# Patient Record
Sex: FEMALE | Race: WHITE | NOT HISPANIC OR LATINO | Employment: OTHER | URBAN - METROPOLITAN AREA
[De-identification: names, ages, dates, MRNs, and addresses within clinical notes are randomized per-mention and may not be internally consistent; named-entity substitution may affect disease eponyms.]

---

## 2017-01-27 ENCOUNTER — NURSE TRIAGE (OUTPATIENT)
Dept: TELEHEALTH | Age: 77
End: 2017-01-27

## 2017-03-11 ENCOUNTER — NURSE TRIAGE (OUTPATIENT)
Dept: TELEHEALTH | Age: 77
End: 2017-03-11

## 2017-03-17 ENCOUNTER — HOSPITAL ENCOUNTER (EMERGENCY)
Age: 77
Discharge: HOME | End: 2017-03-17
Attending: EMERGENCY MEDICINE

## 2017-03-17 ENCOUNTER — APPOINTMENT (OUTPATIENT)
Dept: GENERAL RADIOLOGY | Age: 77
End: 2017-03-17
Attending: EMERGENCY MEDICINE

## 2017-03-17 VITALS
TEMPERATURE: 98.9 F | HEIGHT: 62 IN | BODY MASS INDEX: 28.64 KG/M2 | HEART RATE: 73 BPM | SYSTOLIC BLOOD PRESSURE: 164 MMHG | OXYGEN SATURATION: 100 % | DIASTOLIC BLOOD PRESSURE: 70 MMHG | WEIGHT: 155.65 LBS | RESPIRATION RATE: 18 BRPM

## 2017-03-17 DIAGNOSIS — S62.101A RIGHT WRIST FRACTURE, CLOSED, INITIAL ENCOUNTER: Primary | ICD-10-CM

## 2017-03-17 LAB — GLUCOSE BLDC GLUCOMTR-MCNC: 184 MG/DL (ref 65–99)

## 2017-03-17 PROCEDURE — 29125 APPL SHORT ARM SPLINT STATIC: CPT | Performed by: EMERGENCY MEDICINE

## 2017-03-17 PROCEDURE — 73110 X-RAY EXAM OF WRIST: CPT

## 2017-03-17 PROCEDURE — 82962 GLUCOSE BLOOD TEST: CPT

## 2017-03-17 PROCEDURE — 99284 EMERGENCY DEPT VISIT MOD MDM: CPT | Performed by: NURSE PRACTITIONER

## 2017-03-17 PROCEDURE — 73090 X-RAY EXAM OF FOREARM: CPT | Performed by: RADIOLOGY

## 2017-03-17 PROCEDURE — 73110 X-RAY EXAM OF WRIST: CPT | Performed by: RADIOLOGY

## 2017-03-17 PROCEDURE — 73060 X-RAY EXAM OF HUMERUS: CPT

## 2017-03-17 PROCEDURE — 73090 X-RAY EXAM OF FOREARM: CPT

## 2017-03-17 PROCEDURE — A4565 SLINGS: HCPCS | Performed by: EMERGENCY MEDICINE

## 2017-03-17 PROCEDURE — 73060 X-RAY EXAM OF HUMERUS: CPT | Performed by: RADIOLOGY

## 2017-03-17 RX ORDER — HYDROCODONE BITARTRATE AND ACETAMINOPHEN 5; 325 MG/1; MG/1
1 TABLET ORAL ONCE
Status: COMPLETED | OUTPATIENT
Start: 2017-03-17 | End: 2017-03-17

## 2017-03-17 RX ORDER — HYDROCODONE BITARTRATE AND ACETAMINOPHEN 5; 325 MG/1; MG/1
1-2 TABLET ORAL EVERY 6 HOURS PRN
Qty: 12 TABLET | Refills: 0 | Status: SHIPPED | OUTPATIENT
Start: 2017-03-17

## 2017-03-17 RX ADMIN — HYDROCODONE BITARTRATE AND ACETAMINOPHEN 0.5 TABLET: 5; 325 TABLET ORAL at 10:15

## 2017-03-17 ASSESSMENT — ENCOUNTER SYMPTOMS
DIARRHEA: 0
ABDOMINAL PAIN: 0
HEADACHES: 0
COUGH: 0
NAUSEA: 0
CONFUSION: 0
WEAKNESS: 0
NUMBNESS: 0
CHILLS: 0
VOMITING: 0
FEVER: 0
SHORTNESS OF BREATH: 0
SORE THROAT: 0

## 2017-03-17 ASSESSMENT — PAIN SCALES - GENERAL
PAINLEVEL_OUTOF10: 2
PAINLEVEL_OUTOF10: 3
PAINLEVEL_OUTOF10: 8

## 2018-01-03 ENCOUNTER — NURSE TRIAGE (OUTPATIENT)
Dept: TELEHEALTH | Age: 78
End: 2018-01-03

## 2018-05-18 ENCOUNTER — HOSPITAL ENCOUNTER (INPATIENT)
Age: 78
LOS: 3 days | Discharge: NURSING, REHAB OR LONG-TERM CARE | DRG: 699 | End: 2018-05-22
Attending: INTERNAL MEDICINE | Admitting: INTERNAL MEDICINE
Payer: MEDICARE

## 2018-05-18 ENCOUNTER — APPOINTMENT (OUTPATIENT)
Dept: GENERAL RADIOLOGY | Age: 78
DRG: 699 | End: 2018-05-18
Attending: INTERNAL MEDICINE
Payer: MEDICARE

## 2018-05-18 ENCOUNTER — APPOINTMENT (OUTPATIENT)
Dept: CT IMAGING | Age: 78
DRG: 699 | End: 2018-05-18
Attending: INTERNAL MEDICINE
Payer: MEDICARE

## 2018-05-18 ENCOUNTER — APPOINTMENT (OUTPATIENT)
Dept: INTERVENTIONAL RADIOLOGY/VASCULAR | Age: 78
DRG: 699 | End: 2018-05-18
Attending: RADIOLOGY
Payer: MEDICARE

## 2018-05-18 DIAGNOSIS — N12 PYELONEPHRITIS: ICD-10-CM

## 2018-05-18 DIAGNOSIS — R10.9 RIGHT FLANK PAIN: ICD-10-CM

## 2018-05-18 DIAGNOSIS — T83.098A MALFUNCTION OF NEPHROSTOMY TUBE (HCC): Primary | ICD-10-CM

## 2018-05-18 PROBLEM — Z90.5 S/P NEPHRECTOMY: Status: ACTIVE | Noted: 2018-05-18

## 2018-05-18 PROBLEM — T83.022A NEPHROSTOMY TUBE DISPLACED (HCC): Status: ACTIVE | Noted: 2018-05-18

## 2018-05-18 LAB
ANION GAP SERPL CALC-SCNC: 9 MMOL/L (ref 3–18)
APPEARANCE UR: ABNORMAL
APTT PPP: 36.5 SEC (ref 23–36.4)
BACTERIA URNS QL MICRO: ABNORMAL /HPF
BASOPHILS # BLD: 0 K/UL (ref 0–0.06)
BASOPHILS NFR BLD: 0 % (ref 0–2)
BILIRUB UR QL: ABNORMAL
BUN SERPL-MCNC: 23 MG/DL (ref 7–18)
BUN/CREAT SERPL: 15 (ref 12–20)
CALCIUM SERPL-MCNC: 9 MG/DL (ref 8.5–10.1)
CHLORIDE SERPL-SCNC: 105 MMOL/L (ref 100–108)
CK MB CFR SERPL CALC: NORMAL % (ref 0–4)
CK MB SERPL-MCNC: <1 NG/ML (ref 5–25)
CK SERPL-CCNC: 67 U/L (ref 26–192)
CO2 SERPL-SCNC: 25 MMOL/L (ref 21–32)
COLOR UR: ABNORMAL
CREAT SERPL-MCNC: 1.58 MG/DL (ref 0.6–1.3)
DIFFERENTIAL METHOD BLD: ABNORMAL
EOSINOPHIL # BLD: 0.2 K/UL (ref 0–0.4)
EOSINOPHIL NFR BLD: 2 % (ref 0–5)
EPITH CASTS URNS QL MICRO: ABNORMAL /LPF (ref 0–5)
ERYTHROCYTE [DISTWIDTH] IN BLOOD BY AUTOMATED COUNT: 13.3 % (ref 11.6–14.5)
EST. AVERAGE GLUCOSE BLD GHB EST-MCNC: 151 MG/DL
GLUCOSE SERPL-MCNC: 157 MG/DL (ref 74–99)
GLUCOSE UR STRIP.AUTO-MCNC: 100 MG/DL
HBA1C MFR BLD: 6.9 % (ref 4.5–5.6)
HCT VFR BLD AUTO: 39.9 % (ref 35–45)
HGB BLD-MCNC: 13 G/DL (ref 12–16)
HGB UR QL STRIP: ABNORMAL
INR PPP: 1 (ref 0.8–1.2)
KETONES UR QL STRIP.AUTO: NEGATIVE MG/DL
LEUKOCYTE ESTERASE UR QL STRIP.AUTO: ABNORMAL
LYMPHOCYTES # BLD: 1 K/UL (ref 0.9–3.6)
LYMPHOCYTES NFR BLD: 10 % (ref 21–52)
MAGNESIUM SERPL-MCNC: 1.7 MG/DL (ref 1.6–2.6)
MCH RBC QN AUTO: 29.3 PG (ref 24–34)
MCHC RBC AUTO-ENTMCNC: 32.6 G/DL (ref 31–37)
MCV RBC AUTO: 90.1 FL (ref 74–97)
MONOCYTES # BLD: 0.8 K/UL (ref 0.05–1.2)
MONOCYTES NFR BLD: 8 % (ref 3–10)
NEUTS SEG # BLD: 8 K/UL (ref 1.8–8)
NEUTS SEG NFR BLD: 80 % (ref 40–73)
NITRITE UR QL STRIP.AUTO: POSITIVE
PH UR STRIP: 5.5 [PH] (ref 5–8)
PLATELET # BLD AUTO: 229 K/UL (ref 135–420)
PMV BLD AUTO: 9.3 FL (ref 9.2–11.8)
POTASSIUM SERPL-SCNC: 4.3 MMOL/L (ref 3.5–5.5)
PROT UR STRIP-MCNC: 100 MG/DL
PROTHROMBIN TIME: 12.9 SEC (ref 11.5–15.2)
RBC # BLD AUTO: 4.43 M/UL (ref 4.2–5.3)
RBC #/AREA URNS HPF: ABNORMAL /HPF (ref 0–5)
SODIUM SERPL-SCNC: 139 MMOL/L (ref 136–145)
SP GR UR REFRACTOMETRY: 1.02 (ref 1–1.03)
TROPONIN I SERPL-MCNC: <0.02 NG/ML (ref 0–0.06)
UROBILINOGEN UR QL STRIP.AUTO: 0.2 EU/DL (ref 0.2–1)
WBC # BLD AUTO: 10 K/UL (ref 4.6–13.2)
WBC URNS QL MICRO: ABNORMAL /HPF (ref 0–5)

## 2018-05-18 PROCEDURE — 85025 COMPLETE CBC W/AUTO DIFF WBC: CPT | Performed by: INTERNAL MEDICINE

## 2018-05-18 PROCEDURE — 87040 BLOOD CULTURE FOR BACTERIA: CPT | Performed by: INTERNAL MEDICINE

## 2018-05-18 PROCEDURE — 74176 CT ABD & PELVIS W/O CONTRAST: CPT

## 2018-05-18 PROCEDURE — 74018 RADEX ABDOMEN 1 VIEW: CPT

## 2018-05-18 PROCEDURE — 83036 HEMOGLOBIN GLYCOSYLATED A1C: CPT | Performed by: INTERNAL MEDICINE

## 2018-05-18 PROCEDURE — 81001 URINALYSIS AUTO W/SCOPE: CPT | Performed by: INTERNAL MEDICINE

## 2018-05-18 PROCEDURE — 74011636320 HC RX REV CODE- 636/320: Performed by: RADIOLOGY

## 2018-05-18 PROCEDURE — 87186 SC STD MICRODIL/AGAR DIL: CPT | Performed by: INTERNAL MEDICINE

## 2018-05-18 PROCEDURE — 87070 CULTURE OTHR SPECIMN AEROBIC: CPT | Performed by: INTERNAL MEDICINE

## 2018-05-18 PROCEDURE — 83735 ASSAY OF MAGNESIUM: CPT | Performed by: INTERNAL MEDICINE

## 2018-05-18 PROCEDURE — C1769 GUIDE WIRE: HCPCS

## 2018-05-18 PROCEDURE — 87086 URINE CULTURE/COLONY COUNT: CPT | Performed by: INTERNAL MEDICINE

## 2018-05-18 PROCEDURE — 93005 ELECTROCARDIOGRAM TRACING: CPT

## 2018-05-18 PROCEDURE — 87077 CULTURE AEROBIC IDENTIFY: CPT | Performed by: INTERNAL MEDICINE

## 2018-05-18 PROCEDURE — 99152 MOD SED SAME PHYS/QHP 5/>YRS: CPT

## 2018-05-18 PROCEDURE — 74011250636 HC RX REV CODE- 250/636: Performed by: INTERNAL MEDICINE

## 2018-05-18 PROCEDURE — 85730 THROMBOPLASTIN TIME PARTIAL: CPT | Performed by: INTERNAL MEDICINE

## 2018-05-18 PROCEDURE — 80048 BASIC METABOLIC PNL TOTAL CA: CPT | Performed by: INTERNAL MEDICINE

## 2018-05-18 PROCEDURE — 96374 THER/PROPH/DIAG INJ IV PUSH: CPT

## 2018-05-18 PROCEDURE — 0T25X0Z CHANGE DRAINAGE DEVICE IN KIDNEY, EXTERNAL APPROACH: ICD-10-PCS | Performed by: RADIOLOGY

## 2018-05-18 PROCEDURE — 85610 PROTHROMBIN TIME: CPT | Performed by: INTERNAL MEDICINE

## 2018-05-18 PROCEDURE — 99285 EMERGENCY DEPT VISIT HI MDM: CPT

## 2018-05-18 PROCEDURE — 82550 ASSAY OF CK (CPK): CPT | Performed by: INTERNAL MEDICINE

## 2018-05-18 PROCEDURE — 71045 X-RAY EXAM CHEST 1 VIEW: CPT

## 2018-05-18 PROCEDURE — 74011000250 HC RX REV CODE- 250: Performed by: RADIOLOGY

## 2018-05-18 PROCEDURE — 74011250636 HC RX REV CODE- 250/636: Performed by: RADIOLOGY

## 2018-05-18 RX ORDER — MORPHINE SULFATE 10 MG/ML
2 INJECTION, SOLUTION INTRAMUSCULAR; INTRAVENOUS
Status: DISCONTINUED | OUTPATIENT
Start: 2018-05-18 | End: 2018-05-22 | Stop reason: HOSPADM

## 2018-05-18 RX ORDER — LEVOFLOXACIN 5 MG/ML
750 INJECTION, SOLUTION INTRAVENOUS ONCE
Status: COMPLETED | OUTPATIENT
Start: 2018-05-18 | End: 2018-05-18

## 2018-05-18 RX ORDER — LEVOFLOXACIN 5 MG/ML
750 INJECTION, SOLUTION INTRAVENOUS EVERY 24 HOURS
Status: DISCONTINUED | OUTPATIENT
Start: 2018-05-18 | End: 2018-05-19

## 2018-05-18 RX ORDER — FLUMAZENIL 0.1 MG/ML
0.2 INJECTION INTRAVENOUS
Status: DISCONTINUED | OUTPATIENT
Start: 2018-05-18 | End: 2018-05-18 | Stop reason: ALTCHOICE

## 2018-05-18 RX ORDER — SODIUM CHLORIDE 9 MG/ML
100 INJECTION, SOLUTION INTRAVENOUS CONTINUOUS
Status: DISCONTINUED | OUTPATIENT
Start: 2018-05-18 | End: 2018-05-21

## 2018-05-18 RX ORDER — FENTANYL CITRATE 50 UG/ML
25-200 INJECTION, SOLUTION INTRAMUSCULAR; INTRAVENOUS
Status: DISCONTINUED | OUTPATIENT
Start: 2018-05-18 | End: 2018-05-18 | Stop reason: ALTCHOICE

## 2018-05-18 RX ORDER — DOCUSATE SODIUM 100 MG/1
100 CAPSULE, LIQUID FILLED ORAL 2 TIMES DAILY
Status: DISCONTINUED | OUTPATIENT
Start: 2018-05-18 | End: 2018-05-22 | Stop reason: HOSPADM

## 2018-05-18 RX ORDER — HYDROCODONE BITARTRATE AND ACETAMINOPHEN 5; 325 MG/1; MG/1
TABLET ORAL
COMMUNITY
End: 2018-05-22

## 2018-05-18 RX ORDER — ACETAMINOPHEN 325 MG/1
650 TABLET ORAL
Status: DISCONTINUED | OUTPATIENT
Start: 2018-05-18 | End: 2018-05-22 | Stop reason: HOSPADM

## 2018-05-18 RX ORDER — DIPHENHYDRAMINE HYDROCHLORIDE 50 MG/ML
12.5 INJECTION, SOLUTION INTRAMUSCULAR; INTRAVENOUS
Status: DISCONTINUED | OUTPATIENT
Start: 2018-05-18 | End: 2018-05-22 | Stop reason: HOSPADM

## 2018-05-18 RX ORDER — MIDAZOLAM HYDROCHLORIDE 1 MG/ML
.5-4 INJECTION, SOLUTION INTRAMUSCULAR; INTRAVENOUS
Status: DISCONTINUED | OUTPATIENT
Start: 2018-05-18 | End: 2018-05-18 | Stop reason: ALTCHOICE

## 2018-05-18 RX ORDER — INSULIN LISPRO 100 [IU]/ML
INJECTION, SOLUTION INTRAVENOUS; SUBCUTANEOUS
Status: DISCONTINUED | OUTPATIENT
Start: 2018-05-18 | End: 2018-05-19

## 2018-05-18 RX ORDER — NALOXONE HYDROCHLORIDE 0.4 MG/ML
0.1 INJECTION, SOLUTION INTRAMUSCULAR; INTRAVENOUS; SUBCUTANEOUS AS NEEDED
Status: DISCONTINUED | OUTPATIENT
Start: 2018-05-18 | End: 2018-05-18 | Stop reason: ALTCHOICE

## 2018-05-18 RX ORDER — OXYCODONE AND ACETAMINOPHEN 5; 325 MG/1; MG/1
1 TABLET ORAL
Status: DISCONTINUED | OUTPATIENT
Start: 2018-05-18 | End: 2018-05-22 | Stop reason: HOSPADM

## 2018-05-18 RX ORDER — DEXTROSE 50 % IN WATER (D50W) INTRAVENOUS SYRINGE
25-50 AS NEEDED
Status: DISCONTINUED | OUTPATIENT
Start: 2018-05-18 | End: 2018-05-22 | Stop reason: HOSPADM

## 2018-05-18 RX ORDER — LIDOCAINE HYDROCHLORIDE 10 MG/ML
1-20 INJECTION INFILTRATION; PERINEURAL
Status: DISCONTINUED | OUTPATIENT
Start: 2018-05-18 | End: 2018-05-18 | Stop reason: ALTCHOICE

## 2018-05-18 RX ORDER — MAGNESIUM SULFATE 100 %
4 CRYSTALS MISCELLANEOUS AS NEEDED
Status: DISCONTINUED | OUTPATIENT
Start: 2018-05-18 | End: 2018-05-22 | Stop reason: HOSPADM

## 2018-05-18 RX ORDER — NALOXONE HYDROCHLORIDE 0.4 MG/ML
0.4 INJECTION, SOLUTION INTRAMUSCULAR; INTRAVENOUS; SUBCUTANEOUS AS NEEDED
Status: DISCONTINUED | OUTPATIENT
Start: 2018-05-18 | End: 2018-05-22 | Stop reason: HOSPADM

## 2018-05-18 RX ORDER — FENTANYL CITRATE 50 UG/ML
50 INJECTION, SOLUTION INTRAMUSCULAR; INTRAVENOUS
Status: COMPLETED | OUTPATIENT
Start: 2018-05-18 | End: 2018-05-18

## 2018-05-18 RX ORDER — HEPARIN SODIUM 5000 [USP'U]/ML
5000 INJECTION, SOLUTION INTRAVENOUS; SUBCUTANEOUS EVERY 8 HOURS
Status: DISCONTINUED | OUTPATIENT
Start: 2018-05-18 | End: 2018-05-22 | Stop reason: HOSPADM

## 2018-05-18 RX ORDER — ONDANSETRON 2 MG/ML
4 INJECTION INTRAMUSCULAR; INTRAVENOUS
Status: DISCONTINUED | OUTPATIENT
Start: 2018-05-18 | End: 2018-05-22 | Stop reason: HOSPADM

## 2018-05-18 RX ADMIN — MIDAZOLAM HYDROCHLORIDE 0.5 MG: 1 INJECTION, SOLUTION INTRAMUSCULAR; INTRAVENOUS at 22:42

## 2018-05-18 RX ADMIN — FENTANYL CITRATE 50 MCG: 50 INJECTION, SOLUTION INTRAMUSCULAR; INTRAVENOUS at 19:30

## 2018-05-18 RX ADMIN — HEPARIN SODIUM 5000 UNITS: 5000 INJECTION, SOLUTION INTRAVENOUS; SUBCUTANEOUS at 23:21

## 2018-05-18 RX ADMIN — LEVOFLOXACIN 750 MG: 5 INJECTION, SOLUTION INTRAVENOUS at 21:20

## 2018-05-18 RX ADMIN — LIDOCAINE HYDROCHLORIDE 1 ML: 10 INJECTION, SOLUTION INFILTRATION; PERINEURAL at 22:51

## 2018-05-18 RX ADMIN — SODIUM CHLORIDE 100 ML/HR: 900 INJECTION, SOLUTION INTRAVENOUS at 21:21

## 2018-05-18 RX ADMIN — FENTANYL CITRATE 50 MCG: 50 INJECTION, SOLUTION INTRAMUSCULAR; INTRAVENOUS at 22:30

## 2018-05-18 RX ADMIN — IOPAMIDOL 4 ML: 612 INJECTION, SOLUTION INTRAVENOUS at 22:51

## 2018-05-18 RX ADMIN — FENTANYL CITRATE 25 MCG: 50 INJECTION, SOLUTION INTRAMUSCULAR; INTRAVENOUS at 22:42

## 2018-05-18 RX ADMIN — MIDAZOLAM HYDROCHLORIDE 1 MG: 1 INJECTION, SOLUTION INTRAMUSCULAR; INTRAVENOUS at 22:30

## 2018-05-18 RX ADMIN — SODIUM CHLORIDE 1000 MG: 900 INJECTION, SOLUTION INTRAVENOUS at 23:22

## 2018-05-18 NOTE — H&P
History & Physical    Patient: Maximino Ribera MRN: [id number]  CSN: [id number]    YOB: 1940  Age: 65 y.o. Sex: female      DOA: 5/18/2018    Chief Complaint:   Chief Complaint   Patient presents with    Other     Kidney catheter drainage problem          HPI:     Maximino Ribera is a 65 y.o.  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 No pertinent family history. Social History     Social History    Marital status:      Spouse name: N/A    Number of children: N/A    Years of education: N/A     Social History Main Topics    Smoking status: Never Smoker    Smokeless tobacco: Never Used    Alcohol use No    Drug use: None    Sexual activity: Not Asked     Other Topics Concern    None     Social History Narrative    None       Prior to Admission medications    Medication Sig Start Date End Date Taking? Authorizing Provider   levothyroxine sodium (SYNTHROID PO) Take  by mouth. Yes Dc Hopper MD   METOPROLOL TARTRATE PO Take  by mouth. Yes Dc Hopper MD   GABAPENTIN, BULK, by Does Not Apply route.    Yes Dc Hopper MD   atorvastatin calcium (ATORVASTATIN PO) Take  by mouth.   Yes Dc Hopper MD   SODIUM BICARBONATE, BULK, by Does Not Apply route. Yes Dc Hopper MD   HYDROcodone-acetaminophen (NORCO) 5-325 mg per tablet Take  by mouth. Yes Dc Hopper MD   tramadol HCl (TRAMADOL PO) Take  by mouth. Yes Dc Hopper MD   OTHER Indications: reports takes anxiety medication and muscle relaxant   Yes Dc Hopper MD       Allergies   Allergen Reactions    Pcn [Penicillins] Rash         Review of Systems  GENERAL: Patient alert, awake and oriented times 3, able to communicate full sentences and not in distress. HEENT: No change in vision, no earache, tinnitus, sore throat or sinus congestion. NECK: No pain or stiffness. PULMONARY: No shortness of breath, cough or wheeze. Cardiovascular: no pnd or orthopnea, no CP  GASTROINTESTINAL: + abdominal pain, +nausea, vomiting or diarrhea, melena or bright red blood per rectum. GENITOURINARY:   +urinary frequency, urgency, hesitancy or dysuria. Through tube and change in urine order   MUSCULOSKELETAL: No joint or muscle pain, no back pain, no recent trauma. DERMATOLOGIC: No rash, no itching, no lesions. ENDOCRINE: No polyuria, polydipsia, no heat or cold intolerance. No recent change in weight. HEMATOLOGICAL: No anemia or easy bruising or bleeding. NEUROLOGIC: No headache, seizures, numbness, tingling +weakness. Physical Exam:     Physical Exam:  Visit Vitals    /68    Pulse 88    Temp 98.1 °F (36.7 °C)    Resp 16    Ht 5' 2\" (1.575 m)    Wt 70.3 kg (155 lb)    SpO2 99%    BMI 28.35 kg/m2      O2 Device: Room air    Temp (24hrs), Av.1 °F (36.7 °C), Min:98.1 °F (36.7 °C), Max:98.1 °F (36.7 °C)             General:  Alert, cooperative, no distress, appears stated age. Head: Normocephalic, without obvious abnormality, atraumatic. Eyes:  Conjunctivae/corneas clear. PERRL, EOMs intact. Nose: Nares normal. No drainage or sinus tenderness.    Neck: Supple, symmetrical, trachea midline, no adenopathy, thyroid: no enlargement, no carotid bruit and no JVD. Lungs:   Clear to auscultation bilaterally. Heart:  Regular rate and rhythm, S1, S2 normal.     Abdomen: Soft, non-tender. Bowel sounds normal. Nephrostomy tube with purulent drainage around opening urine bag with thick purulent urine  Colostomy site in abdomen intact    Extremities: Extremities normal, atraumatic, no cyanosis or edema. Pulses: 2+ and symmetric all extremities. Skin:  +rashes around nephrostomy tube or lesions   Neurologic: AAOx3, No focal motor or sensory deficit. Labs Reviewed:  Recent Results (from the past 24 hour(s))   EKG, 12 LEAD, INITIAL    Collection Time: 05/18/18  3:48 PM   Result Value Ref Range    Ventricular Rate 84 BPM    Atrial Rate 84 BPM    P-R Interval 160 ms    QRS Duration 76 ms    Q-T Interval 376 ms    QTC Calculation (Bezet) 444 ms    Calculated P Axis 58 degrees    Calculated R Axis 68 degrees    Calculated T Axis 59 degrees    Diagnosis       Normal sinus rhythm  Normal ECG  No previous ECGs available     CBC WITH AUTOMATED DIFF    Collection Time: 05/18/18  4:36 PM   Result Value Ref Range    WBC 10.0 4.6 - 13.2 K/uL    RBC 4.43 4.20 - 5.30 M/uL    HGB 13.0 12.0 - 16.0 g/dL    HCT 39.9 35.0 - 45.0 %    MCV 90.1 74.0 - 97.0 FL    MCH 29.3 24.0 - 34.0 PG    MCHC 32.6 31.0 - 37.0 g/dL    RDW 13.3 11.6 - 14.5 %    PLATELET 723 992 - 895 K/uL    MPV 9.3 9.2 - 11.8 FL    NEUTROPHILS 80 (H) 40 - 73 %    LYMPHOCYTES 10 (L) 21 - 52 %    MONOCYTES 8 3 - 10 %    EOSINOPHILS 2 0 - 5 %    BASOPHILS 0 0 - 2 %    ABS. NEUTROPHILS 8.0 1.8 - 8.0 K/UL    ABS. LYMPHOCYTES 1.0 0.9 - 3.6 K/UL    ABS. MONOCYTES 0.8 0.05 - 1.2 K/UL    ABS. EOSINOPHILS 0.2 0.0 - 0.4 K/UL    ABS.  BASOPHILS 0.0 0.0 - 0.06 K/UL    DF AUTOMATED     METABOLIC PANEL, BASIC    Collection Time: 05/18/18  4:36 PM   Result Value Ref Range    Sodium 139 136 - 145 mmol/L    Potassium 4.3 3.5 - 5.5 mmol/L    Chloride 105 100 - 108 mmol/L    CO2 25 21 - 32 mmol/L    Anion gap 9 3.0 - 18 mmol/L    Glucose 157 (H) 74 - 99 mg/dL    BUN 23 (H) 7.0 - 18 MG/DL    Creatinine 1.58 (H) 0.6 - 1.3 MG/DL    BUN/Creatinine ratio 15 12 - 20      GFR est AA 38 (L) >60 ml/min/1.73m2    GFR est non-AA 32 (L) >60 ml/min/1.73m2    Calcium 9.0 8.5 - 10.1 MG/DL   MAGNESIUM    Collection Time: 05/18/18  4:36 PM   Result Value Ref Range    Magnesium 1.7 1.6 - 2.6 mg/dL   CARDIAC PANEL,(CK, CKMB & TROPONIN)    Collection Time: 05/18/18  4:36 PM   Result Value Ref Range    CK 67 26 - 192 U/L    CK - MB <1.0 <3.6 ng/ml    CK-MB Index  0.0 - 4.0 %     CALCULATION NOT PERFORMED WHEN RESULT IS BELOW LINEAR LIMIT    Troponin-I, Qt. <0.02 0.00 - 0.06 NG/ML   URINALYSIS W/ RFLX MICROSCOPIC    Collection Time: 05/18/18  5:00 PM   Result Value Ref Range    Color CESAR      Appearance CLOUDY      Specific gravity 1.020 1.003 - 1.030      pH (UA) 5.5 5.0 - 8.0      Protein 100 (A) NEG mg/dL    Glucose 100 (A) NEG mg/dL    Ketone NEGATIVE  NEG mg/dL    Bilirubin SMALL (A) NEG      Blood LARGE (A) NEG      Urobilinogen 0.2 0.2 - 1.0 EU/dL    Nitrites POSITIVE (A) NEG      Leukocyte Esterase LARGE (A) NEG     URINE MICROSCOPIC ONLY    Collection Time: 05/18/18  5:00 PM   Result Value Ref Range    WBC TOO NUMEROUS TO COUNT 0 - 5 /hpf    RBC TOO NUMEROUS TO COUNT 0 - 5 /hpf    Epithelial cells FEW 0 - 5 /lpf    Bacteria 3+ (A) NEG /hpf     All lab results for the last 24 hours reviewed.   Recent Results (from the past 24 hour(s))   EKG, 12 LEAD, INITIAL    Collection Time: 05/18/18  3:48 PM   Result Value Ref Range    Ventricular Rate 84 BPM    Atrial Rate 84 BPM    P-R Interval 160 ms    QRS Duration 76 ms    Q-T Interval 376 ms    QTC Calculation (Bezet) 444 ms    Calculated P Axis 58 degrees    Calculated R Axis 68 degrees    Calculated T Axis 59 degrees    Diagnosis       Normal sinus rhythm  Normal ECG  No previous ECGs available     CBC WITH AUTOMATED DIFF    Collection Time: 05/18/18  4:36 PM Result Value Ref Range    WBC 10.0 4.6 - 13.2 K/uL    RBC 4.43 4.20 - 5.30 M/uL    HGB 13.0 12.0 - 16.0 g/dL    HCT 39.9 35.0 - 45.0 %    MCV 90.1 74.0 - 97.0 FL    MCH 29.3 24.0 - 34.0 PG    MCHC 32.6 31.0 - 37.0 g/dL    RDW 13.3 11.6 - 14.5 %    PLATELET 644 603 - 930 K/uL    MPV 9.3 9.2 - 11.8 FL    NEUTROPHILS 80 (H) 40 - 73 %    LYMPHOCYTES 10 (L) 21 - 52 %    MONOCYTES 8 3 - 10 %    EOSINOPHILS 2 0 - 5 %    BASOPHILS 0 0 - 2 %    ABS. NEUTROPHILS 8.0 1.8 - 8.0 K/UL    ABS. LYMPHOCYTES 1.0 0.9 - 3.6 K/UL    ABS. MONOCYTES 0.8 0.05 - 1.2 K/UL    ABS. EOSINOPHILS 0.2 0.0 - 0.4 K/UL    ABS.  BASOPHILS 0.0 0.0 - 0.06 K/UL    DF AUTOMATED     METABOLIC PANEL, BASIC    Collection Time: 05/18/18  4:36 PM   Result Value Ref Range    Sodium 139 136 - 145 mmol/L    Potassium 4.3 3.5 - 5.5 mmol/L    Chloride 105 100 - 108 mmol/L    CO2 25 21 - 32 mmol/L    Anion gap 9 3.0 - 18 mmol/L    Glucose 157 (H) 74 - 99 mg/dL    BUN 23 (H) 7.0 - 18 MG/DL    Creatinine 1.58 (H) 0.6 - 1.3 MG/DL    BUN/Creatinine ratio 15 12 - 20      GFR est AA 38 (L) >60 ml/min/1.73m2    GFR est non-AA 32 (L) >60 ml/min/1.73m2    Calcium 9.0 8.5 - 10.1 MG/DL   MAGNESIUM    Collection Time: 05/18/18  4:36 PM   Result Value Ref Range    Magnesium 1.7 1.6 - 2.6 mg/dL   CARDIAC PANEL,(CK, CKMB & TROPONIN)    Collection Time: 05/18/18  4:36 PM   Result Value Ref Range    CK 67 26 - 192 U/L    CK - MB <1.0 <3.6 ng/ml    CK-MB Index  0.0 - 4.0 %     CALCULATION NOT PERFORMED WHEN RESULT IS BELOW LINEAR LIMIT    Troponin-I, Qt. <0.02 0.00 - 0.06 NG/ML   URINALYSIS W/ RFLX MICROSCOPIC    Collection Time: 05/18/18  5:00 PM   Result Value Ref Range    Color CESAR      Appearance CLOUDY      Specific gravity 1.020 1.003 - 1.030      pH (UA) 5.5 5.0 - 8.0      Protein 100 (A) NEG mg/dL    Glucose 100 (A) NEG mg/dL    Ketone NEGATIVE  NEG mg/dL    Bilirubin SMALL (A) NEG      Blood LARGE (A) NEG      Urobilinogen 0.2 0.2 - 1.0 EU/dL    Nitrites POSITIVE (A) NEG Leukocyte Esterase LARGE (A) NEG     URINE MICROSCOPIC ONLY    Collection Time: 05/18/18  5:00 PM   Result Value Ref Range    WBC TOO NUMEROUS TO COUNT 0 - 5 /hpf    RBC TOO NUMEROUS TO COUNT 0 - 5 /hpf    Epithelial cells FEW 0 - 5 /lpf    Bacteria 3+ (A) NEG /hpf    and EKG  CT Results  (Last 48 hours)               05/18/18 1844  CT ABD PELV WO CONT Final result    Impression:  IMPRESSION:       1. There is a nephrostomy tube in the lower pole the right kidney. It is   unclear whether this nephrostomy tube is functioning since there is significant   hydronephrosis with perinephric stranding. Perinephric stranding raising the   possibility of nephritis. Postsurgical changes in the pelvis and a left lower quadrant colostomy. Prior left nephrectomy and probable left adrenalectomy           Narrative:  EXAM: CT of the abdomen and pelvis       INDICATION: Right flank pain       COMPARISON: None. TECHNIQUE: Axial CT imaging of the abdomen and pelvis was performed without   intravenous contrast. Multiplanar reformats were generated. DOSE REDUCTION:  One or more dose reduction techniques were used on this CT:   automated exposure control, adjustment of the mAs and/or kVp according to   patient's size, and iterative reconstruction techniques. The specific techniques   utilized on this CT exam have been documented in the patient's electronic   medical record.       _______________       FINDINGS:       LOWER CHEST: Unremarkable. LIVER, BILIARY: Liver is normal. No biliary dilation. Cholecystectomy       PANCREAS: Normal.       SPLEEN: Normal.       ADRENALS: Right adrenal is unremarkable. The left adrenal not visualized and may   have been removed. KIDNEYS/URETERS: Left nephrectomy. Right kidney demonstrates moderate to   significant hydronephrosis with a right renal pelvis measuring 4.6 x 5 cm.  There   is a right-sided nephrostomy tube with the loop portion in the lower pole of the right kidney. It is unclear whether the side holes are in the renal collecting   system or in the renal parenchyma. Correlate as to whether this nephrostomy tube   demonstrates output. Given the significant hydronephrosis as it is unclear   whether this nephrostomy tube is functioning. In addition there is stranding   around the right kidney suggesting nephritis. VASCULATURE: Significant calcific atherosclerosis present       LYMPH NODES: No enlarged lymph nodes. GASTRO INTESTINAL TRACT: There is a left lower quadrant colostomy. There has   been resection of the descending and sigmoid colon. There is no bowel   obstruction. PELVIC ORGANS/BLADDER: Cystectomy. Hysterectomy. 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 Correlate with prior cross-sectional imaging   for change. BONES: No acute or aggressive osseous abnormalities identified. Significant   osteopenia       OTHER: Small right lower quadrant ventral hernia containing one wall of cecum   without obstruction. _______________               Procedures/imaging: see electronic medical records for all procedures/Xrays and details which were not copied into this note but were reviewed prior to creation of Plan      Assessment/Plan     Active Problems:    * No active hospital problems. *  1. Vipin nephritis   2. Malfunctioning Nephrostomy Tube  3. DM   4. Legal Blindness  5. Non ambulatory  6. Hx of Cervical cancer with secondary neprhostomy and colostomy tube     Plan  Gentle fluids  IV levaquin/Vanc  IR consult for tube exchange  Wound Care consult   Sliding scale insulin     DVT/GI Prophylaxis: Hep SQ    Discussed with patient at bedside about hospital admission and my plan care, who understood and agree with my plan care.     Starla Oliveira MD  5/18/2018 7:56 PM

## 2018-05-18 NOTE — ED TRIAGE NOTES
Patient presents today with pain and irritation around her kidney tube drainage sight. Patients son is with patient and denies seeing any drainage around bandage sight but has reported decreased urine drainage    Sepsis Screening completed    (  )Patient meets SIRS criteria. ( x )Patient does not meet SIRS criteria.       SIRS Criteria is achieved when two or more of the following are present   Temperature < 96.8°F (36°C) or > 100.9°F (38.3°C)   Heart Rate > 90 beats per minute   Respiratory Rate > 20 breaths per minute   WBC count > 12,000 or <4,000 or > 10% bands

## 2018-05-18 NOTE — ED PROVIDER NOTES
EMERGENCY DEPARTMENT HISTORY AND PHYSICAL EXAM    Date: 5/18/2018  Patient Name: Mp Zamora    History of Presenting Illness     Chief Complaint   Patient presents with    Other     Kidney catheter drainage problem         History Provided By: Patient    Chief Complaint: Back pain  Duration: 1 Weeks  Timing:  Gradual  Location: Around right kidney drainage tube  Quality: Aching and Pressure  Severity: 5 out of 10  Associated Symptoms: Cough, decreased appetite, and fever of 99.1 F    Additional History (Context):   3:20 PM  Mp Zamora is a 65 y.o. female with PMHX of vaginal and cervical cancer who presents to the emergency department C/O pressure/pain to the right back around surgical site of right kidney drainage tube for one week. Associated sxs include cough, decreased appetite, and fever of 99.1 F. Pt had surgery for kidney drainage tube in [place] and has not had it cleaned/changed since 4/5/18, usually apparatus is replaced every 6 weeks. PSHx bladder, vagina and colon removal. Pt last emptied her gupta 3 hours ago. Last PO was at 5:30 AM. Pt has colostomy bag. Pt denies any other sxs or complaints. PCP: None    Current Facility-Administered Medications   Medication Dose Route Frequency Provider Last Rate Last Dose    levoFLOXacin (LEVAQUIN) 750 mg in D5W IVPB  750 mg IntraVENous ONCE Nydia Garcia MD        vancomycin (VANCOCIN) 1,000 mg in 0.9% sodium chloride (MBP/ADV) 250 mL adv  1,000 mg IntraVENous ONCE Nydia Garcia MD        Vancomycin- Rx to Dose & Monitor  1 Each Other Rx Dosing/Monitoring Nydia Garcia MD         Current Outpatient Prescriptions   Medication Sig Dispense Refill    levothyroxine sodium (SYNTHROID PO) Take  by mouth.  METOPROLOL TARTRATE PO Take  by mouth.  GABAPENTIN, BULK, by Does Not Apply route.  atorvastatin calcium (ATORVASTATIN PO) Take  by mouth.  SODIUM BICARBONATE, BULK, by Does Not Apply route.       HYDROcodone-acetaminophen (NORCO) 5-325 mg per tablet Take  by mouth.  tramadol HCl (TRAMADOL PO) Take  by mouth.  OTHER Indications: reports takes anxiety medication and muscle relaxant         Past History     Past Medical History:  Past Medical History:   Diagnosis Date    Cancer ([facility] [address]     vaginal and colon cancer    Diabetes ([facility] [address]     Legally blind        Past Surgical History:  Past Surgical History:   Procedure Laterality Date    HX COLOSTOMY      HX OTHER SURGICAL      kidney drainage tube    HX UROLOGICAL      kidney removal       Family History:  History reviewed. No pertinent family history. Social History:  Social History   Substance Use Topics    Smoking status: Never Smoker    Smokeless tobacco: Never Used    Alcohol use No       Allergies: Allergies   Allergen Reactions    Pcn [Penicillins] Rash         Review of Systems   Review of Systems   Constitutional: Positive for fever. Negative for chills. Respiratory: Positive for cough. Musculoskeletal: Positive for back pain and myalgias. Skin: Positive for wound. All other systems reviewed and are negative. Physical Exam     Vitals:    05/18/18 1355 05/18/18 1742 05/18/18 1933   BP: 152/79 152/58 172/68   Pulse: 92 84 88   Resp: 18 13 16   Temp: 98.1 °F (36.7 °C)     SpO2: 99% 99% 99%   Weight: 70.3 kg (155 lb)     Height: 5' 2\" (1.575 m)       Physical Exam   Constitutional: She is oriented to person, place, and time. She appears well-developed and well-nourished. She appears distressed. HENT:   Head: Normocephalic and atraumatic. Right Ear: External ear normal.   Left Ear: External ear normal.   Nose: Nose normal.   Mouth/Throat: Oropharynx is clear and moist. Mucous membranes are dry. Eyes: Conjunctivae and EOM are normal. Pupils are equal, round, and reactive to light. Right eye exhibits no discharge. Left eye exhibits no discharge. No scleral icterus. Neck: Normal range of motion. Neck supple. No JVD present.  No tracheal deviation present. Cardiovascular: Normal rate, regular rhythm, normal heart sounds and intact distal pulses. Pulmonary/Chest: Effort normal. She has decreased breath sounds in the right lower field. Abdominal: Soft. Bowel sounds are normal. She exhibits no distension. There is tenderness in the right lower quadrant. There is no rebound and no guarding. No hernia. Colostomy llq area, w/o leakage, bleed or signs of infection. Multiple healed surgical scar. No HSM. Obese. Musculoskeletal: Normal range of motion. Right nephrostomy tube with dry dressing, no obvious bleed or drainage. Right flank tenderness, no guarding or rebound. Neurological: She is alert and oriented to person, place, and time. She has normal reflexes. No focal motor weakness. Skin: Skin is warm and dry. No rash noted. Psychiatric: She has a normal mood and affect. Her behavior is normal.   Nursing note and vitals reviewed. Diagnostic Study Results     Labs -     Recent Results (from the past 12 hour(s))   EKG, 12 LEAD, INITIAL    Collection Time: 05/18/18  3:48 PM   Result Value Ref Range    Ventricular Rate 84 BPM    Atrial Rate 84 BPM    P-R Interval 160 ms    QRS Duration 76 ms    Q-T Interval 376 ms    QTC Calculation (Bezet) 444 ms    Calculated P Axis 58 degrees    Calculated R Axis 68 degrees    Calculated T Axis 59 degrees    Diagnosis       Normal sinus rhythm  Normal ECG  No previous ECGs available     CBC WITH AUTOMATED DIFF    Collection Time: 05/18/18  4:36 PM   Result Value Ref Range    WBC 10.0 4.6 - 13.2 K/uL    RBC 4.43 4.20 - 5.30 M/uL    HGB 13.0 12.0 - 16.0 g/dL    HCT 39.9 35.0 - 45.0 %    MCV 90.1 74.0 - 97.0 FL    MCH 29.3 24.0 - 34.0 PG    MCHC 32.6 31.0 - 37.0 g/dL    RDW 13.3 11.6 - 14.5 %    PLATELET 526 739 - 461 K/uL    MPV 9.3 9.2 - 11.8 FL    NEUTROPHILS 80 (H) 40 - 73 %    LYMPHOCYTES 10 (L) 21 - 52 %    MONOCYTES 8 3 - 10 %    EOSINOPHILS 2 0 - 5 %    BASOPHILS 0 0 - 2 %    ABS.  NEUTROPHILS 8.0 1.8 - 8.0 K/UL    ABS. LYMPHOCYTES 1.0 0.9 - 3.6 K/UL    ABS. MONOCYTES 0.8 0.05 - 1.2 K/UL    ABS. EOSINOPHILS 0.2 0.0 - 0.4 K/UL    ABS.  BASOPHILS 0.0 0.0 - 0.06 K/UL    DF AUTOMATED     METABOLIC PANEL, BASIC    Collection Time: 05/18/18  4:36 PM   Result Value Ref Range    Sodium 139 136 - 145 mmol/L    Potassium 4.3 3.5 - 5.5 mmol/L    Chloride 105 100 - 108 mmol/L    CO2 25 21 - 32 mmol/L    Anion gap 9 3.0 - 18 mmol/L    Glucose 157 (H) 74 - 99 mg/dL    BUN 23 (H) 7.0 - 18 MG/DL    Creatinine 1.58 (H) 0.6 - 1.3 MG/DL    BUN/Creatinine ratio 15 12 - 20      GFR est AA 38 (L) >60 ml/min/1.73m2    GFR est non-AA 32 (L) >60 ml/min/1.73m2    Calcium 9.0 8.5 - 10.1 MG/DL   MAGNESIUM    Collection Time: 05/18/18  4:36 PM   Result Value Ref Range    Magnesium 1.7 1.6 - 2.6 mg/dL   CARDIAC PANEL,(CK, CKMB & TROPONIN)    Collection Time: 05/18/18  4:36 PM   Result Value Ref Range    CK 67 26 - 192 U/L    CK - MB <1.0 <3.6 ng/ml    CK-MB Index  0.0 - 4.0 %     CALCULATION NOT PERFORMED WHEN RESULT IS BELOW LINEAR LIMIT    Troponin-I, Qt. <0.02 0.00 - 0.06 NG/ML   URINALYSIS W/ RFLX MICROSCOPIC    Collection Time: 05/18/18  5:00 PM   Result Value Ref Range    Color CESAR      Appearance CLOUDY      Specific gravity 1.020 1.003 - 1.030      pH (UA) 5.5 5.0 - 8.0      Protein 100 (A) NEG mg/dL    Glucose 100 (A) NEG mg/dL    Ketone NEGATIVE  NEG mg/dL    Bilirubin SMALL (A) NEG      Blood LARGE (A) NEG      Urobilinogen 0.2 0.2 - 1.0 EU/dL    Nitrites POSITIVE (A) NEG      Leukocyte Esterase LARGE (A) NEG     URINE MICROSCOPIC ONLY    Collection Time: 05/18/18  5:00 PM   Result Value Ref Range    WBC TOO NUMEROUS TO COUNT 0 - 5 /hpf    RBC TOO NUMEROUS TO COUNT 0 - 5 /hpf    Epithelial cells FEW 0 - 5 /lpf    Bacteria 3+ (A) NEG /hpf       Radiologic Studies -     RADIOLOGY FINDINGS  Chest X-ray shows NAP  Pending review by Radiologist  Recorded by Melvi Beebe ED Scribe, as dictated by Nemo Thomas MD    RADIOLOGY FINDINGS  Abd X-ray shows nephrostomy tube in right flank area, multiple surgical clips  Pending review by Radiologist  Recorded by David Jimenez ED Scribe, as dictated by Sharonda Rivera MD       XR CHEST PORT    (Results Pending)   XR ABD PORT  1 V    (Results Pending)     CT Results  (Last 48 hours)               05/18/18 1844  CT ABD PELV WO CONT Final result    Impression:  IMPRESSION:       1. There is a nephrostomy tube in the lower pole the right kidney. It is   unclear whether this nephrostomy tube is functioning since there is significant   hydronephrosis with perinephric stranding. Perinephric stranding raising the   possibility of nephritis. Postsurgical changes in the pelvis and a left lower quadrant colostomy. Prior left nephrectomy and probable left adrenalectomy           Narrative:  EXAM: CT of the abdomen and pelvis       INDICATION: Right flank pain       COMPARISON: None. TECHNIQUE: Axial CT imaging of the abdomen and pelvis was performed without   intravenous contrast. Multiplanar reformats were generated. DOSE REDUCTION:  One or more dose reduction techniques were used on this CT:   automated exposure control, adjustment of the mAs and/or kVp according to   patient's size, and iterative reconstruction techniques. The specific techniques   utilized on this CT exam have been documented in the patient's electronic   medical record.       _______________       FINDINGS:       LOWER CHEST: Unremarkable. LIVER, BILIARY: Liver is normal. No biliary dilation. Cholecystectomy       PANCREAS: Normal.       SPLEEN: Normal.       ADRENALS: Right adrenal is unremarkable. The left adrenal not visualized and may   have been removed. KIDNEYS/URETERS: Left nephrectomy. Right kidney demonstrates moderate to   significant hydronephrosis with a right renal pelvis measuring 4.6 x 5 cm.  There   is a right-sided nephrostomy tube with the loop portion in the lower pole of the right kidney. It is unclear whether the side holes are in the renal collecting   system or in the renal parenchyma. Correlate as to whether this nephrostomy tube   demonstrates output. Given the significant hydronephrosis as it is unclear   whether this nephrostomy tube is functioning. In addition there is stranding   around the right kidney suggesting nephritis. VASCULATURE: Significant calcific atherosclerosis present       LYMPH NODES: No enlarged lymph nodes. GASTRO INTESTINAL TRACT: There is a left lower quadrant colostomy. There has   been resection of the descending and sigmoid colon. There is no bowel   obstruction. PELVIC ORGANS/BLADDER: Cystectomy. Hysterectomy. 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 Correlate with prior cross-sectional imaging   for change. BONES: No acute or aggressive osseous abnormalities identified. Significant   osteopenia       OTHER: Small right lower quadrant ventral hernia containing one wall of cecum   without obstruction.        _______________                 Medications given in the ED-  Medications   Vancomycin- Rx to Dose & Monitor (not administered)   glucose chewable tablet 16 g (not administered)   glucagon (GLUCAGEN) injection 1 mg (not administered)   dextrose (D50W) injection syrg 12.5-25 g (not administered)   ondansetron (ZOFRAN) injection 4 mg (not administered)   0.9% sodium chloride infusion (100 mL/hr IntraVENous New Bag 5/20/18 5807)   acetaminophen (TYLENOL) tablet 650 mg (not administered)   oxyCODONE-acetaminophen (PERCOCET) 5-325 mg per tablet 1 Tab (not administered)   morphine injection 2 mg (not administered)   naloxone (NARCAN) injection 0.4 mg (not administered)   diphenhydrAMINE (BENADRYL) injection 12.5 mg (not administered)   docusate sodium (COLACE) capsule 100 mg (100 mg Oral Given 5/20/18 7346)   heparin (porcine) injection 5,000 Units (5,000 Units SubCUTAneous Given 5/20/18 7045) atorvastatin (LIPITOR) tablet 10 mg (10 mg Oral Given 5/20/18 0842)   HYDROcodone-acetaminophen (NORCO) 5-325 mg per tablet 2 Tab (not administered)   LORazepam (ATIVAN) tablet 1 mg (not administered)   gabapentin (NEURONTIN) capsule 300 mg (300 mg Oral Given 5/20/18 0842)   vancomycin (VANCOCIN) 1,000 mg in 0.9% sodium chloride (MBP/ADV) 250 mL adv (1,000 mg IntraVENous New Bag 5/20/18 0626)   levoFLOXacin (LEVAQUIN) 750 mg in D5W IVPB (not administered)   insulin lispro (HUMALOG) injection (3 Units SubCUTAneous Given 5/20/18 0844)   fentaNYL citrate (PF) injection 50 mcg (50 mcg IntraVENous Given 5/18/18 1930)   levoFLOXacin (LEVAQUIN) 750 mg in D5W IVPB (750 mg IntraVENous New Bag 5/18/18 2120)   vancomycin (VANCOCIN) 1,000 mg in 0.9% sodium chloride (MBP/ADV) 250 mL adv (1,000 mg IntraVENous New Bag 5/18/18 2322)   iopamidol (ISOVUE 300) 61 % contrast injection 100 mL (4 mL IntraVENous Given 5/18/18 2251)         Medical Decision Making   I am the first provider for this patient. I reviewed the vital signs, available nursing notes, past medical history, past surgical history, family history and social history. Vital Signs-Reviewed the patient's vital signs. EKG interpretation: (Preliminary)  Rhythm: NSR. Rate (approx.): 84 bpm. No STEMI   EKG read by Reji Raygoza MD at 3:48 PM     Provider Notes (Medical Decision Making): Ddx- malfunctioning nephrostomy tube, UTI, obstruction, mass and abscess. Rule out obstruction, kidney stone and infiltrate. Procedures:  Procedures    ED Course:   3:20 PM Initial assessment performed. The patients presenting problems have been discussed, and they are in agreement with the care plan formulated and outlined with them. I have encouraged them to ask questions as they arise throughout their visit.     4:14 PM Discussed patient's history, exam, and available diagnostics results with Haris Martinez MD (urologist), who agrees with CT abd pelvic, basic blood work and flushing with sterile normal saline 10-20cc to irrigate nephrostomy tube. He would like call back after 5pm.     550 PM pt re-evaluated says still pain, but better when lies on flatter on stretcher. Given update on results and plan.    7:08 PM Spoke to radiologist who states the CT shows that the nephrostomy tube might not be in place and there is significant hydronephrosis and pyelonephritis. 7:26 PM Discussed patient's history, exam, and available diagnostics results with Dr. Vinita Flynn MD, urologist, who states they do not do nephrostomies, IR does. 7:47 PM Discussed patient's history, exam, and available diagnostics results with Norma Gray MD, interventional radiology, who will come in a couple hours to evaluate for intervention. 550 PM pt re-evaluated says rt flank pain is better, no other compltints. . Given update on results and furhther plan. 7:59 PM Discussed patient's history, exam, and available diagnostics results with Dr. Tod Stanley MD, internal medicine, who agrees to admit to Medical-Surgical.       Diagnosis and Disposition       Core Measures:  For Hospitalized Patients:    1. Hospitalization Decision Time:  The decision to hospitalize the patient was made by Kelsie Ramirez MD at 7:47 PM on 5/18/2018    2. Aspirin: Aspirin was not given because the patient did not present with a stroke at the time of their Emergency Department evaluation    3. Plan: Admit to Medical-Surgical    8:04 PM Patient is being admitted to the hospital by Dr. Tod Stanley MD (Hospitalist). The results of their tests and reasons for their admission have been discussed with them and/or available family. They convey agreement and understanding for the need to be admitted and for their admission diagnosis. Clinical Impression:    1. Malfunction of nephrostomy tube ([address]    2. Pyelonephritis    3. Right flank pain          _______________________________    Attestations:   This note is prepared by Rema Oakes and Clair Bueno, acting as Scribe for Erasmo Olivares MD .    Erasmo Olivares MD:  The scribe's documentation has been prepared under my direction and personally reviewed by me in its entirety.   I confirm that the note above accurately reflects all work, treatment, procedures, and medical decision making performed by me.  _______________________________

## 2018-05-18 NOTE — Clinical Note
Status[de-identified] Inpatient [101] Type of Bed: Medical [8] Inpatient Hospitalization Certified Necessary for the Following Reasons: 9. Other (further clarification in H&P documentation) Admitting Diagnosis: Malfunction of nephrostomy tube [facility]) [[id number]] Admitting Diagnosis: Pyelonephritis [[id number]] Admitting Diagnosis: S/p nephrectomy [[id number]] Admitting Diagnosis: Right flank pain [[id number]] Admitting Physician: Remedios Carrero [[id number]] Attending Physician: Remedios Carrero [[id number]] Estimated Length of Stay: 2 Midnights Discharge Plan[de-identified] Home with Office Follow-up

## 2018-05-18 NOTE — IP AVS SNAPSHOT
[address] [place] [address] 
[phone] Patient: Shirley Styles MRN: [id number] :[id number] About your hospitalization You were admitted on:  May 19, 2018 You last received care in the:  [address] [place] [address] You were discharged on:  May 22, 2018 Why you were hospitalized Your primary diagnosis was:  Pyelonephritis Your diagnoses also included:  S/P Nephrectomy, Right Flank Pain, Malfunction Of Nephrostomy Tube (Hcc), Nephrostomy Tube Displaced (Hcc) Follow-up Information Follow up With Details Comments Contact Info [place] VER[place] is responsible for making arrangements for the PCP visit while in-house. Chosen to continue managing your healthcare needs [address] [place] [address] 
[phone] Ladonna Navarrete MD Schedule an appointment as soon as possible for a visit in 4 weeks for established patient and nephrostomy tube change scheduling 111 [facility] 107 [facility] 150 
[phone] None   None (395) Patient stated that they have no PCP Discharge Orders None A check quin indicates which time of day the medication should be taken. My Medications START taking these medications Instructions Each Dose to Equal  
 Morning Noon Evening Bedtime  
 insulin glargine 100 unit/mL injection Commonly known as:  LANTUS Your last dose was: Your next dose is:    
   
   
 8 units SQ QHS  
     
   
   
   
  
 insulin lispro 100 unit/mL injection Commonly known as:  HUMALOG Your last dose was: Your next dose is:    
   
   
 Sliding scale per facility  
     
   
   
   
  
 levoFLOXacin 250 mg tablet Commonly known as:  Gerda Boeck Your last dose was: Your next dose is: Take 1 Tab by mouth daily.   
 250 mg  
    
   
   
   
  
 metroNIDAZOLE 500 mg tablet Commonly known as:  FLAGYL Your last dose was: Your next dose is: Take 1 Tab by mouth two (2) times a day for 5 days. 500 mg CHANGE how you take these medications Instructions Each Dose to Equal  
 Morning Noon Evening Bedtime  
 traMADol 50 mg tablet Commonly known as:  ULTRAM  
What changed:   
- medication strength 
- how much to take - when to take this 
- reasons to take this Your last dose was: Your next dose is: Take 1 Tab by mouth every six (6) hours as needed. Max Daily Amount: 200 mg.  
 50 mg CONTINUE taking these medications Instructions Each Dose to Equal  
 Morning Noon Evening Bedtime ATORVASTATIN PO Your last dose was: Your next dose is: Take  by mouth. GABAPENTIN (BULK) Your last dose was: Your next dose is:    
   
   
 by Does Not Apply route. METOPROLOL TARTRATE PO Your last dose was: Your next dose is: Take  by mouth. OTHER Your last dose was: Your next dose is:    
   
   
 Indications: reports takes anxiety medication and muscle relaxant SODIUM BICARBONATE (BULK) Your last dose was: Your next dose is:    
   
   
 by Does Not Apply route. SYNTHROID PO Your last dose was: Your next dose is: Take  by mouth. STOP taking these medications HYDROcodone-acetaminophen 5-325 mg per tablet Commonly known as:  Sweta Oliva Where to Get Your Medications Information on where to get these meds will be given to you by the nurse or doctor. ! Ask your nurse or doctor about these medications  
  insulin glargine 100 unit/mL injection insulin lispro 100 unit/mL injection  
 levoFLOXacin 250 mg tablet  
 metroNIDAZOLE 500 mg tablet  
 traMADol 50 mg tablet Opioid Education Prescription Opioids: What You Need to Know: 
 
Prescription opioids can be used to help relieve moderate-to-severe pain and are often prescribed following a surgery or injury, or for certain health conditions. These medications can be an important part of treatment but also come with serious risks. Opioids are strong pain medicines. Examples include hydrocodone, oxycodone, fentanyl, and morphine. Heroin is an example of an illegal opioid. It is important to work with your health care provider to make sure you are getting the safest, most effective care. WHAT ARE THE RISKS AND SIDE EFFECTS OF OPIOID USE? Prescription opioids carry serious risks of addiction and overdose, especially with prolonged use. An opioid overdose, often marked by slow breathing, can cause sudden death. The use of prescription opioids can have a number of side effects as well, even when taken as directed. · Tolerance-meaning you might need to take more of a medication for the same pain relief · Physical dependence-meaning you have symptoms of withdrawal when the medication is stopped. Withdrawal symptoms can include nausea, sweating, chills, diarrhea, stomach cramps, and muscle aches. Withdrawal can last up to several weeks, depending on which drug you took and how long you took it. · Increased sensitivity to pain · Constipation · Nausea, vomiting, and dry mouth · Sleepiness and dizziness · Confusion · Depression · Low levels of testosterone that can result in lower sex drive, energy, and strength · Itching and sweating RISKS ARE GREATER WITH:      
· History of drug misuse, substance use disorder, or overdose · Mental health conditions (such as depression or anxiety) · Sleep apnea · Older age (72 years or older) · Pregnancy Avoid alcohol while taking prescription opioids. Also, unless specifically advised by your health care provider, medications to avoid include: · Benzodiazepines (such as Xanax or Valium) · Muscle relaxants (such as Soma or Flexeril) · Hypnotics (such as Ambien or Lunesta) · Other prescription opioids KNOW YOUR OPTIONS Talk to your health care provider about ways to manage your pain that don't involve prescription opioids. Some of these options may actually work better and have fewer risks and side effects. Options may include: 
· Pain relievers such as acetaminophen, ibuprofen, and naproxen · Some medications that are also used for depression or seizures · Physical therapy and exercise · Counseling to help patients learn how to cope better with triggers of pain and stress. · Application of heat or cold compress · Massage therapy · Relaxation techniques Be Informed Make sure you know the name of your medication, how much and how often to take it, and its potential risks & side effects. IF YOU ARE PRESCRIBED OPIOIDS FOR PAIN: 
· Never take opioids in greater amounts or more often than prescribed. Remember the goal is not to be pain-free but to manage your pain at a tolerable level. · Follow up with your primary care provider to: · Work together to create a plan on how to manage your pain. · Talk about ways to help manage your pain that don't involve prescription opioids. · Talk about any and all concerns and side effects. · Help prevent misuse and abuse. · Never sell or share prescription opioids · Help prevent misuse and abuse. · Store prescription opioids in a secure place and out of reach of others (this may include visitors, children, friends, and family).  
· Safely dispose of unused/unwanted prescription opioids: Find your community drug take-back program or your pharmacy mail-back program, or flush them down the toilet, following guidance from the Food and Drug Administration (www.fda.gov/Drugs/ResourcesForYou). · Visit www.cdc.gov/drugoverdose to learn about the risks of opioid abuse and overdose. · If you believe you may be struggling with addiction, tell your health care provider and ask for guidance or call Carrie Barba at 6-175-185-XYFN. Discharge Instructions Open Nephrectomy: What to Expect at [facility] Your Recovery A nephrectomy is surgery to take out part or all of the kidney. One or both kidneys may have been taken out. Sometimes other tissue near the kidney is taken out at the same time. The doctor took out your kidney through a long cut, called an incision, in the front or side of your belly. The incision will leave a scar that will fade with time. Your belly will feel sore after the surgery. This usually lasts about 1 to 2 weeks. Your doctor will give you pain medicine for this. You may also have other symptoms such as nausea, diarrhea, constipation, gas, or a headache. At first, you may have low energy and get tired quickly. It may take 3 to 6 months for your energy to fully return. Your body can work fine with one healthy kidney. If both kidneys are removed or your remaining kidney is not healthy, your doctor will talk to you about the kind of treatment you will need after surgery. This care sheet gives you a general idea about how long it will take for you to recover. But each person recovers at a different pace. Follow the steps below to get better as quickly as possible. How can you care for yourself at home? Activity ? · Rest when you feel tired. Getting enough sleep will help you recover. ? · Try to walk each day. Start by walking a little more than you did the day before. Bit by bit, increase the amount you walk. Walking boosts blood flow and helps prevent pneumonia and constipation. ? · Avoid exercises that use your belly muscles and strenuous activities, such as bicycle riding, jogging, weight lifting, or aerobic exercise, until your doctor says it is okay. ? · For at least 4 weeks, avoid lifting anything that would make you strain. This may include a child, heavy grocery bags and milk containers, a heavy briefcase or backpack, cat litter or dog food bags, or a vacuum . ? · Hold a pillow over the cut the doctor made (incision) when you cough or take deep breaths. This will support your belly and decrease your pain. ? · Do breathing exercises at home as instructed by your doctor. This will help prevent pneumonia. ? · Ask your doctor when you can drive again. ? · You will probably need to take 4 to 6 weeks off from work. It depends on the type of work you do and how you feel. ? · You may be able to take showers (unless you have a drainage tube near your incision). If you have a drainage tube, follow your doctor's instructions to empty and care for it. Do not take a bath for the first 2 weeks, or until your doctor tells you it is okay. ? · Ask your doctor when it is okay for you to have sex. Diet ? · You can eat your normal diet. If you were on a special diet for your kidneys before surgery, follow that diet until your doctor tells you to stop. ? · If your stomach is upset, try bland, low-fat foods like plain rice, broiled chicken, toast, and yogurt. ? · Drink plenty of fluids (unless your doctor tells you not to). ? · You may notice that your bowel movements are not regular right after your surgery. This is common. Try to avoid constipation and straining with bowel movements. You may want to take a fiber supplement every day. If you have not had a bowel movement after a couple of days, ask your doctor about taking a mild laxative. Medicines ? · Your doctor will tell you if and when you can restart your medicines. He or she will also give you instructions about taking any new medicines. ? · If you take blood thinners, such as warfarin (Coumadin), clopidogrel (Plavix), or aspirin, be sure to talk to your doctor. He or she will tell you if and when to start taking those medicines again. Make sure that you understand exactly what your doctor wants you to do. ? · Take pain medicines exactly as directed. ¨ If the doctor gave you a prescription medicine for pain, take it as prescribed. ¨ If you are not taking a prescription pain medicine, take an over-the-counter medicine that your doctor recommends. Read and follow all instructions on the label. ¨ Do not take aspirin, ibuprofen (Advil, Motrin), naproxen (Aleve), or other nonsteroidal anti-inflammatory drugs (NSAIDs) unless your doctor says it is okay. ? · If you think your pain medicine is making you sick to your stomach: 
¨ Take your medicine after meals (unless your doctor has told you not to). ¨ Ask your doctor for a different pain medicine. ? · If your doctor prescribed antibiotics, take them as directed. Do not stop taking them just because you feel better. You need to take the full course of antibiotics. Incision care ? · If you have strips of tape on the incision, leave the tape on for a week or until it falls off.  
? · Wash the area daily with warm, soapy water and pat it dry. Don't use hydrogen peroxide or alcohol, which can slow healing. You may cover the area with a gauze bandage if it weeps or rubs against clothing. Change the bandage every day. ? · Keep the area clean and dry. Follow-up care is a key part of your treatment and safety. Be sure to make and go to all appointments, and call your doctor if you are having problems. It's also a good idea to know your test results and keep a list of the medicines you take. When should you call for help? Call 911 anytime you think you may need emergency care. For example, call if: ? · You passed out (lost consciousness). ? · You have chest pain, are short of breath, or cough up blood. ?Call your doctor now or seek immediate medical care if: 
? · You have pain that does not get better after you take your pain medicine. ? · You have symptoms of a urinary tract infection. These may include: 
¨ Pain or burning when you urinate. ¨ A frequent need to urinate without being able to pass much urine. ¨ Pain in the flank, which is just below the rib cage and above the waist on either side of the back. ¨ Blood in the urine. ¨ A fever. ? · You have signs of infection, such as: 
¨ Increased pain, swelling, warmth, or redness. ¨ Red streaks leading from the incisions. ¨ Pus draining from the incisions. ¨ A fever. ? · You have loose stitches, or your incisions come open. ? · You are bleeding from the incisions. ? · You cannot urinate. ? · You are sick to your stomach or cannot drink fluids. ? · You have signs of a blood clot in your leg (called a deep vein thrombosis), such as: 
¨ Pain in the calf, back of the knee, thigh, or groin. ¨ Redness and swelling in your leg. ? Watch closely for changes in your health, and be sure to contact your doctor if you are having any problems. Where can you learn more? Go to http://richelle-kash.info/. Enter V566 in the search box to learn more about \"Open Nephrectomy: What to Expect at Home. \" Current as of: May 12, 2017 Content Version: 11.4 © 4564-0291 [employer]. Care instructions adapted under license by [employer] (which disclaims liability or warranty for this information). If you have questions about a medical condition or this instruction, always ask your healthcare professional. [place] [address] any warranty or liability for your use of this information. DISCHARGE SUMMARY from Nurse PATIENT INSTRUCTIONS: 
 
 
F-face looks uneven A-arms unable to move or move unevenly S-speech slurred or non-existent T-time-call 911 as soon as signs and symptoms begin-DO NOT go Back to bed or wait to see if you get better-TIME IS BRAIN. Warning Signs of HEART ATTACK Call 911 if you have these symptoms: 
? Chest discomfort. Most heart attacks involve discomfort in the center of the chest that lasts more than a few minutes, or that goes away and comes back. It can feel like uncomfortable pressure, squeezing, fullness, or pain. ? Discomfort in other areas of the upper body. Symptoms can include pain or discomfort in one or both arms, the back, neck, jaw, or stomach. ? Shortness of breath with or without chest discomfort. ? Other signs may include breaking out in a cold sweat, nausea, or lightheadedness. Don't wait more than five minutes to call [address]! Fast action can save your life. Calling 911 is almost always the fastest way to get lifesaving treatment. Emergency Medical Services staff can begin treatment when they arrive  up to an hour sooner than if someone gets to the hospital by car. The discharge information has been reviewed with the caregiver. The caregiver verbalized understanding. Discharge medications reviewed with the caregiver and appropriate educational materials and side effects teaching were provided. ___________________________________________________________________________________________________________________________________ Introducing [facility]! [employer] introduces [employer] patient portal. Now you can access parts of your medical record, email your doctor's office, and request medication refills online.    
 
1. In your internet browser, go to [email or web address] 2. Click on the First Time User? Click Here link in the Sign In box. You will see the New Member Sign Up page. 3. Enter your PredicSis Access Code exactly as it appears below. You will not need to use this code after youve completed the sign-up process. If you do not sign up before the expiration date, you must request a new code. · PredicSis Access Code: [email or web address] Expires: 8/16/2018  1:55 PM 
 
4. Enter the last four digits of your Social Security Number (xxxx) and Date of Birth (mm/dd/yyyy) as indicated and click Submit. You will be taken to the next sign-up page. 5. Create a Imagineer Systemst ID. This will be your PredicSis login ID and cannot be changed, so think of one that is secure and easy to remember. 6. Create a PredicSis password. You can change your password at any time. 7. Enter your Password Reset Question and Answer. This can be used at a later time if you forget your password. 8. Enter your e-mail address. You will receive e-mail notification when new information is available in [address] 9. Click Sign Up. You can now view and download portions of your medical record. 10. Click the Download Summary menu link to download a portable copy of your medical information. If you have questions, please visit the Frequently Asked Questions section of the PredicSis website. Remember, PredicSis is NOT to be used for urgent needs. For medical emergencies, dial 911. Now available from your iPhone and Android! Introducing Rodolfo Diaz As a [facility] Parvez patient, I wanted to make you aware of our electronic visit tool called Rodolfo Diaz. [facility] 24/7 allows you to connect within minutes with a medical provider 24 hours a day, seven days a week via a mobile device or tablet or logging into a secure website from your computer. You can access Rodolfo Diaz from anywhere in the [place]. A virtual visit might be right for you when you have a simple condition and feel like you just dont want to get out of bed, or cant get away from work for an appointment, when your regular [employer] provider is not available (evenings, weekends or holidays), or when youre out of town and need minor care. Electronic visits cost only $49 and if the [employer] 24/7 provider determines a prescription is needed to treat your condition, one can be electronically transmitted to a nearby pharmacy*. Please take a moment to enroll today if you have not already done so. The enrollment process is free and takes just a few minutes. To enroll, please download the [employer] 24/7 justina to your tablet or phone, or visit [email or web address] to enroll on your computer. And, as an [address] [place] [address] patient with a [employer] account, the results of your visits will be scanned into your electronic medical record and your primary care provider will be able to view the scanned results. We urge you to continue to see your regular [employer] provider for your ongoing medical care. And while your primary care provider may not be the one available when you seek a iCabbiwolfgangfin virtual visit, the peace of mind you get from getting a real diagnosis real time can be priceless. For more information on iCabbiwolfgangfin, view our Frequently Asked Questions (FAQs) at [email or web address]. Sincerely, 
 
Pradip Wang MD 
Chief Medical Officer Saira Fritz *:  certain medications cannot be prescribed via [facility] Unresulted Labs-Please follow up with your PCP about these lab tests Order Current Status CULTURE, BLOOD Preliminary result Providers Seen During Your Hospitalization Provider Specialty Primary office phone Mt Esquivel MD Emergency Medicine [phone] Renato Negrete MD Internal Medicine [phone] Your Primary Care Physician (PCP) Primary Care Physician Office Phone Office Fax NONE ** None ** ** None ** You are allergic to the following Allergen Reactions Pcn (Penicillins) Rash Recent Documentation Height Weight BMI OB Status Smoking Status 1.575 m 73.8 kg 29.76 kg/m2 Postmenopausal Never Smoker Emergency Contacts Name Discharge Info Relation Home Work Mobile Brittney Formany DISCHARGE CAREGIVER [3] Son [22] [phone] Patient Belongings The following personal items are in your possession at time of discharge: 
  Dental Appliances: None  Visual Aid: None      Home Medications: None   Jewelry: None  Clothing: Shirt, Pants, Other (comment), Undergarments (shoes)    Other Valuables: None Please provide this summary of care documentation to your next provider. Signatures-by signing, you are acknowledging that this After Visit Summary has been reviewed with you and you have received a copy. Patient Signature:  ____________________________________________________________ Date:  ____________________________________________________________  
  
[facility] Provider Signature:  ____________________________________________________________ Date:  ____________________________________________________________

## 2018-05-18 NOTE — IP AVS SNAPSHOT
Summary of Care Report The Summary of Care report has been created to help improve care coordination. Users with access to qcue or [address] (Web-based application) may access additional patient information including the Discharge Summary. If you are not currently a [address] user and need more information, please call the number listed below in the [place] [address] section and ask to be connected with Medical Records. Facility Information Name Address Phone [address] [address] [phone] Patient Information Patient Name Sex  Edis Portillo ([id number]) Female 1940 Discharge Information Admitting Provider Service Area Unit Carlie Alcala MD / [address] Surg/Onco / [phone] Discharge Provider Discharge Date/Time Discharge Disposition Destination (none) 2018 (Pending) SNF (none) Patient Language Language ENGLISH [13] Hospital Problems as of 2018  Reviewed: 2018  9:08 PM by Carlie Alcala MD  
  
  
  
 Class Noted - Resolved Last Modified POA Active Problems * (Principal)Pyelonephritis  2018 - Present 2018 by Carlie Alcala MD Unknown Entered by Sanjana Carranza MD  
  S/p nephrectomy  2018 - Present 2018 by Sanjana Craranza MD Unknown Entered by Sanjana Carranza MD  
  Right flank pain  2018 - Present 2018 by Sanjana Carranza MD Unknown Entered by Sanjana Carranza MD  
  Malfunction of nephrostomy tube [facility])  2018 - Present 2018 by Carlie Alcala MD Unknown Entered by Sanjana Carranza MD  
  Nephrostomy tube displaced [facility])  2018 - Present 2018 by Carlie Alcala MD Unknown   Entered by Carlie Alcala MD  
  
 Non-Hospital Problems as of 5/22/2018  Reviewed: 5/18/2018  9:08 PM by Joana Lofton MD  
 None You are allergic to the following Allergen Reactions Pcn (Penicillins) Rash Current Discharge Medication List  
  
START taking these medications Dose & Instructions Dispensing Information Comments  
 insulin glargine 100 unit/mL injection Commonly known as:  LANTUS  
 8 units SQ QHS Quantity:  1 Vial  
Refills:  0  
   
 insulin lispro 100 unit/mL injection Commonly known as:  HUMALOG Sliding scale per facility Quantity:  1 Vial  
Refills:  0  
   
 levoFLOXacin 250 mg tablet Commonly known as:  Lerma Jb Dose:  250 mg Take 1 Tab by mouth daily. Quantity:  5 Tab Refills:  0  
   
 metroNIDAZOLE 500 mg tablet Commonly known as:  FLAGYL Dose:  500 mg Take 1 Tab by mouth two (2) times a day for 5 days. Quantity:  10 Tab Refills:  0 CONTINUE these medications which have CHANGED Dose & Instructions Dispensing Information Comments  
 traMADol 50 mg tablet Commonly known as:  ULTRAM  
What changed:   
- medication strength 
- how much to take - when to take this 
- reasons to take this Dose:  50 mg Take 1 Tab by mouth every six (6) hours as needed. Max Daily Amount: 200 mg. Quantity:  12 Tab Refills:  0 CONTINUE these medications which have NOT CHANGED Dose & Instructions Dispensing Information Comments ATORVASTATIN PO Take  by mouth. Refills:  0  
   
 GABAPENTIN (BULK)  
 by Does Not Apply route. Refills:  0 METOPROLOL TARTRATE PO Take  by mouth. Refills:  0  
   
 OTHER Indications: reports takes anxiety medication and muscle relaxant Refills:  0  
   
 SODIUM BICARBONATE (BULK)  
 by Does Not Apply route. Refills:  0  
   
 SYNTHROID PO Take  by mouth. Refills:  0 STOP taking these medications Comments HYDROcodone-acetaminophen 5-325 mg per tablet Commonly known as:  Fritz Castro Follow-up Information Follow up With Details Comments Contact Info [facility])  SNF is responsible for making arrangements for the PCP visit while in-house. Chosen to continue managing your healthcare needs [address] [facility] 
[phone] Carey Kathleen MD Schedule an appointment as soon as possible for a visit in 4 weeks for established patient and nephrostomy tube change scheduling [address] [facility] [address] [facility] 
[phone] None   None (395) Patient stated that they have no PCP Discharge Instructions Open Nephrectomy: What to Expect at [facility] Your Recovery A nephrectomy is surgery to take out part or all of the kidney. One or both kidneys may have been taken out. Sometimes other tissue near the kidney is taken out at the same time. The doctor took out your kidney through a long cut, called an incision, in the front or side of your belly. The incision will leave a scar that will fade with time. Your belly will feel sore after the surgery. This usually lasts about 1 to 2 weeks. Your doctor will give you pain medicine for this. You may also have other symptoms such as nausea, diarrhea, constipation, gas, or a headache. At first, you may have low energy and get tired quickly. It may take 3 to 6 months for your energy to fully return. Your body can work fine with one healthy kidney. If both kidneys are removed or your remaining kidney is not healthy, your doctor will talk to you about the kind of treatment you will need after surgery. This care sheet gives you a general idea about how long it will take for you to recover. But each person recovers at a different pace. Follow the steps below to get better as quickly as possible. How can you care for yourself at home? Activity ? · Rest when you feel tired. Getting enough sleep will help you recover. ? · Try to walk each day. Start by walking a little more than you did the day before. Bit by bit, increase the amount you walk. Walking boosts blood flow and helps prevent pneumonia and constipation. ? · Avoid exercises that use your belly muscles and strenuous activities, such as bicycle riding, jogging, weight lifting, or aerobic exercise, until your doctor says it is okay. ? · For at least 4 weeks, avoid lifting anything that would make you strain. This may include a child, heavy grocery bags and milk containers, a heavy briefcase or backpack, cat litter or dog food bags, or a vacuum . ? · Hold a pillow over the cut the doctor made (incision) when you cough or take deep breaths. This will support your belly and decrease your pain. ? · Do breathing exercises at home as instructed by your doctor. This will help prevent pneumonia. ? · Ask your doctor when you can drive again. ? · You will probably need to take 4 to 6 weeks off from work. It depends on the type of work you do and how you feel. ? · You may be able to take showers (unless you have a drainage tube near your incision). If you have a drainage tube, follow your doctor's instructions to empty and care for it. Do not take a bath for the first 2 weeks, or until your doctor tells you it is okay. ? · Ask your doctor when it is okay for you to have sex. Diet ? · You can eat your normal diet. If you were on a special diet for your kidneys before surgery, follow that diet until your doctor tells you to stop. ? · If your stomach is upset, try bland, low-fat foods like plain rice, broiled chicken, toast, and yogurt. ? · Drink plenty of fluids (unless your doctor tells you not to). ? · You may notice that your bowel movements are not regular right after your surgery. This is common. Try to avoid constipation and straining with bowel movements. You may want to take a fiber supplement every day.  If you have not had a bowel movement after a couple of days, ask your doctor about taking a mild laxative. Medicines ? · Your doctor will tell you if and when you can restart your medicines. He or she will also give you instructions about taking any new medicines. ? · If you take blood thinners, such as warfarin (Coumadin), clopidogrel (Plavix), or aspirin, be sure to talk to your doctor. He or she will tell you if and when to start taking those medicines again. Make sure that you understand exactly what your doctor wants you to do. ? · Take pain medicines exactly as directed. ¨ If the doctor gave you a prescription medicine for pain, take it as prescribed. ¨ If you are not taking a prescription pain medicine, take an over-the-counter medicine that your doctor recommends. Read and follow all instructions on the label. ¨ Do not take aspirin, ibuprofen (Advil, Motrin), naproxen (Aleve), or other nonsteroidal anti-inflammatory drugs (NSAIDs) unless your doctor says it is okay. ? · If you think your pain medicine is making you sick to your stomach: 
¨ Take your medicine after meals (unless your doctor has told you not to). ¨ Ask your doctor for a different pain medicine. ? · If your doctor prescribed antibiotics, take them as directed. Do not stop taking them just because you feel better. You need to take the full course of antibiotics. Incision care ? · If you have strips of tape on the incision, leave the tape on for a week or until it falls off.  
? · Wash the area daily with warm, soapy water and pat it dry. Don't use hydrogen peroxide or alcohol, which can slow healing. You may cover the area with a gauze bandage if it weeps or rubs against clothing. Change the bandage every day. ? · Keep the area clean and dry. Follow-up care is a key part of your treatment and safety.  Be sure to make and go to all appointments, and call your doctor if you are having problems. It's also a good idea to know your test results and keep a list of the medicines you take. When should you call for help? Call 911 anytime you think you may need emergency care. For example, call if: 
? · You passed out (lost consciousness). ? · You have chest pain, are short of breath, or cough up blood. ?Call your doctor now or seek immediate medical care if: 
? · You have pain that does not get better after you take your pain medicine. ? · You have symptoms of a urinary tract infection. These may include: 
¨ Pain or burning when you urinate. ¨ A frequent need to urinate without being able to pass much urine. ¨ Pain in the flank, which is just below the rib cage and above the waist on either side of the back. ¨ Blood in the urine. ¨ A fever. ? · You have signs of infection, such as: 
¨ Increased pain, swelling, warmth, or redness. ¨ Red streaks leading from the incisions. ¨ Pus draining from the incisions. ¨ A fever. ? · You have loose stitches, or your incisions come open. ? · You are bleeding from the incisions. ? · You cannot urinate. ? · You are sick to your stomach or cannot drink fluids. ? · You have signs of a blood clot in your leg (called a deep vein thrombosis), such as: 
¨ Pain in the calf, back of the knee, thigh, or groin. ¨ Redness and swelling in your leg. ? Watch closely for changes in your health, and be sure to contact your doctor if you are having any problems. Where can you learn more? Go to http://richelle-kash.info/. Enter V756 in the search box to learn more about \"Open Nephrectomy: What to Expect at Home. \" Current as of: May 12, 2017 Content Version: 11.4 © 0138-8945 [employer]wise, [employer]. Care instructions adapted under license by [employer] (which disclaims liability or warranty for this information).  If you have questions about a medical condition or this instruction, always ask your healthcare professional. [place] [address] any warranty or liability for your use of this information. DISCHARGE SUMMARY from Nurse PATIENT INSTRUCTIONS: 
 
 
F-face looks uneven A-arms unable to move or move unevenly S-speech slurred or non-existent T-time-call 911 as soon as signs and symptoms begin-DO NOT go Back to bed or wait to see if you get better-TIME IS BRAIN. Warning Signs of HEART ATTACK Call 911 if you have these symptoms: 
? Chest discomfort. Most heart attacks involve discomfort in the center of the chest that lasts more than a few minutes, or that goes away and comes back. It can feel like uncomfortable pressure, squeezing, fullness, or pain. ? Discomfort in other areas of the upper body. Symptoms can include pain or discomfort in one or both arms, the back, neck, jaw, or stomach. ? Shortness of breath with or without chest discomfort. ? Other signs may include breaking out in a cold sweat, nausea, or lightheadedness. Don't wait more than five minutes to call [address]! Fast action can save your life. Calling 911 is almost always the fastest way to get lifesaving treatment. Emergency Medical Services staff can begin treatment when they arrive  up to an hour sooner than if someone gets to the hospital by car. The discharge information has been reviewed with the caregiver. The caregiver verbalized understanding. Discharge medications reviewed with the caregiver and appropriate educational materials and side effects teaching were provided. ___________________________________________________________________________________________________________________________________ Chart Review Routing History No Routing History on File

## 2018-05-19 LAB
ANION GAP SERPL CALC-SCNC: 10 MMOL/L (ref 3–18)
ATRIAL RATE: 84 BPM
BUN SERPL-MCNC: 19 MG/DL (ref 7–18)
BUN/CREAT SERPL: 13 (ref 12–20)
CALCIUM SERPL-MCNC: 8.3 MG/DL (ref 8.5–10.1)
CALCULATED P AXIS, ECG09: 58 DEGREES
CALCULATED R AXIS, ECG10: 68 DEGREES
CALCULATED T AXIS, ECG11: 59 DEGREES
CHLORIDE SERPL-SCNC: 107 MMOL/L (ref 100–108)
CO2 SERPL-SCNC: 23 MMOL/L (ref 21–32)
CREAT SERPL-MCNC: 1.46 MG/DL (ref 0.6–1.3)
DIAGNOSIS, 93000: NORMAL
GLUCOSE BLD STRIP.AUTO-MCNC: 182 MG/DL (ref 70–110)
GLUCOSE BLD STRIP.AUTO-MCNC: 232 MG/DL (ref 70–110)
GLUCOSE BLD STRIP.AUTO-MCNC: 279 MG/DL (ref 70–110)
GLUCOSE BLD STRIP.AUTO-MCNC: 292 MG/DL (ref 70–110)
GLUCOSE SERPL-MCNC: 171 MG/DL (ref 74–99)
P-R INTERVAL, ECG05: 160 MS
POTASSIUM SERPL-SCNC: 3.9 MMOL/L (ref 3.5–5.5)
Q-T INTERVAL, ECG07: 376 MS
QRS DURATION, ECG06: 76 MS
QTC CALCULATION (BEZET), ECG08: 444 MS
SODIUM SERPL-SCNC: 140 MMOL/L (ref 136–145)
VENTRICULAR RATE, ECG03: 84 BPM

## 2018-05-19 PROCEDURE — 82962 GLUCOSE BLOOD TEST: CPT

## 2018-05-19 PROCEDURE — 74011636637 HC RX REV CODE- 636/637: Performed by: INTERNAL MEDICINE

## 2018-05-19 PROCEDURE — 97162 PT EVAL MOD COMPLEX 30 MIN: CPT

## 2018-05-19 PROCEDURE — 74011250636 HC RX REV CODE- 250/636: Performed by: INTERNAL MEDICINE

## 2018-05-19 PROCEDURE — 36415 COLL VENOUS BLD VENIPUNCTURE: CPT | Performed by: INTERNAL MEDICINE

## 2018-05-19 PROCEDURE — 80048 BASIC METABOLIC PNL TOTAL CA: CPT | Performed by: INTERNAL MEDICINE

## 2018-05-19 PROCEDURE — 97116 GAIT TRAINING THERAPY: CPT

## 2018-05-19 PROCEDURE — 65270000029 HC RM PRIVATE

## 2018-05-19 PROCEDURE — 74011250637 HC RX REV CODE- 250/637: Performed by: INTERNAL MEDICINE

## 2018-05-19 RX ORDER — INSULIN LISPRO 100 [IU]/ML
INJECTION, SOLUTION INTRAVENOUS; SUBCUTANEOUS
Status: DISCONTINUED | OUTPATIENT
Start: 2018-05-19 | End: 2018-05-22 | Stop reason: HOSPADM

## 2018-05-19 RX ORDER — LORAZEPAM 1 MG/1
1 TABLET ORAL
Status: DISCONTINUED | OUTPATIENT
Start: 2018-05-19 | End: 2018-05-19

## 2018-05-19 RX ORDER — HYDROCODONE BITARTRATE AND ACETAMINOPHEN 5; 325 MG/1; MG/1
2 TABLET ORAL
Status: DISCONTINUED | OUTPATIENT
Start: 2018-05-19 | End: 2018-05-22 | Stop reason: HOSPADM

## 2018-05-19 RX ORDER — GABAPENTIN 300 MG/1
300 CAPSULE ORAL DAILY
Status: DISCONTINUED | OUTPATIENT
Start: 2018-05-19 | End: 2018-05-22 | Stop reason: HOSPADM

## 2018-05-19 RX ORDER — LORAZEPAM 1 MG/1
1 TABLET ORAL
Status: DISCONTINUED | OUTPATIENT
Start: 2018-05-19 | End: 2018-05-22 | Stop reason: HOSPADM

## 2018-05-19 RX ORDER — LEVOFLOXACIN 5 MG/ML
750 INJECTION, SOLUTION INTRAVENOUS
Status: DISCONTINUED | OUTPATIENT
Start: 2018-05-20 | End: 2018-05-22 | Stop reason: HOSPADM

## 2018-05-19 RX ORDER — ATORVASTATIN CALCIUM 10 MG/1
10 TABLET, FILM COATED ORAL DAILY
Status: DISCONTINUED | OUTPATIENT
Start: 2018-05-19 | End: 2018-05-22 | Stop reason: HOSPADM

## 2018-05-19 RX ADMIN — DOCUSATE SODIUM 100 MG: 100 CAPSULE, LIQUID FILLED ORAL at 22:22

## 2018-05-19 RX ADMIN — DOCUSATE SODIUM 100 MG: 100 CAPSULE, LIQUID FILLED ORAL at 08:14

## 2018-05-19 RX ADMIN — GABAPENTIN 300 MG: 300 CAPSULE ORAL at 08:51

## 2018-05-19 RX ADMIN — INSULIN LISPRO 9 UNITS: 100 INJECTION, SOLUTION INTRAVENOUS; SUBCUTANEOUS at 22:22

## 2018-05-19 RX ADMIN — HEPARIN SODIUM 5000 UNITS: 5000 INJECTION, SOLUTION INTRAVENOUS; SUBCUTANEOUS at 08:14

## 2018-05-19 RX ADMIN — HEPARIN SODIUM 5000 UNITS: 5000 INJECTION, SOLUTION INTRAVENOUS; SUBCUTANEOUS at 16:56

## 2018-05-19 RX ADMIN — INSULIN LISPRO 2 UNITS: 100 INJECTION, SOLUTION INTRAVENOUS; SUBCUTANEOUS at 08:13

## 2018-05-19 RX ADMIN — INSULIN LISPRO 4 UNITS: 100 INJECTION, SOLUTION INTRAVENOUS; SUBCUTANEOUS at 16:55

## 2018-05-19 RX ADMIN — INSULIN LISPRO 6 UNITS: 100 INJECTION, SOLUTION INTRAVENOUS; SUBCUTANEOUS at 11:42

## 2018-05-19 RX ADMIN — SODIUM CHLORIDE 100 ML/HR: 900 INJECTION, SOLUTION INTRAVENOUS at 16:55

## 2018-05-19 RX ADMIN — ATORVASTATIN CALCIUM 10 MG: 10 TABLET, FILM COATED ORAL at 08:14

## 2018-05-19 RX ADMIN — HEPARIN SODIUM 5000 UNITS: 5000 INJECTION, SOLUTION INTRAVENOUS; SUBCUTANEOUS at 23:08

## 2018-05-19 NOTE — PROGRESS NOTES
Pt arrived on unit; Pt Alert and Oriented; Consent signed; See chart   for sedation report and/or vital signs. Pt transferred to treatment table for procedure.

## 2018-05-19 NOTE — ROUTINE PROCESS
TRANSFER - OUT REPORT:    Verbal report given to CHAITANYA Salvador  on Martita Cummings  being transferred to [facility]  (medical unit) for routine progression of care       Report consisted of patients Situation, Background, Assessment and   Recommendations(SBAR). Information from the following report(s) SBAR, ED Summary, [facility] and Recent Results was reviewed with the receiving nurse. Lines:   Peripheral IV 05/18/18 Left Wrist (Active)   Site Assessment Clean, dry, & intact 5/18/2018  4:35 PM   Phlebitis Assessment 0 5/18/2018  4:35 PM   Infiltration Assessment 0 5/18/2018  4:35 PM   Dressing Status Clean, dry, & intact 5/18/2018  4:35 PM   Dressing Type Transparent 5/18/2018  4:35 PM   Hub Color/Line Status Blue;Flushed 5/18/2018  4:35 PM   Action Taken Blood drawn 5/18/2018  4:35 PM   Alcohol Cap Used Yes 5/18/2018  4:35 PM       Peripheral IV 05/18/18 Right Hand (Active)   Site Assessment Clean, dry, & intact 5/18/2018  9:03 PM   Phlebitis Assessment 0 5/18/2018  9:03 PM   Infiltration Assessment 0 5/18/2018  9:03 PM   Dressing Status Clean, dry, & intact 5/18/2018  9:03 PM   Hub Color/Line Status Patent 5/18/2018  9:03 PM        Opportunity for questions and clarification was provided.       Patient transported with:   [employer]

## 2018-05-19 NOTE — PROGRESS NOTES
Hospitalist Progress Note    Patient: Jaz Trotter MRN: [id number]  CSN: [id number]    YOB: 1940  Age: 65 y.o. Sex: female    DOA: 5/18/2018 LOS:  LOS: 0 days          Chief Complaint:  Nephrostomy malfunction with pyelonephritis           Assessment/Plan     Disposition :  Patient Active Problem List   Diagnosis Code    Pyelonephritis N12    S/p nephrectomy Z90.5    Right flank pain R10.9    Malfunction of nephrostomy tube ([address] T83.098A    Nephrostomy tube displaced ([address] T83.022A     Continue hydration with gentle fluids  Continue IV levaquin/Vanc  S/p tube exchange yesterday by IR  Wound Care consulted   Good glycemic control. Continue sliding scale insulin      DVT/GI Prophylaxis: Hep SQ    Subjective:    No events over night. Patient doing well this AM.  No new complaints. Review of systems:    Constitutional: denies fevers, chills, myalgias  Respiratory: denies SOB, cough  Cardiovascular: denies chest pain, palpitations  Gastrointestinal: denies nausea, vomiting, diarrhea      Vital signs/Intake and Output:  Visit Vitals    /73 (BP 1 Location: Right arm, BP Patient Position: At rest)    Pulse 85    Temp 98.3 °F (36.8 °C)    Resp 18    Ht 5' 2\" (1.575 m)    Wt 70.3 kg (155 lb)    SpO2 100%    BMI 28.35 kg/m2     Current Shift:  05/19 0701 - 05/19 1900  In: 100 [P.O.:100]  Out: 800   Last three shifts:  05/17 1901 - 05/19 0700  In: -   Out: 200 [Urine:200]    Exam:    General: Legally blind.   Well developed, alert, NAD, OX3  Head/Neck: NCAT, supple, No masses, No lymphadenopathy  CVS:Regular rate and rhythm, no M/R/G, S1/S2 heard, no thrill  Lungs:Clear to auscultation bilaterally, no wheezes, rhonchi, or rales  Abdomen: Soft, Nontender, No distention, Normal Bowel sounds, No hepatomegaly  Extremities: No C/C/E, pulses palpable 2+  Skin:normal texture and turgor, no rashes, no lesions  Neuro:grossly normal, follows commands  Psych:appropriate Labs: Results:       Chemistry Recent Labs      05/18/18   1636   GLU  157*   NA  139   K  4.3   CL  105   CO2  25   BUN  23*   CREA  1.58*   CA  9.0   AGAP  9   BUCR  15      CBC w/Diff Recent Labs      05/18/18   1636   WBC  10.0   RBC  4.43   HGB  13.0   HCT  39.9   PLT  229   GRANS  80*   LYMPH  10*   EOS  2      Cardiac Enzymes Recent Labs      05/18/18   1636   CPK  67   CKND1  CALCULATION NOT PERFORMED WHEN RESULT IS BELOW LINEAR LIMIT      Coagulation Recent Labs      05/18/18   1636   PTP  12.9   INR  1.0   APTT  36.5*       Lipid Panel No results found for: CHOL, CHOLPOCT, CHOLX, CHLST, CHOLV, 197036, HDL, LDL, LDLC, DLDLP, 291812, VLDLC, VLDL, TGLX, TRIGL, TRIGP, TGLPOCT, CHHD, CHHDX   BNP No results for input(s): BNPP in the last 72 hours. Liver Enzymes No results for input(s): TP, ALB, TBIL, AP, SGOT, GPT in the last 72 hours.     No lab exists for component: DBIL   Thyroid Studies No results found for: T4, T3U, TSH, TSHEXT     Procedures/imaging: see electronic medical records for all procedures/Xrays and details which were not copied into this note but were reviewed prior to creation of Adrienne Starks MD

## 2018-05-19 NOTE — PROGRESS NOTES
Pharmacy Dosing Services: Levaquin     The following medication: Levaquin 750 mg IV q24h was automatically dose-adjusted to Levaquin 750 mg IV q48h per [facility] P&T Committee Protocol, with respect to renal function. Pt Weight:   Wt Readings from Last 1 Encounters:   05/18/18 70.3 kg (155 lb)     Serum Creatinine Lab Results   Component Value Date/Time    Creatinine 1.46 (H) 05/19/2018 01:40 PM       Creatinine Clearance Estimated Creatinine Clearance: 29.2 mL/min (based on Cr of 1.46). BUN Lab Results   Component Value Date/Time    BUN 19 (H) 05/19/2018 01:40 PM         Pharmacy to continue to monitor patient daily. Will make dosage adjustments based upon changing renal function. Signed Jessica Palomo.  Contact information: [phone]

## 2018-05-19 NOTE — PROGRESS NOTES
Pharmacy Dosing Services: Vancomycin    Consult for Vancomycin Dosing by Pharmacy by Dr. Mary Andrew provided for this 65y.o. year old female , for indication of Pyelonephritis. Day of Therapy 01    Ht Readings from Last 1 Encounters:   05/18/18 157.5 cm (62\")        Wt Readings from Last 1 Encounters:   05/18/18 70.3 kg (155 lb)        Previous Regimen NA   Last Level NA   Other Current Antibiotics Levaquin 750 mg IV x1   Significant Cultures Pending   Serum Creatinine Lab Results   Component Value Date/Time    Creatinine 1.58 (H) 05/18/2018 04:36 PM      Creatinine Clearance Estimated Creatinine Clearance: 27 mL/min (based on Cr of 1.58). BUN Lab Results   Component Value Date/Time    BUN 23 (H) 05/18/2018 04:36 PM      WBC Lab Results   Component Value Date/Time    WBC 10.0 05/18/2018 04:36 PM      H/H Lab Results   Component Value Date/Time    HGB 13.0 05/18/2018 04:36 PM      Platelets Lab Results   Component Value Date/Time    PLATELET 784 56/49/9158 04:36 PM      Temp 98.1 °F (36.7 °C)     Start Vancomycin therapy, with loading dose of 1 gm IV x1 @ 20:00 [id number]  Subsequent dosing to be determined by AM labs, due to compromised renal function. Dose calculated to approximate a therapeutic trough of 15-20 mcg/mL. Pharmacy to follow daily and will make changes to dose and/or frequency based on clinical status. Erin Walter, Pharm. D.   Clinical Pharmacist  [phone]

## 2018-05-19 NOTE — PROGRESS NOTES
TRANSFER - OUT REPORT:    Verbal report given to CHAITANYA Jay(name) on Maximino Ribera  being transferred to ED room 9(unit) for routine progression of care       Report consisted of patients Situation, Background, Assessment and   Recommendations(SBAR). Information from the following report(s) SBAR, Kardex and MAR was reviewed with the receiving nurse. Lines:   Peripheral IV 05/18/18 Left Wrist (Active)   Site Assessment Clean, dry, & intact 5/18/2018  4:35 PM   Phlebitis Assessment 0 5/18/2018  4:35 PM   Infiltration Assessment 0 5/18/2018  4:35 PM   Dressing Status Clean, dry, & intact 5/18/2018  4:35 PM   Dressing Type Transparent 5/18/2018  4:35 PM   Hub Color/Line Status Blue;Flushed 5/18/2018  4:35 PM   Action Taken Blood drawn 5/18/2018  4:35 PM   Alcohol Cap Used Yes 5/18/2018  4:35 PM       Peripheral IV 05/18/18 Right Hand (Active)   Site Assessment Clean, dry, & intact 5/18/2018  9:03 PM   Phlebitis Assessment 0 5/18/2018  9:03 PM   Infiltration Assessment 0 5/18/2018  9:03 PM   Dressing Status Clean, dry, & intact 5/18/2018  9:03 PM   Hub Color/Line Status Patent 5/18/2018  9:03 PM        Opportunity for questions and clarification was provided.           Patient transported with:   Registered Nurse

## 2018-05-19 NOTE — PROGRESS NOTES
New admit, patient has a sore to right great toe, in healing stages. Reddened tissue to right outer leg. Left lower abd colostomy bag c/d/i. Right nephrostomy tube protruding from back draining to bag. Patient states vaginal opening has been closed. Small bits of extra tissue on buttocks. States she has had increase in confusion, dizziness, and balance over the last few months with random nausea and vomiting. Says she had radiation to her eyes and would like to \"get her head looked at\" to make sure she doesn't have cancer in the brain. Dr. Brody Yoo was notified and will pass off to day shift, may need CT. Patient Vitals for the past 12 hrs:   Temp Pulse Resp BP SpO2   05/19/18 0716 98.3 °F (36.8 °C) 85 18 153/73 100 %   05/19/18 0442 97.4 °F (36.3 °C) 93 18 (!) 153/93 100 %   05/19/18 0031 97.6 °F (36.4 °C) 90 20 159/79 100 %   05/18/18 2248 - 99 16 158/90 (!) 9 %   05/18/18 2245 - 96 15 160/51 97 %   05/18/18 2240 - (!) 101 15 166/58 98 %   05/18/18 2235 - 97 16 172/52 95 %   05/18/18 2230 - 95 16 170/56 100 %   05/18/18 2225 - 98 16 (!) 188/92 100 %   05/18/18 2100 - 99 16 - -   05/18/18 1933 - 88 16 172/68 99 %     Bedside shift change report given to Ian Medrano Rn (oncoming nurse) by Denise Mcmullen Rn (offgoing nurse). Report included the following information SBAR, Kardex, MAR and Recent Results.

## 2018-05-19 NOTE — PROGRESS NOTES
Problem: Falls - Risk of  Goal: *Absence of Falls  Document Romeo Fall Risk and appropriate interventions in the flowsheet.    Outcome: Progressing Towards Goal  Fall Risk Interventions:  Mobility Interventions: Communicate number of staff needed for ambulation/transfer         Medication Interventions: Patient to call before getting OOB    Elimination Interventions: Call light in reach

## 2018-05-19 NOTE — PROGRESS NOTES
Pharmacy Dosing Services: Vancomycin     Consult for Vancomycin Dosing by Pharmacy by Dr. Jerman Zhong provided for this 65y.o. year old female , for indication of Pyelonephritis. Day of Therapy 2    Ht Readings from Last 1 Encounters:   05/18/18 157.5 cm (62\")        Wt Readings from Last 1 Encounters:   05/18/18 70.3 kg (155 lb)      Other Current Antibiotics Levaquin    Serum Creatinine Lab Results   Component Value Date/Time    Creatinine 1.46 (H) 05/19/2018 01:40 PM      Creatinine Clearance Estimated Creatinine Clearance: 29.2 mL/min (based on Cr of 1.46). BUN Lab Results   Component Value Date/Time    BUN 19 (H) 05/19/2018 01:40 PM      WBC Lab Results   Component Value Date/Time    WBC 10.0 05/18/2018 04:36 PM      H/H Lab Results   Component Value Date/Time    HGB 13.0 05/18/2018 04:36 PM      Platelets Lab Results   Component Value Date/Time    PLATELET 170 62/65/4753 04:36 PM      Temp 98.6 °F (37 °C)     Pt received Vancomycin 1 gm IVPB x 1 last night 5/18/18 @ 2300 with further dosing pending today's labs. Scr = 1.46 today   CrCl ~ 29.2 ml/min   Will continue Vancomycin dosed at 1 gm IV q36h  Dose calculated to approximate a therapeutic trough of 15-20 mcg/mL. Next dose due tomorrow 5/20/18 @ 0500      Pharmacy to follow daily and will make changes to dose and/or frequency based on clinical status.     Pharmacist Benigno Mcburney, [facility]

## 2018-05-19 NOTE — PROGRESS NOTES
Problem: Mobility Impaired (Adult and Pediatric)  Goal: *Acute Goals and Plan of Care (Insert Text)  In 1-7 days pt will be able to perform:  ST.  Bed mobility:  Rolling L to R to L modified independent for positioning. 2.  Supine to sit to supine SBA with HR for meals. 3.  Sit to stand to sit CGA with RW in prep for ambulation. LT.  Gait:  Ambulate >50ft CGA with RW, for home/community mobility. 2.  Stair Negotiation:  Ascend/descend >3 steps CGA with HR for home entry. 3.  Activity tolerance: Tolerate up in chair 1-2 hours for ADLs. 4.  Patient/Family Education:  Patient/family to be independent with HEP for follow-up care and safe discharge. physical Therapy EVALUATION    Patient: Jenny Montalvo (25 y.o. female)  Date: 2018  Primary Diagnosis: Malfunction of nephrostomy tube [facility])  Pyelonephritis  S/p nephrectomy  Right flank pain  Pyelonephritis  Nephrostomy tube displaced [facility])        Precautions:   Fall (blind)    ASSESSMENT :  Based on the objective data described below, the patient presents with decreased functional mobility and independence in regard to bed mobility, transfers, gt quality and tolerance, generalized dec strength, pain, stair negotiation and safety. Pt denies pain at this time. Pt reports she was in transition in between [place] and here when she became ill due to nephro tube blockage bringing her to the hospital.  Pt staying w/ son and over the course of several days became ill causing immobility, sleeping in recliner and c/o increasing weakness. Pt reports she used [facility] prior to this episode as RW too wide to navigate son's home. Pt is blind and is worried regarding recent fall, inc balance impairments and possibility of B eye tumor return. Pt has colostomy which she reports HH comes to A w/.  Pt required mod A for supine <>sit. Sit <> stand min A.  Balance impairments in standing and during gt noted, pt is high fall risk.   Pt able to participate in gt training w/ RW, WBAT, GB and min A w/ antalgic pattern and poor technique. Pt fatigues quickly. Pt returned to supine in bed w/ all needs within reach. Nurse Terry galo. Recommend SNF rehab upon hospital d/c. Patient will benefit from skilled intervention to address the above impairments. Patients rehabilitation potential is considered to be Fair  Factors which may influence rehabilitation potential include:   []         None noted  []         Mental ability/status  [x]         Medical condition  []         Home/family situation and support systems  [x]         Safety awareness  []         Pain tolerance/management  []         Other:      PLAN :  Recommendations and Planned Interventions:  [x]           Bed Mobility Training             []    Neuromuscular Re-Education  [x]           Transfer Training                   []    Orthotic/Prosthetic Training  [x]           Gait Training                          []    Modalities  [x]           Therapeutic Exercises          []    Edema Management/Control  [x]           Therapeutic Activities            [x]    Patient and Family Training/Education  []           Other (comment):    Frequency/Duration: Patient will be followed by physical therapy 1-2 times per day/4-7 days per week to address goals. Discharge Recommendations: [facility]  Further Equipment Recommendations for Discharge: N/A     SUBJECTIVE:   Patient stated I have just become so weak.     OBJECTIVE DATA SUMMARY:     Past Medical History:   Diagnosis Date    Cancer ([facility] [address]     vaginal and colon cancer    Diabetes ([facility] [address]     Legally blind      Past Surgical History:   Procedure Laterality Date    HX COLOSTOMY      HX OTHER SURGICAL      kidney drainage tube    HX UROLOGICAL      kidney removal     Barriers to Learning/Limitations: yes;  sensory deficits-vision/hearing/speech  Compensate with: visual, verbal, tactile, kinesthetic cues/model  Prior Level of Function/Home Situation:   Home Situation  Home Environment: Private residence  # Steps to Enter: 4  Rails to Enter: Yes  Hand Rails : Bilateral  One/Two Story Residence: One story  Living Alone: No  Support Systems: Family member(s), Home care staff  Patient Expects to be Discharged to[de-identified] Skilled nursing facility  Current DME Used/Available at Home: [employer], [address], [place]  Critical Behavior:  Neurologic State: Alert; Appropriate for age  Orientation Level: Oriented to person;Oriented to place;Oriented to situation  Cognition: Follows commands; Appropriate safety awareness; Appropriate for age attention/concentration; Appropriate decision making  Safety/Judgement: Decreased awareness of environment  Psychosocial  Patient Behaviors: Calm; Cooperative  Purposeful Interaction: Yes  Pt Identified Daily Priority: Clinical issues (comment)  Caritas Process: Establish trust  Caring Interventions: Reassure  Reassure: Informing  Skin Condition/Temp: Dry;Warm  Skin Integrity: Other (comment) (nephro tube in back)  Skin Integumentary  Skin Color: Appropriate for ethnicity  Skin Condition/Temp: Dry;Warm  Skin Integrity: Other (comment) (nephro tube in back)  Turgor: Non-tenting  Hair Growth: Sparce  Varicosities: Absent  Strength:    Strength: Generally decreased, functional  Tone & Sensation:   Tone: Normal  Sensation: Intact  Range Of Motion:  AROM: Generally decreased, functional  PROM: Generally decreased, functional  Functional Mobility:  Bed Mobility:  Supine to Sit: Moderate assistance; Additional time (vc)  Sit to Supine: Moderate assistance; Additional time (vc)  Scooting: Moderate assistance (vc)  Transfers:  Sit to Stand: Minimum assistance (vc)  Stand to Sit: Minimum assistance; Additional time (vc)  Balance:   Sitting: Intact  Standing: Impaired; With support  Standing - Static: Fair;Constant support  Standing - Dynamic : Poor  Ambulation/Gait Training:  Distance (ft): 20 Feet (ft)  Assistive Device: Walker, rolling;Gait belt  Ambulation - Level of Assistance: Minimal assistance; Moderate assistance; Additional time (vc)  Gait Abnormalities: Decreased step clearance; Step to gait; Path deviations  Base of Support: Widened  Stance: Weight shift;Time  Speed/Jennifer: Slow;Shuffled  Step Length: Left shortened;Right shortened  Swing Pattern: Left asymmetrical;Right asymmetrical  Interventions: Safety awareness training; Tactile cues; Verbal cues  Therapeutic Exercises:   Pain:  Pain Scale 1: Numeric (0 - 10)  Pain Intensity 1: 0  Activity Tolerance:   Fair-  Please refer to the flowsheet for vital signs taken during this treatment. After treatment:   []         Patient left in no apparent distress sitting up in chair  [x]         Patient left in no apparent distress in bed  [x]         Call bell left within reach  [x]         Nursing notified  []         Caregiver present  []         Bed alarm activated    COMMUNICATION/EDUCATION:   [x]         Fall prevention education was provided and the patient/caregiver indicated understanding. [x]         Patient/family have participated as able in goal setting and plan of care. [x]         Patient/family agree to work toward stated goals and plan of care. []         Patient understands intent and goals of therapy, but is neutral about his/her participation. []         Patient is unable to participate in goal setting and plan of care.     Thank you for this referral.  Namita Lagunas, PT   Time Calculation: 28 mins      Eval Complexity: History: HIGH Complexity :3+ comorbidities / personal factors will impact the outcome/ POC Exam:MEDIUM Complexity : 3 Standardized tests and measures addressing body structure, function, activity limitation and / or participation in recreation  Presentation: MEDIUM Complexity : Evolving with changing characteristics  Clinical Decision Making:Medium Complexity amb <30' Overall Complexity:MEDIUM

## 2018-05-19 NOTE — PROGRESS NOTES
New right nephrostomy tube catheter secured with suture, tegaderm and guaze by Dr. Geralynn Litten. Reddened sore noted at insertion site from where a plastic securing device from an outside facility was utilized to secure the old nephrostomy tube that irritated pt's skin. Sore was covered with antibiotic ointment, covered with guaze and tegaderm.

## 2018-05-19 NOTE — ED NOTES
Patient transported to IR for procedure in stable condition, receiving nurse updated on patients condition at this time.

## 2018-05-19 NOTE — PROCEDURES
Interventional Radiology Brief Procedure Note    Interventional Radiologist: Katelin Yung MD    Pre-operative Diagnosis: dysfunctional R PCN. S/p left nephrectomy . UTI    Post-operative Diagnosis: Same as pre-operative diagnosis    Procedure(s) Performed:  Nephrostomy Tube replacement    Anesthesia:    Moderate Sedation/local  Time:     9191-8141    Findings: Informed consent. fluoroscopic guidance . Old PCN dressing malodorous. Old PCN partially pulled back. Replaced. New PCN in good position,  attached to gravity drainage bag. Full report to follow.     Complications: No immediate  Estimated Blood Loss:  Minimal  Tubes and Drains: Percutaneous Nephrostomy Tube  Specimens: Urine from kidney  Condition: unchanged  Disposition: to room    Katelin Yung MD  [facility]  5/18/2018

## 2018-05-20 LAB
ANION GAP SERPL CALC-SCNC: 7 MMOL/L (ref 3–18)
BUN SERPL-MCNC: 18 MG/DL (ref 7–18)
BUN/CREAT SERPL: 11 (ref 12–20)
CALCIUM SERPL-MCNC: 8.1 MG/DL (ref 8.5–10.1)
CHLORIDE SERPL-SCNC: 109 MMOL/L (ref 100–108)
CO2 SERPL-SCNC: 24 MMOL/L (ref 21–32)
CREAT SERPL-MCNC: 1.61 MG/DL (ref 0.6–1.3)
GLUCOSE BLD STRIP.AUTO-MCNC: 172 MG/DL (ref 70–110)
GLUCOSE BLD STRIP.AUTO-MCNC: 182 MG/DL (ref 70–110)
GLUCOSE BLD STRIP.AUTO-MCNC: 219 MG/DL (ref 70–110)
GLUCOSE BLD STRIP.AUTO-MCNC: 228 MG/DL (ref 70–110)
GLUCOSE SERPL-MCNC: 198 MG/DL (ref 74–99)
POTASSIUM SERPL-SCNC: 4.6 MMOL/L (ref 3.5–5.5)
SODIUM SERPL-SCNC: 140 MMOL/L (ref 136–145)

## 2018-05-20 PROCEDURE — 74011250637 HC RX REV CODE- 250/637: Performed by: INTERNAL MEDICINE

## 2018-05-20 PROCEDURE — 74011250636 HC RX REV CODE- 250/636: Performed by: INTERNAL MEDICINE

## 2018-05-20 PROCEDURE — 36415 COLL VENOUS BLD VENIPUNCTURE: CPT | Performed by: INTERNAL MEDICINE

## 2018-05-20 PROCEDURE — 97530 THERAPEUTIC ACTIVITIES: CPT

## 2018-05-20 PROCEDURE — 65270000029 HC RM PRIVATE

## 2018-05-20 PROCEDURE — 80048 BASIC METABOLIC PNL TOTAL CA: CPT | Performed by: INTERNAL MEDICINE

## 2018-05-20 PROCEDURE — 74011636637 HC RX REV CODE- 636/637: Performed by: INTERNAL MEDICINE

## 2018-05-20 PROCEDURE — 97116 GAIT TRAINING THERAPY: CPT

## 2018-05-20 PROCEDURE — 82962 GLUCOSE BLOOD TEST: CPT

## 2018-05-20 RX ADMIN — DOCUSATE SODIUM 100 MG: 100 CAPSULE, LIQUID FILLED ORAL at 08:42

## 2018-05-20 RX ADMIN — INSULIN LISPRO 3 UNITS: 100 INJECTION, SOLUTION INTRAVENOUS; SUBCUTANEOUS at 17:10

## 2018-05-20 RX ADMIN — HEPARIN SODIUM 5000 UNITS: 5000 INJECTION, SOLUTION INTRAVENOUS; SUBCUTANEOUS at 23:01

## 2018-05-20 RX ADMIN — HEPARIN SODIUM 5000 UNITS: 5000 INJECTION, SOLUTION INTRAVENOUS; SUBCUTANEOUS at 16:00

## 2018-05-20 RX ADMIN — SODIUM CHLORIDE 100 ML/HR: 900 INJECTION, SOLUTION INTRAVENOUS at 02:17

## 2018-05-20 RX ADMIN — SODIUM CHLORIDE 1000 MG: 900 INJECTION, SOLUTION INTRAVENOUS at 06:26

## 2018-05-20 RX ADMIN — INSULIN LISPRO 6 UNITS: 100 INJECTION, SOLUTION INTRAVENOUS; SUBCUTANEOUS at 12:51

## 2018-05-20 RX ADMIN — DOCUSATE SODIUM 100 MG: 100 CAPSULE, LIQUID FILLED ORAL at 20:33

## 2018-05-20 RX ADMIN — INSULIN LISPRO 3 UNITS: 100 INJECTION, SOLUTION INTRAVENOUS; SUBCUTANEOUS at 08:44

## 2018-05-20 RX ADMIN — HEPARIN SODIUM 5000 UNITS: 5000 INJECTION, SOLUTION INTRAVENOUS; SUBCUTANEOUS at 08:43

## 2018-05-20 RX ADMIN — GABAPENTIN 300 MG: 300 CAPSULE ORAL at 08:42

## 2018-05-20 RX ADMIN — ATORVASTATIN CALCIUM 10 MG: 10 TABLET, FILM COATED ORAL at 08:42

## 2018-05-20 RX ADMIN — LORAZEPAM 1 MG: 1 TABLET ORAL at 23:10

## 2018-05-20 RX ADMIN — SODIUM CHLORIDE 100 ML/HR: 900 INJECTION, SOLUTION INTRAVENOUS at 17:11

## 2018-05-20 RX ADMIN — INSULIN LISPRO 6 UNITS: 100 INJECTION, SOLUTION INTRAVENOUS; SUBCUTANEOUS at 23:01

## 2018-05-20 RX ADMIN — LEVOFLOXACIN 750 MG: 5 INJECTION, SOLUTION INTRAVENOUS at 20:33

## 2018-05-20 NOTE — PROGRESS NOTES
Shift summary:     Patient worked with PT, recommends skilled nursing facility. Eating well. No complaints of pain/nausea. Son visited throughout the shift.      Patient Vitals for the past 12 hrs:   Temp Pulse Resp BP SpO2   05/19/18 1952 98.9 °F (37.2 °C) 81 18 139/60 98 %   05/19/18 1129 98.6 °F (37 °C) 83 18 134/49 100 %   05/19/18 1033 - - - - 100 %

## 2018-05-20 NOTE — PROGRESS NOTES
Hospitalist Progress Note    Patient: Loreta Ventura MRN: [id number]  CSN: [id number]    YOB: 1940  Age: 65 y.o. Sex: female    DOA: 5/18/2018 LOS:  LOS: 1 day          Chief Complaint:  Nephrostomy malfunction with pyelonephritis           Assessment/Plan     Disposition :  Patient Active Problem List   Diagnosis Code    Pyelonephritis N12    S/p nephrectomy Z90.5    Right flank pain R10.9    Malfunction of nephrostomy tube ([address] T83.098A    Nephrostomy tube displaced ([address] T83.022A     Continue hydration with gentle fluids  Continue IV levaquin/Vanc  S/p tube exchange by IR  Wound Care consulted   Good glycemic control. Continue sliding scale insulin      DVT/GI Prophylaxis: Hep SQ    Subjective:    No events over night. Patient doing well this AM.  No new complaints. Review of systems:    Constitutional: denies fevers, chills, myalgias  Respiratory: denies SOB, cough  Cardiovascular: denies chest pain, palpitations  Gastrointestinal: denies nausea, vomiting, diarrhea      Vital signs/Intake and Output:  Visit Vitals    /58 (BP 1 Location: Right arm, BP Patient Position: At rest)    Pulse 75    Temp 99 °F (37.2 °C)    Resp 16    Ht 5' 2\" (1.575 m)    Wt 70.3 kg (154 lb 15.7 oz)    SpO2 96%    BMI 28.35 kg/m2     Current Shift:     Last three shifts:  05/18 1901 - 05/20 0700  In: 3551 [P.O.:600; I.V.:1167]  Out: 2450 [Urine:1650]    Exam:    General: Legally blind.   Well developed, alert, NAD, OX3  Head/Neck: NCAT, supple, No masses, No lymphadenopathy  CVS:Regular rate and rhythm, no M/R/G, S1/S2 heard, no thrill  Lungs:Clear to auscultation bilaterally, no wheezes, rhonchi, or rales  Abdomen: Soft, Nontender, No distention, Normal Bowel sounds, No hepatomegaly  Extremities: No C/C/E, pulses palpable 2+  Skin:normal texture and turgor, no rashes, no lesions  Neuro:grossly normal, follows commands  Psych:appropriate                Labs: Results:       Chemistry Recent Labs      05/20/18   0530  05/19/18   1340  05/18/18   1636   GLU  198*  171*  157*   NA  140  140  139   K  4.6  3.9  4.3   CL  109*  107  105   CO2  24  23  25   BUN  18  19*  23*   CREA  1.61*  1.46*  1.58*   CA  8.1*  8.3*  9.0   AGAP  7  10  9   BUCR  11*  13  15      CBC w/Diff Recent Labs      05/18/18   1636   WBC  10.0   RBC  4.43   HGB  13.0   HCT  39.9   PLT  229   GRANS  80*   LYMPH  10*   EOS  2      Cardiac Enzymes Recent Labs      05/18/18   1636   CPK  67   CKND1  CALCULATION NOT PERFORMED WHEN RESULT IS BELOW LINEAR LIMIT      Coagulation Recent Labs      05/18/18   1636   PTP  12.9   INR  1.0   APTT  36.5*       Lipid Panel No results found for: CHOL, CHOLPOCT, CHOLX, CHLST, CHOLV, 383369, HDL, LDL, LDLC, DLDLP, 563722, VLDLC, VLDL, TGLX, TRIGL, TRIGP, TGLPOCT, CHHD, CHHDX   BNP No results for input(s): BNPP in the last 72 hours. Liver Enzymes No results for input(s): TP, ALB, TBIL, AP, SGOT, GPT in the last 72 hours.     No lab exists for component: DBIL   Thyroid Studies No results found for: T4, T3U, TSH, TSHEXT, TSHEXT     Procedures/imaging: see electronic medical records for all procedures/Xrays and details which were not copied into this note but were reviewed prior to creation of Ron De La Rosa MD

## 2018-05-20 NOTE — PROGRESS NOTES
Patient alert and oriented x4. Blind bilateral eyes but communicates well. Denies pain at this time. 1300hrs Patient resting in bed, denies pain. 1923hrs Bedside, Verbal and Written shift change report given to Latrice Montana RN (oncoming nurse) by Jenny Zamora RN (offgoing nurse). Report included the following information SBAR, Kardex, Intake/Output, MAR, Accordion, Recent Results and Med Rec Status. All questions answered.

## 2018-05-20 NOTE — ROUTINE PROCESS
Bedside and Verbal shift change report given to MARY Bautista (oncoming nurse) by Martine Gustafson RN   (offgoing nurse). Report included the following information SBAR, Kardex and MAR.

## 2018-05-20 NOTE — PROGRESS NOTES
Problem: Mobility Impaired (Adult and Pediatric)  Goal: *Acute Goals and Plan of Care (Insert Text)  In 1-7 days pt will be able to perform:  ST.  Bed mobility:  Rolling L to R to L modified independent for positioning. 2.  Supine to sit to supine SBA with HR for meals. 3.  Sit to stand to sit CGA with RW in prep for ambulation. LT.  Gait:  Ambulate >50ft CGA with RW, for home/community mobility. 2.  Stair Negotiation:  Ascend/descend >3 steps CGA with HR for home entry. 3.  Activity tolerance: Tolerate up in chair 1-2 hours for ADLs. 4.  Patient/Family Education:  Patient/family to be independent with HEP for follow-up care and safe discharge. Outcome: Progressing Towards Goal  physical Therapy TREATMENT    Patient: Emanuel Sandoval (24 y.o. female)  Date: 2018  Diagnosis: Malfunction of nephrostomy tube [facility])  Pyelonephritis  S/p nephrectomy  Right flank pain  Pyelonephritis  Nephrostomy tube displaced [facility]) Pyelonephritis       Precautions: Fall  Chart, physical therapy assessment, plan of care and goals were reviewed. ASSESSMENT:  Pt denies pain at this time. Pt c/o not sleeping well and being awakened last night. Pt wanting to participate w/ PT. Pt cont to require min/mod A for supine<>sit<>stand. Pt requires additional time and frequent vc for guidance due to blindness. Pt fatigues quickly during gt training. Pt able to participate w/ gt training w/ RW, GB and min A. Pt has tendency to advance RW too far ahead requiring A for safety. Pt returned to supine in bed w/ all needs within reach. Recommend SNF rehab upon hospital d/c. Nurse Cezar Agudelo aware. Progression toward goals:  []      Improving appropriately and progressing toward goals  [x]      Improving slowly and progressing toward goals  []      Not making progress toward goals and plan of care will be adjusted     PLAN:  Patient continues to benefit from skilled intervention to address the above impairments.   Continue treatment per established plan of care. Discharge Recommendations:  [facility]  Further Equipment Recommendations for Discharge:  N/A     SUBJECTIVE:   Patient stated I have not slept all night because the nurse kept coming in.    OBJECTIVE DATA SUMMARY:   Critical Behavior:  Neurologic State: Alert, Appropriate for age  Orientation Level: Oriented to person, Oriented to place, Oriented to situation  Cognition: Follows commands, Appropriate safety awareness  Safety/Judgement: Decreased awareness of environment  Functional Mobility Training:  Bed Mobility:  Supine to Sit: Minimum assistance; Additional time; Moderate assistance (vc)  Sit to Supine: Moderate assistance (vc)  Scooting: Minimum assistance; Additional time (vc)  Transfers:  Sit to Stand: Minimum assistance; Additional time (vc)  Stand to Sit: Minimum assistance; Additional time (vc)  Balance:  Sitting: Intact  Standing: Impaired; With support  Standing - Static: Fair;Constant support  Standing - Dynamic : Poor   Range Of Motion:  Ambulation/Gait Training:  Distance (ft): 40 Feet (ft)  Assistive Device: Walker, rolling;Gait belt  Ambulation - Level of Assistance: Minimal assistance; Moderate assistance; Additional time (vc)  Gait Abnormalities: Decreased step clearance; Path deviations; Step to gait  Base of Support: Widened  Stance: Weight shift;Time  Speed/Jennifer: Slow  Step Length: Left shortened;Right shortened  Swing Pattern: Left asymmetrical;Right asymmetrical  Interventions: Safety awareness training; Tactile cues; Verbal cues  Neuro Re-Education:  Therapeutic Exercises: Ankle pumps, ankle circles, LAQ x10 reps each. Pain:  Pain Scale 1: Numeric (0 - 10)  Pain Intensity 1: 0  Activity Tolerance:   Fair   Please refer to the flowsheet for vital signs taken during this treatment.   After treatment:   [] Patient left in no apparent distress sitting up in chair  [x] Patient left in no apparent distress in bed  [x] Call bell left within reach  [x] Nursing notified  [] Caregiver present  [] Bed alarm activated      Ab Mcdowell PT   Time Calculation: 24 mins

## 2018-05-20 NOTE — ROUTINE PROCESS
Bedside and Verbal shift change report given to Eliseo Jain RN (oncoming nurse) by Olga Ford   (offgoing nurse). Report included the following information SBAR, Kardex and MAR.

## 2018-05-20 NOTE — PROGRESS NOTES
Shift summary: Pt A/Ox4. Bind in both eyes, call light in reach at all times. Bed alarm on. Nephrostomy tube drained clear yellow urine. Flushed with 20 ml NS per order, pt tolerated well. IV infiltrated x 2. Up with assist x 1 with walker to bathroom. Pt emptied colostomy bag over toilet, filled with flatus and brown stool. New site started on multiple attempts.      Patient Vitals for the past 12 hrs:   Temp Pulse Resp BP SpO2   05/20/18 0340 98.6 °F (37 °C) 74 18 149/68 100 %   05/19/18 2253 98.2 °F (36.8 °C) 89 18 155/73 99 %   05/19/18 1952 98.9 °F (37.2 °C) 81 18 139/60 98 %

## 2018-05-21 LAB
ANION GAP SERPL CALC-SCNC: 10 MMOL/L (ref 3–18)
BUN SERPL-MCNC: 19 MG/DL (ref 7–18)
BUN/CREAT SERPL: 13 (ref 12–20)
CALCIUM SERPL-MCNC: 8.2 MG/DL (ref 8.5–10.1)
CHLORIDE SERPL-SCNC: 110 MMOL/L (ref 100–108)
CO2 SERPL-SCNC: 22 MMOL/L (ref 21–32)
CREAT SERPL-MCNC: 1.49 MG/DL (ref 0.6–1.3)
GLUCOSE BLD STRIP.AUTO-MCNC: 167 MG/DL (ref 70–110)
GLUCOSE BLD STRIP.AUTO-MCNC: 180 MG/DL (ref 70–110)
GLUCOSE BLD STRIP.AUTO-MCNC: 199 MG/DL (ref 70–110)
GLUCOSE BLD STRIP.AUTO-MCNC: 229 MG/DL (ref 70–110)
GLUCOSE SERPL-MCNC: 135 MG/DL (ref 74–99)
POTASSIUM SERPL-SCNC: 4.4 MMOL/L (ref 3.5–5.5)
SODIUM SERPL-SCNC: 142 MMOL/L (ref 136–145)

## 2018-05-21 PROCEDURE — 97110 THERAPEUTIC EXERCISES: CPT

## 2018-05-21 PROCEDURE — 80048 BASIC METABOLIC PNL TOTAL CA: CPT | Performed by: INTERNAL MEDICINE

## 2018-05-21 PROCEDURE — 74011250636 HC RX REV CODE- 250/636: Performed by: INTERNAL MEDICINE

## 2018-05-21 PROCEDURE — 74011250636 HC RX REV CODE- 250/636: Performed by: HOSPITALIST

## 2018-05-21 PROCEDURE — 74011250637 HC RX REV CODE- 250/637: Performed by: INTERNAL MEDICINE

## 2018-05-21 PROCEDURE — 74011636637 HC RX REV CODE- 636/637: Performed by: INTERNAL MEDICINE

## 2018-05-21 PROCEDURE — 82962 GLUCOSE BLOOD TEST: CPT

## 2018-05-21 PROCEDURE — 87493 C DIFF AMPLIFIED PROBE: CPT | Performed by: HOSPITALIST

## 2018-05-21 PROCEDURE — 74011636637 HC RX REV CODE- 636/637: Performed by: HOSPITALIST

## 2018-05-21 PROCEDURE — 97116 GAIT TRAINING THERAPY: CPT

## 2018-05-21 PROCEDURE — 97530 THERAPEUTIC ACTIVITIES: CPT

## 2018-05-21 PROCEDURE — 36415 COLL VENOUS BLD VENIPUNCTURE: CPT | Performed by: INTERNAL MEDICINE

## 2018-05-21 PROCEDURE — 65270000029 HC RM PRIVATE

## 2018-05-21 RX ORDER — INSULIN GLARGINE 100 [IU]/ML
8 INJECTION, SOLUTION SUBCUTANEOUS EVERY 24 HOURS
Status: DISCONTINUED | OUTPATIENT
Start: 2018-05-21 | End: 2018-05-22 | Stop reason: HOSPADM

## 2018-05-21 RX ADMIN — SODIUM CHLORIDE 100 ML/HR: 900 INJECTION, SOLUTION INTRAVENOUS at 05:32

## 2018-05-21 RX ADMIN — INSULIN GLARGINE 8 UNITS: 100 INJECTION, SOLUTION SUBCUTANEOUS at 17:14

## 2018-05-21 RX ADMIN — DOCUSATE SODIUM 100 MG: 100 CAPSULE, LIQUID FILLED ORAL at 08:20

## 2018-05-21 RX ADMIN — HEPARIN SODIUM 5000 UNITS: 5000 INJECTION, SOLUTION INTRAVENOUS; SUBCUTANEOUS at 17:14

## 2018-05-21 RX ADMIN — HEPARIN SODIUM 5000 UNITS: 5000 INJECTION, SOLUTION INTRAVENOUS; SUBCUTANEOUS at 08:19

## 2018-05-21 RX ADMIN — GABAPENTIN 300 MG: 300 CAPSULE ORAL at 08:20

## 2018-05-21 RX ADMIN — INSULIN LISPRO 3 UNITS: 100 INJECTION, SOLUTION INTRAVENOUS; SUBCUTANEOUS at 12:57

## 2018-05-21 RX ADMIN — ATORVASTATIN CALCIUM 10 MG: 10 TABLET, FILM COATED ORAL at 08:20

## 2018-05-21 RX ADMIN — INSULIN LISPRO 6 UNITS: 100 INJECTION, SOLUTION INTRAVENOUS; SUBCUTANEOUS at 17:14

## 2018-05-21 RX ADMIN — LORAZEPAM 1 MG: 1 TABLET ORAL at 21:06

## 2018-05-21 RX ADMIN — INSULIN LISPRO 3 UNITS: 100 INJECTION, SOLUTION INTRAVENOUS; SUBCUTANEOUS at 08:19

## 2018-05-21 RX ADMIN — SODIUM CHLORIDE 100 ML/HR: 900 INJECTION, SOLUTION INTRAVENOUS at 15:24

## 2018-05-21 RX ADMIN — SODIUM CHLORIDE 1000 MG: 900 INJECTION, SOLUTION INTRAVENOUS at 17:11

## 2018-05-21 NOTE — ROUTINE PROCESS
Bedside and Verbal shift change report given to Jatin Phelps RN (oncoming nurse) by Serina Nava RN (offgoing nurse). Report included the following information SBAR, Kardex, Intake/Output, MAR and Recent Results.

## 2018-05-21 NOTE — PROGRESS NOTES
Problem: Mobility Impaired (Adult and Pediatric)  Goal: *Acute Goals and Plan of Care (Insert Text)  In 1-7 days pt will be able to perform:  ST.  Bed mobility:  Rolling L to R to L modified independent for positioning. 2.  Supine to sit to supine SBA with HR for meals. 3.  Sit to stand to sit CGA with RW in prep for ambulation. LT.  Gait:  Ambulate >50ft CGA with RW, for home/community mobility. 2.  Stair Negotiation:  Ascend/descend >3 steps CGA with HR for home entry. 3.  Activity tolerance: Tolerate up in chair 1-2 hours for ADLs. 4.  Patient/Family Education:  Patient/family to be independent with HEP for follow-up care and safe discharge. Outcome: Progressing Towards Goal  physical Therapy TREATMENT    Patient: Jenny Montalvo (20 y.o. female)  Date: 2018  Diagnosis: Malfunction of nephrostomy tube [facility])  Pyelonephritis  S/p nephrectomy  Right flank pain  Pyelonephritis  Nephrostomy tube displaced [facility]) Pyelonephritis  Precautions: Fall   Chart, physical therapy assessment, plan of care and goals were reviewed. ASSESSMENT:  Pt continues to be motivated to participate with PT. She continues to require assistance with mobility and moderate verbal, tactile and manual cuing due to visual impairments. During gait training with RW/GB use pt ambulated 5 feet from bed-chair with min/mod A with a wide RADHA and decreased step clearance. Left pt sitting up in a chair for lunch with son in the room. Educated pt and son to call nursing staff to get back to bed if son has to leave due to pt's increased falls risk; pt and son verbalized understanding. Recommend SNF at time of discharge.    Progression toward goals:  []      Improving appropriately and progressing toward goals  [x]      Improving slowly and progressing toward goals  []      Not making progress toward goals and plan of care will be adjusted     PLAN:  Patient continues to benefit from skilled intervention to address the above impairments. Continue treatment per established plan of care. Discharge Recommendations:  Skilled Nursing Facility  Further Equipment Recommendations for Discharge:  rolling walker     SUBJECTIVE:   Patient stated Dee Canas was hoping you would come back.     OBJECTIVE DATA SUMMARY:   Critical Behavior:  Neurologic State: Alert, Appropriate for age  Orientation Level: Appropriate for age, Oriented X4  Cognition: Follows commands  Safety/Judgement: Awareness of environment, Fall prevention  Functional Mobility Training:  Bed Mobility:   Supine to Sit: Minimum assistance; Additional time  Sit to Supine: Moderate assistance; Additional time  Scooting: Minimum assistance; Additional time   Transfers:  Sit to Stand: Minimum assistance; Additional time  Stand to Sit: Minimum assistance; Additional time  Balance:  Sitting: Intact  Standing: Impaired; With support  Standing - Static: Fair  Standing - Dynamic : Poor  Ambulation/Gait Training:  Distance (ft): 5 Feet (ft)  Assistive Device: Walker, rolling;Gait belt  Ambulation - Level of Assistance: Minimal assistance   Gait Abnormalities: Decreased step clearance   Base of Support: Widened  Stance: Weight shift;Time  Speed/Jennifer: Slow  Step Length: Right shortened;Left shortened  Swing Pattern: Right asymmetrical;Left asymmetrical   Interventions: Visual/Demos; Verbal cues; Tactile cues; Safety awareness training   Therapeutic Exercises:   HEP included ankle pumps, heel slides, LAQ, finger AROM in all directions x 10 reps  Pain:  Pain Scale 1: Numeric (0 - 10)  Pain Intensity 1: 0   Activity Tolerance:   Fair  Please refer to the flowsheet for vital signs taken during this treatment.   After treatment:   [x] Patient left in no apparent distress sitting up in chair  [] Patient left in no apparent distress in bed  [x] Call bell left within reach  [x] Nursing notified  [x] Caregiver present  [] Bed alarm activated        Kleber Fatima PT, DPT     Time Calculation: 25 mins

## 2018-05-21 NOTE — PROGRESS NOTES
Shift summary: No acute events. Pt A&Ox4. Blind in both eyes, bed alarm on. Call light in reach at all times. Denied pain. Nephrostomy tube drained clear yellow urine. Colostomy with soft brown stool. Gave ativan for sleep.      Patient Vitals for the past 12 hrs:   Temp Pulse Resp BP SpO2   05/21/18 0408 98.6 °F (37 °C) 73 17 152/60 100 %   05/20/18 2247 98.8 °F (37.1 °C) 84 18 159/66 99 %   05/20/18 2047 98.4 °F (36.9 °C) 76 18 143/49 98 %

## 2018-05-21 NOTE — PROGRESS NOTES
Received bedside report from night shift RN, patient resting quietly in bed. Lung sounds clear bilaterally, bowel sounds active. Colostomy and nephrostomy site clean, dry, intact. No complaints of pain. Patient stated concern about post hospitalization care. During IDR's, Care Mgmt spoke about [facility]. Wound care and Urology consult possibly today. 1500 bedside handoff to new RN.

## 2018-05-21 NOTE — PROGRESS NOTES
Problem: Mobility Impaired (Adult and Pediatric)  Goal: *Acute Goals and Plan of Care (Insert Text)  In 1-7 days pt will be able to perform:  ST.  Bed mobility:  Rolling L to R to L modified independent for positioning. 2.  Supine to sit to supine SBA with HR for meals. 3.  Sit to stand to sit CGA with RW in prep for ambulation. LT.  Gait:  Ambulate >50ft CGA with RW, for home/community mobility. 2.  Stair Negotiation:  Ascend/descend >3 steps CGA with HR for home entry. 3.  Activity tolerance: Tolerate up in chair 1-2 hours for ADLs. 4.  Patient/Family Education:  Patient/family to be independent with HEP for follow-up care and safe discharge. Pt refused PT due to:    []  Nausea/vomiting  [x]  Eating  []  Pain  []  Pt lethargic  []  Off Unit  Will f/u later as schedule allows. Thank you.     Lucy Fatima PT, DPT

## 2018-05-21 NOTE — PROGRESS NOTES
Problem: Mobility Impaired (Adult and Pediatric)  Goal: *Acute Goals and Plan of Care (Insert Text)  In 1-7 days pt will be able to perform:  ST.  Bed mobility:  Rolling L to R to L modified independent for positioning. 2.  Supine to sit to supine SBA with HR for meals. 3.  Sit to stand to sit CGA with RW in prep for ambulation. LT.  Gait:  Ambulate >50ft CGA with RW, for home/community mobility. 2.  Stair Negotiation:  Ascend/descend >3 steps CGA with HR for home entry. 3.  Activity tolerance: Tolerate up in chair 1-2 hours for ADLs. 4.  Patient/Family Education:  Patient/family to be independent with HEP for follow-up care and safe discharge. Outcome: Progressing Towards Goal  physical Therapy TREATMENT    Patient: Lesley Mchugh (15 y.o. female)  Date: 2018  Diagnosis: Malfunction of nephrostomy tube [facility])  Pyelonephritis  S/p nephrectomy  Right flank pain  Pyelonephritis  Nephrostomy tube displaced [facility]) Pyelonephritis  Precautions: Fall   Chart, physical therapy assessment, plan of care and goals were reviewed. ASSESSMENT:  Pt motivated to participate with PT at this time. Pt did require extensive verbal, tactile and manual cuing for mobility due to being visually impaired. During gait training pt ambulated a total of 46 feet with RW/GB use with Gwen with RW negotiation required by therapist.  Left pt in bed with all needs met, call bell within reach and pt's son in the room. Recommend SNF at time of discharge. Progression toward goals:  []      Improving appropriately and progressing toward goals  [x]      Improving slowly and progressing toward goals  []      Not making progress toward goals and plan of care will be adjusted     PLAN:  Patient continues to benefit from skilled intervention to address the above impairments. Continue treatment per established plan of care.   Discharge Recommendations:  [facility]  Further Equipment Recommendations for Discharge: rolling walker     SUBJECTIVE:   Patient stated I have been getting really tired.     OBJECTIVE DATA SUMMARY:   Critical Behavior:  Neurologic State: Alert, Appropriate for age  Orientation Level: Appropriate for age, Oriented X4  Cognition: Follows commands  Safety/Judgement: Decreased awareness of environment  Functional Mobility Training:  Bed Mobility:   Supine to Sit: Minimum assistance; Additional time  Sit to Supine: Moderate assistance; Additional time  Scooting: Minimum assistance; Additional time   Transfers:  Sit to Stand: Minimum assistance; Additional time  Stand to Sit: Minimum assistance; Additional time  Balance:  Sitting: Intact  Standing: Impaired; With support  Standing - Static: Fair;Constant support  Standing - Dynamic : Poor  Ambulation/Gait Training:  Distance (ft): 46 Feet (ft)  Assistive Device: Walker, rolling;Gait belt  Ambulation - Level of Assistance: Minimal assistance   Gait Abnormalities: Decreased step clearance   Base of Support: Widened  Stance: Weight shift;Time  Speed/Jennifer: Slow  Step Length: Right shortened;Left shortened  Swing Pattern: Right asymmetrical;Left asymmetrical   Interventions: Verbal cues; Tactile cues; Safety awareness training;Manual cues   Therapeutic Exercises:   HEP included ankle pumps, heel slides, finger flexion/extension x 10 reps  Pain:  Pain Scale 1: Numeric (0 - 10)  Pain Intensity 1: 0   Activity Tolerance:   Fair  Please refer to the flowsheet for vital signs taken during this treatment.   After treatment:   [] Patient left in no apparent distress sitting up in chair  [x] Patient left in no apparent distress in bed  [x] Call bell left within reach  [x] Nursing notified  [x] Caregiver present  [x] Bed alarm activated      Jian Fatima PT, DPT       Time Calculation: 26 mins

## 2018-05-21 NOTE — PROGRESS NOTES
Hospitalist Progress Note    Patient: Lesley Mchugh MRN: [id number]  CSN: [id number]    YOB: 1940  Age: 65 y.o. Sex: female    DOA: 5/18/2018 LOS:  LOS: 2 days          Chief Complaint:  pyelo        Assessment/Plan     Pyelonephritis  Hx cervical cancer with chronic neph tube and colostomy  Diabetes with neuropathy and blindness  Malfunction of neph tube which is a chronic tube    Enterococcus small amount from wound-vanco second dose ordered  Tube exchanged  Flush daily  Continue levaquin for other all cx results/sensitivities    [facility] planned at [facility]  Urology consult  Labs reviewed    Add low dose lantus for diabetes-BG above goal, plus SSI    Discussed with son at length    Disposition :  Patient Active Problem List   Diagnosis Code    Pyelonephritis N12    S/p nephrectomy Z90.5    Right flank pain R10.9    Malfunction of nephrostomy tube ([address] T83.098A    Nephrostomy tube displaced ([address] T83.022A       Subjective:  I feel ok  Denies [pain, nausea, abd pain      Review of systems:    Constitutional: denies fevers, chills, myalgias  Respiratory: denies SOB, cough  Cardiovascular: denies chest pain, palpitations  Gastrointestinal: denies nausea, vomiting, diarrhea      Vital signs/Intake and Output:  Visit Vitals    /57    Pulse 83    Temp 98.8 °F (37.1 °C)    Resp 17    Ht 5' 2\" (1.575 m)    Wt 70.3 kg (154 lb 15.7 oz)    SpO2 99%    BMI 28.35 kg/m2     Current Shift:  05/21 0701 - 05/21 1900  In: 2174.7 [P.O.:540; I.V.:1634.7]  Out: 360 [Urine:360]  Last three shifts:  05/19 1901 - 05/21 0700  In: 2037 [P.O.:720;  I.V.:1317]  Out: 3175 [Urine:2825]    Exam:    General: elderly Wf, alert, NAD, OX3  CVS:Regular rate and rhythm, no M/R/G, S1/S2 heard, no thrill  Lungs:Clear to auscultation bilaterally, no wheezes, rhonchi, or rales  Abdomen: Soft, Nontender, No distention, colostomy, right neph tube, Normal Bowel sounds, No hepatomegaly  Extremities: No C/C/E, pulses palpable 2+  Skin:normal texture and turgor, no rashes, no lesions  Neuro:grossly normal , follows commands  Psych:appropriate                Labs: Results:       Chemistry Recent Labs      05/21/18   0444  05/20/18   0530  05/19/18   1340   GLU  135*  198*  171*   NA  142  140  140   K  4.4  4.6  3.9   CL  110*  109*  107   CO2  22  24  23   BUN  19*  18  19*   CREA  1.49*  1.61*  1.46*   CA  8.2*  8.1*  8.3*   AGAP  10  7  10   BUCR  13  11*  13      CBC w/Diff Recent Labs      05/18/18   1636   WBC  10.0   RBC  4.43   HGB  13.0   HCT  39.9   PLT  229   GRANS  80*   LYMPH  10*   EOS  2      Cardiac Enzymes Recent Labs      05/18/18   1636   CPK  67   CKND1  CALCULATION NOT PERFORMED WHEN RESULT IS BELOW LINEAR LIMIT      Coagulation Recent Labs      05/18/18   1636   PTP  12.9   INR  1.0   APTT  36.5*       Lipid Panel No results found for: CHOL, CHOLPOCT, CHOLX, CHLST, CHOLV, 170214, HDL, LDL, LDLC, DLDLP, 101260, VLDLC, VLDL, TGLX, TRIGL, TRIGP, TGLPOCT, CHHD, CHHDX   BNP No results for input(s): BNPP in the last 72 hours. Liver Enzymes No results for input(s): TP, ALB, TBIL, AP, SGOT, GPT in the last 72 hours.     No lab exists for component: DBIL   Thyroid Studies No results found for: T4, T3U, TSH, TSHEXT     Procedures/imaging: see electronic medical records for all procedures/Xrays and details which were not copied into this note but were reviewed prior to creation of Debby Swartz MD

## 2018-05-21 NOTE — WOUND CARE
Pt seen by wound care at request of staff, to assist with provisions of supplies. Pt ostomy measures 1 1/8, wafer used from PAR and matching bag. Upon assessment bag noted to be over inflated and filled with liquid stool. As this site is a colostomy, should be solid stool. Bedside nurse made aware of liquid stool, which has apparently had more than one occurrence today per pt. Also discussed with Dr. Zeus Cochran, who states she will place order for stool sample. Wafer and bag applied using stoma paste, recommend frequent monitoring and emptying of bag to prevent leaks, bag bursting or peristomal breakdown. Wound care remains available as needed during this hospitalization.

## 2018-05-21 NOTE — PROGRESS NOTES
Problem: Pressure Injury - Risk of  Goal: *Prevention of pressure injury  Document Rickie Scale and appropriate interventions in the flowsheet.    Outcome: Progressing Towards Goal  Pressure Injury Interventions:  Sensory Interventions: Assess changes in LOC, Check visual cues for pain, Keep linens dry and wrinkle-free, Monitor skin under medical devices, Pressure redistribution bed/mattress (bed type)     Activity Interventions: Pressure redistribution bed/mattress(bed type), PT/OT evaluation    Mobility Interventions: Pressure redistribution bed/mattress (bed type), HOB 30 degrees or less    Nutrition Interventions: Document food/fluid/supplement intake

## 2018-05-21 NOTE — ROUTINE PROCESS
Bedside and Verbal shift change report given to MORENO Virk (oncoming nurse) by Agueda Weaver RN   (offgoing nurse). Report included the following information SBAR, Kardex and MAR.

## 2018-05-21 NOTE — PROGRESS NOTES
Problem: Falls - Risk of  Goal: *Absence of Falls  Document Romeo Fall Risk and appropriate interventions in the flowsheet.    Outcome: Progressing Towards Goal  Fall Risk Interventions:  Mobility Interventions: Assess mobility with egress test, Utilize walker, cane, or other assitive device     Medication Interventions: Patient to call before getting OOB, Teach patient to arise slowly    Elimination Interventions: Call light in reach, Patient to call for help with toileting needs

## 2018-05-21 NOTE — PROGRESS NOTES
Reason for Admission:   Pyelonephritis                    RRAT Score:         Low; 8            Plan for utilizing home health:      Plan SNF to LTC                    Likelihood of Readmission:  Low                          [facility]          Pt admitted due to pyelonephritis and kidney catheter drainage issue. Pt with a history of  cervical cancer with mets now with chronic nephrostomytube and colostomy presents with 3 day hx of severe nephrostomy tube pain and foul smelling urine. Pt is also blind and [place]. Pt recently moved to the area in April of this year to live with her son. Met with pt and her son, Neil Handley ([phone]), at bedside to discuss plan of care needs. Pt has asked that I speak with her son regarding plan of care. CM spoke with [place] [place] and he has indicated he would like SNF. Pt is in agreement. Offered [facility] and they would like [address] [place] [address] and [employer]. CMS has been notified to assist.  Pt has been accepted to [address] [place] [address] for admission as early as tomorrow. Pt and her son have indicated they would like to have pt transition to LTC. CM has contacted [facility] to assist with a Medicaid application. A UAI will also be completed to assist with SNF to LTC. Anticipate pt will transfer to [facility] [address] ([facility] [address] at [address] [place] [address]. [phone] for report.), 5/22/18. Pt's son has indicated he will transport pt to this facility when medically stable. CM continuing to follow. Care Management Interventions  PCP Verified by CM: Yes  Mode of Transport at Discharge:  Other (see comment) (Family)  Transition of Care Consult (CM Consult): SNF  Partner SNF: Yes  Health Maintenance Reviewed: Yes  Physical Therapy Consult: Yes  Occupational Therapy Consult: Yes  Current Support Network: Family Lives [place], Relative's Home  Confirm Follow Up Transport: Family  Plan discussed with Pt/Family/Caregiver: Yes  Freedom of Choice Offered: Yes  Discharge Location  Discharge Placement: Skilled nursing facility ([facility])

## 2018-05-21 NOTE — PROGRESS NOTES
Problem: Mobility Impaired (Adult and Pediatric)  Goal: *Acute Goals and Plan of Care (Insert Text)  In 1-7 days pt will be able to perform:  ST.  Bed mobility:  Rolling L to R to L modified independent for positioning. 2.  Supine to sit to supine SBA with HR for meals. 3.  Sit to stand to sit CGA with RW in prep for ambulation. LT.  Gait:  Ambulate >50ft CGA with RW, for home/community mobility. 2.  Stair Negotiation:  Ascend/descend >3 steps CGA with HR for home entry. 3.  Activity tolerance: Tolerate up in chair 1-2 hours for ADLs. 4.  Patient/Family Education:  Patient/family to be independent with HEP for follow-up care and safe discharge. Pt refused PT due to:    []  Nausea/vomiting  []  Eating  []  Pain  []  Pt lethargic  [x]  Nursing staff bathing patient. Will f/u later as schedule allows. Thank you.     Jona Fatima PT, DPT

## 2018-05-22 VITALS
HEART RATE: 72 BPM | BODY MASS INDEX: 29.94 KG/M2 | DIASTOLIC BLOOD PRESSURE: 65 MMHG | RESPIRATION RATE: 17 BRPM | HEIGHT: 62 IN | SYSTOLIC BLOOD PRESSURE: 152 MMHG | OXYGEN SATURATION: 97 % | TEMPERATURE: 97.9 F | WEIGHT: 162.7 LBS

## 2018-05-22 LAB
ANION GAP SERPL CALC-SCNC: 10 MMOL/L (ref 3–18)
BACTERIA SPEC CULT: ABNORMAL
BACTERIA SPEC CULT: NORMAL
BUN SERPL-MCNC: 19 MG/DL (ref 7–18)
BUN/CREAT SERPL: 13 (ref 12–20)
CALCIUM SERPL-MCNC: 8.4 MG/DL (ref 8.5–10.1)
CHLORIDE SERPL-SCNC: 110 MMOL/L (ref 100–108)
CO2 SERPL-SCNC: 22 MMOL/L (ref 21–32)
CREAT SERPL-MCNC: 1.45 MG/DL (ref 0.6–1.3)
ERYTHROCYTE [DISTWIDTH] IN BLOOD BY AUTOMATED COUNT: 13.3 % (ref 11.6–14.5)
GLUCOSE BLD STRIP.AUTO-MCNC: 125 MG/DL (ref 70–110)
GLUCOSE BLD STRIP.AUTO-MCNC: 218 MG/DL (ref 70–110)
GLUCOSE SERPL-MCNC: 150 MG/DL (ref 74–99)
GRAM STN SPEC: ABNORMAL
GRAM STN SPEC: ABNORMAL
HCT VFR BLD AUTO: 37.7 % (ref 35–45)
HGB BLD-MCNC: 12.4 G/DL (ref 12–16)
MCH RBC QN AUTO: 29.5 PG (ref 24–34)
MCHC RBC AUTO-ENTMCNC: 32.9 G/DL (ref 31–37)
MCV RBC AUTO: 89.5 FL (ref 74–97)
PLATELET # BLD AUTO: 213 K/UL (ref 135–420)
PMV BLD AUTO: 9.5 FL (ref 9.2–11.8)
POTASSIUM SERPL-SCNC: 4.2 MMOL/L (ref 3.5–5.5)
RBC # BLD AUTO: 4.21 M/UL (ref 4.2–5.3)
SERVICE CMNT-IMP: ABNORMAL
SERVICE CMNT-IMP: NORMAL
SODIUM SERPL-SCNC: 142 MMOL/L (ref 136–145)
WBC # BLD AUTO: 5.3 K/UL (ref 4.6–13.2)

## 2018-05-22 PROCEDURE — 74011636637 HC RX REV CODE- 636/637: Performed by: INTERNAL MEDICINE

## 2018-05-22 PROCEDURE — 85027 COMPLETE CBC AUTOMATED: CPT | Performed by: HOSPITALIST

## 2018-05-22 PROCEDURE — 74011250637 HC RX REV CODE- 250/637: Performed by: HOSPITALIST

## 2018-05-22 PROCEDURE — 80048 BASIC METABOLIC PNL TOTAL CA: CPT | Performed by: INTERNAL MEDICINE

## 2018-05-22 PROCEDURE — 74011250637 HC RX REV CODE- 250/637: Performed by: INTERNAL MEDICINE

## 2018-05-22 PROCEDURE — 36415 COLL VENOUS BLD VENIPUNCTURE: CPT | Performed by: INTERNAL MEDICINE

## 2018-05-22 PROCEDURE — 82962 GLUCOSE BLOOD TEST: CPT

## 2018-05-22 PROCEDURE — 77030010541

## 2018-05-22 PROCEDURE — 74011250636 HC RX REV CODE- 250/636: Performed by: INTERNAL MEDICINE

## 2018-05-22 RX ORDER — LEVOFLOXACIN 250 MG/1
250 TABLET ORAL DAILY
Qty: 5 TAB | Refills: 0 | Status: ON HOLD | OUTPATIENT
Start: 2018-05-22 | End: 2018-06-15

## 2018-05-22 RX ORDER — INSULIN LISPRO 100 [IU]/ML
INJECTION, SOLUTION INTRAVENOUS; SUBCUTANEOUS
Qty: 1 VIAL | Refills: 0 | Status: ON HOLD
Start: 2018-05-22 | End: 2018-06-15

## 2018-05-22 RX ORDER — INSULIN GLARGINE 100 [IU]/ML
INJECTION, SOLUTION SUBCUTANEOUS
Qty: 1 VIAL | Refills: 0 | Status: SHIPPED | OUTPATIENT
Start: 2018-05-22 | End: 2019-11-12

## 2018-05-22 RX ORDER — METRONIDAZOLE 500 MG/1
500 TABLET ORAL 2 TIMES DAILY
Qty: 10 TAB | Refills: 0 | Status: SHIPPED | OUTPATIENT
Start: 2018-05-22 | End: 2018-05-27

## 2018-05-22 RX ORDER — METRONIDAZOLE 250 MG/1
500 TABLET ORAL EVERY 8 HOURS
Status: DISCONTINUED | OUTPATIENT
Start: 2018-05-22 | End: 2018-05-22 | Stop reason: HOSPADM

## 2018-05-22 RX ORDER — TRAMADOL HYDROCHLORIDE 50 MG/1
50 TABLET ORAL
Qty: 12 TAB | Refills: 0 | Status: ON HOLD | OUTPATIENT
Start: 2018-05-22 | End: 2018-06-15

## 2018-05-22 RX ADMIN — INSULIN LISPRO 3 UNITS: 100 INJECTION, SOLUTION INTRAVENOUS; SUBCUTANEOUS at 12:19

## 2018-05-22 RX ADMIN — HEPARIN SODIUM 5000 UNITS: 5000 INJECTION, SOLUTION INTRAVENOUS; SUBCUTANEOUS at 09:10

## 2018-05-22 RX ADMIN — HEPARIN SODIUM 5000 UNITS: 5000 INJECTION, SOLUTION INTRAVENOUS; SUBCUTANEOUS at 00:23

## 2018-05-22 RX ADMIN — ATORVASTATIN CALCIUM 10 MG: 10 TABLET, FILM COATED ORAL at 09:10

## 2018-05-22 RX ADMIN — METRONIDAZOLE 500 MG: 250 TABLET ORAL at 12:19

## 2018-05-22 RX ADMIN — GABAPENTIN 300 MG: 300 CAPSULE ORAL at 09:10

## 2018-05-22 NOTE — PROGRESS NOTES
Shift summary:     Stool sample for Cdiff sent per MD order. New IV placed. Ativan given x1 at bedtime per request. Nephro tube flush, drainage noted around tube site, urine output adequate and is clear/yellow. No complaints of pain/nausea.      Patient Vitals for the past 12 hrs:   Temp Pulse Resp BP SpO2   05/21/18 2019 98.5 °F (36.9 °C) 79 16 148/54 98 %   05/21/18 1805 - - - - 99 %   05/21/18 1611 98.8 °F (37.1 °C) 78 16 138/59 99 %

## 2018-05-22 NOTE — ROUTINE PROCESS
Verbal shift change report given to Sherri Fernandez RN (oncoming nurse) by Yennifer Curry   (offgoing nurse). Report included the following information SBAR, Kardex and MAR.

## 2018-05-22 NOTE — ROUTINE PROCESS
Bedside and Verbal shift change report given to Carisa Varghese RN (oncoming nurse) by Ananya Merchant RN (offgoing nurse). Report included the following information SBAR and Kardex.

## 2018-05-22 NOTE — PROGRESS NOTES
0006-Resting quietly in bed with recording playing, arouses with moderate effort. Stable. Nephrostomy tube connected to leg bag in place; patent, draining. Dressing to right flank area intact, no drainage noted. Left quad ostomy intact, drainage noted to bag. Patient is legally blind and hard of hearing. Stable. Denies pain. Protective of IV site, right hand. Call light and personal items within reach. Assessment complete. 0428-No change from initial assessment. Patient voiced concern about having blood drawn this morning, stating that they usually draw from hands, but they are sore from being stuck. Will make  aware when on floor this morning. 0630-Resting in bed. Stable. No complaints. Reassured patient that assistance will be given to order breakfast this morning. Shift Summary:  Small amount of leaking noted from nephrostomy site during shift. Stable.

## 2018-05-22 NOTE — DISCHARGE SUMMARY
[address]  MR#: [id number]  : 1940  ACCOUNT #: [de-identified]   ADMIT DATE: 2018  DISCHARGE DATE:     CODE STATUS:  DO NOT RESUSCITATE. DISCHARGE DIAGNOSES:  1. Pyelonephritis. 2.  Status post previous nephrectomy with history of cervical cancer. 3.  Chronic nephrostomy tube in place. 4.  Malfunction of the nephrostomy tube, status post replacement. CONSULTANTS:  Dr. Darcie Wallace, urology and interventional radiology. HOSPITAL SUMMARY:  The patient is 65years old. [place] recently [place] live [place] her older son who is in this area, and she is going to be at a nursing facility at least in the interim. She has a history of cervical cancer. She has a colostomy in place. She has a chronic nephrostomy tube. It was last changed a month ago. It was being changed every few months, but it started to be requiring more regular changes. She has had some issues with infection recently in regards to that. It was last changed in [place] on 2018. Here, she came into the hospital because she had pain around the tube. There was foul smelling urine, and when she came in, there was notable drainage. It required exchange, and she has been treated for pyelonephritis here. She was not septic on admission. Her electrolytes are stable. She has some degree of chronic kidney disease with the last BUN and creatinine of 19 and 1.45. Her sodium and potassium are normal.  Bicarbonate is normal at 22. White count was normal on admission at 10. Repeat is 5.3. H and H and platelet count are normal.  Her INR was 1.0 on admission. She had a urine culture from 2018, that grew greater than 100,000 colonies of E. coli, Citrobacter, and Enterococcus.   The Citrobacter is sensitive to Levaquin, the Enterococcus is sensitive to Levaquin, and the E. coli is sensitive to Levaquin, which she has been on here in the [facility].  A wound culture was done from the nephrostomy area. It grew Pseudomonas, few E. coli, Enterococcus, and another second morphotype of Pseudomonas. She has received vancomycin IV for that, and the E. coli part is sensitive as well as the Pseudomonas are both sensitive to the Levaquin as well. A second urine culture was done. It also showed the Enterococcus and E. coli. Again, this is sensitive to the Levaquin that she has been receiving, and she had a blood culture from 05/18/2018, that is no growth for 4 days and a C. diff was sent yesterday that was negative. She does have a history of C. diff about a year and a half ago. She does have a colostomy in place that was changed out yesterday by Wound Care. She had some loose brown stools in that, but she is not febrile, not toxic, awake and alert. She is a debilitated, elderly, white female who seems to be at her baseline otherwise, and that is per her son report as well. She is legally blind. She is mildly hard of hearing. She does listen to books on tape for entertainment. She has a machine to do that that she has been using here in the hospital.  Today she is stable. Temperature is 98.1, pulse 76, blood pressure 155/53, respiratory rate 16, SpO2 99% on room air. She denies abdominal cramping or pain. She has had no nausea or vomiting. No shortness of breath. No issues overnight. Ostomy shows loose brown stool. Abdomen is soft and nontender with good bowel sounds. Lungs are clear. Cardiac exam, regular rate and rhythm. Lower extremities are without pitting edema. Nephrostomy tube is exchanged and in place. Urology has seen her and recommended they will see her in the office for establishment of new patient care as well as arrange for a repeat nephrostomy change out in 6 weeks. At this point, she is tolerating her oral medications. She has had Levaquin since admission.   We are changing that over to oral.  We started empiric Flagyl because of the question about C. diff, and I think she is going to be on Levaquin for the next 5 days. We will continue Flagyl for that many days as well empirically. So with that said, she is stable for discharge from the hospital.  There are some clarifications on her home meds that need to be made by the nursing facility, and those are on her Lipitor, which we have her on 10 mg daily here, but I would verify with her son. Gabapentin we have her on 300 mg daily, but verify with her son her home dose. She is diabetic and her A1c is 6.9%. Her most recent blood sugar here is 125-229. We started Lantus insulin here which I would recommend continuing as well as sliding scale at the facility. We will continue Levaquin 250 mg daily for 5 more days. Metoprolol dose needs to be verified by her son. We are going to continue Flagyl 500 mg twice daily for 5 days. She takes sodium bicarbonate, which I do not have a home dose on nor Synthroid, so all of those medications need to be verified with her son as to what her prior dosings were. Those are in her prior to admission medication list.  He is currently not here to verify the dosing, but he is coming to pick her up for transfer, so we can try and do it here before she leaves if not at the facility. I wrote a prescription for tramadol 50 mg q.6 hours as needed for severe pain that she can take for the next few days as needed, but she does not appear to have any uncontrolled pain. Her nephrostomy tube needs to be flushed every day, and she needs to come back in 6 weeks for it to be exchanged. She needs to follow up with Dr. Janina Hampton at [facility]. Urology in about 2-3 weeks to establish care. [facility] is going to accept her today for skilled nursing and rehabilitation. So make sure her flush happens every day for the nephrostomy. She has a DO NOT RESUSCITATE in place.   She should be on a diabetic diet, Accu-Cheks before meals and at bedtime, physical therapy and occupational therapy. Follow up with urology in 2-3 weeks and make sure good catheter care is done for her. She is stable for discharge from the hospital.      DISCHARGE TIME:  45 minutes. I discussed the plan of care with her son yesterday. DISPOSITION:  Patient will be discharged to Nursing Home.       MD JERROD Brown/  D: 05/22/2018 11:42     T: 05/22/2018 12:36  JOB #: [id number]

## 2018-05-22 NOTE — PROGRESS NOTES
Progress Note    Patient: Niurka Hilario MRN: [id number]  SSN: [id number]    YOB: 1940  Age: 65 y.o. Sex: female      Admit Date: 5/18/2018    LOS: 3 days     Subjective:     Pt is lying in bed this am.  She is listening to a book recording. She offers no complaints. Bed is low,  Side rails up x 2 and call light in reach. Objective:     Vitals:    05/21/18 2019 05/22/18 0411 05/22/18 0807 05/22/18 0836   BP: 148/54 177/82 155/53    Pulse: 79 76 76    Resp: 16 18 16    Temp: 98.5 °F (36.9 °C) 98.3 °F (36.8 °C) 98.1 °F (36.7 °C)    SpO2: 98% 100% 99% 99%   Weight:  73.8 kg (162 lb 11.2 oz)     Height:            Intake and Output:  Current Shift: 05/22 0701 - 05/22 1900  In: 240 [P.O.:240]  Out: -   Last three shifts: 05/20 1901 - 05/22 0700  In: 2804.7 [P.O.:1020; I.V.:1784.7]  Out: 2930 [Urine:2580]    Physical Exam:   GENERAL: alert, cooperative, no distress, appears stated age  EYE: positive findings: pt is legally blind. LUNG: clear to auscultation bilaterally  HEART: regular rate and rhythm, S1, S2 normal, no murmur, click, rub or gallop  ABDOMEN: soft, non-tender. Bowel sounds normal. No masses,  no organomegaly  EXTREMITIES:  extremities normal, atraumatic, no cyanosis or edema  SKIN: Normal.    Lab/Data Review: All lab results for the last 24 hours reviewed. Assessment:     Principal Problem:    Pyelonephritis (5/18/2018)    Active Problems:    S/p nephrectomy (5/18/2018)      Right flank pain (5/18/2018)      Malfunction of nephrostomy tube ([address] (5/18/2018)      Nephrostomy tube displaced ([address] (5/18/2018)        Plan:     Pt is being discharged today  Keep pt comfortable  Assist with needs  Pt is legally blind.     Signed By: Brook Coffey     May 22, 2018

## 2018-05-22 NOTE — PROGRESS NOTES
TRANSFER - OUT REPORT:    Verbal report given to Dulce Robb LPN on Niurka Hilario  being transferred to [facility] for routine progression of care       Report consisted of patients Situation, Background, Assessment and   Recommendations(SBAR). Information from the following report(s) SBAR, Kardex, Intake/Output, MAR, Recent Results and Cardiac Rhythm NSR was reviewed with the receiving nurse. Lines:   Peripheral IV 05/21/18 Right Hand (Active)   Site Assessment Clean, dry, & intact 5/22/2018  4:28 AM   Phlebitis Assessment 0 5/22/2018  4:28 AM   Infiltration Assessment 0 5/22/2018  4:28 AM   Dressing Status Clean, dry, & intact 5/22/2018  4:28 AM   Dressing Type Transparent;Tape 5/22/2018  4:28 AM   Hub Color/Line Status Yellow;Capped 5/22/2018  4:28 AM   Action Taken Open ports on tubing capped 5/21/2018  8:59 PM   Alcohol Cap Used Yes 5/22/2018  4:28 AM        Opportunity for questions, call back number  and clarification was provided. Patient  Will be transported with:   Tech and Son to the next facility  Discharge paper work given to son.  Pt off unit at this time

## 2018-05-22 NOTE — PROGRESS NOTES
Notified that pt would be discharged. Her son will be transporting her to [address] [place] [address] at 1300. Son is here now at bedside.

## 2018-05-22 NOTE — DISCHARGE INSTRUCTIONS
Open Nephrectomy: What to Expect at [address] [place] [address]  A nephrectomy is surgery to take out part or all of the kidney. One or both kidneys may have been taken out. Sometimes other tissue near the kidney is taken out at the same time. The doctor took out your kidney through a long cut, called an incision, in the front or side of your belly. The incision will leave a scar that will fade with time. Your belly will feel sore after the surgery. This usually lasts about 1 to 2 weeks. Your doctor will give you pain medicine for this. You may also have other symptoms such as nausea, diarrhea, constipation, gas, or a headache. At first, you may have low energy and get tired quickly. It may take 3 to 6 months for your energy to fully return. Your body can work fine with one healthy kidney. If both kidneys are removed or your remaining kidney is not healthy, your doctor will talk to you about the kind of treatment you will need after surgery. This care sheet gives you a general idea about how long it will take for you to recover. But each person recovers at a different pace. Follow the steps below to get better as quickly as possible. How can you care for yourself at home? Activity  ? · Rest when you feel tired. Getting enough sleep will help you recover. ? · Try to walk each day. Start by walking a little more than you did the day before. Bit by bit, increase the amount you walk. Walking boosts blood flow and helps prevent pneumonia and constipation. ? · Avoid exercises that use your belly muscles and strenuous activities, such as bicycle riding, jogging, weight lifting, or aerobic exercise, until your doctor says it is okay. ? · For at least 4 weeks, avoid lifting anything that would make you strain. This may include a child, heavy grocery bags and milk containers, a heavy briefcase or backpack, cat litter or dog food bags, or a vacuum .    ? · Hold a pillow over the cut the doctor made (incision) when you cough or take deep breaths. This will support your belly and decrease your pain. ? · Do breathing exercises at home as instructed by your doctor. This will help prevent pneumonia. ? · Ask your doctor when you can drive again. ? · You will probably need to take 4 to 6 weeks off from work. It depends on the type of work you do and how you feel. ? · You may be able to take showers (unless you have a drainage tube near your incision). If you have a drainage tube, follow your doctor's instructions to empty and care for it. Do not take a bath for the first 2 weeks, or until your doctor tells you it is okay. ? · Ask your doctor when it is okay for you to have sex. Diet  ? · You can eat your normal diet. If you were on a special diet for your kidneys before surgery, follow that diet until your doctor tells you to stop. ? · If your stomach is upset, try bland, low-fat foods like plain rice, broiled chicken, toast, and yogurt. ? · Drink plenty of fluids (unless your doctor tells you not to). ? · You may notice that your bowel movements are not regular right after your surgery. This is common. Try to avoid constipation and straining with bowel movements. You may want to take a fiber supplement every day. If you have not had a bowel movement after a couple of days, ask your doctor about taking a mild laxative. Medicines  ? · Your doctor will tell you if and when you can restart your medicines. He or she will also give you instructions about taking any new medicines. ? · If you take blood thinners, such as warfarin (Coumadin), clopidogrel (Plavix), or aspirin, be sure to talk to your doctor. He or she will tell you if and when to start taking those medicines again. Make sure that you understand exactly what your doctor wants you to do. ? · Take pain medicines exactly as directed. ¨ If the doctor gave you a prescription medicine for pain, take it as prescribed.   ¨ If you are not taking a prescription pain medicine, take an over-the-counter medicine that your doctor recommends. Read and follow all instructions on the label. ¨ Do not take aspirin, ibuprofen (Advil, Motrin), naproxen (Aleve), or other nonsteroidal anti-inflammatory drugs (NSAIDs) unless your doctor says it is okay. ? · If you think your pain medicine is making you sick to your stomach:  ¨ Take your medicine after meals (unless your doctor has told you not to). ¨ Ask your doctor for a different pain medicine. ? · If your doctor prescribed antibiotics, take them as directed. Do not stop taking them just because you feel better. You need to take the full course of antibiotics. Incision care  ? · If you have strips of tape on the incision, leave the tape on for a week or until it falls off.   ? · Wash the area daily with warm, soapy water and pat it dry. Don't use hydrogen peroxide or alcohol, which can slow healing. You may cover the area with a gauze bandage if it weeps or rubs against clothing. Change the bandage every day. ? · Keep the area clean and dry. Follow-up care is a key part of your treatment and safety. Be sure to make and go to all appointments, and call your doctor if you are having problems. It's also a good idea to know your test results and keep a list of the medicines you take. When should you call for help? Call 911 anytime you think you may need emergency care. For example, call if:  ? · You passed out (lost consciousness). ? · You have chest pain, are short of breath, or cough up blood. ?Call your doctor now or seek immediate medical care if:  ? · You have pain that does not get better after you take your pain medicine. ? · You have symptoms of a urinary tract infection. These may include:  ¨ Pain or burning when you urinate. ¨ A frequent need to urinate without being able to pass much urine.   ¨ Pain in the flank, which is just below the rib cage and above the waist on either side of the back.  ¨ Blood in the urine. ¨ A fever. ? · You have signs of infection, such as:  ¨ Increased pain, swelling, warmth, or redness. ¨ Red streaks leading from the incisions. ¨ Pus draining from the incisions. ¨ A fever. ? · You have loose stitches, or your incisions come open. ? · You are bleeding from the incisions. ? · You cannot urinate. ? · You are sick to your stomach or cannot drink fluids. ? · You have signs of a blood clot in your leg (called a deep vein thrombosis), such as:  ¨ Pain in the calf, back of the knee, thigh, or groin. ¨ Redness and swelling in your leg. ? Watch closely for changes in your health, and be sure to contact your doctor if you are having any problems. Where can you learn more? Go to http://richelle-kash.info/. Enter Y061 in the search box to learn more about \"Open Nephrectomy: What to Expect at Home. \"  Current as of: May 12, 2017  Content Version: 11.4  © 7696-1916 [employer]. Care instructions adapted under license by [employer] (which disclaims liability or warranty for this information). If you have questions about a medical condition or this instruction, always ask your healthcare professional. [address] any warranty or liability for your use of this information. DISCHARGE SUMMARY from Nurse    PATIENT INSTRUCTIONS:    After general anesthesia or intravenous sedation, for 24 hours or while taking prescription Narcotics:  · Limit your activities  · Do not drive and operate hazardous machinery  · Do not make important personal or business decisions  · Do  not drink alcoholic beverages  · If you have not urinated within 8 hours after discharge, please contact your surgeon on call.     Report the following to your surgeon:  · Excessive pain, swelling, redness or odor of or around the surgical area  · Temperature over 100.5  · Nausea and vomiting lasting longer than 4 hours or if unable to take medications  · Any signs of decreased circulation or nerve impairment to extremity: change in color, persistent  numbness, tingling, coldness or increase pain  · Any questions    What to do at Home:  Recommended activity: Activity as tolerated,     If you experience any of the following symptoms , please follow up with PCP. *  Please give a list of your current medications to your Primary Care Provider. *  Please update this list whenever your medications are discontinued, doses are      changed, or new medications (including over-the-counter products) are added. *  Please carry medication information at all times in case of emergency situations. These are general instructions for a healthy lifestyle:    No smoking/ No tobacco products/ Avoid exposure to second hand smoke  Surgeon General's Warning:  Quitting smoking now greatly reduces serious risk to your health. Obesity, smoking, and sedentary lifestyle greatly increases your risk for illness    A healthy diet, regular physical exercise & weight monitoring are important for maintaining a healthy lifestyle    You may be retaining fluid if you have a history of heart failure or if you experience any of the following symptoms:  Weight gain of 3 pounds or more overnight or 5 pounds in a week, increased swelling in our hands or feet or shortness of breath while lying flat in bed. Please call your doctor as soon as you notice any of these symptoms; do not wait until your next office visit. Recognize signs and symptoms of STROKE:    F-face looks uneven    A-arms unable to move or move unevenly    S-speech slurred or non-existent    T-time-call 911 as soon as signs and symptoms begin-DO NOT go       Back to bed or wait to see if you get better-TIME IS BRAIN. Warning Signs of HEART ATTACK     Call 911 if you have these symptoms:   Chest discomfort.  Most heart attacks involve discomfort in the center of the chest that lasts more than a few minutes, or that goes away and comes back. It can feel like uncomfortable pressure, squeezing, fullness, or pain.  Discomfort in other areas of the upper body. Symptoms can include pain or discomfort in one or both arms, the back, neck, jaw, or stomach.  Shortness of breath with or without chest discomfort.  Other signs may include breaking out in a cold sweat, nausea, or lightheadedness. Don't wait more than five minutes to call 911 - MINUTES MATTER! Fast action can save your life. Calling 911 is almost always the fastest way to get lifesaving treatment. Emergency Medical Services staff can begin treatment when they arrive -- up to an hour sooner than if someone gets to the hospital by car. The discharge information has been reviewed with the caregiver. The caregiver verbalized understanding. Discharge medications reviewed with the caregiver and appropriate educational materials and side effects teaching were provided.   ___________________________________________________________________________________________________________________________________

## 2018-05-22 NOTE — ROUTINE PROCESS
Bedside and Verbal shift change report given to Marquis Carney RN (oncoming nurse) by Jameel Wayne RN (offgoing nurse). Report included the following information SBAR and Kardex.

## 2018-05-24 LAB
BACTERIA SPEC CULT: NORMAL
SERVICE CMNT-IMP: NORMAL

## 2018-06-08 ENCOUNTER — HOSPITAL ENCOUNTER (OUTPATIENT)
Dept: LAB | Age: 78
Discharge: HOME | End: 2018-06-08

## 2018-06-08 PROCEDURE — 99001 SPECIMEN HANDLING PT-LAB: CPT | Performed by: INTERNAL MEDICINE

## 2018-06-15 ENCOUNTER — HOSPITAL ENCOUNTER (OUTPATIENT)
Dept: INTERVENTIONAL RADIOLOGY/VASCULAR | Age: 78
Discharge: NURSING, REHAB OR LONG-TERM CARE | End: 2018-06-15
Attending: UROLOGY | Admitting: RADIOLOGY
Payer: MEDICARE

## 2018-06-15 VITALS — TEMPERATURE: 97.6 F | OXYGEN SATURATION: 10 % | HEART RATE: 62 BPM | HEIGHT: 62 IN | RESPIRATION RATE: 16 BRPM

## 2018-06-15 DIAGNOSIS — N13.39 OTHER HYDRONEPHROSIS: ICD-10-CM

## 2018-06-15 PROCEDURE — 50435 EXCHANGE NEPHROSTOMY CATH: CPT

## 2018-06-15 PROCEDURE — 74011250636 HC RX REV CODE- 250/636: Performed by: RADIOLOGY

## 2018-06-15 PROCEDURE — 74011636320 HC RX REV CODE- 636/320: Performed by: RADIOLOGY

## 2018-06-15 PROCEDURE — 74011000250 HC RX REV CODE- 250

## 2018-06-15 PROCEDURE — 74011250636 HC RX REV CODE- 250/636

## 2018-06-15 RX ORDER — FLUMAZENIL 0.1 MG/ML
INJECTION INTRAVENOUS
Status: DISCONTINUED
Start: 2018-06-15 | End: 2018-06-15 | Stop reason: HOSPADM

## 2018-06-15 RX ORDER — LEVOTHYROXINE SODIUM 50 UG/1
TABLET ORAL
COMMUNITY

## 2018-06-15 RX ORDER — SODIUM CHLORIDE 9 MG/ML
500 INJECTION, SOLUTION INTRAVENOUS
Status: COMPLETED | OUTPATIENT
Start: 2018-06-15 | End: 2018-06-15

## 2018-06-15 RX ORDER — GABAPENTIN 300 MG/1
300 CAPSULE ORAL 2 TIMES DAILY
Status: ON HOLD | COMMUNITY
End: 2019-04-15 | Stop reason: SDUPTHER

## 2018-06-15 RX ORDER — HYDROCODONE BITARTRATE AND ACETAMINOPHEN 5; 325 MG/1; MG/1
1 TABLET ORAL
Status: ON HOLD | COMMUNITY
End: 2018-11-29 | Stop reason: SDUPTHER

## 2018-06-15 RX ORDER — ACETAMINOPHEN 650 MG/1
650 SUPPOSITORY RECTAL EVERY 6 HOURS
COMMUNITY
End: 2018-11-29

## 2018-06-15 RX ORDER — INSULIN ASPART 100 [IU]/ML
INJECTION, SOLUTION INTRAVENOUS; SUBCUTANEOUS
Status: ON HOLD | COMMUNITY
End: 2021-03-20

## 2018-06-15 RX ORDER — METOPROLOL SUCCINATE 50 MG/1
50 TABLET, EXTENDED RELEASE ORAL DAILY
COMMUNITY

## 2018-06-15 RX ORDER — MILK OF MAGNESIA 2400 MG/10ML
10 CONCENTRATE ORAL
Status: ON HOLD | COMMUNITY
End: 2021-12-07

## 2018-06-15 RX ORDER — FLUMAZENIL 0.1 MG/ML
0.2 INJECTION INTRAVENOUS
Status: DISCONTINUED | OUTPATIENT
Start: 2018-06-15 | End: 2018-06-15 | Stop reason: HOSPADM

## 2018-06-15 RX ORDER — NALOXONE HYDROCHLORIDE 0.4 MG/ML
0.1 INJECTION, SOLUTION INTRAMUSCULAR; INTRAVENOUS; SUBCUTANEOUS AS NEEDED
Status: DISCONTINUED | OUTPATIENT
Start: 2018-06-15 | End: 2018-06-15 | Stop reason: HOSPADM

## 2018-06-15 RX ORDER — LIDOCAINE HYDROCHLORIDE 10 MG/ML
1-20 INJECTION INFILTRATION; PERINEURAL
Status: COMPLETED | OUTPATIENT
Start: 2018-06-15 | End: 2018-06-15

## 2018-06-15 RX ORDER — BRIMONIDINE TARTRATE, TIMOLOL MALEATE 2; 5 MG/ML; MG/ML
1 SOLUTION/ DROPS OPHTHALMIC EVERY 12 HOURS
COMMUNITY
End: 2021-12-10

## 2018-06-15 RX ORDER — FACIAL-BODY WIPES
EACH TOPICAL
Status: ON HOLD | COMMUNITY
End: 2021-03-20

## 2018-06-15 RX ORDER — SODIUM BICARBONATE 650 MG/1
650 TABLET ORAL 2 TIMES DAILY
COMMUNITY
End: 2020-11-11

## 2018-06-15 RX ORDER — MIDAZOLAM HYDROCHLORIDE 1 MG/ML
INJECTION, SOLUTION INTRAMUSCULAR; INTRAVENOUS
Status: DISCONTINUED
Start: 2018-06-15 | End: 2018-06-15 | Stop reason: HOSPADM

## 2018-06-15 RX ORDER — SODIUM CHLORIDE 9 MG/ML
25 INJECTION, SOLUTION INTRAVENOUS CONTINUOUS
Status: DISCONTINUED | OUTPATIENT
Start: 2018-06-15 | End: 2018-06-15 | Stop reason: HOSPADM

## 2018-06-15 RX ORDER — MIDAZOLAM HYDROCHLORIDE 1 MG/ML
.5-4 INJECTION, SOLUTION INTRAMUSCULAR; INTRAVENOUS
Status: DISCONTINUED | OUTPATIENT
Start: 2018-06-15 | End: 2018-06-15 | Stop reason: HOSPADM

## 2018-06-15 RX ORDER — NALOXONE HYDROCHLORIDE 0.4 MG/ML
INJECTION, SOLUTION INTRAMUSCULAR; INTRAVENOUS; SUBCUTANEOUS
Status: DISCONTINUED
Start: 2018-06-15 | End: 2018-06-15 | Stop reason: HOSPADM

## 2018-06-15 RX ORDER — LORAZEPAM 0.5 MG/1
0.5 TABLET ORAL
Status: ON HOLD | COMMUNITY
End: 2018-11-29 | Stop reason: SDUPTHER

## 2018-06-15 RX ORDER — DEXTROMETHORPHAN HYDROBROMIDE, GUAIFENESIN 5; 100 MG/5ML; MG/5ML
650 LIQUID ORAL
Status: ON HOLD | COMMUNITY
End: 2021-03-20

## 2018-06-15 RX ORDER — MAG HYDROX/ALUMINUM HYD/SIMETH 200-200-20
30 SUSPENSION, ORAL (FINAL DOSE FORM) ORAL
Status: ON HOLD | COMMUNITY
End: 2021-03-20

## 2018-06-15 RX ORDER — FENTANYL CITRATE 50 UG/ML
25-200 INJECTION, SOLUTION INTRAMUSCULAR; INTRAVENOUS
Status: DISCONTINUED | OUTPATIENT
Start: 2018-06-15 | End: 2018-06-15 | Stop reason: HOSPADM

## 2018-06-15 RX ORDER — LIDOCAINE HYDROCHLORIDE 10 MG/ML
INJECTION INFILTRATION; PERINEURAL
Status: COMPLETED
Start: 2018-06-15 | End: 2018-06-15

## 2018-06-15 RX ORDER — FENTANYL CITRATE 50 UG/ML
INJECTION, SOLUTION INTRAMUSCULAR; INTRAVENOUS
Status: DISCONTINUED
Start: 2018-06-15 | End: 2018-06-15 | Stop reason: HOSPADM

## 2018-06-15 RX ADMIN — LIDOCAINE HYDROCHLORIDE 9 ML: 10 INJECTION INFILTRATION; PERINEURAL at 13:42

## 2018-06-15 RX ADMIN — LIDOCAINE HYDROCHLORIDE 9 ML: 10 INJECTION, SOLUTION INFILTRATION; PERINEURAL at 13:42

## 2018-06-15 RX ADMIN — SODIUM CHLORIDE 500 ML: 900 INJECTION, SOLUTION INTRAVENOUS at 12:50

## 2018-06-15 RX ADMIN — IOPAMIDOL 9 ML: 612 INJECTION, SOLUTION INTRAVENOUS at 13:42

## 2018-06-15 NOTE — PROGRESS NOTES
TRANSFER - OUT REPORT:    Verbal report given to [id number]pe Lerner RN(name) on Hedy Lozoya  being transferred to Care Unit(unit) for routine progression of care       Report consisted of patients Situation, Background, Assessment and   Recommendations(SBAR). Information from the following report(s) SBAR, Kardex and MAR was reviewed with the receiving nurse. Lines:   Peripheral IV 06/15/18 Right Hand (Active)   Site Assessment Clean, dry, & intact 6/15/2018 12:50 PM   Phlebitis Assessment 0 6/15/2018 12:50 PM   Infiltration Assessment 0 6/15/2018 12:50 PM   Dressing Status Clean, dry, & intact 6/15/2018 12:50 PM   Dressing Type Transparent 6/15/2018 12:50 PM   Hub Color/Line Status Yellow 6/15/2018 12:50 PM        Opportunity for questions and clarification was provided.       Patient transported with:   Registered Nurse

## 2018-06-15 NOTE — PROGRESS NOTES
Received notice of pt's leaking right nephrostomy tube. Requested earlier appt to address leak:  Precall completed with Andre Brewer, nurse managers at [facility]:  aware of NPO status. Pt ate at 0900. aware of need to hold anticoagulants per protocol. Denies. aware of potential for sedation administration and need for  at discharge. aware of arrival time pre procedure. Arrive to [facility] radiology waiting room ASAP today. states no questions at this time.

## 2018-06-15 NOTE — PROGRESS NOTES
Pt assisted dressed and helped to wheelchair,  Discharge inst given and reviewed with pt and son,  Instruction sheet on how to flush cath given to pt and son.   Both verbalize understanding

## 2018-06-15 NOTE — DISCHARGE INSTRUCTIONS
DISCHARGE SUMMARY from Nurse    PATIENT INSTRUCTIONS:    After general anesthesia or intravenous sedation, for 24 hours or while taking prescription Narcotics:  · Limit your activities  · Do not drive and operate hazardous machinery  · Do not make important personal or business decisions  · Do  not drink alcoholic beverages  · If you have not urinated within 8 hours after discharge, please contact your surgeon on call. Report the following to your surgeon:  · Excessive pain, swelling, redness or odor of or around the surgical area  · Temperature over 100.5  · Nausea and vomiting lasting longer than 4 hours or if unable to take medications  · Any signs of decreased circulation or nerve impairment to extremity: change in color, persistent  numbness, tingling, coldness or increase pain  · Any questions    What to do at Home:  Recommended activity: Activity as tolerated and no driving for today,   *  Please give a list of your current medications to your Primary Care Provider. *  Please update this list whenever your medications are discontinued, doses are      changed, or new medications (including over-the-counter products) are added. *  Please carry medication information at all times in case of emergency situations. These are general instructions for a healthy lifestyle:    No smoking/ No tobacco products/ Avoid exposure to second hand smoke  Surgeon General's Warning:  Quitting smoking now greatly reduces serious risk to your health. Obesity, smoking, and sedentary lifestyle greatly increases your risk for illness    A healthy diet, regular physical exercise & weight monitoring are important for maintaining a healthy lifestyle    You may be retaining fluid if you have a history of heart failure or if you experience any of the following symptoms:  Weight gain of 3 pounds or more overnight or 5 pounds in a week, increased swelling in our hands or feet or shortness of breath while lying flat in bed. Please call your doctor as soon as you notice any of these symptoms; do not wait until your next office visit. Recognize signs and symptoms of STROKE:    F-face looks uneven    A-arms unable to move or move unevenly    S-speech slurred or non-existent    T-time-call 911 as soon as signs and symptoms begin-DO NOT go       Back to bed or wait to see if you get better-TIME IS BRAIN. Warning Signs of HEART ATTACK     Call 911 if you have these symptoms:   Chest discomfort. Most heart attacks involve discomfort in the center of the chest that lasts more than a few minutes, or that goes away and comes back. It can feel like uncomfortable pressure, squeezing, fullness, or pain.  Discomfort in other areas of the upper body. Symptoms can include pain or discomfort in one or both arms, the back, neck, jaw, or stomach.  Shortness of breath with or without chest discomfort.  Other signs may include breaking out in a cold sweat, nausea, or lightheadedness. Don't wait more than five minutes to call 911 - MINUTES MATTER! Fast action can save your life. Calling 911 is almost always the fastest way to get lifesaving treatment. Emergency Medical Services staff can begin treatment when they arrive -- up to an hour sooner than if someone gets to the hospital by car. The discharge information has been reviewed with the patient. The patient and caregiver verbalized understanding. Discharge medications reviewed with the patient and caregiver and appropriate educational materials and side effects teaching were provided. ___________________________________________________________________________________________________________________________________     Nephrostomy Tube Care: Care Instructions  Your Care Instructions    A nephrostomy tube is a thin catheter placed into your kidney to drain urine. You may have one tube in a kidney or two tubes, one in each kidney. The urine collects in a bag attached to the tube.  In most cases, the bag is attached to your leg. Sometimes the catheter tube has a valve that lets you drain the urine into the toilet or other container. You may need a nephrostomy tube if you have a blockage or a hole in your urinary tract. The blockage may be caused by a kidney stone, infection, scar tissue, or a tumor. If you have only one tube, you still need to urinate. Your other kidney will still produce urine that will drain into your bladder. Having a nephrostomy tube in for a long time increases the risk of getting an infection. Nephrostomy tube care focuses on preventing infection. Follow-up care is a key part of your treatment and safety. Be sure to make and go to all appointments, and call your doctor if you are having problems. It's also a good idea to know your test results and keep a list of the medicines you take. How can you care for yourself at home? · Wash your hands before you handle the nephrostomy tube. · Clean the area around the tube with soap and water every day. · Keep the drainage bag lower than your kidney to keep urine from backing up. · If you have been told you can reuse your bag, you can clean the bag after removing it from the tube. Use another container to collect your urine while you clean the bag. To clean the bag, fill it with 2 parts vinegar to 3 parts water, and let it stand for 20 minutes. Then empty it out, and let it air dry. · Empty the drainage bag before it is completely full or every 2 to 3 hours. · Do not swim or take baths while you have a nephrostomy tube. You can shower after wrapping the end of the nephrostomy tube with plastic wrap. · Change the dressing around the nephrostomy tube about every 3 days or when it gets wet or dirty. A nurse will teach you how to change the dressing. When should you call for help? Call your doctor now or seek immediate medical care if:  ? · You have new or worse symptoms of a kidney infection.  These may include:  ¨ Pain or burning when you urinate. ¨ A frequent need to urinate without being able to pass much urine. ¨ Pain in the flank, which is just below the rib cage and above the waist on either side of the back. ¨ Blood in the urine. ¨ A fever. ? · You are vomiting or nauseated. ? · Your tube leaks. ? · Urine does not collect in the drainage bag.   ? · You have pain or bleeding around the tube. ? Watch closely for changes in your health, and be sure to contact your doctor if:  ? · You do not get better as expected. Where can you learn more? Go to http://richelle-kash.info/. Enter Y680 in the search box to learn more about \"Nephrostomy Tube Care: Care Instructions. \"  Current as of: May 12, 2017  Content Version: 11.4  © 4423-0241 [employer]. Care instructions adapted under license by [employer] (which disclaims liability or warranty for this information). If you have questions about a medical condition or this instruction, always ask your healthcare professional. [place] [address] any warranty or liability for your use of this information. Nephrostomy Tube Discharge Instructions    General Information:   A nephrostomy is a tube inserted through your skin and into the kidney. The purpose of the tube is to drain urine outside of the body. This is done in the event fo a block or a damaged ureter. Most of these tubes are usually changed every 2-3 months. However, some patients may need to have their tubes changed more often. Home Care Instructions: You can resume your regular diet. Do not drink alcohol, drive, or make any important legal decisions in the next 24 hours. Do not lift anything heavier than a gallon of milk or do anything strenuous for the next 24 hours. You should not shower for 24 hours. You should not bathe or swim while you have this drain in place. Your doctor may arrange for home health to visit you to help take care of the tube.  You should clean the tube and change the dressing at least every 48 hours and more as needed. Keep the dressing clean and dry. Keep up with how much drainage you get from the tube and report this to your doctor. Call If:   You should call your Physician and/or the Radiology Nurse if you have any bleeding other than a small spot on your bandage. Call if you have any signs of infection, fever, or increased pain at the site of the tube. Call if you should have leakage around the tube, a change in the appearance of the urine draining from the tube, increased pain in the lower back, or no drainage from the tube for 12 hours. Call if the tube has pulled back, or has been pulled out. Follow-Up Instructions:  Please see your ordering doctor as he/she has requested.     To Reach Us:        Patient Signature:  Date: 6/15/2018  Discharging Nurse: Lj Wright RN

## 2018-06-15 NOTE — IP AVS SNAPSHOT
[address] [address] 
[phone] Patient: Zulema Joe MRN: [id number] :1940 About your hospitalization You were admitted on:  Keesha 15, 2018 You last received care in the:  [address] You were discharged on:  Keesha 15, 2018 Why you were hospitalized Your primary diagnosis was:  Not on File Follow-up Information Follow up With Details Comments Contact Info Nicko Myers MD  As needed [address] [facility] 107 [address] [facility] 
[phone] Discharge Orders None A check quin indicates which time of day the medication should be taken. My Medications ASK your doctor about these medications Instructions Each Dose to Equal  
 Morning Noon Evening Bedtime * acetaminophen 650 mg suppository Commonly known as:  TYLENOL Your last dose was: Your next dose is: Insert 650 mg into rectum every six (6) hours. 650 mg  
    
   
   
   
  
 * acetaminophen 650 mg Tber Commonly known as:  TYLENOL Your last dose was: Your next dose is: Take 650 mg by mouth every six (6) hours as needed for Pain or Fever. 650 mg  
    
   
   
   
  
 alum-mag hydroxide-simeth 200-200-20 mg/5 mL Susp Commonly known as:  MYLANTA Your last dose was: Your next dose is: Take 30 mL by mouth four (4) times daily as needed. 30 mL  
    
   
   
   
  
 ATIVAN 0.5 mg tablet Generic drug:  LORazepam  
   
Your last dose was: Your next dose is: Take 0.5 mg by mouth daily as needed for Anxiety. 0.5 mg  
    
   
   
   
  
 ATORVASTATIN PO Your last dose was: Your next dose is: Take 20 mg by mouth daily. 20 mg  
    
   
   
   
  
 brimonidine-timolol 0.2-0.5 % Drop ophthalmic solution Commonly known as:  COMBIGAN Your last dose was: Your next dose is:    
   
   
 Administer 1 Drop to both eyes every twelve (12) hours. 1 Drop DULCOLAX (BISACODYL) 10 mg suppository Generic drug:  bisacodyl Your last dose was: Your next dose is: Insert  into rectum daily as needed. Indications: constipation  
     
   
   
   
  
 gabapentin 300 mg capsule Commonly known as:  NEURONTIN Your last dose was: Your next dose is: Take 300 mg by mouth daily. 300 mg  
    
   
   
   
  
 insulin glargine 100 unit/mL injection Commonly known as:  LANTUS Your last dose was: Your next dose is:    
   
   
 8 units SQ QHS  
     
   
   
   
  
 metoprolol succinate 50 mg XL tablet Commonly known as:  TOPROL-XL Your last dose was: Your next dose is: Take 50 mg by mouth daily. 50 mg MILK OF MAGNESIA CONCENTRATED 2,400 mg/10 mL Susp suspension Generic drug:  magnesium hydroxide Your last dose was: Your next dose is: Take 10 mL by mouth. 10 mL NORCO 5-325 mg per tablet Generic drug:  HYDROcodone-acetaminophen Your last dose was: Your next dose is: Take 1 Tab by mouth every twelve (12) hours as needed for Pain (for pain related to [address]). 1 Tab NovoLOG Flexpen U-100 Insulin 100 unit/mL Inpn Generic drug:  insulin aspart U-100 Your last dose was: Your next dose is:    
   
   
 by SubCUTAneous route. Sliding scale  
     
   
   
   
  
 sodium bicarbonate 650 mg tablet Your last dose was: Your next dose is: Take 650 mg by mouth two (2) times a day. 650 mg  
    
   
   
   
  
 synthroid 50 mcg tablet Generic drug:  levothyroxine Your last dose was: Your next dose is: Take  by mouth Daily (before breakfast). * Notice: This list has 2 medication(s) that are the same as other medications prescribed for you. Read the directions carefully, and ask your doctor or other care provider to review them with you. Opioid Education Prescription Opioids: What You Need to Know: 
 
 
 
F-face looks uneven A-arms unable to move or move unevenly S-speech slurred or non-existent T-time-call 911 as soon as signs and symptoms begin-DO NOT go Back to bed or wait to see if you get better-TIME IS BRAIN. Warning Signs of HEART ATTACK Call 911 if you have these symptoms: 
? Chest discomfort. Most heart attacks involve discomfort in the center of the chest that lasts more than a few minutes, or that goes away and comes back. It can feel like uncomfortable pressure, squeezing, fullness, or pain. ? Discomfort in other areas of the upper body. Symptoms can include pain or discomfort in one or both arms, the back, neck, jaw, or stomach. ? Shortness of breath with or without chest discomfort. ? Other signs may include breaking out in a cold sweat, nausea, or lightheadedness. Don't wait more than five minutes to call [address]! Fast action can save your life. Calling 911 is almost always the fastest way to get lifesaving treatment. Emergency Medical Services staff can begin treatment when they arrive  up to an hour sooner than if someone gets to the hospital by car. The discharge information has been reviewed with the patient. The patient and caregiver verbalized understanding.  
Discharge medications reviewed with the patient and caregiver and appropriate educational materials and side effects teaching were provided. ___________________________________________________________________________________________________________________________________ Nephrostomy Tube Care: Care Instructions Your Care Instructions A nephrostomy tube is a thin catheter placed into your kidney to drain urine. You may have one tube in a kidney or two tubes, one in each kidney. The urine collects in a bag attached to the tube. In most cases, the bag is attached to your leg. Sometimes the catheter tube has a valve that lets you drain the urine into the toilet or other container. You may need a nephrostomy tube if you have a blockage or a hole in your urinary tract. The blockage may be caused by a kidney stone, infection, scar tissue, or a tumor. If you have only one tube, you still need to urinate. Your other kidney will still produce urine that will drain into your bladder. Having a nephrostomy tube in for a long time increases the risk of getting an infection. Nephrostomy tube care focuses on preventing infection. Follow-up care is a key part of your treatment and safety. Be sure to make and go to all appointments, and call your doctor if you are having problems. It's also a good idea to know your test results and keep a list of the medicines you take. How can you care for yourself at home? · Wash your hands before you handle the nephrostomy tube. · Clean the area around the tube with soap and water every day. · Keep the drainage bag lower than your kidney to keep urine from backing up. · If you have been told you can reuse your bag, you can clean the bag after removing it from the tube. Use another container to collect your urine while you clean the bag. To clean the bag, fill it with 2 parts vinegar to 3 parts water, and let it stand for 20 minutes. Then empty it out, and let it air dry.  
· Empty the drainage bag before it is completely full or every 2 to 3 hours. 
· Do not swim or take baths while you have a nephrostomy tube. You can shower after wrapping the end of the nephrostomy tube with plastic wrap. · Change the dressing around the nephrostomy tube about every 3 days or when it gets wet or dirty. A nurse will teach you how to change the dressing. When should you call for help? Call your doctor now or seek immediate medical care if: 
? · You have new or worse symptoms of a kidney infection. These may include: 
¨ Pain or burning when you urinate. ¨ A frequent need to urinate without being able to pass much urine. ¨ Pain in the flank, which is just below the rib cage and above the waist on either side of the back. ¨ Blood in the urine. ¨ A fever. ? · You are vomiting or nauseated. ? · Your tube leaks. ? · Urine does not collect in the drainage bag.  
? · You have pain or bleeding around the tube. ? Watch closely for changes in your health, and be sure to contact your doctor if: 
? · You do not get better as expected. Where can you learn more? Go to http://richelle-kash.info/. Enter J314 in the search box to learn more about \"Nephrostomy Tube Care: Care Instructions. \" Current as of: May 12, 2017 Content Version: 11.4 © 9553-9681 [employer]. Care instructions adapted under license by [employer] (which disclaims liability or warranty for this information). If you have questions about a medical condition or this instruction, always ask your healthcare professional. [place] [address] any warranty or liability for your use of this information. Nephrostomy Tube Discharge Instructions General Information: A nephrostomy is a tube inserted through your skin and into the kidney. The purpose of the tube is to drain urine outside of the body. This is done in the event fo a block or a damaged ureter.  Most of these tubes are usually changed every 2-3 months. However, some patients may need to have their tubes changed more often. Home Care Instructions: You can resume your regular diet. Do not drink alcohol, drive, or make any important legal decisions in the next 24 hours. Do not lift anything heavier than a gallon of milk or do anything strenuous for the next 24 hours. You should not shower for 24 hours. You should not bathe or swim while you have this drain in place. Your doctor may arrange for home health to visit you to help take care of the tube. You should clean the tube and change the dressing at least every 48 hours and more as needed. Keep the dressing clean and dry. Keep up with how much drainage you get from the tube and report this to your doctor. Call If: 
 You should call your Physician and/or the Radiology Nurse if you have any bleeding other than a small spot on your bandage. Call if you have any signs of infection, fever, or increased pain at the site of the tube. Call if you should have leakage around the tube, a change in the appearance of the urine draining from the tube, increased pain in the lower back, or no drainage from the tube for 12 hours. Call if the tube has pulled back, or has been pulled out. Follow-Up Instructions:  Please see your ordering doctor as he/she has requested. To Reach Us:   
 
 
Patient Signature: 
Date: 6/15/2018 Discharging Nurse: Lovely Pringle RN Introducing [facility]! Gavi Meyer introduces "[employer] patient portal. Now you can access parts of your medical record, email your doctor's office, and request medication refills online. 1. In your internet browser, go to [email or web address] 2. Click on the First Time User? Click Here link in the Sign In box. You will see the New Member Sign Up page. 3. Enter your "[employer] Access Code exactly as it appears below.  You will not need to use this code after youve completed the sign-up process. If you do not sign up before the expiration date, you must request a new code. · [employer] Access Code: [email or web address] Expires: 8/16/2018  1:55 PM 
 
4. Enter the last four digits of your Social Security Number (xxxx) and Date of Birth (mm/dd/yyyy) as indicated and click Submit. You will be taken to the next sign-up page. 5. Create a [employer]Som ID. This will be your [employer] login ID and cannot be changed, so think of one that is secure and easy to remember. 6. Create a [employer] password. You can change your password at any time. 7. Enter your Password Reset Question and Answer. This can be used at a later time if you forget your password. 8. Enter your e-mail address. You will receive e-mail notification when new information is available in [address]. 9. Click Sign Up. You can now view and download portions of your medical record. 10. Click the Download Summary menu link to download a portable copy of your medical information. If you have questions, please visit the Frequently Asked Questions section of the [employer] website. Remember, [employer] is NOT to be used for urgent needs. For medical emergencies, dial 911. Now available from your iPhone and Android! Introducing Rodolfo Diaz As a [employer] patient, I wanted to make you aware of our electronic visit tool called Rodolfo Diaz. [employer] 24/7 allows you to connect within minutes with a medical provider 24 hours a day, seven days a week via a mobile device or tablet or logging into a secure website from your computer. You can access Rodolfo Diaz from anywhere in the [place].  
 
A virtual visit might be right for you when you have a simple condition and feel like you just dont want to get out of bed, or cant get away from work for an appointment, when your regular [employer] provider is not available (evenings, weekends or holidays), or when youre out of town and need minor care. Electronic visits cost only $49 and if the [employer] 24/7 provider determines a prescription is needed to treat your condition, one can be electronically transmitted to a nearby pharmacy*. Please take a moment to enroll today if you have not already done so. The enrollment process is free and takes just a few minutes. To enroll, please download the [employer] 24/7 justina to your tablet or phone, or visit [email or web address] to enroll on your computer. And, as an [address] [place] [address] patient with a [employer] account, the results of your visits will be scanned into your electronic medical record and your primary care provider will be able to view the scanned results. We urge you to continue to see your regular [employer] provider for your ongoing medical care. And while your primary care provider may not be the one available when you seek a [employer] virtual visit, the peace of mind you get from getting a real diagnosis real time can be priceless. For more information on [employer], view our Frequently Asked Questions (FAQs) at [email or web address]. Sincerely, 
 
Ny Tay MD 
Chief Medical Officer [address] [place] [address] *:  certain medications cannot be prescribed via [employer] Unresulted tests-please follow up with your PCP on these results Procedure/Test Authorizing Provider Kevin Marshall MD  
  
Providers Seen During Your Hospitalization Provider Specialty Primary office phone Ino Hyatt MD Urology [phone] Your Primary Care Physician (PCP) Primary Care Physician Office Phone Office Fax NONE ** None ** ** None ** You are allergic to the following Allergen Reactions Pcn (Penicillins) Rash Recent Documentation Height Breastfeeding? OB Status Smoking Status 1.575 m No Postmenopausal Never Smoker Emergency Contacts Name Discharge Info Relation Home Work Mobile Hemal Forman DISCHARGE CAREGIVER [3] Son [22] [phone] Patient Belongings The following personal items are in your possession at time of discharge: 
     Visual Aid: None Please provide this summary of care documentation to your next provider. Signatures-by signing, you are acknowledging that this After Visit Summary has been reviewed with you and you have received a copy. Patient Signature:  ____________________________________________________________ Date:  ____________________________________________________________  
  
[facility] Provider Signature:  ____________________________________________________________ Date:  ____________________________________________________________

## 2018-06-15 NOTE — PROCEDURES
Vascular & Interventional Radiology Brief Procedure Note    Interventional Radiologist: Camille Johnson    Pre-operative Diagnosis: R ureteric obstruction    Post-operative Diagnosis: Same as pre-operative diagnosis    Procedure(s) Performed:  R PCN exchange     Anesthesia:  Local     Findings: Old catheter in ideal position and patent. New catheter placed. Complications: None    Estimated Blood Loss:  minimal    Tubes and Drains: None    Specimens: None    Condition: Good    Disposition: Back to nursing facility    [address] [place] [address].  Camille Johnson, [address]    6/15/2018

## 2018-06-15 NOTE — PROGRESS NOTES
Called report to Hemalatha Gomez, nurse manager at [facility]. Informed nurse that pt's right flank nephrostomy tube has been exchanged. Original tube presented clamped with stopcock turned off to patient causing urine to not drain into the bag. As soon as stop cock was opened, clear yellow urine drained freely into bag. Tube was exchanged by Dr. Jacquelyn Ugarte, interventional radiologist.  Instructions for flushing neph tube w/10cc NS daily and changing sterile dsg to neph tube q 7 days and prn were given to both pt's son and Jennie Cottrell at [facility]. All parties verbalized understanding/compliance.

## 2018-06-19 ENCOUNTER — HOSPITAL ENCOUNTER (OUTPATIENT)
Dept: INTERVENTIONAL RADIOLOGY/VASCULAR | Age: 78
Discharge: HOME | End: 2018-06-19
Attending: UROLOGY

## 2018-07-20 NOTE — PROGRESS NOTES
PT pre call done with son Brianna Dickinson. PT aware of need to hold anticoagulants per protocol. PT aware of potential for  need for  at discharge. PT aware of arrival time pre procedure, 0730. Pt states no questions at this time.

## 2018-07-26 ENCOUNTER — HOSPITAL ENCOUNTER (OUTPATIENT)
Dept: INTERVENTIONAL RADIOLOGY/VASCULAR | Age: 78
Discharge: HOME | End: 2018-07-26
Attending: RADIOLOGY | Admitting: RADIOLOGY
Payer: MEDICARE

## 2018-07-26 VITALS
HEIGHT: 62 IN | TEMPERATURE: 97.7 F | BODY MASS INDEX: 28.52 KG/M2 | SYSTOLIC BLOOD PRESSURE: 176 MMHG | DIASTOLIC BLOOD PRESSURE: 65 MMHG | HEART RATE: 83 BPM | OXYGEN SATURATION: 100 % | WEIGHT: 155 LBS | RESPIRATION RATE: 20 BRPM

## 2018-07-26 DIAGNOSIS — N13.39 OTHER HYDRONEPHROSIS: ICD-10-CM

## 2018-07-26 PROCEDURE — 74011250636 HC RX REV CODE- 250/636: Performed by: RADIOLOGY

## 2018-07-26 PROCEDURE — C1729 CATH, DRAINAGE: HCPCS

## 2018-07-26 PROCEDURE — 74011636320 HC RX REV CODE- 636/320: Performed by: RADIOLOGY

## 2018-07-26 RX ORDER — LIDOCAINE HYDROCHLORIDE 10 MG/ML
1-10 INJECTION, SOLUTION EPIDURAL; INFILTRATION; INTRACAUDAL; PERINEURAL
Status: DISCONTINUED | OUTPATIENT
Start: 2018-07-26 | End: 2018-07-26 | Stop reason: CLARIF

## 2018-07-26 RX ORDER — SODIUM CHLORIDE 9 MG/ML
500 INJECTION, SOLUTION INTRAVENOUS
Status: COMPLETED | OUTPATIENT
Start: 2018-07-26 | End: 2018-07-26

## 2018-07-26 RX ORDER — SODIUM CHLORIDE 9 MG/ML
500 INJECTION, SOLUTION INTRAVENOUS
Status: DISCONTINUED | OUTPATIENT
Start: 2018-07-26 | End: 2018-07-26 | Stop reason: CLARIF

## 2018-07-26 RX ORDER — LIDOCAINE HYDROCHLORIDE 10 MG/ML
1-10 INJECTION, SOLUTION EPIDURAL; INFILTRATION; INTRACAUDAL; PERINEURAL
Status: DISCONTINUED | OUTPATIENT
Start: 2018-07-26 | End: 2018-07-26

## 2018-07-26 RX ADMIN — IOPAMIDOL 3 ML: 612 INJECTION, SOLUTION INTRAVENOUS at 08:54

## 2018-07-26 RX ADMIN — SODIUM CHLORIDE 500 ML: 900 INJECTION, SOLUTION INTRAVENOUS at 08:54

## 2018-07-26 NOTE — IP AVS SNAPSHOT
[address] [address] 
[phone] Patient: Tobi Hannah MRN: [id number] :1940 About your hospitalization You were admitted on:  2018 You last received care in the:  [address] You were discharged on:  2018 Why you were hospitalized Your primary diagnosis was:  Not on File Follow-up Information Follow up With Details Comments Contact Info None   None ([phone]) Patient stated that they have no PCP Discharge Orders None A check quin indicates which time of day the medication should be taken. My Medications ASK your doctor about these medications Instructions Each Dose to Equal  
 Morning Noon Evening Bedtime * acetaminophen 650 mg suppository Commonly known as:  TYLENOL Your last dose was: Your next dose is: Insert 650 mg into rectum every six (6) hours. 650 mg  
    
   
   
   
  
 * acetaminophen 650 mg Tber Commonly known as:  TYLENOL Your last dose was: Your next dose is: Take 650 mg by mouth every six (6) hours as needed for Pain or Fever. 650 mg  
    
   
   
   
  
 alum-mag hydroxide-simeth 200-200-20 mg/5 mL Susp Commonly known as:  MYLANTA Your last dose was: Your next dose is: Take 30 mL by mouth four (4) times daily as needed. 30 mL  
    
   
   
   
  
 ATIVAN 0.5 mg tablet Generic drug:  LORazepam  
   
Your last dose was: Your next dose is: Take 0.5 mg by mouth daily as needed for Anxiety. 0.5 mg  
    
   
   
   
  
 ATORVASTATIN PO Your last dose was: Your next dose is: Take 20 mg by mouth daily. 20 mg  
    
   
   
   
  
 brimonidine-timolol 0.2-0.5 % Drop ophthalmic solution Commonly known as:  COMBIGAN Your last dose was: Your next dose is: Administer 1 Drop to both eyes every twelve (12) hours. 1 Drop DULCOLAX (BISACODYL) 10 mg suppository Generic drug:  bisacodyl Your last dose was: Your next dose is: Insert  into rectum daily as needed. Indications: constipation  
     
   
   
   
  
 gabapentin 300 mg capsule Commonly known as:  NEURONTIN Your last dose was: Your next dose is: Take 300 mg by mouth daily. 300 mg  
    
   
   
   
  
 insulin glargine 100 unit/mL injection Commonly known as:  LANTUS Your last dose was: Your next dose is:    
   
   
 8 units SQ QHS  
     
   
   
   
  
 metoprolol succinate 50 mg XL tablet Commonly known as:  TOPROL-XL Your last dose was: Your next dose is: Take 50 mg by mouth daily. 50 mg MILK OF MAGNESIA CONCENTRATED 2,400 mg/10 mL Susp suspension Generic drug:  magnesium hydroxide Your last dose was: Your next dose is: Take 10 mL by mouth. 10 mL NORCO 5-325 mg per tablet Generic drug:  HYDROcodone-acetaminophen Your last dose was: Your next dose is: Take 1 Tab by mouth every twelve (12) hours as needed for Pain (for pain related to [address]). 1 Tab NovoLOG Flexpen U-100 Insulin 100 unit/mL Inpn Generic drug:  insulin aspart U-100 Your last dose was: Your next dose is:    
   
   
 by SubCUTAneous route. Sliding scale  
     
   
   
   
  
 sodium bicarbonate 650 mg tablet Your last dose was: Your next dose is: Take 650 mg by mouth two (2) times a day. 650 mg  
    
   
   
   
  
 synthroid 50 mcg tablet Generic drug:  levothyroxine Your last dose was: Your next dose is: Take  by mouth Daily (before breakfast). * Notice: This list has 2 medication(s) that are the same as other medications prescribed for you. Read the directions carefully, and ask your doctor or other care provider to review them with you. Opioid Education Prescription Opioids: What You Need to Know: 
 
Prescription opioids can be used to help relieve moderate-to-severe pain and are often prescribed following a surgery or injury, or for certain health conditions. These medications can be an important part of treatment but also come with serious risks. Opioids are strong pain medicines. Examples include hydrocodone, oxycodone, fentanyl, and morphine. Heroin is an example of an illegal opioid. It is important to work with your health care provider to make sure you are getting the safest, most effective care. WHAT ARE THE RISKS AND SIDE EFFECTS OF OPIOID USE? Prescription opioids carry serious risks of addiction and overdose, especially with prolonged use. An opioid overdose, often marked by slow breathing, can cause sudden death. The use of prescription opioids can have a number of side effects as well, even when taken as directed. · Tolerance-meaning you might need to take more of a medication for the same pain relief · Physical dependence-meaning you have symptoms of withdrawal when the medication is stopped. Withdrawal symptoms can include nausea, sweating, chills, diarrhea, stomach cramps, and muscle aches. Withdrawal can last up to several weeks, depending on which drug you took and how long you took it. · Increased sensitivity to pain · Constipation · Nausea, vomiting, and dry mouth · Sleepiness and dizziness · Confusion · Depression · Low levels of testosterone that can result in lower sex drive, energy, and strength · Itching and sweating RISKS ARE GREATER WITH:      
· History of drug misuse, substance use disorder, or overdose · Mental health conditions (such as depression or anxiety) · Sleep apnea · Older age (72 years or older) · Pregnancy Avoid alcohol while taking prescription opioids. Also, unless specifically advised by your health care provider, medications to avoid include: · Benzodiazepines (such as Xanax or Valium) · Muscle relaxants (such as Soma or Flexeril) · Hypnotics (such as Ambien or Lunesta) · Other prescription opioids KNOW YOUR OPTIONS Talk to your health care provider about ways to manage your pain that don't involve prescription opioids. Some of these options may actually work better and have fewer risks and side effects. Options may include: 
· Pain relievers such as acetaminophen, ibuprofen, and naproxen · Some medications that are also used for depression or seizures · Physical therapy and exercise · Counseling to help patients learn how to cope better with triggers of pain and stress. · Application of heat or cold compress · Massage therapy · Relaxation techniques Be Informed Make sure you know the name of your medication, how much and how often to take it, and its potential risks & side effects. IF YOU ARE PRESCRIBED OPIOIDS FOR PAIN: 
· Never take opioids in greater amounts or more often than prescribed. Remember the goal is not to be pain-free but to manage your pain at a tolerable level. · Follow up with your primary care provider to: · Work together to create a plan on how to manage your pain. · Talk about ways to help manage your pain that don't involve prescription opioids. · Talk about any and all concerns and side effects. · Help prevent misuse and abuse. · Never sell or share prescription opioids · Help prevent misuse and abuse. · Store prescription opioids in a secure place and out of reach of others (this may include visitors, children, friends, and family).  
· Safely dispose of unused/unwanted prescription opioids: Find your community drug take-back program or your pharmacy mail-back program, or flush them down the toilet, following guidance from the Food and Drug Administration (www.fda.gov/Drugs/ResourcesForYou). · Visit www.cdc.gov/drugoverdose to learn about the risks of opioid abuse and overdose. · If you believe you may be struggling with addiction, tell your health care provider and ask for guidance or call Carrie Barba at 7-937-164-DKTM. Discharge Instructions [facility] Nephrostomy Tube Discharge Instructions General Information: A nephrostomy is a tube inserted through your skin and into the kidney. The purpose of the tube is to drain urine outside of the body. This is done in the event fo a block or a damaged ureter. Most of these tubes are usually changed every 2-3 months. However, some patients may need to have their tubes changed more often. Home Care Instructions: You can resume your regular diet. Do not drink alcohol, drive, or make any important legal decisions in the next 24 hours. Do not lift anything heavier than a gallon of milk or do anything strenuous for the next 24 hours. You should not shower for 24 hours. You should not bathe or swim while you have this drain in place. Your doctor may arrange for home health to visit you to help take care of the tube. You should clean the tube and change the dressing at least every 48 hours and more as needed. Keep the dressing clean and dry. Keep up with how much drainage you get from the tube and report this to your doctor. Call If: 
 You should call your Physician and/or the Radiology Nurse if you have any bleeding other than a small spot on your bandage. Call if you have any signs of infection, fever, or increased pain at the site of the tube.   Call if you should have leakage around the tube, a change in the appearance of the urine draining from the tube, increased pain in the lower back, or no drainage from the tube for 12 hours. Call if the tube has pulled back, or has been pulled out. Follow-Up Instructions:  Please see your ordering doctor as he/she has requested. To Reach Us:   
 
 
Patient Signature: 
Date: 7/26/2018 Discharging Nurse: Tony Landis RN 
 
DISCHARGE SUMMARY from Nurse PATIENT INSTRUCTIONS: 
 
 
F-face looks uneven A-arms unable to move or move unevenly S-speech slurred or non-existent T-time-call 911 as soon as signs and symptoms begin-DO NOT go Back to bed or wait to see if you get better-TIME IS BRAIN. Warning Signs of HEART ATTACK Call 911 if you have these symptoms: 
? Chest discomfort. Most heart attacks involve discomfort in the center of the chest that lasts more than a few minutes, or that goes away and comes back. It can feel like uncomfortable pressure, squeezing, fullness, or pain. ? Discomfort in other areas of the upper body. Symptoms can include pain or discomfort in one or both arms, the back, neck, jaw, or stomach. ? Shortness of breath with or without chest discomfort. ? Other signs may include breaking out in a cold sweat, nausea, or lightheadedness. Don't wait more than five minutes to call [address]! Fast action can save your life. Calling 911 is almost always the fastest way to get lifesaving treatment. Emergency Medical Services staff can begin treatment when they arrive  up to an hour sooner than if someone gets to the hospital by car. The discharge information has been reviewed with the patient and caregiver. The patient and caregiver verbalized understanding. Discharge medications reviewed with the patient and caregiver and appropriate educational materials and side effects teaching were provided. Patient armband removed and shredded ___________________________________________________________________________________________________________________________________ Introducing [facility]! Leonard Nugent introduces [employer] patient portal. Now you can access parts of your medical record, email your doctor's office, and request medication refills online. 1. In your internet browser, go to [email or web address] 2. Click on the First Time User? Click Here link in the Sign In box. You will see the New Member Sign Up page. 3. Enter your [employer] Access Code exactly as it appears below. You will not need to use this code after youve completed the sign-up process. If you do not sign up before the expiration date, you must request a new code. · [employer] Access Code: [email or web address] Expires: 8/16/2018  1:55 PM 
 
4. Enter the last four digits of your Social Security Number (xxxx) and Date of Birth (mm/dd/yyyy) as indicated and click Submit. You will be taken to the next sign-up page. 5. Create a Server D[employer] ID. This will be your [employer] login ID and cannot be changed, so think of one that is secure and easy to remember. 6. Create a [employer] password. You can change your password at any time. 7. Enter your Password Reset Question and Answer. This can be used at a later time if you forget your password. 8. Enter your e-mail address. You will receive e-mail notification when new information is available in [facility] 9. Click Sign Up. You can now view and download portions of your medical record. 10. Click the Download Summary menu link to download a portable copy of your medical information. If you have questions, please visit the Frequently Asked Questions section of the [employer] website. Remember, [employer] is NOT to be used for urgent needs. For medical emergencies, dial 911. Now available from your iPhone and Android! Introducing Rodolfo Diaz As a [facility] patient, I wanted to make you aware of our electronic visit tool called [employer]. [employer] 24/7 allows you to connect within minutes with a medical provider 24 hours a day, seven days a week via a mobile device or tablet or logging into a secure website from your computer. You can access Rodolfo [employer] from anywhere in the [place]. A virtual visit might be right for you when you have a simple condition and feel like you just dont want to get out of bed, or cant get away from work for an appointment, when your regular [facility]res provider is not available (evenings, weekends or holidays), or when youre out of town and need minor care. Electronic visits cost only $49 and if the [employer] 24/7 provider determines a prescription is needed to treat your condition, one can be electronically transmitted to a nearby pharmacy*. Please take a moment to enroll today if you have not already done so. The enrollment process is free and takes just a few minutes. To enroll, please download the [employer] 24/7 justina to your tablet or phone, or visit [email or web address] to enroll on your computer. And, as an [address] [place] [address] patient with a [employer] account, the results of your visits will be scanned into your electronic medical record and your primary care provider will be able to view the scanned results. We urge you to continue to see your regular [facility]ie Linares provider for your ongoing medical care. And while your primary care provider may not be the one available when you seek a Rodolfo Diaz virtual visit, the peace of mind you get from getting a real diagnosis real time can be priceless. For more information on Rodolfo Diaz, view our Frequently Asked Questions (FAQs) at [email or web address]. Sincerely, 
 
Humera Jackson MD 
Chief Medical Officer Saira Fritz *:  certain medications cannot be prescribed via Rodolfo Diaz Unresulted Labs-Please follow up with your PCP about these lab tests Order Current Status IR EXCHANGE NEPHRO PERC RT SI In process Providers Seen During Your Hospitalization Provider Specialty Primary office phone Sunshine Corley MD Diagnostic Radiology [phone] Your Primary Care Physician (PCP) Primary Care Physician Office Phone Office Fax NONE ** None ** ** None ** You are allergic to the following Allergen Reactions Morphine Nausea and Vomiting Pcn (Penicillins) Rash Recent Documentation Height Weight Breastfeeding? BMI OB Status Smoking Status 1.575 m 70.3 kg No 28.35 kg/m2 Postmenopausal Never Smoker Emergency Contacts Name Discharge Info Relation Home Work Mobile Hemal Forman DISCHARGE CAREGIVER [3] Son [22] [phone] Patient Belongings The following personal items are in your possession at time of discharge: 
     Visual Aid: Glasses Please provide this summary of care documentation to your next provider. Signatures-by signing, you are acknowledging that this After Visit Summary has been reviewed with you and you have received a copy. Patient Signature:  ____________________________________________________________ Date:  ____________________________________________________________  
  
Justin Ibanez Provider Signature:  ____________________________________________________________ Date:  ____________________________________________________________

## 2018-07-26 NOTE — IP AVS SNAPSHOT
Summary of Care Report The Summary of Care report has been created to help improve care coordination. Users with access to [facility] or [address] (Web-based application) may access additional patient information including the Discharge Summary. If you are not currently a [address] user and need more information, please call the number listed below in the [place] [address] section and ask to be connected with Medical Records. Facility Information Name Address Phone [address] [place] [id number] [phone] Patient Information Patient Name Sex  Allison Lee ([id number]) Female 1940 Discharge Information Admitting Provider Service Area Unit Nicky Briggs MD / 2 [facility] / [phone] Discharge Provider Discharge Date/Time Discharge Disposition Destination (none) 2018 09:00 (Pending) AHR (none) Patient Language Language ENGLISH [13] Hospital Problems as of 2018  Reviewed: 2018  9:08 PM by Cassie Partida MD  
 None Non-Hospital Problems as of 2018  Reviewed: 2018  9:08 PM by Cassie Partida MD  
  
  
  
 Class Noted - Resolved Last Modified Active Problems   Pyelonephritis  2018 - Present 2018 by Cassie Partida MD  
  Entered by Vianey Mishra MD  
  S/p nephrectomy  2018 - Present 2018 by Vianey Mishra MD  
  Entered by Vianey Mishra MD  
  Right flank pain  2018 - Present 2018 by Vianey Mishra MD  
  Entered by Viaeny Mishra MD  
  Malfunction of nephrostomy tube [facility])  2018 - Present 2018 by Cassie Partida MD  
  Entered by Vianey Mishra MD  
  Nephrostomy tube displaced [facility])  2018 - Present 2018 by Cassie Partida MD  
 Entered by Ivery Canavan, MD  
  
You are allergic to the following Allergen Reactions Morphine Nausea and Vomiting Pcn (Penicillins) Rash Current Discharge Medication List  
  
ASK your doctor about these medications Dose & Instructions Dispensing Information Comments * acetaminophen 650 mg suppository Commonly known as:  TYLENOL Dose:  650 mg Insert 650 mg into rectum every six (6) hours. Refills:  0  
   
 * acetaminophen 650 mg Tber Commonly known as:  TYLENOL Dose:  650 mg Take 650 mg by mouth every six (6) hours as needed for Pain or Fever. Refills:  0  
   
 alum-mag hydroxide-simeth 200-200-20 mg/5 mL Susp Commonly known as:  MYLANTA Dose:  30 mL Take 30 mL by mouth four (4) times daily as needed. Refills:  0  
   
 ATIVAN 0.5 mg tablet Generic drug:  LORazepam  
 Dose:  0.5 mg Take 0.5 mg by mouth daily as needed for Anxiety. Refills:  0  
   
 ATORVASTATIN PO Dose:  20 mg Take 20 mg by mouth daily. Refills:  0  
   
 brimonidine-timolol 0.2-0.5 % Drop ophthalmic solution Commonly known as:  COMBIGAN Dose:  1 Drop Administer 1 Drop to both eyes every twelve (12) hours. Refills:  0 DULCOLAX (BISACODYL) 10 mg suppository Generic drug:  bisacodyl Insert  into rectum daily as needed. Indications: constipation Refills:  0  
   
 gabapentin 300 mg capsule Commonly known as:  NEURONTIN Dose:  300 mg Take 300 mg by mouth daily. Refills:  0  
   
 insulin glargine 100 unit/mL injection Commonly known as:  LANTUS  
 8 units SQ QHS Quantity:  1 Vial  
Refills:  0  
   
 metoprolol succinate 50 mg XL tablet Commonly known as:  TOPROL-XL Dose:  50 mg Take 50 mg by mouth daily. Refills:  0 MILK OF MAGNESIA CONCENTRATED 2,400 mg/10 mL Susp suspension Generic drug:  magnesium hydroxide Dose:  10 mL Take 10 mL by mouth. Refills:  0 NORCO 5-325 mg per tablet Generic drug:  HYDROcodone-acetaminophen Dose:  1 Tab Take 1 Tab by mouth every twelve (12) hours as needed for Pain (for pain related to [address]). Refills:  0 NovoLOG Flexpen U-100 Insulin 100 unit/mL Inpn Generic drug:  insulin aspart U-100  
 by SubCUTAneous route. Sliding scale Refills:  0  
   
 sodium bicarbonate 650 mg tablet Dose:  650 mg Take 650 mg by mouth two (2) times a day. Refills:  0  
   
 synthroid 50 mcg tablet Generic drug:  levothyroxine Take  by mouth Daily (before breakfast). Refills:  0  
   
 * Notice: This list has 2 medication(s) that are the same as other medications prescribed for you. Read the directions carefully, and ask your doctor or other care provider to review them with you. Surgery Information ID Date/Time Status Primary Surgeon All Procedures Location [id number] 7/26/2018 Unposted   [facility] RAD ANGIO IR OR-DO NOT SCHEDULE Follow-up Information Follow up With Details Comments Contact Info None   None ([phone]) Patient stated that they have no PCP Discharge Instructions [facility] Nephrostomy Tube Discharge Instructions General Information: A nephrostomy is a tube inserted through your skin and into the kidney. The purpose of the tube is to drain urine outside of the body. This is done in the event fo a block or a damaged ureter. Most of these tubes are usually changed every 2-3 months. However, some patients may need to have their tubes changed more often. Home Care Instructions: You can resume your regular diet. Do not drink alcohol, drive, or make any important legal decisions in the next 24 hours. Do not lift anything heavier than a gallon of milk or do anything strenuous for the next 24 hours. You should not shower for 24 hours. You should not bathe or swim while you have this drain in place.   Your doctor may arrange for home health to visit you to help take care of the tube. You should clean the tube and change the dressing at least every 48 hours and more as needed. Keep the dressing clean and dry. Keep up with how much drainage you get from the tube and report this to your doctor. Call If: 
 You should call your Physician and/or the Radiology Nurse if you have any bleeding other than a small spot on your bandage. Call if you have any signs of infection, fever, or increased pain at the site of the tube. Call if you should have leakage around the tube, a change in the appearance of the urine draining from the tube, increased pain in the lower back, or no drainage from the tube for 12 hours. Call if the tube has pulled back, or has been pulled out. Follow-Up Instructions:  Please see your ordering doctor as he/she has requested. To Reach Us:   
 
 
Patient Signature: 
Date: 7/26/2018 Discharging Nurse: Leonie Major RN 
 
DISCHARGE SUMMARY from Nurse PATIENT INSTRUCTIONS: 
 
 
F-face looks uneven A-arms unable to move or move unevenly S-speech slurred or non-existent T-time-call 911 as soon as signs and symptoms begin-DO NOT go Back to bed or wait to see if you get better-TIME IS BRAIN. Warning Signs of HEART ATTACK Call 911 if you have these symptoms: 
? Chest discomfort. Most heart attacks involve discomfort in the center of the chest that lasts more than a few minutes, or that goes away and comes back. It can feel like uncomfortable pressure, squeezing, fullness, or pain. ? Discomfort in other areas of the upper body. Symptoms can include pain or discomfort in one or both arms, the back, neck, jaw, or stomach. ? Shortness of breath with or without chest discomfort. ? Other signs may include breaking out in a cold sweat, nausea, or lightheadedness. Don't wait more than five minutes to call [address]! Fast action can save your life. Calling 911 is almost always the fastest way to get lifesaving treatment. Emergency Medical Services staff can begin treatment when they arrive  up to an hour sooner than if someone gets to the hospital by car. The discharge information has been reviewed with the patient and caregiver. The patient and caregiver verbalized understanding. Discharge medications reviewed with the patient and caregiver and appropriate educational materials and side effects teaching were provided. Patient armband removed and shredded 
 
___________________________________________________________________________________________________________________________________ Chart Review Routing History No Routing History on File

## 2018-07-26 NOTE — DISCHARGE INSTRUCTIONS
[address] Nephrostomy Tube Discharge Instructions    General Information:   A nephrostomy is a tube inserted through your skin and into the kidney. The purpose of the tube is to drain urine outside of the body. This is done in the event fo a block or a damaged ureter. Most of these tubes are usually changed every 2-3 months. However, some patients may need to have their tubes changed more often. Home Care Instructions: You can resume your regular diet. Do not drink alcohol, drive, or make any important legal decisions in the next 24 hours. Do not lift anything heavier than a gallon of milk or do anything strenuous for the next 24 hours. You should not shower for 24 hours. You should not bathe or swim while you have this drain in place. Your doctor may arrange for home health to visit you to help take care of the tube. You should clean the tube and change the dressing at least every 48 hours and more as needed. Keep the dressing clean and dry. Keep up with how much drainage you get from the tube and report this to your doctor. Call If:   You should call your Physician and/or the Radiology Nurse if you have any bleeding other than a small spot on your bandage. Call if you have any signs of infection, fever, or increased pain at the site of the tube. Call if you should have leakage around the tube, a change in the appearance of the urine draining from the tube, increased pain in the lower back, or no drainage from the tube for 12 hours. Call if the tube has pulled back, or has been pulled out. Follow-Up Instructions:  Please see your ordering doctor as he/she has requested.     To Reach Us:        Patient Signature:  Date: 7/26/2018  Discharging Nurse: Rosetta Viramontes RN    DISCHARGE SUMMARY from Nurse    PATIENT INSTRUCTIONS:    After general anesthesia or intravenous sedation, for 24 hours or while taking prescription Narcotics:  · Limit your activities  · Do not drive and operate hazardous machinery  · Do not make important personal or business decisions  · Do  not drink alcoholic beverages  · If you have not urinated within 8 hours after discharge, please contact your surgeon on call. Report the following to your surgeon:  · Excessive pain, swelling, redness or odor of or around the surgical area  · Temperature over 100.5  · Nausea and vomiting lasting longer than 4 hours or if unable to take medications  · Any signs of decreased circulation or nerve impairment to extremity: change in color, persistent  numbness, tingling, coldness or increase pain  · Any questions    What to do at Home:  Recommended activity: Activity as tolerated,       *  Please give a list of your current medications to your Primary Care Provider. *  Please update this list whenever your medications are discontinued, doses are      changed, or new medications (including over-the-counter products) are added. *  Please carry medication information at all times in case of emergency situations. These are general instructions for a healthy lifestyle:    No smoking/ No tobacco products/ Avoid exposure to second hand smoke  Surgeon General's Warning:  Quitting smoking now greatly reduces serious risk to your health. Obesity, smoking, and sedentary lifestyle greatly increases your risk for illness    A healthy diet, regular physical exercise & weight monitoring are important for maintaining a healthy lifestyle    You may be retaining fluid if you have a history of heart failure or if you experience any of the following symptoms:  Weight gain of 3 pounds or more overnight or 5 pounds in a week, increased swelling in our hands or feet or shortness of breath while lying flat in bed. Please call your doctor as soon as you notice any of these symptoms; do not wait until your next office visit.     Recognize signs and symptoms of STROKE:    F-face looks uneven    A-arms unable to move or move unevenly    S-speech slurred or non-existent    T-time-call 911 as soon as signs and symptoms begin-DO NOT go       Back to bed or wait to see if you get better-TIME IS BRAIN. Warning Signs of HEART ATTACK     Call 911 if you have these symptoms:   Chest discomfort. Most heart attacks involve discomfort in the center of the chest that lasts more than a few minutes, or that goes away and comes back. It can feel like uncomfortable pressure, squeezing, fullness, or pain.  Discomfort in other areas of the upper body. Symptoms can include pain or discomfort in one or both arms, the back, neck, jaw, or stomach.  Shortness of breath with or without chest discomfort.  Other signs may include breaking out in a cold sweat, nausea, or lightheadedness. Don't wait more than five minutes to call 911 - MINUTES MATTER! Fast action can save your life. Calling 911 is almost always the fastest way to get lifesaving treatment. Emergency Medical Services staff can begin treatment when they arrive -- up to an hour sooner than if someone gets to the hospital by car. The discharge information has been reviewed with the patient and caregiver. The patient and caregiver verbalized understanding. Discharge medications reviewed with the patient and caregiver and appropriate educational materials and side effects teaching were provided.     Patient armband removed and shredded    ___________________________________________________________________________________________________________________________________

## 2018-07-26 NOTE — PROCEDURES
Vascular & Interventional Radiology Brief Procedure Note    Interventional Radiologist: Mariah Zavala MD    Pre-operative Diagnosis: Obstructive Uropathy    Post-operative Diagnosis: Same as pre-op dx    Procedure(s) Performed:  Routine RT PCN Change    Anesthesia:  none    Findings:  Uneventful OTW exchange of the existing tube for an identical sized new tube.     Complications: None    Estimated Blood Loss:  minimal    Tubes and Drains: 8 Fr locking pigtail RT PCN tube    Specimens: None    Condition: Good    Disposition:  D/c home    Plan: return PRN      Mariah Zavala MD  [facility]  Vascular & Interventional Radiology  7/26/2018

## 2018-07-26 NOTE — PROGRESS NOTES
Pt discharged home in the care of son in stable condition with no complications nor any c/o pain. Nephrotube exchanged, site WNL.

## 2018-08-27 NOTE — PROGRESS NOTES
PT aware of NPO status. PT aware of potential for sedation administration and need for  at discharge. PT aware of arrival time pre procedure. Pt states no questions at this time. Spoke with son and will arrive at 36. Patient prefers not to be sedated if possible.

## 2018-09-04 ENCOUNTER — HOSPITAL ENCOUNTER (OUTPATIENT)
Dept: INTERVENTIONAL RADIOLOGY/VASCULAR | Age: 78
Discharge: HOME | End: 2018-09-04
Attending: RADIOLOGY | Admitting: RADIOLOGY
Payer: MEDICARE

## 2018-09-04 VITALS
OXYGEN SATURATION: 99 % | SYSTOLIC BLOOD PRESSURE: 153 MMHG | WEIGHT: 154 LBS | BODY MASS INDEX: 28.34 KG/M2 | RESPIRATION RATE: 16 BRPM | HEIGHT: 62 IN | HEART RATE: 72 BPM | TEMPERATURE: 97.5 F | DIASTOLIC BLOOD PRESSURE: 49 MMHG

## 2018-09-04 DIAGNOSIS — N13.39 OTHER HYDRONEPHROSIS: ICD-10-CM

## 2018-09-04 PROCEDURE — 74011636320 HC RX REV CODE- 636/320: Performed by: RADIOLOGY

## 2018-09-04 PROCEDURE — 74011250636 HC RX REV CODE- 250/636: Performed by: RADIOLOGY

## 2018-09-04 PROCEDURE — 77030005208 IR EXCHANGE NEPHRO PERC RT SI

## 2018-09-04 RX ORDER — LIDOCAINE HYDROCHLORIDE 10 MG/ML
1-10 INJECTION INFILTRATION; PERINEURAL
Status: DISCONTINUED | OUTPATIENT
Start: 2018-09-04 | End: 2018-09-04

## 2018-09-04 RX ORDER — SODIUM CHLORIDE 9 MG/ML
500 INJECTION, SOLUTION INTRAVENOUS
Status: COMPLETED | OUTPATIENT
Start: 2018-09-04 | End: 2018-09-04

## 2018-09-04 RX ADMIN — IOPAMIDOL 10 ML: 612 INJECTION, SOLUTION INTRAVENOUS at 13:44

## 2018-09-04 RX ADMIN — SODIUM CHLORIDE 500 ML: 900 INJECTION, SOLUTION INTRAVENOUS at 13:44

## 2018-09-04 NOTE — PROGRESS NOTES
Back from procedure. Nephrostomy tube draining clear yellow urine to leg bag. Pt denies sob or pain,.

## 2018-09-04 NOTE — DISCHARGE INSTRUCTIONS
[address] Nephrostomy Tube Discharge Instructions    General Information:   A nephrostomy is a tube inserted through your skin and into the kidney. The purpose of the tube is to drain urine outside of the body. This is done in the event fo a block or a damaged ureter. Most of these tubes are usually changed every 2-3 months. However, some patients may need to have their tubes changed more often. Home Care Instructions: You can resume your regular diet. Do not drink alcohol, drive, or make any important legal decisions in the next 24 hours. Do not lift anything heavier than a gallon of milk or do anything strenuous for the next 24 hours. You should not shower for 24 hours. You should not bathe or swim while you have this drain in place. Your doctor may arrange for home health to visit you to help take care of the tube. You should clean the tube and change the dressing at least every 48 hours and more as needed. Keep the dressing clean and dry. Keep up with how much drainage you get from the tube and report this to your doctor. Call If:   You should call your Physician and/or the Radiology Nurse if you have any bleeding other than a small spot on your bandage. Call if you have any signs of infection, fever, or increased pain at the site of the tube. Call if you should have leakage around the tube, a change in the appearance of the urine draining from the tube, increased pain in the lower back, or no drainage from the tube for 12 hours. Call if the tube has pulled back, or has been pulled out. Follow-Up Instructions:  Please see your ordering doctor as he/she has requested.     To Reach Us:        Patient Signature:  Date: 9/4/2018  Discharging Nurse: Philip Richard RN    DISCHARGE SUMMARY from Nurse    PATIENT INSTRUCTIONS:    After general anesthesia or intravenous sedation, for 24 hours or while taking prescription Narcotics:  · Limit your activities  · Do not drive and operate hazardous machinery  · Do not make important personal or business decisions  · Do  not drink alcoholic beverages  · If you have not urinated within 8 hours after discharge, please contact your surgeon on call. Report the following to your surgeon:  · Excessive pain, swelling, redness or odor of or around the surgical area  · Temperature over 100.5  · Nausea and vomiting lasting longer than 4 hours or if unable to take medications  · Any signs of decreased circulation or nerve impairment to extremity: change in color, persistent  numbness, tingling, coldness or increase pain  · Any questions    What to do at Home:  Recommended activity: Activity as tolerated,       *  Please give a list of your current medications to your Primary Care Provider. *  Please update this list whenever your medications are discontinued, doses are      changed, or new medications (including over-the-counter products) are added. *  Please carry medication information at all times in case of emergency situations. These are general instructions for a healthy lifestyle:    No smoking/ No tobacco products/ Avoid exposure to second hand smoke  Surgeon General's Warning:  Quitting smoking now greatly reduces serious risk to your health. Obesity, smoking, and sedentary lifestyle greatly increases your risk for illness    A healthy diet, regular physical exercise & weight monitoring are important for maintaining a healthy lifestyle    You may be retaining fluid if you have a history of heart failure or if you experience any of the following symptoms:  Weight gain of 3 pounds or more overnight or 5 pounds in a week, increased swelling in our hands or feet or shortness of breath while lying flat in bed. Please call your doctor as soon as you notice any of these symptoms; do not wait until your next office visit.     Recognize signs and symptoms of STROKE:    F-face looks uneven    A-arms unable to move or move unevenly    S-speech slurred or non-existent    T-time-call 911 as soon as signs and symptoms begin-DO NOT go       Back to bed or wait to see if you get better-TIME IS BRAIN. Warning Signs of HEART ATTACK     Call 911 if you have these symptoms:   Chest discomfort. Most heart attacks involve discomfort in the center of the chest that lasts more than a few minutes, or that goes away and comes back. It can feel like uncomfortable pressure, squeezing, fullness, or pain.  Discomfort in other areas of the upper body. Symptoms can include pain or discomfort in one or both arms, the back, neck, jaw, or stomach.  Shortness of breath with or without chest discomfort.  Other signs may include breaking out in a cold sweat, nausea, or lightheadedness. Don't wait more than five minutes to call 911 - MINUTES MATTER! Fast action can save your life. Calling 911 is almost always the fastest way to get lifesaving treatment. Emergency Medical Services staff can begin treatment when they arrive -- up to an hour sooner than if someone gets to the hospital by car. The discharge information has been reviewed with the patient and caregiver. The patient and caregiver verbalized understanding. Discharge medications reviewed with the patient and caregiver and appropriate educational materials and side effects teaching were provided.       ,Patient armband removed and shredded    ___________________________________________________________________________________________________________________________________

## 2018-09-04 NOTE — IP AVS SNAPSHOT
[address] [address] 
[phone] Patient: Christie Mendoza MRN: [id number] :1940 About your hospitalization You were admitted on:  2018 You last received care in the:  [address] You were discharged on:  2018 Why you were hospitalized Your primary diagnosis was:  Not on File Follow-up Information Follow up With Details Comments Contact Info Michael Evans MD  As needed 111 [facility] 107 1700 [facility] 
[phone] Discharge Orders None A check quin indicates which time of day the medication should be taken. My Medications ASK your doctor about these medications Instructions Each Dose to Equal  
 Morning Noon Evening Bedtime * acetaminophen 650 mg suppository Commonly known as:  TYLENOL Your last dose was: Your next dose is: Insert 650 mg into rectum every six (6) hours. 650 mg  
    
   
   
   
  
 * acetaminophen 650 mg Tber Commonly known as:  TYLENOL Your last dose was: Your next dose is: Take 650 mg by mouth every six (6) hours as needed for Pain or Fever. 650 mg  
    
   
   
   
  
 alum-mag hydroxide-simeth 200-200-20 mg/5 mL Susp Commonly known as:  MYLANTA Your last dose was: Your next dose is: Take 30 mL by mouth four (4) times daily as needed. 30 mL  
    
   
   
   
  
 ATIVAN 0.5 mg tablet Generic drug:  LORazepam  
   
Your last dose was: Your next dose is: Take 0.5 mg by mouth daily as needed for Anxiety. 0.5 mg  
    
   
   
   
  
 ATORVASTATIN PO Your last dose was: Your next dose is: Take 20 mg by mouth daily. 20 mg  
    
   
   
   
  
 brimonidine-timolol 0.2-0.5 % Drop ophthalmic solution Commonly known as:  COMBIGAN Your last dose was: Your next dose is:    
   
   
 Administer 1 Drop to both eyes every twelve (12) hours. 1 Drop DULCOLAX (BISACODYL) 10 mg suppository Generic drug:  bisacodyl Your last dose was: Your next dose is: Insert  into rectum daily as needed. Indications: constipation  
     
   
   
   
  
 gabapentin 300 mg capsule Commonly known as:  NEURONTIN Your last dose was: Your next dose is: Take 300 mg by mouth daily. 300 mg  
    
   
   
   
  
 insulin glargine 100 unit/mL injection Commonly known as:  LANTUS Your last dose was: Your next dose is:    
   
   
 8 units SQ QHS  
     
   
   
   
  
 metoprolol succinate 50 mg XL tablet Commonly known as:  TOPROL-XL Your last dose was: Your next dose is: Take 50 mg by mouth daily. 50 mg MILK OF MAGNESIA CONCENTRATED 2,400 mg/10 mL Susp suspension Generic drug:  magnesium hydroxide Your last dose was: Your next dose is: Take 10 mL by mouth. 10 mL NORCO 5-325 mg per tablet Generic drug:  HYDROcodone-acetaminophen Your last dose was: Your next dose is: Take 1 Tab by mouth every twelve (12) hours as needed for Pain (for pain related to [address]). 1 Tab NovoLOG Flexpen U-100 Insulin 100 unit/mL Inpn Generic drug:  insulin aspart U-100 Your last dose was: Your next dose is:    
   
   
 by SubCUTAneous route. Sliding scale  
     
   
   
   
  
 sodium bicarbonate 650 mg tablet Your last dose was: Your next dose is: Take 650 mg by mouth two (2) times a day. 650 mg  
    
   
   
   
  
 synthroid 50 mcg tablet Generic drug:  levothyroxine Your last dose was: Your next dose is: Take  by mouth Daily (before breakfast). * Notice: This list has 2 medication(s) that are the same as other medications prescribed for you. Read the directions carefully, and ask your doctor or other care provider to review them with you. Opioid Education Prescription Opioids: What You Need to Know: 
 
Prescription opioids can be used to help relieve moderate-to-severe pain and are often prescribed following a surgery or injury, or for certain health conditions. These medications can be an important part of treatment but also come with serious risks. Opioids are strong pain medicines. Examples include hydrocodone, oxycodone, fentanyl, and morphine. Heroin is an example of an illegal opioid. It is important to work with your health care provider to make sure you are getting the safest, most effective care. WHAT ARE THE RISKS AND SIDE EFFECTS OF OPIOID USE? Prescription opioids carry serious risks of addiction and overdose, especially with prolonged use. An opioid overdose, often marked by slow breathing, can cause sudden death. The use of prescription opioids can have a number of side effects as well, even when taken as directed. · Tolerance-meaning you might need to take more of a medication for the same pain relief · Physical dependence-meaning you have symptoms of withdrawal when the medication is stopped. Withdrawal symptoms can include nausea, sweating, chills, diarrhea, stomach cramps, and muscle aches. Withdrawal can last up to several weeks, depending on which drug you took and how long you took it. · Increased sensitivity to pain · Constipation · Nausea, vomiting, and dry mouth · Sleepiness and dizziness · Confusion · Depression · Low levels of testosterone that can result in lower sex drive, energy, and strength · Itching and sweating RISKS ARE GREATER WITH:      
· History of drug misuse, substance use disorder, or overdose · Mental health conditions (such as depression or anxiety) · Sleep apnea · Older age (72 years or older) · Pregnancy Avoid alcohol while taking prescription opioids. Also, unless specifically advised by your health care provider, medications to avoid include: · Benzodiazepines (such as Xanax or Valium) · Muscle relaxants (such as Soma or Flexeril) · Hypnotics (such as Ambien or Lunesta) · Other prescription opioids KNOW YOUR OPTIONS Talk to your health care provider about ways to manage your pain that don't involve prescription opioids. Some of these options may actually work better and have fewer risks and side effects. Options may include: 
· Pain relievers such as acetaminophen, ibuprofen, and naproxen · Some medications that are also used for depression or seizures · Physical therapy and exercise · Counseling to help patients learn how to cope better with triggers of pain and stress. · Application of heat or cold compress · Massage therapy · Relaxation techniques Be Informed Make sure you know the name of your medication, how much and how often to take it, and its potential risks & side effects. IF YOU ARE PRESCRIBED OPIOIDS FOR PAIN: 
· Never take opioids in greater amounts or more often than prescribed. Remember the goal is not to be pain-free but to manage your pain at a tolerable level. · Follow up with your primary care provider to: · Work together to create a plan on how to manage your pain. · Talk about ways to help manage your pain that don't involve prescription opioids. · Talk about any and all concerns and side effects. · Help prevent misuse and abuse. · Never sell or share prescription opioids · Help prevent misuse and abuse. · Store prescription opioids in a secure place and out of reach of others (this may include visitors, children, friends, and family).  
· Safely dispose of unused/unwanted prescription opioids: Find your community drug take-back program or your pharmacy mail-back program, or flush them down the toilet, following guidance from the Food and Drug Administration (www.fda.gov/Drugs/ResourcesForYou). · Visit www.cdc.gov/drugoverdose to learn about the risks of opioid abuse and overdose. · If you believe you may be struggling with addiction, tell your health care provider and ask for guidance or call Carrie Barba at 5-590-556-LHNQ. Discharge Instructions [address] 34 Nephrostomy Tube Discharge Instructions General Information: A nephrostomy is a tube inserted through your skin and into the kidney. The purpose of the tube is to drain urine outside of the body. This is done in the event fo a block or a damaged ureter. Most of these tubes are usually changed every 2-3 months. However, some patients may need to have their tubes changed more often. Home Care Instructions: You can resume your regular diet. Do not drink alcohol, drive, or make any important legal decisions in the next 24 hours. Do not lift anything heavier than a gallon of milk or do anything strenuous for the next 24 hours. You should not shower for 24 hours. You should not bathe or swim while you have this drain in place. Your doctor may arrange for home health to visit you to help take care of the tube. You should clean the tube and change the dressing at least every 48 hours and more as needed. Keep the dressing clean and dry. Keep up with how much drainage you get from the tube and report this to your doctor. Call If: 
 You should call your Physician and/or the Radiology Nurse if you have any bleeding other than a small spot on your bandage. Call if you have any signs of infection, fever, or increased pain at the site of the tube.   Call if you should have leakage around the tube, a change in the appearance of the urine draining from the tube, increased pain in the lower back, or no drainage from the tube for 12 hours. Call if the tube has pulled back, or has been pulled out. Follow-Up Instructions:  Please see your ordering doctor as he/she has requested. To Reach Us:   
 
 
Patient Signature: 
Date: 9/4/2018 Discharging Nurse: Rodrigo Sadler RN 
 
DISCHARGE SUMMARY from Nurse PATIENT INSTRUCTIONS: 
 
 
F-face looks uneven A-arms unable to move or move unevenly S-speech slurred or non-existent T-time-call 911 as soon as signs and symptoms begin-DO NOT go Back to bed or wait to see if you get better-TIME IS BRAIN. Warning Signs of HEART ATTACK Call 911 if you have these symptoms: 
? Chest discomfort. Most heart attacks involve discomfort in the center of the chest that lasts more than a few minutes, or that goes away and comes back. It can feel like uncomfortable pressure, squeezing, fullness, or pain. ? Discomfort in other areas of the upper body. Symptoms can include pain or discomfort in one or both arms, the back, neck, jaw, or stomach. ? Shortness of breath with or without chest discomfort. ? Other signs may include breaking out in a cold sweat, nausea, or lightheadedness. Don't wait more than five minutes to call [address]! Fast action can save your life. Calling 911 is almost always the fastest way to get lifesaving treatment. Emergency Medical Services staff can begin treatment when they arrive  up to an hour sooner than if someone gets to the hospital by car. The discharge information has been reviewed with the patient and caregiver. The patient and caregiver verbalized understanding. Discharge medications reviewed with the patient and caregiver and appropriate educational materials and side effects teaching were provided. ,Patient armband removed and shredded ___________________________________________________________________________________________________________________________________ Introducing [facility]! [facility] introduces Wealthfronthart patient portal. Now you can access parts of your medical record, email your doctor's office, and request medication refills online. 1. In your internet browser, go to [email or web address] 2. Click on the First Time User? Click Here link in the Sign In box. You will see the New Member Sign Up page. 3. Enter your Semmle Access Code exactly as it appears below. You will not need to use this code after youve completed the sign-up process. If you do not sign up before the expiration date, you must request a new code. · Semmle Access Code: [email or web address] Expires: 11/25/2018  2:38 PM 
 
4. Enter the last four digits of your Social Security Number (xxxx) and Date of Birth (mm/dd/yyyy) as indicated and click Submit. You will be taken to the next sign-up page. 5. Create a Brightcovet ID. This will be your Semmle login ID and cannot be changed, so think of one that is secure and easy to remember. 6. Create a Semmle password. You can change your password at any time. 7. Enter your Password Reset Question and Answer. This can be used at a later time if you forget your password. 8. Enter your e-mail address. You will receive e-mail notification when new information is available in [address] 9. Click Sign Up. You can now view and download portions of your medical record. 10. Click the Download Summary menu link to download a portable copy of your medical information. If you have questions, please visit the Frequently Asked Questions section of the Semmle website. Remember, Semmle is NOT to be used for urgent needs. For medical emergencies, dial 911. Now available from your iPhone and Android! Introducing Rodolfo Diaz As a [facility] patient, I wanted to make you aware of our electronic visit tool called Rodolfo MartinezMymCart. [employer]/7 allows you to connect within minutes with a medical provider 24 hours a day, seven days a week via a mobile device or tablet or logging into a secure website from your computer. You can access Rodolfo Martinezfin from anywhere in the [place]. A virtual visit might be right for you when you have a simple condition and feel like you just dont want to get out of bed, or cant get away from work for an appointment, when your regular [facility] provider is not available (evenings, weekends or holidays), or when youre out of town and need minor care. Electronic visits cost only $49 and if the GilliamStorenvy/7 provider determines a prescription is needed to treat your condition, one can be electronically transmitted to a nearby pharmacy*. Please take a moment to enroll today if you have not already done so. The enrollment process is free and takes just a few minutes. To enroll, please download the [facility] 24/Sensor Tower justina to your tablet or phone, or visit [email or web address] to enroll on your computer. And, as an [address] [place] [address] patient with a SemaConnect account, the results of your visits will be scanned into your electronic medical record and your primary care provider will be able to view the scanned results. We urge you to continue to see your regular [facility] provider for your ongoing medical care. And while your primary care provider may not be the one available when you seek a Rodolfo Diaz virtual visit, the peace of mind you get from getting a real diagnosis real time can be priceless. For more information on Rodolfo Casaswolfgangfin, view our Frequently Asked Questions (FAQs) at [email or web address]. Sincerely, 
 
Carl Odell MD 
Chief Medical Officer Sherrell8 Berta Fritz *:  certain medications cannot be prescribed via Rodolfo Diaz Providers Seen During Your Hospitalization Provider Specialty Primary office phone Apurva Butterfield MD Radiology [phone] Your Primary Care Physician (PCP) Primary Care Physician Office Phone Office Fax NONE ** None ** ** None ** You are allergic to the following Allergen Reactions Morphine Nausea and Vomiting Pcn (Penicillins) Rash Recent Documentation Height Weight Breastfeeding? BMI OB Status Smoking Status 1.575 m 69.9 kg No 28.17 kg/m2 Postmenopausal Never Smoker Emergency Contacts Name Discharge Info Relation Home Work Mobile Hemal Forman DISCHARGE CAREGIVER [3] Son [22] [phone] Patient Belongings The following personal items are in your possession at time of discharge: 
     Visual Aid: None Please provide this summary of care documentation to your next provider. Signatures-by signing, you are acknowledging that this After Visit Summary has been reviewed with you and you have received a copy. Patient Signature:  ____________________________________________________________ Date:  ____________________________________________________________  
  
Jhonathan Sport Provider Signature:  ____________________________________________________________ Date:  ____________________________________________________________

## 2018-09-04 NOTE — PROGRESS NOTES
Telephone report given to Steven Langley on [facility] unit at [facility]

## 2018-09-04 NOTE — PROGRESS NOTES
Pt assisted up to side of bed, dressed and into wheelchair. Discharge inst reviewed with pt and son, son verbalizes understanding.  Arm bands removed and shredded,  Pt discharge via wheelchair in care of son,  Pt denies any pain or distress at time of discharge, urostomy tube draining clear yellow urine

## 2018-09-26 ENCOUNTER — HOSPITAL ENCOUNTER (OUTPATIENT)
Dept: LAB | Age: 78
Discharge: HOME | End: 2018-09-26
Payer: MEDICARE

## 2018-09-26 LAB
ALBUMIN SERPL-MCNC: 2.8 G/DL (ref 3.4–5)
ALBUMIN/GLOB SERPL: 0.8 {RATIO} (ref 0.8–1.7)
ALP SERPL-CCNC: 79 U/L (ref 45–117)
ALT SERPL-CCNC: 28 U/L (ref 13–56)
ANION GAP SERPL CALC-SCNC: 9 MMOL/L (ref 3–18)
AST SERPL-CCNC: 13 U/L (ref 15–37)
BILIRUB SERPL-MCNC: 0.3 MG/DL (ref 0.2–1)
BUN SERPL-MCNC: 36 MG/DL (ref 7–18)
BUN/CREAT SERPL: 26 (ref 12–20)
CALCIUM SERPL-MCNC: 8.3 MG/DL (ref 8.5–10.1)
CHLORIDE SERPL-SCNC: 106 MMOL/L (ref 100–108)
CHOLEST SERPL-MCNC: 123 MG/DL
CO2 SERPL-SCNC: 25 MMOL/L (ref 21–32)
CREAT SERPL-MCNC: 1.38 MG/DL (ref 0.6–1.3)
ERYTHROCYTE [DISTWIDTH] IN BLOOD BY AUTOMATED COUNT: 13.9 % (ref 11.6–14.5)
EST. AVERAGE GLUCOSE BLD GHB EST-MCNC: 183 MG/DL
GLOBULIN SER CALC-MCNC: 3.3 G/DL (ref 2–4)
GLUCOSE SERPL-MCNC: 176 MG/DL (ref 74–99)
HBA1C MFR BLD: 8 % (ref 4.5–5.6)
HCT VFR BLD AUTO: 36 % (ref 35–45)
HDLC SERPL-MCNC: 33 MG/DL (ref 40–60)
HDLC SERPL: 3.7 {RATIO} (ref 0–5)
HGB BLD-MCNC: 11.7 G/DL (ref 12–16)
LDLC SERPL CALC-MCNC: 66 MG/DL (ref 0–100)
LIPID PROFILE,FLP: ABNORMAL
MCH RBC QN AUTO: 28.5 PG (ref 24–34)
MCHC RBC AUTO-ENTMCNC: 32.5 G/DL (ref 31–37)
MCV RBC AUTO: 87.8 FL (ref 74–97)
PLATELET # BLD AUTO: 229 K/UL (ref 135–420)
PMV BLD AUTO: 9.8 FL (ref 9.2–11.8)
POTASSIUM SERPL-SCNC: 5 MMOL/L (ref 3.5–5.5)
PROT SERPL-MCNC: 6.1 G/DL (ref 6.4–8.2)
RBC # BLD AUTO: 4.1 M/UL (ref 4.2–5.3)
SODIUM SERPL-SCNC: 140 MMOL/L (ref 136–145)
TRIGL SERPL-MCNC: 120 MG/DL (ref ?–150)
VLDLC SERPL CALC-MCNC: 24 MG/DL
WBC # BLD AUTO: 7.4 K/UL (ref 4.6–13.2)

## 2018-09-26 PROCEDURE — 80053 COMPREHEN METABOLIC PANEL: CPT | Performed by: INTERNAL MEDICINE

## 2018-09-26 PROCEDURE — 85027 COMPLETE CBC AUTOMATED: CPT | Performed by: INTERNAL MEDICINE

## 2018-09-26 PROCEDURE — 80061 LIPID PANEL: CPT | Performed by: INTERNAL MEDICINE

## 2018-09-26 PROCEDURE — 83036 HEMOGLOBIN GLYCOSYLATED A1C: CPT | Performed by: INTERNAL MEDICINE

## 2018-09-28 ENCOUNTER — HOSPITAL ENCOUNTER (OUTPATIENT)
Dept: GENERAL RADIOLOGY | Age: 78
Discharge: HOME | End: 2018-09-28
Payer: MEDICARE

## 2018-09-28 DIAGNOSIS — M79.671 RIGHT FOOT PAIN: ICD-10-CM

## 2018-09-28 PROCEDURE — 73630 X-RAY EXAM OF FOOT: CPT

## 2018-10-17 RX ORDER — DOCUSATE SODIUM 100 MG/1
100 CAPSULE, LIQUID FILLED ORAL 2 TIMES DAILY
COMMUNITY
End: 2020-11-11

## 2018-10-17 RX ORDER — TIMOLOL MALEATE 5 MG/ML
1 SOLUTION/ DROPS OPHTHALMIC 2 TIMES DAILY
Status: ON HOLD | COMMUNITY
End: 2021-03-20 | Stop reason: SDUPTHER

## 2018-10-17 RX ORDER — ONDANSETRON 8 MG/1
8 TABLET, ORALLY DISINTEGRATING ORAL
COMMUNITY
End: 2019-04-15

## 2018-10-17 RX ORDER — TRAZODONE HYDROCHLORIDE 50 MG/1
50 TABLET ORAL
Status: ON HOLD | COMMUNITY
End: 2021-03-20

## 2018-10-17 RX ORDER — NYSTATIN 100000 [USP'U]/G
100000 POWDER TOPICAL 4 TIMES DAILY
Status: ON HOLD | COMMUNITY
End: 2021-03-20

## 2018-10-17 RX ORDER — TERBINAFINE HYDROCHLORIDE 250 MG/1
250 TABLET ORAL DAILY
COMMUNITY
End: 2018-11-29

## 2018-10-17 RX ORDER — HYDROCORTISONE 25 MG/G
CREAM TOPICAL 2 TIMES DAILY
Status: ON HOLD | COMMUNITY
End: 2021-03-20

## 2018-10-17 RX ORDER — TIZANIDINE HYDROCHLORIDE 2 MG/1
2 CAPSULE, GELATIN COATED ORAL 2 TIMES DAILY
Status: ON HOLD | COMMUNITY
End: 2021-03-20

## 2018-10-17 NOTE — PROGRESS NOTES
Pre call made to [facility] and son Teddy king. PT aware of need to hold anticoagulants per protocol. PT /family/SFN aware of arrival time pre procedure, 46. SNF and son states no questions at this time.

## 2018-10-27 ENCOUNTER — HOSPITAL ENCOUNTER (OUTPATIENT)
Dept: LAB | Age: 78
Discharge: HOME | End: 2018-10-27

## 2018-10-27 LAB
ALBUMIN SERPL-MCNC: 2.8 G/DL (ref 3.4–5)
ALBUMIN/GLOB SERPL: 0.8 {RATIO} (ref 0.8–1.7)
ALP SERPL-CCNC: 77 U/L (ref 45–117)
ALT SERPL-CCNC: 17 U/L (ref 13–56)
ANION GAP SERPL CALC-SCNC: 12 MMOL/L (ref 3–18)
AST SERPL-CCNC: 14 U/L (ref 15–37)
BILIRUB SERPL-MCNC: 0.3 MG/DL (ref 0.2–1)
BUN SERPL-MCNC: 34 MG/DL (ref 7–18)
BUN/CREAT SERPL: 26
CALCIUM SERPL-MCNC: 8.5 MG/DL (ref 8.5–10.1)
CHLORIDE SERPL-SCNC: 104 MMOL/L (ref 100–108)
CO2 SERPL-SCNC: 25 MMOL/L (ref 21–32)
CREAT SERPL-MCNC: 1.31 MG/DL (ref 0.6–1.3)
EST. AVERAGE GLUCOSE BLD GHB EST-MCNC: 180 MG/DL
GLOBULIN SER CALC-MCNC: 3.5 G/DL (ref 2–4)
GLUCOSE SERPL-MCNC: 118 MG/DL (ref 74–99)
HBA1C MFR BLD: 7.9 % (ref 4.2–5.6)
POTASSIUM SERPL-SCNC: 5.1 MMOL/L (ref 3.5–5.5)
PROT SERPL-MCNC: 6.3 G/DL (ref 6.4–8.2)
SODIUM SERPL-SCNC: 141 MMOL/L (ref 136–145)

## 2018-10-27 PROCEDURE — 80053 COMPREHEN METABOLIC PANEL: CPT | Performed by: INTERNAL MEDICINE

## 2018-10-27 PROCEDURE — 36415 COLL VENOUS BLD VENIPUNCTURE: CPT | Performed by: INTERNAL MEDICINE

## 2018-10-27 PROCEDURE — 83036 HEMOGLOBIN GLYCOSYLATED A1C: CPT | Performed by: INTERNAL MEDICINE

## 2018-10-30 ENCOUNTER — HOSPITAL ENCOUNTER (OUTPATIENT)
Dept: INTERVENTIONAL RADIOLOGY/VASCULAR | Age: 78
Discharge: HOME | End: 2018-10-30
Attending: RADIOLOGY | Admitting: RADIOLOGY
Payer: MEDICARE

## 2018-10-30 VITALS
TEMPERATURE: 98 F | HEIGHT: 62 IN | BODY MASS INDEX: 28.34 KG/M2 | OXYGEN SATURATION: 99 % | DIASTOLIC BLOOD PRESSURE: 53 MMHG | SYSTOLIC BLOOD PRESSURE: 130 MMHG | HEART RATE: 61 BPM | RESPIRATION RATE: 20 BRPM | WEIGHT: 154 LBS

## 2018-10-30 DIAGNOSIS — N13.39 OTHER HYDRONEPHROSIS: ICD-10-CM

## 2018-10-30 PROCEDURE — 74011636320 HC RX REV CODE- 636/320: Performed by: RADIOLOGY

## 2018-10-30 PROCEDURE — 74011250636 HC RX REV CODE- 250/636: Performed by: RADIOLOGY

## 2018-10-30 PROCEDURE — C1769 GUIDE WIRE: HCPCS

## 2018-10-30 RX ORDER — SODIUM CHLORIDE 9 MG/ML
500 INJECTION, SOLUTION INTRAVENOUS
Status: COMPLETED | OUTPATIENT
Start: 2018-10-30 | End: 2018-10-30

## 2018-10-30 RX ORDER — LIDOCAINE HYDROCHLORIDE 10 MG/ML
INJECTION, SOLUTION EPIDURAL; INFILTRATION; INTRACAUDAL; PERINEURAL
Status: DISCONTINUED
Start: 2018-10-30 | End: 2018-10-30 | Stop reason: HOSPADM

## 2018-10-30 RX ORDER — LIDOCAINE HYDROCHLORIDE 10 MG/ML
1-5 INJECTION, SOLUTION EPIDURAL; INFILTRATION; INTRACAUDAL; PERINEURAL
Status: DISCONTINUED | OUTPATIENT
Start: 2018-10-30 | End: 2018-10-30 | Stop reason: HOSPADM

## 2018-10-30 RX ADMIN — IOPAMIDOL 5 ML: 612 INJECTION, SOLUTION INTRAVENOUS at 09:55

## 2018-10-30 RX ADMIN — SODIUM CHLORIDE 500 ML: 900 INJECTION, SOLUTION INTRAVENOUS at 10:14

## 2018-10-30 NOTE — DISCHARGE INSTRUCTIONS
[address] 34 Nephrostomy Tube Discharge Instructions    General Information:   A nephrostomy is a tube inserted through your skin and into the kidney. The purpose of the tube is to drain urine outside of the body. This is done in the event fo a block or a damaged ureter. Most of these tubes are usually changed every 2-3 months. However, some patients may need to have their tubes changed more often. Home Care Instructions: You can resume your regular diet. Do not drink alcohol, drive, or make any important legal decisions in the next 24 hours. Do not lift anything heavier than a gallon of milk or do anything strenuous for the next 24 hours. You should not shower for 24 hours. You should not bathe or swim while you have this drain in place. Your doctor may arrange for home health to visit you to help take care of the tube. You should clean the tube and change the dressing at least every 48 hours and more as needed. Keep the dressing clean and dry. Keep up with how much drainage you get from the tube and report this to your doctor. Call If:   You should call your Physician and/or the Radiology Nurse if you have any bleeding other than a small spot on your bandage. Call if you have any signs of infection, fever, or increased pain at the site of the tube. Call if you should have leakage around the tube, a change in the appearance of the urine draining from the tube, increased pain in the lower back, or no drainage from the tube for 12 hours. Call if the tube has pulled back, or has been pulled out. Follow-Up Instructions:  Please see your ordering doctor as he/she has requested.     To Reach Us:        Patient Signature:  Date: 10/30/2018  Discharging Nurse: Judith Baez RN    DISCHARGE SUMMARY from Nurse    PATIENT INSTRUCTIONS:    After general anesthesia or intravenous sedation, for 24 hours or while taking prescription Narcotics:  · Limit your activities  · Do not drive and operate hazardous machinery  · Do not make important personal or business decisions  · Do  not drink alcoholic beverages  · If you have not urinated within 8 hours after discharge, please contact your surgeon on call. Report the following to your surgeon:  · Excessive pain, swelling, redness or odor of or around the surgical area  · Temperature over 100.5  · Nausea and vomiting lasting longer than 4 hours or if unable to take medications  · Any signs of decreased circulation or nerve impairment to extremity: change in color, persistent  numbness, tingling, coldness or increase pain  · Any questions    What to do at Home:  Recommended activity: Activity as tolerated,         *  Please give a list of your current medications to your Primary Care Provider. *  Please update this list whenever your medications are discontinued, doses are      changed, or new medications (including over-the-counter products) are added. *  Please carry medication information at all times in case of emergency situations. These are general instructions for a healthy lifestyle:    No smoking/ No tobacco products/ Avoid exposure to second hand smoke  Surgeon General's Warning:  Quitting smoking now greatly reduces serious risk to your health. Obesity, smoking, and sedentary lifestyle greatly increases your risk for illness    A healthy diet, regular physical exercise & weight monitoring are important for maintaining a healthy lifestyle    You may be retaining fluid if you have a history of heart failure or if you experience any of the following symptoms:  Weight gain of 3 pounds or more overnight or 5 pounds in a week, increased swelling in our hands or feet or shortness of breath while lying flat in bed. Please call your doctor as soon as you notice any of these symptoms; do not wait until your next office visit.     Recognize signs and symptoms of STROKE:    F-face looks uneven    A-arms unable to move or move unevenly    S-speech slurred or non-existent    T-time-call 911 as soon as signs and symptoms begin-DO NOT go       Back to bed or wait to see if you get better-TIME IS BRAIN. Warning Signs of HEART ATTACK     Call 911 if you have these symptoms:   Chest discomfort. Most heart attacks involve discomfort in the center of the chest that lasts more than a few minutes, or that goes away and comes back. It can feel like uncomfortable pressure, squeezing, fullness, or pain.  Discomfort in other areas of the upper body. Symptoms can include pain or discomfort in one or both arms, the back, neck, jaw, or stomach.  Shortness of breath with or without chest discomfort.  Other signs may include breaking out in a cold sweat, nausea, or lightheadedness. Don't wait more than five minutes to call 911 - MINUTES MATTER! Fast action can save your life. Calling 911 is almost always the fastest way to get lifesaving treatment. Emergency Medical Services staff can begin treatment when they arrive -- up to an hour sooner than if someone gets to the hospital by car. The discharge information has been reviewed with the patient and caregiver. The patient and caregiver verbalized understanding. Discharge medications reviewed with the patient and caregiver and appropriate educational materials and side effects teaching were provided.     Patient armband removed and shredded    ___________________________________________________________________________________________________________________________________

## 2018-10-30 NOTE — PROGRESS NOTES
Pt is all prepped and ready for procedure      1035 Pt tolerated procedure well. Verbalized discharge instructions well. Left with son in stable condition with no complaints voiced.

## 2018-10-30 NOTE — PROCEDURES
Vascular & Interventional Radiology Brief Procedure Note    Interventional Radiologist: Celia Gutierres MD    Pre-operative Diagnosis:  PCN exchange    Post-operative Diagnosis: Same as pre-op dx    Procedure(s) Performed:  Uncomplicated R PCN exchange. Anesthesia:  Local and Moderate Sedation    Findings:   Uncomplicated R PCN exchange.      Complications: None    Estimated Blood Loss:  minimal    Tubes and Drains: None    Specimens: None    Condition: Good       Celia Gutierres MD  [facility]  Vascular & Interventional Radiology  10/30/2018

## 2018-10-30 NOTE — H&P
The patient is an appropriate candidate to undergo R PCN change. Patient assessed immediately prior to induction. Anesthesia plan as follows: Local/No Anesthesia. History and Physical update:  H&P was reviewed and the patient was examined. No changes have occurred in the patient's condition since the H&P was completed.     Jeremias Cantu MD  Vascular & Interventional Radiology  [facility]  10/30/2018

## 2018-11-26 ENCOUNTER — HOSPITAL ENCOUNTER (INPATIENT)
Age: 78
LOS: 3 days | Discharge: NURSING, REHAB OR LONG-TERM CARE | DRG: 699 | End: 2018-11-29
Attending: EMERGENCY MEDICINE | Admitting: HOSPITALIST
Payer: MEDICARE

## 2018-11-26 ENCOUNTER — APPOINTMENT (OUTPATIENT)
Dept: INTERVENTIONAL RADIOLOGY/VASCULAR | Age: 78
DRG: 699 | End: 2018-11-26
Attending: NURSE PRACTITIONER
Payer: MEDICARE

## 2018-11-26 ENCOUNTER — APPOINTMENT (OUTPATIENT)
Dept: GENERAL RADIOLOGY | Age: 78
DRG: 699 | End: 2018-11-26
Attending: NURSE PRACTITIONER
Payer: MEDICARE

## 2018-11-26 DIAGNOSIS — R31.9 URINARY TRACT INFECTION WITH HEMATURIA, SITE UNSPECIFIED: Primary | ICD-10-CM

## 2018-11-26 DIAGNOSIS — N39.0 URINARY TRACT INFECTION WITH HEMATURIA, SITE UNSPECIFIED: Primary | ICD-10-CM

## 2018-11-26 DIAGNOSIS — R79.89 ELEVATED LACTIC ACID LEVEL: ICD-10-CM

## 2018-11-26 DIAGNOSIS — F41.9 ANXIETY: ICD-10-CM

## 2018-11-26 DIAGNOSIS — T83.022A NEPHROSTOMY TUBE DISPLACED (HCC): ICD-10-CM

## 2018-11-26 DIAGNOSIS — L89.322 PRESSURE INJURY OF LEFT BUTTOCK, STAGE 2 (HCC): ICD-10-CM

## 2018-11-26 DIAGNOSIS — E86.0 DEHYDRATION: ICD-10-CM

## 2018-11-26 DIAGNOSIS — D72.829 LEUKOCYTOSIS, UNSPECIFIED TYPE: ICD-10-CM

## 2018-11-26 DIAGNOSIS — E87.1 HYPONATREMIA: ICD-10-CM

## 2018-11-26 PROBLEM — A41.9 SEPSIS (HCC): Status: ACTIVE | Noted: 2018-11-26

## 2018-11-26 PROBLEM — E11.9 DIABETES (HCC): Status: ACTIVE | Noted: 2018-11-26

## 2018-11-26 PROBLEM — L98.9 SKIN LESION OF FOOT: Status: ACTIVE | Noted: 2018-11-26

## 2018-11-26 PROBLEM — I10 HYPERTENSION: Status: ACTIVE | Noted: 2018-11-26

## 2018-11-26 PROBLEM — Z90.49 S/P COLECTOMY: Status: ACTIVE | Noted: 2018-11-26

## 2018-11-26 PROBLEM — H54.8 LEGALLY BLIND: Status: ACTIVE | Noted: 2018-11-26

## 2018-11-26 PROBLEM — E07.9 THYROID DISEASE: Status: ACTIVE | Noted: 2018-11-26

## 2018-11-26 LAB
ALBUMIN SERPL-MCNC: 3.2 G/DL (ref 3.4–5)
ALBUMIN/GLOB SERPL: 0.8 {RATIO} (ref 0.8–1.7)
ALP SERPL-CCNC: 94 U/L (ref 45–117)
ALT SERPL-CCNC: 34 U/L (ref 13–56)
ANION GAP SERPL CALC-SCNC: 11 MMOL/L (ref 3–18)
APPEARANCE UR: ABNORMAL
APTT PPP: 49.7 SEC (ref 23–36.4)
AST SERPL-CCNC: 23 U/L (ref 15–37)
BACTERIA URNS QL MICRO: ABNORMAL /HPF
BASOPHILS # BLD: 0 K/UL (ref 0–0.1)
BASOPHILS NFR BLD: 0 % (ref 0–2)
BILIRUB SERPL-MCNC: 0.6 MG/DL (ref 0.2–1)
BILIRUB UR QL: NEGATIVE
BUN SERPL-MCNC: 33 MG/DL (ref 7–18)
BUN/CREAT SERPL: 20
CALCIUM SERPL-MCNC: 8.3 MG/DL (ref 8.5–10.1)
CHLORIDE SERPL-SCNC: 99 MMOL/L (ref 100–108)
CO2 SERPL-SCNC: 24 MMOL/L (ref 21–32)
COLOR UR: ABNORMAL
CREAT SERPL-MCNC: 1.63 MG/DL (ref 0.6–1.3)
DIFFERENTIAL METHOD BLD: ABNORMAL
EOSINOPHIL # BLD: 0.1 K/UL (ref 0–0.4)
EOSINOPHIL NFR BLD: 1 % (ref 0–5)
EPITH CASTS URNS QL MICRO: ABNORMAL /LPF (ref 0–5)
ERYTHROCYTE [DISTWIDTH] IN BLOOD BY AUTOMATED COUNT: 13.7 % (ref 11.6–14.5)
EST. AVERAGE GLUCOSE BLD GHB EST-MCNC: 186 MG/DL
GLOBULIN SER CALC-MCNC: 3.9 G/DL (ref 2–4)
GLUCOSE BLD STRIP.AUTO-MCNC: 200 MG/DL (ref 70–110)
GLUCOSE SERPL-MCNC: 195 MG/DL (ref 74–99)
GLUCOSE UR STRIP.AUTO-MCNC: 100 MG/DL
HBA1C MFR BLD: 8.1 % (ref 4.2–5.6)
HCT VFR BLD AUTO: 39.9 % (ref 35–45)
HGB BLD-MCNC: 13.3 G/DL (ref 12–16)
HGB UR QL STRIP: ABNORMAL
INR PPP: 1.3 (ref 0.8–1.2)
KETONES UR QL STRIP.AUTO: NEGATIVE MG/DL
LACTATE BLD-SCNC: 2.62 MMOL/L (ref 0.4–2)
LACTATE SERPL-SCNC: 1.2 MMOL/L (ref 0.4–2)
LEUKOCYTE ESTERASE UR QL STRIP.AUTO: ABNORMAL
LYMPHOCYTES # BLD: 1.2 K/UL (ref 0.9–3.6)
LYMPHOCYTES NFR BLD: 7 % (ref 21–52)
MCH RBC QN AUTO: 29 PG (ref 24–34)
MCHC RBC AUTO-ENTMCNC: 33.3 G/DL (ref 31–37)
MCV RBC AUTO: 86.9 FL (ref 74–97)
MONOCYTES # BLD: 1 K/UL (ref 0.05–1.2)
MONOCYTES NFR BLD: 6 % (ref 3–10)
NEUTS SEG # BLD: 15.8 K/UL (ref 1.8–8)
NEUTS SEG NFR BLD: 86 % (ref 40–73)
NITRITE UR QL STRIP.AUTO: NEGATIVE
OTHER,OTHU: ABNORMAL
PH UR STRIP: 8 [PH] (ref 5–8)
PLATELET # BLD AUTO: 249 K/UL (ref 135–420)
PMV BLD AUTO: 9 FL (ref 9.2–11.8)
POTASSIUM SERPL-SCNC: 5.2 MMOL/L (ref 3.5–5.5)
PROT SERPL-MCNC: 7.1 G/DL (ref 6.4–8.2)
PROT UR STRIP-MCNC: >1000 MG/DL
PROTHROMBIN TIME: 16.1 SEC (ref 11.5–15.2)
RBC # BLD AUTO: 4.59 M/UL (ref 4.2–5.3)
RBC #/AREA URNS HPF: ABNORMAL /HPF (ref 0–5)
SODIUM SERPL-SCNC: 134 MMOL/L (ref 136–145)
SP GR UR REFRACTOMETRY: 1.02 (ref 1–1.03)
UROBILINOGEN UR QL STRIP.AUTO: 0.2 EU/DL (ref 0.2–1)
WBC # BLD AUTO: 18.2 K/UL (ref 4.6–13.2)
WBC URNS QL MICRO: ABNORMAL /HPF (ref 0–5)

## 2018-11-26 PROCEDURE — 99285 EMERGENCY DEPT VISIT HI MDM: CPT

## 2018-11-26 PROCEDURE — 74011000250 HC RX REV CODE- 250: Performed by: HOSPITALIST

## 2018-11-26 PROCEDURE — 85610 PROTHROMBIN TIME: CPT

## 2018-11-26 PROCEDURE — 74011250637 HC RX REV CODE- 250/637: Performed by: HOSPITALIST

## 2018-11-26 PROCEDURE — C1887 CATHETER, GUIDING: HCPCS

## 2018-11-26 PROCEDURE — 96365 THER/PROPH/DIAG IV INF INIT: CPT

## 2018-11-26 PROCEDURE — 74011250636 HC RX REV CODE- 250/636: Performed by: HOSPITALIST

## 2018-11-26 PROCEDURE — 74011250636 HC RX REV CODE- 250/636: Performed by: NURSE PRACTITIONER

## 2018-11-26 PROCEDURE — 36415 COLL VENOUS BLD VENIPUNCTURE: CPT

## 2018-11-26 PROCEDURE — 87186 SC STD MICRODIL/AGAR DIL: CPT

## 2018-11-26 PROCEDURE — 96366 THER/PROPH/DIAG IV INF ADDON: CPT

## 2018-11-26 PROCEDURE — 87040 BLOOD CULTURE FOR BACTERIA: CPT

## 2018-11-26 PROCEDURE — 74011636637 HC RX REV CODE- 636/637: Performed by: HOSPITALIST

## 2018-11-26 PROCEDURE — 81001 URINALYSIS AUTO W/SCOPE: CPT

## 2018-11-26 PROCEDURE — 77030011256 HC DRSG MEPILEX <16IN NO BORD MOLN -A

## 2018-11-26 PROCEDURE — 85730 THROMBOPLASTIN TIME PARTIAL: CPT

## 2018-11-26 PROCEDURE — 87086 URINE CULTURE/COLONY COUNT: CPT

## 2018-11-26 PROCEDURE — 87077 CULTURE AEROBIC IDENTIFY: CPT

## 2018-11-26 PROCEDURE — 74011250636 HC RX REV CODE- 250/636: Performed by: EMERGENCY MEDICINE

## 2018-11-26 PROCEDURE — 83036 HEMOGLOBIN GLYCOSYLATED A1C: CPT

## 2018-11-26 PROCEDURE — 74011636320 HC RX REV CODE- 636/320: Performed by: EMERGENCY MEDICINE

## 2018-11-26 PROCEDURE — 83605 ASSAY OF LACTIC ACID: CPT

## 2018-11-26 PROCEDURE — 71045 X-RAY EXAM CHEST 1 VIEW: CPT

## 2018-11-26 PROCEDURE — 77030037878 HC DRSG MEPILEX >48IN BORD MOLN -B

## 2018-11-26 PROCEDURE — 65270000029 HC RM PRIVATE

## 2018-11-26 PROCEDURE — 85025 COMPLETE CBC W/AUTO DIFF WBC: CPT

## 2018-11-26 PROCEDURE — 96375 TX/PRO/DX INJ NEW DRUG ADDON: CPT

## 2018-11-26 PROCEDURE — 82962 GLUCOSE BLOOD TEST: CPT

## 2018-11-26 PROCEDURE — 0T25X0Z CHANGE DRAINAGE DEVICE IN KIDNEY, EXTERNAL APPROACH: ICD-10-PCS | Performed by: RADIOLOGY

## 2018-11-26 PROCEDURE — 80053 COMPREHEN METABOLIC PANEL: CPT

## 2018-11-26 PROCEDURE — 74011000258 HC RX REV CODE- 258: Performed by: NURSE PRACTITIONER

## 2018-11-26 RX ORDER — ONDANSETRON 2 MG/ML
4 INJECTION INTRAMUSCULAR; INTRAVENOUS
Status: DISCONTINUED | OUTPATIENT
Start: 2018-11-26 | End: 2018-11-29 | Stop reason: HOSPADM

## 2018-11-26 RX ORDER — DOCUSATE SODIUM 100 MG/1
100 CAPSULE, LIQUID FILLED ORAL 2 TIMES DAILY
Status: DISCONTINUED | OUTPATIENT
Start: 2018-11-26 | End: 2018-11-29 | Stop reason: HOSPADM

## 2018-11-26 RX ORDER — HEPARIN SODIUM 5000 [USP'U]/ML
5000 INJECTION, SOLUTION INTRAVENOUS; SUBCUTANEOUS EVERY 8 HOURS
Status: DISCONTINUED | OUTPATIENT
Start: 2018-11-26 | End: 2018-11-29 | Stop reason: HOSPADM

## 2018-11-26 RX ORDER — NALOXONE HYDROCHLORIDE 0.4 MG/ML
0.4 INJECTION, SOLUTION INTRAMUSCULAR; INTRAVENOUS; SUBCUTANEOUS AS NEEDED
Status: DISCONTINUED | OUTPATIENT
Start: 2018-11-26 | End: 2018-11-29 | Stop reason: HOSPADM

## 2018-11-26 RX ORDER — DEXTROSE 50 % IN WATER (D50W) INTRAVENOUS SYRINGE
25-50 AS NEEDED
Status: DISCONTINUED | OUTPATIENT
Start: 2018-11-26 | End: 2018-11-29 | Stop reason: HOSPADM

## 2018-11-26 RX ORDER — SODIUM CHLORIDE 9 MG/ML
100 INJECTION, SOLUTION INTRAVENOUS CONTINUOUS
Status: DISCONTINUED | OUTPATIENT
Start: 2018-11-26 | End: 2018-11-27

## 2018-11-26 RX ORDER — SODIUM CHLORIDE 9 MG/ML
500 INJECTION, SOLUTION INTRAVENOUS
Status: COMPLETED | OUTPATIENT
Start: 2018-11-26 | End: 2018-11-26

## 2018-11-26 RX ORDER — DIPHENHYDRAMINE HYDROCHLORIDE 50 MG/ML
12.5 INJECTION, SOLUTION INTRAMUSCULAR; INTRAVENOUS
Status: DISCONTINUED | OUTPATIENT
Start: 2018-11-26 | End: 2018-11-29 | Stop reason: HOSPADM

## 2018-11-26 RX ORDER — INSULIN LISPRO 100 [IU]/ML
INJECTION, SOLUTION INTRAVENOUS; SUBCUTANEOUS
Status: DISCONTINUED | OUTPATIENT
Start: 2018-11-26 | End: 2018-11-29 | Stop reason: HOSPADM

## 2018-11-26 RX ORDER — DOCUSATE SODIUM 100 MG/1
100 CAPSULE, LIQUID FILLED ORAL 2 TIMES DAILY
Status: DISCONTINUED | OUTPATIENT
Start: 2018-11-26 | End: 2018-11-26 | Stop reason: SDUPTHER

## 2018-11-26 RX ORDER — LEVOTHYROXINE SODIUM 50 UG/1
50 TABLET ORAL
Status: DISCONTINUED | OUTPATIENT
Start: 2018-11-27 | End: 2018-11-29 | Stop reason: HOSPADM

## 2018-11-26 RX ORDER — TIMOLOL MALEATE 5 MG/ML
1 SOLUTION/ DROPS OPHTHALMIC 2 TIMES DAILY
Status: DISCONTINUED | OUTPATIENT
Start: 2018-11-26 | End: 2018-11-29 | Stop reason: HOSPADM

## 2018-11-26 RX ORDER — LEVOFLOXACIN 5 MG/ML
750 INJECTION, SOLUTION INTRAVENOUS
Status: DISCONTINUED | OUTPATIENT
Start: 2018-11-26 | End: 2018-11-26

## 2018-11-26 RX ORDER — LEVOFLOXACIN 5 MG/ML
750 INJECTION, SOLUTION INTRAVENOUS ONCE
Status: COMPLETED | OUTPATIENT
Start: 2018-11-26 | End: 2018-11-26

## 2018-11-26 RX ORDER — SODIUM CHLORIDE 9 MG/ML
500 INJECTION, SOLUTION INTRAVENOUS CONTINUOUS
Status: DISCONTINUED | OUTPATIENT
Start: 2018-11-26 | End: 2018-11-27

## 2018-11-26 RX ORDER — METOPROLOL SUCCINATE 50 MG/1
50 TABLET, EXTENDED RELEASE ORAL DAILY
Status: DISCONTINUED | OUTPATIENT
Start: 2018-11-27 | End: 2018-11-29 | Stop reason: HOSPADM

## 2018-11-26 RX ORDER — ACETAMINOPHEN 325 MG/1
650 TABLET ORAL
Status: DISCONTINUED | OUTPATIENT
Start: 2018-11-26 | End: 2018-11-29 | Stop reason: HOSPADM

## 2018-11-26 RX ORDER — LIDOCAINE HYDROCHLORIDE 10 MG/ML
1-20 INJECTION INFILTRATION; PERINEURAL
Status: DISCONTINUED | OUTPATIENT
Start: 2018-11-26 | End: 2018-11-26 | Stop reason: SDDI

## 2018-11-26 RX ORDER — LORAZEPAM 2 MG/ML
1 INJECTION INTRAMUSCULAR ONCE
Status: COMPLETED | OUTPATIENT
Start: 2018-11-26 | End: 2018-11-26

## 2018-11-26 RX ORDER — LEVOFLOXACIN 5 MG/ML
750 INJECTION, SOLUTION INTRAVENOUS
Status: DISCONTINUED | OUTPATIENT
Start: 2018-11-28 | End: 2018-11-29 | Stop reason: HOSPADM

## 2018-11-26 RX ORDER — MAGNESIUM SULFATE 100 %
4 CRYSTALS MISCELLANEOUS AS NEEDED
Status: DISCONTINUED | OUTPATIENT
Start: 2018-11-26 | End: 2018-11-29 | Stop reason: HOSPADM

## 2018-11-26 RX ORDER — BRIMONIDINE TARTRATE, TIMOLOL MALEATE 2; 5 MG/ML; MG/ML
1 SOLUTION/ DROPS OPHTHALMIC EVERY 12 HOURS
Status: DISCONTINUED | OUTPATIENT
Start: 2018-11-26 | End: 2018-11-26 | Stop reason: RX

## 2018-11-26 RX ORDER — BRIMONIDINE TARTRATE 2 MG/ML
1 SOLUTION/ DROPS OPHTHALMIC 2 TIMES DAILY
Status: DISCONTINUED | OUTPATIENT
Start: 2018-11-26 | End: 2018-11-29 | Stop reason: HOSPADM

## 2018-11-26 RX ORDER — SODIUM CHLORIDE 0.9 % (FLUSH) 0.9 %
5-10 SYRINGE (ML) INJECTION AS NEEDED
Status: DISCONTINUED | OUTPATIENT
Start: 2018-11-26 | End: 2018-11-26

## 2018-11-26 RX ORDER — POLYETHYLENE GLYCOL 3350 17 G/17G
17 POWDER, FOR SOLUTION ORAL DAILY
Status: DISCONTINUED | OUTPATIENT
Start: 2018-11-27 | End: 2018-11-29 | Stop reason: HOSPADM

## 2018-11-26 RX ORDER — ATORVASTATIN CALCIUM 20 MG/1
20 TABLET, FILM COATED ORAL DAILY
Status: DISCONTINUED | OUTPATIENT
Start: 2018-11-27 | End: 2018-11-29 | Stop reason: HOSPADM

## 2018-11-26 RX ORDER — GABAPENTIN 300 MG/1
300 CAPSULE ORAL DAILY
Status: DISCONTINUED | OUTPATIENT
Start: 2018-11-27 | End: 2018-11-29 | Stop reason: HOSPADM

## 2018-11-26 RX ADMIN — SODIUM CHLORIDE 500 ML: 900 INJECTION, SOLUTION INTRAVENOUS at 15:04

## 2018-11-26 RX ADMIN — SODIUM CHLORIDE 1000 ML: 900 INJECTION, SOLUTION INTRAVENOUS at 18:13

## 2018-11-26 RX ADMIN — INSULIN LISPRO 4 UNITS: 100 INJECTION, SOLUTION INTRAVENOUS; SUBCUTANEOUS at 22:33

## 2018-11-26 RX ADMIN — TIMOLOL MALEATE 1 DROP: 5 SOLUTION/ DROPS OPHTHALMIC at 22:34

## 2018-11-26 RX ADMIN — LEVOFLOXACIN 750 MG: 5 INJECTION, SOLUTION INTRAVENOUS at 12:34

## 2018-11-26 RX ADMIN — HEPARIN SODIUM 5000 UNITS: 5000 INJECTION INTRAVENOUS; SUBCUTANEOUS at 18:37

## 2018-11-26 RX ADMIN — LORAZEPAM 1 MG: 2 INJECTION INTRAMUSCULAR; INTRAVENOUS at 10:36

## 2018-11-26 RX ADMIN — BRIMONIDINE TARTRATE 1 DROP: 2 SOLUTION OPHTHALMIC at 22:34

## 2018-11-26 RX ADMIN — SODIUM CHLORIDE 124 ML: 900 INJECTION, SOLUTION INTRAVENOUS at 19:28

## 2018-11-26 RX ADMIN — DOCUSATE SODIUM 100 MG: 100 CAPSULE, LIQUID FILLED ORAL at 22:33

## 2018-11-26 RX ADMIN — SODIUM CHLORIDE 500 ML: 900 INJECTION, SOLUTION INTRAVENOUS at 13:40

## 2018-11-26 RX ADMIN — SODIUM CHLORIDE 1000 ML: 900 INJECTION, SOLUTION INTRAVENOUS at 16:37

## 2018-11-26 RX ADMIN — IOPAMIDOL 5 ML: 612 INJECTION, SOLUTION INTRAVENOUS at 13:40

## 2018-11-26 NOTE — PROGRESS NOTES
Pharmacy Dosing Services: Levaquin The following medication: Levaquin 750 mg IV q24h was automatically dose-adjusted to Levaquin 750 mg IV q48h per THE [facility] P&T Committee Protocol, with respect to renal function. Pt Weight:  
Wt Readings from Last 1 Encounters:  
11/26/18 70.8 kg (156 lb) Serum Creatinine Lab Results Component Value Date/Time Creatinine 1.63 (H) 11/26/2018 10:44 AM  
   
Creatinine Clearance Estimated Creatinine Clearance: 26.2 mL/min (A) (based on SCr of 1.63 mg/dL (H)). BUN Lab Results Component Value Date/Time BUN 33 (H) 11/26/2018 10:44 AM  
   
Pharmacy to continue to monitor patient daily. Will make dosage adjustments based upon changing renal function. Signed Emmanuel Monahan. Contact information: [phone]

## 2018-11-26 NOTE — ED NOTES
Patient transported to interventional radiology with IV antibiotics and RN transport , patient updates given to Sharon Alex RN at bedside, all questions answered at this time

## 2018-11-26 NOTE — ED NOTES
Spoke with Parveen Soria NP regarding labs, verbal order to not repeat labs or EKG at this time, levaquin already given at 1234

## 2018-11-26 NOTE — ROUTINE PROCESS
[facility] Medic Code Sepsis -  
-  
- Time of Code [id number] 
- Team: 
Physician Darshan Mancuso Bedside Nurse Julia Alvarez - SIRS # 1 2.62  SIRS # 2 NA 
- Possible source of infection: UTI 
- Organ dysfunction related to sepsis: Hypotension - Blood Cultures collected times 2 OR collected within last 24 hours:  Yes 
- Lactic Acid Collection time OR within last 6 hours: Yes 
- Antibiotic Start time: 
- Lactic Acid Result:2.62 
- Target Fluid Bolus Amount:2124 - Time of Fluid Bolus initiated:1616 
- Reason for no target fluid bolus: NA 
- New PIV / or line: PIV x 1 
 
- Additional Notes: Pt transferred to Medical 327 Transfer to higher level of care? Medical

## 2018-11-26 NOTE — ED NOTES
Telephone report given to [facility], updates given on sepsis protocol, all questions answered at this time

## 2018-11-26 NOTE — ED PROVIDER NOTES
EMERGENCY DEPARTMENT HISTORY AND PHYSICAL EXAM 
 
Date: 11/26/2018 Patient Name: Becka Wallis History of Presenting Illness Chief Complaint Patient presents with  Back Pain History Provided By: Patient Chief Complaint: lower back pain Duration: ~7.5 Hours Timing:  Constant Location: lower Severity: 10 out of 10 Associated Symptoms: urinary retention and nausea Additional History (Context):  
 
8:55 AM 
 
Becka Wallis is a 65 y.o. female with pertinent PMHx of DM, HTN, and chronic pain presenting via EMS to the ED c/o constant 10/10 lower back pain x ~0130 this morning. Pt states that an aide was helping her adjust this morning, and twisted her in a way that was uncomfortable. Onset of more intense pain after this. Pt states that her nephrostomy tubing seemed to be displaced with this movement. This was placed in [place]. Pt notes associated symptoms of urinary retention and nausea. Pt specifically denies any fever/chills or vomiting. PCP: Dr. Janey Wilkinson Urology: Jamie Leavitt MD 
Pt does not smoke tobacco, drink ETOH or do illicit drugs. There are no other complaints, changes, or physical findings at this time. Current Facility-Administered Medications Medication Dose Route Frequency Provider Last Rate Last Dose  
 0.9% sodium chloride infusion 500 mL  500 mL IntraVENous CONTINUOUS Lula Coleman NP   Stopped at 11/26/18 1637  levoFLOXacin (LEVAQUIN) 750 mg in D5W IVPB  750 mg IntraVENous Q48H Yoana Morillo NP      
 sodium chloride 0.9 % bolus infusion 1,000 mL  1,000 mL IntraVENous ONCE Lacho Morillo NP Followed by  
 sodium chloride 0.9 % bolus infusion 124 mL  124 mL IntraVENous ONCE Lacho Morillo NP Current Outpatient Medications Medication Sig Dispense Refill  ondansetron (ZOFRAN ODT) 8 mg disintegrating tablet Take 8 mg by mouth every eight (8) hours as needed for Nausea.  tiZANidine (ZANAFLEX) 2 mg capsule Take 2 mg by mouth two (2) times a day.  traZODone (DESYREL) 50 mg tablet Take 50 mg by mouth nightly.  nystatin (MYCOSTATIN) powder Apply 100,000 Units to affected area four (4) times daily.  camphor-menthol (SARNA) 0.5-0.5 % lotion Apply  to affected area daily.  terbinafine HCl (LAMISIL) 250 mg tablet Take 250 mg by mouth daily.  docusate sodium (COLACE) 100 mg capsule Take 100 mg by mouth two (2) times a day.  hydrocortisone (HYTONE) 2.5 % topical cream Apply  to affected area two (2) times a day. use thin layer  timolol (TIMOPTIC) 0.5 % ophthalmic solution 1 Drop two (2) times a day.  acetaminophen (TYLENOL) 650 mg suppository Insert 650 mg into rectum every six (6) hours.  LORazepam (ATIVAN) 0.5 mg tablet Take 0.5 mg by mouth daily as needed for Anxiety.  brimonidine-timolol (COMBIGAN) 0.2-0.5 % drop ophthalmic solution Administer 1 Drop to both eyes every twelve (12) hours.  bisacodyl (DULCOLAX, BISACODYL,) 10 mg suppository Insert  into rectum daily as needed. Indications: constipation  gabapentin (NEURONTIN) 300 mg capsule Take 300 mg by mouth daily.  metoprolol succinate (TOPROL-XL) 50 mg XL tablet Take 50 mg by mouth daily.  magnesium hydroxide (MILK OF MAGNESIA CONCENTRATED) 2,400 mg/10 mL susp suspension Take 10 mL by mouth.  alum-mag hydroxide-simeth (MYLANTA) 200-200-20 mg/5 mL susp Take 30 mL by mouth four (4) times daily as needed.  HYDROcodone-acetaminophen (NORCO) 5-325 mg per tablet Take 1 Tab by mouth every twelve (12) hours as needed for Pain (for pain related to [address]).  insulin aspart U-100 (NOVOLOG FLEXPEN U-100 INSULIN) 100 unit/mL inpn by SubCUTAneous route. Sliding scale  sodium bicarbonate 650 mg tablet Take 650 mg by mouth two (2) times a day.  levothyroxine (SYNTHROID) 50 mcg tablet Take  by mouth Daily (before breakfast).  acetaminophen (TYLENOL) 650 mg TbER Take 650 mg by mouth every six (6) hours as needed for Pain or Fever.  insulin glargine (LANTUS) 100 unit/mL injection 8 units SQ QHS 1 Vial 0  
 atorvastatin calcium (ATORVASTATIN PO) Take 20 mg by mouth daily. Past History Past Medical History: 
Past Medical History:  
Diagnosis Date  Cancer ([facility] [address]   
 vaginal and colon cancer  Chronic pain   
 arthritis  [facility]  Hypertension  Legally blind  Thyroid disease Past Surgical History: 
Past Surgical History:  
Procedure Laterality Date  HX COLOSTOMY  HX OTHER SURGICAL    
 kidney drainage tube  HX UROLOGICAL    
 kidney removal  
 
 
Family History: 
History reviewed. No pertinent family history. Social History: 
Social History Tobacco Use  Smoking status: Never Smoker  Smokeless tobacco: Never Used Substance Use Topics  Alcohol use: No  
 Drug use: Not on file Allergies: Allergies Allergen Reactions  Morphine Nausea and Vomiting  Pcn [Penicillins] Rash Review of Systems Review of Systems Constitutional: Negative for chills and fever. Gastrointestinal: Positive for nausea. Negative for vomiting. Genitourinary: Positive for difficulty urinating. Musculoskeletal: Positive for back pain (lower). All other systems reviewed and are negative. Physical Exam  
 
Vitals:  
 11/26/18 1542 11/26/18 1545 11/26/18 1600 11/26/18 1615 BP: 115/89 (!) 103/36 105/60 109/56 Pulse: 95 Resp: 18 Temp: 97.4 °F (36.3 °C) SpO2: 100% 100% 100% 100% Weight:      
Height:      
 
Physical Exam  
Constitutional: She is oriented to person, place, and time. She appears well-developed and well-nourished. Elderly, moaning in pain HENT:  
Head: Normocephalic and atraumatic. Eyes: Conjunctivae are normal.  
Neck: Normal range of motion. Cardiovascular: Normal rate and regular rhythm. Pulmonary/Chest: Effort normal and breath sounds normal.  
Abdominal: Soft. Bowel sounds are normal. She exhibits distension. There is tenderness. There is no rebound and no guarding. Diffuse TTP without point TTP, mildly distended Genitourinary:  
Genitourinary Comments: Right sided nephrostomy tube without any urine or drainage Urostomy present abdomen, no output or drainage, no erythema Musculoskeletal:  
Nephrostomy tube present right lower back, no erythema or bleeding Neurological: She is alert and oriented to person, place, and time. Skin: Skin is warm and dry. No erythema. Nursing note and vitals reviewed. Diagnostic Study Results Labs - Recent Results (from the past 12 hour(s)) CBC WITH AUTOMATED DIFF Collection Time: 11/26/18 10:44 AM  
Result Value Ref Range WBC 18.2 (H) 4.6 - 13.2 K/uL  
 RBC 4.59 4.20 - 5.30 M/uL  
 HGB 13.3 12.0 - 16.0 g/dL HCT 39.9 35.0 - 45.0 % MCV 86.9 74.0 - 97.0 FL  
 MCH 29.0 24.0 - 34.0 PG  
 MCHC 33.3 31.0 - 37.0 g/dL  
 RDW 13.7 11.6 - 14.5 % PLATELET 560 541 - 046 K/uL MPV 9.0 (L) 9.2 - 11.8 FL  
 NEUTROPHILS 86 (H) 40 - 73 % LYMPHOCYTES 7 (L) 21 - 52 % MONOCYTES 6 3 - 10 % EOSINOPHILS 1 0 - 5 % BASOPHILS 0 0 - 2 %  
 ABS. NEUTROPHILS 15.8 (H) 1.8 - 8.0 K/UL  
 ABS. LYMPHOCYTES 1.2 0.9 - 3.6 K/UL  
 ABS. MONOCYTES 1.0 0.05 - 1.2 K/UL  
 ABS. EOSINOPHILS 0.1 0.0 - 0.4 K/UL  
 ABS. BASOPHILS 0.0 0.0 - 0.1 K/UL  
 DF AUTOMATED METABOLIC PANEL, COMPREHENSIVE Collection Time: 11/26/18 10:44 AM  
Result Value Ref Range Sodium 134 (L) 136 - 145 mmol/L Potassium 5.2 3.5 - 5.5 mmol/L Chloride 99 (L) 100 - 108 mmol/L  
 CO2 24 21 - 32 mmol/L Anion gap 11 3.0 - 18 mmol/L Glucose 195 (H) 74 - 99 mg/dL BUN 33 (H) 7.0 - 18 MG/DL Creatinine 1.63 (H) 0.6 - 1.3 MG/DL  
 BUN/Creatinine ratio 20 GFR est AA 37 (L) >60 ml/min/1.73m2 GFR est non-AA 31 (L) >60 ml/min/1.73m2 Calcium 8.3 (L) 8.5 - 10.1 MG/DL Bilirubin, total 0.6 0.2 - 1.0 MG/DL  
 ALT (SGPT) 34 13 - 56 U/L  
 AST (SGOT) 23 15 - 37 U/L Alk. phosphatase 94 45 - 117 U/L Protein, total 7.1 6.4 - 8.2 g/dL Albumin 3.2 (L) 3.4 - 5.0 g/dL Globulin 3.9 2.0 - 4.0 g/dL A-G Ratio 0.8 0.8 - 1.7 PTT Collection Time: 11/26/18 10:44 AM  
Result Value Ref Range aPTT 49.7 (H) 23.0 - 36.4 SEC PROTHROMBIN TIME + INR Collection Time: 11/26/18 10:44 AM  
Result Value Ref Range Prothrombin time 16.1 (H) 11.5 - 15.2 sec INR 1.3 (H) 0.8 - 1.2 URINALYSIS W/ RFLX MICROSCOPIC Collection Time: 11/26/18  2:25 PM  
Result Value Ref Range Color RED Appearance TURBID Specific gravity 1.023 1.005 - 1.030    
 pH (UA) 8.0 5.0 - 8.0 Protein >1,000 (A) NEG mg/dL Glucose 100 (A) NEG mg/dL Ketone NEGATIVE  NEG mg/dL Bilirubin NEGATIVE  NEG Blood LARGE (A) NEG Urobilinogen 0.2 0.2 - 1.0 EU/dL Nitrites NEGATIVE  NEG Leukocyte Esterase LARGE (A) NEG URINE MICROSCOPIC ONLY Collection Time: 11/26/18  2:25 PM  
Result Value Ref Range WBC TOO NUMEROUS TO COUNT 0 - 5 /hpf  
 RBC TOO NUMEROUS TO COUNT 0 - 5 /hpf Epithelial cells 1+ 0 - 5 /lpf Bacteria 3+ (A) NEG /hpf Other: URINE COLOR MAY EFFECT MACROSCOPIC RESULTS. POC LACTIC ACID Collection Time: 11/26/18  4:10 PM  
Result Value Ref Range Lactic Acid (POC) 2.62 (HH) 0.40 - 2.00 mmol/L Radiologic Studies -  
IR NEPHROSTOMY PERC RT PLC CATH  SI Final Result XR CHEST PORT    (Results Pending) Medical Decision Making I am the first provider for this patient. I reviewed the vital signs, available nursing notes, past medical history, past surgical history, family history and social history. Vital Signs-Reviewed the patient's vital signs. Pulse Oximetry Analysis - 100% on RA Records Reviewed: Nursing Notes and Old Medical Records Provider Notes (Medical Decision Making): Patient presents with no nephrostomy output and pain after being reposition at the nursing home. The nephrostomy tube was replaced but showed cloudy output, and she had a WBC of 18,000. She was already treated with IV abx prior to procedure. She had a single low BP, so lactic acid was checked, which was elevated. Code Sepsis was called, target fluids given. Pt will be admitted to the hospital for further evaluation and IV abx. She was stable during her stay in the ED. PROCEDURES: 
Procedures MEDICATIONS GIVEN IN THE ED: 
Medications  
0.9% sodium chloride infusion 500 mL (0 mL IntraVENous IV Completed 11/26/18 1637) levoFLOXacin (LEVAQUIN) 750 mg in D5W IVPB (not administered)  
sodium chloride 0.9 % bolus infusion 1,000 mL (1,000 mL IntraVENous New Bag 11/26/18 1637) Followed by  
sodium chloride 0.9 % bolus infusion 1,000 mL (not administered) Followed by  
sodium chloride 0.9 % bolus infusion 124 mL (not administered) LORazepam (ATIVAN) injection 1 mg (1 mg IntraVENous Given 11/26/18 1036) levoFLOXacin (LEVAQUIN) 750 mg in D5W IVPB (0 mg IntraVENous IV Completed 11/26/18 1404) 0.9% sodium chloride infusion 500 mL (0 mL Irrigation IV Completed 11/26/18 1431) iopamidol (ISOVUE 300) 61 % contrast injection 1-50 mL (5 mL Other Given 11/26/18 1340) ED COURSE:  
8:55 AM 
Initial assessment performed. CONSULT NOTE: 
9:21 AM 
David ZHAO spoke with Zoe Jimenez MD, Specialty: Urology Discussed pt's hx, disposition, and available diagnostic and imaging results. Reviewed care plans. Consultant agrees with plans as outlined. Zoe Jimenez MD recommends consulting IR to place new Nephrostomy tubes, and does not recommend further imaging at this time. Written by [place] [facility], ED Scribe, as dictated by David ZHAO.   
 
11:46 AM Discussed patient's history, exam, and available diagnostics results with Soco Simms MD, interventional radiology, who states that he will replace the nephrostomy bag and wants platelets taken and Levaquin given. 1:34 PM:  Discussed patient's history, exam, and available diagnostics results with Soco iSmms MD, interventional radiology,who states they were able to replace the tube and it was completely out, the fluid was somewhat cloudy, he recommends watching for 2 hours and possibly covering with ab. 
 
1:51 PM: Maame PRESTON-BC spoke with Tyrell Guerrero MD, Specialty: Urology Discussed pt's hx, disposition, and available diagnostic and imaging results. He recommends watching the patient and if she spikes a fever or appears toxic needs to be admitted but if she is doing well recommends sending urine for culture and covering with Levaquin 3:22 PM Discussed patient's history, exam, and available diagnostics results with Manuel Cuadra MD, hospitalist, who agrees to admit the patient to Medical. 
 
4:13 Pt had initial reading of HTN, POC lactic was checked and was elevated. Code SEPSIS called. IV ab were started previously. 4:21 PM Patient's son, Seth Weaver, updated on patient staying in the hospital. He is agreeable and will be up to see his mother. Diagnosis and Disposition Critical Care Time: none Core Measures: 
For Hospitalized Patients: 
 
1. Hospitalization Decision Time: The decision to hospitalize the patient was made by Maame ZHAO at 3:22 PM on 11/26/2018 2. Aspirin: Aspirin was not given because the patient did not present with a stroke at the time of their Emergency Department evaluation 3:23 PM  Patient is being admitted to the hospital by Manuel Cuadra MD. The results of their tests and reasons for their admission have been discussed with them and/or available family. They convey agreement and understanding for the need to be admitted and for their admission diagnosis.    
 
CONDITIONS ON ADMISSION: 
 Sepsis is not present at the time of admission. Deep Vein Thrombosis is not present at the time of admission. Thrombosis is not present at the time of admission. Urinary Tract Infection is present at the time of admission. Pneumonia is not present at the time of admission. MRSA is not present at the time of admission. Wound infection is not present at the time of admission. Pressure Ulcer is not present at the time of admission. CLINICAL IMPRESSION: 
 
1. Urinary tract infection with hematuria, site unspecified 2. Leukocytosis, unspecified type 3. Nephrostomy tube displaced ([address] 4. Hyponatremia 5. Dehydration 6. Elevated lactic acid level PLAN: 
1. Admit to Medical 
_______________________________ Attestations: This note is prepared by Hao Madrid and Idania Boston, acting as Scribe for [facility]. [facility]:  The scribe's documentation has been prepared under my direction and personally reviewed by me in its entirety. I confirm that the note above accurately reflects all work, treatment, procedures, and medical decision making performed by me. 
_______________________________

## 2018-11-26 NOTE — PROGRESS NOTES
Report to Millicent Andres RN in ER-pt resting comfortable and son was left a message. Basia Mela Adrian was also notified.

## 2018-11-26 NOTE — ED TRIAGE NOTES
Patient arrived via EMS with back pain since 0400, patient states that nephrostomy tube was pulled out and has had no urine output in 0400, patient report nausea, patient moaning in pain, speaking in complete sentences, a/o x3

## 2018-11-26 NOTE — ROUTINE PROCESS
TRANSFER - OUT REPORT: 
 
Verbal report given to [facility] on Naun Jones  being transferred to Medical Unit for routine progression of care Report consisted of patients Situation, Background, Assessment and  
Recommendations(SBAR). Information from the following report(s) SBAR, ED Summary, Intake/Output, MAR and Recent Results was reviewed with the receiving nurse. Lines:  
Peripheral IV 11/26/18 Right Hand (Active) Site Assessment Clean, dry, & intact 11/26/2018 12:20 PM  
Phlebitis Assessment 0 11/26/2018 12:20 PM  
Infiltration Assessment 0 11/26/2018 12:20 PM  
Dressing Type Transparent 11/26/2018 12:20 PM  
Hub Color/Line Status Yellow 11/26/2018 12:20 PM  
   
Peripheral IV 11/26/18 Right Hand (Active) Site Assessment Clean, dry, & intact 11/26/2018 12:30 PM  
Phlebitis Assessment 0 11/26/2018 12:30 PM  
Infiltration Assessment 0 11/26/2018 12:30 PM  
Dressing Status Clean, dry, & intact 11/26/2018 12:30 PM  
Dressing Type Transparent 11/26/2018 12:30 PM  
Hub Color/Line Status Blue 11/26/2018 12:30 PM  
  
 
Opportunity for questions and clarification was provided. Patient transported with: 
 [employer]

## 2018-11-26 NOTE — H&P
History & Physical 
Patient: Kamaljit Diehl MRN: [id number]  CSN: [id number] YOB: 1940  Age: 65 y.o. Sex: female DOA: 11/26/2018 Primary Care Provider:  None Assessment/Plan Hospital Problems  Date Reviewed: 5/18/2018 Codes Class Noted POA  
 UTI (urinary tract infection) ICD-10-CM: N39.0 ICD-9-CM: 599.0  11/26/2018 Unknown Elevated WBC count ICD-10-CM: A72.513 ICD-9-CM: 288.60  11/26/2018 Unknown Diabetes ([facility] ICD-10-CM: E11.9 ICD-9-CM: 250.00  11/26/2018 Unknown Legally blind ICD-10-CM: H54.8 ICD-9-CM: 369.4  11/26/2018 Unknown Hypertension ICD-10-CM: I10 
ICD-9-CM: 401.9  11/26/2018 Unknown Thyroid disease ICD-10-CM: E07.9 ICD-9-CM: 246.9  11/26/2018 Unknown Elevated serum creatinine ICD-10-CM: R79.89 ICD-9-CM: 790.99  11/26/2018 Unknown S/P colectomy ICD-10-CM: Z90.49 ICD-9-CM: V45.89  11/26/2018 Unknown Sepsis ([facility] ICD-10-CM: A41.9 ICD-9-CM: 038.9, 995.91  11/26/2018 Unknown Skin lesion of foot ICD-10-CM: L98.9 ICD-9-CM: 709.9  11/26/2018 Unknown Nephrostomy tube displaced ([facility] ICD-10-CM: J67.017N ICD-9-CM: 997.5  5/18/2018 Unknown Admit to medical 
 
 
Sepsis Due to gu source Septic protocol, pan cx , iv fluid ,lactic acid monitoring Iv abx started in Er and will continue, received iv n bolus  
 
uti Will continue f/u with the cx Nephrostomy tube displaced Changed per IR Urology was consulted DM type II ,  
- ssi, diabetic diet , hypoglycemia protocol   
 
 HTN, accelerated Will hold now due to low side normal range. Elevated cr 
 due to sepsis vs dehydration Skin lesion of rt foot Will continue wound care AC:  I have had a discussion with patient regarding code status.  Particularly, described potential options in event of cardiac or respiratory arrest. I have explained what being full code entails, including cardiorespiratory resuscitation attempts with chest compressions, potential cariodioversion/ \" shocks\" as well as intubation. I have also explained Do not resuscitate which would mean we allow a natural death with out aggressive interventions. Dnr/I  AC 32 min DVT : heparin,  ppi proph CC: tube issue and back pain HPI:  
 
Selene Garcia is a 65 y.o. female who hx Dm, HTN , chronic pain. Colectomy bag, nephro tube was sent to Er due to nephro tube dysfunction. She was turned position and her nephro tube seen was displaced. She was told to drink a lot water , but no urine coming out of the tube. She started to have back pain. IR was called and tube was repositioned. She was found wbc 18 . ua -uti and urology recommend to admit to the hospital.  
Denies any slurred speech/headache/cp/n/v/blurred vission/d/c/palpitation/gait change/bleeding. Visit Vitals /56 Pulse 95 Temp 97.4 °F (36.3 °C) Resp 18 Ht 5' 2\" (1.575 m) Wt 70.8 kg (156 lb) SpO2 100% BMI 28.53 kg/m² O2 Device: Room air Past Medical History:  
Diagnosis Date  Cancer ([facility]   
 vaginal and colon cancer  Chronic pain   
 arthritis  Diabetes ([facility]  Hypertension  Legally blind  Thyroid disease Past Surgical History:  
Procedure Laterality Date  HX COLOSTOMY  HX OTHER SURGICAL    
 kidney drainage tube  HX UROLOGICAL    
 kidney removal  
 
 
History reviewed. No pertinent family history. Social History Socioeconomic History  Marital status:  Spouse name: Not on file  Number of children: Not on file  Years of education: Not on file  Highest education level: Not on file Tobacco Use  Smoking status: Never Smoker  Smokeless tobacco: Never Used Substance and Sexual Activity  Alcohol use: No  
Other Topics Concern Prior to Admission medications Medication Sig Start Date End Date Taking? Authorizing Provider ondansetron (ZOFRAN ODT) 8 mg disintegrating tablet Take 8 mg by mouth every eight (8) hours as needed for Nausea. Provider, Historical  
tiZANidine (ZANAFLEX) 2 mg capsule Take 2 mg by mouth two (2) times a day. Provider, Historical  
traZODone (DESYREL) 50 mg tablet Take 50 mg by mouth nightly. Provider, Historical  
nystatin (MYCOSTATIN) powder Apply 100,000 Units to affected area four (4) times daily. Provider, Historical  
camphor-menthol (SARNA) 0.5-0.5 % lotion Apply  to affected area daily. Provider, Historical  
terbinafine HCl (LAMISIL) 250 mg tablet Take 250 mg by mouth daily. Provider, Historical  
docusate sodium (COLACE) 100 mg capsule Take 100 mg by mouth two (2) times a day. Provider, Historical  
hydrocortisone (HYTONE) 2.5 % topical cream Apply  to affected area two (2) times a day. use thin layer    Provider, Historical  
timolol (TIMOPTIC) 0.5 % ophthalmic solution 1 Drop two (2) times a day. Provider, Historical  
acetaminophen (TYLENOL) 650 mg suppository Insert 650 mg into rectum every six (6) hours. Provider, Historical  
LORazepam (ATIVAN) 0.5 mg tablet Take 0.5 mg by mouth daily as needed for Anxiety. Provider, Historical  
brimonidine-timolol (COMBIGAN) 0.2-0.5 % drop ophthalmic solution Administer 1 Drop to both eyes every twelve (12) hours. Provider, Historical  
bisacodyl (DULCOLAX, BISACODYL,) 10 mg suppository Insert  into rectum daily as needed. Indications: constipation    Provider, Historical  
gabapentin (NEURONTIN) 300 mg capsule Take 300 mg by mouth daily. Provider, Historical  
metoprolol succinate (TOPROL-XL) 50 mg XL tablet Take 50 mg by mouth daily. Provider, Historical  
magnesium hydroxide (MILK OF MAGNESIA CONCENTRATED) 2,400 mg/10 mL susp suspension Take 10 mL by mouth. Provider, Historical  
alum-mag hydroxide-simeth (MYLANTA) 200-200-20 mg/5 mL susp Take 30 mL by mouth four (4) times daily as needed.     Provider, Historical  
 HYDROcodone-acetaminophen (NORCO) 5-325 mg per tablet Take 1 Tab by mouth every twelve (12) hours as needed for Pain (for pain related to [address]). Provider, Historical  
insulin aspart U-100 (NOVOLOG FLEXPEN U-100 INSULIN) 100 unit/mL inpn by SubCUTAneous route. Sliding scale    Provider, Historical  
sodium bicarbonate 650 mg tablet Take 650 mg by mouth two (2) times a day. Provider, Historical  
levothyroxine (SYNTHROID) 50 mcg tablet Take  by mouth Daily (before breakfast). Provider, Historical  
acetaminophen (TYLENOL) 650 mg TbER Take 650 mg by mouth every six (6) hours as needed for Pain or Fever. Provider, Historical  
insulin glargine (LANTUS) 100 unit/mL injection 8 units SQ QHS 18   Shy Zheng MD  
atorvastatin calcium (ATORVASTATIN PO) Take 20 mg by mouth daily. Other, MD Dc  
 
 
Allergies Allergen Reactions  Morphine Nausea and Vomiting  Pcn [Penicillins] Rash Review of Systems Gen: No fever, chills, malaise, weight loss/gain. Heent: No headache, rhinorrhea, epistaxis, ear pain, hearing loss, sinus pain, neck pain/stiffness, sore throat. Heart: No chest pain, palpitations, LEWIS, pnd, or orthopnea. Resp: No cough, hemoptysis, wheezing and shortness of breath. GI:  nausea, vomiting, no diarrhea, +constipation, no melena or hematochezia. : No urine out -put from tube Derm: skin lesion on foot Musc/skeletal: pain all over Vasc: No edema, cyanosis or claudication. Endo: No heat/cold intolerance, no polyuria,polydipsia or polyphagia. Neuro: No unilateral weakness, numbness, tingling. No seizures. Heme: No easy bruising or bleeding. Physical Exam:  
 
Physical Exam: 
Visit Vitals /56 Pulse 95 Temp 97.4 °F (36.3 °C) Resp 18 Ht 5' 2\" (1.575 m) Wt 70.8 kg (156 lb) SpO2 100% BMI 28.53 kg/m² O2 Device: Room air Temp (24hrs), Av.6 °F (36.4 °C), Min:97.4 °F (36.3 °C), Max:97.7 °F (36.5 °C) 11/26 0701 - 11/26 1900 In: 1150 [I.V.:1150] Out: -    No intake/output data recorded. General:  Awake, cooperative, no distress. Head:  Normocephalic, without obvious abnormality, atraumatic. Eyes:  Conjunctivae/corneas clear, sclera anicteric, PERRL, EOMs intact. Nose: Nares normal. No drainage or sinus tenderness. Throat: Lips, mucosa, and tongue normal. .  
Neck: Supple, symmetrical, trachea midline, no adenopathy. Lungs:   Clear to auscultation bilaterally. Heart:  Regular rate and rhythm, S1, S2 normal, + murmur, no click, rub or gallop. Abdomen: Soft, non-tender. Bowel sounds normal. No masses,  No organomegaly. Colectomy bag was -empty Extremities: Extremities normal, atraumatic, no  Edema. Rt  Foot skin lesion Pulses: 2+ and symmetric all extremities. Skin: Skin color-pink, texture, turgor normal.except above No rashes or lesions. Capillary refill normal   
Neurologic: CNII-XII intact. No focal motor or sensory deficit. Labs Reviewed: 
 
BMP:  
Lab Results Component Value Date/Time  (L) 11/26/2018 10:44 AM  
 K 5.2 11/26/2018 10:44 AM  
 CL 99 (L) 11/26/2018 10:44 AM  
 CO2 24 11/26/2018 10:44 AM  
 AGAP 11 11/26/2018 10:44 AM  
  (H) 11/26/2018 10:44 AM  
 BUN 33 (H) 11/26/2018 10:44 AM  
 CREA 1.63 (H) 11/26/2018 10:44 AM  
 GFRAA 37 (L) 11/26/2018 10:44 AM  
 GFRNA 31 (L) 11/26/2018 10:44 AM  
 
CMP:  
Lab Results Component Value Date/Time   (L) 11/26/2018 10:44 AM  
 K 5.2 11/26/2018 10:44 AM  
 CL 99 (L) 11/26/2018 10:44 AM  
 CO2 24 11/26/2018 10:44 AM  
 AGAP 11 11/26/2018 10:44 AM  
  (H) 11/26/2018 10:44 AM  
 BUN 33 (H) 11/26/2018 10:44 AM  
 CREA 1.63 (H) 11/26/2018 10:44 AM  
 GFRAA 37 (L) 11/26/2018 10:44 AM  
 GFRNA 31 (L) 11/26/2018 10:44 AM  
 CA 8.3 (L) 11/26/2018 10:44 AM  
 ALB 3.2 (L) 11/26/2018 10:44 AM  
 TP 7.1 11/26/2018 10:44 AM  
 GLOB 3.9 11/26/2018 10:44 AM  
 AGRAT 0.8 11/26/2018 10:44 AM  
 SGOT 23 11/26/2018 10:44 AM  
 ALT 34 11/26/2018 10:44 AM  
 
CBC:  
Lab Results Component Value Date/Time WBC 18.2 (H) 11/26/2018 10:44 AM  
 HGB 13.3 11/26/2018 10:44 AM  
 HCT 39.9 11/26/2018 10:44 AM  
  11/26/2018 10:44 AM  
 
All Cardiac Markers in the last 24 hours: No results found for: CPK, CK, CKMMB, CKMB, RCK3, CKMBT, CKNDX, CKND1, JANE, TROPT, TROIQ, CLARENCE, TROPT, TNIPOC, BNP, BNPP Recent Glucose Results:  
Lab Results Component Value Date/Time  (H) 11/26/2018 10:44 AM  
 
ABG: No results found for: PH, PHI, PCO2, PCO2I, PO2, PO2I, HCO3, HCO3I, FIO2, FIO2I 
COAGS:  
Lab Results Component Value Date/Time APTT 49.7 (H) 11/26/2018 10:44 AM  
 PTP 16.1 (H) 11/26/2018 10:44 AM  
 INR 1.3 (H) 11/26/2018 10:44 AM  
 
Liver Panel:  
Lab Results Component Value Date/Time ALB 3.2 (L) 11/26/2018 10:44 AM  
 TP 7.1 11/26/2018 10:44 AM  
 GLOB 3.9 11/26/2018 10:44 AM  
 AGRAT 0.8 11/26/2018 10:44 AM  
 SGOT 23 11/26/2018 10:44 AM  
 ALT 34 11/26/2018 10:44 AM  
 AP 94 11/26/2018 10:44 AM  
 
Pancreatic Markers: No results found for: AMYLPOCT, AML, LIPPOCT, LPSE Procedures/imaging: see electronic medical records for all procedures/Xrays and details which were not copied into this note but were reviewed prior to creation of Plan Judd Steven MD, Internal Medicine CC: None

## 2018-11-26 NOTE — PROGRESS NOTES
Spoke with son Bon Kim and explained procedure and received verbal consent with Autumn Marion.   
 
Report rreceived from [address]

## 2018-11-26 NOTE — ED NOTES
Patient laying in bed with PIV removed and holding IV tubing in other hand, patient appears to be asleep, cath tip intact, will attempt to place new PIV

## 2018-11-26 NOTE — PROGRESS NOTES
Went to ER and found patient unable to give consent due to 1mg of ativan given. Called her son Seth Weaver and left message as he is POA. Attempted to call SF[facility] and no answer. Awaiting call back for consent. Pt pulled out IV.

## 2018-11-26 NOTE — PROCEDURES
Vascular & Interventional Radiology Brief Procedure Note    Interventional Radiologist: Eugenio Jasmine MD    Pre-operative Diagnosis:  Right nephrostomy pulled back this am    Post-operative Diagnosis: Same as pre-op dx    Procedure(s) Performed:  Replace right percutaneous nephrostomy    Anesthesia:  None    Findings:  Prior right nephrostomy completely out under the bandage. Tract easily negotiated with a 5 Ivorian catheter into the collecting system and new 8 Ivorian nephrostomy placed over a guidewire. Urine is cloudy and malodorous. Complications: None    Estimated Blood Loss:  None    Tubes and Drains: 8 Ivorian locking loop pigtail right nephrostomy.     Specimens: None    Condition: Unchanged    Disposition:  Emergency Department patient    Eugenio Jasmine MD, MD  [address] [address]  Vascular & Interventional Radiology  11/26/2018

## 2018-11-26 NOTE — ED NOTES
Patient is a difficult stick, 2 RNs attempted to draw blood and place PIV with ultrasound assistance,  Medic called to attempt lab draw and PIV access

## 2018-11-27 ENCOUNTER — HOSPITAL ENCOUNTER (OUTPATIENT)
Dept: WOUND CARE | Age: 78
End: 2018-11-27

## 2018-11-27 LAB
ALBUMIN SERPL-MCNC: 2.2 G/DL (ref 3.4–5)
ALBUMIN/GLOB SERPL: 0.8 {RATIO} (ref 0.8–1.7)
ALP SERPL-CCNC: 75 U/L (ref 45–117)
ALT SERPL-CCNC: 28 U/L (ref 13–56)
ANION GAP SERPL CALC-SCNC: 13 MMOL/L (ref 3–18)
AST SERPL-CCNC: 18 U/L (ref 15–37)
BASOPHILS # BLD: 0 K/UL (ref 0–0.1)
BASOPHILS NFR BLD: 0 % (ref 0–2)
BILIRUB SERPL-MCNC: 0.5 MG/DL (ref 0.2–1)
BUN SERPL-MCNC: 29 MG/DL (ref 7–18)
BUN/CREAT SERPL: 19
CALCIUM SERPL-MCNC: 7.1 MG/DL (ref 8.5–10.1)
CHLORIDE SERPL-SCNC: 105 MMOL/L (ref 100–108)
CO2 SERPL-SCNC: 21 MMOL/L (ref 21–32)
CREAT SERPL-MCNC: 1.55 MG/DL (ref 0.6–1.3)
DIFFERENTIAL METHOD BLD: ABNORMAL
EOSINOPHIL # BLD: 0 K/UL (ref 0–0.4)
EOSINOPHIL NFR BLD: 0 % (ref 0–5)
ERYTHROCYTE [DISTWIDTH] IN BLOOD BY AUTOMATED COUNT: 14 % (ref 11.6–14.5)
GLOBULIN SER CALC-MCNC: 2.9 G/DL (ref 2–4)
GLUCOSE BLD STRIP.AUTO-MCNC: 128 MG/DL (ref 70–110)
GLUCOSE BLD STRIP.AUTO-MCNC: 147 MG/DL (ref 70–110)
GLUCOSE BLD STRIP.AUTO-MCNC: 168 MG/DL (ref 70–110)
GLUCOSE BLD STRIP.AUTO-MCNC: 214 MG/DL (ref 70–110)
GLUCOSE SERPL-MCNC: 145 MG/DL (ref 74–99)
HCT VFR BLD AUTO: 31.1 % (ref 35–45)
HGB BLD-MCNC: 10.2 G/DL (ref 12–16)
LYMPHOCYTES # BLD: 0.8 K/UL (ref 0.9–3.6)
LYMPHOCYTES NFR BLD: 7 % (ref 21–52)
MAGNESIUM SERPL-MCNC: 1.4 MG/DL (ref 1.6–2.6)
MCH RBC QN AUTO: 28.6 PG (ref 24–34)
MCHC RBC AUTO-ENTMCNC: 32.8 G/DL (ref 31–37)
MCV RBC AUTO: 87.1 FL (ref 74–97)
MONOCYTES # BLD: 0.9 K/UL (ref 0.05–1.2)
MONOCYTES NFR BLD: 9 % (ref 3–10)
NEUTS SEG # BLD: 8.6 K/UL (ref 1.8–8)
NEUTS SEG NFR BLD: 84 % (ref 40–73)
PLATELET # BLD AUTO: 194 K/UL (ref 135–420)
PMV BLD AUTO: 9.5 FL (ref 9.2–11.8)
POTASSIUM SERPL-SCNC: 4.6 MMOL/L (ref 3.5–5.5)
PROT SERPL-MCNC: 5.1 G/DL (ref 6.4–8.2)
RBC # BLD AUTO: 3.57 M/UL (ref 4.2–5.3)
SODIUM SERPL-SCNC: 139 MMOL/L (ref 136–145)
WBC # BLD AUTO: 10.4 K/UL (ref 4.6–13.2)

## 2018-11-27 PROCEDURE — 74011636637 HC RX REV CODE- 636/637: Performed by: HOSPITALIST

## 2018-11-27 PROCEDURE — 97535 SELF CARE MNGMENT TRAINING: CPT

## 2018-11-27 PROCEDURE — 74011000250 HC RX REV CODE- 250: Performed by: HOSPITALIST

## 2018-11-27 PROCEDURE — 85025 COMPLETE CBC W/AUTO DIFF WBC: CPT

## 2018-11-27 PROCEDURE — 80053 COMPREHEN METABOLIC PANEL: CPT

## 2018-11-27 PROCEDURE — 83735 ASSAY OF MAGNESIUM: CPT

## 2018-11-27 PROCEDURE — 74011250637 HC RX REV CODE- 250/637: Performed by: HOSPITALIST

## 2018-11-27 PROCEDURE — 97167 OT EVAL HIGH COMPLEX 60 MIN: CPT

## 2018-11-27 PROCEDURE — 77030010541

## 2018-11-27 PROCEDURE — 65270000029 HC RM PRIVATE

## 2018-11-27 PROCEDURE — 36415 COLL VENOUS BLD VENIPUNCTURE: CPT

## 2018-11-27 PROCEDURE — 74011250636 HC RX REV CODE- 250/636: Performed by: HOSPITALIST

## 2018-11-27 PROCEDURE — 82962 GLUCOSE BLOOD TEST: CPT

## 2018-11-27 PROCEDURE — 77030010522

## 2018-11-27 PROCEDURE — 97530 THERAPEUTIC ACTIVITIES: CPT

## 2018-11-27 PROCEDURE — 77030011641 HC PASTE OST ADH BMS -A

## 2018-11-27 PROCEDURE — 97163 PT EVAL HIGH COMPLEX 45 MIN: CPT

## 2018-11-27 RX ORDER — INSULIN GLARGINE 100 [IU]/ML
5 INJECTION, SOLUTION SUBCUTANEOUS
Status: DISCONTINUED | OUTPATIENT
Start: 2018-11-27 | End: 2018-11-29 | Stop reason: HOSPADM

## 2018-11-27 RX ADMIN — TIMOLOL MALEATE 1 DROP: 5 SOLUTION/ DROPS OPHTHALMIC at 09:56

## 2018-11-27 RX ADMIN — SODIUM CHLORIDE 100 ML/HR: 900 INJECTION, SOLUTION INTRAVENOUS at 15:25

## 2018-11-27 RX ADMIN — DOCUSATE SODIUM 100 MG: 100 CAPSULE, LIQUID FILLED ORAL at 09:55

## 2018-11-27 RX ADMIN — INSULIN LISPRO 4 UNITS: 100 INJECTION, SOLUTION INTRAVENOUS; SUBCUTANEOUS at 12:53

## 2018-11-27 RX ADMIN — SODIUM CHLORIDE 100 ML/HR: 900 INJECTION, SOLUTION INTRAVENOUS at 04:14

## 2018-11-27 RX ADMIN — METOPROLOL SUCCINATE 50 MG: 50 TABLET, EXTENDED RELEASE ORAL at 09:55

## 2018-11-27 RX ADMIN — BRIMONIDINE TARTRATE 1 DROP: 2 SOLUTION OPHTHALMIC at 09:56

## 2018-11-27 RX ADMIN — HEPARIN SODIUM 5000 UNITS: 5000 INJECTION INTRAVENOUS; SUBCUTANEOUS at 19:05

## 2018-11-27 RX ADMIN — HEPARIN SODIUM 5000 UNITS: 5000 INJECTION INTRAVENOUS; SUBCUTANEOUS at 04:14

## 2018-11-27 RX ADMIN — ATORVASTATIN CALCIUM 20 MG: 20 TABLET, FILM COATED ORAL at 09:54

## 2018-11-27 RX ADMIN — HEPARIN SODIUM 5000 UNITS: 5000 INJECTION INTRAVENOUS; SUBCUTANEOUS at 09:55

## 2018-11-27 RX ADMIN — POLYETHYLENE GLYCOL 3350 17 G: 17 POWDER, FOR SOLUTION ORAL at 09:54

## 2018-11-27 RX ADMIN — GABAPENTIN 300 MG: 300 CAPSULE ORAL at 09:55

## 2018-11-27 RX ADMIN — LEVOTHYROXINE SODIUM 50 MCG: 50 TABLET ORAL at 09:55

## 2018-11-27 NOTE — PROGRESS NOTES
Hospitalist Progress Note-critical care note Patient: Mattie Martin MRN: [id number]  CSN: [id number] YOB: 1940  Age: 65 y.o. Sex: female DOA: 11/26/2018 LOS:  LOS: 1 day Chief complaint: uti, dm, HTn sepsis Assessment/Plan Hospital Problems  Date Reviewed: 5/18/2018 Codes Class Noted POA  
 UTI (urinary tract infection) ICD-10-CM: N39.0 ICD-9-CM: 599.0  11/26/2018 Unknown Elevated WBC count ICD-10-CM: S94.527 ICD-9-CM: 288.60  11/26/2018 Unknown Diabetes ([facility] ICD-10-CM: E11.9 ICD-9-CM: 250.00  11/26/2018 Unknown Legally blind ICD-10-CM: H54.8 ICD-9-CM: 369.4  11/26/2018 Unknown Hypertension ICD-10-CM: I10 
ICD-9-CM: 401.9  11/26/2018 Unknown Thyroid disease ICD-10-CM: E07.9 ICD-9-CM: 246.9  11/26/2018 Unknown Elevated serum creatinine ICD-10-CM: R79.89 ICD-9-CM: 790.99  11/26/2018 Unknown S/P colectomy ICD-10-CM: Z90.49 ICD-9-CM: V45.89  11/26/2018 Unknown Sepsis ([facility] ICD-10-CM: A41.9 ICD-9-CM: 038.9, 995.91  11/26/2018 Unknown Skin lesion of foot ICD-10-CM: L98.9 ICD-9-CM: 709.9  11/26/2018 Unknown Nephrostomy tube displaced ([facility] ICD-10-CM: W96.884J ICD-9-CM: 997.5  5/18/2018 Unknown Sepsis Resolving Due to gu source  
bcx and urine cx were no growth so far 
  
uti -due to Nephrostomy tube dysfunction Will continue f/u with the cx On levaquin ,  
  
Nephrostomy tube displaced Changed per IR Urology was consulted  
  
  
DM type II ,  
- ssi, diabetic diet , hypoglycemia protocol, will add lantus  For better control  
  
 HTN, accelerated 
bp better now, will d/c iv fluid  
  
Elevated cr Improving, close to base line  
  
Skin lesion of rt foot  
 wound care Subjective,  Blood drawal bother me Nurse: no acute issue Disposition :1-2 days Review of systems: 
 
General: No fevers or chills. Cardiovascular: No chest pain or pressure. No palpitations. Pulmonary: No shortness of breath. Gastrointestinal: No nausea, vomiting. Vital signs/Intake and Output: 
Visit Vitals /41 (BP 1 Location: Left arm, BP Patient Position: At rest) Pulse 77 Temp 98.4 °F (36.9 °C) Resp 18 Ht 5' 2\" (1.575 m) Wt 75.4 kg (166 lb 3.2 oz) SpO2 100% BMI 30.40 kg/m² Current Shift:  11/27 0701 - 11/27 1900 In: 2442 [I.V.:2442] Out: 800 [Urine:800] Last three shifts:  11/25 1901 - 11/27 0700 In: 1150 [I.V.:1150] Out: 850 [Urine:850] Physical Exam: 
General: WD, WN. Alert, cooperative, no acute distress   
HEENT: NC, Atraumatic. PERRLA, anicteric sclerae. Lungs: CTA Bilaterally. No Wheezing/Rhonchi/Rales. Heart:  Regular  rhythm,  +murmur, No Rubs, No Gallops Abdomen: Soft, Non distended, Non tender.  +Bowel sounds, colostomy bag noted Extremities: No c/c/e. Rt foot skin lesion, no sign of infection Psych:   Not anxious or agitated. Neurologic:  No acute neurological deficit. Labs: Results:  
   
Chemistry Recent Labs  
  11/27/18 
0340 11/26/18 
1044 * 195*  134* K 4.6 5.2  99* CO2 21 24 BUN 29* 33* CREA 1.55* 1.63* CA 7.1* 8.3* AGAP 13 11 BUCR 19 20 AP 75 94  
TP 5.1* 7.1 ALB 2.2* 3.2*  
GLOB 2.9 3.9 AGRAT 0.8 0.8 CBC w/Diff Recent Labs  
  11/27/18 
0340 11/26/18 
1044 WBC 10.4 18.2*  
RBC 3.57* 4.59 HGB 10.2* 13.3 HCT 31.1* 39.9  249 GRANS 84* 86* LYMPH 7* 7* EOS 0 1 Cardiac Enzymes No results for input(s): CPK, CKND1, JANE in the last 72 hours. No lab exists for component: Sunita Browning Coagulation Recent Labs  
  11/26/18 
1044 PTP 16.1* INR 1.3* APTT 49.7* Lipid Panel Lab Results Component Value Date/Time  Cholesterol, total 123 09/26/2018 06:50 AM  
 HDL Cholesterol 33 (L) 09/26/2018 06:50 AM  
 LDL, calculated 66 09/26/2018 06:50 AM  
 VLDL, calculated 24 09/26/2018 06:50 AM  
 Triglyceride 120 09/26/2018 06:50 AM  
 CHOL/HDL Ratio 3.7 09/26/2018 06:50 AM  
  
BNP No results for input(s): BNPP in the last 72 hours. Liver Enzymes Recent Labs  
  11/27/18 
0340 TP 5.1* ALB 2.2* AP 75 SGOT 18 Thyroid Studies No results found for: T4, T3U, TSH, TSHEXT Procedures/imaging: see electronic medical records for all procedures/Xrays and details which were not copied into this note but were reviewed prior to creation of Plan Rodney Brown MD

## 2018-11-27 NOTE — PROGRESS NOTES
0730-received report from Merlin Peng RN included SBAR MAR and Kardex. Shift summary-no change in pt status, consult to wound care. Visited by family. No c/o. Bedside and Verbal shift change report given to Yessenia Mckeon RN (oncoming nurse) by Madelyn Ho RN (offgoing nurse). Report included the following information SBAR, Kardex and MAR.

## 2018-11-27 NOTE — PROGRESS NOTES
Problem: Mobility Impaired (Adult and Pediatric) Goal: *Acute Goals and Plan of Care (Insert Text) Physical Therapy Goals Initiated 11/27/2018 and to be accomplished within 7 day(s) 1. Patient will move from supine <> sit with Min A  in prep for out of bed activity and change of position. 2.  Patient will perform sit<> stand with Max A  with LRAD in prep for transfers/ambulation. 3.  Patient will SPT from bed <> chair with Max A  with LRAD for time up in chair for completion of ADL activity. Outcome: Progressing Towards Goal 
physical Therapy EVALUATION Patient: Mattie Martin (39 y.o. female) Date: 11/27/2018 Primary Diagnosis: UTI (urinary tract infection) Nephrostomy tube displaced ([address] Elevated WBC count Precautions:Fall ASSESSMENT : 
Based on the objective data described below, the patient presents with decrease independence w/ bed mobility and transfers. Pt seen in supine prior to session w/ IV connected and nephrostomy bag. Pt reports no pain at this time. Pt is confused, talkative during session and easily distracted so needs a lot of cuing to stay on task. Per [facility] at [facility] pt was able to perform SPT from bed <>w/c assist x 1 PTA. Pt has L heel ulcer. Pt has difficulty w/ AROM of the L LE. Pt able to sit at the EOB however demonstrates impaired sitting balance and requires support. At this time no safe for transfers sit<>stand as pt has impaired sitting balance and L heel ulcer Pt transferred back to bed where pt was left in supine after session, call bell and tray in reach, all rails up, bed alarm donned, nurse notified after session. Patient will benefit from skilled intervention to address the above impairments. Patients rehabilitation potential is considered to be Good Factors which may influence rehabilitation potential include:  
[]         None noted 
[]         Mental ability/status [x]         Medical condition [x]         Home/family situation and support systems 
[x]         Safety awareness 
[]         Pain tolerance/management 
[]         Other: PLAN : 
Recommendations and Planned Interventions: 
[x]           Bed Mobility Training             [x]    Neuromuscular Re-Education 
[x]           Transfer Training                   []    Orthotic/Prosthetic Training 
[]           Gait Training                          []    Modalities [x]           Therapeutic Exercises          []    Edema Management/Control 
[x]           Therapeutic Activities            [x]    Patient and Family Training/Education 
[]           Other (comment): Frequency/Duration: Patient will be followed by physical therapy 3 day trial to address goals. [facility] Further Equipment Recommendations for Discharge: N/A  
 
SUBJECTIVE:  
Patient stated I really appreciate you guys.  OBJECTIVE DATA SUMMARY:  
 
Past Medical History:  
Diagnosis Date  Cancer ([facility]   
 vaginal and colon cancer  Chronic pain   
 arthritis  [facility]  Hypertension  Legally blind  Thyroid disease Past Surgical History:  
Procedure Laterality Date  HX COLOSTOMY  HX OTHER SURGICAL    
 kidney drainage tube  HX UROLOGICAL    
 kidney removal  
 
Barriers to Learning/Limitations: yes;  cognitive and physical 
Compensate with: Verbal Cues and Tactile Cues Prior Level of Function/Home Situation:  
Home Situation Home Environment: Skilled nursing facility Care Facility Name: [facility] One/Two Story Residence: One story Living Alone: No 
Support Systems: Family member(s) Patient Expects to be Discharged to[de-identified] Private residence Current DME Used/Available at Home: NoneStrength:   
Strength: Generally decreased, functional 
Tone & Sensation:  
Tone: Normal 
Sensation: Intact Range Of Motion: 
AROM: Generally decreased, functional 
 PROM: Generally decreased, functional 
Functional Mobility: 
Bed Mobility: 
Supine to Sit: Maximum assistance; Total assistance Sit to Supine: Maximum assistance; Total assistance Scooting: Total assistance;Assist x2 Balance:  
Sitting: Impaired; With support; Without support Sitting - Static: Fair (occasional); Poor (constant support) Sitting - Dynamic: Poor (constant support) Standing: (NT)Pain: 
Pain Scale 1: Numeric (0 - 10) Pain Intensity 1: 0 Activity Tolerance:  
Fair Please refer to the flowsheet for vital signs taken during this treatment. After treatment:  
[]         Patient left in no apparent distress sitting up in chair 
[x]         Patient left in no apparent distress in bed 
[x]         Call bell left within reach [x]         Nursing notified 
[]         Caregiver present [x]         Bed alarm activated COMMUNICATION/EDUCATION:  
[x]         Fall prevention education was provided and the patient/caregiver indicated understanding. [x]         Patient/family have participated as able in goal setting and plan of care. [x]         Patient/family agree to work toward stated goals and plan of care. []         Patient understands intent and goals of therapy, but is neutral about his/her participation. []         Patient is unable to participate in goal setting and plan of care. Thank you for this referral. 
Ana Lilia Hernandez, PT Time Calculation: 27 mins Mobility:  Current  CM= 80-99%   Goal  CL= 60-79%. The severity rating is based on the Other Based on functional assessment

## 2018-11-27 NOTE — PROGRESS NOTES
Reason for Admission:   Nephrostomy tube issue with back pain RRAT Score:     Mod; 13 Do you (patient/family) have any concerns for transition/discharge? Plan for utilizing home health:      
 
Likelihood of readmission? Transition of Care Plan:      [address] [place] [address] Becka Wallis is a 65 y.o. female who hx Dm, HTN , chronic pain. Colectomy bag, nephro tube. Pt is a resident at [address] [place] [address] and was sent to Er due to nephro tube dysfunction. Pt with a history of diabetes, is legally blind, hypertension, and [place]. CM contacted pt's son, Laurita Dos Santos ([phone]), to discuss transition of care. Pt's son has indicated plan is to have pt return to [address] [place] [address] when medically stable. CM to continue to follow and assist with transition of care. Care Management Interventions Mode of Transport at Discharge: [facility] Transition of Care Consult (CM Consult): [facility], Discharge Planning(From [address] [place] [address]) Health Maintenance Reviewed: Yes Physical Therapy Consult: Yes Occupational Therapy Consult: Yes Current Support Network: [address] [place] [address]

## 2018-11-27 NOTE — PROGRESS NOTES
Shift summary: Pt A&Ox4. Hard of hearing and legally blind. Bed alarm on. Blanchable redness to sacrum, proshield and mepilex applied. Blanchable redness to L heel, mepilex applied. Top of R foot with mepilex dressing. Colostomy leaked, bag and wafer changed. R nephrostomy tube drained yellow cloudy urine. Denied pain.

## 2018-11-27 NOTE — PROGRESS NOTES
Problem: Self Care Deficits Care Plan (Adult) Goal: *Acute Goals and Plan of Care (Insert Text) Occupational Therapy EVALUATION/discharge Patient: Opal Yeager (15 y.o. female) Date: 11/27/2018 Primary Diagnosis: UTI (urinary tract infection) Nephrostomy tube displaced ([facility] Elevated WBC count Precautions:   Fall ASSESSMENT AND RECOMMENDATIONS: 
Based on the objective data described below, the patient presents with ability to perform ADL tasks at baseline level. Pt was talkative and required re-direction to attending to the tasks at hand. Pt currently requires assistance with self-care tasks secondary to limited mobility and being legally blind. Pt was able to complete basic grooming tasks while supine with setup and verbal cueing to assist. Pt does require assistance with self-feeding; container management and cutting food as well as with feeding. Pt does not need skilled OT services at this time. It is recommended for pt to return back to [facility] after discharge as she was residing there prior. Education: POC, OT. Discharge Recommendations: Catrachito Bui Further Equipment Recommendations for Discharge: None at this time SUBJECTIVE:  
Patient stated I'm waiting on wound care to come.  OBJECTIVE DATA SUMMARY:  
 
Past Medical History:  
Diagnosis Date  Cancer ([facility]   
 vaginal and colon cancer  Chronic pain   
 arthritis  Diabetes ([facility] [facility]  Hypertension  Legally blind  Thyroid disease Past Surgical History:  
Procedure Laterality Date  HX COLOSTOMY  HX OTHER SURGICAL    
 kidney drainage tube  HX UROLOGICAL    
 kidney removal  
 
Barriers to Learning/Limitations: yes;  sensory deficits-vision/hearing/speech and physical 
Compensate with: visual, verbal, tactile, kinesthetic cues/model Prior Level of Function/Home Situation: Pt required assistance with self-care tasks at [facility]. Home Situation Home Environment: Skilled nursing facility Care Facility Name: [facility] One/Two Story Residence: One story Living Alone: No 
Support Systems: Family member(s) Patient Expects to be Discharged to[de-identified] Private residence Current DME Used/Available at Home: None 
[x]     Right hand dominant   []     Left hand dominant Cognitive/Behavioral Status: 
Neurologic State: Alert Orientation Level: Appropriate for age Cognition: Appropriate decision making; Follows commands; Appropriate for age attention/concentration Safety/Judgement: Awareness of environment Vision/Perceptual:   
   Acuity: Impaired near vision; Impaired far vision(legally blind) Coordination: 
Coordination: Generally decreased, functional 
Fine Motor Skills-Upper: Left Intact; Right Intact Balance: 
Sitting: Impaired; With support; Without support Sitting - Static: Fair (occasional); Poor (constant support) Sitting - Dynamic: Poor (constant support) Standing: (NT) Strength: 
Strength: Generally decreased, functional 
   
Tone & Sensation: 
Tone: Normal 
Sensation: Intact Range of Motion: 
AROM: Generally decreased, functional 
PROM: Generally decreased, functional 
   
Functional Mobility and Transfers for ADLs: 
Bed Mobility: 
 Supine to Sit: Maximum assistance; Total assistance Sit to Supine: Maximum assistance; Total assistance Scooting: Total assistance;Assist x2 ADL Assessment: 
Feeding: Setup(secondary to being legally blind) Oral Facial Hygiene/Grooming: Setup(secondary to being legally blind) Lower Body Dressing: Total assistance Toileting: Total assistance ADL Intervention: 
Feeding Container Management: Total assistance (dependent) Cutting Food: Total assistance (dependent) Drink to Mouth: Supervision/set-up Grooming Washing Face: Supervision/set-up Brushing Teeth: Supervision/set-up Cues: Verbal cues provided(to assist secondary to being legally blind) Toileting Toileting Assistance: Total assistance(dependent) Bladder Hygiene: Total assistance (dependent) Bowel Hygiene: Total assistance (dependent) Clothing Management: Total assistance (dependent) Cognitive Retraining Safety/Judgement: Awareness of environment Activity Tolerance:  
Pt tolerated treatment well no signs of fatigue or increase pain Please refer to the flowsheet for vital signs taken during this treatment. After treatment:  
[]  Patient left in no apparent distress sitting up in chair 
[x]  Patient left in no apparent distress in bed 
[x]  Call bell left within reach [x]  Nursing notified 
[]  Caregiver present [x]  Bed alarm activated COMMUNICATION/EDUCATION:  
Communication/Collaboration: 
[x]      Home safety education was provided and the patient/caregiver indicated understanding. [x]      Patient/family have participated as able and agree with findings and recommendations. []      Patient is unable to participate in plan of care at this time. Thank you for this referral. 
Ginny Resendiz, OTR/L Time Calculation: 25 mins G-Codes (GP) Self Care  Current  CM= 80-99%  Goal  CM= 80-99%  D/C  CM= 80-99% The severity rating is based on the professional judgement & direct observation of Level of Assistance required for Functional Mobility and ADLs. Eval Complexity: History: HIGH Complexity : Extensive review of history including physical, cognitive and psychosocial history ; Examination: HIGH Complexity : 5 or more performance deficits relating to physical, cognitive , or psychosocial skils that result in activity limitations and / or participation restrictions; Decision Making:HIGH Complexity : Patient presents with comorbidities that affect occupational performance. Signifigant modification of tasks or assistance (eg, physical or verbal) with assessment (s) is necessary to enable patient to complete evaluation

## 2018-11-27 NOTE — DIABETES MGMT
NUTRITIONAL ASSESSMENT GLYCEMIC CONTROL/ PLAN OF CARE Harinder Chang           65 y.o.           11/26/2018 1. Urinary tract infection with hematuria, site unspecified 2. Leukocytosis, unspecified type 3. Nephrostomy tube displaced ([address] 4. Hyponatremia 5. Dehydration 6. Elevated lactic acid level PMHx: Legally Blind, Diabetes, Cancer ( vaginal and colon) pt w/ colostomy bag, HTN, Thyroid Disease Brief Update: Pt has UTI and sepsis. Skin lesion to R. Foot. INTERVENTIONS/PLAN:  
-Recommend continuing Diabetic Consistent Carb Diet w/ mechanical soft consistency. ASSESSMENT:  
Nutritional Status: 
Obese class I per BMI 30.4 kg/m2 (30.0-34.9 kg/m2) Diabetes Management:  
Recent Glucose Results:  
Lab Results Component Value Date/Time  (H) 11/27/2018 03:40 AM  
 GLUCPOC 128 (H) 11/27/2018 04:26 PM  
 GLUCPOC 214 (H) 11/27/2018 11:07 AM  
 GLUCPOC 147 (H) 11/27/2018 07:51 AM  
 
 
Within target range (non-ICU: <140; ICU<180): [] Yes   [x]  No 
 
Current Insulin regimen:   
Lantus 5 units Humalog, corrective, normal insulin sensitivity Home medication/insulin regimen:  
Lantus 8 units Novolog (sliding scale) HbA1c: (11/26/18) 8.1% equivalent to average blood glucose level 186 mg/dL. Adequate glycemic control PTA:  [] Yes  [x] No 
  
SUBJECTIVE/OBJECTIVE: Information obtained from: Pt had difficulty confirming all PTA diabetes medications. She comes form [address] and is legally blind. She confirms that she receives Lantus daily, but is unsure about Novolog. P/t NH, pt was on a 70/30 regimen. Diet: DIET DIABETIC WITH OPTIONS Medications: [x]                Reviewed Most Recent POC Glucose:  
Recent Labs  
  11/27/18 
0340 11/26/18 
1044 * 195* Labs:  
Lab Results Component Value Date/Time Hemoglobin A1c 8.1 (H) 11/26/2018 10:44 AM  
 
Lab Results Component Value Date/Time Sodium 139 11/27/2018 03:40 AM  
 Potassium 4.6 11/27/2018 03:40 AM  
 Chloride 105 11/27/2018 03:40 AM  
 CO2 21 11/27/2018 03:40 AM  
 Anion gap 13 11/27/2018 03:40 AM  
 Glucose 145 (H) 11/27/2018 03:40 AM  
 BUN 29 (H) 11/27/2018 03:40 AM  
 Creatinine 1.55 (H) 11/27/2018 03:40 AM  
 Calcium 7.1 (L) 11/27/2018 03:40 AM  
 Magnesium 1.4 (L) 11/27/2018 03:40 AM  
 Albumin 2.2 (L) 11/27/2018 03:40 AM  
 
 
Anthropometrics:BMI (calculated): 30.4 Wt Readings from Last 1 Encounters:  
11/26/18 75.4 kg (166 lb 3.2 oz) Ht Readings from Last 1 Encounters:  
11/26/18 5' 2\" (1.575 m) Estimated Nutrition Needs: 1544 kcal/day (MSJ x 1.3 ), 45 g PRO/day ( 0.6 g PRO/kg), 1885 mL fluid/day (25 mL fluid/ kg) Based on:   [x]          Actual BW : 75.4 kg Nutrition Diagnoses: Altered nutrition-related lab value related pt's inability to self-monitor d/t legal blindness as evidenced by A1C 8.1%. Nutrition Interventions: 
-Continue Diabetic Consistent Carbohydrate Diet w/ mechanical soft texture. Goal:  
Blood glucose will be within target range of  mg/dL by 12/30/18. Nutrition Monitoring and Evaluation []     Monitor po intake on meal rounds 
[x]     Continue inpatient monitoring and intervention 
[]     Other: 
 
 
Nutrition Risk:  []   High     []  Moderate    [x]  Minimal/Uncompromised Michela Boudreaux RD 
pgr [phone]

## 2018-11-27 NOTE — CDMP QUERY
Please clarify if this patient is being treated/managed for: 
 
=>Uti due to Nephrostomy tube     
=>Other Explanation of clinical findings =>Unable to Determine (no explanation of clinical findings) The medical record reflects the following: 
 
Risk: Dislodge nephrostomy tube Clinical Indicators: Nephrostomy dislodge , urinary retention, UA- leukocyte esterase large, wbc tntc, bacteria 3+. urine cloudy and slightly malodorous from nephrostomy tube. Treatment: levaquin, replacement of nephrostomy tube Please clarify and document your clinical opinion in the progress notes and discharge summary including the definitive and/or presumptive diagnosis, (suspected or probable), related to the above clinical findings. Please include clinical findings supporting your diagnosis. If you DECLINE this query or would like to communicate with [facility], please utilize the \"[employer] message box\" at the TOP of the Progress Note on the right. Thank you, Surjit Solitario RN [facility] [phone]

## 2018-11-28 PROBLEM — L89.302 PRESSURE INJURY OF BUTTOCK, STAGE 2 (HCC): Status: ACTIVE | Noted: 2018-11-28

## 2018-11-28 PROBLEM — L89.601 PRESSURE INJURY OF HEEL, STAGE 1: Status: ACTIVE | Noted: 2018-11-28

## 2018-11-28 PROBLEM — E83.42 HYPOMAGNESEMIA: Status: ACTIVE | Noted: 2018-11-28

## 2018-11-28 LAB
ANION GAP SERPL CALC-SCNC: 9 MMOL/L (ref 3–18)
BASOPHILS # BLD: 0 K/UL (ref 0–0.1)
BASOPHILS NFR BLD: 0 % (ref 0–2)
BUN SERPL-MCNC: 23 MG/DL (ref 7–18)
BUN/CREAT SERPL: 18
CALCIUM SERPL-MCNC: 7.3 MG/DL (ref 8.5–10.1)
CHLORIDE SERPL-SCNC: 111 MMOL/L (ref 100–108)
CO2 SERPL-SCNC: 20 MMOL/L (ref 21–32)
CREAT SERPL-MCNC: 1.31 MG/DL (ref 0.6–1.3)
DIFFERENTIAL METHOD BLD: ABNORMAL
EOSINOPHIL # BLD: 0.1 K/UL (ref 0–0.4)
EOSINOPHIL NFR BLD: 1 % (ref 0–5)
ERYTHROCYTE [DISTWIDTH] IN BLOOD BY AUTOMATED COUNT: 14 % (ref 11.6–14.5)
GLUCOSE BLD STRIP.AUTO-MCNC: 123 MG/DL (ref 70–110)
GLUCOSE BLD STRIP.AUTO-MCNC: 125 MG/DL (ref 70–110)
GLUCOSE BLD STRIP.AUTO-MCNC: 158 MG/DL (ref 70–110)
GLUCOSE BLD STRIP.AUTO-MCNC: 166 MG/DL (ref 70–110)
GLUCOSE BLD STRIP.AUTO-MCNC: 196 MG/DL (ref 70–110)
GLUCOSE SERPL-MCNC: 112 MG/DL (ref 74–99)
HCT VFR BLD AUTO: 32 % (ref 35–45)
HGB BLD-MCNC: 10.2 G/DL (ref 12–16)
LYMPHOCYTES # BLD: 0.9 K/UL (ref 0.9–3.6)
LYMPHOCYTES NFR BLD: 12 % (ref 21–52)
MAGNESIUM SERPL-MCNC: 1.5 MG/DL (ref 1.6–2.6)
MCH RBC QN AUTO: 28.1 PG (ref 24–34)
MCHC RBC AUTO-ENTMCNC: 31.9 G/DL (ref 31–37)
MCV RBC AUTO: 88.2 FL (ref 74–97)
MONOCYTES # BLD: 0.8 K/UL (ref 0.05–1.2)
MONOCYTES NFR BLD: 11 % (ref 3–10)
NEUTS SEG # BLD: 5.6 K/UL (ref 1.8–8)
NEUTS SEG NFR BLD: 76 % (ref 40–73)
PLATELET # BLD AUTO: 184 K/UL (ref 135–420)
PMV BLD AUTO: 9.1 FL (ref 9.2–11.8)
POTASSIUM SERPL-SCNC: 4.2 MMOL/L (ref 3.5–5.5)
RBC # BLD AUTO: 3.63 M/UL (ref 4.2–5.3)
SODIUM SERPL-SCNC: 140 MMOL/L (ref 136–145)
WBC # BLD AUTO: 7.4 K/UL (ref 4.6–13.2)

## 2018-11-28 PROCEDURE — 74011250636 HC RX REV CODE- 250/636: Performed by: EMERGENCY MEDICINE

## 2018-11-28 PROCEDURE — 74011000250 HC RX REV CODE- 250: Performed by: HOSPITALIST

## 2018-11-28 PROCEDURE — 83735 ASSAY OF MAGNESIUM: CPT

## 2018-11-28 PROCEDURE — 87040 BLOOD CULTURE FOR BACTERIA: CPT

## 2018-11-28 PROCEDURE — 36415 COLL VENOUS BLD VENIPUNCTURE: CPT

## 2018-11-28 PROCEDURE — 74011250636 HC RX REV CODE- 250/636: Performed by: HOSPITALIST

## 2018-11-28 PROCEDURE — 74011250636 HC RX REV CODE- 250/636: Performed by: INTERNAL MEDICINE

## 2018-11-28 PROCEDURE — 77030011256 HC DRSG MEPILEX <16IN NO BORD MOLN -A

## 2018-11-28 PROCEDURE — 74011250637 HC RX REV CODE- 250/637: Performed by: HOSPITALIST

## 2018-11-28 PROCEDURE — 80048 BASIC METABOLIC PNL TOTAL CA: CPT

## 2018-11-28 PROCEDURE — 82962 GLUCOSE BLOOD TEST: CPT

## 2018-11-28 PROCEDURE — 65270000029 HC RM PRIVATE

## 2018-11-28 PROCEDURE — 74011636637 HC RX REV CODE- 636/637: Performed by: HOSPITALIST

## 2018-11-28 PROCEDURE — 97530 THERAPEUTIC ACTIVITIES: CPT

## 2018-11-28 PROCEDURE — 85025 COMPLETE CBC W/AUTO DIFF WBC: CPT

## 2018-11-28 RX ORDER — MAGNESIUM SULFATE HEPTAHYDRATE 40 MG/ML
2 INJECTION, SOLUTION INTRAVENOUS
Status: COMPLETED | OUTPATIENT
Start: 2018-11-28 | End: 2018-11-28

## 2018-11-28 RX ORDER — VANCOMYCIN/0.9 % SOD CHLORIDE 1.5G/250ML
1500 PLASTIC BAG, INJECTION (ML) INTRAVENOUS ONCE
Status: COMPLETED | OUTPATIENT
Start: 2018-11-28 | End: 2018-11-28

## 2018-11-28 RX ADMIN — INSULIN GLARGINE 5 UNITS: 100 INJECTION, SOLUTION SUBCUTANEOUS at 00:17

## 2018-11-28 RX ADMIN — BRIMONIDINE TARTRATE 1 DROP: 2 SOLUTION OPHTHALMIC at 21:40

## 2018-11-28 RX ADMIN — HEPARIN SODIUM 5000 UNITS: 5000 INJECTION INTRAVENOUS; SUBCUTANEOUS at 10:24

## 2018-11-28 RX ADMIN — ACETAMINOPHEN 650 MG: 325 TABLET ORAL at 00:17

## 2018-11-28 RX ADMIN — INSULIN GLARGINE 5 UNITS: 100 INJECTION, SOLUTION SUBCUTANEOUS at 21:39

## 2018-11-28 RX ADMIN — TIMOLOL MALEATE 1 DROP: 5 SOLUTION/ DROPS OPHTHALMIC at 00:16

## 2018-11-28 RX ADMIN — TIMOLOL MALEATE 1 DROP: 5 SOLUTION/ DROPS OPHTHALMIC at 10:27

## 2018-11-28 RX ADMIN — INSULIN LISPRO 2 UNITS: 100 INJECTION, SOLUTION INTRAVENOUS; SUBCUTANEOUS at 13:23

## 2018-11-28 RX ADMIN — DOCUSATE SODIUM 100 MG: 100 CAPSULE, LIQUID FILLED ORAL at 11:00

## 2018-11-28 RX ADMIN — INSULIN LISPRO 2 UNITS: 100 INJECTION, SOLUTION INTRAVENOUS; SUBCUTANEOUS at 21:39

## 2018-11-28 RX ADMIN — MAGNESIUM SULFATE HEPTAHYDRATE 2 G: 40 INJECTION, SOLUTION INTRAVENOUS at 15:18

## 2018-11-28 RX ADMIN — ATORVASTATIN CALCIUM 20 MG: 20 TABLET, FILM COATED ORAL at 10:24

## 2018-11-28 RX ADMIN — VANCOMYCIN HYDROCHLORIDE 1500 MG: 10 INJECTION, POWDER, LYOPHILIZED, FOR SOLUTION INTRAVENOUS at 10:32

## 2018-11-28 RX ADMIN — LEVOTHYROXINE SODIUM 50 MCG: 50 TABLET ORAL at 07:05

## 2018-11-28 RX ADMIN — GABAPENTIN 300 MG: 300 CAPSULE ORAL at 10:24

## 2018-11-28 RX ADMIN — BRIMONIDINE TARTRATE 1 DROP: 2 SOLUTION OPHTHALMIC at 10:27

## 2018-11-28 RX ADMIN — HEPARIN SODIUM 5000 UNITS: 5000 INJECTION INTRAVENOUS; SUBCUTANEOUS at 17:48

## 2018-11-28 RX ADMIN — DOCUSATE SODIUM 100 MG: 100 CAPSULE, LIQUID FILLED ORAL at 00:17

## 2018-11-28 RX ADMIN — DOCUSATE SODIUM 100 MG: 100 CAPSULE, LIQUID FILLED ORAL at 21:39

## 2018-11-28 RX ADMIN — MAGNESIUM SULFATE HEPTAHYDRATE 2 G: 40 INJECTION, SOLUTION INTRAVENOUS at 14:25

## 2018-11-28 RX ADMIN — ACETAMINOPHEN 650 MG: 325 TABLET ORAL at 21:46

## 2018-11-28 RX ADMIN — TIMOLOL MALEATE 1 DROP: 5 SOLUTION/ DROPS OPHTHALMIC at 21:40

## 2018-11-28 RX ADMIN — INSULIN LISPRO 2 UNITS: 100 INJECTION, SOLUTION INTRAVENOUS; SUBCUTANEOUS at 00:17

## 2018-11-28 RX ADMIN — HEPARIN SODIUM 5000 UNITS: 5000 INJECTION INTRAVENOUS; SUBCUTANEOUS at 03:41

## 2018-11-28 RX ADMIN — BRIMONIDINE TARTRATE 1 DROP: 2 SOLUTION OPHTHALMIC at 00:16

## 2018-11-28 RX ADMIN — POLYETHYLENE GLYCOL 3350 17 G: 17 POWDER, FOR SOLUTION ORAL at 10:23

## 2018-11-28 RX ADMIN — METOPROLOL SUCCINATE 50 MG: 50 TABLET, EXTENDED RELEASE ORAL at 10:24

## 2018-11-28 RX ADMIN — LEVOFLOXACIN 750 MG: 5 INJECTION, SOLUTION INTRAVENOUS at 13:24

## 2018-11-28 NOTE — PROGRESS NOTES
Hospitalist Progress Note-critical care note Patient: Danica Ta MRN: [id number]  CSN: [id number] YOB: 1940  Age: 65 y.o. Sex: female DOA: 11/26/2018 LOS:  LOS: 2 days Chief complaint: uti, dm, HTn sepsis Assessment/Plan Hospital Problems  Date Reviewed: 5/18/2018 Codes Class Noted POA Pressure injury of buttock, stage 2 ICD-10-CM: V65.966 ICD-9-CM: 707.05, 707.22  11/28/2018 Unknown Pressure injury of heel, stage 1 ICD-10-CM: L89.601 ICD-9-CM: 707.07, 707.21  11/28/2018 Unknown Hypomagnesemia ICD-10-CM: X11.87 
ICD-9-CM: 275.2  11/28/2018 Unknown UTI (urinary tract infection) ICD-10-CM: N39.0 ICD-9-CM: 599.0  11/26/2018 Unknown Elevated WBC count ICD-10-CM: L16.607 ICD-9-CM: 288.60  11/26/2018 Unknown Diabetes ([facility] ICD-10-CM: E11.9 ICD-9-CM: 250.00  11/26/2018 Unknown Legally blind ICD-10-CM: H54.8 ICD-9-CM: 369.4  11/26/2018 Unknown Hypertension ICD-10-CM: I10 
ICD-9-CM: 401.9  11/26/2018 Unknown Thyroid disease ICD-10-CM: E07.9 ICD-9-CM: 246.9  11/26/2018 Unknown Elevated serum creatinine ICD-10-CM: R79.89 ICD-9-CM: 790.99  11/26/2018 Unknown S/P colectomy ICD-10-CM: Z90.49 ICD-9-CM: V45.89  11/26/2018 Unknown Sepsis ([facility] ICD-10-CM: A41.9 ICD-9-CM: 038.9, 995.91  11/26/2018 Unknown Skin lesion of foot ICD-10-CM: L98.9 ICD-9-CM: 709.9  11/26/2018 Unknown Nephrostomy tube displaced ([facility] ICD-10-CM: H01.927G ICD-9-CM: 997.5  5/18/2018 Unknown Sepsis Resolving Due to gu source  
bcx and ua positive  
  
uti -due to Nephrostomy tube dysfunction Gram negative rods, will f/u with the final results and sensitivity On levaquin ,  
  
Hypomagnesemia Mg replacement Positive blood cx One of two bottles ,will repeat -like contaminate -she remained afebrile She is on vanc now -covered Nephrostomy tube displaced Changed per IR 
 
   
  
DM type II ,  
- ssi, diabetic diet , hypoglycemia protocol, will add lantus  For better control  
  
 HTN, accelerated Controlled and continue current medication  
  
Elevated cr Improving, close to base line  
  
Skin lesion of rt foot and decubitus ulcer -buttock 2-3 stage and heel -stage 1, poa Stable , continue wound care Subjective. When I can go home Nurse: no acute issue Disposition :1-2 days Review of systems: 
 
General: No fevers or chills. Cardiovascular: No chest pain or pressure. No palpitations. Pulmonary: No shortness of breath. Gastrointestinal: No nausea, vomiting. Vital signs/Intake and Output: 
Visit Vitals /55 (BP 1 Location: Left arm, BP Patient Position: At rest) Pulse 77 Temp 98 °F (36.7 °C) Resp 20 Ht 5' 2\" (1.575 m) Wt 77 kg (169 lb 12.1 oz) SpO2 98% BMI 31.05 kg/m² Current Shift:  11/28 0701 - 11/28 1900 In: -  
Out: [place] [address] Last three shifts:  11/26 1901 - 11/28 0700 In: 2442 [I.V.:2442] Out: 2300 [Urine:2150] Physical Exam: 
General: WD, WN. Alert, cooperative, no acute distress   
HEENT: NC, Atraumatic. PERRLA, anicteric sclerae. Lungs: CTA Bilaterally. No Wheezing/Rhonchi/Rales. Heart:  Regular  rhythm,  +murmur, No Rubs, No Gallops Abdomen: Soft, Non distended, Non tender.  +Bowel sounds, colostomy bag noted , decubitus ulcer at the bottom, nephro tube noted with urine -clear Extremities: No c/c/e. Rt foot skin lesion, no sign of infection , erythema on left heal  
Psych:   Not anxious or agitated. Neurologic:  No acute neurological deficit. Labs: Results:  
   
Chemistry Recent Labs  
  11/28/18 
0517 11/27/18 
0340 11/26/18 
1044 * 145* 195*  139 134* K 4.2 4.6 5.2 * 105 99* CO2 20* 21 24 BUN 23* 29* 33* CREA 1.31* 1.55* 1.63* CA 7.3* 7.1* 8.3* AGAP 9 13 11 BUCR 18 19 20 AP  --  75 94 TP  --  5.1* 7.1 ALB  --  2.2* 3.2*  
GLOB  --  2.9 3.9 AGRAT  --  0.8 0.8 CBC w/Diff Recent Labs  
  11/28/18 
0517 11/27/18 
0340 11/26/18 
1044 WBC 7.4 10.4 18.2*  
RBC 3.63* 3.57* 4.59 HGB 10.2* 10.2* 13.3 HCT 32.0* 31.1* 39.9  194 249 GRANS 76* 84* 86* LYMPH 12* 7* 7* EOS 1 0 1 Cardiac Enzymes No results for input(s): CPK, CKND1, JANE in the last 72 hours. No lab exists for component: Mihirtis Pluck Coagulation Recent Labs  
  11/26/18 
1044 PTP 16.1* INR 1.3* APTT 49.7* Lipid Panel Lab Results Component Value Date/Time Cholesterol, total 123 09/26/2018 06:50 AM  
 HDL Cholesterol 33 (L) 09/26/2018 06:50 AM  
 LDL, calculated 66 09/26/2018 06:50 AM  
 VLDL, calculated 24 09/26/2018 06:50 AM  
 Triglyceride 120 09/26/2018 06:50 AM  
 CHOL/HDL Ratio 3.7 09/26/2018 06:50 AM  
  
BNP No results for input(s): BNPP in the last 72 hours. Liver Enzymes Recent Labs  
  11/27/18 
0340 TP 5.1* ALB 2.2* AP 75 SGOT 18 Thyroid Studies No results found for: T4, T3U, TSH, TSHEXT, TSHEXT Procedures/imaging: see electronic medical records for all procedures/Xrays and details which were not copied into this note but were reviewed prior to creation of Plan Dheeraj Hdez MD

## 2018-11-28 NOTE — PROGRESS NOTES
Problem: Falls - Risk of 
Goal: *Absence of Falls Document Magalie Spangler Fall Risk and appropriate interventions in the flowsheet. Outcome: Progressing Towards Goal 
Fall Risk Interventions: 
Mobility Interventions: Patient to call before getting OOB Medication Interventions: Evaluate medications/consider consulting pharmacy Elimination Interventions: Call light in reach, Patient to call for help with toileting needs

## 2018-11-28 NOTE — PROGRESS NOTES
0540 - Cultures called in by lab. Positive for gram positive cocci in clusters. Lv Ojeda Notified Dr. Mariela Frankel. Orders for Vancomycin, with Pharmacy to dose. Shift summary: no acute changes. Pt rested quietly throughout the night.

## 2018-11-28 NOTE — ROUTINE PROCESS
Bedside and Verbal shift change report given to KATIE Ramirez (oncoming nurse) by Deepak Dolan RN (offgoing nurse). Report included the following information SBAR, Kardex, Intake/Output, MAR and Recent Results.

## 2018-11-28 NOTE — WOUND CARE
Wound Care Progress Note New consult placed for \"blanchable redness to sacrum, lt foot\" Assessment:  
Communication: pt alert and oriented and very talkative Continent - Pt has a nephrostomy tube to right back Requires assistance to turn and reposition. Diet: as tolerated Patient reports pain to left ankle and left great toe Left heel reddened but blanches (pt states this is due to a bone spur), sacrum and buttocks with blanchable redness, however small stage II noted to coccyx. No redness observed to right heel 1. POA - Coccyx area Stage II  0.4 x 0.4 x 0.1 cm. Proshield applied followed by mepilex border 2. POA - Left medial ankle that measure 0.3 x 0.3 x 0.1 cm . Proshield applied followed by mepilex border. 3. POA - Left dorsal foot wound that measures 0.6 x 0.8 x 0.1 cm. Wound cleansed with barrier wipes and dermal wound cleanser. Dr. Rae Mac in room and saw wound to foot. Bacitracin and mepilex border applied to area. 4. POA - Great toe on left foot with scabbed area. Pt states toe is very painful. Toe remains open to air Patient repositioned on back at this time. Pt ready to eat  Breakfast 
 
Heels offloaded on pillow. Wound Recommendations:  Continue to apply proshield to coocyx area, Lt heel and lt medial ankle followed by protective borders. Continue to relieve pressure of bottom by alternating pillows underneath hips. Pt. States she is unable to lay on her side. Skin Care & Pressure Relief Recommendations: 
Minimize layers of linen/pads under patient to optimize support surface. Turn/reposition approximately every 2 hours and offload heels pillows or Prevalon boots. Manage incontinence / promote continence; Proshield to buttocks and sacrum daily and as needed with incontinence care Discussed above plan with patient &  S.  Judy Marroquin 
 
 Transition of Care: Plan to follow weekly and per product recommendations while admitted to hospital.

## 2018-11-28 NOTE — PROGRESS NOTES
Pharmacy Dosing Services: Vancomycin Consult for Vancomycin Dosing by Pharmacy by Dr. Ewelina Reyes Consult provided for this 65y.o. year old female , for indication of Bloodstream Infection. Day of Therapy 1 Ht Readings from Last 1 Encounters:  
11/26/18 157.5 cm (62\") Wt Readings from Last 1 Encounters:  
11/27/18 77 kg (169 lb 12.1 oz) Other Current Antibiotics Levofloxacin 750 mg IV every 28 hours Significant Cultures Pending Serum Creatinine Lab Results Component Value Date/Time Creatinine 1.31 (H) 11/28/2018 05:17 AM  
  
Creatinine Clearance Estimated Creatinine Clearance: 34 mL/min (A) (based on SCr of 1.31 mg/dL (H)). BUN Lab Results Component Value Date/Time BUN 23 (H) 11/28/2018 05:17 AM  
  
WBC Lab Results Component Value Date/Time WBC 7.4 11/28/2018 05:17 AM  
  
H/H Lab Results Component Value Date/Time HGB 10.2 (L) 11/28/2018 05:17 AM  
  
Platelets Lab Results Component Value Date/Time PLATELET 194 55/00/5095 05:17 AM  
  
Temp 97.9 °F (36.6 °C) Start Vancomycin therapy, with loading dose of 1500 mg at 0700 11/28/18. Follow with maintenance dose of 750 mg at 0800 11/29/18, every 24 hours. Dose calculated to approximate a therapeutic trough of 15-20 mcg/mL. Pharmacy to follow daily and will make changes to dose and/or frequency based on clinical status.  
 
Pharmacist [id number] [facility] [address] Mary Schneider

## 2018-11-29 VITALS
HEART RATE: 71 BPM | SYSTOLIC BLOOD PRESSURE: 153 MMHG | HEIGHT: 62 IN | TEMPERATURE: 98.4 F | DIASTOLIC BLOOD PRESSURE: 52 MMHG | WEIGHT: 162.7 LBS | OXYGEN SATURATION: 99 % | BODY MASS INDEX: 29.94 KG/M2 | RESPIRATION RATE: 18 BRPM

## 2018-11-29 LAB
ANION GAP SERPL CALC-SCNC: 9 MMOL/L (ref 3–18)
BACTERIA SPEC CULT: ABNORMAL
BASOPHILS # BLD: 0 K/UL (ref 0–0.1)
BASOPHILS NFR BLD: 0 % (ref 0–2)
BUN SERPL-MCNC: 22 MG/DL (ref 7–18)
BUN/CREAT SERPL: 14
CALCIUM SERPL-MCNC: 7.8 MG/DL (ref 8.5–10.1)
CHLORIDE SERPL-SCNC: 109 MMOL/L (ref 100–108)
CO2 SERPL-SCNC: 23 MMOL/L (ref 21–32)
CREAT SERPL-MCNC: 1.58 MG/DL (ref 0.6–1.3)
DIFFERENTIAL METHOD BLD: ABNORMAL
EOSINOPHIL # BLD: 0.2 K/UL (ref 0–0.4)
EOSINOPHIL NFR BLD: 2 % (ref 0–5)
ERYTHROCYTE [DISTWIDTH] IN BLOOD BY AUTOMATED COUNT: 13.8 % (ref 11.6–14.5)
GLUCOSE BLD STRIP.AUTO-MCNC: 122 MG/DL (ref 70–110)
GLUCOSE BLD STRIP.AUTO-MCNC: 163 MG/DL (ref 70–110)
GLUCOSE SERPL-MCNC: 116 MG/DL (ref 74–99)
HCT VFR BLD AUTO: 32.3 % (ref 35–45)
HGB BLD-MCNC: 10.6 G/DL (ref 12–16)
LYMPHOCYTES # BLD: 0.6 K/UL (ref 0.9–3.6)
LYMPHOCYTES NFR BLD: 8 % (ref 21–52)
MAGNESIUM SERPL-MCNC: 2.3 MG/DL (ref 1.6–2.6)
MCH RBC QN AUTO: 28.5 PG (ref 24–34)
MCHC RBC AUTO-ENTMCNC: 32.8 G/DL (ref 31–37)
MCV RBC AUTO: 86.8 FL (ref 74–97)
MONOCYTES # BLD: 0.7 K/UL (ref 0.05–1.2)
MONOCYTES NFR BLD: 8 % (ref 3–10)
NEUTS SEG # BLD: 6.3 K/UL (ref 1.8–8)
NEUTS SEG NFR BLD: 82 % (ref 40–73)
PLATELET # BLD AUTO: 208 K/UL (ref 135–420)
PMV BLD AUTO: 9.2 FL (ref 9.2–11.8)
POTASSIUM SERPL-SCNC: 4.4 MMOL/L (ref 3.5–5.5)
RBC # BLD AUTO: 3.72 M/UL (ref 4.2–5.3)
SERVICE CMNT-IMP: ABNORMAL
SODIUM SERPL-SCNC: 141 MMOL/L (ref 136–145)
WBC # BLD AUTO: 7.7 K/UL (ref 4.6–13.2)

## 2018-11-29 PROCEDURE — 74011250636 HC RX REV CODE- 250/636: Performed by: INTERNAL MEDICINE

## 2018-11-29 PROCEDURE — 74011250637 HC RX REV CODE- 250/637: Performed by: HOSPITALIST

## 2018-11-29 PROCEDURE — 80048 BASIC METABOLIC PNL TOTAL CA: CPT

## 2018-11-29 PROCEDURE — 82962 GLUCOSE BLOOD TEST: CPT

## 2018-11-29 PROCEDURE — 74011250636 HC RX REV CODE- 250/636: Performed by: HOSPITALIST

## 2018-11-29 PROCEDURE — 83735 ASSAY OF MAGNESIUM: CPT

## 2018-11-29 PROCEDURE — 74011636637 HC RX REV CODE- 636/637: Performed by: HOSPITALIST

## 2018-11-29 PROCEDURE — 36415 COLL VENOUS BLD VENIPUNCTURE: CPT

## 2018-11-29 PROCEDURE — 85025 COMPLETE CBC W/AUTO DIFF WBC: CPT

## 2018-11-29 RX ORDER — HYDROCODONE BITARTRATE AND ACETAMINOPHEN 5; 325 MG/1; MG/1
1 TABLET ORAL
Qty: 4 TAB | Refills: 0 | Status: SHIPPED | OUTPATIENT
Start: 2018-11-29 | End: 2019-04-15

## 2018-11-29 RX ORDER — CHOLECALCIFEROL (VITAMIN D3) 125 MCG
5 CAPSULE ORAL
Status: DISCONTINUED | OUTPATIENT
Start: 2018-11-29 | End: 2018-11-29 | Stop reason: HOSPADM

## 2018-11-29 RX ORDER — LORAZEPAM 0.5 MG/1
0.5 TABLET ORAL
Qty: 2 TAB | Refills: 0 | Status: ON HOLD | OUTPATIENT
Start: 2018-11-29 | End: 2019-04-15 | Stop reason: SDUPTHER

## 2018-11-29 RX ORDER — NALOXONE HYDROCHLORIDE 4 MG/.1ML
SPRAY NASAL
Qty: 1 EACH | Refills: 0 | Status: SHIPPED | OUTPATIENT
Start: 2018-11-29 | End: 2020-06-26

## 2018-11-29 RX ORDER — LEVOFLOXACIN 500 MG/1
500 TABLET, FILM COATED ORAL
Qty: 4 TAB | Refills: 0 | Status: SHIPPED | OUTPATIENT
Start: 2018-11-29 | End: 2019-03-19

## 2018-11-29 RX ADMIN — GABAPENTIN 300 MG: 300 CAPSULE ORAL at 08:34

## 2018-11-29 RX ADMIN — METOPROLOL SUCCINATE 50 MG: 50 TABLET, EXTENDED RELEASE ORAL at 08:34

## 2018-11-29 RX ADMIN — HEPARIN SODIUM 5000 UNITS: 5000 INJECTION INTRAVENOUS; SUBCUTANEOUS at 09:48

## 2018-11-29 RX ADMIN — INSULIN LISPRO 2 UNITS: 100 INJECTION, SOLUTION INTRAVENOUS; SUBCUTANEOUS at 12:22

## 2018-11-29 RX ADMIN — VANCOMYCIN HYDROCHLORIDE 750 MG: 10 INJECTION, POWDER, LYOPHILIZED, FOR SOLUTION INTRAVENOUS at 08:37

## 2018-11-29 RX ADMIN — TIMOLOL MALEATE 1 DROP: 5 SOLUTION/ DROPS OPHTHALMIC at 08:35

## 2018-11-29 RX ADMIN — HEPARIN SODIUM 5000 UNITS: 5000 INJECTION INTRAVENOUS; SUBCUTANEOUS at 03:12

## 2018-11-29 RX ADMIN — ATORVASTATIN CALCIUM 20 MG: 20 TABLET, FILM COATED ORAL at 08:34

## 2018-11-29 RX ADMIN — LEVOTHYROXINE SODIUM 50 MCG: 50 TABLET ORAL at 07:19

## 2018-11-29 RX ADMIN — BRIMONIDINE TARTRATE 1 DROP: 2 SOLUTION OPHTHALMIC at 08:35

## 2018-11-29 NOTE — PROGRESS NOTES
Problem: Falls - Risk of 
Goal: *Absence of Falls Document Bran Christianson Fall Risk and appropriate interventions in the flowsheet. Outcome: Progressing Towards Goal 
Fall Risk Interventions: 
Mobility Interventions: Patient to call before getting OOB Medication Interventions: Evaluate medications/consider consulting pharmacy Elimination Interventions: Call light in reach, Patient to call for help with toileting needs Problem: Pressure Injury - Risk of 
Goal: *Prevention of pressure injury Document Rickie Scale and appropriate interventions in the flowsheet. Outcome: Progressing Towards Goal 
Pressure Injury Interventions: 
Sensory Interventions: Assess changes in LOC, Keep linens dry and wrinkle-free, Monitor skin under medical devices Moisture Interventions: Absorbent underpads Activity Interventions: Pressure redistribution bed/mattress(bed type) Mobility Interventions: HOB 30 degrees or less, Pressure redistribution bed/mattress (bed type) Nutrition Interventions: Document food/fluid/supplement intake Friction and Shear Interventions: Foam dressings/transparent film/skin sealants

## 2018-11-29 NOTE — PROGRESS NOTES
Problem: Pressure Injury - Risk of 
Goal: *Prevention of pressure injury Document Rickie Scale and appropriate interventions in the flowsheet. Outcome: Progressing Towards Goal 
Pressure Injury Interventions: 
Sensory Interventions: Assess changes in LOC, Keep linens dry and wrinkle-free, Monitor skin under medical devices Moisture Interventions: Absorbent underpads Activity Interventions: Pressure redistribution bed/mattress(bed type), PT/OT evaluation Mobility Interventions: HOB 30 degrees or less, Pressure redistribution bed/mattress (bed type), PT/OT evaluation Nutrition Interventions: Document food/fluid/supplement intake, Offer support with meals,snacks and hydration Friction and Shear Interventions: Apply protective barrier, creams and emollients, Foam dressings/transparent film/skin sealants, HOB 30 degrees or less, Lift sheet, Minimize layers

## 2018-11-29 NOTE — PROGRESS NOTES
Problem: Mobility Impaired (Adult and Pediatric) Goal: *Acute Goals and Plan of Care (Insert Text) Physical Therapy Goals Initiated 11/27/2018 and to be accomplished within 7 day(s) 1. Patient will move from supine <> sit with Min A  in prep for out of bed activity and change of position. 2.  Patient will perform sit<> stand with Max A  with LRAD in prep for transfers/ambulation. 3.  Patient will SPT from bed <> chair with Max A  with LRAD for time up in chair for completion of ADL activity. Outcome: Progressing Towards Goal 
physical Therapy TREATMENT Patient: Kamaljit Diehl (15 y.o. female) Date: 11/29/2018 Diagnosis: UTI (urinary tract infection) Nephrostomy tube displaced ([address] Elevated WBC count <principal problem not specified> Precautions: Fall Chart, physical therapy assessment, plan of care and goals were reviewed. ASSESSMENT: 
Pt continues to require significant assistance with mobility. Cont POC. Progression toward goals: 
[]      Improving appropriately and progressing toward goals [x]      Improving slowly and progressing toward goals 
[]      Not making progress toward goals and plan of care will be adjusted PLAN: 
Patient continues to benefit from skilled intervention to address the above impairments. Continue treatment per established plan of care. Discharge Recommendations:  Rehab Further Equipment Recommendations for Discharge:  hospital bed SUBJECTIVE:  
Patient stated  They picked me up and put me in my chair OBJECTIVE DATA SUMMARY:  
Critical Behavior: 
Neurologic State: Alert, Appropriate for age Orientation Level: Appropriate for age Cognition: Appropriate decision making Safety/Judgement: Awareness of environment Functional Mobility Training: 
Bed Mobility: 
Supine <> sit EOB Mod A x2 with HOB elevated. Sit<>Supine Max A Transfers: 
Not tested Balance: 
Sitting: impaired. F. Pt leans to R and VCs to shift weight to midline. Standing: not tested. Pain: 
Pain Scale 1: Visual 
Pain Intensity 1: 2 Pain Location 1: Rib cage Pain Orientation 1: Right Pain Description 1: Tightness(when breathing deeply) Pain Intervention(s) 1: Medication (see MAR) Activity Tolerance:  
Fair After treatment:  
[] Patient left in no apparent distress sitting up in chair 
[x] Patient left in no apparent distress in bed 
[x] Call bell left within reach 
[] Nursing notified 
[] Caregiver present 
[] Bed alarm activated Tung Newman PTA Time Calculation: 25 mins

## 2018-11-29 NOTE — PROGRESS NOTES
[facility] Noted pt is medically stable for discharge today to transition back to [facility]. CM contacted pt's son, Landon Beltran ([phone]), and left a  with transport time. CM contacted pt's daughter in law, Mrs. Leann Luis ([phone]), and notified her of plan transport time to [facility] today. Please arrange medical transport to [facility] at [address]. [phone] for report. Please include all hard scripts for controlled substances, med rec and dc summary in packet. Please medicate for pain prior to dc if possible and needed to help offset delay when patient first arrives to facility. No other transition of care needs have been identified. Care Management Interventions Mode of Transport at Discharge: [facility] Transition of Care Consult (CM Consult): [facility], Discharge Planning(From [facility]) Health Maintenance Reviewed: Yes Physical Therapy Consult: Yes Occupational Therapy Consult: Yes Current Support Network: [address] [place] [address]

## 2018-11-29 NOTE — PROGRESS NOTES
Bedside shift change report given to JESSIKA Vizcaino  (oncoming nurse) by Mamadou (offgoing nurse). Report included the following information SBAR, Kardex, Intake/Output and MAR.

## 2018-11-29 NOTE — DISCHARGE SUMMARY
Discharge Summary    Patient: Danica Ta MRN: [id number]  CSN: [id number]    YOB: 1940  Age: 65 y.o. Sex: female    DOA: 11/26/2018 LOS:  LOS: 3 days   Discharge Date:      Primary Care Provider:  None    Admission Diagnoses: UTI (urinary tract infection)  Nephrostomy tube displaced ([facility]  Elevated WBC count    Discharge Diagnoses:    Hospital Problems  Date Reviewed: 5/18/2018          Codes Class Noted POA    Pressure injury of buttock, stage 2 ICD-10-CM: L89.302  ICD-9-CM: 707.05, 707.22  11/28/2018 Unknown        Pressure injury of heel, stage 1 ICD-10-CM: L89.601  ICD-9-CM: 707.07, 707.21  11/28/2018 Unknown        Hypomagnesemia ICD-10-CM: E83.42  ICD-9-CM: 275.2  11/28/2018 Unknown        UTI (urinary tract infection) ICD-10-CM: N39.0  ICD-9-CM: 599.0  11/26/2018 Unknown        Elevated WBC count ICD-10-CM: D72.829  ICD-9-CM: 288.60  11/26/2018 Unknown        Diabetes ([facility] ICD-10-CM: E11.9  ICD-9-CM: 250.00  11/26/2018 Unknown        Legally blind ICD-10-CM: H54.8  ICD-9-CM: 369.4  11/26/2018 Unknown        Hypertension ICD-10-CM: I10  ICD-9-CM: 401.9  11/26/2018 Unknown        Thyroid disease ICD-10-CM: E07.9  ICD-9-CM: 246.9  11/26/2018 Unknown        Elevated serum creatinine ICD-10-CM: R79.89  ICD-9-CM: 790.99  11/26/2018 Unknown        S/P colectomy ICD-10-CM: Z90.49  ICD-9-CM: V45.89  11/26/2018 Unknown        Sepsis ([facility] ICD-10-CM: A41.9  ICD-9-CM: 038.9, 995.91  11/26/2018 Unknown        Skin lesion of foot ICD-10-CM: L98.9  ICD-9-CM: 709.9  11/26/2018 Unknown        Nephrostomy tube displaced ([facility] ICD-10-CM: T41.400T  ICD-9-CM: 997.5  5/18/2018 Unknown              Discharge Condition: Stable    Discharge Medications:     Current Discharge Medication List      START taking these medications    Details   levoFLOXacin (LEVAQUIN) 500 mg tablet Take 1 Tab by mouth every fourty-eight (48) hours.   Qty: 4 Tab, Refills: 0      naloxone (NARCAN) 4 mg/actuation nasal spray Use 1 spray intranasally, then discard. Repeat with new spray every 2 min as needed for opioid overdose symptoms, alternating nostrils. Qty: 1 Each, Refills: 0         CONTINUE these medications which have CHANGED    Details   HYDROcodone-acetaminophen (NORCO) 5-325 mg per tablet Take 1 Tab by mouth every twelve (12) hours as needed for Pain (for pain related to [address]). Max Daily Amount: 2 Tabs. Qty: 4 Tab, Refills: 0    Associated Diagnoses: Pressure injury of left buttock, stage 2      LORazepam (ATIVAN) 0.5 mg tablet Take 1 Tab by mouth daily as needed for Anxiety. Qty: 2 Tab, Refills: 0    Associated Diagnoses: Anxiety         CONTINUE these medications which have NOT CHANGED    Details   ondansetron (ZOFRAN ODT) 8 mg disintegrating tablet Take 8 mg by mouth every eight (8) hours as needed for Nausea. tiZANidine (ZANAFLEX) 2 mg capsule Take 2 mg by mouth two (2) times a day. traZODone (DESYREL) 50 mg tablet Take 50 mg by mouth nightly. nystatin (MYCOSTATIN) powder Apply 100,000 Units to affected area four (4) times daily. camphor-menthol (SARNA) 0.5-0.5 % lotion Apply  to affected area daily. docusate sodium (COLACE) 100 mg capsule Take 100 mg by mouth two (2) times a day. hydrocortisone (HYTONE) 2.5 % topical cream Apply  to affected area two (2) times a day. use thin layer      timolol (TIMOPTIC) 0.5 % ophthalmic solution 1 Drop two (2) times a day. brimonidine-timolol (COMBIGAN) 0.2-0.5 % drop ophthalmic solution Administer 1 Drop to both eyes every twelve (12) hours. bisacodyl (DULCOLAX, BISACODYL,) 10 mg suppository Insert  into rectum daily as needed. Indications: constipation      gabapentin (NEURONTIN) 300 mg capsule Take 300 mg by mouth daily. metoprolol succinate (TOPROL-XL) 50 mg XL tablet Take 50 mg by mouth daily. magnesium hydroxide (MILK OF MAGNESIA CONCENTRATED) 2,400 mg/10 mL susp suspension Take 10 mL by mouth.       alum-mag hydroxide-simeth (MYLANTA) 200-200-20 mg/5 mL susp Take 30 mL by mouth four (4) times daily as needed. insulin aspart U-100 (NOVOLOG FLEXPEN U-100 INSULIN) 100 unit/mL inpn by SubCUTAneous route. Sliding scale      sodium bicarbonate 650 mg tablet Take 650 mg by mouth two (2) times a day. levothyroxine (SYNTHROID) 50 mcg tablet Take  by mouth Daily (before breakfast). acetaminophen (TYLENOL) 650 mg TbER Take 650 mg by mouth every six (6) hours as needed for Pain or Fever. insulin glargine (LANTUS) 100 unit/mL injection 8 units SQ QHS  Qty: 1 Vial, Refills: 0      atorvastatin calcium (ATORVASTATIN PO) Take 20 mg by mouth daily. STOP taking these medications       terbinafine HCl (LAMISIL) 250 mg tablet Comments:   Reason for Stopping:         acetaminophen (TYLENOL) 650 mg suppository Comments:   Reason for Stopping:               Procedures : nephro tube replacement    Consults: None      PHYSICAL EXAM   Visit Vitals  /76   Pulse 74   Temp 97.8 °F (36.6 °C)   Resp 17   Ht 5' 2\" (1.575 m)   Wt 73.8 kg (162 lb 11.2 oz)   SpO2 99%   BMI 29.76 kg/m²     General: Awake, cooperative, no acute distress    HEENT: NC, Atraumatic. PERRLA, EOMI. Anicteric sclerae. Lungs:  CTA Bilaterally. No Wheezing/Rhonchi/Rales. Heart:  Regular  rhythm,  + murmur, No Rubs, No Gallops  Abdomen: Soft, Non distended, Non tender. +Bowel sounds,   Extremities: No c/c. Rt foot covered with dressing   Psych:   Not anxious or agitated. Neurologic:  No acute neurological deficits. Admission HPI :   Naun Jones is a 65 y.o. female who hx Dm, HTN , chronic pain. Colectomy bag, nephro tube was sent to Er due to nephro tube dysfunction. She was turned position and her nephro tube seen was displaced. She was told to drink a lot water , but no urine coming out of the tube. She started to have back pain. IR was called and tube was repositioned.  She was found wbc 18 . ua -uti and urology recommend to admit to the hospital.   Denies any slurred speech/headache/cp/n/v/blurred         Hospital Course :     Since she was admitted, iv abx were started uti -due to Nephrostomy tube dysfunction, urine cx indicate   27076 COLONIES/mL CITROBACTER BRAAKII Abnormal     Culture result: (Abnormal)      Final   67777 COLONIES/mL KLEBSIELLA OXYTOCA Abnormal     Culture result: (Abnormal)      Final   <10,000 COLONIES/mL PROTEUS SPECIES Abnormal     And sensitive to levaquin. Will continue renal dose to complete the treat ment. She did have 1/4 bottle bcx positive, she remained afebrile, likely due to contamination. She also received mg replacement for  hypomagnesemia     Nephrostomy tube displaced and was replaced per IR. We also controlled DM with insulin. Wound  Care was on board for decubitus ulcer and her wound on rt foot got improving. Recommend to continue wound care in the future facility.                   Activity: Activity as tolerated    Diet: Diabetic Diet    Follow-up: [facility]     Disposition: [facility]     Minutes spent on discharge: 45 min       Labs: Results:       Chemistry Recent Labs     11/29/18 0440 11/28/18 0517 11/27/18 0340 11/26/18  1044   * 112* 145* 195*    140 139 134*   K 4.4 4.2 4.6 5.2   * 111* 105 99*   CO2 23 20* 21 24   BUN 22* 23* 29* 33*   CREA 1.58* 1.31* 1.55* 1.63*   CA 7.8* 7.3* 7.1* 8.3*   AGAP 9 9 13 11   BUCR 14 18 19 20   AP  --   --  75 94   TP  --   --  5.1* 7.1   ALB  --   --  2.2* 3.2*   GLOB  --   --  2.9 3.9   AGRAT  --   --  0.8 0.8      CBC w/Diff Recent Labs     11/29/18 0440 11/28/18 0517 11/27/18  0340   WBC 7.7 7.4 10.4   RBC 3.72* 3.63* 3.57*   HGB 10.6* 10.2* 10.2*   HCT 32.3* 32.0* 31.1*    184 194   GRANS 82* 76* 84*   LYMPH 8* 12* 7*   EOS 2 1 0      Cardiac Enzymes No results for input(s): CPK, CKND1, JANE in the last 72 hours.     No lab exists for component: CKRMB, TROIP   Coagulation Recent Labs     11/26/18  1044   PTP 16.1*   INR 1.3* APTT 49.7*       Lipid Panel Lab Results   Component Value Date/Time    Cholesterol, total 123 09/26/2018 06:50 AM    HDL Cholesterol 33 (L) 09/26/2018 06:50 AM    LDL, calculated 66 09/26/2018 06:50 AM    VLDL, calculated 24 09/26/2018 06:50 AM    Triglyceride 120 09/26/2018 06:50 AM    CHOL/HDL Ratio 3.7 09/26/2018 06:50 AM      BNP No results for input(s): BNPP in the last 72 hours. Liver Enzymes Recent Labs     11/27/18  0340   TP 5.1*   ALB 2.2*   AP 75   SGOT 18      Thyroid Studies No results found for: T4, T3U, TSH, TSHEXT         Significant Diagnostic Studies: Xr Chest Port    Result Date: 11/26/2018  EXAM: One-view chest CLINICAL HISTORY: History of sepsis COMPARISON: None FINDINGS: Frontal view of the chest demonstrate clear lungs. Cardiac silhouette is normal in size and contour. No acute bony or soft tissue abnormality. IMPRESSION: No acute pulmonary process identified. Ir Nephrostomy Perc Rt Plc Cath  Si    Result Date: 11/26/2018  Procedure: Right percutaneous nephrostomy tube replacement with fluoroscopic guidance Indication: Right percutaneous nephrostomy tube pulled back this a.m. Medications: Premedicated with Levaquin 750 mg IV No sedation or local anesthetic required, mature tract. Findings: Prior to the procedure, written informed consent was obtained from the patient's son, Wanda Moy. A final timeout was performed. The patient was placed prone on the special procedures table. Upon removing the patient's dressing, the right nephrostomy tube had pulled completely out of the skin, with its pigtail formed underneath the bandage. The right flank was prepped and draped in usual sterile fashion. The procedure was performed following standard maximal sterile barrier technique which includes cap and mask, sterile gown, sterile gloves, sterile full body drape, hand hygiene with alcohol foam, and 2% chlorhexidine gluconate/70% isopropyl alcohol for cutaneous antisepsis.  A [address] [place] [address] Berenstein catheter was placed into the orifice of the tract and dilute iodinated contrast was gently injected during fluoroscopic visualization, opacifying the tract to the renal collecting system. The catheter with associated down the tract into the right renal pelvis and additional contrast was injected gently, confirming the location of the catheter within the renal pelvis. In addition, urine was freely coming back through the tube. High-grade right hydronephrosis was noted. Of note, the urine is cloudy and a slightly malodorous. A 0.035 guidewire was advanced down the [place], and exchanges over the guidewire resulted in placement of an 8 Lithuanian locking loop pigtail catheter, with the loop formed in the renal pelvis. The guidewire was removed, the catheter was secured to the skin with a Percu-Stay system and attached to external gravity drainage. The procedure was well tolerated without complication. A postprocedure pause was performed. The procedure was performed with fluoroscopic guidance with 3 images archived to PACS. Radiation dose information: --Radiation dose (reference air kerma), in mGy: 33     Impression: Successful replacement of 8 Lithuanian right percutaneous nephrostomy catheter. Urine is cloudy and slightly malodorous suggesting colonization. The procedure and the character of the patient's urine were discussed with Yana Andersen NP.             [facility]     CC: None

## 2018-11-29 NOTE — PROGRESS NOTES
Pt was resting in bed NAD, VSS, Pt blood sugar remained WNL. Pt received 2 Mg runs this am. Pt nephrostomy tube and colostomy tube in place draining adequately. Will continue to monitor.

## 2018-11-29 NOTE — PROGRESS NOTES
Bedside shift change report given to KATIE Bird (oncoming nurse) by JESSIKA Hernandez  (offgoing nurse). Report included the following information SBAR, Kardex, Intake/Output and MAR.

## 2018-11-29 NOTE — PROGRESS NOTES
Bedside shift change report given to KATIE Naidu Che (oncoming nurse) by JESSIKA Fall  (offgoing nurse). Report included the following information SBAR, Kardex, MAR and Med Rec Status.

## 2018-11-29 NOTE — PROGRESS NOTES
TRANSFER - OUT REPORT: 
 
Verbal report given to STEVEN Espinoza (name) on Christina Ready  being transferred to [address] [place] [address] (unit) for routine progression of care Report consisted of patients Situation, Background, Assessment and  
Recommendations(SBAR). Information from the following report(s) SBAR, Kardex, Intake/Output, MAR and Recent Results was reviewed with the receiving nurse. Opportunity for questions and clarification was provided. Patient transported with: 
 [facility] All drains emptied, and dressings intact.

## 2018-11-29 NOTE — ROUTINE PROCESS
Bedside and Verbal shift change report given to KATIE Hughes (oncoming nurse) by Amrit Pino RN (offgoing nurse). Report included the following information SBAR, Kardex, Intake/Output, MAR and Recent Results.

## 2018-11-29 NOTE — PROGRESS NOTES
Shift summary: No acute events. Nephrostomy and colostomy draining without complications. Reports minimal pain to RLE; controlled with PRN Tylenol. Able to rest quietly throughout the night.

## 2018-11-29 NOTE — PROGRESS NOTES
Requested pt DDNR be faxed to the floor from [facility]. Will continue to monitor.

## 2018-11-30 LAB
BACTERIA SPEC CULT: ABNORMAL
GRAM STN SPEC: ABNORMAL
GRAM STN SPEC: ABNORMAL
SERVICE CMNT-IMP: ABNORMAL

## 2018-12-02 LAB
BACTERIA SPEC CULT: NORMAL
SERVICE CMNT-IMP: NORMAL

## 2018-12-04 LAB
BACTERIA SPEC CULT: NORMAL
BACTERIA SPEC CULT: NORMAL
SERVICE CMNT-IMP: NORMAL
SERVICE CMNT-IMP: NORMAL

## 2018-12-13 ENCOUNTER — HOSPITAL ENCOUNTER (OUTPATIENT)
Dept: LAB | Age: 78
Discharge: HOME | End: 2018-12-13
Payer: MEDICARE

## 2018-12-13 LAB
ANION GAP SERPL CALC-SCNC: 6 MMOL/L (ref 3–18)
BUN SERPL-MCNC: 18 MG/DL (ref 7–18)
BUN/CREAT SERPL: 17
CALCIUM SERPL-MCNC: 8.3 MG/DL (ref 8.5–10.1)
CHLORIDE SERPL-SCNC: 103 MMOL/L (ref 100–108)
CO2 SERPL-SCNC: 29 MMOL/L (ref 21–32)
CREAT SERPL-MCNC: 1.05 MG/DL (ref 0.6–1.3)
ERYTHROCYTE [DISTWIDTH] IN BLOOD BY AUTOMATED COUNT: 13.8 % (ref 11.6–14.5)
GLUCOSE SERPL-MCNC: 147 MG/DL (ref 74–99)
HCT VFR BLD AUTO: 36.5 % (ref 35–45)
HGB BLD-MCNC: 12.1 G/DL (ref 12–16)
MCH RBC QN AUTO: 28.9 PG (ref 24–34)
MCHC RBC AUTO-ENTMCNC: 33.2 G/DL (ref 31–37)
MCV RBC AUTO: 87.3 FL (ref 74–97)
PLATELET # BLD AUTO: 290 K/UL (ref 135–420)
PMV BLD AUTO: 9.7 FL (ref 9.2–11.8)
POTASSIUM SERPL-SCNC: 4.6 MMOL/L (ref 3.5–5.5)
RBC # BLD AUTO: 4.18 M/UL (ref 4.2–5.3)
SODIUM SERPL-SCNC: 138 MMOL/L (ref 136–145)
WBC # BLD AUTO: 5.4 K/UL (ref 4.6–13.2)

## 2018-12-13 PROCEDURE — 80048 BASIC METABOLIC PNL TOTAL CA: CPT

## 2018-12-13 PROCEDURE — 85027 COMPLETE CBC AUTOMATED: CPT

## 2019-01-16 NOTE — PROGRESS NOTES
Precall completed w/pt's son, Emily Mendez:  aware of potential need for  at discharge. aware of arrival time pre procedure. [facility] on 1/28/19 at 0800 for scheduled procedure to occur at 0830.    states no questions at this time.

## 2019-01-28 ENCOUNTER — HOSPITAL ENCOUNTER (OUTPATIENT)
Dept: INTERVENTIONAL RADIOLOGY/VASCULAR | Age: 79
Discharge: NURSING, REHAB OR LONG-TERM CARE | End: 2019-01-28
Attending: RADIOLOGY | Admitting: RADIOLOGY
Payer: MEDICARE

## 2019-01-28 VITALS
TEMPERATURE: 98.5 F | DIASTOLIC BLOOD PRESSURE: 62 MMHG | RESPIRATION RATE: 18 BRPM | HEIGHT: 62 IN | BODY MASS INDEX: 29.44 KG/M2 | SYSTOLIC BLOOD PRESSURE: 159 MMHG | OXYGEN SATURATION: 100 % | HEART RATE: 75 BPM | WEIGHT: 160 LBS

## 2019-01-28 DIAGNOSIS — N13.39 OTHER HYDRONEPHROSIS: ICD-10-CM

## 2019-01-28 PROCEDURE — 74011636320 HC RX REV CODE- 636/320: Performed by: RADIOLOGY

## 2019-01-28 PROCEDURE — 74011250636 HC RX REV CODE- 250/636: Performed by: RADIOLOGY

## 2019-01-28 PROCEDURE — 50435 EXCHANGE NEPHROSTOMY CATH: CPT

## 2019-01-28 RX ORDER — SODIUM CHLORIDE 9 MG/ML
500 INJECTION, SOLUTION INTRAVENOUS
Status: COMPLETED | OUTPATIENT
Start: 2019-01-28 | End: 2019-01-28

## 2019-01-28 RX ORDER — LIDOCAINE HYDROCHLORIDE 10 MG/ML
INJECTION, SOLUTION EPIDURAL; INFILTRATION; INTRACAUDAL; PERINEURAL
Status: DISCONTINUED
Start: 2019-01-28 | End: 2019-01-28 | Stop reason: WASHOUT

## 2019-01-28 RX ORDER — LIDOCAINE HYDROCHLORIDE 10 MG/ML
1-5 INJECTION, SOLUTION EPIDURAL; INFILTRATION; INTRACAUDAL; PERINEURAL
Status: DISCONTINUED | OUTPATIENT
Start: 2019-01-28 | End: 2019-01-28 | Stop reason: CLARIF

## 2019-01-28 RX ADMIN — SODIUM CHLORIDE 500 ML: 900 INJECTION, SOLUTION INTRAVENOUS at 09:35

## 2019-01-28 RX ADMIN — IOPAMIDOL 7 ML: 612 INJECTION, SOLUTION INTRAVENOUS at 09:35

## 2019-01-28 NOTE — PROGRESS NOTES
Pt tolerated rt Neph tube exchange well. Dressing dry and intact  Pt discharged back to rehab with son @ side  via ambulance service in stable condition with no complaints.

## 2019-01-28 NOTE — PROCEDURES
Vascular & Interventional Radiology Brief Procedure Note    Interventional Radiologist: Courtney Alexander MD    Pre-operative Diagnosis:  PCN exchange    Post-operative Diagnosis: Same as pre-op dx    Procedure(s) Performed:  same    Anesthesia:  Local and Moderate Sedation    Findings:  Uncomplicated 8F R PCN exchange over wire.      Complications: None    Estimated Blood Loss:  minimal    Tubes and Drains: None    Specimens: None    Condition: Good       Courtney Alexander MD  [facility]  Vascular & Interventional Radiology  1/28/2019

## 2019-01-28 NOTE — H&P
The patient is an appropriate candidate to undergo PCN exchange. Patient assessed immediately prior to induction. Anesthesia plan as follows: Local/No Anesthesia. History and Physical update:  H&P was reviewed and the patient was examined. No changes have occurred in the patient's condition since the H&P was completed.     Ivonne Dan MD  Vascular & Interventional Radiology  [facility]  1/28/2019

## 2019-01-28 NOTE — DISCHARGE INSTRUCTIONS
Patient Education        Nephrostomy Tube Care: Care Instructions  Your Care Instructions    A nephrostomy tube is a thin catheter placed into your kidney to drain urine. You may have one tube in a kidney or two tubes, one in each kidney. The urine collects in a bag attached to the tube. In most cases, the bag is attached to your leg. Sometimes the catheter tube has a valve that lets you drain the urine into the toilet or other container. You may need a nephrostomy tube if you have a blockage or a hole in your urinary tract. The blockage may be caused by a kidney stone, infection, scar tissue, or a tumor. If you have only one tube, you still need to urinate. Your other kidney will still produce urine that will drain into your bladder. Having a nephrostomy tube in for a long time increases the risk of getting an infection. Nephrostomy tube care focuses on preventing infection. Follow-up care is a key part of your treatment and safety. Be sure to make and go to all appointments, and call your doctor if you are having problems. It's also a good idea to know your test results and keep a list of the medicines you take. How can you care for yourself at home? · Wash your hands before you handle the nephrostomy tube. · Clean the area around the tube with soap and water every day. · Keep the drainage bag lower than your kidney to keep urine from backing up. · If you have been told you can reuse your bag, you can clean the bag after removing it from the tube. Use another container to collect your urine while you clean the bag. To clean the bag, fill it with 2 parts vinegar to 3 parts water, and let it stand for 20 minutes. Then empty it out, and let it air dry. · Empty the drainage bag before it is completely full or every 2 to 3 hours. · Do not swim or take baths while you have a nephrostomy tube. You can shower after wrapping the end of the nephrostomy tube with plastic wrap.   · Change the dressing around the nephrostomy tube about every 3 days or when it gets wet or dirty. A nurse will teach you how to change the dressing. When should you call for help? Call your doctor now or seek immediate medical care if:    · You have new or worse symptoms of a kidney infection. These may include:  ? Pain or burning when you urinate. ? A frequent need to urinate without being able to pass much urine. ? Pain in the flank, which is just below the rib cage and above the waist on either side of the back. ? Blood in the urine. ? A fever.     · You are vomiting or nauseated.     · Your tube leaks.     · Urine does not collect in the drainage bag.     · You have pain or bleeding around the tube.    Watch closely for changes in your health, and be sure to contact your doctor if:    · You do not get better as expected. Where can you learn more? Go to http://richelle-kash.info/. Enter D463 in the search box to learn more about \"Nephrostomy Tube Care: Care Instructions. \"  Current as of: March 14, 2018  Content Version: 11.9  © 6637-0733 [employer]. Care instructions adapted under license by [employer] (which disclaims liability or warranty for this information). If you have questions about a medical condition or this instruction, always ask your healthcare professional. [address] any warranty or liability for your use of this information. DISCHARGE SUMMARY from Nurse    PATIENT INSTRUCTIONS:    After general anesthesia or intravenous sedation, for 24 hours or while taking prescription Narcotics:  · Limit your activities  · Do not drive and operate hazardous machinery  · Do not make important personal or business decisions  · Do  not drink alcoholic beverages  · If you have not urinated within 8 hours after discharge, please contact your surgeon on call.     Report the following to your surgeon:  · Excessive pain, swelling, redness or odor of or around the surgical area  · Temperature over 100.5  · Nausea and vomiting lasting longer than 4 hours or if unable to take medications  · Any signs of decreased circulation or nerve impairment to extremity: change in color, persistent  numbness, tingling, coldness or increase pain  · Any questions    What to do at Home:  Recommended activity: Activity as tolerated,       *  Please give a list of your current medications to your Primary Care Provider. *  Please update this list whenever your medications are discontinued, doses are      changed, or new medications (including over-the-counter products) are added. *  Please carry medication information at all times in case of emergency situations. These are general instructions for a healthy lifestyle:    No smoking/ No tobacco products/ Avoid exposure to second hand smoke  Surgeon General's Warning:  Quitting smoking now greatly reduces serious risk to your health. Obesity, smoking, and sedentary lifestyle greatly increases your risk for illness    A healthy diet, regular physical exercise & weight monitoring are important for maintaining a healthy lifestyle    You may be retaining fluid if you have a history of heart failure or if you experience any of the following symptoms:  Weight gain of 3 pounds or more overnight or 5 pounds in a week, increased swelling in our hands or feet or shortness of breath while lying flat in bed. Please call your doctor as soon as you notice any of these symptoms; do not wait until your next office visit. Recognize signs and symptoms of STROKE:    F-face looks uneven    A-arms unable to move or move unevenly    S-speech slurred or non-existent    T-time-call 911 as soon as signs and symptoms begin-DO NOT go       Back to bed or wait to see if you get better-TIME IS BRAIN. Warning Signs of HEART ATTACK     Call 911 if you have these symptoms:   Chest discomfort.  Most heart attacks involve discomfort in the center of the chest that lasts more than a few minutes, or that goes away and comes back. It can feel like uncomfortable pressure, squeezing, fullness, or pain.  Discomfort in other areas of the upper body. Symptoms can include pain or discomfort in one or both arms, the back, neck, jaw, or stomach.  Shortness of breath with or without chest discomfort.  Other signs may include breaking out in a cold sweat, nausea, or lightheadedness. Don't wait more than five minutes to call 911 - MINUTES MATTER! Fast action can save your life. Calling 911 is almost always the fastest way to get lifesaving treatment. Emergency Medical Services staff can begin treatment when they arrive -- up to an hour sooner than if someone gets to the hospital by car. The discharge information has been reviewed with the patient and spouse. The patient and spouse verbalized understanding. Discharge medications reviewed with the patient and spouse and appropriate educational materials and side effects teaching were provided.     Patient armband removed and shredded    ___________________________________________________________________________________________________________________________________

## 2019-02-06 ENCOUNTER — HOSPITAL ENCOUNTER (OUTPATIENT)
Dept: LAB | Age: 79
Discharge: HOME | End: 2019-02-06
Payer: MEDICARE

## 2019-02-06 LAB
25(OH)D3 SERPL-MCNC: 27.4 NG/ML (ref 30–100)
ANION GAP SERPL CALC-SCNC: 7 MMOL/L (ref 3–18)
BASOPHILS # BLD: 0 K/UL (ref 0–0.1)
BASOPHILS NFR BLD: 0 % (ref 0–2)
BUN SERPL-MCNC: 32 MG/DL (ref 7–18)
BUN/CREAT SERPL: 30 (ref 12–20)
CALCIUM SERPL-MCNC: 8.7 MG/DL (ref 8.5–10.1)
CHLORIDE SERPL-SCNC: 106 MMOL/L (ref 100–108)
CHOLEST SERPL-MCNC: 116 MG/DL
CO2 SERPL-SCNC: 27 MMOL/L (ref 21–32)
CREAT SERPL-MCNC: 1.08 MG/DL (ref 0.6–1.3)
DIFFERENTIAL METHOD BLD: ABNORMAL
EOSINOPHIL # BLD: 0.3 K/UL (ref 0–0.4)
EOSINOPHIL NFR BLD: 4 % (ref 0–5)
ERYTHROCYTE [DISTWIDTH] IN BLOOD BY AUTOMATED COUNT: 14.2 % (ref 11.6–14.5)
EST. AVERAGE GLUCOSE BLD GHB EST-MCNC: 183 MG/DL
GLUCOSE SERPL-MCNC: 143 MG/DL (ref 74–99)
HBA1C MFR BLD: 8 % (ref 4.2–5.6)
HCT VFR BLD AUTO: 36 % (ref 35–45)
HDLC SERPL-MCNC: 31 MG/DL (ref 40–60)
HDLC SERPL: 3.7 {RATIO} (ref 0–5)
HGB BLD-MCNC: 11.2 G/DL (ref 12–16)
LDLC SERPL CALC-MCNC: 60.6 MG/DL (ref 0–100)
LIPID PROFILE,FLP: ABNORMAL
LYMPHOCYTES # BLD: 1.4 K/UL (ref 0.9–3.6)
LYMPHOCYTES NFR BLD: 19 % (ref 21–52)
MCH RBC QN AUTO: 28 PG (ref 24–34)
MCHC RBC AUTO-ENTMCNC: 31.1 G/DL (ref 31–37)
MCV RBC AUTO: 90 FL (ref 74–97)
MONOCYTES # BLD: 0.6 K/UL (ref 0.05–1.2)
MONOCYTES NFR BLD: 8 % (ref 3–10)
NEUTS SEG # BLD: 5.2 K/UL (ref 1.8–8)
NEUTS SEG NFR BLD: 69 % (ref 40–73)
PLATELET # BLD AUTO: 287 K/UL (ref 135–420)
PMV BLD AUTO: 9.9 FL (ref 9.2–11.8)
POTASSIUM SERPL-SCNC: 5.1 MMOL/L (ref 3.5–5.5)
RBC # BLD AUTO: 4 M/UL (ref 4.2–5.3)
SODIUM SERPL-SCNC: 140 MMOL/L (ref 136–145)
TRIGL SERPL-MCNC: 122 MG/DL (ref ?–150)
TSH SERPL DL<=0.05 MIU/L-ACNC: 1.3 UIU/ML (ref 0.36–3.74)
VLDLC SERPL CALC-MCNC: 24.4 MG/DL
WBC # BLD AUTO: 7.6 K/UL (ref 4.6–13.2)

## 2019-02-06 PROCEDURE — 80061 LIPID PANEL: CPT

## 2019-02-06 PROCEDURE — 82306 VITAMIN D 25 HYDROXY: CPT

## 2019-02-06 PROCEDURE — 80048 BASIC METABOLIC PNL TOTAL CA: CPT

## 2019-02-06 PROCEDURE — 83036 HEMOGLOBIN GLYCOSYLATED A1C: CPT

## 2019-02-06 PROCEDURE — 85025 COMPLETE CBC W/AUTO DIFF WBC: CPT

## 2019-02-06 PROCEDURE — 84443 ASSAY THYROID STIM HORMONE: CPT

## 2019-02-09 ENCOUNTER — HOSPITAL ENCOUNTER (EMERGENCY)
Age: 79
Discharge: NURSING, REHAB OR LONG-TERM CARE | End: 2019-02-09
Attending: EMERGENCY MEDICINE
Payer: MEDICARE

## 2019-02-09 VITALS
TEMPERATURE: 97.6 F | HEART RATE: 77 BPM | WEIGHT: 158 LBS | OXYGEN SATURATION: 100 % | HEIGHT: 62 IN | SYSTOLIC BLOOD PRESSURE: 154 MMHG | RESPIRATION RATE: 16 BRPM | BODY MASS INDEX: 29.08 KG/M2 | DIASTOLIC BLOOD PRESSURE: 79 MMHG

## 2019-02-09 DIAGNOSIS — R31.0 GROSS HEMATURIA: ICD-10-CM

## 2019-02-09 DIAGNOSIS — Z93.6 NEPHROSTOMY STATUS (HCC): Primary | ICD-10-CM

## 2019-02-09 DIAGNOSIS — Z79.01 ON CONTINUOUS ORAL ANTICOAGULATION: ICD-10-CM

## 2019-02-09 LAB
ABO + RH BLD: NORMAL
ALBUMIN SERPL-MCNC: 3 G/DL (ref 3.4–5)
ALBUMIN/GLOB SERPL: 0.7 {RATIO} (ref 0.8–1.7)
ALP SERPL-CCNC: 99 U/L (ref 45–117)
ALT SERPL-CCNC: 23 U/L (ref 13–56)
ANION GAP SERPL CALC-SCNC: 6 MMOL/L (ref 3–18)
APPEARANCE UR: ABNORMAL
APTT PPP: 35.9 SEC (ref 23–36.4)
AST SERPL-CCNC: 28 U/L (ref 15–37)
BACTERIA URNS QL MICRO: ABNORMAL /HPF
BASOPHILS # BLD: 0 K/UL (ref 0–0.1)
BASOPHILS NFR BLD: 0 % (ref 0–2)
BILIRUB SERPL-MCNC: 0.5 MG/DL (ref 0.2–1)
BILIRUB UR QL: ABNORMAL
BLOOD GROUP ANTIBODIES SERPL: NORMAL
BUN SERPL-MCNC: 28 MG/DL (ref 7–18)
BUN/CREAT SERPL: 26 (ref 12–20)
CALCIUM SERPL-MCNC: 8.6 MG/DL (ref 8.5–10.1)
CHLORIDE SERPL-SCNC: 104 MMOL/L (ref 100–108)
CO2 SERPL-SCNC: 28 MMOL/L (ref 21–32)
COLOR UR: ABNORMAL
CREAT SERPL-MCNC: 1.08 MG/DL (ref 0.6–1.3)
DIFFERENTIAL METHOD BLD: ABNORMAL
EOSINOPHIL # BLD: 0.3 K/UL (ref 0–0.4)
EOSINOPHIL NFR BLD: 3 % (ref 0–5)
EPITH CASTS URNS QL MICRO: ABNORMAL /LPF (ref 0–5)
ERYTHROCYTE [DISTWIDTH] IN BLOOD BY AUTOMATED COUNT: 13.5 % (ref 11.6–14.5)
GLOBULIN SER CALC-MCNC: 4.4 G/DL (ref 2–4)
GLUCOSE SERPL-MCNC: 199 MG/DL (ref 74–99)
GLUCOSE UR STRIP.AUTO-MCNC: NEGATIVE MG/DL
HCT VFR BLD AUTO: 38.6 % (ref 35–45)
HGB BLD-MCNC: 12.5 G/DL (ref 12–16)
HGB UR QL STRIP: ABNORMAL
INR PPP: 1.1 (ref 0.8–1.2)
KETONES UR QL STRIP.AUTO: ABNORMAL MG/DL
LEUKOCYTE ESTERASE UR QL STRIP.AUTO: ABNORMAL
LYMPHOCYTES # BLD: 1.5 K/UL (ref 0.9–3.6)
LYMPHOCYTES NFR BLD: 18 % (ref 21–52)
MCH RBC QN AUTO: 28.7 PG (ref 24–34)
MCHC RBC AUTO-ENTMCNC: 32.4 G/DL (ref 31–37)
MCV RBC AUTO: 88.7 FL (ref 74–97)
MONOCYTES # BLD: 0.5 K/UL (ref 0.05–1.2)
MONOCYTES NFR BLD: 7 % (ref 3–10)
NEUTS SEG # BLD: 5.9 K/UL (ref 1.8–8)
NEUTS SEG NFR BLD: 72 % (ref 40–73)
NITRITE UR QL STRIP.AUTO: POSITIVE
PH UR STRIP: >9 [PH] (ref 5–8)
PLATELET # BLD AUTO: 301 K/UL (ref 135–420)
PMV BLD AUTO: 9.3 FL (ref 9.2–11.8)
POTASSIUM SERPL-SCNC: 4.4 MMOL/L (ref 3.5–5.5)
POTASSIUM SERPL-SCNC: 6.4 MMOL/L (ref 3.5–5.5)
PROT SERPL-MCNC: 7.4 G/DL (ref 6.4–8.2)
PROT UR STRIP-MCNC: >300 MG/DL
PROTHROMBIN TIME: 14.3 SEC (ref 11.5–15.2)
RBC # BLD AUTO: 4.35 M/UL (ref 4.2–5.3)
RBC #/AREA URNS HPF: ABNORMAL /HPF (ref 0–5)
SODIUM SERPL-SCNC: 138 MMOL/L (ref 136–145)
SP GR UR REFRACTOMETRY: 1.01 (ref 1–1.03)
SPECIMEN EXP DATE BLD: NORMAL
UROBILINOGEN UR QL STRIP.AUTO: 1 EU/DL (ref 0.2–1)
WBC # BLD AUTO: 8.2 K/UL (ref 4.6–13.2)
WBC URNS QL MICRO: ABNORMAL /HPF (ref 0–5)

## 2019-02-09 PROCEDURE — 99285 EMERGENCY DEPT VISIT HI MDM: CPT

## 2019-02-09 PROCEDURE — 87077 CULTURE AEROBIC IDENTIFY: CPT

## 2019-02-09 PROCEDURE — 87086 URINE CULTURE/COLONY COUNT: CPT

## 2019-02-09 PROCEDURE — 81001 URINALYSIS AUTO W/SCOPE: CPT

## 2019-02-09 PROCEDURE — 74011000250 HC RX REV CODE- 250: Performed by: EMERGENCY MEDICINE

## 2019-02-09 PROCEDURE — 84132 ASSAY OF SERUM POTASSIUM: CPT

## 2019-02-09 PROCEDURE — 87186 SC STD MICRODIL/AGAR DIL: CPT

## 2019-02-09 PROCEDURE — 85610 PROTHROMBIN TIME: CPT

## 2019-02-09 PROCEDURE — 85730 THROMBOPLASTIN TIME PARTIAL: CPT

## 2019-02-09 PROCEDURE — 86900 BLOOD TYPING SEROLOGIC ABO: CPT

## 2019-02-09 PROCEDURE — 96374 THER/PROPH/DIAG INJ IV PUSH: CPT

## 2019-02-09 PROCEDURE — 85025 COMPLETE CBC W/AUTO DIFF WBC: CPT

## 2019-02-09 PROCEDURE — 74011250636 HC RX REV CODE- 250/636: Performed by: EMERGENCY MEDICINE

## 2019-02-09 RX ADMIN — CEFTRIAXONE 1 G: 1 INJECTION, POWDER, FOR SOLUTION INTRAMUSCULAR; INTRAVENOUS at 12:30

## 2019-02-09 NOTE — ED NOTES
Report called to Desmond Bashir LPN at [facility] allowed to answer questions and updated on vss and treatment

## 2019-02-09 NOTE — ED NOTES
Patient is blind and states she cannot see to sign for d/c summary states she will have her son come by later to sign

## 2019-02-09 NOTE — DISCHARGE INSTRUCTIONS
Blood in the Urine: Care Instructions  Your Care Instructions    Blood in the urine, or hematuria, may make the urine look red, brown, or pink. There may be blood every time you urinate or just from time to time. You cannot always see blood in the urine, but it will show up in a urine test.  Blood in the urine may be serious. It should always be checked by a doctor. Your doctor may recommend more tests, including an X-ray, a CT scan, or a cystoscopy (which lets a doctor look inside the urethra and bladder). Blood in the urine can be a sign of another problem. Common causes are bladder infections and kidney stones. An injury to your groin or your genital area can also cause bleeding in the urinary tract. Very hard exercise--such as running a marathon--can cause blood in the urine. Blood in the urine can also be a sign of kidney disease or cancer in the bladder or kidney. Many cases of blood in the urine are caused by a harmless condition that runs in families. This is called benign familial hematuria. It does not need any treatment. Sometimes your urine may look red or brown even though it does not contain blood. For example, not getting enough fluids (dehydration), taking certain medicines, or having a liver problem can change the color of your urine. Eating foods such as beets, rhubarb, or blackberries or foods with red food coloring can make your urine look red or pink. Follow-up care is a key part of your treatment and safety. Be sure to make and go to all appointments, and call your doctor if you are having problems. It's also a good idea to know your test results and keep a list of the medicines you take. When should you call for help? Call your doctor now or seek immediate medical care if:    · You have symptoms of a urinary infection. For example:  ? You have pus in your urine. ? You have pain in your back just below your rib cage. This is called flank pain. ?  You have a fever, chills, or body aches.  ? It hurts to urinate. ? You have groin or belly pain.     · You have more blood in your urine.    Watch closely for changes in your health, and be sure to contact your doctor if:    · You have new urination problems.     · You do not get better as expected. Where can you learn more? Go to http://richelle-kash.info/. Enter J517 in the search box to learn more about \"Blood in the Urine: Care Instructions. \"  Current as of: March 20, 2018  Content Version: 11.9  © 0959-6376 [employer]. Care instructions adapted under license by [employer] (which disclaims liability or warranty for this information). If you have questions about a medical condition or this instruction, always ask your healthcare professional. [address] any warranty or liability for your use of this information. Nephrostomy Tube Care: Care Instructions  Your Care Instructions    A nephrostomy tube is a thin catheter placed into your kidney to drain urine. You may have one tube in a kidney or two tubes, one in each kidney. The urine collects in a bag attached to the tube. In most cases, the bag is attached to your leg. Sometimes the catheter tube has a valve that lets you drain the urine into the toilet or other container. You may need a nephrostomy tube if you have a blockage or a hole in your urinary tract. The blockage may be caused by a kidney stone, infection, scar tissue, or a tumor. If you have only one tube, you still need to urinate. Your other kidney will still produce urine that will drain into your bladder. Having a nephrostomy tube in for a long time increases the risk of getting an infection. Nephrostomy tube care focuses on preventing infection. Follow-up care is a key part of your treatment and safety. Be sure to make and go to all appointments, and call your doctor if you are having problems.  It's also a good idea to know your test results and keep a list of the medicines you take. How can you care for yourself at home? · Wash your hands before you handle the nephrostomy tube. · Clean the area around the tube with soap and water every day. · Keep the drainage bag lower than your kidney to keep urine from backing up. · If you have been told you can reuse your bag, you can clean the bag after removing it from the tube. Use another container to collect your urine while you clean the bag. To clean the bag, fill it with 2 parts vinegar to 3 parts water, and let it stand for 20 minutes. Then empty it out, and let it air dry. · Empty the drainage bag before it is completely full or every 2 to 3 hours. · Do not swim or take baths while you have a nephrostomy tube. You can shower after wrapping the end of the nephrostomy tube with plastic wrap. · Change the dressing around the nephrostomy tube about every 3 days or when it gets wet or dirty. A nurse will teach you how to change the dressing. When should you call for help? Call your doctor now or seek immediate medical care if:    · You have new or worse symptoms of a kidney infection. These may include:  ? Pain or burning when you urinate. ? A frequent need to urinate without being able to pass much urine. ? Pain in the flank, which is just below the rib cage and above the waist on either side of the back. ? Blood in the urine. ? A fever.     · You are vomiting or nauseated.     · Your tube leaks.     · Urine does not collect in the drainage bag.     · You have pain or bleeding around the tube.    Watch closely for changes in your health, and be sure to contact your doctor if:    · You do not get better as expected. Where can you learn more? Go to http://richelle-kash.info/. Enter Q001 in the search box to learn more about \"Nephrostomy Tube Care: Care Instructions. \"  Current as of: March 14, 2018  Content Version: 11.9  © 2540-5166 [employer].  Care instructions adapted under license by [employer] (which disclaims liability or warranty for this information). If you have questions about a medical condition or this instruction, always ask your healthcare professional. [address] any warranty or liability for your use of this information.

## 2019-02-09 NOTE — ED TRIAGE NOTES
Patient arrived via EMS from [facility] was reported that patient had DVT and was started on eloquis for DVT. Patient has nephrostomy tube and colostomy. Patient has red colored like Emigdio-aid in nephrostomy bag.  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

## 2019-02-09 NOTE — ED NOTES
Moo Scott has been attempting to get transport for this patient back to [facility] however, however, [facility] reports patient has medicaid [facility]  called and logistic care  reports patient has not had medicaid since 2018.  Moo Scott is now attempting to call [facility] back to set up transportation

## 2019-02-09 NOTE — ED NOTES
Patient d/c back to [facility] via [facility] report was called earlier as documented. Patient has cell phone in her hand blanket pants and shirt were sent. Report gven to ems patient viss at time of transfer. Nephrostomy tube patent draining cherry colored urine colostomy intact.

## 2019-02-09 NOTE — ED NOTES
Report received from FRIDA Pritchard pt laying in bed, voices no complaints or concerns, call light in reach, pt awaiting transport at this time.

## 2019-02-09 NOTE — ED NOTES
Hourly rounds completed no new complains. Vss. Continues to have cheery colored urine In urostomy bag.  Consulted with Dr. Hiren Roberts she informed RN that she has consulted with urology and they informed her that it is expected

## 2019-02-09 NOTE — ED NOTES
Lab called to obtain repeat K level after this RN unable to find vein to obtain blood from. Pt yelling that she needs a PICC line, pt refuses for this RN to attempt blood draw from IV site.

## 2019-02-09 NOTE — ED PROVIDER NOTES
EMERGENCY DEPARTMENT HISTORY AND PHYSICAL EXAM    Date: 2/9/2019  Patient Name: Osmin Lombardi    History of Presenting Illness     Chief Complaint   Patient presents with    Other         History Provided By: Patient    Chief Complaint: Blood in Nephrostomy  Duration: <1 Days  Timing:  Constant  Location: Left  Severity: Moderate  Associated Symptoms: intermittent nausea    Additional History (Context):   8:27 AM  Osmin Lombardi is a 77 y.o. female with PMHX DM, HTN, and vaginal and colon CA and PSHX nephrectomy, colostomy, and nephrostomy placement (placed 2/8/2011) presents to the emergency department C/O blood in her nephrostomy bag, onset this morning. Associated sxs include intermittent nausea. Pt was recently dx'ed with RLE DVT and is currently on Eliquis. Urologist is Dr. Xuan Isbell. Pt does not have a PCP. Pt denies fever, CP, SOB, and any other sxs or complaints. PCP: None    Current Facility-Administered Medications   Medication Dose Route Frequency Provider Last Rate Last Dose    cefTRIAXone (ROCEPHIN) 1 g in sterile water (preservative free) 10 mL IV syringe  1 g IntraVENous NOW Mari Levy, DO         Current Outpatient Medications   Medication Sig Dispense Refill    apixaban (ELIQUIS PO) Take 10 mg by mouth two (2) times a day.  traZODone (DESYREL) 50 mg tablet Take 50 mg by mouth nightly.  docusate sodium (COLACE) 100 mg capsule Take 100 mg by mouth two (2) times a day.  brimonidine-timolol (COMBIGAN) 0.2-0.5 % drop ophthalmic solution Administer 1 Drop to both eyes every twelve (12) hours.  bisacodyl (DULCOLAX, BISACODYL,) 10 mg suppository Insert  into rectum daily as needed. Indications: constipation      metoprolol succinate (TOPROL-XL) 50 mg XL tablet Take 50 mg by mouth daily.  levothyroxine (SYNTHROID) 50 mcg tablet Take  by mouth Daily (before breakfast).       insulin glargine (LANTUS) 100 unit/mL injection 8 units SQ QHS 1 Vial 0    atorvastatin calcium (ATORVASTATIN PO) Take 20 mg by mouth daily.  pollen extracts (PROSTAT PO) Take  by mouth.  HYDROcodone-acetaminophen (NORCO) 5-325 mg per tablet Take 1 Tab by mouth every twelve (12) hours as needed for Pain (for pain related to [address]). Max Daily Amount: 2 Tabs. 4 Tab 0    LORazepam (ATIVAN) 0.5 mg tablet Take 1 Tab by mouth daily as needed for Anxiety. 2 Tab 0    levoFLOXacin (LEVAQUIN) 500 mg tablet Take 1 Tab by mouth every fourty-eight (48) hours. 4 Tab 0    naloxone (NARCAN) 4 mg/actuation nasal spray Use 1 spray intranasally, then discard. Repeat with new spray every 2 min as needed for opioid overdose symptoms, alternating nostrils. 1 Each 0    ondansetron (ZOFRAN ODT) 8 mg disintegrating tablet Take 8 mg by mouth every eight (8) hours as needed for Nausea.  tiZANidine (ZANAFLEX) 2 mg capsule Take 2 mg by mouth two (2) times a day.  nystatin (MYCOSTATIN) powder Apply 100,000 Units to affected area four (4) times daily.  camphor-menthol (SARNA) 0.5-0.5 % lotion Apply  to affected area daily.  hydrocortisone (HYTONE) 2.5 % topical cream Apply  to affected area two (2) times a day. use thin layer      timolol (TIMOPTIC) 0.5 % ophthalmic solution 1 Drop two (2) times a day.  gabapentin (NEURONTIN) 300 mg capsule Take 300 mg by mouth two (2) times a day.  magnesium hydroxide (MILK OF MAGNESIA CONCENTRATED) 2,400 mg/10 mL susp suspension Take 10 mL by mouth.  alum-mag hydroxide-simeth (MYLANTA) 200-200-20 mg/5 mL susp Take 30 mL by mouth four (4) times daily as needed.  insulin aspart U-100 (NOVOLOG FLEXPEN U-100 INSULIN) 100 unit/mL inpn by SubCUTAneous route. Sliding scale      sodium bicarbonate 650 mg tablet Take 650 mg by mouth two (2) times a day.  acetaminophen (TYLENOL) 650 mg TbER Take 650 mg by mouth every six (6) hours as needed for Pain or Fever.          Past History     Past Medical History:  Past Medical History: Diagnosis Date    Cancer [facility])     vaginal and colon cancer    Chronic pain     arthritis    Diabetes ([facility] [address]     Hypertension     Legally blind     Thyroid disease        Past Surgical History:  Past Surgical History:   Procedure Laterality Date    HX COLOSTOMY      HX OTHER SURGICAL      kidney drainage tube    HX UROLOGICAL      kidney removal    IR EXCHANGE NEPHRO PERC RT SI  1/28/2019       Family History:  History reviewed. No pertinent family history. Social History:  Social History     Tobacco Use    Smoking status: Never Smoker    Smokeless tobacco: Never Used   Substance Use Topics    Alcohol use: No    Drug use: Not on file       Allergies: Allergies   Allergen Reactions    Morphine Nausea and Vomiting    Pcn [Penicillins] Rash         Review of Systems   Review of Systems   Constitutional: Negative for fever. Respiratory: Negative for shortness of breath. Cardiovascular: Negative for chest pain. Gastrointestinal: Positive for nausea. Genitourinary: Positive for hematuria (blood in nephrostomy bag). All other systems reviewed and are negative. Physical Exam     Vitals:    02/09/19 0930 02/09/19 0945 02/09/19 1000 02/09/19 1130   BP: (!) 163/24 (!) 156/39 (!) 147/32 158/60   Pulse: 68 69 72 79   Resp: 19 14 15 17   Temp:       SpO2: 100% 100%     Weight:       Height:         Physical Exam   Nursing note and vitals reviewed. Constitutional: Chronically ill appearing, but in NAD. Head: Normocephalic, Atraumatic  Eyes: Blind in both eyes. Neck: Supple, non-tender  Cardiovascular: Regular rate and rhythm, no murmurs, rubs, or gallops  Chest: Normal work of breathing and chest excursion bilaterally  Lungs: Clear to ausculation bilaterally, no wheezes, no rhonchi  Abdomen: Soft, non tender, non distended, normoactive bowel sounds. Colostomy bag in the left quadrant. Back: No evidence of trauma or deformity. Nephrostomy tube draining bloody urine.    Extremities: No evidence of trauma or deformity, no LE edema  Skin: Warm and dry, normal cap refill  Neuro: Alert and appropriate, CN intact, normal speech, strength and sensation full and symmetric bilaterally, normal gait, normal coordination  Psychiatric: Normal mood and affect       Diagnostic Study Results     Labs -     Recent Results (from the past 12 hour(s))   CBC WITH AUTOMATED DIFF    Collection Time: 02/09/19  9:11 AM   Result Value Ref Range    WBC 8.2 4.6 - 13.2 K/uL    RBC 4.35 4.20 - 5.30 M/uL    HGB 12.5 12.0 - 16.0 g/dL    HCT 38.6 35.0 - 45.0 %    MCV 88.7 74.0 - 97.0 FL    MCH 28.7 24.0 - 34.0 PG    MCHC 32.4 31.0 - 37.0 g/dL    RDW 13.5 11.6 - 14.5 %    PLATELET 215 005 - 876 K/uL    MPV 9.3 9.2 - 11.8 FL    NEUTROPHILS 72 40 - 73 %    LYMPHOCYTES 18 (L) 21 - 52 %    MONOCYTES 7 3 - 10 %    EOSINOPHILS 3 0 - 5 %    BASOPHILS 0 0 - 2 %    ABS. NEUTROPHILS 5.9 1.8 - 8.0 K/UL    ABS. LYMPHOCYTES 1.5 0.9 - 3.6 K/UL    ABS. MONOCYTES 0.5 0.05 - 1.2 K/UL    ABS. EOSINOPHILS 0.3 0.0 - 0.4 K/UL    ABS. BASOPHILS 0.0 0.0 - 0.1 K/UL    DF AUTOMATED     METABOLIC PANEL, COMPREHENSIVE    Collection Time: 02/09/19  9:11 AM   Result Value Ref Range    Sodium 138 136 - 145 mmol/L    Potassium 6.4 (HH) 3.5 - 5.5 mmol/L    Chloride 104 100 - 108 mmol/L    CO2 28 21 - 32 mmol/L    Anion gap 6 3.0 - 18 mmol/L    Glucose 199 (H) 74 - 99 mg/dL    BUN 28 (H) 7.0 - 18 MG/DL    Creatinine 1.08 0.6 - 1.3 MG/DL    BUN/Creatinine ratio 26 (H) 12 - 20      GFR est AA 59 (L) >60 ml/min/1.73m2    GFR est non-AA 49 (L) >60 ml/min/1.73m2    Calcium 8.6 8.5 - 10.1 MG/DL    Bilirubin, total 0.5 0.2 - 1.0 MG/DL    ALT (SGPT) 23 13 - 56 U/L    AST (SGOT) 28 15 - 37 U/L    Alk.  phosphatase 99 45 - 117 U/L    Protein, total 7.4 6.4 - 8.2 g/dL    Albumin 3.0 (L) 3.4 - 5.0 g/dL    Globulin 4.4 (H) 2.0 - 4.0 g/dL    A-G Ratio 0.7 (L) 0.8 - 1.7     PROTHROMBIN TIME + INR    Collection Time: 02/09/19  9:11 AM   Result Value Ref Range    Prothrombin time 14.3 11.5 - 15.2 sec    INR 1.1 0.8 - 1.2     TYPE & SCREEN    Collection Time: 02/09/19  9:11 AM   Result Value Ref Range    Crossmatch Expiration 02/12/2019     ABO/Rh(D) A NEGATIVE     Antibody screen NEG    PTT    Collection Time: 02/09/19  9:11 AM   Result Value Ref Range    aPTT 35.9 23.0 - 36.4 SEC   URINALYSIS W/ RFLX MICROSCOPIC    Collection Time: 02/09/19  9:11 AM   Result Value Ref Range    Color RED      Appearance CLOUDY      Specific gravity 1.010 1.003 - 1.030      pH (UA) >9.0 (H) 5.0 - 8.0    Protein >300 (A) NEG mg/dL    Glucose NEGATIVE  NEG mg/dL    Ketone TRACE (A) NEG mg/dL    Bilirubin SMALL (A) NEG      Blood LARGE (A) NEG      Urobilinogen 1.0 0.2 - 1.0 EU/dL    Nitrites POSITIVE (A) NEG      Leukocyte Esterase LARGE (A) NEG     URINE MICROSCOPIC ONLY    Collection Time: 02/09/19  9:11 AM   Result Value Ref Range    WBC 4 to 10 0 - 5 /hpf    RBC TOO NUMEROUS TO COUNT 0 - 5 /hpf    Epithelial cells FEW 0 - 5 /lpf    Bacteria 3+ (A) NEG /hpf   POTASSIUM    Collection Time: 02/09/19 11:00 AM   Result Value Ref Range    Potassium 4.4 3.5 - 5.5 mmol/L       Radiologic Studies -   No orders to display     CT Results  (Last 48 hours)    None        CXR Results  (Last 48 hours)    None            Medical Decision Making   I am the first provider for this patient. I reviewed the vital signs, available nursing notes, past medical history, past surgical history, family history and social history. Vital Signs-Reviewed the patient's vital signs. Pulse Oximetry Analysis - 100% on RA     Cardiac Monitor:  Rate: 74 bpm  Rhythm: NSR    Records Reviewed: Nursing Notes    Provider Notes:   77 y.o. female hx of recently dx'ed DVT on Eliquis and chronic colostomy and nephrostomy presenting for hematuria in her nephrostomy bag.  On exam, she is afebrile with appropriate vital signs and notable hematuria in her nephrostomy bag but a benign abd exam. Will check HGB and clotters and will require discussion with her urologist Dr. Osuna Frederick. Procedures:  Procedures    ED Course:   8:27 AM Initial assessment performed. The patients presenting problems have been discussed, and they are in agreement with the care plan formulated and outlined with them. I have encouraged them to ask questions as they arise throughout their visit. 11:58 AM K hemolyzed, rpt 4.4 Hgb stable at 12.5. Labwork + for nitrates and leuks, few bacteria, many RBCs. Discussed patient's history, exam, and available diagnostics results with Radha Anderson MD, urology, who states this is likely chronic contaminant from nephrostomy. Recommend giving Rocephin and obtaining urine culture. If thin hematuria, this is likely normal due to the Eliquis. Diagnosis and Disposition       DISCHARGE NOTE:  12:03 PM  Chip Craig's  results have been reviewed with her. She has been counseled regarding her diagnosis, treatment, and plan. She verbally conveys understanding and agreement of the signs, symptoms, diagnosis, treatment and prognosis and additionally agrees to follow up as discussed. She also agrees with the care-plan and conveys that all of her questions have been answered. I have also provided discharge instructions for her that include: educational information regarding their diagnosis and treatment, and list of reasons why they would want to return to the ED prior to their follow-up appointment, should her condition change. She has been provided with education for proper emergency department utilization. CLINICAL IMPRESSION:    1. Nephrostomy status ([address]    2. Gross hematuria    3. On continuous oral anticoagulation        PLAN:  1. D/C Home  2. Current Discharge Medication List        3.    Follow-up Information     Follow up With Specialties Details Why Lucila Mcdonough MD Urology Schedule an appointment as soon as possible for a visit in 2 days Urology follow up.  [address] [address] [place]      [facility] EMERGENCY DEPT Emergency Medicine  As needed, if symptoms worsen [address] [facility] [address]  [phone]        ___________________________   Attestations:     SCRIBE ATTESTATION:  This note is prepared by Dalia Hudson, acting as Scribe for Willi Mead DO.    PROVIDER ATTESTATION:  Willi Mead DO: The scribe's documentation has been prepared under my direction and personally reviewed by me in its entirety.  I confirm that the note above accurately reflects all work, treatment, procedures, and medical decision making performed by me.   _______________________________

## 2019-02-11 LAB
BACTERIA SPEC CULT: ABNORMAL
SERVICE CMNT-IMP: ABNORMAL

## 2019-02-11 NOTE — CALL BACK NOTE
Spoke to patients nurse Craig at [address] [place] [address] who would like results faxed to her so she can discuss urine culture with the provider.  Results faxed as requested    Jose De Jesus ZHAO 12:10 PM 2/11/2019

## 2019-02-11 NOTE — CALL BACK NOTE
Attempted to call patient with positive urine culture. Spoke to patients daughter in law who will notify patient to call the Ed for results.  Review of records shows patient was not treated    Culture result: (Abnormal)      Final   >100,000 COLONIES/mL PROTEUS MIRABILIS Abnormal     Susceptibility     Proteus mirabilis     Antibiotic Interpretation Value Method Comment   Ampicillin ($) Resistant <=2 ug/mL SHANELL    Ampicillin/sulbactam ($) Susceptible <=2 ug/mL SHANELL    Cefazolin ($) Susceptible 8 ug/mL SHANELL    Cefepime ($$) Susceptible <=1 ug/mL SHANELL    Ceftazidime ($) Susceptible <=1 ug/mL SHANELL    Ceftriaxone ($) Susceptible <=1 ug/mL SHANELL    Ciprofloxacin ($) Resistant >=4 ug/mL SHANELL    Gentamicin ($) Susceptible <=1 ug/mL SHANELL    Imipenem Susceptible 2 ug/mL SHANELL    Levofloxacin ($) Resistant >=8 ug/mL SHANELL    Nitrofurantoin Resistant 128 ug/mL SHANELL    Piperacillin/Tazobac ($) Susceptible <=4 ug/mL SHANELL    Tobramycin ($) Susceptible <=1 ug/mL SHANELL    Trimeth-Sulfamethoxa Resistant >=320 ug/mL SHANELL    Susceptibility     Proteus mirabilis (1)     Antibiotic Interpretation Method Status    Ampicillin ($) Resistant SHANELL Final    Ampicillin/sulbactam ($) Susceptible SHANELL Final    Cefazolin ($) Susceptible SHANELL Final    Cefepime ($$) Susceptible SHANELL Final    Ceftazidime ($) Susceptible SHANELL Final    Ceftriaxone ($) Susceptible SHANELL Final    Ciprofloxacin ($) Resistant SHANELL Final    Gentamicin ($) Susceptible SHANELL Final    Imipenem Susceptible SHANELL Final    Levofloxacin ($) Resistant SHANELL Final    Nitrofurantoin Resistant SHANELL Final    Piperacillin/Tazobac ($) Susceptible SHANELL Final    Tobramycin ($) Susceptible SHANELL Final    Trimeth-Sulfamethoxa Resistant SHANELL       Emily Smith FNP-BC 8:20 AM 2/11/19

## 2019-02-14 ENCOUNTER — HOSPITAL ENCOUNTER (OUTPATIENT)
Dept: CT IMAGING | Age: 79
Discharge: HOME | End: 2019-02-14
Attending: INTERNAL MEDICINE
Payer: MEDICARE

## 2019-02-14 DIAGNOSIS — Q27.9 CONGENITAL VASCULAR DISEASE: ICD-10-CM

## 2019-02-14 PROCEDURE — 73706 CT ANGIO LWR EXTR W/O&W/DYE: CPT

## 2019-02-14 PROCEDURE — 74011636320 HC RX REV CODE- 636/320: Performed by: INTERNAL MEDICINE

## 2019-03-14 NOTE — PROGRESS NOTES
Son aware of appt time of 1030. Attempting to call [facility] but no answer at this time. Will continue to attempt to reach.

## 2019-03-18 ENCOUNTER — HOSPITAL ENCOUNTER (OUTPATIENT)
Dept: LAB | Age: 79
Discharge: HOME | End: 2019-03-18
Payer: MEDICARE

## 2019-03-18 LAB
FLUAV AG NPH QL IA: NEGATIVE
FLUBV AG NOSE QL IA: NEGATIVE

## 2019-03-18 PROCEDURE — 87804 INFLUENZA ASSAY W/OPTIC: CPT

## 2019-03-19 RX ORDER — DIPHENHYDRAMINE HCL 25 MG
50 CAPSULE ORAL
COMMUNITY
End: 2020-06-26

## 2019-03-21 ENCOUNTER — HOSPITAL ENCOUNTER (OUTPATIENT)
Dept: INTERVENTIONAL RADIOLOGY/VASCULAR | Age: 79
Discharge: HOME | End: 2019-03-21
Attending: UROLOGY
Payer: MEDICARE

## 2019-03-21 DIAGNOSIS — N13.39 OTHER HYDRONEPHROSIS: ICD-10-CM

## 2019-03-21 PROCEDURE — 74011636320 HC RX REV CODE- 636/320: Performed by: UROLOGY

## 2019-03-21 PROCEDURE — 74011250636 HC RX REV CODE- 250/636: Performed by: UROLOGY

## 2019-03-21 PROCEDURE — 50435 EXCHANGE NEPHROSTOMY CATH: CPT

## 2019-03-21 RX ORDER — SODIUM CHLORIDE 9 MG/ML
500 INJECTION, SOLUTION INTRAVENOUS
Status: COMPLETED | OUTPATIENT
Start: 2019-03-21 | End: 2019-03-21

## 2019-03-21 RX ADMIN — SODIUM CHLORIDE 500 ML: 900 INJECTION, SOLUTION INTRAVENOUS at 15:37

## 2019-03-21 RX ADMIN — IOPAMIDOL 3 ML: 612 INJECTION, SOLUTION INTRAVENOUS at 15:37

## 2019-03-21 NOTE — PROCEDURES
Vascular & Interventional Radiology Brief Procedure Note    Interventional Radiologist: Félix Kemp MD    Pre-operative Diagnosis:  R PCN exchange    Post-operative Diagnosis: Same as pre-op dx    Procedure(s) Performed:  same    Anesthesia:  Local and Moderate Sedation    Findings:  Uncomplicated R PCN exchange     Complications: None    Estimated Blood Loss:  minimal    Tubes and Drains: None    Specimens: None    Condition: Good     Félix Kemp MD  [address] [place] [address],[facility]  Vascular & Interventional Radiology  3/21/2019

## 2019-03-21 NOTE — H&P
The patient is an appropriate candidate to undergo PCN exchange. Patient assessed immediately prior to induction. Anesthesia plan as follows: Local/No Anesthesia. History and Physical update:  H&P was reviewed and the patient was examined. No changes have occurred in the patient's condition since the H&P was completed.     Jsesica Small MD  Vascular & Interventional Radiology  [address] [place] [address] [facility]  3/21/2019

## 2019-03-21 NOTE — PROGRESS NOTES
TRANSFER - OUT REPORT:    Verbal report given to Laila Mcfarlane (nurse at [facility]) on Candelario Host  being transferred to [facility] for routine progression of care. Report consisted of patients Situation, Background, Assessment and   Recommendations(SBAR). Information from the following report(s) SBAR/KARDEX/MARwas reviewed with the receiving nurse. Lines:       Opportunity for questions and clarification was provided. Patient transported with:  Pt discharged out of angio in baseline condition in wheelchair to care of pt's son, Georgie Barroso,  and the [facility] CNA.

## 2019-03-21 NOTE — PROGRESS NOTES
Pt arrived on unit; Pt Alert and Oriented; Consent signed; Pt transferred from wheelchair to stretcher to treatment table w/4person assist for procedure.

## 2019-04-03 ENCOUNTER — HOSPITAL ENCOUNTER (OUTPATIENT)
Dept: LAB | Age: 79
Discharge: HOME | End: 2019-04-03
Payer: MEDICARE

## 2019-04-03 LAB
APPEARANCE UR: ABNORMAL
BACTERIA URNS QL MICRO: ABNORMAL /HPF
BILIRUB UR QL: NEGATIVE
COLOR UR: YELLOW
GLUCOSE UR STRIP.AUTO-MCNC: 500 MG/DL
HGB UR QL STRIP: ABNORMAL
KETONES UR QL STRIP.AUTO: NEGATIVE MG/DL
LEUKOCYTE ESTERASE UR QL STRIP.AUTO: ABNORMAL
NITRITE UR QL STRIP.AUTO: NEGATIVE
PH UR STRIP: >8.5 [PH] (ref 5–8)
PROT UR STRIP-MCNC: ABNORMAL MG/DL
RBC #/AREA URNS HPF: ABNORMAL /HPF (ref 0–5)
SP GR UR REFRACTOMETRY: 1.01 (ref 1–1.03)
TRI-PHOS CRY URNS QL MICRO: ABNORMAL
UROBILINOGEN UR QL STRIP.AUTO: 0.2 EU/DL (ref 0.2–1)
WBC URNS QL MICRO: ABNORMAL /HPF (ref 0–5)

## 2019-04-03 PROCEDURE — 87086 URINE CULTURE/COLONY COUNT: CPT

## 2019-04-03 PROCEDURE — 87077 CULTURE AEROBIC IDENTIFY: CPT

## 2019-04-03 PROCEDURE — 81001 URINALYSIS AUTO W/SCOPE: CPT

## 2019-04-03 PROCEDURE — 87186 SC STD MICRODIL/AGAR DIL: CPT

## 2019-04-05 LAB
BACTERIA SPEC CULT: ABNORMAL
SERVICE CMNT-IMP: ABNORMAL

## 2019-04-11 ENCOUNTER — APPOINTMENT (OUTPATIENT)
Dept: CT IMAGING | Age: 79
DRG: 699 | End: 2019-04-11
Attending: PHYSICIAN ASSISTANT
Payer: MEDICARE

## 2019-04-11 ENCOUNTER — HOSPITAL ENCOUNTER (INPATIENT)
Age: 79
LOS: 4 days | Discharge: NURSING, REHAB OR LONG-TERM CARE | DRG: 699 | End: 2019-04-15
Attending: EMERGENCY MEDICINE | Admitting: INTERNAL MEDICINE
Payer: MEDICARE

## 2019-04-11 ENCOUNTER — APPOINTMENT (OUTPATIENT)
Dept: GENERAL RADIOLOGY | Age: 79
DRG: 699 | End: 2019-04-11
Attending: PHYSICIAN ASSISTANT
Payer: MEDICARE

## 2019-04-11 DIAGNOSIS — R10.9 RIGHT FLANK PAIN: ICD-10-CM

## 2019-04-11 DIAGNOSIS — N12 PYELONEPHRITIS: Primary | ICD-10-CM

## 2019-04-11 DIAGNOSIS — N99.528 NEPHROSTOMY COMPLICATION (HCC): ICD-10-CM

## 2019-04-11 DIAGNOSIS — E87.5 ACUTE HYPERKALEMIA: ICD-10-CM

## 2019-04-11 DIAGNOSIS — F41.9 ANXIETY: ICD-10-CM

## 2019-04-11 LAB
ALBUMIN SERPL-MCNC: 3.1 G/DL (ref 3.4–5)
ALBUMIN/GLOB SERPL: 0.8 {RATIO} (ref 0.8–1.7)
ALP SERPL-CCNC: 102 U/L (ref 45–117)
ALT SERPL-CCNC: 30 U/L (ref 13–56)
ANION GAP SERPL CALC-SCNC: 8 MMOL/L (ref 3–18)
APPEARANCE UR: ABNORMAL
APPEARANCE UR: ABNORMAL
AST SERPL-CCNC: 30 U/L (ref 15–37)
BACTERIA URNS QL MICRO: ABNORMAL /HPF
BACTERIA URNS QL MICRO: ABNORMAL /HPF
BASOPHILS # BLD: 0 K/UL (ref 0–0.1)
BASOPHILS NFR BLD: 0 % (ref 0–2)
BILIRUB SERPL-MCNC: 0.5 MG/DL (ref 0.2–1)
BILIRUB UR QL: NEGATIVE
BILIRUB UR QL: NEGATIVE
BUN SERPL-MCNC: 40 MG/DL (ref 7–18)
BUN/CREAT SERPL: 31 (ref 12–20)
CALCIUM SERPL-MCNC: 8.9 MG/DL (ref 8.5–10.1)
CHLORIDE SERPL-SCNC: 103 MMOL/L (ref 100–108)
CK MB CFR SERPL CALC: NORMAL % (ref 0–4)
CK MB SERPL-MCNC: <1 NG/ML (ref 5–25)
CK SERPL-CCNC: 56 U/L (ref 26–192)
CO2 SERPL-SCNC: 22 MMOL/L (ref 21–32)
COLOR UR: ABNORMAL
COLOR UR: YELLOW
CREAT SERPL-MCNC: 1.28 MG/DL (ref 0.6–1.3)
DIFFERENTIAL METHOD BLD: ABNORMAL
EOSINOPHIL # BLD: 0.4 K/UL (ref 0–0.4)
EOSINOPHIL NFR BLD: 3 % (ref 0–5)
EPITH CASTS URNS QL MICRO: ABNORMAL /LPF (ref 0–5)
EPITH CASTS URNS QL MICRO: ABNORMAL /LPF (ref 0–5)
ERYTHROCYTE [DISTWIDTH] IN BLOOD BY AUTOMATED COUNT: 13.9 % (ref 11.6–14.5)
GLOBULIN SER CALC-MCNC: 3.8 G/DL (ref 2–4)
GLUCOSE SERPL-MCNC: 158 MG/DL (ref 74–99)
GLUCOSE UR STRIP.AUTO-MCNC: 250 MG/DL
GLUCOSE UR STRIP.AUTO-MCNC: NEGATIVE MG/DL
HCT VFR BLD AUTO: 42.9 % (ref 35–45)
HGB BLD-MCNC: 14.2 G/DL (ref 12–16)
HGB UR QL STRIP: ABNORMAL
HGB UR QL STRIP: NEGATIVE
KETONES UR QL STRIP.AUTO: NEGATIVE MG/DL
KETONES UR QL STRIP.AUTO: NEGATIVE MG/DL
LACTATE BLD-SCNC: 0.82 MMOL/L (ref 0.4–2)
LEUKOCYTE ESTERASE UR QL STRIP.AUTO: ABNORMAL
LEUKOCYTE ESTERASE UR QL STRIP.AUTO: ABNORMAL
LYMPHOCYTES # BLD: 1.3 K/UL (ref 0.9–3.6)
LYMPHOCYTES NFR BLD: 8 % (ref 21–52)
MCH RBC QN AUTO: 29.4 PG (ref 24–34)
MCHC RBC AUTO-ENTMCNC: 33.1 G/DL (ref 31–37)
MCV RBC AUTO: 88.8 FL (ref 74–97)
MONOCYTES # BLD: 1 K/UL (ref 0.05–1.2)
MONOCYTES NFR BLD: 6 % (ref 3–10)
NEUTS SEG # BLD: 13.4 K/UL (ref 1.8–8)
NEUTS SEG NFR BLD: 83 % (ref 40–73)
NITRITE UR QL STRIP.AUTO: NEGATIVE
NITRITE UR QL STRIP.AUTO: POSITIVE
PH UR STRIP: >8.5 [PH] (ref 5–8)
PH UR STRIP: >8.5 [PH] (ref 5–8)
PLATELET # BLD AUTO: 311 K/UL (ref 135–420)
PMV BLD AUTO: 9.6 FL (ref 9.2–11.8)
POTASSIUM SERPL-SCNC: 6 MMOL/L (ref 3.5–5.5)
PROT SERPL-MCNC: 6.9 G/DL (ref 6.4–8.2)
PROT UR STRIP-MCNC: 100 MG/DL
PROT UR STRIP-MCNC: 300 MG/DL
RBC # BLD AUTO: 4.83 M/UL (ref 4.2–5.3)
RBC #/AREA URNS HPF: ABNORMAL /HPF (ref 0–5)
RBC #/AREA URNS HPF: NEGATIVE /HPF (ref 0–5)
SODIUM SERPL-SCNC: 133 MMOL/L (ref 136–145)
SP GR UR REFRACTOMETRY: 1.01 (ref 1–1.03)
SP GR UR REFRACTOMETRY: 1.01 (ref 1–1.03)
TRI-PHOS CRY URNS QL MICRO: ABNORMAL
TRI-PHOS CRY URNS QL MICRO: ABNORMAL
TROPONIN I SERPL-MCNC: <0.02 NG/ML (ref 0–0.04)
UROBILINOGEN UR QL STRIP.AUTO: 0.2 EU/DL (ref 0.2–1)
UROBILINOGEN UR QL STRIP.AUTO: 1 EU/DL (ref 0.2–1)
WBC # BLD AUTO: 16.1 K/UL (ref 4.6–13.2)
WBC URNS QL MICRO: ABNORMAL /HPF (ref 0–5)
WBC URNS QL MICRO: NEGATIVE /HPF (ref 0–5)

## 2019-04-11 PROCEDURE — 96374 THER/PROPH/DIAG INJ IV PUSH: CPT

## 2019-04-11 PROCEDURE — 96375 TX/PRO/DX INJ NEW DRUG ADDON: CPT

## 2019-04-11 PROCEDURE — 99285 EMERGENCY DEPT VISIT HI MDM: CPT

## 2019-04-11 PROCEDURE — 74011636637 HC RX REV CODE- 636/637: Performed by: PHYSICIAN ASSISTANT

## 2019-04-11 PROCEDURE — 96361 HYDRATE IV INFUSION ADD-ON: CPT

## 2019-04-11 PROCEDURE — 85025 COMPLETE CBC W/AUTO DIFF WBC: CPT

## 2019-04-11 PROCEDURE — 74011250636 HC RX REV CODE- 250/636: Performed by: PHYSICIAN ASSISTANT

## 2019-04-11 PROCEDURE — 83605 ASSAY OF LACTIC ACID: CPT

## 2019-04-11 PROCEDURE — 74011000250 HC RX REV CODE- 250: Performed by: PHYSICIAN ASSISTANT

## 2019-04-11 PROCEDURE — 82550 ASSAY OF CK (CPK): CPT

## 2019-04-11 PROCEDURE — 87086 URINE CULTURE/COLONY COUNT: CPT

## 2019-04-11 PROCEDURE — 81001 URINALYSIS AUTO W/SCOPE: CPT

## 2019-04-11 PROCEDURE — 74176 CT ABD & PELVIS W/O CONTRAST: CPT

## 2019-04-11 PROCEDURE — 74011250637 HC RX REV CODE- 250/637: Performed by: PHYSICIAN ASSISTANT

## 2019-04-11 PROCEDURE — 74018 RADEX ABDOMEN 1 VIEW: CPT

## 2019-04-11 PROCEDURE — 93005 ELECTROCARDIOGRAM TRACING: CPT

## 2019-04-11 PROCEDURE — 65660000000 HC RM CCU STEPDOWN

## 2019-04-11 PROCEDURE — 87186 SC STD MICRODIL/AGAR DIL: CPT

## 2019-04-11 PROCEDURE — 87077 CULTURE AEROBIC IDENTIFY: CPT

## 2019-04-11 PROCEDURE — 80053 COMPREHEN METABOLIC PANEL: CPT

## 2019-04-11 RX ORDER — ONDANSETRON 2 MG/ML
4 INJECTION INTRAMUSCULAR; INTRAVENOUS
Status: COMPLETED | OUTPATIENT
Start: 2019-04-11 | End: 2019-04-11

## 2019-04-11 RX ORDER — HYDROCODONE BITARTRATE AND ACETAMINOPHEN 5; 325 MG/1; MG/1
1 TABLET ORAL
Status: COMPLETED | OUTPATIENT
Start: 2019-04-11 | End: 2019-04-11

## 2019-04-11 RX ORDER — DEXTROSE 50 % IN WATER (D50W) INTRAVENOUS SYRINGE
25
Status: COMPLETED | OUTPATIENT
Start: 2019-04-11 | End: 2019-04-11

## 2019-04-11 RX ORDER — ONDANSETRON 4 MG/1
4 TABLET, FILM COATED ORAL
COMMUNITY
End: 2020-06-26

## 2019-04-11 RX ADMIN — SODIUM CHLORIDE 500 ML: 900 INJECTION, SOLUTION INTRAVENOUS at 20:41

## 2019-04-11 RX ADMIN — HUMAN INSULIN 5 UNITS: 100 INJECTION, SOLUTION SUBCUTANEOUS at 21:33

## 2019-04-11 RX ADMIN — CEFTRIAXONE 1 G: 1 INJECTION, POWDER, FOR SOLUTION INTRAMUSCULAR; INTRAVENOUS at 20:59

## 2019-04-11 RX ADMIN — SODIUM CHLORIDE 500 ML: 900 INJECTION, SOLUTION INTRAVENOUS at 16:07

## 2019-04-11 RX ADMIN — DEXTROSE MONOHYDRATE 25 G: 25 INJECTION, SOLUTION INTRAVENOUS at 21:25

## 2019-04-11 RX ADMIN — HYDROCODONE BITARTRATE AND ACETAMINOPHEN 1 TABLET: 5; 325 TABLET ORAL at 18:31

## 2019-04-11 RX ADMIN — ONDANSETRON 4 MG: 2 INJECTION INTRAMUSCULAR; INTRAVENOUS at 16:10

## 2019-04-11 NOTE — ED PROVIDER NOTES
EMERGENCY DEPARTMENT HISTORY AND PHYSICAL EXAM    Date: 4/11/2019  Patient Name: Nayeli Piña    History of Presenting Illness     Chief Complaint   Patient presents with    Flank Pain         History Provided By: Patient     Nayeli Piña is a 77 y.o. legally blind female with PMHX DM, HTN, and vaginal and colon CA and PSHX nephrectomy, colostomy, and nephrostomy placement (placed 2/8/2011) presents to the emergency department C/O decreased urine output to her nephrostomy bag, onset this morning. Associated sxs include intermittent nausea and lower abd pain. Urologist is Dr. Cheng Villalba per EMR. Pt denies fever, vomiting, CP, SOB, and any other sxs or complaints. PCP: Tomás Callaway MD    Current Facility-Administered Medications   Medication Dose Route Frequency Provider Last Rate Last Dose    dextrose (D50W) injection syrg 25 g  25 g IntraVENous NOW Prashant Baum PA        insulin regular (NOVOLIN R, HUMULIN R) injection 5 Units  5 Units IntraVENous ONCE Prashant Baum PA         Current Outpatient Medications   Medication Sig Dispense Refill    ondansetron hcl (ZOFRAN) 4 mg tablet Take 4 mg by mouth every eight (8) hours as needed for Nausea.  insulin regular (NOVOLIN R, HUMULIN R) 100 unit/mL injection by SubCUTAneous route.  diphenhydrAMINE (BENADRYL) 25 mg capsule Take 50 mg by mouth every six (6) hours as needed for Itching.  apixaban (ELIQUIS PO) Take 10 mg by mouth two (2) times a day.  pollen extracts (PROSTAT PO) Take  by mouth.  HYDROcodone-acetaminophen (NORCO) 5-325 mg per tablet Take 1 Tab by mouth every twelve (12) hours as needed for Pain (for pain related to [address]). Max Daily Amount: 2 Tabs. 4 Tab 0    LORazepam (ATIVAN) 0.5 mg tablet Take 1 Tab by mouth daily as needed for Anxiety. 2 Tab 0    naloxone (NARCAN) 4 mg/actuation nasal spray Use 1 spray intranasally, then discard.  Repeat with new spray every 2 min as needed for opioid overdose symptoms, alternating nostrils. 1 Each 0    ondansetron (ZOFRAN ODT) 8 mg disintegrating tablet Take 8 mg by mouth every eight (8) hours as needed for Nausea.  tiZANidine (ZANAFLEX) 2 mg capsule Take 2 mg by mouth two (2) times a day.  traZODone (DESYREL) 50 mg tablet Take 50 mg by mouth nightly.  nystatin (MYCOSTATIN) powder Apply 100,000 Units to affected area four (4) times daily.  camphor-menthol (SARNA) 0.5-0.5 % lotion Apply  to affected area daily.  docusate sodium (COLACE) 100 mg capsule Take 100 mg by mouth two (2) times a day.  hydrocortisone (HYTONE) 2.5 % topical cream Apply  to affected area two (2) times a day. use thin layer      timolol (TIMOPTIC) 0.5 % ophthalmic solution 1 Drop two (2) times a day.  brimonidine-timolol (COMBIGAN) 0.2-0.5 % drop ophthalmic solution Administer 1 Drop to both eyes every twelve (12) hours.  bisacodyl (DULCOLAX, BISACODYL,) 10 mg suppository Insert  into rectum daily as needed. Indications: constipation      gabapentin (NEURONTIN) 300 mg capsule Take 300 mg by mouth two (2) times a day.  metoprolol succinate (TOPROL-XL) 50 mg XL tablet Take 50 mg by mouth daily.  magnesium hydroxide (MILK OF MAGNESIA CONCENTRATED) 2,400 mg/10 mL susp suspension Take 10 mL by mouth.  alum-mag hydroxide-simeth (MYLANTA) 200-200-20 mg/5 mL susp Take 30 mL by mouth four (4) times daily as needed.  insulin aspart U-100 (NOVOLOG FLEXPEN U-100 INSULIN) 100 unit/mL inpn by SubCUTAneous route. Sliding scale      sodium bicarbonate 650 mg tablet Take 650 mg by mouth two (2) times a day.  levothyroxine (SYNTHROID) 50 mcg tablet Take  by mouth Daily (before breakfast).  acetaminophen (TYLENOL) 650 mg TbER Take 650 mg by mouth every six (6) hours as needed for Pain or Fever.  insulin glargine (LANTUS) 100 unit/mL injection 8 units SQ QHS 1 Vial 0    atorvastatin calcium (ATORVASTATIN PO) Take 20 mg by mouth daily. Past History     Past Medical History:  Past Medical History:   Diagnosis Date    Cancer ([facility] [address]     vaginal and colon cancer    Chronic pain     arthritis    Diabetes ([facility] [address]     Hypertension     Legally blind     Thyroid disease        Past Surgical History:  Past Surgical History:   Procedure Laterality Date    HX COLOSTOMY      HX OTHER SURGICAL      kidney drainage tube    HX UROLOGICAL      kidney removal    IR EXCHANGE NEPHRO PERC RT SI  1/28/2019    IR EXCHANGE NEPHRO PERC RT SI  3/21/2019       Family History:  History reviewed. No pertinent family history. Social History:  Social History     Tobacco Use    Smoking status: Never Smoker    Smokeless tobacco: Never Used   Substance Use Topics    Alcohol use: No    Drug use: Not on file       Allergies: Allergies   Allergen Reactions    Morphine Nausea and Vomiting    Pcn [Penicillins] Rash         Review of Systems   Review of Systems   Constitutional: Positive for appetite change. Negative for fever. Respiratory: Negative for shortness of breath. Cardiovascular: Negative for chest pain. Gastrointestinal: Positive for abdominal pain and nausea. Negative for vomiting. Genitourinary: Positive for decreased urine volume. All other systems reviewed and are negative. Physical Exam     Vitals:    04/11/19 1456 04/11/19 1525 04/11/19 1730 04/11/19 1900   BP: 154/74  (!) 182/98 166/81   Pulse: 67   70   Resp: 16   18   Temp: 98.1 °F (36.7 °C)      SpO2: 100%  100% 100%   Weight:  73.8 kg (162 lb 11.2 oz)     Height:  5' 2\" (1.575 m)       Physical Exam   Constitutional: She is oriented to person, place, and time. Obese female reclined on stretcher in NAD   HENT:   Head: Normocephalic and atraumatic. Mouth/Throat: Mucous membranes are dry. Eyes: Pupils are equal, round, and reactive to light. Conjunctivae and EOM are normal.   Neck: Normal range of motion. Neck supple. Cardiovascular: Regular rhythm.    Pulmonary/Chest: Effort normal and breath sounds normal.   Abdominal: Soft. Bowel sounds are normal.   Nephrostomy bag with very little light yellow urine noted; colonostomy bag in place is empty; both dressings to sites are clean & dry   Musculoskeletal: Normal range of motion. Neurological: She is alert and oriented to person, place, and time. Skin: Skin is warm and dry. Psychiatric: She has a normal mood and affect. Her behavior is normal.   Nursing note and vitals reviewed. Diagnostic Study Results     Labs -     Recent Results (from the past 12 hour(s))   URINALYSIS W/ RFLX MICROSCOPIC    Collection Time: 04/11/19  3:30 PM   Result Value Ref Range    Color YELLOW      Appearance TURBID      Specific gravity 1.015 1.005 - 1.030      pH (UA) >8.5 (H) 5.0 - 8.0    Protein 300 (A) NEG mg/dL    Glucose 250 (A) NEG mg/dL    Ketone NEGATIVE  NEG mg/dL    Bilirubin NEGATIVE  NEG      Blood NEGATIVE  NEG      Urobilinogen 1.0 0.2 - 1.0 EU/dL    Nitrites NEGATIVE  NEG      Leukocyte Esterase LARGE (A) NEG     URINE MICROSCOPIC ONLY    Collection Time: 04/11/19  3:30 PM   Result Value Ref Range    WBC NEGATIVE  0 - 5 /hpf    RBC NEGATIVE  0 - 5 /hpf    Epithelial cells FEW 0 - 5 /lpf    Bacteria 4+ (A) NEG /hpf    Triple Phosphate crystals 4+ (A) NEG   CBC WITH AUTOMATED DIFF    Collection Time: 04/11/19  4:00 PM   Result Value Ref Range    WBC 16.1 (H) 4.6 - 13.2 K/uL    RBC 4.83 4.20 - 5.30 M/uL    HGB 14.2 12.0 - 16.0 g/dL    HCT 42.9 35.0 - 45.0 %    MCV 88.8 74.0 - 97.0 FL    MCH 29.4 24.0 - 34.0 PG    MCHC 33.1 31.0 - 37.0 g/dL    RDW 13.9 11.6 - 14.5 %    PLATELET 767 614 - 608 K/uL    MPV 9.6 9.2 - 11.8 FL    NEUTROPHILS 83 (H) 40 - 73 %    LYMPHOCYTES 8 (L) 21 - 52 %    MONOCYTES 6 3 - 10 %    EOSINOPHILS 3 0 - 5 %    BASOPHILS 0 0 - 2 %    ABS. NEUTROPHILS 13.4 (H) 1.8 - 8.0 K/UL    ABS. LYMPHOCYTES 1.3 0.9 - 3.6 K/UL    ABS. MONOCYTES 1.0 0.05 - 1.2 K/UL    ABS. EOSINOPHILS 0.4 0.0 - 0.4 K/UL    ABS.  BASOPHILS 0.0 0.0 - 0.1 K/UL    DF AUTOMATED     METABOLIC PANEL, COMPREHENSIVE    Collection Time: 04/11/19  6:07 PM   Result Value Ref Range    Sodium 133 (L) 136 - 145 mmol/L    Potassium 6.0 (H) 3.5 - 5.5 mmol/L    Chloride 103 100 - 108 mmol/L    CO2 22 21 - 32 mmol/L    Anion gap 8 3.0 - 18 mmol/L    Glucose 158 (H) 74 - 99 mg/dL    BUN 40 (H) 7.0 - 18 MG/DL    Creatinine 1.28 0.6 - 1.3 MG/DL    BUN/Creatinine ratio 31 (H) 12 - 20      GFR est AA 49 (L) >60 ml/min/1.73m2    GFR est non-AA 40 (L) >60 ml/min/1.73m2    Calcium 8.9 8.5 - 10.1 MG/DL    Bilirubin, total 0.5 0.2 - 1.0 MG/DL    ALT (SGPT) 30 13 - 56 U/L    AST (SGOT) 30 15 - 37 U/L    Alk.  phosphatase 102 45 - 117 U/L    Protein, total 6.9 6.4 - 8.2 g/dL    Albumin 3.1 (L) 3.4 - 5.0 g/dL    Globulin 3.8 2.0 - 4.0 g/dL    A-G Ratio 0.8 0.8 - 1.7     CARDIAC PANEL,(CK, CKMB & TROPONIN)    Collection Time: 04/11/19  6:07 PM   Result Value Ref Range    CK 56 26 - 192 U/L    CK - MB <1.0 <3.6 ng/ml    CK-MB Index  0.0 - 4.0 %     CALCULATION NOT PERFORMED WHEN RESULT IS BELOW LINEAR LIMIT    Troponin-I, QT <0.02 0.0 - 0.045 NG/ML   EKG, 12 LEAD, INITIAL    Collection Time: 04/11/19  7:02 PM   Result Value Ref Range    Ventricular Rate 69 BPM    Atrial Rate 69 BPM    P-R Interval 160 ms    QRS Duration 82 ms    Q-T Interval 384 ms    QTC Calculation (Bezet) 411 ms    Calculated P Axis 49 degrees    Calculated R Axis 55 degrees    Calculated T Axis 60 degrees    Diagnosis       Normal sinus rhythm  Normal ECG  When compared with ECG of 18-MAY-2018 15:48,  No significant change was found     URINALYSIS W/ RFLX MICROSCOPIC    Collection Time: 04/11/19  8:30 PM   Result Value Ref Range    Color ORANGE      Appearance CLOUDY      Specific gravity 1.013 1.005 - 1.030      pH (UA) >8.5 (H) 5.0 - 8.0    Protein 100 (A) NEG mg/dL    Glucose NEGATIVE  NEG mg/dL    Ketone NEGATIVE  NEG mg/dL    Bilirubin NEGATIVE  NEG      Blood LARGE (A) NEG      Urobilinogen 0.2 0.2 - 1.0 EU/dL Nitrites POSITIVE (A) NEG      Leukocyte Esterase LARGE (A) NEG     URINE MICROSCOPIC ONLY    Collection Time: 04/11/19  8:30 PM   Result Value Ref Range    WBC 2 to 4 0 - 5 /hpf    RBC 60 to 80 0 - 5 /hpf    Epithelial cells OCCASIONAL 0 - 5 /lpf    Bacteria 2+ (A) NEG /hpf    Triple Phosphate crystals FEW (A) NEG     POC LACTIC ACID    Collection Time: 04/11/19  8:45 PM   Result Value Ref Range    Lactic Acid (POC) 0.82 0.40 - 2.00 mmol/L       Radiologic Studies -   CT ABD PELV WO CONT   Final Result   IMPRESSION:      Right kidney demonstrates a nephrostomy tube in place within the lower portion   right renal pelvis, however given the moderate to large hydronephrosis it is   unclear whether this tube is functioning. There is perinephric stranding around   the right kidney correlate with any symptoms of pyelonephritis. Left nephrectomy and likely adrenalectomy. Prior colectomy with left lower quadrant colostomy in place. Stable presacral stranding which may reflect radiation fibrosis      XR ABD (KUB)   Final Result   IMPRESSION:      Right percutaneous nephrostomy catheter appears unchanged in position compared   to most recent exchange on 3/21/2019. CT Results  (Last 48 hours)               04/11/19 1720  CT ABD PELV WO CONT Final result    Impression:  IMPRESSION:       Right kidney demonstrates a nephrostomy tube in place within the lower portion   right renal pelvis, however given the moderate to large hydronephrosis it is   unclear whether this tube is functioning. There is perinephric stranding around   the right kidney correlate with any symptoms of pyelonephritis. Left nephrectomy and likely adrenalectomy. Prior colectomy with left lower quadrant colostomy in place.          Stable presacral stranding which may reflect radiation fibrosis       Narrative:  EXAM: CT of the abdomen and pelvis       INDICATION: Right flank pain, right nephrostomy tube dislodged, colostomy,   history of cervical and colon cancer and UTI       COMPARISON: May 18, 2018       TECHNIQUE: Axial CT imaging of the abdomen and pelvis was performed without   intravenous contrast. Multiplanar reformats were generated. One or more dose   reduction techniques were used on this CT: automated exposure control,   adjustment of the mAs and/or kVp according to patient size, and iterative   reconstruction techniques. The specific techniques used on this CT exam have   been documented in the patient's electronic medical record. Digital Imaging and   Communications in Medicine (DICOM) format image data are available to   nonaffiliated external healthcare facilities or entities on a secure, media   free, reciprocally searchable basis with patient authorization for at least a   12-month period after this study. _______________       FINDINGS:       LOWER CHEST: Unremarkable. LIVER, BILIARY: Liver is normal. No biliary dilation. Cholecystectomy       PANCREAS: There is some atrophy of the pancreas. SPLEEN: There is capsular calcification of the spleen. ADRENALS: Right adrenal is normal. Left adrenal not identified and may have been   removed. KIDNEYS: Left nephrectomy. Right kidney is enlarged with moderate right   hydronephrosis. There is a prominent right extrarenal pelvis. There is   perinephric stranding around the right kidney. No renal calculi seen. There is a   right nephrostomy tube in place looped in the right renal pelvis inferiorly. Right ureter is mildly dilated proximally and difficult to evaluate due to the   beam hardening artifacts from the metallic densities in the right hemipelvis. VASCULATURE: Severe calcific atherosclerosis present. LYMPH NODES: No enlarged lymph nodes. GASTROINTESTINAL TRACT: No bowel dilation or wall thickening.  There has been   resection of the sigmoid and portion the descending colon with a colostomy in   the left lower quadrant. Moderate amount of stool present in the colon. No bowel   obstruction seen. Postsurgical changes are seen in the pelvis anterior to the   sacrum. Evaluation stomach is limited due to under distention. PELVIC ORGANS: Cystectomy and hysterectomy. Presacral stranding seen likely   radiation fibrosis. This appears stable. BONES: No acute or aggressive osseous abnormalities identified. OTHER: Right lower quadrant ventral hernia is again seen in this time it only   contains fat and no bowel wall. .       _______________               CXR Results  (Last 48 hours)    None          Medications given in the ED-  Medications   dextrose (D50W) injection syrg 25 g (has no administration in time range)   insulin regular (NOVOLIN R, HUMULIN R) injection 5 Units (has no administration in time range)   sodium chloride 0.9 % bolus infusion 500 mL (0 mL IntraVENous IV Completed 4/11/19 1710)   ondansetron (ZOFRAN) injection 4 mg (4 mg IntraVENous Given 4/11/19 1610)   HYDROcodone-acetaminophen (NORCO) 5-325 mg per tablet 1 Tab (1 Tab Oral Given 4/11/19 1831)   cefTRIAXone (ROCEPHIN) 1 g in sterile water (preservative free) 10 mL IV syringe (1 g IntraVENous Given 4/11/19 2059)   sodium chloride 0.9 % bolus infusion 500 mL (500 mL IntraVENous New Bag 4/11/19 2041)         Medical Decision Making   I am the first provider for this patient. I reviewed the vital signs, available nursing notes, past medical history, past surgical history, family history and social history. Vital Signs-Reviewed the patient's vital signs. Records Reviewed: Nursing Notes    Procedures:  Procedures    ED Course:   2:57 PM   Initial assessment performed. The patients presenting problems have been discussed, and they are in agreement with the care plan formulated and outlined with them. I have encouraged them to ask questions as they arise throughout their visit.     8:00 PM   Consult with  Citlali who suggest suggest Rocephin IV and another attempt to flush nephrostomy tube.     8:22 PM  Successful nephrostomy tube flushing urine now draining into bag will obtain secondary urinalysis and new urine culture based on this urinary output    9:16 PM  Consult with Dr. Chantel Stewart who agrees to admit the patient to telemetry    Core Measures:  For Hospitalized Patients:    1. Hospitalization Decision Time:  The decision to hospitalize the patient was made by VIRY Cardenas  at 9:19 PM  on 4/11/2019    2. Aspirin: Aspirin was not given because the patient did not present with a stroke at the time of their Emergency Department evaluation    9:19 PM  Patient is being admitted to the hospital by Dr Chantel Stewart. The results of their tests and reasons for their admission have been discussed with them and/or available family. They convey agreement and understanding for the need to be admitted and for their admission diagnosis. CONDITIONS ON ADMISSION:  Sepsis is not present at the time of admission. Deep Vein Thrombosis is not present at the time of admission. Thrombosis is not present at the time of admission. Urinary Tract Infection is present at the time of admission. CLINICAL IMPRESSION:    1. Pyelonephritis    2. Acute hyperkalemia    3. Nephrostomy complication [facility])              Please note that this dictation was completed with [employer], the computer voice recognition software. Quite often unanticipated grammatical, syntax, homophones, and other interpretive errors are inadvertently transcribed by the computer software. Please disregard these errors. Please excuse any errors that have escaped final proofreading.

## 2019-04-11 NOTE — ED TRIAGE NOTES
Pt states that earlier today she felt pressure right side/flank area and worsened.   Given pain med and zofran at 1400 without relief;  Pt states she had similar pain in past with her nephrostomy tube when it was clogged and not draining;

## 2019-04-12 PROBLEM — L97.409 HEEL ULCER (HCC): Status: ACTIVE | Noted: 2019-04-12

## 2019-04-12 PROBLEM — E87.5 HYPERKALEMIA: Status: ACTIVE | Noted: 2019-04-12

## 2019-04-12 LAB
ANION GAP SERPL CALC-SCNC: 10 MMOL/L (ref 3–18)
ANION GAP SERPL CALC-SCNC: 7 MMOL/L (ref 3–18)
ANION GAP SERPL CALC-SCNC: 7 MMOL/L (ref 3–18)
ANION GAP SERPL CALC-SCNC: 9 MMOL/L (ref 3–18)
BUN SERPL-MCNC: 34 MG/DL (ref 7–18)
BUN SERPL-MCNC: 37 MG/DL (ref 7–18)
BUN SERPL-MCNC: 37 MG/DL (ref 7–18)
BUN SERPL-MCNC: 42 MG/DL (ref 7–18)
BUN/CREAT SERPL: 26 (ref 12–20)
BUN/CREAT SERPL: 26 (ref 12–20)
BUN/CREAT SERPL: 28 (ref 12–20)
BUN/CREAT SERPL: 29 (ref 12–20)
CALCIUM SERPL-MCNC: 8 MG/DL (ref 8.5–10.1)
CALCIUM SERPL-MCNC: 8.4 MG/DL (ref 8.5–10.1)
CALCIUM SERPL-MCNC: 8.4 MG/DL (ref 8.5–10.1)
CALCIUM SERPL-MCNC: 8.7 MG/DL (ref 8.5–10.1)
CHLORIDE SERPL-SCNC: 104 MMOL/L (ref 100–108)
CHLORIDE SERPL-SCNC: 109 MMOL/L (ref 100–108)
CHLORIDE SERPL-SCNC: 111 MMOL/L (ref 100–108)
CHLORIDE SERPL-SCNC: 114 MMOL/L (ref 100–108)
CK MB CFR SERPL CALC: NORMAL % (ref 0–4)
CK MB SERPL-MCNC: <1 NG/ML (ref 5–25)
CK SERPL-CCNC: 28 U/L (ref 26–192)
CK SERPL-CCNC: 33 U/L (ref 26–192)
CK SERPL-CCNC: 41 U/L (ref 26–192)
CO2 SERPL-SCNC: 18 MMOL/L (ref 21–32)
CO2 SERPL-SCNC: 20 MMOL/L (ref 21–32)
CO2 SERPL-SCNC: 21 MMOL/L (ref 21–32)
CO2 SERPL-SCNC: 22 MMOL/L (ref 21–32)
CREAT SERPL-MCNC: 1.22 MG/DL (ref 0.6–1.3)
CREAT SERPL-MCNC: 1.4 MG/DL (ref 0.6–1.3)
CREAT SERPL-MCNC: 1.43 MG/DL (ref 0.6–1.3)
CREAT SERPL-MCNC: 1.45 MG/DL (ref 0.6–1.3)
EST. AVERAGE GLUCOSE BLD GHB EST-MCNC: 194 MG/DL
GLUCOSE BLD STRIP.AUTO-MCNC: 177 MG/DL (ref 70–110)
GLUCOSE BLD STRIP.AUTO-MCNC: 220 MG/DL (ref 70–110)
GLUCOSE BLD STRIP.AUTO-MCNC: 227 MG/DL (ref 70–110)
GLUCOSE BLD STRIP.AUTO-MCNC: 301 MG/DL (ref 70–110)
GLUCOSE SERPL-MCNC: 210 MG/DL (ref 74–99)
GLUCOSE SERPL-MCNC: 222 MG/DL (ref 74–99)
GLUCOSE SERPL-MCNC: 226 MG/DL (ref 74–99)
GLUCOSE SERPL-MCNC: 262 MG/DL (ref 74–99)
HBA1C MFR BLD: 8.4 % (ref 4.2–5.6)
POTASSIUM SERPL-SCNC: 4.5 MMOL/L (ref 3.5–5.5)
POTASSIUM SERPL-SCNC: 4.5 MMOL/L (ref 3.5–5.5)
POTASSIUM SERPL-SCNC: 5.1 MMOL/L (ref 3.5–5.5)
POTASSIUM SERPL-SCNC: 6.7 MMOL/L (ref 3.5–5.5)
SODIUM SERPL-SCNC: 133 MMOL/L (ref 136–145)
SODIUM SERPL-SCNC: 137 MMOL/L (ref 136–145)
SODIUM SERPL-SCNC: 140 MMOL/L (ref 136–145)
SODIUM SERPL-SCNC: 142 MMOL/L (ref 136–145)
TROPONIN I SERPL-MCNC: <0.02 NG/ML (ref 0–0.04)

## 2019-04-12 PROCEDURE — 80048 BASIC METABOLIC PNL TOTAL CA: CPT

## 2019-04-12 PROCEDURE — 83036 HEMOGLOBIN GLYCOSYLATED A1C: CPT

## 2019-04-12 PROCEDURE — 77030010541

## 2019-04-12 PROCEDURE — 74011250637 HC RX REV CODE- 250/637: Performed by: INTERNAL MEDICINE

## 2019-04-12 PROCEDURE — 74011636637 HC RX REV CODE- 636/637: Performed by: INTERNAL MEDICINE

## 2019-04-12 PROCEDURE — 65660000000 HC RM CCU STEPDOWN

## 2019-04-12 PROCEDURE — 74011000250 HC RX REV CODE- 250: Performed by: INTERNAL MEDICINE

## 2019-04-12 PROCEDURE — 82962 GLUCOSE BLOOD TEST: CPT

## 2019-04-12 PROCEDURE — 74011000258 HC RX REV CODE- 258: Performed by: INTERNAL MEDICINE

## 2019-04-12 PROCEDURE — 82550 ASSAY OF CK (CPK): CPT

## 2019-04-12 PROCEDURE — 36415 COLL VENOUS BLD VENIPUNCTURE: CPT

## 2019-04-12 PROCEDURE — 74011250636 HC RX REV CODE- 250/636: Performed by: INTERNAL MEDICINE

## 2019-04-12 RX ORDER — ATORVASTATIN CALCIUM 20 MG/1
20 TABLET, FILM COATED ORAL DAILY
Status: DISCONTINUED | OUTPATIENT
Start: 2019-04-12 | End: 2019-04-15 | Stop reason: HOSPADM

## 2019-04-12 RX ORDER — BRIMONIDINE TARTRATE 2 MG/ML
1 SOLUTION/ DROPS OPHTHALMIC 2 TIMES DAILY
Status: DISCONTINUED | OUTPATIENT
Start: 2019-04-12 | End: 2019-04-15 | Stop reason: HOSPADM

## 2019-04-12 RX ORDER — TIMOLOL MALEATE 5 MG/ML
1 SOLUTION/ DROPS OPHTHALMIC 2 TIMES DAILY
Status: DISCONTINUED | OUTPATIENT
Start: 2019-04-12 | End: 2019-04-15 | Stop reason: HOSPADM

## 2019-04-12 RX ORDER — TRAZODONE HYDROCHLORIDE 50 MG/1
50 TABLET ORAL
Status: DISCONTINUED | OUTPATIENT
Start: 2019-04-12 | End: 2019-04-15 | Stop reason: HOSPADM

## 2019-04-12 RX ORDER — SODIUM CHLORIDE 0.9 % (FLUSH) 0.9 %
5-40 SYRINGE (ML) INJECTION AS NEEDED
Status: DISCONTINUED | OUTPATIENT
Start: 2019-04-12 | End: 2019-04-15 | Stop reason: HOSPADM

## 2019-04-12 RX ORDER — DIPHENHYDRAMINE HYDROCHLORIDE 50 MG/ML
12.5 INJECTION, SOLUTION INTRAMUSCULAR; INTRAVENOUS
Status: DISCONTINUED | OUTPATIENT
Start: 2019-04-12 | End: 2019-04-15 | Stop reason: HOSPADM

## 2019-04-12 RX ORDER — TIMOLOL MALEATE 5 MG/ML
1 SOLUTION/ DROPS OPHTHALMIC 2 TIMES DAILY
Status: DISCONTINUED | OUTPATIENT
Start: 2019-04-12 | End: 2019-04-12 | Stop reason: SDUPTHER

## 2019-04-12 RX ORDER — NYSTATIN 100000 [USP'U]/G
POWDER TOPICAL 2 TIMES DAILY
Status: DISCONTINUED | OUTPATIENT
Start: 2019-04-12 | End: 2019-04-15 | Stop reason: HOSPADM

## 2019-04-12 RX ORDER — INSULIN GLARGINE 100 [IU]/ML
8 INJECTION, SOLUTION SUBCUTANEOUS
Status: DISCONTINUED | OUTPATIENT
Start: 2019-04-12 | End: 2019-04-14

## 2019-04-12 RX ORDER — TIZANIDINE 2 MG/1
2 TABLET ORAL
Status: DISCONTINUED | OUTPATIENT
Start: 2019-04-12 | End: 2019-04-15 | Stop reason: HOSPADM

## 2019-04-12 RX ORDER — INSULIN GLARGINE 100 [IU]/ML
8 INJECTION, SOLUTION SUBCUTANEOUS
Status: DISCONTINUED | OUTPATIENT
Start: 2019-04-12 | End: 2019-04-12

## 2019-04-12 RX ORDER — DOCUSATE SODIUM 100 MG/1
100 CAPSULE, LIQUID FILLED ORAL 2 TIMES DAILY
Status: DISCONTINUED | OUTPATIENT
Start: 2019-04-12 | End: 2019-04-15 | Stop reason: HOSPADM

## 2019-04-12 RX ORDER — OXYCODONE AND ACETAMINOPHEN 5; 325 MG/1; MG/1
1 TABLET ORAL
Status: DISCONTINUED | OUTPATIENT
Start: 2019-04-12 | End: 2019-04-15 | Stop reason: HOSPADM

## 2019-04-12 RX ORDER — FACIAL-BODY WIPES
10 EACH TOPICAL DAILY PRN
Status: DISCONTINUED | OUTPATIENT
Start: 2019-04-12 | End: 2019-04-15 | Stop reason: HOSPADM

## 2019-04-12 RX ORDER — ONDANSETRON 2 MG/ML
4 INJECTION INTRAMUSCULAR; INTRAVENOUS
Status: DISCONTINUED | OUTPATIENT
Start: 2019-04-12 | End: 2019-04-15 | Stop reason: HOSPADM

## 2019-04-12 RX ORDER — SODIUM BICARBONATE 650 MG/1
650 TABLET ORAL 2 TIMES DAILY
Status: DISCONTINUED | OUTPATIENT
Start: 2019-04-12 | End: 2019-04-15 | Stop reason: HOSPADM

## 2019-04-12 RX ORDER — LORAZEPAM 0.5 MG/1
0.5 TABLET ORAL
Status: DISCONTINUED | OUTPATIENT
Start: 2019-04-12 | End: 2019-04-15 | Stop reason: HOSPADM

## 2019-04-12 RX ORDER — MAG HYDROX/ALUMINUM HYD/SIMETH 200-200-20
30 SUSPENSION, ORAL (FINAL DOSE FORM) ORAL
Status: DISCONTINUED | OUTPATIENT
Start: 2019-04-12 | End: 2019-04-15 | Stop reason: HOSPADM

## 2019-04-12 RX ORDER — DEXTROSE 50 % IN WATER (D50W) INTRAVENOUS SYRINGE
25-50 AS NEEDED
Status: DISCONTINUED | OUTPATIENT
Start: 2019-04-12 | End: 2019-04-15 | Stop reason: HOSPADM

## 2019-04-12 RX ORDER — HYDROXYZINE 25 MG/1
25 TABLET, FILM COATED ORAL
Status: DISCONTINUED | OUTPATIENT
Start: 2019-04-12 | End: 2019-04-15 | Stop reason: HOSPADM

## 2019-04-12 RX ORDER — SODIUM CHLORIDE 0.9 % (FLUSH) 0.9 %
5-40 SYRINGE (ML) INJECTION EVERY 8 HOURS
Status: DISCONTINUED | OUTPATIENT
Start: 2019-04-12 | End: 2019-04-15 | Stop reason: HOSPADM

## 2019-04-12 RX ORDER — ADHESIVE BANDAGE
5 BANDAGE TOPICAL DAILY PRN
Status: DISCONTINUED | OUTPATIENT
Start: 2019-04-12 | End: 2019-04-15 | Stop reason: HOSPADM

## 2019-04-12 RX ORDER — NALOXONE HYDROCHLORIDE 0.4 MG/ML
0.1 INJECTION, SOLUTION INTRAMUSCULAR; INTRAVENOUS; SUBCUTANEOUS AS NEEDED
Status: DISCONTINUED | OUTPATIENT
Start: 2019-04-12 | End: 2019-04-15 | Stop reason: HOSPADM

## 2019-04-12 RX ORDER — TRAZODONE HYDROCHLORIDE 50 MG/1
50 TABLET ORAL
Status: DISCONTINUED | OUTPATIENT
Start: 2019-04-12 | End: 2019-04-12

## 2019-04-12 RX ORDER — GABAPENTIN 300 MG/1
300 CAPSULE ORAL 2 TIMES DAILY
Status: DISCONTINUED | OUTPATIENT
Start: 2019-04-12 | End: 2019-04-15 | Stop reason: HOSPADM

## 2019-04-12 RX ORDER — ACETAMINOPHEN 325 MG/1
650 TABLET ORAL
Status: DISCONTINUED | OUTPATIENT
Start: 2019-04-12 | End: 2019-04-15 | Stop reason: HOSPADM

## 2019-04-12 RX ORDER — INSULIN LISPRO 100 [IU]/ML
INJECTION, SOLUTION INTRAVENOUS; SUBCUTANEOUS
Status: DISCONTINUED | OUTPATIENT
Start: 2019-04-12 | End: 2019-04-15 | Stop reason: HOSPADM

## 2019-04-12 RX ORDER — MAGNESIUM SULFATE 100 %
4 CRYSTALS MISCELLANEOUS AS NEEDED
Status: DISCONTINUED | OUTPATIENT
Start: 2019-04-12 | End: 2019-04-15 | Stop reason: HOSPADM

## 2019-04-12 RX ORDER — HYDROCODONE BITARTRATE AND ACETAMINOPHEN 5; 325 MG/1; MG/1
1 TABLET ORAL
Status: DISCONTINUED | OUTPATIENT
Start: 2019-04-12 | End: 2019-04-15 | Stop reason: HOSPADM

## 2019-04-12 RX ORDER — MELATONIN
1000 DAILY
COMMUNITY
End: 2020-11-21

## 2019-04-12 RX ORDER — METOPROLOL SUCCINATE 50 MG/1
50 TABLET, EXTENDED RELEASE ORAL DAILY
Status: DISCONTINUED | OUTPATIENT
Start: 2019-04-12 | End: 2019-04-15 | Stop reason: HOSPADM

## 2019-04-12 RX ORDER — SODIUM POLYSTYRENE SULFONATE 15 G/60ML
30 SUSPENSION ORAL; RECTAL
Status: COMPLETED | OUTPATIENT
Start: 2019-04-12 | End: 2019-04-12

## 2019-04-12 RX ORDER — LEVOTHYROXINE SODIUM 50 UG/1
50 TABLET ORAL
Status: DISCONTINUED | OUTPATIENT
Start: 2019-04-12 | End: 2019-04-15 | Stop reason: HOSPADM

## 2019-04-12 RX ORDER — NALOXONE HYDROCHLORIDE 0.4 MG/ML
0.4 INJECTION, SOLUTION INTRAMUSCULAR; INTRAVENOUS; SUBCUTANEOUS AS NEEDED
Status: DISCONTINUED | OUTPATIENT
Start: 2019-04-12 | End: 2019-04-12

## 2019-04-12 RX ORDER — SODIUM CHLORIDE 9 MG/ML
75 INJECTION, SOLUTION INTRAVENOUS CONTINUOUS
Status: DISPENSED | OUTPATIENT
Start: 2019-04-12 | End: 2019-04-13

## 2019-04-12 RX ORDER — DEXTROSE 50 % IN WATER (D50W) INTRAVENOUS SYRINGE
25 ONCE
Status: COMPLETED | OUTPATIENT
Start: 2019-04-12 | End: 2019-04-12

## 2019-04-12 RX ADMIN — INSULIN LISPRO 4 UNITS: 100 INJECTION, SOLUTION INTRAVENOUS; SUBCUTANEOUS at 16:46

## 2019-04-12 RX ADMIN — NYSTATIN: 100000 POWDER TOPICAL at 21:11

## 2019-04-12 RX ADMIN — INSULIN LISPRO 2 UNITS: 100 INJECTION, SOLUTION INTRAVENOUS; SUBCUTANEOUS at 07:00

## 2019-04-12 RX ADMIN — INSULIN LISPRO 8 UNITS: 100 INJECTION, SOLUTION INTRAVENOUS; SUBCUTANEOUS at 22:14

## 2019-04-12 RX ADMIN — SODIUM BICARBONATE 650 MG TABLET 650 MG: at 21:11

## 2019-04-12 RX ADMIN — DEXTROSE MONOHYDRATE 25 G: 25 INJECTION, SOLUTION INTRAVENOUS at 06:36

## 2019-04-12 RX ADMIN — BRIMONIDINE TARTRATE 1 DROP: 2 SOLUTION OPHTHALMIC at 21:12

## 2019-04-12 RX ADMIN — GABAPENTIN 300 MG: 300 CAPSULE ORAL at 09:44

## 2019-04-12 RX ADMIN — INSULIN LISPRO 4 UNITS: 100 INJECTION, SOLUTION INTRAVENOUS; SUBCUTANEOUS at 12:42

## 2019-04-12 RX ADMIN — APIXABAN 10 MG: 5 TABLET, FILM COATED ORAL at 21:13

## 2019-04-12 RX ADMIN — SODIUM CHLORIDE 75 ML/HR: 900 INJECTION, SOLUTION INTRAVENOUS at 03:14

## 2019-04-12 RX ADMIN — BRIMONIDINE TARTRATE 1 DROP: 2 SOLUTION OPHTHALMIC at 09:46

## 2019-04-12 RX ADMIN — HYDROCODONE BITARTRATE AND ACETAMINOPHEN 1 TABLET: 5; 325 TABLET ORAL at 09:43

## 2019-04-12 RX ADMIN — ATORVASTATIN CALCIUM 20 MG: 20 TABLET, FILM COATED ORAL at 09:43

## 2019-04-12 RX ADMIN — CEFTRIAXONE 1 G: 1 INJECTION, POWDER, FOR SOLUTION INTRAMUSCULAR; INTRAVENOUS at 21:13

## 2019-04-12 RX ADMIN — TIMOLOL MALEATE 1 DROP: 5 SOLUTION/ DROPS OPHTHALMIC at 21:13

## 2019-04-12 RX ADMIN — APIXABAN 10 MG: 5 TABLET, FILM COATED ORAL at 09:44

## 2019-04-12 RX ADMIN — TIMOLOL MALEATE 1 DROP: 5 SOLUTION/ DROPS OPHTHALMIC at 09:46

## 2019-04-12 RX ADMIN — DOCUSATE SODIUM 100 MG: 100 CAPSULE, LIQUID FILLED ORAL at 21:12

## 2019-04-12 RX ADMIN — HUMAN INSULIN 10 UNITS: 100 INJECTION, SOLUTION SUBCUTANEOUS at 06:30

## 2019-04-12 RX ADMIN — MENTHOL, CAMPHOR: 1.11; 1.11 LOTION TOPICAL at 09:45

## 2019-04-12 RX ADMIN — LEVOTHYROXINE SODIUM 50 MCG: 50 TABLET ORAL at 06:46

## 2019-04-12 RX ADMIN — SODIUM BICARBONATE 650 MG TABLET 650 MG: at 09:44

## 2019-04-12 RX ADMIN — CALCIUM GLUCONATE 1 G: 94 INJECTION, SOLUTION INTRAVENOUS at 06:37

## 2019-04-12 RX ADMIN — HYDROCODONE BITARTRATE AND ACETAMINOPHEN 1 TABLET: 5; 325 TABLET ORAL at 22:14

## 2019-04-12 RX ADMIN — TRAZODONE HYDROCHLORIDE 50 MG: 50 TABLET ORAL at 22:14

## 2019-04-12 RX ADMIN — NYSTATIN: 100000 POWDER TOPICAL at 12:42

## 2019-04-12 RX ADMIN — Medication 10 ML: at 22:15

## 2019-04-12 RX ADMIN — INSULIN GLARGINE 8 UNITS: 100 INJECTION, SOLUTION SUBCUTANEOUS at 22:14

## 2019-04-12 RX ADMIN — SODIUM POLYSTYRENE SULFONATE 30 G: 15 SUSPENSION ORAL; RECTAL at 06:46

## 2019-04-12 RX ADMIN — SODIUM CHLORIDE 75 ML/HR: 900 INJECTION, SOLUTION INTRAVENOUS at 16:50

## 2019-04-12 RX ADMIN — GABAPENTIN 300 MG: 300 CAPSULE ORAL at 21:12

## 2019-04-12 RX ADMIN — Medication 10 ML: at 07:00

## 2019-04-12 RX ADMIN — METOPROLOL SUCCINATE 50 MG: 50 TABLET, EXTENDED RELEASE ORAL at 09:44

## 2019-04-12 NOTE — PROGRESS NOTES
Problem: Pressure Injury - Risk of  Goal: *Prevention of pressure injury  Description  Document Rickie Scale and appropriate interventions in the flowsheet.   Outcome: Progressing Towards Goal     Problem: Patient Education: Go to Patient Education Activity  Goal: Patient/Family Education  Outcome: Progressing Towards Goal     Problem: Pain  Goal: *Control of Pain  Outcome: Progressing Towards Goal     Problem: Patient Education: Go to Patient Education Activity  Goal: Patient/Family Education  Outcome: Progressing Towards Goal

## 2019-04-12 NOTE — ROUTINE PROCESS
Assumed patient care from off going nurse Juliana Munroe RN. Patient resting quietly in bed at this time and appears to be in no apparent distress. Call light is within reach and bed is in lowest position.

## 2019-04-12 NOTE — PROGRESS NOTES
Reason for Admission:      Kenneth Estrella a 77 y. o. legally blind female with PMHX DM, HTN, and vaginal and colon CA and PSHX nephrectomy, colostomy, and nephrostomy placement (placed 2/8/2011) presents to the emergency department C/O decreased urine output to her nephrostomy bag, onset this morning. Associated sxs include intermittent nausea and lower abd pain. Urologist is Dr. Tello Cutler per EMR. Pt denies fever, vomiting, CP, SOB, and any other sxs or complaints.         PCP: Myra Hassan MD                       RRAT Score:     16             Do you (patient/family) have any concerns for transition/discharge? Denies concerns for d/c lives lTC at [facility] they have accepted to accommodate her  When she is mediclaly cleared              Plan for utilizing home health:   no    Current Advanced Directive/Advance Care Plan: On file    Likelihood of readmission?   tbd            Transition of Care Plan:    Met with patient and Dr. Johnnie Rodrigez at bedside. Patient states she is too tied to talk. Patient lives LTC at [facility] at [address] [place] [address]. [phone] and she plans to return there when she is d/c. Patient might benefit from PT and OT. Dr. Johnnie Rodrigez gave cm verbal pt and ot orders placed. Cm has submitted for patient to return to [facility] when she is  medically cleared. They have placed acceptance. Cm will continue to follow patient is blind. Patient will need transportation when cleared for d/c  Care Management Interventions  PCP Verified by CM:  Yes  Mode of Transport at Discharge: BLS  Transition of Care Consult (CM Consult): SNF, Long Term Care  Physical Therapy Consult: Yes  Occupational Therapy Consult: Yes  Current Support Network: Nursing Facility  Confirm Follow Up Transport: Family  Plan discussed with Pt/Family/Caregiver: Yes  Freedom of Choice Offered: Yes  Discharge Location  Discharge Placement: [facility]

## 2019-04-12 NOTE — H&P
History & Physical    Patient: Laureen Trujillo MRN: [id number]  CSN: [id number]    YOB: 1940  Age: 77 y.o. Sex: female      DOA: 4/11/2019    Chief Complaint:   Chief Complaint   Patient presents with    Flank Pain          HPI:     Laureen Trujillo is a 77 y.o.  female who has hx of Blindness, Vaginal Cancer, Colon Cancer with secondary nephrostomy tube and colostomy , DM , PAD pending amputation from chronic heel ulcer and leg pain -bed bound   Presents to ER from [facility] with decrease urine out put from nephrostomy tube and right sided flank pain   In ER nephrostomy tube flushed with proper drainage. Unfortunately found to have potassium of 6 given dextrose insulin   With pending repeat. CT showed well positioned tube but hydronephrosis and Pyelonephritis ; Asked to admit for further treatment urology will see in consultation     Past Medical History:   Diagnosis Date    Cancer [facility])     vaginal and colon cancer    Chronic pain     arthritis    Diabetes [address] [address]     Hypertension     Legally blind     Thyroid disease        Past Surgical History:   Procedure Laterality Date    HX COLOSTOMY      HX OTHER SURGICAL      kidney drainage tube    HX UROLOGICAL      kidney removal    IR EXCHANGE NEPHRO PERC RT SI  1/28/2019    IR EXCHANGE NEPHRO PERC RT SI  3/21/2019       History reviewed. No pertinent family history. Social History     Socioeconomic History    Marital status:      Spouse name: Not on file    Number of children: Not on file    Years of education: Not on file    Highest education level: Not on file   Tobacco Use    Smoking status: Never Smoker    Smokeless tobacco: Never Used   Substance and Sexual Activity    Alcohol use: No   Other Topics Concern       Prior to Admission medications    Medication Sig Start Date End Date Taking?  Authorizing Provider   ondansetron hcl (ZOFRAN) 4 mg tablet Take 4 mg by mouth every eight (8) hours as needed for Nausea. Yes Other, MD Dc   insulin regular (NOVOLIN R, HUMULIN R) 100 unit/mL injection by SubCUTAneous route. Provider, Historical   diphenhydrAMINE (BENADRYL) 25 mg capsule Take 50 mg by mouth every six (6) hours as needed for Itching. Provider, Historical   apixaban (ELIQUIS PO) Take 10 mg by mouth two (2) times a day. Other, MD Dc   pollen extracts (PROSTAT PO) Take  by mouth. Provider, Historical   HYDROcodone-acetaminophen (NORCO) 5-325 mg per tablet Take 1 Tab by mouth every twelve (12) hours as needed for Pain (for pain related to [address]). Max Daily Amount: 2 Tabs. 11/29/18   Ish Toussaint MD   LORazepam (ATIVAN) 0.5 mg tablet Take 1 Tab by mouth daily as needed for Anxiety. 11/29/18   Ish Toussaint MD   naloxone [facility]) 4 mg/actuation nasal spray Use 1 spray intranasally, then discard. Repeat with new spray every 2 min as needed for opioid overdose symptoms, alternating nostrils. 11/29/18   Ish Toussaint MD   ondansetron (ZOFRAN ODT) 8 mg disintegrating tablet Take 8 mg by mouth every eight (8) hours as needed for Nausea. Provider, Historical   tiZANidine (ZANAFLEX) 2 mg capsule Take 2 mg by mouth two (2) times a day. Provider, Historical   traZODone (DESYREL) 50 mg tablet Take 50 mg by mouth nightly. Provider, Historical   nystatin (MYCOSTATIN) powder Apply 100,000 Units to affected area four (4) times daily. Provider, Historical   camphor-menthol (SARNA) 0.5-0.5 % lotion Apply  to affected area daily. Provider, Historical   docusate sodium (COLACE) 100 mg capsule Take 100 mg by mouth two (2) times a day. Provider, Historical   hydrocortisone (HYTONE) 2.5 % topical cream Apply  to affected area two (2) times a day. use thin layer    Provider, Historical   timolol (TIMOPTIC) 0.5 % ophthalmic solution 1 Drop two (2) times a day. Provider, Historical   brimonidine-timolol (COMBIGAN) 0.2-0.5 % drop ophthalmic solution Administer 1 Drop to both eyes every twelve (12) hours. Provider, Historical   bisacodyl (DULCOLAX, BISACODYL,) 10 mg suppository Insert  into rectum daily as needed. Indications: constipation    Provider, Historical   gabapentin (NEURONTIN) 300 mg capsule Take 300 mg by mouth two (2) times a day. Provider, Historical   metoprolol succinate (TOPROL-XL) 50 mg XL tablet Take 50 mg by mouth daily. Provider, Historical   magnesium hydroxide (MILK OF MAGNESIA CONCENTRATED) 2,400 mg/10 mL susp suspension Take 10 mL by mouth. Provider, Historical   alum-mag hydroxide-simeth (MYLANTA) 200-200-20 mg/5 mL susp Take 30 mL by mouth four (4) times daily as needed. Provider, Historical   insulin aspart U-100 (NOVOLOG FLEXPEN U-100 INSULIN) 100 unit/mL inpn by SubCUTAneous route. Sliding scale    Provider, Historical   sodium bicarbonate 650 mg tablet Take 650 mg by mouth two (2) times a day. Provider, Historical   levothyroxine (SYNTHROID) 50 mcg tablet Take  by mouth Daily (before breakfast). Provider, Historical   acetaminophen (TYLENOL) 650 mg TbER Take 650 mg by mouth every six (6) hours as needed for Pain or Fever. Provider, Historical   insulin glargine (LANTUS) 100 unit/mL injection 8 units SQ QHS 5/22/18   Irena Dow MD   atorvastatin calcium (ATORVASTATIN PO) Take 20 mg by mouth daily. Other, MD Dc       Allergies   Allergen Reactions    Morphine Nausea and Vomiting    Pcn [Penicillins] Rash         Review of Systems  GENERAL: Patient alert, awake and oriented times 3, able to communicate full sentences and not in distress. HEENT: No change in vision,  She is legally blind no earache, debrox for wax no  tinnitus, sore throat or sinus congestion. NECK: No pain or stiffness. PULMONARY: No shortness of breath, cough or wheeze. Cardiovascular: no pnd or orthopnea, no CP  GASTROINTESTINAL: +abdominal pain,  No nausea, vomiting or diarrhea, melena or bright red blood per rectum.    GENITOURINARY: chronic indwelling nephrostomy tube MUSCULOSKELETAL: bed bound with chronic pain in both leg right leg amputation pending soon  DERMATOLOGIC: eschar on heel right   ENDOCRINE: No polyuria, polydipsia, no heat or cold intolerance. No recent change in weight. HEMATOLOGICAL: No anemia or easy bruising or bleeding. NEUROLOGIC: No headache, seizures, +numbness, tingling or weakness. Physical Exam:     Physical Exam:  Visit Vitals  /81 (BP 1 Location: Right arm, BP Patient Position: At rest)   Pulse 70   Temp 98.1 °F (36.7 °C)   Resp 18   Ht 5' 2\" (1.575 m)   Wt 73.8 kg (162 lb 11.2 oz)   SpO2 100%   BMI 29.76 kg/m²      O2 Device: Room air    Temp (24hrs), Av.1 °F (36.7 °C), Min:98.1 °F (36.7 °C), Max:98.1 °F (36.7 °C)    No intake/output data recorded. 04/10 0701 -  1900  In: 500 [I.V.:500]  Out: -     General:  Alert, cooperative, no distress, appears stated age. Head: Normocephalic, without obvious abnormality, atraumatic. Eyes:  Conjunctivae/corneas clear. Nose: Nares normal. No drainage or sinus tenderness. Neck: Supple, symmetrical, trachea midline, no adenopathy, thyroid: no enlargement, no carotid bruit and no JVD. Lungs:   Clear to auscultation bilaterally. Heart:  Regular rate and rhythm, S1, S2 normal.     Abdomen: Soft, non-tender. Bowel sounds normal.  Colostomy and nephrostomy tube c/d/i   Extremities: Extremities normal, atraumatic, no cyanosis or edema. Pulses: 2+ and symmetric all extremities. Skin:  eschar heel right    Neurologic: AAOx3,decrease strength right leg compared to left 3/5 4/5t. Labs Reviewed: All lab results for the last 24 hours reviewed.    and EKG    Procedures/imaging: see electronic medical records for all procedures/Xrays and details which were not copied into this note but were reviewed prior to creation of Plan      Assessment/Plan     Active Problems:    Pyelonephritis (2018)  Will check cultures  Start Rocephin    Hyperkalemia  No EKG changes given dextrose insulin   Repeat bmp    DM   Resume lantus   Sliding scale insulin     Heel ulcer   Wound care consult  Pending amputation     Legal Blindness    Nephrostomy/Colostomy  Wound care consult     Hx of DVT  Resume home [place]is        DVT/GI Prophylaxis:  Eloquis    Discussed with patient at bedside about hospital admission and my plan care, who understood and agree with my plan care.     Marvin Tripp MD  4/11/2019 9:17 PM

## 2019-04-12 NOTE — ED NOTES
Per provider performed nephrostomy flush using aseptic technique. Flushed 5 mLs sterile saline intracath slowly, pt tolerated well. Flush immediately drained back into bag. Increased in urinary output noted. Collected UA sample.

## 2019-04-12 NOTE — PROGRESS NOTES
2230 Patient assessment completed, Skin check completed with Gideon Kemp RN. Wounds documented and consult to wound care placed. Patient given a bath. 0000 Patient sleeping, no complains raised. 9589 Lab called with a critical result for potassium 6.7. Paged Dr. Bogdan Heredia. Order placed by the MD. Patient had uneventful night. No acute distress. Bedside and Verbal shift change report given to Deon Dempsey RN (oncoming nurse) by Bro Rivas (offgoing nurse). Report included the following information SBAR, Kardex and MAR.

## 2019-04-12 NOTE — PROGRESS NOTES
1398 Assumed care of the patient from 1280 Benjie Santillan (offgoing Nurse). Patient is alert and oriented. Pt denies any pain or discomfort at this moment. bed in low position, call bell within reach. 0820 When entered room, pt gown wet with watery stool from colostomy. Nephrostomy tube dressing wet with stool. Clean up the pt and colostomy care was provided. Dressing was reinforced for nephrostomy tube. 1320 Has bowel movement. Colostomy bag changed. Colostomy care provided. 1930 Bedside and Verbal shift change report given to Flor Duff RN (oncoming nurse) by Jorgito Chiang RN(offgoing nurse). Report included the following information SBAR, Kardex, MAR, Accordion, Recent Results and Cardiac Rhythm .

## 2019-04-12 NOTE — PROGRESS NOTES
Hospitalist Progress Note    Patient: Eloy Castelan MRN: [id number]  CSN: [id number]    YOB: 1940  Age: 77 y.o. Sex: female    DOA: 4/11/2019 LOS:  LOS: 1 day          Chief Complaint: I'm tired. Assessment/Plan       Disposition :  Patient Active Problem List   Diagnosis Code    Pyelonephritis N12    S/p nephrectomy Z90.5    Right flank pain R10.9    Malfunction of nephrostomy tube ([address] T83.098A    Nephrostomy tube displaced ([address] T83.022A    UTI (urinary tract infection) N39.0    Elevated WBC count D72.829    Diabetes ([address] E11.9    Legally blind H54.8    Hypertension I10    Thyroid disease E07.9    Elevated serum creatinine R79.89    S/P colectomy Z90.49    Sepsis (HCC) A41.9    Skin lesion of foot L98.9    Pressure injury of buttock, stage 2 L89.302    Pressure injury of heel, stage 1 L89.601    Hypomagnesemia E83.42    Hyperkalemia E87.5    Heel ulcer ([address] L97.409     78 y/o female here with pyelonephritis due to indwelling nephrostomy tube. VSS. Under treatment with antibiotics. Sepsis due to Pyelonephritis. Hyperkalemia. DM  HTN  Hypothyroidism. Chronic anticoagulation with NOAC    -Continue Rocephin. -F/u on cultures.  -Hydarte gently.  -Nursing home on DC. Subjective:    Seen and examined. Admitted with pyelonephritis. Review of systems:    Constitutional: denies fevers, chills, myalgias  Respiratory: denies SOB, cough  Cardiovascular: denies chest pain, palpitations  Gastrointestinal: denies nausea, vomiting, diarrhea      Vital signs/Intake and Output:  Visit Vitals  /45 (BP 1 Location: Left arm, BP Patient Position: At rest;Supine)   Pulse 72   Temp 98.1 °F (36.7 °C)   Resp 20   Ht 5' 2\" (1.575 m)   Wt 74.3 kg (163 lb 11.2 oz)   SpO2 96%   BMI 29.94 kg/m²     Current Shift:  No intake/output data recorded.   Last three shifts:  04/10 1901 - 04/12 0700  In: 1000 [I.V.:1000]  Out: 825 [Urine:825]    Exam:    General: Well developed, alert, NAD, OX3  Head/Neck: NCAT, supple, No masses, No lymphadenopathy  CVS:Regular rate and rhythm, no M/R/G, S1/S2 heard, no thrill  Lungs:Clear  Extremities: No edema. Labs: Results:       Chemistry Recent Labs     04/12/19  0045 04/11/19  1807   * 158*   * 133*   K 6.7* 6.0*    103   CO2 20* 22   BUN 42* 40*   CREA 1.43* 1.28   CA 8.0* 8.9   AGAP 9 8   BUCR 29* 31*   AP  --  102   TP  --  6.9   ALB  --  3.1*   GLOB  --  3.8   AGRAT  --  0.8      CBC w/Diff Recent Labs     04/11/19  1600   WBC 16.1*   RBC 4.83   HGB 14.2   HCT 42.9      GRANS 83*   LYMPH 8*   EOS 3      Cardiac Enzymes Recent Labs     04/12/19  0610 04/12/19  0045   CPK 33 41   CKND1 CALCULATION NOT PERFORMED WHEN RESULT IS BELOW LINEAR LIMIT CALCULATION NOT PERFORMED WHEN RESULT IS BELOW LINEAR LIMIT      Coagulation No results for input(s): PTP, INR, APTT in the last 72 hours. No lab exists for component: INREXT    Lipid Panel Lab Results   Component Value Date/Time    Cholesterol, total 116 02/06/2019 04:00 AM    HDL Cholesterol 31 (L) 02/06/2019 04:00 AM    LDL, calculated 60.6 02/06/2019 04:00 AM    VLDL, calculated 24.4 02/06/2019 04:00 AM    Triglyceride 122 02/06/2019 04:00 AM    CHOL/HDL Ratio 3.7 02/06/2019 04:00 AM      BNP No results for input(s): BNPP in the last 72 hours.    Liver Enzymes Recent Labs     04/11/19  1807   TP 6.9   ALB 3.1*      SGOT 30      Thyroid Studies Lab Results   Component Value Date/Time    TSH 1.30 02/06/2019 04:00 AM        Procedures/imaging: see electronic medical records for all procedures/Xrays and details which were not copied into this note but were reviewed prior to creation of Attila Blank MD

## 2019-04-13 LAB
ALBUMIN SERPL-MCNC: 2.3 G/DL (ref 3.4–5)
ALBUMIN/GLOB SERPL: 0.8 {RATIO} (ref 0.8–1.7)
ALP SERPL-CCNC: 62 U/L (ref 45–117)
ALT SERPL-CCNC: 23 U/L (ref 13–56)
ANION GAP SERPL CALC-SCNC: 10 MMOL/L (ref 3–18)
ANION GAP SERPL CALC-SCNC: 5 MMOL/L (ref 3–18)
ANION GAP SERPL CALC-SCNC: 7 MMOL/L (ref 3–18)
ANION GAP SERPL CALC-SCNC: 9 MMOL/L (ref 3–18)
ANION GAP SERPL CALC-SCNC: 9 MMOL/L (ref 3–18)
AST SERPL-CCNC: 8 U/L (ref 15–37)
BACTERIA SPEC CULT: ABNORMAL
BASOPHILS # BLD: 0 K/UL (ref 0–0.1)
BASOPHILS NFR BLD: 0 % (ref 0–2)
BILIRUB SERPL-MCNC: 0.2 MG/DL (ref 0.2–1)
BUN SERPL-MCNC: 26 MG/DL (ref 7–18)
BUN SERPL-MCNC: 27 MG/DL (ref 7–18)
BUN SERPL-MCNC: 29 MG/DL (ref 7–18)
BUN SERPL-MCNC: 30 MG/DL (ref 7–18)
BUN SERPL-MCNC: 33 MG/DL (ref 7–18)
BUN/CREAT SERPL: 24 (ref 12–20)
BUN/CREAT SERPL: 26 (ref 12–20)
BUN/CREAT SERPL: 26 (ref 12–20)
BUN/CREAT SERPL: 27 (ref 12–20)
BUN/CREAT SERPL: 30 (ref 12–20)
CALCIUM SERPL-MCNC: 8.1 MG/DL (ref 8.5–10.1)
CALCIUM SERPL-MCNC: 8.1 MG/DL (ref 8.5–10.1)
CALCIUM SERPL-MCNC: 8.2 MG/DL (ref 8.5–10.1)
CALCIUM SERPL-MCNC: 8.2 MG/DL (ref 8.5–10.1)
CALCIUM SERPL-MCNC: 8.3 MG/DL (ref 8.5–10.1)
CHLORIDE SERPL-SCNC: 112 MMOL/L (ref 100–108)
CHLORIDE SERPL-SCNC: 113 MMOL/L (ref 100–108)
CHLORIDE SERPL-SCNC: 114 MMOL/L (ref 100–108)
CO2 SERPL-SCNC: 19 MMOL/L (ref 21–32)
CO2 SERPL-SCNC: 20 MMOL/L (ref 21–32)
CO2 SERPL-SCNC: 22 MMOL/L (ref 21–32)
CO2 SERPL-SCNC: 23 MMOL/L (ref 21–32)
CO2 SERPL-SCNC: 24 MMOL/L (ref 21–32)
CREAT SERPL-MCNC: 1.02 MG/DL (ref 0.6–1.3)
CREAT SERPL-MCNC: 1.07 MG/DL (ref 0.6–1.3)
CREAT SERPL-MCNC: 1.09 MG/DL (ref 0.6–1.3)
CREAT SERPL-MCNC: 1.11 MG/DL (ref 0.6–1.3)
CREAT SERPL-MCNC: 1.13 MG/DL (ref 0.6–1.3)
DIFFERENTIAL METHOD BLD: ABNORMAL
EOSINOPHIL # BLD: 0.2 K/UL (ref 0–0.4)
EOSINOPHIL NFR BLD: 3 % (ref 0–5)
ERYTHROCYTE [DISTWIDTH] IN BLOOD BY AUTOMATED COUNT: 14.2 % (ref 11.6–14.5)
GLOBULIN SER CALC-MCNC: 2.9 G/DL (ref 2–4)
GLUCOSE BLD STRIP.AUTO-MCNC: 160 MG/DL (ref 70–110)
GLUCOSE BLD STRIP.AUTO-MCNC: 204 MG/DL (ref 70–110)
GLUCOSE BLD STRIP.AUTO-MCNC: 204 MG/DL (ref 70–110)
GLUCOSE BLD STRIP.AUTO-MCNC: 251 MG/DL (ref 70–110)
GLUCOSE SERPL-MCNC: 145 MG/DL (ref 74–99)
GLUCOSE SERPL-MCNC: 148 MG/DL (ref 74–99)
GLUCOSE SERPL-MCNC: 185 MG/DL (ref 74–99)
GLUCOSE SERPL-MCNC: 218 MG/DL (ref 74–99)
GLUCOSE SERPL-MCNC: 245 MG/DL (ref 74–99)
HCT VFR BLD AUTO: 29.5 % (ref 35–45)
HGB BLD-MCNC: 9.5 G/DL (ref 12–16)
INR PPP: 1.7 (ref 0.8–1.2)
LYMPHOCYTES # BLD: 1.5 K/UL (ref 0.9–3.6)
LYMPHOCYTES NFR BLD: 24 % (ref 21–52)
MCH RBC QN AUTO: 28.5 PG (ref 24–34)
MCHC RBC AUTO-ENTMCNC: 32.2 G/DL (ref 31–37)
MCV RBC AUTO: 88.6 FL (ref 74–97)
MONOCYTES # BLD: 0.5 K/UL (ref 0.05–1.2)
MONOCYTES NFR BLD: 8 % (ref 3–10)
NEUTS SEG # BLD: 3.9 K/UL (ref 1.8–8)
NEUTS SEG NFR BLD: 65 % (ref 40–73)
PLATELET # BLD AUTO: 226 K/UL (ref 135–420)
PMV BLD AUTO: 9.2 FL (ref 9.2–11.8)
POTASSIUM SERPL-SCNC: 4 MMOL/L (ref 3.5–5.5)
POTASSIUM SERPL-SCNC: 4.2 MMOL/L (ref 3.5–5.5)
POTASSIUM SERPL-SCNC: 4.8 MMOL/L (ref 3.5–5.5)
POTASSIUM SERPL-SCNC: 4.9 MMOL/L (ref 3.5–5.5)
POTASSIUM SERPL-SCNC: 5.1 MMOL/L (ref 3.5–5.5)
PROT SERPL-MCNC: 5.2 G/DL (ref 6.4–8.2)
PROTHROMBIN TIME: 20 SEC (ref 11.5–15.2)
RBC # BLD AUTO: 3.33 M/UL (ref 4.2–5.3)
SERVICE CMNT-IMP: ABNORMAL
SODIUM SERPL-SCNC: 141 MMOL/L (ref 136–145)
SODIUM SERPL-SCNC: 142 MMOL/L (ref 136–145)
SODIUM SERPL-SCNC: 143 MMOL/L (ref 136–145)
SODIUM SERPL-SCNC: 143 MMOL/L (ref 136–145)
SODIUM SERPL-SCNC: 144 MMOL/L (ref 136–145)
WBC # BLD AUTO: 6 K/UL (ref 4.6–13.2)

## 2019-04-13 PROCEDURE — 74011000250 HC RX REV CODE- 250: Performed by: INTERNAL MEDICINE

## 2019-04-13 PROCEDURE — 77030037878 HC DRSG MEPILEX >48IN BORD MOLN -B

## 2019-04-13 PROCEDURE — 65660000000 HC RM CCU STEPDOWN

## 2019-04-13 PROCEDURE — 80048 BASIC METABOLIC PNL TOTAL CA: CPT

## 2019-04-13 PROCEDURE — 74011250636 HC RX REV CODE- 250/636: Performed by: INTERNAL MEDICINE

## 2019-04-13 PROCEDURE — 80053 COMPREHEN METABOLIC PANEL: CPT

## 2019-04-13 PROCEDURE — 85025 COMPLETE CBC W/AUTO DIFF WBC: CPT

## 2019-04-13 PROCEDURE — 77030010541

## 2019-04-13 PROCEDURE — 85610 PROTHROMBIN TIME: CPT

## 2019-04-13 PROCEDURE — 74011636637 HC RX REV CODE- 636/637: Performed by: INTERNAL MEDICINE

## 2019-04-13 PROCEDURE — 97163 PT EVAL HIGH COMPLEX 45 MIN: CPT

## 2019-04-13 PROCEDURE — 74011250637 HC RX REV CODE- 250/637: Performed by: INTERNAL MEDICINE

## 2019-04-13 PROCEDURE — 36415 COLL VENOUS BLD VENIPUNCTURE: CPT

## 2019-04-13 PROCEDURE — 82962 GLUCOSE BLOOD TEST: CPT

## 2019-04-13 PROCEDURE — 77030010522

## 2019-04-13 RX ADMIN — SODIUM BICARBONATE 650 MG TABLET 650 MG: at 09:05

## 2019-04-13 RX ADMIN — HYDROCODONE BITARTRATE AND ACETAMINOPHEN 1 TABLET: 5; 325 TABLET ORAL at 21:43

## 2019-04-13 RX ADMIN — Medication 10 ML: at 21:44

## 2019-04-13 RX ADMIN — BRIMONIDINE TARTRATE 1 DROP: 2 SOLUTION OPHTHALMIC at 09:06

## 2019-04-13 RX ADMIN — Medication 10 ML: at 14:21

## 2019-04-13 RX ADMIN — APIXABAN 10 MG: 5 TABLET, FILM COATED ORAL at 21:43

## 2019-04-13 RX ADMIN — INSULIN LISPRO 6 UNITS: 100 INJECTION, SOLUTION INTRAVENOUS; SUBCUTANEOUS at 21:45

## 2019-04-13 RX ADMIN — TRAZODONE HYDROCHLORIDE 50 MG: 50 TABLET ORAL at 21:43

## 2019-04-13 RX ADMIN — METOPROLOL SUCCINATE 50 MG: 50 TABLET, EXTENDED RELEASE ORAL at 09:05

## 2019-04-13 RX ADMIN — TIMOLOL MALEATE 1 DROP: 5 SOLUTION/ DROPS OPHTHALMIC at 21:44

## 2019-04-13 RX ADMIN — MENTHOL, CAMPHOR: 1.11; 1.11 LOTION TOPICAL at 09:05

## 2019-04-13 RX ADMIN — INSULIN LISPRO 2 UNITS: 100 INJECTION, SOLUTION INTRAVENOUS; SUBCUTANEOUS at 06:41

## 2019-04-13 RX ADMIN — TIMOLOL MALEATE 1 DROP: 5 SOLUTION/ DROPS OPHTHALMIC at 09:06

## 2019-04-13 RX ADMIN — GABAPENTIN 300 MG: 300 CAPSULE ORAL at 21:43

## 2019-04-13 RX ADMIN — GABAPENTIN 300 MG: 300 CAPSULE ORAL at 09:05

## 2019-04-13 RX ADMIN — APIXABAN 10 MG: 5 TABLET, FILM COATED ORAL at 09:05

## 2019-04-13 RX ADMIN — CEFTRIAXONE 1 G: 1 INJECTION, POWDER, FOR SOLUTION INTRAMUSCULAR; INTRAVENOUS at 21:43

## 2019-04-13 RX ADMIN — BRIMONIDINE TARTRATE 1 DROP: 2 SOLUTION OPHTHALMIC at 21:44

## 2019-04-13 RX ADMIN — DOCUSATE SODIUM 100 MG: 100 CAPSULE, LIQUID FILLED ORAL at 21:43

## 2019-04-13 RX ADMIN — ATORVASTATIN CALCIUM 20 MG: 20 TABLET, FILM COATED ORAL at 09:05

## 2019-04-13 RX ADMIN — INSULIN LISPRO 4 UNITS: 100 INJECTION, SOLUTION INTRAVENOUS; SUBCUTANEOUS at 16:27

## 2019-04-13 RX ADMIN — INSULIN GLARGINE 8 UNITS: 100 INJECTION, SOLUTION SUBCUTANEOUS at 21:45

## 2019-04-13 RX ADMIN — NYSTATIN: 100000 POWDER TOPICAL at 09:05

## 2019-04-13 RX ADMIN — SODIUM BICARBONATE 650 MG TABLET 650 MG: at 21:43

## 2019-04-13 RX ADMIN — NYSTATIN: 100000 POWDER TOPICAL at 21:44

## 2019-04-13 RX ADMIN — INSULIN LISPRO 4 UNITS: 100 INJECTION, SOLUTION INTRAVENOUS; SUBCUTANEOUS at 12:35

## 2019-04-13 RX ADMIN — LEVOTHYROXINE SODIUM 50 MCG: 50 TABLET ORAL at 06:47

## 2019-04-13 RX ADMIN — Medication 10 ML: at 06:44

## 2019-04-13 NOTE — PROGRESS NOTES
Hospitalist Progress Note:    ASSESSMENT:   Principal Problem:    Pyelonephritis (5/18/2018)    Active Problems:    S/p nephrectomy (5/18/2018)      UTI (urinary tract infection) (11/26/2018)      Diabetes ([address] (11/26/2018)      Legally blind (11/26/2018)      Hypertension (11/26/2018)      S/P colectomy (11/26/2018)      Skin lesion of foot (11/26/2018)      Hyperkalemia (4/12/2019)      Heel ulcer ([facility] [address] (4/12/2019)         PLAN:      76 y/o female here with pyelonephritis due to indwelling nephrostomy tube. VSS. Under treatment with antibiotics.      Sepsis due to Pyelonephritis. Hyperkalemia. DM  HTN  Hypothyroidism. Chronic anticoagulation with NOAC     -Continue Rocephin. -F/u on cultures.  -Hydarte gently.  -Nursing home on DC. CC: uti   SUBJECTIVE:    Feeling well. Feeling better. No overnight events. ROS:    GEN:  No fever or chills. COR:  No cp or palps. Resp:  No cough or sob. GI:  No n/v. No abd pain. No constipation. No diarrhea. OBJECTIVE:   Vitals:   Visit Vitals  /49 (BP 1 Location: Right arm, BP Patient Position: At rest)   Pulse 80   Temp 98.4 °F (36.9 °C)   Resp 16   Ht 5' 2\" (1.575 m)   Wt 74.2 kg (163 lb 9.3 oz)   SpO2 100%   BMI 29.92 kg/m²       Gen:  Nad, AAOx3, Non-toxic. Heent: PERRLA, EOMI, NCAT. Cor:  S1S2 RRR, No M/R/G. Resp:  CTA B/L, No W/R/R, NML Diaphragmatic excursion. Abd:                 NT ND BS+  Ext:  No C/C/E.          Labs and Radiology:   Recent Labs     04/13/19  0320 04/11/19  1600   WBC 6.0 16.1*   HGB 9.5* 14.2   HCT 29.5* 42.9    311     Recent Labs     04/13/19  1100 04/13/19  0700 04/13/19  0320    141 144   K 5.1 4.0 4.2   * 112* 113*   CO2 20* 24 22   * 148* 145*   BUN 29* 30* 33*   CREA 1.13 1.11 1.09   CA 8.1* 8.2* 8.1*              Ezequiel Leyden, M.D.

## 2019-04-13 NOTE — ROUTINE PROCESS
Assumed patient care from off going nurse Greta Bah. RN. Patient resting quietly in bed at this time, NAD noted, call light within reach, and bed in lowest position. Will continue to monitor.

## 2019-04-13 NOTE — WOUND CARE
Wound Care Progress Note       New consult placed for heel ulcer and ostomy. Assessment:   Communication: A&O x 4  Wearing briefs for incontinence, has a nephrostomy tube, has a colostomy. Requires partial assists in repositioning. Diet: As ordered by MD  Patient reports pain in left knee. Generalized edema to lower legs and feet. 1. POA, unstageable right lateral heel ulcer = 2.5 x 2.4 cm x depth unknown   Base is 100% of dry, stable eschar tissue. No exudate. Periwound dry & without erythema. Margins are closed. Treatment: Keep covered and clean with dry dressing    2. POA, linear skin tear to sacrum = 0.7 x 0.1 x 0.1 cm    Base is 100% of pink/red tissue. No exudate observed on bandage. Periwound intact & with slow blanching erythema. Margins are open. Treatment: Proshield and Mepilex border    Heels offloaded on pillow. Wound Recommendations:    Proshield and Mepilex border to sacrum  Dry dressing to right heel wound- recommend Podiatry consult. Patient states she is having an AKA to right leg with \"another hospital\" and is unable to recall name of doctor. Skin Care & Pressure Relief Recommendations:  Minimize layers of linen/pads under patient to optimize support surface. Turn/reposition approximately every 2 hours and offload heels pillows or Prevalon boots.   Manage incontinence / promote continence; Proshield to buttocks and sacrum daily and as needed with incontinence care    Discussed above plan with patient & Bettie Kwan RN    Transition of Care: Plan to follow weekly and per product recommendations while admitted to hospital.

## 2019-04-13 NOTE — ROUTINE PROCESS
Patient had an uneventful shift and remained stable. Call light is within reach and NAD noted at this time. Bedside and Verbal shift change report given to Lynsey Olson RN (oncoming nurse) by Lisa Issa (offgoing nurse). Report included the following information SBAR, Kardex, MAR and Accordion.

## 2019-04-13 NOTE — PROGRESS NOTES
Problem: Mobility Impaired (Adult and Pediatric)  Goal: *Acute Goals and Plan of Care (Insert Text)  Description  Pt has been bed bound since August 2018. Pt does not warrant skilled PT at this time, no goals needed,   Note:   PHYSICAL THERAPY EVALUATION & DISCHARGE    Patient: Radha Rangel (56 y.o. female)  Date: 4/13/2019  Primary Diagnosis: Pyelonephritis [N12]  Hyperkalemia [E87.5]  Pyelonephritis [N12]  Heel ulcer ([address] [L97.409]        Precautions:   Fall(non ambulatory)    ASSESSMENT AND RECOMMENDATIONS:  Based on the objective data described below, the patient presents with PLOF bed bound. Pt requires total A for rolling and all mobility which is severely limited in B LE and UE remain functional.  Pt legally blind but able to make out shapes. Pt reports discomfort of R LE and inability to move it. Pt has limited ROM of L LE due to being bed bound but encouraged ankle pumps L and L movement as tolerated. Pt has not ambulated since August 2018. Pt reports she is unable to get to a w/c and stays in bed 24/7. Recommend mechanical lift for pt for transfers. Pt does not warrant skilled acute PT at this time. Pt to return to SNF upon hospital d/c. Nurse aware. Skilled acute physical therapy is not indicated at this time. Discharge Recommendations: Catrachito Bui  Further Equipment Recommendations for Discharge: N/A      SUBJECTIVE:   Patient stated ? No I don't get out of bed.?    OBJECTIVE DATA SUMMARY:     Past Medical History:   Diagnosis Date    Cancer ([address]     vaginal and colon cancer    Chronic pain     arthritis    Diabetes ([address]     DVT (deep venous thrombosis) [address] [address]     Hypertension     Legally blind     Thyroid disease      Past Surgical History:   Procedure Laterality Date    HX COLOSTOMY      HX OTHER SURGICAL      kidney drainage tube    HX UROLOGICAL      kidney removal    IR EXCHANGE NEPHRO PERC RT SI  1/28/2019    IR EXCHANGE NEPHRO PERC RT SI  3/21/2019     Barriers to Learning/Limitations: None  Compensate with: visual, verbal, tactile, kinesthetic cues/model  Prior Level of Function/Home Situation:   Home Situation  Home Environment: [address] [place] [address] Name: Mary Alice Comer  One/Two Story Residence: One story  Living Alone: No  Support Systems: Skilled nursing facility, Family member(s)  Patient Expects to be Discharged to[de-identified] Skilled nursing facility  Current DME Used/Available at Home: Hospital bed  Critical Behavior:  Neurologic State: Alert; Appropriate for age  Orientation Level: Oriented X4  Cognition: Appropriate decision making; Appropriate for age attention/concentration; Appropriate safety awareness; Follows commands  Psychosocial  Patient Behaviors: Calm; Cooperative  Purposeful Interaction: Yes  Pt Identified Daily Priority: Clinical issues (comment); Communication issues (comment)  Car[facility] Process: Nurture loving kindness;Establish trust;Teaching/learning; Attend basic human needs;Create healing environment  Caring Interventions: Reassure  Reassure: Therapeutic listening; Informing;Caring rounds  Skin Condition/Temp: Warm;Dry  Skin Integrity: Wound (add Wound LDA)  Skin Integumentary  Skin Color: Appropriate for ethnicity; Red  Skin Condition/Temp: Warm;Dry  Skin Integrity: Wound (add Wound LDA)  Turgor: Epidermis thin w/ loss of subcut tissue  Strength:    Strength: Grossly decreased, non-functional  Tone & Sensation:   Tone: Normal  Sensation: Impaired  Range Of Motion:  AROM: Grossly decreased, non-functional  PROM: Grossly decreased, non-functional  Functional Mobility:  Bed Mobility:  Rolling: Total assistance  Supine to Sit: Total assistance  Transfers:  Balance:   Ambulation/Gait Training:  Therapeutic Exercises:   Pain:  Pain Scale 1: Numeric (0 - 10)  Pain Intensity 1: 0  Activity Tolerance:   Poor   Please refer to the flowsheet for vital signs taken during this treatment.   After treatment:   ?         Patient left in no apparent distress sitting up in chair  ? Patient left in no apparent distress in bed  ? Call bell left within reach  ? Nursing notified  ? Caregiver present  ? Bed alarm activated    COMMUNICATION/EDUCATION:   ?         Fall prevention education was provided and the patient/caregiver indicated understanding. ? Patient/family have participated as able in goal setting and plan of care. ?         Patient/family agree to work toward stated goals and plan of care. ?         Patient understands intent and goals of therapy, but is neutral about his/her participation. ? Patient is unable to participate in goal setting and plan of care.     Thank you for this referral.  Telly Street, PT   Time Calculation: 16 mins       Eval Complexity: History: HIGH Complexity :3+ comorbidities / personal factors will impact the outcome/ POC Exam:HIGH Complexity : 4+ Standardized tests and measures addressing body structure, function, activity limitation and / or participation in recreation  Presentation: HIGH Complexity : Unstable and unpredictable characteristics  Clinical Decision Making:High Complexity    Overall Complexity:HIGH

## 2019-04-14 LAB
ANION GAP SERPL CALC-SCNC: 7 MMOL/L (ref 3–18)
BACTERIA SPEC CULT: ABNORMAL
BUN SERPL-MCNC: 26 MG/DL (ref 7–18)
BUN/CREAT SERPL: 28 (ref 12–20)
CALCIUM SERPL-MCNC: 8 MG/DL (ref 8.5–10.1)
CHLORIDE SERPL-SCNC: 112 MMOL/L (ref 100–108)
CO2 SERPL-SCNC: 23 MMOL/L (ref 21–32)
CREAT SERPL-MCNC: 0.93 MG/DL (ref 0.6–1.3)
GLUCOSE BLD STRIP.AUTO-MCNC: 148 MG/DL (ref 70–110)
GLUCOSE BLD STRIP.AUTO-MCNC: 182 MG/DL (ref 70–110)
GLUCOSE BLD STRIP.AUTO-MCNC: 255 MG/DL (ref 70–110)
GLUCOSE BLD STRIP.AUTO-MCNC: 325 MG/DL (ref 70–110)
GLUCOSE SERPL-MCNC: 178 MG/DL (ref 74–99)
POTASSIUM SERPL-SCNC: 4.4 MMOL/L (ref 3.5–5.5)
SERVICE CMNT-IMP: ABNORMAL
SODIUM SERPL-SCNC: 142 MMOL/L (ref 136–145)

## 2019-04-14 PROCEDURE — 74011000250 HC RX REV CODE- 250: Performed by: INTERNAL MEDICINE

## 2019-04-14 PROCEDURE — 74011636637 HC RX REV CODE- 636/637: Performed by: INTERNAL MEDICINE

## 2019-04-14 PROCEDURE — 74011250637 HC RX REV CODE- 250/637: Performed by: INTERNAL MEDICINE

## 2019-04-14 PROCEDURE — 65660000000 HC RM CCU STEPDOWN

## 2019-04-14 PROCEDURE — 80048 BASIC METABOLIC PNL TOTAL CA: CPT

## 2019-04-14 PROCEDURE — 74011250636 HC RX REV CODE- 250/636: Performed by: INTERNAL MEDICINE

## 2019-04-14 PROCEDURE — 82962 GLUCOSE BLOOD TEST: CPT

## 2019-04-14 PROCEDURE — 36415 COLL VENOUS BLD VENIPUNCTURE: CPT

## 2019-04-14 RX ORDER — DOCOSANOL 100 MG/G
CREAM TOPICAL
Status: DISCONTINUED | OUTPATIENT
Start: 2019-04-14 | End: 2019-04-15 | Stop reason: HOSPADM

## 2019-04-14 RX ORDER — INSULIN GLARGINE 100 [IU]/ML
12 INJECTION, SOLUTION SUBCUTANEOUS
Status: DISCONTINUED | OUTPATIENT
Start: 2019-04-14 | End: 2019-04-15 | Stop reason: HOSPADM

## 2019-04-14 RX ORDER — INSULIN LISPRO 100 [IU]/ML
4 INJECTION, SOLUTION INTRAVENOUS; SUBCUTANEOUS
Status: DISCONTINUED | OUTPATIENT
Start: 2019-04-14 | End: 2019-04-15

## 2019-04-14 RX ADMIN — DOCOSANOL 10 % TOPICAL CREAM: at 17:25

## 2019-04-14 RX ADMIN — SODIUM BICARBONATE 650 MG TABLET 650 MG: at 21:49

## 2019-04-14 RX ADMIN — GABAPENTIN 300 MG: 300 CAPSULE ORAL at 09:07

## 2019-04-14 RX ADMIN — DOCOSANOL 10 % TOPICAL CREAM: at 21:50

## 2019-04-14 RX ADMIN — Medication 10 ML: at 06:43

## 2019-04-14 RX ADMIN — DOCUSATE SODIUM 100 MG: 100 CAPSULE, LIQUID FILLED ORAL at 09:07

## 2019-04-14 RX ADMIN — HYDROCODONE BITARTRATE AND ACETAMINOPHEN 1 TABLET: 5; 325 TABLET ORAL at 19:46

## 2019-04-14 RX ADMIN — INSULIN LISPRO 8 UNITS: 100 INJECTION, SOLUTION INTRAVENOUS; SUBCUTANEOUS at 12:18

## 2019-04-14 RX ADMIN — DOCOSANOL 10 % TOPICAL CREAM: at 12:18

## 2019-04-14 RX ADMIN — INSULIN LISPRO 6 UNITS: 100 INJECTION, SOLUTION INTRAVENOUS; SUBCUTANEOUS at 17:25

## 2019-04-14 RX ADMIN — TRAZODONE HYDROCHLORIDE 50 MG: 50 TABLET ORAL at 21:49

## 2019-04-14 RX ADMIN — INSULIN LISPRO 2 UNITS: 100 INJECTION, SOLUTION INTRAVENOUS; SUBCUTANEOUS at 21:48

## 2019-04-14 RX ADMIN — APIXABAN 10 MG: 5 TABLET, FILM COATED ORAL at 09:07

## 2019-04-14 RX ADMIN — ATORVASTATIN CALCIUM 20 MG: 20 TABLET, FILM COATED ORAL at 09:07

## 2019-04-14 RX ADMIN — INSULIN LISPRO 4 UNITS: 100 INJECTION, SOLUTION INTRAVENOUS; SUBCUTANEOUS at 17:24

## 2019-04-14 RX ADMIN — DOCOSANOL 10 % TOPICAL CREAM: at 13:42

## 2019-04-14 RX ADMIN — APIXABAN 10 MG: 5 TABLET, FILM COATED ORAL at 21:49

## 2019-04-14 RX ADMIN — CEFTRIAXONE 1 G: 1 INJECTION, POWDER, FOR SOLUTION INTRAMUSCULAR; INTRAVENOUS at 21:48

## 2019-04-14 RX ADMIN — DOCUSATE SODIUM 100 MG: 100 CAPSULE, LIQUID FILLED ORAL at 21:49

## 2019-04-14 RX ADMIN — BRIMONIDINE TARTRATE 1 DROP: 2 SOLUTION OPHTHALMIC at 21:50

## 2019-04-14 RX ADMIN — NYSTATIN: 100000 POWDER TOPICAL at 21:50

## 2019-04-14 RX ADMIN — LEVOTHYROXINE SODIUM 50 MCG: 50 TABLET ORAL at 06:44

## 2019-04-14 RX ADMIN — INSULIN GLARGINE 12 UNITS: 100 INJECTION, SOLUTION SUBCUTANEOUS at 21:48

## 2019-04-14 RX ADMIN — METOPROLOL SUCCINATE 50 MG: 50 TABLET, EXTENDED RELEASE ORAL at 09:08

## 2019-04-14 RX ADMIN — Medication 10 ML: at 13:42

## 2019-04-14 RX ADMIN — GABAPENTIN 300 MG: 300 CAPSULE ORAL at 21:49

## 2019-04-14 RX ADMIN — INSULIN LISPRO 4 UNITS: 100 INJECTION, SOLUTION INTRAVENOUS; SUBCUTANEOUS at 12:18

## 2019-04-14 RX ADMIN — Medication 10 ML: at 21:51

## 2019-04-14 RX ADMIN — SODIUM BICARBONATE 650 MG TABLET 650 MG: at 09:07

## 2019-04-14 RX ADMIN — TIMOLOL MALEATE 1 DROP: 5 SOLUTION/ DROPS OPHTHALMIC at 21:50

## 2019-04-14 RX ADMIN — NYSTATIN: 100000 POWDER TOPICAL at 09:08

## 2019-04-14 RX ADMIN — MENTHOL, CAMPHOR: 1.11; 1.11 LOTION TOPICAL at 09:08

## 2019-04-14 NOTE — PROGRESS NOTES
Hospitalist Progress Note    Patient: Sabina Cochran MRN: [id number]  CSN: [id number]    YOB: 1940  Age: 77 y.o. Sex: female    DOA: 4/11/2019 LOS:  LOS: 3 days          Chief Complaint: pyelonephritis          Assessment/Plan       Disposition :  Patient Active Problem List   Diagnosis Code    Pyelonephritis N12    S/p nephrectomy Z90.5    Right flank pain R10.9    Malfunction of nephrostomy tube ([address] T83.098A    Nephrostomy tube displaced ([address] T83.022A    UTI (urinary tract infection) N39.0    Elevated WBC count D72.829    Diabetes ([address] E11.9    Legally blind H54.8    Hypertension I10    Thyroid disease E07.9    Elevated serum creatinine R79.89    S/P colectomy Z90.49    Sepsis ([address] A41.9    Skin lesion of foot L98.9    Pressure injury of buttock, stage 2 L89.302    Pressure injury of heel, stage 1 L89.601    Hypomagnesemia E83.42    Hyperkalemia E87.5    Heel ulcer ([address] L97.409       78 y/o female here with pyelonephritis due to indwelling nephrostomy tube.  VSS.  Under treatment with antibiotics.      Sepsis due to Pyelonephritis. Hyperkalemia. DM.  HTN. Hypothyroidism. Chronic anticoagulation with NOAC     -Continue Rocephin.  -Ucx with Proteus. Looks like same \"bug\" as noted on 4/3/19 culture. Could probably go home of oral Keflex when clear for dc.   -Rx Abreva. -Nursing home on DC. Tomorrow? Subjective:    C/o \"fever blisters. \"  Requests Theone Matte. Review of systems:    Constitutional: denies fevers, chills, myalgias  Respiratory: denies SOB, cough  Cardiovascular: denies chest pain, palpitations  Gastrointestinal: denies nausea, vomiting, diarrhea      Vital signs/Intake and Output:  Visit Vitals  /57 (BP 1 Location: Right arm, BP Patient Position: At rest)   Pulse 77   Temp 98.3 °F (36.8 °C)   Resp 16   Ht 5' 2\" (1.575 m)   Wt 76.1 kg (167 lb 12.3 oz)   SpO2 100%   BMI 30.69 kg/m²     Current Shift:  No intake/output data recorded.   Last three shifts:  04/12 1901 - 04/14 0700  In: 828.8 [P.O.:220; I.V.:608.8]  Out: 3550 [Urine:2350]    Exam:    General: NAD  Head/Neck: MMM  CVS:s1s2   Lungs:CTABL  Abdomen: Soft, bs+  Extremities: No edema. Labs: Results:       Chemistry Recent Labs     04/14/19  0300 04/13/19  2255 04/13/19  1845  04/13/19  0320  04/11/19  1807   * 245* 185*   < > 145*   < > 158*    143 143   < > 144   < > 133*   K 4.4 4.8 4.9   < > 4.2   < > 6.0*   * 114* 113*   < > 113*   < > 103   CO2 23 19* 23   < > 22   < > 22   BUN 26* 27* 26*   < > 33*   < > 40*   CREA 0.93 1.02 1.07   < > 1.09   < > 1.28   CA 8.0* 8.2* 8.3*   < > 8.1*   < > 8.9   AGAP 7 10 7   < > 9   < > 8   BUCR 28* 26* 24*   < > 30*   < > 31*   AP  --   --   --   --  62  --  102   TP  --   --   --   --  5.2*  --  6.9   ALB  --   --   --   --  2.3*  --  3.1*   GLOB  --   --   --   --  2.9  --  3.8   AGRAT  --   --   --   --  0.8  --  0.8    < > = values in this interval not displayed. CBC w/Diff Recent Labs     04/13/19  0320 04/11/19  1600   WBC 6.0 16.1*   RBC 3.33* 4.83   HGB 9.5* 14.2   HCT 29.5* 42.9    311   GRANS 65 83*   LYMPH 24 8*   EOS 3 3      Cardiac Enzymes Recent Labs     04/12/19  1228 04/12/19  0610   CPK 28 33   CKND1 CALCULATION NOT PERFORMED WHEN RESULT IS BELOW LINEAR LIMIT CALCULATION NOT PERFORMED WHEN RESULT IS BELOW LINEAR LIMIT      Coagulation Recent Labs     04/13/19  0320   PTP 20.0*   INR 1.7*       Lipid Panel Lab Results   Component Value Date/Time    Cholesterol, total 116 02/06/2019 04:00 AM    HDL Cholesterol 31 (L) 02/06/2019 04:00 AM    LDL, calculated 60.6 02/06/2019 04:00 AM    VLDL, calculated 24.4 02/06/2019 04:00 AM    Triglyceride 122 02/06/2019 04:00 AM    CHOL/HDL Ratio 3.7 02/06/2019 04:00 AM      BNP No results for input(s): BNPP in the last 72 hours.    Liver Enzymes Recent Labs     04/13/19  0320   TP 5.2*   ALB 2.3*   AP 62   SGOT 8*      Thyroid Studies Lab Results   Component Value Date/Time    TSH 1.30 02/06/2019 04:00 AM        Procedures/imaging: see electronic medical records for all procedures/Xrays and details which were not copied into this note but were reviewed prior to creation of Joana Cheng MD

## 2019-04-14 NOTE — ROUTINE PROCESS
Patient had an uneventful shift and remained stable. NAD noted, call light within reach, and bed in lowest position. Bedside and Verbal shift change report given to Zenon Mead RN (oncoming nurse) by Saniya Falcon (offgoing nurse). Report included the following information SBAR, Kardex, MAR and Accordion.  \

## 2019-04-15 ENCOUNTER — APPOINTMENT (OUTPATIENT)
Dept: GENERAL RADIOLOGY | Age: 79
DRG: 699 | End: 2019-04-15
Attending: PHYSICIAN ASSISTANT
Payer: MEDICARE

## 2019-04-15 VITALS
BODY MASS INDEX: 30.87 KG/M2 | SYSTOLIC BLOOD PRESSURE: 146 MMHG | OXYGEN SATURATION: 100 % | WEIGHT: 167.77 LBS | HEART RATE: 77 BPM | RESPIRATION RATE: 16 BRPM | DIASTOLIC BLOOD PRESSURE: 63 MMHG | TEMPERATURE: 97.6 F | HEIGHT: 62 IN

## 2019-04-15 PROBLEM — S31.801A TEAR OF SKIN OF BUTTOCK: Status: ACTIVE | Noted: 2018-11-28

## 2019-04-15 PROBLEM — L89.610 PRESSURE INJURY OF RIGHT HEEL, UNSTAGEABLE (HCC): Status: ACTIVE | Noted: 2018-11-28

## 2019-04-15 LAB
GLUCOSE BLD STRIP.AUTO-MCNC: 110 MG/DL (ref 70–110)
GLUCOSE BLD STRIP.AUTO-MCNC: 239 MG/DL (ref 70–110)

## 2019-04-15 PROCEDURE — 74011636637 HC RX REV CODE- 636/637: Performed by: INTERNAL MEDICINE

## 2019-04-15 PROCEDURE — 82962 GLUCOSE BLOOD TEST: CPT

## 2019-04-15 PROCEDURE — 74011250637 HC RX REV CODE- 250/637: Performed by: INTERNAL MEDICINE

## 2019-04-15 PROCEDURE — 71045 X-RAY EXAM CHEST 1 VIEW: CPT

## 2019-04-15 PROCEDURE — 77030010541

## 2019-04-15 PROCEDURE — 77030010522

## 2019-04-15 RX ORDER — INSULIN LISPRO 100 [IU]/ML
7 INJECTION, SOLUTION INTRAVENOUS; SUBCUTANEOUS
Status: DISCONTINUED | OUTPATIENT
Start: 2019-04-15 | End: 2019-04-15 | Stop reason: HOSPADM

## 2019-04-15 RX ORDER — GABAPENTIN 300 MG/1
300 CAPSULE ORAL 2 TIMES DAILY
Qty: 2 CAP | Refills: 0 | Status: ON HOLD | OUTPATIENT
Start: 2019-04-15 | End: 2020-11-11 | Stop reason: SDUPTHER

## 2019-04-15 RX ORDER — LORAZEPAM 0.5 MG/1
0.5 TABLET ORAL
Qty: 2 TAB | Refills: 0 | Status: SHIPPED | OUTPATIENT
Start: 2019-04-15 | End: 2020-06-26

## 2019-04-15 RX ORDER — CEPHALEXIN 500 MG/1
500 CAPSULE ORAL 4 TIMES DAILY
Qty: 24 CAP | Refills: 0 | Status: SHIPPED
Start: 2019-04-15 | End: 2019-04-21

## 2019-04-15 RX ADMIN — BRIMONIDINE TARTRATE 1 DROP: 2 SOLUTION OPHTHALMIC at 09:09

## 2019-04-15 RX ADMIN — DOCUSATE SODIUM 100 MG: 100 CAPSULE, LIQUID FILLED ORAL at 09:06

## 2019-04-15 RX ADMIN — DOCOSANOL 10 % TOPICAL CREAM: at 11:00

## 2019-04-15 RX ADMIN — METOPROLOL SUCCINATE 50 MG: 50 TABLET, EXTENDED RELEASE ORAL at 09:00

## 2019-04-15 RX ADMIN — NYSTATIN: 100000 POWDER TOPICAL at 09:09

## 2019-04-15 RX ADMIN — ATORVASTATIN CALCIUM 20 MG: 20 TABLET, FILM COATED ORAL at 09:06

## 2019-04-15 RX ADMIN — SODIUM BICARBONATE 650 MG TABLET 650 MG: at 09:06

## 2019-04-15 RX ADMIN — MENTHOL, CAMPHOR: 1.11; 1.11 LOTION TOPICAL at 09:10

## 2019-04-15 RX ADMIN — APIXABAN 10 MG: 5 TABLET, FILM COATED ORAL at 09:06

## 2019-04-15 RX ADMIN — INSULIN LISPRO 4 UNITS: 100 INJECTION, SOLUTION INTRAVENOUS; SUBCUTANEOUS at 11:52

## 2019-04-15 RX ADMIN — LEVOTHYROXINE SODIUM 50 MCG: 50 TABLET ORAL at 06:36

## 2019-04-15 RX ADMIN — DOCOSANOL 10 % TOPICAL CREAM: at 06:23

## 2019-04-15 RX ADMIN — Medication 10 ML: at 06:22

## 2019-04-15 RX ADMIN — TIMOLOL MALEATE 1 DROP: 5 SOLUTION/ DROPS OPHTHALMIC at 09:09

## 2019-04-15 RX ADMIN — GABAPENTIN 300 MG: 300 CAPSULE ORAL at 09:06

## 2019-04-15 NOTE — ROUTINE PROCESS
Bedside and Verbal shift change report given to John Rowley RN  (oncoming nurse) by Isak Singleton RN  (offgoing nurse). Report given with SBAR, Kardex, Intake/Output and Recent Results.

## 2019-04-15 NOTE — DIABETES MGMT
GLYCEMIC CONTROL PROGRESS NOTE:    - known h/o T2DM, basal/mealtime SNF regimen  - BG out of target range non-ICU: 110-180 mg/dl  - TDD 36 (12 Lantus + 8 units mealtime + 16 units - Humalog Normal Insulin Sensitivity Corrective Coverage)  - PPG out of target range recommend increase mealtime insulin   *Humalog 7 units qac    Recent Glucose Results:   Lab Results   Component Value Date/Time    GLUCPOC 110 04/15/2019 06:21 AM    GLUCPOC 182 (H) 04/14/2019 08:48 PM    GLUCPOC 255 (H) 04/14/2019 04:16 PM     Jose Christopher MS, RN, CDE  Glycemic Control Team  [phone]  Pager [phone] (M-TH 8:00-4:30P)  *After Hours pager [phone]

## 2019-04-15 NOTE — CDMP QUERY
Conflict in documentation:  
 
 
Patient admitted with wounds to heel and buttock. Noted documentation of pressure ulcer heel stage 1 on patient active problem list. and  Pressure ulcer  buttock on patient active problem list.  
 
Wound Care Nurse  Documents :1)  POA, unstageable right lateral heel ulcer = 2.5 x 2.4 cm x depth unknown Base is 100% of dry, stable eschar tissue 2) POA, linear skin tear to sacrum = 0.7 x 0.1 x 0.1 cm Base is 100% of pink/red tissue Do you agree? If so ,please document in progress note and discharge summary. Also if in agreement with wound care , please remove from patients active problem list. 
 
Thank-You Margarita Landry RN [address] [place] [address] [phone]

## 2019-04-15 NOTE — CDMP QUERY
This patient has been diagnosed with Sepsis. Please document in the progress notes the Clinical Indicators and any associated Acute Organ Dysfunction that supports this diagnosis or state that the diagnosis has been ruled out. Current documentation: WBC 16 with left shift. VS WNL, . Documented with pyelonephritis. No blood cxs. Sepsis has been documented in Patient active problem list.  
 
Sepsis  (BSV Approved definition) Documented Source of suspected or confirmed infection as manifested by 2 or more of the following, not easily explained by another co-existing condition:  Temperature:  >38C (100.9F)   -OR-  <36C  (96.8F)  Heart Rate:  > 90 bpm 
 Respiratory Rate:  > 20 / min  -OR-  PaCO2 < 32 
 WBC:  > 12K  -OR- < 4K  -OR- Bands > 10 Explicitly link all related/associated Acute Organ Dysfunction to Sepsis:     
 Encephalopathy  Sepsis-induced Hypotension (or)  Septic Shock         [SBP < 90 (or) MAP <65 (or) SBP drop > 40 points from baseline]  Hypoxemia (or)  Acute Respiratory Failure         [need for invasive or non-invasive ventilation OR- pO2 < 60 mmHg]  Acute Kidney Injury (or) Acute Renal Failure OR- Oliguria         [serum Creatinine > 2.0 OR- Urine Output < 0.5ml/kg/hr for 2 hours]  Ileus (absent bowel sounds)  Coagulopathy  DIC  Thrombocytopenia         [Platelet Count < 053,296 OR- INR > 1.5 OR- aPTT >60 sec]  Hyperbilirubinemia     [Bilirubin > 2 mg/dL]  Hyperlactatemia   [Lactic Acid > 2.0]  Critical-Illness Myopathy or Polyneuropathy Severe Sepsis = Sepsis with associated Acute Organ Dysfunction Septic Shock = Refractory hypotension refractory to aggressive volume replacement and often requiring vasopressor therapy like dopamine  -OR-  Lactic Acidosis  [Lactic Acid > 4.0]. Thank-You Rudy Koenig  [facility] [phone]

## 2019-04-15 NOTE — PROGRESS NOTES
0500- Colostomy bag and wafer changed. Nephrostomy appears to be leaking, linens changed and dressing around nephrostomy changed.

## 2019-04-15 NOTE — PROGRESS NOTES
CDMP - Sepsis ruled out. CDMP - per wound care: POA unstageable right lateral heel ulcer and POA linear skin tear to sacrum.

## 2019-04-15 NOTE — PROGRESS NOTES
Transition of care: d/c to LTC today  Met with patient at bedside. She lives at [facility]. Informed cm she plan to return there when ready for d/c. She is aware of plan for d/c today. Patient is legally blind. Patient will need transportation to return to rehab. RN please set up transportation for patient to return to [address] [facility] has set up transportation for 1500 per Darius, [address] request  RN please call report to RONA Mendoza at [address] [place] [address]. [phone] for report. Prior to patient leaving [address] [place] [address] Dr  Please include all hard scripts for controlled substances, med rec and dc summary in packet. Please medicate for pain prior to dc if possible and needed to help offset delay when patient first arrives to facility. [phone] telephone call to patients son to let him know of plan of care and transportation has been set up for 1500 he concurs with d/c plan. Telephone call to [facility] at [facility] to let her know transportation has been set up for 1500  Care Management Interventions  PCP Verified by CM:  Yes  Mode of Transport at Discharge: BLS  Transition of Care Consult (CM Consult): SNF, Long Term Care  Physical Therapy Consult: Yes  Occupational Therapy Consult: Yes  Current Support Network: Nursing Facility  Confirm Follow Up Transport: Family  Plan discussed with Pt/Family/Caregiver: Yes  Freedom of Choice Offered: Yes  Discharge Location  Discharge Placement: [address] [facility]

## 2019-04-15 NOTE — PROGRESS NOTES
Occupational Therapy Evaluation Attempt/Screening:  Services are not indicated at this time. Occupational therapy orders received. Pt is a SNF/LTC resident at [facility], bedbound and dependent for ADLs d/t decreased vision. Would recommend OT at SNF to screen patient during meal to assess for A/E to assist in self-feeding independence if not already done so. Unfortunately, no skilled OT needs at this time d/t no functional decline. Recommend Mechanical lift for OOB to w/c to increase skin integrity & quality of life. Thank you for this consult. Will sign off.   Shantell Naik, OTR/L

## 2019-04-15 NOTE — ROUTINE PROCESS
Bedside and Verbal shift change report given to [address]  (oncoming nurse) by Elton Galeazzi RN (offgoing nurse). Report included the following information SBAR and Kardex.

## 2019-04-15 NOTE — DISCHARGE INSTRUCTIONS
Patient Education        Kidney Infection: Care Instructions  Your Care Instructions    A kidney infection (pyelonephritis) is a type of urinary tract infection, or UTI. Most UTIs are bladder infections. Kidney infections tend to make people much sicker than bladder infections do. A kidney infection is also more serious because it can cause lasting damage if it is not treated quickly. Follow-up care is a key part of your treatment and safety. Be sure to make and go to all appointments, and call your doctor if you are having problems. It's also a good idea to know your test results and keep a list of the medicines you take. How can you care for yourself at home? · Take your antibiotics as directed. Do not stop taking them just because you feel better. You need to take the full course of antibiotics. · Drink plenty of water, enough so that your urine is light yellow or clear like water. This may help wash out bacteria that are causing the infection. If you have kidney, heart, or liver disease and have to limit fluids, talk with your doctor before you increase the amount of fluids you drink. · Urinate often. Try to empty your bladder each time. · To relieve pain, take a hot shower or lay a heating pad (set on low) over your lower belly. Never go to sleep with a heating pad in place. Put a thin cloth between the heating pad and your skin. To help prevent kidney infections  · Drink plenty of water each day. This helps you urinate often, which clears bacteria from your system. If you have kidney, heart, or liver disease and have to limit fluids, talk with your doctor before you increase the amount of fluids you drink. · Urinate when you have the urge. Do not hold your urine for a long time. Urinate before you go to sleep. · If you have symptoms of a bladder infection, such as burning when you urinate or having to urinate often, call your doctor so you can treat the problem before it gets worse.  If you do not treat a bladder infection quickly, it can spread to the kidney. · Men should keep the tip of the penis clean. If you are a woman, keep these ideas in mind:  · Urinate right after you have sex. · Change sanitary pads often. Avoid douches, feminine hygiene sprays, and other feminine hygiene products that have deodorants. · After going to the bathroom, wipe from front to back. When should you call for help? Call your doctor now or seek immediate medical care if:    · You have symptoms that a kidney infection is getting worse. These may include:  ? Pain or burning when you urinate. ? A frequent need to urinate without being able to pass much urine. ? Pain in the flank, which is just below the rib cage and above the waist on either side of the back. ? Blood in the urine. ? A fever.     · You are vomiting or nauseated.    Watch closely for changes in your health, and be sure to contact your doctor if:    · You do not get better as expected. Where can you learn more? Go to http://richelle-kash.info/. Enter S598 in the search box to learn more about \"Kidney Infection: Care Instructions. \"  Current as of: March 14, 2018  Content Version: 11.9  © 6620-7974 [employer]. Care instructions adapted under license by [employer] (which disclaims liability or warranty for this information). If you have questions about a medical condition or this instruction, always ask your healthcare professional. [place] [address] any warranty or liability for your use of this information.

## 2019-04-15 NOTE — ROUTINE PROCESS
Bedside shift change report given to Shilpa Blakely (oncoming nurse) by Davida Xavier RN (offgoing nurse). Report included the following information SBAR, Kardex, Intake/Output and MAR.

## 2019-04-15 NOTE — PROGRESS NOTES
0700: Assumed care of pt from [facility]. 0900: Pt resting no sign of distress. Call light within reach.

## 2019-04-15 NOTE — DIABETES MGMT
Diabetes Patient/Family Education Record  Factors That  May Influence Patients Ability  to Learn or  Comply with Recommendations   []   Language barrier    []   Cultural needs   [x]   Motivation    []   Cognitive limitation    []   Physical   []   Education    []   Physiological factors   [x]   Hearing/vision/speaking impairment: Blind   []   Jew beliefs    []   Financial factors   [x]  Other: NH Resident    []  No factors identified at this time.      Person Instructed:   [x]   Patient   []   Family   []  Other     Preference for Learning:   [x]   Verbal   []   Written   []  Demonstration     Level of Comprehension & Competence:    []  Good                                      [] Fair                                     [x]  Poor                             [x]  Needs Reinforcement   [x]  Teachback completed    Education Component:   [x]  Medication management, including how to administer insulin (if appropriate) and potential medication interactions    [x]  Nutritional management -obtain usual meal pattern   []  Exercise   []  Signs, symptoms, and treatment of hyperglycemia and hypoglycemia   [] Prevention, recognition and treatment of hyperglycemia and hypoglycemia   [x]  Importance of blood glucose monitoring and how to obtain a blood glucose meter    []  Instruction on use of the blood glucose meter   [x]  Discuss the importance of HbA1C monitoring    []  Sick day guidelines   []  Proper use and disposal of lancets, needles, syringes or insulin pens (if appropriate)   [x]  Potential long-term complications (retinopathy, kidney disease, neuropathy, foot care) infection,poor healing   [] Information about whom to contact in case of emergency or for more information    [x]  Goal:  Patient/family will demonstrate understanding of Diabetes Self Management Skills by:4/30/19  Plan for post-discharge education or self-management support:    [] Outpatient class schedule provided [x] Patient Declined: not appropriate. Pt bed bound and lives at [facility]    [] Scheduled for outpatient classes (date) _______  Verify:  Does patient understand how diabetes medications work?_  Does patient know what their most recent A1c is? Yes, 8.4%. Does patient monitor glucose at home? NH staff monitors. Does patient have difficulty obtaining diabetes medications or testing supplies?  No     Chema Yadav  [facility] [phone]

## 2019-04-15 NOTE — DIABETES MGMT
NUTRITIONAL ASSESSMENT GLYCEMIC CONTROL/ PLAN OF CARE     Shonna Arias           77 y.o.           4/11/2019                 1. Pyelonephritis    2. Acute hyperkalemia    3. 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, PAD, chronic heal ulcer, bed bound     INTERVENTIONS/PLAN:   -Recommend continuing Diabetic Consistent Carbohydrate Diet  -Provide Diabetes Self-Mgt Education     ASSESSMENT:   Nutritional Status:  Obese class I per BMI 30.7 kg/m2 (30.0-34.9 kg/m2)      Diabetes Management:     Recent Glucose Results:   Lab Results   Component Value Date/Time    GLUCPOC 110 04/15/2019 06:21 AM    GLUCPOC 182 (H) 04/14/2019 08:48 PM    GLUCPOC 255 (H) 04/14/2019 04:16 PM       Within target range (non-ICU: <140; ICU<180): [] Yes   [x]  No    Current Insulin regimen:   Lantus  12 units  Humalog 7 units w/ meals  Humalog corrective normal insulin sensitivity scale    Home medication/insulin regimen:   Lantus 8 units   Novolog ss    HbA1c:(4/12/19) 8.4% equivalent to average blood glucose level 192mg/dL. Adequate glycemic control PTA:  [] Yes  [x] No       SUBJECTIVE/OBJECTIVE:   Information obtained from: Pt unable to confirm PTA diabetes regimen. She states before living at [facility] she was on a 70/30 regimen. Believe pt is now on Lantus and Novolog at [facility]. Staff at [facility] monitor pt's BG levels. Pt states that she eat the NH food. No special diabetic diet provided per pt. Pt's son also brings in food and candy for pt. Pt states she will have occasional chocolate binges. Pt reports good appetitie. Will sometimes eat breakfast and feel full for the rest of the day. She will snack on cheese, carrot and celery, and dill dip. Pt not open to dietary changes. Explained importance of good BG levels and protein intake for wound healing. Will continue to monitor and follow-up per policy.       Diet: DIET DIABETIC CONSISTENT CARB      Patient Vitals for the past 100 hrs:   % Diet Eaten   04/15/19 0649 0 %   04/14/19 0621 0 %   04/13/19 0604 0 %   04/12/19 1436 100 %     Medications: [x]                Reviewed     Most Recent POC Glucose:   Recent Labs     04/14/19  0300 04/13/19  2255 04/13/19  1845 04/13/19  1100   * 245* 185* 218*         Labs:   Lab Results   Component Value Date/Time    Hemoglobin A1c 8.4 (H) 04/12/2019 12:45 AM     Lab Results   Component Value Date/Time    Sodium 142 04/14/2019 03:00 AM    Potassium 4.4 04/14/2019 03:00 AM    Chloride 112 (H) 04/14/2019 03:00 AM    CO2 23 04/14/2019 03:00 AM    Anion gap 7 04/14/2019 03:00 AM    Glucose 178 (H) 04/14/2019 03:00 AM    BUN 26 (H) 04/14/2019 03:00 AM    Creatinine 0.93 04/14/2019 03:00 AM    Calcium 8.0 (L) 04/14/2019 03:00 AM    Magnesium 2.3 11/29/2018 04:40 AM    Albumin 2.3 (L) 04/13/2019 03:20 AM       Anthropometrics:BMI (calculated): 30.7 kg/m2  Wt Readings from Last 1 Encounters:   04/14/19 76.1 kg (167 lb 12.3 oz)      Ht Readings from Last 1 Encounters:   04/11/19 5' 2\" (1.575 m)       Estimated Nutrition Needs: 1976 kcal/day (35 kcal/kg), 74 g PRO/day ( 1.3 g PRO/kg), 1976 mL fluid/day (1 mL fluid/ kcal)  Based on:     [x]            Adjusted BW : 56.47 kg    Nutrition Diagnoses: Altered nutrition-related lab value related to excess carbohydrate as evidenced by Hgb A1C 7.4%. Nutrition Interventions:  -Recommend continuing Diabetic Consistent Carbohydrate Diet    Goal:   Blood glucose will be within target range of  mg/dL by 4/18/19.      Nutrition Monitoring and Evaluation      []     Monitor po intake on meal rounds  [x]     Continue inpatient monitoring and intervention  []     Other:      Nutrition Risk:  []   High     []  Moderate    [x]  Minimal/Uncompromised    Mary Lav  pgr [phone]

## 2019-04-15 NOTE — DISCHARGE SUMMARY
Discharge Summary    Patient: Jayro Cisneros MRN: [id number]  CSN: [id number]    YOB: 1940  Age: 77 y.o.   Sex: female    DOA: 4/11/2019 LOS:  LOS: 4 days   Discharge Date:      Primary Care Provider:  Nahomi Falcon MD    Admission Diagnoses: Pyelonephritis [N12]  Hyperkalemia [E87.5]  Pyelonephritis [N12]  Heel ulcer ([facility] [L97.409]    Discharge Diagnoses:    Problem List as of 4/15/2019 Date Reviewed: 4/12/2019          Codes Class Noted - Resolved    Hyperkalemia ICD-10-CM: E87.5  ICD-9-CM: 276.7  4/12/2019 - Present        Heel ulcer ([facility] ICD-10-CM: L97.409  ICD-9-CM: 707.14  4/12/2019 - Present        Pressure injury of buttock, stage 2 ICD-10-CM: L89.302  ICD-9-CM: 707.05, 707.22  11/28/2018 - Present        Pressure injury of heel, stage 1 ICD-10-CM: L89.601  ICD-9-CM: 707.07, 707.21  11/28/2018 - Present        Hypomagnesemia ICD-10-CM: E83.42  ICD-9-CM: 275.2  11/28/2018 - Present        UTI (urinary tract infection) ICD-10-CM: N39.0  ICD-9-CM: 599.0  11/26/2018 - Present        Elevated WBC count ICD-10-CM: D72.829  ICD-9-CM: 288.60  11/26/2018 - Present        Diabetes ([facility] ICD-10-CM: E11.9  ICD-9-CM: 250.00  11/26/2018 - Present        Legally blind ICD-10-CM: H54.8  ICD-9-CM: 369.4  11/26/2018 - Present        Hypertension ICD-10-CM: I10  ICD-9-CM: 401.9  11/26/2018 - Present        Thyroid disease ICD-10-CM: E07.9  ICD-9-CM: 246.9  11/26/2018 - Present        Elevated serum creatinine ICD-10-CM: R79.89  ICD-9-CM: 790.99  11/26/2018 - Present        S/P colectomy ICD-10-CM: Z90.49  ICD-9-CM: V45.89  11/26/2018 - Present        Sepsis ([address] ICD-10-CM: A41.9  ICD-9-CM: 038.9, 995.91  11/26/2018 - Present        Skin lesion of foot ICD-10-CM: L98.9  ICD-9-CM: 709.9  11/26/2018 - Present        * (Principal) Pyelonephritis ICD-10-CM: N12  ICD-9-CM: 590.80  5/18/2018 - Present        S/p nephrectomy ICD-10-CM: Z90.5  ICD-9-CM: V45.73  5/18/2018 - Present        Right flank pain ICD-10-CM: R10.9  ICD-9-CM: 789.09  5/18/2018 - Present        Malfunction of nephrostomy tube [facility]) ICD-10-CM: B49.550H  ICD-9-CM: 997.5  5/18/2018 - Present        Nephrostomy tube displaced [facility]) ICD-10-CM: A98.909E  ICD-9-CM: 997.5  5/18/2018 - Present              Discharge Medications:     Current Discharge Medication List      START taking these medications    Details   cephALEXin (KEFLEX) 500 mg capsule Take 1 Cap by mouth four (4) times daily for 6 days. Qty: 24 Cap, Refills: 0         CONTINUE these medications which have CHANGED    Details   LORazepam (ATIVAN) 0.5 mg tablet Take 1 Tab by mouth daily as needed for Anxiety. Qty: 2 Tab, Refills: 0    Associated Diagnoses: Anxiety      gabapentin (NEURONTIN) 300 mg capsule Take 1 Cap by mouth two (2) times a day. Qty: 2 Cap, Refills: 0    Associated Diagnoses: Right flank pain         CONTINUE these medications which have NOT CHANGED    Details   cholecalciferol (VITAMIN D3) 1,000 unit tablet Take 1,000 Units by mouth daily. ondansetron hcl (ZOFRAN) 4 mg tablet Take 4 mg by mouth every eight (8) hours as needed for Nausea. apixaban (ELIQUIS PO) Take 10 mg by mouth two (2) times a day. nystatin (MYCOSTATIN) powder Apply 100,000 Units to affected area four (4) times daily. hydrocortisone (HYTONE) 2.5 % topical cream Apply  to affected area two (2) times a day. use thin layer      brimonidine-timolol (COMBIGAN) 0.2-0.5 % drop ophthalmic solution Administer 1 Drop to both eyes every twelve (12) hours. insulin aspart U-100 (NOVOLOG FLEXPEN U-100 INSULIN) 100 unit/mL inpn by SubCUTAneous route. Sliding scale      sodium bicarbonate 650 mg tablet Take 650 mg by mouth two (2) times a day. insulin regular (NOVOLIN R, HUMULIN R) 100 unit/mL injection by SubCUTAneous route. diphenhydrAMINE (BENADRYL) 25 mg capsule Take 50 mg by mouth every six (6) hours as needed for Itching. pollen extracts (PROSTAT PO) Take  by mouth. naloxone (NARCAN) 4 mg/actuation nasal spray Use 1 spray intranasally, then discard. Repeat with new spray every 2 min as needed for opioid overdose symptoms, alternating nostrils. Qty: 1 Each, Refills: 0      tiZANidine (ZANAFLEX) 2 mg capsule Take 2 mg by mouth two (2) times a day. traZODone (DESYREL) 50 mg tablet Take 50 mg by mouth nightly. camphor-menthol (SARNA) 0.5-0.5 % lotion Apply  to affected area daily. docusate sodium (COLACE) 100 mg capsule Take 100 mg by mouth two (2) times a day. timolol (TIMOPTIC) 0.5 % ophthalmic solution 1 Drop two (2) times a day. bisacodyl (DULCOLAX, BISACODYL,) 10 mg suppository Insert  into rectum daily as needed. Indications: constipation      metoprolol succinate (TOPROL-XL) 50 mg XL tablet Take 50 mg by mouth daily. magnesium hydroxide (MILK OF MAGNESIA CONCENTRATED) 2,400 mg/10 mL susp suspension Take 10 mL by mouth. alum-mag hydroxide-simeth (MYLANTA) 200-200-20 mg/5 mL susp Take 30 mL by mouth four (4) times daily as needed. levothyroxine (SYNTHROID) 50 mcg tablet Take  by mouth Daily (before breakfast). acetaminophen (TYLENOL) 650 mg TbER Take 650 mg by mouth every six (6) hours as needed for Pain or Fever. insulin glargine (LANTUS) 100 unit/mL injection 8 units SQ QHS  Qty: 1 Vial, Refills: 0      atorvastatin calcium (ATORVASTATIN PO) Take 20 mg by mouth daily. STOP taking these medications       HYDROcodone-acetaminophen (NORCO) 5-325 mg per tablet Comments:   Reason for Stopping:         ondansetron (ZOFRAN ODT) 8 mg disintegrating tablet Comments:   Reason for Stopping:               Discharge Condition: Stable    Procedures : None    Consults: None      PHYSICAL EXAM   Visit Vitals  /63   Pulse 77   Temp 97.6 °F (36.4 °C)   Resp 16   Ht 5' 2\" (1.575 m)   Wt 76.1 kg (167 lb 12.3 oz)   SpO2 100%   BMI 30.69 kg/m²     General: Elderly white female, chronically ill appearing.  Awake, cooperative, no acute distress    HEENT: NC, Atraumatic. PERRLA, EOMI. Anicteric sclerae. Lungs:  CTA Bilaterally. No Wheezing/Rhonchi/Rales. Heart:  Regular  rhythm,  No murmur, No Rubs, No Gallops  Abdomen: Soft, Non distended, Non tender. +Bowel sounds. Nephrostomy tube in place. Ostomy bag in place. Extremities: Right lower extremity with unstageable ulcer to lateral right heel. RLE painful to palpation. Psych:   Not anxious or agitated. Neurologic:  No acute neurological deficits. Admission HPI : Margret Mcmullen is a 77 y.o.  female who has hx of Blindness, Vaginal Cancer, Colon Cancer with secondary nephrostomy tube and colostomy , DM , PAD pending amputation from chronic heel ulcer and leg pain -bed bound   Presents to ER from [facility] with decrease urine out put from nephrostomy tube and right sided flank pain   In ER nephrostomy tube flushed with proper drainage. Unfortunately found to have potassium of 6 given dextrose insulin   With pending repeat. CT showed well positioned tube but hydronephrosis and Pyelonephritis ; Asked to admit for further treatment urology will see in consultation     Hospital Course : This patient was admitted to medical services on April 11, 2019 for pyelonephritis and malfunctioning nephrostomy tube. Other admission diagnoses include insulin-dependent type II diabetes, hyperkalemia, right heel ulcer, legal blindness, history of DVT. The patient was admitted to the telemetry unit for further care. She was started on broad spectrum antibiotics and her urine was sent for culture. The patient's nephrostomy tube was flushed in the emergency department after recommendations from urology. The flush resulted in adequate flow of urine into nephrostomy bag. Urine was sent for culture and has returned with Proteus mirabilis growth.  She will be discharged with a course of Keflex 500 mg four times daily for anther 6 days, to complete a total of 10 days of antibiotics. On the day of discharge, she is nontoxic, her vital signs are stable, and she is afebrile. The patient was advised to follow up with her primary urologist for future management. The patient's insulin-dependent diabetes was monitored with accuchecks four times daily before meals and at bedtime. She was resumed on lantus and mealtime Humalog. She was advised to resume her usual regimen at the time of discharge and follow up with her primary care provider for any future adjustments and management. Her hyperkalemia was managed utilizing an insulin injection as well as dextrose. There were no significant EKG changes, and her potassium remained stable throughout the duration of her hospital stay thereafter. She was seen by wound care for her chronic right heel ulcer. Per the patient's son, she is scheduled for RLE amputation supposedly this Friday. Advised patient to follow up with surgeon for further management. As for her history of DVT, she was continued on Eliquis without any acute complications. The patient will be discharged to her long term care facility today. Activity: Activity as tolerated    Diet: Diabetic Diet    Follow-up: Primary care provider    Disposition: Long term care    Minutes spent on discharge: 36       Labs: Results:       Chemistry Recent Labs     04/14/19  0300 04/13/19  2255 04/13/19  1845  04/13/19  0320   * 245* 185*   < > 145*    143 143   < > 144   K 4.4 4.8 4.9   < > 4.2   * 114* 113*   < > 113*   CO2 23 19* 23   < > 22   BUN 26* 27* 26*   < > 33*   CREA 0.93 1.02 1.07   < > 1.09   CA 8.0* 8.2* 8.3*   < > 8.1*   AGAP 7 10 7   < > 9   BUCR 28* 26* 24*   < > 30*   AP  --   --   --   --  62   TP  --   --   --   --  5.2*   ALB  --   --   --   --  2.3*   GLOB  --   --   --   --  2.9   AGRAT  --   --   --   --  0.8    < > = values in this interval not displayed.       CBC w/Diff Recent Labs     04/13/19  0320   WBC 6.0   RBC 3.33* HGB 9.5*   HCT 29.5*      GRANS 65   LYMPH 24   EOS 3      Cardiac Enzymes No results for input(s): CPK, CKND1, JANE in the last 72 hours. No lab exists for component: CKRMB, TROIP   Coagulation Recent Labs     04/13/19 0320   PTP 20.0*   INR 1.7*       Lipid Panel Lab Results   Component Value Date/Time    Cholesterol, total 116 02/06/2019 04:00 AM    HDL Cholesterol 31 (L) 02/06/2019 04:00 AM    LDL, calculated 60.6 02/06/2019 04:00 AM    VLDL, calculated 24.4 02/06/2019 04:00 AM    Triglyceride 122 02/06/2019 04:00 AM    CHOL/HDL Ratio 3.7 02/06/2019 04:00 AM      BNP No results for input(s): BNPP in the last 72 hours. Liver Enzymes Recent Labs     04/13/19 0320   TP 5.2*   ALB 2.3*   AP 62   SGOT 8*      Thyroid Studies Lab Results   Component Value Date/Time    TSH 1.30 02/06/2019 04:00 AM            Significant Diagnostic Studies: Xr Abd (kub)    Result Date: 4/11/2019  EXAM: XR ABD (KUB) CLINICAL INDICATION/HISTORY: Decreased urine output from right nephrostomy bag since this morning, evaluate for dislodged catheter COMPARISON: Nephrostomy tube exchange 3/21/2019 TECHNIQUE: Supine AP view of the abdomen was obtained. _______________ FINDINGS: Right-sided percutaneous nephrostomy catheter appears essentially unchanged in location and shape compared to prior post placement radiographs of 3/21/2019. No air distended bowel loops nor other evidence of bowel obstruction or bowel dilatation. No free intraperitoneal air. No acute osseous abnormality. Coils are again seen overlying superior right paracentral pelvis. Scattered left and right retroperitoneal surgical clips. _______________     IMPRESSION: Right percutaneous nephrostomy catheter appears unchanged in position compared to most recent exchange on 3/21/2019.      Ct Abd Pelv Wo Cont    Result Date: 4/11/2019  EXAM: CT of the abdomen and pelvis INDICATION: Right flank pain, right nephrostomy tube dislodged, colostomy, history of cervical and colon cancer and UTI COMPARISON: May 18, 2018 TECHNIQUE: Axial CT imaging of the abdomen and pelvis was performed without intravenous contrast. Multiplanar reformats were generated. One or more dose reduction techniques were used on this CT: automated exposure control, adjustment of the mAs and/or kVp according to patient size, and iterative reconstruction techniques. The specific techniques used on this CT exam have been documented in the patient's electronic medical record. Digital Imaging and Communications in Medicine (DICOM) format image data are available to nonaffiliated external healthcare facilities or entities on a secure, media free, reciprocally searchable basis with patient authorization for at least a 12-month period after this study. _______________ FINDINGS: LOWER CHEST: Unremarkable. LIVER, BILIARY: Liver is normal. No biliary dilation. Cholecystectomy PANCREAS: There is some atrophy of the pancreas. SPLEEN: There is capsular calcification of the spleen. ADRENALS: Right adrenal is normal. Left adrenal not identified and may have been removed. KIDNEYS: Left nephrectomy. Right kidney is enlarged with moderate right hydronephrosis. There is a prominent right extrarenal pelvis. There is perinephric stranding around the right kidney. No renal calculi seen. There is a right nephrostomy tube in place looped in the right renal pelvis inferiorly. Right ureter is mildly dilated proximally and difficult to evaluate due to the beam hardening artifacts from the metallic densities in the right hemipelvis. VASCULATURE: Severe calcific atherosclerosis present. LYMPH NODES: No enlarged lymph nodes. GASTROINTESTINAL TRACT: No bowel dilation or wall thickening. There has been resection of the sigmoid and portion the descending colon with a colostomy in the left lower quadrant. Moderate amount of stool present in the colon. No bowel obstruction seen. Postsurgical changes are seen in the pelvis anterior to the sacrum. Evaluation stomach is limited due to under distention. PELVIC ORGANS: Cystectomy and hysterectomy. Presacral stranding seen likely radiation fibrosis. This appears stable. BONES: No acute or aggressive osseous abnormalities identified. OTHER: Right lower quadrant ventral hernia is again seen in this time it only contains fat and no bowel wall. . _______________     IMPRESSION: Right kidney demonstrates a nephrostomy tube in place within the lower portion right renal pelvis, however given the moderate to large hydronephrosis it is unclear whether this tube is functioning. There is perinephric stranding around the right kidney correlate with any symptoms of pyelonephritis. Left nephrectomy and likely adrenalectomy. Prior colectomy with left lower quadrant colostomy in place. Stable presacral stranding which may reflect radiation fibrosis    Xr Chest Port    Result Date: 4/15/2019  EXAM: XR CHEST PORT CLINICAL INDICATION/HISTORY: Return to long term care -Additional: Discharge planning COMPARISON: Several prior exams, most recently 11/26/2018 TECHNIQUE: Frontal view of the chest _______________ FINDINGS: HEART AND MEDIASTINUM: Normal cardiac size and mediastinal contours. LUNGS AND PLEURAL SPACES: No focal pneumonic consolidation, pneumothorax, or pleural effusion. BONY THORAX AND SOFT TISSUES: No acute osseous abnormality _______________     IMPRESSION: No acute radiographic cardiopulmonary abnormality. Ir Exchange Nephro Perc Rt Si    Result Date: 3/29/2019  Right percutaneous nephrostomy tube exchange:   Indication:  Routine exchange of right nephrostomy tube. GUIDANCE: Fluoroscopic guidance was used to position (and confirm the position  of) the catheter. Image(s) saved in PACS: Fluoroscopic   Technique:  After the procedure, as well as its risks, benefits and alternatives  were explained to the patient wife, and all questions answered, written informed consent was obtained and witnessed.   The procedure was performed under conscious sedation with constant monitoring. Maximal sterile barrier technique was used for the procedure including sterile cap, sterile mask, sterile gown, sterile gloves and a large sterile sheet. Proper hand hygiene along with proper skin antiseptic was followed, utilizing 2% chlorhexidine solution for skin preparation. Existing right pigtail nephrostomy tube was identified.  film demonstrates this to be grossly in normal position. Small amount of contrast injected to confirm positioning in the collecting system. Catheter then cut and removed over wire. Over this a new 8 Indian pigtail drain was placed and pigtail formed in the renal pelvis and secured. Catheter secured and bandaged. Contrast was injected to document position. Catheter was adhered to the skin using a Percustay device. The patient tolerated procedure well and there were no immediate complications. Findings: Uncomplicated replacement of right percutaneous nephrostomy tube. Fluoroscopic time: 0.6 minutes  Fluoroscopic dose (reference air kerma): 17 mGy        IMPRESSION:   Successful replacement of right percutaneous nephrostomy tube over wire. No results found for this or any previous visit.         CC: Minerva Fan MD

## 2019-04-17 ENCOUNTER — HOSPITAL ENCOUNTER (OUTPATIENT)
Dept: LAB | Age: 79
Discharge: HOME | End: 2019-04-17

## 2019-04-17 LAB
EST. AVERAGE GLUCOSE BLD GHB EST-MCNC: 192 MG/DL
HBA1C MFR BLD: 8.3 % (ref 4.2–5.6)

## 2019-04-17 PROCEDURE — 36415 COLL VENOUS BLD VENIPUNCTURE: CPT

## 2019-04-17 PROCEDURE — 83036 HEMOGLOBIN GLYCOSYLATED A1C: CPT

## 2019-04-22 NOTE — PROGRESS NOTES
Order received for right nephrostomy tube exchange. Spoke to pt's son, Hammad Johnson, and Brenda @ [facility]. Beny Amaya the patient is to arrive at [facility] radiology waiting room on 5/10/19 at 0730 for scheduled procedure to occur at 0800. No IV, sedation, labs, or lidocaine (per pt request). No prep necessary for this procedure. Brenda @ [facility] stated she will arrange transport from [facility] to [facility] w/pt in stretcher.

## 2019-04-23 LAB
ATRIAL RATE: 69 BPM
CALCULATED P AXIS, ECG09: 49 DEGREES
CALCULATED R AXIS, ECG10: 55 DEGREES
CALCULATED T AXIS, ECG11: 60 DEGREES
DIAGNOSIS, 93000: NORMAL
P-R INTERVAL, ECG05: 160 MS
Q-T INTERVAL, ECG07: 384 MS
QRS DURATION, ECG06: 82 MS
QTC CALCULATION (BEZET), ECG08: 411 MS
VENTRICULAR RATE, ECG03: 69 BPM

## 2019-05-03 ENCOUNTER — HOSPITAL ENCOUNTER (OUTPATIENT)
Dept: LAB | Age: 79
Discharge: HOME | End: 2019-05-03
Payer: MEDICARE

## 2019-05-03 LAB — HEMOCCULT STL QL: NEGATIVE

## 2019-05-03 PROCEDURE — 82272 OCCULT BLD FECES 1-3 TESTS: CPT

## 2019-05-05 LAB
FAX TO INFO,FAXT: NORMAL
FAX TO NUMBER,FAXN: NORMAL

## 2019-05-10 ENCOUNTER — HOSPITAL ENCOUNTER (OUTPATIENT)
Dept: INTERVENTIONAL RADIOLOGY/VASCULAR | Age: 79
Discharge: HOME | End: 2019-05-10
Attending: UROLOGY

## 2019-05-10 NOTE — PROGRESS NOTES
PT and SFN aware and POA son Hemal-signed consent. PT aware of need to hold anticoagulants per protocol. PT aware of arrival time pre procedure,  0900. Pt and POA states no questions at this time.

## 2019-05-13 ENCOUNTER — HOSPITAL ENCOUNTER (OUTPATIENT)
Dept: INTERVENTIONAL RADIOLOGY/VASCULAR | Age: 79
Discharge: HOME | End: 2019-05-13
Attending: UROLOGY
Payer: MEDICARE

## 2019-05-13 DIAGNOSIS — N13.30 HYDRONEPHROSIS: ICD-10-CM

## 2019-05-13 PROCEDURE — 74011250636 HC RX REV CODE- 250/636: Performed by: UROLOGY

## 2019-05-13 PROCEDURE — 50435 EXCHANGE NEPHROSTOMY CATH: CPT

## 2019-05-13 PROCEDURE — 74011636320 HC RX REV CODE- 636/320: Performed by: UROLOGY

## 2019-05-13 RX ORDER — SODIUM CHLORIDE 9 MG/ML
500 INJECTION, SOLUTION INTRAVENOUS
Status: COMPLETED | OUTPATIENT
Start: 2019-05-13 | End: 2019-05-13

## 2019-05-13 RX ADMIN — SODIUM CHLORIDE 500 ML: 900 INJECTION, SOLUTION INTRAVENOUS at 09:45

## 2019-05-13 RX ADMIN — IOPAMIDOL 5 ML: 612 INJECTION, SOLUTION INTRAVENOUS at 09:45

## 2019-05-13 NOTE — PROCEDURES
Vascular & Interventional Radiology Brief Procedure Note    Interventional Radiologist: Marlin Sheikh MD    Pre-operative Diagnosis: chronic right PCN.   Ureteral obstruction    Post-operative Diagnosis: Same as pre-op dx    Procedure(s) Performed:  R PCN exchange    Assistant:  none    Anesthesia:  none    Findings:  New cath in good position    Complications: None    Estimated Blood Loss:  minimal    Tubes and Drains: 8 Fr PCN    Specimens: None    Condition: Good    Plan: home      Marlin Sheikh MD, MD  [facility]  Vascular & Interventional Radiology  5/13/2019

## 2019-05-22 NOTE — PROGRESS NOTES
EC-ECHOCARDIOGRAM COMPLETE W/O CONT   Order: [id number]   Status:  Final result   Visible to patient:  No (Inaccessible in MyChart) Dx:  Mixed hyperlipidemia; HTN (hypertensi...   Notes recorded by Neil King M.D. on 5/21/2019 at 8:40 AM PDT  Dear Fifi,    Can you please let Doug Arora know that result is ok and I will see patient as scheduled?    Thanks Mejia Calix.     Pt called. No answer. LVM with above details and f/u appt time/date.    Pt discharged via wheelchair to car in care of son,  Pt denies any pain or distress at time of discharge.  Arm bands removed and shredded

## 2019-05-31 ENCOUNTER — APPOINTMENT (OUTPATIENT)
Dept: GENERAL RADIOLOGY | Age: 79
DRG: 683 | End: 2019-05-31
Attending: EMERGENCY MEDICINE
Payer: MEDICARE

## 2019-05-31 ENCOUNTER — HOSPITAL ENCOUNTER (INPATIENT)
Age: 79
LOS: 3 days | Discharge: NURSING, REHAB OR LONG-TERM CARE | DRG: 683 | End: 2019-06-03
Attending: EMERGENCY MEDICINE | Admitting: INTERNAL MEDICINE
Payer: MEDICARE

## 2019-05-31 ENCOUNTER — HOSPITAL ENCOUNTER (OUTPATIENT)
Dept: LAB | Age: 79
Discharge: HOME | End: 2019-05-31

## 2019-05-31 DIAGNOSIS — N17.9 AKI (ACUTE KIDNEY INJURY) (HCC): Primary | ICD-10-CM

## 2019-05-31 DIAGNOSIS — N30.01 ACUTE CYSTITIS WITH HEMATURIA: ICD-10-CM

## 2019-05-31 LAB
ALBUMIN SERPL-MCNC: 2.3 G/DL (ref 3.4–5)
ALBUMIN/GLOB SERPL: 0.6 {RATIO} (ref 0.8–1.7)
ALP SERPL-CCNC: 103 U/L (ref 45–117)
ALT SERPL-CCNC: 31 U/L (ref 13–56)
ANION GAP SERPL CALC-SCNC: 9 MMOL/L (ref 3–18)
APPEARANCE UR: ABNORMAL
AST SERPL-CCNC: 25 U/L (ref 15–37)
ATRIAL RATE: 81 BPM
BACTERIA URNS QL MICRO: ABNORMAL /HPF
BASOPHILS # BLD: 0 K/UL (ref 0–0.1)
BASOPHILS NFR BLD: 0 % (ref 0–2)
BILIRUB SERPL-MCNC: 0.5 MG/DL (ref 0.2–1)
BILIRUB UR QL: NEGATIVE
BUN SERPL-MCNC: 41 MG/DL (ref 7–18)
BUN/CREAT SERPL: 18 (ref 12–20)
CALCIUM SERPL-MCNC: 8.3 MG/DL (ref 8.5–10.1)
CALCULATED P AXIS, ECG09: 53 DEGREES
CALCULATED R AXIS, ECG10: 60 DEGREES
CALCULATED T AXIS, ECG11: 59 DEGREES
CHLORIDE SERPL-SCNC: 98 MMOL/L (ref 100–108)
CO2 SERPL-SCNC: 24 MMOL/L (ref 21–32)
COLOR UR: YELLOW
CREAT SERPL-MCNC: 2.32 MG/DL (ref 0.6–1.3)
DIAGNOSIS, 93000: NORMAL
DIFFERENTIAL METHOD BLD: ABNORMAL
EOSINOPHIL # BLD: 0.2 K/UL (ref 0–0.4)
EOSINOPHIL NFR BLD: 2 % (ref 0–5)
EPITH CASTS URNS QL MICRO: ABNORMAL /LPF (ref 0–5)
ERYTHROCYTE [DISTWIDTH] IN BLOOD BY AUTOMATED COUNT: 13.9 % (ref 11.6–14.5)
GLOBULIN SER CALC-MCNC: 3.7 G/DL (ref 2–4)
GLUCOSE BLD STRIP.AUTO-MCNC: 123 MG/DL (ref 70–110)
GLUCOSE BLD STRIP.AUTO-MCNC: 218 MG/DL (ref 70–110)
GLUCOSE SERPL-MCNC: 149 MG/DL (ref 74–99)
GLUCOSE UR STRIP.AUTO-MCNC: NEGATIVE MG/DL
HCT VFR BLD AUTO: 27.4 % (ref 35–45)
HGB BLD-MCNC: 8.8 G/DL (ref 12–16)
HGB UR QL STRIP: ABNORMAL
KETONES UR QL STRIP.AUTO: NEGATIVE MG/DL
LEUKOCYTE ESTERASE UR QL STRIP.AUTO: ABNORMAL
LYMPHOCYTES # BLD: 1.1 K/UL (ref 0.9–3.6)
LYMPHOCYTES NFR BLD: 10 % (ref 21–52)
MCH RBC QN AUTO: 28.9 PG (ref 24–34)
MCHC RBC AUTO-ENTMCNC: 32.1 G/DL (ref 31–37)
MCV RBC AUTO: 89.8 FL (ref 74–97)
MONOCYTES # BLD: 1.1 K/UL (ref 0.05–1.2)
MONOCYTES NFR BLD: 10 % (ref 3–10)
NEUTS SEG # BLD: 8.6 K/UL (ref 1.8–8)
NEUTS SEG NFR BLD: 78 % (ref 40–73)
NITRITE UR QL STRIP.AUTO: NEGATIVE
P-R INTERVAL, ECG05: 168 MS
PH UR STRIP: 7 [PH] (ref 5–8)
PLATELET # BLD AUTO: 342 K/UL (ref 135–420)
PMV BLD AUTO: 8.8 FL (ref 9.2–11.8)
POTASSIUM SERPL-SCNC: 5.2 MMOL/L (ref 3.5–5.5)
PROT SERPL-MCNC: 6 G/DL (ref 6.4–8.2)
PROT UR STRIP-MCNC: 100 MG/DL
Q-T INTERVAL, ECG07: 370 MS
QRS DURATION, ECG06: 74 MS
QTC CALCULATION (BEZET), ECG08: 429 MS
RBC # BLD AUTO: 3.05 M/UL (ref 4.2–5.3)
RBC #/AREA URNS HPF: ABNORMAL /HPF (ref 0–5)
SODIUM SERPL-SCNC: 131 MMOL/L (ref 136–145)
SP GR UR REFRACTOMETRY: 1.01 (ref 1–1.03)
UROBILINOGEN UR QL STRIP.AUTO: 0.2 EU/DL (ref 0.2–1)
VENTRICULAR RATE, ECG03: 81 BPM
WBC # BLD AUTO: 11 K/UL (ref 4.6–13.2)
WBC URNS QL MICRO: ABNORMAL /HPF (ref 0–5)

## 2019-05-31 PROCEDURE — 96374 THER/PROPH/DIAG INJ IV PUSH: CPT

## 2019-05-31 PROCEDURE — 73552 X-RAY EXAM OF FEMUR 2/>: CPT

## 2019-05-31 PROCEDURE — 87077 CULTURE AEROBIC IDENTIFY: CPT

## 2019-05-31 PROCEDURE — 74011250636 HC RX REV CODE- 250/636: Performed by: EMERGENCY MEDICINE

## 2019-05-31 PROCEDURE — 74011250637 HC RX REV CODE- 250/637: Performed by: FAMILY MEDICINE

## 2019-05-31 PROCEDURE — 93005 ELECTROCARDIOGRAM TRACING: CPT

## 2019-05-31 PROCEDURE — 82962 GLUCOSE BLOOD TEST: CPT

## 2019-05-31 PROCEDURE — 96361 HYDRATE IV INFUSION ADD-ON: CPT

## 2019-05-31 PROCEDURE — 71045 X-RAY EXAM CHEST 1 VIEW: CPT

## 2019-05-31 PROCEDURE — 87086 URINE CULTURE/COLONY COUNT: CPT

## 2019-05-31 PROCEDURE — 74011636637 HC RX REV CODE- 636/637: Performed by: INTERNAL MEDICINE

## 2019-05-31 PROCEDURE — 87186 SC STD MICRODIL/AGAR DIL: CPT

## 2019-05-31 PROCEDURE — 81001 URINALYSIS AUTO W/SCOPE: CPT

## 2019-05-31 PROCEDURE — 74011250637 HC RX REV CODE- 250/637: Performed by: INTERNAL MEDICINE

## 2019-05-31 PROCEDURE — 74011000250 HC RX REV CODE- 250: Performed by: FAMILY MEDICINE

## 2019-05-31 PROCEDURE — 74011250636 HC RX REV CODE- 250/636: Performed by: INTERNAL MEDICINE

## 2019-05-31 PROCEDURE — 80053 COMPREHEN METABOLIC PANEL: CPT

## 2019-05-31 PROCEDURE — 74011000250 HC RX REV CODE- 250: Performed by: EMERGENCY MEDICINE

## 2019-05-31 PROCEDURE — 65270000029 HC RM PRIVATE

## 2019-05-31 PROCEDURE — 74011250636 HC RX REV CODE- 250/636: Performed by: FAMILY MEDICINE

## 2019-05-31 PROCEDURE — 85025 COMPLETE CBC W/AUTO DIFF WBC: CPT

## 2019-05-31 PROCEDURE — 99285 EMERGENCY DEPT VISIT HI MDM: CPT

## 2019-05-31 RX ORDER — NYSTATIN 100000 [USP'U]/G
100000 POWDER TOPICAL 4 TIMES DAILY
Status: DISCONTINUED | OUTPATIENT
Start: 2019-05-31 | End: 2019-06-03 | Stop reason: HOSPADM

## 2019-05-31 RX ORDER — SODIUM BICARBONATE 650 MG/1
650 TABLET ORAL 2 TIMES DAILY
Status: DISCONTINUED | OUTPATIENT
Start: 2019-05-31 | End: 2019-06-03 | Stop reason: HOSPADM

## 2019-05-31 RX ORDER — TIMOLOL MALEATE 5 MG/ML
1 SOLUTION/ DROPS OPHTHALMIC 2 TIMES DAILY
Status: DISCONTINUED | OUTPATIENT
Start: 2019-05-31 | End: 2019-06-03 | Stop reason: HOSPADM

## 2019-05-31 RX ORDER — SODIUM CHLORIDE 9 MG/ML
125 INJECTION, SOLUTION INTRAVENOUS CONTINUOUS
Status: DISCONTINUED | OUTPATIENT
Start: 2019-05-31 | End: 2019-06-03

## 2019-05-31 RX ORDER — DOCUSATE SODIUM 100 MG/1
100 CAPSULE, LIQUID FILLED ORAL 2 TIMES DAILY
Status: DISCONTINUED | OUTPATIENT
Start: 2019-05-31 | End: 2019-06-03 | Stop reason: HOSPADM

## 2019-05-31 RX ORDER — DIPHENHYDRAMINE HCL 25 MG
25 CAPSULE ORAL
Status: ON HOLD | COMMUNITY
End: 2021-03-20

## 2019-05-31 RX ORDER — MAGNESIUM SULFATE 100 %
4 CRYSTALS MISCELLANEOUS AS NEEDED
Status: DISCONTINUED | OUTPATIENT
Start: 2019-05-31 | End: 2019-06-03 | Stop reason: HOSPADM

## 2019-05-31 RX ORDER — ONDANSETRON 4 MG/1
4 TABLET, FILM COATED ORAL
Status: DISCONTINUED | OUTPATIENT
Start: 2019-05-31 | End: 2019-06-03 | Stop reason: HOSPADM

## 2019-05-31 RX ORDER — LORAZEPAM 0.5 MG/1
0.5 TABLET ORAL
Status: DISCONTINUED | OUTPATIENT
Start: 2019-05-31 | End: 2019-06-03 | Stop reason: HOSPADM

## 2019-05-31 RX ORDER — GABAPENTIN 300 MG/1
300 CAPSULE ORAL 2 TIMES DAILY
Status: DISCONTINUED | OUTPATIENT
Start: 2019-05-31 | End: 2019-06-03 | Stop reason: HOSPADM

## 2019-05-31 RX ORDER — TRAZODONE HYDROCHLORIDE 50 MG/1
50 TABLET ORAL
Status: DISCONTINUED | OUTPATIENT
Start: 2019-05-31 | End: 2019-06-03 | Stop reason: HOSPADM

## 2019-05-31 RX ORDER — METOPROLOL SUCCINATE 50 MG/1
50 TABLET, EXTENDED RELEASE ORAL DAILY
Status: DISCONTINUED | OUTPATIENT
Start: 2019-06-01 | End: 2019-06-03 | Stop reason: HOSPADM

## 2019-05-31 RX ORDER — TIZANIDINE 2 MG/1
2 TABLET ORAL 2 TIMES DAILY
Status: DISCONTINUED | OUTPATIENT
Start: 2019-05-31 | End: 2019-06-03 | Stop reason: HOSPADM

## 2019-05-31 RX ORDER — ACETAMINOPHEN 325 MG/1
650 TABLET ORAL
Status: DISCONTINUED | OUTPATIENT
Start: 2019-05-31 | End: 2019-06-03 | Stop reason: HOSPADM

## 2019-05-31 RX ORDER — LEVOFLOXACIN 5 MG/ML
500 INJECTION, SOLUTION INTRAVENOUS
Status: DISCONTINUED | OUTPATIENT
Start: 2019-06-02 | End: 2019-06-01

## 2019-05-31 RX ORDER — LEVOFLOXACIN 5 MG/ML
750 INJECTION, SOLUTION INTRAVENOUS ONCE
Status: COMPLETED | OUTPATIENT
Start: 2019-05-31 | End: 2019-06-01

## 2019-05-31 RX ORDER — DIPHENHYDRAMINE HCL 25 MG
50 CAPSULE ORAL
Status: DISCONTINUED | OUTPATIENT
Start: 2019-05-31 | End: 2019-06-03 | Stop reason: HOSPADM

## 2019-05-31 RX ORDER — LEVOTHYROXINE SODIUM 50 UG/1
50 TABLET ORAL
Status: DISCONTINUED | OUTPATIENT
Start: 2019-06-01 | End: 2019-06-03 | Stop reason: HOSPADM

## 2019-05-31 RX ORDER — BRIMONIDINE TARTRATE 2 MG/ML
1 SOLUTION/ DROPS OPHTHALMIC 2 TIMES DAILY
Status: DISCONTINUED | OUTPATIENT
Start: 2019-05-31 | End: 2019-06-03 | Stop reason: HOSPADM

## 2019-05-31 RX ORDER — ADHESIVE BANDAGE
30 BANDAGE TOPICAL DAILY PRN
COMMUNITY
End: 2020-06-26

## 2019-05-31 RX ORDER — DOCUSATE SODIUM 100 MG/1
100 CAPSULE, LIQUID FILLED ORAL 2 TIMES DAILY
COMMUNITY
End: 2019-06-03

## 2019-05-31 RX ORDER — INSULIN LISPRO 100 [IU]/ML
INJECTION, SOLUTION INTRAVENOUS; SUBCUTANEOUS
Status: DISCONTINUED | OUTPATIENT
Start: 2019-05-31 | End: 2019-06-03 | Stop reason: HOSPADM

## 2019-05-31 RX ORDER — ATORVASTATIN CALCIUM 20 MG/1
20 TABLET, FILM COATED ORAL DAILY
Status: DISCONTINUED | OUTPATIENT
Start: 2019-06-01 | End: 2019-06-03 | Stop reason: HOSPADM

## 2019-05-31 RX ORDER — DEXTROSE 50 % IN WATER (D50W) INTRAVENOUS SYRINGE
25-50 AS NEEDED
Status: DISCONTINUED | OUTPATIENT
Start: 2019-05-31 | End: 2019-06-03 | Stop reason: HOSPADM

## 2019-05-31 RX ADMIN — CEFTRIAXONE 1 G: 1 INJECTION, POWDER, FOR SOLUTION INTRAMUSCULAR; INTRAVENOUS at 23:10

## 2019-05-31 RX ADMIN — SODIUM BICARBONATE 650 MG TABLET 650 MG: at 21:59

## 2019-05-31 RX ADMIN — CEFTRIAXONE 1 G: 1 INJECTION, POWDER, FOR SOLUTION INTRAMUSCULAR; INTRAVENOUS at 14:33

## 2019-05-31 RX ADMIN — GABAPENTIN 300 MG: 300 CAPSULE ORAL at 21:59

## 2019-05-31 RX ADMIN — INSULIN LISPRO 4 UNITS: 100 INJECTION, SOLUTION INTRAVENOUS; SUBCUTANEOUS at 23:08

## 2019-05-31 RX ADMIN — TRAZODONE HYDROCHLORIDE 50 MG: 50 TABLET ORAL at 21:59

## 2019-05-31 RX ADMIN — SODIUM CHLORIDE 125 ML/HR: 900 INJECTION, SOLUTION INTRAVENOUS at 18:06

## 2019-05-31 RX ADMIN — NYSTATIN 100000 UNITS: 100000 POWDER TOPICAL at 18:07

## 2019-05-31 RX ADMIN — SODIUM CHLORIDE 500 ML: 900 INJECTION, SOLUTION INTRAVENOUS at 14:33

## 2019-05-31 RX ADMIN — LEVOFLOXACIN 750 MG: 5 INJECTION, SOLUTION INTRAVENOUS at 22:55

## 2019-05-31 RX ADMIN — NYSTATIN 100000 UNITS: 100000 POWDER TOPICAL at 22:02

## 2019-05-31 RX ADMIN — ACETAMINOPHEN 650 MG: 325 TABLET ORAL at 21:59

## 2019-05-31 RX ADMIN — SODIUM CHLORIDE 1000 ML: 900 INJECTION, SOLUTION INTRAVENOUS at 23:19

## 2019-05-31 RX ADMIN — APIXABAN 5 MG: 5 TABLET, FILM COATED ORAL at 21:59

## 2019-05-31 RX ADMIN — DOCUSATE SODIUM 100 MG: 100 CAPSULE, LIQUID FILLED ORAL at 21:59

## 2019-05-31 NOTE — H&P
History & Physical    Patient: Marielle Amor MRN: [id number]  CSN: [id number]    YOB: 1940  Age: 77 y.o. Sex: female      DOA: 5/31/2019  Primary Care Provider:  Ricky Reilly MD      Assessment/Plan     Patient Active Problem List   Diagnosis Code    Pyelonephritis N14    S/p nephrectomy Z90.5    Right flank pain R10.9    Malfunction of nephrostomy tube [facility]) T83.098A    Nephrostomy tube displaced ([facility] T83.022A    UTI (urinary tract infection) N39.0    Elevated WBC count D72.829    Diabetes ([facility] E11.9    Legally blind H54.8    Hypertension I10    Thyroid disease E07.9    Elevated serum creatinine R79.89    S/P colectomy Z90.49    Skin lesion of foot L98.9    Tear of skin of buttock S31.801A    Pressure injury of right heel, unstageable ([facility]) L89.610    Hypomagnesemia E83.42    Hyperkalemia E87.5    Heel ulcer ([facility] L97.409    RAOUL (acute kidney injury) ([facility] N17.9       RAOUL  UTI  DM  Hypothyroidism  HTN  Blind    -Admit.  -IVF. Seemingly pre-renal.  Reassess creatinine in am  -I/O. -No nephrotoxins.  -Rocephin + await urine cx. -DVT px.  -PT as she will being going back to snf on dc.   -Dispo 1-2 days. I think we can fix her overnight. CC: Fatigue       HPI:     Marielle Amor is a 77 y.o. female well known to service. Sent from SNF to ER. Not quite herself. Fatigue. Malaise. Dx raoul and uti. Asked to bring in to correct these issues.      Past Medical History:   Diagnosis Date    Cancer [facility])     vaginal and colon cancer    Cancer ([facility]     Chronic pain     arthritis    Diabetes ([facility]     DVT (deep venous thrombosis) ([facility])     Hypertension     Legally blind     Thyroid disease        Past Surgical History:   Procedure Laterality Date    HX ABOVE KNEE AMPUTATION Right     HX COLOSTOMY      HX ORTHOPAEDIC      HX OTHER SURGICAL      kidney drainage tube    HX UROLOGICAL      kidney removal    IR EXCHANGE NEPHRO PERC RT SI  1/28/2019    IR EXCHANGE NEPHRO PERC RT SI  3/21/2019    IR EXCHANGE NEPHRO PERC RT SI  5/13/2019       History reviewed. No pertinent family history. Social History     Socioeconomic History    Marital status:      Spouse name: Not on file    Number of children: Not on file    Years of education: Not on file    Highest education level: Not on file   Tobacco Use    Smoking status: Never Smoker    Smokeless tobacco: Never Used   Substance and Sexual Activity    Alcohol use: No   Other Topics Concern       Prior to Admission medications    Medication Sig Start Date End Date Taking? Authorizing Provider   LORazepam (ATIVAN) 0.5 mg tablet Take 1 Tab by mouth daily as needed for Anxiety. 4/15/19   Ana Juarez PA-C   gabapentin (NEURONTIN) 300 mg capsule Take 1 Cap by mouth two (2) times a day. 4/15/19   Ana Juarez PA-C   cholecalciferol (VITAMIN D3) 1,000 unit tablet Take 1,000 Units by mouth daily. Provider, Historical   ondansetron hcl (ZOFRAN) 4 mg tablet Take 4 mg by mouth every eight (8) hours as needed for Nausea. Other, MD Dc   insulin regular (NOVOLIN R, HUMULIN R) 100 unit/mL injection by SubCUTAneous route. Provider, Historical   diphenhydrAMINE (BENADRYL) 25 mg capsule Take 50 mg by mouth every six (6) hours as needed for Itching. Provider, Historical   apixaban (ELIQUIS PO) Take 10 mg by mouth two (2) times a day. Other, MD Dc   pollen extracts (PROSTAT PO) Take  by mouth. Provider, Historical   naloxone (NARCAN) 4 mg/actuation nasal spray Use 1 spray intranasally, then discard. Repeat with new spray every 2 min as needed for opioid overdose symptoms, alternating nostrils. 11/29/18   Pardeep Valdovinos MD   tiZANidine (ZANAFLEX) 2 mg capsule Take 2 mg by mouth two (2) times a day. Provider, Historical   traZODone (DESYREL) 50 mg tablet Take 50 mg by mouth nightly.     Provider, Historical   nystatin (MYCOSTATIN) powder Apply 100,000 Units to affected area four (4) times daily.    Provider, Historical   camphor-menthol (SARNA) 0.5-0.5 % lotion Apply  to affected area daily. Provider, Historical   docusate sodium (COLACE) 100 mg capsule Take 100 mg by mouth two (2) times a day. Provider, Historical   hydrocortisone (HYTONE) 2.5 % topical cream Apply  to affected area two (2) times a day. use thin layer    Provider, Historical   timolol (TIMOPTIC) 0.5 % ophthalmic solution 1 Drop two (2) times a day. Provider, Historical   brimonidine-timolol (COMBIGAN) 0.2-0.5 % drop ophthalmic solution Administer 1 Drop to both eyes every twelve (12) hours. Provider, Historical   bisacodyl (DULCOLAX, BISACODYL,) 10 mg suppository Insert  into rectum daily as needed. Indications: constipation    Provider, Historical   metoprolol succinate (TOPROL-XL) 50 mg XL tablet Take 50 mg by mouth daily. Provider, Historical   magnesium hydroxide (MILK OF MAGNESIA CONCENTRATED) 2,400 mg/10 mL susp suspension Take 10 mL by mouth. Provider, Historical   alum-mag hydroxide-simeth (MYLANTA) 200-200-20 mg/5 mL susp Take 30 mL by mouth four (4) times daily as needed. Provider, Historical   insulin aspart U-100 (NOVOLOG FLEXPEN U-100 INSULIN) 100 unit/mL inpn by SubCUTAneous route. Sliding scale    Provider, Historical   sodium bicarbonate 650 mg tablet Take 650 mg by mouth two (2) times a day. Provider, Historical   levothyroxine (SYNTHROID) 50 mcg tablet Take  by mouth Daily (before breakfast). Provider, Historical   acetaminophen (TYLENOL) 650 mg TbER Take 650 mg by mouth every six (6) hours as needed for Pain or Fever. Provider, Historical   insulin glargine (LANTUS) 100 unit/mL injection 8 units SQ QHS 5/22/18   Alban Erwin MD   atorvastatin calcium (ATORVASTATIN PO) Take 20 mg by mouth daily. Other, MD Dc       Allergies   Allergen Reactions    Morphine Nausea and Vomiting    Pcn [Penicillins] Rash       Review of Systems  Gen: No fever, chills, malaise, weight loss/gain. Heent: No headache, rhinorrhea, epistaxis, ear pain, hearing loss, sinus pain, neck pain/stiffness, sore throat. Heart: No chest pain, palpitations, LEWIS, pnd, or orthopnea. Resp: No cough, hemoptysis, wheezing and shortness of breath. GI: No nausea, vomiting, diarrhea, constipation, melena or hematochezia. : No urinary obstruction, dysuria or hematuria. Derm: No rash, new skin lesion or pruritis. Musc/skeletal: no bone or joint complains. Vasc: No edema, cyanosis or claudication. Endo: No heat/cold intolerance, no polyuria,polydipsia or polyphagia. Neuro: No unilateral weakness, numbness, tingling. No seizures. Heme: No easy bruising or bleeding. Physical Exam:     Physical Exam:  Visit Vitals  /41 (BP 1 Location: Left arm, BP Patient Position: At rest)   Pulse 85   Temp 98.4 °F (36.9 °C)   Resp 21   Ht 5' 2\" (1.575 m)   Wt 68 kg (150 lb)   SpO2 100%   BMI 27.44 kg/m²      O2 Device: Room air    Temp (24hrs), Av °F (37.2 °C), Min:98.4 °F (36.9 °C), Max:99.5 °F (37.5 °C)     0701 -  1900  In: 500 [I.V.:500]  Out: -    No intake/output data recorded. General:  Awake, cooperative, no distress. Head:  Normocephalic, without obvious abnormality, atraumatic. Eyes:  Conjunctivae/corneas clear, sclera anicteric, PERRL, EOMs intact. Nose: Nares normal. No drainage or sinus tenderness. Throat: Lips, mucosa, and tongue normal.    Neck: Supple, symmetrical, trachea midline, no adenopathy. Lungs:   Clear to auscultation bilaterally. Heart:  Regular rate and rhythm, S1, S2 normal, no murmur, click, rub or gallop. Abdomen: Soft, non-tender. Bowel sounds normal. No masses,  No organomegaly. Extremities: Extremities normal, atraumatic, no cyanosis or edema. Capillary refill normal.   Pulses: 2+ and symmetric all extremities. Skin: Skin color pink/pale/mottled/flushed, turgor normal. No rashes or lesions   Neurologic: CNII-XII intact.  No focal motor or sensory deficit. Labs Reviewed: All lab results for the last 24 hours reviewed. Recent Results (from the past 24 hour(s))   URINALYSIS W/ RFLX MICROSCOPIC    Collection Time: 05/31/19  1:05 PM   Result Value Ref Range    Color YELLOW      Appearance TURBID      Specific gravity 1.010 1.005 - 1.030      pH (UA) 7.0 5.0 - 8.0      Protein 100 (A) NEG mg/dL    Glucose NEGATIVE  NEG mg/dL    Ketone NEGATIVE  NEG mg/dL    Bilirubin NEGATIVE  NEG      Blood LARGE (A) NEG      Urobilinogen 0.2 0.2 - 1.0 EU/dL    Nitrites NEGATIVE  NEG      Leukocyte Esterase LARGE (A) NEG     URINE MICROSCOPIC ONLY    Collection Time: 05/31/19  1:05 PM   Result Value Ref Range    WBC TOO NUMEROUS TO COUNT 0 - 5 /hpf    RBC 31 to 40 0 - 5 /hpf    Epithelial cells FEW 0 - 5 /lpf    Bacteria 2+ (A) NEG /hpf   EKG, 12 LEAD, INITIAL    Collection Time: 05/31/19  1:09 PM   Result Value Ref Range    Ventricular Rate 81 BPM    Atrial Rate 81 BPM    P-R Interval 168 ms    QRS Duration 74 ms    Q-T Interval 370 ms    QTC Calculation (Bezet) 429 ms    Calculated P Axis 53 degrees    Calculated R Axis 60 degrees    Calculated T Axis 59 degrees    Diagnosis       Normal sinus rhythm  Normal ECG  When compared with ECG of 11-APR-2019 19:02,  No significant change was found     CBC WITH AUTOMATED DIFF    Collection Time: 05/31/19  1:30 PM   Result Value Ref Range    WBC 11.0 4.6 - 13.2 K/uL    RBC 3.05 (L) 4.20 - 5.30 M/uL    HGB 8.8 (L) 12.0 - 16.0 g/dL    HCT 27.4 (L) 35.0 - 45.0 %    MCV 89.8 74.0 - 97.0 FL    MCH 28.9 24.0 - 34.0 PG    MCHC 32.1 31.0 - 37.0 g/dL    RDW 13.9 11.6 - 14.5 %    PLATELET 182 151 - 646 K/uL    MPV 8.8 (L) 9.2 - 11.8 FL    NEUTROPHILS 78 (H) 40 - 73 %    LYMPHOCYTES 10 (L) 21 - 52 %    MONOCYTES 10 3 - 10 %    EOSINOPHILS 2 0 - 5 %    BASOPHILS 0 0 - 2 %    ABS. NEUTROPHILS 8.6 (H) 1.8 - 8.0 K/UL    ABS. LYMPHOCYTES 1.1 0.9 - 3.6 K/UL    ABS. MONOCYTES 1.1 0.05 - 1.2 K/UL    ABS.  EOSINOPHILS 0.2 0.0 - 0.4 K/UL ABS. BASOPHILS 0.0 0.0 - 0.1 K/UL    DF AUTOMATED     METABOLIC PANEL, COMPREHENSIVE    Collection Time: 05/31/19  1:30 PM   Result Value Ref Range    Sodium 131 (L) 136 - 145 mmol/L    Potassium 5.2 3.5 - 5.5 mmol/L    Chloride 98 (L) 100 - 108 mmol/L    CO2 24 21 - 32 mmol/L    Anion gap 9 3.0 - 18 mmol/L    Glucose 149 (H) 74 - 99 mg/dL    BUN 41 (H) 7.0 - 18 MG/DL    Creatinine 2.32 (H) 0.6 - 1.3 MG/DL    BUN/Creatinine ratio 18 12 - 20      GFR est AA 25 (L) >60 ml/min/1.73m2    GFR est non-AA 20 (L) >60 ml/min/1.73m2    Calcium 8.3 (L) 8.5 - 10.1 MG/DL    Bilirubin, total 0.5 0.2 - 1.0 MG/DL    ALT (SGPT) 31 13 - 56 U/L    AST (SGOT) 25 15 - 37 U/L    Alk. phosphatase 103 45 - 117 U/L    Protein, total 6.0 (L) 6.4 - 8.2 g/dL    Albumin 2.3 (L) 3.4 - 5.0 g/dL    Globulin 3.7 2.0 - 4.0 g/dL    A-G Ratio 0.6 (L) 0.8 - 1.7         Procedures/imaging: see electronic medical records for all procedures/Xrays and details which were not copied into this note but were reviewed prior to creation of Plan    70 minutes of critical care time spent in the direct evaluation and treatment of this high risk patient. The reason for providing this level of medical care for this critically ill patient was due a critical illness that impaired one or more vital organ systems such that there was a high probability of imminent or life threatening deterioration in the patients condition. This care involved high complexity decision making to assess, manipulate, and support vital system functions, to treat this degreee vital organ system failure and to prevent further life threatening deterioration of the patients condition.       CC: Pastor Eveline MD

## 2019-05-31 NOTE — ROUTINE PROCESS
Bedside and Verbal shift change report given to ARTHUR Ramirez RN (oncoming nurse) by Antionette Domingo RN  (offgoing nurse). Report included the following information SBAR, Kardex, Intake/Output and MAR.

## 2019-05-31 NOTE — ED PROVIDER NOTES
EMERGENCY DEPARTMENT HISTORY AND PHYSICAL EXAM    Date: 5/31/2019  Patient Name: Gamaliel Hurst    History of Presenting Illness     Chief Complaint   Patient presents with    Fatigue         History Provided By: Patient and EMS    12:51 PM  Gamaliel Hurst is a 77 y.o. 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 Per patient she has no complaints but per the nursing rehab facility she had a fever this morning of 99.5 axillary and seemed fatigued and so they called EMS to bring patient to the emergency department. The patient denies any chest pain, shortness of breath, fever, cough, vomiting, bowel or urinary complaints, drainage from her wound site. PCP: Maricel Kothari MD    Current Outpatient Medications   Medication Sig Dispense Refill    LORazepam (ATIVAN) 0.5 mg tablet Take 1 Tab by mouth daily as needed for Anxiety. 2 Tab 0    gabapentin (NEURONTIN) 300 mg capsule Take 1 Cap by mouth two (2) times a day. 2 Cap 0    cholecalciferol (VITAMIN D3) 1,000 unit tablet Take 1,000 Units by mouth daily.  ondansetron hcl (ZOFRAN) 4 mg tablet Take 4 mg by mouth every eight (8) hours as needed for Nausea.  insulin regular (NOVOLIN R, HUMULIN R) 100 unit/mL injection by SubCUTAneous route.  diphenhydrAMINE (BENADRYL) 25 mg capsule Take 50 mg by mouth every six (6) hours as needed for Itching.  apixaban (ELIQUIS PO) Take 10 mg by mouth two (2) times a day.  pollen extracts (PROSTAT PO) Take  by mouth.  naloxone (NARCAN) 4 mg/actuation nasal spray Use 1 spray intranasally, then discard. Repeat with new spray every 2 min as needed for opioid overdose symptoms, alternating nostrils. 1 Each 0    tiZANidine (ZANAFLEX) 2 mg capsule Take 2 mg by mouth two (2) times a day.  traZODone (DESYREL) 50 mg tablet Take 50 mg by mouth nightly.       nystatin (MYCOSTATIN) powder Apply 100,000 Units to affected area four (4) times daily.  camphor-menthol (SARNA) 0.5-0.5 % lotion Apply  to affected area daily.  docusate sodium (COLACE) 100 mg capsule Take 100 mg by mouth two (2) times a day.  hydrocortisone (HYTONE) 2.5 % topical cream Apply  to affected area two (2) times a day. use thin layer      timolol (TIMOPTIC) 0.5 % ophthalmic solution 1 Drop two (2) times a day.  brimonidine-timolol (COMBIGAN) 0.2-0.5 % drop ophthalmic solution Administer 1 Drop to both eyes every twelve (12) hours.  bisacodyl (DULCOLAX, BISACODYL,) 10 mg suppository Insert  into rectum daily as needed. Indications: constipation      metoprolol succinate (TOPROL-XL) 50 mg XL tablet Take 50 mg by mouth daily.  magnesium hydroxide (MILK OF MAGNESIA CONCENTRATED) 2,400 mg/10 mL susp suspension Take 10 mL by mouth.  alum-mag hydroxide-simeth (MYLANTA) 200-200-20 mg/5 mL susp Take 30 mL by mouth four (4) times daily as needed.  insulin aspart U-100 (NOVOLOG FLEXPEN U-100 INSULIN) 100 unit/mL inpn by SubCUTAneous route. Sliding scale      sodium bicarbonate 650 mg tablet Take 650 mg by mouth two (2) times a day.  levothyroxine (SYNTHROID) 50 mcg tablet Take  by mouth Daily (before breakfast).  acetaminophen (TYLENOL) 650 mg TbER Take 650 mg by mouth every six (6) hours as needed for Pain or Fever.  insulin glargine (LANTUS) 100 unit/mL injection 8 units SQ QHS 1 Vial 0    atorvastatin calcium (ATORVASTATIN PO) Take 20 mg by mouth daily.          Past History     Past Medical History:  Past Medical History:   Diagnosis Date    Cancer ([facility]     vaginal and colon cancer    Cancer ([facility]     Chronic pain     arthritis    Diabetes ([facility]     DVT (deep venous thrombosis) (HCC)     Hypertension     Legally blind     Thyroid disease        Past Surgical History:  Past Surgical History:   Procedure Laterality Date    HX ABOVE KNEE AMPUTATION Right     HX COLOSTOMY      HX ORTHOPAEDIC      HX OTHER SURGICAL      kidney drainage tube    HX UROLOGICAL      kidney removal    IR EXCHANGE NEPHRO PERC RT SI  1/28/2019    IR EXCHANGE NEPHRO PERC RT SI  3/21/2019    IR EXCHANGE NEPHRO PERC RT SI  5/13/2019       Family History:  History reviewed. No pertinent family history. Social History:  Social History     Tobacco Use    Smoking status: Never Smoker    Smokeless tobacco: Never Used   Substance Use Topics    Alcohol use: No    Drug use: Not on file       Allergies: Allergies   Allergen Reactions    Morphine Nausea and Vomiting    Pcn [Penicillins] Rash         Review of Systems   Review of Systems   Constitutional: Negative for fever. Respiratory: Negative for cough. Cardiovascular: Negative for chest pain. Gastrointestinal: Negative for abdominal pain. All other systems reviewed and are negative.         Physical Exam     Vitals:    05/31/19 1300 05/31/19 1330 05/31/19 1400 05/31/19 1436   BP: 137/40 (!) 120/22 127/44 (!) 131/110   Pulse: 80 82 83 83   Resp: 20 19 22 22   Temp:       SpO2: 96% 100% 100% 100%   Weight:       Height:         Physical Exam    Nursing notes and vital signs reviewed    Constitutional: Non toxic appearing, no acute distress  Head: Normocephalic, Atraumatic  Eyes: Blind  Neck: Supple  Cardiovascular: Regular rate and rhythm, no murmurs, rubs, or gallops  Chest: Normal work of breathing and chest excursion bilaterally  Lungs: Clear to ausculation bilaterally  Abdomen: Soft, non tender, non distended, normoactive bowel sounds  Back: No evidence of trauma or deformity  Extremities: Left AKA site with clean dry dressing in place and no surrounding erythema  Skin: Warm and dry, normal cap refill  Neuro: Alert and appropriate, normal speech  Psychiatric: Normal mood and affect      Diagnostic Study Results     Labs -     Recent Results (from the past 12 hour(s))   URINALYSIS W/ RFLX MICROSCOPIC    Collection Time: 05/31/19  1:05 PM   Result Value Ref Range    Color YELLOW      Appearance TURBID      Specific gravity 1.010 1.005 - 1.030      pH (UA) 7.0 5.0 - 8.0      Protein 100 (A) NEG mg/dL    Glucose NEGATIVE  NEG mg/dL    Ketone NEGATIVE  NEG mg/dL    Bilirubin NEGATIVE  NEG      Blood LARGE (A) NEG      Urobilinogen 0.2 0.2 - 1.0 EU/dL    Nitrites NEGATIVE  NEG      Leukocyte Esterase LARGE (A) NEG     URINE MICROSCOPIC ONLY    Collection Time: 05/31/19  1:05 PM   Result Value Ref Range    WBC TOO NUMEROUS TO COUNT 0 - 5 /hpf    RBC 31 to 40 0 - 5 /hpf    Epithelial cells FEW 0 - 5 /lpf    Bacteria 2+ (A) NEG /hpf   EKG, 12 LEAD, INITIAL    Collection Time: 05/31/19  1:09 PM   Result Value Ref Range    Ventricular Rate 81 BPM    Atrial Rate 81 BPM    P-R Interval 168 ms    QRS Duration 74 ms    Q-T Interval 370 ms    QTC Calculation (Bezet) 429 ms    Calculated P Axis 53 degrees    Calculated R Axis 60 degrees    Calculated T Axis 59 degrees    Diagnosis       Normal sinus rhythm  Normal ECG  When compared with ECG of 11-APR-2019 19:02,  No significant change was found     CBC WITH AUTOMATED DIFF    Collection Time: 05/31/19  1:30 PM   Result Value Ref Range    WBC 11.0 4.6 - 13.2 K/uL    RBC 3.05 (L) 4.20 - 5.30 M/uL    HGB 8.8 (L) 12.0 - 16.0 g/dL    HCT 27.4 (L) 35.0 - 45.0 %    MCV 89.8 74.0 - 97.0 FL    MCH 28.9 24.0 - 34.0 PG    MCHC 32.1 31.0 - 37.0 g/dL    RDW 13.9 11.6 - 14.5 %    PLATELET 413 194 - 858 K/uL    MPV 8.8 (L) 9.2 - 11.8 FL    NEUTROPHILS 78 (H) 40 - 73 %    LYMPHOCYTES 10 (L) 21 - 52 %    MONOCYTES 10 3 - 10 %    EOSINOPHILS 2 0 - 5 %    BASOPHILS 0 0 - 2 %    ABS. NEUTROPHILS 8.6 (H) 1.8 - 8.0 K/UL    ABS. LYMPHOCYTES 1.1 0.9 - 3.6 K/UL    ABS. MONOCYTES 1.1 0.05 - 1.2 K/UL    ABS. EOSINOPHILS 0.2 0.0 - 0.4 K/UL    ABS.  BASOPHILS 0.0 0.0 - 0.1 K/UL    DF AUTOMATED     METABOLIC PANEL, COMPREHENSIVE    Collection Time: 05/31/19  1:30 PM   Result Value Ref Range    Sodium 131 (L) 136 - 145 mmol/L    Potassium 5.2 3.5 - 5.5 mmol/L    Chloride 98 (L) 100 - 108 mmol/L    CO2 24 21 - 32 mmol/L    Anion gap 9 3.0 - 18 mmol/L    Glucose 149 (H) 74 - 99 mg/dL    BUN 41 (H) 7.0 - 18 MG/DL    Creatinine 2.32 (H) 0.6 - 1.3 MG/DL    BUN/Creatinine ratio 18 12 - 20      GFR est AA 25 (L) >60 ml/min/1.73m2    GFR est non-AA 20 (L) >60 ml/min/1.73m2    Calcium 8.3 (L) 8.5 - 10.1 MG/DL    Bilirubin, total 0.5 0.2 - 1.0 MG/DL    ALT (SGPT) 31 13 - 56 U/L    AST (SGOT) 25 15 - 37 U/L    Alk. phosphatase 103 45 - 117 U/L    Protein, total 6.0 (L) 6.4 - 8.2 g/dL    Albumin 2.3 (L) 3.4 - 5.0 g/dL    Globulin 3.7 2.0 - 4.0 g/dL    A-G Ratio 0.6 (L) 0.8 - 1.7         Radiologic Studies -   XR CHEST PORT   Final Result   Impression:      No significant abnormality. No new abnormality is seen developing since last   study. XR FEMUR RT 2 VS   Final Result   IMPRESSION:      Amputation at the mid shaft right femur without focal abnormality. CT Results  (Last 48 hours)    None        CXR Results  (Last 48 hours)               05/31/19 1435  XR CHEST PORT Final result    Impression:  Impression:       No significant abnormality. No new abnormality is seen developing since last   study. Narrative:  A portable AP radiograph of the chest was obtained at 1426 hours:   INDICATION:  Malaise. COMPARISON:  4/15/2019. FINDINGS:        Heart and mediastinum: Unremarkable. Lungs and pleura: Clear without consolidation or pleural effusion. Aorta: Unremarkable. Bones: Within normal limits for age. Other: None. Medications given in the ED-  Medications   cefTRIAXone (ROCEPHIN) 1 g in sterile water (preservative free) 10 mL IV syringe (1 g IntraVENous Given 5/31/19 1433)   sodium chloride 0.9 % bolus infusion 500 mL (500 mL IntraVENous New Bag 5/31/19 1433)         Medical Decision Making   I am the first provider for this patient.     I reviewed the vital signs, available nursing notes, past medical history, past surgical history, family history and social history. Vital Signs-Reviewed the patient's vital signs. Pulse Oximetry Analysis -100 % on room air    Cardiac Monitor:  Rate: 84 bpm  Rhythm: Normal sinus    EKG interpretation: (Preliminary)  EKG read by Dr. Cathy Patricia at 1:17 PM  Normal sinus rhythm at a rate of 81 bpm, MD interval of 160 ms, QRS duration of 74 ms    Records Reviewed: Nursing Notes, Old Medical Records and Previous electrocardiograms    Provider Notes (Medical Decision Making): Basim Moser is a 77 y.o. female sent from rehab facility for fatigue and reported fever. No complaints here but found to have UTI and ANTONI. Antibiotics and gentle hydration initiated. Discussed with hospitalist for further inpatient management. Procedures:  Procedures    ED Course:   2:13 PM  UA concerning for UTI. Prior cultures reviewed and reveal susceptibility to ceftriaxone. Though allergic to penicillins has tolerated ceftriaxone in the past.  In addition patient's creatinine is significantly elevated which is new reflecting ANTONI. CONSULT NOTE:   2:57 PM  Dr. Cathy Patricia spoke with Dr. Elissa Negro  Specialty: Hospitalist  Discussed pt's hx, disposition, and available diagnostic and imaging results over the telephone. Reviewed care plans. Accepts for admission to medical.         Diagnosis and Disposition     Critical Care Time: None    Core Measures:  For Hospitalized Patients:    1. Hospitalization Decision Time:  The decision to hospitalize the patient was made by Dr. Cathy Patricia at 2:30 PM on 5/31/2019    2. Aspirin: Aspirin was not given because the patient did not present with a stroke at the time of their Emergency Department evaluation    2:58 PM  Patient is being admitted to the hospital by Dr. Elissa Negro. The results of their tests and reasons for their admission have been discussed with them and/or available family.  They convey agreement and understanding for the need to be admitted and for their admission diagnosis. CONDITIONS ON ADMISSION:  Sepsis is not present at the time of admission. Deep Vein Thrombosis is present at the time of admission. Urinary Tract Infection is present at the time of admission. Pneumonia is not present at the time of admission. MRSA is not present at the time of admission. Wound infection is not present at the time of admission. Pressure Ulcer is not present at the time of admission. CLINICAL IMPRESSION:    1. ANTONI (acute kidney injury) ([facility] [address]    2. Acute cystitis with hematuria      _______________________________      Please note that this dictation was completed with [employer], the computer voice recognition software. Quite often unanticipated grammatical, syntax, homophones, and other interpretive errors are inadvertently transcribed by the computer software. Please disregard these errors. Please excuse any errors that have escaped final proofreading.

## 2019-05-31 NOTE — ED NOTES
TRANSFER - OUT REPORT:    Verbal report given to [facility]) on Evangelista Pickard  being transferred to [facility](unit) for routine progression of care       Report consisted of patients Situation, Background, Assessment and   Recommendations(SBAR). Information from the following report(s) SBAR, Kardex, ED Summary, [facility] and Recent Results was reviewed with the receiving nurse. Lines:   Peripheral IV 05/31/19 Right Hand (Active)   Site Assessment Clean, dry, & intact 5/31/2019  1:30 PM   Phlebitis Assessment 0 5/31/2019  1:30 PM   Infiltration Assessment 0 5/31/2019  1:30 PM   Dressing Status Clean, dry, & intact 5/31/2019  1:30 PM   Dressing Type Tape;Transparent 5/31/2019  1:30 PM   Hub Color/Line Status Blue 5/31/2019  1:30 PM   Action Taken Blood drawn 5/31/2019  1:30 PM   Alcohol Cap Used Yes 5/31/2019  1:30 PM        Opportunity for questions and clarification was provided.       Patient transported with:   EBIQUOUS

## 2019-05-31 NOTE — ED TRIAGE NOTES
Pt arrived per EMS from [address] [place] [address] with no complaints. Nursing from [address] [place] [address] called EMS for patient having a fever and malaise this morning. EMS also reports pt had RIGHT AKA about a week ago and has a nephrostomy and colostomy bag.

## 2019-05-31 NOTE — PROGRESS NOTES
1700 . Pt requesting for dinner of shrimp stir cervantes, 2% milk, and apple juice. Request placed with dietary. 2500 [facility] list received from Marlene Bennett via fax. Eliquis 5mg is twice a day; updated PTA med list and informed pharmacy. 1800 DDNR found in paperwork. Dr. Lamont Soni was informed. SHIFT SUMMARY: No acute events. Pt is blind and hard of hearing, but pt is oriented x4, cooperative, and is able to feed herself. Good appetite. Wound care consult placed for ostomy, and healing wound on right AKA stump.

## 2019-06-01 LAB
ANION GAP SERPL CALC-SCNC: 8 MMOL/L (ref 3–18)
BUN SERPL-MCNC: 32 MG/DL (ref 7–18)
BUN/CREAT SERPL: 22 (ref 12–20)
CALCIUM SERPL-MCNC: 7.3 MG/DL (ref 8.5–10.1)
CHLORIDE SERPL-SCNC: 110 MMOL/L (ref 100–108)
CO2 SERPL-SCNC: 22 MMOL/L (ref 21–32)
CREAT SERPL-MCNC: 1.43 MG/DL (ref 0.6–1.3)
GLUCOSE BLD STRIP.AUTO-MCNC: 114 MG/DL (ref 70–110)
GLUCOSE BLD STRIP.AUTO-MCNC: 122 MG/DL (ref 70–110)
GLUCOSE BLD STRIP.AUTO-MCNC: 186 MG/DL (ref 70–110)
GLUCOSE BLD STRIP.AUTO-MCNC: 227 MG/DL (ref 70–110)
GLUCOSE SERPL-MCNC: 101 MG/DL (ref 74–99)
LACTATE SERPL-SCNC: 0.8 MMOL/L (ref 0.4–2)
POTASSIUM SERPL-SCNC: 4.7 MMOL/L (ref 3.5–5.5)
SODIUM SERPL-SCNC: 140 MMOL/L (ref 136–145)

## 2019-06-01 PROCEDURE — 77030011256 HC DRSG MEPILEX <16IN NO BORD MOLN -A

## 2019-06-01 PROCEDURE — 74011250636 HC RX REV CODE- 250/636: Performed by: INTERNAL MEDICINE

## 2019-06-01 PROCEDURE — 74011636637 HC RX REV CODE- 636/637: Performed by: INTERNAL MEDICINE

## 2019-06-01 PROCEDURE — 82962 GLUCOSE BLOOD TEST: CPT

## 2019-06-01 PROCEDURE — 65270000029 HC RM PRIVATE

## 2019-06-01 PROCEDURE — 36415 COLL VENOUS BLD VENIPUNCTURE: CPT

## 2019-06-01 PROCEDURE — 80048 BASIC METABOLIC PNL TOTAL CA: CPT

## 2019-06-01 PROCEDURE — 74011000250 HC RX REV CODE- 250: Performed by: INTERNAL MEDICINE

## 2019-06-01 PROCEDURE — 74011000250 HC RX REV CODE- 250: Performed by: FAMILY MEDICINE

## 2019-06-01 PROCEDURE — 83605 ASSAY OF LACTIC ACID: CPT

## 2019-06-01 PROCEDURE — 74011250636 HC RX REV CODE- 250/636: Performed by: FAMILY MEDICINE

## 2019-06-01 PROCEDURE — 87040 BLOOD CULTURE FOR BACTERIA: CPT

## 2019-06-01 PROCEDURE — 74011250637 HC RX REV CODE- 250/637: Performed by: INTERNAL MEDICINE

## 2019-06-01 RX ORDER — LEVOFLOXACIN 5 MG/ML
750 INJECTION, SOLUTION INTRAVENOUS
Status: DISCONTINUED | OUTPATIENT
Start: 2019-06-02 | End: 2019-06-02

## 2019-06-01 RX ADMIN — METOPROLOL SUCCINATE 50 MG: 50 TABLET, EXTENDED RELEASE ORAL at 08:53

## 2019-06-01 RX ADMIN — NYSTATIN 100000 UNITS: 100000 POWDER TOPICAL at 08:55

## 2019-06-01 RX ADMIN — TIZANIDINE 2 MG: 2 TABLET ORAL at 21:00

## 2019-06-01 RX ADMIN — NYSTATIN 100000 UNITS: 100000 POWDER TOPICAL at 21:51

## 2019-06-01 RX ADMIN — NYSTATIN 100000 UNITS: 100000 POWDER TOPICAL at 12:51

## 2019-06-01 RX ADMIN — LEVOTHYROXINE SODIUM 50 MCG: 50 TABLET ORAL at 07:35

## 2019-06-01 RX ADMIN — DOCUSATE SODIUM 100 MG: 100 CAPSULE, LIQUID FILLED ORAL at 08:53

## 2019-06-01 RX ADMIN — SODIUM BICARBONATE 650 MG TABLET 650 MG: at 08:53

## 2019-06-01 RX ADMIN — BRIMONIDINE TARTRATE 1 DROP: 2 SOLUTION OPHTHALMIC at 08:53

## 2019-06-01 RX ADMIN — ATORVASTATIN CALCIUM 20 MG: 20 TABLET, FILM COATED ORAL at 08:53

## 2019-06-01 RX ADMIN — TIZANIDINE 2 MG: 2 TABLET ORAL at 09:00

## 2019-06-01 RX ADMIN — TIMOLOL MALEATE 1 DROP: 5 SOLUTION/ DROPS OPHTHALMIC at 08:53

## 2019-06-01 RX ADMIN — GABAPENTIN 300 MG: 300 CAPSULE ORAL at 08:53

## 2019-06-01 RX ADMIN — INSULIN LISPRO 4 UNITS: 100 INJECTION, SOLUTION INTRAVENOUS; SUBCUTANEOUS at 11:57

## 2019-06-01 RX ADMIN — CEFTRIAXONE 1 G: 1 INJECTION, POWDER, FOR SOLUTION INTRAMUSCULAR; INTRAVENOUS at 23:54

## 2019-06-01 RX ADMIN — APIXABAN 5 MG: 5 TABLET, FILM COATED ORAL at 08:53

## 2019-06-01 RX ADMIN — INSULIN LISPRO 2 UNITS: 100 INJECTION, SOLUTION INTRAVENOUS; SUBCUTANEOUS at 21:49

## 2019-06-01 RX ADMIN — GABAPENTIN 300 MG: 300 CAPSULE ORAL at 21:50

## 2019-06-01 RX ADMIN — SODIUM CHLORIDE 125 ML/HR: 900 INJECTION, SOLUTION INTRAVENOUS at 23:58

## 2019-06-01 RX ADMIN — SODIUM CHLORIDE 125 ML/HR: 900 INJECTION, SOLUTION INTRAVENOUS at 14:01

## 2019-06-01 RX ADMIN — TRAZODONE HYDROCHLORIDE 50 MG: 50 TABLET ORAL at 21:50

## 2019-06-01 RX ADMIN — VANCOMYCIN HYDROCHLORIDE 1000 MG: 1 INJECTION, POWDER, LYOPHILIZED, FOR SOLUTION INTRAVENOUS at 00:46

## 2019-06-01 RX ADMIN — APIXABAN 5 MG: 5 TABLET, FILM COATED ORAL at 21:51

## 2019-06-01 RX ADMIN — SODIUM BICARBONATE 650 MG TABLET 650 MG: at 21:50

## 2019-06-01 NOTE — PROGRESS NOTES
Pharmacy Dosing Services: levofloxacin 500 mg IV q 48 hrs    Pharmacist Renal Dosing  Note for  Dosage Adjustment in Patients with Renal Impairment (creatinine clearance <50 mL/min)  Physician Bharath Ray    Indication:   Previous Regimen Levofloxacin 500 mg IV q 24 hr   Serum Creatinine Lab Results   Component Value Date/Time    Creatinine 2.32 (H) 05/31/2019 01:30 PM        Creatinine Clearance Estimated Creatinine Clearance: 17.8 mL/min (A) (based on SCr of 2.32 mg/dL (H)).    BUN Lab Results   Component Value Date/Time    BUN 41 (H) 05/31/2019 01:30 PM           Dose administration notes:   Doses given appropriately as scheduled  Plan:  Continue to monitor

## 2019-06-01 NOTE — PROGRESS NOTES
3:20 PM  Bedside and Verbal shift change report given to Jeanette Vega RN (oncoming nurse) by Zi Messina RN (offgoing nurse). Report included the following information SBAR, Kardex and MAR.

## 2019-06-01 NOTE — PROGRESS NOTES
Orders received. Chart reviewed. PT assessment not performed at this time. When encouraging pt to participate pt reported \"Go Away I don't need PT I don't move, I am bed bound! \". Pt reports that she lives in a nursing home and she does not get out of the bed. Pt has a R LE amputation and reports L LE stiffness. [address] [place] [address] they report that she is in the LTC facililty usually refuses to get oob but when she does agree a jazz lift is used to transfer from bed to chair. Per note in April 2019, pt was evaluated and d/c as skilled PT services were not rendered as pt was bed bound and no changed of status since last evaluation. At this time no skilled PT services are rendered as pt is total care, spoke with care management about pt being total care and will d/c order at this time. Please re-order PT if needed or pt is will to participate in session.

## 2019-06-01 NOTE — PROGRESS NOTES
Antimicrobial Stewardship Team Note    Broad Spectrum Antimicrobial Recommendation    Patient: Adrian Kendrick  MRN#: [id number]  Attending: No name on file. Admission Date: 053119    Current Antimicrobial Medications  Current Antimicrobial Therapy (168h ago, onward)      Ordered     Start Stop    05/31/19 2142  cefTRIAXone (ROCEPHIN) 1 g in sterile water (preservative free) 10 mL IV syringe  1 g,   IntraVENous,   EVERY 24 HOURS      06/01/19 0000 --    05/31/19 2141  vancomycin (VANCOCIN) 1,000 mg in 0.9% sodium chloride 250 mL IVPB  1,000 mg,   IntraVENous,   EVERY 24 HOURS      05/31/19 2200 06/07/19 2159 05/31/19 2141  levoFLOXacin (LEVAQUIN) 500 mg in D5W IVPB  500 mg,   IntraVENous,   EVERY 24 HOURS      05/31/19 2200 --              Current Indication/Therapy  Vancomycin 1 gm IV  q 24 hr    Assessment/Recommendation    Estimated Pharmacokinetic Parameters (based on population kinetics)  Vd: 48 L (0.7 L/kg)   John: 0.017 hr-1 (T1/2 = 40.8 hrs)     Dosing Recommendations   Vancomycin dose: 1000 mg IV Q48hrs (infused over 1 hr)   Estimated peak: 37.3 mcg/mL   Estimated trough: 16.8 mcg/mL   Estimated AUC:SHANELL: 618 mcg*hr/mL (assumed SHANELL 1 mcg/mL)     A/P:   1. Recommend vancomycin 1000 mg IV Q48hrs (15 mg/kg)   2. Consider a vancomycin trough level prior to the 4th dose. 3. Please monitor renal function (urine output, BUN/SCr). Dose adjustments may be necessary with a significant change in renal function.        Submitted by: Germaine Sky, [address] [facility]

## 2019-06-01 NOTE — PROGRESS NOTES
Problem: Pressure Injury - Risk of  Goal: *Prevention of pressure injury  Description  Document Rickie Scale and appropriate interventions in the flowsheet.   Outcome: Progressing Towards Goal

## 2019-06-01 NOTE — PROGRESS NOTES
Hospitalist Progress Note    Patient: Maylin Ashford MRN: [id number]  CSN: [id number]    YOB: 1940  Age: 77 y.o. Sex: female    DOA: 5/31/2019 LOS:  LOS: 1 day                Assessment/Plan     Patient Active Problem List   Diagnosis Code    Pyelonephritis N14    S/p nephrectomy Z90.5    Right flank pain R10.9    Malfunction of nephrostomy tube ([address] T83.098A    Nephrostomy tube displaced ([address] T83.022A    UTI (urinary tract infection) N39.0    Elevated WBC count D72.829    Diabetes ([address] E11.9    Legally blind H54.8    Hypertension I10    Thyroid disease E07.9    Elevated serum creatinine R79.89    S/P colectomy Z90.49    Skin lesion of foot L98.9    Tear of skin of buttock S31.801A    Pressure injury of right heel, unstageable ([facility]) L89.610    Hypomagnesemia E83.42    Hyperkalemia E87.5    Heel ulcer ([address] L97.409    ANTONI (acute kidney injury) ([address] N17.9            78 yo female from SNF admitted for UTI, ANTONI. History of DM, HTN    Feels better, denies any complains, no acute events overnight. ANTONI - improving on IVF, follow renal function. UTI - continue on ceftriaxone and levaquin, narrow down antibiotics once cultures resulted. DM - on SSI    HTN - controlled on toprol xl    Hypothyroidism - on synthroid    History of DVT - on eliquis. Disposition : 1-2 days    Review of systems  General: No fevers or chills. Cardiovascular: No chest pain or pressure. No palpitations. Pulmonary: No shortness of breath. Gastrointestinal: No nausea, vomiting. Physical Exam:  General: Awake, cooperative, no acute distress    HEENT: NC, Atraumatic. PERRLA, anicteric sclerae. Lungs: CTA Bilaterally. No Wheezing/Rhonchi/Rales. Heart:  S1 S2,  No murmur, No Rubs, No Gallops  Abdomen: Soft, Non distended, Non tender.  +Bowel sounds,   Extremities: No c/c/e  Psych:   Not anxious or agitated. Neurologic:  No acute neurological deficit.            Vital signs/Intake and Output:  Visit Vitals  /77 (BP 1 Location: Right arm, BP Patient Position: At rest)   Pulse 95   Temp 98.7 °F (37.1 °C)   Resp 16   Ht 5' 2\" (1.575 m)   Wt 68 kg (150 lb)   SpO2 100%   BMI 27.44 kg/m²     Current Shift:  06/01 0701 - 06/01 1900  In: 360 [P.O.:360]  Out: 700 [Urine:500]  Last three shifts:  05/30 1901 - 06/01 0700  In: 5808 [P.O.:300; I.V.:2752]  Out: 3450 [Urine:2750]            Labs: Results:       Chemistry Recent Labs     06/01/19  0424 05/31/19  1330   * 149*    131*   K 4.7 5.2   * 98*   CO2 22 24   BUN 32* 41*   CREA 1.43* 2.32*   CA 7.3* 8.3*   AGAP 8 9   BUCR 22* 18   AP  --  103   TP  --  6.0*   ALB  --  2.3*   GLOB  --  3.7   AGRAT  --  0.6*      CBC w/Diff Recent Labs     05/31/19  1330   WBC 11.0   RBC 3.05*   HGB 8.8*   HCT 27.4*      GRANS 78*   LYMPH 10*   EOS 2      Cardiac Enzymes No results for input(s): CPK, CKND1, JANE in the last 72 hours. No lab exists for component: CKRMB, TROIP   Coagulation No results for input(s): PTP, INR, APTT in the last 72 hours. No lab exists for component: INREXT    Lipid Panel Lab Results   Component Value Date/Time    Cholesterol, total 116 02/06/2019 04:00 AM    HDL Cholesterol 31 (L) 02/06/2019 04:00 AM    LDL, calculated 60.6 02/06/2019 04:00 AM    VLDL, calculated 24.4 02/06/2019 04:00 AM    Triglyceride 122 02/06/2019 04:00 AM    CHOL/HDL Ratio 3.7 02/06/2019 04:00 AM      BNP No results for input(s): BNPP in the last 72 hours.    Liver Enzymes Recent Labs     05/31/19  1330   TP 6.0*   ALB 2.3*      SGOT 25      Thyroid Studies Lab Results   Component Value Date/Time    TSH 1.30 02/06/2019 04:00 AM        Procedures/imaging: see electronic medical records for all procedures/Xrays and details which were not copied into this note but were reviewed prior to creation of Plan

## 2019-06-01 NOTE — PROGRESS NOTES
RN paged for fever with increasing HR and BP. Ordered blood cx (not done in ER), vanc, and levaquin. Concerned for sepsis. Also ordered 1 L bolus.     Mary Stiles MD  Nocturnist

## 2019-06-01 NOTE — ROUTINE PROCESS
1940: Resident MEWS score of 3, assessed resident no s/s of distress or discomfort, denies pain. Fluids given and accepted. Awaiting call from MD for abnormal vitals. Hospitalist in to see. New orders received. Tylenol given for elevated temp, effective. Rocephin given no adverse reactions noted. Bolus NS given. Resident started on Levaquin and vancomycin no adverse reactions noted. Stat labs drawn awaiting results. Medications tolerated. Fluids encouraged. PT able to make all needs known. Resident blind and hard of hearing. Tolerated all medications. Colostomy and nephrostomy in place. Call bell and safety precautions in place. Bedside and Verbal shift change report given to Tia Mclean  (oncoming nurse) by Sadia Taveras RN   (offgoing nurse). Report included the following information SBAR, Kardex, Procedure Summary, Intake/Output, MAR and Recent Results.

## 2019-06-01 NOTE — PROGRESS NOTES
0  Pt is awake, alert, responds to questions appropriately. Pt is blind and hard of hearing. Pt has island dressing to her right leg AKA stump. Left leg with minimal movement and has swelling on foot and leg. .  As per pt, she has been bedridden. Pt has nephrostomy tube to right flank draining clear yellow urine. Pt has Colostomy bad to left abdomen draining liquid stools. 9:05 AM   Pt ate breakfast.  Denies pain. Lungs are clear. Abdomen soft with hypoactive BS. Nystatin powder applied to reddened areas on groin and abdominal folds. Dr Wayne Mahoney in to see pt.   9:59 AM   Colostomy drain was emptied of 100 ml of yellowish liquid stools. 1026  Colostomy was emptied by CNA of 100 ml of liquid stools. 1150   Pt was turned to left side. Noted about 2inch long thin area that appears like an old blister that had popped located on back of right thigh which is dry and pinkish. 1222  Family in to see pt. Pt stated  that she is not hungry  and will not have lunch but drank apple juice. 1500  Shift Summary   Pt refused PT this morning. Pt sleeping on and off but wakes up easily to tactile stimuli. Pt to continue IV antibiotics.

## 2019-06-01 NOTE — PROGRESS NOTES
Pharmacy Dosing Services: vancomycin    Consult for Vancomycin Dosing by Pharmacy by Dr. Jeanie Levi provided for this 77y.o. year old female , for indication of UTI and Sepsis. Ht Readings from Last 1 Encounters:   05/31/19 157.5 cm (62\")        Wt Readings from Last 1 Encounters:   05/31/19 68 kg (150 lb)        Previous Regimen Vancomycin 1 g Q24h   Other Current Antibiotics Ceftriaxone, levaquin   Serum Creatinine Lab Results   Component Value Date/Time    Creatinine 1.43 (H) 06/01/2019 04:24 AM        Creatinine Clearance Estimated Creatinine Clearance: 28.9 mL/min (A) (based on SCr of 1.43 mg/dL (H)). BUN Lab Results   Component Value Date/Time    BUN 32 (H) 06/01/2019 04:24 AM        WBC Lab Results   Component Value Date/Time    WBC 11.0 05/31/2019 01:30 PM        H/H Lab Results   Component Value Date/Time    HGB 8.8 (L) 05/31/2019 01:30 PM        Platelets Lab Results   Component Value Date/Time    PLATELET 037 08/43/5949 01:30 PM        Temp 98.5 °F (36.9 °C)     Change to maintenance dose of 1000 mg at 1300 on 6/2/19, every 36 hours. Dose calculated to approximate a therapeutic trough of 15-20 mcg/mL. Pharmacy to follow daily and will make changes to dose and/or frequency based on clinical status.       Pharmacist Anya Trevino, PACOD

## 2019-06-01 NOTE — ROUTINE PROCESS
Bedside and Verbal shift change report given to ARTHUR Vilchis RN (oncoming nurse) by TAM Cabrera (offgoing nurse). Report included the following information SBAR, Kardex, Intake/Output and MAR.

## 2019-06-01 NOTE — PROGRESS NOTES
Pharmacy Dosing Services: [facility]    Pharmacist Renal Dosing Progress Note for [facility]   Physician Dani    The following medication: 500 mg IV q 48 hrs was automatically dose-adjusted per [facility] P&T Committee Protocol, with respect to renal function. Consult provided for this   77 y.o. , female , for the indication of UTI and Sepsis of Unknown etiology. Pt Weight:   Wt Readings from Last 1 Encounters:   05/31/19 68 kg (150 lb)         Previous Regimen Levaquin 500 mg IV q 48 hrs   Serum Creatinine Lab Results   Component Value Date/Time    Creatinine 1.43 (H) 06/01/2019 04:24 AM         Creatinine Clearance Estimated Creatinine Clearance: 28.9 mL/min (A) (based on SCr of 1.43 mg/dL (H)). BUN Lab Results   Component Value Date/Time    BUN 32 (H) 06/01/2019 04:24 AM           New CrCl = 28.9    Dosage changed to:  Levaquin 750 mg q48 hr        Pharmacy to continue to monitor patient daily. Will make dosage adjustments based upon changing renal function.   Signed Titi Raman. Contact information: [phone]

## 2019-06-01 NOTE — PROGRESS NOTES
Shift Summary:    Pt report received from Maximino Rose RN. Pt hard of hearing and legally blind. Pt denies pain. Some redness observed in skin folds, Nystatin powder sprinkled in reddened areas. 18:40 Pt colostomy bag began to leak from sides. Bag replaced and taped down for extra security and yazan pad placed underneath to protect patient gown and skin.      Visit Vitals  BP (!) 140/36 (BP 1 Location: Right arm, BP Patient Position: At rest)   Pulse 84   Temp 99.1 °F (37.3 °C)   Resp 16   Ht 5' 2\" (1.575 m)   Wt 68 kg (150 lb)   SpO2 100%   BMI 27.44 kg/m²

## 2019-06-01 NOTE — PROGRESS NOTES
NUTRITION SCREENING    Recommendations: MST screen error for EN/PN PTA    RD ASSESSMENT/PLAN:     Diet:  DM 2000kcal, 2 GM NA Height: 5' 2\" (157.5 cm)     Food Allergies: NKFA  Weight: 68 kg (150 lb)    PO Intake:  Patient Vitals for the past 100 hrs:   % Diet Eaten   05/31/19 1910 100 %      BMI: 27.4 kg/m^2 is  overweight (25.0%-29.9% BMI)      PMH: vaginal ca, colon ca, chronic pain, dm, dvt, HTN, legally blind thyroid disease    Current Hospital Problems: ANTONI, UTI, DM, HTN, pt is blind lives at snf, pressure injury to right heel POA     MST screen error for EN/PN PTA pt eats food; full nutrition assessment to follow   Will rescreen per policy.      REASON FOR ASSESSMENT:         [x] Enteral/Parenteral Nutrition PTA       Lis Velez RD  Pager: [phone]

## 2019-06-02 LAB
ANION GAP SERPL CALC-SCNC: 8 MMOL/L (ref 3–18)
BACTERIA SPEC CULT: ABNORMAL
BUN SERPL-MCNC: 20 MG/DL (ref 7–18)
BUN/CREAT SERPL: 19 (ref 12–20)
CALCIUM SERPL-MCNC: 7.6 MG/DL (ref 8.5–10.1)
CHLORIDE SERPL-SCNC: 113 MMOL/L (ref 100–108)
CO2 SERPL-SCNC: 21 MMOL/L (ref 21–32)
CREAT SERPL-MCNC: 1.03 MG/DL (ref 0.6–1.3)
GLUCOSE BLD STRIP.AUTO-MCNC: 114 MG/DL (ref 70–110)
GLUCOSE BLD STRIP.AUTO-MCNC: 135 MG/DL (ref 70–110)
GLUCOSE BLD STRIP.AUTO-MCNC: 174 MG/DL (ref 70–110)
GLUCOSE BLD STRIP.AUTO-MCNC: 306 MG/DL (ref 70–110)
GLUCOSE SERPL-MCNC: 103 MG/DL (ref 74–99)
POTASSIUM SERPL-SCNC: 4.5 MMOL/L (ref 3.5–5.5)
SERVICE CMNT-IMP: ABNORMAL
SODIUM SERPL-SCNC: 142 MMOL/L (ref 136–145)

## 2019-06-02 PROCEDURE — 82962 GLUCOSE BLOOD TEST: CPT

## 2019-06-02 PROCEDURE — 74011000250 HC RX REV CODE- 250: Performed by: FAMILY MEDICINE

## 2019-06-02 PROCEDURE — 77030010535 HC BELT OST ADJ HOLL -A

## 2019-06-02 PROCEDURE — 77030037875 HC DRSG MEPILEX <16IN BORD MOLN -A

## 2019-06-02 PROCEDURE — 74011250637 HC RX REV CODE- 250/637: Performed by: INTERNAL MEDICINE

## 2019-06-02 PROCEDURE — 74011636637 HC RX REV CODE- 636/637: Performed by: INTERNAL MEDICINE

## 2019-06-02 PROCEDURE — 74011250636 HC RX REV CODE- 250/636: Performed by: INTERNAL MEDICINE

## 2019-06-02 PROCEDURE — 74011250636 HC RX REV CODE- 250/636: Performed by: FAMILY MEDICINE

## 2019-06-02 PROCEDURE — 80048 BASIC METABOLIC PNL TOTAL CA: CPT

## 2019-06-02 PROCEDURE — 65270000029 HC RM PRIVATE

## 2019-06-02 PROCEDURE — 77030010522

## 2019-06-02 PROCEDURE — 36415 COLL VENOUS BLD VENIPUNCTURE: CPT

## 2019-06-02 PROCEDURE — 77030011641 HC PASTE OST ADH BMS -A

## 2019-06-02 RX ADMIN — CEFTRIAXONE 1 G: 1 INJECTION, POWDER, FOR SOLUTION INTRAMUSCULAR; INTRAVENOUS at 23:25

## 2019-06-02 RX ADMIN — TRAZODONE HYDROCHLORIDE 50 MG: 50 TABLET ORAL at 21:20

## 2019-06-02 RX ADMIN — VANCOMYCIN HYDROCHLORIDE 1000 MG: 1 INJECTION, POWDER, LYOPHILIZED, FOR SOLUTION INTRAVENOUS at 12:58

## 2019-06-02 RX ADMIN — LEVOTHYROXINE SODIUM 50 MCG: 50 TABLET ORAL at 06:51

## 2019-06-02 RX ADMIN — NYSTATIN 100000 UNITS: 100000 POWDER TOPICAL at 21:22

## 2019-06-02 RX ADMIN — SODIUM CHLORIDE 125 ML/HR: 900 INJECTION, SOLUTION INTRAVENOUS at 17:06

## 2019-06-02 RX ADMIN — SODIUM BICARBONATE 650 MG TABLET 650 MG: at 09:01

## 2019-06-02 RX ADMIN — NYSTATIN 100000 UNITS: 100000 POWDER TOPICAL at 09:04

## 2019-06-02 RX ADMIN — GABAPENTIN 300 MG: 300 CAPSULE ORAL at 09:01

## 2019-06-02 RX ADMIN — INSULIN LISPRO 8 UNITS: 100 INJECTION, SOLUTION INTRAVENOUS; SUBCUTANEOUS at 21:19

## 2019-06-02 RX ADMIN — TIZANIDINE 2 MG: 2 TABLET ORAL at 21:20

## 2019-06-02 RX ADMIN — TIZANIDINE 2 MG: 2 TABLET ORAL at 11:53

## 2019-06-02 RX ADMIN — SODIUM CHLORIDE 125 ML/HR: 900 INJECTION, SOLUTION INTRAVENOUS at 06:47

## 2019-06-02 RX ADMIN — NYSTATIN 100000 UNITS: 100000 POWDER TOPICAL at 12:58

## 2019-06-02 RX ADMIN — INSULIN LISPRO 2 UNITS: 100 INJECTION, SOLUTION INTRAVENOUS; SUBCUTANEOUS at 11:53

## 2019-06-02 RX ADMIN — APIXABAN 5 MG: 5 TABLET, FILM COATED ORAL at 21:20

## 2019-06-02 RX ADMIN — ATORVASTATIN CALCIUM 20 MG: 20 TABLET, FILM COATED ORAL at 09:01

## 2019-06-02 RX ADMIN — NYSTATIN 100000 UNITS: 100000 POWDER TOPICAL at 17:06

## 2019-06-02 RX ADMIN — GABAPENTIN 300 MG: 300 CAPSULE ORAL at 21:20

## 2019-06-02 RX ADMIN — METOPROLOL SUCCINATE 50 MG: 50 TABLET, EXTENDED RELEASE ORAL at 09:01

## 2019-06-02 RX ADMIN — SODIUM BICARBONATE 650 MG TABLET 650 MG: at 21:20

## 2019-06-02 RX ADMIN — APIXABAN 5 MG: 5 TABLET, FILM COATED ORAL at 09:01

## 2019-06-02 NOTE — PROGRESS NOTES
0730 Assumed care of pt at this time. Pt in bed with no signs of distress. Pt left with call light within reach and encouraged to call for assistance. 1000 Head to toe assessment completed at this time  Patient is A&OX4. Pt denies N/V chest pain and SOB or distress. Skin in warm and dry with dressing on right AKA and is CDI. Lungs are clear bilaterally. Bowel sounds are active. Abdomen is soft and non-tender. Pt has Lt colostomy bag and was leaking so changed and is clean, dry and intact. Nephrostomy on rt side of abdomen and is patent and draining. 22 g needle in the Rt hand. Pain level is 0. Patient was oriented to call bell and bed function. Will continue to monitor    1545 Reassessment done, no changes to previous one, pt is resting quietly on bed, call light within reach and bed at lowest position.

## 2019-06-02 NOTE — PROGRESS NOTES
Problem: Pressure Injury - Risk of  Goal: *Prevention of pressure injury  Description  Document Rickie Scale and appropriate interventions in the flowsheet.   Outcome: Progressing Towards Goal  Note:   Pressure Injury Interventions:       Moisture Interventions: Absorbent underpads, Internal/External fecal devices, Internal/External urinary devices    Activity Interventions: Pressure redistribution bed/mattress(bed type), Increase time out of bed    Mobility Interventions: HOB 30 degrees or less    Nutrition Interventions: Document food/fluid/supplement intake    Friction and Shear Interventions: HOB 30 degrees or less, Apply protective barrier, creams and emollients

## 2019-06-02 NOTE — PROGRESS NOTES
Pharmacy Dosing Services: Vancomycin    Consult for Vancomycin Dosing by Pharmacy by Dr. Smiley Kelley. Consult provided for this 77y.o. year old female , for indication of UTI / sepsis. Day of Therapy 2 of 5  Scr = 1.03  (adjusted Scr = 1.14 for AKA)  CrCl ~ 36 ml/min       Due to improvement in Scr, maintenance dose adjusted to:  Vancomycin 1000 mg IV q24h, scheduled for 6/3/19 at 13:00. (3 doses remaining)  Goal therapeutic trough: 15 - 20        Pharmacy to follow daily and will make changes to dose and/or frequency based on clinical status.   Pharmacist Christopher Zamarripa, [address]

## 2019-06-02 NOTE — ROUTINE PROCESS
Bedside shift change report given to Jayne Fowler (oncoming nurse) by Casimiro RICE RN (offgoing nurse). Report included the following information SBAR, Kardex and MAR.

## 2019-06-02 NOTE — PROGRESS NOTES
Hospitalist Progress Note    Patient: Laureen Trujillo MRN: [id number]  CSN: [id number]    YOB: 1940  Age: 77 y.o. Sex: female    DOA: 5/31/2019 LOS:  LOS: 2 days                Assessment/Plan     Patient Active Problem List   Diagnosis Code    Pyelonephritis N14    S/p nephrectomy Z90.5    Right flank pain R10.9    Malfunction of nephrostomy tube ([address] T83.098A    Nephrostomy tube displaced ([address] T83.022A    UTI (urinary tract infection) N39.0    Elevated WBC count D72.829    Diabetes ([address] E11.9    Legally blind H54.8    Hypertension I10    Thyroid disease E07.9    Elevated serum creatinine R79.89    S/P colectomy Z90.49    Skin lesion of foot L98.9    Tear of skin of buttock S31.801A    Pressure injury of right heel, unstageable ([facility]) L89.610    Hypomagnesemia E83.42    Hyperkalemia E87.5    Heel ulcer ([address] L97.409    ANTONI (acute kidney injury) ([address] N17.9            76 yo female from SNF admitted for UTI, ANTONI. History of DM, HTN    Feels better, denies any complains, no acute events overnight. \"I had a good tasty breakfast\"    ANTONI - resolved with IVF. UTI - urine cultures growing proteus mirabilis, continue on ceftriaxone stop levaquin, vancomycin. DM - on SSI    HTN - controlled on toprol xl    Hypothyroidism - on synthroid    History of DVT - on eliquis. PT/OT    Disposition : likely tomorrow    Review of systems  General: No fevers or chills. Cardiovascular: No chest pain or pressure. No palpitations. Pulmonary: No shortness of breath. Gastrointestinal: No nausea, vomiting. Physical Exam:  General: Awake, cooperative, no acute distress    HEENT: NC, Atraumatic. PERRLA, anicteric sclerae. Lungs: CTA Bilaterally. No Wheezing/Rhonchi/Rales. Heart:  S1 S2,  No murmur, No Rubs, No Gallops  Abdomen: Soft, Non distended, Non tender.  +Bowel sounds,   Extremities: No c/c/e  Psych:   Not anxious or agitated. Neurologic:  No acute neurological deficit. Vital signs/Intake and Output:  Visit Vitals  /64 (BP 1 Location: Right arm, BP Patient Position: At rest)   Pulse 74   Temp 98.8 °F (37.1 °C)   Resp 16   Ht 5' 2\" (1.575 m)   Wt 68 kg (150 lb)   SpO2 100%   BMI 27.44 kg/m²     Current Shift:  06/02 0701 - 06/02 1900  In: 4406 [P.O.:344; I.V.:1500]  Out: 2040 [Urine:1650]  Last three shifts:  05/31 1901 - 06/02 0700  In: 5987 [P.O.:1140; I.V.:2252]  Out: 5125 [Urine:4150]            Labs: Results:       Chemistry Recent Labs     06/02/19  0525 06/01/19  0424 05/31/19  1330   * 101* 149*    140 131*   K 4.5 4.7 5.2   * 110* 98*   CO2 21 22 24   BUN 20* 32* 41*   CREA 1.03 1.43* 2.32*   CA 7.6* 7.3* 8.3*   AGAP 8 8 9   BUCR 19 22* 18   AP  --   --  103   TP  --   --  6.0*   ALB  --   --  2.3*   GLOB  --   --  3.7   AGRAT  --   --  0.6*      CBC w/Diff Recent Labs     05/31/19  1330   WBC 11.0   RBC 3.05*   HGB 8.8*   HCT 27.4*      GRANS 78*   LYMPH 10*   EOS 2      Cardiac Enzymes No results for input(s): CPK, CKND1, JANE in the last 72 hours. No lab exists for component: CKRMB, TROIP   Coagulation No results for input(s): PTP, INR, APTT in the last 72 hours. No lab exists for component: INREXT, INREXT    Lipid Panel Lab Results   Component Value Date/Time    Cholesterol, total 116 02/06/2019 04:00 AM    HDL Cholesterol 31 (L) 02/06/2019 04:00 AM    LDL, calculated 60.6 02/06/2019 04:00 AM    VLDL, calculated 24.4 02/06/2019 04:00 AM    Triglyceride 122 02/06/2019 04:00 AM    CHOL/HDL Ratio 3.7 02/06/2019 04:00 AM      BNP No results for input(s): BNPP in the last 72 hours.    Liver Enzymes Recent Labs     05/31/19  1330   TP 6.0*   ALB 2.3*      SGOT 25      Thyroid Studies Lab Results   Component Value Date/Time    TSH 1.30 02/06/2019 04:00 AM        Procedures/imaging: see electronic medical records for all procedures/Xrays and details which were not copied into this note but were reviewed prior to creation of Plan

## 2019-06-02 NOTE — WOUND CARE
Wound Care Progress Note     New consult placed for right AKA wound and colostomy. Assessment:   Communication: A&O x 4, communicative  Patient has nephrostomy on right side and colostomy on left side of abdomen. States she has had colostomy for approximately 8 years. Nurse stated it was leaking so she changed it this morning. Patient has had loose stools. Wafer intact with no leaks. Encouraged use of ostomy belt to prevent leaking. Requires partial assists in repositioning. Diet: As ordered by MD  Patient reports phantom pain in right toes. 1. POA, right AKA surgical wound with staples present and intact= 0.1 x 20 x 0.1 cm    Base is 100% of pink tissue. Scant amount of tan exudate from right side of incision with no odor. Periwound intact & with mild erythema. Margins are well approximated. Treatment: Mepilex border    Had conversation with son via patient's phone. Stated Dr. Diamond Wasserman performed surgery at [facility] approximately two weeks ago and sent orders to [facility] for daily dry dressing changes and  but patient could not tolerate compression due to pain at that time. States he will call office tomorrow to report patient is in the hospital.       Wound Recommendations:    Continue any orders and follow up from Dr. [place] Recommendations:  Minimize layers of linen/pads under patient to optimize support surface. Turn/reposition approximately every 2 hours and offload heels pillows or Prevalon boots.   Manage incontinence / promote continence; Proshield to buttocks and sacrum daily and as needed with incontinence care    Discussed above plan with patient & Ton Erazo RN, Annabella Pham MD    Transition of Care: Plan to follow weekly and per product recommendations while admitted to hospital.

## 2019-06-02 NOTE — ROUTINE PROCESS
Received resident in bed awake and alert, able to make all needs known. No s/s of distress or discomfort. C/O phantom pain to R AKA, declined pain medications. Tolerated meds with ease. Colostomy and Nephrostomy intact. No adverse reactions to ABT. Fluids offered and accepted. Call bell and safety precautions in place. 0300 Received a call from [address] [place] [address] asking residents admitting diagnosis. Bedside and Verbal shift change report given to Rekha Diamond RN  (oncoming nurse) by Suman Macedo RN   (offgoing nurse). Report included the following information SBAR, ED Summary, Intake/Output, MAR and Recent Results.

## 2019-06-03 VITALS
RESPIRATION RATE: 18 BRPM | BODY MASS INDEX: 27.6 KG/M2 | DIASTOLIC BLOOD PRESSURE: 36 MMHG | OXYGEN SATURATION: 100 % | HEIGHT: 62 IN | HEART RATE: 76 BPM | WEIGHT: 150 LBS | SYSTOLIC BLOOD PRESSURE: 146 MMHG | TEMPERATURE: 98.2 F

## 2019-06-03 PROBLEM — Z93.6 NEPHROSTOMY STATUS (HCC): Status: ACTIVE | Noted: 2019-06-03

## 2019-06-03 PROBLEM — Z89.611 S/P AKA (ABOVE KNEE AMPUTATION), RIGHT (HCC): Status: ACTIVE | Noted: 2019-06-03

## 2019-06-03 LAB
ANION GAP SERPL CALC-SCNC: 8 MMOL/L (ref 3–18)
BUN SERPL-MCNC: 18 MG/DL (ref 7–18)
BUN/CREAT SERPL: 19 (ref 12–20)
CALCIUM SERPL-MCNC: 7.7 MG/DL (ref 8.5–10.1)
CHLORIDE SERPL-SCNC: 114 MMOL/L (ref 100–108)
CO2 SERPL-SCNC: 23 MMOL/L (ref 21–32)
CREAT SERPL-MCNC: 0.94 MG/DL (ref 0.6–1.3)
GLUCOSE BLD STRIP.AUTO-MCNC: 130 MG/DL (ref 70–110)
GLUCOSE BLD STRIP.AUTO-MCNC: 240 MG/DL (ref 70–110)
GLUCOSE SERPL-MCNC: 114 MG/DL (ref 74–99)
POTASSIUM SERPL-SCNC: 4.2 MMOL/L (ref 3.5–5.5)
SODIUM SERPL-SCNC: 145 MMOL/L (ref 136–145)

## 2019-06-03 PROCEDURE — 74011250637 HC RX REV CODE- 250/637: Performed by: INTERNAL MEDICINE

## 2019-06-03 PROCEDURE — 80048 BASIC METABOLIC PNL TOTAL CA: CPT

## 2019-06-03 PROCEDURE — 74011250636 HC RX REV CODE- 250/636: Performed by: INTERNAL MEDICINE

## 2019-06-03 PROCEDURE — 82962 GLUCOSE BLOOD TEST: CPT

## 2019-06-03 PROCEDURE — 36415 COLL VENOUS BLD VENIPUNCTURE: CPT

## 2019-06-03 PROCEDURE — 77030011256 HC DRSG MEPILEX <16IN NO BORD MOLN -A

## 2019-06-03 RX ORDER — CEPHALEXIN 500 MG/1
500 CAPSULE ORAL 4 TIMES DAILY
Qty: 20 CAP | Refills: 0 | Status: SHIPPED
Start: 2019-06-03 | End: 2019-06-08

## 2019-06-03 RX ADMIN — SODIUM CHLORIDE 125 ML/HR: 900 INJECTION, SOLUTION INTRAVENOUS at 08:55

## 2019-06-03 RX ADMIN — METOPROLOL SUCCINATE 50 MG: 50 TABLET, EXTENDED RELEASE ORAL at 08:41

## 2019-06-03 RX ADMIN — LEVOTHYROXINE SODIUM 50 MCG: 50 TABLET ORAL at 06:30

## 2019-06-03 RX ADMIN — ATORVASTATIN CALCIUM 20 MG: 20 TABLET, FILM COATED ORAL at 08:41

## 2019-06-03 RX ADMIN — SODIUM BICARBONATE 650 MG TABLET 650 MG: at 08:41

## 2019-06-03 RX ADMIN — APIXABAN 5 MG: 5 TABLET, FILM COATED ORAL at 08:41

## 2019-06-03 RX ADMIN — TIZANIDINE 2 MG: 2 TABLET ORAL at 08:42

## 2019-06-03 RX ADMIN — SODIUM CHLORIDE 125 ML/HR: 900 INJECTION, SOLUTION INTRAVENOUS at 00:52

## 2019-06-03 RX ADMIN — NYSTATIN 100000 UNITS: 100000 POWDER TOPICAL at 13:00

## 2019-06-03 RX ADMIN — GABAPENTIN 300 MG: 300 CAPSULE ORAL at 08:41

## 2019-06-03 RX ADMIN — NYSTATIN 100000 UNITS: 100000 POWDER TOPICAL at 08:44

## 2019-06-03 NOTE — PROGRESS NOTES
Transition of Care [facility]) Plan:  Qi Garcia at [address], [place]. [phone] for report. Please include all hard scripts for controlled substances, med rec and dc summary in packet. Please medicate for pain prior to dc if possible and needed to help offset delay when patient first arrives to facility. Pt will need medical transport, is bed bound from LTC. Spoke with son Manolo Ambrocio [phone] to inform of possible dc today, will let him know when plan finalized. 1100- left VM for son re: transport time 12:30pm    [facility] Transportation:       How is patient being transported at discharge? By ambulance     When? Is transport scheduled? Follow-up appointment and transportation:     PCP? See AVS     Specialist?     Time/Date? Who is transporting to the follow-up appointment? Is transport for follow up appointment scheduled? Communication plan (with patient/family): Who is being called? Patient or Next of Kin? Responsible party? Manolo Ambrocio      What number(s) is to be used? What service provider is calling for [facility] services? When are they calling? Readmission Risk? (Green/Low; Yellow/Moderate; Red/High): green      Care Management Interventions  PCP Verified by CM:  Yes  Transition of Care Consult (CM Consult): SNF  Partner SNF: Yes  Discharge Durable Medical Equipment: No  Physical Therapy Consult: Yes  Occupational Therapy Consult: Yes  Current Support Network: [address] [place] [address] Av  Confirm Follow Up Transport: Family  Plan discussed with Pt/Family/Caregiver: Yes  Freedom of Choice Offered: Yes  Discharge Location  Discharge Placement: Skilled nursing facility

## 2019-06-03 NOTE — PROGRESS NOTES
0710 received bedside report from night shift RN. Patient resting quietly in bed. No complaint of pain/nausea. Patient alert and oriented x 4. Legally blind, hard of hearing. Right AKA dry/intact. Colostomy emptied, loose stool. Dressing dry/intact. Nephrostomy emptied, yellow/clear urine without odor. Dressing dry/intact. Tas [address] given to Meghan Roberts RN at [facility]. Patient taken via medical transport to [facility], son following.

## 2019-06-03 NOTE — DISCHARGE SUMMARY
[address]    Name:  Lam Cade  MR#:   [id number]  :  1940  ACCOUNT #:  [de-identified]  ADMIT DATE:  2019  DISCHARGE DATE:  2019    DISCHARGE DIAGNOSES:  1. Acute kidney injury. 2.  Urinary tract infection. 3.  Diabetes mellitus. 4.  Hypertension. 5.  Nephrostomy tube in place. 6.  Right above-knee amputation. 7.  History of deep vein thrombosis. 8.  Hypothyroidism. 9.  Chronic blindness and debility. 10.  History of colectomy. HOSPITAL SUMMARY:  This is a 71-year-old  female who lives at the nursing facility. She was last here in the middle of May for a urinary infection and hyperkalemia. At that time, she was noted to have a history of vaginal cancer, colon cancer, and a secondary nephrostomy tube with colostomy. She had, at that time, a pending amputation which she has just had at the end of May for chronic heel ulcer and leg pain. She had been treated for UTI/pyelonephritis during that hospitalization also and so she came back to the hospital on  with fatigue, malaise, diagnosis of UTI, and acute kidney injury. She was started on antibiotics and IV fluids. She has had a fairly uneventful hospital stay with resolution of her acute kidney injury from a creatinine of 2.32, now down to 0.94 with a normal BUN of 18. Her white count normalized from 16.1-11. Urinalysis had heavy too numerous to count WBCs, 2+ bacteria, RBCs, large leukocyte esterase, and the culture is back showing Proteus and that is susceptible to the cephalosporin that she has been on, so we plan to transition her over to oral cephalosporins for her discharge. She is feeling better. She wants to go home, back to [address] [place] [address] today. She does have pain in which she describes as phantom pains in her right AKA site, it is stapled.   There is some mild redness at the right lower border, but this appears minimal and I think can be treated with the oral antibiotics as well. Her glucose currently is 240. White count is as noted already. Metabolic panel has stabilized and she had a chest x-ray also when she came in. There was no significant abnormality and a right femur x-ray also done that showed the amputation without focal abnormality and her blood culture from 06/01 is no growth for 2 days. Today, she is a debilitated, elderly female who is awake and alert. She is blind, but she is oriented x3, in no acute distress. Temperature is 98.5, blood pressure 154/58. Her respiratory rate is 18. Her SaO2 is 100%. On exam, her oropharynx is dry. Neck is supple. Lungs are clear bilaterally. Cardiac exam, regular rate and rhythm. Her nephrostomy tube is in place. She has a bag associated with clear, yellow urine. Colostomy in place. Lower extremities, right amputation above knee, staple line intact, small redness at the right lower border with minimal serous drainage on a bandage that was in place and left lower extremity chronic edema and no changes otherwise. PLAN:  Discharge back to the facility today to continue on oral antibiotics. She does have a do not resuscitate in place. Of note, her meds should be listed as follows; Mylanta four times daily as needed, Dulcolax 10 mg daily as needed, Combigan 1 drop both eyes every 12 hours, Keflex 500 mg four times daily for 5 days, vitamin D 1000 units daily, sliding scale FlexPen insulin, Lantus 8 units subcu at night, milk of mag as needed daily. She has Timoptic eyedrops 1 drop twice daily, Eliquis 5 mg twice daily, Lipitor 20 mg daily, Ativan 0.5 mg as needed for anxiety, Benadryl 25 mg q.6 h. as needed, Zofran 4 mg q.8 h. as needed, sodium bicarb 650 mg twice daily, Desyrel 50 mg at night, Colace 100 mg twice daily, Neurontin 300 mg twice daily, Synthroid 50 mcg daily, Toprol XL 50 mg daily, nystatin powder to affected areas four times daily, and Zanaflex 2 mg twice daily.   The new medication is listed and her followup is at the facility. She has a do not resuscitate in place. Her diet currently is diabetic with options 2-g sodium. She needs to follow up with the surgeon that did her amputation as soon as she gets out of the hospital to make sure that they can see that staple line and decide when it is going to get removed since that was 05/22, so I would say later this week would be an excellent plan for her. 45 minutes on discharge time today. She is stable to leave the hospital, otherwise can go back to [address] [place] [address].       Fuentes Bearden MD      [id number]  D:  06/03/2019 11:35  T:  06/03/2019 11:40  JOB #:  [id number]

## 2019-06-03 NOTE — DIABETES MGMT
Diabetes Patient/Family Education Record  Factors That  May Influence Patients Ability  to Learn or  Comply with Recommendations   []   Language barrier    []   Cultural needs   [x]   Motivation    []   Cognitive limitation    []   Physical   []   Education    []   Physiological factors   [x]   Hearing/vision impairment   []   Religion beliefs    []   Financial factors   []  Other:   []  No factors identified at this time.      Person Instructed:   [x]   Patient   [x]   Family: pt's son   []  Other     Preference for Learning:   [x]   Verbal   []   Written   []  Demonstration     Level of Comprehension & Competence:    []  Good                                      [] Fair                                     []  Poor                             []  Needs Reinforcement   []  Teachback completed    Education Component:   [x]  Medication management, including how to administer insulin (if appropriate) and potential medication interactions    [x]  Nutritional management -obtain usual meal pattern   []  Exercise   []  Signs, symptoms, and treatment of hyperglycemia and hypoglycemia   [] Prevention, recognition and treatment of hyperglycemia and hypoglycemia   []  Importance of blood glucose monitoring and how to obtain a blood glucose meter    []  Instruction on use of the blood glucose meter   [x]  Discuss the importance of HbA1C monitoring    []  Sick day guidelines   []  Proper use and disposal of lancets, needles, syringes or insulin pens (if appropriate)   []  Potential long-term complications (retinopathy, kidney disease, neuropathy, foot care)   [] Information about whom to contact in case of emergency or for more information    [x]  Goal:  Patient/family will demonstrate understanding of Diabetes Self Management Skills by: 6/30  Plan for post-discharge education or self-management support:    [] Outpatient class schedule provided            [x] No appropriate    [] Scheduled for outpatient classes (date) _______  Verify:  Does patient understand how diabetes medications work? Yes  Does patient know what their most recent A1c is? Yes  Does patient monitor glucose at home? Staff at [facility] does. Does patient have difficulty obtaining diabetes medications or testing supplies?  No         Kaleigh Ambriz, [address]

## 2019-06-03 NOTE — PROGRESS NOTES
Occupational Therapy Evaluation/Screening:    Occupational Therapy orders received. The patients chart was reviewed and the patient is NOT appropriate for a skilled therapy evaluation. Pt is a LTC resident, bedbound at baseline. Pt w/ recent R AKA (2 weeks ago) w/o change in status. Staff use Kushal Pitts for OOB when pt is agreeable. Pt is legally blind and requires assist w/ ADLs and assist/compensation for self-feeding at baseline. Thank you.   Shantell Naik, OTR/L

## 2019-06-03 NOTE — ROUTINE PROCESS
Bedside and Verbal shift change report given to CHAITANYA Chino (oncoming nurse) by Susan Thorpe RN   (offgoing nurse). Report included the following information SBAR, Kardex, ED Summary, Intake/Output, MAR and Recent Results.

## 2019-06-03 NOTE — PROGRESS NOTES
Received PT eval order and chart reviewed. Pt is bed bound, LTC patient and PT eval attempted 06/01/2019 w/ pt adamantly refusing. PT eval is not appropriate at this time as pt is bed bound and facility uses mechanical [place] lift for transfers.

## 2019-06-03 NOTE — DIABETES MGMT
NUTRITIONAL ASSESSMENT GLYCEMIC CONTROL/ PLAN OF CARE     Jayro Cisneros           77 y.o.           5/31/2019                 1. ANTONI (acute kidney injury) ([address]    2. Acute cystitis with hematuria      PMHx: Type 2 Diabetes, Blind     INTERVENTIONS/PLAN:   -Recommend continuing Diabetic Consistent Carb Diet  -Provided Diabetes Self-Mgt Education     ASSESSMENT:   Nutritional Status:  Overweight per BMI 27.4 kg/m2 (25.0-29.9 kg/m2)     Diabetes Management:     Recent Glucose Results:   Lab Results   Component Value Date/Time     (H) 06/03/2019 04:43 AM    GLUCPOC 240 (H) 06/03/2019 11:28 AM    GLUCPOC 130 (H) 06/03/2019 08:12 AM    GLUCPOC 306 (H) 06/02/2019 09:14 PM         Within target range (non-ICU: <140; ICU<180): [] Yes   [x]  No    Current Insulin regimen:   Humalog Corrective normal insulin sensitivity     Home medication/insulin regimen:   Pt unable to confirm     HbA1c:(4/17/19) 8.3% equivalent to average blood glucose level 192 mg/dL. .    Adequate glycemic control PTA:  [] Yes  [x] No       SUBJECTIVE/OBJECTIVE:   Information obtained from: Pt legally blind and resides at [address] [place] [address]. Pt unable to confirm PTA diabetes regimen. Pt reports she likes the breakfast here. Her son was present at time of visit and disclosed that he provided a lot of take out meals to pt while at [facility]. Pt not interested in changing eating patterns or nutrition education d/t age. Will continue to monitor and follow-up per policy.      Diet: DIET DIABETIC WITH OPTIONS    Patient Vitals for the past 100 hrs:   % Diet Eaten   06/03/19 1007 100 %   06/02/19 1544 0 %   06/02/19 0906 90 %   06/01/19 0957 70 %   06/01/19 0937 30 %   05/31/19 1910 100 %         Medications: [x]                Reviewed     Most Recent POC Glucose:   Recent Labs     06/03/19  0443 06/02/19  0525 06/01/19  0424   * 103* 101*         Labs:   Lab Results   Component Value Date/Time    Hemoglobin A1c 8.3 (H) 04/17/2019 11:40 AM     Lab Results   Component Value Date/Time    Sodium 145 06/03/2019 04:43 AM    Potassium 4.2 06/03/2019 04:43 AM    Chloride 114 (H) 06/03/2019 04:43 AM    CO2 23 06/03/2019 04:43 AM    Anion gap 8 06/03/2019 04:43 AM    Glucose 114 (H) 06/03/2019 04:43 AM    BUN 18 06/03/2019 04:43 AM    Creatinine 0.94 06/03/2019 04:43 AM    Calcium 7.7 (L) 06/03/2019 04:43 AM    Magnesium 2.3 11/29/2018 04:40 AM    Albumin 2.3 (L) 05/31/2019 01:30 PM       Anthropometrics: BMI (calculated): 27.4 kg/m2  Wt Readings from Last 1 Encounters:   06/01/19 68 kg (150 lb)      Ht Readings from Last 1 Encounters:   05/31/19 5' 2\" (1.575 m)       Estimated Nutrition Needs:.1527 kcal/day (28 kcal/kg), 65 g PRO/day ( 1.2 g PRO/kg), 1527 mL fluid/day (1mL fluid/kcal)  Based on:     [x]            Adjusted BW: 68 kg    Nutrition Diagnoses: Altered nutrition-related lab value related to hx of type 2 diabetes and less stringent diabetes self-mgt targets as evidenced by Hgb A1C 8.3%. Nutrition Interventions:  -Recommend diabetes consistent carb diet    Goal:   Blood glucose will be within target range of  mg/dL by 6/6/19.      Nutrition Monitoring and Evaluation      []     Monitor po intake on meal rounds  [x]     Continue inpatient monitoring and intervention  []     Other:      Nutrition Risk:  []   High     []  Moderate    [x]  Minimal/Uncompromised    Lisa Spencer  [phone]

## 2019-06-07 LAB
BACTERIA SPEC CULT: NORMAL
SERVICE CMNT-IMP: NORMAL

## 2019-06-27 ENCOUNTER — HOSPITAL ENCOUNTER (OUTPATIENT)
Dept: INTERVENTIONAL RADIOLOGY/VASCULAR | Age: 79
Discharge: HOME | End: 2019-06-27
Attending: UROLOGY
Payer: MEDICARE

## 2019-06-27 DIAGNOSIS — N13.30 HYDRONEPHROSIS: ICD-10-CM

## 2019-06-27 PROCEDURE — 50435 EXCHANGE NEPHROSTOMY CATH: CPT

## 2019-06-27 PROCEDURE — 74011250636 HC RX REV CODE- 250/636: Performed by: UROLOGY

## 2019-06-27 PROCEDURE — 74011636320 HC RX REV CODE- 636/320: Performed by: UROLOGY

## 2019-06-27 RX ORDER — SODIUM CHLORIDE 9 MG/ML
500 INJECTION, SOLUTION INTRAVENOUS
Status: COMPLETED | OUTPATIENT
Start: 2019-06-27 | End: 2019-06-27

## 2019-06-27 RX ADMIN — SODIUM CHLORIDE 500 ML: 900 INJECTION, SOLUTION INTRAVENOUS at 09:52

## 2019-06-27 RX ADMIN — IOPAMIDOL 8 ML: 612 INJECTION, SOLUTION INTRAVENOUS at 09:52

## 2019-07-31 ENCOUNTER — HOSPITAL ENCOUNTER (OUTPATIENT)
Dept: LAB | Age: 79
Discharge: HOME | End: 2019-07-31
Payer: MEDICARE

## 2019-07-31 LAB
APPEARANCE UR: ABNORMAL
BACTERIA URNS QL MICRO: ABNORMAL /HPF
BILIRUB UR QL: NEGATIVE
COLOR UR: YELLOW
EPITH CASTS URNS QL MICRO: ABNORMAL /LPF (ref 0–5)
GLUCOSE UR STRIP.AUTO-MCNC: NEGATIVE MG/DL
HGB UR QL STRIP: ABNORMAL
KETONES UR QL STRIP.AUTO: NEGATIVE MG/DL
LEUKOCYTE ESTERASE UR QL STRIP.AUTO: ABNORMAL
NITRITE UR QL STRIP.AUTO: POSITIVE
PH UR STRIP: >8.5 [PH] (ref 5–8)
PROT UR STRIP-MCNC: 30 MG/DL
RBC #/AREA URNS HPF: ABNORMAL /HPF (ref 0–5)
SP GR UR REFRACTOMETRY: 1.01 (ref 1–1.03)
TRI-PHOS CRY URNS QL MICRO: ABNORMAL
UROBILINOGEN UR QL STRIP.AUTO: 0.2 EU/DL (ref 0.2–1)
WBC URNS QL MICRO: ABNORMAL /HPF (ref 0–5)

## 2019-07-31 PROCEDURE — 87086 URINE CULTURE/COLONY COUNT: CPT

## 2019-07-31 PROCEDURE — 87186 SC STD MICRODIL/AGAR DIL: CPT

## 2019-07-31 PROCEDURE — 87077 CULTURE AEROBIC IDENTIFY: CPT

## 2019-07-31 PROCEDURE — 81001 URINALYSIS AUTO W/SCOPE: CPT

## 2019-08-02 LAB
BACTERIA SPEC CULT: ABNORMAL
SERVICE CMNT-IMP: ABNORMAL

## 2019-08-16 ENCOUNTER — HOSPITAL ENCOUNTER (OUTPATIENT)
Dept: INTERVENTIONAL RADIOLOGY/VASCULAR | Age: 79
Discharge: HOME | End: 2019-08-16
Attending: UROLOGY | Admitting: UROLOGY
Payer: MEDICARE

## 2019-08-16 DIAGNOSIS — N13.30 HYDRONEPHROSIS: ICD-10-CM

## 2019-08-16 PROCEDURE — 74011250636 HC RX REV CODE- 250/636: Performed by: UROLOGY

## 2019-08-16 PROCEDURE — 74011636320 HC RX REV CODE- 636/320: Performed by: UROLOGY

## 2019-08-16 PROCEDURE — 50435 EXCHANGE NEPHROSTOMY CATH: CPT

## 2019-08-16 RX ORDER — SODIUM CHLORIDE 9 MG/ML
500 INJECTION, SOLUTION INTRAVENOUS
Status: COMPLETED | OUTPATIENT
Start: 2019-08-16 | End: 2019-08-16

## 2019-08-16 RX ADMIN — SODIUM CHLORIDE 500 ML: 900 INJECTION, SOLUTION INTRAVENOUS at 09:53

## 2019-08-16 RX ADMIN — IOPAMIDOL 10 ML: 612 INJECTION, SOLUTION INTRAVENOUS at 09:52

## 2019-08-16 NOTE — PROGRESS NOTES
Interventional Radiology Brief Procedure Note    Interventional Radiologist: Gregorio Valerio MD    Pre-operative Diagnosis: CHronic right PCN - routine exchange    Post-operative Diagnosis: Same as pre-operative diagnosis    Procedure(s) Performed:  Nephrostomy Tube exchange    Anesthesia:  none    Findings: Informed consent. fluoroscopic guidance. New PCN in good position. PCN attached to gravity drainage bag. Full report to follow.     Complications: No immediate    Estimated Blood Loss: none    Tubes and Drains: 8Fr PCN    Specimens: None    Condition: Good         Gregorio Valerio MD  [facility]  8/16/2019

## 2019-08-28 ENCOUNTER — HOSPITAL ENCOUNTER (OUTPATIENT)
Dept: LAB | Age: 79
Discharge: HOME | End: 2019-08-28
Payer: MEDICARE

## 2019-08-28 LAB
ALBUMIN SERPL-MCNC: 2.9 G/DL (ref 3.4–5)
ALBUMIN/GLOB SERPL: 0.8 {RATIO} (ref 0.8–1.7)
ALP SERPL-CCNC: 78 U/L (ref 45–117)
ALT SERPL-CCNC: 23 U/L (ref 13–56)
ANION GAP SERPL CALC-SCNC: 9 MMOL/L (ref 3–18)
AST SERPL-CCNC: 17 U/L (ref 10–38)
BASOPHILS # BLD: 0 K/UL (ref 0–0.1)
BASOPHILS NFR BLD: 0 % (ref 0–2)
BILIRUB SERPL-MCNC: 0.4 MG/DL (ref 0.2–1)
BUN SERPL-MCNC: 31 MG/DL (ref 7–18)
BUN/CREAT SERPL: 26 (ref 12–20)
CALCIUM SERPL-MCNC: 8.3 MG/DL (ref 8.5–10.1)
CHLORIDE SERPL-SCNC: 104 MMOL/L (ref 100–111)
CHOLEST SERPL-MCNC: 115 MG/DL
CO2 SERPL-SCNC: 24 MMOL/L (ref 21–32)
CREAT SERPL-MCNC: 1.17 MG/DL (ref 0.6–1.3)
DIFFERENTIAL METHOD BLD: ABNORMAL
EOSINOPHIL # BLD: 0.3 K/UL (ref 0–0.4)
EOSINOPHIL NFR BLD: 4 % (ref 0–5)
ERYTHROCYTE [DISTWIDTH] IN BLOOD BY AUTOMATED COUNT: 14.3 % (ref 11.6–14.5)
EST. AVERAGE GLUCOSE BLD GHB EST-MCNC: 194 MG/DL
GLOBULIN SER CALC-MCNC: 3.7 G/DL (ref 2–4)
GLUCOSE SERPL-MCNC: 215 MG/DL (ref 74–99)
HBA1C MFR BLD: 8.4 % (ref 4.2–5.6)
HCT VFR BLD AUTO: 37.9 % (ref 35–45)
HDLC SERPL-MCNC: 39 MG/DL (ref 40–60)
HDLC SERPL: 2.9 {RATIO} (ref 0–5)
HGB BLD-MCNC: 12.1 G/DL (ref 12–16)
LDLC SERPL CALC-MCNC: 46.2 MG/DL (ref 0–100)
LIPID PROFILE,FLP: ABNORMAL
LYMPHOCYTES # BLD: 1.7 K/UL (ref 0.9–3.6)
LYMPHOCYTES NFR BLD: 19 % (ref 21–52)
MCH RBC QN AUTO: 27.8 PG (ref 24–34)
MCHC RBC AUTO-ENTMCNC: 31.9 G/DL (ref 31–37)
MCV RBC AUTO: 87.1 FL (ref 74–97)
MONOCYTES # BLD: 0.7 K/UL (ref 0.05–1.2)
MONOCYTES NFR BLD: 8 % (ref 3–10)
NEUTS SEG # BLD: 5.9 K/UL (ref 1.8–8)
NEUTS SEG NFR BLD: 69 % (ref 40–73)
PLATELET # BLD AUTO: 249 K/UL (ref 135–420)
PMV BLD AUTO: 10.5 FL (ref 9.2–11.8)
POTASSIUM SERPL-SCNC: 4.9 MMOL/L (ref 3.5–5.5)
PROT SERPL-MCNC: 6.6 G/DL (ref 6.4–8.2)
RBC # BLD AUTO: 4.35 M/UL (ref 4.2–5.3)
SODIUM SERPL-SCNC: 137 MMOL/L (ref 136–145)
TRIGL SERPL-MCNC: 149 MG/DL (ref ?–150)
VLDLC SERPL CALC-MCNC: 29.8 MG/DL
WBC # BLD AUTO: 8.6 K/UL (ref 4.6–13.2)

## 2019-08-28 PROCEDURE — 83036 HEMOGLOBIN GLYCOSYLATED A1C: CPT

## 2019-08-28 PROCEDURE — 80053 COMPREHEN METABOLIC PANEL: CPT

## 2019-08-28 PROCEDURE — 85025 COMPLETE CBC W/AUTO DIFF WBC: CPT

## 2019-08-28 PROCEDURE — 80061 LIPID PANEL: CPT

## 2019-09-25 NOTE — PROGRESS NOTES
Call to [facility]) to discuss appt time and setting up transport for the patient for neph tube exchange. Will have patient arrive between [id number] to radiology waiting room.

## 2019-10-03 ENCOUNTER — HOSPITAL ENCOUNTER (OUTPATIENT)
Dept: INTERVENTIONAL RADIOLOGY/VASCULAR | Age: 79
Discharge: HOME | End: 2019-10-03
Attending: UROLOGY
Payer: MEDICARE

## 2019-10-03 DIAGNOSIS — N13.39 OTHER HYDRONEPHROSIS: ICD-10-CM

## 2019-10-03 PROCEDURE — 74011636320 HC RX REV CODE- 636/320: Performed by: UROLOGY

## 2019-10-03 PROCEDURE — 50435 EXCHANGE NEPHROSTOMY CATH: CPT

## 2019-10-03 PROCEDURE — 74011250636 HC RX REV CODE- 250/636: Performed by: UROLOGY

## 2019-10-03 RX ORDER — SODIUM CHLORIDE 9 MG/ML
500 INJECTION, SOLUTION INTRAVENOUS
Status: COMPLETED | OUTPATIENT
Start: 2019-10-03 | End: 2019-10-03

## 2019-10-03 RX ADMIN — IOPAMIDOL 10 ML: 612 INJECTION, SOLUTION INTRAVENOUS at 12:36

## 2019-10-03 RX ADMIN — SODIUM CHLORIDE 500 ML: 900 INJECTION, SOLUTION INTRAVENOUS at 12:36

## 2019-10-10 ENCOUNTER — HOSPITAL ENCOUNTER (OUTPATIENT)
Dept: LAB | Age: 79
Discharge: HOME | End: 2019-10-10
Payer: MEDICARE

## 2019-10-10 LAB
APPEARANCE UR: ABNORMAL
BACTERIA URNS QL MICRO: ABNORMAL /HPF
BILIRUB UR QL: NEGATIVE
COLOR UR: YELLOW
EPITH CASTS URNS QL MICRO: ABNORMAL /LPF (ref 0–5)
GLUCOSE UR STRIP.AUTO-MCNC: 500 MG/DL
HGB UR QL STRIP: ABNORMAL
KETONES UR QL STRIP.AUTO: NEGATIVE MG/DL
LEUKOCYTE ESTERASE UR QL STRIP.AUTO: ABNORMAL
NITRITE UR QL STRIP.AUTO: POSITIVE
PH UR STRIP: 8.5 [PH] (ref 5–8)
PROT UR STRIP-MCNC: 30 MG/DL
RBC #/AREA URNS HPF: ABNORMAL /HPF (ref 0–5)
SP GR UR REFRACTOMETRY: 1.01 (ref 1–1.03)
UROBILINOGEN UR QL STRIP.AUTO: 0.2 EU/DL (ref 0.2–1)
WBC URNS QL MICRO: ABNORMAL /HPF (ref 0–5)

## 2019-10-10 PROCEDURE — 81001 URINALYSIS AUTO W/SCOPE: CPT

## 2019-10-10 PROCEDURE — 36415 COLL VENOUS BLD VENIPUNCTURE: CPT

## 2019-10-10 PROCEDURE — 87077 CULTURE AEROBIC IDENTIFY: CPT

## 2019-10-10 PROCEDURE — 87086 URINE CULTURE/COLONY COUNT: CPT

## 2019-10-10 PROCEDURE — 87186 SC STD MICRODIL/AGAR DIL: CPT

## 2019-10-14 LAB
BACTERIA SPEC CULT: ABNORMAL
SERVICE CMNT-IMP: ABNORMAL

## 2019-11-12 RX ORDER — HYDROCODONE BITARTRATE AND ACETAMINOPHEN 5; 325 MG/1; MG/1
1 TABLET ORAL
COMMUNITY
End: 2020-06-26

## 2019-11-12 NOTE — PROGRESS NOTES
Notified [address] of appointment-need transport. PT aware of need to hold anticoagulants per protocol. [facility]  aware of arrival time pre procedure 0930. NATHALIENIK Recio-son  states no questions at this time.

## 2019-11-18 ENCOUNTER — HOSPITAL ENCOUNTER (OUTPATIENT)
Dept: INTERVENTIONAL RADIOLOGY/VASCULAR | Age: 79
Discharge: HOME | End: 2019-11-18
Attending: UROLOGY
Payer: MEDICARE

## 2019-11-18 DIAGNOSIS — N13.39 OTHER HYDRONEPHROSIS: ICD-10-CM

## 2019-11-18 PROCEDURE — 87086 URINE CULTURE/COLONY COUNT: CPT

## 2019-11-18 PROCEDURE — 74011636320 HC RX REV CODE- 636/320: Performed by: RADIOLOGY

## 2019-11-18 PROCEDURE — 50435 EXCHANGE NEPHROSTOMY CATH: CPT

## 2019-11-18 PROCEDURE — 74011250637 HC RX REV CODE- 250/637: Performed by: RADIOLOGY

## 2019-11-18 PROCEDURE — 87077 CULTURE AEROBIC IDENTIFY: CPT

## 2019-11-18 PROCEDURE — 74011250636 HC RX REV CODE- 250/636: Performed by: RADIOLOGY

## 2019-11-18 PROCEDURE — 87186 SC STD MICRODIL/AGAR DIL: CPT

## 2019-11-18 RX ORDER — CIPROFLOXACIN 500 MG/1
500 TABLET ORAL EVERY 12 HOURS
Status: DISCONTINUED | OUTPATIENT
Start: 2019-11-18 | End: 2019-11-22 | Stop reason: HOSPADM

## 2019-11-18 RX ORDER — SODIUM CHLORIDE 9 MG/ML
500 INJECTION, SOLUTION INTRAVENOUS
Status: COMPLETED | OUTPATIENT
Start: 2019-11-18 | End: 2019-11-18

## 2019-11-18 RX ADMIN — IOPAMIDOL 10 ML: 612 INJECTION, SOLUTION INTRAVENOUS at 09:56

## 2019-11-18 RX ADMIN — SODIUM CHLORIDE 500 ML: 900 INJECTION, SOLUTION INTRAVENOUS at 09:58

## 2019-11-18 RX ADMIN — CIPROFLOXACIN HYDROCHLORIDE 500 MG: 500 TABLET, FILM COATED ORAL at 10:02

## 2019-11-18 NOTE — PROGRESS NOTES
Patient reports feeling dizzy this am and has had some blood in urine. BP taken prior to procedure  And noted at 209/84. Discussed with Dr Navjot Dobbins and prophalactic dose of Cipro given po. Cultures obtained and sent. Results to go to Dr Zoraida Lew.

## 2019-11-18 NOTE — PROGRESS NOTES
Exchange of [id number] APDL. Urine culture drawn from nephrostomy tubing and sent to lab due to patient condition and urine coloration.

## 2019-11-20 LAB
BACTERIA SPEC CULT: ABNORMAL
SERVICE CMNT-IMP: ABNORMAL

## 2020-01-06 ENCOUNTER — HOSPITAL ENCOUNTER (OUTPATIENT)
Dept: INTERVENTIONAL RADIOLOGY/VASCULAR | Age: 80
Discharge: HOME | End: 2020-01-06
Attending: UROLOGY
Payer: MEDICARE

## 2020-01-06 DIAGNOSIS — N13.30 HYDRONEPHROSIS: ICD-10-CM

## 2020-01-06 PROCEDURE — 74011250636 HC RX REV CODE- 250/636: Performed by: UROLOGY

## 2020-01-06 PROCEDURE — 50435 EXCHANGE NEPHROSTOMY CATH: CPT

## 2020-01-06 PROCEDURE — 74011636320 HC RX REV CODE- 636/320: Performed by: UROLOGY

## 2020-01-06 RX ORDER — SODIUM CHLORIDE 9 MG/ML
500 INJECTION, SOLUTION INTRAVENOUS
Status: COMPLETED | OUTPATIENT
Start: 2020-01-06 | End: 2020-01-06

## 2020-01-06 RX ADMIN — IOPAMIDOL 10 ML: 612 INJECTION, SOLUTION INTRAVENOUS at 09:37

## 2020-01-06 RX ADMIN — SODIUM CHLORIDE 500 ML: 900 INJECTION, SOLUTION INTRAVENOUS at 09:36

## 2020-01-16 ENCOUNTER — APPOINTMENT (OUTPATIENT)
Dept: CT IMAGING | Age: 80
End: 2020-01-16
Attending: EMERGENCY MEDICINE
Payer: MEDICARE

## 2020-01-16 ENCOUNTER — HOSPITAL ENCOUNTER (EMERGENCY)
Age: 80
Discharge: OTHER INSTITUTION | End: 2020-01-16
Attending: EMERGENCY MEDICINE
Payer: MEDICARE

## 2020-01-16 VITALS
SYSTOLIC BLOOD PRESSURE: 171 MMHG | WEIGHT: 190 LBS | BODY MASS INDEX: 35.87 KG/M2 | RESPIRATION RATE: 18 BRPM | OXYGEN SATURATION: 100 % | HEART RATE: 71 BPM | HEIGHT: 61 IN | TEMPERATURE: 98.1 F | DIASTOLIC BLOOD PRESSURE: 59 MMHG

## 2020-01-16 DIAGNOSIS — T83.092A OBSTRUCTED NEPHROSTOMY TUBE (HCC): Primary | ICD-10-CM

## 2020-01-16 DIAGNOSIS — R10.9 RIGHT FLANK PAIN: ICD-10-CM

## 2020-01-16 LAB
ANION GAP BLD CALC-SCNC: 15 MMOL/L (ref 10–20)
APPEARANCE UR: ABNORMAL
BACTERIA URNS QL MICRO: ABNORMAL /HPF
BASOPHILS # BLD: 0 K/UL (ref 0–0.1)
BASOPHILS NFR BLD: 0 % (ref 0–2)
BILIRUB UR QL: NEGATIVE
BUN BLD-MCNC: 17 MG/DL (ref 7–18)
CA-I BLD-MCNC: 1.13 MMOL/L (ref 1.12–1.32)
CHLORIDE BLD-SCNC: 94 MMOL/L (ref 100–108)
CO2 BLD-SCNC: 29 MMOL/L (ref 19–24)
COLOR UR: YELLOW
CREAT UR-MCNC: 1.1 MG/DL (ref 0.6–1.3)
DIFFERENTIAL METHOD BLD: ABNORMAL
EOSINOPHIL # BLD: 0.2 K/UL (ref 0–0.4)
EOSINOPHIL NFR BLD: 2 % (ref 0–5)
EPITH CASTS URNS QL MICRO: ABNORMAL /LPF (ref 0–5)
ERYTHROCYTE [DISTWIDTH] IN BLOOD BY AUTOMATED COUNT: 14 % (ref 11.6–14.5)
GLUCOSE BLD STRIP.AUTO-MCNC: 153 MG/DL (ref 74–106)
GLUCOSE UR STRIP.AUTO-MCNC: NEGATIVE MG/DL
HCT VFR BLD AUTO: 38.6 % (ref 35–45)
HCT VFR BLD CALC: 38 % (ref 36–49)
HGB BLD-MCNC: 12.6 G/DL (ref 12–16)
HGB BLD-MCNC: 12.9 G/DL (ref 12–16)
HGB UR QL STRIP: ABNORMAL
KETONES UR QL STRIP.AUTO: NEGATIVE MG/DL
LEUKOCYTE ESTERASE UR QL STRIP.AUTO: ABNORMAL
LYMPHOCYTES # BLD: 1 K/UL (ref 0.9–3.6)
LYMPHOCYTES NFR BLD: 9 % (ref 21–52)
MCH RBC QN AUTO: 28.1 PG (ref 24–34)
MCHC RBC AUTO-ENTMCNC: 32.6 G/DL (ref 31–37)
MCV RBC AUTO: 86.2 FL (ref 74–97)
MONOCYTES # BLD: 0.8 K/UL (ref 0.05–1.2)
MONOCYTES NFR BLD: 8 % (ref 3–10)
NEUTS SEG # BLD: 9 K/UL (ref 1.8–8)
NEUTS SEG NFR BLD: 81 % (ref 40–73)
NITRITE UR QL STRIP.AUTO: NEGATIVE
PH UR STRIP: 6 [PH] (ref 5–8)
PLATELET # BLD AUTO: 257 K/UL (ref 135–420)
PMV BLD AUTO: 9.4 FL (ref 9.2–11.8)
POTASSIUM BLD-SCNC: 4.7 MMOL/L (ref 3.5–5.5)
PROT UR STRIP-MCNC: 30 MG/DL
RBC # BLD AUTO: 4.48 M/UL (ref 4.2–5.3)
RBC #/AREA URNS HPF: ABNORMAL /HPF (ref 0–5)
SODIUM BLD-SCNC: 132 MMOL/L (ref 136–145)
SP GR UR REFRACTOMETRY: 1.01 (ref 1–1.03)
UROBILINOGEN UR QL STRIP.AUTO: 0.2 EU/DL (ref 0.2–1)
WBC # BLD AUTO: 11 K/UL (ref 4.6–13.2)
WBC CASTS URNS QL MICRO: ABNORMAL /LPF
WBC URNS QL MICRO: ABNORMAL /HPF (ref 0–5)

## 2020-01-16 PROCEDURE — 85025 COMPLETE CBC W/AUTO DIFF WBC: CPT

## 2020-01-16 PROCEDURE — 81001 URINALYSIS AUTO W/SCOPE: CPT

## 2020-01-16 PROCEDURE — 80047 BASIC METABLC PNL IONIZED CA: CPT

## 2020-01-16 PROCEDURE — 99285 EMERGENCY DEPT VISIT HI MDM: CPT

## 2020-01-16 PROCEDURE — 74176 CT ABD & PELVIS W/O CONTRAST: CPT

## 2020-01-16 PROCEDURE — 74011250637 HC RX REV CODE- 250/637: Performed by: EMERGENCY MEDICINE

## 2020-01-16 RX ORDER — ACETAMINOPHEN 500 MG
1000 TABLET ORAL ONCE
Status: COMPLETED | OUTPATIENT
Start: 2020-01-16 | End: 2020-01-16

## 2020-01-16 RX ADMIN — ACETAMINOPHEN 1000 MG: 500 TABLET ORAL at 10:27

## 2020-01-16 NOTE — ED TRIAGE NOTES
Pt arrives from [address] [place] [address] w/ c/o RIGHT flank pain starting this morning stating \"feeling like the nephrostomy tube is clogged\"

## 2020-01-16 NOTE — ED NOTES
Report called to [facility] and spoke to [facility]. Pt to return via medical transport within the hour. Results and plan of care reviewed.

## 2020-01-16 NOTE — ED NOTES
Bedside report given to Michaela Lopez from [place]. Pt transported back to [address] [place] [address].

## 2020-01-16 NOTE — ED PROVIDER NOTES
EMERGENCY DEPARTMENT HISTORY AND PHYSICAL EXAM    Date: 1/16/2020  Patient Name: Lidia Matos    History of Presenting Illness     Chief Complaint   Patient presents with    Flank Pain         History Provided By: Patient    Lidia Matos is a [de-identified] y.o. female who presents to the emergency department C/O right-sided flank pain and possible clogged nephrostomy tube. 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 She states she had to have her bladder and colon removed as well as her left kidney. She states she does have her right kidney and only is able to produce urine through her nephrostomy tube. .  She states that about 3 AM she developed pain in her right flank. And she states that the nursing staff was unable to determine if the tube was clogged. She denies any fevers or chills. She states she does have a longstanding history of frequent urinary tract infections and is \"colonized\". She states she is bedbound. She states she had her right nephrostomy tube exchanged on January 6, 2020 by Dr. Tyra Broussard    PCP: Anthony Torres MD    Current Outpatient Medications   Medication Sig Dispense Refill    insulin detemir U-100 (LEVEMIR U-100 INSULIN) 100 unit/mL injection 15 Units by SubCUTAneous route nightly.  HYDROcodone-acetaminophen (NORCO) 5-325 mg per tablet Take 1 Tab by mouth every six (6) hours as needed for Pain.  magnesium hydroxide (ROSE MILK OF MAGNESIA) 400 mg/5 mL suspension Take 30 mL by mouth daily as needed for Constipation.  diphenhydrAMINE (BENADRYL) 25 mg capsule Take 25 mg by mouth every six (6) hours as needed.  LORazepam (ATIVAN) 0.5 mg tablet Take 1 Tab by mouth daily as needed for Anxiety. 2 Tab 0    gabapentin (NEURONTIN) 300 mg capsule Take 1 Cap by mouth two (2) times a day. 2 Cap 0    cholecalciferol (VITAMIN D3) 1,000 unit tablet Take 1,000 Units by mouth daily.       ondansetron hcl (ZOFRAN) 4 mg tablet Take 4 mg by mouth every eight (8) hours as needed for Nausea.  insulin regular (NOVOLIN R, HUMULIN R) 100 unit/mL injection by SubCUTAneous route.  diphenhydrAMINE (BENADRYL) 25 mg capsule Take 50 mg by mouth every six (6) hours as needed for Itching.  apixaban (ELIQUIS PO) Take 5 mg by mouth two (2) times a day.  pollen extracts (PROSTAT PO) Take  by mouth.  naloxone (NARCAN) 4 mg/actuation nasal spray Use 1 spray intranasally, then discard. Repeat with new spray every 2 min as needed for opioid overdose symptoms, alternating nostrils. 1 Each 0    tiZANidine (ZANAFLEX) 2 mg capsule Take 2 mg by mouth two (2) times a day.  traZODone (DESYREL) 50 mg tablet Take 50 mg by mouth nightly.  nystatin (MYCOSTATIN) powder Apply 100,000 Units to affected area four (4) times daily.  camphor-menthol (SARNA) 0.5-0.5 % lotion Apply  to affected area daily.  docusate sodium (COLACE) 100 mg capsule Take 100 mg by mouth two (2) times a day.  hydrocortisone (HYTONE) 2.5 % topical cream Apply  to affected area two (2) times a day. use thin layer      timolol (TIMOPTIC) 0.5 % ophthalmic solution 1 Drop two (2) times a day.  brimonidine-timolol (COMBIGAN) 0.2-0.5 % drop ophthalmic solution Administer 1 Drop to both eyes every twelve (12) hours.  bisacodyl (DULCOLAX, BISACODYL,) 10 mg suppository Insert  into rectum daily as needed. Indications: constipation      metoprolol succinate (TOPROL-XL) 50 mg XL tablet Take 50 mg by mouth daily.  magnesium hydroxide (MILK OF MAGNESIA CONCENTRATED) 2,400 mg/10 mL susp suspension Take 10 mL by mouth.  alum-mag hydroxide-simeth (MYLANTA) 200-200-20 mg/5 mL susp Take 30 mL by mouth four (4) times daily as needed.  insulin aspart U-100 (NOVOLOG FLEXPEN U-100 INSULIN) 100 unit/mL inpn by SubCUTAneous route. Sliding scale      sodium bicarbonate 650 mg tablet Take 650 mg by mouth two (2) times a day.       levothyroxine (SYNTHROID) 50 mcg tablet Take  by mouth Daily (before breakfast).  acetaminophen (TYLENOL) 650 mg TbER Take 650 mg by mouth every six (6) hours as needed for Pain or Fever.  atorvastatin calcium (ATORVASTATIN PO) Take 20 mg by mouth daily. Past History     Past Medical History:  Past Medical History:   Diagnosis Date    [facility] ([facility]     vaginal and colon cancer    Cancer ([facility]     Chronic pain     arthritis    [facility]     DVT (deep venous thrombosis) (HCC)     Hypertension     Legally blind     Thyroid disease        Past Surgical History:  Past Surgical History:   Procedure Laterality Date    HX ABOVE KNEE AMPUTATION Right     HX COLOSTOMY      HX ORTHOPAEDIC      HX OTHER SURGICAL      kidney drainage tube    HX UROLOGICAL      kidney removal    IR EXCHANGE NEPHRO PERC RT SI  1/28/2019    IR EXCHANGE NEPHRO PERC RT SI  3/21/2019    IR EXCHANGE NEPHRO PERC RT SI  5/13/2019    IR EXCHANGE NEPHRO PERC RT SI  6/27/2019    IR EXCHANGE NEPHRO PERC RT SI  8/16/2019    IR EXCHANGE NEPHRO PERC RT SI  10/3/2019    IR EXCHANGE NEPHRO PERC RT SI  11/18/2019    IR EXCHANGE NEPHRO PERC RT SI  1/6/2020       Family History:  History reviewed. No pertinent family history. Social History:  Social History     Tobacco Use    Smoking status: Never Smoker    Smokeless tobacco: Never Used   Substance Use Topics    Alcohol use: No    Drug use: Not on file       Allergies: Allergies   Allergen Reactions    Morphine Nausea and Vomiting    Pcn [Penicillins] Rash         Review of Systems   Review of Systems   Genitourinary: Positive for flank pain. All other systems reviewed and are negative.         Physical Exam     Vitals:    01/16/20 0843 01/16/20 0930 01/16/20 1000 01/16/20 1130   BP: 162/63 159/43 164/47 171/59   Pulse: 70 67 72 71   Resp: 20 13 16 18   Temp: 98.1 °F (36.7 °C)      SpO2: 100%   100%   Weight: 86.2 kg (190 lb)      Height: 5' 1\" (1.549 m)        Physical Exam    Nursing notes and vital signs reviewed    Constitutional: Non toxic appearing, no acute distress chronically ill-appearing  HEENT: Normocephalic, Atraumatic, bilateral blindness EOMI, No nasal congestion, external ears normal, mucous membranes mois  Cardiovascular: Regular rate and rhythm, no murmurs  Chest: Normal work of breathing and chest excursion bilaterally  Lungs: Clear to ausculation bilaterally  Abdomen: Soft, non tender, non distended, normoactive bowel sounds  Back: No evidence of trauma or deformity, right nephrostomy tube is in place there is no surrounding erythema, there is sediment noted in the tubing as well as some yellow urine in the bag  Extremities: No evidence of trauma or deformity, chronic changes in the lower extremities  Skin: Warm and dry, normal cap refill  Neuro: Alert and oriented x 3, CN intact, normal speech, strength and sensation full and symmetric bilaterally, normal coordination  Psychiatric: Normal mood and affect      Diagnostic Study Results     Labs -     Recent Results (from the past 72 hour(s))   URINALYSIS W/ RFLX MICROSCOPIC    Collection Time: 01/16/20  8:55 AM   Result Value Ref Range    Color YELLOW      Appearance CLOUDY      Specific gravity 1.006 1.005 - 1.030      pH (UA) 6.0 5.0 - 8.0      Protein 30 (A) NEG mg/dL    Glucose NEGATIVE  NEG mg/dL    Ketone NEGATIVE  NEG mg/dL    Bilirubin NEGATIVE  NEG      Blood LARGE (A) NEG      Urobilinogen 0.2 0.2 - 1.0 EU/dL    Nitrites NEGATIVE  NEG      Leukocyte Esterase LARGE (A) NEG     URINE MICROSCOPIC ONLY    Collection Time: 01/16/20  8:55 AM   Result Value Ref Range    WBC 11 to 20 0 - 5 /hpf    RBC 11 to 20 0 - 5 /hpf    Epithelial cells FEW 0 - 5 /lpf    Bacteria 1+ (A) NEG /hpf    WBC cast 0 to 3 NEG /lpf   CBC WITH AUTOMATED DIFF    Collection Time: 01/16/20  9:20 AM   Result Value Ref Range    WBC 11.0 4.6 - 13.2 K/uL    RBC 4.48 4.20 - 5.30 M/uL    HGB 12.6 12.0 - 16.0 g/dL    HCT 38.6 35.0 - 45.0 %    MCV 86.2 74.0 - 97.0 FL    MCH 28.1 24.0 - 34.0 PG    MCHC 32.6 31.0 - 37.0 g/dL    RDW 14.0 11.6 - 14.5 %    PLATELET 133 388 - 219 K/uL    MPV 9.4 9.2 - 11.8 FL    NEUTROPHILS 81 (H) 40 - 73 %    LYMPHOCYTES 9 (L) 21 - 52 %    MONOCYTES 8 3 - 10 %    EOSINOPHILS 2 0 - 5 %    BASOPHILS 0 0 - 2 %    ABS. NEUTROPHILS 9.0 (H) 1.8 - 8.0 K/UL    ABS. LYMPHOCYTES 1.0 0.9 - 3.6 K/UL    ABS. MONOCYTES 0.8 0.05 - 1.2 K/UL    ABS. EOSINOPHILS 0.2 0.0 - 0.4 K/UL    ABS. BASOPHILS 0.0 0.0 - 0.1 K/UL    DF AUTOMATED     POC CHEM8    Collection Time: 01/16/20  9:28 AM   Result Value Ref Range    CO2, POC 29 (H) 19 - 24 MMOL/L    Glucose,  (H) 74 - 106 MG/DL    BUN, POC 17 7 - 18 MG/DL    Creatinine, POC 1.1 0.6 - 1.3 MG/DL    GFRAA, POC 58 (L) >60 ml/min/1.73m2    GFRNA, POC 48 (L) >60 ml/min/1.73m2    Sodium,  (L) 136 - 145 MMOL/L    Potassium, POC 4.7 3.5 - 5.5 MMOL/L    Calcium, ionized (POC) 1.13 1.12 - 1.32 mmol/L    Chloride, POC 94 (L) 100 - 108 MMOL/L    Anion gap, POC 15 10 - 20      Hematocrit, POC 38 36 - 49 %    Hemoglobin, POC 12.9 12 - 16 G/DL       Radiologic Studies -   CT ABD PELV WO CONT   Final Result   IMPRESSION:      1. Interval retraction of the right-sided percutaneous nephrostomy catheter,   with formed loop now present within the lower pole calyx. Unchanged dilatation   of the upper and mid pole calyces with chronic unchanged pelviectasis. 2. Additional chronic unchanged findings as above. CT Results  (Last 48 hours)               01/16/20 1105  CT ABD PELV WO CONT Final result    Impression:  IMPRESSION:       1. Interval retraction of the right-sided percutaneous nephrostomy catheter,   with formed loop now present within the lower pole calyx. Unchanged dilatation   of the upper and mid pole calyces with chronic unchanged pelviectasis. 2. Additional chronic unchanged findings as above.        Narrative:  EXAM: CT of the abdomen and pelvis       INDICATION: Right-sided flank pain, recent prior nephrostomy tube exchange on   6/20/2020. COMPARISON: CT performed 4/11/2019       TECHNIQUE: Axial CT imaging of the abdomen and pelvis was performed without oral   or intravenous contrast. Multiplanar reformats were generated. One or more dose reduction techniques were used on this CT: automated exposure   control, adjustment of the mAs and/or kVp according to patient size, and   iterative reconstruction techniques. The specific techniques used on this CT   exam have been documented in the patient's electronic medical record. Digital   Imaging and Communications in Medicine (DICOM) format image data are available   to nonaffiliated external healthcare facilities or entities on a secure, media   free, reciprocally searchable basis with patient authorization for at least a   12-month period after this study. _______________       FINDINGS:       LOWER CHEST: Trace left pleural effusion and left basilar atelectasis. Lung   bases is clear. Cardiac size. LIVER, BILIARY: Unenhanced appearance of the liver demonstrates no focal   abnormality. No biliary dilation. Cholecystectomy. PANCREAS: Normal unenhanced appearance. SPLEEN: Normal.       ADRENALS: Normal.       KIDNEYS/URETERS/BLADDER: Left kidney is surgically absent. Right-sided   percutaneous nephrostomy catheter is retracted in the interval from placement   imaging on 6/20/2020, with formed loop present within the lower pole calyx. Unchanged ureteropelvic dilatation and mid/upper pole hydronephrosis. Coil   obliteration of the right ureter noted, as previously. Potential cystectomy,   with no discrete urinary bladder demonstrated. PELVIC ORGANS: Uterus is surgically absent. VASCULATURE: Advanced and diffuse aortobiiliac atherosclerotic vascular   calcification and visceral arterial atherosclerosis as previously. LYMPH NODES: No enlarged lymph nodes. GASTROINTESTINAL TRACT: No bowel dilation or wall thickening. Left lower   quadrant ostomy with small parastomal hernia, unchanged. No morphology of bowel   obstruction. No free intraperitoneal gas. BONES: No acute or aggressive osseous abnormalities identified. OTHER: None.       _______________               CXR Results  (Last 48 hours)    None          Medications given in the ED-  Medications   acetaminophen (TYLENOL) tablet 1,000 mg (1,000 mg Oral Given 1/16/20 1027)         Medical Decision Making   I am the first provider for this patient. I reviewed the vital signs, available nursing notes, past medical history, past surgical history, family history and social history. Vital Signs-Reviewed the patient's vital signs. Pulse Oximetry Analysis - 100% on room air     Cardiac Monitor:  Rate: 70 bpm  Rhythm: sinus    Records Reviewed: Nursing Notes    Procedures:  Procedures    ED Course: The right nephrostomy tube was flushed with 5 cc of sterile saline, urine was able to be drawn from the tube more easily after flushing, there is noticeable sediment. Will obtain UA, CBC, kidney function as well as CT scan of the abdomen pelvis without contrast to assess for possible hydronephrosis    Laboratory findings unremarkable, UA appears improved from prior, CT scan showed no significant changes from prior. Patient is stable at this time. The nephrostomy tube is flowing adequately with no clogging at this time. I recommend the patient follow up with her urologist and nephrologist for long-term management of medical problems otherwise come back to the emergency department if symptoms worsen. She understands and agrees to plan    Diagnosis and Disposition       DISCHARGE NOTE:    Akosua Riff  results have been reviewed with her. She has been counseled regarding her diagnosis, treatment, and plan. She verbally conveys understanding and agreement of the signs, symptoms, diagnosis, treatment and prognosis and additionally agrees to follow up as discussed. She also agrees with the care-plan and conveys that all of her questions have been answered. I have also provided discharge instructions for her that include: educational information regarding their diagnosis and treatment, and list of reasons why they would want to return to the ED prior to their follow-up appointment, should her condition change. She has been provided with education for proper emergency department utilization. CLINICAL IMPRESSION:    1. Obstructed nephrostomy tube ([address]    2. Right flank pain        PLAN:  1. D/C Home  2. Discharge Medication List as of 1/16/2020 12:10 PM        3. Follow-up Information     Follow up With Specialties Details Why Cristian Dudley MD Urology Schedule an appointment as soon as possible for a visit   [address] [place] [address]  [phone]      [facility] EMERGENCY DEPT Emergency Medicine Go to  If symptoms worsen [address] [address]  [phone]        _______________________________      Please note that this dictation was completed with [employer], the computer voice recognition software. Quite often unanticipated grammatical, syntax, homophones, and other interpretive errors are inadvertently transcribed by the computer software. Please disregard these errors. Please excuse any errors that have escaped final proofreading.

## 2020-01-16 NOTE — ED NOTES
Pt resting on stretcher. Side rails up x 2. Call bell instructions given to pt and call bell palced in pt hand. Pt is visually impaired. Pt reports improvement in pain post medication. States that she continues to have a soreness to R LQ and that the area feels hard. Upon palpation, area does feel very firm. Unknown what normal is for pt. Nephrostomy tube in place to R flank. Approx 175mL straw colored, clear urine noted in bag. Pt reports improvement in pressure sensation since MD flushed nephrostomy tube. Awaiting CT results. Central monitor in plcae with NSR noted.

## 2020-01-16 NOTE — ED NOTES
Patient arrived with 50 cc urine output from RIGHT sided nephrostomy bag. 5 cc sterile NS flushed via Nguyễn Dewitt MD and had 5 cc urine pulled from tubing, bag emptied to monitor output during ED visit. Will continue to monitor.

## 2020-01-20 ENCOUNTER — HOSPITAL ENCOUNTER (OUTPATIENT)
Dept: LAB | Age: 80
Discharge: HOME | End: 2020-01-20
Payer: MEDICARE

## 2020-01-20 LAB
APPEARANCE UR: ABNORMAL
BACTERIA URNS QL MICRO: ABNORMAL /HPF
BILIRUB UR QL: NEGATIVE
COLOR UR: YELLOW
GLUCOSE UR STRIP.AUTO-MCNC: NEGATIVE MG/DL
HGB UR QL STRIP: ABNORMAL
KETONES UR QL STRIP.AUTO: NEGATIVE MG/DL
LEUKOCYTE ESTERASE UR QL STRIP.AUTO: ABNORMAL
MUCOUS THREADS URNS QL MICRO: ABNORMAL /LPF
NITRITE UR QL STRIP.AUTO: POSITIVE
PH UR STRIP: 8 [PH] (ref 5–8)
PROT UR STRIP-MCNC: 30 MG/DL
RBC #/AREA URNS HPF: ABNORMAL /HPF (ref 0–5)
SP GR UR REFRACTOMETRY: 1.01 (ref 1–1.03)
UROBILINOGEN UR QL STRIP.AUTO: 0.2 EU/DL (ref 0.2–1)
WBC URNS QL MICRO: ABNORMAL /HPF (ref 0–5)

## 2020-01-20 PROCEDURE — 87186 SC STD MICRODIL/AGAR DIL: CPT

## 2020-01-20 PROCEDURE — 81001 URINALYSIS AUTO W/SCOPE: CPT

## 2020-01-20 PROCEDURE — 87086 URINE CULTURE/COLONY COUNT: CPT

## 2020-01-20 PROCEDURE — 87077 CULTURE AEROBIC IDENTIFY: CPT

## 2020-01-23 LAB
BACTERIA SPEC CULT: ABNORMAL
BACTERIA SPEC CULT: ABNORMAL
SERVICE CMNT-IMP: ABNORMAL

## 2020-01-24 ENCOUNTER — HOSPITAL ENCOUNTER (OUTPATIENT)
Dept: LAB | Age: 80
Discharge: HOME | End: 2020-01-24
Payer: MEDICARE

## 2020-01-24 LAB
T4 FREE SERPL-MCNC: 1.5 NG/DL (ref 0.7–1.5)
TSH SERPL DL<=0.05 MIU/L-ACNC: 3.4 UIU/ML (ref 0.36–3.74)

## 2020-01-24 PROCEDURE — 36415 COLL VENOUS BLD VENIPUNCTURE: CPT

## 2020-01-24 PROCEDURE — 84443 ASSAY THYROID STIM HORMONE: CPT

## 2020-01-24 PROCEDURE — 84439 ASSAY OF FREE THYROXINE: CPT

## 2020-02-14 ENCOUNTER — HOSPITAL ENCOUNTER (OUTPATIENT)
Dept: LAB | Age: 80
Discharge: HOME | End: 2020-02-14
Payer: MEDICARE

## 2020-02-14 PROCEDURE — 85025 COMPLETE CBC W/AUTO DIFF WBC: CPT

## 2020-02-15 ENCOUNTER — HOSPITAL ENCOUNTER (OUTPATIENT)
Dept: LAB | Age: 80
Discharge: HOME | End: 2020-02-15
Payer: MEDICARE

## 2020-02-15 LAB
BASOPHILS # BLD: 0 K/UL (ref 0–0.1)
BASOPHILS NFR BLD: 0 % (ref 0–2)
DIFFERENTIAL METHOD BLD: ABNORMAL
EOSINOPHIL # BLD: 0.3 K/UL (ref 0–0.4)
EOSINOPHIL NFR BLD: 4 % (ref 0–5)
ERYTHROCYTE [DISTWIDTH] IN BLOOD BY AUTOMATED COUNT: 14.7 % (ref 11.6–14.5)
HCT VFR BLD AUTO: 35.4 % (ref 35–45)
HGB BLD-MCNC: 11.4 G/DL (ref 12–16)
LYMPHOCYTES # BLD: 1.2 K/UL (ref 0.9–3.6)
LYMPHOCYTES NFR BLD: 18 % (ref 21–52)
MCH RBC QN AUTO: 28 PG (ref 24–34)
MCHC RBC AUTO-ENTMCNC: 32.2 G/DL (ref 31–37)
MCV RBC AUTO: 87 FL (ref 74–97)
MONOCYTES # BLD: 0.7 K/UL (ref 0.05–1.2)
MONOCYTES NFR BLD: 10 % (ref 3–10)
NEUTS SEG # BLD: 4.6 K/UL (ref 1.8–8)
NEUTS SEG NFR BLD: 68 % (ref 40–73)
PLATELET # BLD AUTO: 284 K/UL (ref 135–420)
PMV BLD AUTO: 9.9 FL (ref 9.2–11.8)
RBC # BLD AUTO: 4.07 M/UL (ref 4.2–5.3)
WBC # BLD AUTO: 6.8 K/UL (ref 4.6–13.2)

## 2020-02-15 PROCEDURE — 85025 COMPLETE CBC W/AUTO DIFF WBC: CPT

## 2020-02-18 ENCOUNTER — HOSPITAL ENCOUNTER (OUTPATIENT)
Dept: VASCULAR SURGERY | Age: 80
Discharge: HOME | End: 2020-02-18
Attending: NURSE PRACTITIONER
Payer: MEDICARE

## 2020-02-18 DIAGNOSIS — I83.813 VARICOSE VEINS OF BOTH LOWER EXTREMITIES WITH PAIN: ICD-10-CM

## 2020-02-18 PROCEDURE — 93971 EXTREMITY STUDY: CPT

## 2020-02-24 ENCOUNTER — HOSPITAL ENCOUNTER (OUTPATIENT)
Dept: INTERVENTIONAL RADIOLOGY/VASCULAR | Age: 80
Discharge: HOME | End: 2020-02-24
Attending: UROLOGY | Admitting: UROLOGY
Payer: MEDICARE

## 2020-02-24 DIAGNOSIS — N13.30 HYDRONEPHROSIS: ICD-10-CM

## 2020-02-24 PROCEDURE — 74011636320 HC RX REV CODE- 636/320: Performed by: UROLOGY

## 2020-02-24 PROCEDURE — 50435 EXCHANGE NEPHROSTOMY CATH: CPT

## 2020-02-24 PROCEDURE — 74011250636 HC RX REV CODE- 250/636: Performed by: UROLOGY

## 2020-02-24 RX ORDER — SODIUM CHLORIDE 9 MG/ML
500 INJECTION, SOLUTION INTRAVENOUS
Status: COMPLETED | OUTPATIENT
Start: 2020-02-24 | End: 2020-02-24

## 2020-02-24 RX ADMIN — SODIUM CHLORIDE 500 ML: 900 INJECTION, SOLUTION INTRAVENOUS at 09:23

## 2020-02-24 RX ADMIN — IOPAMIDOL 7 ML: 612 INJECTION, SOLUTION INTRAVENOUS at 09:24

## 2020-03-24 ENCOUNTER — HOSPITAL ENCOUNTER (OUTPATIENT)
Dept: LAB | Age: 80
Discharge: HOME | End: 2020-03-24
Payer: MEDICARE

## 2020-03-24 LAB
APPEARANCE UR: CLEAR
BACTERIA URNS QL MICRO: ABNORMAL /HPF
BILIRUB UR QL: NEGATIVE
COLOR UR: YELLOW
EPITH CASTS URNS QL MICRO: ABNORMAL /LPF (ref 0–5)
GLUCOSE UR STRIP.AUTO-MCNC: 250 MG/DL
HGB UR QL STRIP: ABNORMAL
KETONES UR QL STRIP.AUTO: NEGATIVE MG/DL
LEUKOCYTE ESTERASE UR QL STRIP.AUTO: ABNORMAL
NITRITE UR QL STRIP.AUTO: POSITIVE
PH UR STRIP: 8.5 [PH] (ref 5–8)
PROT UR STRIP-MCNC: 30 MG/DL
RBC #/AREA URNS HPF: ABNORMAL /HPF (ref 0–5)
SP GR UR REFRACTOMETRY: 1.01 (ref 1–1.03)
UROBILINOGEN UR QL STRIP.AUTO: 0.2 EU/DL (ref 0.2–1)
WBC URNS QL MICRO: ABNORMAL /HPF (ref 0–5)

## 2020-03-24 PROCEDURE — 87086 URINE CULTURE/COLONY COUNT: CPT

## 2020-03-24 PROCEDURE — 87077 CULTURE AEROBIC IDENTIFY: CPT

## 2020-03-24 PROCEDURE — 81001 URINALYSIS AUTO W/SCOPE: CPT

## 2020-03-24 PROCEDURE — 87186 SC STD MICRODIL/AGAR DIL: CPT

## 2020-03-26 NOTE — PROGRESS NOTES
Spoke w/pt's son Mary Alcantara who requested 4/13/20 for neph tube exchange. Spoke with Krish Sanon @ [facility] who is aware to plan for pt arrival to [facility] radiology waiting room on 4/13/20 at 0830.

## 2020-03-27 LAB
BACTERIA SPEC CULT: ABNORMAL
CC UR VC: ABNORMAL
SERVICE CMNT-IMP: ABNORMAL

## 2020-04-13 ENCOUNTER — HOSPITAL ENCOUNTER (OUTPATIENT)
Dept: INTERVENTIONAL RADIOLOGY/VASCULAR | Age: 80
Discharge: HOME | End: 2020-04-13
Attending: UROLOGY | Admitting: UROLOGY
Payer: MEDICARE

## 2020-04-13 DIAGNOSIS — N13.30 HYDRONEPHROSIS: ICD-10-CM

## 2020-04-13 PROCEDURE — 74011636320 HC RX REV CODE- 636/320: Performed by: UROLOGY

## 2020-04-13 PROCEDURE — 50435 EXCHANGE NEPHROSTOMY CATH: CPT

## 2020-04-13 PROCEDURE — 74011250636 HC RX REV CODE- 250/636: Performed by: UROLOGY

## 2020-04-13 RX ORDER — SODIUM CHLORIDE 9 MG/ML
500 INJECTION, SOLUTION INTRAVENOUS
Status: COMPLETED | OUTPATIENT
Start: 2020-04-13 | End: 2020-04-13

## 2020-04-13 RX ADMIN — IOPAMIDOL 10 ML: 612 INJECTION, SOLUTION INTRAVENOUS at 09:23

## 2020-04-13 RX ADMIN — SODIUM CHLORIDE 500 ML: 900 INJECTION, SOLUTION INTRAVENOUS at 09:23

## 2020-04-13 NOTE — PROGRESS NOTES
Call to [facility] and spole with Faustino Burgess LPN for Maribell Mora and informed her of colostomy leaking. We placed a depends over for transport as we don't have correct supplies. Transport aware. Spoke with Damari, he is aware of issue and also transport concerns.

## 2020-06-01 ENCOUNTER — HOSPITAL ENCOUNTER (OUTPATIENT)
Dept: INTERVENTIONAL RADIOLOGY/VASCULAR | Age: 80
Discharge: HOME | End: 2020-06-01
Attending: UROLOGY
Payer: MEDICARE

## 2020-06-01 DIAGNOSIS — N13.30 HYDRONEPHROSIS: ICD-10-CM

## 2020-06-01 PROCEDURE — 50435 EXCHANGE NEPHROSTOMY CATH: CPT

## 2020-06-01 PROCEDURE — 74011636320 HC RX REV CODE- 636/320: Performed by: RADIOLOGY

## 2020-06-01 PROCEDURE — 74011250636 HC RX REV CODE- 250/636: Performed by: RADIOLOGY

## 2020-06-01 RX ORDER — SODIUM CHLORIDE 9 MG/ML
500 INJECTION, SOLUTION INTRAVENOUS
Status: COMPLETED | OUTPATIENT
Start: 2020-06-01 | End: 2020-06-01

## 2020-06-01 RX ADMIN — IOPAMIDOL 10 ML: 612 INJECTION, SOLUTION INTRAVENOUS at 09:07

## 2020-06-01 RX ADMIN — SODIUM CHLORIDE 500 ML: 900 INJECTION, SOLUTION INTRAVENOUS at 09:07

## 2020-06-19 ENCOUNTER — HOSPITAL ENCOUNTER (INPATIENT)
Age: 80
LOS: 7 days | Discharge: NURSING, REHAB OR LONG-TERM CARE | DRG: 698 | End: 2020-06-26
Attending: EMERGENCY MEDICINE | Admitting: EMERGENCY MEDICINE
Payer: MEDICARE

## 2020-06-19 ENCOUNTER — APPOINTMENT (OUTPATIENT)
Dept: ULTRASOUND IMAGING | Age: 80
DRG: 698 | End: 2020-06-19
Attending: RADIOLOGY
Payer: MEDICARE

## 2020-06-19 ENCOUNTER — APPOINTMENT (OUTPATIENT)
Dept: INTERVENTIONAL RADIOLOGY/VASCULAR | Age: 80
DRG: 698 | End: 2020-06-19
Attending: EMERGENCY MEDICINE
Payer: MEDICARE

## 2020-06-19 DIAGNOSIS — T83.022A NEPHROSTOMY TUBE DISPLACED (HCC): Primary | ICD-10-CM

## 2020-06-19 DIAGNOSIS — T83.022A DISPLACEMENT OF NEPHROSTOMY TUBE (HCC): ICD-10-CM

## 2020-06-19 DIAGNOSIS — I33.0 BACTERIAL ENDOCARDITIS: ICD-10-CM

## 2020-06-19 PROBLEM — N99.528 NEPHROSTOMY COMPLICATION (HCC): Status: ACTIVE | Noted: 2020-06-19

## 2020-06-19 LAB
ANION GAP SERPL CALC-SCNC: 7 MMOL/L (ref 3–18)
APTT PPP: 48.5 SEC (ref 23–36.4)
BASOPHILS # BLD: 0 K/UL (ref 0–0.1)
BASOPHILS NFR BLD: 0 % (ref 0–2)
BUN SERPL-MCNC: 32 MG/DL (ref 7–18)
BUN/CREAT SERPL: 16 (ref 12–20)
CALCIUM SERPL-MCNC: 8.4 MG/DL (ref 8.5–10.1)
CHLORIDE SERPL-SCNC: 101 MMOL/L (ref 100–111)
CO2 SERPL-SCNC: 27 MMOL/L (ref 21–32)
CREAT SERPL-MCNC: 1.98 MG/DL (ref 0.6–1.3)
DIFFERENTIAL METHOD BLD: ABNORMAL
EOSINOPHIL # BLD: 0 K/UL (ref 0–0.4)
EOSINOPHIL NFR BLD: 0 % (ref 0–5)
ERYTHROCYTE [DISTWIDTH] IN BLOOD BY AUTOMATED COUNT: 13.8 % (ref 11.6–14.5)
EST. AVERAGE GLUCOSE BLD GHB EST-MCNC: 189 MG/DL
GLUCOSE BLD STRIP.AUTO-MCNC: 218 MG/DL (ref 70–110)
GLUCOSE BLD STRIP.AUTO-MCNC: 222 MG/DL (ref 70–110)
GLUCOSE BLD STRIP.AUTO-MCNC: 258 MG/DL (ref 70–110)
GLUCOSE SERPL-MCNC: 205 MG/DL (ref 74–99)
HBA1C MFR BLD: 8.2 % (ref 4.2–5.6)
HCT VFR BLD AUTO: 36.9 % (ref 35–45)
HGB BLD-MCNC: 11.9 G/DL (ref 12–16)
INR PPP: 1.8 (ref 0.8–1.2)
LYMPHOCYTES # BLD: 0.8 K/UL (ref 0.9–3.6)
LYMPHOCYTES NFR BLD: 7 % (ref 21–52)
MCH RBC QN AUTO: 28.9 PG (ref 24–34)
MCHC RBC AUTO-ENTMCNC: 32.2 G/DL (ref 31–37)
MCV RBC AUTO: 89.6 FL (ref 74–97)
MONOCYTES # BLD: 1.1 K/UL (ref 0.05–1.2)
MONOCYTES NFR BLD: 9 % (ref 3–10)
NEUTS SEG # BLD: 10.7 K/UL (ref 1.8–8)
NEUTS SEG NFR BLD: 84 % (ref 40–73)
PLATELET # BLD AUTO: 322 K/UL (ref 135–420)
PMV BLD AUTO: 9 FL (ref 9.2–11.8)
POTASSIUM SERPL-SCNC: 5.3 MMOL/L (ref 3.5–5.5)
PROTHROMBIN TIME: 21 SEC (ref 11.5–15.2)
RBC # BLD AUTO: 4.12 M/UL (ref 4.2–5.3)
SODIUM SERPL-SCNC: 135 MMOL/L (ref 136–145)
WBC # BLD AUTO: 12.7 K/UL (ref 4.6–13.2)

## 2020-06-19 PROCEDURE — 74011250637 HC RX REV CODE- 250/637: Performed by: FAMILY MEDICINE

## 2020-06-19 PROCEDURE — C1769 GUIDE WIRE: HCPCS

## 2020-06-19 PROCEDURE — 80048 BASIC METABOLIC PNL TOTAL CA: CPT

## 2020-06-19 PROCEDURE — 85730 THROMBOPLASTIN TIME PARTIAL: CPT

## 2020-06-19 PROCEDURE — 74011636320 HC RX REV CODE- 636/320: Performed by: RADIOLOGY

## 2020-06-19 PROCEDURE — 83036 HEMOGLOBIN GLYCOSYLATED A1C: CPT

## 2020-06-19 PROCEDURE — 76775 US EXAM ABDO BACK WALL LIM: CPT

## 2020-06-19 PROCEDURE — 74011636637 HC RX REV CODE- 636/637: Performed by: FAMILY MEDICINE

## 2020-06-19 PROCEDURE — 85610 PROTHROMBIN TIME: CPT

## 2020-06-19 PROCEDURE — 74011000250 HC RX REV CODE- 250: Performed by: RADIOLOGY

## 2020-06-19 PROCEDURE — 85025 COMPLETE CBC W/AUTO DIFF WBC: CPT

## 2020-06-19 PROCEDURE — 74011250636 HC RX REV CODE- 250/636: Performed by: RADIOLOGY

## 2020-06-19 PROCEDURE — 0T25X0Z CHANGE DRAINAGE DEVICE IN KIDNEY, EXTERNAL APPROACH: ICD-10-PCS | Performed by: RADIOLOGY

## 2020-06-19 PROCEDURE — 82962 GLUCOSE BLOOD TEST: CPT

## 2020-06-19 PROCEDURE — 65270000029 HC RM PRIVATE

## 2020-06-19 PROCEDURE — 99285 EMERGENCY DEPT VISIT HI MDM: CPT

## 2020-06-19 RX ORDER — SODIUM CHLORIDE 9 MG/ML
500 INJECTION, SOLUTION INTRAVENOUS
Status: COMPLETED | OUTPATIENT
Start: 2020-06-19 | End: 2020-06-19

## 2020-06-19 RX ORDER — FLUMAZENIL 0.1 MG/ML
0.2 INJECTION INTRAVENOUS
Status: DISCONTINUED | OUTPATIENT
Start: 2020-06-19 | End: 2020-06-19

## 2020-06-19 RX ORDER — SODIUM CHLORIDE 9 MG/ML
25 INJECTION, SOLUTION INTRAVENOUS CONTINUOUS
Status: DISCONTINUED | OUTPATIENT
Start: 2020-06-19 | End: 2020-06-19

## 2020-06-19 RX ORDER — DIPHENHYDRAMINE HCL 25 MG
25 CAPSULE ORAL
Status: CANCELLED | OUTPATIENT
Start: 2020-06-19

## 2020-06-19 RX ORDER — NALOXONE HYDROCHLORIDE 0.4 MG/ML
0.4 INJECTION, SOLUTION INTRAMUSCULAR; INTRAVENOUS; SUBCUTANEOUS AS NEEDED
Status: DISCONTINUED | OUTPATIENT
Start: 2020-06-19 | End: 2020-06-26 | Stop reason: HOSPADM

## 2020-06-19 RX ORDER — INSULIN LISPRO 100 [IU]/ML
INJECTION, SOLUTION INTRAVENOUS; SUBCUTANEOUS
Status: DISCONTINUED | OUTPATIENT
Start: 2020-06-19 | End: 2020-06-26 | Stop reason: HOSPADM

## 2020-06-19 RX ORDER — DEXTROSE MONOHYDRATE 100 MG/ML
125-250 INJECTION, SOLUTION INTRAVENOUS AS NEEDED
Status: DISCONTINUED | OUTPATIENT
Start: 2020-06-19 | End: 2020-06-26 | Stop reason: HOSPADM

## 2020-06-19 RX ORDER — NALOXONE HYDROCHLORIDE 0.4 MG/ML
0.4 INJECTION, SOLUTION INTRAMUSCULAR; INTRAVENOUS; SUBCUTANEOUS AS NEEDED
Status: DISCONTINUED | OUTPATIENT
Start: 2020-06-19 | End: 2020-06-19

## 2020-06-19 RX ORDER — CLINDAMYCIN PHOSPHATE 600 MG/50ML
600 INJECTION, SOLUTION INTRAVENOUS ONCE
Status: DISCONTINUED | OUTPATIENT
Start: 2020-06-19 | End: 2020-06-19

## 2020-06-19 RX ORDER — MIDAZOLAM HYDROCHLORIDE 1 MG/ML
.5-4 INJECTION, SOLUTION INTRAMUSCULAR; INTRAVENOUS
Status: DISCONTINUED | OUTPATIENT
Start: 2020-06-19 | End: 2020-06-19

## 2020-06-19 RX ORDER — OXYCODONE HYDROCHLORIDE 5 MG/1
5 TABLET ORAL
Status: DISCONTINUED | OUTPATIENT
Start: 2020-06-19 | End: 2020-06-26 | Stop reason: HOSPADM

## 2020-06-19 RX ORDER — MAGNESIUM SULFATE 100 %
16 CRYSTALS MISCELLANEOUS AS NEEDED
Status: DISCONTINUED | OUTPATIENT
Start: 2020-06-19 | End: 2020-06-26 | Stop reason: HOSPADM

## 2020-06-19 RX ORDER — LIDOCAINE HYDROCHLORIDE 10 MG/ML
1-20 INJECTION INFILTRATION; PERINEURAL
Status: DISCONTINUED | OUTPATIENT
Start: 2020-06-19 | End: 2020-06-21

## 2020-06-19 RX ORDER — ONDANSETRON 2 MG/ML
4 INJECTION INTRAMUSCULAR; INTRAVENOUS
Status: DISCONTINUED | OUTPATIENT
Start: 2020-06-19 | End: 2020-06-26 | Stop reason: HOSPADM

## 2020-06-19 RX ORDER — ACETAMINOPHEN 325 MG/1
650 TABLET ORAL
Status: DISCONTINUED | OUTPATIENT
Start: 2020-06-19 | End: 2020-06-26 | Stop reason: HOSPADM

## 2020-06-19 RX ORDER — FENTANYL CITRATE 50 UG/ML
25-200 INJECTION, SOLUTION INTRAMUSCULAR; INTRAVENOUS
Status: DISCONTINUED | OUTPATIENT
Start: 2020-06-19 | End: 2020-06-19

## 2020-06-19 RX ADMIN — INSULIN LISPRO 6 UNITS: 100 INJECTION, SOLUTION INTRAVENOUS; SUBCUTANEOUS at 22:46

## 2020-06-19 RX ADMIN — OXYCODONE 5 MG: 5 TABLET ORAL at 18:57

## 2020-06-19 RX ADMIN — IOPAMIDOL 5 ML: 612 INJECTION, SOLUTION INTRAVENOUS at 08:46

## 2020-06-19 RX ADMIN — SODIUM CHLORIDE 500 ML: 900 INJECTION, SOLUTION INTRAVENOUS at 08:26

## 2020-06-19 RX ADMIN — INSULIN LISPRO 4 UNITS: 100 INJECTION, SOLUTION INTRAVENOUS; SUBCUTANEOUS at 17:03

## 2020-06-19 RX ADMIN — LIDOCAINE HYDROCHLORIDE 4 ML: 10 INJECTION, SOLUTION INFILTRATION; PERINEURAL at 08:45

## 2020-06-19 RX ADMIN — OXYCODONE 5 MG: 5 TABLET ORAL at 22:46

## 2020-06-19 NOTE — ED NOTES
Verbal shift exchange report given to  Miquel RN report included the following information ED summary, SBAR, MAR.

## 2020-06-19 NOTE — H&P
History & Physical    Patient: Fe Santa MRN: [id number]  CSN: [id number]    YOB: 1940  Age: [de-identified] y.o. 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 Admitted for nephrostomy tube complication.      Nephrostomy tube complication -  IR is planning to place NEW nephrostomy tube  IR planned to replace tube but track had closed   IR could not PLACE NEW TUBE  today because pt is on Eliquis , last dose yesterday and needs to wait 48 hours, planning to do it tomorrow     Abdominal pain due to nephrostomy tube complication -  Pain mgmt until tube replaced   Hydronephrosis present    ANTONI -  Does not have good vascular access  ED physician placed a RIGHT IJ   Gentle fluid hydration   Expect improvement overnight   Follow BMP     Diabetes Mellitus -  SSI, ADA, FSBG qac and qhs     Hypertension -  Monitor BP     Legally blind -  Supportive care     Hyponatremia -  Very mild, will expect improvement overnight     DVT Prophylaxis -SCDs    GI Prophylaxis -Protonix       Patient Active Problem List   Diagnosis Code    Pyelonephritis N12    S/p nephrectomy Z90.5    Right flank pain R10.9    Malfunction of nephrostomy tube ([address] T83.098A    Nephrostomy tube displaced ([address] T83.022A    UTI (urinary tract infection) N39.0    Elevated WBC count D72.829    Diabetes ([address] E11.9    Legally blind H54.8    Hypertension I10    Thyroid disease E07.9    Elevated serum creatinine R79.89    S/P colectomy Z90.49    Skin lesion of foot L98.9    Tear of skin of buttock S31.801A    Pressure injury of right heel, unstageable (HCC) L89.610    Hypomagnesemia E83.42    Hyperkalemia E87.5    Heel ulcer ([address] L97.409    ANTONI (acute kidney injury) ([address] N17.9    S/P AKA (above knee amputation), right [facility]) Z89.611    Nephrostomy status ([facility] Z93.6    Obstructed nephrostomy tube ([facility] T83.092A    Nephrostomy complication ([facility] E37.354     Estimated length of stay : 1-2 days     CC: abdominal pain        HPI:     Gera Walsh is a [de-identified] y.o. female with pertinent h/o of CA, DM, HTN, thyroid disease, blindness, chronic pain, colostomy, decubitus ulcers, and DVT presenting via EMS to the ED due to her right nephrostomy tube being accidentally removed last night at her nursing facility ([facility]). Pt notes associated pain to her right flank and nausea, but denies vomiting. She does also note unrelated periumbilical abdominal pain, to the area of her colostomy, noting that she had a \"heavy\" bagel yesterday. Pt states that she has been followed for her nephrostomy tube and colostomy over the last 9 years. Pt states that nursing at her facility will not empty her colostomy until she is full. Pt notes a FHx of thyroid disease (mother). She is unsure of paternal family history. Pt does note a personal extensive history of difficult vascular access.  She also notes multiple issues with her tubing in the past.    Past Medical History:   Diagnosis Date    Cancer ([facility]     vaginal and colon cancer    Cancer ([facility]     Chronic pain     arthritis    Diabetes ([facility]     DVT (deep venous thrombosis) (HCC)     Hypertension     Legally blind     Thyroid disease        Past Surgical History:   Procedure Laterality Date    HX ABOVE KNEE AMPUTATION Right     HX COLOSTOMY      HX ORTHOPAEDIC      HX OTHER SURGICAL      kidney drainage tube    HX UROLOGICAL      kidney removal    IR EXCHANGE NEPHRO PERC RT SI  1/28/2019    IR EXCHANGE NEPHRO PERC RT SI  3/21/2019    IR EXCHANGE NEPHRO PERC RT SI  5/13/2019    IR EXCHANGE NEPHRO PERC RT SI  6/27/2019    IR EXCHANGE NEPHRO PERC RT SI  8/16/2019    IR EXCHANGE NEPHRO PERC RT SI  10/3/2019    IR EXCHANGE NEPHRO PERC RT SI  11/18/2019    IR EXCHANGE NEPHRO PERC RT SI  1/6/2020    IR EXCHANGE NEPHRO PERC RT SI  2/24/2020    IR EXCHANGE NEPHRO PERC RT SI  4/13/2020    IR EXCHANGE NEPHRO PERC RT SI  6/1/2020       History reviewed. No pertinent family history. Social History     Socioeconomic History    Marital status:      Spouse name: Not on file    Number of children: Not on file    Years of education: Not on file    Highest education level: Not on file   Tobacco Use    Smoking status: Never Smoker    Smokeless tobacco: Never Used   Substance and Sexual Activity    Alcohol use: No    Drug use: Never   Other Topics Concern       Prior to Admission medications    Medication Sig Start Date End Date Taking? Authorizing Provider   insulin detemir U-100 (LEVEMIR U-100 INSULIN) 100 unit/mL injection 15 Units by SubCUTAneous route nightly. Yes Provider, Historical   gabapentin (NEURONTIN) 300 mg capsule Take 1 Cap by mouth two (2) times a day. 4/15/19  Yes Rashi Song PA-C   insulin regular (NOVOLIN R, HUMULIN R) 100 unit/mL injection by SubCUTAneous route. Yes Provider, Historical   metoprolol succinate (TOPROL-XL) 50 mg XL tablet Take 50 mg by mouth daily. Yes Provider, Historical   insulin aspart U-100 (NOVOLOG FLEXPEN U-100 INSULIN) 100 unit/mL inpn by SubCUTAneous route. Sliding scale   Yes Provider, Historical   levothyroxine (SYNTHROID) 50 mcg tablet Take  by mouth Daily (before breakfast). Yes Provider, Historical   atorvastatin calcium (ATORVASTATIN PO) Take 20 mg by mouth daily. Yes Other, MD Dc   HYDROcodone-acetaminophen (NORCO) 5-325 mg per tablet Take 1 Tab by mouth every six (6) hours as needed for Pain. Provider, Historical   magnesium hydroxide (ROSE MILK OF MAGNESIA) 400 mg/5 mL suspension Take 30 mL by mouth daily as needed for Constipation. Provider, Historical   diphenhydrAMINE (BENADRYL) 25 mg capsule Take 25 mg by mouth every six (6) hours as needed.     Provider, Historical   LORazepam (ATIVAN) 0.5 mg tablet Take 1 Tab by mouth daily as needed for Anxiety. 4/15/19   Margarita Johnson PA-C   cholecalciferol (VITAMIN D3) 1,000 unit tablet Take 1,000 Units by mouth daily. Provider, Historical   ondansetron hcl (ZOFRAN) 4 mg tablet Take 4 mg by mouth every eight (8) hours as needed for Nausea. Other, MD Dc   diphenhydrAMINE (BENADRYL) 25 mg capsule Take 50 mg by mouth every six (6) hours as needed for Itching. Provider, Historical   apixaban (ELIQUIS PO) Take 5 mg by mouth two (2) times a day. Other, MD Dc   pollen extracts (PROSTAT PO) Take  by mouth. Provider, Historical   naloxone (NARCAN) 4 mg/actuation nasal spray Use 1 spray intranasally, then discard. Repeat with new spray every 2 min as needed for opioid overdose symptoms, alternating nostrils. 11/29/18   Lindsey Lake MD   tiZANidine (ZANAFLEX) 2 mg capsule Take 2 mg by mouth two (2) times a day. Provider, Historical   traZODone (DESYREL) 50 mg tablet Take 50 mg by mouth nightly. Provider, Historical   nystatin (MYCOSTATIN) powder Apply 100,000 Units to affected area four (4) times daily. Provider, Historical   camphor-menthol (SARNA) 0.5-0.5 % lotion Apply  to affected area daily. Provider, Historical   docusate sodium (COLACE) 100 mg capsule Take 100 mg by mouth two (2) times a day. Provider, Historical   hydrocortisone (HYTONE) 2.5 % topical cream Apply  to affected area two (2) times a day. use thin layer    Provider, Historical   timolol (TIMOPTIC) 0.5 % ophthalmic solution 1 Drop two (2) times a day. Provider, Historical   brimonidine-timolol (COMBIGAN) 0.2-0.5 % drop ophthalmic solution Administer 1 Drop to both eyes every twelve (12) hours. Provider, Historical   bisacodyl (DULCOLAX, BISACODYL,) 10 mg suppository Insert  into rectum daily as needed. Indications: constipation    Provider, Historical   magnesium hydroxide (MILK OF MAGNESIA CONCENTRATED) 2,400 mg/10 mL susp suspension Take 10 mL by mouth.     Provider, Historical   alum-mag hydroxide-simeth (MYLANTA) 200-200-20 mg/5 mL susp Take 30 mL by mouth four (4) times daily as needed. Provider, Historical   sodium bicarbonate 650 mg tablet Take 650 mg by mouth two (2) times a day. Provider, Historical   acetaminophen (TYLENOL) 650 mg TbER Take 650 mg by mouth every six (6) hours as needed for Pain or Fever. Provider, Historical       Allergies   Allergen Reactions    Morphine Nausea and Vomiting    Pcn [Penicillins] Rash       Review of Systems  Gen: No fever, chills, malaise, weight loss/gain. Heent: No headache, rhinorrhea, epistaxis, ear pain, hearing loss, sinus pain, neck pain/stiffness, sore throat. Heart: No chest pain, palpitations, LEWIS, pnd, or orthopnea. Resp: No cough, hemoptysis, wheezing and shortness of breath. GI: No nausea, vomiting, diarrhea, constipation, melena or hematochezia. : No urinary obstruction, dysuria or hematuria. Derm: No rash, new skin lesion or pruritis. Musc/skeletal: no bone or joint complains. Vasc: No edema, cyanosis or claudication. Endo: No heat/cold intolerance, no polyuria,polydipsia or polyphagia. Neuro: No unilateral weakness, numbness, tingling. No seizures. Heme: No easy bruising or bleeding. Physical Exam:     Physical Exam:  Visit Vitals  /90   Pulse 60   Temp 97.7 °F (36.5 °C)   Resp 17   Ht 5' 2\" (1.575 m)   Wt 78 kg (172 lb)   SpO2 100%   BMI 31.46 kg/m²      O2 Device: Room air    Temp (24hrs), Av.7 °F (36.5 °C), Min:97.6 °F (36.4 °C), Max:97.7 °F (36.5 °C)    No intake/output data recorded. No intake/output data recorded. General:  Awake, cooperative, no distress. Head:  Normocephalic, without obvious abnormality, atraumatic. Eyes:  Conjunctivae/corneas clear, sclera anicteric, PERRL, EOMs intact. Nose: Nares normal. No drainage or sinus tenderness. Throat: Lips, mucosa, and tongue normal.    Neck: Supple, symmetrical, trachea midline, no adenopathy. Lungs:   Clear to auscultation bilaterally. Heart:  Regular rate and rhythm, S1, S2 normal, no murmur, click, rub or gallop. Abdomen: Soft, non-tender. Bowel sounds normal. No masses,  No organomegaly. Extremities: Extremities normal, atraumatic, no cyanosis or edema. Capillary refill normal.   Pulses: 2+ and symmetric all extremities. Skin: Skin color pink/pale/mottled/flushed, turgor normal. No rashes or lesions   Neurologic: CNII-XII intact. No focal motor or sensory deficit.        Labs Reviewed:  Recent Results (from the past 24 hour(s))   GLUCOSE, POC    Collection Time: 06/19/20  6:22 AM   Result Value Ref Range    Glucose (POC) 222 (H) 70 - 110 mg/dL       Procedures/imaging: see electronic medical records for all procedures/Xrays and details which were not copied into this note but were reviewed prior to creation of Plan        CC: Bsi, Not On File

## 2020-06-19 NOTE — ED NOTES
Pt arrives back from IR w/ no access, per Ishaan Torrez RN pt w/ no access for PICC line placement or any IV access after total US to find access, suggestion for vascular surgery for access. MD made aware. Pt resting comfortably in stretcher, states \"I feel much better than I did before. \" Pt in NAD, lights dimmed, VSS on monitor.

## 2020-06-19 NOTE — ED NOTES
Spoke w/ Debra Josue, RN Supervisor about patient being admitted without vascular access, but will have plan for today for patient to have procedure for vascular access prior to transport to floor. Dr [employer] consulting with IR at this time.

## 2020-06-19 NOTE — PROGRESS NOTES
Reason for Admission:     nephrostomy tube was accidently removed                        RUR Score:                  PCP: First and Last name:     Name of Practice:    Are you a current patient: Yes/No:    Approximate date of last visit:    Can you participate in a virtual visit if needed:     Do you (patient/family) have any concerns for transition/discharge? Plan for utilizing home health:   N/a    Current Advanced Directive/Advance Care Plan: On file            Transition of Care Plan:  Return to [facility]        Chart reviewed. Per physician documentation Lynn Kathleen is a [de-identified] y.o. female with pertinent PMHx of CA, DM, HTN, thyroid disease, blindness, chronic pain, colostomy, decubitus ulcers, and DVT presenting via EMS to the ED due to her right nephrostomy tube being accidentally removed last night at her nursing facility ([facility]). Pt notes associated pain to her right flank and nausea, but denies vomiting. She does also note unrelated periumbilical abdominal pain, to the area of her colostomy, noting that she had a \"heavy\" bagel yesterday. Pt states that she has been followed for her nephrostomy tube and colostomy over the last 9 years. Pt states that nursing at her facility will not empty her colostomy until she is full. Pt notes a FHx of thyroid disease (mother). She is unsure of paternal family history. Pt does note a personal extensive history of difficult vascular access. She also notes multiple issues with her tubing in the past.\"    Pt is a LTC resident at [facility]. Pt would have to have a COVID test to assist with facilitating his return. CM has been notified [facility] would be able to accept pt back over the weekend, pending COVID test.  Pt would also have to arrive before noon on Saturday or Sunday. Referral has been sent for review.   CM to continue to follow and assist.      Care Management Interventions  Mode of Transport at Discharge: BLS  Transition of Care Consult (CM Consult): Discharge Planning(From [address] [place] [address] ([facility]))  Current Support Network: Nursing Facility([facility])  The Plan for Transition of Care is Related to the Following Treatment Goals : Return to [address] [place] [address]  Discharge Location  [facility]

## 2020-06-19 NOTE — PROGRESS NOTES
Spoke with Chen Person and gave Miquel RN report on patient. Unable to find IV access. Received medication list and information from N-last Eliquis given on 6/18/2020 @7512. Dr. Janene Oneal has been informed.

## 2020-06-19 NOTE — ED PROVIDER NOTES
EMERGENCY DEPARTMENT HISTORY AND PHYSICAL EXAM    Date: 6/19/2020  Patient Name: Jose Manuel    History of Presenting Illness     Chief Complaint   Patient presents with    Other     nephrostomy tube was accidently removed         History Provided By: Patient    Additional History (Context):     5:31 AM    Jose Manuel is a [de-identified] y.o. female with pertinent PMHx of CA, DM, HTN, thyroid disease, blindness, chronic pain, colostomy, decubitus ulcers, and DVT presenting via EMS to the ED due to her right nephrostomy tube being accidentally removed last night at her nursing facility ([address] [place] [address]). Pt notes associated pain to her right flank and nausea, but denies vomiting. She does also note unrelated periumbilical abdominal pain, to the area of her colostomy, noting that she had a \"heavy\" bagel yesterday. Pt states that she has been followed for her nephrostomy tube and colostomy over the last 9 years. Pt states that nursing at her facility will not empty her colostomy until she is full. Pt notes a FHx of thyroid disease (mother). She is unsure of paternal family history. Pt does note a personal extensive history of difficult vascular access. She also notes multiple issues with her tubing in the past.    PCP: Leticia, Not On File  Pt does not smoke tobacco, drink EtOH excessively, or do illicit drugs. There are no other complaints, changes, or physical findings at this time.      Current Facility-Administered Medications   Medication Dose Route Frequency Provider Last Rate Last Dose    lidocaine (XYLOCAINE) 10 mg/mL (1 %) injection 1-20 mL  1-20 mL SubCUTAneous RAD CONTINUOUS Gavi Christina MD   Stopped at 06/19/20 0944       Past History     Past Medical History:  Past Medical History:   Diagnosis Date    Cancer ([address]     vaginal and colon cancer    Cancer [facility]     Chronic pain     arthritis    Diabetes ([facility]     DVT (deep venous thrombosis) (HCC)     Hypertension     Legally blind     Thyroid disease Past Surgical History:  Past Surgical History:   Procedure Laterality Date    HX ABOVE KNEE AMPUTATION Right     HX COLOSTOMY      HX ORTHOPAEDIC      HX OTHER SURGICAL      kidney drainage tube    HX UROLOGICAL      kidney removal    IR EXCHANGE NEPHRO PERC RT SI  1/28/2019    IR EXCHANGE NEPHRO PERC RT SI  3/21/2019    IR EXCHANGE NEPHRO PERC RT SI  5/13/2019    IR EXCHANGE NEPHRO PERC RT SI  6/27/2019    IR EXCHANGE NEPHRO PERC RT SI  8/16/2019    IR EXCHANGE NEPHRO PERC RT SI  10/3/2019    IR EXCHANGE NEPHRO PERC RT SI  11/18/2019    IR EXCHANGE NEPHRO PERC RT SI  1/6/2020    IR EXCHANGE NEPHRO PERC RT SI  2/24/2020    IR EXCHANGE NEPHRO PERC RT SI  4/13/2020    IR EXCHANGE NEPHRO PERC RT SI  6/1/2020       Family History:  History reviewed. No pertinent family history. Social History:  Social History     Tobacco Use    Smoking status: Never Smoker    Smokeless tobacco: Never Used   Substance Use Topics    Alcohol use: No    Drug use: Never       Allergies: Allergies   Allergen Reactions    Morphine Nausea and Vomiting    Pcn [Penicillins] Rash         Review of Systems   Review of Systems   Constitutional: Negative for chills and fever. HENT: Negative. Respiratory: Negative. Cardiovascular: Negative. Gastrointestinal: Positive for nausea. Negative for vomiting. Genitourinary: Positive for flank pain (right). All other systems reviewed and are negative. Physical Exam     Vitals:    06/19/20 1200 06/19/20 1230 06/19/20 1302 06/19/20 1439   BP: 136/83 155/68 136/62 149/72   Pulse:   88 79   Resp:   17 15   Temp:   97.6 °F (36.4 °C) 97.5 °F (36.4 °C)   SpO2: 100% 100% 94% 96%   Weight:       Height:         Physical Exam  Vitals signs and nursing note reviewed. Constitutional:       Appearance: She is well-developed. She is not diaphoretic. Comments: Morbidly obese, comfortable appearing, nontoxic   HENT:      Head: Normocephalic and atraumatic.       Right Ear: External ear normal.      Left Ear: External ear normal.      Mouth/Throat:      Pharynx: No oropharyngeal exudate. Eyes:      General: No scleral icterus. Comments: Pupils are unresponsive at 3mm  Mild pallor   Neck:      Musculoskeletal: Normal range of motion and neck supple. Thyroid: No thyromegaly. Vascular: No JVD. Trachea: No tracheal deviation. Cardiovascular:      Rate and Rhythm: Normal rate and regular rhythm. Comments: Distant heart sounds, otherwise unremarkable  Pulmonary:      Effort: Pulmonary effort is normal.      Breath sounds: No stridor. Comments: Faint crackles to bilateral bases, otherwise normal.  Abdominal:      General: Bowel sounds are normal. There is no distension. Palpations: Abdomen is soft. Tenderness: There is no abdominal tenderness. There is no guarding or rebound. Genitourinary:     Comments: We rolled the pt. She has stage 2 sacral decubitus ulcers starting at the central supra-gluteal cleft with small inflamed irritated skin tag roughly 2x1cm triangle. This is tender to touch, but without underlying fluctuance. Prominent malodor to her urine  Musculoskeletal:      Comments: Right lower extremity with AKA  Left foot in extensor tone   Lymphadenopathy:      Cervical: No cervical adenopathy. Skin:     General: Skin is warm and dry. Findings: No erythema or rash. Comments: Left lower extremity with small calcaneal pressure sore mostly unstageable. She has lesions roughly 2.5 x 3.5 cm in size. Irregular shape with apparent deflated vesicle or blister with dried material. No underlying fluid collection. Surgical wounds to abdomen and buttocks are mostly well healed. There is some erythema or minimal tissue breakdown buildup on her skin intertriginous folds of the waistline as well as under both breasts. Neurological:      Mental Status: She is alert and oriented to person, place, and time.       GCS: GCS eye subscore is 4. GCS verbal subscore is 5. GCS motor subscore is 6. Cranial Nerves: No cranial nerve deficit. Coordination: Coordination normal.      Deep Tendon Reflexes: Reflexes are normal and symmetric. Reflexes normal.      Comments: Pupils are unresponsive at 3mm with normal EOM   Psychiatric:         Thought Content: Thought content normal.         Judgment: Judgment normal.      Comments: Anxious affect         Diagnostic Study Results     Labs -     Recent Results (from the past 12 hour(s))   GLUCOSE, POC    Collection Time: 06/19/20  6:22 AM   Result Value Ref Range    Glucose (POC) 222 (H) 70 - 538 mg/dL   METABOLIC PANEL, BASIC    Collection Time: 06/19/20  2:15 PM   Result Value Ref Range    Sodium 135 (L) 136 - 145 mmol/L    Potassium 5.3 3.5 - 5.5 mmol/L    Chloride 101 100 - 111 mmol/L    CO2 27 21 - 32 mmol/L    Anion gap 7 3.0 - 18 mmol/L    Glucose 205 (H) 74 - 99 mg/dL    BUN 32 (H) 7.0 - 18 MG/DL    Creatinine 1.98 (H) 0.6 - 1.3 MG/DL    BUN/Creatinine ratio 16 12 - 20      GFR est AA 29 (L) >60 ml/min/1.73m2    GFR est non-AA 24 (L) >60 ml/min/1.73m2    Calcium 8.4 (L) 8.5 - 10.1 MG/DL   CBC WITH AUTOMATED DIFF    Collection Time: 06/19/20  2:15 PM   Result Value Ref Range    WBC 12.7 4.6 - 13.2 K/uL    RBC 4.12 (L) 4.20 - 5.30 M/uL    HGB 11.9 (L) 12.0 - 16.0 g/dL    HCT 36.9 35.0 - 45.0 %    MCV 89.6 74.0 - 97.0 FL    MCH 28.9 24.0 - 34.0 PG    MCHC 32.2 31.0 - 37.0 g/dL    RDW 13.8 11.6 - 14.5 %    PLATELET 230 984 - 820 K/uL    MPV 9.0 (L) 9.2 - 11.8 FL    NEUTROPHILS 84 (H) 40 - 73 %    LYMPHOCYTES 7 (L) 21 - 52 %    MONOCYTES 9 3 - 10 %    EOSINOPHILS 0 0 - 5 %    BASOPHILS 0 0 - 2 %    ABS. NEUTROPHILS 10.7 (H) 1.8 - 8.0 K/UL    ABS. LYMPHOCYTES 0.8 (L) 0.9 - 3.6 K/UL    ABS. MONOCYTES 1.1 0.05 - 1.2 K/UL    ABS. EOSINOPHILS 0.0 0.0 - 0.4 K/UL    ABS.  BASOPHILS 0.0 0.0 - 0.1 K/UL    DF AUTOMATED     PROTHROMBIN TIME + INR    Collection Time: 06/19/20  2:15 PM   Result Value Ref Range Prothrombin time 21.0 (H) 11.5 - 15.2 sec    INR 1.8 (H) 0.8 - 1.2     PTT    Collection Time: 06/19/20  2:15 PM   Result Value Ref Range    aPTT 48.5 (H) 23.0 - 36.4 SEC       Radiologic Studies -   US KIDNEY RT   Final Result   IMPRESSION:       1. Moderate right renal pelvic caliectasis                IR EXCHANGE NEPHRO PERC RT SI    (Results Pending)         Medical Decision Making   I am the first provider for this patient. I reviewed the vital signs, available nursing notes, past medical history, past surgical history, family history and social history. Vital Signs-Reviewed the patient's vital signs. Pulse Oximetry Analysis - 97% on RA      Records Reviewed: Nursing Notes, Old Medical Records, Previous Radiology Studies and Previous Laboratory Studies    Provider Notes (Medical Decision Making): Long term care is difficult for this pt. Apparently she has had several tubes dislodged. Will need a new tube placed by IR or urology. She has a few decubitus ulcers to sacrum and heel, and would likely benefit from egg crate padding to heel, bed, or variable inflation mattress. She may need new blood work assisted by [employer] [facility] team. Will assess urine for new infection    PROCEDURES:  Procedures    MEDICATIONS GIVEN IN THE ED:  Medications   lidocaine (XYLOCAINE) 10 mg/mL (1 %) injection 1-20 mL (0 mL SubCUTAneous IV Completed 6/19/20 3159)   iopamidoL (ISOVUE 300) 61 % contrast injection 1-50 mL (5 mL IntraVENous Given 6/19/20 9621)   0.9% sodium chloride infusion 500 mL (0 mL Irrigation IV Completed 6/19/20 1317)        ED COURSE:   5:31 AM   Initial assessment performed. 6:15 AM - Paged urology. CONSULT NOTE:  6:16 AM  [employer]. Grant Michaud MD spoke with Peng Winter MD,  Specialty: Urologist  Discussed pt's hx, disposition, and available diagnostic and imaging results. Reviewed care plans. Consultant agrees with our plans to have IR place new tube.  She should not need long term IVs, but may need a quick blood test for coags. We can also culture the urine once obtained. Written by EDSON Vazquez, as dictated by Cheryl Wells MD.     6:40 AM - Spoke with radiology tech who will have interventional radiology nurse respond to us. Rachel Berg MD  Spoke with IR and they were unable to place PICC line due to poor access. Additionally they report nephrostomy line has closed off and has likely been out for greater than 1 day. Because patient is on Eliquis she needs to wait at least 48 hours before a new tract can be made. Additionally she is not have easy access at this point      12:01 PM  Spoke with Dr. Matthew Cox who reports he is able to place tunnel cath in patient, ideal after 24 hours w/o eqliuis    12:02 PM  I spoke with Dr. Rufina Jacinto who accepts patient     2:00 PM  Spoke with Dr. Matthew Cox -does not feel that patient requires permanent access. He reports she does not have frequent emergency room visits and her past visits have all been for replacement of nephrostomy tube. We will place a peripheral IV and patient as she needs labs and IV access for admission. Dr. Matthew Cox plans on new nephrostomy line in the next day or 2 depending on labs, renal function, patient progress        VENOUS ACCESS.     Side and/or site verified, Patient identification confirmed, Sterile procedures observed, Verbal consent obtained, IV therapy indicated for fluid replacement, IV access indicated for medication administration, IV access by physician indicated due to poor venous access, IV access by a physician indicated due to RN unable to obtain access, Venipuncture requiring MD skill, Ultrasound was used to evaluate potential access sites, verify vessel location and patency, and to verify vascular needle entry in real-time, 18Ga long (2.5\") [place] intravenous catheter placed RIJ under real-time HF U/S guidance; dark, non-pulsatile venous blood obtained        Diagnosis and Disposition     Critical Care Time:     Core Measures:  For Hospitalized Patients:    1. Hospitalization Decision Time:  The decision to hospitalize the patient was made by Dr Bjorn Pereyra on 6/19/2020    2. Aspirin: Aspirin was not given because the patient did not present with a stroke at the time of their Emergency Department evaluation    2:53 PM  Patient is being admitted to the hospital by Dr Do Peacock. The results of their tests and reasons for their admission have been discussed with them and/or available family. They convey agreement and understanding for the need to be admitted and for their admission diagnosis. CONDITIONS ON ADMISSION:  Sepsis is not present at the time of admission. Deep Vein Thrombosis is not present at the time of admission. Thrombosis is not present at the time of admission. Urinary Tract Infection is not present at the time of admission. Pneumonia is not present at the time of admission. MRSA is not present at the time of admission. Wound infection is not present at the time of admission. Pressure Ulcer is not present at the time of admission. CLINICAL IMPRESSION:    1.  Nephrostomy tube displaced ([address]

## 2020-06-19 NOTE — ROUTINE PROCESS
TRANSFER - IN REPORT:    Verbal report received from DEBBY SolisRN(name) on Oral Ke  being received from ED(unit) for routine progression of care      Report consisted of patients Situation, Background, Assessment and   Recommendations(SBAR). Information from the following report(s) SBAR, Kardex, Intake/Output and MAR was reviewed with the receiving nurse. Opportunity for questions and clarification was provided. Assessment completed upon patients arrival to unit and care assumed.

## 2020-06-19 NOTE — ED TRIAGE NOTES
Transported via medics from [facility] due to her nephrostomy tube was removed some time through the night.

## 2020-06-19 NOTE — PROGRESS NOTES
Spoke with son, [address] [facility] [place], ED Miquel RN , informed  of findings, new tube including fresh stick, tract is closed. Patient requires labs and IV , moderate sedation. Patient is informed and moved back to bed. Patient had informed me earlier that she had pain and tube was pulled on 2 days ago and also yesterday during turning.

## 2020-06-19 NOTE — PROGRESS NOTES
Received report from Miquel TAVARES, received verbal/phone consent from [address] [place] [address]. Pt brought to IR for procedure, alert and oriented. Rt nephrostomy tube completely out.

## 2020-06-19 NOTE — PROGRESS NOTES
1437-Telephone/Verbal shift change report given to JESUS Ledezma RN (oncoming nurse) by Charlee Stevenson (offgoing nurse). Report included the following information SBAR, Kardex, Intake/Output and MAR.     1453-Patient arrived on the floor. Griffin Schwartz from radiology called to discuss plan of care. Patient to have Nephro tube placed Sunday at 0900. Patient last took Eliquis last night (6/18/2020) must have not taken it for 48 hours,To have INR rechecked Saturday AM. NPO at midnight Saturday. 1722-Patient requesting something for anxiety, Dr. Martha Ramirez. . On unit to assess patient. Spoke with provider about anxiety meds. Bedside and Verbal shift change report given to Abiola Oscar RN (oncoming nurse) by Celia Montoya (offgoing nurse). Report included the following information SBAR, Kardex, Intake/Output and MAR.

## 2020-06-19 NOTE — ROUTINE PROCESS
TRANSFER - OUT REPORT:    Verbal report given to [facility] RN(name) on Luis Escobar  being transferred to (unit) for routine progression of care       Report consisted of patients Situation, Background, Assessment and   Recommendations(SBAR). Information from the following report(s) SBAR, ED Summary, [facility] and Recent Results was reviewed with the receiving nurse. Lines:   Peripheral IV 06/19/20 Right Other(comment) (Active)   Site Assessment Clean, dry, & intact 6/19/2020  2:20 PM   Phlebitis Assessment 0 6/19/2020  2:20 PM   Infiltration Assessment 0 6/19/2020  2:20 PM   Dressing Status Clean, dry, & intact 6/19/2020  2:20 PM   Dressing Type Transparent;Tape 6/19/2020  2:20 PM   Hub Color/Line Status Pink;Patent; Flushed;Positional 6/19/2020  2:20 PM   Action Taken Blood drawn 6/19/2020  2:20 PM   Alcohol Cap Used Yes 6/19/2020  2:20 PM        Opportunity for questions and clarification was provided.       Patient transported with:   [employer]

## 2020-06-19 NOTE — PROCEDURES
Interventional Radiology Brief Procedure Note    Interventional Radiologist: Amalia Massey MD    Pre-operative Diagnosis: Dislodged nephrostomy tube    Post-operative Diagnosis: Same as pre-operative diagnosis    Procedure(s) Performed:  Attempted tube replacement    Anesthesia:  Local     Findings:   -nephrostomy tube completely dislodged.  -no visible track on contrast evaluation  -attempts to catheterize with wire and catheters unsuccessful.  -bedside ultrasound showed hydronephrosis. Complications: None    Estimated Blood Loss:  minimal    Tubes and Drains: None    Specimens: None     Condition: Good    Disposition:   ER for admission. Plan to place new nephrostomy tube. Case discussed with ER.      Amalia Massey MD  [facility]  6/19/2020

## 2020-06-19 NOTE — ED NOTES
Cyn TAVARES called for pt update. Pt needs change of procedure d/t extensive change in status of nephrostomy tube/track. MD made aware.

## 2020-06-19 NOTE — PROGRESS NOTES
Spoke with [place] RN, [facility] and informed her that patient will have neph tube placed on Sunday at 0900 by Dr. Marla Vilchis. Patients INR needs to be 1.5 or lower for procedure. NPO after midnight on Saturday.

## 2020-06-20 LAB
ABO + RH BLD: NORMAL
ANION GAP SERPL CALC-SCNC: 9 MMOL/L (ref 3–18)
BUN SERPL-MCNC: 37 MG/DL (ref 7–18)
BUN/CREAT SERPL: 12 (ref 12–20)
CALCIUM SERPL-MCNC: 8.3 MG/DL (ref 8.5–10.1)
CHLORIDE SERPL-SCNC: 101 MMOL/L (ref 100–111)
CO2 SERPL-SCNC: 23 MMOL/L (ref 21–32)
CREAT SERPL-MCNC: 3.08 MG/DL (ref 0.6–1.3)
ERYTHROCYTE [DISTWIDTH] IN BLOOD BY AUTOMATED COUNT: 14.2 % (ref 11.6–14.5)
GLUCOSE BLD STRIP.AUTO-MCNC: 248 MG/DL (ref 70–110)
GLUCOSE BLD STRIP.AUTO-MCNC: 248 MG/DL (ref 70–110)
GLUCOSE BLD STRIP.AUTO-MCNC: 252 MG/DL (ref 70–110)
GLUCOSE BLD STRIP.AUTO-MCNC: 286 MG/DL (ref 70–110)
GLUCOSE SERPL-MCNC: 210 MG/DL (ref 74–99)
HCT VFR BLD AUTO: 34.2 % (ref 35–45)
HGB BLD-MCNC: 10.9 G/DL (ref 12–16)
INR PPP: 1.9 (ref 0.8–1.2)
INR PPP: 2 (ref 0.8–1.2)
INR PPP: 2.2 (ref 0.8–1.2)
MCH RBC QN AUTO: 28.8 PG (ref 24–34)
MCHC RBC AUTO-ENTMCNC: 31.9 G/DL (ref 31–37)
MCV RBC AUTO: 90.2 FL (ref 74–97)
PLATELET # BLD AUTO: 407 K/UL (ref 135–420)
PMV BLD AUTO: 9.6 FL (ref 9.2–11.8)
POTASSIUM SERPL-SCNC: 5.7 MMOL/L (ref 3.5–5.5)
PROTHROMBIN TIME: 21.4 SEC (ref 11.5–15.2)
PROTHROMBIN TIME: 22.7 SEC (ref 11.5–15.2)
PROTHROMBIN TIME: 24.6 SEC (ref 11.5–15.2)
RBC # BLD AUTO: 3.79 M/UL (ref 4.2–5.3)
SODIUM SERPL-SCNC: 133 MMOL/L (ref 136–145)
WBC # BLD AUTO: 14.7 K/UL (ref 4.6–13.2)

## 2020-06-20 PROCEDURE — 74011636637 HC RX REV CODE- 636/637: Performed by: FAMILY MEDICINE

## 2020-06-20 PROCEDURE — 74011250637 HC RX REV CODE- 250/637: Performed by: FAMILY MEDICINE

## 2020-06-20 PROCEDURE — 87186 SC STD MICRODIL/AGAR DIL: CPT

## 2020-06-20 PROCEDURE — 85027 COMPLETE CBC AUTOMATED: CPT

## 2020-06-20 PROCEDURE — 82962 GLUCOSE BLOOD TEST: CPT

## 2020-06-20 PROCEDURE — 87635 SARS-COV-2 COVID-19 AMP PRB: CPT

## 2020-06-20 PROCEDURE — 74011250636 HC RX REV CODE- 250/636: Performed by: FAMILY MEDICINE

## 2020-06-20 PROCEDURE — 85610 PROTHROMBIN TIME: CPT

## 2020-06-20 PROCEDURE — 80048 BASIC METABOLIC PNL TOTAL CA: CPT

## 2020-06-20 PROCEDURE — 51798 US URINE CAPACITY MEASURE: CPT

## 2020-06-20 PROCEDURE — 65270000029 HC RM PRIVATE

## 2020-06-20 PROCEDURE — 74011000258 HC RX REV CODE- 258: Performed by: FAMILY MEDICINE

## 2020-06-20 PROCEDURE — 87040 BLOOD CULTURE FOR BACTERIA: CPT

## 2020-06-20 PROCEDURE — 86900 BLOOD TYPING SEROLOGIC ABO: CPT

## 2020-06-20 PROCEDURE — 87077 CULTURE AEROBIC IDENTIFY: CPT

## 2020-06-20 PROCEDURE — 36415 COLL VENOUS BLD VENIPUNCTURE: CPT

## 2020-06-20 RX ORDER — SODIUM POLYSTYRENE SULFONATE 15 G/60ML
15 SUSPENSION ORAL; RECTAL
Status: COMPLETED | OUTPATIENT
Start: 2020-06-20 | End: 2020-06-20

## 2020-06-20 RX ADMIN — PHYTONADIONE 10 MG: 10 INJECTION, EMULSION INTRAMUSCULAR; INTRAVENOUS; SUBCUTANEOUS at 13:41

## 2020-06-20 RX ADMIN — SODIUM POLYSTYRENE SULFONATE 15 G: 15 SUSPENSION ORAL; RECTAL at 17:26

## 2020-06-20 RX ADMIN — INSULIN LISPRO 6 UNITS: 100 INJECTION, SOLUTION INTRAVENOUS; SUBCUTANEOUS at 21:54

## 2020-06-20 RX ADMIN — INSULIN LISPRO 4 UNITS: 100 INJECTION, SOLUTION INTRAVENOUS; SUBCUTANEOUS at 12:47

## 2020-06-20 RX ADMIN — ACETAMINOPHEN 650 MG: 325 TABLET ORAL at 21:15

## 2020-06-20 RX ADMIN — INSULIN LISPRO 4 UNITS: 100 INJECTION, SOLUTION INTRAVENOUS; SUBCUTANEOUS at 09:11

## 2020-06-20 RX ADMIN — INSULIN LISPRO 6 UNITS: 100 INJECTION, SOLUTION INTRAVENOUS; SUBCUTANEOUS at 17:25

## 2020-06-20 RX ADMIN — OXYCODONE 5 MG: 5 TABLET ORAL at 10:04

## 2020-06-20 NOTE — PROGRESS NOTES
Assumed pt care from [facility], RN.    1004- Roxicodone PRN for 5/10 pain. Reassessed pain and verbalized relief. Patient was given Vitamin K to help lower INR in preparation for procedure in AM.    Kayexalate given for Potassium  Of 5.7. No other significant episodes during this shift. Visit Vitals  /77   Pulse 96   Temp 100.1 °F (37.8 °C)   Resp 17   Ht 5' 2\" (1.575 m)   Wt 78 kg (172 lb)   SpO2 99%   Breastfeeding No   BMI 31.46 kg/m²     Bedside and Verbal shift change report given to Daniela Goodwin RN (oncoming nurse) by Marie Avalos RN (offgoing nurse). Report included the following information SBAR, Kardex, ED Summary, Procedure Summary, Intake/Output, MAR and Recent Results.

## 2020-06-20 NOTE — PROGRESS NOTES
Assumed pt care from [address] 2246 Roxicodone prn was given for pain score of 5/10, reassessed pain and verbalized relief. No other significant event the entire shift. No nausea and vomiting. Refused VS at midnight d/t pt just wanted to sleep, pt was educated. Visit Vitals  /55 (BP 1 Location: Right arm, BP Patient Position: At rest)   Pulse 96   Temp 98.7 °F (37.1 °C)   Resp 17   Ht 5' 2\" (1.575 m)   Wt 78 kg (172 lb)   SpO2 97%   Breastfeeding No   BMI 31.46 kg/m²     0815 Serum Potassium was 5.7, Dr Nazario Fisher was verbally notified. Bedside shift change report given to Jasmine Damian RN (oncoming nurse) by Chris Young RN (offgoing nurse). Report included the following information SBAR, Kardex, ED Summary, Procedure Summary, MAR, Accordion, Med Rec Status, Alarm Parameters  and Quality Measures.

## 2020-06-20 NOTE — PROGRESS NOTES
Hospitalist Progress Note    Patient: Ofelia Chaidez MRN: [id number]  CSN: [id number]    YOB: 1940  Age: [de-identified] y.o. Sex: female    DOA: 6/19/2020 LOS:  LOS: 1 day          Chief Complaint:      Assessment/Plan   79-year-old female with a history of diabetes, hypertension, legally blind, right AKA, colectomy, obesity, colonic and vaginal cancer resulting in reconstructive surgery.  Admitted for nephrostomy tube complication.      Nephrostomy tube complication -  IR is planning to place NEW nephrostomy tube  IR planned to replace tube but track had closed   IR could not PLACE NEW TUBE  today because pt is on Eliquis, last dose yesterday and needs to wait 48 hours, planning to do it tomorrow      Abdominal pain due to nephrostomy tube complication -  Pain mgmt until tube replaced   Hydronephrosis present     ANTONI -  Worsening renal status  Sodium level 133  Potassium level 5.7  Creatinine level 3.08, yesterday it was 1.98  Gentle fluid hydration   Expect improvement overnight   Follow BMP      Diabetes Mellitus -  SSI, ADA, FSBG qac and qhs      Hypertension -  Monitor BP      Legally blind -  Supportive care      Hyponatremia -  Very mild, will expect improvement overnight      DVT Prophylaxis -SCDs     GI Prophylaxis -Protonix      Disposition :  Patient Active Problem List   Diagnosis Code    Pyelonephritis N12    S/p nephrectomy Z90.5    Right flank pain R10.9    Malfunction of nephrostomy tube ([address] T83.098A    Nephrostomy tube displaced ([address] T83.022A    UTI (urinary tract infection) N39.0    Elevated WBC count D72.829    Diabetes ([address] E11.9    Legally blind H54.8    Hypertension I10    Thyroid disease E07.9    Elevated serum creatinine R79.89    S/P colectomy Z90.49    Skin lesion of foot L98.9    Tear of skin of buttock S31.801A    Pressure injury of right heel, unstageable ([facility]) L89.610    Hypomagnesemia E83.42    Hyperkalemia E87.5    Heel ulcer ([address] L97.409    ANTONI (acute kidney injury) ([facility] N17.9    S/P AKA (above knee amputation), right ([facility] Z89.611    Nephrostomy status ([facility] Z93.6    Obstructed nephrostomy tube ([facility] T83.092A    Nephrostomy complication ([facility] R18.426       Subjective:    Pt has no complaints. Understands that she is getting procedure tomorrow. Review of systems:    Constitutional: denies fevers, chills, myalgias  Respiratory: denies SOB, cough  Cardiovascular: denies chest pain, palpitations  Gastrointestinal: denies nausea, vomiting, diarrhea      Vital signs/Intake and Output:  Visit Vitals  /45   Pulse 99   Temp 99.5 °F (37.5 °C)   Resp 17   Ht 5' 2\" (1.575 m)   Wt 78 kg (172 lb)   SpO2 98%   Breastfeeding No   BMI 31.46 kg/m²     Current Shift:  No intake/output data recorded. Last three shifts:  06/18 1901 - 06/20 0700  In: 120 [P.O.:120]  Out: -     Exam:    General: Well developed, alert, NAD, OX3  Head/Neck: NCAT, supple, No masses, No lymphadenopathy  CVS:Regular rate and rhythm, no M/R/G, S1/S2 heard, no thrill  Lungs:Clear to auscultation bilaterally, no wheezes, rhonchi, or rales  Abdomen: Soft, Nontender, No distention, Normal Bowel sounds, No hepatomegaly  Extremities: No C/C/E, pulses palpable 2+  Skin:normal texture and turgor, no rashes, no lesions  Neuro:grossly normal , follows commands  Psych:appropriate                Labs: Results:       Chemistry Recent Labs     06/20/20  0548 06/19/20  1415   * 205*   * 135*   K 5.7* 5.3    101   CO2 23 27   BUN 37* 32*   CREA 3.08* 1.98*   CA 8.3* 8.4*   AGAP 9 7   BUCR 12 16      CBC w/Diff Recent Labs     06/20/20  0548 06/19/20  1415   WBC 14.7* 12.7   RBC 3.79* 4.12*   HGB 10.9* 11.9*   HCT 34.2* 36.9    322   GRANS  --  84*   LYMPH  --  7*   EOS  --  0      Cardiac Enzymes No results for input(s): CPK, CKND1, JANE in the last 72 hours.     No lab exists for component: CKRMB, TROIP   Coagulation Recent Labs     06/20/20  0548 06/19/20  1415   PTP 22.7* 21.0* INR 2.0* 1.8*   APTT  --  48.5*       Lipid Panel Lab Results   Component Value Date/Time    Cholesterol, total 115 08/28/2019 08:22 AM    HDL Cholesterol 39 (L) 08/28/2019 08:22 AM    LDL, calculated 46.2 08/28/2019 08:22 AM    VLDL, calculated 29.8 08/28/2019 08:22 AM    Triglyceride 149 08/28/2019 08:22 AM    CHOL/HDL Ratio 2.9 08/28/2019 08:22 AM      BNP No results for input(s): BNPP in the last 72 hours. Liver Enzymes No results for input(s): TP, ALB, TBIL, AP in the last 72 hours.     No lab exists for component: SGOT, GPT, DBIL   Thyroid Studies Lab Results   Component Value Date/Time    TSH 3.40 01/24/2020 08:35 AM        Procedures/imaging: see electronic medical records for all procedures/Xrays and details which were not copied into this note but were reviewed prior to creation of Skye Jackson MD

## 2020-06-20 NOTE — PROGRESS NOTES
Chart reviewed as CM on call. Pt admitted to medical by hospitalist. Benjamin Espinosa with Manololizzeth Clemente @ [facility] and she confirms pt is LTC resident of [facility]. Referral sent. Per Devora Bird pt will need covid screen before returning, MD Adam Alvarez made aware and will order. CM will cont to follow for transition of care needs and will be available via hospital  on weekends.

## 2020-06-21 ENCOUNTER — APPOINTMENT (OUTPATIENT)
Dept: INTERVENTIONAL RADIOLOGY/VASCULAR | Age: 80
DRG: 698 | End: 2020-06-21
Attending: FAMILY MEDICINE
Payer: MEDICARE

## 2020-06-21 ENCOUNTER — APPOINTMENT (OUTPATIENT)
Dept: ULTRASOUND IMAGING | Age: 80
DRG: 698 | End: 2020-06-21
Attending: RADIOLOGY
Payer: MEDICARE

## 2020-06-21 PROBLEM — N17.9 ACUTE RENAL FAILURE (ARF) (HCC): Status: ACTIVE | Noted: 2020-06-21

## 2020-06-21 PROBLEM — R79.1 ELEVATED INR: Status: ACTIVE | Noted: 2020-06-21

## 2020-06-21 LAB
ANION GAP SERPL CALC-SCNC: 11 MMOL/L (ref 3–18)
ANION GAP SERPL CALC-SCNC: 8 MMOL/L (ref 3–18)
ARTERIAL PATENCY WRIST A: YES
ATRIAL RATE: 152 BPM
BASE DEFICIT BLD-SCNC: 1 MMOL/L
BDY SITE: NORMAL
BUN SERPL-MCNC: 46 MG/DL (ref 7–18)
BUN SERPL-MCNC: 47 MG/DL (ref 7–18)
BUN/CREAT SERPL: 10 (ref 12–20)
BUN/CREAT SERPL: 10 (ref 12–20)
CALCIUM SERPL-MCNC: 8.5 MG/DL (ref 8.5–10.1)
CALCIUM SERPL-MCNC: 8.6 MG/DL (ref 8.5–10.1)
CALCULATED P AXIS, ECG09: 58 DEGREES
CALCULATED R AXIS, ECG10: 51 DEGREES
CALCULATED T AXIS, ECG11: 88 DEGREES
CHLORIDE SERPL-SCNC: 99 MMOL/L (ref 100–111)
CHLORIDE SERPL-SCNC: 99 MMOL/L (ref 100–111)
CK MB CFR SERPL CALC: ABNORMAL % (ref 0–4)
CK MB SERPL-MCNC: <1 NG/ML (ref 5–25)
CK SERPL-CCNC: 209 U/L (ref 26–192)
CO2 SERPL-SCNC: 23 MMOL/L (ref 21–32)
CO2 SERPL-SCNC: 25 MMOL/L (ref 21–32)
CREAT SERPL-MCNC: 4.48 MG/DL (ref 0.6–1.3)
CREAT SERPL-MCNC: 4.72 MG/DL (ref 0.6–1.3)
DIAGNOSIS, 93000: NORMAL
ERYTHROCYTE [DISTWIDTH] IN BLOOD BY AUTOMATED COUNT: 14.6 % (ref 11.6–14.5)
ERYTHROCYTE [DISTWIDTH] IN BLOOD BY AUTOMATED COUNT: 14.7 % (ref 11.6–14.5)
GAS FLOW.O2 O2 DELIVERY SYS: NORMAL L/MIN
GAS FLOW.O2 SETTING OXYMISER: 5 L/M
GLUCOSE BLD STRIP.AUTO-MCNC: 168 MG/DL (ref 70–110)
GLUCOSE BLD STRIP.AUTO-MCNC: 185 MG/DL (ref 70–110)
GLUCOSE BLD STRIP.AUTO-MCNC: 218 MG/DL (ref 70–110)
GLUCOSE BLD STRIP.AUTO-MCNC: 250 MG/DL (ref 70–110)
GLUCOSE SERPL-MCNC: 218 MG/DL (ref 74–99)
GLUCOSE SERPL-MCNC: 240 MG/DL (ref 74–99)
HCO3 BLD-SCNC: 24.3 MMOL/L (ref 22–26)
HCT VFR BLD AUTO: 35 % (ref 35–45)
HCT VFR BLD AUTO: 35.8 % (ref 35–45)
HGB BLD-MCNC: 11.1 G/DL (ref 12–16)
HGB BLD-MCNC: 11.2 G/DL (ref 12–16)
INR PPP: 1.9 (ref 0.8–1.2)
MAGNESIUM SERPL-MCNC: 1.5 MG/DL (ref 1.6–2.6)
MCH RBC QN AUTO: 28.3 PG (ref 24–34)
MCH RBC QN AUTO: 28.9 PG (ref 24–34)
MCHC RBC AUTO-ENTMCNC: 31 G/DL (ref 31–37)
MCHC RBC AUTO-ENTMCNC: 32 G/DL (ref 31–37)
MCV RBC AUTO: 90.2 FL (ref 74–97)
MCV RBC AUTO: 91.3 FL (ref 74–97)
O2/TOTAL GAS SETTING VFR VENT: 40 %
P-R INTERVAL, ECG05: 128 MS
PCO2 BLD: 43.2 MMHG (ref 35–45)
PH BLD: 7.36 [PH] (ref 7.35–7.45)
PLATELET # BLD AUTO: 300 K/UL (ref 135–420)
PLATELET # BLD AUTO: 344 K/UL (ref 135–420)
PMV BLD AUTO: 9.4 FL (ref 9.2–11.8)
PMV BLD AUTO: 9.5 FL (ref 9.2–11.8)
PO2 BLD: 99 MMHG (ref 80–100)
POTASSIUM SERPL-SCNC: 5 MMOL/L (ref 3.5–5.5)
POTASSIUM SERPL-SCNC: 5.6 MMOL/L (ref 3.5–5.5)
PROTHROMBIN TIME: 21.5 SEC (ref 11.5–15.2)
Q-T INTERVAL, ECG07: 246 MS
QRS DURATION, ECG06: 66 MS
QTC CALCULATION (BEZET), ECG08: 391 MS
RBC # BLD AUTO: 3.88 M/UL (ref 4.2–5.3)
RBC # BLD AUTO: 3.92 M/UL (ref 4.2–5.3)
SAO2 % BLD: 97 % (ref 92–97)
SERVICE CMNT-IMP: NORMAL
SODIUM SERPL-SCNC: 132 MMOL/L (ref 136–145)
SODIUM SERPL-SCNC: 133 MMOL/L (ref 136–145)
SPECIMEN TYPE: NORMAL
TOTAL RESP. RATE, ITRR: 24
TROPONIN I SERPL-MCNC: <0.02 NG/ML (ref 0–0.04)
VENTRICULAR RATE, ECG03: 152 BPM
WBC # BLD AUTO: 13.3 K/UL (ref 4.6–13.2)
WBC # BLD AUTO: 17.9 K/UL (ref 4.6–13.2)

## 2020-06-21 PROCEDURE — 87040 BLOOD CULTURE FOR BACTERIA: CPT

## 2020-06-21 PROCEDURE — 36430 TRANSFUSION BLD/BLD COMPNT: CPT

## 2020-06-21 PROCEDURE — 85027 COMPLETE CBC AUTOMATED: CPT

## 2020-06-21 PROCEDURE — 77010033678 HC OXYGEN DAILY

## 2020-06-21 PROCEDURE — 77030013687 IR NEPHROSTOMY PERC RT PLC CATH  SI

## 2020-06-21 PROCEDURE — 74011000250 HC RX REV CODE- 250

## 2020-06-21 PROCEDURE — 82550 ASSAY OF CK (CPK): CPT

## 2020-06-21 PROCEDURE — 74011250636 HC RX REV CODE- 250/636: Performed by: FAMILY MEDICINE

## 2020-06-21 PROCEDURE — 80048 BASIC METABOLIC PNL TOTAL CA: CPT

## 2020-06-21 PROCEDURE — 74011250636 HC RX REV CODE- 250/636: Performed by: HOSPITALIST

## 2020-06-21 PROCEDURE — 36600 WITHDRAWAL OF ARTERIAL BLOOD: CPT

## 2020-06-21 PROCEDURE — 74011000250 HC RX REV CODE- 250: Performed by: FAMILY MEDICINE

## 2020-06-21 PROCEDURE — 87086 URINE CULTURE/COLONY COUNT: CPT

## 2020-06-21 PROCEDURE — 0T9030Z DRAINAGE OF RIGHT KIDNEY WITH DRAINAGE DEVICE, PERCUTANEOUS APPROACH: ICD-10-PCS | Performed by: RADIOLOGY

## 2020-06-21 PROCEDURE — P9047 ALBUMIN (HUMAN), 25%, 50ML: HCPCS | Performed by: FAMILY MEDICINE

## 2020-06-21 PROCEDURE — 36415 COLL VENOUS BLD VENIPUNCTURE: CPT

## 2020-06-21 PROCEDURE — 83735 ASSAY OF MAGNESIUM: CPT

## 2020-06-21 PROCEDURE — 74011636320 HC RX REV CODE- 636/320: Performed by: RADIOLOGY

## 2020-06-21 PROCEDURE — 93005 ELECTROCARDIOGRAM TRACING: CPT

## 2020-06-21 PROCEDURE — P9059 PLASMA, FRZ BETWEEN 8-24HOUR: HCPCS

## 2020-06-21 PROCEDURE — 74011000258 HC RX REV CODE- 258: Performed by: FAMILY MEDICINE

## 2020-06-21 PROCEDURE — 74011250636 HC RX REV CODE- 250/636: Performed by: RADIOLOGY

## 2020-06-21 PROCEDURE — 30233K1 TRANSFUSION OF NONAUTOLOGOUS FROZEN PLASMA INTO PERIPHERAL VEIN, PERCUTANEOUS APPROACH: ICD-10-PCS | Performed by: FAMILY MEDICINE

## 2020-06-21 PROCEDURE — 74011250636 HC RX REV CODE- 250/636

## 2020-06-21 PROCEDURE — 82803 BLOOD GASES ANY COMBINATION: CPT

## 2020-06-21 PROCEDURE — 74011636637 HC RX REV CODE- 636/637: Performed by: FAMILY MEDICINE

## 2020-06-21 PROCEDURE — 87077 CULTURE AEROBIC IDENTIFY: CPT

## 2020-06-21 PROCEDURE — 85610 PROTHROMBIN TIME: CPT

## 2020-06-21 PROCEDURE — 82962 GLUCOSE BLOOD TEST: CPT

## 2020-06-21 PROCEDURE — 65270000029 HC RM PRIVATE

## 2020-06-21 PROCEDURE — 87186 SC STD MICRODIL/AGAR DIL: CPT

## 2020-06-21 RX ORDER — MAGNESIUM SULFATE HEPTAHYDRATE 40 MG/ML
2 INJECTION, SOLUTION INTRAVENOUS ONCE
Status: COMPLETED | OUTPATIENT
Start: 2020-06-21 | End: 2020-06-21

## 2020-06-21 RX ORDER — SODIUM CHLORIDE 9 MG/ML
500 INJECTION, SOLUTION INTRAVENOUS
Status: DISPENSED | OUTPATIENT
Start: 2020-06-21 | End: 2020-06-22

## 2020-06-21 RX ORDER — LIDOCAINE HYDROCHLORIDE 20 MG/ML
1 INJECTION, SOLUTION EPIDURAL; INFILTRATION; INTRACAUDAL; PERINEURAL AS NEEDED
Status: DISCONTINUED | OUTPATIENT
Start: 2020-06-21 | End: 2020-06-21

## 2020-06-21 RX ORDER — SODIUM BICARBONATE 42 MG/ML
50 INJECTION, SOLUTION INTRAVENOUS ONCE
Status: DISCONTINUED | OUTPATIENT
Start: 2020-06-21 | End: 2020-06-21

## 2020-06-21 RX ORDER — MIDAZOLAM HYDROCHLORIDE 1 MG/ML
.5-4 INJECTION, SOLUTION INTRAMUSCULAR; INTRAVENOUS
Status: DISCONTINUED | OUTPATIENT
Start: 2020-06-21 | End: 2020-06-21

## 2020-06-21 RX ORDER — FENTANYL CITRATE 50 UG/ML
25-200 INJECTION, SOLUTION INTRAMUSCULAR; INTRAVENOUS
Status: DISCONTINUED | OUTPATIENT
Start: 2020-06-21 | End: 2020-06-21

## 2020-06-21 RX ORDER — LIDOCAINE HYDROCHLORIDE 20 MG/ML
2 INJECTION, SOLUTION EPIDURAL; INFILTRATION; INTRACAUDAL; PERINEURAL ONCE
Status: COMPLETED | OUTPATIENT
Start: 2020-06-21 | End: 2020-06-21

## 2020-06-21 RX ORDER — MIDAZOLAM HYDROCHLORIDE 1 MG/ML
INJECTION, SOLUTION INTRAMUSCULAR; INTRAVENOUS
Status: COMPLETED
Start: 2020-06-21 | End: 2020-06-21

## 2020-06-21 RX ORDER — LIDOCAINE HYDROCHLORIDE 10 MG/ML
INJECTION INFILTRATION; PERINEURAL
Status: COMPLETED
Start: 2020-06-21 | End: 2020-06-21

## 2020-06-21 RX ORDER — FENTANYL CITRATE 50 UG/ML
INJECTION, SOLUTION INTRAMUSCULAR; INTRAVENOUS
Status: COMPLETED
Start: 2020-06-21 | End: 2020-06-21

## 2020-06-21 RX ORDER — CLINDAMYCIN PHOSPHATE 900 MG/50ML
900 INJECTION, SOLUTION INTRAVENOUS ONCE
Status: CANCELLED | OUTPATIENT
Start: 2020-06-21 | End: 2020-06-22

## 2020-06-21 RX ORDER — CLINDAMYCIN PHOSPHATE 600 MG/50ML
600 INJECTION, SOLUTION INTRAVENOUS ONCE
Status: COMPLETED | OUTPATIENT
Start: 2020-06-21 | End: 2020-06-21

## 2020-06-21 RX ORDER — LORAZEPAM 2 MG/ML
INJECTION INTRAMUSCULAR
Status: COMPLETED
Start: 2020-06-21 | End: 2020-06-21

## 2020-06-21 RX ORDER — NALOXONE HYDROCHLORIDE 0.4 MG/ML
INJECTION, SOLUTION INTRAMUSCULAR; INTRAVENOUS; SUBCUTANEOUS
Status: DISCONTINUED
Start: 2020-06-21 | End: 2020-06-21 | Stop reason: WASHOUT

## 2020-06-21 RX ORDER — FLUMAZENIL 0.1 MG/ML
INJECTION INTRAVENOUS
Status: DISCONTINUED
Start: 2020-06-21 | End: 2020-06-21 | Stop reason: WASHOUT

## 2020-06-21 RX ORDER — SODIUM CHLORIDE 9 MG/ML
250 INJECTION, SOLUTION INTRAVENOUS AS NEEDED
Status: DISCONTINUED | OUTPATIENT
Start: 2020-06-21 | End: 2020-06-25

## 2020-06-21 RX ORDER — SODIUM CHLORIDE 9 MG/ML
125 INJECTION, SOLUTION INTRAVENOUS CONTINUOUS
Status: DISCONTINUED | OUTPATIENT
Start: 2020-06-21 | End: 2020-06-24

## 2020-06-21 RX ORDER — CALCIUM GLUCONATE 20 MG/ML
1 INJECTION, SOLUTION INTRAVENOUS ONCE
Status: DISPENSED | OUTPATIENT
Start: 2020-06-21 | End: 2020-06-21

## 2020-06-21 RX ORDER — LIDOCAINE HYDROCHLORIDE 10 MG/ML
1-20 INJECTION INFILTRATION; PERINEURAL
Status: COMPLETED | OUTPATIENT
Start: 2020-06-21 | End: 2020-06-21

## 2020-06-21 RX ORDER — NALOXONE HYDROCHLORIDE 0.4 MG/ML
0.4 INJECTION, SOLUTION INTRAMUSCULAR; INTRAVENOUS; SUBCUTANEOUS AS NEEDED
Status: DISCONTINUED | OUTPATIENT
Start: 2020-06-21 | End: 2020-06-21

## 2020-06-21 RX ORDER — FLUMAZENIL 0.1 MG/ML
0.2 INJECTION INTRAVENOUS
Status: DISCONTINUED | OUTPATIENT
Start: 2020-06-21 | End: 2020-06-21

## 2020-06-21 RX ORDER — ALBUMIN HUMAN 250 G/1000ML
12.5 SOLUTION INTRAVENOUS EVERY 6 HOURS
Status: DISCONTINUED | OUTPATIENT
Start: 2020-06-21 | End: 2020-06-22

## 2020-06-21 RX ADMIN — LIDOCAINE HYDROCHLORIDE 20 ML: 10 INJECTION, SOLUTION INFILTRATION; PERINEURAL at 14:22

## 2020-06-21 RX ADMIN — INSULIN LISPRO 2 UNITS: 100 INJECTION, SOLUTION INTRAVENOUS; SUBCUTANEOUS at 22:16

## 2020-06-21 RX ADMIN — LIDOCAINE HYDROCHLORIDE 20 ML: 10 INJECTION INFILTRATION; PERINEURAL at 14:22

## 2020-06-21 RX ADMIN — INSULIN LISPRO 4 UNITS: 100 INJECTION, SOLUTION INTRAVENOUS; SUBCUTANEOUS at 09:10

## 2020-06-21 RX ADMIN — ALBUMIN (HUMAN) 12.5 G: 0.25 INJECTION, SOLUTION INTRAVENOUS at 20:55

## 2020-06-21 RX ADMIN — LIDOCAINE HYDROCHLORIDE 2 ML: 20 INJECTION, SOLUTION EPIDURAL; INFILTRATION; INTRACAUDAL; PERINEURAL at 12:28

## 2020-06-21 RX ADMIN — DESMOPRESSIN ACETATE 23.4 MCG: 4 INJECTION, SOLUTION INTRAVENOUS; SUBCUTANEOUS at 14:47

## 2020-06-21 RX ADMIN — MIDAZOLAM HYDROCHLORIDE 0.5 MG: 1 INJECTION, SOLUTION INTRAMUSCULAR; INTRAVENOUS at 14:26

## 2020-06-21 RX ADMIN — MAGNESIUM SULFATE HEPTAHYDRATE 2 G: 40 INJECTION, SOLUTION INTRAVENOUS at 21:17

## 2020-06-21 RX ADMIN — CLINDAMYCIN PHOSPHATE 600 MG: 600 INJECTION, SOLUTION INTRAVENOUS at 14:15

## 2020-06-21 RX ADMIN — INSULIN LISPRO 6 UNITS: 100 INJECTION, SOLUTION INTRAVENOUS; SUBCUTANEOUS at 12:18

## 2020-06-21 RX ADMIN — FENTANYL CITRATE 25 MCG: 50 INJECTION INTRAMUSCULAR; INTRAVENOUS at 14:26

## 2020-06-21 RX ADMIN — IOPAMIDOL 10 ML: 612 INJECTION, SOLUTION INTRAVENOUS at 14:25

## 2020-06-21 RX ADMIN — FENTANYL CITRATE 25 MCG: 50 INJECTION, SOLUTION INTRAMUSCULAR; INTRAVENOUS at 14:26

## 2020-06-21 RX ADMIN — ONDANSETRON 4 MG: 2 INJECTION INTRAMUSCULAR; INTRAVENOUS at 09:45

## 2020-06-21 RX ADMIN — LORAZEPAM 1 MG: 2 INJECTION INTRAMUSCULAR at 10:00

## 2020-06-21 RX ADMIN — SODIUM CHLORIDE 25 ML/HR: 900 INJECTION, SOLUTION INTRAVENOUS at 20:56

## 2020-06-21 RX ADMIN — INSULIN LISPRO 2 UNITS: 100 INJECTION, SOLUTION INTRAVENOUS; SUBCUTANEOUS at 19:08

## 2020-06-21 NOTE — PROGRESS NOTES
RRT called due to sob   Start abg and ekg done at the bedside, will increase O2. Pt has  raoul on ckd, hyperkalemia from today morning lab,   Calcium gluconate and bicarb were given  IO done due to no good iv access   Stat lab ordered   Ativan was given   She was alert, bp was good     Dr. Liana Juarez was at the bedside too. She will f/u with the lab      Hemal Forman son was updated per phone. Confirmed dnr/I-according to her wish. Dr. Liana Juarez also talked with him -confirmed the code status.

## 2020-06-21 NOTE — PROGRESS NOTES
Attempts to draw, lab refused to attempt. MD aware and paged anesthesia. Waiting on call back. Patient and I spoke with son Mayra Barraza to keep informed. Dr. Saunders [place] kept updated by Geronimo Ramos.

## 2020-06-21 NOTE — PROGRESS NOTES
TRANSFER - OUT REPORT:    Verbal report given to Tesha Knox 23  being transferred to [address] [place] [address](unit) for ordered procedure       Report consisted of patients Situation, Background, Assessment and   Recommendations(SBAR). Information from the following report(s) SBAR, Kardex and MAR was reviewed with the receiving nurse. Lines:   Peripheral IV 06/19/20 Right Other(comment) (Active)   Site Assessment Clean, dry, & intact 6/20/2020 10:22 PM   Phlebitis Assessment 0 6/20/2020 10:22 PM   Infiltration Assessment 0 6/20/2020 10:22 PM   Dressing Status Clean, dry, & intact 6/20/2020 10:22 PM   Dressing Type Transparent 6/20/2020 10:22 PM   Hub Color/Line Status Flushed;Patent; End cap changed 6/20/2020 10:22 PM   Action Taken Open ports on tubing capped 6/20/2020 10:22 PM   Alcohol Cap Used Yes 6/20/2020 10:22 PM       Intraosseous Line 06/21/20 Left;Tibia (Active)        Opportunity for questions and clarification was provided. Wasted 1.5 versed and 75 mcq fentanyl with Geronimo Ramos.      Patient transported with:   Registered Nurse  Tech

## 2020-06-21 NOTE — PROGRESS NOTES
Consent from son George Day. Pt alert, we are awaiting MD arrival.   Pt arrived on unit; Pt Alert and Oriented; Consent signed; See MAC_lab for sedation report and/or vital signs. Pt transferred to treatment table for procedure. MD called POA and explained procedure.

## 2020-06-21 NOTE — PROGRESS NOTES
VASCULAR & INTERVENTIONAL RADIOLOGY PROGRESS NOTE    History and Physical reviewed; I have examined the patient and there are no pertinent changes. Pt is an appropriate candidate to undergo image guided right percutaneous nephrostomy (PCN) catheter placement under moderate sedation. Procedure delayed due to Eliquis washout. It was noted on admission labs that INR is elevated. Would like INR < 1.5 as procedure as high risk of bleeding. Continued to be elevated throughout weekend. Patient received 3 U FFP this am.  Had difficulty obtaining blood sample to recheck INR. INR still elevated at 1.9. D/W hospitalist.  Patient's WBC and renal function worsening. Unsure why INR is elevated as patient not on Coumadin and no liver disease. Due to worsening clinical picture will proceed with PCN placement. Discussed increased bleeding risk with hospitalist, patient and son/POA Aaronwilfred Lopez ([phone]). Preprocedure IV antibiotics, Clindamycin 600 mg IV and ddAVP will be given. Apparently difficult to give IV meds due to only IO access.     Jean Fox MD  Vascular & Interventional Radiology  [facility]  6/21/2020

## 2020-06-21 NOTE — ROUTINE PROCESS
Responded to rapid response at 0953. MD and RN present. Patient's vitals were taken and an EKG was performed. Peripheral vascular access failed during the rapid response, and an IO was placed to obtain peripheral access. Patient initially hypertensive and tachycardic. After rapid response the patient became hypotensive and a 250 mL bolus of normal saline was ordered.

## 2020-06-21 NOTE — PROGRESS NOTES
Discussed overall plan with Dr. Tavo Renteria. He plans to come and place her nephrostomy tube. He is aware that her INR is elevated at 1.9. He is concerned that her platelets may be underactive and he wants to have DDAVP hung and running while he is doing the nephrostomy tube placement. DDAVP, 0.3mcg/kg IV, 23.4 mcg, 100ml/hr, 50ml bag, 30 minutes at start of procedure. I have also paged the anesthesiologist on call with regard to femoral line placement. Patient has very limited vascular access and we currently have an I/O line in. Called her son, Klarissa Ko and updated him with regard to the current plan. He expressed understanding and agreement.

## 2020-06-21 NOTE — PROGRESS NOTES
RESPONDED TO RRT FOR PATIENT WITH DIFFICULTY BREATHING. NECK APPEARED TO BE SWOLLEN. PROBABLE INFILTRATION OF RIGHT IJ. UPPER AIRWAY COMPROMISED. NASOPHARYNGEAL AIRWAY INSERTED IN RIGHT NARE. ABG'S OBTAINED WITH PATIENT ON 5L NC.  RESULTS WERE WNL AND WERE GIVEN TO JERSON YOON AND KARINA. CONTINUING TO MONITOR.

## 2020-06-21 NOTE — PROGRESS NOTES
Hospitalist Progress Note    Patient: Ayesha Ko MRN: [id number]  CSN: [id number]    YOB: 1940  Age: [de-identified] y.o. 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 Admitted for nephrostomy tube complication. Had hydronephrosis on admission. Has developed acute and worsening renal failure because nephrostomy tube insertion delayed because of bleeding risk from being on Eliquis. This morning her INR (1.9) was still higher than the requirement after vitamin K and FFP. A stat blood draw was ordered this AM, but patient began yelling at staff and telling them not to stick her and to get away from her.    Attempting to draw her labwork to see if the INR was 1.5 or less     Nephrostomy tube complication -  IR is planning to place NEW nephrostomy tube  IR planned to replace tube but track had closed   IR could not PLACE NEW TUBE  today because pt is on Eliquis, last dose yesterday and needs to wait 48 hours, planning to do it tomorrow      Abdominal pain due to nephrostomy tube complication -  Pain mgmt until tube replaced   Hydronephrosis present     ANTONI -  Worsening renal status  Sodium level 133  Potassium level 5.7  Creatinine level 3.08, yesterday it was 1.98  Gentle fluid hydration   Expect improvement overnight   Follow BMP      Diabetes Mellitus -  SSI, ADA, FSBG qac and qhs      Hypertension -  Monitor BP      Legally blind -  Supportive care      Hyponatremia -  Very mild, will expect improvement overnight      DVT Prophylaxis -SCDs     GI Prophylaxis -Protonix      Disposition :  Patient Active Problem List   Diagnosis Code    Pyelonephritis N12    S/p nephrectomy Z90.5    Right flank pain R10.9    Malfunction of nephrostomy tube ([facility] [address] [address] T83.098A    Nephrostomy tube displaced ([address] T83.022A    UTI (urinary tract infection) N39.0    Elevated WBC count D72.829    Diabetes (HCC) E11.9    Legally blind H54.8    Hypertension I10    Thyroid disease E07.9    Elevated serum creatinine R79.89    S/P colectomy Z90.49    Skin lesion of foot L98.9    Tear of skin of buttock S31.801A    Pressure injury of right heel, unstageable ([facility]) L89.610    Hypomagnesemia E83.42    Hyperkalemia E87.5    Heel ulcer ([address] L97.409    ANTONI (acute kidney injury) ([address] N17.9    S/P AKA (above knee amputation), right ([facility]) Z89.611    Nephrostomy status ([address] Z93.6    Obstructed nephrostomy tube ([address] T83.092A    Nephrostomy complication ([facility]) W34.485       Subjective:    Pt very irritated and agitated. Says that we all are using her for an experiment. Some degradation in her mental status      Review of systems:    Constitutional: denies fevers, chills, myalgias  Respiratory: denies SOB, cough  Cardiovascular: denies chest pain, palpitations  Gastrointestinal: denies nausea, vomiting, diarrhea      Vital signs/Intake and Output:  Visit Vitals  /57   Pulse (!) 103   Temp 99.2 °F (37.3 °C)   Resp 19   Ht 5' 2\" (1.575 m)   Wt 78 kg (172 lb)   SpO2 100%   Breastfeeding No   BMI 31.46 kg/m²     Current Shift:  No intake/output data recorded.   Last three shifts:  06/19 1901 - 06/21 0700  In: 964 [P.O.:120]  Out: -     Exam:    General: Well developed, edematous, agitated   Head/Neck: NCAT, supple, No masses, No lymphadenopathy  CVS:Regular rate and rhythm, no M/R/G, S1/S2 heard, no thrill  Lungs:Clear to auscultation bilaterally, no wheezes, rhonchi, or rales  Abdomen: Soft, Nontender, No distention, Normal Bowel sounds, No hepatomegaly  Extremities: No C/C/E, pulses palpable 2+  Skin:normal texture and turgor, no rashes, no lesions  Neuro:grossly normal , follows commands  Psych:appropriate                Labs: Results:       Chemistry Recent Labs     06/21/20  1612 06/20/20  0548 06/19/20  1415   * 210* 205*   * 133* 135*   K 5.6* 5.7* 5.3   CL 99* 101 101   CO2 25 23 27   BUN 46* 37* 32*   CREA 4.48* 3.08* 1.98*   CA 8.6 8.3* 8.4*   AGAP 8 9 7   BUCR 10* 12 16      CBC w/Diff Recent Labs     06/21/20  0623 06/20/20  0548 06/19/20  1415   WBC 17.9* 14.7* 12.7   RBC 3.92* 3.79* 4.12*   HGB 11.1* 10.9* 11.9*   HCT 35.8 34.2* 36.9    407 322   GRANS  --   --  84*   LYMPH  --   --  7*   EOS  --   --  0      Cardiac Enzymes No results for input(s): CPK, CKND1, JANE in the last 72 hours. No lab exists for component: CKRMB, TROIP   Coagulation Recent Labs     06/20/20  2235 06/20/20  1745  06/19/20  1415   PTP 21.4* 24.6*   < > 21.0*   INR 1.9* 2.2*   < > 1.8*   APTT  --   --   --  48.5*    < > = values in this interval not displayed. Lipid Panel Lab Results   Component Value Date/Time    Cholesterol, total 115 08/28/2019 08:22 AM    HDL Cholesterol 39 (L) 08/28/2019 08:22 AM    LDL, calculated 46.2 08/28/2019 08:22 AM    VLDL, calculated 29.8 08/28/2019 08:22 AM    Triglyceride 149 08/28/2019 08:22 AM    CHOL/HDL Ratio 2.9 08/28/2019 08:22 AM      BNP No results for input(s): BNPP in the last 72 hours. Liver Enzymes No results for input(s): TP, ALB, TBIL, AP in the last 72 hours.     No lab exists for component: SGOT, GPT, DBIL   Thyroid Studies Lab Results   Component Value Date/Time    TSH 3.40 01/24/2020 08:35 AM        Procedures/imaging: see electronic medical records for all procedures/Xrays and details which were not copied into this note but were reviewed prior to creation of Lenice Friendly, MD

## 2020-06-21 NOTE — DIABETES MGMT
Glycemic Control: A1c= 8.2%  Recommend adding Lantus 8 units q 24 hrs for 12 glucose readings 210 to 286.  Medardo TUTTLE CDE

## 2020-06-21 NOTE — PROGRESS NOTES
Assumed care of patient from Malathi Arellano RN.    2300- Multiple attempts to draw labs were unsuccessful. Anesthesia was paged for assistance. 0772- RRT called on patient due to SOB. Dr. Merlinda Dalton, medic, and Samanta responded. EKG and IO access gained due to poor IV access. Calcium gluconate and bicarb given. 1mg of Ativan given IM. 1230- patient hypotensive, 250 ml bolus given via IO.    1330- Patient transferred to IR for nephrostomy tube placement. 32 61 16- Patient back to room from IR with RN at the bedside. Patient tolerated procedure well.     1600- Patient is sleeping at this time will continue to monitor. 1830- Patient is resting, responds to voice/ touch and responds appropriately. Verbalizes understanding of dinner at the bedside and shift change. Bedside and Verbal shift change report given to Koki Saucedo RN (oncoming nurse) by Judd Pickett RN (offgoing nurse). Report included the following information SBAR, Kardex, Procedure Summary, Intake/Output, MAR and Recent Results.

## 2020-06-21 NOTE — PROGRESS NOTES
INITIAL NUTRITION ASSESSMENT     RECOMMENDATIONS/PLAN:   Diet: Adv diet per MD  Avoid prolonged NPO diet order as it does not meet estimated needs  Monitor labs/lytes, diet adv, skin integrity, wt, fluid status, BM    REASON FOR ASSESSMENT:   []  MD Consult:    [] General Nutrition Management and Supplements   [] Management of Tube Feeding   [] Calorie Count    [] Diet Education  []  RN Referral:    [] MST score >/=2  Malnutrition Screening Tool (MST):  Recently Lost Weight Without Trying: No     Eating Poorly Due to Decreased Appetite: No  MST Score: 0    [] Enteral/Parenteral Nutrition PTA   [] Pregnant (Not in Labor):  [] Gestational DM     [] Multigestation   [x] Pressure Ulcer/Wound Care needs  [] Positive Nutrition Screen:  [] BMI <19  [] NPO/Clear Liquid Diet > 5 days (>3 days in ICU)  [] New Order for TPN  [] LOS  [] ICU admission  NUTRITION ASSESSMENT:   Client History: [de-identified] yrs old Female admitted with nephrostomy tube complication, abdominal pain, ANTONI, DM, HTN, hyponatremia, legally blind, has colostomy bag      PMHx: vaginal and colon ca, arthritis, DM, DVT, HTN, legally blind, thyroid disease    Cultural/Advent Food Preferences: None Identified    FOOD/NUTRITION HISTORY  Diet History: ROSIE at this time    Food Allergies:  [x] NKFA       Pertinent PTA Medications: levemir, novolin, novolog, synthroid, MOM, vit d3, zofran, prostat,  Colace, dulcolax, magnesium hydroxide, mylanta, sodium bicarbonate,     NUTRITION INTAKE   Diet Order:  NPO      Average PO Intake:       No data found.    Pertinent Medications:  [x] Reviewed; zofran   Electrolyte Replacement Protocol: []K  []Mg  []PO4    Insulin:  [] SSI  [x] Pre-meal   []  Basal   [] Drip  [] None  Pt expected to meet estimated nutrient needs through next review:          []  Yes     [x] No; NPO does not meet estimated needs  ANTHROPOMETRICS  Height: 5' 2\" (157.5 cm)       Weight: 78 kg (172 lb)    BMI: 31.5 kg/m^2  -  obese (30%-39.9% BMI)        Weight change: no wt loss per chart review                                   Comparison to Reference Standards:  IBW: 99 lbs      %IBW: 178%      AdjBW: 57 kg    NUTRITION-FOCUSED PHYSICAL ASSESSMENT  Skin: decubitus ulcer POA     GI: +BM 6/202/20    BIOCHEMICAL DATA & MEDICAL TESTS  Pertinent Labs:  [x] Reviewed; Na-132, Glucose-218, BUN-46 Creatinine-4.48 GFR est AA-11    NUTRITION PRESCRIPTION  Calories: 1925 kcal/day based on Kell x 1.5  Protein:68-86 g/day based on 1.2-1.5 g/kg of AdjBW  CHO: 241 g/day based on 50% of total energy  Fluid: 1925 ml/day based on 1 kcal/ml      NUTRITION DIAGNOSES:   1. Inadequate oral intake related to diet order as evidence by NPO diet     NUTRITION INTERVENTIONS:   INTERVENTIONS:        GOALS:  1. Diet: Adv diet per MD 1. Adv diet per MD by next review 4 days     LEARNING NEEDS (Diet, Supplementation, Food/Nutrient-Drug Interaction):   [x] None Identified  [] Inpatient education provided/documented    [] Identified and patient:  [] Declined     [] Was not appropriate/indicated  NUTRITION MONITORING /EVALUATION:   Monitor wt  Monitor renal labs, electrolytes, fluid status    [] Participated in Interdisciplinary Rounds  [x] Interdisciplinary Care Plan Reviewed/Documented  DISCHARGE NUTRITION RECOMMENDATIONS ADDRESSED:      [x] To be determined closer to discharge    NUTRITION RISK:     [x]  At risk                     []  Not currently at risk     Will follow-up per policy.   Nalini Durbin 1

## 2020-06-21 NOTE — PROCEDURES
Vascular & Interventional Radiology Brief Procedure Note    Interventional Radiologist: Latrell Traylor    Pre-operative Diagnosis:  Right obstructive uropathy. Urosepsis. Post-operative Diagnosis: Same as pre-op dx    Procedure(s) Performed:  Image guided right percutaneous nephrostomy (PCN) placement. Anesthesia:  Local and Moderate Sedation    Findings:    1. Successful placement of right PCN via upper pole calyx. 2. Total 100 mL of cloudy fluid aspirated. Sample sent to lab. Complications: None    Estimated Blood Loss:  < 5 mL    Tubes and Drains: 10 Fr APDL. Specimens: Urine. Condition: Guarded. Disposition: To floor. Plan:   1. Finish infusion of Clindamycin and ddAVP. 2. Monitor closely for bleeding due to elevated INR. 3. Patient needs to be on IV Abx.       Eliana Swann MD  [facility]  Vascular & Interventional Radiology  6/21/2020

## 2020-06-21 NOTE — PROGRESS NOTES
Pt in stable condition, no s/s of acute distress noted/voiced. Pt drowsy and slow to follow command. Plan of care discussed, verbalized understanding. Comfort and care given. All safety and comfort measures in place. Legally blind, [place], right AKA, skin tear to buttocks and unstageable to right heel. Wound prevention/protection in place. CLRT as tolerated. Colostomy care performed, brown soft outpt.  K 5.7 and kayexalate given at 1726. Pending repeat lab. MD called. Aware of pending Covid sccreening for SNF placement. Voiced no isolation order required. MD aware of INR of 2.2 at 1745 after Vit K infusion at 1341. Order for repeat testing and FFP. 2235:  Current INR 1.9     0000:  Pt NPO for nephr tube placement. 12:  Order for FFP ordered directly from MD.  Blood Bank reviewed order and released order. No order seen on kardex. Will trouble shoot. Order reentered. 3151:  Pt tolerated 3units well. Pending followup labs. 0730:  IVR wants stat INR before procedure. Phlebotomy called to assist.  Pending lab work.    Yandel Bar:  Bedside shift change report given to Trinh Ochoa (oncoming nurse) by Nisa Moe RN (offgoing nurse). Report included the following information SBAR, Kardex, Procedure Summary, Intake/Output and Recent Results.

## 2020-06-22 ENCOUNTER — HOSPITAL ENCOUNTER (OUTPATIENT)
Dept: ULTRASOUND IMAGING | Age: 80
Discharge: HOME | DRG: 698 | End: 2020-06-22
Attending: HOSPITALIST
Payer: MEDICARE

## 2020-06-22 LAB
ANION GAP SERPL CALC-SCNC: 11 MMOL/L (ref 3–18)
BLD PROD TYP BPU: NORMAL
BPU ID: NORMAL
BUN SERPL-MCNC: 52 MG/DL (ref 7–18)
BUN/CREAT SERPL: 12 (ref 12–20)
CALCIUM SERPL-MCNC: 8.1 MG/DL (ref 8.5–10.1)
CHLORIDE SERPL-SCNC: 102 MMOL/L (ref 100–111)
CO2 SERPL-SCNC: 26 MMOL/L (ref 21–32)
CREAT SERPL-MCNC: 4.34 MG/DL (ref 0.6–1.3)
ERYTHROCYTE [DISTWIDTH] IN BLOOD BY AUTOMATED COUNT: 14.7 % (ref 11.6–14.5)
GLUCOSE BLD STRIP.AUTO-MCNC: 157 MG/DL (ref 70–110)
GLUCOSE BLD STRIP.AUTO-MCNC: 205 MG/DL (ref 70–110)
GLUCOSE BLD STRIP.AUTO-MCNC: 254 MG/DL (ref 70–110)
GLUCOSE SERPL-MCNC: 129 MG/DL (ref 74–99)
HCT VFR BLD AUTO: 25.5 % (ref 35–45)
HGB BLD-MCNC: 8 G/DL (ref 12–16)
INR PPP: 2 (ref 0.8–1.2)
MCH RBC QN AUTO: 28.3 PG (ref 24–34)
MCHC RBC AUTO-ENTMCNC: 31.4 G/DL (ref 31–37)
MCV RBC AUTO: 90.1 FL (ref 74–97)
PLATELET # BLD AUTO: 261 K/UL (ref 135–420)
PMV BLD AUTO: 9.4 FL (ref 9.2–11.8)
POTASSIUM SERPL-SCNC: 4.2 MMOL/L (ref 3.5–5.5)
PROTHROMBIN TIME: 22.5 SEC (ref 11.5–15.2)
RBC # BLD AUTO: 2.83 M/UL (ref 4.2–5.3)
SODIUM SERPL-SCNC: 139 MMOL/L (ref 136–145)
STATUS OF UNIT,%ST: NORMAL
UNIT DIVISION, %UDIV: 0
VANCOMYCIN SERPL-MCNC: 20.6 UG/ML (ref 5–40)
WBC # BLD AUTO: 9.4 K/UL (ref 4.6–13.2)

## 2020-06-22 PROCEDURE — 76770 US EXAM ABDO BACK WALL COMP: CPT

## 2020-06-22 PROCEDURE — 85610 PROTHROMBIN TIME: CPT

## 2020-06-22 PROCEDURE — 74011250637 HC RX REV CODE- 250/637: Performed by: HOSPITALIST

## 2020-06-22 PROCEDURE — 74011636637 HC RX REV CODE- 636/637: Performed by: FAMILY MEDICINE

## 2020-06-22 PROCEDURE — 74011250636 HC RX REV CODE- 250/636: Performed by: FAMILY MEDICINE

## 2020-06-22 PROCEDURE — 74011250637 HC RX REV CODE- 250/637: Performed by: FAMILY MEDICINE

## 2020-06-22 PROCEDURE — 99152 MOD SED SAME PHYS/QHP 5/>YRS: CPT

## 2020-06-22 PROCEDURE — 74011250636 HC RX REV CODE- 250/636: Performed by: HOSPITALIST

## 2020-06-22 PROCEDURE — 36415 COLL VENOUS BLD VENIPUNCTURE: CPT

## 2020-06-22 PROCEDURE — P9047 ALBUMIN (HUMAN), 25%, 50ML: HCPCS | Performed by: FAMILY MEDICINE

## 2020-06-22 PROCEDURE — 82962 GLUCOSE BLOOD TEST: CPT

## 2020-06-22 PROCEDURE — 74011250636 HC RX REV CODE- 250/636: Performed by: INTERNAL MEDICINE

## 2020-06-22 PROCEDURE — 74011000250 HC RX REV CODE- 250: Performed by: FAMILY MEDICINE

## 2020-06-22 PROCEDURE — 85027 COMPLETE CBC AUTOMATED: CPT

## 2020-06-22 PROCEDURE — 80202 ASSAY OF VANCOMYCIN: CPT

## 2020-06-22 PROCEDURE — 87040 BLOOD CULTURE FOR BACTERIA: CPT

## 2020-06-22 PROCEDURE — 74011000250 HC RX REV CODE- 250: Performed by: INTERNAL MEDICINE

## 2020-06-22 PROCEDURE — 80048 BASIC METABOLIC PNL TOTAL CA: CPT

## 2020-06-22 PROCEDURE — 65270000029 HC RM PRIVATE

## 2020-06-22 RX ORDER — LEVOFLOXACIN 5 MG/ML
250 INJECTION, SOLUTION INTRAVENOUS
Status: COMPLETED | OUTPATIENT
Start: 2020-06-22 | End: 2020-06-24

## 2020-06-22 RX ORDER — ATORVASTATIN CALCIUM 20 MG/1
20 TABLET, FILM COATED ORAL DAILY
Status: DISCONTINUED | OUTPATIENT
Start: 2020-06-23 | End: 2020-06-26 | Stop reason: HOSPADM

## 2020-06-22 RX ORDER — TIMOLOL MALEATE 5 MG/ML
1 SOLUTION/ DROPS OPHTHALMIC 2 TIMES DAILY
Status: DISCONTINUED | OUTPATIENT
Start: 2020-06-22 | End: 2020-06-26 | Stop reason: HOSPADM

## 2020-06-22 RX ORDER — DOCUSATE SODIUM 100 MG/1
100 CAPSULE, LIQUID FILLED ORAL 2 TIMES DAILY
Status: DISCONTINUED | OUTPATIENT
Start: 2020-06-22 | End: 2020-06-26 | Stop reason: HOSPADM

## 2020-06-22 RX ORDER — TIMOLOL MALEATE 5 MG/ML
1 SOLUTION/ DROPS OPHTHALMIC 2 TIMES DAILY
Status: DISCONTINUED | OUTPATIENT
Start: 2020-06-22 | End: 2020-06-22

## 2020-06-22 RX ORDER — MELATONIN
1000 DAILY
Status: DISCONTINUED | OUTPATIENT
Start: 2020-06-23 | End: 2020-06-26 | Stop reason: HOSPADM

## 2020-06-22 RX ORDER — INSULIN GLARGINE 100 [IU]/ML
15 INJECTION, SOLUTION SUBCUTANEOUS
Status: DISCONTINUED | OUTPATIENT
Start: 2020-06-22 | End: 2020-06-26 | Stop reason: HOSPADM

## 2020-06-22 RX ORDER — BRIMONIDINE TARTRATE 2 MG/ML
1 SOLUTION/ DROPS OPHTHALMIC EVERY 12 HOURS
Status: DISCONTINUED | OUTPATIENT
Start: 2020-06-22 | End: 2020-06-26 | Stop reason: HOSPADM

## 2020-06-22 RX ORDER — TRAZODONE HYDROCHLORIDE 50 MG/1
50 TABLET ORAL
Status: DISCONTINUED | OUTPATIENT
Start: 2020-06-22 | End: 2020-06-26 | Stop reason: HOSPADM

## 2020-06-22 RX ORDER — METOPROLOL SUCCINATE 50 MG/1
50 TABLET, EXTENDED RELEASE ORAL DAILY
Status: DISCONTINUED | OUTPATIENT
Start: 2020-06-23 | End: 2020-06-26 | Stop reason: HOSPADM

## 2020-06-22 RX ORDER — FACIAL-BODY WIPES
10 EACH TOPICAL DAILY PRN
Status: DISCONTINUED | OUTPATIENT
Start: 2020-06-22 | End: 2020-06-26 | Stop reason: HOSPADM

## 2020-06-22 RX ORDER — MAG HYDROX/ALUMINUM HYD/SIMETH 200-200-20
30 SUSPENSION, ORAL (FINAL DOSE FORM) ORAL
Status: DISCONTINUED | OUTPATIENT
Start: 2020-06-22 | End: 2020-06-26 | Stop reason: HOSPADM

## 2020-06-22 RX ORDER — LEVOTHYROXINE SODIUM 50 UG/1
50 TABLET ORAL
Status: DISCONTINUED | OUTPATIENT
Start: 2020-06-23 | End: 2020-06-26 | Stop reason: HOSPADM

## 2020-06-22 RX ORDER — GABAPENTIN 300 MG/1
300 CAPSULE ORAL 2 TIMES DAILY
Status: DISCONTINUED | OUTPATIENT
Start: 2020-06-22 | End: 2020-06-26 | Stop reason: HOSPADM

## 2020-06-22 RX ORDER — SODIUM BICARBONATE 650 MG/1
650 TABLET ORAL 2 TIMES DAILY
Status: DISCONTINUED | OUTPATIENT
Start: 2020-06-22 | End: 2020-06-26 | Stop reason: HOSPADM

## 2020-06-22 RX ADMIN — ALBUMIN (HUMAN) 12.5 G: 0.25 INJECTION, SOLUTION INTRAVENOUS at 02:41

## 2020-06-22 RX ADMIN — BRIMONIDINE TARTRATE 1 DROP: 2 SOLUTION OPHTHALMIC at 21:35

## 2020-06-22 RX ADMIN — DOCUSATE SODIUM 100 MG: 100 CAPSULE, LIQUID FILLED ORAL at 15:26

## 2020-06-22 RX ADMIN — ALBUMIN (HUMAN) 12.5 G: 0.25 INJECTION, SOLUTION INTRAVENOUS at 08:39

## 2020-06-22 RX ADMIN — INSULIN LISPRO 6 UNITS: 100 INJECTION, SOLUTION INTRAVENOUS; SUBCUTANEOUS at 17:40

## 2020-06-22 RX ADMIN — TIMOLOL MALEATE 1 DROP: 5 SOLUTION/ DROPS OPHTHALMIC at 21:35

## 2020-06-22 RX ADMIN — SODIUM BICARBONATE 650 MG TABLET 650 MG: at 15:26

## 2020-06-22 RX ADMIN — INSULIN LISPRO 2 UNITS: 100 INJECTION, SOLUTION INTRAVENOUS; SUBCUTANEOUS at 08:39

## 2020-06-22 RX ADMIN — VANCOMYCIN HYDROCHLORIDE 1500 MG: 1.5 INJECTION, POWDER, LYOPHILIZED, FOR SOLUTION INTRAVENOUS at 00:36

## 2020-06-22 RX ADMIN — DOCUSATE SODIUM 100 MG: 100 CAPSULE, LIQUID FILLED ORAL at 20:47

## 2020-06-22 RX ADMIN — TRAZODONE HYDROCHLORIDE 50 MG: 50 TABLET ORAL at 21:21

## 2020-06-22 RX ADMIN — LEVOFLOXACIN 250 MG: 5 INJECTION, SOLUTION INTRAVENOUS at 15:26

## 2020-06-22 RX ADMIN — CEFTRIAXONE 1 G: 1 INJECTION, POWDER, FOR SOLUTION INTRAMUSCULAR; INTRAVENOUS at 22:05

## 2020-06-22 RX ADMIN — SODIUM BICARBONATE 650 MG TABLET 650 MG: at 20:47

## 2020-06-22 RX ADMIN — INSULIN LISPRO 4 UNITS: 100 INJECTION, SOLUTION INTRAVENOUS; SUBCUTANEOUS at 21:21

## 2020-06-22 RX ADMIN — GABAPENTIN 300 MG: 300 CAPSULE ORAL at 20:46

## 2020-06-22 RX ADMIN — ACETAMINOPHEN 650 MG: 325 TABLET ORAL at 17:41

## 2020-06-22 RX ADMIN — ACETAMINOPHEN 650 MG: 325 TABLET ORAL at 00:52

## 2020-06-22 RX ADMIN — OXYCODONE 5 MG: 5 TABLET ORAL at 20:46

## 2020-06-22 RX ADMIN — INSULIN GLARGINE 15 UNITS: 100 INJECTION, SOLUTION SUBCUTANEOUS at 21:22

## 2020-06-22 RX ADMIN — GABAPENTIN 300 MG: 300 CAPSULE ORAL at 15:26

## 2020-06-22 RX ADMIN — OXYCODONE 5 MG: 5 TABLET ORAL at 15:29

## 2020-06-22 RX ADMIN — CEFTRIAXONE 1 G: 1 INJECTION, POWDER, FOR SOLUTION INTRAMUSCULAR; INTRAVENOUS at 00:36

## 2020-06-22 NOTE — PROGRESS NOTES
Above noted, chart reviewed. Case discussed with Dr. Kim Cason. At this time would recommend picc line placement if possible. If not possible pt may come to temporary central venous catheter vs tunneled groshong catheter for long term iv acces today vs tomorrow.

## 2020-06-22 NOTE — PROGRESS NOTES
Pharmacy Dosing Services: Vancomycin    Consult for Vancomycin Dosing by Pharmacy by Dr. Robert Gregory provided for this [de-identified]y.o. year old female , for indication of UTI x 10 days. Day of Therapy 1    Ht Readings from Last 1 Encounters:   06/19/20 157.5 cm (62\")        Wt Readings from Last 1 Encounters:   06/19/20 78 kg (172 lb)        Other Current Antibiotics Ceftriaxone 1g IV   Significant Cultures pending   Serum Creatinine Lab Results   Component Value Date/Time    Creatinine 4.72 (H) 06/21/2020 10:35 AM    Creatinine, POC 1.1 01/16/2020 09:28 AM      Creatinine Clearance Estimated Creatinine Clearance: 9.2 mL/min (A) (based on SCr of 4.72 mg/dL (H)). BUN Lab Results   Component Value Date/Time    BUN 47 (H) 06/21/2020 10:35 AM    BUN, POC 17 01/16/2020 09:28 AM      WBC Lab Results   Component Value Date/Time    WBC 13.3 (H) 06/21/2020 11:25 PM      H/H Lab Results   Component Value Date/Time    HGB 11.2 (L) 06/21/2020 11:25 PM      Platelets Lab Results   Component Value Date/Time    PLATELET 504 19/68/3710 11:25 PM      Temp 98.2 °F (36.8 °C)     Start Vancomycin therapy, with loading dose of 1500mg IV for one dose  Due to patient's decreased renal function, will order a 24 hour random after first dose    Pharmacy to follow daily and will make changes to dose and/or frequency based levels and clinical status.     Pharmacist Wil Kaur, [facility]

## 2020-06-22 NOTE — PROGRESS NOTES
D/C Plan: [facility] (pt must arrive before 2:00pm on day of discharge)    Noted pt is not medically stable at this time [facility]. Pt has a LTSS/UAI at [facility]. CM to continue to follow and assist with transition back to this facility when medically stable.     Care Management Interventions  Mode of Transport at Discharge: BLS  Transition of Care Consult (CM Consult): Discharge Planning(From [facility] (LT))  Current Support Network: Nursing Facility([facility])  The Plan for Transition of Care is Related to the Following Treatment Goals : Return to [facility]  Discharge Location  Discharge Placement: Skilled nursing facility([facility])

## 2020-06-22 NOTE — PROGRESS NOTES
In contact with vascular and pt nurse. Pending consult to nephrology to determine if PICC placement is OK.

## 2020-06-22 NOTE — PROGRESS NOTES
Problem: Falls - Risk of  Goal: *Absence of Falls  Description: Document Lisa Graham Fall Risk and appropriate interventions in the flowsheet. Outcome: Progressing Towards Goal  Note: Fall Risk Interventions:  Mobility Interventions: Patient to call before getting OOB    Mentation Interventions: Adequate sleep, hydration, pain control    Medication Interventions: Patient to call before getting OOB    Elimination Interventions: Toileting schedule/hourly rounds              Problem: Patient Education: Go to Patient Education Activity  Goal: Patient/Family Education  Outcome: Progressing Towards Goal     Problem: Pressure Injury - Risk of  Goal: *Prevention of pressure injury  Description: Document Rickie Scale and appropriate interventions in the flowsheet. Outcome: Progressing Towards Goal  Note: Pressure Injury Interventions:  Sensory Interventions: Assess changes in LOC    Moisture Interventions: Absorbent underpads    Activity Interventions: Pressure redistribution bed/mattress(bed type)    Mobility Interventions: Pressure redistribution bed/mattress (bed type)    Nutrition Interventions: Document food/fluid/supplement intake    Friction and Shear Interventions: Apply protective barrier, creams and emollients                Problem: Patient Education: Go to Patient Education Activity  Goal: Patient/Family Education  Outcome: Progressing Towards Goal     Problem: Diabetes Self-Management  Goal: *Disease process and treatment process  Description: Define diabetes and identify own type of diabetes; list 3 options for treating diabetes. Outcome: Progressing Towards Goal  Goal: *Incorporating nutritional management into lifestyle  Description: Describe effect of type, amount and timing of food on blood glucose; list 3 methods for planning meals. Outcome: Progressing Towards Goal  Goal: *Incorporating physical activity into lifestyle  Description: State effect of exercise on blood glucose levels.   Outcome: Progressing Towards Goal  Goal: *Developing strategies to promote health/change behavior  Description: Define the ABC's of diabetes; identify appropriate screenings, schedule and personal plan for screenings. Outcome: Progressing Towards Goal  Goal: *Using medications safely  Description: State effect of diabetes medications on diabetes; name diabetes medication taking, action and side effects. Outcome: Progressing Towards Goal  Goal: *Monitoring blood glucose, interpreting and using results  Description: Identify recommended blood glucose targets  and personal targets. Outcome: Progressing Towards Goal  Goal: *Prevention, detection, treatment of acute complications  Description: List symptoms of hyper- and hypoglycemia; describe how to treat low blood sugar and actions for lowering  high blood glucose level. Outcome: Progressing Towards Goal  Goal: *Prevention, detection and treatment of chronic complications  Description: Define the natural course of diabetes and describe the relationship of blood glucose levels to long term complications of diabetes.   Outcome: Progressing Towards Goal  Goal: *Developing strategies to address psychosocial issues  Description: Describe feelings about living with diabetes; identify support needed and support network  Outcome: Progressing Towards Goal  Goal: *Insulin pump training  Outcome: Progressing Towards Goal  Goal: *Sick day guidelines  Outcome: Progressing Towards Goal  Goal: *Patient Specific Goal (EDIT GOAL, INSERT TEXT)  Outcome: Progressing Towards Goal     Problem: Patient Education: Go to Patient Education Activity  Goal: Patient/Family Education  Outcome: Progressing Towards Goal

## 2020-06-22 NOTE — PROGRESS NOTES
Pt in stable condition, no s/s of acute distress noted/voiced. Pt drowsy and slow to follow command. Plan of care discussed, verbalized understanding. Comfort and care given. All safety and comfort measures in place. Legally blind, [place], right AKA, skin tear to buttocks and unstageable to right heel. Wound prevention/protection in place. CLRT as tolerated. Colostomy care performed, brown soft outpt. Right percutaneous nephrostomy care performed - draining moderate serosanguinous outpt. LLE IO in place, IVF tolerating well. BP treading low, albumin infusing. Will continue to monitor. 2232:  MD aware of positive gram stains. Repeat blood cultures and antibiotics ordered. 0630:  Pt voiced left knee discomfort, noted pt removing IO dressing. Pt educated and site wrapped. Will continue to monitor. Pt called son to bring in her audio book. Educated on TV use, reenforcement/assitance needed. 4140:  Bedside shift change report given to L-3 Amira (oncoming nurse) by Rita Bailon RN (offgoing nurse). Report included the following information SBAR, Kardex, Procedure Summary, MAR and Recent Results.

## 2020-06-22 NOTE — PROGRESS NOTES
Hospitalist Progress Note    Patient: Ayesha Ko MRN: [id number]  CSN: [id number]    YOB: 1940  Age: [de-identified] y.o.   Sex: female    DOA: 6/19/2020 LOS:  LOS: 3 days          Chief Complaint:    ARF      Assessment/Plan     Admitted for nephrostomy complication-tube replaced yesterday  eliquis had to be held for IR to replace tube  ARF worsened since admission due to obstruction with nephrostomy tube  UTI/pyelo    bacteremia X 1 cx-repeats are negative thus far    Legal blindness  NO IV ACCESS-IO I  n left leg-prefer this be out ASAP-need IJ line again, uncertain what happened to right neck IJ placed in ER_bad stick per multiple reports-consult to [facility] surgery-IR said they cant place it    Right AKA    ANEMIA    S/p nephrectomy, 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    HTN-resume home meds    IDDM type 2-resume lantus, SSI PRN    Hypothyroid-resume synthroid    Follow INR-hold eliquis for now-as may need procedure further    Urology and nephrology consults  Push safely IVF via IO line until we get established access    Repeat comprehensive renal US today, follow output from neph tube bulb    Daily BMP, renal panel    Prognosis guarded-DNR  Disposition :  Patient Active Problem List   Diagnosis Code    Pyelonephritis N12    S/p nephrectomy Z90.5    Right flank pain R10.9    Malfunction of nephrostomy tube ([address] T83.098A    Nephrostomy tube displaced ([address] T83.022A    UTI (urinary tract infection) N39.0    Elevated WBC count D72.829    Diabetes ([address] E11.9    Legally blind H54.8    Hypertension I10    Thyroid disease E07.9    Elevated serum creatinine R79.89    S/P colectomy Z90.49    Skin lesion of foot L98.9    Tear of skin of buttock S31.801A    Pressure injury of right heel, unstageable ([facility] L89.610    Hypomagnesemia E83.42    Hyperkalemia E87.5    Heel ulcer ([address] L97.409    ANTONI (acute kidney injury) ([address] N17.9    S/P AKA (above knee amputation), right ([address] Z89.611    Nephrostomy status ([facility] Z93.6    Obstructed nephrostomy tube ([facility] T83.092A    Nephrostomy complication (HCC) I04.372    Acute renal failure (ARF) (HCC) N17.9    Elevated INR R79.1       Subjective:    No new issues reported to me    Chart reviewed-saw she only has IO access and renal function very poor    She is awake and talking, keeps eyes closed, she is blind    IO left leg only access    I stated she has very poor renal function and she needs better IV access to give abx and fluids, and she told me well I have that thing in my leg and doesn't that work?     Review of systems:    Constitutional: denies fevers, chills  Respiratory: denies SOB  Cardiovascular: denies chest pain, palpitations  Gastrointestinal: denies nausea, vomiting, diarrhea      Vital signs/Intake and Output:  Visit Vitals  /52 (BP 1 Location: Right arm, BP Patient Position: At rest)   Pulse (!) 104   Temp 98.2 °F (36.8 °C)   Resp 18   Ht 5' 2\" (1.575 m)   Wt 78 kg (172 lb)   SpO2 100%   Breastfeeding No   BMI 31.46 kg/m²     Current Shift:  06/22 0701 - 06/22 1900  In: -   Out: 150 [Urine:150]  Last three shifts:  06/20 1901 - 06/22 0700  In: 844   Out: 750 [Urine:250]    Exam:    General: obese WF debilitated, alert, NAD, OX3  Head/Neck: NCAT, supple, No masses, No lymphadenopathy  CVS:Regular rate and rhythm, no M/R/G, S1/S2 heard, no thrill  Lungs:Clear to auscultation bilaterally, no wheezes, rhonchi, or rales  Abdomen: Soft, Nontender, No distention, Normal Bowel sounds, No hepatomegaly  Extremities: right AKA, LLE with IO in shin  Neuro:grossly normal , follows commands  Psych:appropriate                Labs: Results:       Chemistry Recent Labs     06/22/20  0330 06/21/20  1035 06/21/20  0623   * 240* 218*    133* 132*   K 4.2 5.0 5.6*    99* 99*   CO2 26 23 25   BUN 52* 47* 46*   CREA 4.34* 4.72* 4.48*   CA 8.1* 8.5 8.6   AGAP 11 11 8   BUCR 12 10* 10*      CBC w/Diff Recent Labs 06/22/20  0330 06/21/20  2325 06/21/20  0623  06/19/20  1415   WBC 9.4 13.3* 17.9*   < > 12.7   RBC 2.83* 3.88* 3.92*   < > 4.12*   HGB 8.0* 11.2* 11.1*   < > 11.9*   HCT 25.5* 35.0 35.8   < > 36.9    300 344   < > 322   GRANS  --   --   --   --  84*   LYMPH  --   --   --   --  7*   EOS  --   --   --   --  0    < > = values in this interval not displayed. Cardiac Enzymes Recent Labs     06/21/20  1035   *   CKND1 CALCULATION NOT PERFORMED WHEN RESULT IS BELOW LINEAR LIMIT      Coagulation Recent Labs     06/21/20  1035 06/20/20  2235  06/19/20  1415   PTP 21.5* 21.4*   < > 21.0*   INR 1.9* 1.9*   < > 1.8*   APTT  --   --   --  48.5*    < > = values in this interval not displayed. Lipid Panel Lab Results   Component Value Date/Time    Cholesterol, total 115 08/28/2019 08:22 AM    HDL Cholesterol 39 (L) 08/28/2019 08:22 AM    LDL, calculated 46.2 08/28/2019 08:22 AM    VLDL, calculated 29.8 08/28/2019 08:22 AM    Triglyceride 149 08/28/2019 08:22 AM    CHOL/HDL Ratio 2.9 08/28/2019 08:22 AM      BNP No results for input(s): BNPP in the last 72 hours. Liver Enzymes No results for input(s): TP, ALB, TBIL, AP in the last 72 hours.     No lab exists for component: SGOT, GPT, DBIL   Thyroid Studies Lab Results   Component Value Date/Time    TSH 3.40 01/24/2020 08:35 AM        Procedures/imaging: see electronic medical records for all procedures/Xrays and details which were not copied into this note but were reviewed prior to creation of Montrell Fleming MD

## 2020-06-22 NOTE — PROGRESS NOTES
Occupational Therapy Screening:  Services are not indicated at this time. An InBasket screening referral was triggered for occupational therapy based on results obtained during the nursing admission assessment. The patients chart was reviewed and the patient is not appropriate for a skilled therapy evaluation at this time. Patient was admitted with need for new Nephrostomy tube placement. Pt is legally blind, agitated, confused. Pt is LTC resident at [facility]. Pt has been bedbound prior to 06/13/19. Staff use Lesly Nguyen for OOB when pt is agreeable. Pt is legally blind and requires assist w/ ADLs and assist/compensation for self-feeding at baseline. Thank you.   Shantell Naik, OTR/L, CSRS

## 2020-06-22 NOTE — WOUND CARE
Attempted to see patient for wound consult. Pt would not interact with this RN, would not follow instructions to turn or to be assessed. Wound care will follow up at a later time to see if pt will be more agreeable.

## 2020-06-22 NOTE — PROGRESS NOTES
0659  Assumed care of patient at this time, assessment complete. Patient alert and oriented x3 occasional confusion. Denies SOB and chest pain. Patient lungs clear bilaterally. Cap refilled  less than 3 seconds. Patient denies numbness and tingling to all extremities. Stated pain 0/10. Patient has intraosseous line to left tibia. Call light and personal items in reach, bed in low position and locked, will continue to monitor patient. Fall risk arm band in place. 1220  Bedside in verbal shift change report given to MORENO Trivedihand RN  (oncoming nurse) by Baldev Moya RN (off going nurse). Report included the following information: SBAR, KARDEX, MAR and recent results.

## 2020-06-22 NOTE — PROGRESS NOTES
NUTRITION UPDATE    -Supplements:  Add Evans BID to help w/ wound healing now that diet is adv       Brien Maciel, PARAG  PAGER:  [phone]

## 2020-06-23 ENCOUNTER — APPOINTMENT (OUTPATIENT)
Dept: INTERVENTIONAL RADIOLOGY/VASCULAR | Age: 80
DRG: 698 | End: 2020-06-23
Attending: HOSPITALIST
Payer: MEDICARE

## 2020-06-23 LAB
ANION GAP SERPL CALC-SCNC: 9 MMOL/L (ref 3–18)
APPEARANCE UR: ABNORMAL
BACTERIA URNS QL MICRO: ABNORMAL /HPF
BILIRUB UR QL: NEGATIVE
BUN SERPL-MCNC: 43 MG/DL (ref 7–18)
BUN/CREAT SERPL: 14 (ref 12–20)
CALCIUM SERPL-MCNC: 7.6 MG/DL (ref 8.5–10.1)
CHLORIDE SERPL-SCNC: 102 MMOL/L (ref 100–111)
CO2 SERPL-SCNC: 27 MMOL/L (ref 21–32)
COLOR UR: YELLOW
CREAT SERPL-MCNC: 3.08 MG/DL (ref 0.6–1.3)
EPITH CASTS URNS QL MICRO: ABNORMAL /LPF (ref 0–5)
ERYTHROCYTE [DISTWIDTH] IN BLOOD BY AUTOMATED COUNT: 14.7 % (ref 11.6–14.5)
GLUCOSE BLD STRIP.AUTO-MCNC: 163 MG/DL (ref 70–110)
GLUCOSE BLD STRIP.AUTO-MCNC: 172 MG/DL (ref 70–110)
GLUCOSE BLD STRIP.AUTO-MCNC: 197 MG/DL (ref 70–110)
GLUCOSE BLD STRIP.AUTO-MCNC: 238 MG/DL (ref 70–110)
GLUCOSE SERPL-MCNC: 185 MG/DL (ref 74–99)
GLUCOSE UR STRIP.AUTO-MCNC: NEGATIVE MG/DL
HCT VFR BLD AUTO: 25.9 % (ref 35–45)
HGB BLD-MCNC: 8.1 G/DL (ref 12–16)
HGB UR QL STRIP: ABNORMAL
INR PPP: 1.6 (ref 0.8–1.2)
IRON SATN MFR SERPL: 8 % (ref 20–50)
IRON SERPL-MCNC: 10 UG/DL (ref 50–175)
KETONES UR QL STRIP.AUTO: NEGATIVE MG/DL
LEUKOCYTE ESTERASE UR QL STRIP.AUTO: ABNORMAL
MCH RBC QN AUTO: 28.2 PG (ref 24–34)
MCHC RBC AUTO-ENTMCNC: 31.3 G/DL (ref 31–37)
MCV RBC AUTO: 90.2 FL (ref 74–97)
NITRITE UR QL STRIP.AUTO: NEGATIVE
PH UR STRIP: 5.5 [PH] (ref 5–8)
PLATELET # BLD AUTO: 250 K/UL (ref 135–420)
PMV BLD AUTO: 9.5 FL (ref 9.2–11.8)
POTASSIUM SERPL-SCNC: 3.6 MMOL/L (ref 3.5–5.5)
PROT UR STRIP-MCNC: 300 MG/DL
PROTHROMBIN TIME: 18.8 SEC (ref 11.5–15.2)
RBC # BLD AUTO: 2.87 M/UL (ref 4.2–5.3)
RBC #/AREA URNS HPF: ABNORMAL /HPF (ref 0–5)
SODIUM SERPL-SCNC: 138 MMOL/L (ref 136–145)
SP GR UR REFRACTOMETRY: 1.02 (ref 1–1.03)
TIBC SERPL-MCNC: 123 UG/DL (ref 250–450)
UROBILINOGEN UR QL STRIP.AUTO: 0.2 EU/DL (ref 0.2–1)
WBC # BLD AUTO: 8.6 K/UL (ref 4.6–13.2)
WBC URNS QL MICRO: ABNORMAL /HPF (ref 0–5)

## 2020-06-23 PROCEDURE — 87040 BLOOD CULTURE FOR BACTERIA: CPT

## 2020-06-23 PROCEDURE — 81001 URINALYSIS AUTO W/SCOPE: CPT

## 2020-06-23 PROCEDURE — 74011000250 HC RX REV CODE- 250: Performed by: INTERNAL MEDICINE

## 2020-06-23 PROCEDURE — 65270000029 HC RM PRIVATE

## 2020-06-23 PROCEDURE — 74011250636 HC RX REV CODE- 250/636: Performed by: INTERNAL MEDICINE

## 2020-06-23 PROCEDURE — 74011250636 HC RX REV CODE- 250/636

## 2020-06-23 PROCEDURE — C1751 CATH, INF, PER/CENT/MIDLINE: HCPCS

## 2020-06-23 PROCEDURE — 82962 GLUCOSE BLOOD TEST: CPT

## 2020-06-23 PROCEDURE — 85610 PROTHROMBIN TIME: CPT

## 2020-06-23 PROCEDURE — 77010033678 HC OXYGEN DAILY

## 2020-06-23 PROCEDURE — 85027 COMPLETE CBC AUTOMATED: CPT

## 2020-06-23 PROCEDURE — 74011250637 HC RX REV CODE- 250/637: Performed by: FAMILY MEDICINE

## 2020-06-23 PROCEDURE — 87086 URINE CULTURE/COLONY COUNT: CPT

## 2020-06-23 PROCEDURE — 80048 BASIC METABOLIC PNL TOTAL CA: CPT

## 2020-06-23 PROCEDURE — 74011250637 HC RX REV CODE- 250/637: Performed by: HOSPITALIST

## 2020-06-23 PROCEDURE — 36592 COLLECT BLOOD FROM PICC: CPT

## 2020-06-23 PROCEDURE — 74011636637 HC RX REV CODE- 636/637: Performed by: FAMILY MEDICINE

## 2020-06-23 PROCEDURE — 36415 COLL VENOUS BLD VENIPUNCTURE: CPT

## 2020-06-23 PROCEDURE — 74011250636 HC RX REV CODE- 250/636: Performed by: HOSPITALIST

## 2020-06-23 PROCEDURE — 02HV33Z INSERTION OF INFUSION DEVICE INTO SUPERIOR VENA CAVA, PERCUTANEOUS APPROACH: ICD-10-PCS | Performed by: PHYSICIAN ASSISTANT

## 2020-06-23 PROCEDURE — 74011000250 HC RX REV CODE- 250: Performed by: RADIOLOGY

## 2020-06-23 PROCEDURE — 83540 ASSAY OF IRON: CPT

## 2020-06-23 RX ORDER — HEPARIN SODIUM 200 [USP'U]/100ML
500 INJECTION, SOLUTION INTRAVENOUS
Status: COMPLETED | OUTPATIENT
Start: 2020-06-23 | End: 2020-06-23

## 2020-06-23 RX ORDER — LIDOCAINE HYDROCHLORIDE 10 MG/ML
1-10 INJECTION INFILTRATION; PERINEURAL
Status: COMPLETED | OUTPATIENT
Start: 2020-06-23 | End: 2020-06-23

## 2020-06-23 RX ORDER — HEPARIN SODIUM (PORCINE) LOCK FLUSH IV SOLN 100 UNIT/ML 100 UNIT/ML
SOLUTION INTRAVENOUS
Status: COMPLETED
Start: 2020-06-23 | End: 2020-06-23

## 2020-06-23 RX ORDER — HEPARIN SODIUM 200 [USP'U]/100ML
INJECTION, SOLUTION INTRAVENOUS
Status: COMPLETED
Start: 2020-06-23 | End: 2020-06-23

## 2020-06-23 RX ORDER — HEPARIN SODIUM (PORCINE) LOCK FLUSH IV SOLN 100 UNIT/ML 100 UNIT/ML
500 SOLUTION INTRAVENOUS
Status: DISCONTINUED | OUTPATIENT
Start: 2020-06-23 | End: 2020-06-26 | Stop reason: HOSPADM

## 2020-06-23 RX ADMIN — VITAMIN D, TAB 1000IU (100/BT) 1 TABLET: 25 TAB at 09:25

## 2020-06-23 RX ADMIN — INSULIN LISPRO 2 UNITS: 100 INJECTION, SOLUTION INTRAVENOUS; SUBCUTANEOUS at 07:45

## 2020-06-23 RX ADMIN — EPOETIN ALFA-EPBX 3000 UNITS: 3000 INJECTION, SOLUTION INTRAVENOUS; SUBCUTANEOUS at 13:52

## 2020-06-23 RX ADMIN — CEFTRIAXONE 1 G: 1 INJECTION, POWDER, FOR SOLUTION INTRAMUSCULAR; INTRAVENOUS at 22:06

## 2020-06-23 RX ADMIN — GABAPENTIN 300 MG: 300 CAPSULE ORAL at 22:06

## 2020-06-23 RX ADMIN — HEPARIN SODIUM 1000 UNITS: 200 INJECTION, SOLUTION INTRAVENOUS at 10:21

## 2020-06-23 RX ADMIN — GABAPENTIN 300 MG: 300 CAPSULE ORAL at 09:25

## 2020-06-23 RX ADMIN — SODIUM BICARBONATE 650 MG TABLET 650 MG: at 22:06

## 2020-06-23 RX ADMIN — LEVOTHYROXINE SODIUM 50 MCG: 0.05 TABLET ORAL at 07:45

## 2020-06-23 RX ADMIN — INSULIN GLARGINE 15 UNITS: 100 INJECTION, SOLUTION SUBCUTANEOUS at 22:05

## 2020-06-23 RX ADMIN — LIDOCAINE HYDROCHLORIDE 2 ML: 10 INJECTION, SOLUTION INFILTRATION; PERINEURAL at 10:23

## 2020-06-23 RX ADMIN — BRIMONIDINE TARTRATE 1 DROP: 2 SOLUTION OPHTHALMIC at 22:07

## 2020-06-23 RX ADMIN — HEPARIN SODIUM IN SODIUM CHLORIDE 1000 UNITS: 200 INJECTION INTRAVENOUS at 10:21

## 2020-06-23 RX ADMIN — ACETAMINOPHEN 650 MG: 325 TABLET ORAL at 16:31

## 2020-06-23 RX ADMIN — TRAZODONE HYDROCHLORIDE 50 MG: 50 TABLET ORAL at 22:06

## 2020-06-23 RX ADMIN — INSULIN LISPRO 4 UNITS: 100 INJECTION, SOLUTION INTRAVENOUS; SUBCUTANEOUS at 22:05

## 2020-06-23 RX ADMIN — SODIUM CHLORIDE 125 ML/HR: 900 INJECTION, SOLUTION INTRAVENOUS at 10:56

## 2020-06-23 RX ADMIN — METOPROLOL SUCCINATE 50 MG: 50 TABLET, EXTENDED RELEASE ORAL at 09:25

## 2020-06-23 RX ADMIN — HEPARIN SODIUM (PORCINE) LOCK FLUSH IV SOLN 100 UNIT/ML 500 UNITS: 100 SOLUTION at 10:23

## 2020-06-23 RX ADMIN — TIMOLOL MALEATE 1 DROP: 5 SOLUTION/ DROPS OPHTHALMIC at 22:07

## 2020-06-23 RX ADMIN — ATORVASTATIN CALCIUM 20 MG: 20 TABLET, FILM COATED ORAL at 09:25

## 2020-06-23 RX ADMIN — INSULIN LISPRO 2 UNITS: 100 INJECTION, SOLUTION INTRAVENOUS; SUBCUTANEOUS at 12:10

## 2020-06-23 RX ADMIN — DOCUSATE SODIUM 100 MG: 100 CAPSULE, LIQUID FILLED ORAL at 22:06

## 2020-06-23 RX ADMIN — DOCUSATE SODIUM 100 MG: 100 CAPSULE, LIQUID FILLED ORAL at 09:25

## 2020-06-23 RX ADMIN — INSULIN LISPRO 2 UNITS: 100 INJECTION, SOLUTION INTRAVENOUS; SUBCUTANEOUS at 16:34

## 2020-06-23 NOTE — CONSULTS
RENAL CONSULT  2020    Patient:  Adama Traore  :  1940  Gender:  female  MRN #:  [id number]    Consulting Physician:  Puma Jackson DO,  Assessment:    )Active Problems:    Nephrostomy complication ([address] ()      Acute renal failure (ARF) ([address] (2020)      Elevated INR (2020)      Blindness  Deafness  Anemia    Plan:    Ok to place PICC, pt is not a candidate for AVF placement nor dialysis  Check Iron profile  Retacrit        History of Present Illness:  Adama Traore is a [de-identified]y.o. year old female with ongoing HTN, unilateral kidney who has nephrostomy tube pulled out, PT has been admitted for ARF. PT need IV access for care. I was asked to see regarding PICC placed. Past Medical History:   Diagnosis Date    Cancer [facility])     vaginal and colon cancer    Cancer ([facility]     Chronic pain     arthritis    Diabetes ([facility]     DVT (deep venous thrombosis) (HCC)     Hypertension     Legally blind     Thyroid disease      Past Surgical History:   Procedure Laterality Date    HX ABOVE KNEE AMPUTATION Right     HX COLOSTOMY      HX ORTHOPAEDIC      HX OTHER SURGICAL      kidney drainage tube    HX UROLOGICAL      kidney removal    IR EXCHANGE NEPHRO PERC RT SI  2019    IR EXCHANGE NEPHRO PERC RT SI  3/21/2019    IR EXCHANGE NEPHRO PERC RT SI  2019    IR EXCHANGE NEPHRO PERC RT SI  2019    IR EXCHANGE NEPHRO PERC RT SI  2019    IR EXCHANGE NEPHRO PERC RT SI  10/3/2019    IR EXCHANGE NEPHRO PERC RT SI  2019    IR EXCHANGE NEPHRO PERC RT SI  2020    IR EXCHANGE NEPHRO PERC RT SI  2020    IR EXCHANGE NEPHRO PERC RT SI  2020    IR EXCHANGE NEPHRO PERC RT SI  2020    IR EXCHANGE NEPHRO PERC RT SI  2020    IR NEPHROSTOMY PERC RT PLC CATH  SI  2020     History reviewed. No pertinent family history.   Allergies   Allergen Reactions    Morphine Nausea and Vomiting    Pcn [Penicillins] Rash     Current Facility-Administered Medications   Medication Dose Route Frequency Provider Last Rate Last Dose    Vancomycin random due on 6/23/20 at 23:00(Dose currently held)  1 Each Other ONCE Jenny Monte MD        heparin (porcine) 100 unit/mL injection 500 Units  500 Units InterCATHeter Q8H PRN Balta Hickey MD   500 Units at 06/23/20 1023    alum-mag hydroxide-simeth (MYLANTA) oral suspension 30 mL  30 mL Oral QID PRN Alexsander England MD        atorvastatin (LIPITOR) tablet 20 mg  20 mg Oral DAILY Alexsander England MD   20 mg at 06/23/20 0925    bisacodyL (DULCOLAX) suppository 10 mg  10 mg Rectal DAILY PRN Alexsander England MD        camphor-menthoL (SARNA) 0.5-0.5 % lotion   Topical DAILY Alexsander England MD        cholecalciferol (VITAMIN D3) (1000 Units /25 mcg) tablet 1 Tab  1,000 Units Oral DAILY Alexsander England MD   1 Tab at 06/23/20 1402    docusate sodium (COLACE) capsule 100 mg  100 mg Oral BID Alexsander England MD   100 mg at 06/23/20 4333    gabapentin (NEURONTIN) capsule 300 mg  300 mg Oral BID Alexsander England MD   300 mg at 06/23/20 0925    insulin glargine (LANTUS) injection 15 Units  15 Units SubCUTAneous QHS Alexsander England MD   15 Units at 06/22/20 2122    levothyroxine (SYNTHROID) tablet 50 mcg  50 mcg Oral ACB Bushra England MD   50 mcg at 06/23/20 0745    metoprolol succinate (TOPROL-XL) XL tablet 50 mg  50 mg Oral DAILY Alexsander England MD   50 mg at 06/23/20 0925    Vancocmycin-Pharmacy to dose  1 Each Other Rx Dosing/Monitoring Jenny Monte MD        sodium bicarbonate tablet 650 mg  650 mg Oral BID Anna Marie Small MD   650 mg at 06/22/20 2047    levoFLOXacin (LEVAQUIN) 250 mg in D5W IVPB  250 mg IntraVENous Q48H Anna Marie Small MD 50 mL/hr at 06/22/20 1526 250 mg at 06/22/20 1526    traZODone (DESYREL) tablet 50 mg  50 mg Oral QHS Anna Marie Small MD   50 mg at 06/22/20 2121    brimonidine (ALPHAGAN) 0.2 % ophthalmic solution 1 Drop  1 Drop Both Eyes Q12H Ngoc England MD   1 Drop at 06/22/20 2135    timolol (TIMOPTIC) 0.5 % ophthalmic solution 1 Drop  1 Drop Both Eyes BID Ngoc England MD   1 Drop at 06/22/20 2135    0.9% sodium chloride infusion 250 mL  250 mL IntraVENous PRN Pepito Monte MD        0.9% sodium chloride infusion  125 mL/hr IntraVENous CONTINUOUS Yuko Shankar  mL/hr at 06/23/20 1056 125 mL/hr at 06/23/20 1056    cefTRIAXone (ROCEPHIN) 1 g in sterile water (preservative free) 10 mL IV syringe  1 g IntraVENous Q24H Pepito Monte MD   1 g at 06/22/20 2205    acetaminophen (TYLENOL) tablet 650 mg  650 mg Oral Q4H PRN Ngoc England MD   650 mg at 06/22/20 1741    oxyCODONE IR (ROXICODONE) tablet 5 mg  5 mg Oral Q4H PRN Ngoc England MD   5 mg at 06/22/20 2046    naloxone (NARCAN) injection 0.4 mg  0.4 mg IntraVENous PRN Ngoc England MD        ondansetron (ZOFRAN) injection 4 mg  4 mg IntraVENous Q4H PRN Ngoc England MD   4 mg at 06/21/20 0945    insulin lispro (HUMALOG) injection   SubCUTAneous AC&HS Ngoc England MD   2 Units at 06/23/20 0745    glucose chewable tablet 16 g  16 g Oral PRN Nogc England MD        glucagon (GLUCAGEN) injection 1 mg  1 mg IntraMUSCular PRN Ngoc England MD        dextrose 10% infusion 125-250 mL  125-250 mL IntraVENous PRN Ngoc England MD           Review of Symptoms:  PT is hard of hearing, only complaint has leg pain     Objective:  Visit Vitals  /49 (BP 1 Location: Right arm, BP Patient Position: At rest)   Pulse 98   Temp 98.4 °F (36.9 °C)   Resp 18   Ht 5' 2\" (1.575 m)   Wt 78 kg (172 lb)   SpO2 100%   Breastfeeding No   BMI 31.46 kg/m²         PT is legally blind  Hard of hearing  Obese  Abdomen is soft, colostomy bag  Nephrostomy tube in place  BKA noted  No edema        Laboratory Data:  Lab Results Component Value Date    BUN 43 (H) 06/23/2020    BUN 52 (H) 06/22/2020    BUN 47 (H) 06/21/2020     06/23/2020     06/22/2020     (L) 06/21/2020    CO2 27 06/23/2020    CO2 26 06/22/2020    CO2 23 06/21/2020     Lab Results   Component Value Date    WBC 8.6 06/23/2020    HGB 8.1 (L) 06/23/2020    HCT 25.9 (L) 06/23/2020       Imaging have been reviewed:  )Us Retroperitoneum Comp    Result Date: 6/22/2020  Retroperitoneal Ultrasound History: [de-identified]year-old patient status post right PCN placement for management of obstructive uropathy Comparison: Correlation made with ultrasound images 6/19/2020; CT 1/16/2020. Technique: Multiple gray scale sonographic images of the kidneys and bladder were obtained. Additional color Doppler and the Doppler waveform images were obtained . Findings: The right kidney measures 12.0 cm in length and demonstrates normal parenchymal echotexture. Moderate renal cortical thinning as noted previously. There is no hydronephrosis. No suspicious renal lesion. No shadowing calculi. The left kidney is surgically absent. The bladder is nonvisualized. IMPRESSION: 1. No evidence of right-sided hydronephrosis status post percutaneous nephrostomy catheter placement. Ir Exchange Nephro Perc Rt Si    Result Date: 6/19/2020  PLANNED PROCEDURE: Right nephrostomy tube replacement ATTENDING: Jim Soulier M.D. COMPLICATIONS: None SPECIMEN: None DOSE: Fluoroscopic time: 4 minutes Fluoroscopic dose (reference air kerma): 82 mGy INDICATION: Dislodged right nephrostomy tube. TECHNIQUE: Informed consent was obtained. Procedural time out was performed. Maximal sterile barrier technique (including cap, mask, sterile gown, sterile gloves, large sterile sheet, hand hygiene, and cutaneous antisepsis per current guidelines) was followed. The right back was prepped and draped in sterile fashion.  Contrast injection via the existing tract was performed demonstrating no fistulous communication to the kidney. Attempts to manipulate catheter and Glidewire were unsuccessful. Bedside ultrasound was performed demonstrating moderate hydronephrosis. Procedure was aborted, pending vascular access and routine labs. Sterile dressing was applied. IMPRESSION: Unable to replace right nephrostomy tube as described above. Plan to place a new nephrostomy tube pending vascular access and laboratory findings. Ir Exchange Nephro Perc Rt Si    Result Date: 6/2/2020  PROCEDURE: Right antegrade pyelogram Right nephrostomy tube exchange ATTENDING: Nikki Platt M.D. COMPLICATIONS: None SPECIMEN: None DOSE: Fluoroscopic time: 0.5 minutes Fluoroscopic dose (reference air kerma): 6 mGy INDICATION: Routine right nephrostomy tube exchange. TECHNIQUE: Informed consent was obtained. Procedural time out was performed. Maximal sterile barrier technique (including cap, mask, sterile gown, sterile gloves, large sterile sheet, hand hygiene, and cutaneous antisepsis per current guidelines) was followed. Initial  image demonstrated indwelling nephrostomy tube. Contrast injection was performed. The catheter was then removed over wire and a new 8 [place] nephrostomy tube was placed with distal tip in the renal pelvis and fluoroscopic guidance. Repeat injection demonstrated appropriate position. The catheter was secured in place and passed to gravity drainage. Sterile dressing was applied. IMPRESSION: Routine right nephrostomy tube exchange as above. Ir Exchange Nephro Perc Rt Si    Result Date: 4/13/2020  PROCEDURE: Right antegrade pyelogram Right nephrostomy tube exchange ATTENDING: Nikki Platt M.D. COMPLICATIONS: None SPECIMEN: None DOSE: Fluoroscopic time: 0.7 minutes Fluoroscopic dose (reference air kerma): 10 mGy INDICATION: Routine right nephrostomy tube exchange. TECHNIQUE: Informed consent was obtained. Procedural time out was performed.  Maximal sterile barrier technique (including cap, mask, sterile gown, sterile gloves, large sterile sheet, hand hygiene, and cutaneous antisepsis per current guidelines) was followed. Initial  image demonstrated indwelling nephrostomy tube. Contrast injection was performed. The catheter was then removed over wire and a new 8 La[place] nephrostomy tube was placed with distal tip in the renal pelvis and fluoroscopic guidance. Repeat injection demonstrated appropriate position. The catheter was secured in place and passed to gravity drainage. Sterile dressing was applied. IMPRESSION: Routine right nephrostomy tube exchange as above. Ir Nephrostomy Perc Rt Plc Cath  Si    Result Date: 6/21/2020  PROCEDURE: Right percutaneous nephrostomy catheter placement INDICATION:  Single kidney with history of obstructive uropathy status post right percutaneous nephrostomy (PCN) placement. Catheter inadvertently removed. Unable to cannulate previous percutaneous tract. Technique:  After the procedure, as well as its risks, benefits and alternatives were explained to the patient and her son (POA), and all questions answered, telephone informed consent was obtained from the son and witnessed. The procedure was performed under conscious sedation with constant monitoring. Limited imaging demonstrates right kidney demonstrates moderate hydronephrosis. Image(s) obtained and entered into patient record. The patient's right flank was prepped and draped in sterile fashion and lidocaine was used local anesthesia. Sterile probe cover and sterile gel was used for ultrasound imaging. A 21 gauge trocar needle was advanced from a posterior oblique approach into a posterior upper pole calyx under direct ultrasound guidance. Needle was aspirated and sample of fluid sent for laboratory evaluation. Contrast was injected to opacify the upper collecting system. A 0.018 inch wire was advanced into the upper collecting system over which an Accustick system was used. Contrast injected to document position.   Over an Amplatz super stiff wire, a 10 Portuguese locking APD catheter was placed into the right renal pelvis. Contrast was injected to document position. Catheter was also aspirated. The catheter was adhered to the skin using a Percustay device. The patient tolerated procedure well and there were no immediate complications. Sedation: Versed 0.5 mg IV. Fentanyl 25 micrograms IV. IV Antibiotics:  Clindamycin 600 mg intraosseous. Monitoring start time:  1422 hrs. Monitoring end time:  1440 hrs. Monitored by:  Jaspreet Muniz R.N. Radiation dose (reference air kerma):  26 mGy. GUIDANCE: Ultrasound and fluoroscopy guidance was used to position (and confirm the position of) the needle / catheter. Image(s) saved in PACS: Ultrasound and fluoroscopy _________________________________________ FINDINGS: There is successful placement of a right percutaneous nephrostomy catheter with its tip in the renal pelvis. The upper collecting system drains well through the catheter. _________________________________________     IMPRESSION: Successful placement of a right percutaneous nephrostomy catheter. Us Kidney Rt    Result Date: 6/19/2020  ULTRASOUND ABDOMEN, LIMITED History: Pre-Nephrostomy tube reinsertion Technique: Transabdominal limited exam of the right kidney. Findings: R. KIDNEY: The right kidney is of  normal size, and of  normal position, and cortical echogenicity, with moderate renal pelvicaliectasis but no evident echogenic shadowing calculus. , measuring roughly 10 cm longest axis. IMPRESSION:  1. Moderate right renal pelvic caliectasis      Nc Us Technologist Service    Result Date: 6/21/2020  PROCEDURE: Right percutaneous nephrostomy catheter placement INDICATION:  Single kidney with history of obstructive uropathy status post right percutaneous nephrostomy (PCN) placement. Catheter inadvertently removed. Unable to cannulate previous percutaneous tract.  Technique:  After the procedure, as well as its risks, benefits and alternatives were explained to the patient and her son (POA), and all questions answered, telephone informed consent was obtained from the son and witnessed. The procedure was performed under conscious sedation with constant monitoring. Limited imaging demonstrates right kidney demonstrates moderate hydronephrosis. Image(s) obtained and entered into patient record. The patient's right flank was prepped and draped in sterile fashion and lidocaine was used local anesthesia. Sterile probe cover and sterile gel was used for ultrasound imaging. A 21 gauge trocar needle was advanced from a posterior oblique approach into a posterior upper pole calyx under direct ultrasound guidance. Needle was aspirated and sample of fluid sent for laboratory evaluation. Contrast was injected to opacify the upper collecting system. A 0.018 inch wire was advanced into the upper collecting system over which an Accustick system was used. Contrast injected to document position. Over an Amplatz super stiff wire, a 10 Faroese locking APD catheter was placed into the right renal pelvis. Contrast was injected to document position. Catheter was also aspirated. The catheter was adhered to the skin using a Percustay device. The patient tolerated procedure well and there were no immediate complications. Sedation: Versed 0.5 mg IV. Fentanyl 25 micrograms IV. IV Antibiotics:  Clindamycin 600 mg intraosseous. Monitoring start time:  1422 hrs. Monitoring end time:  1440 hrs. Monitored by:  Jaspreet Muniz R.N. Radiation dose (reference air kerma):  26 mGy. GUIDANCE: Ultrasound and fluoroscopy guidance was used to position (and confirm the position of) the needle / catheter. Image(s) saved in PACS: Ultrasound and fluoroscopy _________________________________________ FINDINGS: There is successful placement of a right percutaneous nephrostomy catheter with its tip in the renal pelvis.   The upper collecting system drains well through the catheter. _________________________________________     IMPRESSION: Successful placement of a right percutaneous nephrostomy catheter.       Total time spent 45 minutes        )Malick Jordan DO,

## 2020-06-23 NOTE — PROCEDURES
Interventional Radiology Brief Procedure Note    Performed By:  Gio Lorenz PA-C    Attending Radiologist: Timo Valerio MD    Pre-operative Diagnosis:  Need for long term IV antibiotics    Post-operative Diagnosis: Same as pre-op diagnosis    Procedure(s) Performed:  Ultrasound & fluoroscopic guided PICC line placement    Anesthesia:  Local      Findings:  Successful left arm dual lumen 5F PICC line placement. Please see dictated report for details. Complications: None immediate    Estimated Blood Loss:  Minimal    Specimens: None    Condition: Stable    Disposition:  Return to nursing unit. PICC line is ready for immediate use.        Gio Lorenz PA-C  [facility]  Vascular & Interventional Radiology  6/23/2020

## 2020-06-23 NOTE — PROGRESS NOTES
Pt in stable condition, no s/s of acute distress noted/voiced. Pt drowsy and slow to follow command. Plan of care discussed, verbalized understanding. Comfort and care given. All safety and comfort measures in place. 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 Wound prevention/protection in place. CLRT as tolerated. Colostomy care performed, brown soft outpt. Right percutaneous nephrostomy care performed - draining moderate serosanguinous outpt. Flushed as per MD order. LLE IO in place, IVF rate tolerated. Pending PICC placement. 0069: Bedside shift change report given to Ira TAVARES (oncoming nurse) by Bhargav Siddiqui RN (offgoing nurse). Report included the following information SBAR, Kardex, Procedure Summary, MAR and Recent Results.

## 2020-06-23 NOTE — CONSULTS
Urology Consult  Full consult to follow. Patient briefly:  Had nepohrostomy tube accidentally removed on 6/18/2020. Enough time had passed that a new catheter could not be easily placed in IR and a new stick had to be done. Her creatinine continued to increase. REnal US (6/22/2020) = negative for hydro on right (absent left kidney)    Assessment:     Active Problems:    Nephrostomy complication ([address] (1/94/9288)      Acute renal failure (ARF) (HCC) (6/21/2020)      Elevated INR (6/21/2020)    Acute renal failure tht persists even after new neph tube placed    Plan:     There is no hydro.   No need for acute urologic intervention

## 2020-06-23 NOTE — PROGRESS NOTES
0740- Bedside and Verbal shift change report given to Robin Ramirez RN (oncoming nurse) by Grisel Lozada RN (offgoing nurse). Report included the following information SBAR, Kardex and MAR.     0915- Flushed nephrostomy with 10cc. 1000- Patient taken to IR for PICC placement    1057- Patient returned to floor. PICC flushed. IV fluids resumed through purple port of PICC. Medic, Jarad Big, called to assist in removal of IO.    1105- IO removed. Pressure dressing placed. 1340- Page placed for Dr. Sun Mercado to clarify orders for Retacrit    1350- Confirmed with Dr. Sun Mercado to administer Retacrit today, despite [facility] administration orders. 1631- Tylenol administered due to temp of 100.4. Cool cloth placed on forehead. 1710- Temperature reassessed at 100.7. Dr. Jeramy Marte paged to inform her of vitals. Awaiting call back. 200- Informed Dr. Jeramy Marte of patient's elevated temperature. Instructed to continue to monitor. No further orders received at this time. Provider stated that she will assess patient once on unit. 1849- Urine specimen sent to lab    Provided verbal/bedside report to KRANTHI Rhodes RN. Report included SBAR, Kardex and MAR.

## 2020-06-23 NOTE — CONSULTS
Urology Consult    Subjective:     Date of Consultation:  June 23, 2020    Referring Physician: Dr Tomasa Maher    Reason for Consultation:  hydronephrosis    History of Present Illness:   Patient is a [de-identified] y.o.  female who is being seen for acute renal failure after unexpected removal of neph tube. She was admitted to the hospital for Nephrostomy complication ([facility] [S53.916]. She had her tube removed at [address] [place] [address] by accident. Prior to that it was functioning fine. No hematuria. No pain. Of note - she is soemwhat slow to respond to questions and is quite lethargic today    Past Medical History:   Diagnosis Date    Cancer ([facility]     vaginal and colon cancer    Cancer ([facility]     Chronic pain     arthritis    Diabetes ([facility]     DVT (deep venous thrombosis) ([facility]     Hypertension     Legally blind     Thyroid disease       Past Surgical History:   Procedure Laterality Date    HX ABOVE KNEE AMPUTATION Right     HX COLOSTOMY      HX ORTHOPAEDIC      HX OTHER SURGICAL      kidney drainage tube    HX UROLOGICAL      kidney removal    IR EXCHANGE NEPHRO PERC RT SI  1/28/2019    IR EXCHANGE NEPHRO PERC RT SI  3/21/2019    IR EXCHANGE NEPHRO PERC RT SI  5/13/2019    IR EXCHANGE NEPHRO PERC RT SI  6/27/2019    IR EXCHANGE NEPHRO PERC RT SI  8/16/2019    IR EXCHANGE NEPHRO PERC RT SI  10/3/2019    IR EXCHANGE NEPHRO PERC RT SI  11/18/2019    IR EXCHANGE NEPHRO PERC RT SI  1/6/2020    IR EXCHANGE NEPHRO PERC RT SI  2/24/2020    IR EXCHANGE NEPHRO PERC RT SI  4/13/2020    IR EXCHANGE NEPHRO PERC RT SI  6/1/2020    IR EXCHANGE NEPHRO PERC RT SI  6/19/2020    IR NEPHROSTOMY PERC RT PLC CATH  SI  6/21/2020      History reviewed. No pertinent family history.    Social History     Tobacco Use    Smoking status: Never Smoker    Smokeless tobacco: Never Used   Substance Use Topics    Alcohol use: No     Allergies   Allergen Reactions    Morphine Nausea and Vomiting    Pcn [Penicillins] Rash Prior to Admission medications    Medication Sig Start Date End Date Taking? Authorizing Provider   insulin detemir U-100 (LEVEMIR U-100 INSULIN) 100 unit/mL injection 15 Units by SubCUTAneous route nightly. Yes Provider, Historical   diphenhydrAMINE (BENADRYL) 25 mg capsule Take 25 mg by mouth every six (6) hours as needed. Yes Provider, Historical   gabapentin (NEURONTIN) 300 mg capsule Take 1 Cap by mouth two (2) times a day. 4/15/19  Yes Mickie Costello PA-C   insulin regular (NOVOLIN R, HUMULIN R) 100 unit/mL injection by SubCUTAneous route. Yes Provider, Historical   metoprolol succinate (TOPROL-XL) 50 mg XL tablet Take 50 mg by mouth daily. Yes Provider, Historical   insulin aspart U-100 (NOVOLOG FLEXPEN U-100 INSULIN) 100 unit/mL inpn by SubCUTAneous route. Sliding scale   Yes Provider, Historical   levothyroxine (SYNTHROID) 50 mcg tablet Take  by mouth Daily (before breakfast). Yes Provider, Historical   atorvastatin calcium (ATORVASTATIN PO) Take 20 mg by mouth daily. Yes Other, MD Dc   HYDROcodone-acetaminophen (NORCO) 5-325 mg per tablet Take 1 Tab by mouth every six (6) hours as needed for Pain. Provider, Historical   magnesium hydroxide (ROSE MILK OF MAGNESIA) 400 mg/5 mL suspension Take 30 mL by mouth daily as needed for Constipation. Provider, Historical   LORazepam (ATIVAN) 0.5 mg tablet Take 1 Tab by mouth daily as needed for Anxiety. 4/15/19   Mickie Costello PA-C   cholecalciferol (VITAMIN D3) 1,000 unit tablet Take 1,000 Units by mouth daily. Provider, Historical   ondansetron hcl (ZOFRAN) 4 mg tablet Take 4 mg by mouth every eight (8) hours as needed for Nausea. Other, MD Dc   diphenhydrAMINE (BENADRYL) 25 mg capsule Take 50 mg by mouth every six (6) hours as needed for Itching. Provider, Historical   apixaban (ELIQUIS PO) Take 5 mg by mouth two (2) times a day. Ruchi, MD Dc   pollen extracts (PROSTAT PO) Take  by mouth.     Provider, Historical   naloxone (NARCAN) 4 mg/actuation nasal spray Use 1 spray intranasally, then discard. Repeat with new spray every 2 min as needed for opioid overdose symptoms, alternating nostrils. 11/29/18   Celeste Hunter MD   tiZANidine (ZANAFLEX) 2 mg capsule Take 2 mg by mouth two (2) times a day. Provider, Historical   traZODone (DESYREL) 50 mg tablet Take 50 mg by mouth nightly. Provider, Historical   nystatin (MYCOSTATIN) powder Apply 100,000 Units to affected area four (4) times daily. Provider, Historical   camphor-menthol (SARNA) 0.5-0.5 % lotion Apply  to affected area daily. Provider, Historical   docusate sodium (COLACE) 100 mg capsule Take 100 mg by mouth two (2) times a day. Provider, Historical   hydrocortisone (HYTONE) 2.5 % topical cream Apply  to affected area two (2) times a day. use thin layer    Provider, Historical   timolol (TIMOPTIC) 0.5 % ophthalmic solution 1 Drop two (2) times a day. Provider, Historical   brimonidine-timolol (COMBIGAN) 0.2-0.5 % drop ophthalmic solution Administer 1 Drop to both eyes every twelve (12) hours. Provider, Historical   bisacodyl (DULCOLAX, BISACODYL,) 10 mg suppository Insert  into rectum daily as needed. Indications: constipation    Provider, Historical   magnesium hydroxide (MILK OF MAGNESIA CONCENTRATED) 2,400 mg/10 mL susp suspension Take 10 mL by mouth. Provider, Historical   alum-mag hydroxide-simeth (MYLANTA) 200-200-20 mg/5 mL susp Take 30 mL by mouth four (4) times daily as needed. Provider, Historical   sodium bicarbonate 650 mg tablet Take 650 mg by mouth two (2) times a day. Provider, Historical   acetaminophen (TYLENOL) 650 mg TbER Take 650 mg by mouth every six (6) hours as needed for Pain or Fever.     Provider, Historical         Review of Systems:  Not required    Objective:     Patient Vitals for the past 8 hrs:   BP Temp Pulse Resp SpO2   06/23/20 1139 (!) 136/35 98.7 °F (37.1 °C) 92 17 100 %   06/23/20 0726 140/49 98.4 °F (36.9 °C) 98 18 100 %     Temp (24hrs), Av.4 °F (36.9 °C), Min:97.9 °F (36.6 °C), Max:98.7 °F (37.1 °C)      Intake and Output:    1901 -  0700  In: 767.1 [I.V.:767.1]  Out: 9090 [Urine:1250]    Physical Exam:  Gen - Arrousable, but tired,   HEENT - normocephalic atraumatic, poor vision, poor hearing  CV - RRR  Resp - Breathing easy  Abd - soft, nd ,nt  Back - neph tube inplace draining clear urine with dressing dry    BMP:   Lab Results   Component Value Date/Time     2020 12:56 AM    K 3.6 2020 12:56 AM     2020 12:56 AM    CO2 27 2020 12:56 AM    AGAP 9 2020 12:56 AM     (H) 2020 12:56 AM    BUN 43 (H) 2020 12:56 AM    CREA 3.08 (H) 2020 12:56 AM    GFRAA 18 (L) 2020 12:56 AM    GFRNA 15 (L) 2020 12:56 AM        WBC=8.6  HCT=25.9  Ultrasound 2020 -- + hydro and no neph tube in place and no gupta  Ultrasound 2020 - no hydro, neph tube in good position        Assessment:     Active Problems:    Nephrostomy complication (HCC) ()      Acute renal failure (ARF) (HCC) (2020)      Elevated INR (2020)        Acute renal failure that persists even after new neph tube placed. It is slowly improving today    Plan:     There is no hydro.   No need for acute urologic intervention

## 2020-06-23 NOTE — PROGRESS NOTES
Pharmacy Dosing Services: Vancomycin    Consult for Vancomycin Dosing by Pharmacy by Dr. Jamil Wen provided for this [de-identified]y.o. year old female , for indication of UTI x 10days    Vancomycin random level- 20.6mcg/ml on 6/22/20. Patient is supratherapeutic. Will hold dose for now and order a 24hr random level. Goal trough 10-15mcg/ml. Pharmacy to follow and will make changes to dose and/or frequency based on levels and clinical status. Ht Readings from Last 1 Encounters:   06/19/20 157.5 cm (62\")        Wt Readings from Last 1 Encounters:   06/19/20 78 kg (172 lb)        Serum Creatinine Lab Results   Component Value Date/Time    Creatinine 3.08 (H) 06/23/2020 12:56 AM    Creatinine, POC 1.1 01/16/2020 09:28 AM      Creatinine Clearance Estimated Creatinine Clearance: 14.1 mL/min (A) (based on SCr of 3.08 mg/dL (H)).    BUN Lab Results   Component Value Date/Time    BUN 43 (H) 06/23/2020 12:56 AM    BUN, POC 17 01/16/2020 09:28 AM      WBC Lab Results   Component Value Date/Time    WBC 8.6 06/23/2020 12:56 AM      H/H Lab Results   Component Value Date/Time    HGB 8.1 (L) 06/23/2020 12:56 AM      Platelets Lab Results   Component Value Date/Time    PLATELET 994 14/58/5581 12:56 AM      Temp 98.6 °F (37 °C)       Pharmacist Maylin Mock, [address] [facility]

## 2020-06-24 PROBLEM — E11.22 CKD STAGE 3 DUE TO TYPE 2 DIABETES MELLITUS (HCC): Status: ACTIVE | Noted: 2020-06-24

## 2020-06-24 PROBLEM — R78.81 BACTEREMIA: Status: ACTIVE | Noted: 2020-06-24

## 2020-06-24 PROBLEM — B95.2 ENTEROCOCCUS FAECALIS INFECTION: Status: ACTIVE | Noted: 2020-06-24

## 2020-06-24 PROBLEM — N18.30 CKD STAGE 3 DUE TO TYPE 2 DIABETES MELLITUS (HCC): Status: ACTIVE | Noted: 2020-06-24

## 2020-06-24 LAB
ANION GAP SERPL CALC-SCNC: 6 MMOL/L (ref 3–18)
BUN SERPL-MCNC: 31 MG/DL (ref 7–18)
BUN/CREAT SERPL: 18 (ref 12–20)
CALCIUM SERPL-MCNC: 6.9 MG/DL (ref 8.5–10.1)
CHLORIDE SERPL-SCNC: 108 MMOL/L (ref 100–111)
CO2 SERPL-SCNC: 27 MMOL/L (ref 21–32)
CREAT SERPL-MCNC: 1.71 MG/DL (ref 0.6–1.3)
ERYTHROCYTE [DISTWIDTH] IN BLOOD BY AUTOMATED COUNT: 14.6 % (ref 11.6–14.5)
GLUCOSE BLD STRIP.AUTO-MCNC: 116 MG/DL (ref 70–110)
GLUCOSE BLD STRIP.AUTO-MCNC: 131 MG/DL (ref 70–110)
GLUCOSE BLD STRIP.AUTO-MCNC: 168 MG/DL (ref 70–110)
GLUCOSE BLD STRIP.AUTO-MCNC: 203 MG/DL (ref 70–110)
GLUCOSE SERPL-MCNC: 183 MG/DL (ref 74–99)
HCT VFR BLD AUTO: 25.7 % (ref 35–45)
HGB BLD-MCNC: 8 G/DL (ref 12–16)
INR PPP: 1.3 (ref 0.8–1.2)
MCH RBC QN AUTO: 28.5 PG (ref 24–34)
MCHC RBC AUTO-ENTMCNC: 31.1 G/DL (ref 31–37)
MCV RBC AUTO: 91.5 FL (ref 74–97)
PLATELET # BLD AUTO: 246 K/UL (ref 135–420)
PMV BLD AUTO: 9.9 FL (ref 9.2–11.8)
POTASSIUM SERPL-SCNC: 3.3 MMOL/L (ref 3.5–5.5)
PROTHROMBIN TIME: 15.7 SEC (ref 11.5–15.2)
RBC # BLD AUTO: 2.81 M/UL (ref 4.2–5.3)
SODIUM SERPL-SCNC: 141 MMOL/L (ref 136–145)
VANCOMYCIN SERPL-MCNC: 11.2 UG/ML (ref 5–40)
WBC # BLD AUTO: 8.1 K/UL (ref 4.6–13.2)

## 2020-06-24 PROCEDURE — 74011250636 HC RX REV CODE- 250/636: Performed by: INTERNAL MEDICINE

## 2020-06-24 PROCEDURE — 74011250636 HC RX REV CODE- 250/636: Performed by: HOSPITALIST

## 2020-06-24 PROCEDURE — 80202 ASSAY OF VANCOMYCIN: CPT

## 2020-06-24 PROCEDURE — 74011636637 HC RX REV CODE- 636/637: Performed by: FAMILY MEDICINE

## 2020-06-24 PROCEDURE — 74011000250 HC RX REV CODE- 250: Performed by: INTERNAL MEDICINE

## 2020-06-24 PROCEDURE — 85027 COMPLETE CBC AUTOMATED: CPT

## 2020-06-24 PROCEDURE — 80048 BASIC METABOLIC PNL TOTAL CA: CPT

## 2020-06-24 PROCEDURE — 82962 GLUCOSE BLOOD TEST: CPT

## 2020-06-24 PROCEDURE — 77010033678 HC OXYGEN DAILY

## 2020-06-24 PROCEDURE — 74011250637 HC RX REV CODE- 250/637: Performed by: HOSPITALIST

## 2020-06-24 PROCEDURE — 65270000029 HC RM PRIVATE

## 2020-06-24 PROCEDURE — 85610 PROTHROMBIN TIME: CPT

## 2020-06-24 PROCEDURE — 77030040392 HC DRSG OPTIFOAM MDII -A

## 2020-06-24 PROCEDURE — 74011250637 HC RX REV CODE- 250/637: Performed by: FAMILY MEDICINE

## 2020-06-24 RX ADMIN — ATORVASTATIN CALCIUM 20 MG: 20 TABLET, FILM COATED ORAL at 09:24

## 2020-06-24 RX ADMIN — SODIUM CHLORIDE 125 ML/HR: 900 INJECTION, SOLUTION INTRAVENOUS at 03:02

## 2020-06-24 RX ADMIN — LEVOTHYROXINE SODIUM 50 MCG: 0.05 TABLET ORAL at 09:24

## 2020-06-24 RX ADMIN — INSULIN LISPRO 2 UNITS: 100 INJECTION, SOLUTION INTRAVENOUS; SUBCUTANEOUS at 22:15

## 2020-06-24 RX ADMIN — ACETAMINOPHEN 650 MG: 325 TABLET ORAL at 15:42

## 2020-06-24 RX ADMIN — VITAMIN D, TAB 1000IU (100/BT) 1 TABLET: 25 TAB at 09:25

## 2020-06-24 RX ADMIN — INSULIN LISPRO 4 UNITS: 100 INJECTION, SOLUTION INTRAVENOUS; SUBCUTANEOUS at 12:19

## 2020-06-24 RX ADMIN — LEVOFLOXACIN 250 MG: 5 INJECTION, SOLUTION INTRAVENOUS at 12:22

## 2020-06-24 RX ADMIN — ACETAMINOPHEN 650 MG: 325 TABLET ORAL at 22:15

## 2020-06-24 RX ADMIN — SODIUM BICARBONATE 650 MG TABLET 650 MG: at 22:15

## 2020-06-24 RX ADMIN — METOPROLOL SUCCINATE 50 MG: 50 TABLET, EXTENDED RELEASE ORAL at 09:24

## 2020-06-24 RX ADMIN — DOCUSATE SODIUM 100 MG: 100 CAPSULE, LIQUID FILLED ORAL at 22:15

## 2020-06-24 RX ADMIN — VANCOMYCIN HYDROCHLORIDE 1000 MG: 1 INJECTION, POWDER, LYOPHILIZED, FOR SOLUTION INTRAVENOUS at 15:43

## 2020-06-24 RX ADMIN — INSULIN GLARGINE 15 UNITS: 100 INJECTION, SOLUTION SUBCUTANEOUS at 22:15

## 2020-06-24 RX ADMIN — APIXABAN 2.5 MG: 2.5 TABLET, FILM COATED ORAL at 22:15

## 2020-06-24 RX ADMIN — TRAZODONE HYDROCHLORIDE 50 MG: 50 TABLET ORAL at 22:15

## 2020-06-24 RX ADMIN — GABAPENTIN 300 MG: 300 CAPSULE ORAL at 09:24

## 2020-06-24 RX ADMIN — EPOETIN ALFA-EPBX 3000 UNITS: 3000 INJECTION, SOLUTION INTRAVENOUS; SUBCUTANEOUS at 22:53

## 2020-06-24 RX ADMIN — CEFTRIAXONE 1 G: 1 INJECTION, POWDER, FOR SOLUTION INTRAMUSCULAR; INTRAVENOUS at 22:15

## 2020-06-24 RX ADMIN — DOCUSATE SODIUM 100 MG: 100 CAPSULE, LIQUID FILLED ORAL at 09:25

## 2020-06-24 RX ADMIN — GABAPENTIN 300 MG: 300 CAPSULE ORAL at 22:15

## 2020-06-24 NOTE — PROGRESS NOTES
Bedside and verbal shift change report given to Darryl Gorman RN (on coming nurse) by Bon John RN (off going nurse). Report included the following information SBAR, Kardex, OR Summary, Intake/Output and MAR. Pt refuse sodium bicarb pt stated That medication has been d/c for over 10 years. 0930 Pt refuse her eye drop, pt stated \" I d/c my eye drops because I cant see and im not taking it. \"    7298 Labs drawn CBC, BPM, and Prothrombin Time + INR. Shift summary. Pt is in bed resting with bed in lowest position and call light in reach. Colostomy care done x 2. Nephrostomy flushed with 10 cc normal saline, noted draining and patient draining yellow output. Pt c/o pain to left leg, PRN tylenol administered. Dressing change to sacrum, noted CDI. Bedside and verbal shift change report given by Darryl Gorman RN (off going nurse) to Angelo Crockett RN(on coming nurse). Report included the following information SBAR, Kardex, OR Summary, Intake/Output and MAR.

## 2020-06-24 NOTE — CONSULTS
[facility]  (A Division of [address] [place] [address])    Consultation Note      Date of Admission: 6/19/2020    Date of Consultation: 6/24/2020    Referred by: Will Tristan MD    Reason for Referral: Nephrostomy dysfunction/bacteremia    Current Antimicrobials: Prior Antimicrobials   1. Vanco IV (6/22-) #2  2. CAX IV (6/22-) #2 1. Levo IV (6/21-24)       Assessment: Plan:   Limited community-acquired Enterococcus faecalis sepsis - in conjunction with R nephrotomy tube dysfunction/accidental removal.    Forget the CoNS recovered from same admission BCx (6/20); the enterococcus is worrisome, but has not grown out since in multiple BCx. So SBE unlikely, but not 0%. Recommend RICHARD for good measure - if negative, give her 2wks IV CAX+Vanco combination; if positive for vegetations, she'll go 6wks. ->CAX/Vanco #2/14 (or 42)      RICHARD.    IF (-), terminate [id number]; if (+), terminate 3AUG; weekly labs (qMondays) - CBC and CMP; fax to me [phone]. NO follow up me necessary at this point, unless RICHARD returns (+). 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 ANTONI on CKD    DMT2    R renal calcinosis      Microbiology:  6/23 - BCx (-)      UA sent     6/22 - BCx (-)     6/21 - BCx (-)      UA (+) Proteus mirabilis     6/20 - BCx (+) Enterococcus faecalis and CoNS       3/24/20 - UA (+) Proteus mirabilis (R- FQ, nitro, and T/S)      Lines / Catheters: L PICC      HPI:  CC:  I can't see  Ms Mora is a blind [de-identified] WF survivor of many things who most recently had accidental dis-lodging of her chronic/permanent R nephrostomy tube on 6/16 with subsequent progressive R back pain and malaise; also had abdominal pain, Nausea. Since having it replaced 6/21, her symptoms have settled. No f/s/c.     Retired [employer]    Diagnosis Date Noted    Bacteremia 06/24/2020    Acute renal failure (ARF) ([facility] 06/21/2020    Elevated INR 06/21/2020    [facility]rost[facility] ([facility] 04/73/3967    Legally blind 11/26/2018    Hypertension 11/26/2018    Pyelonephritis 05/18/2018     Past Medical History:   Diagnosis Date    Cancer ([facility]     vaginal and colon cancer    [facility]     Chronic pain     arthritis    Diabetes ([facility]     DVT (deep venous thrombosis) (HCC)     Hypertension     Legally blind     Thyroid disease      Past Surgical History:   Procedure Laterality Date    HX ABOVE KNEE AMPUTATION Right     HX COLOSTOMY      HX ORTHOPAEDIC      HX OTHER SURGICAL      kidney drainage tube    HX UROLOGICAL      kidney removal    IR EXCHANGE NEPHRO PERC RT SI  1/28/2019    IR EXCHANGE NEPHRO PERC RT SI  3/21/2019    IR EXCHANGE NEPHRO PERC RT SI  5/13/2019    IR EXCHANGE NEPHRO PERC RT SI  6/27/2019    IR EXCHANGE NEPHRO PERC RT SI  8/16/2019    IR EXCHANGE NEPHRO PERC RT SI  10/3/2019    IR EXCHANGE NEPHRO PERC RT SI  11/18/2019    IR EXCHANGE NEPHRO PERC RT SI  1/6/2020    IR EXCHANGE NEPHRO PERC RT SI  2/24/2020    IR EXCHANGE NEPHRO PERC RT SI  4/13/2020    IR EXCHANGE NEPHRO PERC RT SI  6/1/2020    IR EXCHANGE NEPHRO PERC RT SI  6/19/2020    IR NEPHROSTOMY PERC RT PLC CATH  SI  6/21/2020     History reviewed. No pertinent family history.   Social History     Socioeconomic History    Marital status:      Spouse name: Not on file    Number of children: Not on file    Years of education: Not on file    Highest education level: Not on file   Occupational History    Not on file   Social Needs    Financial resource strain: Not on file    Food insecurity     Worry: Not on file     Inability: Not on file    Transportation needs     Medical: Not on file     Non-medical: Not on file   Tobacco Use    Smoking status: Never Smoker    Smokeless tobacco: Never Used   Substance and Sexual Activity    Alcohol use: No    Drug use: Never    Sexual activity: Not on file Lifestyle    Physical activity     Days per week: Not on file     Minutes per session: Not on file    Stress: Not on file   Relationships    Social connections     Talks on phone: Not on file     Gets together: Not on file     Attends Orthodoxy service: Not on file     Active member of club or organization: Not on file     Attends meetings of clubs or organizations: Not on file     Relationship status: Not on file    Intimate partner violence     Fear of current or ex partner: Not on file     Emotionally abused: Not on file     Physically abused: Not on file     Forced sexual activity: Not on file   Other Topics Concern     Service Not Asked    Blood Transfusions Not Asked    Caffeine Concern Not Asked    Occupational Exposure Not Asked   Inder Emerson Hazards Not Asked    Sleep Concern Not Asked    Stress Concern Not Asked    Weight Concern Not Asked    Special Diet Not Asked    Back Care Not Asked    Exercise Not Asked    Bike Helmet Not Asked   [address] Not Asked    Self-Exams Not Asked   Social History Narrative    Not on file       Allergies:  Morphine and Pcn [penicillins] .      Medications:  Current Facility-Administered Medications   Medication Dose Route Frequency    [START ON 6/25/2020] iron sucrose (VENOFER) 300 mg in 0.9% sodium chloride 250 mL IVPB  300 mg IntraVENous ONCE    vancomycin (VANCOCIN) 1,000 mg in 0.9% sodium chloride 250 mL (VIAL-MATE)  1,000 mg IntraVENous ONCE    apixaban (ELIQUIS) tablet 2.5 mg  2.5 mg Oral BID    cefTRIAXone (ROCEPHIN) 1 g in sterile water (preservative free) 10 mL IV syringe  1 g IntraVENous Q24H    heparin (porcine) 100 unit/mL injection 500 Units  500 Units InterCATHeter Q8H PRN    epoetin anna-epbx (RETACRIT) injection 3,000 Units  3,000 Units SubCUTAneous Q MON, WED & FRI    alum-mag hydroxide-simeth (MYLANTA) oral suspension 30 mL  30 mL Oral QID PRN    atorvastatin (LIPITOR) tablet 20 mg  20 mg Oral DAILY    bisacodyL (DULCOLAX) suppository 10 mg  10 mg Rectal DAILY PRN    camphor-menthoL (SARNA) 0.5-0.5 % lotion   Topical DAILY    cholecalciferol (VITAMIN D3) (1000 Units /25 mcg) tablet 1 Tab  1,000 Units Oral DAILY    docusate sodium (COLACE) capsule 100 mg  100 mg Oral BID    gabapentin (NEURONTIN) capsule 300 mg  300 mg Oral BID    insulin glargine (LANTUS) injection 15 Units  15 Units SubCUTAneous QHS    levothyroxine (SYNTHROID) tablet 50 mcg  50 mcg Oral ACB    metoprolol succinate (TOPROL-XL) XL tablet 50 mg  50 mg Oral DAILY    Vancocmycin-Pharmacy to dose  1 Each Other Rx Dosing/Monitoring    sodium bicarbonate tablet 650 mg  650 mg Oral BID    traZODone (DESYREL) tablet 50 mg  50 mg Oral QHS    brimonidine (ALPHAGAN) 0.2 % ophthalmic solution 1 Drop  1 Drop Both Eyes Q12H    timolol (TIMOPTIC) 0.5 % ophthalmic solution 1 Drop  1 Drop Both Eyes BID    0.9% sodium chloride infusion 250 mL  250 mL IntraVENous PRN    acetaminophen (TYLENOL) tablet 650 mg  650 mg Oral Q4H PRN    oxyCODONE IR (ROXICODONE) tablet 5 mg  5 mg Oral Q4H PRN    naloxone (NARCAN) injection 0.4 mg  0.4 mg IntraVENous PRN    ondansetron (ZOFRAN) injection 4 mg  4 mg IntraVENous Q4H PRN    insulin lispro (HUMALOG) injection   SubCUTAneous AC&HS    glucose chewable tablet 16 g  16 g Oral PRN    glucagon (GLUCAGEN) injection 1 mg  1 mg IntraMUSCular PRN    dextrose 10% infusion 125-250 mL  125-250 mL IntraVENous PRN        ROS:  Constitutional: positive for fatigue and malaise, negative for fevers, chills and sweats  Eyes: 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  female in no acute distress. ENT: ENT exam normal, no neck nodes or sinus tenderness  Head: normocephalic, without obvious abnormality  Mouth:  mucous membranes moist, pharynx normal without lesions  Neck: supple, symmetrical, trachea midline   Cardio:  regular rate and rhythm, S1, S2 normal, no murmur, click, rub or gallop  Chest: inspection normal - no chest wall deformities or tenderness, respiratory effort normal  Lungs: clear to auscultation, no wheezes or rales and unlabored breathing  Abdomen: soft, non-tender. Bowel sounds normal. No masses, no organomegaly.   Extremities:  no redness or tenderness in the calves or thighs, no edema  Neuro: Grossly normal     Lab results:    Chemistry  Recent Labs     06/24/20  1259 06/23/20  0056 06/22/20  0330   * 185* 129*    138 139   K 3.3* 3.6 4.2    102 102   CO2 27 27 26   BUN 31* 43* 52*   CREA 1.71* 3.08* 4.34*   CA 6.9* 7.6* 8.1*   AGAP 6 9 11   BUCR 18 14 12       CBC w/ Diff  Recent Labs     06/24/20  1259 06/23/20  0056 06/22/20  0330   WBC 8.1 8.6 9.4   RBC 2.81* 2.87* 2.83*   HGB 8.0* 8.1* 8.0*   HCT 25.7* 25.9* 25.5*    250 261       Microbiology  All Micro Results     Procedure Component Value Units Date/Time    CULTURE, URINE [[id number]]  (Abnormal) Collected:  06/21/20 1430    Order Status:  Completed Specimen:  Cath Urine Updated:  06/24/20 1452     Special Requests: NO SPECIAL REQUESTS        [place] Count --        >100,000  COLONIES/mL       Culture result:       PROTEUS MIRABILIS SENSITIVITY TO FOLLOW          CULTURE, BLOOD [[id number]]  (Abnormal)  (Susceptibility) Collected:  06/20/20 2235    Order Status:  Completed Specimen:  Blood Updated:  06/24/20 1038     Special Requests: NO SPECIAL REQUESTS        GRAM STAIN GRAM POSITIVE COCCI IN CHAINS AEROBIC BOTTLE            LORIE LOUIS RN 3S TO CAF [id number] AT 2155     Culture result:       ENTEROCOCCUS FAECALIS GROWING IN THE AEROBIC BOTTLE (NO SITE INDICATED)                  STAPHYLOCOCCUS SPECIES, COAGULASE NEGATIVE ALSO GROWING IN THE ABOVE AEROBIC BOTTLE IDENTIFICATION AND SUSCEPTIBILITY TO FOLLOW          CULTURE, BLOOD [[id number]] Collected:  06/23/20 2225    Order Status:  Completed Specimen:  Blood Updated:  06/24/20 0851     Special Requests: NO SPECIAL REQUESTS        Culture result: NO GROWTH AFTER 10 HOURS       CULTURE, BLOOD [[id number]] Collected:  06/23/20 2055    Order Status:  Completed Specimen:  Blood Updated:  06/24/20 0851     Special Requests: NO SPECIAL REQUESTS        Culture result: NO GROWTH AFTER 9 HOURS       CULTURE, BLOOD [[id number]] Collected:  06/21/20 2325    Order Status:  Completed Specimen:  Blood Updated:  06/24/20 0851     Special Requests: NO SPECIAL REQUESTS        Culture result: NO GROWTH 3 DAYS       CULTURE, BLOOD [[id number]] Collected:  06/22/20 0330    Order Status:  Completed Specimen:  Blood Updated:  06/24/20 0851     Special Requests: NO SPECIAL REQUESTS        Culture result: NO GROWTH 2 DAYS       CULTURE, URINE [[id number]] Collected:  06/23/20 1800    Order Status:  Completed Specimen:  Urine from Clean catch Updated:  06/23/20 2014           Destiny Seth MD  cell [phone]  [facility]  6/24/2020   3:30 PM

## 2020-06-24 NOTE — PROGRESS NOTES
Problem: Falls - Risk of  Goal: *Absence of Falls  Description: Document Gerard Fall Fall Risk and appropriate interventions in the flowsheet. Outcome: Progressing Towards Goal  Note: Fall Risk Interventions:  Mobility Interventions: Bed/chair exit alarm    Mentation Interventions: Adequate sleep, hydration, pain control    Medication Interventions: Bed/chair exit alarm    Elimination Interventions: Patient to call for help with toileting needs              Problem: Patient Education: Go to Patient Education Activity  Goal: Patient/Family Education  Outcome: Progressing Towards Goal     Problem: Pressure Injury - Risk of  Goal: *Prevention of pressure injury  Description: Document Rickie Scale and appropriate interventions in the flowsheet. Outcome: Progressing Towards Goal  Note: Pressure Injury Interventions:  Sensory Interventions: Assess changes in LOC    Moisture Interventions: Absorbent underpads    Activity Interventions: Pressure redistribution bed/mattress(bed type)    Mobility Interventions: Pressure redistribution bed/mattress (bed type)    Nutrition Interventions: Document food/fluid/supplement intake, Offer support with meals,snacks and hydration    Friction and Shear Interventions: Minimize layers                Problem: Patient Education: Go to Patient Education Activity  Goal: Patient/Family Education  Outcome: Progressing Towards Goal     Problem: Diabetes Self-Management  Goal: *Disease process and treatment process  Description: Define diabetes and identify own type of diabetes; list 3 options for treating diabetes. Outcome: Progressing Towards Goal  Goal: *Incorporating nutritional management into lifestyle  Description: Describe effect of type, amount and timing of food on blood glucose; list 3 methods for planning meals. Outcome: Progressing Towards Goal  Goal: *Incorporating physical activity into lifestyle  Description: State effect of exercise on blood glucose levels.   Outcome: Progressing Towards Goal  Goal: *Developing strategies to promote health/change behavior  Description: Define the ABC's of diabetes; identify appropriate screenings, schedule and personal plan for screenings. Outcome: Progressing Towards Goal  Goal: *Using medications safely  Description: State effect of diabetes medications on diabetes; name diabetes medication taking, action and side effects. Outcome: Progressing Towards Goal  Goal: *Monitoring blood glucose, interpreting and using results  Description: Identify recommended blood glucose targets  and personal targets. Outcome: Progressing Towards Goal  Goal: *Prevention, detection, treatment of acute complications  Description: List symptoms of hyper- and hypoglycemia; describe how to treat low blood sugar and actions for lowering  high blood glucose level. Outcome: Progressing Towards Goal  Goal: *Prevention, detection and treatment of chronic complications  Description: Define the natural course of diabetes and describe the relationship of blood glucose levels to long term complications of diabetes.   Outcome: Progressing Towards Goal  Goal: *Developing strategies to address psychosocial issues  Description: Describe feelings about living with diabetes; identify support needed and support network  Outcome: Progressing Towards Goal  Goal: *Insulin pump training  Outcome: Progressing Towards Goal  Goal: *Sick day guidelines  Outcome: Progressing Towards Goal  Goal: *Patient Specific Goal (EDIT GOAL, INSERT TEXT)  Outcome: Progressing Towards Goal     Problem: Patient Education: Go to Patient Education Activity  Goal: Patient/Family Education  Outcome: Progressing Towards Goal

## 2020-06-24 NOTE — PROGRESS NOTES
D/C Plan: [address] [place] [address] (pt must arrive before 2:00pm on day of discharge)     Noted with a fever over night and is not medically stable at this time to transition back to [place] [address]. Pt has a LTSS/UAI at [address] [place] [address]. CM to continue to follow and assist with transition back to this facility when medically stable.     Care Management Interventions  Mode of Transport at Discharge: BLS  Transition of Care Consult (CM Consult): Discharge Planning(From [address] [place] [address] (LT))  Current Support Network: Nursing Facility([facility])  The Plan for Transition of Care is Related to the Following Treatment Goals : Return to [address] [place] [address]  Discharge Location  Discharge Placement: Skilled nursing facility([facility])

## 2020-06-24 NOTE — PROGRESS NOTES
Vancomycin - Pharmacy to Dose ( UTI - 10 day Tx )  Patient received Vancomycin 1500 mg IV at 00:36 6/22/20  Random level 11.2 at 01:25 6/24/20. CrCl 14.1 ml/min   WBC 8.6  Will re-dose with Vancomycin 1000 mg IV now x1. Further doses will be based on levels. Pharmacy will continue to follow. Tony Jones.  [facility], operated by [facility]  [phone]

## 2020-06-24 NOTE — PROGRESS NOTES
Hospitalist Progress Note-critical care note     Patient: Suki Atkins MRN: [id number]  CSN: [id number]    YOB: 1940  Age: [de-identified] y.o. Sex: female    DOA: 6/19/2020 LOS:  LOS: 5 days            Chief complaint: Marysol Likes, bacteremia     Assessment/Plan         Hospital Problems  Date Reviewed: 5/31/2019          Codes Class Noted POA    Acute renal failure (ARF) ([facility] ICD-10-CM: N17.9  ICD-9-CM: 584.9  6/21/2020 Unknown        Elevated INR ICD-10-CM: R79.1  ICD-9-CM: 790.92  6/21/2020 Unknown        * (Principal) Nephrostomy complication ([facility] OJD-10-HT: O86.054  ICD-9-CM: 997.5  6/19/2020 Unknown        Legally blind ICD-10-CM: H54.8  ICD-9-CM: 369.4  11/26/2018 Yes        Hypertension ICD-10-CM: I10  ICD-9-CM: 401.9  11/26/2018 Yes        Pyelonephritis ICD-10-CM: N12  ICD-9-CM: 590.80  5/18/2018 Yes            Admitted for nephrostomy complication-tube replaced 6/21, eliquis had to be held for IR to replace tube, will have picc today      Nephrostomy -poa   dislodged   Place right PCN per Cristiane colindres on 6/21  So far drain well     Jeison   Mild improving   Will continue bmp daily   Renal on board        UTI/pyelo  on rocephin, levaquin-proteus on urine cx-final results still pending      bacteremia X 1 cx  Enterococcus faecalis   She is allergic to penicillin   On vanc and let ID on board      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 Cardiovascular: No chest pain or pressure. No palpitations. Pulmonary: No shortness of breath. Gastrointestinal: No nausea, vomiting.      Vital signs/Intake and Output:  Visit Vitals  /43 (BP 1 Location: Right arm, BP Patient Position: At rest)   Pulse 85   Temp 99.3 °F (37.4 °C)   Resp 18   Ht 5' 2\" (1.575 m)   Wt 78 kg (171 lb 15.3 oz)   SpO2 100%   Breastfeeding No   BMI 31.45 kg/m²     Current Shift:  06/24 0701 - 06/24 1900  In: 200 [P.O.:200]  Out: 350 [Urine:350]  Last three shifts:  06/22 1901 - 06/24 0700  In: 2871.1 [P.O.:240; I.V.:2631.1]  Out: 1250 [Urine:1075]    Physical Exam:  General: WD, WN. Alert, cooperative, no acute distress    HEENT: NC, Atraumatic. PERRLA, anicteric sclerae. Lungs: CTA Bilaterally. No Wheezing/Rhonchi/Rales. Heart:  Regular  rhythm,  + murmur, No Rubs, No Gallops  Abdomen: Soft, Non distended, Non tender. +Bowel sounds,  colectomy bag , nephrotomy tube noted with urine in the bag    Extremities: No c/c/e  Psych:   Not anxious or agitated. Neurologic:  No acute neurological deficit. Labs: Results:       Chemistry Recent Labs     06/24/20  1259 06/23/20  0056 06/22/20  0330   * 185* 129*    138 139   K 3.3* 3.6 4.2    102 102   CO2 27 27 26   BUN 31* 43* 52*   CREA 1.71* 3.08* 4.34*   CA 6.9* 7.6* 8.1*   AGAP 6 9 11   BUCR 18 14 12      CBC w/Diff Recent Labs     06/24/20  1259 06/23/20  0056 06/22/20  0330   WBC 8.1 8.6 9.4   RBC 2.81* 2.87* 2.83*   HGB 8.0* 8.1* 8.0*   HCT 25.7* 25.9* 25.5*    250 261      Cardiac Enzymes No results for input(s): CPK, CKND1, JANE in the last 72 hours.     No lab exists for component: CKRMB, TROIP   Coagulation Recent Labs     06/24/20  1259 06/23/20  0056   PTP 15.7* 18.8*   INR 1.3* 1.6*       Lipid Panel Lab Results   Component Value Date/Time    Cholesterol, total 115 08/28/2019 08:22 AM    HDL Cholesterol 39 (L) 08/28/2019 08:22 AM    LDL, calculated 46.2 08/28/2019 08:22 AM    VLDL, calculated 29.8 08/28/2019 08:22 AM    Triglyceride 149 08/28/2019 08:22 AM    CHOL/HDL Ratio 2.9 08/28/2019 08:22 AM      BNP No results for input(s): BNPP in the last 72 hours. Liver Enzymes No results for input(s): TP, ALB, TBIL, AP in the last 72 hours. No lab exists for component: SGOT, GPT, DBIL   Thyroid Studies Lab Results   Component Value Date/Time    TSH 3.40 01/24/2020 08:35 AM        Procedures/imaging: see electronic medical records for all procedures/Xrays and details which were not copied into this note but were reviewed prior to creation of Plan    Us Retroperitoneum Comp    Result Date: 6/22/2020  Retroperitoneal Ultrasound History: [de-identified]year-old patient status post right PCN placement for management of obstructive uropathy Comparison: Correlation made with ultrasound images 6/19/2020; CT 1/16/2020. Technique: Multiple gray scale sonographic images of the kidneys and bladder were obtained. Additional color Doppler and the Doppler waveform images were obtained . Findings: The right kidney measures 12.0 cm in length and demonstrates normal parenchymal echotexture. Moderate renal cortical thinning as noted previously. There is no hydronephrosis. No suspicious renal lesion. No shadowing calculi. The left kidney is surgically absent. The bladder is nonvisualized. IMPRESSION: 1. No evidence of right-sided hydronephrosis status post percutaneous nephrostomy catheter placement. Ir Exchange Nephro Perc Rt Si    Result Date: 6/19/2020  PLANNED PROCEDURE: Right nephrostomy tube replacement ATTENDING: Nadia Vargas M.D. COMPLICATIONS: None SPECIMEN: None DOSE: Fluoroscopic time: 4 minutes Fluoroscopic dose (reference air kerma): 82 mGy INDICATION: Dislodged right nephrostomy tube. TECHNIQUE: Informed consent was obtained. Procedural time out was performed. Maximal sterile barrier technique (including cap, mask, sterile gown, sterile gloves, large sterile sheet, hand hygiene, and cutaneous antisepsis per current guidelines) was followed. The right back was prepped and draped in sterile fashion.  Contrast injection via the existing tract was performed demonstrating no fistulous communication to the kidney. Attempts to manipulate catheter and Glidewire were unsuccessful. Bedside ultrasound was performed demonstrating moderate hydronephrosis. Procedure was aborted, pending vascular access and routine labs. Sterile dressing was applied. IMPRESSION: Unable to replace right nephrostomy tube as described above. Plan to place a new nephrostomy tube pending vascular access and laboratory findings. Ir Exchange Nephro Perc Rt Si    Result Date: 6/2/2020  PROCEDURE: Right antegrade pyelogram Right nephrostomy tube exchange ATTENDING: Pinky Rice M.D. COMPLICATIONS: None SPECIMEN: None DOSE: Fluoroscopic time: 0.5 minutes Fluoroscopic dose (reference air kerma): 6 mGy INDICATION: Routine right nephrostomy tube exchange. TECHNIQUE: Informed consent was obtained. Procedural time out was performed. Maximal sterile barrier technique (including cap, mask, sterile gown, sterile gloves, large sterile sheet, hand hygiene, and cutaneous antisepsis per current guidelines) was followed. Initial  image demonstrated indwelling nephrostomy tube. Contrast injection was performed. The catheter was then removed over wire and a new 8 Amharic nephrostomy tube was placed with distal tip in the renal pelvis and fluoroscopic guidance. Repeat injection demonstrated appropriate position. The catheter was secured in place and passed to gravity drainage. Sterile dressing was applied. IMPRESSION: Routine right nephrostomy tube exchange as above. Ir Nephrostomy Perc Rt Plc Cath  Si    Result Date: 6/21/2020  PROCEDURE: Right percutaneous nephrostomy catheter placement INDICATION:  Single kidney with history of obstructive uropathy status post right percutaneous nephrostomy (PCN) placement. Catheter inadvertently removed. Unable to cannulate previous percutaneous tract.  Technique:  After the procedure, as well as its risks, benefits and alternatives were explained to the patient and her son (POA), and all questions answered, telephone informed consent was obtained from the son and witnessed. The procedure was performed under conscious sedation with constant monitoring. Limited imaging demonstrates right kidney demonstrates moderate hydronephrosis. Image(s) obtained and entered into patient record. The patient's right flank was prepped and draped in sterile fashion and lidocaine was used local anesthesia. Sterile probe cover and sterile gel was used for ultrasound imaging. A 21 gauge trocar needle was advanced from a posterior oblique approach into a posterior upper pole calyx under direct ultrasound guidance. Needle was aspirated and sample of fluid sent for laboratory evaluation. Contrast was injected to opacify the upper collecting system. A 0.018 inch wire was advanced into the upper collecting system over which an Accustick system was used. Contrast injected to document position. Over an Amplatz super stiff wire, a 10 [place] citizen locking APD catheter was placed into the right renal pelvis. Contrast was injected to document position. Catheter was also aspirated. The catheter was adhered to the skin using a Percustay device. The patient tolerated procedure well and there were no immediate complications. Sedation: Versed 0.5 mg IV. Fentanyl 25 micrograms IV. IV Antibiotics:  Clindamycin 600 mg intraosseous. Monitoring start time:  1422 hrs. Monitoring end time:  1440 hrs. Monitored by:  Wisam Stewart R.N. Radiation dose (reference air kerma):  26 mGy. GUIDANCE: Ultrasound and fluoroscopy guidance was used to position (and confirm the position of) the needle / catheter. Image(s) saved in PACS: Ultrasound and fluoroscopy _________________________________________ FINDINGS: There is successful placement of a right percutaneous nephrostomy catheter with its tip in the renal pelvis.   The upper collecting system drains well through the catheter. _________________________________________ IMPRESSION: Successful placement of a right percutaneous nephrostomy catheter. Us Kidney Rt    Result Date: 6/19/2020  ULTRASOUND ABDOMEN, LIMITED History: Pre-Nephrostomy tube reinsertion Technique: Transabdominal limited exam of the right kidney. Findings: R. KIDNEY: The right kidney is of  normal size, and of  normal position, and cortical echogenicity, with moderate renal pelvicaliectasis but no evident echogenic shadowing calculus. , measuring roughly 10 cm longest axis. IMPRESSION:  1. Moderate right renal pelvic caliectasis      Nc Us Technologist Service    Result Date: 6/21/2020  PROCEDURE: Right percutaneous nephrostomy catheter placement INDICATION:  Single kidney with history of obstructive uropathy status post right percutaneous nephrostomy (PCN) placement. Catheter inadvertently removed. Unable to cannulate previous percutaneous tract. Technique:  After the procedure, as well as its risks, benefits and alternatives were explained to the patient and her son (POA), and all questions answered, telephone informed consent was obtained from the son and witnessed. The procedure was performed under conscious sedation with constant monitoring. Limited imaging demonstrates right kidney demonstrates moderate hydronephrosis. Image(s) obtained and entered into patient record. The patient's right flank was prepped and draped in sterile fashion and lidocaine was used local anesthesia. Sterile probe cover and sterile gel was used for ultrasound imaging. A 21 gauge trocar needle was advanced from a posterior oblique approach into a posterior upper pole calyx under direct ultrasound guidance. Needle was aspirated and sample of fluid sent for laboratory evaluation. Contrast was injected to opacify the upper collecting system. A 0.018 inch wire was advanced into the upper collecting system over which an Accustick system was used. Contrast injected to document position.   Over an Amplatz super stiff wire, a 10 [place] locking APD catheter was placed into the right renal pelvis. Contrast was injected to document position. Catheter was also aspirated. The catheter was adhered to the skin using a Percustay device. The patient tolerated procedure well and there were no immediate complications. Sedation: Versed 0.5 mg IV. Fentanyl 25 micrograms IV. IV Antibiotics:  Clindamycin 600 mg intraosseous. Monitoring start time:  1422 hrs. Monitoring end time:  1440 hrs. Monitored by:  Regina Acosta R.N. Radiation dose (reference air kerma):  26 mGy. GUIDANCE: Ultrasound and fluoroscopy guidance was used to position (and confirm the position of) the needle / catheter. Image(s) saved in PACS: Ultrasound and fluoroscopy _________________________________________ FINDINGS: There is successful placement of a right percutaneous nephrostomy catheter with its tip in the renal pelvis. The upper collecting system drains well through the catheter. _________________________________________     IMPRESSION: Successful placement of a right percutaneous nephrostomy catheter.       Hanna Raya MD

## 2020-06-24 NOTE — CDMP QUERY
Patient admitted with Nephrostomy complication, Per nursing notes on admission , pt has a stage 2 Pressure injury to Buttocks and Sacrum, and an unstageable pressure injury to the left heel. After review, if you agree, please document in your progress notes to reflect this diagnosis. => Pressure injury to Buttocks and Sacrum, stage 2 , POA; Pressure injury to left heel, unstageable POA 
=> Other Explanation of clinical findings 
=> Clinically Undetermined (no explanation for clinical findings) The medical record reflects the following: 
   Risk Factors:Lives @ [facility] 
,  
   Clinical Indicators:  Nursing note 6/19  documents skin assessment with stage 2 Pressure injury to buttocks and sacrum and Unstagable pressure left heel both POA Treatment: wound care, mepilex, turn and repostioning REFERENCE: 
  
Stage I: Intact skin with non-blanchable redness of a localized area usually over a bony prominence; darkly pigmented skin may not have visible blanching Stage II: Partial thickness loss of dermis presenting as a shallow open ulcer with a red pink wound bed, without slough - may also present as an intact or open/ruptured serum filled blister Stage III: Full thickness tissue loss; subcutaneous fat may be visible but bone, tendon or muscle are not exposed. Cathlyn Dunks may be present but does not obscur the depth of tissue loss - may include undermining and tunneling Stage IV: Full thickness tissue loss with exposed bone, tendon or muscle - slough eschar may be present on some parts of the wound bed; often includes undermining and tunneling Unstageable: Full thickness tissue loss in which the base of the ulcer is convered by slough and/or eschar *if the \"unstageable ulcer\" is \"staged\" during the admission/encounter, report the stage Thank you for your time, 
Donavan Freeman RN [facility] [phone]

## 2020-06-24 NOTE — PROGRESS NOTES
Assessment:     Nephrostomy complication ([facility] [address] (2/70/1415)       Acute renal failure (ARF) ([facility] [address] (6/21/2020)       Elevated INR (6/21/2020)        Blindness  Deafness  Anemia  Iron deficiency  Bacteremia  UTI     Plan:    Ok to place PICC, pt is not a candidate for AVF placement nor dialysis  Venofer   Retacrit  Stop IVF       CC: ARF, Anemia    Interval History: Pt is stable over night      Subjective:   No change since i saw pt last. No new symptoms or issues. Review of Systems:  Negative for Edema, SOB        Blood pressure 135/43, pulse 87, temperature 98.6 °F (37 °C), resp. rate 16, height 5' 2\" (1.575 m), weight 78 kg (171 lb 15.3 oz), SpO2 100 %, not currently breastfeeding.       awake and alert   NAD  Minimal edema      Intake/Output Summary (Last 24 hours) at 6/24/2020 0949  Last data filed at 6/24/2020 3696  Gross per 24 hour   Intake 3071.09 ml   Output 900 ml   Net 2171.09 ml      Recent Labs     06/23/20  0056   WBC 8.6     Lab Results   Component Value Date/Time    Sodium 138 06/23/2020 12:56 AM    Potassium 3.6 06/23/2020 12:56 AM    Chloride 102 06/23/2020 12:56 AM    CO2 27 06/23/2020 12:56 AM    Anion gap 9 06/23/2020 12:56 AM    Glucose 185 (H) 06/23/2020 12:56 AM    BUN 43 (H) 06/23/2020 12:56 AM    Creatinine 3.08 (H) 06/23/2020 12:56 AM    BUN/Creatinine ratio 14 06/23/2020 12:56 AM    GFR est AA 18 (L) 06/23/2020 12:56 AM    GFR est non-AA 15 (L) 06/23/2020 12:56 AM    Calcium 7.6 (L) 06/23/2020 12:56 AM        Current Facility-Administered Medications   Medication Dose Route Frequency Provider Last Rate Last Dose    heparin (porcine) 100 unit/mL injection 500 Units  500 Units InterCATHeter Q8H PRN Emily Albert MD   500 Units at 06/23/20 1023    epoetin anna-epbx (RETACRIT) injection 3,000 Units  3,000 Units SubCUTAneous Q MON, WED & FRI Malick Jordan R, DO   3,000 Units at 06/23/20 1352    alum-mag hydroxide-simeth (MYLANTA) oral suspension 30 mL  30 mL Oral QID PRN Jessica Hobbs MD        atorvastatin (LIPITOR) tablet 20 mg  20 mg Oral DAILY Oscar England MD   20 mg at 06/24/20 0924    bisacodyL (DULCOLAX) suppository 10 mg  10 mg Rectal DAILY PRN Oscar England MD        camphor-menthoL (SARNA) 0.5-0.5 % lotion   Topical DAILY Oscar England MD        cholecalciferol (VITAMIN D3) (1000 Units /25 mcg) tablet 1 Tab  1,000 Units Oral DAILY Oscar England MD   1 Tab at 06/24/20 0925    docusate sodium (COLACE) capsule 100 mg  100 mg Oral BID Bushra England MD   100 mg at 06/24/20 8710    gabapentin (NEURONTIN) capsule 300 mg  300 mg Oral BID Bushra England MD   300 mg at 06/24/20 0924    insulin glargine (LANTUS) injection 15 Units  15 Units SubCUTAneous QHS Oscar England MD   15 Units at 06/23/20 2205    levothyroxine (SYNTHROID) tablet 50 mcg  50 mcg Oral ACB Oscar England MD   50 mcg at 06/24/20 9572    metoprolol succinate (TOPROL-XL) XL tablet 50 mg  50 mg Oral DAILY Oscar England MD   50 mg at 06/24/20 0924    [facility] to dose  1 Each Other Rx Dosing/Monitoring Brian Monte MD        sodium bicarbonate tablet 650 mg  650 mg Oral BID Malcolm Kearns MD   650 mg at 06/23/20 2206    levoFLOXacin (LEVAQUIN) 250 mg in D5W IVPB  250 mg IntraVENous Q48H Malcolm Kearns MD 50 mL/hr at 06/22/20 1526 250 mg at 06/22/20 1526    traZODone (DESYREL) tablet 50 mg  50 mg Oral QHS Malcolm Kearns MD   50 mg at 06/23/20 2206    brimonidine (ALPHAGAN) 0.2 % ophthalmic solution 1 Drop  1 Drop Both Eyes Q12H Bushra England MD   1 Drop at 06/23/20 2207    timolol (TIMOPTIC) 0.5 % ophthalmic solution 1 Drop  1 Drop Both Eyes BID Oscar England MD   1 Drop at 06/23/20 2207    0.9% sodium chloride infusion 250 mL  250 mL IntraVENous PRN Brian Monte MD        0.9% sodium chloride infusion  125 mL/hr IntraVENous CONTINUOUS Malcolm Kearns MD 125 mL/hr at 06/24/20 0302 125 mL/hr at 06/24/20 0302    cefTRIAXone (ROCEPHIN) 1 g in sterile water (preservative free) 10 mL IV syringe  1 g IntraVENous Q24H Delvin Monte MD   1 g at 06/23/20 2206    acetaminophen (TYLENOL) tablet 650 mg  650 mg Oral Q4H PRN Bushra England MD   650 mg at 06/23/20 1631    oxyCODONE IR (ROXICODONE) tablet 5 mg  5 mg Oral Q4H PRN Bushra England MD   5 mg at 06/22/20 2046    naloxone (NARCAN) injection 0.4 mg  0.4 mg IntraVENous PRN Sapna England MD        ondansetron (ZOFRAN) injection 4 mg  4 mg IntraVENous Q4H PRN Bushra England MD   4 mg at 06/21/20 0945    insulin lispro (HUMALOG) injection   SubCUTAneous AC&HS Sapna England MD   Stopped at 06/24/20 0730    glucose chewable tablet 16 g  16 g Oral PRN Sapna England MD        glucagon (GLUCAGEN) injection 1 mg  1 mg IntraMUSCular PRN Bushra England MD        dextrose 10% infusion 125-250 mL  125-250 mL IntraVENous Sapna Martines MD

## 2020-06-25 ENCOUNTER — ANESTHESIA (OUTPATIENT)
Dept: NON INVASIVE DIAGNOSTICS | Age: 80
DRG: 698 | End: 2020-06-25
Payer: MEDICARE

## 2020-06-25 ENCOUNTER — ANESTHESIA EVENT (OUTPATIENT)
Dept: NON INVASIVE DIAGNOSTICS | Age: 80
DRG: 698 | End: 2020-06-25
Payer: MEDICARE

## 2020-06-25 ENCOUNTER — APPOINTMENT (OUTPATIENT)
Dept: NON INVASIVE DIAGNOSTICS | Age: 80
DRG: 698 | End: 2020-06-25
Attending: INTERNAL MEDICINE
Payer: MEDICARE

## 2020-06-25 LAB
ANION GAP SERPL CALC-SCNC: 7 MMOL/L (ref 3–18)
BACTERIA SPEC CULT: ABNORMAL
BUN SERPL-MCNC: 29 MG/DL (ref 7–18)
BUN/CREAT SERPL: 20 (ref 12–20)
CALCIUM SERPL-MCNC: 7 MG/DL (ref 8.5–10.1)
CC UR VC: ABNORMAL
CHLORIDE SERPL-SCNC: 111 MMOL/L (ref 100–111)
CO2 SERPL-SCNC: 26 MMOL/L (ref 21–32)
CREAT SERPL-MCNC: 1.42 MG/DL (ref 0.6–1.3)
ECHO TV REGURGITANT MAX VELOCITY: 359.5 CM/S
ECHO TV REGURGITANT PEAK GRADIENT: 51.7 MMHG
ERYTHROCYTE [DISTWIDTH] IN BLOOD BY AUTOMATED COUNT: 14.5 % (ref 11.6–14.5)
GLUCOSE BLD STRIP.AUTO-MCNC: 110 MG/DL (ref 70–110)
GLUCOSE BLD STRIP.AUTO-MCNC: 111 MG/DL (ref 70–110)
GLUCOSE BLD STRIP.AUTO-MCNC: 119 MG/DL (ref 70–110)
GLUCOSE BLD STRIP.AUTO-MCNC: 157 MG/DL (ref 70–110)
GLUCOSE BLD STRIP.AUTO-MCNC: 165 MG/DL (ref 70–110)
GLUCOSE SERPL-MCNC: 90 MG/DL (ref 74–99)
GRAM STN SPEC: ABNORMAL
GRAM STN SPEC: ABNORMAL
HCT VFR BLD AUTO: 24.6 % (ref 35–45)
HGB BLD-MCNC: 7.7 G/DL (ref 12–16)
INR PPP: 1.5 (ref 0.8–1.2)
MCH RBC QN AUTO: 28.3 PG (ref 24–34)
MCHC RBC AUTO-ENTMCNC: 31.3 G/DL (ref 31–37)
MCV RBC AUTO: 90.4 FL (ref 74–97)
PLATELET # BLD AUTO: 249 K/UL (ref 135–420)
PMV BLD AUTO: 9.3 FL (ref 9.2–11.8)
POTASSIUM SERPL-SCNC: 3.1 MMOL/L (ref 3.5–5.5)
PROTHROMBIN TIME: 17.9 SEC (ref 11.5–15.2)
RBC # BLD AUTO: 2.72 M/UL (ref 4.2–5.3)
SERVICE CMNT-IMP: ABNORMAL
SERVICE CMNT-IMP: ABNORMAL
SODIUM SERPL-SCNC: 144 MMOL/L (ref 136–145)
VANCOMYCIN SERPL-MCNC: 18.4 UG/ML (ref 5–40)
WBC # BLD AUTO: 6.7 K/UL (ref 4.6–13.2)

## 2020-06-25 PROCEDURE — 74011636637 HC RX REV CODE- 636/637: Performed by: FAMILY MEDICINE

## 2020-06-25 PROCEDURE — 77010033678 HC OXYGEN DAILY

## 2020-06-25 PROCEDURE — 74011250637 HC RX REV CODE- 250/637: Performed by: FAMILY MEDICINE

## 2020-06-25 PROCEDURE — 74011250636 HC RX REV CODE- 250/636: Performed by: ANESTHESIOLOGY

## 2020-06-25 PROCEDURE — 36592 COLLECT BLOOD FROM PICC: CPT

## 2020-06-25 PROCEDURE — 74011250636 HC RX REV CODE- 250/636: Performed by: FAMILY MEDICINE

## 2020-06-25 PROCEDURE — 74011250636 HC RX REV CODE- 250/636: Performed by: INTERNAL MEDICINE

## 2020-06-25 PROCEDURE — 74011000250 HC RX REV CODE- 250: Performed by: ANESTHESIOLOGY

## 2020-06-25 PROCEDURE — 80202 ASSAY OF VANCOMYCIN: CPT

## 2020-06-25 PROCEDURE — 80048 BASIC METABOLIC PNL TOTAL CA: CPT

## 2020-06-25 PROCEDURE — 74011250637 HC RX REV CODE- 250/637: Performed by: HOSPITALIST

## 2020-06-25 PROCEDURE — 65270000029 HC RM PRIVATE

## 2020-06-25 PROCEDURE — 85610 PROTHROMBIN TIME: CPT

## 2020-06-25 PROCEDURE — 82962 GLUCOSE BLOOD TEST: CPT

## 2020-06-25 PROCEDURE — 85027 COMPLETE CBC AUTOMATED: CPT

## 2020-06-25 PROCEDURE — 74011000250 HC RX REV CODE- 250: Performed by: HOSPITALIST

## 2020-06-25 PROCEDURE — 74011250636 HC RX REV CODE- 250/636: Performed by: HOSPITALIST

## 2020-06-25 RX ORDER — POTASSIUM CHLORIDE 20 MEQ/1
20 TABLET, EXTENDED RELEASE ORAL 2 TIMES DAILY
Status: DISCONTINUED | OUTPATIENT
Start: 2020-06-25 | End: 2020-06-26 | Stop reason: HOSPADM

## 2020-06-25 RX ORDER — MIDAZOLAM HYDROCHLORIDE 1 MG/ML
INJECTION, SOLUTION INTRAMUSCULAR; INTRAVENOUS
Status: DISPENSED
Start: 2020-06-25 | End: 2020-06-26

## 2020-06-25 RX ORDER — SODIUM CHLORIDE 0.9 % (FLUSH) 0.9 %
5-40 SYRINGE (ML) INJECTION EVERY 8 HOURS
Status: CANCELLED | OUTPATIENT
Start: 2020-06-25

## 2020-06-25 RX ORDER — LANOLIN ALCOHOL/MO/W.PET/CERES
1 CREAM (GRAM) TOPICAL
Status: DISCONTINUED | OUTPATIENT
Start: 2020-06-25 | End: 2020-06-26 | Stop reason: HOSPADM

## 2020-06-25 RX ORDER — POTASSIUM CHLORIDE 20 MEQ/1
40 TABLET, EXTENDED RELEASE ORAL ONCE
Status: COMPLETED | OUTPATIENT
Start: 2020-06-25 | End: 2020-06-25

## 2020-06-25 RX ORDER — SODIUM CHLORIDE 0.9 % (FLUSH) 0.9 %
5-40 SYRINGE (ML) INJECTION AS NEEDED
Status: CANCELLED | OUTPATIENT
Start: 2020-06-25

## 2020-06-25 RX ORDER — MIDAZOLAM HYDROCHLORIDE 1 MG/ML
INJECTION, SOLUTION INTRAMUSCULAR; INTRAVENOUS AS NEEDED
Status: DISCONTINUED | OUTPATIENT
Start: 2020-06-25 | End: 2020-06-25 | Stop reason: HOSPADM

## 2020-06-25 RX ORDER — PROPOFOL 10 MG/ML
INJECTION, EMULSION INTRAVENOUS AS NEEDED
Status: DISCONTINUED | OUTPATIENT
Start: 2020-06-25 | End: 2020-06-25 | Stop reason: HOSPADM

## 2020-06-25 RX ORDER — LIDOCAINE HYDROCHLORIDE 20 MG/ML
INJECTION, SOLUTION EPIDURAL; INFILTRATION; INTRACAUDAL; PERINEURAL AS NEEDED
Status: DISCONTINUED | OUTPATIENT
Start: 2020-06-25 | End: 2020-06-25 | Stop reason: HOSPADM

## 2020-06-25 RX ADMIN — METOPROLOL SUCCINATE 50 MG: 50 TABLET, EXTENDED RELEASE ORAL at 08:54

## 2020-06-25 RX ADMIN — SODIUM BICARBONATE 650 MG TABLET 650 MG: at 08:54

## 2020-06-25 RX ADMIN — PROPOFOL 30 MG: 10 INJECTION, EMULSION INTRAVENOUS at 15:25

## 2020-06-25 RX ADMIN — ATORVASTATIN CALCIUM 20 MG: 20 TABLET, FILM COATED ORAL at 08:53

## 2020-06-25 RX ADMIN — PROPOFOL 20 MG: 10 INJECTION, EMULSION INTRAVENOUS at 15:32

## 2020-06-25 RX ADMIN — GABAPENTIN 300 MG: 300 CAPSULE ORAL at 08:55

## 2020-06-25 RX ADMIN — DOCUSATE SODIUM 100 MG: 100 CAPSULE, LIQUID FILLED ORAL at 22:53

## 2020-06-25 RX ADMIN — ONDANSETRON 4 MG: 2 INJECTION INTRAMUSCULAR; INTRAVENOUS at 23:15

## 2020-06-25 RX ADMIN — INSULIN GLARGINE 15 UNITS: 100 INJECTION, SOLUTION SUBCUTANEOUS at 22:52

## 2020-06-25 RX ADMIN — VITAMIN D, TAB 1000IU (100/BT) 1 TABLET: 25 TAB at 08:54

## 2020-06-25 RX ADMIN — MENTHOL, CAMPHOR: 1.11; 1.11 LOTION TOPICAL at 08:58

## 2020-06-25 RX ADMIN — OXYCODONE 5 MG: 5 TABLET ORAL at 22:56

## 2020-06-25 RX ADMIN — MEROPENEM 1 G: 1 INJECTION, POWDER, FOR SOLUTION INTRAVENOUS at 13:50

## 2020-06-25 RX ADMIN — POTASSIUM CHLORIDE 40 MEQ: 1500 TABLET, EXTENDED RELEASE ORAL at 08:55

## 2020-06-25 RX ADMIN — DOCUSATE SODIUM 100 MG: 100 CAPSULE, LIQUID FILLED ORAL at 08:55

## 2020-06-25 RX ADMIN — INSULIN LISPRO 2 UNITS: 100 INJECTION, SOLUTION INTRAVENOUS; SUBCUTANEOUS at 22:53

## 2020-06-25 RX ADMIN — LIDOCAINE HYDROCHLORIDE 40 MG: 20 INJECTION, SOLUTION EPIDURAL; INFILTRATION; INTRACAUDAL; PERINEURAL at 15:25

## 2020-06-25 RX ADMIN — LEVOTHYROXINE SODIUM 50 MCG: 0.05 TABLET ORAL at 08:54

## 2020-06-25 RX ADMIN — SODIUM BICARBONATE 650 MG TABLET 650 MG: at 22:53

## 2020-06-25 RX ADMIN — GABAPENTIN 300 MG: 300 CAPSULE ORAL at 22:53

## 2020-06-25 RX ADMIN — PROPOFOL 20 MG: 10 INJECTION, EMULSION INTRAVENOUS at 15:28

## 2020-06-25 RX ADMIN — APIXABAN 2.5 MG: 2.5 TABLET, FILM COATED ORAL at 22:54

## 2020-06-25 RX ADMIN — POTASSIUM CHLORIDE 20 MEQ: 20 TABLET, EXTENDED RELEASE ORAL at 22:53

## 2020-06-25 RX ADMIN — APIXABAN 2.5 MG: 2.5 TABLET, FILM COATED ORAL at 08:54

## 2020-06-25 RX ADMIN — MIDAZOLAM 2 MG: 1 INJECTION INTRAMUSCULAR; INTRAVENOUS at 15:24

## 2020-06-25 RX ADMIN — IRON SUCROSE 300 MG: 20 INJECTION, SOLUTION INTRAVENOUS at 01:05

## 2020-06-25 RX ADMIN — FERROUS SULFATE TAB 325 MG (65 MG ELEMENTAL FE) 325 MG: 325 (65 FE) TAB at 08:54

## 2020-06-25 RX ADMIN — POTASSIUM CHLORIDE 20 MEQ: 20 TABLET, EXTENDED RELEASE ORAL at 13:49

## 2020-06-25 NOTE — PROGRESS NOTES
0750  Bedside and Verbal shift change report given by JESSIKA Bravo, RN (off going nurse) to Yunier Jiménez, RN (on coming nurse). Report included the following information SBAR, Kardex, OR Summary, Intake/Output and MAR    1630  Pt had a RICHARD. Pt is stable. No complaints of pain or nausea. 1940  Bedside and Verbal shift change report given by Yunier Jiménez, RN (off going nurse) to JESSIKA Bravo, RN (on coming nurse). Report included the following information SBAR, Kardex, OR Summary, Intake/Output and MAR.     Patient Vitals for the past 24 hrs:   Temp Pulse Resp BP SpO2   06/25/20 1935 98.9 °F (37.2 °C) 97 17 150/68 98 %   06/25/20 1726 99.1 °F (37.3 °C) 98 17 153/82 98 %   06/25/20 1600 98.5 °F (36.9 °C) 89 19 164/58 99 %   06/25/20 1548  85 20 144/46 97 %   06/25/20 1546  85 18 (!) 131/39 97 %   06/25/20 1544  86 18 (!) 114/21 96 %   06/25/20 1500  94  196/69 99 %   06/25/20 1445  92  189/56 99 %   06/25/20 1116 98.3 °F (36.8 °C) 81 17 142/45 96 %   06/25/20 0733 98.3 °F (36.8 °C) 87 17 140/41 93 %   06/25/20 0432 98.5 °F (36.9 °C) 90 18 147/61 94 %   06/24/20 2300 98.1 °F (36.7 °C) 93 18 148/46 95 %   06/24/20 2024 97.8 °F (36.6 °C) 93 18 122/44 97 %

## 2020-06-25 NOTE — PROGRESS NOTES
TRANSFER - IN REPORT:    Verbal report received from Omari Lerner Mc 201 RN(name) on [facility]  being received from Care(unit) for routine progression of care      Report consisted of patients Situation, Background, Assessment and   Recommendations(SBAR). Information from the following report(s) Kardex was reviewed with the receiving nurse. Opportunity for questions and clarification was provided. Assessment completed upon patients arrival to unit and care assumed.

## 2020-06-25 NOTE — PROGRESS NOTES
Assessment:     Nephrostomy complication ([facility] [address] (1/04/6101)       Acute renal failure (ARF) ([facility] [address] (6/21/2020)       Elevated INR (6/21/2020)        Blindness  Deafness  Anemia  Iron deficiency  Bacteremia  UTI     Plan:    BUN and cr better  Got IV iron  Will stop Retacrit  Will sign off.         CC: ARF, Anemia     Interval History: Pt is stable over night        Subjective:   No change since i saw pt last. No new symptoms or issues.                    Blood pressure 140/41, pulse 87, temperature 98.3 °F (36.8 °C), resp. rate 17, height 5' 2\" (1.575 m), weight 78 kg (171 lb 15.3 oz), SpO2 93 %, not currently breastfeeding.       Hard of hearing   Minimal edema      Intake/Output Summary (Last 24 hours) at 6/25/2020 1109  Last data filed at 6/25/2020 0441  Gross per 24 hour   Intake 250 ml   Output 1250 ml   Net -1000 ml      Recent Labs     06/25/20  0446   WBC 6.7     Lab Results   Component Value Date/Time    Sodium 144 06/25/2020 04:46 AM    Potassium 3.1 (L) 06/25/2020 04:46 AM    Chloride 111 06/25/2020 04:46 AM    CO2 26 06/25/2020 04:46 AM    Anion gap 7 06/25/2020 04:46 AM    Glucose 90 06/25/2020 04:46 AM    BUN 29 (H) 06/25/2020 04:46 AM    Creatinine 1.42 (H) 06/25/2020 04:46 AM    BUN/Creatinine ratio 20 06/25/2020 04:46 AM    GFR est AA 43 (L) 06/25/2020 04:46 AM    GFR est non-AA 36 (L) 06/25/2020 04:46 AM    Calcium 7.0 (L) 06/25/2020 04:46 AM        Current Facility-Administered Medications   Medication Dose Route Frequency Provider Last Rate Last Dose    ferrous sulfate tablet 325 mg  1 Tab Oral DAILY WITH BREAKFAST Saskia Aguirre MD   325 mg at 06/25/20 0854    Vancomycin random level on 06/25 at 1500  1 Each Other ONCE Christine Monte MD        apixaban Nikki Billingsley) tablet 2.5 mg  2.5 mg Oral BID Renuka Medrano MD   2.5 mg at 06/25/20 0854    cefTRIAXone (ROCEPHIN) 1 g in sterile water (preservative free) 10 mL IV syringe  1 g IntraVENous Q24H Alina Patel MD   1 g at 06/24/20 2215    heparin (porcine) 100 unit/mL injection 500 Units  500 Units InterCATHeter Q8H PRN Nancy Dodd MD   500 Units at 06/23/20 1023    epoetin anna-epbx (RETACRIT) injection 3,000 Units  3,000 Units SubCUTAneous Q MON, WED & FRI Julian, Bobmed R, DO   3,000 Units at 06/24/20 2253    alum-mag hydroxide-simeth (MYLANTA) oral suspension 30 mL  30 mL Oral QID PRN Rashid England MD        atorvastatin (LIPITOR) tablet 20 mg  20 mg Oral DAILY Rashid England MD   20 mg at 06/25/20 0853    bisacodyL (DULCOLAX) suppository 10 mg  10 mg Rectal DAILY PRN Rashid England MD        camphor-menthoL (SARNA) 0.5-0.5 % lotion   Topical DAILY Rashid England MD        cholecalciferol (VITAMIN D3) (1000 Units /25 mcg) tablet 1 Tab  1,000 Units Oral DAILY Rashid England MD   1 Tab at 06/25/20 0854    docusate sodium (COLACE) capsule 100 mg  100 mg Oral BID Bushra England MD   100 mg at 06/25/20 0855    gabapentin (NEURONTIN) capsule 300 mg  300 mg Oral BID Bushra England MD   300 mg at 06/25/20 0855    insulin glargine (LANTUS) injection 15 Units  15 Units SubCUTAneous QHS Rashid England MD   15 Units at 06/24/20 2215    levothyroxine (SYNTHROID) tablet 50 mcg  50 mcg Oral ACB Bushra England MD   50 mcg at 06/25/20 0854    metoprolol succinate (TOPROL-XL) XL tablet 50 mg  50 mg Oral DAILY Rashid England MD   50 mg at 06/25/20 0854    Vancocmycin-Pharmacy to dose  1 Each Other Rx Dosing/Monitoring Hanna Monte MD        sodium bicarbonate tablet 650 mg  650 mg Oral BID Yelena Ashley MD   650 mg at 06/25/20 0854    traZODone (DESYREL) tablet 50 mg  50 mg Oral QHS Yelena Ashley MD   50 mg at 06/24/20 2215    brimonidine (ALPHAGAN) 0.2 % ophthalmic solution 1 Drop  1 Drop Both Eyes Q12H Bushra England MD   1 Drop at 06/23/20 2207    timolol (TIMOPTIC) 0.5 % ophthalmic solution 1 Drop  1 Drop Both Eyes BID Samanta, Michelle Olivares MD   1 Drop at 06/23/20 2207    0.9% sodium chloride infusion 250 mL  250 mL IntraVENous PRN Christine Monte MD        acetaminophen (TYLENOL) tablet 650 mg  650 mg Oral Q4H PRN Michelle England MD   650 mg at 06/24/20 2215    oxyCODONE IR (ROXICODONE) tablet 5 mg  5 mg Oral Q4H PRN Samanta, Michelle Olivares MD   5 mg at 06/22/20 2046    naloxone (NARCAN) injection 0.4 mg  0.4 mg IntraVENous PRN Michelle England MD        ondansetron (ZOFRAN) injection 4 mg  4 mg IntraVENous Q4H PRN Bushra England MD   4 mg at 06/21/20 0945    insulin lispro (HUMALOG) injection   SubCUTAneous AC&HS Michelle England MD   Stopped at 06/25/20 0730    glucose chewable tablet 16 g  16 g Oral PRN Michelle England MD        glucagon (GLUCAGEN) injection 1 mg  1 mg IntraMUSCular PRN Bushra England MD        dextrose 10% infusion 125-250 mL  125-250 mL IntraVENous Michelle Martines MD

## 2020-06-25 NOTE — PROGRESS NOTES
Anesthesia name:  Dr. Ricardo Woodruff     Anesthesia is present for case. Refer to anesthesia log for vitals.

## 2020-06-25 NOTE — ANESTHESIA PREPROCEDURE EVALUATION
Relevant Problems   No relevant active problems       Anesthetic History   No history of anesthetic complications            Review of Systems / Medical History  Patient summary reviewed, nursing notes reviewed and pertinent labs reviewed    Pulmonary              Pertinent negatives: No COPD, asthma, recent URI and sleep apnea  Comments: Light smoking in 20s   Neuro/Psych   Within defined limits           Cardiovascular    Hypertension: well controlled            Pertinent negatives: No past MI, CAD, PAD, dysrhythmias, angina and CHF  Exercise tolerance: <4 METS     GI/Hepatic/Renal         Renal disease: CRI    Pertinent negatives: No GERD, hepatitis and liver disease   Endo/Other    Diabetes  Hypothyroidism: well controlled  Obesity and anemia  Pertinent negatives: No blood dyscrasia   Other Findings   Comments: bacteremia           Physical Exam    Airway  Mallampati: III  TM Distance: 4 - 6 cm  Neck ROM: decreased range of motion   Mouth opening: Diminished (comment)     Cardiovascular  Regular rate and rhythm,  S1 and S2 normal,  no murmur, click, rub, or gallop             Dental    Dentition: Poor dentition  Comments: Missing many upper/lower teeth, remaining teeth with many chips   Pulmonary  Breath sounds clear to auscultation               Abdominal  GI exam deferred       Other Findings            Anesthetic Plan    ASA: 3  Anesthesia type: MAC          Induction: Intravenous  Anesthetic plan and risks discussed with: Patient

## 2020-06-25 NOTE — WOUND CARE
Pt assessed by wound care team during quarterly skin prevalence. Pt has current carolyn score of 12 , no new pressure injuries observed at this time. Wound care will continue to follow pt during this hospitalization.

## 2020-06-25 NOTE — PROGRESS NOTES
2015 - Bedside and Verbal shift change report given to Kamla Gusman RN  (oncoming nurse) by Olivia Ellison RN (offgoing nurse). Report included the following information SBAR, Kardex, Intake/Output, MAR and Recent Results. Shift summary -- Nephrostomy drain flushed with 10 ml of NS, remained patent and drained total of 475 ml. Colostomy patent overnight with minimal output. Pt reported phantom pain described as \"electric\" and sharp, shooting pain to left leg, which was adequately managed with prn po Tylenol. Bedside and Verbal shift change report given to Catherine Jenkins RN (oncoming nurse) by Kamla Gusman RN (offgoing nurse). Report included the following information SBAR, Kardex, Intake/Output, MAR and Recent Results.

## 2020-06-25 NOTE — PROGRESS NOTES
Hospitalist Progress Note    Patient: Hernando Keith MRN: [id number]  [facility]: [id number]    YOB: 1940  Age: [de-identified] y.o.   Sex: female    DOA: 6/19/2020 LOS:  LOS: 6 days          Chief Complaint:    ARF      Assessment/Plan   [de-identified] yo NH patient with chronic neph tube, colostomy    [facility]-nixzeded-org to infection and malfunction of neph tube  Pyelo with obstructed nephrostomy tube-resolved  Proteus-ESBL-resistant to rocephin and levaquin-will add merrem if ok with ID for further tx  Chronic nephrostomy tube  Hx colon cancer, nephrectomy  Enterococcus bacteremia  severe iron def anemia-add PO iron, has had IV  Hypokalemia  Blindness  Right AKA  Vascular disease  Left heel ulcer    ID recommends RICHARD due to bacteremia, consult cardiology    Planning 2 weeks IV abx with current IV treatments    Repeat blood cx are negative    Replace K and iron    Continue inpatient tx    Disposition :  Patient Active Problem List   Diagnosis Code    Pyelonephritis N12    S/p nephrectomy Z90.5    Right flank pain R10.9    Malfunction of nephrostomy tube ([address] T83.098A    Nephrostomy tube displaced ([address] T83.022A    UTI (urinary tract infection) N39.0    Elevated WBC count D72.829    Diabetes ([address] E11.9    Legally blind H54.8    Hypertension I10    Thyroid disease E07.9    Elevated serum creatinine R79.89    S/P colectomy Z90.49    Skin lesion of foot L98.9    Tear of skin of buttock S31.801A    Pressure injury of right heel, unstageable ([facility]) L89.610    Hypomagnesemia E83.42    Hyperkalemia E87.5    Heel ulcer ([address] L97.409    ANTONI (acute kidney injury) ([address] N17.9    S/P AKA (above knee amputation), right ([address] Z89.611    Nephrostomy status ([address] Z93.6    Obstructed nephrostomy tube ([address] T83.092A    Nephrostomy complication ([address] V50.011    Acute renal failure (ARF) ([address] N17.9    Elevated INR R79.1    Bacteremia R78.81    Enterococcus faecalis infection B95.2    CKD stage 3 due to type 2 diabetes mellitus ([facility] E11.22, N18.3       Subjective:    I am ok  Denies pain, fevers, Chills    Mild SOB this am, but repositioned and is better    Review of systems:    Constitutional: denies  myalgias  Respiratory: denies cough  Cardiovascular: denies chest pain, palpitations  Gastrointestinal: denies nausea, vomiting, diarrhea      Vital signs/Intake and Output:  Visit Vitals  /41 (BP 1 Location: Right arm, BP Patient Position: At rest)   Pulse 87   Temp 98.3 °F (36.8 °C)   Resp 17   Ht 5' 2\" (1.575 m)   Wt 78 kg (171 lb 15.3 oz)   SpO2 93%   Breastfeeding No   BMI 31.45 kg/m²     Current Shift:  No intake/output data recorded. Last three shifts:  06/23 1901 - 06/25 0700  In: 450 [P.O.:200; I.V.:250]  Out: 1650 [Urine:1125]    Exam:    General: elderly debilitated WF alert, NAD, OX3  CVS:Regular rate and rhythm, no M/R/G, S1/S2 heard, no thrill  Lungs:Clear to auscultation bilaterally, no wheezes, rhonchi, or rales  Abdomen: Soft, Nontender, colostomy, right neph tube, No distention, Normal Bowel sounds, No hepatomegaly  Extremities: No C/C/E, pulses palpable 2+  Skin:right AKA  Neuro:grossly normal , follows commands  Psych:appropriate                Labs: Results:       Chemistry Recent Labs     06/25/20 0446 06/24/20  1259 06/23/20  0056   GLU 90 183* 185*    141 138   K 3.1* 3.3* 3.6    108 102   CO2 26 27 27   BUN 29* 31* 43*   CREA 1.42* 1.71* 3.08*   CA 7.0* 6.9* 7.6*   AGAP 7 6 9   BUCR 20 18 14      CBC w/Diff Recent Labs     06/25/20 0446 06/24/20  1259 06/23/20  0056   WBC 6.7 8.1 8.6   RBC 2.72* 2.81* 2.87*   HGB 7.7* 8.0* 8.1*   HCT 24.6* 25.7* 25.9*    246 250      Cardiac Enzymes No results for input(s): CPK, CKND1, JANE in the last 72 hours.     No lab exists for component: CKRMB, TROIP   Coagulation Recent Labs     06/25/20  0446 06/24/20  1259   PTP 17.9* 15.7*   INR 1.5* 1.3*       Lipid Panel Lab Results   Component Value Date/Time    Cholesterol, total 115 08/28/2019 08:22 AM    HDL Cholesterol 39 (L) 08/28/2019 08:22 AM    LDL, calculated 46.2 08/28/2019 08:22 AM    VLDL, calculated 29.8 08/28/2019 08:22 AM    Triglyceride 149 08/28/2019 08:22 AM    CHOL/HDL Ratio 2.9 08/28/2019 08:22 AM      BNP No results for input(s): BNPP in the last 72 hours. Liver Enzymes No results for input(s): TP, ALB, TBIL, AP in the last 72 hours.     No lab exists for component: SGOT, GPT, DBIL   Thyroid Studies Lab Results   Component Value Date/Time    TSH 3.40 01/24/2020 08:35 AM        Procedures/imaging: see electronic medical records for all procedures/Xrays and details which were not copied into this note but were reviewed prior to creation of Dana Reynolds MD

## 2020-06-25 NOTE — CONSULTS
[facility] Consult Note      Patient: Rio Garvey MRN: [id number]  SSN: [id number]    YOB: 1940  Age: [de-identified] y.o. Sex: female    Date of Consultation: 06/25/2020  Referring Physician:Dr. Brannon Ventura  Reason for Consultation: RICHARD for bacteremia    HPI:  I was asked by Dr. Donald Huggins for RICHARD for bacteremia. Tawny gupta is a 58-year-old very pleasant patient legally blind she has moved from [place], [place] 2 years ago in this area. She has multiple co-morbidities and she is living in a nursing home. Patient have history of recurrent UTI and also have bacteremia. At this point patient denied any chest pain no previous history of heart problem but she said she has history of bacterial endocarditis when she was 20year-old no documented report available. no history of a difficulty eating or swallowing.           Past Medical History:   Diagnosis Date    Cancer [facility])     vaginal and colon cancer    Cancer [facility]     Chronic pain     arthritis    Diabetes ([facility]     DVT (deep venous thrombosis) (HCC)     Hypertension     Legally blind     Thyroid disease      Past Surgical History:   Procedure Laterality Date    HX ABOVE KNEE AMPUTATION Right     HX COLOSTOMY      HX ORTHOPAEDIC      HX OTHER SURGICAL      kidney drainage tube    HX UROLOGICAL      kidney removal    IR EXCHANGE NEPHRO PERC RT SI  1/28/2019    IR EXCHANGE NEPHRO PERC RT SI  3/21/2019    IR EXCHANGE NEPHRO PERC RT SI  5/13/2019    IR EXCHANGE NEPHRO PERC RT SI  6/27/2019    IR EXCHANGE NEPHRO PERC RT SI  8/16/2019    IR EXCHANGE NEPHRO PERC RT SI  10/3/2019    IR EXCHANGE NEPHRO PERC RT SI  11/18/2019    IR EXCHANGE NEPHRO PERC RT SI  1/6/2020    IR EXCHANGE NEPHRO PERC RT SI  2/24/2020    IR EXCHANGE NEPHRO PERC RT SI  4/13/2020    IR EXCHANGE NEPHRO PERC RT SI  6/1/2020    IR EXCHANGE NEPHRO PERC RT SI  6/19/2020    IR NEPHROSTOMY PERC RT PLC CATH  SI  6/21/2020     Current Facility-Administered Medications   Medication Dose Route Frequency    ferrous sulfate tablet 325 mg  1 Tab Oral DAILY WITH BREAKFAST    Vancomycin random level on 06/25 at 1500  1 Each Other ONCE    potassium chloride (K-DUR, KLOR-CON) SR tablet 20 mEq  20 mEq Oral BID    meropenem (MERREM) 1 g in sterile water (preservative free) 20 mL IV syringe  1 g IntraVENous Q12H    apixaban (ELIQUIS) tablet 2.5 mg  2.5 mg Oral BID    heparin (porcine) 100 unit/mL injection 500 Units  500 Units InterCATHeter Q8H PRN    alum-mag hydroxide-simeth (MYLANTA) oral suspension 30 mL  30 mL Oral QID PRN    atorvastatin (LIPITOR) tablet 20 mg  20 mg Oral DAILY    bisacodyL (DULCOLAX) suppository 10 mg  10 mg Rectal DAILY PRN    camphor-menthoL (SARNA) 0.5-0.5 % lotion   Topical DAILY    cholecalciferol (VITAMIN D3) (1000 Units /25 mcg) tablet 1 Tab  1,000 Units Oral DAILY    docusate sodium (COLACE) capsule 100 mg  100 mg Oral BID    gabapentin (NEURONTIN) capsule 300 mg  300 mg Oral BID    insulin glargine (LANTUS) injection 15 Units  15 Units SubCUTAneous QHS    levothyroxine (SYNTHROID) tablet 50 mcg  50 mcg Oral ACB    metoprolol succinate (TOPROL-XL) XL tablet 50 mg  50 mg Oral DAILY    Vancocmycin-Pharmacy to dose  1 Each Other Rx Dosing/Monitoring    sodium bicarbonate tablet 650 mg  650 mg Oral BID    traZODone (DESYREL) tablet 50 mg  50 mg Oral QHS    brimonidine (ALPHAGAN) 0.2 % ophthalmic solution 1 Drop  1 Drop Both Eyes Q12H    timolol (TIMOPTIC) 0.5 % ophthalmic solution 1 Drop  1 Drop Both Eyes BID    acetaminophen (TYLENOL) tablet 650 mg  650 mg Oral Q4H PRN    oxyCODONE IR (ROXICODONE) tablet 5 mg  5 mg Oral Q4H PRN    naloxone (NARCAN) injection 0.4 mg  0.4 mg IntraVENous PRN    ondansetron (ZOFRAN) injection 4 mg  4 mg IntraVENous Q4H PRN    insulin lispro (HUMALOG) injection   SubCUTAneous AC&HS    glucose chewable tablet 16 g  16 g Oral PRN    glucagon (GLUCAGEN) injection 1 mg  1 mg IntraMUSCular PRN    dextrose 10% infusion 125-250 mL  125-250 mL IntraVENous PRN       Allergies and Intolerances: Allergies   Allergen Reactions    Morphine Nausea and Vomiting    Pcn [Penicillins] Rash       Family History:   History reviewed. No pertinent family history. Social History:   She  reports that she has never smoked. She has never used smokeless tobacco.  She  reports no history of alcohol use. Review of Systems  Gen: + fever, chills, malaise, weight loss/gain. Heent: No headache, rhinorrhea, epistaxis, ear pain, hearing loss, sinus pain, neck pain/stiffness, sore throat. Heart: No chest pain, palpitations, LEWIS, pnd, or orthopnea. Resp: No cough, hemoptysis, wheezing and shortness of breath. GI: No nausea, vomiting, diarrhea, constipation, melena or hematochezia. : No urinary obstruction, dysuria or hematuria. Derm: No rash, new skin lesion or pruritis. Musc/skeletal: no bone or joint complains. Vasc: No edema, cyanosis or claudication. Endo: No heat/cold intolerance, no polyuria,polydipsia or polyphagia. Neuro: No unilateral weakness, numbness, tingling. No seizures. Heme: No easy bruising or bleeding. Physical:   Patient Vitals for the past 6 hrs:   Temp Pulse Resp BP SpO2   06/25/20 1116 98.3 °F (36.8 °C) 81 17 142/45 96 %   06/25/20 0733 98.3 °F (36.8 °C) 87 17 140/41 93 %         Exam:   General Appearance: Legally blind, comfortable  HEENT: FLORY. HEAD: Atraumatic  NECK: No JVD, no thyroidomeglay. LUNGS: Clear bilaterally. HEART: S1+S2     ABD: Non-tender, BS Audible    EXT: No edema, and no cysnosis. VASCULAR EXAM:  both radials intact. PSYCHIATRIC EXAM: Mood is appropriate.   MUSCULOSKELETAL:  Right AKA  NEUROLOGICAL:  Alert, oriented in time place and person    Review of Data:   LABS:   Lab Results   Component Value Date/Time    WBC 6.7 06/25/2020 04:46 AM    HGB 7.7 (L) 06/25/2020 04:46 AM    HCT 24.6 (L) 06/25/2020 04:46 AM    PLATELET 473 28/56/9954 04:46 AM     Lab Results Component Value Date/Time    Sodium 144 06/25/2020 04:46 AM    Potassium 3.1 (L) 06/25/2020 04:46 AM    Chloride 111 06/25/2020 04:46 AM    CO2 26 06/25/2020 04:46 AM    Glucose 90 06/25/2020 04:46 AM    BUN 29 (H) 06/25/2020 04:46 AM    Creatinine 1.42 (H) 06/25/2020 04:46 AM     Lab Results   Component Value Date/Time    Cholesterol, total 115 08/28/2019 08:22 AM    HDL Cholesterol 39 (L) 08/28/2019 08:22 AM    LDL, calculated 46.2 08/28/2019 08:22 AM    Triglyceride 149 08/28/2019 08:22 AM     No components found for: GPT  Lab Results   Component Value Date/Time    Hemoglobin A1c 8.2 (H) 06/19/2020 02:15 PM       RADIOLOGY:  CT Results  (Last 48 hours)    None        CXR Results  (Last 48 hours)    None            Cardiology Procedures:   Results for orders placed or performed during the hospital encounter of 06/19/20   EKG, 12 LEAD, INITIAL   Result Value Ref Range    Ventricular Rate 152 BPM    Atrial Rate 152 BPM    P-R Interval 128 ms    QRS Duration 66 ms    Q-T Interval 246 ms    QTC Calculation (Bezet) 391 ms    Calculated P Axis 58 degrees    Calculated R Axis 51 degrees    Calculated T Axis 88 degrees    Diagnosis       Sinus tachycardia  Nonspecific ST and T wave abnormality  Abnormal ECG  When compared with ECG of 31-MAY-2019 13:09,  Vent.  rate has increased BY  71 BPM  ST no longer elevated in Inferior leads  ST now depressed in Lateral leads  Nonspecific T wave abnormality now evident in Inferior leads  Nonspecific T wave abnormality now evident in Lateral leads  Confirmed by Bryan Vega MD. ([id number]) on 6/21/2020 8:29:05 PM        Echo Results  (Last 48 hours)    None       Cardiolite (Tc-99m Sestamibi) stress test        Impression / Plan:    Patient Active Problem List   Diagnosis Code    Pyelonephritis N12    S/p nephrectomy Z90.5    Right flank pain R10.9    Malfunction of nephrostomy tube ([address] T83.098A    Nephrostomy tube displaced ([address] T83.022A    UTI (urinary tract infection) N39.0  Elevated WBC count D72.829    Diabetes ([facility]) E11.9    Legally blind H54.8    Hypertension I10    Thyroid disease E07.9    Elevated serum creatinine R79.89    S/P colectomy Z90.49    Skin lesion of foot L98.9    Tear of skin of buttock S31.801A    Pressure injury of right heel, unstageable ([facility]) L89.610    Hypomagnesemia E83.42    Hyperkalemia E87.5    Heel ulcer ([address] L97.409    ANTONI (acute kidney injury) ([address] N17.9    S/P AKA (above knee amputation), right ([facility]) Z89.611    Nephrostomy status ([facility]) Z93.6    Obstructed nephrostomy tube ([facility] T83.092A    Nephrostomy complication ([facility]) U05.578    Acute renal failure (ARF) ([facility]) N17.9    Elevated INR R79.1    Bacteremia R78.81    Enterococcus faecalis infection B95.2    CKD stage 3 due to type 2 diabetes mellitus ([facility]) E11.22, N18.3        assessment and plan    Bacteremia    Plan:  RICHARD with anaesthesia. Risk, benefits and alternatives were discussed in detail with the patient. She agreed to proceed with transesophageal echocardiogram.  Thank you for allowing me to participate in management of this pleasant patient. Please feel free to contact me if you have any question or concerns.       Signed By: Willie Sharp MD     June 25, 2020

## 2020-06-25 NOTE — PROGRESS NOTES
Pharmacy Dosing Services: Vancomycin    Consult for Vancomycin Dosing by Pharmacy by Dr. Yennifer Villatoro provided for this [de-identified]y.o. year old female , for indication of UTI. Day of Therapy 4    Ht Readings from Last 1 Encounters:   06/19/20 157.5 cm (62\")        Wt Readings from Last 1 Encounters:   06/23/20 78 kg (171 lb 15.3 oz)        Previous Regimen Vancomycin 1000mg once   Last Level 18.4   Other Current Antibiotics Ceftriaxone,Levofloxacin   Serum Creatinine Lab Results   Component Value Date/Time    Creatinine 1.42 (H) 06/25/2020 04:46 AM    Creatinine, POC 1.1 01/16/2020 09:28 AM      Creatinine Clearance Estimated Creatinine Clearance: 30.6 mL/min (A) (based on SCr of 1.42 mg/dL (H)). BUN Lab Results   Component Value Date/Time    BUN 29 (H) 06/25/2020 04:46 AM    BUN, POC 17 01/16/2020 09:28 AM      WBC Lab Results   Component Value Date/Time    WBC 6.7 06/25/2020 04:46 AM      H/H Lab Results   Component Value Date/Time    HGB 7.7 (L) 06/25/2020 04:46 AM      Platelets Lab Results   Component Value Date/Time    PLATELET 545 15/48/3618 04:46 AM      Temp 98.3 °F (36.8 °C)     Vancomycin 750mg once has ordered at 0300 on 06/26/2020  Targeted trough 10-15 mcg/mL    Pharmacy to follow daily and will make changes to dose and/or frequency based on clinical status.       Pharmacist Mitch Aguilar Contact: [phone]

## 2020-06-25 NOTE — PROGRESS NOTES
NUTRITION FOLLOW-UP    RECOMMENDATIONS/PLAN:   - Continue diet as ordered  - Monitor labs/lytes, fluid status, skin integrity, bowel function  NUTRITION ASSESSMENT:   Client Update: [de-identified] yrs old Female with nephrostomy tube complication. Tube replaced on 6/21 per MD note. ANTONI per MD note. UTI/pyelo, bacteremia, PICC placed yesterday. FOOD/NUTRITION INTAKE   Diet Order:  renal   Supplements: Evans BID   Food Allergies: NKFA  Average PO Intake:      Patient Vitals for the past 100 hrs:   % Diet Eaten   06/24/20 0921 35 %   06/23/20 1843 100 %      Pertinent Medications:  [x] Reviewed; vitamin d3, lantus, lispro, colace, synthroid, sodium bicarb   Electrolyte Replacement Protocol: []K []Mg []PO4  Insulin:  []SSI  [x]Pre-meal   [x]Basal    []Drip  []None  Cultural/Episcopalian Food Preferences: None Identified    BIOCHEMICAL DATA & MEDICAL TESTS  Pertinent Labs:  [x] Reviewed; K-3.1 ANTHROPOMETRICS  Height: 5' 2\" (157.5 cm)       Weight: 78 kg (171 lb 15.3 oz)         BMI: 31.4 kg/m^2 obese (30%-39.9% BMI)   Adm Weight: 172bs                Weight change: n/a  Adjusted Body Weight: 57kg    NUTRITION-FOCUSED PHYSICAL ASSESSMENT  Skin: documented pressure injury per documentation   GI: BM 6/24 + ostomy    NUTRITION PRESCRIPTION  Calories: 1925 kcal/day based on Lamb x 1.5  Protein:68-86 g/day based on 1.2-1.5 g/kg of AdjBW  CHO: 241 g/day based on 50% of total energy  Fluid: 1925 ml/day based on 1 kcal/ml      NUTRITION DIAGNOSES:   1. at risk for inadequate oral intake related to extended LOS as evidenced by LOS of 6 days  NUTRITION INTERVENTIONS:   INTERVENTIONS:                                                                                         GOALS:  1. Continue current POC 1. Pt to continue to meet estimated needs via PO Intakes >75% throughout the next 5-7 days      LEARNING NEEDS (Diet, Supplementation, Food/Nutrient-Drug Interaction):   [x]? None Identified  []?  Inpatient education provided/documented []? Identified and patient:  []? Declined     []? Was not appropriate/indicated  NUTRITION MONITORING /EVALUATION:   Monitor wt  Monitor renal labs, electrolytes, fluid status     []? Participated in Interdisciplinary Rounds  [x]? Interdisciplinary Care Plan Reviewed/Documented  DISCHARGE NUTRITION RECOMMENDATIONS ADDRESSED:                            [x]? To be determined closer to discharge     NUTRITION RISK:     [x]? At risk                     []?  Not currently at risk     Will follow-up per policy.   Nalini Mark [address]

## 2020-06-25 NOTE — PROGRESS NOTES
TRANSFER - OUT REPORT:    Verbal report given to Jinny(name) on Luis Escobar being transferred to Care(unit) for routine progression of care       Report consisted of patient's Situation, Background, Assessment and   Recommendations(SBAR). Information from the following report(s) Kardex was reviewed with the receiving nurse. Opportunity for questions and clarification was provided.       Patient transported with:   [employer]

## 2020-06-26 VITALS
HEART RATE: 94 BPM | OXYGEN SATURATION: 96 % | DIASTOLIC BLOOD PRESSURE: 51 MMHG | BODY MASS INDEX: 31.64 KG/M2 | RESPIRATION RATE: 16 BRPM | HEIGHT: 62 IN | SYSTOLIC BLOOD PRESSURE: 146 MMHG | TEMPERATURE: 98.8 F | WEIGHT: 171.96 LBS

## 2020-06-26 LAB
ANION GAP SERPL CALC-SCNC: 5 MMOL/L (ref 3–18)
BACTERIA SPEC CULT: NORMAL
BUN SERPL-MCNC: 27 MG/DL (ref 7–18)
BUN/CREAT SERPL: 24 (ref 12–20)
CALCIUM SERPL-MCNC: 6.9 MG/DL (ref 8.5–10.1)
CHLORIDE SERPL-SCNC: 112 MMOL/L (ref 100–111)
CO2 SERPL-SCNC: 26 MMOL/L (ref 21–32)
COVID-19 RAPID TEST, COVR: NOT DETECTED
CREAT SERPL-MCNC: 1.13 MG/DL (ref 0.6–1.3)
ERYTHROCYTE [DISTWIDTH] IN BLOOD BY AUTOMATED COUNT: 15 % (ref 11.6–14.5)
GLUCOSE BLD STRIP.AUTO-MCNC: 100 MG/DL (ref 70–110)
GLUCOSE BLD STRIP.AUTO-MCNC: 202 MG/DL (ref 70–110)
GLUCOSE SERPL-MCNC: 124 MG/DL (ref 74–99)
HCT VFR BLD AUTO: 24.2 % (ref 35–45)
HGB BLD-MCNC: 7.5 G/DL (ref 12–16)
INR PPP: 1.5 (ref 0.8–1.2)
MCH RBC QN AUTO: 27.7 PG (ref 24–34)
MCHC RBC AUTO-ENTMCNC: 31 G/DL (ref 31–37)
MCV RBC AUTO: 89.3 FL (ref 74–97)
PLATELET # BLD AUTO: 257 K/UL (ref 135–420)
PMV BLD AUTO: 9.2 FL (ref 9.2–11.8)
POTASSIUM SERPL-SCNC: 3.6 MMOL/L (ref 3.5–5.5)
PROTHROMBIN TIME: 17.8 SEC (ref 11.5–15.2)
RBC # BLD AUTO: 2.71 M/UL (ref 4.2–5.3)
SARS-COV-2, COV2NT: NOT DETECTED
SERVICE CMNT-IMP: NORMAL
SODIUM SERPL-SCNC: 143 MMOL/L (ref 136–145)
SOURCE, COVRS: NORMAL
SOURCE, COVRS: NORMAL
WBC # BLD AUTO: 5.9 K/UL (ref 4.6–13.2)

## 2020-06-26 PROCEDURE — 80048 BASIC METABOLIC PNL TOTAL CA: CPT

## 2020-06-26 PROCEDURE — 85027 COMPLETE CBC AUTOMATED: CPT

## 2020-06-26 PROCEDURE — 74011250637 HC RX REV CODE- 250/637: Performed by: HOSPITALIST

## 2020-06-26 PROCEDURE — 74011250636 HC RX REV CODE- 250/636: Performed by: HOSPITALIST

## 2020-06-26 PROCEDURE — 74011000258 HC RX REV CODE- 258: Performed by: INTERNAL MEDICINE

## 2020-06-26 PROCEDURE — 74011250636 HC RX REV CODE- 250/636: Performed by: INTERNAL MEDICINE

## 2020-06-26 PROCEDURE — 82962 GLUCOSE BLOOD TEST: CPT

## 2020-06-26 PROCEDURE — 87635 SARS-COV-2 COVID-19 AMP PRB: CPT

## 2020-06-26 PROCEDURE — 74011250637 HC RX REV CODE- 250/637: Performed by: FAMILY MEDICINE

## 2020-06-26 PROCEDURE — 36592 COLLECT BLOOD FROM PICC: CPT

## 2020-06-26 PROCEDURE — 74011000250 HC RX REV CODE- 250: Performed by: HOSPITALIST

## 2020-06-26 PROCEDURE — 85610 PROTHROMBIN TIME: CPT

## 2020-06-26 PROCEDURE — 74011636637 HC RX REV CODE- 636/637: Performed by: FAMILY MEDICINE

## 2020-06-26 RX ORDER — OXYCODONE HYDROCHLORIDE 5 MG/1
5 TABLET ORAL
Qty: 10 TAB | Refills: 0 | Status: SHIPPED | OUTPATIENT
Start: 2020-06-26 | End: 2020-06-29

## 2020-06-26 RX ORDER — LANOLIN ALCOHOL/MO/W.PET/CERES
325 CREAM (GRAM) TOPICAL
Qty: 10 TAB | Refills: 0 | Status: SHIPPED
Start: 2020-06-27 | End: 2020-11-11

## 2020-06-26 RX ADMIN — ATORVASTATIN CALCIUM 20 MG: 20 TABLET, FILM COATED ORAL at 09:26

## 2020-06-26 RX ADMIN — DOCUSATE SODIUM 100 MG: 100 CAPSULE, LIQUID FILLED ORAL at 09:27

## 2020-06-26 RX ADMIN — APIXABAN 2.5 MG: 2.5 TABLET, FILM COATED ORAL at 09:26

## 2020-06-26 RX ADMIN — VITAMIN D, TAB 1000IU (100/BT) 1 TABLET: 25 TAB at 09:27

## 2020-06-26 RX ADMIN — INSULIN LISPRO 4 UNITS: 100 INJECTION, SOLUTION INTRAVENOUS; SUBCUTANEOUS at 11:40

## 2020-06-26 RX ADMIN — MEROPENEM 1 G: 1 INJECTION, POWDER, FOR SOLUTION INTRAVENOUS at 00:53

## 2020-06-26 RX ADMIN — ERTAPENEM SODIUM 1 G: 1 INJECTION, POWDER, LYOPHILIZED, FOR SOLUTION INTRAMUSCULAR; INTRAVENOUS at 11:33

## 2020-06-26 RX ADMIN — TRAZODONE HYDROCHLORIDE 50 MG: 50 TABLET ORAL at 00:53

## 2020-06-26 RX ADMIN — FERROUS SULFATE TAB 325 MG (65 MG ELEMENTAL FE) 325 MG: 325 (65 FE) TAB at 09:26

## 2020-06-26 RX ADMIN — METOPROLOL SUCCINATE 50 MG: 50 TABLET, EXTENDED RELEASE ORAL at 09:26

## 2020-06-26 RX ADMIN — SODIUM BICARBONATE 650 MG TABLET 650 MG: at 09:26

## 2020-06-26 RX ADMIN — LEVOTHYROXINE SODIUM 50 MCG: 0.05 TABLET ORAL at 06:46

## 2020-06-26 RX ADMIN — POTASSIUM CHLORIDE 20 MEQ: 20 TABLET, EXTENDED RELEASE ORAL at 09:27

## 2020-06-26 RX ADMIN — VANCOMYCIN HYDROCHLORIDE 750 MG: 750 INJECTION, POWDER, LYOPHILIZED, FOR SOLUTION INTRAVENOUS at 03:54

## 2020-06-26 RX ADMIN — GABAPENTIN 300 MG: 300 CAPSULE ORAL at 09:26

## 2020-06-26 NOTE — DISCHARGE SUMMARY
[address]    Name:  Ari Billings  MR#:   [id number]  :  1940  ACCOUNT #:  [de-identified]  ADMIT DATE:  2020  DISCHARGE DATE:  2020    The patient is going to skilled nursing facility were she is a long-term care resident. She has a nephrostomy tube in the right flank. It needs to be flushed daily to keep open. She has a PICC line in the left arm that needs regular care until her antibiotics are completed. DISCHARGE MEDICATIONS:  1. Tylenol 650 every 6 hours needed for pain. 2.  Mylanta 30 mL four times daily as needed. 3.  Lipitor 20 mg at night. 4.  Benadryl 25 mg every 6 hours as needed. 5.  Combigan 1 drop both eyes every 12 hours. 6.  Vitamin D 1000 units daily. 7.  Colace 100 mg twice daily. 8.  Dulcolax suppository daily as needed to the rectum. 9.  Eliquis 5 mg twice daily. 10.  Ertapenem 1 g IV q.24 h. daily for 6 days. 11.  Ferrous sulfate 325 mg daily. 12.  Gabapentin 300 mg twice daily. 13.  Levemir 15 units subcu at night. 14.  Metoprolol XL 50 mg daily. 15.  NovoLog sliding scale insulin. 16  Nystatin powder to the groin four times daily. 17.  Oxycodone 5 mg 1 tablet every 6 hours as needed for severe pain. 18.  Sodium bicarbonate 650 mg twice daily. 19.  Synthroid 50 mcg daily. 20.  Timolol eyedrops 1 drop twice daily. 21.  Trazodone 50 mg at night. 22.  Vancomycin 1 g IV daily for 10 days. 23.  Zanaflex 2 mg twice daily. Again, the patient is going to [facility]. DISCHARGE DIAGNOSES:  1. Acute renal failure, pyelonephritis, malfunctioning nephrostomy tube with displacement. 2.  History of nephrectomy and colon cancer with colostomy. 3.  Enterococcus bacteremia. 4.  Severe iron-deficiency anemia. 5.  Hypokalemia. 6.  Right above-knee amputation. 7.  Peripheral vascular disease. 8.  Left heel ulcer. 9.  Blindness. HOSPITAL CONSULTANTS:  Dr. Precious Sher, Cardiology.     PROCEDURES PERFORMED: PICC line placement and transesophageal echocardiogram.    OTHER CONSULTANTS:  Dr. Sheryl Plaza, Infectious Disease. HOSPITAL SUMMARY:  The patient is [de-identified]years old. She has a history of colon cancer with colostomy. She has had previous nephrectomy. She has a chronic nephrostomy tube in place. It became dislodged at the nursing facility that she stays. She developed pyelonephritis, flank pain, nausea and came into the emergency room. She had acute renal failure. Her Eliquis had to be held for appropriate manipulation and replacement of the nephrostomy tube. She had an interosseus IV, a right IJ that was pulled and subsequently did not have appropriate IV access, other than the interosseous line, so she had Nephrology consultation with Dr. Ophelia Delgado. PICC line was placed as her renal function was improving with fluids. Infectious Disease saw her for the bacteremia and urinary infection. She saw Urology about the nephrostomy, and she saw Cardiology to do a RICHARD due to the Enterococcus bacteremia and distant history of endocarditis as a young woman. She is at this point, improved. Her H and H are stable at 7.5 and 24.2. She is iron deficient. There are no signs or symptoms of bleeding. We started her on oral iron here. Her renal failure has resolved. Her creatinine is down to 1. 13. BUN is 27. Sodium, potassium, bicarb and anion gap are normal.  Her urine culture grew ESBL Proteus, and her blood culture grew Staphylococcus epidermidis. Repeat cultures are negative. White count is now within normal limits. No fevers or chills. RICHARD showed no evidence for vegetation. Today, she is awake and alert, in no acute distress. She is legally blind. She has a special device that she uses for her phone and listening to books on tape. Her temp is 98.4, pulse 96, blood pressure 155/56, respiratory rate 16, SaO2 is 97%.   Her primary complaint this morning is that she wants biscuits and gravy for breakfast.  She denies any new issues of pain, fevers, chills, shortness of breath or abdominal pain. Lungs are clear bilaterally. Cardiac exam, regular rate and rhythm. Abdomen is obese, soft, nontender. Right nephrostomy tube draining without issue. PICC lines in the left arm. She has a colostomy bag with soft stool. Right above-knee amputation and left heel ulcer that is dressed. She will discharge back to the nursing facility today on the ertapenem and vancomycin as per Dr. Andrea Camargo instructions. The antibiotics are for the following dates. Please note these again. Vancomycin is 1 g IV daily until 07/06, ertapenem is 1 g IV daily until 07/02. She requires a CBC, vanco level and basic metabolic panel every Monday, that should be faxed to [phone], care of Dr. Elaine White. No follow up necessary with Infectious Disease as her RICHARD is negative. She can have her PICC line removed after her antibiotics are completed. Her diet instruction is regular. She has a do not resuscitate on file. I talked with her son this morning and updated him. I spent 45 minutes on discharge time today.       MD JERROD [id number]  D:  06/26/2020 10:53  T:  06/26/2020 11:30  JOB #:  [id number]

## 2020-06-26 NOTE — PROGRESS NOTES
D/C Plan: [facility] (pt must arrive before 2:00pm on day of discharge M-F and before 12:00 Saturday/Sunday)    Provider has indicated pt is medically stable to transition back to [facility] today. Pt will have to have COVID test resulted (test done on 6/20/2020). CM has requested a 1300 transport in preparation for transition back to [facility] today. CM to continue to follow and assist.    Transition of Care Plan:     Communication to Patient/Family:  Met with patient and family, and they are agreeable to the transition plan. The Plan for Transition of Care is related to the following treatment goals: SNF    The Patient and/or patient representative, son Leonidas Cabot) was provided with a choice of provider and agrees   with the discharge plan. Yes [x] No []    Freedom of choice list was provided with basic dialogue that supports the patient's individualized plan of care/goals and shares the quality data associated with the providers. Yes [x] No []    SNF/Rehab Transition:  Patient has been accepted to [facility] at [address] [facility] [address] [facility] for SNF and meets criteria for admission. Patient will transported by medical transport and expected to leave at 1300. Communication to SNF/Rehab:  Bedside RN,  has been notified to update the transition plan to the facility and call report [phone]. Discharge information has been updated on the AVS and communicated through [facility] or [facility]. Discharge instructions to be fax'd to facility at [phone]     Please include all hard scripts for controlled substances, med rec and dc summary, and AVS in packet. Please medicate for pain prior to dc if possible and needed to help offset delay when patient first arrives to facility.     Reviewed and confirmed with facility, [facility] can manage the patient care needs for the following:     Elise Tsai with (X) only those applicable:  Medication:  []Medications are available at the facility  []IV Antibiotics    []Controlled Substance  hard copies available sent. []Weekly Labs    Equipment:  []CPAP/BiPAP  []Wound Vacuum  []Wright or Urinary Device  []PICC/Central Line  []Nebulizer  []Ventilator    Treatment:  []Isolation (for MRSA, VRE, etc.)  []Surgical Drain Management  []Tracheostomy Care  []Dressing Changes  []Dialysis with transportation  []PEG Care  []Oxygen  []Daily Weights for Heart Failure    Dietary:  []Any diet limitations  []Tube Feedings   []Total Parenteral Management (TPN)    Financial Resources:  []Medicaid Application Completed    []UAI Completed and copy given to pt/family. [x]A screening has previously been completed. []Level II Completed    [] Private pay individual who will not become   financially eligible for Medicaid within 6 months from admission to a [address] [place] [address] facility. [] Individual refused to have screening conducted. []Medicaid Application Completed    []The screening denied because it was determined individual did not need/did not qualify for nursing facility level of care. [] Out of state residents seeking direct admission to a [address] facility. [] Individuals who are inpatients of an out of state hospital, or in state or out of state veterans/ hospital and seek direct admission to a [address] facility  [] Individuals who are pateints or residents of a state owned/operated facility that is licensed by [employer] of [employer] and seek direct admission to [address] [place] [address]  [] A screening not required for enrollment in [facility] services as set out in [address] [place] [address] 88-  [] [facility]) staff shall perform screenings of the [facility] clients. Advanced Care Plan:  []Surrogate Decision Maker of Care  []POA  []Communicated Code Status and copy sent.     Other:         Care Management Interventions  Mode of Transport at Discharge: BLS  Transition of Care Consult (CM Consult): Discharge Planning(From [address] [place] [address] ([facility]))  Current Support Network: Nursing Facility([facility])  The Plan for Transition of Care is Related to the Following Treatment Goals : Return to [address] [place] [address]  Discharge Location  Discharge Placement: Skilled nursing facility([facility]

## 2020-06-26 NOTE — CONSULTS
[facility]  (A Division of [address] [place] [address])    Follow-up Note      Date of Admission: 6/19/2020       Date of Note:  6/26/2020    Summary:        Ms Brie Izaguirre is a blind [de-identified] WF survivor of many things who most recently had accidental dis-lodging of her chronic/permanent R nephrostomy tube on 6/16 with subsequent progressive R back pain and malaise; also had abdominal pain, Nausea. Since having it replaced 6/21, her symptoms have settled. No f/s/c.     Retired CNA       Interval History:  CC:  Pain gone  Events of last few days reviewed. Doing better. Back pain gone now. Nausea likewise better. Current Antimicrobials: Prior Antimicrobials   1. Vanco IV (6/22-) #4  2. Elda IV (6/25-) #1 1. Levo IV (6/21-24)  2. CAX IV (6/22-25)       Assessment Plan:   Limited community-acquired Enterococcus faecalis sepsis - in conjunction with R nephrotomy tube dysfunction/accidental removal.    RICHARD (-)    I think a total 2wks course of IV vancomycin ought to suffice as she cleared her admission BCx real quickly and RICHARD(-). ->Vanco #2/14  Vanco 1000mg IV q24h. Terminate [id number]; if (+); weekly labs (qMondays) - CBC, Vanco level for PHARM to dose, and BMP; fax to me [phone].     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 Agree with course of carbapenem now - just 7 days. Follow up culture sterile. ->Elda #1/7    I switched her over to once daily ertapenem 1000mg   Terminate - [id number] last dose. Labs as above.     No f/u necessary   ANTONI on CKD - improved    R renal calcinosis      Microbiology:                6/23 - BCx (-)                                                      UA (-)                                         6/22 - BCx (-)                                         6/21 - BCx (-)                                                      UA (+) ESBL Proteus mirabilis 6/20 - BCx (+) Enterococcus faecalis and CoNS                                            3/24/20 - UA (+) Proteus mirabilis (R- FQ, nitro, and T/S)        Lines / Catheters:         L PICC        Patient Active Problem List   Diagnosis Code    Pyelonephritis N12    S/p nephrectomy Z90.5    Right flank pain R10.9    Malfunction of nephrostomy tube ([facility] T83.098A    Nephrostomy tube displaced ([facility] T83.022A    UTI (urinary tract infection) N39.0    Elevated WBC count D72.829    Diabetes ([facility]) E11.9    Legally blind H54.8    Hypertension I10    Thyroid disease E07.9    Elevated serum creatinine R79.89    S/P colectomy Z90.49    Skin lesion of foot L98.9    Tear of skin of buttock S31.801A    Pressure injury of right heel, unstageable ([facility]) L89.610    Hypomagnesemia E83.42    Hyperkalemia E87.5    Heel ulcer ([facility]) L97.409    ANTONI (acute kidney injury) ([facility] N17.9    S/P AKA (above knee amputation), right ([facility]) Z89.611    Nephrostomy status ([facility]) Z93.6    Obstructed nephrostomy tube ([facility]) T83.092A    Nephrostomy complication ([facility]) A01.783    Acute renal failure (ARF) ([facility]) N17.9    Elevated INR R79.1    Bacteremia R78.81    Enterococcus faecalis infection B95.2    CKD stage 3 due to type 2 diabetes mellitus ([facility]) E11.22, N18.3       Current Facility-Administered Medications   Medication Dose Route Frequency    ertapenem (INVANZ) 1 g in 0.9% sodium chloride (MBP/ADV) 50 mL MBP  1 g IntraVENous ONCE    ferrous sulfate tablet 325 mg  1 Tab Oral DAILY WITH BREAKFAST    potassium chloride (K-DUR, KLOR-CON) SR tablet 20 mEq  20 mEq Oral BID    apixaban (ELIQUIS) tablet 2.5 mg  2.5 mg Oral BID    heparin (porcine) 100 unit/mL injection 500 Units  500 Units InterCATHeter Q8H PRN    alum-mag hydroxide-simeth (MYLANTA) oral suspension 30 mL  30 mL Oral QID PRN    atorvastatin (LIPITOR) tablet 20 mg  20 mg Oral DAILY    bisacodyL (DULCOLAX) suppository 10 mg  10 mg Rectal DAILY PRN    camphor-menthoL (SARNA) 0.5-0.5 % lotion   Topical DAILY    cholecalciferol (VITAMIN D3) (1000 Units /25 mcg) tablet 1 Tab  1,000 Units Oral DAILY    docusate sodium (COLACE) capsule 100 mg  100 mg Oral BID    gabapentin (NEURONTIN) capsule 300 mg  300 mg Oral BID    insulin glargine (LANTUS) injection 15 Units  15 Units SubCUTAneous QHS    levothyroxine (SYNTHROID) tablet 50 mcg  50 mcg Oral ACB    metoprolol succinate (TOPROL-XL) XL tablet 50 mg  50 mg Oral DAILY    Vancocmycin-Pharmacy to dose  1 Each Other Rx Dosing/Monitoring    sodium bicarbonate tablet 650 mg  650 mg Oral BID    traZODone (DESYREL) tablet 50 mg  50 mg Oral QHS    brimonidine (ALPHAGAN) 0.2 % ophthalmic solution 1 Drop  1 Drop Both Eyes Q12H    timolol (TIMOPTIC) 0.5 % ophthalmic solution 1 Drop  1 Drop Both Eyes BID    acetaminophen (TYLENOL) tablet 650 mg  650 mg Oral Q4H PRN    oxyCODONE IR (ROXICODONE) tablet 5 mg  5 mg Oral Q4H PRN    naloxone (NARCAN) injection 0.4 mg  0.4 mg IntraVENous PRN    ondansetron (ZOFRAN) injection 4 mg  4 mg IntraVENous Q4H PRN    insulin lispro (HUMALOG) injection   SubCUTAneous AC&HS    glucose chewable tablet 16 g  16 g Oral PRN    glucagon (GLUCAGEN) injection 1 mg  1 mg IntraMUSCular PRN    dextrose 10% infusion 125-250 mL  125-250 mL IntraVENous PRN     Facility-Administered Medications Ordered in Other Encounters   Medication Dose Route Frequency    benzocaine (HURRICANE) 20 % spray    PRN         Review of Systems - General ROS: negative for - chills, fever or night sweats  Respiratory ROS: no cough, shortness of breath, or wheezing  Cardiovascular ROS: no chest pain or dyspnea on exertion  Gastrointestinal ROS: no abdominal pain, change in bowel habits, or black or bloody stools  negative for - nausea/vomiting       Objective:  Visit Vitals  /56   Pulse 96   Temp 98.4 °F (36.9 °C)   Resp 16   Ht 5' 2\" (1.575 m)   Wt 78 kg (171 lb 15.3 oz)   SpO2 97% Breastfeeding No   BMI 31.45 kg/m²       Temp (24hrs), Av.6 °F (37 °C), Min:98.1 °F (36.7 °C), Max:99.1 °F (37.3 °C)      GEN: Elderly obese WF in NAD  HEENT: blind  CHEST: CTA  CVS:RRR  ABD: NT  EXT: no edema; L PICC non-inflamatory       Lab results:    Chemistry  Recent Labs     20  0332 20  0446 20  1259   * 90 183*    144 141   K 3.6 3.1* 3.3*   * 111 108   CO2 26 26 27   BUN 27* 29* 31*   CREA 1.13 1.42* 1.71*   CA 6.9* 7.0* 6.9*   AGAP 5 7 6   BUCR 24* 20 18       CBC w/ Diff  Recent Labs     20  0332 206 20  1259   WBC 5.9 6.7 8.1   RBC 2.71* 2.72* 2.81*   HGB 7.5* 7.7* 8.0*   HCT 24.2* 24.6* 25.7*    249 246       Microbiology  All Micro Results     Procedure Component Value Units Date/Time    CULTURE, URINE [[id number]] Collected:  20 1800    Order Status:  Completed Specimen:  Urine from Clean catch Updated:  20 0756     Special Requests: NO SPECIAL REQUESTS        Culture result: No growth (<1,000 CFU/ML)       CULTURE, BLOOD [[id number]] Collected:  20    Order Status:  Completed Specimen:  Blood Updated:  20     Special Requests: NO SPECIAL REQUESTS        Culture result: NO GROWTH 3 DAYS       CULTURE, BLOOD [[id number]] Collected:  20    Order Status:  Completed Specimen:  Blood Updated:  20     Special Requests: NO SPECIAL REQUESTS        Culture result: NO GROWTH 3 DAYS       CULTURE, BLOOD [[id number]] Collected:  205    Order Status:  Completed Specimen:  Blood Updated:  20     Special Requests: NO SPECIAL REQUESTS        Culture result: NO GROWTH 5 DAYS       CULTURE, BLOOD [[id number]] Collected:  20 0330    Order Status:  Completed Specimen:  Blood Updated:  20     Special Requests: NO SPECIAL REQUESTS        Culture result: NO GROWTH 4 DAYS       CULTURE, BLOOD [[id number]]  (Abnormal)  (Susceptibility) Collected:  20 1714 Order Status:  Completed Specimen:  Blood Updated:  06/25/20 1102     Special Requests: NO SPECIAL REQUESTS        GRAM STAIN       GRAM POSITIVE COCCI IN CHAINS AEROBIC BOTTLE            LORIE LOUIS RN 3S TO [facility] [id number] AT 2155     Culture result:       ENTEROCOCCUS FAECALIS GROWING IN THE AEROBIC BOTTLE (NO SITE INDICATED)                  STAPHYLOCOCCUS EPIDERMIDIS (OXACILLIN RESISTANT) ALSO GROWING IN THE ABOVE BOTTLE          CULTURE, URINE [[id number]]  (Abnormal)  (Susceptibility) Collected:  06/21/20 1430    Order Status:  Completed Specimen:  Cath Urine Updated:  06/25/20 1036     Special Requests: NO SPECIAL REQUESTS        [place] Count --        >100,000  COLONIES/mL       Culture result:       PROTEUS MIRABILIS ** (EXTENDED SPECTRUM BETA LACTAMASE ) Chiquita Modi MD  cell [phone]  [facility]   6/26/2020   10:16 AM

## 2020-06-26 NOTE — PROGRESS NOTES
0750  Bedside and Verbal shift change report given by JESSIKA Kaur, RN (off going nurse) to Fanny Faith RN (on coming nurse). Report included the following information SBAR, Kardex, OR Summary, Intake/Output and MAR      1106  Lab called and said that the pt's COVID-19 rapid test was negative. Notified Dr. Jaron Scott. 1300  TRANSFER - OUT REPORT:    Verbal report given to [facility], RN(name) on Robson Villeda  being transferred to [facility]  for routine progression of care       Report consisted of patients Situation, Background, Assessment and   Recommendations(SBAR). Information from the following report(s) SBAR, Intake/Output, MAR and Recent Results was reviewed with the receiving nurse. Lines:   PICC Double Lumen 06/23/20 Left;Brachial (Active)   Central Line Being Utilized Yes 6/26/2020  8:00 AM   Criteria for Appropriate Use Limited/no vessel suitable for conventional peripheral access 6/26/2020  8:00 AM   Site Assessment Clean, dry, & intact 6/26/2020  8:00 AM   Phlebitis Assessment 0 6/26/2020  8:00 AM   Infiltration Assessment 0 6/26/2020  8:00 AM   Date of Last Dressing Change 06/23/20 6/26/2020  8:00 AM   Dressing Status Clean, dry, & intact 6/26/2020  8:00 AM   Action Taken Open ports on tubing capped 6/26/2020  8:00 AM   Dressing Type Disk with Chlorhexadine gluconate (CHG); Transparent 6/26/2020  8:00 AM   Hub Color/Line Status Red;Flushed;Patent 6/26/2020  8:00 AM   Positive Blood Return (Site #1) Yes 6/26/2020  8:00 AM   Hub Color/Line Status Purple;Flushed;Patent 6/26/2020  8:00 AM   Positive Blood Return (Site #2) Yes 6/26/2020  8:00 AM   Alcohol Cap Used Yes 6/26/2020  8:00 AM        Opportunity for questions and clarification was provided.       Patient transported with:   [employer]

## 2020-06-26 NOTE — PROGRESS NOTES
Bedside and Verbal shift change report given to Vin Bhagat RN  (oncoming nurse) by Citlali Lawrence RN (offgoing nurse). Report included the following information SBAR, Kardex, Intake/Output, MAR and Recent Results . Shift summary -- Pt increasingly anxious overnight due to leaking of colostomy bag. Wafer and bag changed with hygiene care provided. Prn medications given for pain and nausea. Bedside and Verbal shift change report given to Citlali Lawrence RN (oncoming nurse) by Vin Bhagat RN (offgoing nurse). Report included the following information SBAR, Kardex, Intake/Output, MAR and Recent Results.

## 2020-06-26 NOTE — CDMP QUERY
Pt admitted with Bactremia. Culture and sensitivity was performed on 6/20 and results indicated resistance to Rocephin and Levofloxacin and  Antibiotics were changed to [address] [place] [address]. After further review, can this patient be considered to have: 
? Levofloxacin and Rocephine Resistance ? Other Resistance, please specify:  
? Unable to determine The medical record reflects the following: 
  Risk Factors: [de-identified] yo female admitted with  Pyelonephritis and obstructed nephrsostomy tube Clinical Indicators: Proteus-ESBL-resistant to rocephin and levaquin-will add merrem if ok with ID for further tx Treatment: Merrem added to med list 6/25 Thank you for your time, 
Arlette Thrasher RN [facility] [phone]

## 2020-06-26 NOTE — ANESTHESIA POSTPROCEDURE EVALUATION
Post-Anesthesia Evaluation & Assessment    Vitals:    BP - 144/66  Pulse - 85  RR - 20  O2 sat - 97%  Temp - 98.1F    Nausea/Vomiting: Controlled. Post-operative hydration adequate. Pain Scale 1: Numeric (0 - 10) (06/25/20 1429)  Pain Intensity 1: 0 (06/25/20 1429)   Managed    Pain score at or below stated goal level. Mental status & Level of consciousness: alert and oriented x 3    Neurological status: moves all extremities, sensation grossly intact    Pulmonary status: airway patent, adequate oxygenation. Complications related to anesthesia: none    Patient has met all PACU discharge requirements.       Bernardine Mass, DO

## 2020-06-27 LAB
BACTERIA SPEC CULT: NORMAL
SERVICE CMNT-IMP: NORMAL

## 2020-06-28 LAB
BACTERIA SPEC CULT: NORMAL
SERVICE CMNT-IMP: NORMAL

## 2020-06-28 NOTE — PROGRESS NOTES
CD QUERY     Agree with:   Pressure injury to Buttocks and Sacrum, stage 2 , POA; Pressure injury to left heel, unstageable POA   Pt admitted with Bactremia. Culture and sensitivity was performed on 6/20 and results indicated resistance to Rocephin and Levofloxacin and  Antibiotics were changed to [address] [place] [address]. After further review, can this patient be considered to have:     Clinically unable to determine.

## 2020-06-29 ENCOUNTER — HOSPITAL ENCOUNTER (OUTPATIENT)
Dept: LAB | Age: 80
Discharge: HOME | End: 2020-06-29

## 2020-06-29 LAB
ANION GAP SERPL CALC-SCNC: 3 MMOL/L (ref 3–18)
BACTERIA SPEC CULT: NORMAL
BACTERIA SPEC CULT: NORMAL
BASOPHILS # BLD: 0 K/UL (ref 0–0.1)
BASOPHILS NFR BLD: 0 % (ref 0–2)
BUN SERPL-MCNC: 14 MG/DL (ref 7–18)
BUN/CREAT SERPL: 18 (ref 12–20)
CALCIUM SERPL-MCNC: 6.6 MG/DL (ref 8.5–10.1)
CHLORIDE SERPL-SCNC: 108 MMOL/L (ref 100–111)
CO2 SERPL-SCNC: 30 MMOL/L (ref 21–32)
CREAT SERPL-MCNC: 0.76 MG/DL (ref 0.6–1.3)
DIFFERENTIAL METHOD BLD: ABNORMAL
EOSINOPHIL # BLD: 0.2 K/UL (ref 0–0.4)
EOSINOPHIL NFR BLD: 3 % (ref 0–5)
ERYTHROCYTE [DISTWIDTH] IN BLOOD BY AUTOMATED COUNT: 14.9 % (ref 11.6–14.5)
FAX TO INFO,FAXT: NORMAL
FAX TO NUMBER,FAXN: NORMAL
GLUCOSE SERPL-MCNC: 164 MG/DL (ref 74–99)
HCT VFR BLD AUTO: 24 % (ref 35–45)
HGB BLD-MCNC: 7.5 G/DL (ref 12–16)
LYMPHOCYTES # BLD: 1.3 K/UL (ref 0.9–3.6)
LYMPHOCYTES NFR BLD: 18 % (ref 21–52)
MCH RBC QN AUTO: 28 PG (ref 24–34)
MCHC RBC AUTO-ENTMCNC: 31.3 G/DL (ref 31–37)
MCV RBC AUTO: 89.6 FL (ref 74–97)
MONOCYTES # BLD: 0.5 K/UL (ref 0.05–1.2)
MONOCYTES NFR BLD: 7 % (ref 3–10)
NEUTS SEG # BLD: 5 K/UL (ref 1.8–8)
NEUTS SEG NFR BLD: 72 % (ref 40–73)
PLATELET # BLD AUTO: 323 K/UL (ref 135–420)
PMV BLD AUTO: 8.9 FL (ref 9.2–11.8)
POTASSIUM SERPL-SCNC: 3.8 MMOL/L (ref 3.5–5.5)
RBC # BLD AUTO: 2.68 M/UL (ref 4.2–5.3)
SERVICE CMNT-IMP: NORMAL
SERVICE CMNT-IMP: NORMAL
SODIUM SERPL-SCNC: 141 MMOL/L (ref 136–145)
VANCOMYCIN TROUGH SERPL-MCNC: 19.4 UG/ML (ref 10–20)
WBC # BLD AUTO: 7 K/UL (ref 4.6–13.2)

## 2020-06-29 PROCEDURE — 80048 BASIC METABOLIC PNL TOTAL CA: CPT

## 2020-06-29 PROCEDURE — 80202 ASSAY OF VANCOMYCIN: CPT

## 2020-06-29 PROCEDURE — 85025 COMPLETE CBC W/AUTO DIFF WBC: CPT

## 2020-07-01 NOTE — CDMP QUERY
After further study, do you concur with the findings of Enterococcus faecalis sepsis noted in the Infectious Disease Consultation note? Please clarify if this patient was treated/managed for: 
 
? Concur with ID consultant, patient with Enterococcus faecalis sepsis ? Sepsis ruled out; signs and symptoms of localized infection only ? Other Explanation of the clinical findings ? Clinically Undetermined (no explanation for clinical findings) The medical record reflects the following clinical findings, risk factors and treatment:  
 
Risk Factors:  [de-identified] yo female admitted with dislodged nephrostomy tube Clinical Indicators: => The ED provider specified that the patient was not septic. =>Later, after admission, it is documented that patient developed worsening back pain and malaise. 
 
=> On day 2, WBC increased from 12.7 to 14.7 and 17.9 and had fever up to 103. 
 
=> Urine culture was positive for Proteus =>Blood culture was positive for Enterococcus and Staph epidermidis  
 
=>PICC line placed and patient was discharged on IV Vancomycin and Ertapenem for 10 more days =>Infectious Disease consultant documented Enterococcus faecalis sepsis Treatment: ID consult, IV antibiotics, PICC line placed, Blood and urine cultures Please clarify and document your clinical opinion in the progress notes and discharge summary including the definitive and/or presumptive diagnosis, (suspected or probable), related to the above clinical findings. Please include clinical findings supporting your diagnosis. Thank you for your time, 
Jaya Fraser RN [facility] [phone]

## 2020-07-03 ENCOUNTER — HOSPITAL ENCOUNTER (OUTPATIENT)
Dept: LAB | Age: 80
Discharge: HOME | End: 2020-07-03

## 2020-07-03 ENCOUNTER — HOSPITAL ENCOUNTER (OUTPATIENT)
Dept: LAB | Age: 80
Discharge: HOME | End: 2020-07-03
Payer: COMMERCIAL

## 2020-07-03 LAB — VANCOMYCIN TROUGH SERPL-MCNC: 28.1 UG/ML (ref 10–20)

## 2020-07-03 PROCEDURE — 80202 ASSAY OF VANCOMYCIN: CPT

## 2020-07-03 NOTE — PROGRESS NOTES
Hospitalist Progress Note    Patient: Benito Bennett MRN: [id number]  CSN: [id number]    YOB: 1940  Age: [de-identified] y.o.   Sex: female    DOA: 6/19/2020 LOS:  LOS: 7 days          Enterococcus fecalis sepsis treated-CDI

## 2020-07-04 ENCOUNTER — HOSPITAL ENCOUNTER (OUTPATIENT)
Dept: LAB | Age: 80
Discharge: HOME | End: 2020-07-04

## 2020-07-04 ENCOUNTER — HOSPITAL ENCOUNTER (OUTPATIENT)
Dept: LAB | Age: 80
Discharge: HOME | End: 2020-07-04
Payer: COMMERCIAL

## 2020-07-04 LAB — VANCOMYCIN TROUGH SERPL-MCNC: 30.1 UG/ML (ref 10–20)

## 2020-07-04 PROCEDURE — 80202 ASSAY OF VANCOMYCIN: CPT

## 2020-07-06 ENCOUNTER — HOSPITAL ENCOUNTER (OUTPATIENT)
Dept: LAB | Age: 80
Discharge: HOME | End: 2020-07-06

## 2020-07-06 ENCOUNTER — HOSPITAL ENCOUNTER (OUTPATIENT)
Dept: LAB | Age: 80
Discharge: HOME | End: 2020-07-06
Payer: COMMERCIAL

## 2020-07-06 LAB
ANION GAP SERPL CALC-SCNC: 5 MMOL/L (ref 3–18)
BUN SERPL-MCNC: 14 MG/DL (ref 7–18)
BUN/CREAT SERPL: 18 (ref 12–20)
CALCIUM SERPL-MCNC: 7 MG/DL (ref 8.5–10.1)
CHLORIDE SERPL-SCNC: 108 MMOL/L (ref 100–111)
CO2 SERPL-SCNC: 29 MMOL/L (ref 21–32)
CREAT SERPL-MCNC: 0.78 MG/DL (ref 0.6–1.3)
DATE LAST DOSE: NORMAL
GLUCOSE SERPL-MCNC: 91 MG/DL (ref 74–99)
POTASSIUM SERPL-SCNC: 3.5 MMOL/L (ref 3.5–5.5)
REPORTED DOSE,DOSE: NORMAL UNITS
REPORTED DOSE/TIME,TMG: NORMAL
SODIUM SERPL-SCNC: 142 MMOL/L (ref 136–145)
VANCOMYCIN TROUGH SERPL-MCNC: 14.6 UG/ML (ref 10–20)

## 2020-07-06 PROCEDURE — 80048 BASIC METABOLIC PNL TOTAL CA: CPT

## 2020-07-06 PROCEDURE — 80202 ASSAY OF VANCOMYCIN: CPT

## 2020-07-07 LAB
FAX TO INFO,FAXT: NORMAL
FAX TO NUMBER,FAXN: NORMAL

## 2020-07-24 RX ORDER — OXYCODONE HYDROCHLORIDE 5 MG/1
5 TABLET ORAL
COMMUNITY
End: 2020-11-11

## 2020-07-24 RX ORDER — ONDANSETRON 4 MG/1
4 TABLET, FILM COATED ORAL
COMMUNITY
End: 2021-12-10

## 2020-07-24 RX ORDER — BISMUTH SUBSALICYLATE 262 MG
1 TABLET,CHEWABLE ORAL DAILY
COMMUNITY

## 2020-07-24 RX ORDER — LORAZEPAM 0.5 MG/1
0.5 TABLET ORAL EVERY 24 HOURS
COMMUNITY
End: 2020-11-11

## 2020-07-24 NOTE — PROGRESS NOTES
Appointment for routine right nephrostomy tube exchanged reviewed with pt's son, Micky Villagran and Brenda @ [facility]:    Arrive in stretcher to [facility] radiology waiting room on 8/10/20 at 0945 for scheduled procedure to occur at 1000. Junior Jay stated he will meet pt in waiting room. Requested that [facility] fax pt's current medication list and medical summary to [facility] radiology rn ofc.

## 2020-08-10 ENCOUNTER — HOSPITAL ENCOUNTER (OUTPATIENT)
Dept: INTERVENTIONAL RADIOLOGY/VASCULAR | Age: 80
Discharge: HOME | End: 2020-08-10
Attending: UROLOGY
Payer: MEDICARE

## 2020-08-10 DIAGNOSIS — N13.30 HYDRONEPHROSIS: ICD-10-CM

## 2020-08-10 PROCEDURE — 74011000250 HC RX REV CODE- 250: Performed by: RADIOLOGY

## 2020-08-10 PROCEDURE — 74011636320 HC RX REV CODE- 636/320: Performed by: RADIOLOGY

## 2020-08-10 PROCEDURE — C1729 CATH, DRAINAGE: HCPCS

## 2020-08-10 PROCEDURE — 74011250636 HC RX REV CODE- 250/636: Performed by: RADIOLOGY

## 2020-08-10 RX ORDER — LIDOCAINE HYDROCHLORIDE 10 MG/ML
1-20 INJECTION INFILTRATION; PERINEURAL
Status: COMPLETED | OUTPATIENT
Start: 2020-08-10 | End: 2020-08-10

## 2020-08-10 RX ORDER — SODIUM CHLORIDE 9 MG/ML
500 INJECTION, SOLUTION INTRAVENOUS
Status: COMPLETED | OUTPATIENT
Start: 2020-08-10 | End: 2020-08-10

## 2020-08-10 RX ADMIN — SODIUM CHLORIDE 500 ML: 900 INJECTION, SOLUTION INTRAVENOUS at 10:16

## 2020-08-10 RX ADMIN — IOPAMIDOL 16 ML: 612 INJECTION, SOLUTION INTRAVENOUS at 10:16

## 2020-08-10 RX ADMIN — LIDOCAINE HYDROCHLORIDE 2 ML: 10 INJECTION, SOLUTION INFILTRATION; PERINEURAL at 10:16

## 2020-08-10 NOTE — PROCEDURES
Vascular & Interventional Radiology Brief Procedure Note    Interventional Radiologist: Ant Pierson    Pre-operative Diagnosis:  Chronic indwelling right percutaneous nephrostomy (PCN). Routine exchange. Post-operative Diagnosis: Same as pre-op dx    Procedure(s) Performed:  Right PCN exchange. Anesthesia:  Local    Findings:  Successful exchange of right PCN for the same. Complications: None    Estimated Blood Loss:  minimal    Tubes and Drains: 10 Fr APDL. Specimens: None    Condition: Good    Disposition:  D/C home. Plan:  Right PCN to gravity drainage. Routine exchange in 8-12 weeks.       Rayo Conte MD  [facility]  Vascular & Interventional Radiology  8/10/2020

## 2020-08-10 NOTE — PROGRESS NOTES
Pt arrived on unit; Pt Alert and Oriented; Consent signed; Pt is accompanied by her son, John Pace. Pt transferred to treatment table for procedure.

## 2020-08-11 ENCOUNTER — HOSPITAL ENCOUNTER (OUTPATIENT)
Dept: LAB | Age: 80
Discharge: HOME | End: 2020-08-11
Payer: MEDICARE

## 2020-08-11 LAB
APPEARANCE UR: ABNORMAL
BACTERIA URNS QL MICRO: ABNORMAL /HPF
BILIRUB UR QL: ABNORMAL
COLOR UR: ABNORMAL
EPITH CASTS URNS QL MICRO: ABNORMAL /LPF (ref 0–5)
GLUCOSE UR STRIP.AUTO-MCNC: NEGATIVE MG/DL
HGB UR QL STRIP: ABNORMAL
KETONES UR QL STRIP.AUTO: NEGATIVE MG/DL
LEUKOCYTE ESTERASE UR QL STRIP.AUTO: ABNORMAL
NITRITE UR QL STRIP.AUTO: POSITIVE
PH UR STRIP: 5.5 [PH] (ref 5–8)
PROT UR STRIP-MCNC: 300 MG/DL
RBC #/AREA URNS HPF: ABNORMAL /HPF (ref 0–5)
SP GR UR REFRACTOMETRY: 1.01 (ref 1–1.03)
UROBILINOGEN UR QL STRIP.AUTO: 0.2 EU/DL (ref 0.2–1)
WBC URNS QL MICRO: >100 /HPF (ref 0–5)

## 2020-08-11 PROCEDURE — 87086 URINE CULTURE/COLONY COUNT: CPT

## 2020-08-11 PROCEDURE — 81001 URINALYSIS AUTO W/SCOPE: CPT

## 2020-08-11 PROCEDURE — 87077 CULTURE AEROBIC IDENTIFY: CPT

## 2020-08-11 PROCEDURE — 87186 SC STD MICRODIL/AGAR DIL: CPT

## 2020-08-15 LAB
BACTERIA SPEC CULT: ABNORMAL
BACTERIA SPEC CULT: ABNORMAL
CC UR VC: ABNORMAL
SERVICE CMNT-IMP: ABNORMAL

## 2020-08-16 ENCOUNTER — HOSPITAL ENCOUNTER (OUTPATIENT)
Dept: LAB | Age: 80
Discharge: HOME | End: 2020-08-16
Payer: MEDICARE

## 2020-08-16 LAB — GENTAMICIN TROUGH SERPL-MCNC: <0.2 UG/ML (ref 0–2)

## 2020-08-16 PROCEDURE — 80170 ASSAY OF GENTAMICIN: CPT

## 2020-09-14 ENCOUNTER — HOSPITAL ENCOUNTER (OUTPATIENT)
Dept: LAB | Age: 80
Discharge: HOME | End: 2020-09-14

## 2020-09-14 PROCEDURE — 87635 SARS-COV-2 COVID-19 AMP PRB: CPT

## 2020-09-15 LAB — SARS-COV-2, COV2NT: NOT DETECTED

## 2020-09-15 NOTE — PROGRESS NOTES
Spoke with Evangelina Gilford at [address] [place] [address] to arrange transportation for Bridget Quintana to radiology for 9/30 at  0900. Discussed plan with son Vinod Simon and he will accompany her as well. LM with Jerod Dewitt at Dr Richard Valerio office about scheduling appt.

## 2020-09-21 ENCOUNTER — HOSPITAL ENCOUNTER (OUTPATIENT)
Dept: LAB | Age: 80
Discharge: HOME | End: 2020-09-21

## 2020-09-21 PROCEDURE — 87635 SARS-COV-2 COVID-19 AMP PRB: CPT

## 2020-09-22 LAB — SARS-COV-2, COV2NT: NOT DETECTED

## 2020-09-30 ENCOUNTER — HOSPITAL ENCOUNTER (OUTPATIENT)
Dept: INTERVENTIONAL RADIOLOGY/VASCULAR | Age: 80
Discharge: HOME | End: 2020-09-30
Attending: UROLOGY
Payer: MEDICARE

## 2020-09-30 DIAGNOSIS — N13.39 OTHER HYDRONEPHROSIS: ICD-10-CM

## 2020-09-30 PROCEDURE — 50435 EXCHANGE NEPHROSTOMY CATH: CPT

## 2020-09-30 PROCEDURE — 74011000636 HC RX REV CODE- 636: Performed by: RADIOLOGY

## 2020-09-30 PROCEDURE — 74011250636 HC RX REV CODE- 250/636: Performed by: RADIOLOGY

## 2020-09-30 RX ORDER — SODIUM CHLORIDE 9 MG/ML
500 INJECTION, SOLUTION INTRAVENOUS
Status: COMPLETED | OUTPATIENT
Start: 2020-09-30 | End: 2020-09-30

## 2020-09-30 RX ADMIN — SODIUM CHLORIDE 500 ML: 900 INJECTION, SOLUTION INTRAVENOUS at 09:31

## 2020-09-30 RX ADMIN — IOPAMIDOL 10 ML: 612 INJECTION, SOLUTION INTRAVENOUS at 09:32

## 2020-10-07 ENCOUNTER — HOSPITAL ENCOUNTER (OUTPATIENT)
Dept: LAB | Age: 80
Discharge: HOME | End: 2020-10-07

## 2020-10-07 PROCEDURE — 87635 SARS-COV-2 COVID-19 AMP PRB: CPT

## 2020-10-09 LAB — SARS-COV-2, COV2NT: NOT DETECTED

## 2020-10-14 ENCOUNTER — HOSPITAL ENCOUNTER (OUTPATIENT)
Dept: LAB | Age: 80
Discharge: HOME | End: 2020-10-14

## 2020-10-14 PROCEDURE — 87635 SARS-COV-2 COVID-19 AMP PRB: CPT

## 2020-10-15 LAB — SARS-COV-2, COV2NT: NOT DETECTED

## 2020-10-19 ENCOUNTER — HOSPITAL ENCOUNTER (OUTPATIENT)
Dept: LAB | Age: 80
Discharge: HOME | End: 2020-10-19

## 2020-10-19 PROCEDURE — 87635 SARS-COV-2 COVID-19 AMP PRB: CPT

## 2020-10-21 LAB — SARS-COV-2, COV2NT: NOT DETECTED

## 2020-11-03 NOTE — PROGRESS NOTES
PT aware of arrival time pre procedure. 0900 for 1000 appt. Discussed plan with son Dante Irene and Mango Billy at [facility]. Transport to be arranged and updated med list to accompany patient. Pt states no questions at this time.

## 2020-11-04 ENCOUNTER — HOSPITAL ENCOUNTER (OUTPATIENT)
Dept: LAB | Age: 80
Discharge: HOME | End: 2020-11-04

## 2020-11-04 ENCOUNTER — APPOINTMENT (OUTPATIENT)
Dept: GENERAL RADIOLOGY | Age: 80
DRG: 698 | End: 2020-11-04
Attending: EMERGENCY MEDICINE
Payer: MEDICARE

## 2020-11-04 ENCOUNTER — APPOINTMENT (OUTPATIENT)
Dept: CT IMAGING | Age: 80
DRG: 698 | End: 2020-11-04
Attending: EMERGENCY MEDICINE
Payer: MEDICARE

## 2020-11-04 ENCOUNTER — HOSPITAL ENCOUNTER (INPATIENT)
Age: 80
LOS: 7 days | Discharge: NURSING, REHAB OR LONG-TERM CARE | DRG: 698 | End: 2020-11-11
Attending: EMERGENCY MEDICINE | Admitting: FAMILY MEDICINE
Payer: MEDICARE

## 2020-11-04 DIAGNOSIS — F41.9 ANXIETY: ICD-10-CM

## 2020-11-04 DIAGNOSIS — T83.092A OBSTRUCTED NEPHROSTOMY TUBE (HCC): ICD-10-CM

## 2020-11-04 DIAGNOSIS — N17.9 ACUTE RENAL FAILURE, UNSPECIFIED ACUTE RENAL FAILURE TYPE (HCC): Primary | ICD-10-CM

## 2020-11-04 DIAGNOSIS — L89.610 PRESSURE INJURY OF RIGHT HEEL, UNSTAGEABLE (HCC): ICD-10-CM

## 2020-11-04 DIAGNOSIS — Z71.89 ADVANCED CARE PLANNING/COUNSELING DISCUSSION: ICD-10-CM

## 2020-11-04 DIAGNOSIS — R53.81 DEBILITY: ICD-10-CM

## 2020-11-04 DIAGNOSIS — R50.9 FEVER IN ADULT: ICD-10-CM

## 2020-11-04 DIAGNOSIS — A41.9 SEPSIS, DUE TO UNSPECIFIED ORGANISM, UNSPECIFIED WHETHER ACUTE ORGAN DYSFUNCTION PRESENT (HCC): ICD-10-CM

## 2020-11-04 DIAGNOSIS — R10.9 RIGHT FLANK PAIN: ICD-10-CM

## 2020-11-04 DIAGNOSIS — N12 PYELONEPHRITIS: ICD-10-CM

## 2020-11-04 PROBLEM — N13.30 HYDRONEPHROSIS, RIGHT: Status: ACTIVE | Noted: 2020-11-04

## 2020-11-04 PROBLEM — R65.20 SEVERE SEPSIS WITH ACUTE ORGAN DYSFUNCTION (HCC): Status: ACTIVE | Noted: 2020-11-04

## 2020-11-04 LAB
ALBUMIN SERPL-MCNC: 2.6 G/DL (ref 3.4–5)
ALBUMIN/GLOB SERPL: 0.6 {RATIO} (ref 0.8–1.7)
ALP SERPL-CCNC: 131 U/L (ref 45–117)
ALT SERPL-CCNC: 29 U/L (ref 13–56)
ANION GAP SERPL CALC-SCNC: 10 MMOL/L (ref 3–18)
APTT PPP: 46.1 SEC (ref 23–36.4)
AST SERPL-CCNC: 29 U/L (ref 10–38)
ATRIAL RATE: 93 BPM
BASOPHILS # BLD: 0 K/UL (ref 0–0.1)
BASOPHILS NFR BLD: 0 % (ref 0–3)
BILIRUB SERPL-MCNC: 0.8 MG/DL (ref 0.2–1)
BUN SERPL-MCNC: 46 MG/DL (ref 7–18)
BUN/CREAT SERPL: 14 (ref 12–20)
CALCIUM SERPL-MCNC: 8.2 MG/DL (ref 8.5–10.1)
CALCULATED P AXIS, ECG09: 51 DEGREES
CALCULATED R AXIS, ECG10: 54 DEGREES
CALCULATED T AXIS, ECG11: 66 DEGREES
CHLORIDE SERPL-SCNC: 95 MMOL/L (ref 100–111)
CO2 SERPL-SCNC: 25 MMOL/L (ref 21–32)
CREAT SERPL-MCNC: 3.26 MG/DL (ref 0.6–1.3)
DIAGNOSIS, 93000: NORMAL
DIFFERENTIAL METHOD BLD: ABNORMAL
EOSINOPHIL # BLD: 0 K/UL (ref 0–0.4)
EOSINOPHIL NFR BLD: 0 % (ref 0–5)
ERYTHROCYTE [DISTWIDTH] IN BLOOD BY AUTOMATED COUNT: 14.4 % (ref 11.6–14.5)
GLOBULIN SER CALC-MCNC: 4.2 G/DL (ref 2–4)
GLUCOSE SERPL-MCNC: 317 MG/DL (ref 74–99)
HCT VFR BLD AUTO: 33.1 % (ref 35–45)
HGB BLD-MCNC: 10.8 G/DL (ref 12–16)
INR PPP: 2.7 (ref 0.8–1.2)
LACTATE BLD-SCNC: 3 MMOL/L (ref 0.4–2)
LACTATE SERPL-SCNC: 2.2 MMOL/L (ref 0.4–2)
LACTATE SERPL-SCNC: 2.3 MMOL/L (ref 0.4–2)
LYMPHOCYTES # BLD: 0.9 K/UL (ref 0.8–3.5)
LYMPHOCYTES NFR BLD: 4 % (ref 20–51)
MAGNESIUM SERPL-MCNC: 1.4 MG/DL (ref 1.6–2.6)
MCH RBC QN AUTO: 29 PG (ref 24–34)
MCHC RBC AUTO-ENTMCNC: 32.6 G/DL (ref 31–37)
MCV RBC AUTO: 88.7 FL (ref 74–97)
METAMYELOCYTES NFR BLD MANUAL: 1 %
MONOCYTES # BLD: 0.9 K/UL (ref 0–1)
MONOCYTES NFR BLD: 4 % (ref 2–9)
NEUTS BAND NFR BLD MANUAL: 5 % (ref 0–5)
NEUTS SEG # BLD: 20.3 K/UL (ref 1.8–8)
NEUTS SEG NFR BLD: 86 % (ref 42–75)
P-R INTERVAL, ECG05: 142 MS
PHOSPHATE SERPL-MCNC: 3.6 MG/DL (ref 2.5–4.9)
PLATELET # BLD AUTO: 452 K/UL (ref 135–420)
PLATELET COMMENTS,PCOM: ABNORMAL
PMV BLD AUTO: 9.6 FL (ref 9.2–11.8)
POTASSIUM SERPL-SCNC: 4.8 MMOL/L (ref 3.5–5.5)
PROT SERPL-MCNC: 6.8 G/DL (ref 6.4–8.2)
PROTHROMBIN TIME: 27.7 SEC (ref 11.5–15.2)
Q-T INTERVAL, ECG07: 344 MS
QRS DURATION, ECG06: 78 MS
QTC CALCULATION (BEZET), ECG08: 427 MS
RBC # BLD AUTO: 3.73 M/UL (ref 4.2–5.3)
RBC MORPH BLD: ABNORMAL
SODIUM SERPL-SCNC: 130 MMOL/L (ref 136–145)
VENTRICULAR RATE, ECG03: 93 BPM
WBC # BLD AUTO: 23.6 K/UL (ref 4.6–13.2)

## 2020-11-04 PROCEDURE — 96365 THER/PROPH/DIAG IV INF INIT: CPT

## 2020-11-04 PROCEDURE — 71045 X-RAY EXAM CHEST 1 VIEW: CPT

## 2020-11-04 PROCEDURE — 87040 BLOOD CULTURE FOR BACTERIA: CPT

## 2020-11-04 PROCEDURE — 83735 ASSAY OF MAGNESIUM: CPT

## 2020-11-04 PROCEDURE — 85025 COMPLETE CBC W/AUTO DIFF WBC: CPT

## 2020-11-04 PROCEDURE — 74176 CT ABD & PELVIS W/O CONTRAST: CPT

## 2020-11-04 PROCEDURE — 87186 SC STD MICRODIL/AGAR DIL: CPT

## 2020-11-04 PROCEDURE — 87635 SARS-COV-2 COVID-19 AMP PRB: CPT

## 2020-11-04 PROCEDURE — 80053 COMPREHEN METABOLIC PANEL: CPT

## 2020-11-04 PROCEDURE — 96375 TX/PRO/DX INJ NEW DRUG ADDON: CPT

## 2020-11-04 PROCEDURE — 93005 ELECTROCARDIOGRAM TRACING: CPT

## 2020-11-04 PROCEDURE — 84100 ASSAY OF PHOSPHORUS: CPT

## 2020-11-04 PROCEDURE — 74011250636 HC RX REV CODE- 250/636: Performed by: FAMILY MEDICINE

## 2020-11-04 PROCEDURE — 83605 ASSAY OF LACTIC ACID: CPT

## 2020-11-04 PROCEDURE — 85610 PROTHROMBIN TIME: CPT

## 2020-11-04 PROCEDURE — 65270000029 HC RM PRIVATE

## 2020-11-04 PROCEDURE — 87077 CULTURE AEROBIC IDENTIFY: CPT

## 2020-11-04 PROCEDURE — 2709999900 HC NON-CHARGEABLE SUPPLY

## 2020-11-04 PROCEDURE — 74011250636 HC RX REV CODE- 250/636: Performed by: EMERGENCY MEDICINE

## 2020-11-04 PROCEDURE — 99285 EMERGENCY DEPT VISIT HI MDM: CPT

## 2020-11-04 PROCEDURE — 74011000250 HC RX REV CODE- 250: Performed by: FAMILY MEDICINE

## 2020-11-04 PROCEDURE — 85730 THROMBOPLASTIN TIME PARTIAL: CPT

## 2020-11-04 RX ORDER — LORAZEPAM 2 MG/ML
0.5 INJECTION INTRAMUSCULAR ONCE
Status: DISCONTINUED | OUTPATIENT
Start: 2020-11-04 | End: 2020-11-05

## 2020-11-04 RX ORDER — POLYETHYLENE GLYCOL 3350 17 G/17G
17 POWDER, FOR SOLUTION ORAL DAILY PRN
Status: DISCONTINUED | OUTPATIENT
Start: 2020-11-04 | End: 2020-11-11 | Stop reason: HOSPADM

## 2020-11-04 RX ORDER — SODIUM CHLORIDE 9 MG/ML
100 INJECTION, SOLUTION INTRAVENOUS CONTINUOUS
Status: DISCONTINUED | OUTPATIENT
Start: 2020-11-04 | End: 2020-11-08

## 2020-11-04 RX ORDER — PROMETHAZINE HYDROCHLORIDE 25 MG/1
12.5 TABLET ORAL
Status: DISCONTINUED | OUTPATIENT
Start: 2020-11-04 | End: 2020-11-11 | Stop reason: HOSPADM

## 2020-11-04 RX ORDER — ONDANSETRON 2 MG/ML
4 INJECTION INTRAMUSCULAR; INTRAVENOUS
Status: DISCONTINUED | OUTPATIENT
Start: 2020-11-04 | End: 2020-11-11 | Stop reason: HOSPADM

## 2020-11-04 RX ORDER — MAGNESIUM SULFATE 100 %
16 CRYSTALS MISCELLANEOUS AS NEEDED
Status: DISCONTINUED | OUTPATIENT
Start: 2020-11-04 | End: 2020-11-11 | Stop reason: HOSPADM

## 2020-11-04 RX ORDER — INSULIN LISPRO 100 [IU]/ML
INJECTION, SOLUTION INTRAVENOUS; SUBCUTANEOUS EVERY 6 HOURS
Status: DISCONTINUED | OUTPATIENT
Start: 2020-11-05 | End: 2020-11-11

## 2020-11-04 RX ORDER — METRONIDAZOLE 500 MG/100ML
500 INJECTION, SOLUTION INTRAVENOUS EVERY 8 HOURS
Status: DISCONTINUED | OUTPATIENT
Start: 2020-11-04 | End: 2020-11-04

## 2020-11-04 RX ORDER — SODIUM CHLORIDE 0.9 % (FLUSH) 0.9 %
5-10 SYRINGE (ML) INJECTION AS NEEDED
Status: DISCONTINUED | OUTPATIENT
Start: 2020-11-04 | End: 2020-11-11 | Stop reason: HOSPADM

## 2020-11-04 RX ORDER — SODIUM CHLORIDE 0.9 % (FLUSH) 0.9 %
5-40 SYRINGE (ML) INJECTION AS NEEDED
Status: DISCONTINUED | OUTPATIENT
Start: 2020-11-04 | End: 2020-11-11 | Stop reason: HOSPADM

## 2020-11-04 RX ORDER — ACETAMINOPHEN 325 MG/1
650 TABLET ORAL
Status: DISCONTINUED | OUTPATIENT
Start: 2020-11-04 | End: 2020-11-05

## 2020-11-04 RX ORDER — LEVOFLOXACIN 5 MG/ML
750 INJECTION, SOLUTION INTRAVENOUS
Status: DISCONTINUED | OUTPATIENT
Start: 2020-11-04 | End: 2020-11-04

## 2020-11-04 RX ORDER — DEXTROSE MONOHYDRATE 100 MG/ML
125-250 INJECTION, SOLUTION INTRAVENOUS AS NEEDED
Status: DISCONTINUED | OUTPATIENT
Start: 2020-11-04 | End: 2020-11-11 | Stop reason: HOSPADM

## 2020-11-04 RX ORDER — ACETAMINOPHEN 650 MG/1
650 SUPPOSITORY RECTAL
Status: DISCONTINUED | OUTPATIENT
Start: 2020-11-04 | End: 2020-11-05

## 2020-11-04 RX ORDER — LORAZEPAM 1 MG/1
0.5 TABLET ORAL
Status: DISCONTINUED | OUTPATIENT
Start: 2020-11-04 | End: 2020-11-04

## 2020-11-04 RX ORDER — SODIUM CHLORIDE 0.9 % (FLUSH) 0.9 %
5-40 SYRINGE (ML) INJECTION EVERY 8 HOURS
Status: DISCONTINUED | OUTPATIENT
Start: 2020-11-04 | End: 2020-11-11 | Stop reason: HOSPADM

## 2020-11-04 RX ORDER — SODIUM CHLORIDE 9 MG/ML
250 INJECTION, SOLUTION INTRAVENOUS AS NEEDED
Status: DISCONTINUED | OUTPATIENT
Start: 2020-11-04 | End: 2020-11-11 | Stop reason: HOSPADM

## 2020-11-04 RX ADMIN — MEROPENEM 500 MG: 500 INJECTION, POWDER, FOR SOLUTION INTRAVENOUS at 22:59

## 2020-11-04 RX ADMIN — SODIUM CHLORIDE 1000 ML: 900 INJECTION, SOLUTION INTRAVENOUS at 19:45

## 2020-11-04 RX ADMIN — METRONIDAZOLE 500 MG: 500 INJECTION, SOLUTION INTRAVENOUS at 19:45

## 2020-11-04 RX ADMIN — SODIUM CHLORIDE 503 ML: 900 INJECTION, SOLUTION INTRAVENOUS at 20:49

## 2020-11-04 RX ADMIN — LEVOFLOXACIN 750 MG: 5 INJECTION, SOLUTION INTRAVENOUS at 21:09

## 2020-11-04 NOTE — ED NOTES
Patient arrived with leaking colostomy bag. Changed colostomy bag during triage. Nephrostomy bag noted bloody and bloody gauze to site     Pressure ulcer noted to sacrum.  Mepelix placed at this time    Bernadette Spence MD observed

## 2020-11-05 ENCOUNTER — APPOINTMENT (OUTPATIENT)
Dept: INTERVENTIONAL RADIOLOGY/VASCULAR | Age: 80
DRG: 698 | End: 2020-11-05
Attending: RADIOLOGY
Payer: MEDICARE

## 2020-11-05 PROBLEM — Z79.01 ANTICOAGULATED: Status: ACTIVE | Noted: 2020-11-05

## 2020-11-05 LAB
ABO + RH BLD: NORMAL
ALBUMIN SERPL-MCNC: 2.2 G/DL (ref 3.4–5)
ALBUMIN/GLOB SERPL: 0.6 {RATIO} (ref 0.8–1.7)
ALP SERPL-CCNC: 115 U/L (ref 45–117)
ALT SERPL-CCNC: 27 U/L (ref 13–56)
ANION GAP SERPL CALC-SCNC: 11 MMOL/L (ref 3–18)
AST SERPL-CCNC: 20 U/L (ref 10–38)
ATRIAL RATE: 87 BPM
BILIRUB SERPL-MCNC: 0.6 MG/DL (ref 0.2–1)
BUN SERPL-MCNC: 44 MG/DL (ref 7–18)
BUN/CREAT SERPL: 15 (ref 12–20)
CALCIUM SERPL-MCNC: 7.5 MG/DL (ref 8.5–10.1)
CALCULATED P AXIS, ECG09: 63 DEGREES
CALCULATED R AXIS, ECG10: 64 DEGREES
CALCULATED T AXIS, ECG11: 57 DEGREES
CHLORIDE SERPL-SCNC: 102 MMOL/L (ref 100–111)
CO2 SERPL-SCNC: 20 MMOL/L (ref 21–32)
CREAT SERPL-MCNC: 3.03 MG/DL (ref 0.6–1.3)
DIAGNOSIS, 93000: NORMAL
ERYTHROCYTE [DISTWIDTH] IN BLOOD BY AUTOMATED COUNT: 14.5 % (ref 11.6–14.5)
GLOBULIN SER CALC-MCNC: 3.9 G/DL (ref 2–4)
GLUCOSE BLD STRIP.AUTO-MCNC: 148 MG/DL (ref 70–110)
GLUCOSE BLD STRIP.AUTO-MCNC: 215 MG/DL (ref 70–110)
GLUCOSE BLD STRIP.AUTO-MCNC: 241 MG/DL (ref 70–110)
GLUCOSE SERPL-MCNC: 186 MG/DL (ref 74–99)
HCT VFR BLD AUTO: 29.2 % (ref 35–45)
HGB BLD-MCNC: 9.6 G/DL (ref 12–16)
LACTATE SERPL-SCNC: 1.7 MMOL/L (ref 0.4–2)
MCH RBC QN AUTO: 29 PG (ref 24–34)
MCHC RBC AUTO-ENTMCNC: 32.9 G/DL (ref 31–37)
MCV RBC AUTO: 88.2 FL (ref 74–97)
P-R INTERVAL, ECG05: 148 MS
PLATELET # BLD AUTO: 281 K/UL (ref 135–420)
PMV BLD AUTO: 8.9 FL (ref 9.2–11.8)
POTASSIUM SERPL-SCNC: 5.2 MMOL/L (ref 3.5–5.5)
PROT SERPL-MCNC: 6.1 G/DL (ref 6.4–8.2)
Q-T INTERVAL, ECG07: 392 MS
QRS DURATION, ECG06: 80 MS
QTC CALCULATION (BEZET), ECG08: 471 MS
RBC # BLD AUTO: 3.31 M/UL (ref 4.2–5.3)
SARS-COV-2, COV2NT: NOT DETECTED
SODIUM SERPL-SCNC: 133 MMOL/L (ref 136–145)
VENTRICULAR RATE, ECG03: 87 BPM
WBC # BLD AUTO: 14.7 K/UL (ref 4.6–13.2)

## 2020-11-05 PROCEDURE — C1769 GUIDE WIRE: HCPCS

## 2020-11-05 PROCEDURE — 74011250637 HC RX REV CODE- 250/637: Performed by: FAMILY MEDICINE

## 2020-11-05 PROCEDURE — 82962 GLUCOSE BLOOD TEST: CPT

## 2020-11-05 PROCEDURE — 65270000029 HC RM PRIVATE

## 2020-11-05 PROCEDURE — 83605 ASSAY OF LACTIC ACID: CPT

## 2020-11-05 PROCEDURE — 87077 CULTURE AEROBIC IDENTIFY: CPT

## 2020-11-05 PROCEDURE — 74011250636 HC RX REV CODE- 250/636: Performed by: FAMILY MEDICINE

## 2020-11-05 PROCEDURE — 87635 SARS-COV-2 COVID-19 AMP PRB: CPT

## 2020-11-05 PROCEDURE — 80053 COMPREHEN METABOLIC PANEL: CPT

## 2020-11-05 PROCEDURE — 74011636637 HC RX REV CODE- 636/637: Performed by: FAMILY MEDICINE

## 2020-11-05 PROCEDURE — 93005 ELECTROCARDIOGRAM TRACING: CPT

## 2020-11-05 PROCEDURE — 74011000636 HC RX REV CODE- 636: Performed by: RADIOLOGY

## 2020-11-05 PROCEDURE — 87086 URINE CULTURE/COLONY COUNT: CPT

## 2020-11-05 PROCEDURE — 0T25X0Z CHANGE DRAINAGE DEVICE IN KIDNEY, EXTERNAL APPROACH: ICD-10-PCS | Performed by: RADIOLOGY

## 2020-11-05 PROCEDURE — 36415 COLL VENOUS BLD VENIPUNCTURE: CPT

## 2020-11-05 PROCEDURE — 85027 COMPLETE CBC AUTOMATED: CPT

## 2020-11-05 PROCEDURE — 74011000250 HC RX REV CODE- 250: Performed by: FAMILY MEDICINE

## 2020-11-05 PROCEDURE — 86900 BLOOD TYPING SEROLOGIC ABO: CPT

## 2020-11-05 PROCEDURE — 87186 SC STD MICRODIL/AGAR DIL: CPT

## 2020-11-05 PROCEDURE — 74011250636 HC RX REV CODE- 250/636: Performed by: RADIOLOGY

## 2020-11-05 RX ORDER — LORAZEPAM 0.5 MG/1
0.5 TABLET ORAL
Status: DISCONTINUED | OUTPATIENT
Start: 2020-11-05 | End: 2020-11-11 | Stop reason: HOSPADM

## 2020-11-05 RX ORDER — ATORVASTATIN CALCIUM 20 MG/1
20 TABLET, FILM COATED ORAL
Status: DISCONTINUED | OUTPATIENT
Start: 2020-11-05 | End: 2020-11-11 | Stop reason: HOSPADM

## 2020-11-05 RX ORDER — LEVOTHYROXINE SODIUM 50 UG/1
50 TABLET ORAL
Status: DISCONTINUED | OUTPATIENT
Start: 2020-11-05 | End: 2020-11-11 | Stop reason: HOSPADM

## 2020-11-05 RX ORDER — ACETAMINOPHEN 500 MG
1000 TABLET ORAL
Status: DISCONTINUED | OUTPATIENT
Start: 2020-11-05 | End: 2020-11-11 | Stop reason: HOSPADM

## 2020-11-05 RX ORDER — LANOLIN ALCOHOL/MO/W.PET/CERES
325 CREAM (GRAM) TOPICAL
Status: DISCONTINUED | OUTPATIENT
Start: 2020-11-05 | End: 2020-11-11 | Stop reason: HOSPADM

## 2020-11-05 RX ORDER — LIDOCAINE HYDROCHLORIDE 10 MG/ML
1-20 INJECTION INFILTRATION; PERINEURAL
Status: ACTIVE | OUTPATIENT
Start: 2020-11-05 | End: 2020-11-05

## 2020-11-05 RX ORDER — SODIUM CHLORIDE 9 MG/ML
500 INJECTION, SOLUTION INTRAVENOUS
Status: COMPLETED | OUTPATIENT
Start: 2020-11-05 | End: 2020-11-05

## 2020-11-05 RX ORDER — GABAPENTIN 300 MG/1
300 CAPSULE ORAL 2 TIMES DAILY
Status: DISCONTINUED | OUTPATIENT
Start: 2020-11-05 | End: 2020-11-11 | Stop reason: HOSPADM

## 2020-11-05 RX ORDER — SODIUM BICARBONATE 650 MG/1
650 TABLET ORAL 2 TIMES DAILY
Status: DISCONTINUED | OUTPATIENT
Start: 2020-11-05 | End: 2020-11-11 | Stop reason: HOSPADM

## 2020-11-05 RX ORDER — ACETAMINOPHEN 650 MG/1
650 SUPPOSITORY RECTAL
Status: DISCONTINUED | OUTPATIENT
Start: 2020-11-05 | End: 2020-11-11 | Stop reason: HOSPADM

## 2020-11-05 RX ADMIN — ATORVASTATIN CALCIUM 20 MG: 20 TABLET, FILM COATED ORAL at 22:58

## 2020-11-05 RX ADMIN — INSULIN LISPRO 4 UNITS: 100 INJECTION, SOLUTION INTRAVENOUS; SUBCUTANEOUS at 18:33

## 2020-11-05 RX ADMIN — VANCOMYCIN HYDROCHLORIDE 750 MG: 750 INJECTION, POWDER, LYOPHILIZED, FOR SOLUTION INTRAVENOUS at 04:05

## 2020-11-05 RX ADMIN — SODIUM CHLORIDE 100 ML/HR: 900 INJECTION, SOLUTION INTRAVENOUS at 04:11

## 2020-11-05 RX ADMIN — Medication 10 ML: at 14:46

## 2020-11-05 RX ADMIN — SODIUM BICARBONATE 650 MG: 650 TABLET ORAL at 22:58

## 2020-11-05 RX ADMIN — IOPAMIDOL 15 ML: 612 INJECTION, SOLUTION INTRAVENOUS at 01:19

## 2020-11-05 RX ADMIN — LEVOTHYROXINE SODIUM 50 MCG: 0.05 TABLET ORAL at 06:31

## 2020-11-05 RX ADMIN — INSULIN LISPRO 4 UNITS: 100 INJECTION, SOLUTION INTRAVENOUS; SUBCUTANEOUS at 06:31

## 2020-11-05 RX ADMIN — SODIUM BICARBONATE 650 MG: 650 TABLET ORAL at 10:09

## 2020-11-05 RX ADMIN — MEROPENEM 500 MG: 500 INJECTION, POWDER, FOR SOLUTION INTRAVENOUS at 14:45

## 2020-11-05 RX ADMIN — ACETAMINOPHEN 1000 MG: 500 TABLET ORAL at 04:44

## 2020-11-05 RX ADMIN — GABAPENTIN 300 MG: 300 CAPSULE ORAL at 22:58

## 2020-11-05 RX ADMIN — Medication 10 ML: at 22:00

## 2020-11-05 RX ADMIN — SODIUM CHLORIDE 100 ML/HR: 900 INJECTION, SOLUTION INTRAVENOUS at 18:34

## 2020-11-05 RX ADMIN — FERROUS SULFATE TAB 325 MG (65 MG ELEMENTAL FE) 325 MG: 325 (65 FE) TAB at 10:09

## 2020-11-05 RX ADMIN — MEROPENEM 500 MG: 500 INJECTION, POWDER, FOR SOLUTION INTRAVENOUS at 22:57

## 2020-11-05 RX ADMIN — SODIUM CHLORIDE 500 ML: 900 INJECTION, SOLUTION INTRAVENOUS at 01:19

## 2020-11-05 RX ADMIN — GABAPENTIN 300 MG: 300 CAPSULE ORAL at 10:09

## 2020-11-05 NOTE — ED NOTES
Attempted x2 to call report, floor aware that patient coming to them, receiving rn stated to be reading sbar and will call with questions

## 2020-11-05 NOTE — PROCEDURES
Vascular & Interventional Radiology Brief Procedure Note    Interventional Radiologist: Mila Haskins MD    Pre-operative Diagnosis:  Dysfunctional right PCN, sepsis    Post-operative Diagnosis: Same as pre-op dx    Procedure(s) Performed:  Replace and reposition 10 F right PCN. Assistant:  none    Anesthesia:  Local and Moderate Sedation    Findings:  Successful replace and reposition of 10 F right PCN. Complications: None    Estimated Blood Loss:  minimal    Tubes and Drains: as above. Specimens: urine specimen sent. Condition: Good    Plan: to ER.       Mila Haskins MD, MD  [address] [address]  Vascular & Interventional Radiology  11/5/2020

## 2020-11-05 NOTE — H&P
History & Physical    Patient: Frances Ortiz MRN: [id number]  CSN: [id number]    YOB: 1940  Age: [de-identified] y.o. Sex: female      DOA: 11/4/2020  Primary Care Provider:  Daphnie Rodriguez, Not On File      Assessment/Plan     Patient Active Problem List   Diagnosis Code    Pyelonephritis N14    S/p nephrectomy Z90.5    Right flank pain R10.9    Malfunction of nephrostomy tube ([address] T83.098A    Nephrostomy tube displaced ([address] T83.022A    UTI (urinary tract infection) N39.0    Elevated WBC count D72.829    Diabetes ([address] E11.9    Legally blind H54.8    Hypertension I10    Thyroid disease E07.9    Elevated serum creatinine R79.89    S/P colectomy Z90.49    Skin lesion of foot L98.9    Tear of skin of buttock S31.801A    Pressure injury of right heel, unstageable ([facility]) L89.610    Hypomagnesemia E83.42    Hyperkalemia E87.5    Heel ulcer ([address] L97.409    ANTONI (acute kidney injury) ([address] N17.9    S/P AKA (above knee amputation), right ([address] Z89.611    Nephrostomy status ([address] Z93.6    Obstructed nephrostomy tube ([address] T83.092A    Nephrostomy complication ([facility]) F31.301    Acute renal failure (ARF) ([address] N17.9    Elevated INR R79.1    Bacteremia R78.81    Enterococcus faecalis infection B95.2    CKD stage 3 due to type 2 diabetes mellitus (HCC) E11.22, N18.30    Hydronephrosis, right N13.30    Severe sepsis with acute organ dysfunction ([facility]) A41.9, R65.20    Anticoagulated Z79.01     [de-identified] y/o female on anticoagulation for prior DVT as well as 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      Severe sepsis with pyelonephritis and acute organ dysfunction  -meropenem and vancomycin due to prior ESBL urine cultures  -sepsis bundle  -follow lactic acid  -aggressive IV fluid resuscitation post nephrostomy exchange  -trend Cr with fluid resuscitation    Nephrostomy tube displaced with right hydronephrosis-tube is in renal parenchyma  -emergent replacement by IR tonight as it was not draining at all    Anticoagulated  -FFP on hold for IR but not needed    DNR per [facility] paperwork    Dispo-back to NH (COVID test ordered for screening)    Prophylaxis: protonix IV, holding anticoagulation periprocedurally    Estimated length of stay : 3 nights    Gildardo Gallegos MD  Nocturnist  ----------------------------------------------------------------------------------------------------------------------------------------------------------------------------  CC: fever       HPI:     Bridget Quintana is a [de-identified] y.o. female who has a right nephrostomy tube and colostomy as a sequelae of malignancy, surgical removal of left kidney, and prior DVT who is at [facility] and was transferred for fever. She is not having output from her nephrostomy bag. She is alert and oriented to self only and cannot provide additional history. She has had multiple nephrostomy tube exchanges in the past. COVID test was done at [facility] due to fever.     Past Medical History:   Diagnosis Date    Cancer [facility])     vaginal and colon cancer    [facility]     Chronic pain     arthritis    Diabetes ([facility]     DVT (deep venous thrombosis) (HCC)     Hypertension     Legally blind     Thyroid disease        Past Surgical History:   Procedure Laterality Date    HX ABOVE KNEE AMPUTATION Right     HX COLOSTOMY      HX ORTHOPAEDIC      HX OTHER SURGICAL      kidney drainage tube    HX UROLOGICAL      kidney removal    IR EXCHANGE NEPHRO PERC RT SI  1/28/2019    IR EXCHANGE NEPHRO PERC RT SI  3/21/2019    IR EXCHANGE NEPHRO PERC RT SI  5/13/2019    IR EXCHANGE NEPHRO PERC RT SI  6/27/2019    IR EXCHANGE NEPHRO PERC RT SI  8/16/2019    IR EXCHANGE NEPHRO PERC RT SI  10/3/2019    IR EXCHANGE NEPHRO PERC RT SI  11/18/2019    IR EXCHANGE NEPHRO PERC RT SI  1/6/2020  IR EXCHANGE NEPHRO PERC RT SI  2/24/2020    IR EXCHANGE NEPHRO PERC RT SI  4/13/2020    IR EXCHANGE NEPHRO PERC RT SI  6/1/2020    IR EXCHANGE NEPHRO PERC RT SI  6/19/2020    IR EXCHANGE NEPHRO PERC RT SI  8/10/2020    IR EXCHANGE NEPHRO PERC RT SI  9/30/2020    IR EXCHANGE NEPHRO PERC RT SI  11/5/2020    IR NEPHROSTOMY PERC RT PLC CATH  SI  6/21/2020       History reviewed. No pertinent family history. Patient is only A&Ox1, not obtainable. Social History     Socioeconomic History    Marital status:      Spouse name: Not on file    Number of children: Not on file    Years of education: Not on file    Highest education level: Not on file   Tobacco Use    Smoking status: Never Smoker    Smokeless tobacco: Never Used   Substance and Sexual Activity    Alcohol use: No    Drug use: Never   Other Topics Concern       Prior to Admission medications    Medication Sig Start Date End Date Taking? Authorizing Provider   LORazepam (ATIVAN) 0.5 mg tablet Take 0.5 mg by mouth every twenty-four (24) hours. Provider, Historical   oxyCODONE IR (ROXICODONE) 5 mg immediate release tablet Take 5 mg by mouth every six (6) hours as needed for Pain. Provider, Historical   amino acids/protein hydrolys (PRO-STAT PROFILE PO) Take  by mouth. Provider, Historical   multivitamin (ONE A DAY) tablet Take 1 Tab by mouth daily. Provider, Historical   ondansetron hcl (ZOFRAN) 4 mg tablet Take 4 mg by mouth every eight (8) hours as needed for Nausea or Vomiting. Provider, Historical   ferrous sulfate 325 mg (65 mg iron) tablet Take 1 Tab by mouth daily (with breakfast). 6/27/20   Ovidio Joe MD   insulin detemir U-100 (LEVEMIR U-100 INSULIN) 100 unit/mL injection 15 Units by SubCUTAneous route nightly. Provider, Historical   diphenhydrAMINE (BENADRYL) 25 mg capsule Take 25 mg by mouth every six (6) hours as needed.     Provider, Historical   gabapentin (NEURONTIN) 300 mg capsule Take 1 Cap by mouth two (2) times a day. 4/15/19   Shivam Katz PA-C   cholecalciferol (VITAMIN D3) 1,000 unit tablet Take 1,000 Units by mouth daily. Provider, Historical   apixaban (ELIQUIS PO) Take 5 mg by mouth two (2) times a day. Other, MD Dc   pollen extracts (PROSTAT PO) Take  by mouth. Provider, Historical   tiZANidine (ZANAFLEX) 2 mg capsule Take 2 mg by mouth two (2) times a day. Provider, Historical   traZODone (DESYREL) 50 mg tablet Take 50 mg by mouth nightly. Provider, Historical   nystatin (MYCOSTATIN) powder Apply 100,000 Units to affected area four (4) times daily. Provider, Historical   camphor-menthol (SARNA) 0.5-0.5 % lotion Apply  to affected area daily. Provider, Historical   docusate sodium (COLACE) 100 mg capsule Take 100 mg by mouth two (2) times a day. Provider, Historical   hydrocortisone (HYTONE) 2.5 % topical cream Apply  to affected area two (2) times a day. use thin layer    Provider, Historical   timolol (TIMOPTIC) 0.5 % ophthalmic solution 1 Drop two (2) times a day. Provider, Historical   brimonidine-timolol (COMBIGAN) 0.2-0.5 % drop ophthalmic solution Administer 1 Drop to both eyes every twelve (12) hours. Provider, Historical   bisacodyl (DULCOLAX, BISACODYL,) 10 mg suppository Insert  into rectum daily as needed. Indications: constipation    Provider, Historical   metoprolol succinate (TOPROL-XL) 50 mg XL tablet Take 50 mg by mouth daily. Provider, Historical   magnesium hydroxide (MILK OF MAGNESIA CONCENTRATED) 2,400 mg/10 mL susp suspension Take 10 mL by mouth. Provider, Historical   alum-mag hydroxide-simeth (MYLANTA) 200-200-20 mg/5 mL susp Take 30 mL by mouth four (4) times daily as needed. Provider, Historical   insulin aspart U-100 (NOVOLOG FLEXPEN U-100 INSULIN) 100 unit/mL inpn by SubCUTAneous route. Sliding scale    Provider, Historical   sodium bicarbonate 650 mg tablet Take 650 mg by mouth two (2) times a day.     Provider, Historical levothyroxine (SYNTHROID) 50 mcg tablet Take  by mouth Daily (before breakfast). Provider, Historical   acetaminophen (TYLENOL) 650 mg TbER Take 650 mg by mouth every six (6) hours as needed for Pain or Fever. Provider, Historical   atorvastatin calcium (ATORVASTATIN PO) Take 20 mg by mouth daily. Other, MD Dc       Allergies   Allergen Reactions    Morphine Nausea and Vomiting    Pcn [Penicillins] Rash       Review of Systems  Unable to obtain from patient    Physical Exam:     Physical Exam:  Visit Vitals  /63   Pulse 97   Temp 98.2 °F (36.8 °C)   Resp 18   Ht 5' 2\" (1.575 m)   Wt 74.4 kg (164 lb)   SpO2 100%   BMI 30.00 kg/m²      O2 Device: Room air    Temp (24hrs), Av.3 °F (36.8 °C), Min:98.2 °F (36.8 °C), Max:98.5 °F (36.9 °C)     1901 -  0700  In: 100 [I.V.:100]  Out: -    No intake/output data recorded. General:  Awake, cooperative, no distress. Head:  Normocephalic, without obvious abnormality, atraumatic. Eyes:  Conjunctivae/corneas clear, sclera anicteric. Neck: Supple, symmetrical, trachea midline. Lungs:   Clear to auscultation bilaterally. Heart:  Regular rate and rhythm, S1, S2 normal, no murmur, click, rub or gallop. Abdomen: Soft, non-tender. Bowel sounds normal. No masses,  No organomegaly. R nephrostomy in place with small amount of gross blood in the bag but no urine. Extremities: Right above the knee amputation. Capillary refill normal.   Pulses: 2+ and symmetric all extremities. Skin: Skin color pink, turgor increased. Small stage I decubitus ulcer on right lumbar area covered with mepilex. Neurologic: No focal motor or sensory deficit. Labs Reviewed: All lab results for the last 24 hours reviewed.   Recent Results (from the past 24 hour(s))   EKG, 12 LEAD, INITIAL    Collection Time: 20  5:56 PM   Result Value Ref Range    Ventricular Rate 93 BPM    Atrial Rate 93 BPM    P-R Interval 142 ms    QRS Duration 78 ms    Q-T Interval 344 ms    QTC Calculation (Bezet) 427 ms    Calculated P Axis 51 degrees    Calculated R Axis 54 degrees    Calculated T Axis 66 degrees    Diagnosis       Normal sinus rhythm  Normal ECG  Confirmed by Maximo Patterson MD, [facility] ([id number]) on 11/4/2020 10:43:32 PM     CBC WITH AUTOMATED DIFF    Collection Time: 11/04/20  6:10 PM   Result Value Ref Range    WBC 23.6 (H) 4.6 - 13.2 K/uL    RBC 3.73 (L) 4.20 - 5.30 M/uL    HGB 10.8 (L) 12.0 - 16.0 g/dL    HCT 33.1 (L) 35.0 - 45.0 %    MCV 88.7 74.0 - 97.0 FL    MCH 29.0 24.0 - 34.0 PG    MCHC 32.6 31.0 - 37.0 g/dL    RDW 14.4 11.6 - 14.5 %    PLATELET 658 (H) 410 - 420 K/uL    MPV 9.6 9.2 - 11.8 FL    NEUTROPHILS 86 (H) 42 - 75 %    BAND NEUTROPHILS 5 0 - 5 %    LYMPHOCYTES 4 (L) 20 - 51 %    MONOCYTES 4 2 - 9 %    EOSINOPHILS 0 0 - 5 %    BASOPHILS 0 0 - 3 %    METAMYELOCYTES 1 (H) 0 %    ABS. NEUTROPHILS 20.3 (H) 1.8 - 8.0 K/UL    ABS. LYMPHOCYTES 0.9 0.8 - 3.5 K/UL    ABS. MONOCYTES 0.9 0 - 1.0 K/UL    ABS. EOSINOPHILS 0.0 0.0 - 0.4 K/UL    ABS. BASOPHILS 0.0 0.0 - 0.1 K/UL    PLATELET COMMENTS SLIDE ESTIMATE CONFIRMS PLT COUNT      RBC COMMENTS NORMOCYTIC, NORMOCHROMIC      DF MANUAL     METABOLIC PANEL, COMPREHENSIVE    Collection Time: 11/04/20  6:10 PM   Result Value Ref Range    Sodium 130 (L) 136 - 145 mmol/L    Potassium 4.8 3.5 - 5.5 mmol/L    Chloride 95 (L) 100 - 111 mmol/L    CO2 25 21 - 32 mmol/L    Anion gap 10 3.0 - 18 mmol/L    Glucose 317 (H) 74 - 99 mg/dL    BUN 46 (H) 7.0 - 18 MG/DL    Creatinine 3.26 (H) 0.6 - 1.3 MG/DL    BUN/Creatinine ratio 14 12 - 20      GFR est AA 17 (L) >60 ml/min/1.73m2    GFR est non-AA 14 (L) >60 ml/min/1.73m2    Calcium 8.2 (L) 8.5 - 10.1 MG/DL    Bilirubin, total 0.8 0.2 - 1.0 MG/DL    ALT (SGPT) 29 13 - 56 U/L    AST (SGOT) 29 10 - 38 U/L    Alk.  phosphatase 131 (H) 45 - 117 U/L    Protein, total 6.8 6.4 - 8.2 g/dL    Albumin 2.6 (L) 3.4 - 5.0 g/dL    Globulin 4.2 (H) 2.0 - 4.0 g/dL    A-G Ratio 0.6 (L) 0.8 - 1.7 MAGNESIUM    Collection Time: 11/04/20  6:10 PM   Result Value Ref Range    Magnesium 1.4 (L) 1.6 - 2.6 mg/dL   PHOSPHORUS    Collection Time: 11/04/20  6:10 PM   Result Value Ref Range    Phosphorus 3.6 2.5 - 4.9 MG/DL   PTT    Collection Time: 11/04/20  6:10 PM   Result Value Ref Range    aPTT 46.1 (H) 23.0 - 36.4 SEC   PROTHROMBIN TIME + INR    Collection Time: 11/04/20  6:10 PM   Result Value Ref Range    Prothrombin time 27.7 (H) 11.5 - 15.2 sec    INR 2.7 (H) 0.8 - 1.2     POC LACTIC ACID    Collection Time: 11/04/20  6:20 PM   Result Value Ref Range    Lactic Acid (POC) 3.00 (HH) 0.40 - 2.00 mmol/L   LACTIC ACID    Collection Time: 11/04/20  8:00 PM   Result Value Ref Range    Lactic acid 2.3 (HH) 0.4 - 2.0 MMOL/L   LACTIC ACID    Collection Time: 11/04/20 10:15 PM   Result Value Ref Range    Lactic acid 2.2 (HH) 0.4 - 2.0 MMOL/L   PLASMA, ALLOCATE    Collection Time: 11/04/20 11:45 PM   Result Value Ref Range    Unit number [id number]     Blood component type FP 24h, Thaw     Unit division 00     Status of unit ALLOCATED    BLOOD TYPE, (ABO+RH)    Collection Time: 11/05/20 12:08 AM   Result Value Ref Range    ABO/Rh(D) A NEGATIVE        Results   CT ABD PELV WO CONT (Accession [id number]) (Order [id number])   Allergies      Not Specified: Morphine;  Pcn [Penicillins]    Exam Information     Status  Exam Begun   Exam Ended     Final [99]  11/04/2020  20:18  11/04/2020  20:27    Result Information     Status: Final result (Exam End: 11/4/2020 20:27)  Provider Status: Open    Study Result     CLINICAL:  Sepsis. Renal failure     COMPARISON: January 16, 2020     TECHNIQUE: Axial CT imaging of the abdomen and pelvis was performed without  intravenous contrast. Multiplanar reformats were generated. One or more dose reduction techniques were used on this CT: automated exposure  control, adjustment of the mAs and/or kVp according to patient size, and  iterative reconstruction techniques.   The specific techniques used on this CT  exam have been documented in the patient's electronic medical record. Digital Imaging and Communications in the Medicine (DICOM) format image data are  available to nonaffiliated external healthcare facilities or entities on a  secure, media free, reciprocally searchable basis with patient authorization for  at least a 12 month period after this study.     _______________     FINDINGS:     LOWER CHEST: Minimal right effusion     LIVER, BILIARY: Liver is normal. No biliary dilation. Gallbladder absent     PANCREAS: Normal.     SPLEEN: Normal.     ADRENALS: Normal.     KIDNEYS: Left kidney absent. Right kidney demonstrates marked hydronephrosis and  proximal hydroureter. A nephrostomy catheter is present, however, this has been  pulled back and the locking loop may be embedded in the parenchyma. The mid  ureter has been embolized.     LYMPH NODES: Retroperitoneal lymph nodes. Retrocaval node measuring 12 mm  previously measuring 8 mm. Para-aortic node measuring 10 mm previously measuring  8 mm.     GASTROINTESTINAL TRACT: No bowel dilation or wall thickening. Left colostomy.     PELVIC ORGANS: Uterus and bladder absent     VASCULATURE: Prominent vascular calcifications     BONES: Degenerative changes in both hips and the lumbar spine     OTHER: None.     _______________     IMPRESSION  IMPRESSION:     Right nephrostomy has been pulled back. Locking loop may be embedded in the  renal parenchyma. Marked right hydronephrosis. Recommend correlation with  nephrostomy output. Recommend nephrostomy exchange, repositioning.     Absent left kidney. Prior hysterectomy and cystectomy.  Multiple pole  retroperitoneal lymph nodes     Mildly enlarged retroperitoneal lymph nodes, mildly increased in size from prior  study        Results   XR CHEST PORT (Accession [id number]) (Order [id number])   Allergies      Not Specified: Morphine;  Pcn [Penicillins]    Exam Information     Status  Exam Begun   Exam Ended     Final [99]  11/04/2020  19:30  11/04/2020  19:49    Result Information     Status: Final result (Exam End: 11/4/2020 19:49)  Provider Status: Open    Study Result     CLINICAL: Sepsis. Fever     COMPARISON: None.     A portable view of the chest from 1938 hours:     The lungs and pleural spaces are clear. The mediastinum is unremarkable in  appearance.     IMPRESSION  IMPRESSION:     No acute process.

## 2020-11-05 NOTE — ROUTINE PROCESS
Bedside and Verbal shift change report given to Luna Issa RN by Simona Samuel. Report included the following information SBAR, Kardex, OR Summary, Intake/Output and MAR.

## 2020-11-05 NOTE — PROGRESS NOTES
Pharmacy Renal Dosing Services: Meropenem    Physician: Dr. Alize Marquez    Meropenem 1g IV q8h was automatically dose-adjusted per THE [facility] P&T Committee Protocol, with respect to renal function. New dose: 500mg IV q12h x 7 days    Pt Weight:   Wt Readings from Last 1 Encounters:   11/04/20 74.4 kg (164 lb)         Serum Creatinine Lab Results   Component Value Date/Time    Creatinine 3.26 (H) 11/04/2020 06:10 PM    Creatinine, POC 1.1 01/16/2020 09:28 AM       Creatinine Clearance Estimated Creatinine Clearance: 13 mL/min (A) (based on SCr of 3.26 mg/dL (H)). BUN Lab Results   Component Value Date/Time    BUN 46 (H) 11/04/2020 06:10 PM    BUN, POC 17 01/16/2020 09:28 AM         Pharmacy to continue to monitor patient daily. Will make dosage adjustments based upon changing renal function.   Signed Narciso Alicea information: [phone]

## 2020-11-05 NOTE — PROGRESS NOTES
TRANSFER - OUT REPORT:    Verbal report given to CHAITANYA Doan(name) on Tracey Adkins  being transferred to ED 4(unit) for routine progression of care       Report consisted of patients Situation, Background, Assessment and   Recommendations(SBAR). Information from the following report(s) SBAR, Kardex and MAR was reviewed with the receiving nurse. Lines:   PICC Double Lumen 06/23/20 Left;Brachial (Active)       Peripheral IV 11/04/20 Left Forearm (Active)   Site Assessment Clean, dry, & intact 11/04/20 1810   Phlebitis Assessment 0 11/04/20 1810   Infiltration Assessment 0 11/04/20 1810   Dressing Status Clean, dry, & intact 11/04/20 1810   Dressing Type Tape;Transparent 11/04/20 1810   Hub Color/Line Status Pink;Patent 11/04/20 1810   Action Taken Blood drawn 11/04/20 1810        Opportunity for questions and clarification was provided.       Patient transported with:   Registered Nurse, tech

## 2020-11-05 NOTE — PROGRESS NOTES
Pharmacy Dosing Services: Vancomycin     Consult for Vancomycin Dosing by Pharmacy by Dr. Monroe Venegas provided for this [de-identified]y.o. year old female , for indication of UTI x 7 days  Day of Therapy 1    Ht Readings from Last 1 Encounters:   11/04/20 157.5 cm (62\")        Wt Readings from Last 1 Encounters:   11/04/20 74.4 kg (164 lb)        Other Current Antibiotics Meropenem 500mg IV    Significant Cultures pending   Serum Creatinine Lab Results   Component Value Date/Time    Creatinine 3.03 (H) 11/05/2020 05:29 AM    Creatinine, POC 1.1 01/16/2020 09:28 AM      Creatinine Clearance Estimated Creatinine Clearance: 14 mL/min (A) (based on SCr of 3.03 mg/dL (H)). BUN Lab Results   Component Value Date/Time    BUN 44 (H) 11/05/2020 05:29 AM    BUN, POC 17 01/16/2020 09:28 AM      WBC Lab Results   Component Value Date/Time    WBC 14.7 (H) 11/05/2020 05:29 AM      H/H Lab Results   Component Value Date/Time    HGB 9.6 (L) 11/05/2020 05:29 AM      Platelets Lab Results   Component Value Date/Time    PLATELET 206 80/26/4912 05:29 AM      Temp (!) 102.1 °F (38.9 °C)       Start Vancomycin therapy, with loading dose of 750mg IV once  Will order a random level with AM labs on 11/6/20  Pharmacy will dose based on levels     Dose calculated to approximate a therapeutic trough of 10-15mcg/mL. Pharmacy to follow daily and will make changes to dose and/or frequency based on clinical status.     Pharmacist Avis Llamas  Vansant Jarocho

## 2020-11-05 NOTE — ED PROVIDER NOTES
EMERGENCY DEPARTMENT HISTORY AND PHYSICAL EXAM    Date: 11/4/2020  Patient Name: Marielle Amor    History of Presenting Illness     Chief Complaint   Patient presents with    Fever         History Provided By: Patient    Additional History (Context):   8:48 PM  Marielle Amro is a [de-identified] y.o. 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 Who presents to the emergency department C/O clogged nephrostomy tube on the right as well as fever. Patient was sent from [facility] after they noticed the tube was not draining properly they stated that they was just found to be problematic today. Patient arrives awake alert and responsive to all questions and previous medical history. She says she has been feeling tired fatigue but denies known fevers nausea or vomiting or diarrhea. Patient also denies any chest pain shortness of breath or abdominal pain. .    Social History  Smoking drinking or drug    Family History  Family history is negative for immunocompromise state other than diabetes      PCP: [facility], Not On File    Current Facility-Administered Medications   Medication Dose Route Frequency Provider Last Rate Last Dose    sodium chloride (NS) flush 5-10 mL  5-10 mL IntraVENous PRN Paige Dexter MD        metroNIDAZOLE (FLAGYL) IVPB premix 500 mg  500 mg IntraVENous Q8H Bisi CRISOSTOMO  mL/hr at 11/04/20 1945 500 mg at 11/04/20 1945    levoFLOXacin (LEVAQUIN) 750 mg in D5W IVPB  750 mg IntraVENous Q48H Paige Dexter MD         Current Outpatient Medications   Medication Sig Dispense Refill    LORazepam (ATIVAN) 0.5 mg tablet Take 0.5 mg by mouth every twenty-four (24) hours.  oxyCODONE IR (ROXICODONE) 5 mg immediate release tablet Take 5 mg by mouth every six (6) hours as needed for Pain.  amino acids/protein hydrolys (PRO-STAT PROFILE PO) Take  by mouth.       multivitamin (ONE A DAY) tablet Take 1 Tab by mouth daily.  ondansetron hcl (ZOFRAN) 4 mg tablet Take 4 mg by mouth every eight (8) hours as needed for Nausea or Vomiting.  ferrous sulfate 325 mg (65 mg iron) tablet Take 1 Tab by mouth daily (with breakfast). 10 Tab 0    insulin detemir U-100 (LEVEMIR U-100 INSULIN) 100 unit/mL injection 15 Units by SubCUTAneous route nightly.  diphenhydrAMINE (BENADRYL) 25 mg capsule Take 25 mg by mouth every six (6) hours as needed.  gabapentin (NEURONTIN) 300 mg capsule Take 1 Cap by mouth two (2) times a day. 2 Cap 0    cholecalciferol (VITAMIN D3) 1,000 unit tablet Take 1,000 Units by mouth daily.  apixaban (ELIQUIS PO) Take 5 mg by mouth two (2) times a day.  pollen extracts (PROSTAT PO) Take  by mouth.  tiZANidine (ZANAFLEX) 2 mg capsule Take 2 mg by mouth two (2) times a day.  traZODone (DESYREL) 50 mg tablet Take 50 mg by mouth nightly.  nystatin (MYCOSTATIN) powder Apply 100,000 Units to affected area four (4) times daily.  camphor-menthol (SARNA) 0.5-0.5 % lotion Apply  to affected area daily.  docusate sodium (COLACE) 100 mg capsule Take 100 mg by mouth two (2) times a day.  hydrocortisone (HYTONE) 2.5 % topical cream Apply  to affected area two (2) times a day. use thin layer      timolol (TIMOPTIC) 0.5 % ophthalmic solution 1 Drop two (2) times a day.  brimonidine-timolol (COMBIGAN) 0.2-0.5 % drop ophthalmic solution Administer 1 Drop to both eyes every twelve (12) hours.  bisacodyl (DULCOLAX, BISACODYL,) 10 mg suppository Insert  into rectum daily as needed. Indications: constipation      metoprolol succinate (TOPROL-XL) 50 mg XL tablet Take 50 mg by mouth daily.  magnesium hydroxide (MILK OF MAGNESIA CONCENTRATED) 2,400 mg/10 mL susp suspension Take 10 mL by mouth.  alum-mag hydroxide-simeth (MYLANTA) 200-200-20 mg/5 mL susp Take 30 mL by mouth four (4) times daily as needed.       insulin aspart U-100 (NOVOLOG FLEXPEN U-100 INSULIN) 100 unit/mL inpn by SubCUTAneous route. Sliding scale      sodium bicarbonate 650 mg tablet Take 650 mg by mouth two (2) times a day.  levothyroxine (SYNTHROID) 50 mcg tablet Take  by mouth Daily (before breakfast).  acetaminophen (TYLENOL) 650 mg TbER Take 650 mg by mouth every six (6) hours as needed for Pain or Fever.  atorvastatin calcium (ATORVASTATIN PO) Take 20 mg by mouth daily. Past History     Past Medical History:  Past Medical History:   Diagnosis Date    [facility] ([facility]     vaginal and colon cancer    [facility]     Chronic pain     arthritis    Diabetes [facility]     DVT (deep venous thrombosis) (HCC)     Hypertension     Legally blind     Thyroid disease        Past Surgical History:  Past Surgical History:   Procedure Laterality Date    HX ABOVE KNEE AMPUTATION Right     HX COLOSTOMY      HX ORTHOPAEDIC      HX OTHER SURGICAL      kidney drainage tube    HX UROLOGICAL      kidney removal    IR EXCHANGE NEPHRO PERC RT SI  1/28/2019    IR EXCHANGE NEPHRO PERC RT SI  3/21/2019    IR EXCHANGE NEPHRO PERC RT SI  5/13/2019    IR EXCHANGE NEPHRO PERC RT SI  6/27/2019    IR EXCHANGE NEPHRO PERC RT SI  8/16/2019    IR EXCHANGE NEPHRO PERC RT SI  10/3/2019    IR EXCHANGE NEPHRO PERC RT SI  11/18/2019    IR EXCHANGE NEPHRO PERC RT SI  1/6/2020    IR EXCHANGE NEPHRO PERC RT SI  2/24/2020    IR EXCHANGE NEPHRO PERC RT SI  4/13/2020    IR EXCHANGE NEPHRO PERC RT SI  6/1/2020    IR EXCHANGE NEPHRO PERC RT SI  6/19/2020    IR EXCHANGE NEPHRO PERC RT SI  8/10/2020    IR EXCHANGE NEPHRO PERC RT SI  9/30/2020    IR NEPHROSTOMY PERC RT PLC CATH  SI  6/21/2020       Family History:  History reviewed. No pertinent family history. Social History:  Social History     Tobacco Use    Smoking status: Never Smoker    Smokeless tobacco: Never Used   Substance Use Topics    Alcohol use: No    Drug use: Never       Allergies:   Allergies   Allergen Reactions    Morphine Nausea and Vomiting    Pcn [Penicillins] Rash         Review of Systems   Review of Systems   Constitutional: Negative. HENT: Negative. Eyes: Negative. Respiratory: Negative. Cardiovascular: Negative. Gastrointestinal: Negative. Endocrine: Negative. Genitourinary: Positive for decreased urine volume and difficulty urinating. Musculoskeletal: Positive for gait problem. Skin: Positive for wound. Allergic/Immunologic: Negative. Neurological: Positive for dizziness. Hematological: Negative. Psychiatric/Behavioral: Negative. Physical Exam     Vitals:    11/04/20 1741 11/04/20 1800   BP: (!) 90/40 (!) 144/52   Pulse: 94 96   Resp: 22 24   Temp: 98.2 °F (36.8 °C)    SpO2: 99% 100%   Weight: 74.4 kg (164 lb)    Height: 5' 2\" (1.575 m)      Physical Exam  Vitals signs and nursing note reviewed. Constitutional:       General: She is not in acute distress. Appearance: She is well-developed and overweight. She is not ill-appearing, toxic-appearing or diaphoretic. HENT:      Head: Normocephalic and atraumatic. Right Ear: External ear normal.      Left Ear: External ear normal.      Mouth/Throat:      Mouth: Mucous membranes are dry. Pharynx: No oropharyngeal exudate. Eyes:      General: No scleral icterus. Conjunctiva/sclera: Conjunctivae normal.      Pupils: Pupils are equal, round, and reactive to light. Comments: No pallor   Neck:      Musculoskeletal: Normal range of motion and neck supple. Thyroid: No thyromegaly. Vascular: No JVD. Trachea: No tracheal deviation. Cardiovascular:      Rate and Rhythm: Normal rate and regular rhythm. Heart sounds: Normal heart sounds. Pulmonary:      Effort: Pulmonary effort is normal. No respiratory distress. Breath sounds: Normal breath sounds. No stridor. Abdominal:      General: Bowel sounds are normal. There is no distension. Palpations: Abdomen is soft. Tenderness: There is no abdominal tenderness. There is no guarding or rebound. Musculoskeletal: Normal range of motion. General: No tenderness. Comments: No soft tissue injuries   Lymphadenopathy:      Cervical: No cervical adenopathy. Skin:     General: Skin is warm and dry. Capillary Refill: Capillary refill takes 2 to 3 seconds. Findings: No erythema or rash. Comments: Nephrostomy tube without peripheral redness swelling or drainage to the periphery. The bag itself has a very small amount of red not clotted material that looks mostly like blood. Neurological:      Mental Status: She is alert and oriented to person, place, and time. GCS: GCS eye subscore is 4. GCS verbal subscore is 5. GCS motor subscore is 6. Cranial Nerves: No cranial nerve deficit. Sensory: Sensation is intact. Motor: Motor function is intact. Coordination: Coordination normal.   Psychiatric:         Attention and Perception: Attention normal.         Mood and Affect: Mood normal.         Speech: Speech normal.         Behavior: Behavior normal.         Thought Content: Thought content normal.         Judgment: Judgment normal.           Diagnostic Study Results     Labs -     Recent Results (from the past 12 hour(s))   EKG, 12 LEAD, INITIAL    Collection Time: 11/04/20  5:56 PM   Result Value Ref Range    Ventricular Rate 93 BPM    Atrial Rate 93 BPM    P-R Interval 142 ms    QRS Duration 78 ms    Q-T Interval 344 ms    QTC Calculation (Bezet) 427 ms    Calculated P Axis 51 degrees    Calculated R Axis 54 degrees    Calculated T Axis 66 degrees    Diagnosis       Normal sinus rhythm  Normal ECG  When compared with ECG of 21-JUN-2020 10:04,  Vent.  rate has decreased BY  59 BPM  Non-specific change in ST segment in Inferior leads  ST no longer depressed in Lateral leads  Nonspecific T wave abnormality no longer evident in Inferior leads  Nonspecific T wave abnormality no longer evident in Lateral leads     CBC WITH AUTOMATED DIFF    Collection Time: 11/04/20  6:10 PM   Result Value Ref Range    WBC 23.6 (H) 4.6 - 13.2 K/uL    RBC 3.73 (L) 4.20 - 5.30 M/uL    HGB 10.8 (L) 12.0 - 16.0 g/dL    HCT 33.1 (L) 35.0 - 45.0 %    MCV 88.7 74.0 - 97.0 FL    MCH 29.0 24.0 - 34.0 PG    MCHC 32.6 31.0 - 37.0 g/dL    RDW 14.4 11.6 - 14.5 %    PLATELET 931 (H) 083 - 420 K/uL    MPV 9.6 9.2 - 11.8 FL    NEUTROPHILS 86 (H) 42 - 75 %    BAND NEUTROPHILS 5 0 - 5 %    LYMPHOCYTES 4 (L) 20 - 51 %    MONOCYTES 4 2 - 9 %    EOSINOPHILS 0 0 - 5 %    BASOPHILS 0 0 - 3 %    METAMYELOCYTES 1 (H) 0 %    ABS. NEUTROPHILS 20.3 (H) 1.8 - 8.0 K/UL    ABS. LYMPHOCYTES 0.9 0.8 - 3.5 K/UL    ABS. MONOCYTES 0.9 0 - 1.0 K/UL    ABS. EOSINOPHILS 0.0 0.0 - 0.4 K/UL    ABS. BASOPHILS 0.0 0.0 - 0.1 K/UL    PLATELET COMMENTS SLIDE ESTIMATE CONFIRMS PLT COUNT      RBC COMMENTS NORMOCYTIC, NORMOCHROMIC      DF MANUAL     METABOLIC PANEL, COMPREHENSIVE    Collection Time: 11/04/20  6:10 PM   Result Value Ref Range    Sodium 130 (L) 136 - 145 mmol/L    Potassium 4.8 3.5 - 5.5 mmol/L    Chloride 95 (L) 100 - 111 mmol/L    CO2 25 21 - 32 mmol/L    Anion gap 10 3.0 - 18 mmol/L    Glucose 317 (H) 74 - 99 mg/dL    BUN 46 (H) 7.0 - 18 MG/DL    Creatinine 3.26 (H) 0.6 - 1.3 MG/DL    BUN/Creatinine ratio 14 12 - 20      GFR est AA 17 (L) >60 ml/min/1.73m2    GFR est non-AA 14 (L) >60 ml/min/1.73m2    Calcium 8.2 (L) 8.5 - 10.1 MG/DL    Bilirubin, total 0.8 0.2 - 1.0 MG/DL    ALT (SGPT) 29 13 - 56 U/L    AST (SGOT) 29 10 - 38 U/L    Alk.  phosphatase 131 (H) 45 - 117 U/L    Protein, total 6.8 6.4 - 8.2 g/dL    Albumin 2.6 (L) 3.4 - 5.0 g/dL    Globulin 4.2 (H) 2.0 - 4.0 g/dL    A-G Ratio 0.6 (L) 0.8 - 1.7     MAGNESIUM    Collection Time: 11/04/20  6:10 PM   Result Value Ref Range    Magnesium 1.4 (L) 1.6 - 2.6 mg/dL   PHOSPHORUS    Collection Time: 11/04/20  6:10 PM   Result Value Ref Range    Phosphorus 3.6 2.5 - 4.9 MG/DL   PTT    Collection Time: 11/04/20  6:10 PM   Result Value Ref Range    aPTT 46.1 (H) 23.0 - 36.4 SEC   PROTHROMBIN TIME + INR    Collection Time: 11/04/20  6:10 PM   Result Value Ref Range    Prothrombin time 27.7 (H) 11.5 - 15.2 sec    INR 2.7 (H) 0.8 - 1.2     POC LACTIC ACID    Collection Time: 11/04/20  6:20 PM   Result Value Ref Range    Lactic Acid (POC) 3.00 (HH) 0.40 - 2.00 mmol/L   LACTIC ACID    Collection Time: 11/04/20  8:00 PM   Result Value Ref Range    Lactic acid 2.3 (HH) 0.4 - 2.0 MMOL/L       Radiologic Studies -   Preliminary findings  Chest x-ray is clear to examination without evidence of pulmonary edema, infiltrate, there is no evidence of cardiomegaly or pleural effusion. The radiology report still pending  Noé Thompson. Chino Quezada MD      XR CHEST PORT    (Results Pending)   CT ABD PELV WO CONT    (Results Pending)     CT Results  (Last 48 hours)    None        CXR Results  (Last 48 hours)    None          Medications given in the ED-  Medications   sodium chloride (NS) flush 5-10 mL (has no administration in time range)   metroNIDAZOLE (FLAGYL) IVPB premix 500 mg (500 mg IntraVENous New Bag 11/4/20 1945)   levoFLOXacin (LEVAQUIN) 750 mg in D5W IVPB (has no administration in time range)   sodium chloride 0.9 % bolus infusion 1,000 mL (1,000 mL IntraVENous New Bag 11/4/20 1945)     Followed by   sodium chloride 0.9 % bolus infusion 503 mL (503 mL IntraVENous New Bag 11/4/20 2049)         Medical Decision Making   I am the first provider for this patient. I reviewed the vital signs, available nursing notes, past medical history, past surgical history, family history and social history. Vital Signs-Reviewed the patient's vital signs. Pulse Oximetry Analysis -100% on room air    Cardiac Monitor:  Rate: 91 bpm  Rhythm: Sinus     EKG interpretation: (Preliminary)  6:35 PM  Normal sinus rhythm, rate 93, QTc 427, normal ST segments  EKG read by Noé Thompson.  Chino Quezada MD at 8:35 PM    Records Reviewed: NURSING NOTES AND PREVIOUS MEDICAL RECORDS    Provider Notes (Medical Decision Making):   Ms. Zenaida Garvey is well-known to our hospital for having urostomy tube complications with being removal or obstruction. She arrives today with another blockage but also showing evidence of sepsis that is with borderline tachycardia, elevated lactic acid elevated white blood cell count. She shows no signs of encephalitis or meningitis or ear nose or throat infection, her lungs are clear to auscultation chest x-ray does not show any evidence of pneumonia. Abdomen is diffusely not tender and the tube does not look to be draining or show much evidence of localized wound infection. Her site is probably intra-abdominal where the neuroma or fluid collection not draining properly. She was started on antibiotics specifically Zosyn which would cover out for most intra-abdominal infections as well. Wounds were cultured this far as lactic and blood and there was no opportunity to perform catheterization as her lower anatomy has significantly changed for multiple resections due to her cancer. Already been discussed with Dr. Glennie Snellen we will admit to the hospitalist service for resuscitation clearance of her lactic acid antibiotics and ultimately surgical care be directed to Dr. Cheree Kawasaki team.    Procedures:  Procedures    ED Course:   8:48 PM: Initial assessment performed. The patients presenting problems have been discussed, and they are in agreement with the care plan formulated and outlined with them. I have encouraged them to ask questions as they arise throughout their visit. CONSULT NOTE:   8:49 PM  Iris Uribe. Milagros Nunez MD spoke with Dr. Gentry Ricketts: Urology  Discussed pt's hx, disposition, and available diagnostic and imaging results  over the telephone. Reviewed care plans. Consulting physician agrees with plans as outlined. Urology team will see the patient and help with replacement of nephrostomy tube.       CONSULT NOTE:   9:09 PM  Iris Uribe. Milagros Nunez MD spoke with Wenceslao Mas  Specialty: Hospitalist  Discussed pt's hx, disposition, and available diagnostic and imaging results  over the telephone. Reviewed care plans. Consulting physician agrees with plans as outlined. We did review of her previous records and found good evidence of multiple drug-resistant infections including Enterococcus, Proteus requiring meropenem as well as vancomycin. She is already been ordered to have Zosyn but will increase her antibiotic coverage. We will keep her n.p.o. after night as urology team will have to replace her draining tube in the coming daylight hours. She is quite stable DNR and patient was placed to the medical floor. Diagnosis and Disposition     CRITICAL CARE NOTE:  9:18 PM  I have spent 75 minutes of critical care time involved in lab review, consultations with specialist, family decision-making, and documentation. During this entire length of time I was immediately available to the patient. Critical Care: The reason for providing this level of medical care for this critically ill patient was due a critical illness that impaired one or more vital organ systems such that there was a high probability of imminent or life threatening deterioration in the patients condition. This care involved high complexity decision making to assess, manipulate, and support vital system functions, to treat this degreee vital organ system failure and to prevent further life threatening deterioration of the patients condition. Core Measures:  For Hospitalized Patients:    1. Hospitalization Decision Time:  The decision to hospitalize the patient was made by El Valle MD   at 6:45 PM on 11/4/2020    2.  Aspirin: Aspirin was not given because the patient is a bleeding risk    9:09 PM  Patient is being admitted to the hospital by Pipo Irving MD. The results of their tests and reasons for their admission have been discussed with them and/or available family. They convey agreement and understanding for the need to be admitted and for their admission diagnosis. CONDITIONS ON ADMISSION:  Sepsis is present at the time of admission. Deep Vein Thrombosis is not present at the time of admission. Thrombosis is not present at the time of admission. CLINICAL IMPRESSION:    1. Acute renal failure, unspecified acute renal failure type ([facility]    2. Obstructed nephrostomy tube ([facility]    3. Fever in adult    4. Sepsis, due to unspecified organism, unspecified whether acute organ dysfunction present [facility])        _______________________________    This note was partially transcribed via voice recognition software. Although efforts have been made to catch any discrepancies, it may contain sound alike words, grammatical errors, or nonsensical words.

## 2020-11-05 NOTE — ED NOTES
Paged hospitalist regarding I/O issue. Pt was to be cathed but urethra is sewed closed and right nephrostomy is not draining.

## 2020-11-05 NOTE — PROGRESS NOTES
Patient presents compliant to current plan of care as directed.    Reginaldo Little  11/5/2020  12:13 PM

## 2020-11-05 NOTE — PROGRESS NOTES
Reason for Admission:   Severe sepsis               RUR Score:    31     PCP: First and Last name:    Name of Practice:   Are you a current patient: Yes/No:   Approximate date of last visit:    Can you do a virtual visit with your PCP:              Resources/supports as identified by patient/family:   SNF                Top Challenges facing patient (as identified by patient/family and CM): Finances/Medication cost?      Pt has medicare ad [employer]? Medical transport            Support system or lack thereof? Living arrangements? Self-care/ADLs/Cognition? Current Advanced Directive/Advance Care Plan: On chart at this time                          Plan for utilizing home health:                     Transition of Care Plan:    Chart reviewed pt arrived to ED via EMS  from [address] [place] [address] due to reported fever, covid test completed for R/O, pt admitted to  for severe sepsis, hx includes, M,HTN,Vag and colon cancer,DVT,legally blind, cm will cont to review and initiate d/c planning. Care Management Interventions  PCP Verified by CM: Yes  Palliative Care Criteria Met (RRAT>21 & CHF Dx)?: Yes  Palliative Consult Recommended?: Yes  Current Support Network: Other       5280- cm reached out to patients gena Doyle [phone] to discuss d/c plan, son would like pt to return back to [facility] when discharged.

## 2020-11-05 NOTE — PROGRESS NOTES
7702 - Patient [facility] at [facility] Admission completed. Patient is A/O x 2. LS clear, on RA. Abd soft, NT and ND, positive BS in all 4 quadrants. Denies nausea. Sacral wnd stge 2, covered with mepilex, Donut in place. Skin dry. LLE edema +3. IV to L FA  intact and patent. Dressing to Neph tube and colostomy sites CDI. Colostomy all empty, neph bag with bloody out put. Denies numbness/tingling. Pain 0/10. Patient was oriented to the room to include use of call bell, and meal ordering,. Phone and call bell left within reach. Skin assessmnet done with Roseline Rodas RN. See above. Isolation for ESBL in urine. Bed alarm on for safety. 0415-Covid test done and sent with Phlebotomist.    0430-Talked to DR Evelyne Vidal as Pt's MEWS is 4. MD noted that pt has a bad infection and  That Txs are in progress, said to give Tylenol for now, changed dose to 1000 Mgs. 0650-BS covered per SS, pt ate 3/4 cup of pudding after. Pt is a feeder as she is legally blind. Pt laying comfortably in bed Fever trending down, day RN aware of the elevated MEWs for F/U. Hany Hires No issues/concerns at this time.  Call bell within reach

## 2020-11-05 NOTE — PROGRESS NOTES
Pharmacy Dosing Services: Levaquin     The following medication: Levaquin 750 mg IV q24h was automatically dose-adjusted to Levaquin 750 mg IV q48h per [facility]  P&T Committee Protocol, with respect to renal function. Consult provided for this   [de-identified] y.o. , female , for the indication of intra-abdominal infection . Pt Weight:   Wt Readings from Last 1 Encounters:   11/04/20 74.4 kg (164 lb)     Serum Creatinine Lab Results   Component Value Date/Time    Creatinine 3.26 (H) 11/04/2020 06:10 PM    Creatinine, POC 1.1 01/16/2020 09:28 AM       Creatinine Clearance Estimated Creatinine Clearance: 13 mL/min (A) (based on SCr of 3.26 mg/dL (H)). BUN Lab Results   Component Value Date/Time    BUN 46 (H) 11/04/2020 06:10 PM    BUN, POC 17 01/16/2020 09:28 AM         Pharmacy to continue to monitor patient daily. Will make dosage adjustments based upon changing renal function. Signed Chemo Faust.  Contact information: [phone]

## 2020-11-05 NOTE — PROGRESS NOTES
S: MD notified of critical lab result. B: Lab representative Kimberly Lambert contacted floor and indicated the following critical lab results for patient: For the first blood culture collected on 11/4 at 1810 pm (Anaerobic sample: Gram negative rods) & (Aerobic sample: Gram positive cocci). For the second blood culture collected on 11/4 at 1930 pm (Anaerboic & Aerobic Samples: Gram negative Rods). Telephone readback was performed and lab was informed MD would be notified. A: At 1612 pm, Dr. Jesenia Hernández was paged by phone and at 1630 pm, Dr. Jesenia Hernández contacted unit and was notified about critical labs for blood cultures as directed. No new orders were given and Charge  [facility] as well as nursing staff were notified. R: Will continue with prescribed plan of care as directed.    Reginaldo Little  11/5/2020  4:55 PM

## 2020-11-05 NOTE — CONSULTS
[facility], [facility]. Urology    Subjective:     Date of Consultation:  November 4, 2020    Referring Physician: ER    Reason for Consultation:  Solitary rt kidney with malfunctioned nephrostomy tube and obstructed embolized ureter. History of Present Illness:   Patient is a [de-identified] y.o.  female who is being seen for above reason. 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 Pt presented to the ER with c/o of right nephrostomy tube dislodged. CT scan 11/4/20 shows:    Left kidney absent. Right kidney demonstrates marked hydronephrosis and  proximal hydroureter. A nephrostomy catheter is present, however, this has been  pulled back and the locking loop may be embedded in the parenchyma. The mid  ureter has been embolized.       BMP:   Lab Results   Component Value Date/Time     (L) 11/04/2020 06:10 PM    K 4.8 11/04/2020 06:10 PM    CL 95 (L) 11/04/2020 06:10 PM    CO2 25 11/04/2020 06:10 PM    AGAP 10 11/04/2020 06:10 PM     (H) 11/04/2020 06:10 PM    BUN 46 (H) 11/04/2020 06:10 PM    CREA 3.26 (H) 11/04/2020 06:10 PM    GFRAA 17 (L) 11/04/2020 06:10 PM    GFRNA 14 (L) 11/04/2020 06:10 PM     CMP:   Lab Results   Component Value Date/Time     (L) 11/04/2020 06:10 PM    K 4.8 11/04/2020 06:10 PM    CL 95 (L) 11/04/2020 06:10 PM    CO2 25 11/04/2020 06:10 PM    AGAP 10 11/04/2020 06:10 PM     (H) 11/04/2020 06:10 PM    BUN 46 (H) 11/04/2020 06:10 PM    CREA 3.26 (H) 11/04/2020 06:10 PM    GFRAA 17 (L) 11/04/2020 06:10 PM    GFRNA 14 (L) 11/04/2020 06:10 PM    CA 8.2 (L) 11/04/2020 06:10 PM    MG 1.4 (L) 11/04/2020 06:10 PM    PHOS 3.6 11/04/2020 06:10 PM    ALB 2.6 (L) 11/04/2020 06:10 PM    TP 6.8 11/04/2020 06:10 PM    GLOB 4.2 (H) 11/04/2020 06:10 PM    AGRAT 0.6 (L) 11/04/2020 06:10 PM    ALT 29 11/04/2020 06:10 PM     CBC:   Lab Results   Component Value Date/Time    WBC 23.6 (H) 11/04/2020 06:10 PM    HGB 10.8 (L) 11/04/2020 06:10 PM    HCT 33.1 (L) 11/04/2020 06:10 PM     (H) 11/04/2020 06:10 PM       Past Medical History:   Diagnosis Date    Cancer [facility] [address]     vaginal and colon cancer    Cancer ([facility] [address]     Chronic pain     arthritis    Diabetes ([facility] [address]     [facility]     Hypertension     Legally blind     Thyroid disease       Past Surgical History:   Procedure Laterality Date    HX ABOVE KNEE AMPUTATION Right     HX COLOSTOMY      HX ORTHOPAEDIC      HX OTHER SURGICAL      kidney drainage tube    HX UROLOGICAL      kidney removal    IR EXCHANGE NEPHRO PERC RT SI  1/28/2019    IR EXCHANGE NEPHRO PERC RT SI  3/21/2019    IR EXCHANGE NEPHRO PERC RT SI  5/13/2019    IR EXCHANGE NEPHRO PERC RT SI  6/27/2019    IR EXCHANGE NEPHRO PERC RT SI  8/16/2019    IR EXCHANGE NEPHRO PERC RT SI  10/3/2019    IR EXCHANGE NEPHRO PERC RT SI  11/18/2019    IR EXCHANGE NEPHRO PERC RT SI  1/6/2020    IR EXCHANGE NEPHRO PERC RT SI  2/24/2020    IR EXCHANGE NEPHRO PERC RT SI  4/13/2020    IR EXCHANGE NEPHRO PERC RT SI  6/1/2020    IR EXCHANGE NEPHRO PERC RT SI  6/19/2020    IR EXCHANGE NEPHRO PERC RT SI  8/10/2020    IR EXCHANGE NEPHRO PERC RT SI  9/30/2020    IR NEPHROSTOMY PERC RT PLC CATH  SI  6/21/2020      History reviewed. No pertinent family history. Social History     Tobacco Use    Smoking status: Never Smoker    Smokeless tobacco: Never Used   Substance Use Topics    Alcohol use: No     Allergies   Allergen Reactions    Morphine Nausea and Vomiting    Pcn [Penicillins] Rash      Prior to Admission medications    Medication Sig Start Date End Date Taking? Authorizing Provider   LORazepam (ATIVAN) 0.5 mg tablet Take 0.5 mg by mouth every twenty-four (24) hours. Provider, Historical   oxyCODONE IR (ROXICODONE) 5 mg immediate release tablet Take 5 mg by mouth every six (6) hours as needed for Pain.     Provider, Historical   amino acids/protein hydrolys (PRO-STAT PROFILE PO) Take  by mouth. Provider, Historical   multivitamin (ONE A DAY) tablet Take 1 Tab by mouth daily. Provider, Historical   ondansetron hcl (ZOFRAN) 4 mg tablet Take 4 mg by mouth every eight (8) hours as needed for Nausea or Vomiting. Provider, Historical   ferrous sulfate 325 mg (65 mg iron) tablet Take 1 Tab by mouth daily (with breakfast). 6/27/20   Lakia Patricia MD   insulin detemir U-100 (LEVEMIR U-100 INSULIN) 100 unit/mL injection 15 Units by SubCUTAneous route nightly. Provider, Historical   diphenhydrAMINE (BENADRYL) 25 mg capsule Take 25 mg by mouth every six (6) hours as needed. Provider, Historical   gabapentin (NEURONTIN) 300 mg capsule Take 1 Cap by mouth two (2) times a day. 4/15/19   Shivam Katz PA-C   cholecalciferol (VITAMIN D3) 1,000 unit tablet Take 1,000 Units by mouth daily. Provider, Historical   apixaban (ELIQUIS PO) Take 5 mg by mouth two (2) times a day. Other, MD Dc   pollen extracts (PROSTAT PO) Take  by mouth. Provider, Historical   tiZANidine (ZANAFLEX) 2 mg capsule Take 2 mg by mouth two (2) times a day. Provider, Historical   traZODone (DESYREL) 50 mg tablet Take 50 mg by mouth nightly. Provider, Historical   nystatin (MYCOSTATIN) powder Apply 100,000 Units to affected area four (4) times daily. Provider, Historical   camphor-menthol (SARNA) 0.5-0.5 % lotion Apply  to affected area daily. Provider, Historical   docusate sodium (COLACE) 100 mg capsule Take 100 mg by mouth two (2) times a day. Provider, Historical   hydrocortisone (HYTONE) 2.5 % topical cream Apply  to affected area two (2) times a day. use thin layer    Provider, Historical   timolol (TIMOPTIC) 0.5 % ophthalmic solution 1 Drop two (2) times a day. Provider, Historical   brimonidine-timolol (COMBIGAN) 0.2-0.5 % drop ophthalmic solution Administer 1 Drop to both eyes every twelve (12) hours.     Provider, Historical   bisacodyl (DULCOLAX, BISACODYL,) 10 mg suppository Insert  into rectum daily as needed. Indications: constipation    Provider, Historical   metoprolol succinate (TOPROL-XL) 50 mg XL tablet Take 50 mg by mouth daily. Provider, Historical   magnesium hydroxide (MILK OF MAGNESIA CONCENTRATED) 2,400 mg/10 mL susp suspension Take 10 mL by mouth. Provider, Historical   alum-mag hydroxide-simeth (MYLANTA) 200-200-20 mg/5 mL susp Take 30 mL by mouth four (4) times daily as needed. Provider, Historical   insulin aspart U-100 (NOVOLOG FLEXPEN U-100 INSULIN) 100 unit/mL inpn by SubCUTAneous route. Sliding scale    Provider, Historical   sodium bicarbonate 650 mg tablet Take 650 mg by mouth two (2) times a day. Provider, Historical   levothyroxine (SYNTHROID) 50 mcg tablet Take  by mouth Daily (before breakfast). Provider, Historical   acetaminophen (TYLENOL) 650 mg TbER Take 650 mg by mouth every six (6) hours as needed for Pain or Fever. Provider, Historical   atorvastatin calcium (ATORVASTATIN PO) Take 20 mg by mouth daily. Other, MD Dc         REVIEW OF SYSTEMS      Objective:     Patient Vitals for the past 8 hrs:   BP Temp Pulse Resp SpO2 Height Weight   20 2220 (!) 144/41  90  100 %     20 2140 (!) 103/39  89  100 %     20 1800 (!) 144/52  96 24 100 %     20 1741 (!) 90/40 98.2 °F (36.8 °C) 94 22 99 % 5' 2\" (1.575 m) 74.4 kg (164 lb)     Temp (24hrs), Av.2 °F (36.8 °C), Min:98.2 °F (36.8 °C), Max:98.2 °F (36.8 °C)        Intake and Output:   No intake/output data recorded. Assessment:     Principal Problem:    Severe sepsis with acute organ dysfunction ([facility] [address] (2020)    Active Problems:    Pyelonephritis (2018)      Nephrostomy tube displaced ([facility] [address] (2018)      Hydronephrosis, right (2020)          Plan:   I reviewed the CT scan and shows right nephrostomy tube is in the parenchyma of kidney, not draining the severe hydronephrotic solitary kidney.  The ureter is embolized. Pt with septic picture and solitary kidney with worsened renal function and needs emergent nephrostomy tube. Discussed with IR on call he is coming in to evaluate.      Signed By: Iam Rondon MD                         November 4, 2020

## 2020-11-05 NOTE — ED NOTES
TRANSFER - OUT REPORT:    Verbal report given to Anuradha RN(name) on Tracey Adkins  being transferred to (unit) for routine progression of care       Report consisted of patients Situation, Background, Assessment and   Recommendations(SBAR). Information from the following report(s) SBAR, ED Summary and MAR was reviewed with the receiving nurse. Lines:   PICC Double Lumen 06/23/20 Left;Brachial (Active)       Peripheral IV 11/04/20 Left Forearm (Active)   Site Assessment Clean, dry, & intact 11/04/20 1810   Phlebitis Assessment 0 11/04/20 1810   Infiltration Assessment 0 11/04/20 1810   Dressing Status Clean, dry, & intact 11/04/20 1810   Dressing Type Tape;Transparent 11/04/20 1810   Hub Color/Line Status Pink;Patent 11/04/20 1810   Action Taken Blood drawn 11/04/20 1810        Opportunity for questions and clarification was provided.       Patient transported with:   Mophie

## 2020-11-06 LAB
ALBUMIN SERPL-MCNC: 1.8 G/DL (ref 3.4–5)
ALBUMIN/GLOB SERPL: 0.6 {RATIO} (ref 0.8–1.7)
ALP SERPL-CCNC: 104 U/L (ref 45–117)
ALT SERPL-CCNC: 20 U/L (ref 13–56)
ANION GAP SERPL CALC-SCNC: 8 MMOL/L (ref 3–18)
APTT PPP: 25.5 SEC (ref 23–36.4)
APTT PPP: >180 SEC (ref 23–36.4)
AST SERPL-CCNC: 16 U/L (ref 10–38)
BILIRUB SERPL-MCNC: 0.3 MG/DL (ref 0.2–1)
BLD PROD TYP BPU: NORMAL
BPU ID: NORMAL
BUN SERPL-MCNC: 37 MG/DL (ref 7–18)
BUN/CREAT SERPL: 17 (ref 12–20)
CALCIUM SERPL-MCNC: 7.3 MG/DL (ref 8.5–10.1)
CHLORIDE SERPL-SCNC: 108 MMOL/L (ref 100–111)
CO2 SERPL-SCNC: 21 MMOL/L (ref 21–32)
CREAT SERPL-MCNC: 2.12 MG/DL (ref 0.6–1.3)
ERYTHROCYTE [DISTWIDTH] IN BLOOD BY AUTOMATED COUNT: 14.6 % (ref 11.6–14.5)
GLOBULIN SER CALC-MCNC: 3.1 G/DL (ref 2–4)
GLUCOSE BLD STRIP.AUTO-MCNC: 156 MG/DL (ref 70–110)
GLUCOSE BLD STRIP.AUTO-MCNC: 158 MG/DL (ref 70–110)
GLUCOSE BLD STRIP.AUTO-MCNC: 226 MG/DL (ref 70–110)
GLUCOSE BLD STRIP.AUTO-MCNC: 226 MG/DL (ref 70–110)
GLUCOSE BLD STRIP.AUTO-MCNC: 240 MG/DL (ref 70–110)
GLUCOSE SERPL-MCNC: 130 MG/DL (ref 74–99)
HCT VFR BLD AUTO: 24.3 % (ref 35–45)
HGB BLD-MCNC: 8.1 G/DL (ref 12–16)
MCH RBC QN AUTO: 29.2 PG (ref 24–34)
MCHC RBC AUTO-ENTMCNC: 33.3 G/DL (ref 31–37)
MCV RBC AUTO: 87.7 FL (ref 74–97)
PLATELET # BLD AUTO: 233 K/UL (ref 135–420)
PMV BLD AUTO: 9.7 FL (ref 9.2–11.8)
POTASSIUM SERPL-SCNC: 4.3 MMOL/L (ref 3.5–5.5)
PROT SERPL-MCNC: 4.9 G/DL (ref 6.4–8.2)
RBC # BLD AUTO: 2.77 M/UL (ref 4.2–5.3)
SODIUM SERPL-SCNC: 137 MMOL/L (ref 136–145)
STATUS OF UNIT,%ST: NORMAL
UNIT DIVISION, %UDIV: 0
VANCOMYCIN SERPL-MCNC: <0.8 UG/ML (ref 5–40)
WBC # BLD AUTO: 10.7 K/UL (ref 4.6–13.2)

## 2020-11-06 PROCEDURE — 76450000000

## 2020-11-06 PROCEDURE — 80202 ASSAY OF VANCOMYCIN: CPT

## 2020-11-06 PROCEDURE — 85730 THROMBOPLASTIN TIME PARTIAL: CPT

## 2020-11-06 PROCEDURE — 74011000250 HC RX REV CODE- 250: Performed by: FAMILY MEDICINE

## 2020-11-06 PROCEDURE — 74011250637 HC RX REV CODE- 250/637: Performed by: FAMILY MEDICINE

## 2020-11-06 PROCEDURE — 74011250636 HC RX REV CODE- 250/636: Performed by: INTERNAL MEDICINE

## 2020-11-06 PROCEDURE — 80053 COMPREHEN METABOLIC PANEL: CPT

## 2020-11-06 PROCEDURE — 65270000029 HC RM PRIVATE

## 2020-11-06 PROCEDURE — 36415 COLL VENOUS BLD VENIPUNCTURE: CPT

## 2020-11-06 PROCEDURE — 74011250636 HC RX REV CODE- 250/636: Performed by: FAMILY MEDICINE

## 2020-11-06 PROCEDURE — 74011636637 HC RX REV CODE- 636/637: Performed by: FAMILY MEDICINE

## 2020-11-06 PROCEDURE — 85027 COMPLETE CBC AUTOMATED: CPT

## 2020-11-06 PROCEDURE — 82962 GLUCOSE BLOOD TEST: CPT

## 2020-11-06 RX ORDER — ENOXAPARIN SODIUM 100 MG/ML
1 INJECTION SUBCUTANEOUS EVERY 12 HOURS
Status: DISCONTINUED | OUTPATIENT
Start: 2020-11-07 | End: 2020-11-06 | Stop reason: DRUGHIGH

## 2020-11-06 RX ORDER — ENOXAPARIN SODIUM 100 MG/ML
1 INJECTION SUBCUTANEOUS EVERY 24 HOURS
Status: DISCONTINUED | OUTPATIENT
Start: 2020-11-07 | End: 2020-11-08

## 2020-11-06 RX ORDER — HEPARIN SODIUM 1000 [USP'U]/ML
80 INJECTION, SOLUTION INTRAVENOUS; SUBCUTANEOUS ONCE
Status: COMPLETED | OUTPATIENT
Start: 2020-11-06 | End: 2020-11-06

## 2020-11-06 RX ORDER — HEPARIN SODIUM 10000 [USP'U]/100ML
18-36 INJECTION, SOLUTION INTRAVENOUS
Status: DISCONTINUED | OUTPATIENT
Start: 2020-11-06 | End: 2020-11-06

## 2020-11-06 RX ADMIN — ATORVASTATIN CALCIUM 20 MG: 20 TABLET, FILM COATED ORAL at 22:28

## 2020-11-06 RX ADMIN — HEPARIN SODIUM 22 UNITS/KG/HR: 10000 INJECTION, SOLUTION INTRAVENOUS at 16:29

## 2020-11-06 RX ADMIN — GABAPENTIN 300 MG: 300 CAPSULE ORAL at 09:45

## 2020-11-06 RX ADMIN — SODIUM BICARBONATE 650 MG: 650 TABLET ORAL at 09:45

## 2020-11-06 RX ADMIN — VANCOMYCIN HYDROCHLORIDE 1000 MG: 1 INJECTION, POWDER, LYOPHILIZED, FOR SOLUTION INTRAVENOUS at 09:44

## 2020-11-06 RX ADMIN — LORAZEPAM 0.5 MG: 0.5 TABLET ORAL at 00:08

## 2020-11-06 RX ADMIN — Medication 10 ML: at 16:34

## 2020-11-06 RX ADMIN — LEVOTHYROXINE SODIUM 50 MCG: 0.05 TABLET ORAL at 06:43

## 2020-11-06 RX ADMIN — ACETAMINOPHEN 1000 MG: 500 TABLET ORAL at 00:08

## 2020-11-06 RX ADMIN — Medication 10 ML: at 23:38

## 2020-11-06 RX ADMIN — MEROPENEM 500 MG: 500 INJECTION, POWDER, FOR SOLUTION INTRAVENOUS at 23:38

## 2020-11-06 RX ADMIN — Medication 10 ML: at 06:00

## 2020-11-06 RX ADMIN — MEROPENEM 500 MG: 500 INJECTION, POWDER, FOR SOLUTION INTRAVENOUS at 11:54

## 2020-11-06 RX ADMIN — LORAZEPAM 0.5 MG: 0.5 TABLET ORAL at 22:28

## 2020-11-06 RX ADMIN — FERROUS SULFATE TAB 325 MG (65 MG ELEMENTAL FE) 325 MG: 325 (65 FE) TAB at 09:45

## 2020-11-06 RX ADMIN — SODIUM CHLORIDE 100 ML/HR: 900 INJECTION, SOLUTION INTRAVENOUS at 06:48

## 2020-11-06 RX ADMIN — INSULIN LISPRO 4 UNITS: 100 INJECTION, SOLUTION INTRAVENOUS; SUBCUTANEOUS at 14:10

## 2020-11-06 RX ADMIN — INSULIN LISPRO 4 UNITS: 100 INJECTION, SOLUTION INTRAVENOUS; SUBCUTANEOUS at 00:18

## 2020-11-06 RX ADMIN — INSULIN LISPRO 2 UNITS: 100 INJECTION, SOLUTION INTRAVENOUS; SUBCUTANEOUS at 18:29

## 2020-11-06 RX ADMIN — HEPARIN SODIUM 5900 UNITS: 1000 INJECTION INTRAVENOUS; SUBCUTANEOUS at 16:29

## 2020-11-06 RX ADMIN — SODIUM BICARBONATE 650 MG: 650 TABLET ORAL at 22:28

## 2020-11-06 RX ADMIN — GABAPENTIN 300 MG: 300 CAPSULE ORAL at 22:28

## 2020-11-06 NOTE — PROGRESS NOTES
[address] [facility] [phone]  [facility] [phone]    Palliative consulted to assist with goals of care. Reasons leading to consult are sepsis from pyelonephritis and bacteremia, nephrostomy tube replacement, ANTONI, and debility. Palliative SW saw patient at bedside. Ms. Keith Huynh was asleep with full breakfast try in front of her. Didn't wake to allow rest. Patient is a LTC resident of [facility] and has a Durable DNR on file. Code status is DNR. Called and spoke with son Benjamin Torres at [phone]. Raul Sanders was very engaging and willing to share about his mother. He described his mother as \"mentally intact, she has all her faculties\". Raul Sanders continued sharing his mother considers herself to have a good quality of life considering she's mainly bed bound, blind and [place] with other chronic health issues. His mother enjoys listing to audio books and other materials, plus they talk every day on the phone, \"She's able to call me everyday\". Raul Sanders shared his mother believes she still has life to live, if she losses her mental faculties or kidney function then she would consider her life to be at the end. Raul Sanders confirmed that his mother's wishes are for DNR, not to have CPR in event of cardiopulmonary arrest. Durable DNR is on file and code status reflects wishes. Raul Sanders shared being hopeful his mother's infections are treated well and can return to [facility]. Raul Sanders shared his mother is a  and has three children; two sons and one daughter. Son Adebayo Fowler is traveling today from [place] and will be visiting this evening with patient. Raul Sanders stated he's appointed medical POA. Will attempt to get copy of paperwork from [facility]. Until copy of POA paperwork received and if patient isn't able to direct her own care, decision making is the majority of the three children,       Goals of care are unchanged at this time.  Patient remains DNR and continue current medical management. Palliative will continue to follow along    Thank you for the opportunity to assist in the care of Ms. Craig.    CONNIE Jarrell, [facility]  Palliative Medicine

## 2020-11-06 NOTE — PROGRESS NOTES
Hospitalist Progress Note    Patient: Rodrigue Vazquez MRN: [id number]  CSN: [id number]    YOB: 1940  Age: [de-identified] y.o. Sex: female    DOA: 11/4/2020 LOS:  LOS: 2 days            Not in pain   Sepsis improved post exchange of tube  Bacteremia present   Repeat cultures pending   Will likely need picc   Will obtain Id consult     Assessment/Plan     Patient Active Problem List   Diagnosis Code    Pyelonephritis N14    S/p nephrectomy Z90.5    Right flank pain R10.9    Malfunction of nephrostomy tube ([address] T83.098A    Nephrostomy tube displaced ([address] T83.022A    UTI (urinary tract infection) N39.0    Elevated WBC count D72.829    Diabetes ([address] E11.9    Legally blind H54.8    Hypertension I10    Thyroid disease E07.9    Elevated serum creatinine R79.89    S/P colectomy Z90.49    Skin lesion of foot L98.9    Tear of skin of buttock S31.801A    Pressure injury of right heel, unstageable ([facility] L89.610    Hypomagnesemia E83.42    Hyperkalemia E87.5    Heel ulcer ([address] L97.409    ANTONI (acute kidney injury) ([address] N17.9    S/P AKA (above knee amputation), right ([address] Z89.611    Nephrostomy status ([address] Z93.6    Obstructed nephrostomy tube ([address] T83.092A    Nephrostomy complication ([address] X89.034    Acute renal failure (ARF) ([address] N17.9    Elevated INR R79.1    Bacteremia R78.81    Enterococcus faecalis infection B95.2    CKD stage 3 due to type 2 diabetes mellitus ([facility]) E11.22, N18.30    Hydronephrosis, right N13.30    Severe sepsis with acute organ dysfunction ([facility]) A41.9, R65.20    Anticoagulated Z79.01            [de-identified] y/o female on anticoagulation for prior DVT as well as 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     Severe sepsis -  From pyelonephritis and bacteremia    Pyelonephritis/Bacteremia -  11/04/2020 2/2 blood cultures growing GPC in chains and GNR  Follow repeat cultues  Follow urine cultures  Antibiotics - vancomycin, meropenem. ID Consult today   Cardiology consult for RICHARD    Nephrostomy tube replaced    ANTONI -   Secondary to obstruction  Follow renal function  IVF    DM -   On SSI    HTN -  Controlled    Hyponatremia-  improving    Palliative care consult    Disposition : will likely need IV abx for at minimum 2 weeks   PICC? Tuesday     Review of systems  General: No fevers or chills. Cardiovascular: No chest pain or pressure. No palpitations. Pulmonary: No shortness of breath. Gastrointestinal: No nausea, vomiting. Physical Exam:  General: Awake, cooperative, sleepy but arousable  legally blind   HEENT: NC, Atraumatic. PERRLA, anicteric sclerae. Lungs: CTA Bilaterally. No Wheezing/Rhonchi/Rales. Heart:  S1 S2,  No murmur, No Rubs, No Gallops  Abdomen: Soft, Non distended, Non tender.  +Bowel sounds, nephrostomy with blood in bag Pos ostomy  Extremitie right  BKA   No Psych:   Not anxious or agitated. Neurologic:  No acute neurological deficit. Vital signs/Intake and Output:  Visit Vitals  BP (!) 124/40 (BP 1 Location: Right arm, BP Patient Position: At rest)   Pulse 86   Temp 98.7 °F (37.1 °C)   Resp 16   Ht 5' 2\" (1.575 m)   Wt 73.7 kg (162 lb 8 oz)   SpO2 100%   BMI 29.72 kg/m²     Current Shift:  No intake/output data recorded. Last three shifts:  11/04 1901 - 11/06 0700  In: 3086.9 [P.O.:600;  I.V.:2486.9]  Out: 1750 [Urine:1150]            Labs: Results:       Chemistry Recent Labs     11/06/20  0435 11/05/20  0529 11/04/20  1810   * 186* 317*    133* 130*   K 4.3 5.2 4.8    102 95*   CO2 21 20* 25   BUN 37* 44* 46*   CREA 2.12* 3.03* 3.26*   CA 7.3* 7.5* 8.2*   AGAP 8 11 10   BUCR 17 15 14    115 131*   TP 4.9* 6.1* 6.8   ALB 1.8* 2.2* 2.6*   GLOB 3.1 3.9 4.2*   AGRAT 0.6* 0.6* 0.6*      CBC w/Diff Recent Labs     11/06/20  0435 11/05/20  0529 11/04/20  181 WBC 10.7 14.7* 23.6*   RBC 2.77* 3.31* 3.73*   HGB 8.1* 9.6* 10.8*   HCT 24.3* 29.2* 33.1*    281 452*   GRANS  --   --  86*   LYMPH  --   --  4*   EOS  --   --  0      Cardiac Enzymes No results for input(s): CPK, CKND1, JANE in the last 72 hours. No lab exists for component: CKRMB, TROIP   Coagulation Recent Labs     11/04/20  1810   PTP 27.7*   INR 2.7*   APTT 46.1*       Lipid Panel Lab Results   Component Value Date/Time    Cholesterol, total 115 08/28/2019 08:22 AM    HDL Cholesterol 39 (L) 08/28/2019 08:22 AM    LDL, calculated 46.2 08/28/2019 08:22 AM    VLDL, calculated 29.8 08/28/2019 08:22 AM    Triglyceride 149 08/28/2019 08:22 AM    CHOL/HDL Ratio 2.9 08/28/2019 08:22 AM      BNP No results for input(s): BNPP in the last 72 hours.    Liver Enzymes Recent Labs     11/06/20  0435   TP 4.9*   ALB 1.8*         Thyroid Studies Lab Results   Component Value Date/Time    TSH 3.40 01/24/2020 08:35 AM        Procedures/imaging: see electronic medical records for all procedures/Xrays and details which were not copied into this note but were reviewed prior to creation of Plan

## 2020-11-06 NOTE — PROGRESS NOTES
Results for Mike Pathak (MRN [id number]) as of 11/6/2020 16:39   Ref. Range 11/6/2020 13:57   aPTT Latest Ref Range: 23.0 - 36.4 SEC 25.5       S: Implementation of IV continuous heparin therapy. B: Dr. Dinh Cabrera inputted IV continuous heparin therapy to assist with VTE prevention/management due to patient's past medical history of DVT. Patient presented with the following APTT as listed above. A: As directed, patient was started on a IV Continuous Heparin therapy with a rate of 22 cc/hr. Patient was given a bolus of 5.9 cc of heparin for preliminary implementation as well around 1629  Pm. Patient will have next APTT assessed around 2230 PM. Order was inputted and MD will be notified. R: Will continue with prescribed plan of care as directed.    Reginaldo Little  11/6/2020   4:30 PM.

## 2020-11-06 NOTE — PROGRESS NOTES
Hospitalist Progress Note    Patient: Elisabeth Hauser MRN: [id number]  CSN: [id number]    YOB: 1940  Age: [de-identified] y.o. Sex: female    DOA: 11/4/2020 LOS:  LOS: 1 day                Assessment/Plan     Patient Active Problem List   Diagnosis Code    Pyelonephritis N14    S/p nephrectomy Z90.5    Right flank pain R10.9    Malfunction of nephrostomy tube ([address] T83.098A    Nephrostomy tube displaced ([address] T83.022A    UTI (urinary tract infection) N39.0    Elevated WBC count D72.829    Diabetes ([address] E11.9    Legally blind H54.8    Hypertension I10    Thyroid disease E07.9    Elevated serum creatinine R79.89    S/P colectomy Z90.49    Skin lesion of foot L98.9    Tear of skin of buttock S31.801A    Pressure injury of right heel, unstageable ([facility]) L89.610    Hypomagnesemia E83.42    Hyperkalemia E87.5    Heel ulcer ([address] L97.409    ANTONI (acute kidney injury) ([address] N17.9    S/P AKA (above knee amputation), right ([address] Z89.611    Nephrostomy status ([address] Z93.6    Obstructed nephrostomy tube ([address] T83.092A    Nephrostomy complication (HCC) G29.300    Acute renal failure (ARF) ([facility]) N17.9    Elevated INR R79.1    Bacteremia R78.81    Enterococcus faecalis infection B95.2    CKD stage 3 due to type 2 diabetes mellitus (HCC) E11.22, N18.30    Hydronephrosis, right N13.30    Severe sepsis with acute organ dysfunction ([facility]) A41.9, R65.20    Anticoagulated Z79.01            [de-identified] y/o female on anticoagulation for prior DVT as well as 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 Severe sepsis -  From pyelonephritis and bacteremia    Pyelonephritis/Bacteremia -  11/04/2020 2/2 blood cultures growing GPC in chains and GNR  Follow repeat cultues  Follow urine cultures  Antibiotics - vancomycin, meropenem.     Nephrostomy tube replaced    ANTONI -   Secondary to obstruction  Follow renal function  IVF    DM -   On SSI    HTN -  Controlled    Hyponatremia-  improving    Palliative care consult    Disposition : TBD    Review of systems  General: No fevers or chills. Cardiovascular: No chest pain or pressure. No palpitations. Pulmonary: No shortness of breath. Gastrointestinal: No nausea, vomiting. Physical Exam:  General: Awake, cooperative, no acute distress    HEENT: NC, Atraumatic. PERRLA, anicteric sclerae. Lungs: CTA Bilaterally. No Wheezing/Rhonchi/Rales. Heart:  S1 S2,  No murmur, No Rubs, No Gallops  Abdomen: Soft, Non distended, Non tender.  +Bowel sounds, nephrostomy with blood in bag  Extremities: No c/c/e  Psych:   Not anxious or agitated. Neurologic:  No acute neurological deficit. Vital signs/Intake and Output:  Visit Vitals  BP (!) 122/43 (BP 1 Location: Right arm, BP Patient Position: At rest)   Pulse 88   Temp 97.7 °F (36.5 °C)   Resp 17   Ht 5' 2\" (1.575 m)   Wt 74.4 kg (164 lb)   SpO2 100%   BMI 30.00 kg/m²     Current Shift:  No intake/output data recorded. Last three shifts:  11/04 0701 - 11/05 1900  In: 700 [P.O.:600; I.V.:100]  Out: 850 [Urine:550]            Labs: Results:       Chemistry Recent Labs     11/05/20 0529 11/04/20 1810   * 317*   * 130*   K 5.2 4.8    95*   CO2 20* 25   BUN 44* 46*   CREA 3.03* 3.26*   CA 7.5* 8.2*   AGAP 11 10   BUCR 15 14    131*   TP 6.1* 6.8   ALB 2.2* 2.6*   GLOB 3.9 4.2*   AGRAT 0.6* 0.6*      CBC w/Diff Recent Labs     11/05/20 0529 11/04/20 1810   WBC 14.7* 23.6*   RBC 3.31* 3.73*   HGB 9.6* 10.8*   HCT 29.2* 33.1*    452*   GRANS  --  86*   LYMPH  --  4*   EOS  --  0      Cardiac Enzymes No results for input(s): CPK, CKND1, JANE in the last 72 hours.     No lab exists for component: CKRMB, TROIP   Coagulation Recent Labs     11/04/20  1810   PTP 27.7*   INR 2.7*   APTT 46.1*       Lipid Panel Lab Results   Component Value Date/Time    Cholesterol, total 115 08/28/2019 08:22 AM    HDL Cholesterol 39 (L) 08/28/2019 08:22 AM    LDL, calculated 46.2 08/28/2019 08:22 AM    VLDL, calculated 29.8 08/28/2019 08:22 AM    Triglyceride 149 08/28/2019 08:22 AM    CHOL/HDL Ratio 2.9 08/28/2019 08:22 AM      BNP No results for input(s): BNPP in the last 72 hours.    Liver Enzymes Recent Labs     11/05/20  0529   TP 6.1*   ALB 2.2*         Thyroid Studies Lab Results   Component Value Date/Time    TSH 3.40 01/24/2020 08:35 AM        Procedures/imaging: see electronic medical records for all procedures/Xrays and details which were not copied into this note but were reviewed prior to creation of Plan

## 2020-11-06 NOTE — PROGRESS NOTES
Problem: Risk for Spread of Infection  Goal: Prevent transmission of infectious organism to others  Description: Prevent the transmission of infectious organisms to other patients, staff members, and visitors. Outcome: Progressing Towards Goal     Problem: Patient Education:  Go to Education Activity  Goal: Patient/Family Education  Outcome: Progressing Towards Goal     Problem: Pressure Injury - Risk of  Goal: *Prevention of pressure injury  Description: Document Rickie Scale and appropriate interventions in the flowsheet. Outcome: Progressing Towards Goal  Note: Pressure Injury Interventions:  Sensory Interventions: Check visual cues for pain    Moisture Interventions: Internal/External urinary devices, Internal/External fecal devices    Activity Interventions: Pressure redistribution bed/mattress(bed type)    Mobility Interventions: Pressure redistribution bed/mattress (bed type), Turn and reposition approx. every two hours(pillow and wedges)    Nutrition Interventions: Document food/fluid/supplement intake    Friction and Shear Interventions: Minimize layers, Lift team/patient mobility team                Problem: Patient Education: Go to Patient Education Activity  Goal: Patient/Family Education  Outcome: Progressing Towards Goal     Problem: Falls - Risk of  Goal: *Absence of Falls  Description: Document Brad Soto Fall Risk and appropriate interventions in the flowsheet.   Outcome: Progressing Towards Goal  Note: Fall Risk Interventions:  Mobility Interventions: Communicate number of staff needed for ambulation/transfer         Medication Interventions: Bed/chair exit alarm    Elimination Interventions: Bed/chair exit alarm, Call light in reach              Problem: Patient Education: Go to Patient Education Activity  Goal: Patient/Family Education  Outcome: Progressing Towards Goal

## 2020-11-06 NOTE — PROGRESS NOTES
Bernie Moser called floor RN with positive blood cultures (gram neg rods and gram pos in blood cx bottles). Already on meropenem and vancomycin.

## 2020-11-06 NOTE — ROUTINE PROCESS
Bedside and Verbal shift change report given to KATIE Del Valle RN (oncoming nurse) by TAM Little RN (offgoing nurse). Report included the following information SBAR, Kardex, Intake/Output, MAR and Recent Results.      Shift summary-

## 2020-11-06 NOTE — PROGRESS NOTES
Patient presents compliant to current plan of care as directed.    Reginaldo Little  11/6/2020  1:17 PM

## 2020-11-06 NOTE — PROGRESS NOTES
Transition of Care Plan: CM confirmed with son yesterday he would like her to return to [facility]. CM today has confirmed they an accept once medically stable for dc. Per MD, here through weekend. Communication to Patient/Family:  Met with patient and family, and they are agreeable to the transition plan. The Plan for Transition of Care is related to the following treatment goals: severe sepsis    The Patient and/or patient representative  was provided with a choice of provider and agrees   with the discharge plan. Yes [x] No []    Freedom of choice list was provided with basic dialogue that supports the patient's individualized plan of care/goals and shares the quality data associated with the providers. Yes [x] No []    SNF/Rehab Transition:  Patient has been accepted to [facility] at [address] [place] [address], [address] [place] [address] for SNF and meets criteria for admission. Patient will transported by medical transport and expected to leave once medically cleared, will need to alert son of transport time on day of dc. Communication to SNF/Rehab:  Bedside RN,  has been notified to update the transition plan to the facility and call report [phone]. Discharge information has been updated on the AVS and communicated through [facility] or [facility]. Discharge instructions to be fax'd to facility at [phone]     Please include all hard scripts for controlled substances, med rec and dc summary, and AVS in packet. Please medicate for pain prior to dc if possible and needed to help offset delay when patient first arrives to facility. Reviewed and confirmed with facility, [facility] can manage the patient care needs for the following:     Siomara Venegas with (X) only those applicable:  Medication:  []Medications are available at the facility  []IV Antibiotics    []Controlled Substance  hard copies available sent.   []Weekly Labs    Equipment:  []CPAP/BiPAP  []Wound Vacuum  []Wright or Urinary Device  []PICC/Central Line  []Nebulizer  []Ventilator    Treatment:  []Isolation (for MRSA, VRE, etc.)  []Surgical Drain Management  []Tracheostomy Care  []Dressing Changes  []Dialysis with transportation  []PEG Care  []Oxygen  []Daily Weights for Heart Failure    Dietary:  []Any diet limitations  []Tube Feedings   []Total Parenteral Management (TPN)    Financial Resources:  []Medicaid Application Completed    []UAI Completed and copy given to pt/family. []A screening has previously been completed. []Level II Completed    [] Private pay individual who will not become   financially eligible for Medicaid within 6 months from admission to a [address] [place] [address] facility. [] Individual refused to have screening conducted. []Medicaid Application Completed    []The screening denied because it was determined individual did not need/did not qualify for nursing facility level of care. [] Out of state residents seeking direct admission to a [address] facility. [] Individuals who are inpatients of an out of state hospital, or in state or out of state veterans/ hospital and seek direct admission to a [address] facility  [] Individuals who are pateints or residents of a state owned/operated facility that is licensed by [employer] of [employer] and seek direct admission to [address] [place] [address]  [] A screening not required for enrollment in [facility] as set out in [facility]-  [] [facility] [place]) staff shall perform screenings of the [facility] clients. Advanced Care Plan:  []Surrogate Decision Maker of Care  []POA  []Communicated Code Status and copy sent.     Other:

## 2020-11-06 NOTE — PROGRESS NOTES
S:  Blood bank inquiry about the necessity of FFP. B: At 1116 am, Blood bank contacted floor and stated that patient had pending order from 11/4/2020 for 1 unit of Fresh Frozen Plasma (FFP) for administration for patient and wanted to know whether MD still wanted for patient to receive medication. Blood bank was informed that MDs would be inquired and that they would be notified of result in accordance to FFP administration. Understanding was verbalized. A: on 11/5/2020 at 21:16 pm, Hospitalist Dr. Sung Montemayor wrote within her history and physical specifically, \"FFP on hold for IR but not needed\". At 12:27 pm, Blood bank were contacted and notified of Dr. Scarlett Henry note from 11/5/2020 and no necessity of FFP. Understanding was verbalized. R: Will continue with prescribed plan of care as directed.    Reginaldo Little  11/6/2020  12:32 PM

## 2020-11-06 NOTE — CONSULTS
[facility] Consult Note      Patient: Tracey Adkins MRN: [id number]  SSN: [id number]    YOB: 1940  Age: [de-identified] y.o. Sex: female        Date of Consultation: 11/6/2020  Referring Physician: Dr. Calli Vieyra  Reason for Consultation: Fever possible bacteremia and transesophageal echocardiogram    HPI:  I was asked by Dr. Calli Vieyra for transesophageal echocardiogram.  Tracey Adkins is a 62-year-old admitted with fever and possible bacteremia and a cardiology was asked to repeat RICHARD. Patient denied any chest pain or shortness of breath or palpitation. Patient have significant arthritis and difficult venous access for medication.     Past Medical History:   Diagnosis Date    Cancer [facility])     vaginal and colon cancer    [facility]     Chronic pain     arthritis    [facility]     DVT (deep venous thrombosis) (HCC)     Hypertension     Legally blind     Thyroid disease      Past Surgical History:   Procedure Laterality Date    HX ABOVE KNEE AMPUTATION Right     HX COLOSTOMY      HX ORTHOPAEDIC      HX OTHER SURGICAL      kidney drainage tube    HX UROLOGICAL      kidney removal    IR EXCHANGE NEPHRO PERC RT SI  1/28/2019    IR EXCHANGE NEPHRO PERC RT SI  3/21/2019    IR EXCHANGE NEPHRO PERC RT SI  5/13/2019    IR EXCHANGE NEPHRO PERC RT SI  6/27/2019    IR EXCHANGE NEPHRO PERC RT SI  8/16/2019    IR EXCHANGE NEPHRO PERC RT SI  10/3/2019    IR EXCHANGE NEPHRO PERC RT SI  11/18/2019    IR EXCHANGE NEPHRO PERC RT SI  1/6/2020    IR EXCHANGE NEPHRO PERC RT SI  2/24/2020    IR EXCHANGE NEPHRO PERC RT SI  4/13/2020    IR EXCHANGE NEPHRO PERC RT SI  6/1/2020    IR EXCHANGE NEPHRO PERC RT SI  6/19/2020    IR EXCHANGE NEPHRO PERC RT SI  8/10/2020    IR EXCHANGE NEPHRO PERC RT SI  9/30/2020    IR EXCHANGE NEPHRO PERC RT SI  11/5/2020    IR NEPHROSTOMY PERC RT PLC CATH  SI  6/21/2020     Current Facility-Administered Medications   Medication Dose Route Frequency    [START ON 11/7/2020] Vancomycin Random level due 11/7/20 at 04:00  1 Each Other ONCE    heparin (porcine) 25,000 units in 0.45% saline 250 ml infusion  18-36 Units/kg/hr IntraVENous TITRATE    ferrous sulfate tablet 325 mg  325 mg Oral DAILY WITH BREAKFAST    atorvastatin (LIPITOR) tablet 20 mg  20 mg Oral QHS    levothyroxine (SYNTHROID) tablet 50 mcg  50 mcg Oral 6am    gabapentin (NEURONTIN) capsule 300 mg  300 mg Oral BID    sodium bicarbonate tablet 650 mg  650 mg Oral BID    acetaminophen (TYLENOL) tablet 1,000 mg  1,000 mg Oral Q8H PRN    Or    acetaminophen (TYLENOL) suppository 650 mg  650 mg Rectal Q6H PRN    Vancomycin-Pharmacy to dose  1 Each Other Rx Dosing/Monitoring    LORazepam (ATIVAN) tablet 0.5 mg  0.5 mg Oral BID PRN    sodium chloride (NS) flush 5-10 mL  5-10 mL IntraVENous PRN    sodium chloride (NS) flush 5-40 mL  5-40 mL IntraVENous Q8H    sodium chloride (NS) flush 5-40 mL  5-40 mL IntraVENous PRN    polyethylene glycol (MIRALAX) packet 17 g  17 g Oral DAILY PRN    promethazine (PHENERGAN) tablet 12.5 mg  12.5 mg Oral Q6H PRN    Or    ondansetron (ZOFRAN) injection 4 mg  4 mg IntraVENous Q6H PRN    insulin lispro (HUMALOG) injection   SubCUTAneous Q6H    glucose chewable tablet 16 g  16 g Oral PRN    glucagon (GLUCAGEN) injection 1 mg  1 mg IntraMUSCular PRN    dextrose 10% infusion 125-250 mL  125-250 mL IntraVENous PRN    0.9% sodium chloride infusion  100 mL/hr IntraVENous CONTINUOUS    meropenem (MERREM) 500 mg in sterile water (preservative free) 10 mL IV syringe  0.5 g IntraVENous Q12H    0.9% sodium chloride infusion 250 mL  250 mL IntraVENous PRN       Allergies and Intolerances: Allergies   Allergen Reactions    Morphine Nausea and Vomiting    Pcn [Penicillins] Rash       Family History:   History reviewed. No pertinent family history. Social History:   She  reports that she has never smoked.  She has never used smokeless tobacco.  She  reports no history of alcohol use.      Review of Systems:     Review of Systems  Gen: +fever, chills, +malaise, weight loss/gain. Heent: No headache, rhinorrhea, epistaxis, ear pain, hearing loss, sinus pain, neck pain/stiffness, sore throat. Heart: No chest pain, palpitations, LEWIS, pnd, or orthopnea. Resp: No cough, hemoptysis, wheezing and shortness of breath. GI: No nausea, vomiting, diarrhea, constipation, melena or hematochezia. : No urinary obstruction, dysuria or hematuria. Derm: No rash, new skin lesion or pruritis. Musc/skeletal: no bone or joint complains. Vasc: No edema, cyanosis or claudication. Endo: No heat/cold intolerance, no polyuria,polydipsia or polyphagia. Neuro: No unilateral weakness, numbness, tingling. No seizures. Heme: No easy bruising or bleeding. Physical:   Patient Vitals for the past 6 hrs:   Temp Pulse Resp BP SpO2   11/06/20 1639    (!) 137/42    11/06/20 1635 99.6 °F (37.6 °C) 98 16 (!) 135/38 100 %   11/06/20 1143 98.6 °F (37 °C) 91 16 (!) 132/40 100 %         Exam:   General Appearance: Comfortable, not using accessory muscles of respiration. HEENT: FLORY. HEAD: Atraumatic  NECK: No JVD, no thyroidomeglay. LUNGS: Clear bilaterally. HEART: S1+S2     ABD: Non-tender, BS Audible    EXT: No edema, and no cysnosis. VASCULAR EXAM: Pulses are intact. PSYCHIATRIC EXAM: Mood is appropriate. MUSCULOSKELETAL: Grossly no joint deformity. NEUROLOGICAL: Motor and sensory sytem intact and Cranial nerves II-XII intact.     Review of Data:   LABS:   Lab Results   Component Value Date/Time    WBC 10.7 11/06/2020 04:35 AM    HGB 8.1 (L) 11/06/2020 04:35 AM    HCT 24.3 (L) 11/06/2020 04:35 AM    PLATELET 984 78/48/0946 04:35 AM     Lab Results   Component Value Date/Time    Sodium 137 11/06/2020 04:35 AM    Potassium 4.3 11/06/2020 04:35 AM    Chloride 108 11/06/2020 04:35 AM    CO2 21 11/06/2020 04:35 AM    Glucose 130 (H) 11/06/2020 04:35 AM    BUN 37 (H) 11/06/2020 04:35 AM Creatinine 2.12 (H) 11/06/2020 04:35 AM     Lab Results   Component Value Date/Time    Cholesterol, total 115 08/28/2019 08:22 AM    HDL Cholesterol 39 (L) 08/28/2019 08:22 AM    LDL, calculated 46.2 08/28/2019 08:22 AM    Triglyceride 149 08/28/2019 08:22 AM     No components found for: GPT  Lab Results   Component Value Date/Time    Hemoglobin A1c 8.2 (H) 06/19/2020 02:15 PM       RADIOLOGY:  CT Results  (Last 48 hours)               11/04/20 2027  CT ABD PELV WO CONT Final result    Impression:  IMPRESSION:       Right nephrostomy has been pulled back. Locking loop may be embedded in the   renal parenchyma. Marked right hydronephrosis. Recommend correlation with   nephrostomy output. Recommend nephrostomy exchange, repositioning. Absent left kidney. Prior hysterectomy and cystectomy. Multiple pole   retroperitoneal lymph nodes       Mildly enlarged retroperitoneal lymph nodes, mildly increased in size from prior   study               Narrative:  CLINICAL:  Sepsis. Renal failure       COMPARISON: January 16, 2020       TECHNIQUE: Axial CT imaging of the abdomen and pelvis was performed without   intravenous contrast. Multiplanar reformats were generated. One or more dose reduction techniques were used on this CT: automated exposure   control, adjustment of the mAs and/or kVp according to patient size, and   iterative reconstruction techniques. The specific techniques used on this CT   exam have been documented in the patient's electronic medical record. Digital Imaging and Communications in the Medicine (DICOM) format image data are   available to nonaffiliated external healthcare facilities or entities on a   secure, media free, reciprocally searchable basis with patient authorization for   at least a 12 month period after this study. _______________       FINDINGS:       LOWER CHEST: Minimal right effusion       LIVER, BILIARY: Liver is normal. No biliary dilation.  Gallbladder absent PANCREAS: Normal.       SPLEEN: Normal.       ADRENALS: Normal.       KIDNEYS: Left kidney absent. Right kidney demonstrates marked hydronephrosis and   proximal hydroureter. A nephrostomy catheter is present, however, this has been   pulled back and the locking loop may be embedded in the parenchyma. The mid   ureter has been embolized. LYMPH NODES: Retroperitoneal lymph nodes. Retrocaval node measuring 12 mm   previously measuring 8 mm. Para-aortic node measuring 10 mm previously measuring   8 mm. GASTROINTESTINAL TRACT: No bowel dilation or wall thickening. Left colostomy. PELVIC ORGANS: Uterus and bladder absent       VASCULATURE: Prominent vascular calcifications       BONES: Degenerative changes in both hips and the lumbar spine       OTHER: None.       _______________               CXR Results  (Last 48 hours)               11/04/20 1949  XR CHEST PORT Final result    Impression:  IMPRESSION:       No acute process. Narrative:  CLINICAL: Sepsis. Fever       COMPARISON: None. A portable view of the chest from 1938 hours: The lungs and pleural spaces are clear. The mediastinum is unremarkable in   appearance.                    Cardiology Procedures:   Results for orders placed or performed during the hospital encounter of 11/04/20   EKG, 12 LEAD, INITIAL   Result Value Ref Range    Ventricular Rate 87 BPM    Atrial Rate 87 BPM    P-R Interval 148 ms    QRS Duration 80 ms    Q-T Interval 392 ms    QTC Calculation (Bezet) 471 ms    Calculated P Axis 63 degrees    Calculated R Axis 64 degrees    Calculated T Axis 57 degrees    Diagnosis       Normal sinus rhythm  Normal ECG  When compared with ECG of 04-NOV-2020 17:56,  No significant change was found  Confirmed by Alee Lee MD. ([id number]) on 11/5/2020 9:45:58 PM        Echo Results  (Last 48 hours)    None       Cardiolite (Tc-99m Sestamibi) stress test        Impression / Plan:    Patient Active Problem List   Diagnosis Code    Pyelonephritis N12    S/p nephrectomy Z90.5    Right flank pain R10.9    Malfunction of nephrostomy tube ([address] T83.098A    Nephrostomy tube displaced ([address] T83.022A    UTI (urinary tract infection) N39.0    Elevated WBC count D72.829    Diabetes ([facility]) E11.9    Legally blind H54.8    Hypertension I10    Thyroid disease E07.9    Elevated serum creatinine R79.89    S/P colectomy Z90.49    Skin lesion of foot L98.9    Tear of skin of buttock S31.801A    Pressure injury of right heel, unstageable ([facility]) L89.610    Hypomagnesemia E83.42    Hyperkalemia E87.5    Heel ulcer ([address] L97.409    ANTONI (acute kidney injury) ([address] N17.9    S/P AKA (above knee amputation), right ([facility]) Z89.611    Nephrostomy status ([facility]) Z93.6    Obstructed nephrostomy tube ([address] T83.092A    Nephrostomy complication ([facility]) K14.183    Acute renal failure (ARF) ([facility]) N17.9    Elevated INR R79.1    Bacteremia R78.81    Enterococcus faecalis infection B95.2    CKD stage 3 due to type 2 diabetes mellitus ([facility]) E11.22, N18.30    Hydronephrosis, right N13.30    Severe sepsis with acute organ dysfunction ([facility]) A41.9, R65.20    Anticoagulated Z79.01     Assessment and plan    Fever  Sepsis    Plan    RICHARD on Monday with anesthesia. Risk, benefits and alternatives were discussed with patient and she agreed to proceed. Thank you for allowing me to participate in management of this pleasant patient.           Signed By: Arvind Mora MD     November 6, 2020

## 2020-11-06 NOTE — CONSULTS
[facility]  (A Division of [address] [place] [address])      Consultation Note      Date of Admission: 11/4/2020    Date of Consultation: 11/6/2020    Referred by: Tomas Moritz, MD    Reason for Referral: polymicrobial sepsis      Current Antimicrobials:    Prior Antimicrobials:  1. Meropenem IV (11/4-) #2  2. Vanco IV (11/5-) #1 1. Levo IV (11/4)       Assessment: Rec / Plan:   Limited community-acquired Enterococcus faecalis/GNR  sepsis - in conjunction with R nephrotomy tube dysfunction/accidental plugging. Well. It's Ground-Hog day again. And here I am.  All tied to her NH tube. Like last time, need more daily BCx to see when the enterococcus shuts off -- and probably another RICHARD to look at her valves like in JUN2020 too. No PICC until BCx cleared. Like your abx selection. ->Elda/Vanco #1    Daily BCx through weekend. PICC Tuesday at earliest  RICHARD - can happen next week to determine length of therapy. 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  Since having it replaced 11/5, her symptoms have settled with improving fever curve. Still feels tired/malaised. No chest symptoms.   BCx on 11/4 have grown out both GNR and GPC, which she's had before  As I recall she had ESBL GNR and an enterococcus from BCx for similia issue back in [address] [place] [address].     Retired [employer] Problems    Diagnosis Date Noted    Anticoagulated 11/05/2020    Hydronephrosis, right 11/04/2020    Severe sepsis with acute organ dysfunction ([facility] 11/04/2020    Nephrostomy tube displaced ([facility] 05/18/2018    Pyelonephritis 05/18/2018     Past Medical History:   Diagnosis Date    Cancer ([facility]     vaginal and colon cancer    Cancer ([facility]     Chronic pain     arthritis    Diabetes ([facility]     [facility]     Hypertension     Legally blind     Thyroid disease      Past Surgical History:   Procedure Laterality Date    HX ABOVE KNEE AMPUTATION Right     HX COLOSTOMY      HX ORTHOPAEDIC      HX OTHER SURGICAL      kidney drainage tube    HX UROLOGICAL      kidney removal    IR EXCHANGE NEPHRO PERC RT SI  1/28/2019    IR EXCHANGE NEPHRO PERC RT SI  3/21/2019    IR EXCHANGE NEPHRO PERC RT SI  5/13/2019    IR EXCHANGE NEPHRO PERC RT SI  6/27/2019    IR EXCHANGE NEPHRO PERC RT SI  8/16/2019    IR EXCHANGE NEPHRO PERC RT SI  10/3/2019    IR EXCHANGE NEPHRO PERC RT SI  11/18/2019    IR EXCHANGE NEPHRO PERC RT SI  1/6/2020    IR EXCHANGE NEPHRO PERC RT SI  2/24/2020    IR EXCHANGE NEPHRO PERC RT SI  4/13/2020    IR EXCHANGE NEPHRO PERC RT SI  6/1/2020    IR EXCHANGE NEPHRO PERC RT SI  6/19/2020    IR EXCHANGE NEPHRO PERC RT SI  8/10/2020    IR EXCHANGE NEPHRO PERC RT SI  9/30/2020    IR EXCHANGE NEPHRO PERC RT SI  11/5/2020    IR NEPHROSTOMY PERC RT PLC CATH  SI  6/21/2020     History reviewed. No pertinent family history.   Social History     Socioeconomic History    Marital status:      Spouse name: Not on file    Number of children: Not on file    Years of education: Not on file    Highest education level: Not on file   Occupational History    Not on file   Social Needs    Financial resource strain: Not on file    Food insecurity     Worry: Not on file     Inability: Not on file    Transportation needs     Medical: Not on file     Non-medical: Not on file   Tobacco Use    Smoking status: Never Smoker    Smokeless tobacco: Never Used Substance and Sexual Activity    Alcohol use: No    Drug use: Never    Sexual activity: Not on file   Lifestyle    Physical activity     Days per week: Not on file     Minutes per session: Not on file    Stress: Not on file   Relationships    Social connections     Talks on phone: Not on file     Gets together: Not on file     Attends [employer] service: Not on file     Active member of club or organization: Not on file     Attends meetings of clubs or organizations: Not on file     Relationship status: Not on file    Intimate partner violence     Fear of current or ex partner: Not on file     Emotionally abused: Not on file     Physically abused: Not on file     Forced sexual activity: Not on file   Other Topics Concern     Service Not Asked    Blood Transfusions Not Asked    Caffeine Concern Not Asked    Occupational Exposure Not Asked    Hobby Hazards Not Asked    Sleep Concern Not Asked    Stress Concern Not Asked    Weight Concern Not Asked    Special Diet Not Asked    Back Care Not Asked    Exercise Not Asked    Bike Helmet Not Asked    Seat Belt Not Asked    Self-Exams Not Asked   Social History Narrative    Not on file       Allergies:  Morphine and Pcn [penicillins]     Medications:  Current Facility-Administered Medications   Medication Dose Route Frequency    [START ON 11/7/2020] Vancomycin Random level due 11/7/20 at 04:00  1 Each Other ONCE    heparin (porcine) 25,000 units in 0.45% saline 250 ml infusion  18-36 Units/kg/hr IntraVENous TITRATE    ferrous sulfate tablet 325 mg  325 mg Oral DAILY WITH BREAKFAST    atorvastatin (LIPITOR) tablet 20 mg  20 mg Oral QHS    levothyroxine (SYNTHROID) tablet 50 mcg  50 mcg Oral 6am    gabapentin (NEURONTIN) capsule 300 mg  300 mg Oral BID    sodium bicarbonate tablet 650 mg  650 mg Oral BID    acetaminophen (TYLENOL) tablet 1,000 mg  1,000 mg Oral Q8H PRN    Or    acetaminophen (TYLENOL) suppository 650 mg  650 mg Rectal Q6H PRN    Vancomycin-Pharmacy to dose  1 Each Other Rx Dosing/Monitoring    LORazepam (ATIVAN) tablet 0.5 mg  0.5 mg Oral BID PRN    sodium chloride (NS) flush 5-10 mL  5-10 mL IntraVENous PRN    sodium chloride (NS) flush 5-40 mL  5-40 mL IntraVENous Q8H    sodium chloride (NS) flush 5-40 mL  5-40 mL IntraVENous PRN    polyethylene glycol (MIRALAX) packet 17 g  17 g Oral DAILY PRN    promethazine (PHENERGAN) tablet 12.5 mg  12.5 mg Oral Q6H PRN    Or    ondansetron (ZOFRAN) injection 4 mg  4 mg IntraVENous Q6H PRN    insulin lispro (HUMALOG) injection   SubCUTAneous Q6H    glucose chewable tablet 16 g  16 g Oral PRN    glucagon (GLUCAGEN) injection 1 mg  1 mg IntraMUSCular PRN    dextrose 10% infusion 125-250 mL  125-250 mL IntraVENous PRN    0.9% sodium chloride infusion  100 mL/hr IntraVENous CONTINUOUS    meropenem (MERREM) 500 mg in sterile water (preservative free) 10 mL IV syringe  0.5 g IntraVENous Q12H    0.9% sodium chloride infusion 250 mL  250 mL IntraVENous PRN        ROS:  Constitutional: positive for fevers, chills, sweats, fatigue, malaise and anorexia  Eyes: positive for chronic blindness  Respiratory: negative for cough or sputum  Cardiovascular: negative for chest pain, dyspnea  Gastrointestinal: negative for diarrhea  Genitourinary:negative     Physical Exam:    HPI:  Temp (24hrs), Av °F (37.2 °C), Min:98.3 °F (36.8 °C), Max:100.3 °F (37.9 °C)    Visit Vitals  BP (!) 132/40 (BP 1 Location: Right arm, BP Patient Position: At rest)   Pulse 91   Temp 98.6 °F (37 °C)   Resp 16   Ht 5' 2\" (1.575 m)   Wt 73.7 kg (162 lb 8 oz)   SpO2 100%   BMI 29.72 kg/m²       General: Well developed, well nourished [de-identified] y.o. WHITE OR  female in no acute distress.   ENT: ENT exam normal, no neck nodes or sinus tenderness  Head: normocephalic, without obvious abnormality  Mouth:  mucous membranes moist, pharynx normal without lesions  Neck: supple, symmetrical, trachea midline   Cardio: regular rate and rhythm, S1, S2 normal, no murmur, click, rub or gallop  Chest: inspection normal - no chest wall deformities or tenderness, respiratory effort normal  Lungs: clear to auscultation, no wheezes or rales and unlabored breathing  Abdomen: soft, non-tender. Bowel sounds normal. No masses, no organomegaly. Extremities:  no redness or tenderness in the calves or thighs, no edema, no peripheral embolic stigmata  Neuro: blind OU       Lab results:    Chemistry  Recent Labs     11/06/20 0435 11/05/20 0529 11/04/20 1810   * 186* 317*    133* 130*   K 4.3 5.2 4.8    102 95*   CO2 21 20* 25   BUN 37* 44* 46*   CREA 2.12* 3.03* 3.26*   CA 7.3* 7.5* 8.2*   AGAP 8 11 10   BUCR 17 15 14    115 131*   TP 4.9* 6.1* 6.8   ALB 1.8* 2.2* 2.6*   GLOB 3.1 3.9 4.2*   AGRAT 0.6* 0.6* 0.6*       CBC w/ Diff  Recent Labs     11/06/20 0435 11/05/20 0529 11/04/20 1810   WBC 10.7 14.7* 23.6*   RBC 2.77* 3.31* 3.73*   HGB 8.1* 9.6* 10.8*   HCT 24.3* 29.2* 33.1*    281 452*   GRANS  --   --  86*   LYMPH  --   --  4*   EOS  --   --  0       Microbiology  All Micro Results     Procedure Component Value Units Date/Time    CULTURE, BLOOD [[id number]]  (Abnormal) Collected:  11/04/20 1810    Order Status:  Completed Specimen:  Blood Updated:  11/05/20 2320     Special Requests: NO SPECIAL REQUESTS        GRAM STAIN       AEROBIC AND ANAEROBIC BOTTLES GRAM NEGATIVE RODS                  SMEAR CALLED TO AND CORRECTLY REPEATED BY: Stu Riojas RN 3S ON 11/5/2020 8448 TO 2313           Culture result:       GRAM NEGATIVE RODS GROWING IN BOTH BOTTLES DRAWN (SITE = LAC)                  GRAM POSITIVE COCCI IN CHAINS GROWING IN THE ANAEROBIC BOTTLE (SITE = LAC)                  PRELIMINARY REPORT OF GRAM POSITIVE COCCI IN CHAINS GROWING IN THE ANAEROBIC BOTTLE CALLED TO AND READ BACK  [address] ON 11/5/2020. HJR                  Sent to [facility] for ID/Susceptibility if indicated. CULTURE, BLOOD [[id number]]  (Abnormal) Collected:  11/04/20 1810    Order Status:  Completed Specimen:  Blood Updated:  11/05/20 2252     Special Requests: NO SPECIAL REQUESTS        GRAM STAIN       ANAEROBIC BOTTLE GRAM NEGATIVE RODS                  AEROBIC BOTTLE GRAM POSITIVE COCCI IN PAIRS IN CHAINS                  SMEAR CALLED TO AND CORRECTLY REPEATED BY: TOVA MACHADO RN 3S ON 11/5/2020 1233 TO 5087           Culture result:       GRAM POSITIVE COCCI IN PAIRS CHAINS AND CLUSTERS GROWING IN 1 OF 2 BOTTLES DRAWN (SITE = LAC)                  GRAM NEGATIVE RODS GROWING IN 1 OF 2 BOTTLES DRAWN (SITE = LAC)          CULTURE, BLOOD [[id number]]     Order Status:  Sent Specimen:  Blood     CULTURE, BLOOD [[id number]]     Order Status:  Sent Specimen:  Blood     CULTURE, URINE [[id number]] Collected:  11/05/20 0100    Order Status:  Completed Specimen:  Urine from Nephrostomy Updated:  11/05/20 1703    CULTURE, URINE [[id number]]     Order Status:  Canceled Specimen:  Cath Urine            Sami Phillips MD  cell [phone]  [facility]  11/6/2020   1:13 PM

## 2020-11-07 LAB
ALBUMIN SERPL-MCNC: 1.7 G/DL (ref 3.4–5)
ALBUMIN/GLOB SERPL: 0.5 {RATIO} (ref 0.8–1.7)
ALP SERPL-CCNC: 145 U/L (ref 45–117)
ALT SERPL-CCNC: 20 U/L (ref 13–56)
ANION GAP SERPL CALC-SCNC: 5 MMOL/L (ref 3–18)
AST SERPL-CCNC: 24 U/L (ref 10–38)
BACTERIA SPEC CULT: ABNORMAL
BILIRUB SERPL-MCNC: 0.3 MG/DL (ref 0.2–1)
BUN SERPL-MCNC: 30 MG/DL (ref 7–18)
BUN/CREAT SERPL: 19 (ref 12–20)
CALCIUM SERPL-MCNC: 7.9 MG/DL (ref 8.5–10.1)
CHLORIDE SERPL-SCNC: 110 MMOL/L (ref 100–111)
CO2 SERPL-SCNC: 23 MMOL/L (ref 21–32)
CREAT SERPL-MCNC: 1.58 MG/DL (ref 0.6–1.3)
ERYTHROCYTE [DISTWIDTH] IN BLOOD BY AUTOMATED COUNT: 14.6 % (ref 11.6–14.5)
GLOBULIN SER CALC-MCNC: 3.6 G/DL (ref 2–4)
GLUCOSE BLD STRIP.AUTO-MCNC: 156 MG/DL (ref 70–110)
GLUCOSE BLD STRIP.AUTO-MCNC: 156 MG/DL (ref 70–110)
GLUCOSE BLD STRIP.AUTO-MCNC: 176 MG/DL (ref 70–110)
GLUCOSE BLD STRIP.AUTO-MCNC: 243 MG/DL (ref 70–110)
GLUCOSE BLD STRIP.AUTO-MCNC: 244 MG/DL (ref 70–110)
GLUCOSE SERPL-MCNC: 185 MG/DL (ref 74–99)
GRAM STN SPEC: ABNORMAL
HCT VFR BLD AUTO: 26.3 % (ref 35–45)
HEALTH STATUS, XMCV2T: NORMAL
HGB BLD-MCNC: 8.3 G/DL (ref 12–16)
MCH RBC QN AUTO: 28.8 PG (ref 24–34)
MCHC RBC AUTO-ENTMCNC: 31.6 G/DL (ref 31–37)
MCV RBC AUTO: 91.3 FL (ref 74–97)
PLATELET # BLD AUTO: 246 K/UL (ref 135–420)
PMV BLD AUTO: 10.4 FL (ref 9.2–11.8)
POTASSIUM SERPL-SCNC: 4.2 MMOL/L (ref 3.5–5.5)
PROT SERPL-MCNC: 5.3 G/DL (ref 6.4–8.2)
RBC # BLD AUTO: 2.88 M/UL (ref 4.2–5.3)
SARS-COV-2, COV2NT: NOT DETECTED
SERVICE CMNT-IMP: ABNORMAL
SERVICE CMNT-IMP: ABNORMAL
SODIUM SERPL-SCNC: 138 MMOL/L (ref 136–145)
SOURCE, COVRS: NORMAL
SPECIMEN TYPE, XMCV1T: NORMAL
VANCOMYCIN SERPL-MCNC: 14.1 UG/ML (ref 5–40)
WBC # BLD AUTO: 9.3 K/UL (ref 4.6–13.2)

## 2020-11-07 PROCEDURE — 36415 COLL VENOUS BLD VENIPUNCTURE: CPT

## 2020-11-07 PROCEDURE — 74011250637 HC RX REV CODE- 250/637: Performed by: FAMILY MEDICINE

## 2020-11-07 PROCEDURE — 74011250636 HC RX REV CODE- 250/636: Performed by: INTERNAL MEDICINE

## 2020-11-07 PROCEDURE — 74011636637 HC RX REV CODE- 636/637: Performed by: FAMILY MEDICINE

## 2020-11-07 PROCEDURE — 85027 COMPLETE CBC AUTOMATED: CPT

## 2020-11-07 PROCEDURE — 74011000250 HC RX REV CODE- 250: Performed by: FAMILY MEDICINE

## 2020-11-07 PROCEDURE — 2709999900 HC NON-CHARGEABLE SUPPLY

## 2020-11-07 PROCEDURE — 74011250636 HC RX REV CODE- 250/636: Performed by: FAMILY MEDICINE

## 2020-11-07 PROCEDURE — 80202 ASSAY OF VANCOMYCIN: CPT

## 2020-11-07 PROCEDURE — 74011000250 HC RX REV CODE- 250: Performed by: INTERNAL MEDICINE

## 2020-11-07 PROCEDURE — 65270000029 HC RM PRIVATE

## 2020-11-07 PROCEDURE — 80053 COMPREHEN METABOLIC PANEL: CPT

## 2020-11-07 PROCEDURE — 82962 GLUCOSE BLOOD TEST: CPT

## 2020-11-07 RX ADMIN — LEVOTHYROXINE SODIUM 50 MCG: 0.05 TABLET ORAL at 07:06

## 2020-11-07 RX ADMIN — GABAPENTIN 300 MG: 300 CAPSULE ORAL at 10:11

## 2020-11-07 RX ADMIN — INSULIN LISPRO 2 UNITS: 100 INJECTION, SOLUTION INTRAVENOUS; SUBCUTANEOUS at 23:56

## 2020-11-07 RX ADMIN — SODIUM CHLORIDE 100 ML/HR: 900 INJECTION, SOLUTION INTRAVENOUS at 13:50

## 2020-11-07 RX ADMIN — INSULIN LISPRO 4 UNITS: 100 INJECTION, SOLUTION INTRAVENOUS; SUBCUTANEOUS at 00:29

## 2020-11-07 RX ADMIN — FERROUS SULFATE TAB 325 MG (65 MG ELEMENTAL FE) 325 MG: 325 (65 FE) TAB at 10:11

## 2020-11-07 RX ADMIN — INSULIN LISPRO 4 UNITS: 100 INJECTION, SOLUTION INTRAVENOUS; SUBCUTANEOUS at 13:47

## 2020-11-07 RX ADMIN — ENOXAPARIN SODIUM 80 MG: 80 INJECTION SUBCUTANEOUS at 23:56

## 2020-11-07 RX ADMIN — SODIUM BICARBONATE 650 MG: 650 TABLET ORAL at 23:56

## 2020-11-07 RX ADMIN — SODIUM BICARBONATE 650 MG: 650 TABLET ORAL at 10:11

## 2020-11-07 RX ADMIN — INSULIN LISPRO 2 UNITS: 100 INJECTION, SOLUTION INTRAVENOUS; SUBCUTANEOUS at 07:06

## 2020-11-07 RX ADMIN — ENOXAPARIN SODIUM 80 MG: 80 INJECTION SUBCUTANEOUS at 00:29

## 2020-11-07 RX ADMIN — INSULIN LISPRO 4 UNITS: 100 INJECTION, SOLUTION INTRAVENOUS; SUBCUTANEOUS at 18:37

## 2020-11-07 RX ADMIN — ATORVASTATIN CALCIUM 20 MG: 20 TABLET, FILM COATED ORAL at 23:55

## 2020-11-07 RX ADMIN — MEROPENEM 500 MG: 500 INJECTION, POWDER, FOR SOLUTION INTRAVENOUS at 10:16

## 2020-11-07 RX ADMIN — Medication 10 ML: at 23:57

## 2020-11-07 RX ADMIN — CEFTRIAXONE 2 G: 2 INJECTION, POWDER, FOR SOLUTION INTRAMUSCULAR; INTRAVENOUS at 15:17

## 2020-11-07 RX ADMIN — LORAZEPAM 0.5 MG: 0.5 TABLET ORAL at 10:13

## 2020-11-07 RX ADMIN — VANCOMYCIN HYDROCHLORIDE 1000 MG: 1 INJECTION, POWDER, LYOPHILIZED, FOR SOLUTION INTRAVENOUS at 07:06

## 2020-11-07 RX ADMIN — LORAZEPAM 0.5 MG: 0.5 TABLET ORAL at 23:56

## 2020-11-07 RX ADMIN — GABAPENTIN 300 MG: 300 CAPSULE ORAL at 23:56

## 2020-11-07 NOTE — ROUTINE PROCESS
0700 received SBAR from night shift RN. Patient sleeping. Nephrostomy tube patent/draining clear/pink urine. Colostomy dressing dry/intact. Patient alert and oriented x 4. Patient eating roughly half of meals today. No complaint of pain/nausea today. Patient resting in bed throughout shift. Patient Vitals for the past 12 hrs:   Temp Pulse Resp BP SpO2   11/07/20 1514 99.1 °F (37.3 °C) 97 17 (!) 158/52 99 %   11/07/20 1148 99.5 °F (37.5 °C) 94 17 (!) 155/45 100 %   11/07/20 0721 99.4 °F (37.4 °C) 80 18 (!) 132/41 100 %   11/07/20 0518 99 °F (37.2 °C) 90 18 (!) 144/40 100 %     Bedside and Verbal shift change report given to Greta Zurita RN (oncoming nurse) by Shavon Mobley RN (offgoing nurse). Report included the following information SBAR, Kardex, Intake/Output, MAR and Recent Results.

## 2020-11-07 NOTE — PROGRESS NOTES
Pharmacy Dosing Services: Vancomycin    Consult for Vancomycin Dosing by Pharmacy by Dr. Yary Aparicio provided for this [de-identified]y.o. year old female , for indication of UTI. Vancomycin random level- 14.1mcg/ml on 11/7/20. Patient is therapeutic. Goal trough- 10-15mcg/ml     Plan: 1. Will order 1000mg IV once(~15mg/kg) today (11/7/20)  2. Will order 24 hour random level and dose intermittently based on levels due to renal function  3. Pharmacy will follow daily and will make changes to dose and/or frequency based on clinical status. Ht Readings from Last 1 Encounters:   11/04/20 157.5 cm (62\")        Wt Readings from Last 1 Encounters:   11/06/20 75.3 kg (166 lb)        Serum Creatinine Lab Results   Component Value Date/Time    Creatinine 1.58 (H) 11/07/2020 03:45 AM    Creatinine, POC 1.1 01/16/2020 09:28 AM      Creatinine Clearance Estimated Creatinine Clearance: 27 mL/min (A) (based on SCr of 1.58 mg/dL (H)).    BUN Lab Results   Component Value Date/Time    BUN 30 (H) 11/07/2020 03:45 AM    BUN, POC 17 01/16/2020 09:28 AM      WBC Lab Results   Component Value Date/Time    WBC 10.7 11/06/2020 04:35 AM      H/H Lab Results   Component Value Date/Time    HGB 8.1 (L) 11/06/2020 04:35 AM      Platelets Lab Results   Component Value Date/Time    PLATELET 476 73/20/1746 04:35 AM      Temp 99.5 °F (37.5 °C)     Pharmacist Val Guzmán, [address]

## 2020-11-07 NOTE — PROGRESS NOTES
Bedside and Verbal shift change report given to Zahra Graham RN (oncoming nurse) by Terra LAM, RN (offgoing nurse). Report included the following information SBAR, Kardex and MAR. SHIFT UPDATES:  Patient presented with orders to implement IV continuous heparin therapy today for VTE prophylaxis due to past medical history of Left leg DVT and due to nephrostomy tube presenting with blood on 11/5/2020. Patient was started on a rate of 22 cc/hr around 1630 pm and received a heparin bolus of 5.9 cc as well. Patient's IV continuous normal saline was discontinued due to inability to place a second line by nursing staff or dayshift medic Cathy Pedraza. Additionally, nursing staff & phlebotomy were unable to draw CBC as directed by DR. Monte was notified and instructed to have nightshift medic team to place midline. Medic team was notified. Nightshift nursing staff will be notified as well. Patient's colostomy presented to be leaking at beginning of shift and edges of colostomy were reinforced. Patient's nephrostomy tube presents with yellow urine and no longer blood output. Patient tolerates IV antibiotic therapy of Meropenem for management/prevention of infection/sepsis. Patient requires nursing staff to turn/reposition every 2 hours and to assist with feeding for meal periods. Patient has RICHARD on Monday 11/9/2020 and will need to be NPO on Sunday night (11/8/2020) for procedure. ABNORMAL LAB:     Results for Neris Peralta (MRN [id number]) as of 11/6/2020 19:47   Ref.  Range 11/6/2020 04:35   WBC Latest Ref Range: 4.6 - 13.2 K/uL 10.7   RBC Latest Ref Range: 4.20 - 5.30 M/uL 2.77 (L)   HGB Latest Ref Range: 12.0 - 16.0 g/dL 8.1 (L)   HCT Latest Ref Range: 35.0 - 45.0 % 24.3 (L)   MCV Latest Ref Range: 74.0 - 97.0 FL 87.7   MCH Latest Ref Range: 24.0 - 34.0 PG 29.2   MCHC Latest Ref Range: 31.0 - 37.0 g/dL 33.3   RDW Latest Ref Range: 11.6 - 14.5 % 14.6 (H)   PLATELET Latest Ref Range: 135 - 420 K/uL 233 MPV Latest Ref Range: 9.2 - 11.8 FL 9.7   Sodium Latest Ref Range: 136 - 145 mmol/L 137   Potassium Latest Ref Range: 3.5 - 5.5 mmol/L 4.3   Chloride Latest Ref Range: 100 - 111 mmol/L 108   CO2 Latest Ref Range: 21 - 32 mmol/L 21   Anion gap Latest Ref Range: 3.0 - 18 mmol/L 8   Glucose Latest Ref Range: 74 - 99 mg/dL 130 (H)   BUN Latest Ref Range: 7.0 - 18 MG/DL 37 (H)   Creatinine Latest Ref Range: 0.6 - 1.3 MG/DL 2.12 (H)   BUN/Creatinine ratio Latest Ref Range: 12 - 20   17   Calcium Latest Ref Range: 8.5 - 10.1 MG/DL 7.3 (L)   GFR est non-AA Latest Ref Range: >60 ml/min/1.73m2 22 (L)   GFR est AA Latest Ref Range: >60 ml/min/1.73m2 27 (L)   Bilirubin, total Latest Ref Range: 0.2 - 1.0 MG/DL 0.3   Protein, total Latest Ref Range: 6.4 - 8.2 g/dL 4.9 (L)   Albumin Latest Ref Range: 3.4 - 5.0 g/dL 1.8 (L)   Globulin Latest Ref Range: 2.0 - 4.0 g/dL 3.1   A-G Ratio Latest Ref Range: 0.8 - 1.7   0.6 (L)   ALT Latest Ref Range: 13 - 56 U/L 20   AST Latest Ref Range: 10 - 38 U/L 16   Alk. phosphatase Latest Ref Range: 45 - 117 U/L 104   Vancomycin, random Latest Ref Range: 5.0 - 40.0 UG/ML <0.8 (L)       Wounds? Decubitus Sacral Wound Stage II-Mepliex dressing in place.      Central Lines? Right Upper Arm        Last BM:  Has Colostomy (ON enteric precautions due to C Diff).       Pending Labs for AM Draw:  CBC with diff, BMP & Vancomycin Random at 4 am on 11/7/2020        Discharge plan:   As of 11/6/2020 case management note at 12:26 pm, patient will discharge back to [address] [place] [address] upon being medically stable.      Reginaldo Little  11/6/2020  7:50 PM

## 2020-11-07 NOTE — PROGRESS NOTES
PTT >180. Heparin drip discontinued. Dr. Jesse Claros started patient on Lovenox sq.      6679 - Patient denied pain overnight. Vital signs stable, afebrile. Wafer and ostomy bag changed due to leakage around wafer. Nephrostomy tube intact draining charlene urine. 0730 -  Bedside and Verbal shift change report given to MORENO Salas RN (oncoming nurse) by Mike Cochran (offgoing nurse). Report included the following information SBAR, Kardex, Intake/Output and MAR.

## 2020-11-07 NOTE — CONSULTS
Brief BCx note    BCx growing both Enterococcus faecalis and Morganella morganii from admission. Need daily BCx to know when the enterococcus shuts off; will need RICHARD next week. The CAX is an adjunct drug (2gm q12h) for the vancomycin to kill the enterococcus since we can't use best drug - PCN. The CAX will also kill the Morganella morganii.     Abbey Lopez MD  Cell [phone]  [facility]

## 2020-11-07 NOTE — PROGRESS NOTES
1955 - Bedside and Verbal shift change report given to Kishan Gardner, RN  (oncoming nurse) by TAM Little RN (offgoing nurse). Report included the following information SBAR, Kardex, Intake/Output, MAR and Recent Results. 2230 - Assessment complete. Denies pain at this time, but appears anxious and uncomfortable. Repositioned and medicated appropriately. Tolerated po medications without difficulty. RRMT at bedside to attempt midline insertion via U/S for additional IV access. 2245 - RRMT unsuccessful with IV insertion. IV abx held. 0 - Dr. Jaclyn kendall MD to d/c heparin gtt and order alternate therapy after discussing with Pharmacist.    2500 - IV Heparin drip stopped. IVP abx given and IVF resumed. Colostomy site care performed. Bedside and Verbal shift change report given to Carol Johnson RN (oncoming nurse) by Julisa Browne RN  (offgoing nurse). Report included the following information SBAR, Kardex, Intake/Output, MAR and Recent Results.

## 2020-11-07 NOTE — PROGRESS NOTES
Pharmacy Renal Dosing Services: Lovenox    Physician: Dr. Jaclyn Ruelas    Lovenox 80mg SQ q12h(1mg/kg) was automatically dose-adjusted per [facility] P&T Committee Protocol, with respect to renal function. New dose: 80mg SQ q24h    Pt Weight:   Wt Readings from Last 1 Encounters:   11/06/20 75.3 kg (166 lb)         Serum Creatinine Lab Results   Component Value Date/Time    Creatinine 2.12 (H) 11/06/2020 04:35 AM    Creatinine, POC 1.1 01/16/2020 09:28 AM       Creatinine Clearance Estimated Creatinine Clearance: 20.1 mL/min (A) (based on SCr of 2.12 mg/dL (H)). BUN Lab Results   Component Value Date/Time    BUN 37 (H) 11/06/2020 04:35 AM    BUN, POC 17 01/16/2020 09:28 AM             Pharmacy to continue to monitor patient daily. Will make dosage adjustments based upon changing renal function.   Signed Christine Cisneros, [facility] information: [phone]

## 2020-11-07 NOTE — PROGRESS NOTES
S: Inability to place second Edi retrieve CBC by medical staff/ MD notified/ prospective Midline pending placement. B: Patient presented with AM orders to retrieve CBC. All attempts by Phlebotomy and nursing staff to draw blood were unsuccessful. Patient's IV continuous fluid therapy of Normal Saline at 100 cc/hr was stopped around 1600 pm to start IV continuous heparin therapy as directed for VTE prophylaxis. ON call carl Miguel was contacted who attempted IV and midline placement but was unsuccessful. Dr. Faiza Sunshine was paged at aunás 84 pm.   A: At [address] pm, Dr. Faiza Sunshine contacted unit and was informed about inability for nursing staff to place second line for IV continuous fluid therapy of normal saline at 100 cc/hr or the am CBC. Dr. Faiza Sunshine stated to have nightshift medic to place midline. Understanding was verbalized and on call carl Miguel was also notified. Stephania Miguel stated that nightshift medic would be notified. R: Will continue with prescribed plan of care as directed.    Reginaldo Little  11/6/2020   7:00 pm.

## 2020-11-07 NOTE — PROGRESS NOTES
Hospitalist Progress Note    Patient: Tra Rasheed MRN: [id number]  [facility]: [id number]    YOB: 1940  Age: [de-identified] y.o. Sex: female    DOA: 11/4/2020 LOS:  LOS: 3 days                Assessment and Plan:    Principal Problem:    Severe sepsis with acute organ dysfunction ([facility] [address] (11/4/2020)    Active Problems:    Pyelonephritis (5/18/2018)      Nephrostomy tube displaced ([facility] [address] (5/18/2018)      Hydronephrosis, right (11/4/2020)      Anticoagulated (11/5/2020)      Severe sepsis -  From pyelonephritis and bacteremia     Pyelonephritis/Bacteremia -  11/04/2020 2/2 blood cultures growing GPC in chains and GNR  Follow repeat cultues  Follow urine cultures  Antibiotics - vancomycin, meropenem. ID Consult reviewed  Cardiology consulted  for RICHARD  White blood cell count is decreasing     Nephrostomy tube replaced     ANTONI -   Secondary to obstruction  Renal function is improving daily  IVF for today but may dc tomorrow     DM -   On SSI     HTN -  Controlled     Hyponatremia-  improving     Palliative care consult  [facility] She is not wanting the richard again        Chief complaint:  [de-identified] y/o female on anticoagulation for prior DVT as well as history of vaginal and colon cancer with left nephrectomy, right nephrostomy, and colostomy and is admitted with severe sepsis and renal failure due to pyelonephritis and displacement of her nephrostomy tube. Subjective:    She is lying in bed drinking her water. No acute distress      Review of systems:    General: No fevers or chills. Cardiovascular: No chest pain or pressure. No palpitations. Pulmonary: No shortness of breath. Gastrointestinal: No nausea, vomiting.      Objective:    Vital signs/Intake and Output:  Visit Vitals  BP (!) 155/45 (BP 1 Location: Right arm, BP Patient Position: At rest)   Pulse 94   Temp 99.5 °F (37.5 °C)   Resp 17   Ht 5' 2\" (1.575 m)   Wt 75.3 kg (166 lb)   SpO2 100%   BMI 30.36 kg/m²     Current Shift: No intake/output data recorded. Last three shifts:  11/05 1901 - 11/07 0700  In: 3725.8 [P.O.:240; I.V.:3485.8]  Out: 3000 [Urine:2075]    Physical Exam:  General: NAD, AAOx3. Non-toxic. HEENT: NC/AT. PERRLA, EOMI.  MMM. Lungs: Nml inspection. CTA B/L. No wheezing, rales or rhonchi. Heart:  S1S2 RRR,  PMI mid 5th IC space. No M/RG. Abdomen: Soft, NT/ND.  BS+. No peritoneal signs. Extremities: No C/C/E. Psych:   Nml affect. Neurologic:  2-12 intact. Strength 5/5 throughout. Sensation symmetrical.          Labs: Results:       Chemistry Recent Labs     11/07/20 0345 11/06/20 0435 11/05/20 0529   * 130* 186*    137 133*   K 4.2 4.3 5.2    108 102   CO2 23 21 20*   BUN 30* 37* 44*   CREA 1.58* 2.12* 3.03*   CA 7.9* 7.3* 7.5*   AGAP 5 8 11   BUCR 19 17 15   * 104 115   TP 5.3* 4.9* 6.1*   ALB 1.7* 1.8* 2.2*   GLOB 3.6 3.1 3.9   AGRAT 0.5* 0.6* 0.6*      CBC w/Diff Recent Labs     11/07/20 0345 11/06/20 0435 11/05/20  0529 11/04/20  1810   WBC 9.3 10.7 14.7* 23.6*   RBC 2.88* 2.77* 3.31* 3.73*   HGB 8.3* 8.1* 9.6* 10.8*   HCT 26.3* 24.3* 29.2* 33.1*    233 281 452*   GRANS  --   --   --  86*   LYMPH  --   --   --  4*   EOS  --   --   --  0      Cardiac Enzymes No results for input(s): CPK, CKND1, JANE in the last 72 hours. No lab exists for component: CKRMB, TROIP   Coagulation Recent Labs     11/06/20  2300 11/06/20  1357 11/04/20  1810   PTP  --   --  27.7*   INR  --   --  2.7*   APTT >180.0* 25.5 46.1*       Lipid Panel Lab Results   Component Value Date/Time    Cholesterol, total 115 08/28/2019 08:22 AM    HDL Cholesterol 39 (L) 08/28/2019 08:22 AM    LDL, calculated 46.2 08/28/2019 08:22 AM    VLDL, calculated 29.8 08/28/2019 08:22 AM    Triglyceride 149 08/28/2019 08:22 AM    CHOL/HDL Ratio 2.9 08/28/2019 08:22 AM      BNP No results for input(s): BNPP in the last 72 hours.    Liver Enzymes Recent Labs     11/07/20  0345   TP 5.3*   ALB 1.7*   *      Thyroid Studies Lab Results   Component Value Date/Time    TSH 3.40 01/24/2020 08:35 AM        Procedures/imaging: see electronic medical records for all procedures/Xrays and details which were not copied into this note but were reviewed prior to creation of Plan

## 2020-11-08 LAB
ANION GAP SERPL CALC-SCNC: 6 MMOL/L (ref 3–18)
BUN SERPL-MCNC: 20 MG/DL (ref 7–18)
BUN/CREAT SERPL: 20 (ref 12–20)
CALCIUM SERPL-MCNC: 7.1 MG/DL (ref 8.5–10.1)
CHLORIDE SERPL-SCNC: 113 MMOL/L (ref 100–111)
CO2 SERPL-SCNC: 22 MMOL/L (ref 21–32)
CREAT SERPL-MCNC: 0.98 MG/DL (ref 0.6–1.3)
ERYTHROCYTE [DISTWIDTH] IN BLOOD BY AUTOMATED COUNT: 15.1 % (ref 11.6–14.5)
GLUCOSE BLD STRIP.AUTO-MCNC: 144 MG/DL (ref 70–110)
GLUCOSE BLD STRIP.AUTO-MCNC: 245 MG/DL (ref 70–110)
GLUCOSE BLD STRIP.AUTO-MCNC: 359 MG/DL (ref 70–110)
GLUCOSE SERPL-MCNC: 142 MG/DL (ref 74–99)
HCT VFR BLD AUTO: 23.8 % (ref 35–45)
HGB BLD-MCNC: 7.7 G/DL (ref 12–16)
MCH RBC QN AUTO: 28.6 PG (ref 24–34)
MCHC RBC AUTO-ENTMCNC: 32.4 G/DL (ref 31–37)
MCV RBC AUTO: 88.5 FL (ref 74–97)
PLATELET # BLD AUTO: 228 K/UL (ref 135–420)
PMV BLD AUTO: 9.4 FL (ref 9.2–11.8)
POTASSIUM SERPL-SCNC: 4 MMOL/L (ref 3.5–5.5)
RBC # BLD AUTO: 2.69 M/UL (ref 4.2–5.3)
SODIUM SERPL-SCNC: 141 MMOL/L (ref 136–145)
VANCOMYCIN SERPL-MCNC: 15.8 UG/ML (ref 5–40)
WBC # BLD AUTO: 6.8 K/UL (ref 4.6–13.2)

## 2020-11-08 PROCEDURE — 87040 BLOOD CULTURE FOR BACTERIA: CPT

## 2020-11-08 PROCEDURE — 74011636637 HC RX REV CODE- 636/637: Performed by: FAMILY MEDICINE

## 2020-11-08 PROCEDURE — 65270000029 HC RM PRIVATE

## 2020-11-08 PROCEDURE — 80048 BASIC METABOLIC PNL TOTAL CA: CPT

## 2020-11-08 PROCEDURE — 74011250637 HC RX REV CODE- 250/637: Performed by: FAMILY MEDICINE

## 2020-11-08 PROCEDURE — 82962 GLUCOSE BLOOD TEST: CPT

## 2020-11-08 PROCEDURE — 74011000250 HC RX REV CODE- 250: Performed by: INTERNAL MEDICINE

## 2020-11-08 PROCEDURE — 36415 COLL VENOUS BLD VENIPUNCTURE: CPT

## 2020-11-08 PROCEDURE — 85027 COMPLETE CBC AUTOMATED: CPT

## 2020-11-08 PROCEDURE — 80202 ASSAY OF VANCOMYCIN: CPT

## 2020-11-08 PROCEDURE — 74011250636 HC RX REV CODE- 250/636: Performed by: FAMILY MEDICINE

## 2020-11-08 PROCEDURE — 74011250636 HC RX REV CODE- 250/636: Performed by: INTERNAL MEDICINE

## 2020-11-08 RX ORDER — ENOXAPARIN SODIUM 100 MG/ML
1 INJECTION SUBCUTANEOUS EVERY 12 HOURS
Status: DISCONTINUED | OUTPATIENT
Start: 2020-11-08 | End: 2020-11-11 | Stop reason: HOSPADM

## 2020-11-08 RX ADMIN — Medication 10 ML: at 22:00

## 2020-11-08 RX ADMIN — SODIUM BICARBONATE 650 MG: 650 TABLET ORAL at 21:59

## 2020-11-08 RX ADMIN — CEFTRIAXONE 2 G: 2 INJECTION, POWDER, FOR SOLUTION INTRAMUSCULAR; INTRAVENOUS at 15:28

## 2020-11-08 RX ADMIN — SODIUM CHLORIDE 100 ML/HR: 900 INJECTION, SOLUTION INTRAVENOUS at 02:00

## 2020-11-08 RX ADMIN — ATORVASTATIN CALCIUM 20 MG: 20 TABLET, FILM COATED ORAL at 21:59

## 2020-11-08 RX ADMIN — Medication 10 ML: at 15:28

## 2020-11-08 RX ADMIN — ENOXAPARIN SODIUM 80 MG: 80 INJECTION SUBCUTANEOUS at 15:27

## 2020-11-08 RX ADMIN — INSULIN LISPRO 10 UNITS: 100 INJECTION, SOLUTION INTRAVENOUS; SUBCUTANEOUS at 18:49

## 2020-11-08 RX ADMIN — GABAPENTIN 300 MG: 300 CAPSULE ORAL at 08:42

## 2020-11-08 RX ADMIN — Medication 10 ML: at 07:12

## 2020-11-08 RX ADMIN — SODIUM BICARBONATE 650 MG: 650 TABLET ORAL at 08:42

## 2020-11-08 RX ADMIN — GABAPENTIN 300 MG: 300 CAPSULE ORAL at 21:59

## 2020-11-08 RX ADMIN — CEFTRIAXONE 2 G: 2 INJECTION, POWDER, FOR SOLUTION INTRAMUSCULAR; INTRAVENOUS at 04:08

## 2020-11-08 RX ADMIN — FERROUS SULFATE TAB 325 MG (65 MG ELEMENTAL FE) 325 MG: 325 (65 FE) TAB at 08:42

## 2020-11-08 RX ADMIN — VANCOMYCIN HYDROCHLORIDE 1000 MG: 1 INJECTION, POWDER, LYOPHILIZED, FOR SOLUTION INTRAVENOUS at 08:42

## 2020-11-08 RX ADMIN — LEVOTHYROXINE SODIUM 50 MCG: 0.05 TABLET ORAL at 07:11

## 2020-11-08 RX ADMIN — INSULIN LISPRO 4 UNITS: 100 INJECTION, SOLUTION INTRAVENOUS; SUBCUTANEOUS at 12:38

## 2020-11-08 NOTE — PROGRESS NOTES
Pharmacy Dosing Services: Vancomycin    Consult for Vancomycin Dosing by Pharmacy by Dr. Wilfred Chin provided for this [de-identified]y.o. year old female , for indication of UTI x 7 days. Day of therapy: 4  Vancomycin random level- 15.8mcg/ml on 11/8/20. Patient is therapeutic. Goal trough- 10-15mcg/ml    Plan: Will adjust to a scheduled dosing of 1000mg IV q24h, due to improved renal function  2. Pharmacy will follow daily and will make changes to dose and/or frequency based on clinical status. Ht Readings from Last 1 Encounters:   11/04/20 157.5 cm (62\")        Wt Readings from Last 1 Encounters:   11/07/20 78 kg (171 lb 14.4 oz)        Serum Creatinine Lab Results   Component Value Date/Time    Creatinine 0.98 11/08/2020 05:25 AM    Creatinine, POC 1.1 01/16/2020 09:28 AM      Creatinine Clearance Estimated Creatinine Clearance: 44.3 mL/min (based on SCr of 0.98 mg/dL).    BUN Lab Results   Component Value Date/Time    BUN 20 (H) 11/08/2020 05:25 AM    BUN, POC 17 01/16/2020 09:28 AM      WBC Lab Results   Component Value Date/Time    WBC 6.8 11/08/2020 05:25 AM      H/H Lab Results   Component Value Date/Time    HGB 7.7 (L) 11/08/2020 05:25 AM      Platelets Lab Results   Component Value Date/Time    PLATELET 040 02/32/4422 05:25 AM      Temp 99.1 °F (37.3 °C)     Pharmacist Janak Rowley, [address]

## 2020-11-08 NOTE — PROGRESS NOTES
Cardiology Progress Note        Patient: Mimi Adams        Sex: female          DOA: 11/4/2020  YOB: 1940      Age:  [de-identified] y.o.        LOS:  LOS: 4 days   Assessment/Plan     Principal Problem:    Severe sepsis with acute organ dysfunction ([facility] [address] (11/4/2020)    Active Problems:    Pyelonephritis (5/18/2018)      Nephrostomy tube displaced ([facility] [address] (5/18/2018)      Hydronephrosis, right (11/4/2020)      Anticoagulated (11/5/2020)        Plan:  Discussed with the patient and with his son Mr. Kranthi Koehler agreed to proceed with transesophageal echocardiogram.  Risk, benefits and alternatives were discussed. We will schedule the procedure tomorrow with anesthesia. Subjective:    cc:  Pyelonephritis  Sepsis          REVIEW OF SYSTEMS:     General: No fevers or chills. Cardiovascular: No chest pain or pressure. No palpitations. No ankle swelling  Pulmonary: No SOB, orthopnea, PND  Gastrointestinal: No nausea, vomiting or diarrhea      Objective:      Visit Vitals  BP (!) 133/39 (BP 1 Location: Right arm, BP Patient Position: At rest)   Pulse 79   Temp 99.3 °F (37.4 °C)   Resp 17   Ht 5' 2\" (1.575 m)   Wt 78 kg (171 lb 14.4 oz)   SpO2 100%   BMI 31.44 kg/m²     Body mass index is 31.44 kg/m². Physical Exam:  General Appearance: Comfortable, not using accessory muscles of respiration. NECK: No JVD, no thyroidomeglay. LUNGS: Clear bilaterally. HEART: S1+S2 audible,    ABD: Non-tender, BS Audible    EXT: No edema, and no cysnosis. VASCULAR EXAM: Pulses are intact. PSYCHIATRIC EXAM: Mood is appropriate.     Medication:  Current Facility-Administered Medications   Medication Dose Route Frequency    vancomycin (VANCOCIN) 1,000 mg in 0.9% sodium chloride 250 mL (VIAL-MATE)  1,000 mg IntraVENous Q24H    [START ON 11/9/2020] vit B Cmplx 3-FA-Vit C-Biotin (NEPHRO BLAYNE RX) tablet 1 Tab  1 Tab Oral DAILY    enoxaparin (LOVENOX) injection 80 mg  1 mg/kg SubCUTAneous Q12H  cefTRIAXone (ROCEPHIN) 2 g in sterile water (preservative free) 20 mL IV syringe  2 g IntraVENous Q12H    ferrous sulfate tablet 325 mg  325 mg Oral DAILY WITH BREAKFAST    atorvastatin (LIPITOR) tablet 20 mg  20 mg Oral QHS    levothyroxine (SYNTHROID) tablet 50 mcg  50 mcg Oral 6am    gabapentin (NEURONTIN) capsule 300 mg  300 mg Oral BID    sodium bicarbonate tablet 650 mg  650 mg Oral BID    acetaminophen (TYLENOL) tablet 1,000 mg  1,000 mg Oral Q8H PRN    Or    acetaminophen (TYLENOL) suppository 650 mg  650 mg Rectal Q6H PRN    Vancomycin-Pharmacy to dose  1 Each Other Rx Dosing/Monitoring    LORazepam (ATIVAN) tablet 0.5 mg  0.5 mg Oral BID PRN    sodium chloride (NS) flush 5-10 mL  5-10 mL IntraVENous PRN    sodium chloride (NS) flush 5-40 mL  5-40 mL IntraVENous Q8H    sodium chloride (NS) flush 5-40 mL  5-40 mL IntraVENous PRN    polyethylene glycol (MIRALAX) packet 17 g  17 g Oral DAILY PRN    promethazine (PHENERGAN) tablet 12.5 mg  12.5 mg Oral Q6H PRN    Or    ondansetron (ZOFRAN) injection 4 mg  4 mg IntraVENous Q6H PRN    insulin lispro (HUMALOG) injection   SubCUTAneous Q6H    glucose chewable tablet 16 g  16 g Oral PRN    glucagon (GLUCAGEN) injection 1 mg  1 mg IntraMUSCular PRN    dextrose 10% infusion 125-250 mL  125-250 mL IntraVENous PRN    0.9% sodium chloride infusion 250 mL  250 mL IntraVENous PRN               Lab/Data Reviewed:  Procedures/imaging: see electronic medical records for all procedures/Xrays   and details which were not copied into this note but were reviewed prior to creation of Plan       All lab results for the last 24 hours reviewed.      Recent Labs     11/08/20 0525 11/07/20 0345 11/06/20  0435   WBC 6.8 9.3 10.7   HGB 7.7* 8.3* 8.1*   HCT 23.8* 26.3* 24.3*    246 233     Recent Labs     11/08/20 0525 11/07/20 0345 11/06/20  0435    138 137   K 4.0 4.2 4.3   * 110 108   CO2 22 23 21   * 185* 130*   BUN 20* 30* 37* CREA 0.98 1.58* 2.12*   CA 7.1* 7.9* 7.3*       RADIOLOGY:  CT Results  (Last 48 hours)    None        CXR Results  (Last 48 hours)    None            Cardiology Procedures:   Results for orders placed or performed during the hospital encounter of 11/04/20   EKG, 12 LEAD, INITIAL   Result Value Ref Range    Ventricular Rate 87 BPM    Atrial Rate 87 BPM    P-R Interval 148 ms    QRS Duration 80 ms    Q-T Interval 392 ms    QTC Calculation (Bezet) 471 ms    Calculated P Axis 63 degrees    Calculated R Axis 64 degrees    Calculated T Axis 57 degrees    Diagnosis       Normal sinus rhythm  Normal ECG  When compared with ECG of 04-NOV-2020 17:56,  No significant change was found  Confirmed by Jin Rush MD. ([id number]) on 11/5/2020 9:45:58 PM        Echo Results  (Last 48 hours)    None       Cardiolite (Tc-99m Sestamibi) stress test    Signed By: Abby Ley MD     November 8, 2020

## 2020-11-08 NOTE — PROGRESS NOTES
Pharmacy Renal Dosing Services: Lovenox     Physician: Dr. Nahomi Duke     Lovenox 80mg SQ q24h(1mg/kg) was automatically dose-adjusted per THE [facility] P&T Committee Protocol, with respect to renal function. New dose: 80mg SQ q12h     Pt Weight:       Wt Readings from Last 1 Encounters:   11/08/20 78 kg (166 lb)     Previous Regimen Lovenox 80 mg SubQ Q 24 hours   Serum Creatinine Lab Results   Component Value Date/Time    Creatinine 0.98 11/08/2020 05:25 AM    Creatinine, POC 1.1 01/16/2020 09:28 AM      Creatinine Clearance Estimated Creatinine Clearance: 44.3 mL/min (based on SCr of 0.98 mg/dL).    BUN Lab Results   Component Value Date/Time    BUN 20 (H) 11/08/2020 05:25 AM    BUN, POC 17 01/16/2020 09:28 AM         Plan:  Continue to monitor

## 2020-11-08 NOTE — PROGRESS NOTES
1940 - Bedside and Verbal shift change report given to Kishan Bearden, RN  (oncoming nurse) by TAM Little RN (offgoing nurse). Report included the following information SBAR, Kardex, Intake/Output, MAR and Recent Results. Shift summary -- Mepilex border changed. Wound bed presented with whitish and serosang drainage. Wound care consult to be inserted per protocol. Repositioning encouraged but patient declined, despite education, and preferred to be left in off-loading position. 0715 - Bedside and Verbal shift change report given to MORENO Quintana (oncoming nurse) by Geri Rush, CHAITANYA  (offgoing nurse). Report included the following information SBAR, Kardex, Intake/Output, MAR and Recent Results.

## 2020-11-08 NOTE — PROGRESS NOTES
Comprehensive Nutrition Assessment    Type and Reason for Visit: (P) Initial, Positive nutrition screen    Nutrition Recommendations/Plan: Supplement: Add Evans BID Other: Add MVI     Nutrition Assessment:  (P) pt admitted w/ Pyelonephritis/Bacteremia, ANTONI, nephrostomy tube replacement, DM, HTN. hyponatremia        Estimated Daily Nutrient Needs:  Energy (kcal): (P) 2158; Weight Used for Energy Requirements: (P) Current  Protein (g): (P) 60-75; Weight Used for Protein Requirements: (P) Ideal(1.2-1.5)  Fluid (ml/day): (P) 2158; Method Used for Fluid Requirements: (P) 1 ml/kcal      Nutrition Related Findings:  (P) +BM 11/8/20 +2 pitting edema Meds: iron, humaolg, synthroid, zofran, miralax, sodium bicarbonate      Wounds:    (P) Pressure injury(awaiting wound care note)       Current Nutrition Therapies:  DIET REGULAR    Anthropometric Measures:  Height:  (P) 5' 2\" (157.5 cm)  Current Body Wt:  (P) 77.6 kg (171 lb)   Admission Body Wt:       Usual Body Wt:  (P) 77.6 kg (171 lb)(6/20)     Ideal Body Wt:  (P) 110 lbs:  (P) 155.5 %   Adjusted Body Weight:   ; Weight Adjustment for:     Adjusted BMI:       BMI Category:  (P) Obese class 1 (BMI 30.0-34. 9)       Nutrition Diagnosis:   (P) Increased nutrient needs related to (P) inadequate protein-energy intake as evidenced by (P) wounds    Nutrition Interventions:   Food and/or Nutrient Delivery: (P) Continue current diet, Mineral supplement, Vitamin supplement, Start oral nutrition supplement  Nutrition Education and Counseling: (P) No recommendations at this time  Coordination of Nutrition Care: (P) Continue to monitor while inpatient    Goals:  (P) Encourage PO intake >50% at most meals in the next 3-5days       Nutrition Monitoring and Evaluation:   Behavioral-Environmental Outcomes:    Food/Nutrient Intake Outcomes: (P) Food and nutrient intake, Diet advancement/tolerance, Supplement intake, Vitamin/mineral intake  Physical Signs/Symptoms Outcomes: (P) Biochemical data, Nutrition focused physical findings, Skin, Weight, Hemodynamic status, GI status    Discharge Planning:    (P) Continue oral nutrition supplement, Continue current diet     Electronically signed by Dejan Oates on 11/8/2020 at 11:09 AM

## 2020-11-08 NOTE — PROGRESS NOTES
Hospitalist Progress Note    Patient: Ranjit Mitchell MRN: [id number]  CSN: [id number]    YOB: 1940  Age: [de-identified] y.o. Sex: female    DOA: 11/4/2020 LOS:  LOS: 4 days                Assessment and Plan:    Principal Problem:    Severe sepsis with acute organ dysfunction ([facility] [address] (11/4/2020)    Active Problems:    Pyelonephritis (5/18/2018)      Nephrostomy tube displaced ([address] (5/18/2018)      Hydronephrosis, right (11/4/2020)      Anticoagulated (11/5/2020)           Pyelonephritis/Bacteremia -  11/04/2020 2/2 blood cultures growing GPC in chains and GNR  Follow repeat cultues today    Follow urine cultures  Antibiotics - vancomycin, meropenem. 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       Subjective:    Sleeping easily, no acute distress      Review of systems:    General: No fevers or chills. Cardiovascular: No chest pain or pressure. No palpitations. Pulmonary: No shortness of breath. Gastrointestinal: No nausea, vomiting. Objective:    Vital signs/Intake and Output:  Visit Vitals  BP (!) 117/36 (BP 1 Location: Right arm, BP Patient Position: At rest;Supine)   Pulse 68   Temp 99.1 °F (37.3 °C)   Resp 18   Ht 5' 2\" (1.575 m)   Wt 78 kg (171 lb 14.4 oz)   SpO2 100%   BMI 31.44 kg/m²     Current Shift:  No intake/output data recorded.   Last three shifts:  11/06 1901 - 11/08 0700  In: 2328.9 [I.V.:2328.9]  Out: 3400 [Urine:2925]    Physical Exam:  General: NAD, AAOx3. Non-toxic. HEENT: NC/AT. PERRLA, EOMI.  MMM. Lungs: Nml inspection. CTA B/L. No wheezing, rales or rhonchi. Heart:  S1S2 RRR,  PMI mid 5th IC space. No M/RG. Abdomen: Soft, NT/ND.  BS+. No peritoneal signs. Extremities: No C/C/E. Psych:   Nml affect. Neurologic:  2-12 intact. Strength 5/5 throughout. Sensation symmetrical.          Labs: Results:       Chemistry Recent Labs     11/08/20 0525 11/07/20 0345 11/06/20 0435   * 185* 130*    138 137   K 4.0 4.2 4.3   * 110 108   CO2 22 23 21   BUN 20* 30* 37*   CREA 0.98 1.58* 2.12*   CA 7.1* 7.9* 7.3*   AGAP 6 5 8   BUCR 20 19 17   AP  --  145* 104   TP  --  5.3* 4.9*   ALB  --  1.7* 1.8*   GLOB  --  3.6 3.1   AGRAT  --  0.5* 0.6*      CBC w/Diff Recent Labs     11/08/20 0525 11/07/20 0345 11/06/20  0435   WBC 6.8 9.3 10.7   RBC 2.69* 2.88* 2.77*   HGB 7.7* 8.3* 8.1*   HCT 23.8* 26.3* 24.3*    246 233      Cardiac Enzymes No results for input(s): CPK, CKND1, JANE in the last 72 hours. No lab exists for component: CKRMB, TROIP   Coagulation Recent Labs     11/06/20  2300 11/06/20  1357   APTT >180.0* 25.5       Lipid Panel Lab Results   Component Value Date/Time    Cholesterol, total 115 08/28/2019 08:22 AM    HDL Cholesterol 39 (L) 08/28/2019 08:22 AM    LDL, calculated 46.2 08/28/2019 08:22 AM    VLDL, calculated 29.8 08/28/2019 08:22 AM    Triglyceride 149 08/28/2019 08:22 AM    CHOL/HDL Ratio 2.9 08/28/2019 08:22 AM      BNP No results for input(s): BNPP in the last 72 hours.    Liver Enzymes Recent Labs     11/07/20  0345   TP 5.3*   ALB 1.7*   *      Thyroid Studies Lab Results   Component Value Date/Time    TSH 3.40 01/24/2020 08:35 AM        Procedures/imaging: see electronic medical records for all procedures/Xrays and details which were not copied into this note but were reviewed prior to creation of Plan

## 2020-11-09 ENCOUNTER — ANESTHESIA (OUTPATIENT)
Dept: NON INVASIVE DIAGNOSTICS | Age: 80
DRG: 698 | End: 2020-11-09
Payer: MEDICARE

## 2020-11-09 ENCOUNTER — APPOINTMENT (OUTPATIENT)
Dept: NON INVASIVE DIAGNOSTICS | Age: 80
DRG: 698 | End: 2020-11-09
Attending: INTERNAL MEDICINE
Payer: MEDICARE

## 2020-11-09 ENCOUNTER — ANESTHESIA EVENT (OUTPATIENT)
Dept: NON INVASIVE DIAGNOSTICS | Age: 80
DRG: 698 | End: 2020-11-09
Payer: MEDICARE

## 2020-11-09 LAB
ALBUMIN SERPL-MCNC: 1.6 G/DL (ref 3.4–5)
ALBUMIN/GLOB SERPL: 0.5 {RATIO} (ref 0.8–1.7)
ALP SERPL-CCNC: 260 U/L (ref 45–117)
ALT SERPL-CCNC: 53 U/L (ref 13–56)
ANION GAP SERPL CALC-SCNC: 7 MMOL/L (ref 3–18)
AST SERPL-CCNC: 69 U/L (ref 10–38)
BASOPHILS # BLD: 0.1 K/UL (ref 0–0.1)
BASOPHILS NFR BLD: 1 % (ref 0–3)
BILIRUB SERPL-MCNC: 0.3 MG/DL (ref 0.2–1)
BUN SERPL-MCNC: 17 MG/DL (ref 7–18)
BUN/CREAT SERPL: 19 (ref 12–20)
CALCIUM SERPL-MCNC: 7.4 MG/DL (ref 8.5–10.1)
CHLORIDE SERPL-SCNC: 113 MMOL/L (ref 100–111)
CK MB CFR SERPL CALC: ABNORMAL % (ref 0–4)
CK MB SERPL-MCNC: <1 NG/ML (ref 5–25)
CK SERPL-CCNC: 12 U/L (ref 26–192)
CO2 SERPL-SCNC: 21 MMOL/L (ref 21–32)
CREAT SERPL-MCNC: 0.88 MG/DL (ref 0.6–1.3)
DIFFERENTIAL METHOD BLD: ABNORMAL
EOSINOPHIL # BLD: 0.2 K/UL (ref 0–0.4)
EOSINOPHIL NFR BLD: 3 % (ref 0–5)
ERYTHROCYTE [DISTWIDTH] IN BLOOD BY AUTOMATED COUNT: 15.1 % (ref 11.6–14.5)
GLOBULIN SER CALC-MCNC: 3.3 G/DL (ref 2–4)
GLUCOSE BLD STRIP.AUTO-MCNC: 159 MG/DL (ref 70–110)
GLUCOSE BLD STRIP.AUTO-MCNC: 170 MG/DL (ref 70–110)
GLUCOSE BLD STRIP.AUTO-MCNC: 176 MG/DL (ref 70–110)
GLUCOSE BLD STRIP.AUTO-MCNC: 187 MG/DL (ref 70–110)
GLUCOSE SERPL-MCNC: 174 MG/DL (ref 74–99)
HCT VFR BLD AUTO: 23.7 % (ref 35–45)
HGB BLD-MCNC: 7.8 G/DL (ref 12–16)
LYMPHOCYTES # BLD: 1.9 K/UL (ref 0.8–3.5)
LYMPHOCYTES NFR BLD: 25 % (ref 20–51)
MCH RBC QN AUTO: 28.8 PG (ref 24–34)
MCHC RBC AUTO-ENTMCNC: 32.9 G/DL (ref 31–37)
MCV RBC AUTO: 87.5 FL (ref 74–97)
MONOCYTES # BLD: 0.5 K/UL (ref 0–1)
MONOCYTES NFR BLD: 7 % (ref 2–9)
NEUTS SEG # BLD: 5 K/UL (ref 1.8–8)
NEUTS SEG NFR BLD: 64 % (ref 42–75)
PLATELET # BLD AUTO: 258 K/UL (ref 135–420)
PMV BLD AUTO: 9.7 FL (ref 9.2–11.8)
POTASSIUM SERPL-SCNC: 4.3 MMOL/L (ref 3.5–5.5)
PROT SERPL-MCNC: 4.9 G/DL (ref 6.4–8.2)
RBC # BLD AUTO: 2.71 M/UL (ref 4.2–5.3)
RBC MORPH BLD: ABNORMAL
RBC MORPH BLD: ABNORMAL
SODIUM SERPL-SCNC: 141 MMOL/L (ref 136–145)
TROPONIN I SERPL-MCNC: <0.02 NG/ML (ref 0–0.04)
WBC # BLD AUTO: 7.7 K/UL (ref 4.6–13.2)

## 2020-11-09 PROCEDURE — 74011250636 HC RX REV CODE- 250/636: Performed by: INTERNAL MEDICINE

## 2020-11-09 PROCEDURE — 97602 WOUND(S) CARE NON-SELECTIVE: CPT

## 2020-11-09 PROCEDURE — 80053 COMPREHEN METABOLIC PANEL: CPT

## 2020-11-09 PROCEDURE — 93005 ELECTROCARDIOGRAM TRACING: CPT

## 2020-11-09 PROCEDURE — 74011250637 HC RX REV CODE- 250/637: Performed by: FAMILY MEDICINE

## 2020-11-09 PROCEDURE — 74011250637 HC RX REV CODE- 250/637: Performed by: HOSPITALIST

## 2020-11-09 PROCEDURE — 74011250636 HC RX REV CODE- 250/636: Performed by: FAMILY MEDICINE

## 2020-11-09 PROCEDURE — 92610 EVALUATE SWALLOWING FUNCTION: CPT

## 2020-11-09 PROCEDURE — 74011000250 HC RX REV CODE- 250: Performed by: INTERNAL MEDICINE

## 2020-11-09 PROCEDURE — 82962 GLUCOSE BLOOD TEST: CPT

## 2020-11-09 PROCEDURE — 74011250636 HC RX REV CODE- 250/636: Performed by: ANESTHESIOLOGY

## 2020-11-09 PROCEDURE — 74011636637 HC RX REV CODE- 636/637: Performed by: FAMILY MEDICINE

## 2020-11-09 PROCEDURE — 82550 ASSAY OF CK (CPK): CPT

## 2020-11-09 PROCEDURE — 92526 ORAL FUNCTION THERAPY: CPT

## 2020-11-09 PROCEDURE — 74011000250 HC RX REV CODE- 250: Performed by: HOSPITALIST

## 2020-11-09 PROCEDURE — 65270000029 HC RM PRIVATE

## 2020-11-09 PROCEDURE — 77030040392 HC DRSG OPTIFOAM MDII -A

## 2020-11-09 PROCEDURE — 36415 COLL VENOUS BLD VENIPUNCTURE: CPT

## 2020-11-09 PROCEDURE — 74011000250 HC RX REV CODE- 250: Performed by: ANESTHESIOLOGY

## 2020-11-09 PROCEDURE — 93312 ECHO TRANSESOPHAGEAL: CPT

## 2020-11-09 PROCEDURE — 85025 COMPLETE CBC W/AUTO DIFF WBC: CPT

## 2020-11-09 RX ORDER — SODIUM CHLORIDE 9 MG/ML
INJECTION, SOLUTION INTRAVENOUS
Status: DISCONTINUED | OUTPATIENT
Start: 2020-11-09 | End: 2020-11-09 | Stop reason: HOSPADM

## 2020-11-09 RX ORDER — MIDAZOLAM HYDROCHLORIDE 1 MG/ML
INJECTION, SOLUTION INTRAMUSCULAR; INTRAVENOUS AS NEEDED
Status: DISCONTINUED | OUTPATIENT
Start: 2020-11-09 | End: 2020-11-09 | Stop reason: HOSPADM

## 2020-11-09 RX ORDER — LIDOCAINE HYDROCHLORIDE 20 MG/ML
INJECTION, SOLUTION EPIDURAL; INFILTRATION; INTRACAUDAL; PERINEURAL AS NEEDED
Status: DISCONTINUED | OUTPATIENT
Start: 2020-11-09 | End: 2020-11-09 | Stop reason: HOSPADM

## 2020-11-09 RX ORDER — PANTOPRAZOLE SODIUM 40 MG/1
40 TABLET, DELAYED RELEASE ORAL
Status: DISCONTINUED | OUTPATIENT
Start: 2020-11-10 | End: 2020-11-11 | Stop reason: HOSPADM

## 2020-11-09 RX ORDER — SODIUM CHLORIDE 0.9 % (FLUSH) 0.9 %
5-40 SYRINGE (ML) INJECTION AS NEEDED
Status: CANCELLED | OUTPATIENT
Start: 2020-11-09

## 2020-11-09 RX ORDER — SODIUM CHLORIDE 0.9 % (FLUSH) 0.9 %
5-40 SYRINGE (ML) INJECTION EVERY 8 HOURS
Status: CANCELLED | OUTPATIENT
Start: 2020-11-09

## 2020-11-09 RX ORDER — PROPOFOL 10 MG/ML
INJECTION, EMULSION INTRAVENOUS AS NEEDED
Status: DISCONTINUED | OUTPATIENT
Start: 2020-11-09 | End: 2020-11-09 | Stop reason: HOSPADM

## 2020-11-09 RX ADMIN — ENOXAPARIN SODIUM 80 MG: 80 INJECTION SUBCUTANEOUS at 15:45

## 2020-11-09 RX ADMIN — LEVOTHYROXINE SODIUM 50 MCG: 0.05 TABLET ORAL at 06:48

## 2020-11-09 RX ADMIN — PROPOFOL 20 MG: 10 INJECTION, EMULSION INTRAVENOUS at 13:42

## 2020-11-09 RX ADMIN — INSULIN LISPRO 4 UNITS: 100 INJECTION, SOLUTION INTRAVENOUS; SUBCUTANEOUS at 19:10

## 2020-11-09 RX ADMIN — ATORVASTATIN CALCIUM 20 MG: 20 TABLET, FILM COATED ORAL at 22:00

## 2020-11-09 RX ADMIN — PROPOFOL 20 MG: 10 INJECTION, EMULSION INTRAVENOUS at 13:37

## 2020-11-09 RX ADMIN — SODIUM BICARBONATE 650 MG: 650 TABLET ORAL at 09:37

## 2020-11-09 RX ADMIN — PROPOFOL 20 MG: 10 INJECTION, EMULSION INTRAVENOUS at 13:45

## 2020-11-09 RX ADMIN — LORAZEPAM 0.5 MG: 0.5 TABLET ORAL at 00:47

## 2020-11-09 RX ADMIN — INSULIN LISPRO 2 UNITS: 100 INJECTION, SOLUTION INTRAVENOUS; SUBCUTANEOUS at 00:16

## 2020-11-09 RX ADMIN — MIDAZOLAM 2 MG: 1 INJECTION INTRAMUSCULAR; INTRAVENOUS at 13:36

## 2020-11-09 RX ADMIN — CEFTRIAXONE 2 G: 2 INJECTION, POWDER, FOR SOLUTION INTRAMUSCULAR; INTRAVENOUS at 04:00

## 2020-11-09 RX ADMIN — Medication 10 ML: at 15:46

## 2020-11-09 RX ADMIN — LIDOCAINE HYDROCHLORIDE 40 MG: 20 INJECTION, SOLUTION EPIDURAL; INFILTRATION; INTRACAUDAL; PERINEURAL at 13:37

## 2020-11-09 RX ADMIN — WATER 2 G: 1 INJECTION INTRAMUSCULAR; INTRAVENOUS; SUBCUTANEOUS at 22:00

## 2020-11-09 RX ADMIN — GABAPENTIN 300 MG: 300 CAPSULE ORAL at 09:36

## 2020-11-09 RX ADMIN — WATER 2 G: 1 INJECTION INTRAMUSCULAR; INTRAVENOUS; SUBCUTANEOUS at 09:37

## 2020-11-09 RX ADMIN — GABAPENTIN 300 MG: 300 CAPSULE ORAL at 21:00

## 2020-11-09 RX ADMIN — B-COMPLEX W/ C & FOLIC ACID TAB 1 MG 1 TABLET: 1 TAB at 09:36

## 2020-11-09 RX ADMIN — VANCOMYCIN HYDROCHLORIDE 1000 MG: 1 INJECTION, POWDER, LYOPHILIZED, FOR SOLUTION INTRAVENOUS at 09:42

## 2020-11-09 RX ADMIN — ENOXAPARIN SODIUM 80 MG: 80 INJECTION SUBCUTANEOUS at 00:16

## 2020-11-09 RX ADMIN — SODIUM CHLORIDE: 900 INJECTION, SOLUTION INTRAVENOUS at 13:32

## 2020-11-09 RX ADMIN — INSULIN LISPRO 2 UNITS: 100 INJECTION, SOLUTION INTRAVENOUS; SUBCUTANEOUS at 06:47

## 2020-11-09 RX ADMIN — FERROUS SULFATE TAB 325 MG (65 MG ELEMENTAL FE) 325 MG: 325 (65 FE) TAB at 09:37

## 2020-11-09 RX ADMIN — SODIUM CHLORIDE 250 ML: 900 INJECTION, SOLUTION INTRAVENOUS at 13:09

## 2020-11-09 RX ADMIN — LIDOCAINE HYDROCHLORIDE 40 ML: 20 SOLUTION ORAL; TOPICAL at 11:05

## 2020-11-09 RX ADMIN — SODIUM BICARBONATE 650 MG: 650 TABLET ORAL at 21:00

## 2020-11-09 NOTE — PROGRESS NOTES
Pt is NPO after midnight for RICHARD in am. Pt being followed by Infectious Disease MD and cardiologist  Verbal shift change report given to Gail Hernandez RN (oncoming nurse) by Roya Briceno RN   (offgoing nurse). Report included the following information SBAR, Kardex, Intake/Output, MAR and Recent Results.

## 2020-11-09 NOTE — PROGRESS NOTES
TRANSFER - OUT REPORT:    Verbal report given to Cardiac Care   RN (name) on Abdiaziz Pickadr  being transferred to [address] [place] [address] (unit) for ordered procedure       Report consisted of patients Situation, Background, Assessment and   Recommendations(SBAR). Information from the following report(s) SBAR, Kardex, Intake/Output and MAR was reviewed with the receiving nurse. Lines:   Peripheral IV 11/04/20 Left Forearm (Active)   Site Assessment Clean, dry, & intact 11/09/20 0013   Phlebitis Assessment 0 11/09/20 0013   Infiltration Assessment 0 11/09/20 0013   Dressing Status Clean, dry, & intact 11/09/20 0013   Dressing Type Transparent 11/09/20 0013   Hub Color/Line Status Pink; Infusing;Patent 11/09/20 0013   Action Taken Open ports on tubing capped 11/09/20 0013   Alcohol Cap Used Yes 11/09/20 0013        Opportunity for questions and clarification was provided.       Patient transported with:   [employer]

## 2020-11-09 NOTE — CONSULTS
[facility]  (A Division of [address] [place] [address])    Follow-up Note      Date of Admission: 11/4/2020       Date of Note:  11/9/2020    Summary:    Ms Lamont Schrader is a blind [de-identified] WF survivor of many things who most recently had another accidental dysfunctioning of her chronic/permanent R nephrostomy tube on 11/3 with subsequent progressive R back pain and malaise; also had abdominal pain, Nausea.  Since having it replaced 11/5, her symptoms have settled with improving fever curve.     Retired CNA     CC:  \"My chest hurts\"    Interval History:  Weekend events reviewed/tracked. States this morning that she has been having intermittent chest discomfort over weeks -- comes/goes. Hugging her chest helps. No n/v.      Back feels better today. Current Antimicrobials:    Prior Antimicrobials:  1. CAX IV (11/7-) #2  2. Vanco IV (11/5-) #4 1. Levo IV (11/4)  2. Meropenem IV (11/4-7)       Assessment: Rec / Plan:   Limited community-acquired Enterococcus faecalis/Morganella morganii  sepsis - in conjunction with R nephrotomy tube dysfunction/accidental plugging    One follow up BCx yesterday sterile so far. Need more daily BCx to determine when to float either midline or PICC to give her 2-6wks IV abx. If RICHARD negative today, go midline for 2wks. ->Vanco/CAX #4    I switched her abx to CAX over the weekend as her GNR turned out NOT to be an ESBL bacterium (like months past); the CAX should kill it along with being an adjunct to the vancomycin for killing her Enterococcus faecalis. 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 ANTONI on CKD - improved    CP    Doesn't sound ischemic, but will defer this to you.     R renal calcinosis        Microbiology:  11/8 - BCx (-)     11/5 - UA (+) Morganella morganii (R - gent/nitro/tmp-smx), Enterococcus faecalis, and a Proteus                                         11/4 - BCx 2/2 (+) Morganella morganii (R - gent/nitro/tmp-smx), Enterococcus faecalis       8/11 - UA (+) Proteus mirabilis (R - FQ, nitro, and tmp-smx)        Lines / Catheters:         peripheral        Patient Active Problem List   Diagnosis Code    Pyelonephritis N12    S/p nephrectomy Z90.5    Right flank pain R10.9    Malfunction of nephrostomy tube ([facility] T83.098A    Nephrostomy tube displaced ([address] T83.022A    UTI (urinary tract infection) N39.0    Elevated WBC count D72.829    Diabetes ([facility]) E11.9    Legally blind H54.8    Hypertension I10    Thyroid disease E07.9    Elevated serum creatinine R79.89    S/P colectomy Z90.49    Skin lesion of foot L98.9    Tear of skin of buttock S31.801A    Pressure injury of right heel, unstageable ([facility]) L89.610    Hypomagnesemia E83.42    Hyperkalemia E87.5    Heel ulcer ([facility]) L97.409    ANTONI (acute kidney injury) ([facility] N17.9    S/P AKA (above knee amputation), right ([facility]) Z89.611    Nephrostomy status ([facility]) Z93.6    Obstructed nephrostomy tube ([facility] T83.092A    Nephrostomy complication ([facility]) H83.169    Acute renal failure (ARF) ([facility]) N17.9    Elevated INR R79.1    Bacteremia R78.81    Enterococcus faecalis infection B95.2    CKD stage 3 due to type 2 diabetes mellitus ([facility]) E11.22, N18.30    Hydronephrosis, right N13.30    Severe sepsis with acute organ dysfunction ([facility]) A41.9, R65.20    Anticoagulated Z79.01       Current Facility-Administered Medications   Medication Dose Route Frequency    cefepime (MAXIPIME) 2 g in sterile water (preservative free) 10 mL IV syringe  2 g IntraVENous Q12H    mylanta/viscous lidocaine (GI COCKTAIL)  40 mL Oral ONCE    [START ON 11/10/2020] pantoprazole (PROTONIX) tablet 40 mg  40 mg Oral ACB    vancomycin (VANCOCIN) 1,000 mg in 0.9% sodium chloride 250 mL (VIAL-MATE)  1,000 mg IntraVENous Q24H    vit B Cmplx 3-FA-Vit C-Biotin (NEPHRO BLAYNE RX) tablet 1 Tab  1 Tab Oral DAILY    enoxaparin (LOVENOX) injection 80 mg  1 mg/kg SubCUTAneous Q12H    ferrous sulfate tablet 325 mg  325 mg Oral DAILY WITH BREAKFAST    atorvastatin (LIPITOR) tablet 20 mg  20 mg Oral QHS    levothyroxine (SYNTHROID) tablet 50 mcg  50 mcg Oral 6am    gabapentin (NEURONTIN) capsule 300 mg  300 mg Oral BID    sodium bicarbonate tablet 650 mg  650 mg Oral BID    acetaminophen (TYLENOL) tablet 1,000 mg  1,000 mg Oral Q8H PRN    Or    acetaminophen (TYLENOL) suppository 650 mg  650 mg Rectal Q6H PRN    Vancomycin-Pharmacy to dose  1 Each Other Rx Dosing/Monitoring    LORazepam (ATIVAN) tablet 0.5 mg  0.5 mg Oral BID PRN    sodium chloride (NS) flush 5-10 mL  5-10 mL IntraVENous PRN    sodium chloride (NS) flush 5-40 mL  5-40 mL IntraVENous Q8H    sodium chloride (NS) flush 5-40 mL  5-40 mL IntraVENous PRN    polyethylene glycol (MIRALAX) packet 17 g  17 g Oral DAILY PRN    promethazine (PHENERGAN) tablet 12.5 mg  12.5 mg Oral Q6H PRN    Or    ondansetron (ZOFRAN) injection 4 mg  4 mg IntraVENous Q6H PRN    insulin lispro (HUMALOG) injection   SubCUTAneous Q6H    glucose chewable tablet 16 g  16 g Oral PRN    glucagon (GLUCAGEN) injection 1 mg  1 mg IntraMUSCular PRN    dextrose 10% infusion 125-250 mL  125-250 mL IntraVENous PRN    0.9% sodium chloride infusion 250 mL  250 mL IntraVENous PRN         Review of Systems - General ROS: negative for - chills, fever or hot flashes  Respiratory ROS: no cough, shortness of breath, or wheezing  Cardiovascular ROS: positive for - chest pain  negative for - palpitations or shortness of breath  Gastrointestinal ROS: no abdominal pain, change in bowel habits, or black or bloody stools       Objective:    Visit Vitals  BP (!) 151/47   Pulse 80   Temp 98.6 °F (37 °C)   Resp 17   Ht 5' 2\" (1.575 m)   Wt 78.9 kg (174 lb)   SpO2 100%   BMI 31.83 kg/m²       Temp (24hrs), Av.9 °F (37.2 °C), Min:98.4 °F (36.9 °C), Max:99.3 °F (37.4 °C)      GEN: WDWN WF in NAD  HEENT: anciteric/blind  CHEST: CTA  CVS:RRR  ABD: NT  EXT: no edema         Lab results:    Chemistry  Recent Labs     11/09/20 0324 11/08/20 0525 11/07/20 0345   * 142* 185*    141 138   K 4.3 4.0 4.2   * 113* 110   CO2 21 22 23   BUN 17 20* 30*   CREA 0.88 0.98 1.58*   CA 7.4* 7.1* 7.9*   AGAP 7 6 5   BUCR 19 20 19   *  --  145*   TP 4.9*  --  5.3*   ALB 1.6*  --  1.7*   GLOB 3.3  --  3.6   AGRAT 0.5*  --  0.5*       CBC w/ Diff  Recent Labs     11/09/20 0324 11/08/20 0525 11/07/20 0345   WBC 7.7 6.8 9.3   RBC 2.71* 2.69* 2.88*   HGB 7.8* 7.7* 8.3*   HCT 23.7* 23.8* 26.3*    228 246   GRANS 64  --   --    LYMPH 25  --   --    EOS 3  --   --        Microbiology  All Micro Results     Procedure Component Value Units Date/Time    CULTURE, BLOOD [[id number]] Collected:  11/08/20 1230    Order Status:  Completed Specimen:  Blood Updated:  11/09/20 0702     Special Requests: NO SPECIAL REQUESTS        Culture result: NO GROWTH AFTER 18 HOURS       CULTURE, URINE [[id number]]  (Abnormal)  (Susceptibility) Collected:  11/05/20 0100    Order Status:  Completed Specimen:  Urine from Nephrostomy Updated:  11/08/20 0932     Special Requests: NO SPECIAL REQUESTS        [place] Count --        >100,000  COLONIES/mL       Culture result:       Morganella morganii ssp morganii            PROTEUS SPECIES               POSSIBLE ENTEROCOCCUS SPECIES          CULTURE, BLOOD [[id number]]  (Abnormal) Collected:  11/04/20 1810    Order Status:  Completed Specimen:  Blood Updated:  11/07/20 0750     Special Requests: NO SPECIAL REQUESTS        GRAM STAIN       ANAEROBIC BOTTLE GRAM NEGATIVE RODS                  AEROBIC BOTTLE GRAM POSITIVE COCCI IN PAIRS IN CHAINS                  SMEAR CALLED TO AND CORRECTLY REPEATED BY: Alexsandra Sheppard RN 3S ON 11/5/2020 3715 TO 4854           Culture result:       ENTEROCOCCUS FAECALIS GROUP D GROWING IN BOTH BOTTLES DRAWN (SITE = LAC)                  MORGANELLA MORGANII GROWING IN BOTH BOTTLES DRAWN (SITE = LAC)                  PLEASE REFER TO PREVIOUS BLOOD CULTURE  [id number] FOR SENSITIVITIES      CULTURE, BLOOD [[id number]]  (Abnormal)  (Susceptibility) Collected:  11/04/20 1810    Order Status:  Completed Specimen:  Blood Updated:  11/07/20 0748     Special Requests: NO SPECIAL REQUESTS        GRAM STAIN       AEROBIC AND ANAEROBIC BOTTLES GRAM NEGATIVE RODS                  SMEAR CALLED TO AND CORRECTLY REPEATED BY: Farzaneh Reardon RN 3S ON 11/5/2020 1233 TO 5087           Culture result:       Morganella morganii ssp morganii GROWING IN BOTH BOTTLES DRAWN (SITE = LAC)                  ENTEROCOCCUS FAECALIS GROWING IN 1 OF THE BOTTLES                  PRELIMINARY REPORT OF GRAM POSITIVE COCCI IN CHAINS GROWING IN THE ANAEROBIC BOTTLE CALLED TO AND READ BACK BY [facility] AT 2323 ON 11/5/2020.  HJR          CULTURE, BLOOD [[id number]] Collected:  11/05/20 2030    Order Status:  Canceled Specimen:  Blood     CULTURE, BLOOD [[id number]] Collected:  11/05/20 2030    Order Status:  Canceled Specimen:  Blood     CULTURE, URINE [[id number]]     Order Status:  Canceled Specimen:  Cath Urine            Diamond Charles MD  Cell [phone]  [facility]   11/9/2020   10:11 AM

## 2020-11-09 NOTE — PROGRESS NOTES
Cardiology Progress Note        Patient: Laureen Bautista        Sex: female          DOA: 11/4/2020  YOB: 1940      Age:  [de-identified] y.o.        LOS:  LOS: 5 days   Assessment/Plan     Principal Problem:    Severe sepsis with acute organ dysfunction ([facility] (11/4/2020)    Active Problems:    Pyelonephritis (5/18/2018)      Nephrostomy tube displaced ([facility] (5/18/2018)      Hydronephrosis, right (11/4/2020)      Anticoagulated (11/5/2020)        Plan:  Sepsis    RICHARD today. R/B/A discussed with pt and son , both agreed to proceed. Subjective:    cc:  Sepsis        REVIEW OF SYSTEMS:     General: No fevers or chills. Cardiovascular: No chest pain or pressure. No palpitations. No ankle swelling  Pulmonary: No SOB, orthopnea, PND  Gastrointestinal: No nausea, vomiting or diarrhea      Objective:      Visit Vitals  BP (!) 149/45 (BP 1 Location: Right arm, BP Patient Position: At rest)   Pulse 85   Temp 98.9 °F (37.2 °C)   Resp 26   Ht 5' 2\" (1.575 m)   Wt 78.9 kg (174 lb)   SpO2 100%   BMI 31.83 kg/m²     Body mass index is 31.83 kg/m². Physical Exam:  General Appearance: Comfortable, not using accessory muscles of respiration. NECK: No JVD, no thyroidomeglay. LUNGS: Clear bilaterally. HEART: S1+S2 audible,    ABD: Non-tender, BS Audible    EXT: No edema, and no cysnosis. VASCULAR EXAM: Pulses are intact. PSYCHIATRIC EXAM: Mood is appropriate.     Medication:  Current Facility-Administered Medications   Medication Dose Route Frequency    cefepime (MAXIPIME) 2 g in sterile water (preservative free) 10 mL IV syringe  2 g IntraVENous Q12H    [START ON 11/10/2020] pantoprazole (PROTONIX) tablet 40 mg  40 mg Oral ACB    vancomycin (VANCOCIN) 1,000 mg in 0.9% sodium chloride 250 mL (VIAL-MATE)  1,000 mg IntraVENous Q24H    vit B Cmplx 3-FA-Vit C-Biotin (NEPHRO BLAYNE RX) tablet 1 Tab  1 Tab Oral DAILY    enoxaparin (LOVENOX) injection 80 mg  1 mg/kg SubCUTAneous Q12H    ferrous sulfate tablet 325 mg  325 mg Oral DAILY WITH BREAKFAST    atorvastatin (LIPITOR) tablet 20 mg  20 mg Oral QHS    levothyroxine (SYNTHROID) tablet 50 mcg  50 mcg Oral 6am    gabapentin (NEURONTIN) capsule 300 mg  300 mg Oral BID    sodium bicarbonate tablet 650 mg  650 mg Oral BID    acetaminophen (TYLENOL) tablet 1,000 mg  1,000 mg Oral Q8H PRN    Or    acetaminophen (TYLENOL) suppository 650 mg  650 mg Rectal Q6H PRN    Vancomycin-Pharmacy to dose  1 Each Other Rx Dosing/Monitoring    LORazepam (ATIVAN) tablet 0.5 mg  0.5 mg Oral BID PRN    sodium chloride (NS) flush 5-10 mL  5-10 mL IntraVENous PRN    sodium chloride (NS) flush 5-40 mL  5-40 mL IntraVENous Q8H    sodium chloride (NS) flush 5-40 mL  5-40 mL IntraVENous PRN    polyethylene glycol (MIRALAX) packet 17 g  17 g Oral DAILY PRN    promethazine (PHENERGAN) tablet 12.5 mg  12.5 mg Oral Q6H PRN    Or    ondansetron (ZOFRAN) injection 4 mg  4 mg IntraVENous Q6H PRN    insulin lispro (HUMALOG) injection   SubCUTAneous Q6H    glucose chewable tablet 16 g  16 g Oral PRN    glucagon (GLUCAGEN) injection 1 mg  1 mg IntraMUSCular PRN    dextrose 10% infusion 125-250 mL  125-250 mL IntraVENous PRN    0.9% sodium chloride infusion 250 mL  250 mL IntraVENous PRN               Lab/Data Reviewed:  Procedures/imaging: see electronic medical records for all procedures/Xrays   and details which were not copied into this note but were reviewed prior to creation of Plan       All lab results for the last 24 hours reviewed.      Recent Labs     11/09/20 0324 11/08/20 0525 11/07/20  0345   WBC 7.7 6.8 9.3   HGB 7.8* 7.7* 8.3*   HCT 23.7* 23.8* 26.3*    228 246     Recent Labs     11/09/20 0324 11/08/20 0525 11/07/20  0345    141 138   K 4.3 4.0 4.2   * 113* 110   CO2 21 22 23   * 142* 185*   BUN 17 20* 30*   CREA 0.88 0.98 1.58*   CA 7.4* 7.1* 7.9*       RADIOLOGY:  CT Results  (Last 48 hours)    None        CXR Results  (Last 48 hours)    None            Cardiology Procedures:   Results for orders placed or performed during the hospital encounter of 11/04/20   EKG, 12 LEAD, INITIAL   Result Value Ref Range    Ventricular Rate 87 BPM    Atrial Rate 87 BPM    P-R Interval 138 ms    QRS Duration 80 ms    Q-T Interval 376 ms    QTC Calculation (Bezet) 452 ms    Calculated P Axis 49 degrees    Calculated R Axis 53 degrees    Calculated T Axis 62 degrees    Diagnosis       Sinus rhythm with premature atrial complexes with aberrant conduction  Low voltage QRS  Borderline ECG  When compared with ECG of 05-NOV-2020 10:43,  aberrant conduction is now present        Echo Results  (Last 48 hours)    None       Cardiolite (Tc-99m Sestamibi) stress test    Signed By: Natacha Retana MD     November 9, 2020

## 2020-11-09 NOTE — PROGRESS NOTES
Problem: Dysphagia (Adult)  Goal: *Acute Goals and Plan of Care (Insert Text)  Description: Dysphagia Present:   No    Recommendations:  Diet: Regular/thin liquid  Meds: Per patient preference  Aspiration Precautions  Feeding assistance due to impaired vision    Patient will:  1. Participate in training and education related to continued aspiration risk, diet recs and compensatory strategies (goal met). Outcome: Resolved/Met    SPEECH LANGUAGE PATHOLOGY BEDSIDE SWALLOW EVALUATION AND DISCHARGE    Patient: Phil Park (80 y.o. female)  Date: 11/9/2020  Primary Diagnosis: Fever [R50.9]        Precautions: Aspiration     PLOF: SNF    ASSESSMENT :  Clinical beside swallow eval completed per MD orders. Pt A&Ox4. Functional communication. Intelligibility >90%. Cognitive-linguistic function appears intact. OM examination revealed oral motor structures functional for mastication and deglutition. Presented with thin liquid, puree, and solid trials. Exhibited + bolus cohesion, manipulation and A-P transit. Further exhibited + swallow timing/reflex and hyolaryngeal excursion. Pt able to manipulate and clear with 0 clinical s/s aspiration and/or oropharyngeal dysphagia. Pt safe for regular solid, thin liquid diet with aspiration precautions, feeding assistance. 0 formal ST needs for dysphagia indicated at this time. SLP educated pt on role of speech therapist in current setting with re: speech/swallow; verbalized comprehension. SLP available for re-evaluation if indicated by MD. Results d/w RN, Heather Gimenez. Thank you for this referral.   Dot Austin, MARIA TERESA     PLAN :  Recommendations and Planned Interventions:  No formal ST needs ID'd for dysphagia. Eval only. Discharge Recommendations: None     SUBJECTIVE:   Patient stated I have a great memory.     OBJECTIVE:     Past Medical History:   Diagnosis Date    Cancer ([facility]     vaginal and colon cancer    Cancer ([facility] [facility]     Chronic pain     arthritis    Diabetes ([facility]     DVT (deep venous thrombosis) ([facility] [address]     Hypertension     Legally blind     Thyroid disease      Past Surgical History:   Procedure Laterality Date    HX ABOVE KNEE AMPUTATION Right     HX COLOSTOMY      HX ORTHOPAEDIC      HX OTHER SURGICAL      kidney drainage tube    HX UROLOGICAL      kidney removal    IR EXCHANGE NEPHRO PERC RT SI  1/28/2019    IR EXCHANGE NEPHRO PERC RT SI  3/21/2019    IR EXCHANGE NEPHRO PERC RT SI  5/13/2019    IR EXCHANGE NEPHRO PERC RT SI  6/27/2019    IR EXCHANGE NEPHRO PERC RT SI  8/16/2019    IR EXCHANGE NEPHRO PERC RT SI  10/3/2019    IR EXCHANGE NEPHRO PERC RT SI  11/18/2019    IR EXCHANGE NEPHRO PERC RT SI  1/6/2020    IR EXCHANGE NEPHRO PERC RT SI  2/24/2020    IR EXCHANGE NEPHRO PERC RT SI  4/13/2020    IR EXCHANGE NEPHRO PERC RT SI  6/1/2020    IR EXCHANGE NEPHRO PERC RT SI  6/19/2020    IR EXCHANGE NEPHRO PERC RT SI  8/10/2020    IR EXCHANGE NEPHRO PERC RT SI  9/30/2020    IR EXCHANGE NEPHRO PERC RT SI  11/5/2020    IR NEPHROSTOMY PERC RT PLC CATH  SI  6/21/2020     Home Situation:   Home Situation  Home Environment: [address] [place] [address] Name: ([address] [place] [address])  One/Two Story Residence: Other (Comment)([facility])  Living Alone: No  Support Systems: Skilled nursing facility  Patient Expects to be Discharged to[de-identified] Skilled nursing facility  Current DME Used/Available at Home: Other (comment)    Diet prior to admission: Regular/thin  Current Diet:  Regular/thin     Cognitive and Communication Status:  Neurologic State: Alert  Orientation Level: Oriented X4  Cognition: Follows commands, Appropriate decision making  Perception: Appears intact  Perseveration: No perseveration noted  Safety/Judgement: Awareness of environment  Oral Assessment:  Oral Assessment  Labial: No impairment  Dentition: Natural  Oral Hygiene: Fair  Lingual: No impairment  Velum: No impairment  Mandible: No impairment  P.O.  Trials:  Patient Position: [facility] 45  Vocal quality prior to P.O.: No impairment  Consistency Presented: Thin liquid;Puree; Solid  How Presented: Self-fed/presented;Straw;Successive swallows     Bolus Acceptance: No impairment  Bolus Formation/Control: No impairment     Propulsion: No impairment  Oral Residue: None  Initiation of Swallow: No impairment  Laryngeal Elevation: Functional  Aspiration Signs/Symptoms: None  Pharyngeal Phase Characteristics: No impairment, issues, or problems   Effective Modifications: Small sips and bites  Cues for Modifications: None       Oral Phase Severity: No impairment  Pharyngeal Phase Severity : No impairment    PAIN:  Pain level pre-treatment: 0/10   Pain level post-treatment: 0/10     After evaluation:   []            Patient left in no apparent distress sitting up in chair  [x]            Patient left in no apparent distress in bed  [x]            Call bell left within reach  [x]            Nursing notified  []            Family present  []            Caregiver present  []            Bed alarm activated      COMMUNICATION/EDUCATION:   [x]            Aspiration precautions; swallow safety; compensatory techniques. [x]            Patient/family have participated as able in goal setting and plan of care. [x]            Patient/family agree to work toward stated goals and plan of care. []            Patient understands intent and goals of therapy; neutral about participation. []            Patient unable to participate in goal setting/plan of care; educ ongoing with interdisciplinary staff  []         Posted safety precautions in patient's room.     Thank you for this referral.  Lizzie Hawkins, MARIA TERESA  Time Calculation: 13 mins

## 2020-11-09 NOTE — PROGRESS NOTES
Report called to [address] on [facility]. Pt awake,  Responds appropriately.   Denies any pain or distress

## 2020-11-09 NOTE — PROGRESS NOTES
Problem: Risk for Spread of Infection  Goal: Prevent transmission of infectious organism to others  Description: Prevent the transmission of infectious organisms to other patients, staff members, and visitors. Outcome: Progressing Towards Goal     Problem: Pressure Injury - Risk of  Goal: *Prevention of pressure injury  Description: Document Rickie Scale and appropriate interventions in the flowsheet. Outcome: Progressing Towards Goal  Note: Pressure Injury Interventions:  Sensory Interventions: Assess changes in LOC, Float heels, Minimize linen layers    Moisture Interventions: Absorbent underpads, Contain wound drainage, Internal/External fecal devices, Internal/External urinary devices, Minimize layers    Activity Interventions: Pressure redistribution bed/mattress(bed type), Assess need for specialty bed    Mobility Interventions: Pressure redistribution bed/mattress (bed type)    Nutrition Interventions: Document food/fluid/supplement intake    Friction and Shear Interventions: Apply protective barrier, creams and emollients, Feet elevated on foot rest, Lift team/patient mobility team                Problem: Falls - Risk of  Goal: *Absence of Falls  Description: Document Romeo Fall Risk and appropriate interventions in the flowsheet.   Outcome: Progressing Towards Goal  Note: Fall Risk Interventions:  Mobility Interventions: Patient to call before getting OOB    Mentation Interventions: Adequate sleep, hydration, pain control    Medication Interventions: Patient to call before getting OOB    Elimination Interventions: Stay With Me (per policy)

## 2020-11-09 NOTE — ANESTHESIA PREPROCEDURE EVALUATION
Relevant Problems   No relevant active problems       Anesthetic History   No history of anesthetic complications            Review of Systems / Medical History  Patient summary reviewed, nursing notes reviewed and pertinent labs reviewed    Pulmonary  Within defined limits                 Neuro/Psych   Within defined limits           Cardiovascular    Hypertension: well controlled          Hyperlipidemia  Pertinent negatives: No past MI, CAD, dysrhythmias, angina and CHF  Exercise tolerance: <4 METS  Comments: H/o bacterial endocarditis   GI/Hepatic/Renal         Renal disease: CRI and ARF    Pertinent negatives: No GERD, hepatitis and liver disease  Comments: Acute on chronic renal failure secondary to sepsis from clogged nephrostomy tube - resolving Endo/Other    Diabetes  Hypothyroidism: well controlled  Obesity and anemia  Pertinent negatives: No morbid obesity and blood dyscrasia   Other Findings              Physical Exam    Airway  Mallampati: III  TM Distance: 4 - 6 cm  Neck ROM: decreased range of motion   Mouth opening: Diminished (comment)     Cardiovascular  Regular rate and rhythm,  S1 and S2 normal,  no murmur, click, rub, or gallop             Dental    Dentition: Poor dentition  Comments: Missing many upper/lower teeth, remaining teeth with many chips   Pulmonary  Breath sounds clear to auscultation               Abdominal  GI exam deferred       Other Findings            Anesthetic Plan    ASA: 3  Anesthesia type: MAC          Induction: Intravenous  Anesthetic plan and risks discussed with: Patient

## 2020-11-09 NOTE — ANESTHESIA POSTPROCEDURE EVALUATION
Post-Anesthesia Evaluation & Assessment    Visit Vitals  BP (!) 151/51   Pulse 80   Temp 37.2 °C (98.9 °F)   Resp 12   Ht 5' 2\" (1.575 m)   Wt 73.5 kg (162 lb)   SpO2 100%   BMI 29.63 kg/m²       Nausea/Vomiting: Controlled. Post-operative hydration adequate. Pain Scale 1: Numeric (0 - 10) (11/09/20 1304)  Pain Intensity 1: 0 (11/09/20 1304)   Managed    Pain score at or below stated goal level. Mental status & Level of consciousness: alert and oriented x 3    Neurological status: moves all extremities, sensation grossly intact    Pulmonary status: airway patent, adequate oxygenation. Complications related to anesthesia: none    Patient has met all PACU discharge requirements.       Bonna Him, DO

## 2020-11-09 NOTE — PROGRESS NOTES
0730- Bedside and Verbal shift change report given to Robby Chang RN (oncoming nurse) by Dayana Doyle RN (offgoing nurse). Report included the following information SBAR, Kardex, Intake/Output and MAR.    0920- Shift assessment complete. Patient states that she is having left sided chest tightness that she states has been happening for a few days but hasn't mentioned it to the care team. I notified Dr. Montrell Garcia about the patient's chest pain. She verbalized understanding. 0930- Called medic to obtain a 12 lead EKG. He verbalized understanding. 1155- Called report to Cardiac Care unit for patient. 1515- Patient returned to floor. Report for Cardiac Care included discussion Cardiac Care RN had with Dr. Montrell Garcia regarding patient's diet order being changed. Dr. Montrell Garcia placed a speech therapy consult but did not order a diet yet. 1545- Cardiac monitor placed on patient, light is on. Patient states that she is hungry. I paged Dr. Montrell Garcia again regarding diet order. She states that she is waiting for the results of speech consult due to risk of aspiration. 1645- Called Telemetry for rate and rhythm. The denied seeing a signal. Batteries on telly box changed. Telemetry verified it was working. CHAITANYA Saldivar helped call speech therapy to come as soon as possible. 1815- Speech therapy placed diet order. I called kitchen to order patient's dinner. 1945- Bedside and Verbal shift change report given to TAM Corey RN (oncoming nurse) by Robby Chang RN (offgoing nurse). Report included the following information SBAR, Kardex, Intake/Output and MAR.

## 2020-11-09 NOTE — PROGRESS NOTES
TRANSFER - IN REPORT:    Verbal report received from Cardiac Care RN (name) on Bridget Quintana  being received from [address] [place] [address] (unit) for routine progression of care      Report consisted of patients Situation, Background, Assessment and   Recommendations(SBAR). Information from the following report(s) SBAR, Kardex, Intake/Output and MAR was reviewed with the receiving nurse. Opportunity for questions and clarification was provided. Assessment completed upon patients arrival to unit and care assumed.

## 2020-11-09 NOTE — PROGRESS NOTES
Hospitalist Progress Note-critical care note     Patient: Deni Blum MRN: [id number]  CSN: [id number]    YOB: 1940  Age: [de-identified] y.o. Sex: female    DOA: 11/4/2020 LOS:  LOS: 5 days            Chief complaint: severe sepsis , bacteremia , pyelonephritis, nephrostomy tube displaced    Assessment/Plan         Hospital Problems  Date Reviewed: 11/9/2020          Codes Class Noted POA    Anticoagulated ICD-10-CM: Z79.01  ICD-9-CM: V58.61  11/5/2020 Yes        Hydronephrosis, right ICD-10-CM: N13.30  ICD-9-CM: 558  11/4/2020 Yes        * (Principal) Severe sepsis with acute organ dysfunction ([facility] [address] ICD-10-CM: A41.9, R65.20  ICD-9-CM: 038.9, 995.92  11/4/2020 Yes        S/P AKA (above knee amputation), right ([facility] [address] ICD-10-CM: L61.739  ICD-9-CM: V49.76  6/3/2019 Yes        Legally blind ICD-10-CM: H54.8  ICD-9-CM: 369.4  11/26/2018 Yes        Pyelonephritis ICD-10-CM: N12  ICD-9-CM: 590.80  5/18/2018 Yes        Nephrostomy tube displaced ([facility] [address] ICD-10-CM: W72.036M  ICD-9-CM: 997.5  5/18/2018 Yes              Chest pain -per RN reported   Stat ekg pvc  ce wnl   Add gi cocktail and ppr     Pyelonephritis/Bacteremia -Morganella morganii ssp morganii . PROTEUS MIRABILIS  Will have RICHARD today per Dr. Mikey Souza   Discussed with dr. Gabi Maria and recommend picc line   abx change to CAX now      Nephrostomy tube replaced     ANTONI - resolved   Secondary to obstruction  Successful replace and reposition of 10 F right PCN. per IR on 11/5        DM -   On SSI and lantus ,good controlled now      HTN -  Controlled     Hyponatremia- and hypomagnesemia   Na wnl , k is good also   Will check mg stat        subjective: I am fine, no chest pain    rn: chest pain   Disposition :tbd,   Review of systems:    General: No fevers or chills. Cardiovascular: No chest pain or pressure. No palpitations. Pulmonary: No shortness of breath. Gastrointestinal: No nausea, vomiting.      Vital signs/Intake and Output:  Visit Vitals  BP (!) 119/36 Pulse 86   Temp 98.1 °F (36.7 °C)   Resp 10   Ht 5' 2\" (1.575 m)   Wt 73.5 kg (162 lb)   SpO2 100%   BMI 29.63 kg/m²     Current Shift:  11/09 0701 - 11/09 1900  In: 300 [I.V.:300]  Out: 250 [Urine:200]  Last three shifts:  11/07 1901 - 11/09 0700  In: 510 [P.O.:240; I.V.:270]  Out: 3050 [Urine:2450]    Physical Exam:  General: WD, WN. Alert, cooperative, no acute distress    HEENT: NC, Atraumatic. PERRLA, anicteric sclerae. blindness   Lungs: CTA Bilaterally. No Wheezing/Rhonchi/Rales. Heart:  Regular  rhythm,  No murmur, No Rubs, No Gallops  Abdomen: Soft, Non distended, Non tender. +Bowel sounds, nephro tube , colectomy bag   Extremities: No c/c.rt aka   Psych:   Not anxious or agitated. Neurologic:  No acute neurological deficit.              Labs: Results:       Chemistry Recent Labs     11/09/20 0324 11/08/20 0525 11/07/20 0345   * 142* 185*    141 138   K 4.3 4.0 4.2   * 113* 110   CO2 21 22 23   BUN 17 20* 30*   CREA 0.88 0.98 1.58*   CA 7.4* 7.1* 7.9*   AGAP 7 6 5   BUCR 19 20 19   *  --  145*   TP 4.9*  --  5.3*   ALB 1.6*  --  1.7*   GLOB 3.3  --  3.6   AGRAT 0.5*  --  0.5*      CBC w/Diff Recent Labs     11/09/20 0324 11/08/20 0525 11/07/20 0345   WBC 7.7 6.8 9.3   RBC 2.71* 2.69* 2.88*   HGB 7.8* 7.7* 8.3*   HCT 23.7* 23.8* 26.3*    228 246   GRANS 64  --   --    LYMPH 25  --   --    EOS 3  --   --       Cardiac Enzymes Recent Labs     11/09/20  0324   CPK 12*   CKND1 CALCULATION NOT PERFORMED WHEN RESULT IS BELOW LINEAR LIMIT      Coagulation Recent Labs     11/06/20  2300   APTT >180.0*       Lipid Panel Lab Results   Component Value Date/Time    Cholesterol, total 115 08/28/2019 08:22 AM    HDL Cholesterol 39 (L) 08/28/2019 08:22 AM    LDL, calculated 46.2 08/28/2019 08:22 AM    VLDL, calculated 29.8 08/28/2019 08:22 AM    Triglyceride 149 08/28/2019 08:22 AM    CHOL/HDL Ratio 2.9 08/28/2019 08:22 AM      BNP No results for input(s): BNPP in the last 72 hours.   Liver Enzymes Recent Labs     11/09/20  0324   TP 4.9*   ALB 1.6*   *      Thyroid Studies Lab Results   Component Value Date/Time    TSH 3.40 01/24/2020 08:35 AM        Procedures/imaging: see electronic medical records for all procedures/Xrays and details which were not copied into this note but were reviewed prior to creation of Plan    Ct Abd Pelv Wo Cont    Result Date: 11/4/2020  CLINICAL:  Sepsis. Renal failure COMPARISON: January 16, 2020 TECHNIQUE: Axial CT imaging of the abdomen and pelvis was performed without intravenous contrast. Multiplanar reformats were generated. One or more dose reduction techniques were used on this CT: automated exposure control, adjustment of the mAs and/or kVp according to patient size, and iterative reconstruction techniques. The specific techniques used on this CT exam have been documented in the patient's electronic medical record. Digital Imaging and Communications in the Medicine (DICOM) format image data are available to nonaffiliated external healthcare facilities or entities on a secure, media free, reciprocally searchable basis with patient authorization for at least a 12 month period after this study. _______________ FINDINGS: LOWER CHEST: Minimal right effusion LIVER, BILIARY: Liver is normal. No biliary dilation. Gallbladder absent PANCREAS: Normal. SPLEEN: Normal. ADRENALS: Normal. KIDNEYS: Left kidney absent. Right kidney demonstrates marked hydronephrosis and proximal hydroureter. A nephrostomy catheter is present, however, this has been pulled back and the locking loop may be embedded in the parenchyma. The mid ureter has been embolized. LYMPH NODES: Retroperitoneal lymph nodes. Retrocaval node measuring 12 mm previously measuring 8 mm. Para-aortic node measuring 10 mm previously measuring 8 mm. GASTROINTESTINAL TRACT: No bowel dilation or wall thickening. Left colostomy.  PELVIC ORGANS: Uterus and bladder absent VASCULATURE: Prominent vascular calcifications BONES: Degenerative changes in both hips and the lumbar spine OTHER: None. _______________     IMPRESSION: Right nephrostomy has been pulled back. Locking loop may be embedded in the renal parenchyma. Marked right hydronephrosis. Recommend correlation with nephrostomy output. Recommend nephrostomy exchange, repositioning. Absent left kidney. Prior hysterectomy and cystectomy. Multiple pole retroperitoneal lymph nodes Mildly enlarged retroperitoneal lymph nodes, mildly increased in size from prior study     Xr Chest Port    Result Date: 11/4/2020  CLINICAL: Sepsis. Fever COMPARISON: None. A portable view of the chest from 1938 hours: The lungs and pleural spaces are clear. The mediastinum is unremarkable in appearance. IMPRESSION: No acute process. Ir Exchange Nephro Perc Rt Si    Result Date: 11/5/2020  Right percutaneous nephrostomy replaced and repositioned. 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 Dysfunctional nephrostomy with lack of drainage and CT earlier same day demonstrating retraction into the renal parenchyma. Hydronephrosis, lactic acidosis, acute renal failure, and sepsis. TECHNIQUE: Procedure, risks, benefits, and alternatives were discussed with the patient and informed, witnessed consent was obtained. There is bloody fluid in the nephrostomy bag and dry blood in the nephrostomy tube. Dry blood is seen in the nephrostomy dressing and surrounding skin. Patient is on Eliquis twice a day and INR of 2.7. In situ nephrostomy tube and surrounding skin were prepped and draped in usual sterile fashion. Patient is on intravenous Flagyl, meropenem, vancomycin, and Levaquin, therefore, additional preprocedure antibiotics were not indicated. Timeout was observed.  image was obtained. Contrast was injected demonstrating the nephrostomy tube retracted and a short tract leading into the collecting system.  In situ nephrostomy tube was removed over wire. Using a Berenstein catheter and Glidewire, tract was navigated and catheter and wire advanced into the upper collecting system. Intraluminal placement confirmed with contrast injection. Over a short Amplatz wire, a new 10 [place] APDL nephrostomy was placed and retention loop formed in the renal pelvis. Foul-smelling blood tinged urine was collected and submitted for microbiology. Contrast was injected to document intraluminal position. Contrast was also aspirated to document patency. Catheter was secured to the skin using Percu-Stay. Patient tolerated the procedure well without immediate complications. GUIDANCE: Fluoroscopy guidance was used to position (and confirm the position of) the catheter. Image(s) saved in PACS: Fluoroscopy. Fluoroscopy dose (reference air kerma): 44 mGy. IMPRESSION: 1. In situ nephrostomy retracted outside the collecting system. A new 10 [place] nephrostomy tube repositioned into the renal pelvis. 2. Moderate to severe hydronephrosis with foul-smelling bloody urine. Specimen submitted for laboratory evaluation.       Birdie Swift MD

## 2020-11-09 NOTE — WOUND CARE
IP WOUND CONSULT     [address] RECORD NUMBER:  [id number]  AGE: [de-identified] y.o. GENDER: female  : 1940  TODAY'S DATE:  2020    GENERAL     [] Follow-up   [] New Consult    Ute Necessary is a [de-identified] y.o. female referred by:   [] Physician  [] Nursing  [] Other:         PAST MEDICAL HISTORY    Past Medical History:   Diagnosis Date    Cancer ([facility] [address]     vaginal and colon cancer    Cancer ([facility] [address]     Chronic pain     arthritis    Diabetes ([facility] [address]     DVT (deep venous thrombosis) ([facility] [address]     Hypertension     Legally blind     Thyroid disease         PAST SURGICAL HISTORY    Past Surgical History:   Procedure Laterality Date    HX ABOVE KNEE AMPUTATION Right     HX COLOSTOMY      HX ORTHOPAEDIC      HX OTHER SURGICAL      kidney drainage tube    HX UROLOGICAL      kidney removal    IR EXCHANGE NEPHRO PERC RT SI  2019    IR EXCHANGE NEPHRO PERC RT SI  3/21/2019    IR EXCHANGE NEPHRO PERC RT SI  2019    IR EXCHANGE NEPHRO PERC RT SI  2019    IR EXCHANGE NEPHRO PERC RT SI  2019    IR EXCHANGE NEPHRO PERC RT SI  10/3/2019    IR EXCHANGE NEPHRO PERC RT SI  2019    IR EXCHANGE NEPHRO PERC RT SI  2020    IR EXCHANGE NEPHRO PERC RT SI  2020    IR EXCHANGE NEPHRO PERC RT SI  2020    IR EXCHANGE NEPHRO PERC RT SI  2020    IR EXCHANGE NEPHRO PERC RT SI  2020    IR EXCHANGE NEPHRO PERC RT SI  8/10/2020    IR EXCHANGE NEPHRO PERC RT SI  2020    IR EXCHANGE NEPHRO PERC RT SI  2020    IR NEPHROSTOMY PERC RT PLC CATH  SI  2020       FAMILY HISTORY    History reviewed. No pertinent family history.     SOCIAL HISTORY    Social History     Tobacco Use    Smoking status: Never Smoker    Smokeless tobacco: Never Used   Substance Use Topics    Alcohol use: No    Drug use: Never       ALLERGIES    Allergies   Allergen Reactions    Morphine Nausea and Vomiting    Pcn [Penicillins] Rash       MEDICATIONS    No current facility-administered medications on file prior to encounter. Current Outpatient Medications on File Prior to Encounter   Medication Sig Dispense Refill    LORazepam (ATIVAN) 0.5 mg tablet Take 0.5 mg by mouth every twenty-four (24) hours.  oxyCODONE IR (ROXICODONE) 5 mg immediate release tablet Take 5 mg by mouth every six (6) hours as needed for Pain.  amino acids/protein hydrolys (PRO-STAT PROFILE PO) Take  by mouth.  multivitamin (ONE A DAY) tablet Take 1 Tab by mouth daily.  ondansetron hcl (ZOFRAN) 4 mg tablet Take 4 mg by mouth every eight (8) hours as needed for Nausea or Vomiting.  ferrous sulfate 325 mg (65 mg iron) tablet Take 1 Tab by mouth daily (with breakfast). 10 Tab 0    insulin detemir U-100 (LEVEMIR U-100 INSULIN) 100 unit/mL injection 15 Units by SubCUTAneous route nightly.  diphenhydrAMINE (BENADRYL) 25 mg capsule Take 25 mg by mouth every six (6) hours as needed.  gabapentin (NEURONTIN) 300 mg capsule Take 1 Cap by mouth two (2) times a day. 2 Cap 0    cholecalciferol (VITAMIN D3) 1,000 unit tablet Take 1,000 Units by mouth daily.  apixaban (ELIQUIS PO) Take 5 mg by mouth two (2) times a day.  pollen extracts (PROSTAT PO) Take  by mouth.  tiZANidine (ZANAFLEX) 2 mg capsule Take 2 mg by mouth two (2) times a day.  traZODone (DESYREL) 50 mg tablet Take 50 mg by mouth nightly.  nystatin (MYCOSTATIN) powder Apply 100,000 Units to affected area four (4) times daily.  camphor-menthol (SARNA) 0.5-0.5 % lotion Apply  to affected area daily.  docusate sodium (COLACE) 100 mg capsule Take 100 mg by mouth two (2) times a day.  hydrocortisone (HYTONE) 2.5 % topical cream Apply  to affected area two (2) times a day. use thin layer      timolol (TIMOPTIC) 0.5 % ophthalmic solution 1 Drop two (2) times a day.  brimonidine-timolol (COMBIGAN) 0.2-0.5 % drop ophthalmic solution Administer 1 Drop to both eyes every twelve (12) hours.       bisacodyl (DULCOLAX, BISACODYL,) 10 mg suppository Insert  into rectum daily as needed. Indications: constipation      metoprolol succinate (TOPROL-XL) 50 mg XL tablet Take 50 mg by mouth daily.  magnesium hydroxide (MILK OF MAGNESIA CONCENTRATED) 2,400 mg/10 mL susp suspension Take 10 mL by mouth.  alum-mag hydroxide-simeth (MYLANTA) 200-200-20 mg/5 mL susp Take 30 mL by mouth four (4) times daily as needed.  insulin aspart U-100 (NOVOLOG FLEXPEN U-100 INSULIN) 100 unit/mL inpn by SubCUTAneous route. Sliding scale      sodium bicarbonate 650 mg tablet Take 650 mg by mouth two (2) times a day.  levothyroxine (SYNTHROID) 50 mcg tablet Take  by mouth Daily (before breakfast).  acetaminophen (TYLENOL) 650 mg TbER Take 650 mg by mouth every six (6) hours as needed for Pain or Fever.  atorvastatin calcium (ATORVASTATIN PO) Take 20 mg by mouth daily.          Wt Readings from Last 3 Encounters:   11/09/20 78.9 kg (174 lb)   06/23/20 78 kg (171 lb 15.3 oz)   01/16/20 86.2 kg (190 lb)       [unfilled]  Visit Vitals  BP (!) 149/45 (BP 1 Location: Right arm, BP Patient Position: At rest)   Pulse 85   Temp 98.9 °F (37.2 °C)   Resp 26   Ht 5' 2\" (1.575 m)   Wt 78.9 kg (174 lb)   SpO2 100%   BMI 31.83 kg/m²       ASSESSMENT     Ulcer Identification:  Ulcer Type: pressure    Contributing Factors: chronic pressure, decreased mobility and sweating    Wound Heel Left;Right (Active)   Number of days: 576       Wound Gluteal fold/cleft (Active)   Number of days: 576       Wound Foot Left;Plantar eschar to heel of left foot  06/19/20 (Active)   Wound Etiology Other (Comment) 11/08/20 2208   Dressing Status Other (Comment) 11/08/20 2208   Dressing/Treatment Open to air 11/08/20 2208   Number of days: 143       Wound Sacral/coccyx stage 2 decubitous ulcers on buttocks and sacral area 06/19/20 (Active)   Wound Image   11/09/20 1335   Wound Etiology Pressure Stage 2 11/09/20 1335   Dressing Status Other (Comment) 11/09/20 1335   Cleansed Wound cleanser 11/09/20 1335   Dressing/Treatment Foam 11/09/20 1335   Dressing Change Due 11/16/20 11/09/20 1335   Wound Length (cm) 2.5 cm 11/09/20 1335   Wound Width (cm) 2.5 cm 11/09/20 1335   Wound Depth (cm) 0.1 cm 11/09/20 1335   Wound Surface Area (cm^2) 6.25 cm^2 11/09/20 1335   Wound Volume (cm^3) 0.62 cm^3 11/09/20 1335   Drainage Amount Small 11/09/20 1335   Drainage Description Serosanguinous 11/09/20 1335   Wound Odor Other (Comment) 11/09/20 1335   Carol-Wound/Incision Assessment Maceration 11/09/20 1335   Edges Attached edges 11/09/20 1335   Wound Thickness Description Full thickness 11/09/20 1335   Number of days: 143       Wound Abdomen redness with moisture under the folds of the abdomen 06/19/20 (Active)   Number of days: 143       Wound Axilla redness and moisture to bilateral axilla  06/19/20 (Active)   Number of days: 143       Wound Abdomen Right;Quadrant; Upper faint bruise over upper right abdomen above the umbilicus 09/48/74 (Active)   Number of days: 143       Wound Toe (Comment  which one) Left left great toe  (Active)   Wound Image   11/09/20 1335   Wound Etiology Pressure Stage 2 11/09/20 1335   Dressing Status Other (Comment) 11/09/20 1335   Number of days: 0          PLAN     Skin Care & Pressure Relief Recommendations  Minimize layers of linen  Pads under patient to optimize support surface  Turn/reposition approximately every 2 hours  Offload heels pillows    Physician/Provider notified: Yes  Recommendations: foam to sacrum, keep area clean and intact     Teaching completed with:   [x] Patient           [] Family member       [] Caregiver          [] Nursing  [] Other    Patient/Caregiver Teaching:  Level of patient/caregiver understanding able to:   [x] Indicates understanding       [] Needs reinforcement  [] Unsuccessful      [] Verbal Understanding  [] Demonstrated understanding       [] No evidence of learning  [] Refused teaching [] N/A       Electronically signed by Michael Urena RN on 11/9/2020 at 1:42 PM         Avoid using doughnut foams, above is result of use, removed from under patients leg and buttocks to prevent breakdown

## 2020-11-09 NOTE — PROGRESS NOTES
Cardiology Progress Note        Patient: Elisabeth Hauser        Sex: female          DOA: 11/4/2020  YOB: 1940      Age:  [de-identified] y.o.        LOS:  LOS: 5 days   Assessment/Plan   RICHARD is done without complication. No evidence of valvular vegetation. Discussed with patient 'son.  THX         Signed By: Efrain Mathis MD     November 9, 2020

## 2020-11-09 NOTE — PROGRESS NOTES
Shift Summary:  Patient received Ativan 0.5 mg po for anxiety and sleep overnight. Nephrostomy tube intact, draining clear, yellow urine. Colostomy site pink, bag intact. Refused repositioning at this time. Denied pain or discomfort. 0720 - Bedside and Verbal shift change report given to TAM Batista RN (oncoming nurse) by Lizzie Richardson (offgoing nurse). Report included the following information SBAR, Kardex and Intake/Output.

## 2020-11-09 NOTE — PROGRESS NOTES
D/C Plan: Harry Taylor    Noted pt is not medically stable to transition from an acute care setting at this time. Harry Taylor has been notified. Transition of Care Plan:     Communication to Patient/Family:  Met with patient and family, and they are agreeable to the transition plan. The Plan for Transition of Care is related to the following treatment goals: SNF    The Patient and/or patient representative was provided with a choice of provider and agrees   with the discharge plan. Yes [x] No []    Freedom of choice list was provided with basic dialogue that supports the patient's individualized plan of care/goals and shares the quality data associated with the providers. Yes [x] No []    SNF/Rehab Transition:  Patient has been accepted to [facility] at [facility], [address] [place] [address] for SNF and meets criteria for admission. Patient will transported by medical transport and expected to leave when medically stable. Communication to SNF/Rehab:  Bedside RN,  has been notified to update the transition plan to the facility and call report [phone]. Discharge information has been updated on the AVS and communicated through [facility] or [facility]. Discharge instructions to be fax'd to facility at [phone]     Please include all hard scripts for controlled substances, med rec and dc summary, and AVS in packet. Please medicate for pain prior to dc if possible and needed to help offset delay when patient first arrives to facility. Reviewed and confirmed with facility, [facility] can manage the patient care needs for the following:     Meagan Castellano with (X) only those applicable:  Medication:  []Medications are available at the facility  []IV Antibiotics    []Controlled Substance  hard copies available sent.   []Weekly Labs    Equipment:  []CPAP/BiPAP  []Wound Vacuum  []Wright or Urinary Device  []PICC/Central Line  []Nebulizer  []Ventilator    Treatment:  []Isolation (for MRSA, VRE, etc.)  []Surgical Drain Management  []Tracheostomy Care  []Dressing Changes  []Dialysis with transportation  []PEG Care  []Oxygen  []Daily Weights for Heart Failure    Dietary:  []Any diet limitations  []Tube Feedings   []Total Parenteral Management (TPN)    Financial Resources:  []Medicaid Application Completed    []UAI Completed and copy given to pt/family. []A screening has previously been completed. []Level II Completed    [] Private pay individual who will not become   financially eligible for Medicaid within 6 months from admission to a [address] [place] [address] facility. [] Individual refused to have screening conducted. []Medicaid Application Completed    []The screening denied because it was determined individual did not need/did not qualify for nursing facility level of care. [] Out of state residents seeking direct admission to a [address] facility. [] Individuals who are inpatients of an out of state hospital, or in state or out of state veterans/ hospital and seek direct admission to a [address] facility  [] Individuals who are pateints or residents of a state owned/operated facility that is licensed by Department of [employer]) and seek direct admission to [address] [place] [address]  [] A screening not required for enrollment in [facility] as set out in [address] [place] [address] 36-  [] [facility]) staff shall perform screenings of the [facility] clients. Advanced Care Plan:  []Surrogate Decision Maker of Care  []POA  []Communicated Code Status and copy sent. Other:         Care Management Interventions  PCP Verified by CM:  Yes  Palliative Care Criteria Met (RRAT>21 & CHF Dx)?: No  Palliative Consult Recommended?: Yes  Mode of Transport at Discharge: BLS  Transition of Care Consult (CM Consult): [address]  Current Support Network: Nursing Facility  Confirm Follow Up Transport: Other (see comment)  The Patient and/or Patient Representative was Provided with a Choice of Provider and Agrees with the Discharge Plan?: Yes  Freedom of Choice List was Provided with Basic Dialogue that Supports the Patient's Individualized Plan of Care/Goals, Treatment Preferences and Shares the Quality Data Associated with the Providers?: Yes

## 2020-11-10 ENCOUNTER — APPOINTMENT (OUTPATIENT)
Dept: INTERVENTIONAL RADIOLOGY/VASCULAR | Age: 80
DRG: 698 | End: 2020-11-10
Attending: INTERNAL MEDICINE
Payer: MEDICARE

## 2020-11-10 LAB
ANION GAP SERPL CALC-SCNC: 6 MMOL/L (ref 3–18)
ANION GAP SERPL CALC-SCNC: 6 MMOL/L (ref 3–18)
ATRIAL RATE: 87 BPM
BACTERIA SPEC CULT: ABNORMAL
BUN SERPL-MCNC: 15 MG/DL (ref 7–18)
BUN SERPL-MCNC: 16 MG/DL (ref 7–18)
BUN/CREAT SERPL: 16 (ref 12–20)
BUN/CREAT SERPL: 19 (ref 12–20)
CALCIUM SERPL-MCNC: 7.1 MG/DL (ref 8.5–10.1)
CALCIUM SERPL-MCNC: 7.4 MG/DL (ref 8.5–10.1)
CALCULATED P AXIS, ECG09: 49 DEGREES
CALCULATED R AXIS, ECG10: 53 DEGREES
CALCULATED T AXIS, ECG11: 62 DEGREES
CC UR VC: ABNORMAL
CHLORIDE SERPL-SCNC: 114 MMOL/L (ref 100–111)
CHLORIDE SERPL-SCNC: 116 MMOL/L (ref 100–111)
CO2 SERPL-SCNC: 23 MMOL/L (ref 21–32)
CO2 SERPL-SCNC: 23 MMOL/L (ref 21–32)
CREAT SERPL-MCNC: 0.84 MG/DL (ref 0.6–1.3)
CREAT SERPL-MCNC: 0.92 MG/DL (ref 0.6–1.3)
DIAGNOSIS, 93000: NORMAL
ERYTHROCYTE [DISTWIDTH] IN BLOOD BY AUTOMATED COUNT: 14.8 % (ref 11.6–14.5)
ERYTHROCYTE [DISTWIDTH] IN BLOOD BY AUTOMATED COUNT: 14.9 % (ref 11.6–14.5)
EST. AVERAGE GLUCOSE BLD GHB EST-MCNC: 160 MG/DL
GLUCOSE BLD STRIP.AUTO-MCNC: 196 MG/DL (ref 70–110)
GLUCOSE BLD STRIP.AUTO-MCNC: 219 MG/DL (ref 70–110)
GLUCOSE BLD STRIP.AUTO-MCNC: 248 MG/DL (ref 70–110)
GLUCOSE BLD STRIP.AUTO-MCNC: 259 MG/DL (ref 70–110)
GLUCOSE SERPL-MCNC: 161 MG/DL (ref 74–99)
GLUCOSE SERPL-MCNC: 182 MG/DL (ref 74–99)
HBA1C MFR BLD: 7.2 % (ref 4.2–5.6)
HCT VFR BLD AUTO: 23.3 % (ref 35–45)
HCT VFR BLD AUTO: 24.6 % (ref 35–45)
HGB BLD-MCNC: 7.7 G/DL (ref 12–16)
HGB BLD-MCNC: 7.8 G/DL (ref 12–16)
MAGNESIUM SERPL-MCNC: 1.1 MG/DL (ref 1.6–2.6)
MCH RBC QN AUTO: 27.9 PG (ref 24–34)
MCH RBC QN AUTO: 28.9 PG (ref 24–34)
MCHC RBC AUTO-ENTMCNC: 31.7 G/DL (ref 31–37)
MCHC RBC AUTO-ENTMCNC: 33 G/DL (ref 31–37)
MCV RBC AUTO: 87.6 FL (ref 74–97)
MCV RBC AUTO: 87.9 FL (ref 74–97)
P-R INTERVAL, ECG05: 138 MS
PLATELET # BLD AUTO: 284 K/UL (ref 135–420)
PLATELET # BLD AUTO: 321 K/UL (ref 135–420)
PMV BLD AUTO: 9 FL (ref 9.2–11.8)
PMV BLD AUTO: 9.9 FL (ref 9.2–11.8)
POTASSIUM SERPL-SCNC: 3.7 MMOL/L (ref 3.5–5.5)
POTASSIUM SERPL-SCNC: 4.1 MMOL/L (ref 3.5–5.5)
Q-T INTERVAL, ECG07: 376 MS
QRS DURATION, ECG06: 80 MS
QTC CALCULATION (BEZET), ECG08: 452 MS
RBC # BLD AUTO: 2.66 M/UL (ref 4.2–5.3)
RBC # BLD AUTO: 2.8 M/UL (ref 4.2–5.3)
SERVICE CMNT-IMP: ABNORMAL
SODIUM SERPL-SCNC: 143 MMOL/L (ref 136–145)
SODIUM SERPL-SCNC: 145 MMOL/L (ref 136–145)
VENTRICULAR RATE, ECG03: 87 BPM
WBC # BLD AUTO: 6.1 K/UL (ref 4.6–13.2)
WBC # BLD AUTO: 7.8 K/UL (ref 4.6–13.2)

## 2020-11-10 PROCEDURE — 74011000250 HC RX REV CODE- 250: Performed by: RADIOLOGY

## 2020-11-10 PROCEDURE — 36415 COLL VENOUS BLD VENIPUNCTURE: CPT

## 2020-11-10 PROCEDURE — C1751 CATH, INF, PER/CENT/MIDLINE: HCPCS

## 2020-11-10 PROCEDURE — 77010033678 HC OXYGEN DAILY

## 2020-11-10 PROCEDURE — 74011250636 HC RX REV CODE- 250/636: Performed by: FAMILY MEDICINE

## 2020-11-10 PROCEDURE — 74011636637 HC RX REV CODE- 636/637: Performed by: FAMILY MEDICINE

## 2020-11-10 PROCEDURE — 74011250637 HC RX REV CODE- 250/637: Performed by: FAMILY MEDICINE

## 2020-11-10 PROCEDURE — 02HV33Z INSERTION OF INFUSION DEVICE INTO SUPERIOR VENA CAVA, PERCUTANEOUS APPROACH: ICD-10-PCS | Performed by: RADIOLOGY

## 2020-11-10 PROCEDURE — 99223 1ST HOSP IP/OBS HIGH 75: CPT | Performed by: NURSE PRACTITIONER

## 2020-11-10 PROCEDURE — 74011000250 HC RX REV CODE- 250: Performed by: INTERNAL MEDICINE

## 2020-11-10 PROCEDURE — 74011250636 HC RX REV CODE- 250/636: Performed by: INTERNAL MEDICINE

## 2020-11-10 PROCEDURE — 65270000029 HC RM PRIVATE

## 2020-11-10 PROCEDURE — 74011250636 HC RX REV CODE- 250/636: Performed by: RADIOLOGY

## 2020-11-10 PROCEDURE — 83735 ASSAY OF MAGNESIUM: CPT

## 2020-11-10 PROCEDURE — 80048 BASIC METABOLIC PNL TOTAL CA: CPT

## 2020-11-10 PROCEDURE — 85027 COMPLETE CBC AUTOMATED: CPT

## 2020-11-10 PROCEDURE — 74011250637 HC RX REV CODE- 250/637: Performed by: HOSPITALIST

## 2020-11-10 PROCEDURE — 74011250636 HC RX REV CODE- 250/636: Performed by: UROLOGY

## 2020-11-10 PROCEDURE — 82962 GLUCOSE BLOOD TEST: CPT

## 2020-11-10 PROCEDURE — 83036 HEMOGLOBIN GLYCOSYLATED A1C: CPT

## 2020-11-10 RX ORDER — MAGNESIUM SULFATE HEPTAHYDRATE 40 MG/ML
2 INJECTION, SOLUTION INTRAVENOUS
Status: DISCONTINUED | OUTPATIENT
Start: 2020-11-10 | End: 2020-11-10

## 2020-11-10 RX ORDER — HEPARIN SODIUM (PORCINE) LOCK FLUSH IV SOLN 100 UNIT/ML 100 UNIT/ML
500 SOLUTION INTRAVENOUS
Status: DISCONTINUED | OUTPATIENT
Start: 2020-11-10 | End: 2020-11-11 | Stop reason: HOSPADM

## 2020-11-10 RX ORDER — MAGNESIUM SULFATE HEPTAHYDRATE 40 MG/ML
2 INJECTION, SOLUTION INTRAVENOUS
Status: DISPENSED | OUTPATIENT
Start: 2020-11-10 | End: 2020-11-10

## 2020-11-10 RX ORDER — HEPARIN SODIUM 200 [USP'U]/100ML
500 INJECTION, SOLUTION INTRAVENOUS
Status: COMPLETED | OUTPATIENT
Start: 2020-11-10 | End: 2020-11-10

## 2020-11-10 RX ORDER — MAGNESIUM SULFATE HEPTAHYDRATE 40 MG/ML
2 INJECTION, SOLUTION INTRAVENOUS
Status: COMPLETED | OUTPATIENT
Start: 2020-11-11 | End: 2020-11-11

## 2020-11-10 RX ORDER — LIDOCAINE HYDROCHLORIDE 10 MG/ML
1-20 INJECTION INFILTRATION; PERINEURAL
Status: COMPLETED | OUTPATIENT
Start: 2020-11-10 | End: 2020-11-10

## 2020-11-10 RX ADMIN — MAGNESIUM SULFATE HEPTAHYDRATE 2 G: 40 INJECTION, SOLUTION INTRAVENOUS at 17:49

## 2020-11-10 RX ADMIN — PANTOPRAZOLE SODIUM 40 MG: 40 TABLET, DELAYED RELEASE ORAL at 09:07

## 2020-11-10 RX ADMIN — MAGNESIUM SULFATE HEPTAHYDRATE 2 G: 40 INJECTION, SOLUTION INTRAVENOUS at 23:30

## 2020-11-10 RX ADMIN — MAGNESIUM SULFATE HEPTAHYDRATE 2 G: 40 INJECTION, SOLUTION INTRAVENOUS at 16:59

## 2020-11-10 RX ADMIN — GABAPENTIN 300 MG: 300 CAPSULE ORAL at 09:07

## 2020-11-10 RX ADMIN — HEPARIN SODIUM IN SODIUM CHLORIDE 1000 UNITS: 200 INJECTION INTRAVENOUS at 13:36

## 2020-11-10 RX ADMIN — ENOXAPARIN SODIUM 80 MG: 80 INJECTION SUBCUTANEOUS at 01:14

## 2020-11-10 RX ADMIN — SODIUM BICARBONATE 650 MG: 650 TABLET ORAL at 21:19

## 2020-11-10 RX ADMIN — VANCOMYCIN HYDROCHLORIDE 1000 MG: 1 INJECTION, POWDER, LYOPHILIZED, FOR SOLUTION INTRAVENOUS at 09:14

## 2020-11-10 RX ADMIN — INSULIN LISPRO 4 UNITS: 100 INJECTION, SOLUTION INTRAVENOUS; SUBCUTANEOUS at 12:57

## 2020-11-10 RX ADMIN — LEVOTHYROXINE SODIUM 50 MCG: 0.05 TABLET ORAL at 09:14

## 2020-11-10 RX ADMIN — FERROUS SULFATE TAB 325 MG (65 MG ELEMENTAL FE) 325 MG: 325 (65 FE) TAB at 09:07

## 2020-11-10 RX ADMIN — HEPARIN SODIUM (PORCINE) LOCK FLUSH IV SOLN 100 UNIT/ML 500 UNITS: 100 SOLUTION at 13:35

## 2020-11-10 RX ADMIN — LORAZEPAM 0.5 MG: 0.5 TABLET ORAL at 21:25

## 2020-11-10 RX ADMIN — ATORVASTATIN CALCIUM 20 MG: 20 TABLET, FILM COATED ORAL at 21:19

## 2020-11-10 RX ADMIN — INSULIN LISPRO 6 UNITS: 100 INJECTION, SOLUTION INTRAVENOUS; SUBCUTANEOUS at 17:48

## 2020-11-10 RX ADMIN — WATER 2 G: 1 INJECTION INTRAMUSCULAR; INTRAVENOUS; SUBCUTANEOUS at 21:19

## 2020-11-10 RX ADMIN — MAGNESIUM SULFATE HEPTAHYDRATE 2 G: 40 INJECTION, SOLUTION INTRAVENOUS at 18:54

## 2020-11-10 RX ADMIN — SODIUM BICARBONATE 650 MG: 650 TABLET ORAL at 09:07

## 2020-11-10 RX ADMIN — ENOXAPARIN SODIUM 80 MG: 80 INJECTION SUBCUTANEOUS at 12:57

## 2020-11-10 RX ADMIN — Medication 10 ML: at 09:15

## 2020-11-10 RX ADMIN — LIDOCAINE HYDROCHLORIDE 3 ML: 10 INJECTION, SOLUTION INFILTRATION; PERINEURAL at 13:36

## 2020-11-10 RX ADMIN — WATER 2 G: 1 INJECTION INTRAMUSCULAR; INTRAVENOUS; SUBCUTANEOUS at 09:07

## 2020-11-10 RX ADMIN — GABAPENTIN 300 MG: 300 CAPSULE ORAL at 21:19

## 2020-11-10 RX ADMIN — B-COMPLEX W/ C & FOLIC ACID TAB 1 MG 1 TABLET: 1 TAB at 09:07

## 2020-11-10 RX ADMIN — Medication 10 ML: at 12:58

## 2020-11-10 NOTE — PROGRESS NOTES
Summary :  No distress. Requested not to be awakened during the shift for vital signs or blood sugars. Colostomy emptied x 1 of approx 350 ml green liquid. Otherwise no changes. Enteric precautions and telemetry monitoring continued.

## 2020-11-10 NOTE — CONSULTS
[facility]: [phone]  [facility]: [phone]   [facility]: [phone]    Patient Name: David Garcia  YOB: 1940    Date of Initial Consult: 11/10/2020  Reason for Consult: care decisions  Requesting Provider: Desmond Graf MD   Primary Care Physician: Jennifer Denis, Not On File      SUMMARY:   David Garcia is a [de-identified] y.o. female with a past history of Right AKA, blind, vaginal cancer, colon cancer, arthritis, T2DM, HTN, DVT colostomy, nephrostomy tubes, thyroid disease who was admitted on 11/4/2020 from [address] [place] [address] with a diagnosis of sepsis/pyelonephritis. Current medical issues leading to Palliative Medicine involvement include: advanced age, multiple co-morbidities and goals of care. PALLIATIVE DIAGNOSES:   1. Advanced care decisions  2. Sepsis/pyelonephritis  3. Pressure ulcer  4. Debility       PLAN:   1. Advanced care decisions: This NP met with patient at bedside. She is awake, alert and able to make own healthcare decisions. Patient is a  with three children: Manolo Whitfield, Eriberto Matos and a daughter, Tg Fitzpatrick who lives in [place]. Hemal's wife is Tamela Wilson. Marvel and Tg Fitzpatrick are both RN's. \"I have many nurses and doctors in my family\". Patient has a DDNR on file which is signed by her son, Jose De Jesus Villaseñor (likely due to her blindness). No AMD is on file. Patient provided permission to contact Jose De Jesus Villaseñor to discuss AMD and [address]. Telephone call placed to Jose De Jesus Villaseñor at [phone] and voicemail left requesting return call. Dela Cruz Kasi returned my call and stated he has copies of the AMD/POA documents and is surprised they are not on the chart. Informed him of placement of PICC line, date and change of PCN tube and need for 2 week antibiotic treatment. Confirmed code status as DNR. Sent email per his request to [email or web address] so he could scan and return AMD documents. GOALS OF CARE: DNR with DDNR signed and on chart. 2. Sepsis/pyelonephritis: presented from LTC with c/o fever and blood in nephrostomy bag. LTC also reported positive blood cultures positive for enterococcus faecalis and organella. Urine culture positive for proteus mirabilis, enterococcus faecalis and morganella. ID consulted and recommends midline or PICC with 2 weeks IV antibiotics. Primary team managing. 3. Pressure ulcer: Stage II on sacrum at admission. Wound care consulted and primary team managing. 4. Debility: Bedbound s/p right AKA. Blind and very [place]. Lives in [address] [place] [address]. Has nephrostomy and colostomy tubes in place. 5. Initial consult note routed to primary continuity provider  6. Communicated plan of care with: Palliative IDT    GOALS OF CARE:  Patient/Health Care Proxy Stated Goals: Prolong life      TREATMENT PREFERENCES:   Code Status: DNR    Advance Care Planning:  Advance Care Planning 11/5/2020   Patient's Healthcare Decision Maker is: -   Confirm Advance Directive Yes, on file   Patient Would Like to Complete Advance Directive -       Medical Interventions: Limited additional interventions         Other: As far as possible, the palliative care team has discussed with patient/health care proxy about goals of care/treatment preferences for patient. HISTORY:     History obtained from: chart    CHIEF COMPLAINT: should have placed PICC two weeks ago. HPI/SUBJECTIVE:    The patient is:   [x] Verbal and participatory  [] Non-participatory due to:   Patient requests I turn on channel 3 so she can \"watch\" her programs (Price is Right, Let's Make a Deal). She is unhappy that she is going to have PICC line place later today. She states \"I begged them to put it in 2 weeks ago\". Denies any current nausea, pain or shortness of breath.     Clinical Pain Assessment (nonverbal scale for nonverbal patients): Clinical Pain Assessment  Severity: 0          Duration: for how long has pt been experiencing pain (e.g., 2 days, 1 month, years)  Frequency: how often pain is an issue (e.g., several times per day, once every few days, constant)     FUNCTIONAL ASSESSMENT:     Palliative Performance Scale (PPS):  PPS: 30    ECOG  ECOG Status : Completely disabled     PSYCHOSOCIAL/SPIRITUAL SCREENING:      Any spiritual / Worship concerns:  [] Yes /  [x] No    Caregiver Burnout:  [] Yes /  [] No /  [x] No Caregiver Present      Anticipatory grief assessment:   [x] Normal  / [] Maladaptive        REVIEW OF SYSTEMS:     Positive and pertinent negative findings in ROS are noted above in HPI. The following systems were [x] reviewed / [] unable to be reviewed as noted in HPI  Other findings are noted below. Systems: constitutional, ears/nose/mouth/throat, respiratory, gastrointestinal, genitourinary, musculoskeletal, integumentary, neurologic, psychiatric, endocrine. Positive findings noted below. Modified ESAS Completed by: provider           Pain: 0     Nausea: 0     Dyspnea: 0           Stool Occurrence(s): 1        PHYSICAL EXAM:     Wt Readings from Last 3 Encounters:   11/09/20 73.5 kg (162 lb)   06/23/20 78 kg (171 lb 15.3 oz)   01/16/20 86.2 kg (190 lb)     Blood pressure (!) 155/56, pulse 95, temperature 99.2 °F (37.3 °C), resp. rate 15, height 5' 2\" (1.575 m), weight 73.5 kg (162 lb), SpO2 100 %. Pain:  Pain Scale 1: Numeric (0 - 10)  Pain Intensity 1: 0              Constitutional: Pale, elderly female lying in bed in no apparent distress. Eyes: pupils equal, anicteric  ENMT: no nasal discharge, moist mucous membranes  Respiratory: breathing unlabored  Musculoskeletal: right AKA, no tenderness to palpation  Skin: warm, dry, scattered ecchymosis.   Neurologic: following commands, moving all extremities       HISTORY:     Principal Problem:    Severe sepsis with acute organ dysfunction ([facility] (11/4/2020)    Active Problems:    Pyelonephritis (5/18/2018)      Nephrostomy tube displaced ([address] (5/18/2018)      Legally blind (11/26/2018)      Hypomagnesemia (11/28/2018)      S/P AKA (above knee amputation), right ([facility] (6/3/2019)      Bacteremia (6/24/2020)      Hydronephrosis, right (11/4/2020)      Anticoagulated (11/5/2020)      Past Medical History:   Diagnosis Date    [facility]     vaginal and colon cancer    [facility]     Chronic pain     arthritis    Diabetes [facility]     DVT [facility]deep venous thrombosis) [facility]     Hypertension     Legally blind     Thyroid disease       Past Surgical History:   Procedure Laterality Date    HX ABOVE KNEE AMPUTATION Right     HX COLOSTOMY      HX ORTHOPAEDIC      HX OTHER SURGICAL      kidney drainage tube    HX UROLOGICAL      kidney removal    IR EXCHANGE NEPHRO PERC RT SI  1/28/2019    IR EXCHANGE NEPHRO PERC RT SI  3/21/2019    IR EXCHANGE NEPHRO PERC RT SI  5/13/2019    IR EXCHANGE NEPHRO PERC RT SI  6/27/2019    IR EXCHANGE NEPHRO PERC RT SI  8/16/2019    IR EXCHANGE NEPHRO PERC RT SI  10/3/2019    IR EXCHANGE NEPHRO PERC RT SI  11/18/2019    IR EXCHANGE NEPHRO PERC RT SI  1/6/2020    IR EXCHANGE NEPHRO PERC RT SI  2/24/2020    IR EXCHANGE NEPHRO PERC RT SI  4/13/2020    IR EXCHANGE NEPHRO PERC RT SI  6/1/2020    IR EXCHANGE NEPHRO PERC RT SI  6/19/2020    IR EXCHANGE NEPHRO PERC RT SI  8/10/2020    IR EXCHANGE NEPHRO PERC RT SI  9/30/2020    IR EXCHANGE NEPHRO PERC RT SI  11/5/2020    IR NEPHROSTOMY PERC RT PLC CATH  SI  6/21/2020      History reviewed. No pertinent family history. History reviewed, no pertinent family history.   Social History     Tobacco Use    Smoking status: Never Smoker    Smokeless tobacco: Never Used   Substance Use Topics    Alcohol use: No     Allergies   Allergen Reactions    Morphine Nausea and Vomiting    Pcn [Penicillins] Rash      Current Facility-Administered Medications   Medication Dose Route Frequency    heparin (porcine) 100 unit/mL injection 500 Units  500 Units InterCATHeter Q8H PRN    magnesium sulfate 2 g/50 ml IVPB (premix or compounded)  2 g IntraVENous Q1H    [START ON 11/11/2020] Vancomycin trough due 0930 11/11/20  1 Each Other ONCE    cefepime (MAXIPIME) 2 g in sterile water (preservative free) 10 mL IV syringe  2 g IntraVENous Q12H    pantoprazole (PROTONIX) tablet 40 mg  40 mg Oral ACB    vancomycin (VANCOCIN) 1,000 mg in 0.9% sodium chloride 250 mL (VIAL-MATE)  1,000 mg IntraVENous Q24H    vit B Cmplx 3-FA-Vit C-Biotin (NEPHRO BLAYNE RX) tablet 1 Tab  1 Tab Oral DAILY    enoxaparin (LOVENOX) injection 80 mg  1 mg/kg SubCUTAneous Q12H    ferrous sulfate tablet 325 mg  325 mg Oral DAILY WITH BREAKFAST    atorvastatin (LIPITOR) tablet 20 mg  20 mg Oral QHS    levothyroxine (SYNTHROID) tablet 50 mcg  50 mcg Oral 6am    gabapentin (NEURONTIN) capsule 300 mg  300 mg Oral BID    sodium bicarbonate tablet 650 mg  650 mg Oral BID    acetaminophen (TYLENOL) tablet 1,000 mg  1,000 mg Oral Q8H PRN    Or    acetaminophen (TYLENOL) suppository 650 mg  650 mg Rectal Q6H PRN    Vancomycin-Pharmacy to dose  1 Each Other Rx Dosing/Monitoring    LORazepam (ATIVAN) tablet 0.5 mg  0.5 mg Oral BID PRN    sodium chloride (NS) flush 5-10 mL  5-10 mL IntraVENous PRN    sodium chloride (NS) flush 5-40 mL  5-40 mL IntraVENous Q8H    sodium chloride (NS) flush 5-40 mL  5-40 mL IntraVENous PRN    polyethylene glycol (MIRALAX) packet 17 g  17 g Oral DAILY PRN    promethazine (PHENERGAN) tablet 12.5 mg  12.5 mg Oral Q6H PRN    Or    ondansetron (ZOFRAN) injection 4 mg  4 mg IntraVENous Q6H PRN    insulin lispro (HUMALOG) injection   SubCUTAneous Q6H    glucose chewable tablet 16 g  16 g Oral PRN    glucagon (GLUCAGEN) injection 1 mg  1 mg IntraMUSCular PRN    dextrose 10% infusion 125-250 mL  125-250 mL IntraVENous PRN    0.9% sodium chloride infusion 250 mL  250 mL IntraVENous PRN        LAB AND IMAGING FINDINGS:     Lab Results   Component Value Date/Time    WBC 6.1 11/10/2020 07:50 AM    HGB 7.7 (L) 11/10/2020 07:50 AM    PLATELET 652 46/25/3140 07:50 AM     Lab Results   Component Value Date/Time    Sodium 143 11/10/2020 07:50 AM    Potassium 4.1 11/10/2020 07:50 AM    Chloride 114 (H) 11/10/2020 07:50 AM    CO2 23 11/10/2020 07:50 AM    BUN 16 11/10/2020 07:50 AM    Creatinine 0.84 11/10/2020 07:50 AM    Calcium 7.1 (L) 11/10/2020 07:50 AM    Magnesium 1.1 (L) 11/10/2020 07:50 AM    Phosphorus 3.6 11/04/2020 06:10 PM      Lab Results   Component Value Date/Time    Alk. phosphatase 260 (H) 11/09/2020 03:24 AM    Protein, total 4.9 (L) 11/09/2020 03:24 AM    Albumin 1.6 (L) 11/09/2020 03:24 AM    Globulin 3.3 11/09/2020 03:24 AM     Lab Results   Component Value Date/Time    INR 2.7 (H) 11/04/2020 06:10 PM    Prothrombin time 27.7 (H) 11/04/2020 06:10 PM    aPTT >180.0 (HH) 11/06/2020 11:00 PM      Lab Results   Component Value Date/Time    Iron 10 (L) 06/23/2020 12:56 AM    TIBC 123 (L) 06/23/2020 12:56 AM    Iron % saturation 8 (L) 06/23/2020 12:56 AM      No results found for: PH, PCO2, PO2  No components found for: [place]   Lab Results   Component Value Date/Time    CK 12 (L) 11/09/2020 03:24 AM    CK - MB <1.0 11/09/2020 03:24 AM              Total time: 70 minutes  Counseling / coordination time, spent as noted above > 50% counseling / coordination: 60 minutes with patient and family. Prolonged service was provided for  []30 min   []75 min in face to face time in the presence of the patient, spent as noted above. Time Start:   Time End:   Note: this can only be billed with 08071 (initial) or 69182 (follow up). If multiple start / stop times, list each separately.

## 2020-11-10 NOTE — DIABETES MGMT
GLYCEMIC CONTROL PROGRESS NOTE:    - known h/o T2DM, HbA1C ordered per protocol, basal home regimen  - BG out of target range non-ICU: < 180 mg/dL  - TDD = 8 units - Humalog Normal Insulin Sensitivity Corrective Coverage  - recommend monitor trends, pt may benefit from scheduled dose of insulin    Recent Glucose Results:   Lab Results   Component Value Date/Time     (H) 11/10/2020 07:50 AM    GLUCPOC 248 (H) 11/10/2020 11:44 AM    GLUCPOC 196 (H) 11/10/2020 12:18 AM    GLUCPOC 170 (H) 11/09/2020 06:22 PM     Gonzalo Delgado MS, RN, CDE  Glycemic Control Team  [phone]  Pager [phone] (M-TH 8:00-4:30P)  *After Hours pager [phone]

## 2020-11-10 NOTE — PROGRESS NOTES
Patient assisted with compliance to current plan of care as directed.    Reginaldo Little  11/10/2020  9:56 AM

## 2020-11-10 NOTE — PROGRESS NOTES
Hospitalist Progress Note-critical care note     Patient: Bridget Quintana MRN: [id number]  CSN: [id number]    YOB: 1940  Age: [de-identified] y.o. Sex: female    DOA: 11/4/2020 LOS:  LOS: 6 days            Chief complaint: severe sepsis , bacteremia , pyelonephritis, nephrostomy tube displaced    Assessment/Plan         Hospital Problems  Date Reviewed: 11/9/2020          Codes Class Noted POA    Anticoagulated ICD-10-CM: Z79.01  ICD-9-CM: V58.61  11/5/2020 Yes        Hydronephrosis, right ICD-10-CM: N13.30  ICD-9-CM: 925  11/4/2020 Yes        * (Principal) Severe sepsis with acute organ dysfunction ([facility] ICD-10-CM: A41.9, R65.20  ICD-9-CM: 038.9, 995.92  11/4/2020 Yes        S/P AKA (above knee amputation), right ([facility].) ICD-10-CM: G18.014  ICD-9-CM: V49.76  6/3/2019 Yes        Hypomagnesemia ICD-10-CM: E83.42  ICD-9-CM: 275.2  11/28/2018 Yes        Legally blind ICD-10-CM: H54.8  ICD-9-CM: 369.4  11/26/2018 Yes        Pyelonephritis ICD-10-CM: N12  ICD-9-CM: 590.80  5/18/2018 Yes        Nephrostomy tube displaced ([facility] ICD-10-CM: E36.026S  ICD-9-CM: 997.5  5/18/2018 Yes              Chest pain -per RN reported   No chest pain over night       Pyelonephritis/Bacteremia -Morganella morganii ssp morganii . PROTEUS MIRABILIS  RICHARD done no vegetation   Will have  picc line today   Continue iv abx      Nephrostomy tube replaced     ANTONI - resolved   Secondary to obstruction  Successful replace and reposition of 10 F right PCN. per IR on 11/5        DM -   On SSI and lantus ,good controlled now      HTN -  Controlled     Hyponatremia- and hypomagnesemia   Mg replacement      subjective: I am fine, may I go home? You will do picc line today ? Disposition :tbd,   Review of systems:    General: No fevers or chills. Cardiovascular: No chest pain or pressure. No palpitations. Pulmonary: No shortness of breath. Gastrointestinal: No nausea, vomiting.      Vital signs/Intake and Output:  Visit Vitals  BP (!) 155/56 (BP 1 Location: Right arm, BP Patient Position: At rest)   Pulse 95   Temp 99.2 °F (37.3 °C)   Resp 15   Ht 5' 2\" (1.575 m)   Wt 73.5 kg (162 lb)   SpO2 100%   BMI 29.63 kg/m²     Current Shift:  11/10 0701 - 11/10 1900  In: 460 [P.O.:460]  Out: 975 [Urine:525]  Last three shifts:  11/08 1901 - 11/10 0700  In: 300 [I.V.:300]  Out: 1975 [Urine:1375]    Physical Exam:  General: WD, WN. Alert, cooperative, no acute distress    HEENT: NC, Atraumatic. PERRLA, anicteric sclerae. blindness   Lungs: CTA Bilaterally. No Wheezing/Rhonchi/Rales. Heart:  Regular  rhythm,  No murmur, No Rubs, No Gallops  Abdomen: Soft, Non distended, Non tender. +Bowel sounds, nephro tube , colectomy bag with stool noted   Extremities: No c/c.rt aka   Psych:   Not anxious or agitated. Neurologic:  No acute neurological deficit. Labs: Results:       Chemistry Recent Labs     11/10/20  0750 11/09/20  0324 11/08/20  0525   * 174* 142*    141 141   K 4.1 4.3 4.0   * 113* 113*   CO2 23 21 22   BUN 16 17 20*   CREA 0.84 0.88 0.98   CA 7.1* 7.4* 7.1*   AGAP 6 7 6   BUCR 19 19 20   AP  --  260*  --    TP  --  4.9*  --    ALB  --  1.6*  --    GLOB  --  3.3  --    AGRAT  --  0.5*  --       CBC w/Diff Recent Labs     11/10/20  0750 11/09/20  0324 11/08/20  0525   WBC 6.1 7.7 6.8   RBC 2.66* 2.71* 2.69*   HGB 7.7* 7.8* 7.7*   HCT 23.3* 23.7* 23.8*    258 228   GRANS  --  64  --    LYMPH  --  25  --    EOS  --  3  --       Cardiac Enzymes Recent Labs     11/09/20  0324   CPK 12*   CKND1 CALCULATION NOT PERFORMED WHEN RESULT IS BELOW LINEAR LIMIT      Coagulation No results for input(s): PTP, INR, APTT, INREXT, INREXT in the last 72 hours.     Lipid Panel Lab Results   Component Value Date/Time    Cholesterol, total 115 08/28/2019 08:22 AM    HDL Cholesterol 39 (L) 08/28/2019 08:22 AM    LDL, calculated 46.2 08/28/2019 08:22 AM    VLDL, calculated 29.8 08/28/2019 08:22 AM    Triglyceride 149 08/28/2019 08:22 AM    CHOL/HDL Ratio 2.9 08/28/2019 08:22 AM      BNP No results for input(s): BNPP in the last 72 hours. Liver Enzymes Recent Labs     11/09/20  0324   TP 4.9*   ALB 1.6*   *      Thyroid Studies Lab Results   Component Value Date/Time    TSH 3.40 01/24/2020 08:35 AM        Procedures/imaging: see electronic medical records for all procedures/Xrays and details which were not copied into this note but were reviewed prior to creation of Plan    Ct Abd Pelv Wo Cont    Result Date: 11/4/2020  CLINICAL:  Sepsis. Renal failure COMPARISON: January 16, 2020 TECHNIQUE: Axial CT imaging of the abdomen and pelvis was performed without intravenous contrast. Multiplanar reformats were generated. One or more dose reduction techniques were used on this CT: automated exposure control, adjustment of the mAs and/or kVp according to patient size, and iterative reconstruction techniques. The specific techniques used on this CT exam have been documented in the patient's electronic medical record. Digital Imaging and Communications in the Medicine (DICOM) format image data are available to nonaffiliated external healthcare facilities or entities on a secure, media free, reciprocally searchable basis with patient authorization for at least a 12 month period after this study. _______________ FINDINGS: LOWER CHEST: Minimal right effusion LIVER, BILIARY: Liver is normal. No biliary dilation. Gallbladder absent PANCREAS: Normal. SPLEEN: Normal. ADRENALS: Normal. KIDNEYS: Left kidney absent. Right kidney demonstrates marked hydronephrosis and proximal hydroureter. A nephrostomy catheter is present, however, this has been pulled back and the locking loop may be embedded in the parenchyma. The mid ureter has been embolized. LYMPH NODES: Retroperitoneal lymph nodes. Retrocaval node measuring 12 mm previously measuring 8 mm. Para-aortic node measuring 10 mm previously measuring 8 mm. GASTROINTESTINAL TRACT: No bowel dilation or wall thickening.  Left colostomy. PELVIC ORGANS: Uterus and bladder absent VASCULATURE: Prominent vascular calcifications BONES: Degenerative changes in both hips and the lumbar spine OTHER: None. _______________     IMPRESSION: Right nephrostomy has been pulled back. Locking loop may be embedded in the renal parenchyma. Marked right hydronephrosis. Recommend correlation with nephrostomy output. Recommend nephrostomy exchange, repositioning. Absent left kidney. Prior hysterectomy and cystectomy. Multiple pole retroperitoneal lymph nodes Mildly enlarged retroperitoneal lymph nodes, mildly increased in size from prior study     Xr Chest Port    Result Date: 11/4/2020  CLINICAL: Sepsis. Fever COMPARISON: None. A portable view of the chest from 1938 hours: The lungs and pleural spaces are clear. The mediastinum is unremarkable in appearance. IMPRESSION: No acute process. Ir Exchange Nephro Perc Rt Si    Result Date: 11/5/2020  Right percutaneous nephrostomy replaced and repositioned. 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 Dysfunctional nephrostomy with lack of drainage and CT earlier same day demonstrating retraction into the renal parenchyma. Hydronephrosis, lactic acidosis, acute renal failure, and sepsis. TECHNIQUE: Procedure, risks, benefits, and alternatives were discussed with the patient and informed, witnessed consent was obtained. There is bloody fluid in the nephrostomy bag and dry blood in the nephrostomy tube. Dry blood is seen in the nephrostomy dressing and surrounding skin. Patient is on Eliquis twice a day and INR of 2.7. In situ nephrostomy tube and surrounding skin were prepped and draped in usual sterile fashion. Patient is on intravenous Flagyl, meropenem, vancomycin, and Levaquin, therefore, additional preprocedure antibiotics were not indicated. Timeout was observed.  image was obtained.  Contrast was injected demonstrating the nephrostomy tube retracted and a short tract leading into the collecting system. In situ nephrostomy tube was removed over wire. Using a Berenstein catheter and Glidewire, tract was navigated and catheter and wire advanced into the upper collecting system. Intraluminal placement confirmed with contrast injection. Over a short Amplatz wire, a new 10 [place] APDL nephrostomy was placed and retention loop formed in the renal pelvis. Foul-smelling blood tinged urine was collected and submitted for microbiology. Contrast was injected to document intraluminal position. Contrast was also aspirated to document patency. Catheter was secured to the skin using Percu-Stay. Patient tolerated the procedure well without immediate complications. GUIDANCE: Fluoroscopy guidance was used to position (and confirm the position of) the catheter. Image(s) saved in PACS: Fluoroscopy. Fluoroscopy dose (reference air kerma): 44 mGy. IMPRESSION: 1. In situ nephrostomy retracted outside the collecting system. A new 10 Greek nephrostomy tube repositioned into the renal pelvis. 2. Moderate to severe hydronephrosis with foul-smelling bloody urine. Specimen submitted for laboratory evaluation.       Willow Manzo MD

## 2020-11-10 NOTE — PROCEDURES
Interventional Radiology Brief Procedure Note    Performed By:  Edis Roberts PA-C    Attending Radiologist: Camron Fajardo MD    Pre-operative Diagnosis:  Need for long term IV antibiotics    Post-operative Diagnosis: Same as pre-op diagnosis    Procedure(s) Performed:  Ultrasound & fluoroscopic guided PICC line placement    Anesthesia:  Local      Findings:  Successful left arm dual lumen 5F PICC line placement. Please see dictated report for details. Complications: None immediate    Estimated Blood Loss:  Minimal    Specimens: None    Condition: Stable    Disposition:  Return to nursing unit. PICC line is ready for immediate use.        AXEL Randhawa Cera [facility]  Vascular & Interventional Radiology  11/10/2020

## 2020-11-10 NOTE — PROGRESS NOTES
Physician Progress Note      PATIENT:               Aditya Kiser  CSN #:                  [id number]  :                       1940  ADMIT DATE:       2020 5:26 PM  [address] [place] DATE:  RESPONDING  PROVIDER #:        Kristin YOON MD          QUERY TEXT:    Patient admitted with Severe Sepsis with pyelonephritis and organ dysfunction,  Per wound care ,pt noted to have a stage 2 Pressure ulcer to Sacrum, stage 2 to Left great toe. . If possible, please document in progress notes and discharge summary the type of ulcer, site of the ulcer and present on admission status of the ulcer: The medical record reflects the following:  Risk Factors: age, hx of ca , pyelonephritis, neprostomy tubes,  Clinical Indicators: wound care notes    Treatment:  minimize layer of linen, turn 2 hours, heel offloaded on pillows, wound cleanser    Thank you,  Tana Ignacio RN/CDI  [phone]  Options provided:  -- Decubitus Pressure Ulcer to Sacrum and Left toe  present on admission  -- Decubitus Pressure Ulcer to Sacrum and left toe not present on admission  -- Other - I will add my own diagnosis  -- Disagree - Not applicable / Not valid  -- Disagree - Clinically unable to determine / Unknown  -- Refer to Clinical Documentation Reviewer    PROVIDER RESPONSE TEXT:    This patient has a decubitus pressure ulcer to Sacrum and left toe that was present on admission.     Query created by: Elieser Tam on 11/10/2020 9:52 AM      Electronically signed by:  Kristin YOON MD 11/10/2020 12:00 PM

## 2020-11-10 NOTE — PROGRESS NOTES
Spoke with provider and have been notified pt will likely transition back to [facility] with in the next 24 hours. CM attempted to contact pt's son, Sissy العراقي ([phone]), and left a message requesting a return call. Noted provider has spoken with pt's son, Sissy العراقي, and he is aware of plan return to [facility]. [facility] is awrae and in agreement with plan return. Transition of Care Plan:     Communication to Patient/Family:  Met with patient and family, and they are agreeable to the transition plan. The Plan for Transition of Care is related to the following treatment goals: SNF    The Patient and/or patient representative was provided with a choice of provider and agrees   with the discharge plan. Yes [x] No []    Freedom of choice list was provided with basic dialogue that supports the patient's individualized plan of care/goals and shares the quality data associated with the providers. Yes [x] No []    SNF/Rehab Transition:  Patient has been accepted to [facility] at [address] [place] [address], [address] [place] [address] for SNF and meets criteria for admission. Patient will transported by medical transport and expected to leave when medically stable. Communication to SNF/Rehab:  Bedside RN,  has been notified to update the transition plan to the facility and call report [phone]. Discharge information has been updated on the AVS and communicated through [facility] or [facility]. Discharge instructions to be fax'd to facility at [phone]     Please include all hard scripts for controlled substances, med rec and dc summary, and AVS in packet. Please medicate for pain prior to dc if possible and needed to help offset delay when patient first arrives to facility.     Reviewed and confirmed with facility, [facility] can manage the patient care needs for the following:     [place] with (X) only those applicable:  Medication:  []Medications are available at the facility  []IV Antibiotics    []Controlled Substance  hard copies available sent. []Weekly Labs    Equipment:  []CPAP/BiPAP  []Wound Vacuum  []Wright or Urinary Device  []PICC/Central Line  []Nebulizer  []Ventilator    Treatment:  []Isolation (for MRSA, VRE, etc.)  []Surgical Drain Management  []Tracheostomy Care  []Dressing Changes  []Dialysis with transportation  []PEG Care  []Oxygen  []Daily Weights for Heart Failure    Dietary:  []Any diet limitations  []Tube Feedings   []Total Parenteral Management (TPN)    Financial Resources:  []Medicaid Application Completed    []UAI Completed and copy given to pt/family. []A screening has previously been completed. []Level II Completed    [] Private pay individual who will not become   financially eligible for Medicaid within 6 months from admission to a [address] [place] [address] facility. [] Individual refused to have screening conducted. []Medicaid Application Completed    []The screening denied because it was determined individual did not need/did not qualify for nursing facility level of care. [] Out of state residents seeking direct admission to a [address] facility. [] Individuals who are inpatients of an out of state hospital, or in state or out of state veterans/ hospital and seek direct admission to a [address] facility  [] Individuals who are pateints or residents of a state owned/operated facility that is licensed by [employer] and seek direct admission to [address] [place] [address]  [] A screening not required for enrollment in [address] [address] [address] services as set out in [address] [place] [address] 90-  [] [facility]) staff shall perform screenings of the [facility] clients. Advanced Care Plan:  []Surrogate Decision Maker of Care  []POA  []Communicated Code Status and copy sent. Other:         Care Management Interventions  PCP Verified by CM:  Yes  Palliative Care Criteria Met (RRAT>21 & CHF Dx)?: No  Palliative Consult Recommended?: Yes  Mode of Transport at Discharge: BLS  Transition of Care Consult (CM Consult): [facility]  Current Support Network: Nursing Facility  Confirm Follow Up Transport: Other (see comment)  The Patient and/or Patient Representative was Provided with a Choice of Provider and Agrees with the Discharge Plan?: Yes  Freedom of Choice List was Provided with Basic Dialogue that Supports the Patient's Individualized Plan of Care/Goals, Treatment Preferences and Shares the Quality Data Associated with the Providers?: Yes

## 2020-11-10 NOTE — CONSULTS
Brief MICRO/HH/RICHARD note    RICHARD (-)  Better. Follow up BCx sterile    Either midline or PICC for 2wks IV abx.   Vancomycin at current dosing  Cefepime 2gm IV q12h  Terminate - [id number]  Weekly labs (qMondays) - CBC with diff, BMP, and vanco level for PHARM to dose; fax to me [phone]    Pierre Waldron MD  Cell [phone]  [facility]

## 2020-11-11 VITALS
HEART RATE: 95 BPM | DIASTOLIC BLOOD PRESSURE: 49 MMHG | SYSTOLIC BLOOD PRESSURE: 155 MMHG | WEIGHT: 166.13 LBS | OXYGEN SATURATION: 100 % | RESPIRATION RATE: 17 BRPM | HEIGHT: 62 IN | BODY MASS INDEX: 30.57 KG/M2 | TEMPERATURE: 98.7 F

## 2020-11-11 LAB
ANION GAP SERPL CALC-SCNC: 9 MMOL/L (ref 3–18)
BUN SERPL-MCNC: 14 MG/DL (ref 7–18)
BUN/CREAT SERPL: 17 (ref 12–20)
CALCIUM SERPL-MCNC: 7.6 MG/DL (ref 8.5–10.1)
CHLORIDE SERPL-SCNC: 113 MMOL/L (ref 100–111)
CO2 SERPL-SCNC: 21 MMOL/L (ref 21–32)
CREAT SERPL-MCNC: 0.84 MG/DL (ref 0.6–1.3)
ERYTHROCYTE [DISTWIDTH] IN BLOOD BY AUTOMATED COUNT: 15.1 % (ref 11.6–14.5)
GLUCOSE BLD STRIP.AUTO-MCNC: 211 MG/DL (ref 70–110)
GLUCOSE BLD STRIP.AUTO-MCNC: 226 MG/DL (ref 70–110)
GLUCOSE SERPL-MCNC: 200 MG/DL (ref 74–99)
HCT VFR BLD AUTO: 24.9 % (ref 35–45)
HGB BLD-MCNC: 8.1 G/DL (ref 12–16)
MAGNESIUM SERPL-MCNC: 4.6 MG/DL (ref 1.6–2.6)
MCH RBC QN AUTO: 28.4 PG (ref 24–34)
MCHC RBC AUTO-ENTMCNC: 32.5 G/DL (ref 31–37)
MCV RBC AUTO: 87.4 FL (ref 74–97)
PLATELET # BLD AUTO: 385 K/UL (ref 135–420)
PMV BLD AUTO: 9.1 FL (ref 9.2–11.8)
POTASSIUM SERPL-SCNC: 3.7 MMOL/L (ref 3.5–5.5)
RBC # BLD AUTO: 2.85 M/UL (ref 4.2–5.3)
SODIUM SERPL-SCNC: 143 MMOL/L (ref 136–145)
WBC # BLD AUTO: 6.9 K/UL (ref 4.6–13.2)

## 2020-11-11 PROCEDURE — 82962 GLUCOSE BLOOD TEST: CPT

## 2020-11-11 PROCEDURE — 74011250636 HC RX REV CODE- 250/636: Performed by: INTERNAL MEDICINE

## 2020-11-11 PROCEDURE — 74011250636 HC RX REV CODE- 250/636: Performed by: FAMILY MEDICINE

## 2020-11-11 PROCEDURE — 85027 COMPLETE CBC AUTOMATED: CPT

## 2020-11-11 PROCEDURE — 83735 ASSAY OF MAGNESIUM: CPT

## 2020-11-11 PROCEDURE — 74011000250 HC RX REV CODE- 250: Performed by: INTERNAL MEDICINE

## 2020-11-11 PROCEDURE — 80048 BASIC METABOLIC PNL TOTAL CA: CPT

## 2020-11-11 PROCEDURE — 74011250637 HC RX REV CODE- 250/637: Performed by: HOSPITALIST

## 2020-11-11 PROCEDURE — 74011636637 HC RX REV CODE- 636/637: Performed by: FAMILY MEDICINE

## 2020-11-11 PROCEDURE — 74011250637 HC RX REV CODE- 250/637: Performed by: FAMILY MEDICINE

## 2020-11-11 PROCEDURE — 36415 COLL VENOUS BLD VENIPUNCTURE: CPT

## 2020-11-11 RX ORDER — NALOXONE HYDROCHLORIDE 4 MG/.1ML
SPRAY NASAL
Qty: 2 EACH | Refills: 0 | Status: SHIPPED | OUTPATIENT
Start: 2020-11-11 | End: 2021-03-25

## 2020-11-11 RX ORDER — INSULIN LISPRO 100 [IU]/ML
INJECTION, SOLUTION INTRAVENOUS; SUBCUTANEOUS EVERY 6 HOURS
Status: DISCONTINUED | OUTPATIENT
Start: 2020-11-11 | End: 2020-11-11 | Stop reason: HOSPADM

## 2020-11-11 RX ORDER — GABAPENTIN 300 MG/1
300 CAPSULE ORAL 2 TIMES DAILY
Qty: 2 CAP | Refills: 0 | Status: ON HOLD | OUTPATIENT
Start: 2020-11-11 | End: 2021-03-20 | Stop reason: SINTOL

## 2020-11-11 RX ORDER — LORAZEPAM 0.5 MG/1
0.5 TABLET ORAL
Qty: 2 TAB | Refills: 0 | Status: SHIPPED | OUTPATIENT
Start: 2020-11-11 | End: 2021-12-10

## 2020-11-11 RX ADMIN — LEVOTHYROXINE SODIUM 50 MCG: 0.05 TABLET ORAL at 06:38

## 2020-11-11 RX ADMIN — PANTOPRAZOLE SODIUM 40 MG: 40 TABLET, DELAYED RELEASE ORAL at 06:39

## 2020-11-11 RX ADMIN — Medication 10 ML: at 06:43

## 2020-11-11 RX ADMIN — SODIUM BICARBONATE 650 MG: 650 TABLET ORAL at 08:52

## 2020-11-11 RX ADMIN — WATER 2 G: 1 INJECTION INTRAMUSCULAR; INTRAVENOUS; SUBCUTANEOUS at 09:02

## 2020-11-11 RX ADMIN — Medication 10 ML: at 14:00

## 2020-11-11 RX ADMIN — ENOXAPARIN SODIUM 80 MG: 80 INJECTION SUBCUTANEOUS at 00:23

## 2020-11-11 RX ADMIN — ENOXAPARIN SODIUM 80 MG: 80 INJECTION SUBCUTANEOUS at 13:12

## 2020-11-11 RX ADMIN — Medication 10 ML: at 00:24

## 2020-11-11 RX ADMIN — GABAPENTIN 300 MG: 300 CAPSULE ORAL at 08:52

## 2020-11-11 RX ADMIN — B-COMPLEX W/ C & FOLIC ACID TAB 1 MG 1 TABLET: 1 TAB at 08:52

## 2020-11-11 RX ADMIN — INSULIN LISPRO 4 UNITS: 100 INJECTION, SOLUTION INTRAVENOUS; SUBCUTANEOUS at 08:53

## 2020-11-11 RX ADMIN — VANCOMYCIN HYDROCHLORIDE 1000 MG: 1 INJECTION, POWDER, LYOPHILIZED, FOR SOLUTION INTRAVENOUS at 09:02

## 2020-11-11 RX ADMIN — INSULIN LISPRO 6 UNITS: 100 INJECTION, SOLUTION INTRAVENOUS; SUBCUTANEOUS at 13:12

## 2020-11-11 RX ADMIN — FERROUS SULFATE TAB 325 MG (65 MG ELEMENTAL FE) 325 MG: 325 (65 FE) TAB at 08:52

## 2020-11-11 NOTE — CONSULTS
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  Since having it replaced 11/5, her symptoms have settled with improving fever curve.     Retired CNA     CC:  none    Interval History:  Last few days reviewed/tracked (See note yesterday). Got her RICHARD (negative) and PICC placed yesterday. Feels better. Current Antimicrobials:    Prior Antimicrobials:  1. Cefepime IV (11/9-) #4  2. Vanco IV (11/5-) #6 1. Levo IV (11/4)  2. Meropenem IV (11/4-7)  3. CAX IV (11/7-9)       Assessment: Rec / Plan:   Limited community-acquired Enterococcus faecalis/Morganella morganii  sepsis - in conjunction with R nephrotomy tube dysfunction/accidental plugging    RICHARD negative; limited enterococci bacteremia in setting of /nephrostomy tube dysfunction (again). Repaired. No ESBL recovery this time, so can do two-tello with the cefepime to adjunct enterococci treatment along with killing the GNRs out of her blood/urine. Tough case. ->Vanco/cefepime #4      Give her 10 more days of cefepime and vancomycin at current dosing - terminate [id number]  Weekly labs (qMondays) - CBC with diff, BMP, and vanco level for PHARM to dose; fax to me [phone]    No follow up me necessary.    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    ANTONI on CKD - improved    R renal calcinosis            Microbiology:                11/8 - BCx (-)                                         11/5 - UA (+) Morganella morganii (R - gent/nitro/tmp-smx), Enterococcus faecalis, and Proteus mirabilis (R - FQ, nitro, and tmp-smx)                                         11/4 - BCx 2/2 (+) Morganella morganii (R - gent/nitro/tmp-smx), Enterococcus faecalis                                            8/11 - UA (+) Proteus mirabilis (R - FQ, nitro, and tmp-smx)        Lines / Jenasie Litten PICC        Patient Active Problem List   Diagnosis Code    Pyelonephritis N12    S/p nephrectomy Z90.5    Right flank pain R10.9    Malfunction of nephrostomy tube ([facility] T83.098A    Nephrostomy tube displaced ([address] T83.022A    UTI (urinary tract infection) N39.0    Elevated WBC count D72.829    Diabetes ([facility]) E11.9    Legally blind H54.8    Hypertension I10    Thyroid disease E07.9    Elevated serum creatinine R79.89    S/P colectomy Z90.49    Skin lesion of foot L98.9    Tear of skin of buttock S31.801A    Pressure injury of right heel, unstageable ([facility]) L89.610    Hypomagnesemia E83.42    Hyperkalemia E87.5    Heel ulcer ([facility]) L97.409    ANTONI (acute kidney injury) ([facility] N17.9    S/P AKA (above knee amputation), right ([facility]) Z89.611    Nephrostomy status ([facility]) Z93.6    Obstructed nephrostomy tube ([facility]) T83.092A    Nephrostomy complication ([facility]) Y23.572    Acute renal failure (ARF) ([facility]) N17.9    Elevated INR R79.1    Bacteremia R78.81    Enterococcus faecalis infection B95.2    CKD stage 3 due to type 2 diabetes mellitus ([facility]) E11.22, N18.30    Hydronephrosis, right N13.30    Severe sepsis with acute organ dysfunction ([facility]) A41.9, R65.20    Anticoagulated Z79.01       Current Facility-Administered Medications   Medication Dose Route Frequency    insulin lispro (HUMALOG) injection   SubCUTAneous Q6H    heparin (porcine) 100 unit/mL injection 500 Units  500 Units InterCATHeter Q8H PRN    Vancomycin trough due 0930 11/11/20  1 Each Other ONCE    cefepime (MAXIPIME) 2 g in sterile water (preservative free) 10 mL IV syringe  2 g IntraVENous Q12H    pantoprazole (PROTONIX) tablet 40 mg  40 mg Oral ACB    vancomycin (VANCOCIN) 1,000 mg in 0.9% sodium chloride 250 mL (VIAL-MATE)  1,000 mg IntraVENous Q24H    vit B Cmplx 3-FA-Vit C-Biotin (NEPHRO BLAYNE RX) tablet 1 Tab  1 Tab Oral DAILY    enoxaparin (LOVENOX) injection 80 mg  1 mg/kg SubCUTAneous Q12H    ferrous sulfate tablet 325 mg  325 mg Oral DAILY WITH BREAKFAST    atorvastatin (LIPITOR) tablet 20 mg  20 mg Oral QHS    levothyroxine (SYNTHROID) tablet 50 mcg  50 mcg Oral 6am    gabapentin (NEURONTIN) capsule 300 mg  300 mg Oral BID    sodium bicarbonate tablet 650 mg  650 mg Oral BID    acetaminophen (TYLENOL) tablet 1,000 mg  1,000 mg Oral Q8H PRN    Or    acetaminophen (TYLENOL) suppository 650 mg  650 mg Rectal Q6H PRN    Vancomycin-Pharmacy to dose  1 Each Other Rx Dosing/Monitoring    LORazepam (ATIVAN) tablet 0.5 mg  0.5 mg Oral BID PRN    sodium chloride (NS) flush 5-10 mL  5-10 mL IntraVENous PRN    sodium chloride (NS) flush 5-40 mL  5-40 mL IntraVENous Q8H    sodium chloride (NS) flush 5-40 mL  5-40 mL IntraVENous PRN    polyethylene glycol (MIRALAX) packet 17 g  17 g Oral DAILY PRN    promethazine (PHENERGAN) tablet 12.5 mg  12.5 mg Oral Q6H PRN    Or    ondansetron (ZOFRAN) injection 4 mg  4 mg IntraVENous Q6H PRN    glucose chewable tablet 16 g  16 g Oral PRN    glucagon (GLUCAGEN) injection 1 mg  1 mg IntraMUSCular PRN    dextrose 10% infusion 125-250 mL  125-250 mL IntraVENous PRN    0.9% sodium chloride infusion 250 mL  250 mL IntraVENous PRN         Review of Systems - General ROS: negative for - chills, fever or night sweats       Objective:    Visit Vitals  BP (!) 155/49 (BP 1 Location: Right arm, BP Patient Position: At rest)   Pulse 95   Temp 98.7 °F (37.1 °C)   Resp 17   Ht 5' 2\" (1.575 m)   Wt 75.4 kg (166 lb 2 oz)   SpO2 100%   BMI 30.38 kg/m²       Temp (24hrs), Av.8 °F (37.1 °C), Min:98.6 °F (37 °C), Max:99 °F (37.2 °C)      GEN: Elderly/frail WF in  NAD  HEENT: anicteric  CHEST: CTA  CVS:RRR           Lab results:    Chemistry  Recent Labs     20  0330 11/10/20  1448 11/10/20  0750 11/09/20  0324   * 182* 161* 174*    145 143 141   K 3.7 3.7 4.1 4.3   * 116* 114* 113*   CO2 21 23 23 21   BUN 14 15 16 17   CREA 0.84 0.92 0.84 0.88   CA 7.6* 7.4* 7.1* 7.4*   AGAP 9 6 6 7   BUCR 17 16 19 19   AP  --   --   --  260*   TP  --   --   --  4.9*   ALB  --   --   --  1.6*   GLOB  --   --   --  3.3   AGRAT  --   --   --  0.5*       CBC w/ Diff  Recent Labs     11/11/20  0330 11/10/20  1449 11/10/20  0750 11/09/20  0324   WBC 6.9 7.8 6.1 7.7   RBC 2.85* 2.80* 2.66* 2.71*   HGB 8.1* 7.8* 7.7* 7.8*   HCT 24.9* 24.6* 23.3* 23.7*    321 284 258   GRANS  --   --   --  64   LYMPH  --   --   --  25   EOS  --   --   --  3       Microbiology  All Micro Results     Procedure Component Value Units Date/Time    CULTURE, URINE [[id number]]  (Abnormal)  (Susceptibility) Collected:  11/05/20 0100    Order Status:  Completed Specimen:  Urine from Nephrostomy Updated:  11/10/20 1010     Special Requests: NO SPECIAL REQUESTS        [place] Count --        >100,000  COLONIES/mL       Culture result:       Morganella morganii ssp morganii            PROTEUS MIRABILIS         ENTEROCOCCUS FAECALIS       CULTURE, BLOOD [[id number]] Collected:  11/08/20 1230    Order Status:  Completed Specimen:  Blood Updated:  11/10/20 0706     Special Requests: NO SPECIAL REQUESTS        Culture result: NO GROWTH 2 DAYS       CULTURE, BLOOD [[id number]]  (Abnormal) Collected:  11/04/20 1810    Order Status:  Completed Specimen:  Blood Updated:  11/07/20 0750     Special Requests: NO SPECIAL REQUESTS        GRAM STAIN       ANAEROBIC BOTTLE GRAM NEGATIVE RODS                  AEROBIC BOTTLE GRAM POSITIVE COCCI IN PAIRS IN CHAINS                  SMEAR CALLED TO AND CORRECTLY REPEATED BY: TOVA MACHADO RN 3S ON 11/5/2020 1233 TO 1034           Culture result:       ENTEROCOCCUS FAECALIS GROUP D GROWING IN BOTH BOTTLES DRAWN (SITE = LAC)                  MORGANELLA MORGANII GROWING IN BOTH BOTTLES DRAWN (SITE = LAC)                  PLEASE REFER TO PREVIOUS BLOOD CULTURE  [id number] FOR SENSITIVITIES      CULTURE, BLOOD [[id number]]  (Abnormal)  (Susceptibility) Collected:  11/04/20 1810    Order Status:  Completed Specimen:  Blood Updated:  11/07/20 0748     Special Requests: NO SPECIAL REQUESTS        GRAM STAIN       AEROBIC AND ANAEROBIC BOTTLES GRAM NEGATIVE RODS                  SMEAR CALLED TO AND CORRECTLY REPEATED BY: Jessica Parks RN 3S ON 11/5/2020 1233 TO 5087           Culture result:       Morganella morganii ssp morganii GROWING IN BOTH BOTTLES DRAWN (SITE = LAC)                  ENTEROCOCCUS FAECALIS GROWING IN 1 OF THE BOTTLES                  PRELIMINARY REPORT OF GRAM POSITIVE COCCI IN CHAINS GROWING IN THE ANAEROBIC BOTTLE CALLED TO AND READ BACK BY [facility] AJIT AT 2323 ON 11/5/2020.  HJR          CULTURE, BLOOD [[id number]] Collected:  11/05/20 2030    Order Status:  Canceled Specimen:  Blood     CULTURE, BLOOD [[id number]] Collected:  11/05/20 2030    Order Status:  Canceled Specimen:  Blood     CULTURE, URINE [[id number]]     Order Status:  Canceled Specimen:  Cath Urine            Masoud Gray MD  Cell [phone]  [facility]   11/11/2020   1:44 PM

## 2020-11-11 NOTE — DIABETES MGMT
GLYCEMIC CONTROL PROGRESS NOTE:    - known h/o T2DM, HbA1C not within recommended range for age + comorbids on basal/bolus home regimen  - BG out of target range non-ICU: < 180 mg/dL  - TDD = 10 units - Humalog Normal Insulin Sensitivity Corrective Coverage  - FBG out of target range recommend conservative dose of basal insulin   *Lantus 5 units daily      Recent Glucose Results:   Lab Results   Component Value Date/Time     (H) 11/11/2020 03:30 AM     (H) 11/10/2020 02:49 PM    GLUCPOC 226 (H) 11/11/2020 11:40 AM    GLUCPOC 211 (H) 11/11/2020 06:43 AM    GLUCPOC 219 (H) 11/10/2020 10:08 PM     Benny Jimenez MS, RN, CDE  Glycemic Control Team  [phone]  Pager [phone] (M-TH 8:00-4:30P)  *After Hours pager [phone]

## 2020-11-11 NOTE — PROGRESS NOTES
Patient arrived to the floor in satisfactory condition. Dsg CDI, Mepilex. VS stable. Pain voiced at 4/10, will medicate per [facility], will medicate at appropriate time. ** at bedside. IS educated and encouraged. Shift POC reviewed with patient. Menu/meal times explained. Call/bell phone within reach. Will monitor for changes. Plans to be d/c to **.    0900 AM medications given. Patient has no complaints. Will monitor for changes. Nephrostomy and colostomy both working adequately. 1000 Consuing breakfast at bedside with the assistance of CNA. 1200 Patient to be discharged today, medical transport set up for 1600. Patient aware. 1315 Scheduled medications given. Patient has no needs at this time. TRANSFER - OUT REPORT:  Verbal report given to Alfredo Nino LPN (name) on [facility]  being transferred to [facility] for routine progression of care     Report consisted of patients Situation, Background, Assessment and   Recommendations(SBAR). Information from the following report(s) SBAR, Kardex, MAR, Recent Results and Med Rec Status was reviewed with the receiving nurse. Lines:   PICC Double Lumen 41/58/38 Left;Basilic (Active)   Opportunity for questions and clarification was provided.     Patient transported with:  Medical Transport  Informed of medical transport time of 4PM.

## 2020-11-11 NOTE — PROGRESS NOTES
EMS here to transfer patient to [address] [place] [address]. Patient in stable condition at time of transfer. Recent vitals and status given to EMS.

## 2020-11-11 NOTE — PROGRESS NOTES
Summary :  Slept fduring the night with medications (Ativan) on request. Made it very clear that she did not want to be wakened during the usual hours of sleep. Request granted as much as possible. Refused to turn at one point on rounds but did agree to change position of her left leg and foot  Several times during the shift. Magnesium runs completed.

## 2020-11-11 NOTE — PROGRESS NOTES
Bedside and Verbal shift change report given to [address] [place] [address] Yann, RN  (oncoming nurse) by Madiha LAM, CHAITANYA (offgoing nurse). Report included the following information SBAR, Kardex and MAR. SHIFT UPDATES:   Patient presented stable for duration of shift and had CTA performed. CTA results are still pending. Patient received 4 Runs of IV Magnesium Sulfate runs for management of electrolytes. Patient presents with blood sugar monitoring with meals and at bedtime. Patient also needs to be re-positioned every 2 hours to prevent bedsores. Patient is pending rehab acceptance. Patient's colostomy was changed totally and presents with a stoma diameter of 30 cm. ABNORMAL LAB:     Results for Xavier Bryan (MRN [id number]) as of 11/10/2020 14:03   Ref.  Range 11/10/2020 07:50 11/10/2020 11:44 11/10/2020 13:45   GLUCOSE,FAST - POC Latest Ref Range: 70 - 110 mg/dL  248 (H)    WBC Latest Ref Range: 4.6 - 13.2 K/uL 6.1     RBC Latest Ref Range: 4.20 - 5.30 M/uL 2.66 (L)     HGB Latest Ref Range: 12.0 - 16.0 g/dL 7.7 (L)     HCT Latest Ref Range: 35.0 - 45.0 % 23.3 (L)     MCV Latest Ref Range: 74.0 - 97.0 FL 87.6     MCH Latest Ref Range: 24.0 - 34.0 PG 28.9     MCHC Latest Ref Range: 31.0 - 37.0 g/dL 33.0     RDW Latest Ref Range: 11.6 - 14.5 % 14.9 (H)     PLATELET Latest Ref Range: 135 - 420 K/uL 284     MPV Latest Ref Range: 9.2 - 11.8 FL 9.0 (L)     Sodium Latest Ref Range: 136 - 145 mmol/L 143     Potassium Latest Ref Range: 3.5 - 5.5 mmol/L 4.1     Chloride Latest Ref Range: 100 - 111 mmol/L 114 (H)     CO2 Latest Ref Range: 21 - 32 mmol/L 23     Anion gap Latest Ref Range: 3.0 - 18 mmol/L 6     Glucose Latest Ref Range: 74 - 99 mg/dL 161 (H)     BUN Latest Ref Range: 7.0 - 18 MG/DL 16     Creatinine Latest Ref Range: 0.6 - 1.3 MG/DL 0.84     BUN/Creatinine ratio Latest Ref Range: 12 - 20   19     Calcium Latest Ref Range: 8.5 - 10.1 MG/DL 7.1 (L)     Magnesium Latest Ref Range: 1.6 - 2.6 mg/dL 1.1 (L) GFR est non-AA Latest Ref Range: >60 ml/min/1.73m2 >60     GFR est AA Latest Ref Range: >60 ml/min/1.73m2 >60     IR GUIDE INSERT PICC OVER 5 YRS Unknown   Rpt         Wounds? Sacral Wound-Mepilex applied. Central Lines? None. Last BM:  Colostomy bag (liquid stool intact)       Pending Labs for AM Draw: CBC with diff , BMP & Magnesium on 11/11/2020 at 4 am.      Discharge plan:  As of 11/9/2020 case management note at 1045 am, Patient will discharge back to [place] [address] upon being found medically stable.        Reginaldo Little  11/10/2020  7:09 PM

## 2020-11-11 NOTE — ROUTINE PROCESS
Bedside and Verbal shift change report given to FRIDA Christian RN  (oncoming nurse) by  TAM Mallory Rn  (offgoing nurse). Report included the following information SBAR, Kardex, Intake/Output, MAR and Recent Results.

## 2020-11-11 NOTE — PROGRESS NOTES
Occupational Therapy Screening:  Services are not indicated at this time. [facility] based on results obtained during the nursing admission assessment. The patients chart was reviewed and the patient is not appropriate for a skilled therapy evaluation at this time. Patient was admitted with sepsis. Pt is LTC resident at [facility]. No functional decline. Pt has baseline deficits related to loss of vision and BKA. Thank you.   Shantell Naik, OTR/L, CSRS, CDCS, [facility]pera

## 2020-11-11 NOTE — PROGRESS NOTES
Problem: Risk for Spread of Infection  Goal: Prevent transmission of infectious organism to others  Description: Prevent the transmission of infectious organisms to other patients, staff members, and visitors. Outcome: Progressing Towards Goal     Problem: Pressure Injury - Risk of  Goal: *Prevention of pressure injury  Description: Document Rickie Scale and appropriate interventions in the flowsheet. Outcome: Progressing Towards Goal  Note: Pressure Injury Interventions:  Sensory Interventions: Assess changes in LOC, Check visual cues for pain, Maintain/enhance activity level, Pressure redistribution bed/mattress (bed type), Turn and reposition approx. every two hours (pillows and wedges if needed)    Moisture Interventions: Absorbent underpads, Maintain skin hydration (lotion/cream)    Activity Interventions: Pressure redistribution bed/mattress(bed type)    Mobility Interventions: HOB 30 degrees or less, Pressure redistribution bed/mattress (bed type)    Nutrition Interventions: Document food/fluid/supplement intake    Friction and Shear Interventions: Apply protective barrier, creams and emollients                Problem: Falls - Risk of  Goal: *Absence of Falls  Description: Document Romeo Fall Risk and appropriate interventions in the flowsheet.   Outcome: Progressing Towards Goal  Note: Fall Risk Interventions:  Mobility Interventions: Patient to call before getting OOB    Mentation Interventions: Adequate sleep, hydration, pain control, Evaluate medications/consider consulting pharmacy    Medication Interventions: Evaluate medications/consider consulting pharmacy, Patient to call before getting OOB, Teach patient to arise slowly    Elimination Interventions: Call light in reach, Patient to call for help with toileting needs, Toilet paper/wipes in reach

## 2020-11-11 NOTE — DISCHARGE SUMMARY
Discharge Summary    Patient: Alina Caballero MRN: [id number]  CSN: [id number]    YOB: 1940  Age: [de-identified] y.o. Sex: female    DOA: 11/4/2020 LOS:  LOS: 7 days   Discharge Date:      Primary Care Provider:  Leticia, Not On File    Admission Diagnoses: Fever [R50.9]    Discharge Diagnoses:    Hospital Problems  Date Reviewed: 11/9/2020          Codes Class Noted POA    Anticoagulated ICD-10-CM: Z79.01  ICD-9-CM: V58.61  11/5/2020 Yes        Hydronephrosis, right ICD-10-CM: N13.30  ICD-9-CM: 010  11/4/2020 Yes        * (Principal) Severe sepsis with acute organ dysfunction ([facility] ICD-10-CM: A41.9, R65.20  ICD-9-CM: 038.9, 995.92  11/4/2020 Yes        Bacteremia ICD-10-CM: R78.81  ICD-9-CM: 790.7  6/24/2020 Yes        S/P AKA (above knee amputation), right ([facility] ICD-10-CM: P82.451  ICD-9-CM: V49.76  6/3/2019 Yes        Hypomagnesemia ICD-10-CM: E83.42  ICD-9-CM: 275.2  11/28/2018 Yes        Legally blind ICD-10-CM: H54.8  ICD-9-CM: 369.4  11/26/2018 Yes        Pyelonephritis ICD-10-CM: N12  ICD-9-CM: 590.80  5/18/2018 Yes        Nephrostomy tube displaced ([facility] ICD-10-CM: Z03.484I  ICD-9-CM: 997.5  5/18/2018 Yes              Discharge Condition: stable     Discharge Medications:     Current Discharge Medication List      START taking these medications    Details   naloxone (NARCAN) 4 mg/actuation nasal spray Use 1 spray intranasally, then discard. Repeat with new spray every 2 min as needed for opioid overdose symptoms, alternating nostrils. Qty: 2 Each, Refills: 0      cefepime 2 gram 2 g IV syringe 2 g by IntraVENous route every twelve (12) hours every twelve (12) hours. Qty: 1 Dose, Refills: 0      vancomycin 1,000 mg 1,000 mg, vial-mate 1 Device IVPB 1,000 mg by IntraVENous route every twenty-four (24) hours. Qty: 1 Dose, Refills: 0         CONTINUE these medications which have CHANGED    Details   gabapentin (NEURONTIN) 300 mg capsule Take 1 Cap by mouth two (2) times a day.   Qty: 2 Cap, Refills: 0 Associated Diagnoses: Right flank pain      LORazepam (ATIVAN) 0.5 mg tablet Take 1 Tab by mouth two (2) times daily as needed for Agitation. Max Daily Amount: 1 mg. Qty: 2 Tab, Refills: 0    Associated Diagnoses: Anxiety      insulin detemir U-100 (Levemir U-100 Insulin) 100 unit/mL injection 10 Units by SubCUTAneous route nightly. Qty: 1 Vial, Refills: 0         CONTINUE these medications which have NOT CHANGED    Details   amino acids/protein hydrolys (PRO-STAT PROFILE PO) Take  by mouth.      multivitamin (ONE A DAY) tablet Take 1 Tab by mouth daily. ondansetron hcl (ZOFRAN) 4 mg tablet Take 4 mg by mouth every eight (8) hours as needed for Nausea or Vomiting. diphenhydrAMINE (BENADRYL) 25 mg capsule Take 25 mg by mouth every six (6) hours as needed. cholecalciferol (VITAMIN D3) 1,000 unit tablet Take 1,000 Units by mouth daily. apixaban (ELIQUIS PO) Take 5 mg by mouth two (2) times a day. pollen extracts (PROSTAT PO) Take  by mouth. tiZANidine (ZANAFLEX) 2 mg capsule Take 2 mg by mouth two (2) times a day. traZODone (DESYREL) 50 mg tablet Take 50 mg by mouth nightly. nystatin (MYCOSTATIN) powder Apply 100,000 Units to affected area four (4) times daily. camphor-menthol (SARNA) 0.5-0.5 % lotion Apply  to affected area daily. hydrocortisone (HYTONE) 2.5 % topical cream Apply  to affected area two (2) times a day. use thin layer      timolol (TIMOPTIC) 0.5 % ophthalmic solution 1 Drop two (2) times a day. brimonidine-timolol (COMBIGAN) 0.2-0.5 % drop ophthalmic solution Administer 1 Drop to both eyes every twelve (12) hours. bisacodyl (DULCOLAX, BISACODYL,) 10 mg suppository Insert  into rectum daily as needed. Indications: constipation      metoprolol succinate (TOPROL-XL) 50 mg XL tablet Take 50 mg by mouth daily. magnesium hydroxide (MILK OF MAGNESIA CONCENTRATED) 2,400 mg/10 mL susp suspension Take 10 mL by mouth.       alum-mag hydroxide-simeth (MYLANTA) 200-200-20 mg/5 mL susp Take 30 mL by mouth four (4) times daily as needed. insulin aspart U-100 (NOVOLOG FLEXPEN U-100 INSULIN) 100 unit/mL inpn by SubCUTAneous route. Sliding scale      levothyroxine (SYNTHROID) 50 mcg tablet Take  by mouth Daily (before breakfast). acetaminophen (TYLENOL) 650 mg TbER Take 650 mg by mouth every six (6) hours as needed for Pain or Fever. atorvastatin calcium (ATORVASTATIN PO) Take 20 mg by mouth daily. STOP taking these medications       oxyCODONE IR (ROXICODONE) 5 mg immediate release tablet Comments:   Reason for Stopping:         ferrous sulfate 325 mg (65 mg iron) tablet Comments:   Reason for Stopping:         docusate sodium (COLACE) 100 mg capsule Comments:   Reason for Stopping:         sodium bicarbonate 650 mg tablet Comments:   Reason for Stopping:               Procedures :   replace and reposition of 10 F right PCN. per IR   picc line     Consults: Cardiology, Infectious Disease and Urology      PHYSICAL EXAM  Visit Vitals  BP (!) 138/48 (BP 1 Location: Right arm, BP Patient Position: At rest)   Pulse 92   Temp 98.6 °F (37 °C)   Resp 16   Ht 5' 2\" (1.575 m)   Wt 75.4 kg (166 lb 2 oz)   SpO2 100%   BMI 30.38 kg/m²     General: Awake, cooperative, no acute distress    HEENT: NC, Atraumatic. PERRLA, EOMI. Anicteric sclerae. Lungs:  CTA Bilaterally. No Wheezing/Rhonchi/Rales. Heart:  Regular  rhythm,  No murmur, No Rubs, No Gallops  Abdomen: Soft, Non distended, Non tender. +Bowel sounds, colectomy bag with loose stool , rt nephrotomy bag with urine noted   Extremities: No c/c rt aka   Psych:   Not anxious or agitated. Neurologic:  No acute neurological deficits. Admission HPI :  Zelda Patel is a [de-identified] y.o. 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  She is not having output from her nephrostomy bag. She is alert and oriented to self only and cannot provide additional history. She has had multiple nephrostomy tube exchanges in the past. COVID test was done at [facility] due to fever. Hospital Course :  Carol Palomo is a [de-identified] y.o. 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 IV vancomycin and meropenem were administrated. Sepsis protocol was  started. Nephrostomy tube was replaced and positioned per IR. Urology was on board for dysfunction of nephrostomy tube. Blood culture indicated Morganella morganii ssp morganii . PROTEUS MIRABILIS. Infectious disease specialist was on board of both bacteremia and pyelonephritis. RICHARD was done per cardiologist and vegetation was ruled out. PICC line was placed for long-term IV antibiotics. Infection diseases specialist recommended:  Vancomycin 1000 mg daily   Cefepime 2gm IV q12h  Terminate - [id number]  Weekly labs (qMondays) - CBC with diff, BMP, and vanco level for PHARM to dose; fax to me [phone]    We also controlled her diabetes with insulin. She also presented acute renal failure secondary due to sepsis on admission-Resolved per IV hydration. Before discharge, she remained afebrile and  no leukocytosis. Discharge planning discussed with patient and her son, they agree  with the plan and no questions and concerns at this point.        Activity: Activity as tolerated    Diet: Diabetic Diet    Follow-up: PCP urologist   Disposition: [facility]     Minutes spent on discharge: 45 min       Labs: Results:       Chemistry Recent Labs     11/11/20  0330 11/10/20  1449 11/10/20  0750 11/09/20  0324   * 182* 161* 174*    145 143 141   K 3.7 3.7 4.1 4.3   * 116* 114* 113*   CO2 21 23 23 21   BUN 14 15 16 17   CREA 0.84 0.92 0.84 0.88   CA 7.6* 7.4* 7. 1* 7.4*   AGAP 9 6 6 7   BUCR 17 16 19 19   AP  --   --   --  260*   TP  --   --   --  4.9*   ALB  --   --   --  1.6*   GLOB  --   --   --  3.3   AGRAT  --   --   --  0.5*      CBC w/Diff Recent Labs     11/11/20  0330 11/10/20  1449 11/10/20  0750 11/09/20  0324   WBC 6.9 7.8 6.1 7.7   RBC 2.85* 2.80* 2.66* 2.71*   HGB 8.1* 7.8* 7.7* 7.8*   HCT 24.9* 24.6* 23.3* 23.7*    321 284 258   GRANS  --   --   --  64   LYMPH  --   --   --  25   EOS  --   --   --  3      Cardiac Enzymes Recent Labs     11/09/20  0324   CPK 12*   CKND1 CALCULATION NOT PERFORMED WHEN RESULT IS BELOW LINEAR LIMIT      Coagulation No results for input(s): PTP, INR, APTT, INREXT in the last 72 hours. Lipid Panel Lab Results   Component Value Date/Time    Cholesterol, total 115 08/28/2019 08:22 AM    HDL Cholesterol 39 (L) 08/28/2019 08:22 AM    LDL, calculated 46.2 08/28/2019 08:22 AM    VLDL, calculated 29.8 08/28/2019 08:22 AM    Triglyceride 149 08/28/2019 08:22 AM    CHOL/HDL Ratio 2.9 08/28/2019 08:22 AM      BNP No results for input(s): BNPP in the last 72 hours. Liver Enzymes Recent Labs     11/09/20  0324   TP 4.9*   ALB 1.6*   *      Thyroid Studies Lab Results   Component Value Date/Time    TSH 3.40 01/24/2020 08:35 AM          @micro    Significant Diagnostic Studies: Ct Abd Pelv Wo Cont    Result Date: 11/4/2020  CLINICAL:  Sepsis. Renal failure COMPARISON: January 16, 2020 TECHNIQUE: Axial CT imaging of the abdomen and pelvis was performed without intravenous contrast. Multiplanar reformats were generated. One or more dose reduction techniques were used on this CT: automated exposure control, adjustment of the mAs and/or kVp according to patient size, and iterative reconstruction techniques. The specific techniques used on this CT exam have been documented in the patient's electronic medical record.  Digital Imaging and Communications in the Medicine (DICOM) format image data are available to nonaffiliated external healthcare facilities or entities on a secure, media free, reciprocally searchable basis with patient authorization for at least a 12 month period after this study. FINDINGS: LOWER CHEST: Minimal right effusion LIVER, BILIARY: Liver is normal. No biliary dilation. Gallbladder absent PANCREAS: Normal. SPLEEN: Normal. ADRENALS: Normal. KIDNEYS: Left kidney absent. Right kidney demonstrates marked hydronephrosis and proximal hydroureter. A nephrostomy catheter is present, however, this has been pulled back and the locking loop may be embedded in the parenchyma. The mid ureter has been embolized. LYMPH NODES: Retroperitoneal lymph nodes. Retrocaval node measuring 12 mm previously measuring 8 mm. Para-aortic node measuring 10 mm previously measuring 8 mm. GASTROINTESTINAL TRACT: No bowel dilation or wall thickening. Left colostomy. PELVIC ORGANS: Uterus and bladder absent VASCULATURE: Prominent vascular calcifications BONES: Degenerative changes in both hips and the lumbar spine OTHER: None. IMPRESSION: Right nephrostomy has been pulled back. Locking loop may be embedded in the renal parenchyma. Marked right hydronephrosis. Recommend correlation with nephrostomy output. Recommend nephrostomy exchange, repositioning. Absent left kidney. Prior hysterectomy and cystectomy. Multiple pole retroperitoneal lymph nodes Mildly enlarged retroperitoneal lymph nodes, mildly increased in size from prior study     Xr Chest Port    Result Date: 11/4/2020  CLINICAL: Sepsis. Fever COMPARISON: None. A portable view of the chest from 1938 hours: The lungs and pleural spaces are clear. The mediastinum is unremarkable in appearance. IMPRESSION: No acute process. Ir Exchange Nephro Perc Rt Si    Result Date: 11/5/2020  Right percutaneous nephrostomy replaced and repositioned. 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  Dysfunctional nephrostomy with lack of drainage and CT earlier same day demonstrating retraction into the renal parenchyma. Hydronephrosis, lactic acidosis, acute renal failure, and sepsis. TECHNIQUE: Procedure, risks, benefits, and alternatives were discussed with the patient and informed, witnessed consent was obtained. There is bloody fluid in the nephrostomy bag and dry blood in the nephrostomy tube. Dry blood is seen in the nephrostomy dressing and surrounding skin. Patient is on Eliquis twice a day and INR of 2.7. In situ nephrostomy tube and surrounding skin were prepped and draped in usual sterile fashion. Patient is on intravenous Flagyl, meropenem, vancomycin, and Levaquin, therefore, additional preprocedure antibiotics were not indicated. Timeout was observed.  image was obtained. Contrast was injected demonstrating the nephrostomy tube retracted and a short tract leading into the collecting system. In situ nephrostomy tube was removed over wire. Using a Berenstein catheter and Glidewire, tract was navigated and catheter and wire advanced into the upper collecting system. Intraluminal placement confirmed with contrast injection. Over a short Amplatz wire, a new 10 [place] APDL nephrostomy was placed and retention loop formed in the renal pelvis. Foul-smelling blood tinged urine was collected and submitted for microbiology. Contrast was injected to document intraluminal position. Contrast was also aspirated to document patency. Catheter was secured to the skin using Percu-Stay. Patient tolerated the procedure well without immediate complications. GUIDANCE: Fluoroscopy guidance was used to position (and confirm the position of) the catheter. Image(s) saved in PACS: Fluoroscopy. Fluoroscopy dose (reference air kerma): 44 mGy. IMPRESSION: 1. In situ nephrostomy retracted outside the collecting system. A new 10 Amharic nephrostomy tube repositioned into the renal pelvis. 2. Moderate to severe hydronephrosis with foul-smelling bloody urine.  Specimen submitted for laboratory evaluation.             Eamon Amira [facility]     CC: Bshsi, Not On File

## 2020-11-14 LAB
BACTERIA SPEC CULT: NORMAL
SERVICE CMNT-IMP: NORMAL

## 2020-11-15 ENCOUNTER — APPOINTMENT (OUTPATIENT)
Dept: GENERAL RADIOLOGY | Age: 80
DRG: 698 | End: 2020-11-15
Attending: EMERGENCY MEDICINE
Payer: MEDICARE

## 2020-11-15 ENCOUNTER — APPOINTMENT (OUTPATIENT)
Dept: CT IMAGING | Age: 80
DRG: 698 | End: 2020-11-15
Attending: EMERGENCY MEDICINE
Payer: MEDICARE

## 2020-11-15 ENCOUNTER — HOSPITAL ENCOUNTER (INPATIENT)
Age: 80
LOS: 6 days | Discharge: NURSING, REHAB OR LONG-TERM CARE | DRG: 698 | End: 2020-11-21
Attending: EMERGENCY MEDICINE | Admitting: FAMILY MEDICINE
Payer: MEDICARE

## 2020-11-15 DIAGNOSIS — Z71.89 ADVANCED CARE PLANNING/COUNSELING DISCUSSION: ICD-10-CM

## 2020-11-15 DIAGNOSIS — N30.00 ACUTE CYSTITIS WITHOUT HEMATURIA: ICD-10-CM

## 2020-11-15 DIAGNOSIS — A41.9 SEPSIS WITHOUT ACUTE ORGAN DYSFUNCTION, DUE TO UNSPECIFIED ORGANISM (HCC): ICD-10-CM

## 2020-11-15 DIAGNOSIS — R41.0 DISORIENTATION: Primary | ICD-10-CM

## 2020-11-15 DIAGNOSIS — G93.40 ENCEPHALOPATHY ACUTE: ICD-10-CM

## 2020-11-15 DIAGNOSIS — R53.81 DEBILITY: ICD-10-CM

## 2020-11-15 PROBLEM — R41.82 AMS (ALTERED MENTAL STATUS): Status: ACTIVE | Noted: 2020-11-15

## 2020-11-15 LAB
ALBUMIN SERPL-MCNC: 1.9 G/DL (ref 3.4–5)
ALBUMIN/GLOB SERPL: 0.4 {RATIO} (ref 0.8–1.7)
ALP SERPL-CCNC: 199 U/L (ref 45–117)
ALT SERPL-CCNC: 34 U/L (ref 13–56)
ANION GAP SERPL CALC-SCNC: 6 MMOL/L (ref 3–18)
APPEARANCE UR: CLEAR
AST SERPL-CCNC: 19 U/L (ref 10–38)
ATRIAL RATE: 85 BPM
BACTERIA URNS QL MICRO: ABNORMAL /HPF
BASOPHILS # BLD: 0 K/UL (ref 0–0.1)
BASOPHILS NFR BLD: 0 % (ref 0–2)
BILIRUB SERPL-MCNC: 0.4 MG/DL (ref 0.2–1)
BILIRUB UR QL: NEGATIVE
BUN SERPL-MCNC: 15 MG/DL (ref 7–18)
BUN/CREAT SERPL: 16 (ref 12–20)
CALCIUM SERPL-MCNC: 8.2 MG/DL (ref 8.5–10.1)
CALCULATED P AXIS, ECG09: 55 DEGREES
CALCULATED R AXIS, ECG10: 53 DEGREES
CALCULATED T AXIS, ECG11: 52 DEGREES
CHLORIDE SERPL-SCNC: 113 MMOL/L (ref 100–111)
CO2 SERPL-SCNC: 22 MMOL/L (ref 21–32)
COLOR UR: YELLOW
CREAT SERPL-MCNC: 0.92 MG/DL (ref 0.6–1.3)
DIAGNOSIS, 93000: NORMAL
DIFFERENTIAL METHOD BLD: ABNORMAL
EOSINOPHIL # BLD: 0.2 K/UL (ref 0–0.4)
EOSINOPHIL NFR BLD: 1 % (ref 0–5)
EPITH CASTS URNS QL MICRO: ABNORMAL /LPF (ref 0–5)
ERYTHROCYTE [DISTWIDTH] IN BLOOD BY AUTOMATED COUNT: 15.7 % (ref 11.6–14.5)
GLOBULIN SER CALC-MCNC: 4.3 G/DL (ref 2–4)
GLUCOSE SERPL-MCNC: 160 MG/DL (ref 74–99)
GLUCOSE UR STRIP.AUTO-MCNC: NEGATIVE MG/DL
HCT VFR BLD AUTO: 27.4 % (ref 35–45)
HGB BLD-MCNC: 8.7 G/DL (ref 12–16)
HGB UR QL STRIP: ABNORMAL
KETONES UR QL STRIP.AUTO: ABNORMAL MG/DL
LACTATE BLD-SCNC: 0.79 MMOL/L (ref 0.4–2)
LACTATE SERPL-SCNC: 0.9 MMOL/L (ref 0.4–2)
LEUKOCYTE ESTERASE UR QL STRIP.AUTO: ABNORMAL
LYMPHOCYTES # BLD: 1.5 K/UL (ref 0.9–3.6)
LYMPHOCYTES NFR BLD: 9 % (ref 21–52)
MCH RBC QN AUTO: 28.2 PG (ref 24–34)
MCHC RBC AUTO-ENTMCNC: 31.8 G/DL (ref 31–37)
MCV RBC AUTO: 88.7 FL (ref 74–97)
MONOCYTES # BLD: 0.7 K/UL (ref 0.05–1.2)
MONOCYTES NFR BLD: 4 % (ref 3–10)
NEUTS SEG # BLD: 15.2 K/UL (ref 1.8–8)
NEUTS SEG NFR BLD: 86 % (ref 40–73)
NITRITE UR QL STRIP.AUTO: NEGATIVE
P-R INTERVAL, ECG05: 138 MS
PH UR STRIP: 6 [PH] (ref 5–8)
PLATELET # BLD AUTO: 505 K/UL (ref 135–420)
PMV BLD AUTO: 8.9 FL (ref 9.2–11.8)
POTASSIUM SERPL-SCNC: 4.1 MMOL/L (ref 3.5–5.5)
PROT SERPL-MCNC: 6.2 G/DL (ref 6.4–8.2)
PROT UR STRIP-MCNC: 100 MG/DL
Q-T INTERVAL, ECG07: 384 MS
QRS DURATION, ECG06: 74 MS
QTC CALCULATION (BEZET), ECG08: 456 MS
RBC # BLD AUTO: 3.09 M/UL (ref 4.2–5.3)
RBC #/AREA URNS HPF: ABNORMAL /HPF (ref 0–5)
SODIUM SERPL-SCNC: 141 MMOL/L (ref 136–145)
SP GR UR REFRACTOMETRY: 1.01 (ref 1–1.03)
UROBILINOGEN UR QL STRIP.AUTO: 0.2 EU/DL (ref 0.2–1)
VENTRICULAR RATE, ECG03: 85 BPM
WBC # BLD AUTO: 17.6 K/UL (ref 4.6–13.2)
WBC URNS QL MICRO: ABNORMAL /HPF (ref 0–5)

## 2020-11-15 PROCEDURE — 93005 ELECTROCARDIOGRAM TRACING: CPT

## 2020-11-15 PROCEDURE — 87086 URINE CULTURE/COLONY COUNT: CPT

## 2020-11-15 PROCEDURE — 81001 URINALYSIS AUTO W/SCOPE: CPT

## 2020-11-15 PROCEDURE — 96361 HYDRATE IV INFUSION ADD-ON: CPT

## 2020-11-15 PROCEDURE — 87040 BLOOD CULTURE FOR BACTERIA: CPT

## 2020-11-15 PROCEDURE — 74011250636 HC RX REV CODE- 250/636: Performed by: EMERGENCY MEDICINE

## 2020-11-15 PROCEDURE — 65270000029 HC RM PRIVATE

## 2020-11-15 PROCEDURE — 36415 COLL VENOUS BLD VENIPUNCTURE: CPT

## 2020-11-15 PROCEDURE — 99285 EMERGENCY DEPT VISIT HI MDM: CPT

## 2020-11-15 PROCEDURE — 74011250636 HC RX REV CODE- 250/636: Performed by: FAMILY MEDICINE

## 2020-11-15 PROCEDURE — 74011000250 HC RX REV CODE- 250: Performed by: FAMILY MEDICINE

## 2020-11-15 PROCEDURE — 74011000636 HC RX REV CODE- 636: Performed by: EMERGENCY MEDICINE

## 2020-11-15 PROCEDURE — 96360 HYDRATION IV INFUSION INIT: CPT

## 2020-11-15 PROCEDURE — 83605 ASSAY OF LACTIC ACID: CPT

## 2020-11-15 PROCEDURE — 02HV33Z INSERTION OF INFUSION DEVICE INTO SUPERIOR VENA CAVA, PERCUTANEOUS APPROACH: ICD-10-PCS | Performed by: FAMILY MEDICINE

## 2020-11-15 PROCEDURE — 85025 COMPLETE CBC W/AUTO DIFF WBC: CPT

## 2020-11-15 PROCEDURE — 80053 COMPREHEN METABOLIC PANEL: CPT

## 2020-11-15 PROCEDURE — 70450 CT HEAD/BRAIN W/O DYE: CPT

## 2020-11-15 PROCEDURE — 77030040361 HC SLV COMPR DVT MDII -B

## 2020-11-15 PROCEDURE — 87635 SARS-COV-2 COVID-19 AMP PRB: CPT

## 2020-11-15 PROCEDURE — 74177 CT ABD & PELVIS W/CONTRAST: CPT

## 2020-11-15 PROCEDURE — 71045 X-RAY EXAM CHEST 1 VIEW: CPT

## 2020-11-15 RX ORDER — SODIUM CHLORIDE 9 MG/ML
100 INJECTION, SOLUTION INTRAVENOUS CONTINUOUS
Status: DISCONTINUED | OUTPATIENT
Start: 2020-11-15 | End: 2020-11-21 | Stop reason: HOSPADM

## 2020-11-15 RX ORDER — ONDANSETRON 4 MG/1
4 TABLET, ORALLY DISINTEGRATING ORAL
Status: DISCONTINUED | OUTPATIENT
Start: 2020-11-15 | End: 2020-11-21 | Stop reason: HOSPADM

## 2020-11-15 RX ORDER — ACETAMINOPHEN 650 MG/1
650 SUPPOSITORY RECTAL
Status: DISCONTINUED | OUTPATIENT
Start: 2020-11-15 | End: 2020-11-21 | Stop reason: HOSPADM

## 2020-11-15 RX ORDER — SODIUM CHLORIDE 0.9 % (FLUSH) 0.9 %
5-10 SYRINGE (ML) INJECTION AS NEEDED
Status: DISCONTINUED | OUTPATIENT
Start: 2020-11-15 | End: 2020-11-21 | Stop reason: HOSPADM

## 2020-11-15 RX ORDER — FAMOTIDINE 20 MG/1
20 TABLET, FILM COATED ORAL 2 TIMES DAILY
Status: DISCONTINUED | OUTPATIENT
Start: 2020-11-15 | End: 2020-11-16

## 2020-11-15 RX ORDER — SODIUM CHLORIDE 0.9 % (FLUSH) 0.9 %
5-40 SYRINGE (ML) INJECTION EVERY 8 HOURS
Status: DISCONTINUED | OUTPATIENT
Start: 2020-11-15 | End: 2020-11-21 | Stop reason: HOSPADM

## 2020-11-15 RX ORDER — ONDANSETRON 2 MG/ML
4 INJECTION INTRAMUSCULAR; INTRAVENOUS
Status: DISCONTINUED | OUTPATIENT
Start: 2020-11-15 | End: 2020-11-21 | Stop reason: HOSPADM

## 2020-11-15 RX ORDER — BISACODYL 5 MG
5 TABLET, DELAYED RELEASE (ENTERIC COATED) ORAL DAILY PRN
Status: DISCONTINUED | OUTPATIENT
Start: 2020-11-15 | End: 2020-11-21 | Stop reason: HOSPADM

## 2020-11-15 RX ORDER — SODIUM CHLORIDE 0.9 % (FLUSH) 0.9 %
5-40 SYRINGE (ML) INJECTION AS NEEDED
Status: DISCONTINUED | OUTPATIENT
Start: 2020-11-15 | End: 2020-11-21 | Stop reason: HOSPADM

## 2020-11-15 RX ORDER — LEVOFLOXACIN 5 MG/ML
750 INJECTION, SOLUTION INTRAVENOUS EVERY 24 HOURS
Status: DISCONTINUED | OUTPATIENT
Start: 2020-11-15 | End: 2020-11-16

## 2020-11-15 RX ORDER — ACETAMINOPHEN 325 MG/1
650 TABLET ORAL
Status: DISCONTINUED | OUTPATIENT
Start: 2020-11-15 | End: 2020-11-21 | Stop reason: HOSPADM

## 2020-11-15 RX ADMIN — SODIUM CHLORIDE 1000 ML: 900 INJECTION, SOLUTION INTRAVENOUS at 12:11

## 2020-11-15 RX ADMIN — SODIUM CHLORIDE 346 ML: 900 INJECTION, SOLUTION INTRAVENOUS at 16:45

## 2020-11-15 RX ADMIN — Medication 10 ML: at 19:44

## 2020-11-15 RX ADMIN — LEVOFLOXACIN 750 MG: 5 INJECTION, SOLUTION INTRAVENOUS at 19:38

## 2020-11-15 RX ADMIN — CEFEPIME HYDROCHLORIDE 2 G: 2 INJECTION, POWDER, FOR SOLUTION INTRAVENOUS at 19:39

## 2020-11-15 RX ADMIN — SODIUM CHLORIDE 100 ML/HR: 900 INJECTION, SOLUTION INTRAVENOUS at 18:42

## 2020-11-15 RX ADMIN — IOPAMIDOL 100 ML: 612 INJECTION, SOLUTION INTRAVENOUS at 12:43

## 2020-11-15 NOTE — PROGRESS NOTES
Pharmacy Dosing Services: Renal Dosing    The following medication: Cefepime was automatically dose-adjusted per THE [facility] P&T Committee Protocol, with respect to renal function. Consult provided for this   [de-identified] y.o. , female , for the indication of UTI. Pt Weight:   Wt Readings from Last 1 Encounters:   11/15/20 78.2 kg (172 lb 8 oz)         Previous Regimen Cefepime 2g IV q8h   Serum Creatinine Lab Results   Component Value Date/Time    Creatinine 0.92 11/15/2020 11:15 AM    Creatinine, POC 1.1 01/16/2020 09:28 AM       Creatinine Clearance Estimated Creatinine Clearance: 48.3 mL/min (based on SCr of 0.92 mg/dL). BUN Lab Results   Component Value Date/Time    BUN 15 11/15/2020 11:15 AM    BUN, POC 17 01/16/2020 09:28 AM       Dosage changed to:  Cefepime 2g q12h    Pharmacy to continue to monitor patient daily. Will make dosage adjustments based upon changing renal function.   Signed Flako Elena, [address]

## 2020-11-15 NOTE — PROGRESS NOTES
1715-Telephone/Verbal shift change report given to Matthew Cool (oncoming nurse) by Danielito Mario (offgoing nurse). Report included the following information SBAR, Kardex, Intake/Output and MAR.

## 2020-11-15 NOTE — ED NOTES
TRANSFER - OUT REPORT:    Verbal report given to FamBadger RN(name) on Roni Mo  being transferred to Med-Surg(unit) for routine progression of care       Report consisted of patients Situation, Background, Assessment and   Recommendations(SBAR). Information from the following report(s) SBAR, Kardex, ED Summary, [facility] and Recent Results was reviewed with the receiving nurse. Lines:   PICC Double Lumen 11/15/20 Left;Brachial (Active)        Opportunity for questions and clarification was provided.       Patient transported with:   [employer]

## 2020-11-15 NOTE — PROGRESS NOTES
1755- Patient on floor. She is extremely disoriented. She is repeating \"please\" and does not respond to questions. Admission assessment complete. Patient is very sensitive to touch and yells \"please no\" in response to even light touch. IV fluids hung, dual skin assessment completed with FRIDA Dodd RN.    2236- Spoke to Dr. Val Sky about patient status. She stated that she would order prn pain medications per my request.    1910- Bedside and Verbal shift change report given to CHAITANYA Mayfield (oncoming nurse) by Carrie Amaya RN (offgoing nurse). Report included the following information SBAR, Kardex, Intake/Output and MAR.    1938- Antibiotics administered, SCD machine placed in room but left off patient for now due to patient's pain. CHAITANYA Mayfield verbalized understanding and stated she would apply them once patient's pain was managed.

## 2020-11-15 NOTE — ED TRIAGE NOTES
Patient lives at [facility]. She is usually oriented x4 per ems. However today last normal was 12 hours ago she is alert at this time but not oriented.  Patient is blind

## 2020-11-15 NOTE — H&P
History & Physical    Patient: Soraya Steve MRN: [id number]  CSN: [id number]    YOB: 1940  Age: [de-identified] y.o. Sex: female      DOA: 11/15/2020  Primary Care Provider:  Aaron Lundborg, Not On File      Assessment/Plan   79-year-old female with a history of multiple medical problems including chronic indwelling right nephrostomy tube, colostomy, diabetes, legally blind, hypertension, thyroid disease, status post above-knee amputation, chronic kidney disease.      Sent from the nursing home with altered mental status    Sepsis   Source, urinary  Elevated white blood count 17.6 with left shift of 86% neutrophils elevated platelet level at 833  Abnormal urinalysis, culture sent  IV antibiotics, broad-spectrum  Pancultures    Urinary tract infection  UA mildly abnormal  Urine culture sent    Acute encephalopathy secondary to sepsis of urinary source  Head CT negative  CT abdomen/pelvis negative  Chest x-ray essentially negative    Chest x-ray demonstrating a PICC line    DM   ADA, SSI, fsbg qac and qhs     Hypertension -  Monitor BS     GI/DVT prophylaxis      Patient Active Problem List   Diagnosis Code    Pyelonephritis N12    S/p nephrectomy Z90.5    Right flank pain R10.9    Malfunction of nephrostomy tube ([address] T83.098A    Nephrostomy tube displaced ([address] T83.022A    UTI (urinary tract infection) N39.0    Elevated WBC count D72.829    Diabetes ([address] E11.9    Legally blind H54.8    Hypertension I10    Thyroid disease E07.9    Elevated serum creatinine R79.89    S/P colectomy Z90.49    Skin lesion of foot L98.9    Tear of skin of buttock S31.801A    Pressure injury of right heel, unstageable ([facility]) L89.610    Hypomagnesemia E83.42    Hyperkalemia E87.5    Heel ulcer ([address] L97.409    ANTONI (acute kidney injury) ([address] N17.9    S/P AKA (above knee amputation), right ([address] Z89.611    Nephrostomy status ([address] Z93.6    Obstructed nephrostomy tube ([address] T83.092A    Nephrostomy complication ([address] P92.621    Acute renal failure (ARF) (HCC) N17.9    Elevated INR R79.1    Bacteremia R78.81    Enterococcus faecalis infection B95.2    CKD stage 3 due to type 2 diabetes mellitus (HCC) E11.22, N18.30    Hydronephrosis, right N13.30    Severe sepsis with acute organ dysfunction (HCC) A41.9, R65.20    Anticoagulated Z79.01    AMS (altered mental status) R41.82     Estimated length of stay : 3-4 days    CC: AMS       HPI:     Whit Coleman is a [de-identified] y.o. female  With [address] (vaginal, colon), chronic arthritis pain, diabetes, DVT, hypertension, thyroid disease, blindness who presents to the emergency department by EMS C/O AMS onset today. Patient is unable to confirm or deny any other symptoms or complaints due to altered mental status. Patient arrived by EMS from [address] with complaints of altered mental status today. She is normally oriented x4 per the nursing home but today is disoriented and repeating, \" don't hurt me. \" and \"help\"    Past Medical History:   Diagnosis Date    Cancer ([facility]     vaginal and colon cancer    Cancer ([facility]     Chronic pain     arthritis    Diabetes ([facility]     DVT (deep venous thrombosis) (HCC)     Hypertension     Legally blind     Thyroid disease        Past Surgical History:   Procedure Laterality Date    HX ABOVE KNEE AMPUTATION Right     HX COLOSTOMY      HX ORTHOPAEDIC      HX OTHER SURGICAL      kidney drainage tube    HX UROLOGICAL      kidney removal    IR EXCHANGE NEPHRO PERC RT SI  1/28/2019    IR EXCHANGE NEPHRO PERC RT SI  3/21/2019    IR EXCHANGE NEPHRO PERC RT SI  5/13/2019    IR EXCHANGE NEPHRO PERC RT SI  6/27/2019    IR EXCHANGE NEPHRO PERC RT SI  8/16/2019    IR EXCHANGE NEPHRO PERC RT SI  10/3/2019    IR EXCHANGE NEPHRO PERC RT SI  11/18/2019    IR EXCHANGE NEPHRO PERC RT SI  1/6/2020    IR EXCHANGE NEPHRO PERC RT SI  2/24/2020    IR EXCHANGE NEPHRO PERC RT SI  4/13/2020    IR EXCHANGE NEPHRO PERC RT SI  6/1/2020  IR EXCHANGE NEPHRO PERC RT SI  6/19/2020    IR EXCHANGE NEPHRO PERC RT SI  8/10/2020    IR EXCHANGE NEPHRO PERC RT SI  9/30/2020    IR EXCHANGE NEPHRO PERC RT SI  11/5/2020    IR NEPHROSTOMY PERC RT PLC CATH  SI  6/21/2020       History reviewed. No pertinent family history. Social History     Socioeconomic History    Marital status:      Spouse name: Not on file    Number of children: Not on file    Years of education: Not on file    Highest education level: Not on file   Tobacco Use    Smoking status: Never Smoker    Smokeless tobacco: Never Used   Substance and Sexual Activity    Alcohol use: No    Drug use: Never   Other Topics Concern       Prior to Admission medications    Medication Sig Start Date End Date Taking? Authorizing Provider   gabapentin (NEURONTIN) 300 mg capsule Take 1 Cap by mouth two (2) times a day. 11/11/20   Melissa Garcia MD   LORazepam (ATIVAN) 0.5 mg tablet Take 1 Tab by mouth two (2) times daily as needed for Agitation. Max Daily Amount: 1 mg. 11/11/20   Melissa Garcia MD   naloxone [facility]) 4 mg/actuation nasal spray Use 1 spray intranasally, then discard. Repeat with new spray every 2 min as needed for opioid overdose symptoms, alternating nostrils. 11/11/20   Melissa Garcia MD   insulin detemir U-100 (Levemir U-100 Insulin) 100 unit/mL injection 10 Units by SubCUTAneous route nightly. 11/11/20   Melissa Garcia MD   cefepime 2 gram 2 g IV syringe 2 g by IntraVENous route every twelve (12) hours every twelve (12) hours. 11/11/20   Melissa Garcia MD   vancomycin 1,000 mg 1,000 mg, vial-mate 1 Device IVPB 1,000 mg by IntraVENous route every twenty-four (24) hours. 11/12/20   Melissa Garcia MD   amino acids/protein hydrolys (PRO-STAT PROFILE PO) Take  by mouth. Provider, Historical   multivitamin (ONE A DAY) tablet Take 1 Tab by mouth daily. Provider, Historical   ondansetron hcl (ZOFRAN) 4 mg tablet Take 4 mg by mouth every eight (8) hours as needed for Nausea or Vomiting.     Provider, Historical   diphenhydrAMINE (BENADRYL) 25 mg capsule Take 25 mg by mouth every six (6) hours as needed. Provider, Historical   cholecalciferol (VITAMIN D3) 1,000 unit tablet Take 1,000 Units by mouth daily. Provider, Historical   apixaban (ELIQUIS PO) Take 5 mg by mouth two (2) times a day. Other, MD Dc   pollen extracts (PROSTAT PO) Take  by mouth. Provider, Historical   tiZANidine (ZANAFLEX) 2 mg capsule Take 2 mg by mouth two (2) times a day. Provider, Historical   traZODone (DESYREL) 50 mg tablet Take 50 mg by mouth nightly. Provider, Historical   nystatin (MYCOSTATIN) powder Apply 100,000 Units to affected area four (4) times daily. Provider, Historical   camphor-menthol (SARNA) 0.5-0.5 % lotion Apply  to affected area daily. Provider, Historical   hydrocortisone (HYTONE) 2.5 % topical cream Apply  to affected area two (2) times a day. use thin layer    Provider, Historical   timolol (TIMOPTIC) 0.5 % ophthalmic solution 1 Drop two (2) times a day. Provider, Historical   brimonidine-timolol (COMBIGAN) 0.2-0.5 % drop ophthalmic solution Administer 1 Drop to both eyes every twelve (12) hours. Provider, Historical   bisacodyl (DULCOLAX, BISACODYL,) 10 mg suppository Insert  into rectum daily as needed. Indications: constipation    Provider, Historical   metoprolol succinate (TOPROL-XL) 50 mg XL tablet Take 50 mg by mouth daily. Provider, Historical   magnesium hydroxide (MILK OF MAGNESIA CONCENTRATED) 2,400 mg/10 mL susp suspension Take 10 mL by mouth. Provider, Historical   alum-mag hydroxide-simeth (MYLANTA) 200-200-20 mg/5 mL susp Take 30 mL by mouth four (4) times daily as needed. Provider, Historical   insulin aspart U-100 (NOVOLOG FLEXPEN U-100 INSULIN) 100 unit/mL inpn by SubCUTAneous route. Sliding scale    Provider, Historical   levothyroxine (SYNTHROID) 50 mcg tablet Take  by mouth Daily (before breakfast).     Provider, Historical   acetaminophen (TYLENOL) 650 mg TbER Take 650 mg by mouth every six (6) hours as needed for Pain or Fever. Provider, Historical   atorvastatin calcium (ATORVASTATIN PO) Take 20 mg by mouth daily. Other, MD Dc       Allergies   Allergen Reactions    Morphine Nausea and Vomiting    Pcn [Penicillins] Rash       Review of Systems  Unable to obtain due to pt condition           Physical Exam:     Physical Exam:  Visit Vitals  BP (!) 159/73 (BP 1 Location: Right arm, BP Patient Position: At rest)   Pulse (!) 106   Temp 97.1 °F (36.2 °C)   Resp 20   Ht 5' 3\" (1.6 m)   Wt 78.2 kg (172 lb 8 oz)   SpO2 100%   BMI 30.56 kg/m²      O2 Device: Room air    Temp (24hrs), Av.2 °F (36.2 °C), Min:97.1 °F (36.2 °C), Max:97.3 °F (36.3 °C)    11/15 0701 - 11/15 1900  In: 1000 [I.V.:1000]  Out: 200 [Urine:200]   No intake/output data recorded. General:  Awake, alert, yelling repetitively    Head:  Normocephalic, without obvious abnormality, atraumatic. Eyes:  Conjunctivae/corneas clear, sclera anicteric, PERRL, EOMs intact. Nose: Nares normal. No drainage or sinus tenderness. Throat: Lips, mucosa, and tongue normal.    Neck: Supple, symmetrical, trachea midline, no adenopathy. Lungs:   Clear to auscultation bilaterally. Heart:  Regular rate and rhythm, S1, S2 normal, no murmur, click, rub or gallop. Abdomen: Soft, non-tender. Bowel sounds normal. No masses,  No organomegaly. Extremities: Extremities normal, atraumatic, no cyanosis or edema. Capillary refill normal.   Pulses: 2+ and symmetric all extremities. Skin: Skin color pink,  turgor normal. No rashes or lesions   Neurologic: CNII-XII intact. No focal motor or sensory deficit.        Labs Reviewed:      Recent Results (from the past 24 hour(s))   CBC WITH AUTOMATED DIFF    Collection Time: 11/15/20 11:15 AM   Result Value Ref Range    WBC 17.6 (H) 4.6 - 13.2 K/uL    RBC 3.09 (L) 4.20 - 5.30 M/uL    HGB 8.7 (L) 12.0 - 16.0 g/dL    HCT 27.4 (L) 35.0 - 45.0 %    MCV 88.7 74.0 - 97.0 FL    MCH 28.2 24.0 - 34.0 PG    MCHC 31.8 31.0 - 37.0 g/dL    RDW 15.7 (H) 11.6 - 14.5 %    PLATELET 355 (H) 631 - 420 K/uL    MPV 8.9 (L) 9.2 - 11.8 FL    NEUTROPHILS 86 (H) 40 - 73 %    LYMPHOCYTES 9 (L) 21 - 52 %    MONOCYTES 4 3 - 10 %    EOSINOPHILS 1 0 - 5 %    BASOPHILS 0 0 - 2 %    ABS. NEUTROPHILS 15.2 (H) 1.8 - 8.0 K/UL    ABS. LYMPHOCYTES 1.5 0.9 - 3.6 K/UL    ABS. MONOCYTES 0.7 0.05 - 1.2 K/UL    ABS. EOSINOPHILS 0.2 0.0 - 0.4 K/UL    ABS. BASOPHILS 0.0 0.0 - 0.1 K/UL    DF AUTOMATED     METABOLIC PANEL, COMPREHENSIVE    Collection Time: 11/15/20 11:15 AM   Result Value Ref Range    Sodium 141 136 - 145 mmol/L    Potassium 4.1 3.5 - 5.5 mmol/L    Chloride 113 (H) 100 - 111 mmol/L    CO2 22 21 - 32 mmol/L    Anion gap 6 3.0 - 18 mmol/L    Glucose 160 (H) 74 - 99 mg/dL    BUN 15 7.0 - 18 MG/DL    Creatinine 0.92 0.6 - 1.3 MG/DL    BUN/Creatinine ratio 16 12 - 20      GFR est AA >60 >60 ml/min/1.73m2    GFR est non-AA 59 (L) >60 ml/min/1.73m2    Calcium 8.2 (L) 8.5 - 10.1 MG/DL    Bilirubin, total 0.4 0.2 - 1.0 MG/DL    ALT (SGPT) 34 13 - 56 U/L    AST (SGOT) 19 10 - 38 U/L    Alk.  phosphatase 199 (H) 45 - 117 U/L    Protein, total 6.2 (L) 6.4 - 8.2 g/dL    Albumin 1.9 (L) 3.4 - 5.0 g/dL    Globulin 4.3 (H) 2.0 - 4.0 g/dL    A-G Ratio 0.4 (L) 0.8 - 1.7     URINALYSIS W/ RFLX MICROSCOPIC    Collection Time: 11/15/20 11:45 AM   Result Value Ref Range    Color YELLOW      Appearance CLEAR      Specific gravity 1.014 1.005 - 1.030      pH (UA) 6.0 5.0 - 8.0      Protein 100 (A) NEG mg/dL    Glucose Negative NEG mg/dL    Ketone TRACE (A) NEG mg/dL    Bilirubin Negative NEG      Blood SMALL (A) NEG      Urobilinogen 0.2 0.2 - 1.0 EU/dL    Nitrites Negative NEG      Leukocyte Esterase TRACE (A) NEG     URINE MICROSCOPIC ONLY    Collection Time: 11/15/20 11:45 AM   Result Value Ref Range    WBC 5 to 10 0 - 5 /hpf    RBC 0 to 3 0 - 5 /hpf    Epithelial cells 1+ 0 - 5 /lpf    Bacteria 1+ (A) NEG /hpf POC LACTIC ACID    Collection Time: 11/15/20 11:47 AM   Result Value Ref Range    Lactic Acid (POC) 0.79 0.40 - 2.00 mmol/L   EKG, 12 LEAD, INITIAL    Collection Time: 11/15/20 11:58 AM   Result Value Ref Range    Ventricular Rate 85 BPM    Atrial Rate 85 BPM    P-R Interval 138 ms    QRS Duration 74 ms    Q-T Interval 384 ms    QTC Calculation (Bezet) 456 ms    Calculated P Axis 55 degrees    Calculated R Axis 53 degrees    Calculated T Axis 52 degrees    Diagnosis       Normal sinus rhythm  Normal ECG  When compared with ECG of 09-NOV-2020 10:10,  premature atrial complexes are no longer present  Confirmed by Eileen Bejarano MD, -- ([id number]) on 11/15/2020 3:12:50 PM     SARS-COV-2    Collection Time: 11/15/20  2:15 PM   Result Value Ref Range    SARS-CoV-2 PENDING     Specimen source NP SWAB     COVID-19 rapid test Not detected NOTD      Specimen type NP Swab         Procedures/imaging: see electronic medical records for all procedures/Xrays and details which were not copied into this note but were reviewed prior to creation of Plan      CC: Bshsi, Not On File

## 2020-11-15 NOTE — ED PROVIDER NOTES
EMERGENCY DEPARTMENT HISTORY AND PHYSICAL EXAM    Date: 11/15/2020  Patient Name: David Garcia    History of Presenting Illness     Chief Complaint   Patient presents with    Altered mental status       History Provided By: Patient     History Thang Hernandez):   11:42 AM  David Garcia is a [de-identified] y.o. female with PMHX of Cancer (vaginal, colon), chronic arthritis pain, diabetes, DVT, hypertension, thyroid disease, blindness who presents to the emergency department by EMS C/O AMS onset today. Patient is unable to confirm or deny any other symptoms or complaints to to altered mental status. Patient arrived by EMS from [address] with complaints of altered mental status today. She is normally oriented x4 per the [facility] home but today is disoriented and repeating, \" don't hurt me. \"    Chief Complaint: Mental status changes  Duration: 1 day  Timing: Acute  Location: Generalized  Quality: Disoriented  Severity: Severe  Modifying Factors: Nothing makes it better, or worse. Associated Symptoms: Patient is unable to confirm or deny any further symptoms due to altered mental status    PCP: Leticia, Not On File     Current Facility-Administered Medications   Medication Dose Route Frequency Provider Last Rate Last Dose    sodium chloride (NS) flush 5-10 mL  5-10 mL IntraVENous PRN Lucio Esquivel MD        sodium chloride 0.9 % bolus infusion 346 mL  346 mL IntraVENous ONCE Lucio Esquivel MD         Current Outpatient Medications   Medication Sig Dispense Refill    gabapentin (NEURONTIN) 300 mg capsule Take 1 Cap by mouth two (2) times a day. 2 Cap 0    LORazepam (ATIVAN) 0.5 mg tablet Take 1 Tab by mouth two (2) times daily as needed for Agitation. Max Daily Amount: 1 mg. 2 Tab 0    naloxone (NARCAN) 4 mg/actuation nasal spray Use 1 spray intranasally, then discard. Repeat with new spray every 2 min as needed for opioid overdose symptoms, alternating nostrils.  2 Each 0    insulin detemir U-100 (Levemir U-100 Insulin) 100 unit/mL injection 10 Units by SubCUTAneous route nightly. 1 Vial 0    cefepime 2 gram 2 g IV syringe 2 g by IntraVENous route every twelve (12) hours every twelve (12) hours. 1 Dose 0    vancomycin 1,000 mg 1,000 mg, vial-mate 1 Device IVPB 1,000 mg by IntraVENous route every twenty-four (24) hours. 1 Dose 0    amino acids/protein hydrolys (PRO-STAT PROFILE PO) Take  by mouth.  multivitamin (ONE A DAY) tablet Take 1 Tab by mouth daily.  ondansetron hcl (ZOFRAN) 4 mg tablet Take 4 mg by mouth every eight (8) hours as needed for Nausea or Vomiting.  diphenhydrAMINE (BENADRYL) 25 mg capsule Take 25 mg by mouth every six (6) hours as needed.  cholecalciferol (VITAMIN D3) 1,000 unit tablet Take 1,000 Units by mouth daily.  apixaban (ELIQUIS PO) Take 5 mg by mouth two (2) times a day.  pollen extracts (PROSTAT PO) Take  by mouth.  tiZANidine (ZANAFLEX) 2 mg capsule Take 2 mg by mouth two (2) times a day.  traZODone (DESYREL) 50 mg tablet Take 50 mg by mouth nightly.  nystatin (MYCOSTATIN) powder Apply 100,000 Units to affected area four (4) times daily.  camphor-menthol (SARNA) 0.5-0.5 % lotion Apply  to affected area daily.  hydrocortisone (HYTONE) 2.5 % topical cream Apply  to affected area two (2) times a day. use thin layer      timolol (TIMOPTIC) 0.5 % ophthalmic solution 1 Drop two (2) times a day.  brimonidine-timolol (COMBIGAN) 0.2-0.5 % drop ophthalmic solution Administer 1 Drop to both eyes every twelve (12) hours.  bisacodyl (DULCOLAX, BISACODYL,) 10 mg suppository Insert  into rectum daily as needed. Indications: constipation      metoprolol succinate (TOPROL-XL) 50 mg XL tablet Take 50 mg by mouth daily.  magnesium hydroxide (MILK OF MAGNESIA CONCENTRATED) 2,400 mg/10 mL susp suspension Take 10 mL by mouth.       alum-mag hydroxide-simeth (MYLANTA) 200-200-20 mg/5 mL susp Take 30 mL by mouth four (4) times daily as needed.  insulin aspart U-100 (NOVOLOG FLEXPEN U-100 INSULIN) 100 unit/mL inpn by SubCUTAneous route. Sliding scale      levothyroxine (SYNTHROID) 50 mcg tablet Take  by mouth Daily (before breakfast).  acetaminophen (TYLENOL) 650 mg TbER Take 650 mg by mouth every six (6) hours as needed for Pain or Fever.  atorvastatin calcium (ATORVASTATIN PO) Take 20 mg by mouth daily. Past History     Past Medical History:  Past Medical History:   Diagnosis Date    [facility]     vaginal and colon cancer    [facility]     Chronic pain     arthritis    Diabetes [facility]     DVT (deep venous thrombosis) ([facility])     Hypertension     Legally blind     Thyroid disease        Past Surgical History:  Past Surgical History:   Procedure Laterality Date    HX ABOVE KNEE AMPUTATION Right     HX COLOSTOMY      HX ORTHOPAEDIC      HX OTHER SURGICAL      kidney drainage tube    HX UROLOGICAL      kidney removal    IR EXCHANGE NEPHRO PERC RT SI  1/28/2019    IR EXCHANGE NEPHRO PERC RT SI  3/21/2019    IR EXCHANGE NEPHRO PERC RT SI  5/13/2019    IR EXCHANGE NEPHRO PERC RT SI  6/27/2019    IR EXCHANGE NEPHRO PERC RT SI  8/16/2019    IR EXCHANGE NEPHRO PERC RT SI  10/3/2019    IR EXCHANGE NEPHRO PERC RT SI  11/18/2019    IR EXCHANGE NEPHRO PERC RT SI  1/6/2020    IR EXCHANGE NEPHRO PERC RT SI  2/24/2020    IR EXCHANGE NEPHRO PERC RT SI  4/13/2020    IR EXCHANGE NEPHRO PERC RT SI  6/1/2020    IR EXCHANGE NEPHRO PERC RT SI  6/19/2020    IR EXCHANGE NEPHRO PERC RT SI  8/10/2020    IR EXCHANGE NEPHRO PERC RT SI  9/30/2020    IR EXCHANGE NEPHRO PERC RT SI  11/5/2020    IR NEPHROSTOMY PERC RT PLC CATH  SI  6/21/2020       Family History:  History reviewed. No pertinent family history. Social History:  Social History     Tobacco Use    Smoking status: Never Smoker    Smokeless tobacco: Never Used   Substance Use Topics    Alcohol use: No    Drug use: Never       Allergies:   Allergies   Allergen Reactions    Morphine Nausea and Vomiting    Pcn [Penicillins] Rash         Review of Systems     Review of Systems   Unable to perform ROS: Mental status change       Physical Exam     Vitals:    11/15/20 1124 11/15/20 1130 11/15/20 1145 11/15/20 1200   BP: 122/72 (!) 136/56 (!) 136/52 (!) 147/52   Pulse: 90 87 81 90   Resp: 19 21 21 20   Temp: 97.3 °F (36.3 °C)      SpO2: 99% 98% 99% 100%   Weight: 78.2 kg (172 lb 8 oz)      Height: 5' 3\" (1.6 m)        Physical Exam  Vitals signs and nursing note reviewed. Constitutional:       General: She is in acute distress. Appearance: Normal appearance. She is normal weight. She is ill-appearing. HENT:      Head: Normocephalic and atraumatic. Nose: Nose normal. No rhinorrhea. Mouth/Throat:      Mouth: Mucous membranes are moist.      Pharynx: No oropharyngeal exudate or posterior oropharyngeal erythema. Eyes:      Extraocular Movements: Extraocular movements intact. Conjunctiva/sclera: Conjunctivae normal.      Pupils: Pupils are equal, round, and reactive to light. Neck:      Musculoskeletal: Normal range of motion and neck supple. No neck rigidity. Cardiovascular:      Rate and Rhythm: Normal rate and regular rhythm. Heart sounds: No murmur. No friction rub. No gallop. Pulmonary:      Effort: Pulmonary effort is normal. No respiratory distress. Breath sounds: Normal breath sounds. No wheezing, rhonchi or rales. Abdominal:      General: Bowel sounds are normal.      Palpations: Abdomen is soft. Tenderness: There is no abdominal tenderness. There is no guarding or rebound. Musculoskeletal: Normal range of motion. General: No swelling, tenderness or deformity. Feet:      Right foot:      Amputation: Right leg is amputated above knee. Lymphadenopathy:      Cervical: No cervical adenopathy. Skin:     General: Skin is warm and dry. Findings: No rash.           Neurological:      General: No focal deficit present. Mental Status: She is lethargic and disoriented. Sensory: Sensation is intact. Motor: Motor function is intact. Psychiatric:         Mood and Affect: Mood normal.         Behavior: Behavior normal.         Diagnostic Study Results     Labs -     Recent Results (from the past 12 hour(s))   CBC WITH AUTOMATED DIFF    Collection Time: 11/15/20 11:15 AM   Result Value Ref Range    WBC 17.6 (H) 4.6 - 13.2 K/uL    RBC 3.09 (L) 4.20 - 5.30 M/uL    HGB 8.7 (L) 12.0 - 16.0 g/dL    HCT 27.4 (L) 35.0 - 45.0 %    MCV 88.7 74.0 - 97.0 FL    MCH 28.2 24.0 - 34.0 PG    MCHC 31.8 31.0 - 37.0 g/dL    RDW 15.7 (H) 11.6 - 14.5 %    PLATELET 959 (H) 667 - 420 K/uL    MPV 8.9 (L) 9.2 - 11.8 FL    NEUTROPHILS 86 (H) 40 - 73 %    LYMPHOCYTES 9 (L) 21 - 52 %    MONOCYTES 4 3 - 10 %    EOSINOPHILS 1 0 - 5 %    BASOPHILS 0 0 - 2 %    ABS. NEUTROPHILS 15.2 (H) 1.8 - 8.0 K/UL    ABS. LYMPHOCYTES 1.5 0.9 - 3.6 K/UL    ABS. MONOCYTES 0.7 0.05 - 1.2 K/UL    ABS. EOSINOPHILS 0.2 0.0 - 0.4 K/UL    ABS. BASOPHILS 0.0 0.0 - 0.1 K/UL    DF AUTOMATED     METABOLIC PANEL, COMPREHENSIVE    Collection Time: 11/15/20 11:15 AM   Result Value Ref Range    Sodium 141 136 - 145 mmol/L    Potassium 4.1 3.5 - 5.5 mmol/L    Chloride 113 (H) 100 - 111 mmol/L    CO2 22 21 - 32 mmol/L    Anion gap 6 3.0 - 18 mmol/L    Glucose 160 (H) 74 - 99 mg/dL    BUN 15 7.0 - 18 MG/DL    Creatinine 0.92 0.6 - 1.3 MG/DL    BUN/Creatinine ratio 16 12 - 20      GFR est AA >60 >60 ml/min/1.73m2    GFR est non-AA 59 (L) >60 ml/min/1.73m2    Calcium 8.2 (L) 8.5 - 10.1 MG/DL    Bilirubin, total 0.4 0.2 - 1.0 MG/DL    ALT (SGPT) 34 13 - 56 U/L    AST (SGOT) 19 10 - 38 U/L    Alk.  phosphatase 199 (H) 45 - 117 U/L    Protein, total 6.2 (L) 6.4 - 8.2 g/dL    Albumin 1.9 (L) 3.4 - 5.0 g/dL    Globulin 4.3 (H) 2.0 - 4.0 g/dL    A-G Ratio 0.4 (L) 0.8 - 1.7     URINALYSIS W/ RFLX MICROSCOPIC    Collection Time: 11/15/20 11:45 AM   Result Value Ref Range    Color YELLOW      Appearance CLEAR      Specific gravity 1.014 1.005 - 1.030      pH (UA) 6.0 5.0 - 8.0      Protein 100 (A) NEG mg/dL    Glucose Negative NEG mg/dL    Ketone TRACE (A) NEG mg/dL    Bilirubin Negative NEG      Blood SMALL (A) NEG      Urobilinogen 0.2 0.2 - 1.0 EU/dL    Nitrites Negative NEG      Leukocyte Esterase TRACE (A) NEG     URINE MICROSCOPIC ONLY    Collection Time: 11/15/20 11:45 AM   Result Value Ref Range    WBC 5 to 10 0 - 5 /hpf    RBC 0 to 3 0 - 5 /hpf    Epithelial cells 1+ 0 - 5 /lpf    Bacteria 1+ (A) NEG /hpf   POC LACTIC ACID    Collection Time: 11/15/20 11:47 AM   Result Value Ref Range    Lactic Acid (POC) 0.79 0.40 - 2.00 mmol/L   EKG, 12 LEAD, INITIAL    Collection Time: 11/15/20 11:58 AM   Result Value Ref Range    Ventricular Rate 85 BPM    Atrial Rate 85 BPM    P-R Interval 138 ms    QRS Duration 74 ms    Q-T Interval 384 ms    QTC Calculation (Bezet) 456 ms    Calculated P Axis 55 degrees    Calculated R Axis 53 degrees    Calculated T Axis 52 degrees    Diagnosis       Normal sinus rhythm  Normal ECG  When compared with ECG of 09-NOV-2020 10:10,  premature atrial complexes are no longer present  Confirmed by Caty Camejo MD, -- ([id number]) on 11/15/2020 3:12:50 PM     SARS-COV-2    Collection Time: 11/15/20  2:15 PM   Result Value Ref Range    SARS-CoV-2 PENDING     Specimen source NP SWAB     COVID-19 rapid test Not detected NOTD      Specimen type NP Swab         Radiologic Studies -   CT ABD PELV W CONT   Final Result   IMPRESSION:         1. Small bilateral pleural effusions with dependent changes of atelectasis. 2. Right-sided percutaneous nephrostomy catheter in stable position from recent   prior placement imaging of 11/5/2020. No hydronephrosis involving the right   kidney. > Unchanged coil obliteration right ureter. > Left kidney and urinary bladder surgically absent. 3. No focal bowel wall thickening or dilatation. No morphology of bowel   obstruction.  Left lower quadrant colostomy. 4. Mild interval increase in previously noted relatively diffuse body wall   edema. CT HEAD WO CONT   Final Result   IMPRESSION:         1. No acute intracranial abnormality. 2. Subcortical and periventricular white matter low-attenuation; appearance of   which favors sequela of chronic ischemic microvascular change. XR CHEST PORT   Final Result   IMPRESSION:         1. Left approach PICC line in stable/appropriate position. 2. Interval increased left retrocardiac atelectasis or airspace disease with   likely small left pleural effusion. CT Results  (Last 48 hours)               11/15/20 1251  CT ABD PELV W CONT Final result    Impression:  IMPRESSION:           1. Small bilateral pleural effusions with dependent changes of atelectasis. 2. Right-sided percutaneous nephrostomy catheter in stable position from recent   prior placement imaging of 11/5/2020. No hydronephrosis involving the right   kidney. > Unchanged coil obliteration right ureter. > Left kidney and urinary bladder surgically absent. 3. No focal bowel wall thickening or dilatation. No morphology of bowel   obstruction. Left lower quadrant colostomy. 4. Mild interval increase in previously noted relatively diffuse body wall   edema. Narrative:  EXAM: CT of the abdomen and pelvis       INDICATION: [de-identified]year-old patient with generalized abdominal pain, altered mental   status. COMPARISON: CT scan 11/4/2020; nephrostomy exchange images 11/5/2020; more   distant prior abdominal/pelvic CT 1/16/2020. TECHNIQUE: Axial CT imaging of the abdomen and pelvis was performed with   intravenous contrast. Multiplanar reformats were generated. One or more dose reduction techniques were used on this CT: automated exposure   control, adjustment of the mAs and/or kVp according to patient size, and   iterative reconstruction techniques.   The specific techniques used on this CT   exam have been documented in the patient's electronic medical record. Digital   Imaging and Communications in Medicine (DICOM) format image data are available   to nonaffiliated external healthcare facilities or entities on a secure, media   free, reciprocally searchable basis with patient authorization for at least a   12-month period after this study. _______________       FINDINGS:       LOWER CHEST: There are small bilateral pleural effusions present with   compressive atelectasis present at each lung base. Cardiac size appears within   normal limits. No evidence of pericardial effusion. LIVER, BILIARY: Hepatic parenchymal enhancement is uniform. No suspicious   appearing hepatic lesion. No biliary dilation. , And are surgically absent. PANCREAS: Normal.       SPLEEN: Normal.       ADRENALS: Normal.       KIDNEYS/URETERS/BLADDER: The left kidney is surgically absent. A percutaneous   nephrostomy catheter is present within the right kidney. Locking loop of the   catheter is present within the renal pelvis. There is no evidence of   hydronephrosis involving the right kidney. Cortical obliteration of the right   ureter again noted. Right kidney enhances normally. Small amount of low   attenuation noted adjacent to the nephrostomy catheter entry site, unchanged. Urinary bladder is likely surgically absent. PELVIC ORGANS: Uterus is surgically absent. VASCULATURE: Diffuse aortobiiliac atherosclerotic vascular calcification is   present without evidence of aneurysmal dilatation. LYMPH NODES: There are scattered subcentimeter mesenteric and retroperitoneal   lymph nodes present without evidence of abdominal or pelvic adenopathy. GASTROINTESTINAL TRACT: No focal bowel wall thickening or dilatation. No   morphology of bowel obstruction. Left lower quadrant colostomy. No free   intraperitoneal gas. BONES: No acute or aggressive osseous abnormalities identified.  Bilateral hip   joint osteoarthritis and lower lumbar predominant facet joint osteoarthritis and   spondylosis, as previously. OTHER: Diffuse body wall edema appearing slightly increased from prior study. _______________           11/15/20 1250  CT HEAD WO CONT Final result    Impression:  IMPRESSION:           1. No acute intracranial abnormality. 2. Subcortical and periventricular white matter low-attenuation; appearance of   which favors sequela of chronic ischemic microvascular change. Narrative:  EXAM: CT head       INDICATION: Altered mental status       COMPARISON: No prior imaging available for review. TECHNIQUE: Axial CT imaging of the head was performed without intravenous   contrast. Standard multiplanar coronal and sagittal reformatted images were   obtained and are included in interpretation. One or more dose reduction techniques were used on this CT: automated exposure   control, adjustment of the mAs and/or kVp according to patient size, and   iterative reconstruction techniques. The specific techniques used on this CT   exam have been documented in the patient's electronic medical record. Digital   Imaging and Communications in Medicine (DICOM) format image data are available   to nonaffiliated external healthcare facilities or entities on a secure, media   free, reciprocally searchable basis with patient authorization for at least a   12-month period after this study. _______________       FINDINGS:       BRAIN AND POSTERIOR FOSSA: Cortical and cerebellar volume loss is present,   appearing within normal limits for patient age. Ventricular size and   configuration is normal. Basilar cisterns appear patent. Subcortical and   periventricular white matter low-attenuation noted. There is no intracranial   hemorrhage, mass effect, or midline shift. Gray-white matter differentiation   appears within normal limits.        EXTRA-AXIAL SPACES AND MENINGES: There are no abnormal extra-axial fluid   collections. CALVARIUM: Intact. SINUSES: Clear. OTHER: None.       _______________               CXR Results  (Last 48 hours)               11/15/20 1212  XR CHEST PORT Final result    Impression:  IMPRESSION:           1. Left approach PICC line in stable/appropriate position. 2. Interval increased left retrocardiac atelectasis or airspace disease with   likely small left pleural effusion. Narrative:  EXAM: XR CHEST PORT       CLINICAL INDICATION/HISTORY: Altered mental status.   -Additional: None       COMPARISON: Several prior studies, most recently 11/4/2020. TECHNIQUE: Frontal view of the chest       _______________       FINDINGS:       HEART AND MEDIASTINUM: Left approach PICC line with tip projecting over the   superior cavoatrial junction. Stable appearing cardiac size and mediastinal   contours. LUNGS AND PLEURAL SPACES: Interval increase in left retrocardiac density present   with likely trace left pleural effusion. Right lung bases clear as visualized. No pneumothorax. Right CP angle clear. BONY THORAX AND SOFT TISSUES: No acute osseous abnormality       _______________                 Medications given in the ED-  Medications   sodium chloride (NS) flush 5-10 mL (has no administration in time range)   sodium chloride 0.9 % bolus infusion 1,000 mL (0 mL IntraVENous IV Completed 11/15/20 1456)     Followed by   sodium chloride 0.9 % bolus infusion 1,000 mL (1,000 mL IntraVENous New Bag 11/15/20 1211)     Followed by   sodium chloride 0.9 % bolus infusion 346 mL (has no administration in time range)   iopamidoL (ISOVUE 300) 61 % contrast injection  mL (100 mL IntraVENous Given 11/15/20 1243)         Medical Decision Making   I am the first provider for this patient. I reviewed the vital signs, available nursing notes, past medical history, past surgical history, family history and social history.     Vital Signs-Reviewed the patient's vital signs.    Pulse Oximetry Analysis -100% on room air    Cardiac Monitor:  Rate: 90 bpm  Rhythm: Normal sinus rhythm    EKG interpretation: (Preliminary)  Rhythm: Normal sinus rhythm. Rate: 85 bpm; No STEMI  EKG read by Pearl De MD at 11:58 AM    Records Reviewed: Nursing Notes    Provider Notes (Medical Decision Making):   DDX: UTI, URI, pneumonia, COVID-19, occult infection, sepsis, ICH    Procedures:  Procedures    ED Course:   11:42 AM Initial assessment performed. The patients presenting problems have been discussed, and they are in agreement with the care plan formulated and outlined with them. I have encouraged them to ask questions as they arise throughout their visit.    3:09 PM  Discussed with Dr Shy Lucas for admission to medical floor, pending covid test.      Diagnosis and Disposition     Discussion:  [de-identified] y.o. female from nursing home with acute onset of disorientation today. Patient is normally ANO x4 per the nursing home. Patient has acute onset of cystitis. Discussed with hospitalist for admission. Critical Care Time:   I have spent 33 minutes of critical care time involved in lab review, consultations with specialist, family decision-making, and documentation. During this entire length of time I was immediately available to the patient. Critical Care: The reason for providing this level of medical care for this critically ill patient was due a critical illness that impaired one or more vital organ systems such that there was a high probability of imminent or life threatening deterioration in the patients condition. This care involved high complexity decision making to assess, manipulate, and support vital system functions, to treat this degreee vital organ system failure and to prevent further life threatening deterioration of the patients condition. Critical Care Time: 33 minutes    Core Measures:  For Hospitalized Patients:    1.  Hospitalization Decision Time:  The decision to hospitalize the patient was made by Francisco Astudillo MD, MD at 1:00 PM on 11/15/2020    2. Aspirin: Aspirin was not given because the patient did not present with a stroke at the time of their Emergency Department evaluation    3:09 PM Patient is being admitted to the hospital by Dr. Hellen Gosselin. The results of their tests and reasons for their admission have been discussed with them and/or available family. They convey agreement and understanding for the need to be admitted and for their admission diagnosis. CONDITIONS ON ADMISSION:  Sepsis is not present at the time of admission. Deep Vein Thrombosis is not present at the time of admission. Thrombosis is not present at the time of admission. Urinary Tract Infection is present at the time of admission. Pneumonia is not present at the time of admission. MRSA is not present at the time of admission. Wound infection is not present at the time of admission. Pressure Ulcer is not present at the time of admission. CLINICAL IMPRESSION:    1. Disorientation    2. Acute cystitis without hematuria            Dragon Disclaimer     Please note that this dictation was completed with [employer], the computer voice recognition software. Quite often unanticipated grammatical, syntax, homophones, and other interpretive errors are inadvertently transcribed by the computer software. Please disregard these errors. Please excuse any errors that have escaped final proofreading.     Fabienne Stark MD

## 2020-11-16 PROBLEM — G93.40 ENCEPHALOPATHY ACUTE: Status: ACTIVE | Noted: 2020-11-16

## 2020-11-16 LAB
ALBUMIN SERPL-MCNC: 1.8 G/DL (ref 3.4–5)
ALBUMIN/GLOB SERPL: 0.5 {RATIO} (ref 0.8–1.7)
ALP SERPL-CCNC: 165 U/L (ref 45–117)
ALT SERPL-CCNC: 25 U/L (ref 13–56)
ANION GAP SERPL CALC-SCNC: 8 MMOL/L (ref 3–18)
AST SERPL-CCNC: 14 U/L (ref 10–38)
BASOPHILS # BLD: 0 K/UL (ref 0–0.1)
BASOPHILS NFR BLD: 0 % (ref 0–2)
BILIRUB SERPL-MCNC: 0.3 MG/DL (ref 0.2–1)
BUN SERPL-MCNC: 13 MG/DL (ref 7–18)
BUN/CREAT SERPL: 18 (ref 12–20)
CALCIUM SERPL-MCNC: 7.5 MG/DL (ref 8.5–10.1)
CHLORIDE SERPL-SCNC: 114 MMOL/L (ref 100–111)
CO2 SERPL-SCNC: 19 MMOL/L (ref 21–32)
CREAT SERPL-MCNC: 0.73 MG/DL (ref 0.6–1.3)
DIFFERENTIAL METHOD BLD: ABNORMAL
EOSINOPHIL # BLD: 0.1 K/UL (ref 0–0.4)
EOSINOPHIL NFR BLD: 1 % (ref 0–5)
ERYTHROCYTE [DISTWIDTH] IN BLOOD BY AUTOMATED COUNT: 15.7 % (ref 11.6–14.5)
EST. AVERAGE GLUCOSE BLD GHB EST-MCNC: 157 MG/DL
GLOBULIN SER CALC-MCNC: 3.9 G/DL (ref 2–4)
GLUCOSE BLD STRIP.AUTO-MCNC: 147 MG/DL (ref 70–110)
GLUCOSE BLD STRIP.AUTO-MCNC: 148 MG/DL (ref 70–110)
GLUCOSE BLD STRIP.AUTO-MCNC: 154 MG/DL (ref 70–110)
GLUCOSE BLD STRIP.AUTO-MCNC: 158 MG/DL (ref 70–110)
GLUCOSE SERPL-MCNC: 135 MG/DL (ref 74–99)
HBA1C MFR BLD: 7.1 % (ref 4.2–5.6)
HCT VFR BLD AUTO: 25.5 % (ref 35–45)
HGB BLD-MCNC: 8.4 G/DL (ref 12–16)
LACTATE SERPL-SCNC: 0.6 MMOL/L (ref 0.4–2)
LYMPHOCYTES # BLD: 1.1 K/UL (ref 0.9–3.6)
LYMPHOCYTES NFR BLD: 7 % (ref 21–52)
MCH RBC QN AUTO: 29.3 PG (ref 24–34)
MCHC RBC AUTO-ENTMCNC: 32.9 G/DL (ref 31–37)
MCV RBC AUTO: 88.9 FL (ref 74–97)
MONOCYTES # BLD: 0.5 K/UL (ref 0.05–1.2)
MONOCYTES NFR BLD: 3 % (ref 3–10)
NEUTS SEG # BLD: 13.7 K/UL (ref 1.8–8)
NEUTS SEG NFR BLD: 89 % (ref 40–73)
PLATELET # BLD AUTO: 395 K/UL (ref 135–420)
PMV BLD AUTO: 8.4 FL (ref 9.2–11.8)
POTASSIUM SERPL-SCNC: 3.9 MMOL/L (ref 3.5–5.5)
PROT SERPL-MCNC: 5.7 G/DL (ref 6.4–8.2)
RBC # BLD AUTO: 2.87 M/UL (ref 4.2–5.3)
SODIUM SERPL-SCNC: 141 MMOL/L (ref 136–145)
WBC # BLD AUTO: 15.4 K/UL (ref 4.6–13.2)

## 2020-11-16 PROCEDURE — 74011636637 HC RX REV CODE- 636/637: Performed by: FAMILY MEDICINE

## 2020-11-16 PROCEDURE — 74011000250 HC RX REV CODE- 250: Performed by: HOSPITALIST

## 2020-11-16 PROCEDURE — 83036 HEMOGLOBIN GLYCOSYLATED A1C: CPT

## 2020-11-16 PROCEDURE — 82962 GLUCOSE BLOOD TEST: CPT

## 2020-11-16 PROCEDURE — 74011250637 HC RX REV CODE- 250/637: Performed by: FAMILY MEDICINE

## 2020-11-16 PROCEDURE — 76450000000

## 2020-11-16 PROCEDURE — 74011250636 HC RX REV CODE- 250/636: Performed by: INTERNAL MEDICINE

## 2020-11-16 PROCEDURE — 65270000029 HC RM PRIVATE

## 2020-11-16 PROCEDURE — 80053 COMPREHEN METABOLIC PANEL: CPT

## 2020-11-16 PROCEDURE — 74011000250 HC RX REV CODE- 250: Performed by: INTERNAL MEDICINE

## 2020-11-16 PROCEDURE — 83605 ASSAY OF LACTIC ACID: CPT

## 2020-11-16 PROCEDURE — 74011250636 HC RX REV CODE- 250/636: Performed by: HOSPITALIST

## 2020-11-16 PROCEDURE — 74011000250 HC RX REV CODE- 250: Performed by: FAMILY MEDICINE

## 2020-11-16 PROCEDURE — 85025 COMPLETE CBC W/AUTO DIFF WBC: CPT

## 2020-11-16 PROCEDURE — 74011250636 HC RX REV CODE- 250/636: Performed by: FAMILY MEDICINE

## 2020-11-16 RX ORDER — NYSTATIN 100000 U/G
OINTMENT TOPICAL 3 TIMES DAILY
Status: DISCONTINUED | OUTPATIENT
Start: 2020-11-16 | End: 2020-11-21 | Stop reason: HOSPADM

## 2020-11-16 RX ORDER — NYSTATIN 100000 [USP'U]/G
POWDER TOPICAL 3 TIMES DAILY
Status: DISCONTINUED | OUTPATIENT
Start: 2020-11-16 | End: 2020-11-21 | Stop reason: HOSPADM

## 2020-11-16 RX ORDER — MORPHINE SULFATE 2 MG/ML
1 INJECTION, SOLUTION INTRAMUSCULAR; INTRAVENOUS
Status: DISCONTINUED | OUTPATIENT
Start: 2020-11-16 | End: 2020-11-21 | Stop reason: HOSPADM

## 2020-11-16 RX ORDER — MAGNESIUM SULFATE 100 %
4 CRYSTALS MISCELLANEOUS AS NEEDED
Status: DISCONTINUED | OUTPATIENT
Start: 2020-11-16 | End: 2020-11-21 | Stop reason: HOSPADM

## 2020-11-16 RX ORDER — NYSTATIN 100000 [USP'U]/G
POWDER TOPICAL 3 TIMES DAILY
Status: DISCONTINUED | OUTPATIENT
Start: 2020-11-16 | End: 2020-11-16 | Stop reason: SDUPTHER

## 2020-11-16 RX ORDER — INSULIN LISPRO 100 [IU]/ML
INJECTION, SOLUTION INTRAVENOUS; SUBCUTANEOUS
Status: DISCONTINUED | OUTPATIENT
Start: 2020-11-16 | End: 2020-11-21 | Stop reason: HOSPADM

## 2020-11-16 RX ORDER — NYSTATIN 100000 [USP'U]/G
POWDER TOPICAL 3 TIMES DAILY
Status: DISCONTINUED | OUTPATIENT
Start: 2020-11-16 | End: 2020-11-16

## 2020-11-16 RX ORDER — DEXTROSE MONOHYDRATE 100 MG/ML
125-250 INJECTION, SOLUTION INTRAVENOUS AS NEEDED
Status: DISCONTINUED | OUTPATIENT
Start: 2020-11-16 | End: 2020-11-21 | Stop reason: HOSPADM

## 2020-11-16 RX ADMIN — NYSTATIN: 100000 POWDER TOPICAL at 21:59

## 2020-11-16 RX ADMIN — Medication 10 ML: at 07:10

## 2020-11-16 RX ADMIN — SODIUM CHLORIDE 100 ML/HR: 900 INJECTION, SOLUTION INTRAVENOUS at 07:10

## 2020-11-16 RX ADMIN — INSULIN LISPRO 2 UNITS: 100 INJECTION, SOLUTION INTRAVENOUS; SUBCUTANEOUS at 09:12

## 2020-11-16 RX ADMIN — CEFEPIME HYDROCHLORIDE 2 G: 2 INJECTION, POWDER, FOR SOLUTION INTRAVENOUS at 07:09

## 2020-11-16 RX ADMIN — NYSTATIN: 100000 POWDER TOPICAL at 16:57

## 2020-11-16 RX ADMIN — WATER 2 G: 1 INJECTION INTRAMUSCULAR; INTRAVENOUS; SUBCUTANEOUS at 15:00

## 2020-11-16 RX ADMIN — NYSTATIN: 100000 POWDER TOPICAL at 12:00

## 2020-11-16 RX ADMIN — NYSTATIN: 100000 OINTMENT TOPICAL at 21:59

## 2020-11-16 RX ADMIN — INSULIN LISPRO 2 UNITS: 100 INJECTION, SOLUTION INTRAVENOUS; SUBCUTANEOUS at 11:30

## 2020-11-16 RX ADMIN — NYSTATIN: 100000 OINTMENT TOPICAL at 09:00

## 2020-11-16 RX ADMIN — FAMOTIDINE 20 MG: 10 INJECTION INTRAVENOUS at 13:00

## 2020-11-16 RX ADMIN — VANCOMYCIN HYDROCHLORIDE 1250 MG: 1.25 INJECTION, POWDER, LYOPHILIZED, FOR SOLUTION INTRAVENOUS at 16:57

## 2020-11-16 RX ADMIN — SODIUM CHLORIDE 100 ML/HR: 900 INJECTION, SOLUTION INTRAVENOUS at 17:58

## 2020-11-16 RX ADMIN — NYSTATIN: 100000 OINTMENT TOPICAL at 16:57

## 2020-11-16 RX ADMIN — FAMOTIDINE 20 MG: 10 INJECTION INTRAVENOUS at 20:31

## 2020-11-16 NOTE — PROGRESS NOTES
Shift Summary:  Patient confused, not following commands. Patient will not speak other than saying, \"Please, please\" upon interventions with patient. LUE PICC line dressing with bloody drainage, applied new dressing, flushed lines, positive blood return. . Low grade temp overnight with tachycardia. 0800 - Bedside and Verbal shift change report given to [address] [place] [address] Freda (oncoming nurse) by Lizzie Richardson (offgoing nurse). Report included the following information SBAR, Kardex, Intake/Output and MAR.

## 2020-11-16 NOTE — PROGRESS NOTES
Reason for Admission:   AMS                  RUR Score:    Mod; 25%              PCP: First and Last name:     Name of Practice:    Are you a current patient: Yes/No:    Approximate date of last visit:    Can you participate in a virtual visit if needed:     Do you (patient/family) have any concerns for transition/discharge? Plan for utilizing home health:   N/A    Current Advanced Directive/Advance Care Plan: On File            Transition of Care Plan:  Return to [facility]        Chart reviewed. Per H&P Rosalva Fuentes is a [de-identified] y.o. female  With PMHX of Cancer (vaginal, colon), chronic arthritis pain, diabetes, DVT, hypertension, thyroid disease, blindness who presents to the emergency department by EMS C/O AMS onset today.  Patient is unable to confirm or deny any other symptoms or complaints due to altered mental status.  Patient arrived by EMS from [address] with complaints of altered mental status today. Ivy Jurado is normally oriented x4 per the nursing home but today is disoriented and repeating, \" don't hurt me. \" and \"help\"\"    Noted pt is a LTC resident at [facility] that was recently discharged from [facility]. CM contacted pt's son, Tiffanie Tripathi ([phone]), and he has indicated plan is for pt to return to [facility] when medically stable. [facility] is aware of plan return when medially stable.     Care Management Interventions  Mode of Transport at Discharge: S  Transition of Care Consult (CM Consult): Discharge Planning, Long Term Care(From [facility])  Health Maintenance Reviewed: Yes  Speech Therapy Consult: Yes  Current Support Network: Family Lives [address] for Transition of Care is Related to the Following Treatment Goals : [facility]  The Patient and/or Patient Representative was Provided with a Choice of Provider and Agrees with the Discharge Plan?: Yes  Name of the Patient Representative Who was Provided with a Choice of Provider and Agrees with the Discharge Plan: son/ Mich Thomas  Freedom of Choice List was Provided with Basic Dialogue that Supports the Patient's Individualized Plan of Care/Goals, Treatment Preferences and Shares the Quality Data Associated with the Providers?: Yes  Discharge Location  Discharge Placement: Skilled nursing facility(vs LTC at [address] [place] [address])     Readmission Assessment  Number of days since last admission?: 1-7 days  Previous disposition: SNF  What was the patient's/caregiver's perception as to why they think they needed to return back to the hospital?: Other (Comment)(AMS)  Did you see a specialist, such as Cardiac, Pulmonary, Orthopedic Physician, etc. after you left the hospital?: No  Who advised the patient to return to the hospital?: Skilled Unit  Does the patient report anything that got in the way of taking their medications?: No

## 2020-11-16 NOTE — PROGRESS NOTES
Hospitalist Progress Note    Patient: Lisa Madera MRN: [id number]  [facility]: [id number]    YOB: 1940  Age: [de-identified] y.o. Sex: female    DOA: 11/15/2020 LOS:  LOS: 1 day                Assessment/Plan     Patient Active Problem List   Diagnosis Code    Pyelonephritis N14    S/p nephrectomy Z90.5    Right flank pain R10.9    Malfunction of nephrostomy tube ([address] T83.098A    Nephrostomy tube displaced ([address] T83.022A    UTI (urinary tract infection) N39.0    Elevated WBC count D72.829    Diabetes ([address] E11.9    Legally blind H54.8    Hypertension I10    Thyroid disease E07.9    Elevated serum creatinine R79.89    S/P colectomy Z90.49    Skin lesion of foot L98.9    Tear of skin of buttock S31.801A    Pressure injury of right heel, unstageable ([facility]) L89.610    Hypomagnesemia E83.42    Hyperkalemia E87.5    Heel ulcer ([address] L97.409    ANTONI (acute kidney injury) ([address] N17.9    S/P AKA (above knee amputation), right ([address] Z89.611    Nephrostomy status ([address] Z93.6    Obstructed nephrostomy tube ([address] T83.092A    Nephrostomy complication ([facility]) O61.813    Acute renal failure (ARF) ([facility]) N17.9    Elevated INR R79.1    Bacteremia R78.81    Enterococcus faecalis infection B95.2    CKD stage 3 due to type 2 diabetes mellitus ([facility]) E11.22, N18.30    Hydronephrosis, right N13.30    Severe sepsis with acute organ dysfunction ([facility]) A41.9, R65.20    Anticoagulated Z79.01    AMS (altered mental status) R41.82    Encephalopathy acute G80.41            [de-identified] y/o female on anticoagulation for prior DVT as well as 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 Only responds with \"please\" however awake. Does not follow commands. Encephalopathy -   CT head negative.   Discussed with son, possible CVA  Does not want MRI now. Pyelonephritis/Bacteremia -  Antibiotics - vancomycin, ceftriaxone. UA with no evidence of UTI  Follow blood and urine cultures. ID consulted    Nephrostomy tube replaced last admission  Urine clear. DM -   On SSI    HTN -  Controlled    Legally blind    Discussed at length with son at bedside. Patient is DNR    Disposition : TBD    Review of systems  Unable to obtain      Physical Exam:  General: As above    HEENT: NC, Atraumatic.   anicteric sclerae. Lungs: CTA Bilaterally. No Wheezing/Rhonchi/Rales. Heart:  S1 S2,  No murmur, No Rubs, No Gallops  Abdomen: Soft, Non distended, Non tender.  +Bowel sounds, nephrostomy  in place. Extremities: No c/c/e          Vital signs/Intake and Output:  Visit Vitals  BP (!) 150/54   Pulse (!) 101   Temp 98.8 °F (37.1 °C)   Resp 18   Ht 5' 3\" (1.6 m)   Wt 78.2 kg (172 lb 8 oz)   SpO2 100%   BMI 30.56 kg/m²     Current Shift:  11/16 0701 - 11/16 1900  In: 327 [I.V.:731]  Out: -   Last three shifts:  11/14 1901 - 11/16 0700  In: 2346 [I.V.:2346]  Out: 1850 [Urine:1850]            Labs: Results:       Chemistry Recent Labs     11/16/20  0115 11/15/20  1115   * 160*    141   K 3.9 4.1   * 113*   CO2 19* 22   BUN 13 15   CREA 0.73 0.92   CA 7.5* 8.2*   AGAP 8 6   BUCR 18 16   * 199*   TP 5.7* 6.2*   ALB 1.8* 1.9*   GLOB 3.9 4.3*   AGRAT 0.5* 0.4*      CBC w/Diff Recent Labs     11/16/20  0115 11/15/20  1115   WBC 15.4* 17.6*   RBC 2.87* 3.09*   HGB 8.4* 8.7*   HCT 25.5* 27.4*    505*   GRANS 89* 86*   LYMPH 7* 9*   EOS 1 1      Cardiac Enzymes No results for input(s): CPK, CKND1, JANE in the last 72 hours. No lab exists for component: CKRMB, TROIP   Coagulation No results for input(s): PTP, INR, APTT, INREXT, INREXT in the last 72 hours.     Lipid Panel Lab Results   Component Value Date/Time    Cholesterol, total 115 08/28/2019 08:22 AM    HDL Cholesterol 39 (L) 08/28/2019 08:22 AM    LDL, calculated 46.2 08/28/2019 08:22 AM    VLDL, calculated 29.8 08/28/2019 08:22 AM    Triglyceride 149 08/28/2019 08:22 AM    CHOL/HDL Ratio 2.9 08/28/2019 08:22 AM      BNP No results for input(s): BNPP in the last 72 hours.    Liver Enzymes Recent Labs     11/16/20  0115   TP 5.7*   ALB 1.8*   *      Thyroid Studies Lab Results   Component Value Date/Time    TSH 3.40 01/24/2020 08:35 AM        Procedures/imaging: see electronic medical records for all procedures/Xrays and details which were not copied into this note but were reviewed prior to creation of Plan

## 2020-11-16 NOTE — CONSULTS
[facility]  (A Division of [address] [place] [address])    Follow-up Note      Date of Admission: 11/15/2020       Date of Note:  11/16/2020    Summary:    Ms Philip Mcnulty is a blind [de-identified] WF survivor of many things who most recently had another accidental dysfunctioning of her chronic/permanent R nephrostomy tube on 11/3 with subsequent progressive R back pain and malaise; also had abdominal pain, Nausea.  Since having it replaced 11/5, her symptoms have settled with improving fever curve.     Retired CNA    Since I last saw her 11/11, she was sent back to NH for 2wks IV Vanco+Cefepime therapy to complete 2wks treatment of non-SBE E faecalis and M morganii bacteremia 11/23, but evidently has become encephalopathic prompting readmission 11/15. NOT on vancomycin that I can see. CC:  \"Please, please\"    Interval History:  Chart reviewed/pt examined at bedside. Non-verbal except for \"please, please\". Looks comfortable. No tremors/myoclonus. Current Antimicrobials:    Prior Antimicrobials:  1. Cefepime IV (11/9-) #9  2. Levofloxacin IV (11/4; 11/15-) #1 1. Vanco IV (11/5-15?)  #8  2. Meropenem IV (11/4-7)  3. CAX IV (11/7-9)       Assessment: Rec / Plan:   Metabolic encephalopathy - cause? Cefepime can cause a variety of CNS toxicities -- encephalopathy not one that I have seen before, but can't discount it. Doubt UTI -- her urine looks as good as it has been in several months since [id number]. Doubt it. We'll see what grows out of her urine; her nephrostomy tube is working (bag full of clear urine). Limited community-acquired Enterococcus faecalis/Morganella morganii  sepsis - in conjunction with R nephrotomy tube dysfunction/accidental plugging  RICHARD last week negative, so was just treating vanco+Cefepime (as adjunct) for 2wks -- initial stop date being 11/23.   I'll stop the cefepime to see if her CNS clears and put her back on CAX, but only 2gm/day vice 4gms a day as her Alk Phos is still high (missing/absent gall-bladder). Will balance risk of intra-hepatic gallstones against her CNS. ->Cefepime/vanco #9    Back on the vancomycin; she needs this for the enterococcal bacteremia last admission (see my last note). Will switch out the cefepime with CAX, but holding my breath to see if she builds gallstones. Vancomycin monotherapy will not reliably kill the enterococcus, much less the multiple GNR that grew out of her urine/blood last admission. Neither will FQ/levofloxacin kill her urinary GNR isolates (Morganella and Proteus).    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    ANTONI on CKD - improved    R renal calcinosis        Microbiology:  11/15 - UA sent      BCx x2 (-)            11/8 - BCx (-)                                         11/5 - UA (+) Morganella morganii (R - gent/nitro/tmp-smx), Enterococcus faecalis, and Proteus mirabilis (R - FQ, nitro, and tmp-smx)                                         11/4 - BCx 2/2 (+) Morganella morganii (R - gent/nitro/tmp-smx), Enterococcus faecalis                                            8/11 - UA (+) Proteus mirabilis (R - FQ, nitro, and tmp-smx)        Lines / Jose Plant PICC    Patient Active Problem List   Diagnosis Code    Pyelonephritis N12    S/p nephrectomy Z90.5    Right flank pain R10.9    Malfunction of nephrostomy tube ([address] T83.098A    Nephrostomy tube displaced ([address] T83.022A    UTI (urinary tract infection) N39.0    Elevated WBC count D72.829    Diabetes ([facility]) E11.9    Legally blind H54.8    Hypertension I10    Thyroid disease E07.9    Elevated serum creatinine R79.89    S/P colectomy Z90.49    Skin lesion of foot L98.9    Tear of skin of buttock S31.801A    Pressure injury of right heel, unstageable ([facility]) L89.610    Hypomagnesemia E83.42    Hyperkalemia E87.5    Heel ulcer ([facility]) L97.409    ANTONI (acute kidney injury) ([facility] N17.9    S/P AKA (above knee amputation), right ([facility]) Z89.611    Nephrostomy status ([facility] Z93.6    Obstructed nephrostomy tube ([facility] T83.092A    Nephrostomy complication ([facility]) B16.721    Acute renal failure (ARF) ([facility] N17.9    Elevated INR R79.1    Bacteremia R78.81    Enterococcus faecalis infection B95.2    CKD stage 3 due to type 2 diabetes mellitus ([facility]) E11.22, N18.30    Hydronephrosis, right N13.30    Severe sepsis with acute organ dysfunction ([facility]) A41.9, R65.20    Anticoagulated Z79.01    AMS (altered mental status) R41.82       Current Facility-Administered Medications   Medication Dose Route Frequency    nystatin (MYCOSTATIN) 100,000 unit/gram ointment   Topical TID    morphine injection 1 mg  1 mg IntraVENous Q2H PRN    insulin lispro (HUMALOG) injection   SubCUTAneous AC&HS    glucose chewable tablet 16 g  4 Tab Oral PRN    glucagon (GLUCAGEN) injection 1 mg  1 mg IntraMUSCular PRN    dextrose 10% infusion 125-250 mL  125-250 mL IntraVENous PRN    nystatin (MYCOSTATIN) 100,000 unit/gram powder   Topical TID    famotidine (PF) (PEPCID) 20 mg in 0.9% sodium chloride 10 mL injection  20 mg IntraVENous Q12H    vancomycin (VANCOCIN) 1,250 mg in 0.9% sodium chloride 250 mL (VIAL-MATE)  1,250 mg IntraVENous ONCE    cefTRIAXone (ROCEPHIN) 2 g in sterile water (preservative free) 20 mL IV syringe  2 g IntraVENous Q24H    sodium chloride (NS) flush 5-10 mL  5-10 mL IntraVENous PRN    sodium chloride (NS) flush 5-10 mL  5-10 mL IntraVENous PRN    0.9% sodium chloride infusion  100 mL/hr IntraVENous CONTINUOUS    sodium chloride (NS) flush 5-40 mL  5-40 mL IntraVENous Q8H    sodium chloride (NS) flush 5-40 mL  5-40 mL IntraVENous PRN    acetaminophen (TYLENOL) tablet 650 mg  650 mg Oral Q6H PRN    Or    acetaminophen (TYLENOL) suppository 650 mg  650 mg Rectal Q6H PRN    bisacodyL (DULCOLAX) tablet 5 mg  5 mg Oral DAILY PRN    ondansetron (ZOFRAN ODT) tablet 4 mg  4 mg Oral Q8H PRN    Or    ondansetron (ZOFRAN) injection 4 mg  4 mg IntraVENous Q6H PRN         Review of Systems - unobtainable due to her AMS       Objective:    Visit Vitals  BP (!) 148/48   Pulse (!) 103   Temp 99.1 °F (37.3 °C)   Resp 18   Ht 5' 3\" (1.6 m)   Wt 78.2 kg (172 lb 8 oz)   SpO2 100%   BMI 30.56 kg/m²       Temp (24hrs), Av.5 °F (36.9 °C), Min:97.1 °F (36.2 °C), Max:99.1 °F (37.3 °C)      GEN: frail/elderly/blind WF in NAD, but only able to say \"please, please\"  HEENT: anicteric  CHEST: CTA  CVS:Tachy  ABD: RUQ TTP, but non-surgical; NABS  EXT: R AKA and swollen L leg         Lab results:    Chemistry  Recent Labs     11/16/20  0115 11/15/20  1115   * 160*    141   K 3.9 4.1   * 113*   CO2 19* 22   BUN 13 15   CREA 0.73 0.92   CA 7.5* 8.2*   AGAP 8 6   BUCR 18 16   * 199*   TP 5.7* 6.2*   ALB 1.8* 1.9*   GLOB 3.9 4.3*   AGRAT 0.5* 0.4*       CBC w/ Diff  Recent Labs     11/16/20  0115 11/15/20  1115   WBC 15.4* 17.6*   RBC 2.87* 3.09*   HGB 8.4* 8.7*   HCT 25.5* 27.4*    505*   GRANS 89* 86*   LYMPH 7* 9*   EOS 1 1       Microbiology  All Micro Results     Procedure Component Value Units Date/Time    CULTURE, BLOOD [[id number]] Collected:  11/15/20 1115    Order Status:  Completed Specimen:  Blood Updated:  20     Special Requests: NO SPECIAL REQUESTS        Culture result: NO GROWTH AFTER 20 HOURS       CULTURE, BLOOD [[id number]] Collected:  11/15/20 1135    Order Status:  Completed Specimen:  Blood Updated:  20     Special Requests: NO SPECIAL REQUESTS        Culture result: NO GROWTH AFTER 20 HOURS       CULTURE, URINE [[id number]] Collected:  11/15/20 1145    Order Status:  Completed Specimen:  Urine from Clean catch Updated:  11/15/20 Estevan Gomez MD  Cell [phone]   2020   2:03 PM

## 2020-11-16 NOTE — PROGRESS NOTES
Head to toe assessment completed at this time. Pt unable to voice weather she has chest pain or SOB. Pt lung sounds are clear but diminished. Pt bowel sounds are active. Pt has excoriation to left belly area. Pt reacts to pain.

## 2020-11-16 NOTE — ROUTINE PROCESS
Bedside and Verbal shift change report given to Bernadette Cobos RN (oncoming nurse) by JENI Gonzalez RN (offgoing nurse). Report included the following information SBAR, Kardex, Intake/Output, MAR and Recent Results.

## 2020-11-16 NOTE — PROGRESS NOTES
SLP NOTE -    0455: ST Eval orders received and chart review completed. SLP attempting swallow eval. Pt pursing lips to straw and spoon presentations only verbalizing \"please please\". Per d/w RN, this has been consistent since pt has been admitted. D/w RN, can complete RN swallow screen with water, applesauce and cracker as pt will allow for diet order. SLP will follow up tomorrow as appropriate.     Thank you for this referral.    Juan M Kennedy M.S., [address] [facility]  Speech-Language Pathologist

## 2020-11-17 ENCOUNTER — HOSPITAL ENCOUNTER (OUTPATIENT)
Dept: CT IMAGING | Age: 80
Discharge: HOME | DRG: 698 | End: 2020-11-17
Attending: HOSPITALIST
Payer: MEDICARE

## 2020-11-17 LAB
ALBUMIN SERPL-MCNC: 1.8 G/DL (ref 3.4–5)
ALBUMIN/GLOB SERPL: 0.5 {RATIO} (ref 0.8–1.7)
ALP SERPL-CCNC: 144 U/L (ref 45–117)
ALT SERPL-CCNC: 18 U/L (ref 13–56)
ANION GAP SERPL CALC-SCNC: 12 MMOL/L (ref 3–18)
AST SERPL-CCNC: 9 U/L (ref 10–38)
BACTERIA SPEC CULT: NORMAL
BASOPHILS # BLD: 0 K/UL (ref 0–0.1)
BASOPHILS NFR BLD: 0 % (ref 0–2)
BILIRUB SERPL-MCNC: 0.3 MG/DL (ref 0.2–1)
BUN SERPL-MCNC: 9 MG/DL (ref 7–18)
BUN/CREAT SERPL: 13 (ref 12–20)
CALCIUM SERPL-MCNC: 7.5 MG/DL (ref 8.5–10.1)
CHLORIDE SERPL-SCNC: 114 MMOL/L (ref 100–111)
CO2 SERPL-SCNC: 15 MMOL/L (ref 21–32)
CREAT SERPL-MCNC: 0.69 MG/DL (ref 0.6–1.3)
DIFFERENTIAL METHOD BLD: ABNORMAL
EOSINOPHIL # BLD: 0.2 K/UL (ref 0–0.4)
EOSINOPHIL NFR BLD: 1 % (ref 0–5)
ERYTHROCYTE [DISTWIDTH] IN BLOOD BY AUTOMATED COUNT: 16 % (ref 11.6–14.5)
GLOBULIN SER CALC-MCNC: 3.6 G/DL (ref 2–4)
GLUCOSE BLD STRIP.AUTO-MCNC: 165 MG/DL (ref 70–110)
GLUCOSE BLD STRIP.AUTO-MCNC: 165 MG/DL (ref 70–110)
GLUCOSE SERPL-MCNC: 139 MG/DL (ref 74–99)
HCT VFR BLD AUTO: 23 % (ref 35–45)
HGB BLD-MCNC: 7.4 G/DL (ref 12–16)
LYMPHOCYTES # BLD: 1.1 K/UL (ref 0.9–3.6)
LYMPHOCYTES NFR BLD: 9 % (ref 21–52)
MCH RBC QN AUTO: 28.5 PG (ref 24–34)
MCHC RBC AUTO-ENTMCNC: 32.2 G/DL (ref 31–37)
MCV RBC AUTO: 88.5 FL (ref 74–97)
MONOCYTES # BLD: 0.6 K/UL (ref 0.05–1.2)
MONOCYTES NFR BLD: 5 % (ref 3–10)
NEUTS SEG # BLD: 10.6 K/UL (ref 1.8–8)
NEUTS SEG NFR BLD: 85 % (ref 40–73)
PLATELET # BLD AUTO: 383 K/UL (ref 135–420)
PMV BLD AUTO: 8.5 FL (ref 9.2–11.8)
POTASSIUM SERPL-SCNC: 3.3 MMOL/L (ref 3.5–5.5)
PROT SERPL-MCNC: 5.4 G/DL (ref 6.4–8.2)
RBC # BLD AUTO: 2.6 M/UL (ref 4.2–5.3)
SERVICE CMNT-IMP: NORMAL
SODIUM SERPL-SCNC: 141 MMOL/L (ref 136–145)
WBC # BLD AUTO: 12.5 K/UL (ref 4.6–13.2)

## 2020-11-17 PROCEDURE — 65270000029 HC RM PRIVATE

## 2020-11-17 PROCEDURE — 80053 COMPREHEN METABOLIC PANEL: CPT

## 2020-11-17 PROCEDURE — 85025 COMPLETE CBC W/AUTO DIFF WBC: CPT

## 2020-11-17 PROCEDURE — 74011250636 HC RX REV CODE- 250/636: Performed by: INTERNAL MEDICINE

## 2020-11-17 PROCEDURE — 70450 CT HEAD/BRAIN W/O DYE: CPT

## 2020-11-17 PROCEDURE — 74011250636 HC RX REV CODE- 250/636: Performed by: FAMILY MEDICINE

## 2020-11-17 PROCEDURE — 74011000250 HC RX REV CODE- 250: Performed by: HOSPITALIST

## 2020-11-17 PROCEDURE — 74011250636 HC RX REV CODE- 250/636: Performed by: HOSPITALIST

## 2020-11-17 PROCEDURE — 82962 GLUCOSE BLOOD TEST: CPT

## 2020-11-17 PROCEDURE — 99221 1ST HOSP IP/OBS SF/LOW 40: CPT | Performed by: NURSE PRACTITIONER

## 2020-11-17 PROCEDURE — 74011636637 HC RX REV CODE- 636/637: Performed by: FAMILY MEDICINE

## 2020-11-17 PROCEDURE — 74011000250 HC RX REV CODE- 250: Performed by: INTERNAL MEDICINE

## 2020-11-17 RX ORDER — TETRAHYDROZOLINE HCL 0.05 %
2 DROPS OPHTHALMIC (EYE) 4 TIMES DAILY
Status: DISCONTINUED | OUTPATIENT
Start: 2020-11-17 | End: 2020-11-21 | Stop reason: HOSPADM

## 2020-11-17 RX ORDER — POTASSIUM CHLORIDE 7.45 MG/ML
10 INJECTION INTRAVENOUS
Status: COMPLETED | OUTPATIENT
Start: 2020-11-17 | End: 2020-11-18

## 2020-11-17 RX ADMIN — NYSTATIN: 100000 POWDER TOPICAL at 21:17

## 2020-11-17 RX ADMIN — NYSTATIN: 100000 POWDER TOPICAL at 16:27

## 2020-11-17 RX ADMIN — FAMOTIDINE 20 MG: 10 INJECTION INTRAVENOUS at 08:43

## 2020-11-17 RX ADMIN — POTASSIUM CHLORIDE 10 MEQ: 7.46 INJECTION, SOLUTION INTRAVENOUS at 22:15

## 2020-11-17 RX ADMIN — MORPHINE SULFATE 1 MG: 2 INJECTION, SOLUTION INTRAMUSCULAR; INTRAVENOUS at 16:41

## 2020-11-17 RX ADMIN — WATER 2 G: 1 INJECTION INTRAMUSCULAR; INTRAVENOUS; SUBCUTANEOUS at 14:29

## 2020-11-17 RX ADMIN — NYSTATIN: 100000 POWDER TOPICAL at 08:43

## 2020-11-17 RX ADMIN — VANCOMYCIN HYDROCHLORIDE 750 MG: 750 INJECTION, POWDER, LYOPHILIZED, FOR SOLUTION INTRAVENOUS at 21:15

## 2020-11-17 RX ADMIN — NYSTATIN: 100000 OINTMENT TOPICAL at 16:27

## 2020-11-17 RX ADMIN — VANCOMYCIN HYDROCHLORIDE 750 MG: 750 INJECTION, POWDER, LYOPHILIZED, FOR SOLUTION INTRAVENOUS at 09:37

## 2020-11-17 RX ADMIN — Medication 10 ML: at 21:17

## 2020-11-17 RX ADMIN — FAMOTIDINE 20 MG: 10 INJECTION INTRAVENOUS at 21:16

## 2020-11-17 RX ADMIN — NYSTATIN: 100000 OINTMENT TOPICAL at 21:17

## 2020-11-17 RX ADMIN — Medication 5 ML: at 14:00

## 2020-11-17 RX ADMIN — MORPHINE SULFATE 1 MG: 2 INJECTION, SOLUTION INTRAMUSCULAR; INTRAVENOUS at 14:29

## 2020-11-17 RX ADMIN — POTASSIUM CHLORIDE 10 MEQ: 7.46 INJECTION, SOLUTION INTRAVENOUS at 21:12

## 2020-11-17 RX ADMIN — INSULIN LISPRO 2 UNITS: 100 INJECTION, SOLUTION INTRAVENOUS; SUBCUTANEOUS at 08:43

## 2020-11-17 RX ADMIN — Medication 10 ML: at 08:22

## 2020-11-17 NOTE — PROGRESS NOTES
9235 Received bedside shift report from off going nurse Shi Sellers RN. Report included the following information SBAR, Kardex, Intake/Output, MAR and Recent Results. Patient calling out \"Hello! \" Guarded when gently touched on hands and arms but then calmed. 0912 Pt pushes away when I was doing assessment. Does not find comfort in hand holding or therapeutic touch. Pushes gown back down. 200 Paged Dr. Magalys Miramontes to inform him of pt recent Potassium lab value 3.3    [id number] pt off floor to ct. 1420 Pt moaning continuously for over 20 minutes after returning from CT. Therapeutic touch ineffective to calm pt. Assuming there is some pain. Administered prn morphine as prescribed. 1645 Pt moaning for more than 20 minutes. Soft to loud. Squeezes eyes shut as if in pain. Unable to soothe. 1950 Gave bedside shift report to George Tristan RN. Report included the following information SBAR, Kardex, Intake/Output, MAR and Recent Results.

## 2020-11-17 NOTE — PROGRESS NOTES
Hospitalist Progress Note    Patient: Ranjit Mitchell MRN: [id number]  [facility]: [id number]    YOB: 1940  Age: [de-identified] y.o. Sex: female    DOA: 11/15/2020 LOS:  LOS: 2 days                Assessment/Plan     Patient Active Problem List   Diagnosis Code    Pyelonephritis N14    S/p nephrectomy Z90.5    Right flank pain R10.9    Malfunction of nephrostomy tube ([address] T83.098A    Nephrostomy tube displaced ([address] T83.022A    UTI (urinary tract infection) N39.0    Elevated WBC count D72.829    Diabetes ([address] E11.9    Legally blind H54.8    Hypertension I10    Thyroid disease E07.9    Elevated serum creatinine R79.89    S/P colectomy Z90.49    Skin lesion of foot L98.9    Tear of skin of buttock S31.801A    Pressure injury of right heel, unstageable ([facility]) L89.610    Hypomagnesemia E83.42    Hyperkalemia E87.5    Heel ulcer ([address] L97.409    ANTONI (acute kidney injury) ([address] N17.9    S/P AKA (above knee amputation), right ([address] Z89.611    Nephrostomy status ([address] Z93.6    Obstructed nephrostomy tube ([address] T83.092A    Nephrostomy complication ([facility]) F62.698    Acute renal failure (ARF) ([facility]) N17.9    Elevated INR R79.1    Bacteremia R78.81    Enterococcus faecalis infection B95.2    CKD stage 3 due to type 2 diabetes mellitus ([facility]) E11.22, N18.30    Hydronephrosis, right N13.30    Severe sepsis with acute organ dysfunction ([facility]) A41.9, R65.20    Anticoagulated Z79.01    AMS (altered mental status) R41.82    Encephalopathy acute G80.41            [de-identified] y/o female on anticoagulation for prior DVT as well as 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 Only responds with \"please\" however awake. Does not follow commands. Encephalopathy -   CT head negative.   She has coil in her abdomen. Need info on it. so cannot get MRI. repeat CT head unchanged. Pyelonephritis/Bacteremia -  Antibiotics - vancomycin, ceftriaxone. UA with no evidence of UTI  Follow blood and urine cultures. ID consulted    Nephrostomy tube replaced last admission  Urine clear. DM -   On SSI    HTN -  Controlled    Legally blind    Discussed at length with daughter at bedside her number [phone]. Patient is DNR    Disposition : TBD    Review of systems  Unable to obtain      Physical Exam:  General: As above    HEENT: NC, Atraumatic.   anicteric sclerae. Lungs: CTA Bilaterally. No Wheezing/Rhonchi/Rales. Heart:  S1 S2,  No murmur, No Rubs, No Gallops  Abdomen: Soft, Non distended, Non tender.  +Bowel sounds, nephrostomy  in place. Extremities: No c/c/e          Vital signs/Intake and Output:  Visit Vitals  BP (!) 196/74 (BP 1 Location: Right arm, BP Patient Position: At rest)   Pulse (!) 112   Temp 97.6 °F (36.4 °C)   Resp 17   Ht 5' 3\" (1.6 m)   Wt 71.7 kg (158 lb)   SpO2 100%   BMI 27.99 kg/m²     Current Shift:  11/17 0701 - 11/17 1900  In: -   Out: 250 [Urine:250]  Last three shifts:  11/15 1901 - 11/17 0700  In: 532 [I.V.:731]  Out: 3100 [Urine:3050]            Labs: Results:       Chemistry Recent Labs     11/17/20  0709 11/16/20  0115 11/15/20  1115   * 135* 160*    141 141   K 3.3* 3.9 4.1   * 114* 113*   CO2 15* 19* 22   BUN 9 13 15   CREA 0.69 0.73 0.92   CA 7.5* 7.5* 8.2*   AGAP 12 8 6   BUCR 13 18 16   * 165* 199*   TP 5.4* 5.7* 6.2*   ALB 1.8* 1.8* 1.9*   GLOB 3.6 3.9 4.3*   AGRAT 0.5* 0.5* 0.4*      CBC w/Diff Recent Labs     11/17/20  0709 11/16/20  0115 11/15/20  1115   WBC 12.5 15.4* 17.6*   RBC 2.60* 2.87* 3.09*   HGB 7.4* 8.4* 8.7*   HCT 23.0* 25.5* 27.4*    395 505*   GRANS 85* 89* 86*   LYMPH 9* 7* 9*   EOS 1 1 1      Cardiac Enzymes No results for input(s): CPK, CKND1, JANE in the last 72 hours.     No lab exists for component: CKRMB, TROIP   Coagulation No results for input(s): PTP, INR, APTT, INREXT, INREXT in the last 72 hours. Lipid Panel Lab Results   Component Value Date/Time    Cholesterol, total 115 08/28/2019 08:22 AM    HDL Cholesterol 39 (L) 08/28/2019 08:22 AM    LDL, calculated 46.2 08/28/2019 08:22 AM    VLDL, calculated 29.8 08/28/2019 08:22 AM    Triglyceride 149 08/28/2019 08:22 AM    CHOL/HDL Ratio 2.9 08/28/2019 08:22 AM      BNP No results for input(s): BNPP in the last 72 hours.    Liver Enzymes Recent Labs     11/17/20  0709   TP 5.4*   ALB 1.8*   *      Thyroid Studies Lab Results   Component Value Date/Time    TSH 3.40 01/24/2020 08:35 AM        Procedures/imaging: see electronic medical records for all procedures/Xrays and details which were not copied into this note but were reviewed prior to creation of Plan

## 2020-11-17 NOTE — PROGRESS NOTES
Shift Summary:  Patient slept through the night, waking only when disturbed by touch. Colostomy intact 50 ml of loose, brown stool overnight. Urostomy intact with 950 ml of yellow, urine     Bedside and Verbal shift change report given to KAJAL Baldwin RN (oncoming nurse) by Jerad Garvey (offgoing nurse). Report included the following information SBAR, Kardex, Intake/Output and MAR.

## 2020-11-17 NOTE — CONSULTS
[facility]: [phone] ([phone])  [facility]: [phone]   [facility]: [phone]    Patient Name: Rosalee Salmeron  YOB: 1940    Date of Initial Consult: 11/17/2020  Reason for Consult: care decisions  Requesting Provider: Dominic Horne MD  Primary Care Physician: Luz Del Valle, Not On File      SUMMARY:   Rosalee Salmerno is a [de-identified] y.o. female with a past history of colon cancer, vaginal cancer, nephrostomy tube, colonoscopy, DVT, arthritis, T2DM, HTN, thyroid disease, right AKA, blindness who was admitted on 11/15/2020 from [facility] with a diagnosis of sepsis. Current medical issues leading to Palliative Medicine involvement include: advanced age, multiple co-morbidities and goals of care. PALLIATIVE DIAGNOSES:   1. Advanced care decisions  2. Altered mental status  3. Sepsis  4. Debility     PLAN:   1. Advanced care decisions: Palliative care team including CONNIE Weir and this NP met with patient at bedside. She awakened easily to light touch and voice. After awakening patient repeated \"hello\" to every question. Assured patient she was safe [facility] at [facility]. Patient known to palliative care team and this is a definite change in mental status as a week ago patient was able to make own healthcare decisions. Patient is a  with three children: Gurdeep Shearer, Yessi Davies and a daughter, Lauren Gallego who lives in [place]. Hemal's wife is Maria Teresa Yung. Marvel and Lauren Gallego are both RN's. \"I have many nurses and doctors in my family\". Patient has an AMD and DDNR on file which is signed by her son, Yvette Avalos (likely due to her blindness). AMD names Gurdeep Shearer as sole MPOA. Will plan to re-assess patient's mental clearing in the morning and if not improved will contact MPOA for further goals of care conversation. GOALS OF CARE: DNR  2.  Altered mental status: Possibly related to sepsis vs strokeCT head on 11/15 and 11/17 both non-acute. 3. Sepsis: was admitted last week with blood cultures positive for enterococcus faecalis and organella. Urine culture positive for proteus mirabilis, enterococcus faecalis and morganella. ID consulted and recommended PICC with 2 weeks IV antibiotics. Readmitted 11/15 with WBC 17.6. ID back on board. Primary team managing. 4. Debility: Bedbound s/p right AKA. Blind and very [place]. Lives in [address] [place] [address]. Has nephrostomy and colostomy tubes in place  5. Initial consult note routed to primary continuity provider  6. Communicated plan of care with: Palliative IDT    GOALS OF CARE:  Patient/Health Care Proxy Stated Goals: Prolong life      TREATMENT PREFERENCES:   Code Status: DNR    Advance Care Planning:  Advance Care Planning 11/5/2020   Patient's Healthcare Decision Maker is: -   Confirm Advance Directive Yes, on file   Patient Would Like to Complete Advance Directive -       Medical Interventions: Limited additional interventions           Other: As far as possible, the palliative care team has discussed with patient/health care proxy about goals of care/treatment preferences for patient. HISTORY:     History obtained from: chart    CHIEF COMPLAINT: \"hello\"    HPI/SUBJECTIVE:    The patient is:   [] Verbal and participatory  [x] Non-participatory due to: altered mental status. Replies \"hello\" to all questions.        Clinical Pain Assessment (nonverbal scale for nonverbal patients): Clinical Pain Assessment  Severity: 4     Activity (Movement): Restless, excessive activity and/or withdrawal reflexes    Duration: for how long has pt been experiencing pain (e.g., 2 days, 1 month, years)  Frequency: how often pain is an issue (e.g., several times per day, once every few days, constant)     FUNCTIONAL ASSESSMENT:     Palliative Performance Scale (PPS):  PPS: 30    ECOG  ECOG Status : Completely disabled     PSYCHOSOCIAL/SPIRITUAL SCREENING:      Any spiritual / Restoration concerns: not assessed  [] Yes /  [] No    Caregiver Burnout:  [] Yes /  [] No /  [x] No Caregiver Present      Anticipatory grief assessment: not assessed  [] Normal  / [] Maladaptive        REVIEW OF SYSTEMS:     Positive and pertinent negative findings in ROS are noted above in HPI. The following systems were [] reviewed / [x] unable to be reviewed as noted in HPI  Other findings are noted below. Systems: constitutional, ears/nose/mouth/throat, respiratory, gastrointestinal, genitourinary, musculoskeletal, integumentary, neurologic, psychiatric, endocrine. Positive findings noted below. Modified ESAS Completed by: provider           Pain: 4                       Stool Occurrence(s): 0        PHYSICAL EXAM:     Wt Readings from Last 3 Encounters:   11/17/20 71.7 kg (158 lb)   11/11/20 75.4 kg (166 lb 2 oz)   06/23/20 78 kg (171 lb 15.3 oz)     Blood pressure 120/84, pulse 88, temperature 98.1 °F (36.7 °C), resp. rate 18, height 5' 3\" (1.6 m), weight 71.7 kg (158 lb), SpO2 100 %. Pain:  Pain Scale 1: FACES  Pain Intensity 1: 8     Pain Location 1: Generalized           Constitutional: Pale, chronically ill appearing elderly female in no apparent distress when sleeping. Eyes: pupils equal, anicteric  ENMT: no nasal discharge, moist mucous membranes  Cardiovascular: generalized edema. Respiratory: breathing unlabored; room air  Gastrointestinal: soft non-tender  Musculoskeletal: no deformity, no tenderness to palpation  Skin: warm, dry, ecchymosis.   Neurologic: not following commands, moving all extremities       HISTORY:     Principal Problem:    Encephalopathy acute (11/16/2020)    Active Problems:    UTI (urinary tract infection) (11/26/2018)      AMS (altered mental status) (11/15/2020)      Past Medical History:   Diagnosis Date    Cancer ([address]     vaginal and colon cancer    [facility]     Chronic pain     arthritis    Diabetes ([address]     DVT (deep venous thrombosis) ([facility]     Hypertension     Legally blind     Thyroid disease Past Surgical History:   Procedure Laterality Date    HX ABOVE KNEE AMPUTATION Right     HX COLOSTOMY      HX ORTHOPAEDIC      HX OTHER SURGICAL      kidney drainage tube    HX UROLOGICAL      kidney removal    IR EXCHANGE NEPHRO PERC RT SI  1/28/2019    IR EXCHANGE NEPHRO PERC RT SI  3/21/2019    IR EXCHANGE NEPHRO PERC RT SI  5/13/2019    IR EXCHANGE NEPHRO PERC RT SI  6/27/2019    IR EXCHANGE NEPHRO PERC RT SI  8/16/2019    IR EXCHANGE NEPHRO PERC RT SI  10/3/2019    IR EXCHANGE NEPHRO PERC RT SI  11/18/2019    IR EXCHANGE NEPHRO PERC RT SI  1/6/2020    IR EXCHANGE NEPHRO PERC RT SI  2/24/2020    IR EXCHANGE NEPHRO PERC RT SI  4/13/2020    IR EXCHANGE NEPHRO PERC RT SI  6/1/2020    IR EXCHANGE NEPHRO PERC RT SI  6/19/2020    IR EXCHANGE NEPHRO PERC RT SI  8/10/2020    IR EXCHANGE NEPHRO PERC RT SI  9/30/2020    IR EXCHANGE NEPHRO PERC RT SI  11/5/2020    IR NEPHROSTOMY PERC RT PLC CATH  SI  6/21/2020      History reviewed. No pertinent family history. History reviewed, no pertinent family history.   Social History     Tobacco Use    Smoking status: Never Smoker    Smokeless tobacco: Never Used   Substance Use Topics    Alcohol use: No     Allergies   Allergen Reactions    Morphine Nausea and Vomiting    Pcn [Penicillins] Rash      Current Facility-Administered Medications   Medication Dose Route Frequency    tetrahydrozoline (OPTI-CLEAR) 0.05 % ophthalmic drops 2 Drop  2 Drop Both Eyes QID    nystatin (MYCOSTATIN) 100,000 unit/gram ointment   Topical TID    morphine injection 1 mg  1 mg IntraVENous Q2H PRN    insulin lispro (HUMALOG) injection   SubCUTAneous AC&HS    glucose chewable tablet 16 g  4 Tab Oral PRN    glucagon (GLUCAGEN) injection 1 mg  1 mg IntraMUSCular PRN    dextrose 10% infusion 125-250 mL  125-250 mL IntraVENous PRN    nystatin (MYCOSTATIN) 100,000 unit/gram powder   Topical TID    famotidine (PF) (PEPCID) 20 mg in 0.9% sodium chloride 10 mL injection  20 mg IntraVENous Q12H    cefTRIAXone (ROCEPHIN) 2 g in sterile water (preservative free) 20 mL IV syringe  2 g IntraVENous Q24H    vancomycin (VANCOCIN) 750 mg in 0.9% sodium chloride 250 mL (VIAL-MATE)  750 mg IntraVENous Q12H    Vancomycin - Rx to dose and monitor  1 Each Other Rx Dosing/Monitoring    sodium chloride (NS) flush 5-10 mL  5-10 mL IntraVENous PRN    sodium chloride (NS) flush 5-10 mL  5-10 mL IntraVENous PRN    0.9% sodium chloride infusion  100 mL/hr IntraVENous CONTINUOUS    sodium chloride (NS) flush 5-40 mL  5-40 mL IntraVENous Q8H    sodium chloride (NS) flush 5-40 mL  5-40 mL IntraVENous PRN    acetaminophen (TYLENOL) tablet 650 mg  650 mg Oral Q6H PRN    Or    acetaminophen (TYLENOL) suppository 650 mg  650 mg Rectal Q6H PRN    bisacodyL (DULCOLAX) tablet 5 mg  5 mg Oral DAILY PRN    ondansetron (ZOFRAN ODT) tablet 4 mg  4 mg Oral Q8H PRN    Or    ondansetron (ZOFRAN) injection 4 mg  4 mg IntraVENous Q6H PRN        LAB AND IMAGING FINDINGS:     Lab Results   Component Value Date/Time    WBC 12.5 11/17/2020 07:09 AM    HGB 7.4 (L) 11/17/2020 07:09 AM    PLATELET 514 04/85/9613 07:09 AM     Lab Results   Component Value Date/Time    Sodium 141 11/17/2020 07:09 AM    Potassium 3.3 (L) 11/17/2020 07:09 AM    Chloride 114 (H) 11/17/2020 07:09 AM    CO2 15 (L) 11/17/2020 07:09 AM    BUN 9 11/17/2020 07:09 AM    Creatinine 0.69 11/17/2020 07:09 AM    Calcium 7.5 (L) 11/17/2020 07:09 AM    Magnesium 4.6 (H) 11/11/2020 03:30 AM    Phosphorus 3.6 11/04/2020 06:10 PM      Lab Results   Component Value Date/Time    Alk.  phosphatase 144 (H) 11/17/2020 07:09 AM    Protein, total 5.4 (L) 11/17/2020 07:09 AM    Albumin 1.8 (L) 11/17/2020 07:09 AM    Globulin 3.6 11/17/2020 07:09 AM     Lab Results   Component Value Date/Time    INR 2.7 (H) 11/04/2020 06:10 PM    Prothrombin time 27.7 (H) 11/04/2020 06:10 PM    aPTT >180.0 (HH) 11/06/2020 11:00 PM      Lab Results   Component Value Date/Time    Iron 10 (L) 06/23/2020 12:56 AM    TIBC 123 (L) 06/23/2020 12:56 AM    Iron % saturation 8 (L) 06/23/2020 12:56 AM      No results found for: PH, PCO2, PO2  No components found for: [place]   Lab Results   Component Value Date/Time    CK 12 (L) 11/09/2020 03:24 AM    CK - MB <1.0 11/09/2020 03:24 AM              Total time: 30 minutes  Counseling / coordination time, spent as noted above > 50% counseling / coordination: 20 minutes with patient. Prolonged service was provided for  []30 min   []75 min in face to face time in the presence of the patient, spent as noted above. Time Start:   Time End:   Note: this can only be billed with 16429 (initial) or 29797 (follow up). If multiple start / stop times, list each separately.

## 2020-11-17 NOTE — PROGRESS NOTES
SLP NOTE -    1130  1300: ST Eval orders received and chart review completed. SLP attempting swallow eval. Pt pursing lips to straw and spoon presentations, pushing therapist hands away and only verbalizing \"please please,\" and \"hello! \". D/w RN, Sheela Jerez at length, this has been consistent since pt has been admitted. D/w RN, can complete RN swallow screen with water, applesauce and cracker as pt will allow for diet order if patient becomes appropriate and hungry/thirsty later this evening. D/w Dr. Teto Shipley at length. SLP will follow up as patient able.     Thank you for this referral.     Fariba Marquez, [id number] [facility]  Speech-Language Pathologist
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
## 2020-11-17 NOTE — ACP (ADVANCE CARE PLANNING)
Placed copy of patient's AMD on paper chart for scanning. Document appoints son Benjamin Torres sole healthcare agent. Durable DNR also on file signed by the son. Code status reflects wishes.     Code status DNR/DNI    MPOA: Amy Ayala (son)  [phone]

## 2020-11-17 NOTE — PROGRESS NOTES
1910 Assumed patient care at this time. Report received from Brian Lara RN.   2031 Shift assessment completed and documented in flow sheets at this time. Patient still screaming \"please\" whenever touched. 200 mL of clear yellow urine removed from nephrostomy. 2335 Bedside and verbal shift change report given to Raudel Kern RN (oncoming nurse) by Michael Madden RN (offgoing nurse). Report included the following information: SBAR, Kardex, MAR, and recent results.

## 2020-11-17 NOTE — DIABETES MGMT
GLYCEMIC CONTROL PROGRESS NOTE:    - discussed in rounds, known h/o T2DM, insulin home regimen  - BG in target range non-ICU: < 180 mg/dL  - TDD = 2 units Humalog Normal Insulin Sensitivity Corrective Coverage, recommend continue    Recent Glucose Results:   Lab Results   Component Value Date/Time     (H) 11/17/2020 07:09 AM    GLUCPOC 147 (H) 11/16/2020 09:59 PM    GLUCPOC 148 (H) 11/16/2020 04:09 PM    GLUCPOC 154 (H) 11/16/2020 11:39 AM     Treasure Chris MS, RN, CDE  Glycemic Control Team  [phone]  Pager [phone] (M-TH 8:00-4:30P)  *After Hours pager [phone]

## 2020-11-18 LAB
ALBUMIN SERPL-MCNC: 2 G/DL (ref 3.4–5)
ALBUMIN/GLOB SERPL: 0.5 {RATIO} (ref 0.8–1.7)
ALP SERPL-CCNC: 136 U/L (ref 45–117)
ALT SERPL-CCNC: 18 U/L (ref 13–56)
ANION GAP SERPL CALC-SCNC: 13 MMOL/L (ref 3–18)
AST SERPL-CCNC: 11 U/L (ref 10–38)
BASOPHILS # BLD: 0 K/UL (ref 0–0.1)
BASOPHILS NFR BLD: 0 % (ref 0–2)
BILIRUB SERPL-MCNC: 0.3 MG/DL (ref 0.2–1)
BUN SERPL-MCNC: 7 MG/DL (ref 7–18)
BUN/CREAT SERPL: 10 (ref 12–20)
CALCIUM SERPL-MCNC: 7.6 MG/DL (ref 8.5–10.1)
CHLORIDE SERPL-SCNC: 113 MMOL/L (ref 100–111)
CO2 SERPL-SCNC: 17 MMOL/L (ref 21–32)
CREAT SERPL-MCNC: 0.72 MG/DL (ref 0.6–1.3)
DATE LAST DOSE: ABNORMAL
DIFFERENTIAL METHOD BLD: ABNORMAL
EOSINOPHIL # BLD: 0.2 K/UL (ref 0–0.4)
EOSINOPHIL NFR BLD: 2 % (ref 0–5)
ERYTHROCYTE [DISTWIDTH] IN BLOOD BY AUTOMATED COUNT: 16.1 % (ref 11.6–14.5)
GLOBULIN SER CALC-MCNC: 4 G/DL (ref 2–4)
GLUCOSE BLD STRIP.AUTO-MCNC: 165 MG/DL (ref 70–110)
GLUCOSE BLD STRIP.AUTO-MCNC: 170 MG/DL (ref 70–110)
GLUCOSE BLD STRIP.AUTO-MCNC: 176 MG/DL (ref 70–110)
GLUCOSE SERPL-MCNC: 153 MG/DL (ref 74–99)
HCT VFR BLD AUTO: 25.2 % (ref 35–45)
HGB BLD-MCNC: 8.2 G/DL (ref 12–16)
LYMPHOCYTES # BLD: 1.2 K/UL (ref 0.9–3.6)
LYMPHOCYTES NFR BLD: 9 % (ref 21–52)
MCH RBC QN AUTO: 28.7 PG (ref 24–34)
MCHC RBC AUTO-ENTMCNC: 32.5 G/DL (ref 31–37)
MCV RBC AUTO: 88.1 FL (ref 74–97)
MONOCYTES # BLD: 0.8 K/UL (ref 0.05–1.2)
MONOCYTES NFR BLD: 6 % (ref 3–10)
NEUTS SEG # BLD: 10.4 K/UL (ref 1.8–8)
NEUTS SEG NFR BLD: 83 % (ref 40–73)
PLATELET # BLD AUTO: 462 K/UL (ref 135–420)
PMV BLD AUTO: 9.3 FL (ref 9.2–11.8)
POTASSIUM SERPL-SCNC: 3.6 MMOL/L (ref 3.5–5.5)
PROT SERPL-MCNC: 6 G/DL (ref 6.4–8.2)
RBC # BLD AUTO: 2.86 M/UL (ref 4.2–5.3)
REPORTED DOSE,DOSE: ABNORMAL UNITS
REPORTED DOSE/TIME,TMG: 928
SODIUM SERPL-SCNC: 143 MMOL/L (ref 136–145)
VANCOMYCIN TROUGH SERPL-MCNC: 32 UG/ML (ref 10–20)
WBC # BLD AUTO: 12.5 K/UL (ref 4.6–13.2)

## 2020-11-18 PROCEDURE — 65270000029 HC RM PRIVATE

## 2020-11-18 PROCEDURE — 85025 COMPLETE CBC W/AUTO DIFF WBC: CPT

## 2020-11-18 PROCEDURE — 36415 COLL VENOUS BLD VENIPUNCTURE: CPT

## 2020-11-18 PROCEDURE — 74011000250 HC RX REV CODE- 250: Performed by: INTERNAL MEDICINE

## 2020-11-18 PROCEDURE — 99233 SBSQ HOSP IP/OBS HIGH 50: CPT | Performed by: NURSE PRACTITIONER

## 2020-11-18 PROCEDURE — 74011000250 HC RX REV CODE- 250: Performed by: HOSPITALIST

## 2020-11-18 PROCEDURE — 80053 COMPREHEN METABOLIC PANEL: CPT

## 2020-11-18 PROCEDURE — 36592 COLLECT BLOOD FROM PICC: CPT

## 2020-11-18 PROCEDURE — 80202 ASSAY OF VANCOMYCIN: CPT

## 2020-11-18 PROCEDURE — 74011250636 HC RX REV CODE- 250/636: Performed by: INTERNAL MEDICINE

## 2020-11-18 PROCEDURE — 82962 GLUCOSE BLOOD TEST: CPT

## 2020-11-18 PROCEDURE — 74011250636 HC RX REV CODE- 250/636: Performed by: HOSPITALIST

## 2020-11-18 PROCEDURE — 74011250637 HC RX REV CODE- 250/637: Performed by: HOSPITALIST

## 2020-11-18 PROCEDURE — 2709999900 HC NON-CHARGEABLE SUPPLY

## 2020-11-18 PROCEDURE — 74011250636 HC RX REV CODE- 250/636: Performed by: FAMILY MEDICINE

## 2020-11-18 RX ADMIN — SODIUM CHLORIDE 100 ML/HR: 900 INJECTION, SOLUTION INTRAVENOUS at 03:42

## 2020-11-18 RX ADMIN — MORPHINE SULFATE 1 MG: 2 INJECTION, SOLUTION INTRAMUSCULAR; INTRAVENOUS at 09:28

## 2020-11-18 RX ADMIN — VANCOMYCIN HYDROCHLORIDE 750 MG: 750 INJECTION, POWDER, LYOPHILIZED, FOR SOLUTION INTRAVENOUS at 09:28

## 2020-11-18 RX ADMIN — NYSTATIN: 100000 OINTMENT TOPICAL at 16:06

## 2020-11-18 RX ADMIN — MORPHINE SULFATE 1 MG: 2 INJECTION, SOLUTION INTRAMUSCULAR; INTRAVENOUS at 01:30

## 2020-11-18 RX ADMIN — MORPHINE SULFATE 1 MG: 2 INJECTION, SOLUTION INTRAMUSCULAR; INTRAVENOUS at 20:52

## 2020-11-18 RX ADMIN — FAMOTIDINE 20 MG: 10 INJECTION INTRAVENOUS at 20:52

## 2020-11-18 RX ADMIN — TETRAHYDROZOLINE HCL 2 DROP: 0.05 SOLUTION/ DROPS OPHTHALMIC at 00:01

## 2020-11-18 RX ADMIN — NYSTATIN: 100000 OINTMENT TOPICAL at 09:35

## 2020-11-18 RX ADMIN — NYSTATIN: 100000 POWDER TOPICAL at 09:35

## 2020-11-18 RX ADMIN — POTASSIUM CHLORIDE 10 MEQ: 7.46 INJECTION, SOLUTION INTRAVENOUS at 00:01

## 2020-11-18 RX ADMIN — FAMOTIDINE 20 MG: 10 INJECTION INTRAVENOUS at 09:28

## 2020-11-18 RX ADMIN — TETRAHYDROZOLINE HCL 2 DROP: 0.05 SOLUTION/ DROPS OPHTHALMIC at 16:07

## 2020-11-18 RX ADMIN — NYSTATIN: 100000 POWDER TOPICAL at 16:06

## 2020-11-18 RX ADMIN — Medication 10 ML: at 16:08

## 2020-11-18 RX ADMIN — WATER 2 G: 1 INJECTION INTRAMUSCULAR; INTRAVENOUS; SUBCUTANEOUS at 15:43

## 2020-11-18 RX ADMIN — TETRAHYDROZOLINE HCL 2 DROP: 0.05 SOLUTION/ DROPS OPHTHALMIC at 09:35

## 2020-11-18 NOTE — CONSULTS
NEUROLOGY CONSULTATION NOTE    Patient: Shailesh Treviño MRN: [id number]  CSN: [id number]    YOB: 1940  Age: [de-identified] y.o. Sex: female    DOA: 11/15/2020 LOS:  LOS: 3 days        Requesting Physician: Dr. Whitney Galo  Reason for Consultation: Encephalopathy               HISTORY OF PRESENT ILLNESS:   Shailesh Treviño is a [de-identified] y.o. female with past medical history of cancer, chronic arthritis, diabetes, DVT and hypertension who I have been asked to see for confusion. According to records, her baseline is fully oriented, but on admission, patient was disoriented and repeating \"do not hurt me\" and \"help\". She was found to have UTI and sepsis. Head CT was negative.     PAST MEDICAL HISTORY:  Past Medical History:   Diagnosis Date    Cancer [facility])     vaginal and colon cancer    [facility] ([facility]     Chronic pain     arthritis    Diabetes ([facility]     DVT (deep venous thrombosis) (HCC)     Hypertension     Legally blind     Thyroid disease      PAST SURGICAL HISTORY:  Past Surgical History:   Procedure Laterality Date    HX ABOVE KNEE AMPUTATION Right     HX COLOSTOMY      HX ORTHOPAEDIC      HX OTHER SURGICAL      kidney drainage tube    HX UROLOGICAL      kidney removal    IR EXCHANGE NEPHRO PERC RT SI  1/28/2019    IR EXCHANGE NEPHRO PERC RT SI  3/21/2019    IR EXCHANGE NEPHRO PERC RT SI  5/13/2019    IR EXCHANGE NEPHRO PERC RT SI  6/27/2019    IR EXCHANGE NEPHRO PERC RT SI  8/16/2019    IR EXCHANGE NEPHRO PERC RT SI  10/3/2019    IR EXCHANGE NEPHRO PERC RT SI  11/18/2019    IR EXCHANGE NEPHRO PERC RT SI  1/6/2020    IR EXCHANGE NEPHRO PERC RT SI  2/24/2020    IR EXCHANGE NEPHRO PERC RT SI  4/13/2020    IR EXCHANGE NEPHRO PERC RT SI  6/1/2020    IR EXCHANGE NEPHRO PERC RT SI  6/19/2020    IR EXCHANGE NEPHRO PERC RT SI  8/10/2020    IR EXCHANGE NEPHRO PERC RT SI  9/30/2020    IR EXCHANGE NEPHRO PERC RT SI  11/5/2020    IR NEPHROSTOMY PERC RT PLC CATH  SI  6/21/2020     FAMILY HISTORY:  History reviewed. No pertinent family history.   SOCIAL HISTORY:  Social History     Socioeconomic History    Marital status:      Spouse name: Not on file    Number of children: Not on file    Years of education: Not on file    Highest education level: Not on file   Tobacco Use    Smoking status: Never Smoker    Smokeless tobacco: Never Used   Substance and Sexual Activity    Alcohol use: No    Drug use: Never   Other Topics Concern     MEDICATIONS:  Current Facility-Administered Medications   Medication Dose Route Frequency    Vancomycin Trough due 11/18/20 at 20:30 (30 minutes prior to 21:00 dose)  1 Each Other ONCE    tetrahydrozoline (OPTI-CLEAR) 0.05 % ophthalmic drops 2 Drop  2 Drop Both Eyes QID    nystatin (MYCOSTATIN) 100,000 unit/gram ointment   Topical TID    morphine injection 1 mg  1 mg IntraVENous Q2H PRN    insulin lispro (HUMALOG) injection   SubCUTAneous AC&HS    glucose chewable tablet 16 g  4 Tab Oral PRN    glucagon (GLUCAGEN) injection 1 mg  1 mg IntraMUSCular PRN    dextrose 10% infusion 125-250 mL  125-250 mL IntraVENous PRN    nystatin (MYCOSTATIN) 100,000 unit/gram powder   Topical TID    famotidine (PF) (PEPCID) 20 mg in 0.9% sodium chloride 10 mL injection  20 mg IntraVENous Q12H    cefTRIAXone (ROCEPHIN) 2 g in sterile water (preservative free) 20 mL IV syringe  2 g IntraVENous Q24H    vancomycin (VANCOCIN) 750 mg in 0.9% sodium chloride 250 mL (VIAL-MATE)  750 mg IntraVENous Q12H    Vancomycin - Rx to dose and monitor  1 Each Other Rx Dosing/Monitoring    sodium chloride (NS) flush 5-10 mL  5-10 mL IntraVENous PRN    sodium chloride (NS) flush 5-10 mL  5-10 mL IntraVENous PRN    0.9% sodium chloride infusion  100 mL/hr IntraVENous CONTINUOUS    sodium chloride (NS) flush 5-40 mL  5-40 mL IntraVENous Q8H    sodium chloride (NS) flush 5-40 mL  5-40 mL IntraVENous PRN    acetaminophen (TYLENOL) tablet 650 mg  650 mg Oral Q6H PRN    Or    acetaminophen (TYLENOL) suppository 650 mg  650 mg Rectal Q6H PRN    bisacodyL (DULCOLAX) tablet 5 mg  5 mg Oral DAILY PRN    ondansetron (ZOFRAN ODT) tablet 4 mg  4 mg Oral Q8H PRN    Or    ondansetron (ZOFRAN) injection 4 mg  4 mg IntraVENous Q6H PRN     Prior to Admission medications    Medication Sig Start Date End Date Taking? Authorizing Provider   gabapentin (NEURONTIN) 300 mg capsule Take 1 Cap by mouth two (2) times a day. 11/11/20   Clay Mejia MD   LORazepam (ATIVAN) 0.5 mg tablet Take 1 Tab by mouth two (2) times daily as needed for Agitation. Max Daily Amount: 1 mg. 11/11/20   Clay Mejia MD   naloxone [facility]) 4 mg/actuation nasal spray Use 1 spray intranasally, then discard. Repeat with new spray every 2 min as needed for opioid overdose symptoms, alternating nostrils. 11/11/20   Clay Mejia MD   insulin detemir U-100 (Levemir U-100 Insulin) 100 unit/mL injection 10 Units by SubCUTAneous route nightly. 11/11/20   Clay Mejia MD   cefepime 2 gram 2 g IV syringe 2 g by IntraVENous route every twelve (12) hours every twelve (12) hours. 11/11/20   Clay Mejia MD   vancomycin 1,000 mg 1,000 mg, vial-mate 1 Device IVPB 1,000 mg by IntraVENous route every twenty-four (24) hours. 11/12/20   Clay Mejia MD   amino acids/protein hydrolys (PRO-STAT PROFILE PO) Take  by mouth. Provider, Historical   multivitamin (ONE A DAY) tablet Take 1 Tab by mouth daily. Provider, Historical   ondansetron hcl (ZOFRAN) 4 mg tablet Take 4 mg by mouth every eight (8) hours as needed for Nausea or Vomiting. Provider, Historical   diphenhydrAMINE (BENADRYL) 25 mg capsule Take 25 mg by mouth every six (6) hours as needed. Provider, Historical   cholecalciferol (VITAMIN D3) 1,000 unit tablet Take 1,000 Units by mouth daily. Provider, Historical   apixaban (ELIQUIS PO) Take 5 mg by mouth two (2) times a day. Other, MD Dc   pollen extracts (PROSTAT PO) Take  by mouth.     Provider, Historical   tiZANidine (ZANAFLEX) 2 mg capsule Take 2 mg by mouth two (2) times a day. Provider, Historical   traZODone (DESYREL) 50 mg tablet Take 50 mg by mouth nightly. Provider, Historical   nystatin (MYCOSTATIN) powder Apply 100,000 Units to affected area four (4) times daily. Provider, Historical   camphor-menthol (SARNA) 0.5-0.5 % lotion Apply  to affected area daily. Provider, Historical   hydrocortisone (HYTONE) 2.5 % topical cream Apply  to affected area two (2) times a day. use thin layer    Provider, Historical   timolol (TIMOPTIC) 0.5 % ophthalmic solution 1 Drop two (2) times a day. Provider, Historical   brimonidine-timolol (COMBIGAN) 0.2-0.5 % drop ophthalmic solution Administer 1 Drop to both eyes every twelve (12) hours. Provider, Historical   bisacodyl (DULCOLAX, BISACODYL,) 10 mg suppository Insert  into rectum daily as needed. Indications: constipation    Provider, Historical   metoprolol succinate (TOPROL-XL) 50 mg XL tablet Take 50 mg by mouth daily. Provider, Historical   magnesium hydroxide (MILK OF MAGNESIA CONCENTRATED) 2,400 mg/10 mL susp suspension Take 10 mL by mouth. Provider, Historical   alum-mag hydroxide-simeth (MYLANTA) 200-200-20 mg/5 mL susp Take 30 mL by mouth four (4) times daily as needed. Provider, Historical   insulin aspart U-100 (NOVOLOG FLEXPEN U-100 INSULIN) 100 unit/mL inpn by SubCUTAneous route. Sliding scale    Provider, Historical   levothyroxine (SYNTHROID) 50 mcg tablet Take  by mouth Daily (before breakfast). Provider, Historical   acetaminophen (TYLENOL) 650 mg TbER Take 650 mg by mouth every six (6) hours as needed for Pain or Fever. Provider, Historical   atorvastatin calcium (ATORVASTATIN PO) Take 20 mg by mouth daily. Other, MD Dc       ALLERGIES:  Allergies   Allergen Reactions    Morphine Nausea and Vomiting    Pcn [Penicillins] Rash       Review of Systems unable to obtain due to confusion.     PHYSICAL EXAMINATION:     Visit Vitals  BP (!) 96/43   Pulse 96   Temp 99 °F (37.2 °C) Resp 18   Ht 5' 3\" (1.6 m)   Wt 70.1 kg (154 lb 8 oz)   SpO2 98%   BMI 27.37 kg/m²      O2 Device: Room air  GENERAL: Pleasant, in no apparent distress. HEENT: Moist mucous membranes, sclerae anicteric, scalp is atraumatic. CVS: Regular rate and rhythm, no murmurs or gallops. No carotid bruits. PULMONARY: Clear to auscultation bilaterally. No rales or rhonchi. No wheezing. EXTREMITIES: Normal range of motion at all sites. No deformities. ABDOMEN: Soft, nontender. SKIN: No rashes or ecchymoses. Warm and dry. NEUROLOGIC: Arousable with physical stimuli. Patient does not follow commands or answer questions. She repeatedly saying 'aw' when I touched her. Cranial nerves: Face is symmetric with symmetric smile. Facial sensation is intact. Rest of the cranial nerve unable to assess. Motor: Spontaneous right hand movement, no spontaneous movement in left upper and lower limb. Right lower limb is amputated. Sensory: Grossly normal in light touch. Coordination: Unable to obtain. Deep tendon reflexes: Diminished throughout, toes are mute. .  Gait assessment: Deferred. Labs: Results:       Chemistry Recent Labs     11/18/20 0425 11/17/20 0709 11/16/20  0115   * 139* 135*    141 141   K 3.6 3.3* 3.9   * 114* 114*   CO2 17* 15* 19*   BUN 7 9 13   CREA 0.72 0.69 0.73   CA 7.6* 7.5* 7.5*   AGAP 13 12 8   BUCR 10* 13 18   * 144* 165*   TP 6.0* 5.4* 5.7*   ALB 2.0* 1.8* 1.8*   GLOB 4.0 3.6 3.9   AGRAT 0.5* 0.5* 0.5*      CBC w/Diff Recent Labs     11/18/20 0425 11/17/20 0709 11/16/20  0115   WBC 12.5 12.5 15.4*   RBC 2.86* 2.60* 2.87*   HGB 8.2* 7.4* 8.4*   HCT 25.2* 23.0* 25.5*   * 383 395   GRANS 83* 85* 89*   LYMPH 9* 9* 7*   EOS 2 1 1      Cardiac Enzymes No results for input(s): CPK, CKND1, JANE in the last 72 hours. No lab exists for component: CKRMB, TROIP   Coagulation No results for input(s): PTP, INR, APTT, INREXT in the last 72 hours.     Lipid Panel Lab Results Component Value Date/Time    Cholesterol, total 115 08/28/2019 08:22 AM    HDL Cholesterol 39 (L) 08/28/2019 08:22 AM    LDL, calculated 46.2 08/28/2019 08:22 AM    VLDL, calculated 29.8 08/28/2019 08:22 AM    Triglyceride 149 08/28/2019 08:22 AM    CHOL/HDL Ratio 2.9 08/28/2019 08:22 AM      BNP No results for input(s): BNPP in the last 72 hours. Liver Enzymes Recent Labs     11/18/20  0425   TP 6.0*   ALB 2.0*   *      Thyroid Studies Lab Results   Component Value Date/Time    TSH 3.40 01/24/2020 08:35 AM          Radiology:  Ct Head Wo Cont    Result Date: 11/17/2020  EXAM: CT head INDICATION: Altered mental status. COMPARISON: CT dated 11/15/2020. TECHNIQUE: Axial CT imaging of the head was performed without intravenous contrast. Coronal and sagittal reconstructions were obtained. Dose reduction: One or more dose reduction techniques were used on this CT: automated exposure control, adjustment of the mAs and/or kVp according to patient's size, and iterative reconstruction techniques. The specific techniques utilized on this CT exam have been documented in the patient's electronic medical record. Digital Imaging and Communications in Medicine (DICOM) format image data are available to nonaffiliated external healthcare facilities or entities on a secure, media free, reciprocally searchable basis with patient authorization for at least a 12-month period after this study. _______________ FINDINGS: BRAIN PARENCHYMA: There is no evidence of acute intracranial hemorrhage, mass effect, midline shift, or herniation. No definite CT evidence of acute cortical infarct is seen. Patchy periventricular white matter low density changes are compatible with areas of chronic ischemia and microinfarcts. VENTRICLES/EXTRA-AXIAL SPACES/MENINGES: The ventricles and sulci are normal for age in their size and configuration with diffuse atrophy. OSSEOUS STRUCTURES: No fracture is seen.  PARANASAL SINUSES/MASTOIDS: Visualized paranasal sinuses and mastoid air cells are clear. ORBITS: The visualized orbits are unremarkable. OTHER: None.  _______________     IMPRESSION: Evidence of atrophy and chronic microvascular changes mainly in the deep white matter unchanged since 2 days earlier. No evidence of an acute intracranial process seen. No hemorrhage, definite acute infarct, or mass effect noted. Ct Head Wo Cont    Result Date: 11/15/2020  EXAM: CT head INDICATION: Altered mental status COMPARISON: No prior imaging available for review. TECHNIQUE: Axial CT imaging of the head was performed without intravenous contrast. Standard multiplanar coronal and sagittal reformatted images were obtained and are included in interpretation. One or more dose reduction techniques were used on this CT: automated exposure control, adjustment of the mAs and/or kVp according to patient size, and iterative reconstruction techniques. The specific techniques used on this CT exam have been documented in the patient's electronic medical record. Digital Imaging and Communications in Medicine (DICOM) format image data are available to nonaffiliated external healthcare facilities or entities on a secure, media free, reciprocally searchable basis with patient authorization for at least a 12-month period after this study. _______________ FINDINGS: BRAIN AND POSTERIOR FOSSA: Cortical and cerebellar volume loss is present, appearing within normal limits for patient age. Ventricular size and configuration is normal. Basilar cisterns appear patent. Subcortical and periventricular white matter low-attenuation noted. There is no intracranial hemorrhage, mass effect, or midline shift. Gray-white matter differentiation appears within normal limits. EXTRA-AXIAL SPACES AND MENINGES: There are no abnormal extra-axial fluid collections. CALVARIUM: Intact. SINUSES: Clear. OTHER: None. _______________     IMPRESSION: 1. No acute intracranial abnormality.  2. Subcortical and periventricular white matter low-attenuation; appearance of which favors sequela of chronic ischemic microvascular change. Ct Abd Pelv Wo Cont    Result Date: 11/4/2020  CLINICAL:  Sepsis. Renal failure COMPARISON: January 16, 2020 TECHNIQUE: Axial CT imaging of the abdomen and pelvis was performed without intravenous contrast. Multiplanar reformats were generated. One or more dose reduction techniques were used on this CT: automated exposure control, adjustment of the mAs and/or kVp according to patient size, and iterative reconstruction techniques. The specific techniques used on this CT exam have been documented in the patient's electronic medical record. Digital Imaging and Communications in the Medicine (DICOM) format image data are available to nonaffiliated external healthcare facilities or entities on a secure, media free, reciprocally searchable basis with patient authorization for at least a 12 month period after this study. _______________ FINDINGS: LOWER CHEST: Minimal right effusion LIVER, BILIARY: Liver is normal. No biliary dilation. Gallbladder absent PANCREAS: Normal. SPLEEN: Normal. ADRENALS: Normal. KIDNEYS: Left kidney absent. Right kidney demonstrates marked hydronephrosis and proximal hydroureter. A nephrostomy catheter is present, however, this has been pulled back and the locking loop may be embedded in the parenchyma. The mid ureter has been embolized. LYMPH NODES: Retroperitoneal lymph nodes. Retrocaval node measuring 12 mm previously measuring 8 mm. Para-aortic node measuring 10 mm previously measuring 8 mm. GASTROINTESTINAL TRACT: No bowel dilation or wall thickening. Left colostomy. PELVIC ORGANS: Uterus and bladder absent VASCULATURE: Prominent vascular calcifications BONES: Degenerative changes in both hips and the lumbar spine OTHER: None. _______________     IMPRESSION: Right nephrostomy has been pulled back. Locking loop may be embedded in the renal parenchyma.  Marked right hydronephrosis. Recommend correlation with nephrostomy output. Recommend nephrostomy exchange, repositioning. Absent left kidney. Prior hysterectomy and cystectomy. Multiple pole retroperitoneal lymph nodes Mildly enlarged retroperitoneal lymph nodes, mildly increased in size from prior study     Ct Abd Pelv W Cont    Result Date: 11/15/2020  EXAM: CT of the abdomen and pelvis INDICATION: [de-identified]year-old patient with generalized abdominal pain, altered mental status. COMPARISON: CT scan 11/4/2020; nephrostomy exchange images 11/5/2020; more distant prior abdominal/pelvic CT 1/16/2020. TECHNIQUE: Axial CT imaging of the abdomen and pelvis was performed with intravenous contrast. Multiplanar reformats were generated. One or more dose reduction techniques were used on this CT: automated exposure control, adjustment of the mAs and/or kVp according to patient size, and iterative reconstruction techniques. The specific techniques used on this CT exam have been documented in the patient's electronic medical record. Digital Imaging and Communications in Medicine (DICOM) format image data are available to nonaffiliated external healthcare facilities or entities on a secure, media free, reciprocally searchable basis with patient authorization for at least a 12-month period after this study. _______________ FINDINGS: LOWER CHEST: There are small bilateral pleural effusions present with compressive atelectasis present at each lung base. Cardiac size appears within normal limits. No evidence of pericardial effusion. LIVER, BILIARY: Hepatic parenchymal enhancement is uniform. No suspicious appearing hepatic lesion. No biliary dilation. , And are surgically absent. PANCREAS: Normal. SPLEEN: Normal. ADRENALS: Normal. KIDNEYS/URETERS/BLADDER: The left kidney is surgically absent. A percutaneous nephrostomy catheter is present within the right kidney. Locking loop of the catheter is present within the renal pelvis.  There is no evidence of hydronephrosis involving the right kidney. Cortical obliteration of the right ureter again noted. Right kidney enhances normally. Small amount of low attenuation noted adjacent to the nephrostomy catheter entry site, unchanged. Urinary bladder is likely surgically absent. PELVIC ORGANS: Uterus is surgically absent. VASCULATURE: Diffuse aortobiiliac atherosclerotic vascular calcification is present without evidence of aneurysmal dilatation. LYMPH NODES: There are scattered subcentimeter mesenteric and retroperitoneal lymph nodes present without evidence of abdominal or pelvic adenopathy. GASTROINTESTINAL TRACT: No focal bowel wall thickening or dilatation. No morphology of bowel obstruction. Left lower quadrant colostomy. No free intraperitoneal gas. BONES: No acute or aggressive osseous abnormalities identified. Bilateral hip joint osteoarthritis and lower lumbar predominant facet joint osteoarthritis and spondylosis, as previously. OTHER: Diffuse body wall edema appearing slightly increased from prior study. _______________     IMPRESSION: 1. Small bilateral pleural effusions with dependent changes of atelectasis. 2. Right-sided percutaneous nephrostomy catheter in stable position from recent prior placement imaging of 11/5/2020. No hydronephrosis involving the right kidney. > Unchanged coil obliteration right ureter. > Left kidney and urinary bladder surgically absent. 3. No focal bowel wall thickening or dilatation. No morphology of bowel obstruction. Left lower quadrant colostomy. 4. Mild interval increase in previously noted relatively diffuse body wall edema. Xr Chest Port    Result Date: 11/15/2020  EXAM: XR CHEST PORT CLINICAL INDICATION/HISTORY: Altered mental status. -Additional: None COMPARISON: Several prior studies, most recently 11/4/2020.  TECHNIQUE: Frontal view of the chest _______________ FINDINGS: HEART AND MEDIASTINUM: Left approach PICC line with tip projecting over the superior cavoatrial junction. Stable appearing cardiac size and mediastinal contours. LUNGS AND PLEURAL SPACES: Interval increase in left retrocardiac density present with likely trace left pleural effusion. Right lung bases clear as visualized. No pneumothorax. Right CP angle clear. BONY THORAX AND SOFT TISSUES: No acute osseous abnormality _______________     IMPRESSION: 1. Left approach PICC line in stable/appropriate position. 2. Interval increased left retrocardiac atelectasis or airspace disease with likely small left pleural effusion. Xr Chest Port    Result Date: 11/4/2020  CLINICAL: Sepsis. Fever COMPARISON: None. A portable view of the chest from 1938 hours: The lungs and pleural spaces are clear. The mediastinum is unremarkable in appearance. IMPRESSION: No acute process. Ir Exchange Nephro Perc Rt Si    Result Date: 11/5/2020  Right percutaneous nephrostomy replaced and repositioned. 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 Dysfunctional nephrostomy with lack of drainage and CT earlier same day demonstrating retraction into the renal parenchyma. Hydronephrosis, lactic acidosis, acute renal failure, and sepsis. TECHNIQUE: Procedure, risks, benefits, and alternatives were discussed with the patient and informed, witnessed consent was obtained. There is bloody fluid in the nephrostomy bag and dry blood in the nephrostomy tube. Dry blood is seen in the nephrostomy dressing and surrounding skin. Patient is on Eliquis twice a day and INR of 2.7. In situ nephrostomy tube and surrounding skin were prepped and draped in usual sterile fashion. Patient is on intravenous Flagyl, meropenem, vancomycin, and Levaquin, therefore, additional preprocedure antibiotics were not indicated. Timeout was observed.  image was obtained. Contrast was injected demonstrating the nephrostomy tube retracted and a short tract leading into the collecting system.  In situ nephrostomy tube was removed over wire. Using a Berenstein catheter and Glidewire, tract was navigated and catheter and wire advanced into the upper collecting system. Intraluminal placement confirmed with contrast injection. Over a short Amplatz wire, a new 10 [place] APDL nephrostomy was placed and retention loop formed in the renal pelvis. Foul-smelling blood tinged urine was collected and submitted for microbiology. Contrast was injected to document intraluminal position. Contrast was also aspirated to document patency. Catheter was secured to the skin using Percu-Stay. Patient tolerated the procedure well without immediate complications. GUIDANCE: Fluoroscopy guidance was used to position (and confirm the position of) the catheter. Image(s) saved in PACS: Fluoroscopy. Fluoroscopy dose (reference air kerma): 44 mGy. IMPRESSION: 1. In situ nephrostomy retracted outside the collecting system. A new 10 [place] nephrostomy tube repositioned into the renal pelvis. 2. Moderate to severe hydronephrosis with foul-smelling bloody urine. Specimen submitted for laboratory evaluation. Ir Guide Insert Picc Over 5 Yrs    Result Date: 11/12/2020  PROCEDURE:  Ultrasound & Fluoroscopic Guided Left Arm PICC Line Placement CLINICAL INDICATION/HISTORY: Need for long-term IV antibiotics PERFORMED BY: Soledad Perez PA-C ATTENDING RADIOLOGIST: Mj Coronado MD SUPERVISION: General TECHNIQUE: After explaining the procedure to the patient, including the potential risks, benefits, and alternatives, informed written and verbal consent was obtained. A time out was performed to verify appropriate patient, procedure, and site at 1333 hours. The procedure was performed following standard maximal barrier technique which includes, cap, mask, sterile gown, sterile gloves, sterile full body drape, hand hygiene with alcohol foam, and 2% chlorhexidine for cutaneous antisepsis. Sterile ultrasound gel and a sterile cover for the US probe was used. Ultrasound evaluation for potential access sites was performed. The left basilic vein is patent and normally compresses. A permanent recording was created for the patient record. The patient's upper arm was prepped and draped in sterile fashion and 1% Lidocaine was used for local anesthesia. The vein was accessed in the upper arm with a 21 gauge needle under ultrasound guidance. A guide wire was advanced under fluoroscopic control to the superior vena cava. The distance between the superior vena cava and skin puncture site was measured and a [address] [place] [address] double lumen power PICC was cut to the appropriate length. Overall catheter length was 43 cm. The PICC was advanced to the SVC under fluoroscopic control. The line was flushed with heparinized saline and adhered to the skin with a Statlock device. Final coned AP view of the chest was obtained to document catheter position. The patient tolerated the procedure well and there were no immediate complications. FINDINGS: Final coned AP view of the chest shows good position of the PICC with its tip in the SVC. Both ports flushed easily and there was good blood return. Radiation dose (reference air kerma):  2 mGy. Fluoroscopy Time: 0.2 minutes. GUIDANCE: Ultrasound and fluoroscopic guidance was used to position (and confirm the position of) the needle and catheter. Image(s) saved in PACS: Ultrasound and fluoroscopy. IMPRESSION: Successful placement of a double lumen power PICC line via the left arm. The PICC line is ready for immediate use. ASSESSMENT/IMPRESSION:  71-year-old female with past medical history of diabetes, right AKA presents with acute encephalopathy. She was found to have sepsis from UTI. 1. Acute encephalopathy: Metabolic encephalopathy secondary to UTI. It is very hard to assess neurologic exam given current encephalopathy. Therefore stroke cannot be ruled out. 2. Sepsis  3. UTI  RECOMMENDATIONS:  1. Brain MRI.   2.   Continue treating sepsis and UTI.      ------------------------------------  Taniya Pastor MD  11/18/2020  4:22 PM

## 2020-11-18 NOTE — PROGRESS NOTES
[facility]: [phone]  [facility]: [phone]   [facility]: [phone]    Patient Name: Xuan Costa  YOB: 1940    Date of Initial Consult: 11/17/2020; follow up: 11/18/2020  Reason for Consult: care decisions  Requesting Provider: Kandace Robert MD  Primary Care Physician: Anders Hagen, Not On File      SUMMARY:   Xuan Costa is a [de-identified] y.o. 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 Current medical issues leading to Palliative Medicine involvement include: advanced age, multiple co-morbidities and goals of care. 11/18/2020: Palliative care team including CONNIE Shen and this NP met with patient at bedside this morning. She was laying quietly in bed on arrival. She did not respond to voice but startled with light touch to hands. Her response was to withdraw and then say \"oh\" repeatedly. Patient does not follow commands. Reassured patient she was safe at [facility] and the nurses are taking good care of her. PALLIATIVE DIAGNOSES:   1. Advanced care decisions  2. Altered mental status  3. Sepsis  4. Debility     PLAN:     11/18/2020: Family meeting held in waiting room on 3rd floor with patient's son, Ruma Brooks and his wife, Earl Rutledge and patient's daughter, Daljit Angulo. They have concerns regarding the suddenness of onset of change in mental status and the etiology of it. Discussed ruling out infectious process, looking at head CT for neurological etiology. Discussed CT's limitations on specificity within the brain and preference for diagnostic MRI. Family requesting neurology consult for input so they can feel they have exhausted all resources prior to electing comfort care.  They are appropriately tearful as they realize they may not see her again once she [facility] [place] [facility]. Discussed with hospitalist and orders placed for neurology consult. Special approval for visitation for Akanksha Lopez and her daughter, Rupesh Ing to visit tomorrow morning at 8:30 prior to flying back to [place]. Family also requests medical details be discussed with Hudson Paty understands these things better\". GOALS OF CARE: DNR/DNI, has DDNR on file. See previous notes shown below:     11/17/2020:    1. Advanced care decisions: Palliative care team including Edmonia Felty, MSW and this NP met with patient at bedside. She awakened easily to light touch and voice. After awakening patient repeated \"hello\" to every question. Assured patient she was safe here at THE [facility]. Patient known to palliative care team and this is a definite change in mental status as a week ago patient was able to make own healthcare decisions. Patient is a  with three children: Angelina Reyez, Kathy Wilson and a daughter, Akanksha Lopez who lives in [place]. Hemal's wife is Ivy Rajput. Marvel and Akanksha Lopez are both RN's. \"I have many nurses and doctors in my family\". Patient has an AMD and DDNR on file which is signed by her son, Martita Malave (likely due to her blindness). AMD names Angelina Reyez as sole MPOA. Will plan to re-assess patient's mental clearing in the morning and if not improved will contact MPOA for further goals of care conversation. GOALS OF CARE: DNR  2. Altered mental status: Possibly related to sepsis vs strokeCT head on 11/15 and 11/17 both non-acute. 3.  Sepsis: was admitted last week with blood cultures positive for enterococcus faecalis and organella. Urine culture positive for proteus mirabilis, enterococcus faecalis and morganella. ID consulted and recommended PICC with 2 weeks IV antibiotics. Readmitted 11/15 with WBC 17.6. ID back on board. Primary team managing. 4.  Debility: Bedbound s/p right AKA. Blind and very [place]. Lives in [address] [place] [address].  Has nephrostomy and colostomy tubes in place  5. Initial consult note routed to primary continuity provider  6. Communicated plan of care with: Palliative IDT    GOALS OF CARE:  Patient/Health Care Proxy Stated Goals: Prolong life      TREATMENT PREFERENCES:   Code Status: DNR    Advance Care Planning:  Advance Care Planning 11/5/2020   Patient's Healthcare Decision Maker is: -   Confirm Advance Directive Yes, on file   Patient Would Like to Complete Advance Directive -       Medical Interventions: Limited additional interventions           Other: As far as possible, the palliative care team has discussed with patient/health care proxy about goals of care/treatment preferences for patient. HISTORY:     History obtained from: chart    CHIEF COMPLAINT: \"oh\"     HPI/SUBJECTIVE:    The patient is:   [] Verbal and participatory  [x] Non-participatory due to: altered mental status. Replies \"oh\" to all questions. Clinical Pain Assessment (nonverbal scale for nonverbal patients): Clinical Pain Assessment  Severity: 0     Activity (Movement): Lying quietly, normal position    Duration: for how long has pt been experiencing pain (e.g., 2 days, 1 month, years)  Frequency: how often pain is an issue (e.g., several times per day, once every few days, constant)     FUNCTIONAL ASSESSMENT:     Palliative Performance Scale (PPS):  PPS: 30    ECOG  ECOG Status : Completely disabled     PSYCHOSOCIAL/SPIRITUAL SCREENING:      Any spiritual / Mosque concerns: not assessed  [] Yes /  [] No    Caregiver Burnout:  [] Yes /  [] No /  [x] No Caregiver Present      Anticipatory grief assessment: not assessed  [] Normal  / [] Maladaptive        REVIEW OF SYSTEMS:     Positive and pertinent negative findings in ROS are noted above in HPI. The following systems were [] reviewed / [x] unable to be reviewed as noted in HPI  Other findings are noted below.   Systems: constitutional, ears/nose/mouth/throat, respiratory, gastrointestinal, genitourinary, musculoskeletal, integumentary, neurologic, psychiatric, endocrine. Positive findings noted below. Modified ESAS Completed by: provider           Pain: 0                       Stool Occurrence(s): 0        PHYSICAL EXAM:     Wt Readings from Last 3 Encounters:   11/18/20 70.1 kg (154 lb 8 oz)   11/11/20 75.4 kg (166 lb 2 oz)   06/23/20 78 kg (171 lb 15.3 oz)     Blood pressure 116/73, pulse (!) 110, temperature 99.1 °F (37.3 °C), resp. rate 18, height 5' 3\" (1.6 m), weight 70.1 kg (154 lb 8 oz), SpO2 98 %. Pain:  Pain Scale 1: FLACC  Pain Intensity 1: 1     Pain Location 1: Generalized        Pain Intervention(s) 1: Medication (see MAR)     Constitutional: Pale, chronically ill appearing elderly female in no apparent distress when sleeping. Eyes: pupils equal, anicteric, erythema around eyes  ENMT: no nasal discharge, moist mucous membranes  Cardiovascular: generalized edema. Respiratory: breathing unlabored; room air  Gastrointestinal: soft non-tender  Musculoskeletal: no deformity, no tenderness to palpation  Skin: warm, dry, ecchymosis.   Neurologic: not following commands, moving all extremities       HISTORY:     Principal Problem:    Encephalopathy acute (11/16/2020)    Active Problems:    UTI (urinary tract infection) (11/26/2018)      AMS (altered mental status) (11/15/2020)      Past Medical History:   Diagnosis Date    Cancer ([facility]     vaginal and colon cancer    Cancer ([facility]     Chronic pain     arthritis    Diabetes ([facility]     DVT (deep venous thrombosis) ([facility]     Hypertension     Legally blind     Thyroid disease       Past Surgical History:   Procedure Laterality Date    HX ABOVE KNEE AMPUTATION Right     HX COLOSTOMY      HX ORTHOPAEDIC      HX OTHER SURGICAL      kidney drainage tube    HX UROLOGICAL      kidney removal    IR EXCHANGE NEPHRO PERC RT SI  1/28/2019    IR EXCHANGE NEPHRO PERC RT SI  3/21/2019    IR EXCHANGE NEPHRO PERC RT SI  5/13/2019    IR EXCHANGE NEPHRO PERC RT SI  6/27/2019    IR EXCHANGE NEPHRO PERC RT SI  8/16/2019    IR EXCHANGE NEPHRO PERC RT SI  10/3/2019    IR EXCHANGE NEPHRO PERC RT SI  11/18/2019    IR EXCHANGE NEPHRO PERC RT SI  1/6/2020    IR EXCHANGE NEPHRO PERC RT SI  2/24/2020    IR EXCHANGE NEPHRO PERC RT SI  4/13/2020    IR EXCHANGE NEPHRO PERC RT SI  6/1/2020    IR EXCHANGE NEPHRO PERC RT SI  6/19/2020    IR EXCHANGE NEPHRO PERC RT SI  8/10/2020    IR EXCHANGE NEPHRO PERC RT SI  9/30/2020    IR EXCHANGE NEPHRO PERC RT SI  11/5/2020    IR NEPHROSTOMY PERC RT PLC CATH  SI  6/21/2020      History reviewed. No pertinent family history. History reviewed, no pertinent family history.   Social History     Tobacco Use    Smoking status: Never Smoker    Smokeless tobacco: Never Used   Substance Use Topics    Alcohol use: No     Allergies   Allergen Reactions    Morphine Nausea and Vomiting    Pcn [Penicillins] Rash      Current Facility-Administered Medications   Medication Dose Route Frequency    Vancomycin Trough due 11/18/20 at 20:30 (30 minutes prior to 21:00 dose)  1 Each Other ONCE    tetrahydrozoline (OPTI-CLEAR) 0.05 % ophthalmic drops 2 Drop  2 Drop Both Eyes QID    nystatin (MYCOSTATIN) 100,000 unit/gram ointment   Topical TID    morphine injection 1 mg  1 mg IntraVENous Q2H PRN    insulin lispro (HUMALOG) injection   SubCUTAneous AC&HS    glucose chewable tablet 16 g  4 Tab Oral PRN    glucagon (GLUCAGEN) injection 1 mg  1 mg IntraMUSCular PRN    dextrose 10% infusion 125-250 mL  125-250 mL IntraVENous PRN    nystatin (MYCOSTATIN) 100,000 unit/gram powder   Topical TID    famotidine (PF) (PEPCID) 20 mg in 0.9% sodium chloride 10 mL injection  20 mg IntraVENous Q12H    cefTRIAXone (ROCEPHIN) 2 g in sterile water (preservative free) 20 mL IV syringe  2 g IntraVENous Q24H    vancomycin (VANCOCIN) 750 mg in 0.9% sodium chloride 250 mL (VIAL-MATE)  750 mg IntraVENous Q12H    Vancomycin - Rx to dose and monitor  1 Each Other Rx Dosing/Monitoring    sodium chloride (NS) flush 5-10 mL  5-10 mL IntraVENous PRN    sodium chloride (NS) flush 5-10 mL  5-10 mL IntraVENous PRN    0.9% sodium chloride infusion  100 mL/hr IntraVENous CONTINUOUS    sodium chloride (NS) flush 5-40 mL  5-40 mL IntraVENous Q8H    sodium chloride (NS) flush 5-40 mL  5-40 mL IntraVENous PRN    acetaminophen (TYLENOL) tablet 650 mg  650 mg Oral Q6H PRN    Or    acetaminophen (TYLENOL) suppository 650 mg  650 mg Rectal Q6H PRN    bisacodyL (DULCOLAX) tablet 5 mg  5 mg Oral DAILY PRN    ondansetron (ZOFRAN ODT) tablet 4 mg  4 mg Oral Q8H PRN    Or    ondansetron (ZOFRAN) injection 4 mg  4 mg IntraVENous Q6H PRN        LAB AND IMAGING FINDINGS:     Lab Results   Component Value Date/Time    WBC 12.5 11/18/2020 04:25 AM    HGB 8.2 (L) 11/18/2020 04:25 AM    PLATELET 190 (H) 71/58/8802 04:25 AM     Lab Results   Component Value Date/Time    Sodium 143 11/18/2020 04:25 AM    Potassium 3.6 11/18/2020 04:25 AM    Chloride 113 (H) 11/18/2020 04:25 AM    CO2 17 (L) 11/18/2020 04:25 AM    BUN 7 11/18/2020 04:25 AM    Creatinine 0.72 11/18/2020 04:25 AM    Calcium 7.6 (L) 11/18/2020 04:25 AM    Magnesium 4.6 (H) 11/11/2020 03:30 AM    Phosphorus 3.6 11/04/2020 06:10 PM      Lab Results   Component Value Date/Time    Alk.  phosphatase 136 (H) 11/18/2020 04:25 AM    Protein, total 6.0 (L) 11/18/2020 04:25 AM    Albumin 2.0 (L) 11/18/2020 04:25 AM    Globulin 4.0 11/18/2020 04:25 AM     Lab Results   Component Value Date/Time    INR 2.7 (H) 11/04/2020 06:10 PM    Prothrombin time 27.7 (H) 11/04/2020 06:10 PM    aPTT >180.0 (HH) 11/06/2020 11:00 PM      Lab Results   Component Value Date/Time    Iron 10 (L) 06/23/2020 12:56 AM    TIBC 123 (L) 06/23/2020 12:56 AM    Iron % saturation 8 (L) 06/23/2020 12:56 AM      No results found for: PH, PCO2, PO2  No components found for: [place]   Lab Results   Component Value Date/Time    CK 12 (L) 11/09/2020 03:24 AM    CK - MB <1.0 11/09/2020 03:24 AM              Total time: 35 minutes  Counseling / coordination time, spent as noted above > 50% counseling / coordination: 30 minutes with patient. Prolonged service was provided for  []30 min   []75 min in face to face time in the presence of the patient, spent as noted above. Time Start:   Time End:   Note: this can only be billed with 68941 (initial) or 40872 (follow up). If multiple start / stop times, list each separately.

## 2020-11-18 NOTE — PROGRESS NOTES
0024 Received bedside shift report from George Tristan, [facility]. Report included the following information SBAR, Kardex, Intake/Output, MAR and Recent Results. 0915 Pt moaning with various intensity. \"oh-oh\". Squeezing brow. 5 Informed MRI that pt has a metal spring from 7 yr old urostomy reversal that has unknown manufacturing details. Can't do MRI. 1950 Gave beside shift report to oncoming nurse [facility] E Lexie Saman [place], RN. Report included the following information SBAR, Kardex, Intake/Output, MAR and Recent Results.

## 2020-11-18 NOTE — CONSULTS
[facility]  (A Division of [address] [place] [address])    Follow-up Note      Date of Admission: 11/15/2020       Date of Note:  11/18/2020    Summary:    Ms Mora is a blind [de-identified] WF survivor of many things who most recently had another accidental dysfunctioning of her chronic/permanent R nephrostomy tube on 11/3 with subsequent progressive R back pain and malaise; also had abdominal pain, Nausea.  Since having it replaced 11/5, her symptoms have settled with improving fever curve.     Retired CNA     Since I last saw her 11/11, she was sent back to NH for 2wks IV Vanco+Cefepime therapy to complete 2wks treatment of non-SBE E faecalis and M morganii bacteremia 11/23, but evidently has become encephalopathic prompting readmission 11/15. NOT on vancomycin that I can see --restarted 11/16. CC:  \"Oh, Oh\"      Interval History:  Events last few days reviewed/tracked from afar. Still very somnlent and out of it. Current Antimicrobials:    Prior Antimicrobials:  1. Vanco IV (11/5-) #10  2. CAX IV (11/7-9; 11/16-) #5 1. Cefepime IV (11/9-16)  2. Meropenem IV (11/4-7)  3. Levo IV (11/4; 11/15-16)       Assessment: Rec / Plan:   Metabolic encephalopathy - cause? If due to cefepime, would have expected to see improvement by now as 1/2 life is only 2-3h with cefepime and should have seen marked improvement by yesterday evening. ->Agree with NEURO eval at this point. Limited community-acquired Enterococcus faecalis/Morganella morganii  sepsis - in conjunction with R nephrotomy tube dysfunction/accidental plugging    Better. ->Cefepime/CAX  +  vanco #11    Just a few more days and then we can stop both the CAX and vanco for this.   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    ANTONI on CKD - improved    R renal calcinosis      Microbiology:                11/15 - UA sent                                                      BCx x2 (-) 11/8 - BCx (-)                                         11/5 - UA (+) Morganella morganii (R - gent/nitro/tmp-smx), Enterococcus faecalis, and Proteus mirabilis (R - FQ, nitro, and tmp-smx)                                         11/4 - BCx 2/2 (+) Morganella morganii (R - gent/nitro/tmp-smx), Enterococcus faecalis                                            8/11 - UA (+) Proteus mirabilis (R - FQ, nitro, and tmp-smx)        Lines / Maximus Like PICC        Patient Active Problem List   Diagnosis Code    Pyelonephritis N12    S/p nephrectomy Z90.5    Right flank pain R10.9    Malfunction of nephrostomy tube ([facility] T83.098A    Nephrostomy tube displaced ([facility] T83.022A    UTI (urinary tract infection) N39.0    Elevated WBC count D72.829    Diabetes ([facility]) E11.9    Legally blind H54.8    Hypertension I10    Thyroid disease E07.9    Elevated serum creatinine R79.89    S/P colectomy Z90.49    Skin lesion of foot L98.9    Tear of skin of buttock S31.801A    Pressure injury of right heel, unstageable ([facility]) L89.610    Hypomagnesemia E83.42    Hyperkalemia E87.5    Heel ulcer ([facility]) L97.409    ANTONI (acute kidney injury) ([facility] N17.9    S/P AKA (above knee amputation), right ([facility]) Z89.611    Nephrostomy status ([facility]) Z93.6    Obstructed nephrostomy tube ([facility] T83.092A    Nephrostomy complication ([facility]) C42.664    Acute renal failure (ARF) ([facility]) N17.9    Elevated INR R79.1    Bacteremia R78.81    Enterococcus faecalis infection B95.2    CKD stage 3 due to type 2 diabetes mellitus ([facility]) E11.22, N18.30    Hydronephrosis, right N13.30    Severe sepsis with acute organ dysfunction ([facility]) A41.9, R65.20    Anticoagulated Z79.01    AMS (altered mental status) R41.82    Encephalopathy acute G93.40       Current Facility-Administered Medications   Medication Dose Route Frequency    Vancomycin Trough due 11/18/20 at 20:30 (30 minutes prior to 21:00 dose)  1 Each Other ONCE    tetrahydrozoline (OPTI-CLEAR) 0.05 % ophthalmic drops 2 Drop  2 Drop Both Eyes QID    nystatin (MYCOSTATIN) 100,000 unit/gram ointment   Topical TID    morphine injection 1 mg  1 mg IntraVENous Q2H PRN    insulin lispro (HUMALOG) injection   SubCUTAneous AC&HS    glucose chewable tablet 16 g  4 Tab Oral PRN    glucagon (GLUCAGEN) injection 1 mg  1 mg IntraMUSCular PRN    dextrose 10% infusion 125-250 mL  125-250 mL IntraVENous PRN    nystatin (MYCOSTATIN) 100,000 unit/gram powder   Topical TID    famotidine (PF) (PEPCID) 20 mg in 0.9% sodium chloride 10 mL injection  20 mg IntraVENous Q12H    cefTRIAXone (ROCEPHIN) 2 g in sterile water (preservative free) 20 mL IV syringe  2 g IntraVENous Q24H    vancomycin (VANCOCIN) 750 mg in 0.9% sodium chloride 250 mL (VIAL-MATE)  750 mg IntraVENous Q12H    Vancomycin - Rx to dose and monitor  1 Each Other Rx Dosing/Monitoring    sodium chloride (NS) flush 5-10 mL  5-10 mL IntraVENous PRN    sodium chloride (NS) flush 5-10 mL  5-10 mL IntraVENous PRN    0.9% sodium chloride infusion  100 mL/hr IntraVENous CONTINUOUS    sodium chloride (NS) flush 5-40 mL  5-40 mL IntraVENous Q8H    sodium chloride (NS) flush 5-40 mL  5-40 mL IntraVENous PRN    acetaminophen (TYLENOL) tablet 650 mg  650 mg Oral Q6H PRN    Or    acetaminophen (TYLENOL) suppository 650 mg  650 mg Rectal Q6H PRN    bisacodyL (DULCOLAX) tablet 5 mg  5 mg Oral DAILY PRN    ondansetron (ZOFRAN ODT) tablet 4 mg  4 mg Oral Q8H PRN    Or    ondansetron (ZOFRAN) injection 4 mg  4 mg IntraVENous Q6H PRN         Review of Systems - unobtainable due to patient factors       Objective:    Visit Vitals  /73   Pulse (!) 110   Temp 99.1 °F (37.3 °C)   Resp 18   Ht 5' 3\" (1.6 m)   Wt 70.1 kg (154 lb 8 oz)   SpO2 98%   BMI 27.37 kg/m²       Temp (24hrs), Av.6 °F (37 °C), Min:98.1 °F (36.7 °C), Max:99.1 °F (37.3 °C)      GEN: Elderly WF quiet - NAD, but confused  HEENT: anicterc  CHEST: CTA  CVS:tachy  ABD: NABS  EXT: no edema         Lab results:    Chemistry  Recent Labs     11/18/20 0425 11/17/20  0709 11/16/20  0115   * 139* 135*    141 141   K 3.6 3.3* 3.9   * 114* 114*   CO2 17* 15* 19*   BUN 7 9 13   CREA 0.72 0.69 0.73   CA 7.6* 7.5* 7.5*   AGAP 13 12 8   BUCR 10* 13 18   * 144* 165*   TP 6.0* 5.4* 5.7*   ALB 2.0* 1.8* 1.8*   GLOB 4.0 3.6 3.9   AGRAT 0.5* 0.5* 0.5*       CBC w/ Diff  Recent Labs     11/18/20 0425 11/17/20  0709 11/16/20  0115   WBC 12.5 12.5 15.4*   RBC 2.86* 2.60* 2.87*   HGB 8.2* 7.4* 8.4*   HCT 25.2* 23.0* 25.5*   * 383 395   GRANS 83* 85* 89*   LYMPH 9* 9* 7*   EOS 2 1 1       Microbiology  All Micro Results     Procedure Component Value Units Date/Time    CULTURE, URINE [[id number]] Collected:  11/15/20 1145    Order Status:  Completed Specimen:  Urine from Clean catch Updated:  11/17/20 1038     Special Requests: NO SPECIAL REQUESTS        Culture result: No growth (<1,000 CFU/ML)       CULTURE, BLOOD [[id number]] Collected:  11/15/20 1135    Order Status:  Completed Specimen:  Blood Updated:  11/17/20 0707     Special Requests: NO SPECIAL REQUESTS        Culture result: NO GROWTH 2 DAYS       CULTURE, BLOOD [[id number]] Collected:  11/15/20 1115    Order Status:  Completed Specimen:  Blood Updated:  11/17/20 0707     Special Requests: NO SPECIAL REQUESTS        Culture result: NO GROWTH 2 DAYS              Chary Sales MD  Cell [phone]  [facility]   11/18/2020   3:43 PM

## 2020-11-18 NOTE — WOUND CARE
IP WOUND CONSULT     [address] RECORD NUMBER:  [id number]  AGE: [de-identified] y.o. GENDER: female  : 1940  TODAY'S DATE:  2020    GENERAL     [x] Follow-up   [] New Consult    Deni Blum is a [de-identified] y.o. female referred by:   [] Physician  [x] Nursing  [] Other:         PAST MEDICAL HISTORY    Past Medical History:   Diagnosis Date    Cancer ([facility]     vaginal and colon cancer    Cancer ([facility]     Chronic pain     arthritis    Diabetes ([facility]     DVT (deep venous thrombosis) ([facility] [facility]     Hypertension     Legally blind     Thyroid disease         PAST SURGICAL HISTORY    Past Surgical History:   Procedure Laterality Date    HX ABOVE KNEE AMPUTATION Right     HX COLOSTOMY      HX ORTHOPAEDIC      HX OTHER SURGICAL      kidney drainage tube    HX UROLOGICAL      kidney removal    IR EXCHANGE NEPHRO PERC RT SI  2019    IR EXCHANGE NEPHRO PERC RT SI  3/21/2019    IR EXCHANGE NEPHRO PERC RT SI  2019    IR EXCHANGE NEPHRO PERC RT SI  2019    IR EXCHANGE NEPHRO PERC RT SI  2019    IR EXCHANGE NEPHRO PERC RT SI  10/3/2019    IR EXCHANGE NEPHRO PERC RT SI  2019    IR EXCHANGE NEPHRO PERC RT SI  2020    IR EXCHANGE NEPHRO PERC RT SI  2020    IR EXCHANGE NEPHRO PERC RT SI  2020    IR EXCHANGE NEPHRO PERC RT SI  2020    IR EXCHANGE NEPHRO PERC RT SI  2020    IR EXCHANGE NEPHRO PERC RT SI  8/10/2020    IR EXCHANGE NEPHRO PERC RT SI  2020    IR EXCHANGE NEPHRO PERC RT SI  2020    IR NEPHROSTOMY PERC RT PLC CATH  SI  2020       FAMILY HISTORY    History reviewed. No pertinent family history.     SOCIAL HISTORY    Social History     Tobacco Use    Smoking status: Never Smoker    Smokeless tobacco: Never Used   Substance Use Topics    Alcohol use: No    Drug use: Never       ALLERGIES    Allergies   Allergen Reactions    Morphine Nausea and Vomiting    Pcn [Penicillins] Rash       MEDICATIONS    No current facility-administered medications on file prior to encounter. Current Outpatient Medications on File Prior to Encounter   Medication Sig Dispense Refill    gabapentin (NEURONTIN) 300 mg capsule Take 1 Cap by mouth two (2) times a day. 2 Cap 0    LORazepam (ATIVAN) 0.5 mg tablet Take 1 Tab by mouth two (2) times daily as needed for Agitation. Max Daily Amount: 1 mg. 2 Tab 0    naloxone (NARCAN) 4 mg/actuation nasal spray Use 1 spray intranasally, then discard. Repeat with new spray every 2 min as needed for opioid overdose symptoms, alternating nostrils. 2 Each 0    insulin detemir U-100 (Levemir U-100 Insulin) 100 unit/mL injection 10 Units by SubCUTAneous route nightly. 1 Vial 0    cefepime 2 gram 2 g IV syringe 2 g by IntraVENous route every twelve (12) hours every twelve (12) hours. 1 Dose 0    vancomycin 1,000 mg 1,000 mg, vial-mate 1 Device IVPB 1,000 mg by IntraVENous route every twenty-four (24) hours. 1 Dose 0    amino acids/protein hydrolys (PRO-STAT PROFILE PO) Take  by mouth.  multivitamin (ONE A DAY) tablet Take 1 Tab by mouth daily.  ondansetron hcl (ZOFRAN) 4 mg tablet Take 4 mg by mouth every eight (8) hours as needed for Nausea or Vomiting.  diphenhydrAMINE (BENADRYL) 25 mg capsule Take 25 mg by mouth every six (6) hours as needed.  cholecalciferol (VITAMIN D3) 1,000 unit tablet Take 1,000 Units by mouth daily.  apixaban (ELIQUIS PO) Take 5 mg by mouth two (2) times a day.  pollen extracts (PROSTAT PO) Take  by mouth.  tiZANidine (ZANAFLEX) 2 mg capsule Take 2 mg by mouth two (2) times a day.  traZODone (DESYREL) 50 mg tablet Take 50 mg by mouth nightly.  nystatin (MYCOSTATIN) powder Apply 100,000 Units to affected area four (4) times daily.  camphor-menthol (SARNA) 0.5-0.5 % lotion Apply  to affected area daily.  hydrocortisone (HYTONE) 2.5 % topical cream Apply  to affected area two (2) times a day.  use thin layer      timolol (TIMOPTIC) 0.5 % ophthalmic solution 1 Drop two (2) times a day.  brimonidine-timolol (COMBIGAN) 0.2-0.5 % drop ophthalmic solution Administer 1 Drop to both eyes every twelve (12) hours.  bisacodyl (DULCOLAX, BISACODYL,) 10 mg suppository Insert  into rectum daily as needed. Indications: constipation      metoprolol succinate (TOPROL-XL) 50 mg XL tablet Take 50 mg by mouth daily.  magnesium hydroxide (MILK OF MAGNESIA CONCENTRATED) 2,400 mg/10 mL susp suspension Take 10 mL by mouth.  alum-mag hydroxide-simeth (MYLANTA) 200-200-20 mg/5 mL susp Take 30 mL by mouth four (4) times daily as needed.  insulin aspart U-100 (NOVOLOG FLEXPEN U-100 INSULIN) 100 unit/mL inpn by SubCUTAneous route. Sliding scale      levothyroxine (SYNTHROID) 50 mcg tablet Take  by mouth Daily (before breakfast).  acetaminophen (TYLENOL) 650 mg TbER Take 650 mg by mouth every six (6) hours as needed for Pain or Fever.  atorvastatin calcium (ATORVASTATIN PO) Take 20 mg by mouth daily.          Wt Readings from Last 3 Encounters:   11/18/20 70.1 kg (154 lb 8 oz)   11/11/20 75.4 kg (166 lb 2 oz)   06/23/20 78 kg (171 lb 15.3 oz)       [unfilled]  Visit Vitals  BP (!) 104/41 (BP 1 Location: Left leg)   Pulse (!) 110   Temp 99 °F (37.2 °C)   Resp 18   Ht 5' 3\" (1.6 m)   Wt 70.1 kg (154 lb 8 oz)   SpO2 98%   BMI 27.37 kg/m²       ASSESSMENT     Ulcer Identification:  Ulcer Type: pressure    Contributing Factors: decreased mobility    Wound Heel Left;Right (Active)   Number of days: 585       Wound Gluteal fold/cleft (Active)   Number of days: 585       Wound Foot Left;Plantar eschar to heel of left foot  06/19/20 (Active)   Number of days: 152       Wound Sacral/coccyx stage 2 decubitous ulcers on buttocks and sacral area 06/19/20 (Active)   Wound Image   11/18/20 1137   Wound Etiology Pressure Stage 2 11/18/20 1137   Dressing Status Other (Comment) 11/18/20 1137   Cleansed Wound cleanser 11/18/20 1137   Dressing/Treatment Other (Comment) 11/18/20 1137   Wound Assessment Pink/red 11/18/20 1137   Drainage Amount Scant 11/18/20 1137   Drainage Description Serous 11/18/20 1137   Wound Odor None 11/18/20 1137   Carol-Wound/Incision Assessment Warm;Blanchable erythema 11/18/20 1137   Edges Attached edges 11/18/20 1137   Wound Thickness Description Partial thickness 11/18/20 1137   Number of days: 152       Wound Abdomen redness with moisture under the folds of the abdomen 06/19/20 (Active)   Number of days: 152       Wound Axilla redness and moisture to bilateral axilla  06/19/20 (Active)   Number of days: 152       Wound Abdomen Right;Quadrant; Upper faint bruise over upper right abdomen above the umbilicus 93/98/63 (Active)   Number of days: 152       Wound Toe (Comment  which one) Left left great toe  (Active)   Number of days: 9          PLAN     Skin Care & Pressure Relief Recommendations  Minimize layers of linen  Pads under patient to optimize support surface  Turn/reposition approximately every 2 hours  Manage incontinence   Promote continence; Skin Protective lotion/cream to buttocks and sacrum daily and as needed with incontinence care    Physician/Provider notified: Yes  Recommendations: keep skin dry and uncovered, turn regularly   Slow blanching areas within entire affected area, important to keep pt turned regularly     Teaching completed with:   [] Patient           [x] Family member       [] Caregiver          [] Nursing  [] Other    Patient/Caregiver Teaching:  Level of patient/caregiver understanding able to:   [x] Indicates understanding       [] Needs reinforcement  [] Unsuccessful      [] Verbal Understanding  [] Demonstrated understanding       [] No evidence of learning  [] Refused teaching         [] N/A       Electronically signed by April L Lathan Mcardle, RN on 11/18/2020 at 11:46 AM

## 2020-11-18 NOTE — PROGRESS NOTES
Patient has a implanted spring or metal device in abdominal area and unable to verify to scan MRI Safely.  [address]

## 2020-11-18 NOTE — PROGRESS NOTES
Daughter called and requested for Neurology consult - will pass on to day shift to inform Hospitalist    0200 Eye drops applied to both eyes, noted some yellowish drainage bilaterally. Carol care done and Mepilex applied to sacral decubitus. Urostomy draining to clear light yellow urine. Colostomy with scant greenish brown pasty stool. Medicated with Morphine once prior to pt care. 0745 Bedside and Verbal shift change report given to Wendy George RN (oncoming nurse) by Mirlande Bautista RN   (offgoing nurse). Report included the following information SBAR, Kardex, Intake/Output, MAR and Recent Results.

## 2020-11-18 NOTE — PROGRESS NOTES
D/C Plan: Return to [facility]        Noted pt/family will have a family meeting today with Palliative Care to discuss goals of care.   CM to continue to follow and assist.    Care Management Interventions  Mode of Transport at Discharge: S  Transition of Care Consult (CM Consult): Discharge Planning, Long Term Care(From [facility])  Health Maintenance Reviewed: Yes  Speech Therapy Consult: Yes  Current Support Network: Family Lives [address] for Transition of Care is Related to the Following Treatment Goals : Gwenetta Fire  The Patient and/or Patient Representative was Provided with a Choice of Provider and Agrees with the Discharge Plan?: Yes  Name of the Patient Representative Who was Provided with a Choice of Provider and Agrees with the Discharge Plan: son/ [address] [facility] List was Provided with Basic Dialogue that Supports the Patient's Individualized Plan of Care/Goals, Treatment Preferences and Shares the Quality Data Associated with the Providers?: Yes  Discharge Location  Discharge Placement: Skilled nursing facility(vs LTC at [facility])

## 2020-11-18 NOTE — PROGRESS NOTES
[address] [facility] [phone]  [facility] [phone]    Palliative family meeting was held outside of patient room with son Meagan Carroll [facility]), daughter Riya [place] and daughter-in law Marvel. Discussed patient's medical conditions and concern for patient's acute AMS. Family would like neurology consult for their assessment and recommendation regarding AMS. They stated unable to make final care decisions without neurology input. They shared their mother has always stated never wanting to live I a state where she wasn't cognitively intact. They stated this was her biggest fear \"losing her mind\". Explored hospice services back at [facility]. Family is leaning toward hospice but awaiting neurology consult before final decisions. Informed Dr. Trish Hemphill who will place consult. Holland Montana and granddaughter Sherrilyn Sacks are flying back to [place] tomorrow morning. She question if they could have one last visit before leaving, knowing this most likely is her last time see mother. Riya Montana became tearful with the thought she won't see her mother again and she could be dying soon. Informed her will ask supervisor for permission for a visit. Spoke with 3S Nurse Manager [address] who approved for holland Montana and granddaughter Irma to visit tomorrow 11/19 @830a. Have called and informed security and bedside RN Sury Treviño to pass down during handoff. No changes to goals of care at this time. Patient remains DNR/DNI and continue current management. Pending Neurology consult. Thank you for the opportunity to assist in the care of Ms. Craig.    CONNIE Saavedra, [facility] HospitalSW-C  Palliative Medicine

## 2020-11-19 ENCOUNTER — HOSPITAL ENCOUNTER (OUTPATIENT)
Dept: INTERVENTIONAL RADIOLOGY/VASCULAR | Age: 80
Discharge: HOME | End: 2020-11-19
Attending: UROLOGY

## 2020-11-19 LAB
ANION GAP SERPL CALC-SCNC: 12 MMOL/L (ref 3–18)
BUN SERPL-MCNC: 6 MG/DL (ref 7–18)
BUN/CREAT SERPL: 9 (ref 12–20)
CALCIUM SERPL-MCNC: 7.2 MG/DL (ref 8.5–10.1)
CHLORIDE SERPL-SCNC: 115 MMOL/L (ref 100–111)
CO2 SERPL-SCNC: 18 MMOL/L (ref 21–32)
COVID-19 RAPID TEST, COVR: NOT DETECTED
CREAT SERPL-MCNC: 0.68 MG/DL (ref 0.6–1.3)
GLUCOSE BLD STRIP.AUTO-MCNC: 122 MG/DL (ref 70–110)
GLUCOSE BLD STRIP.AUTO-MCNC: 129 MG/DL (ref 70–110)
GLUCOSE BLD STRIP.AUTO-MCNC: 164 MG/DL (ref 70–110)
GLUCOSE BLD STRIP.AUTO-MCNC: 171 MG/DL (ref 70–110)
GLUCOSE BLD STRIP.AUTO-MCNC: 184 MG/DL (ref 70–110)
GLUCOSE SERPL-MCNC: 121 MG/DL (ref 74–99)
POTASSIUM SERPL-SCNC: 3.2 MMOL/L (ref 3.5–5.5)
SARS-COV-2, COV2NT: NOT DETECTED
SODIUM SERPL-SCNC: 145 MMOL/L (ref 136–145)
SOURCE, COVRS: NORMAL
SPECIMEN TYPE, XMCV1T: NORMAL

## 2020-11-19 PROCEDURE — 65270000029 HC RM PRIVATE

## 2020-11-19 PROCEDURE — 82962 GLUCOSE BLOOD TEST: CPT

## 2020-11-19 PROCEDURE — 80048 BASIC METABOLIC PNL TOTAL CA: CPT

## 2020-11-19 PROCEDURE — 95816 EEG AWAKE AND DROWSY: CPT | Performed by: PSYCHIATRY & NEUROLOGY

## 2020-11-19 PROCEDURE — 36592 COLLECT BLOOD FROM PICC: CPT

## 2020-11-19 PROCEDURE — 74011250636 HC RX REV CODE- 250/636: Performed by: FAMILY MEDICINE

## 2020-11-19 PROCEDURE — 74011000250 HC RX REV CODE- 250: Performed by: HOSPITALIST

## 2020-11-19 PROCEDURE — 74011250636 HC RX REV CODE- 250/636: Performed by: HOSPITALIST

## 2020-11-19 PROCEDURE — 74011250636 HC RX REV CODE- 250/636: Performed by: INTERNAL MEDICINE

## 2020-11-19 PROCEDURE — 74011000250 HC RX REV CODE- 250: Performed by: INTERNAL MEDICINE

## 2020-11-19 PROCEDURE — 74011636637 HC RX REV CODE- 636/637: Performed by: FAMILY MEDICINE

## 2020-11-19 RX ORDER — LORAZEPAM 0.5 MG/1
0.5 TABLET ORAL
Status: DISCONTINUED | OUTPATIENT
Start: 2020-11-19 | End: 2020-11-21 | Stop reason: HOSPADM

## 2020-11-19 RX ORDER — POTASSIUM CHLORIDE 7.45 MG/ML
10 INJECTION INTRAVENOUS
Status: COMPLETED | OUTPATIENT
Start: 2020-11-19 | End: 2020-11-19

## 2020-11-19 RX ADMIN — INSULIN LISPRO 2 UNITS: 100 INJECTION, SOLUTION INTRAVENOUS; SUBCUTANEOUS at 18:20

## 2020-11-19 RX ADMIN — MORPHINE SULFATE 1 MG: 2 INJECTION, SOLUTION INTRAMUSCULAR; INTRAVENOUS at 06:21

## 2020-11-19 RX ADMIN — POTASSIUM CHLORIDE 10 MEQ: 10 INJECTION, SOLUTION INTRAVENOUS at 16:00

## 2020-11-19 RX ADMIN — NYSTATIN: 100000 POWDER TOPICAL at 01:27

## 2020-11-19 RX ADMIN — MORPHINE SULFATE 1 MG: 2 INJECTION, SOLUTION INTRAMUSCULAR; INTRAVENOUS at 19:27

## 2020-11-19 RX ADMIN — TETRAHYDROZOLINE HCL 2 DROP: 0.05 SOLUTION/ DROPS OPHTHALMIC at 01:27

## 2020-11-19 RX ADMIN — INSULIN LISPRO 2 UNITS: 100 INJECTION, SOLUTION INTRAVENOUS; SUBCUTANEOUS at 23:05

## 2020-11-19 RX ADMIN — POTASSIUM CHLORIDE 10 MEQ: 10 INJECTION, SOLUTION INTRAVENOUS at 14:30

## 2020-11-19 RX ADMIN — NYSTATIN: 100000 OINTMENT TOPICAL at 23:09

## 2020-11-19 RX ADMIN — FAMOTIDINE 20 MG: 10 INJECTION INTRAVENOUS at 09:30

## 2020-11-19 RX ADMIN — NYSTATIN: 100000 POWDER TOPICAL at 14:33

## 2020-11-19 RX ADMIN — NYSTATIN: 100000 OINTMENT TOPICAL at 14:33

## 2020-11-19 RX ADMIN — POTASSIUM CHLORIDE 10 MEQ: 10 INJECTION, SOLUTION INTRAVENOUS at 13:30

## 2020-11-19 RX ADMIN — POTASSIUM CHLORIDE 10 MEQ: 10 INJECTION, SOLUTION INTRAVENOUS at 16:30

## 2020-11-19 RX ADMIN — NYSTATIN: 100000 POWDER TOPICAL at 23:09

## 2020-11-19 RX ADMIN — FAMOTIDINE 20 MG: 10 INJECTION INTRAVENOUS at 23:04

## 2020-11-19 RX ADMIN — NYSTATIN: 100000 POWDER TOPICAL at 16:00

## 2020-11-19 RX ADMIN — WATER 2 G: 1 INJECTION INTRAMUSCULAR; INTRAVENOUS; SUBCUTANEOUS at 15:00

## 2020-11-19 RX ADMIN — Medication 10 ML: at 14:00

## 2020-11-19 RX ADMIN — Medication 10 ML: at 23:08

## 2020-11-19 NOTE — PROGRESS NOTES
Comprehensive Nutrition Assessment    Type and Reason for Visit: Initial, Wound    Nutrition Recommendations/Plan: Diet: advance diet per MD to avoid prolong NPO as it does not meet nutritional needs. Nutrition Assessment:  Hx of cancer, chronic pain, diabetes, legally blind, thyroid disease. Admitted w/ altered mental status, acute encephalopathy, sepsis, UTI, HTN. Estimated Daily Nutrient Needs:  Energy (kcal): 1915; Weight Used for Energy Requirements: Current  Protein (g): (1.2-1.5g/kg); Weight Used for Protein Requirements: Current  Fluid (ml/day): 1915; Method Used for Fluid Requirements: 1 ml/kcal      Nutrition Related Findings:  Lab: 3.2, Ca 7.2, glucose 121. Med: ducolax, humalog, pepcid. +BM 11/18/20-colostomy. NPO since 11/16/20. Wounds:    Pressure injury, Stage II       Current Nutrition Therapies:  DIET NPO    Anthropometric Measures:  · Height:  5' 3\" (160 cm)  · Current Body Wt:  70.1 kg (154 lb 8.7 oz)   · Admission Body Wt:       · Usual Body Wt:  75.4 kg (166 lb 3.6 oz)     · Ideal Body Wt:  115 lbs:  134.4 %   · Adjusted Body Weight:   ; Weight Adjustment for: No adjustment   · Adjusted BMI:       · BMI Category: Overweight (BMI 25.0-29. 9)       Nutrition Diagnosis:   · Inadequate oral intake related to inadequate protein-energy intake as evidenced by NPO or clear liquid status due to medical condition, wounds      Nutrition Interventions:   Food and/or Nutrient Delivery: Start oral diet, Start oral nutrition supplement  Nutrition Education and Counseling: No recommendations at this time  Coordination of Nutrition Care: Continue to monitor while inpatient, Interdisciplinary rounds    Goals:  Advance diet per MD within the next 4-7 days       Nutrition Monitoring and Evaluation:   Behavioral-Environmental Outcomes:    Food/Nutrient Intake Outcomes: Diet advancement/tolerance, Food and nutrient intake, Supplement intake  Physical Signs/Symptoms Outcomes: Biochemical data, Skin, Weight, GI status, Nausea/vomiting    Discharge Planning:     Too soon to determine     Electronically signed by Kimi Zaragoza on 11/19/2020 at 1:48 PM

## 2020-11-19 NOTE — PROGRESS NOTES
NEUROLOGY PROGRESS NOTE        Patient: Laine Neil        Sex: female          DOA: 11/15/2020  YOB: 1940      Age:  [de-identified] y.o.         LOS: 4 days     Identification:  [id number] female presents with acute encephalopathy and UTI. SUBJECTIVE:   No acute event overnight. Brain MRI cannot be performed for a implanted spring or metal device. REVIEW OF SYSTEMS: Unable to obtain    OBJECTIVE:      Visit Vitals  BP (!) 141/46 (BP 1 Location: Left leg, BP Patient Position: At rest)   Pulse 99   Temp 98.3 °F (36.8 °C)   Resp 18   Ht 5' 3\" (1.6 m)   Wt 70.1 kg (154 lb 8 oz)   SpO2 100%   BMI 27.37 kg/m²     Physical Exam:  GEN: Alert, NAD  EYES: conjunctiva normal, lids with out lesions  HEENT: MMM. HEART: RRR +S1 +S2  LUNGS: CTA B/L no rales or rhonchi. ABDOMEN: Soft, non-tender. EXTREMITIES: No edema cyanosis  SKIN: no rashes or skin breakdown, no nodules  NEURO: Arousable to physical stimuli. Saying ' please\", Maryana Marinas are you doing' to my questions. She follows simple commands such as squeezing my hand. No dysarthria. Cranials: Face is symmetric. Unable to assess rest of the cranial nerve. Motor: Normal handgrip bilaterally. She does not move left leg. AKA right lower limb.   Sensory: Light touch grossly normal.. Coordination: Unable to obtain    Current Facility-Administered Medications   Medication Dose Route Frequency    tetrahydrozoline (OPTI-CLEAR) 0.05 % ophthalmic drops 2 Drop  2 Drop Both Eyes QID    nystatin (MYCOSTATIN) 100,000 unit/gram ointment   Topical TID    morphine injection 1 mg  1 mg IntraVENous Q2H PRN    insulin lispro (HUMALOG) injection   SubCUTAneous AC&HS    glucose chewable tablet 16 g  4 Tab Oral PRN    glucagon (GLUCAGEN) injection 1 mg  1 mg IntraMUSCular PRN    dextrose 10% infusion 125-250 mL  125-250 mL IntraVENous PRN    nystatin (MYCOSTATIN) 100,000 unit/gram powder   Topical TID    famotidine (PF) (PEPCID) 20 mg in 0.9% sodium chloride 10 mL injection  20 mg IntraVENous Q12H    cefTRIAXone (ROCEPHIN) 2 g in sterile water (preservative free) 20 mL IV syringe  2 g IntraVENous Q24H    Vancomycin - Rx to dose and monitor  1 Each Other Rx Dosing/Monitoring    sodium chloride (NS) flush 5-10 mL  5-10 mL IntraVENous PRN    sodium chloride (NS) flush 5-10 mL  5-10 mL IntraVENous PRN    0.9% sodium chloride infusion  100 mL/hr IntraVENous CONTINUOUS    sodium chloride (NS) flush 5-40 mL  5-40 mL IntraVENous Q8H    sodium chloride (NS) flush 5-40 mL  5-40 mL IntraVENous PRN    acetaminophen (TYLENOL) tablet 650 mg  650 mg Oral Q6H PRN    Or    acetaminophen (TYLENOL) suppository 650 mg  650 mg Rectal Q6H PRN    bisacodyL (DULCOLAX) tablet 5 mg  5 mg Oral DAILY PRN    ondansetron (ZOFRAN ODT) tablet 4 mg  4 mg Oral Q8H PRN    Or    ondansetron (ZOFRAN) injection 4 mg  4 mg IntraVENous Q6H PRN       Laboratory  Recent Results (from the past 24 hour(s))   GLUCOSE, POC    Collection Time: 11/18/20 12:10 PM   Result Value Ref Range    Glucose (POC) 170 (H) 70 - 110 mg/dL   GLUCOSE, POC    Collection Time: 11/18/20  4:03 PM   Result Value Ref Range    Glucose (POC) 165 (H) 70 - 110 mg/dL   VANCOMYCIN, TROUGH    Collection Time: 11/18/20  8:50 PM   Result Value Ref Range    Vancomycin,trough 32.0 (HH) 10.0 - 20.0 ug/mL    Reported dose date 20201118      Reported dose time: 0928      Reported dose: 750 MG UNITS   METABOLIC PANEL, BASIC    Collection Time: 11/19/20  4:50 AM   Result Value Ref Range    Sodium 145 136 - 145 mmol/L    Potassium 3.2 (L) 3.5 - 5.5 mmol/L    Chloride 115 (H) 100 - 111 mmol/L    CO2 18 (L) 21 - 32 mmol/L    Anion gap 12 3.0 - 18 mmol/L    Glucose 121 (H) 74 - 99 mg/dL    BUN 6 (L) 7.0 - 18 MG/DL    Creatinine 0.68 0.6 - 1.3 MG/DL    BUN/Creatinine ratio 9 (L) 12 - 20      GFR est AA >60 >60 ml/min/1.73m2    GFR est non-AA >60 >60 ml/min/1.73m2    Calcium 7.2 (L) 8.5 - 10.1 MG/DL   GLUCOSE, POC    Collection Time: 11/19/20  8:00 AM Result Value Ref Range    Glucose (POC) 129 (H) 70 - 110 mg/dL       Radiology:  Ct Head Wo Cont    Result Date: 11/17/2020  EXAM: CT head INDICATION: Altered mental status. COMPARISON: CT dated 11/15/2020. TECHNIQUE: Axial CT imaging of the head was performed without intravenous contrast. Coronal and sagittal reconstructions were obtained. Dose reduction: One or more dose reduction techniques were used on this CT: automated exposure control, adjustment of the mAs and/or kVp according to patient's size, and iterative reconstruction techniques. The specific techniques utilized on this CT exam have been documented in the patient's electronic medical record. Digital Imaging and Communications in Medicine (DICOM) format image data are available to nonaffiliated external healthcare facilities or entities on a secure, media free, reciprocally searchable basis with patient authorization for at least a 12-month period after this study. _______________ FINDINGS: BRAIN PARENCHYMA: There is no evidence of acute intracranial hemorrhage, mass effect, midline shift, or herniation. No definite CT evidence of acute cortical infarct is seen. Patchy periventricular white matter low density changes are compatible with areas of chronic ischemia and microinfarcts. VENTRICLES/EXTRA-AXIAL SPACES/MENINGES: The ventricles and sulci are normal for age in their size and configuration with diffuse atrophy. OSSEOUS STRUCTURES: No fracture is seen. PARANASAL SINUSES/MASTOIDS: Visualized paranasal sinuses and mastoid air cells are clear. ORBITS: The visualized orbits are unremarkable. OTHER: None.  _______________     IMPRESSION: Evidence of atrophy and chronic microvascular changes mainly in the deep white matter unchanged since 2 days earlier. No evidence of an acute intracranial process seen. No hemorrhage, definite acute infarct, or mass effect noted.      Ct Head Wo Cont    Result Date: 11/15/2020  EXAM: CT head INDICATION: Altered mental status COMPARISON: No prior imaging available for review. TECHNIQUE: Axial CT imaging of the head was performed without intravenous contrast. Standard multiplanar coronal and sagittal reformatted images were obtained and are included in interpretation. One or more dose reduction techniques were used on this CT: automated exposure control, adjustment of the mAs and/or kVp according to patient size, and iterative reconstruction techniques. The specific techniques used on this CT exam have been documented in the patient's electronic medical record. Digital Imaging and Communications in Medicine (DICOM) format image data are available to nonaffiliated external healthcare facilities or entities on a secure, media free, reciprocally searchable basis with patient authorization for at least a 12-month period after this study. _______________ FINDINGS: BRAIN AND POSTERIOR FOSSA: Cortical and cerebellar volume loss is present, appearing within normal limits for patient age. Ventricular size and configuration is normal. Basilar cisterns appear patent. Subcortical and periventricular white matter low-attenuation noted. There is no intracranial hemorrhage, mass effect, or midline shift. Gray-white matter differentiation appears within normal limits. EXTRA-AXIAL SPACES AND MENINGES: There are no abnormal extra-axial fluid collections. CALVARIUM: Intact. SINUSES: Clear. OTHER: None. _______________     IMPRESSION: 1. No acute intracranial abnormality. 2. Subcortical and periventricular white matter low-attenuation; appearance of which favors sequela of chronic ischemic microvascular change. Ct Abd Pelv Wo Cont    Result Date: 11/4/2020  CLINICAL:  Sepsis. Renal failure COMPARISON: January 16, 2020 TECHNIQUE: Axial CT imaging of the abdomen and pelvis was performed without intravenous contrast. Multiplanar reformats were generated.  One or more dose reduction techniques were used on this CT: automated exposure control, adjustment of the mAs and/or kVp according to patient size, and iterative reconstruction techniques. The specific techniques used on this CT exam have been documented in the patient's electronic medical record. Digital Imaging and Communications in the Medicine (DICOM) format image data are available to nonaffiliated external healthcare facilities or entities on a secure, media free, reciprocally searchable basis with patient authorization for at least a 12 month period after this study. _______________ FINDINGS: LOWER CHEST: Minimal right effusion LIVER, BILIARY: Liver is normal. No biliary dilation. Gallbladder absent PANCREAS: Normal. SPLEEN: Normal. ADRENALS: Normal. KIDNEYS: Left kidney absent. Right kidney demonstrates marked hydronephrosis and proximal hydroureter. A nephrostomy catheter is present, however, this has been pulled back and the locking loop may be embedded in the parenchyma. The mid ureter has been embolized. LYMPH NODES: Retroperitoneal lymph nodes. Retrocaval node measuring 12 mm previously measuring 8 mm. Para-aortic node measuring 10 mm previously measuring 8 mm. GASTROINTESTINAL TRACT: No bowel dilation or wall thickening. Left colostomy. PELVIC ORGANS: Uterus and bladder absent VASCULATURE: Prominent vascular calcifications BONES: Degenerative changes in both hips and the lumbar spine OTHER: None. _______________     IMPRESSION: Right nephrostomy has been pulled back. Locking loop may be embedded in the renal parenchyma. Marked right hydronephrosis. Recommend correlation with nephrostomy output. Recommend nephrostomy exchange, repositioning. Absent left kidney. Prior hysterectomy and cystectomy.  Multiple pole retroperitoneal lymph nodes Mildly enlarged retroperitoneal lymph nodes, mildly increased in size from prior study     Ct Abd Pelv W Cont    Result Date: 11/15/2020  EXAM: CT of the abdomen and pelvis INDICATION: [de-identified]year-old patient with generalized abdominal pain, altered mental status. COMPARISON: CT scan 11/4/2020; nephrostomy exchange images 11/5/2020; more distant prior abdominal/pelvic CT 1/16/2020. TECHNIQUE: Axial CT imaging of the abdomen and pelvis was performed with intravenous contrast. Multiplanar reformats were generated. One or more dose reduction techniques were used on this CT: automated exposure control, adjustment of the mAs and/or kVp according to patient size, and iterative reconstruction techniques. The specific techniques used on this CT exam have been documented in the patient's electronic medical record. Digital Imaging and Communications in Medicine (DICOM) format image data are available to nonaffiliated external healthcare facilities or entities on a secure, media free, reciprocally searchable basis with patient authorization for at least a 12-month period after this study. _______________ FINDINGS: LOWER CHEST: There are small bilateral pleural effusions present with compressive atelectasis present at each lung base. Cardiac size appears within normal limits. No evidence of pericardial effusion. LIVER, BILIARY: Hepatic parenchymal enhancement is uniform. No suspicious appearing hepatic lesion. No biliary dilation. , And are surgically absent. PANCREAS: Normal. SPLEEN: Normal. ADRENALS: Normal. KIDNEYS/URETERS/BLADDER: The left kidney is surgically absent. A percutaneous nephrostomy catheter is present within the right kidney. Locking loop of the catheter is present within the renal pelvis. There is no evidence of hydronephrosis involving the right kidney. Cortical obliteration of the right ureter again noted. Right kidney enhances normally. Small amount of low attenuation noted adjacent to the nephrostomy catheter entry site, unchanged. Urinary bladder is likely surgically absent. PELVIC ORGANS: Uterus is surgically absent. VASCULATURE: Diffuse aortobiiliac atherosclerotic vascular calcification is present without evidence of aneurysmal dilatation.  LYMPH NODES: There are scattered subcentimeter mesenteric and retroperitoneal lymph nodes present without evidence of abdominal or pelvic adenopathy. GASTROINTESTINAL TRACT: No focal bowel wall thickening or dilatation. No morphology of bowel obstruction. Left lower quadrant colostomy. No free intraperitoneal gas. BONES: No acute or aggressive osseous abnormalities identified. Bilateral hip joint osteoarthritis and lower lumbar predominant facet joint osteoarthritis and spondylosis, as previously. OTHER: Diffuse body wall edema appearing slightly increased from prior study. _______________     IMPRESSION: 1. Small bilateral pleural effusions with dependent changes of atelectasis. 2. Right-sided percutaneous nephrostomy catheter in stable position from recent prior placement imaging of 11/5/2020. No hydronephrosis involving the right kidney. > Unchanged coil obliteration right ureter. > Left kidney and urinary bladder surgically absent. 3. No focal bowel wall thickening or dilatation. No morphology of bowel obstruction. Left lower quadrant colostomy. 4. Mild interval increase in previously noted relatively diffuse body wall edema. Xr Chest Port    Result Date: 11/15/2020  EXAM: XR CHEST PORT CLINICAL INDICATION/HISTORY: Altered mental status. -Additional: None COMPARISON: Several prior studies, most recently 11/4/2020. TECHNIQUE: Frontal view of the chest _______________ FINDINGS: HEART AND MEDIASTINUM: Left approach PICC line with tip projecting over the superior cavoatrial junction. Stable appearing cardiac size and mediastinal contours. LUNGS AND PLEURAL SPACES: Interval increase in left retrocardiac density present with likely trace left pleural effusion. Right lung bases clear as visualized. No pneumothorax. Right CP angle clear. BONY THORAX AND SOFT TISSUES: No acute osseous abnormality _______________     IMPRESSION: 1. Left approach PICC line in stable/appropriate position.  2. Interval increased left retrocardiac atelectasis or airspace disease with likely small left pleural effusion. Xr Chest Port    Result Date: 11/4/2020  CLINICAL: Sepsis. Fever COMPARISON: None. A portable view of the chest from 1938 hours: The lungs and pleural spaces are clear. The mediastinum is unremarkable in appearance. IMPRESSION: No acute process. Ir Exchange Nephro Perc Rt Si    Result Date: 11/5/2020  Right percutaneous nephrostomy replaced and repositioned. 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 Dysfunctional nephrostomy with lack of drainage and CT earlier same day demonstrating retraction into the renal parenchyma. Hydronephrosis, lactic acidosis, acute renal failure, and sepsis. TECHNIQUE: Procedure, risks, benefits, and alternatives were discussed with the patient and informed, witnessed consent was obtained. There is bloody fluid in the nephrostomy bag and dry blood in the nephrostomy tube. Dry blood is seen in the nephrostomy dressing and surrounding skin. Patient is on Eliquis twice a day and INR of 2.7. In situ nephrostomy tube and surrounding skin were prepped and draped in usual sterile fashion. Patient is on intravenous Flagyl, meropenem, vancomycin, and Levaquin, therefore, additional preprocedure antibiotics were not indicated. Timeout was observed.  image was obtained. Contrast was injected demonstrating the nephrostomy tube retracted and a short tract leading into the collecting system. In situ nephrostomy tube was removed over wire. Using a Berenstein catheter and Glidewire, tract was navigated and catheter and wire advanced into the upper collecting system. Intraluminal placement confirmed with contrast injection. Over a short Amplatz wire, a [address] APDL nephrostomy was placed and retention loop formed in the renal pelvis. Foul-smelling blood tinged urine was collected and submitted for microbiology. Contrast was injected to document intraluminal position. Contrast was also aspirated to document patency. Catheter was secured to the skin using Percu-Stay. Patient tolerated the procedure well without immediate complications. GUIDANCE: Fluoroscopy guidance was used to position (and confirm the position of) the catheter. Image(s) saved in PACS: Fluoroscopy. Fluoroscopy dose (reference air kerma): 44 mGy. IMPRESSION: 1. In situ nephrostomy retracted outside the collecting system. A new 10 [place] nephrostomy tube repositioned into the renal pelvis. 2. Moderate to severe hydronephrosis with foul-smelling bloody urine. Specimen submitted for laboratory evaluation. Ir Guide Insert Picc Over 5 Yrs    Result Date: 11/12/2020  PROCEDURE:  Ultrasound & Fluoroscopic Guided Left Arm PICC Line Placement CLINICAL INDICATION/HISTORY: Need for long-term IV antibiotics PERFORMED BY: Loren Jacobo PA-C ATTENDING RADIOLOGIST: Denice Alexander MD SUPERVISION: General TECHNIQUE: After explaining the procedure to the patient, including the potential risks, benefits, and alternatives, informed written and verbal consent was obtained. A time out was performed to verify appropriate patient, procedure, and site at 1333 hours. The procedure was performed following standard maximal barrier technique which includes, cap, mask, sterile gown, sterile gloves, sterile full body drape, hand hygiene with alcohol foam, and 2% chlorhexidine for cutaneous antisepsis. Sterile ultrasound gel and a sterile cover for the US probe was used. Ultrasound evaluation for potential access sites was performed. The left basilic vein is patent and normally compresses. A permanent recording was created for the patient record. The patient's upper arm was prepped and draped in sterile fashion and 1% Lidocaine was used for local anesthesia. The vein was accessed in the upper arm with a 21 gauge needle under ultrasound guidance. A guide wire was advanced under fluoroscopic control to the superior vena cava.  The distance between the superior vena cava and skin puncture site was measured and a 5 [place] double lumen power PICC was cut to the appropriate length. Overall catheter length was 43 cm. The PICC was advanced to the SVC under fluoroscopic control. The line was flushed with heparinized saline and adhered to the skin with a Statlock device. Final coned AP view of the chest was obtained to document catheter position. The patient tolerated the procedure well and there were no immediate complications. FINDINGS: Final coned AP view of the chest shows good position of the PICC with its tip in the SVC. Both ports flushed easily and there was good blood return. Radiation dose (reference air kerma):  2 mGy. Fluoroscopy Time: 0.2 minutes. GUIDANCE: Ultrasound and fluoroscopic guidance was used to position (and confirm the position of) the needle and catheter. Image(s) saved in PACS: Ultrasound and fluoroscopy. IMPRESSION: Successful placement of a double lumen power PICC line via the left arm. The PICC line is ready for immediate use. ASSESSMENT/IMPRESSION:   18-year-old female with past medical history of diabetes, right AKA presents with acute encephalopathy. She was found to have sepsis from UTI. 1. Acute encephalopathy: Metabolic encephalopathy secondary to UTI. Symptoms is mildly improved than yesterday. She follow some verbal commands such as squeezing my hand. Unfortunately, MRI cannot be done for stent placement. At least, head CT 11/17 was stable. I will order EEG today. 2. Sepsis  3. UTI  RECOMMENDATIONS:  1. EEG. 2. Please stop morphine and replace it with NSAIDs if possible. 3.   Continue treating sepsis and UTI. Signed:   Silvano Renteria MD  11/19/2020  11:08 AM

## 2020-11-19 NOTE — PROGRESS NOTES
Hospitalist Progress Note    Patient: Deni Blum MRN: [id number]  CSN: [id number]    YOB: 1940  Age: [de-identified] y.o. Sex: female    DOA: 11/15/2020 LOS:  LOS: 3 days                Assessment/Plan     Patient Active Problem List   Diagnosis Code    Pyelonephritis N14    S/p nephrectomy Z90.5    Right flank pain R10.9    Malfunction of nephrostomy tube ([address] T83.098A    Nephrostomy tube displaced ([address] T83.022A    UTI (urinary tract infection) N39.0    Elevated WBC count D72.829    Diabetes ([address] E11.9    Legally blind H54.8    Hypertension I10    Thyroid disease E07.9    Elevated serum creatinine R79.89    S/P colectomy Z90.49    Skin lesion of foot L98.9    Tear of skin of buttock S31.801A    Pressure injury of right heel, unstageable ([facility]) L89.610    Hypomagnesemia E83.42    Hyperkalemia E87.5    Heel ulcer ([address] L97.409    ANTONI (acute kidney injury) ([address] N17.9    S/P AKA (above knee amputation), right ([address] Z89.611    Nephrostomy status ([address] Z93.6    Obstructed nephrostomy tube ([address] T83.092A    Nephrostomy complication (HCC) D06.885    Acute renal failure (ARF) ([facility]) N17.9    Elevated INR R79.1    Bacteremia R78.81    Enterococcus faecalis infection B95.2    CKD stage 3 due to type 2 diabetes mellitus ([facility]) E11.22, N18.30    Hydronephrosis, right N13.30    Severe sepsis with acute organ dysfunction ([facility]) A41.9, R65.20    Anticoagulated Z79.01    AMS (altered mental status) R41.82    Encephalopathy acute G80.41            [de-identified] y/o female on anticoagulation for prior DVT as well as 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 Only responds with \"please\" \"Help\" . Does not follow commands. Encephalopathy -   CT head negative. She has coil in her abdomen. Need info on it. so cannot get MRI. repeat CT head unchanged. Neurology consulted    Pyelonephritis/Bacteremia -  Antibiotics - vancomycin, ceftriaxone. UA with no evidence of UTI  Follow blood and urine cultures. ID following    Nephrostomy tube replaced last admission  Urine clear. DM -   On SSI    HTN -  Controlled    Legally blind    Palliative care following. Disposition : TBD    Review of systems  Unable to obtain      Physical Exam:  General: As above    HEENT: NC, Atraumatic.   anicteric sclerae. Lungs: CTA Bilaterally. No Wheezing/Rhonchi/Rales. Heart:  S1 S2,  No murmur, No Rubs, No Gallops  Abdomen: Soft, Non distended, Non tender.  +Bowel sounds, nephrostomy  in place. Extremities: No edema          Vital signs/Intake and Output:  Visit Vitals  BP (!) 96/43   Pulse 96   Temp 99 °F (37.2 °C)   Resp 18   Ht 5' 3\" (1.6 m)   Wt 70.1 kg (154 lb 8 oz)   SpO2 98%   BMI 27.37 kg/m²     Current Shift:  11/18 1901 - 11/19 0700  In: -   Out: 75 [Urine:75]  Last three shifts:  11/17 0701 - 11/18 1900  In: 1150 [I.V.:1150]  Out: 3400 [Urine:3200]            Labs: Results:       Chemistry Recent Labs     11/18/20 0425 11/17/20  0709 11/16/20  0115   * 139* 135*    141 141   K 3.6 3.3* 3.9   * 114* 114*   CO2 17* 15* 19*   BUN 7 9 13   CREA 0.72 0.69 0.73   CA 7.6* 7.5* 7.5*   AGAP 13 12 8   BUCR 10* 13 18   * 144* 165*   TP 6.0* 5.4* 5.7*   ALB 2.0* 1.8* 1.8*   GLOB 4.0 3.6 3.9   AGRAT 0.5* 0.5* 0.5*      CBC w/Diff Recent Labs     11/18/20 0425 11/17/20  0709 11/16/20  0115   WBC 12.5 12.5 15.4*   RBC 2.86* 2.60* 2.87*   HGB 8.2* 7.4* 8.4*   HCT 25.2* 23.0* 25.5*   * 383 395   GRANS 83* 85* 89*   LYMPH 9* 9* 7*   EOS 2 1 1      Cardiac Enzymes No results for input(s): CPK, CKND1, JANE in the last 72 hours. No lab exists for component: CKRMB, TROIP   Coagulation No results for input(s): PTP, INR, APTT, INREXT, INREXT in the last 72 hours.     Lipid Panel Lab Results Component Value Date/Time    Cholesterol, total 115 08/28/2019 08:22 AM    HDL Cholesterol 39 (L) 08/28/2019 08:22 AM    LDL, calculated 46.2 08/28/2019 08:22 AM    VLDL, calculated 29.8 08/28/2019 08:22 AM    Triglyceride 149 08/28/2019 08:22 AM    CHOL/HDL Ratio 2.9 08/28/2019 08:22 AM      BNP No results for input(s): BNPP in the last 72 hours.    Liver Enzymes Recent Labs     11/18/20  0425   TP 6.0*   ALB 2.0*   *      Thyroid Studies Lab Results   Component Value Date/Time    TSH 3.40 01/24/2020 08:35 AM        Procedures/imaging: see electronic medical records for all procedures/Xrays and details which were not copied into this note but were reviewed prior to creation of Plan

## 2020-11-19 NOTE — PROGRESS NOTES
SLP NOTE -     1130  1300: ST Eval orders received and chart review completed. SLP re-attempting swallow eval. Pt pursing lips to spoon presentations, pushing/swatting therapist hands away and only verbalizing \"please please,\" and \"hello! \". This has been consistent since pt has been admitted. D/w RN, can complete RN swallow screen with water, applesauce and cracker as pt will allow for diet order if patient becomes appropriate and hungry/thirsty and cooperative. SLP will follow up as patient able.     Thank you for this referral.     Sheryl C. [place], M.Ed, [address] [facility]

## 2020-11-19 NOTE — PROGRESS NOTES
Vancomycin - Pharmacy to Dose ( Bloodstream Infection )    Patient currently on Vancomycin 750 mg IV q12hrs. Trough level 32.0 at 20:50 - Supra Therapeutic  Will hold dosing for now and re-check levels. Will re-start when level <20 mcg/ml. Pharmacy will continue to follow and adjust.    Adarsh TIERNEY  [facility]  [phone]

## 2020-11-19 NOTE — PROGRESS NOTES
Pharmacist notified of Vanco trough of 32, was told to hold dose at this time. Pt resting quietly, no moans heard. Pt refused BS check and resisting taking of VS.    0550 Pt care and bath given at this time, linens and gown changed. No dressing to sacral wound - open to air per wound care recommendation. Nystatin powder applied to groin. Pt repeating, \"Please, please, please\", while care done. Urostomy emptied several times by Rena Davenport CNA,     6275 Morphine given for comfort post pt care. Still NPO. Anticipating daughter and grandchild to come at 0830     0800 Bedside and Verbal shift change report given to Chilo Terrell RN (oncoming nurse) by Lionel Bergman RN   (offgoing nurse). Report included the following information SBAR, Kardex, Intake/Output, MAR and Recent Results.

## 2020-11-20 LAB
ANION GAP SERPL CALC-SCNC: 7 MMOL/L (ref 3–18)
BUN SERPL-MCNC: 6 MG/DL (ref 7–18)
BUN/CREAT SERPL: 8 (ref 12–20)
CALCIUM SERPL-MCNC: 7.3 MG/DL (ref 8.5–10.1)
CHLORIDE SERPL-SCNC: 118 MMOL/L (ref 100–111)
CO2 SERPL-SCNC: 20 MMOL/L (ref 21–32)
CREAT SERPL-MCNC: 0.71 MG/DL (ref 0.6–1.3)
GLUCOSE BLD STRIP.AUTO-MCNC: 129 MG/DL (ref 70–110)
GLUCOSE BLD STRIP.AUTO-MCNC: 134 MG/DL (ref 70–110)
GLUCOSE BLD STRIP.AUTO-MCNC: 157 MG/DL (ref 70–110)
GLUCOSE BLD STRIP.AUTO-MCNC: 197 MG/DL (ref 70–110)
GLUCOSE SERPL-MCNC: 109 MG/DL (ref 74–99)
POTASSIUM SERPL-SCNC: 3 MMOL/L (ref 3.5–5.5)
SODIUM SERPL-SCNC: 145 MMOL/L (ref 136–145)
VANCOMYCIN SERPL-MCNC: 22.1 UG/ML (ref 5–40)

## 2020-11-20 PROCEDURE — 82962 GLUCOSE BLOOD TEST: CPT

## 2020-11-20 PROCEDURE — 92610 EVALUATE SWALLOWING FUNCTION: CPT

## 2020-11-20 PROCEDURE — 74011250637 HC RX REV CODE- 250/637: Performed by: FAMILY MEDICINE

## 2020-11-20 PROCEDURE — 74011250636 HC RX REV CODE- 250/636: Performed by: INTERNAL MEDICINE

## 2020-11-20 PROCEDURE — 74011000250 HC RX REV CODE- 250: Performed by: HOSPITALIST

## 2020-11-20 PROCEDURE — 65270000029 HC RM PRIVATE

## 2020-11-20 PROCEDURE — 74011000250 HC RX REV CODE- 250: Performed by: INTERNAL MEDICINE

## 2020-11-20 PROCEDURE — 74011636637 HC RX REV CODE- 636/637: Performed by: FAMILY MEDICINE

## 2020-11-20 PROCEDURE — 74011250636 HC RX REV CODE- 250/636: Performed by: FAMILY MEDICINE

## 2020-11-20 PROCEDURE — 74011250637 HC RX REV CODE- 250/637: Performed by: HOSPITALIST

## 2020-11-20 PROCEDURE — 80048 BASIC METABOLIC PNL TOTAL CA: CPT

## 2020-11-20 PROCEDURE — 80202 ASSAY OF VANCOMYCIN: CPT

## 2020-11-20 PROCEDURE — 74011250636 HC RX REV CODE- 250/636: Performed by: HOSPITALIST

## 2020-11-20 RX ORDER — METOPROLOL SUCCINATE 50 MG/1
50 TABLET, EXTENDED RELEASE ORAL DAILY
Status: DISCONTINUED | OUTPATIENT
Start: 2020-11-20 | End: 2020-11-20

## 2020-11-20 RX ORDER — METOPROLOL SUCCINATE 50 MG/1
50 TABLET, EXTENDED RELEASE ORAL DAILY
Status: DISCONTINUED | OUTPATIENT
Start: 2020-11-20 | End: 2020-11-21 | Stop reason: HOSPADM

## 2020-11-20 RX ORDER — FAMOTIDINE 20 MG/1
20 TABLET, FILM COATED ORAL EVERY 12 HOURS
Status: DISCONTINUED | OUTPATIENT
Start: 2020-11-20 | End: 2020-11-21

## 2020-11-20 RX ADMIN — NYSTATIN: 100000 POWDER TOPICAL at 22:05

## 2020-11-20 RX ADMIN — TETRAHYDROZOLINE HCL 2 DROP: 0.05 SOLUTION/ DROPS OPHTHALMIC at 18:00

## 2020-11-20 RX ADMIN — SODIUM CHLORIDE 100 ML/HR: 900 INJECTION, SOLUTION INTRAVENOUS at 12:31

## 2020-11-20 RX ADMIN — Medication 10 ML: at 22:05

## 2020-11-20 RX ADMIN — Medication 10 ML: at 05:46

## 2020-11-20 RX ADMIN — SODIUM CHLORIDE 100 ML/HR: 900 INJECTION, SOLUTION INTRAVENOUS at 22:13

## 2020-11-20 RX ADMIN — FAMOTIDINE 20 MG: 10 INJECTION INTRAVENOUS at 10:41

## 2020-11-20 RX ADMIN — NYSTATIN: 100000 POWDER TOPICAL at 15:17

## 2020-11-20 RX ADMIN — TETRAHYDROZOLINE HCL 2 DROP: 0.05 SOLUTION/ DROPS OPHTHALMIC at 10:42

## 2020-11-20 RX ADMIN — FAMOTIDINE 20 MG: 20 TABLET, FILM COATED ORAL at 22:03

## 2020-11-20 RX ADMIN — METOPROLOL SUCCINATE 50 MG: 50 TABLET, EXTENDED RELEASE ORAL at 12:30

## 2020-11-20 RX ADMIN — NYSTATIN: 100000 OINTMENT TOPICAL at 10:44

## 2020-11-20 RX ADMIN — INSULIN LISPRO 2 UNITS: 100 INJECTION, SOLUTION INTRAVENOUS; SUBCUTANEOUS at 12:30

## 2020-11-20 RX ADMIN — Medication 10 ML: at 14:00

## 2020-11-20 RX ADMIN — NYSTATIN: 100000 OINTMENT TOPICAL at 15:17

## 2020-11-20 RX ADMIN — Medication 10 ML: at 22:04

## 2020-11-20 RX ADMIN — NYSTATIN: 100000 POWDER TOPICAL at 10:42

## 2020-11-20 RX ADMIN — INSULIN LISPRO 2 UNITS: 100 INJECTION, SOLUTION INTRAVENOUS; SUBCUTANEOUS at 22:04

## 2020-11-20 RX ADMIN — LORAZEPAM 0.5 MG: 0.5 TABLET ORAL at 00:22

## 2020-11-20 RX ADMIN — NYSTATIN: 100000 OINTMENT TOPICAL at 22:05

## 2020-11-20 RX ADMIN — WATER 2 G: 1 INJECTION INTRAMUSCULAR; INTRAVENOUS; SUBCUTANEOUS at 15:12

## 2020-11-20 RX ADMIN — TETRAHYDROZOLINE HCL 2 DROP: 0.05 SOLUTION/ DROPS OPHTHALMIC at 12:32

## 2020-11-20 NOTE — PROGRESS NOTES
Problem: Dysphagia (Adult)  Goal: *Acute Goals and Plan of Care (Insert Text)  Description: Dysphagia Present:   No    Recommendations:  Diet: Regular/thin  Meds: Per patient preference  Aspiration Precautions    Patient will:  1. Participate in training and education related to continued aspiration risk, diet recs and compensatory strategies (goal met). Outcome: Resolved/Met    SPEECH LANGUAGE PATHOLOGY BEDSIDE SWALLOW EVALUATION AND DISCHARGE    Patient: Abdiaziz Pickard (68 y.o. female)  Date: 11/20/2020  Primary Diagnosis: AMS (altered mental status) [R41.82]  UTI (urinary tract infection) [N39.0]        Precautions: Aspiration  PLOF: Independent    ASSESSMENT :  Clinical beside swallow eval completed per MD orders. Pt blind, requests feeding assistance. Pt A&Ox4. Functional communication. Intelligibility >90%. Cognitive-linguistic function appears intact. OM examination revealed oral motor structures functional for mastication and deglutition. Presented with thin liquid, puree, and solid trials. Exhibited mildly delayed mastication of solids with otherwise + bolus cohesion, manipulation and A-P transit. Further exhibited + swallow timing/reflex and hyolaryngeal excursion. Pt able to manipulate and clear with 0 clinical s/s aspiration. Pt safe for regular solid, thin liquid diet with aspiration precautions, feeding assistance. 0 formal ST needs for dysphagia indicated at this time. SLP educated pt on role of speech therapist in current setting with re: speech/swallow; verbalized comprehension. SLP available for re-evaluation if indicated by MD.     Thank you for this referral.   Jose G Elise, SLP     PLAN :  Recommendations and Planned Interventions:  No formal ST needs ID'd for dysphagia. Eval only. Discharge Recommendations: Skilled Nursing Facility     SUBJECTIVE:   Patient stated I want help eating.     OBJECTIVE:     Past Medical History:   Diagnosis Date    Cancer ([facility] [address]     vaginal and colon cancer Cancer ([facility] [address]     Chronic pain     arthritis    Diabetes ([facility] [address]     DV[facility]     Hypertension     Legally blind     Thyroid disease      Past Surgical History:   Procedure Laterality Date    HX ABOVE KNEE AMPUTATION Right     HX COLOSTOMY      HX ORTHOPAEDIC      HX OTHER SURGICAL      kidney drainage tube    HX UROLOGICAL      kidney removal    IR EXCHANGE NEPHRO PERC RT SI  1/28/2019    IR EXCHANGE NEPHRO PERC RT SI  3/21/2019    IR EXCHANGE NEPHRO PERC RT SI  5/13/2019    IR EXCHANGE NEPHRO PERC RT SI  6/27/2019    IR EXCHANGE NEPHRO PERC RT SI  8/16/2019    IR EXCHANGE NEPHRO PERC RT SI  10/3/2019    IR EXCHANGE NEPHRO PERC RT SI  11/18/2019    IR EXCHANGE NEPHRO PERC RT SI  1/6/2020    IR EXCHANGE NEPHRO PERC RT SI  2/24/2020    IR EXCHANGE NEPHRO PERC RT SI  4/13/2020    IR EXCHANGE NEPHRO PERC RT SI  6/1/2020    IR EXCHANGE NEPHRO PERC RT SI  6/19/2020    IR EXCHANGE NEPHRO PERC RT SI  8/10/2020    IR EXCHANGE NEPHRO PERC RT SI  9/30/2020    IR EXCHANGE NEPHRO PERC RT SI  11/5/2020    IR NEPHROSTOMY PERC RT PLC CATH  SI  6/21/2020     Home Situation:      Diet prior to admission: Regular/thin  Current Diet:  Regular/thin     Cognitive and Communication Status:  Neurologic State: Alert  Orientation Level: Oriented X4  Cognition: Follows commands  Perception: Appears intact  Perseveration: No perseveration noted  Safety/Judgement: Awareness of environment, Good awareness of safety precautions  Oral Assessment:  Oral Assessment  Labial: No impairment  Dentition: Natural  Oral Hygiene: Fair  Lingual: No impairment  Velum: No impairment  Mandible: No impairment  P.O. Trials:  Patient Position: HOB 60  Vocal quality prior to P.O.: No impairment  Consistency Presented: Thin liquid;Puree; Solid  How Presented: SLP-fed/presented;Straw     Bolus Acceptance: No impairment  Bolus Formation/Control: Impaired  Type of Impairment: Delayed;Mastication  Propulsion: No impairment  Oral Residue: None  Initiation of Swallow: No impairment  Laryngeal Elevation: Functional  Aspiration Signs/Symptoms: None  Pharyngeal Phase Characteristics: No impairment, issues, or problems   Effective Modifications: Small sips and bites  Cues for Modifications: Minimal       Oral Phase Severity: Minimal  Pharyngeal Phase Severity : No impairment    PAIN:  Pain level pre-treatment: 8/10   Pain level post-treatment: 8/10     After evaluation:   []            Patient left in no apparent distress sitting up in chair  [x]            Patient left in no apparent distress in bed  [x]            Call bell left within reach  []            Nursing notified  []            Family present  []            Caregiver present  []            Bed alarm activated      COMMUNICATION/EDUCATION:   [x]            Aspiration precautions; swallow safety; compensatory techniques. [x]            Patient/family have participated as able in goal setting and plan of care. [x]            Patient/family agree to work toward stated goals and plan of care. []            Patient understands intent and goals of therapy; neutral about participation. []            Patient unable to participate in goal setting/plan of care; educ ongoing with interdisciplinary staff  []         Posted safety precautions in patient's room.     Thank you for this referral.  Ap Alvarez, SLP  Time Calculation: 16 mins

## 2020-11-20 NOTE — PROGRESS NOTES
D/C Plan: [facility] 11/21/2020    Provider has indicated pt will likely be medically stable to transition back to [facility] tomorrow afternoon. CM has requested a 1:00pm transport. CM contacted pt's son, Mayito Ramon ([phone]) and notified him of the above. He is in agreement with transfer back to this facility if medically stable tomorrow at 1:00pm.  CM also reviewed CHRIS (Medicare Important Message). Mr. Jany Mora verbalized an understanding. Anticipate pt will transition back to [facility] tomorrow. [facility] has also been made aware of the above. Transition of Care Plan:     Communication to Patient/Family:  Met with patient and family, and they are agreeable to the transition plan. The Plan for Transition of Care is related to the following treatment goals: SNF/LTC    The Patient and/or patient representative, Mayito Ramon, was provided with a choice of provider and agrees   with the discharge plan. Yes [x] No []    Freedom of choice list was provided with basic dialogue that supports the patient's individualized plan of care/goals and shares the quality data associated with the providers. Yes [x] No []    SNF/Rehab Transition:  Patient has been accepted to [facility] at [address] [place] [address], [address] [place] [address] for SNF and meets criteria for admission. Patient will transported by medical transport and expected to leave tomorrow (11/21/2020) at 1:00pm.    Communication to SNF/Rehab:  Bedside RN,  has been notified to update the transition plan to the facility and call report [phone]. Discharge information has been updated on the AVS and communicated through [facility] or [facility] Link. Discharge instructions to be fax'd to facility at [phone]     Please include all hard scripts for controlled substances, med rec and dc summary, and AVS in packet.  Please medicate for pain prior to dc if possible and needed to help offset delay when patient first arrives to facility. Reviewed and confirmed with facility, [facility] can manage the patient care needs for the following:     [facility] Link with (X) only those applicable:  Medication:  []Medications are available at the facility  []IV Antibiotics    []Controlled Substance  hard copies available sent. []Weekly Labs    Equipment:  []CPAP/BiPAP  []Wound Vacuum  []Wright or Urinary Device  []PICC/Central Line  []Nebulizer  []Ventilator    Treatment:  []Isolation (for MRSA, VRE, etc.)  []Surgical Drain Management  []Tracheostomy Care  []Dressing Changes  []Dialysis with transportation  []PEG Care  []Oxygen  []Daily Weights for Heart Failure    Dietary:  []Any diet limitations  []Tube Feedings   []Total Parenteral Management (TPN)    Financial Resources:  []Medicaid Application Completed    []UAI Completed and copy given to pt/family. [x]A screening has previously been completed. []Level II Completed    [] Private pay individual who will not become   financially eligible for Medicaid within 6 months from admission to a [address] [place] [address] facility. [] Individual refused to have screening conducted. []Medicaid Application Completed    []The screening denied because it was determined individual did not need/did not qualify for nursing facility level of care. [] Out of state residents seeking direct admission to a [address] facility. [] Individuals who are inpatients of an out of state hospital, or in state or out of state veterans/ hospital and seek direct admission to a [address] facility  [] Individuals who are pateints or residents of a state owned/operated facility that is licensed by Department of [employer] ([employer]) and seek direct admission to [address] [place] [address]  [] A screening not required for enrollment in [facility] services as set out in 12 [facility] 01-  [] [facility]) staff shall perform screenings of the [facility] clients.      Advanced Care Plan:  []Surrogate Decision Maker of Care  []POA  []Communicated Code Status and copy sent.     Other:         Care Management Interventions  Mode of Transport at Discharge: BLS  Transition of Care Consult (CM Consult): Discharge Planning, Long Term Care(From [facility])  Health Maintenance Reviewed: Yes  Speech Therapy Consult: Yes  Current Support Network: [facility] [place], [facility] [address] for Transition of Care is Related to the Following Treatment Goals : [facility]  The Patient and/or Patient Representative was Provided with a Choice of Provider and Agrees with the Discharge Plan?: Yes  Name of the Patient Representative Who was Provided with a Choice of Provider and Agrees with the Discharge Plan: gena/ [address] [facility] List was Provided with Basic Dialogue that Supports the Patient's Individualized Plan of Care/Goals, Treatment Preferences and Shares the Quality Data Associated with the Providers?: Yes  Discharge Location  Discharge Placement: Skilled nursing facility(vs LTC at [facility])

## 2020-11-20 NOTE — PROGRESS NOTES
Her mentation is greatly improved. Patient follows commands during EEG. She answered 'Trump' when she was asked 'who is the president'. Current encephalopathy is considered metabolic secondary to sepsis and UTI. Neurology sign off. Please continue treat sepsis and UTI. Please do not hesitate to call for questions.

## 2020-11-20 NOTE — PROGRESS NOTES
Hospitalist Progress Note    Patient: Rashi Vega MRN: [id number]  CSN: [id number]    YOB: 1940  Age: [de-identified] y.o. Sex: female    DOA: 11/15/2020 LOS:  LOS: 5 days                Assessment/Plan     Patient Active Problem List   Diagnosis Code    Pyelonephritis N14    S/p nephrectomy Z90.5    Right flank pain R10.9    Malfunction of nephrostomy tube ([address] T83.098A    Nephrostomy tube displaced ([address] T83.022A    UTI (urinary tract infection) N39.0    Elevated WBC count D72.829    Diabetes ([address] E11.9    Legally blind H54.8    Hypertension I10    Thyroid disease E07.9    Elevated serum creatinine R79.89    S/P colectomy Z90.49    Skin lesion of foot L98.9    Tear of skin of buttock S31.801A    Pressure injury of right heel, unstageable ([facility]) L89.610    Hypomagnesemia E83.42    Hyperkalemia E87.5    Heel ulcer ([address] L97.409    ANTONI (acute kidney injury) ([address] N17.9    S/P AKA (above knee amputation), right ([address] Z89.611    Nephrostomy status ([address] Z93.6    Obstructed nephrostomy tube ([address] T83.092A    Nephrostomy complication ([facility]) L73.509    Acute renal failure (ARF) ([facility]) N17.9    Elevated INR R79.1    Bacteremia R78.81    Enterococcus faecalis infection B95.2    CKD stage 3 due to type 2 diabetes mellitus (HCC) E11.22, N18.30    Hydronephrosis, right N13.30    Severe sepsis with acute organ dysfunction ([facility]) A41.9, R65.20    Anticoagulated Z79.01    AMS (altered mental status) R41.82    Encephalopathy acute G80.41            [de-identified] y/o female on anticoagulation for prior DVT as well as 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 Mental status improved significantly, she is answering questions, following commands.   Back to normal self    Encephalopathy -   CT head negative. She has coil in her abdomen. Need info on it. so cannot get MRI. repeat CT head unchanged. Neurology following  EEG normal, no ictal or interictal findings. Unclear etiology, possible cefepime    Pyelonephritis/Bacteremia -  Antibiotics - vancomycin, ceftriaxone. UA with no evidence of UTI  Follow blood and urine cultures. ID following  One more day of antibiotics, d/c PICC line tomorrow and discharge. Nephrostomy tube replaced last admission  Urine clear. DM -   On SSI    HTN -  Controlled    Legally blind    Palliative care following. Disposition : TBD    Review of systems  General: No fevers or chills. Cardiovascular: No chest pain or pressure. No palpitations. Pulmonary: No shortness of breath. Gastrointestinal: No nausea, vomiting. Physical Exam:  General: As above    HEENT: NC, Atraumatic.   anicteric sclerae. Lungs: CTA Bilaterally. No Wheezing/Rhonchi/Rales. Heart:  S1 S2,  No murmur, No Rubs, No Gallops  Abdomen: Soft, Non distended, Non tender.  +Bowel sounds, nephrostomy  in place.   Extremities: Right AKA          Vital signs/Intake and Output:  Visit Vitals  BP (!) 185/54 (BP 1 Location: Right arm, BP Patient Position: At rest)   Pulse 92   Temp 98.3 °F (36.8 °C)   Resp 18   Ht 5' 3\" (1.6 m)   Wt 69.8 kg (153 lb 14.1 oz)   SpO2 100%   BMI 27.26 kg/m²     Current Shift:  11/20 0701 - 11/20 1900  In: 680 [P.O.:680]  Out: 225 [Urine:225]  Last three shifts:  11/18 1901 - 11/20 0700  In: 3334.7 [I.V.:1232.3]  Out: 1700 [Urine:1200]            Labs: Results:       Chemistry Recent Labs     11/20/20  0532 11/19/20  0450 11/18/20  0425   * 121* 153*    145 143   K 3.0* 3.2* 3.6   * 115* 113*   CO2 20* 18* 17*   BUN 6* 6* 7   CREA 0.71 0.68 0.72   CA 7.3* 7.2* 7.6*   AGAP 7 12 13   BUCR 8* 9* 10*   AP  --   --  136*   TP  --   --  6.0*   ALB  --   --  2.0*   GLOB  --   --  4.0   AGRAT  --   --  0.5*      CBC w/Diff Recent Labs     11/18/20  0421 WBC 12.5   RBC 2.86*   HGB 8.2*   HCT 25.2*   *   GRANS 83*   LYMPH 9*   EOS 2      Cardiac Enzymes No results for input(s): CPK, CKND1, JANE in the last 72 hours. No lab exists for component: CKRMB, TROIP   Coagulation No results for input(s): PTP, INR, APTT, INREXT, INREXT in the last 72 hours. Lipid Panel Lab Results   Component Value Date/Time    Cholesterol, total 115 08/28/2019 08:22 AM    HDL Cholesterol 39 (L) 08/28/2019 08:22 AM    LDL, calculated 46.2 08/28/2019 08:22 AM    VLDL, calculated 29.8 08/28/2019 08:22 AM    Triglyceride 149 08/28/2019 08:22 AM    CHOL/HDL Ratio 2.9 08/28/2019 08:22 AM      BNP No results for input(s): BNPP in the last 72 hours.    Liver Enzymes Recent Labs     11/18/20  0425   TP 6.0*   ALB 2.0*   *      Thyroid Studies Lab Results   Component Value Date/Time    TSH 3.40 01/24/2020 08:35 AM        Procedures/imaging: see electronic medical records for all procedures/Xrays and details which were not copied into this note but were reviewed prior to creation of Plan

## 2020-11-20 NOTE — PROGRESS NOTES
Bedside and Verbal shift change report given to Sven England (oncoming nurse) by Shahla Zhang (offgoing nurse). Report included the following information SBAR, Kardex, Intake/Output and MAR. A/OX4. Patient in bed resting quietly. No complaints at this time. CNA alerted of high blood pressure. Dr. Feliberto Davila paged, he will take a look and put orders in if needed. Patient to be discharged back to [address] [place] [address] tomorrow 11/21/2020. Medical transport set up for 1300 by unit secretary. Shift uneventful  Bedside and Verbal shift change report given to Viviana Christiansen RN (oncoming nurse) by Sven England (offgoing nurse). Report included the following information SBAR, Kardex, Intake/Output and MAR.

## 2020-11-20 NOTE — PROGRESS NOTES
Hospitalist Progress Note    Patient: Shailesh Treviño MRN: [id number]  CSN: [id number]    YOB: 1940  Age: [de-identified] y.o. Sex: female    DOA: 11/15/2020 LOS:  LOS: 4 days                Assessment/Plan     Patient Active Problem List   Diagnosis Code    Pyelonephritis N14    S/p nephrectomy Z90.5    Right flank pain R10.9    Malfunction of nephrostomy tube ([address] T83.098A    Nephrostomy tube displaced ([address] T83.022A    UTI (urinary tract infection) N39.0    Elevated WBC count D72.829    Diabetes ([address] E11.9    Legally blind H54.8    Hypertension I10    Thyroid disease E07.9    Elevated serum creatinine R79.89    S/P colectomy Z90.49    Skin lesion of foot L98.9    Tear of skin of buttock S31.801A    Pressure injury of right heel, unstageable ([facility]) L89.610    Hypomagnesemia E83.42    Hyperkalemia E87.5    Heel ulcer ([address] L97.409    ANTONI (acute kidney injury) ([address] N17.9    S/P AKA (above knee amputation), right ([address] Z89.611    Nephrostomy status ([address] Z93.6    Obstructed nephrostomy tube ([address] T83.092A    Nephrostomy complication (HCC) K82.544    Acute renal failure (ARF) ([facility]) N17.9    Elevated INR R79.1    Bacteremia R78.81    Enterococcus faecalis infection B95.2    CKD stage 3 due to type 2 diabetes mellitus ([facility]) E11.22, N18.30    Hydronephrosis, right N13.30    Severe sepsis with acute organ dysfunction ([facility]) A41.9, R65.20    Anticoagulated Z79.01    AMS (altered mental status) R41.82    Encephalopathy acute G80.41            [de-identified] y/o female on anticoagulation for prior DVT as well as 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 Mental status improved significantly, she is answering questions, following commands. Encephalopathy -   CT head negative.   She has coil in her abdomen. Need info on it. so cannot get MRI. repeat CT head unchanged. Neurology following  For EEG    Pyelonephritis/Bacteremia -  Antibiotics - vancomycin, ceftriaxone. UA with no evidence of UTI  Follow blood and urine cultures. ID following    Nephrostomy tube replaced last admission  Urine clear. DM -   On SSI    HTN -  Controlled    Legally blind    Palliative care following. Disposition : TBD    Review of systems  Unable to obtain      Physical Exam:  General: As above    HEENT: NC, Atraumatic.   anicteric sclerae. Lungs: CTA Bilaterally. No Wheezing/Rhonchi/Rales. Heart:  S1 S2,  No murmur, No Rubs, No Gallops  Abdomen: Soft, Non distended, Non tender.  +Bowel sounds, nephrostomy  in place. Extremities: Right AKA          Vital signs/Intake and Output:  Visit Vitals  BP (!) 144/80 (BP 1 Location: Right arm, BP Patient Position: At rest)   Pulse 100   Temp 97.3 °F (36.3 °C)   Resp 18   Ht 5' 3\" (1.6 m)   Wt 70.1 kg (154 lb 8 oz)   SpO2 100%   BMI 27.37 kg/m²     Current Shift:  No intake/output data recorded. Last three shifts:  11/18 0701 - 11/19 1900  In: 1130 [I.V.:1130]  Out: 800 [Urine:750]            Labs: Results:       Chemistry Recent Labs     11/19/20  0450 11/18/20  0425 11/17/20  0709   * 153* 139*    143 141   K 3.2* 3.6 3.3*   * 113* 114*   CO2 18* 17* 15*   BUN 6* 7 9   CREA 0.68 0.72 0.69   CA 7.2* 7.6* 7.5*   AGAP 12 13 12   BUCR 9* 10* 13   AP  --  136* 144*   TP  --  6.0* 5.4*   ALB  --  2.0* 1.8*   GLOB  --  4.0 3.6   AGRAT  --  0.5* 0.5*      CBC w/Diff Recent Labs     11/18/20  0425 11/17/20  0709   WBC 12.5 12.5   RBC 2.86* 2.60*   HGB 8.2* 7.4*   HCT 25.2* 23.0*   * 383   GRANS 83* 85*   LYMPH 9* 9*   EOS 2 1      Cardiac Enzymes No results for input(s): CPK, CKND1, JANE in the last 72 hours. No lab exists for component: CKRMB, TROIP   Coagulation No results for input(s): PTP, INR, APTT, INREXT, INREXT in the last 72 hours.     Lipid Panel Lab Results   Component Value Date/Time    Cholesterol, total 115 08/28/2019 08:22 AM    HDL Cholesterol 39 (L) 08/28/2019 08:22 AM    LDL, calculated 46.2 08/28/2019 08:22 AM    VLDL, calculated 29.8 08/28/2019 08:22 AM    Triglyceride 149 08/28/2019 08:22 AM    CHOL/HDL Ratio 2.9 08/28/2019 08:22 AM      BNP No results for input(s): BNPP in the last 72 hours.    Liver Enzymes Recent Labs     11/18/20  0425   TP 6.0*   ALB 2.0*   *      Thyroid Studies Lab Results   Component Value Date/Time    TSH 3.40 01/24/2020 08:35 AM        Procedures/imaging: see electronic medical records for all procedures/Xrays and details which were not copied into this note but were reviewed prior to creation of Plan

## 2020-11-20 NOTE — ROUTINE PROCESS
2007 Bedside and Verbal shift change report given to Kemp Gowers RN(oncoming nurse) by Lamin Morocho RN (offgoing nurse). Report included the following information SBAR, Kardex, Intake/Output, MAR and Recent Results. 2304  Pt has a nephrostomy to the right abd patent and draining clear yellow urine with no odor, drsg is CDI. Colostomy to thr left abd draining brown watery stool w/ drsg CDI.     2351 Pt C/O increased anxiety and inability to rest, Dr Corey Interiano notified N.O for Ativan 0.50 mg q 12 hrs first dose to be administered now. 0346  Pt observed resting in bed with chest rise and fall noted and RR greater than 10. Nephrostomy to the right abd patent and draining clear yellow urine with no odor, drsg is CDI. Colostomy to thr left abd draining brown watery stool w/ drsg CDI.     9695 Nephrostomy to the right abd remains patent and draining clear yellow urine with no odor, drsg is CDI. Colostomy to thr left abd draining brown watery stool w/ drsg CDI.     5640 Bedside and Verbal shift change report given to FRIDA Garcia RN (oncoming nurse) by Kemp Gowers RN (offgoing nurse). Report included the following information SBAR, Kardex, Intake/Output, MAR and Recent Results.

## 2020-11-20 NOTE — PROGRESS NOTES
Pharmacy Dosing Services: Vancomycin    Consult for Vancomycin Dosing by Pharmacy by Dr. Kaitlynn Garcia provided for this [de-identified]y.o. year old female , for indication of Bloodstream infection  Day of Therapy 5    Vancomycin random level- 22.1 mcg/ml on 11/20/20. Patient is supratherapeutic. Goal trough- 15-20mcg/ml    Plan: Will hold dose today due to high vancomycin level  Order a random level with AM labs on 11/21. Re-order once level < 20mcg/ml  Pharmacy will follow daily and will make changes to dose and/or frequency based on clinical status. Ht Readings from Last 1 Encounters:   11/19/20 160 cm (63\")        Wt Readings from Last 1 Encounters:   11/19/20 69.8 kg (153 lb 14.1 oz)        Serum Creatinine Lab Results   Component Value Date/Time    Creatinine 0.71 11/20/2020 05:32 AM    Creatinine, POC 1.1 01/16/2020 09:28 AM      Creatinine Clearance Estimated Creatinine Clearance: 59.3 mL/min (based on SCr of 0.71 mg/dL).    BUN Lab Results   Component Value Date/Time    BUN 6 (L) 11/20/2020 05:32 AM    BUN, POC 17 01/16/2020 09:28 AM      WBC Lab Results   Component Value Date/Time    WBC 12.5 11/18/2020 04:25 AM      H/H Lab Results   Component Value Date/Time    HGB 8.2 (L) 11/18/2020 04:25 AM      Platelets Lab Results   Component Value Date/Time    PLATELET 152 (H) 55/48/7066 04:25 AM      Temp 97.4 °F (36.3 °C)       Pharmacist Rea Smith, [address]

## 2020-11-20 NOTE — PROGRESS NOTES
IV to PO Conversion Recommendation     Patient: Kimberly Montalvo   MRN#: [id number]   Admission Date: [id number]     IV TO PO  Medication(s) Famotidine   Oral Meds  Yes   Diet:     Active Orders   Diet    DIET DIABETIC CONSISTENT CARB Regular      TEMP 98.2 °F (36.8 °C)   WBC Lab Results   Component Value Date/Time    WBC 12.5 11/18/2020 04:25 AM           Pharmacy Dosing Services:  Summary:   Does the patient meet all criteria for IV to PO switch as listed below? Yes   If no, list: Is the patient excluded from IV to PO automatic switch based on criteria listed below? No   If yes, list:      Criteria for IV to PO switch - Antibiotics   Received IV therapy for at least 24 hours   2. Functioning GI tract   Taking scheduled oral medications  Tolerating diet more advanced than clear liquids   3. No signs/symptoms of shock  No vasopressor support    Criteria for IV to PO switch - Nonantibiotics   Functioning GI tract   Taking scheduled oral medications  Toleratind duet more advanced than clear liquids  2. No signs/symptoms of shock  No vasopressor support        3. No seizures for >72 hours (antiepileptic medications only)    Exclusion Criteria   Patient is being treated for an infection that requires IV therapy such as: endocarditis, osteomyelitis, meningitis, pancreatitis (antibiotics only)   NG tube with continous suction   Nausea and/or vomiting or severe diarrhea within past 24 hours  Malabsorption syndromes, partial or total gastrectomy, ileus, gastric outlet or bowel obstruction  Active GI bleeding   Significant painful oral ulceration  Unable to swallow  On total parenteral nutrition with a NPO order  NPO  Febrile in last 24 hours (antibiotics only)  Clinically deteriorating or unstable (antibiotics only)      Assessment/Recommendation:    Famotidine 20 mg PO q12h    This IV to PO conversion is based on the [facility] P&T approved automatic conversion policy for eligible patients.       Please call with questions.     Edinson Malik  [id number] [phone]

## 2020-11-20 NOTE — CONSULTS
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  Since having it replaced 11/5, her symptoms have settled with improving fever curve.     Retired CNA     Since I last saw her 11/11, she was sent back to NH for 2wks IV Vanco+Cefepime therapy to complete 2wks treatment of non-SBE E faecalis and M morganii bacteremia 11/23, but evidently has become encephalopathic prompting readmission 11/15.  NOT on vancomycin that I can see --restarted 11/16. CC:  \"are you my doctor? \"    Interval History:  Events last few days reviewed/tracked from afar. Seems to have broken through overnight/last night. Back to her baseline mentation,  Which makes it likely it was toxic/drug (Cefepime) effect. Denies any pain other than stool irritation on her posterior skin. Current Antimicrobials:    Prior Antimicrobials:  1. Vanco IV (11/5-) #12  2. CAX IV (11/7-9; 11/16-) #7 1. Cefepime IV (11/9-16)  2. Meropenem IV (11/4-7)  3. Levo IV (11/4; 11/15-16)       Assessment: Rec / Plan:   Metabolic encephalopathy - cause? Resolved at this point. My money was on the cefepime that is out of her system at this point. Limited community-acquired Enterococcus faecalis/Morganella morganii  sepsis - in conjunction with R nephrotomy tube dysfunction/accidental plugging   Better. ->Cefepime/CAX  +  vanco #13    One more day for both in combination and can stop. No follow up labs/cultures. Pull PICC tomorrow.    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    ANTONI on CKD - improved    R renal calcinosis Microbiology:                11/15 - UA (-)                                                      BCx x2 (-)                                                                        11/8 - BCx (-)                                         11/5 - UA (+) Morganella morganii (R - gent/nitro/tmp-smx), Enterococcus faecalis, and Proteus mirabilis (R - FQ, nitro, and tmp-smx)                                         11/4 - BCx 2/2 (+) Morganella morganii (R - gent/nitro/tmp-smx), Enterococcus faecalis                                            8/11 - UA (+) Proteus mirabilis (R - FQ, nitro, and tmp-smx)        Lines / [facility] PICC        Patient Active Problem List   Diagnosis Code    Pyelonephritis N12    S/p nephrectomy Z90.5    Right flank pain R10.9    Malfunction of nephrostomy tube ([facility] T83.098A    Nephrostomy tube displaced ([facility] T83.022A    UTI (urinary tract infection) N39.0    Elevated WBC count D72.829    Diabetes ([facility]) E11.9    Legally blind H54.8    Hypertension I10    Thyroid disease E07.9    Elevated serum creatinine R79.89    S/P colectomy Z90.49    Skin lesion of foot L98.9    Tear of skin of buttock S31.801A    Pressure injury of right heel, unstageable ([facility]) L89.610    Hypomagnesemia E83.42    Hyperkalemia E87.5    Heel ulcer ([facility]) L97.409    ANTONI (acute kidney injury) ([facility] N17.9    S/P AKA (above knee amputation), right ([facility]) Z89.611    Nephrostomy status ([facility]) Z93.6    Obstructed nephrostomy tube ([facility] T83.092A    Nephrostomy complication ([facility]) O92.679    Acute renal failure (ARF) ([facility]) N17.9    Elevated INR R79.1    Bacteremia R78.81    Enterococcus faecalis infection B95.2    CKD stage 3 due to type 2 diabetes mellitus ([facility]) E11.22, N18.30    Hydronephrosis, right N13.30    Severe sepsis with acute organ dysfunction ([facility]) A41.9, R65.20    Anticoagulated Z79.01    AMS (altered mental status) R41.82    Encephalopathy acute G93.40       Current Facility-Administered Medications   Medication Dose Route Frequency    [START ON 11/21/2020] Vancomycin Random Level due on 11/21/20 with AM labs  1 Each Other ONCE    metoprolol succinate (TOPROL-XL) XL tablet 50 mg  50 mg Oral DAILY    LORazepam (ATIVAN) tablet 0.5 mg  0.5 mg Oral BID PRN    tetrahydrozoline (OPTI-CLEAR) 0.05 % ophthalmic drops 2 Drop  2 Drop Both Eyes QID    nystatin (MYCOSTATIN) 100,000 unit/gram ointment   Topical TID    morphine injection 1 mg  1 mg IntraVENous Q2H PRN    insulin lispro (HUMALOG) injection   SubCUTAneous AC&HS    glucose chewable tablet 16 g  4 Tab Oral PRN    glucagon (GLUCAGEN) injection 1 mg  1 mg IntraMUSCular PRN    dextrose 10% infusion 125-250 mL  125-250 mL IntraVENous PRN    nystatin (MYCOSTATIN) 100,000 unit/gram powder   Topical TID    famotidine (PF) (PEPCID) 20 mg in 0.9% sodium chloride 10 mL injection  20 mg IntraVENous Q12H    cefTRIAXone (ROCEPHIN) 2 g in sterile water (preservative free) 20 mL IV syringe  2 g IntraVENous Q24H    Vancomycin - Rx to dose and monitor  1 Each Other Rx Dosing/Monitoring    sodium chloride (NS) flush 5-10 mL  5-10 mL IntraVENous PRN    sodium chloride (NS) flush 5-10 mL  5-10 mL IntraVENous PRN    0.9% sodium chloride infusion  100 mL/hr IntraVENous CONTINUOUS    sodium chloride (NS) flush 5-40 mL  5-40 mL IntraVENous Q8H    sodium chloride (NS) flush 5-40 mL  5-40 mL IntraVENous PRN    acetaminophen (TYLENOL) tablet 650 mg  650 mg Oral Q6H PRN    Or    acetaminophen (TYLENOL) suppository 650 mg  650 mg Rectal Q6H PRN    bisacodyL (DULCOLAX) tablet 5 mg  5 mg Oral DAILY PRN    ondansetron (ZOFRAN ODT) tablet 4 mg  4 mg Oral Q8H PRN    Or    ondansetron (ZOFRAN) injection 4 mg  4 mg IntraVENous Q6H PRN         Review of Systems - General ROS: negative for - chills, fever or night sweats  Respiratory ROS: no cough, shortness of breath, or wheezing  Cardiovascular ROS: no chest pain or dyspnea on exertion  Gastrointestinal ROS: no abdominal pain, change in bowel habits, or black or bloody stools       Objective:    Visit Vitals  BP (!) 157/55 (BP 1 Location: Right arm, BP Patient Position: At rest)   Pulse 90   Temp 98 °F (36.7 °C)   Resp 18   Ht 5' 3\" (1.6 m)   Wt 69.8 kg (153 lb 14.1 oz)   SpO2 100%   BMI 27.26 kg/m²       Temp (24hrs), Av.1 °F (36.7 °C), Min:97.3 °F (36.3 °C), Max:98.9 °F (37.2 °C)      GEN: Blind WF in NAD - answering/asking questions appropriately  HEENT: blind  CHEST: CTA  CVS:RRR  ABD: NT  EXT: no edema         Lab results:    Chemistry  Recent Labs     20  0532 20  0450 20   * 121* 153*    145 143   K 3.0* 3.2* 3.6   * 115* 113*   CO2 20* 18* 17*   BUN 6* 6* 7   CREA 0.71 0.68 0.72   CA 7.3* 7.2* 7.6*   AGAP 7 12 13   BUCR 8* 9* 10*   AP  --   --  136*   TP  --   --  6.0*   ALB  --   --  2.0*   GLOB  --   --  4.0   AGRAT  --   --  0.5*       CBC w/ Diff  Recent Labs     20   WBC 12.5   RBC 2.86*   HGB 8.2*   HCT 25.2*   *   GRANS 83*   LYMPH 9*   EOS 2       Microbiology  All Micro Results     Procedure Component Value Units Date/Time    CULTURE, BLOOD [[id number]] Collected:  11/15/20 1115    Order Status:  Completed Specimen:  Blood Updated:  20     Special Requests: NO SPECIAL REQUESTS        Culture result: NO GROWTH 5 DAYS       CULTURE, BLOOD [[id number]] Collected:  11/15/20 1135    Order Status:  Completed Specimen:  Blood Updated:  20     Special Requests: NO SPECIAL REQUESTS        Culture result: NO GROWTH 5 DAYS       CULTURE, URINE [[id number]] Collected:  11/15/20 1145    Order Status:  Completed Specimen:  Urine from Clean catch Updated:  20 1038     Special Requests: NO SPECIAL REQUESTS        Culture result: No growth (<1,000 CFU/ML)              Samantha Torres MD  Cell [phone]  [facility]   2020   11:49 AM

## 2020-11-20 NOTE — PROGRESS NOTES
Problem: Falls - Risk of  Goal: *Absence of Falls  Description: Document Lisa Scottie Fall Risk and appropriate interventions in the flowsheet.   Note: Fall Risk Interventions:       Mentation Interventions: Adequate sleep, hydration, pain control, Door open when patient unattended    Medication Interventions: Teach patient to arise slowly    Elimination Interventions: Patient to call for help with toileting needs, Call light in reach

## 2020-11-21 VITALS
WEIGHT: 153.44 LBS | DIASTOLIC BLOOD PRESSURE: 62 MMHG | RESPIRATION RATE: 16 BRPM | HEART RATE: 83 BPM | HEIGHT: 63 IN | OXYGEN SATURATION: 99 % | BODY MASS INDEX: 27.19 KG/M2 | SYSTOLIC BLOOD PRESSURE: 190 MMHG | TEMPERATURE: 99.2 F

## 2020-11-21 LAB
ANION GAP SERPL CALC-SCNC: 7 MMOL/L (ref 3–18)
ANION GAP SERPL CALC-SCNC: 8 MMOL/L (ref 3–18)
BACTERIA SPEC CULT: NORMAL
BACTERIA SPEC CULT: NORMAL
BUN SERPL-MCNC: 5 MG/DL (ref 7–18)
BUN SERPL-MCNC: 5 MG/DL (ref 7–18)
BUN/CREAT SERPL: 6 (ref 12–20)
BUN/CREAT SERPL: 6 (ref 12–20)
CALCIUM SERPL-MCNC: 7.2 MG/DL (ref 8.5–10.1)
CALCIUM SERPL-MCNC: 7.2 MG/DL (ref 8.5–10.1)
CHLORIDE SERPL-SCNC: 118 MMOL/L (ref 100–111)
CHLORIDE SERPL-SCNC: 120 MMOL/L (ref 100–111)
CO2 SERPL-SCNC: 20 MMOL/L (ref 21–32)
CO2 SERPL-SCNC: 20 MMOL/L (ref 21–32)
CREAT SERPL-MCNC: 0.81 MG/DL (ref 0.6–1.3)
CREAT SERPL-MCNC: 0.87 MG/DL (ref 0.6–1.3)
GLUCOSE BLD STRIP.AUTO-MCNC: 143 MG/DL (ref 70–110)
GLUCOSE BLD STRIP.AUTO-MCNC: 230 MG/DL (ref 70–110)
GLUCOSE SERPL-MCNC: 143 MG/DL (ref 74–99)
GLUCOSE SERPL-MCNC: 191 MG/DL (ref 74–99)
MAGNESIUM SERPL-MCNC: 1.1 MG/DL (ref 1.6–2.6)
POTASSIUM SERPL-SCNC: 2.9 MMOL/L (ref 3.5–5.5)
POTASSIUM SERPL-SCNC: 3.5 MMOL/L (ref 3.5–5.5)
SERVICE CMNT-IMP: NORMAL
SERVICE CMNT-IMP: NORMAL
SODIUM SERPL-SCNC: 146 MMOL/L (ref 136–145)
SODIUM SERPL-SCNC: 147 MMOL/L (ref 136–145)
VANCOMYCIN SERPL-MCNC: 19.5 UG/ML (ref 5–40)

## 2020-11-21 PROCEDURE — 80202 ASSAY OF VANCOMYCIN: CPT

## 2020-11-21 PROCEDURE — 74011250636 HC RX REV CODE- 250/636: Performed by: INTERNAL MEDICINE

## 2020-11-21 PROCEDURE — 74011250636 HC RX REV CODE- 250/636: Performed by: FAMILY MEDICINE

## 2020-11-21 PROCEDURE — 74011000250 HC RX REV CODE- 250: Performed by: FAMILY MEDICINE

## 2020-11-21 PROCEDURE — 74011250637 HC RX REV CODE- 250/637: Performed by: HOSPITALIST

## 2020-11-21 PROCEDURE — 74011250637 HC RX REV CODE- 250/637: Performed by: FAMILY MEDICINE

## 2020-11-21 PROCEDURE — 80048 BASIC METABOLIC PNL TOTAL CA: CPT

## 2020-11-21 PROCEDURE — 74011250637 HC RX REV CODE- 250/637: Performed by: INTERNAL MEDICINE

## 2020-11-21 PROCEDURE — 82962 GLUCOSE BLOOD TEST: CPT

## 2020-11-21 PROCEDURE — 83735 ASSAY OF MAGNESIUM: CPT

## 2020-11-21 PROCEDURE — 74011636637 HC RX REV CODE- 636/637: Performed by: FAMILY MEDICINE

## 2020-11-21 RX ORDER — CHOLECALCIFEROL (VITAMIN D3) 125 MCG
5 CAPSULE ORAL
Qty: 30 TAB | Refills: 0 | Status: SHIPPED | OUTPATIENT
Start: 2020-11-21

## 2020-11-21 RX ORDER — AMLODIPINE BESYLATE 5 MG/1
5 TABLET ORAL DAILY
Status: DISCONTINUED | OUTPATIENT
Start: 2020-11-21 | End: 2020-11-21 | Stop reason: HOSPADM

## 2020-11-21 RX ORDER — FAMOTIDINE 10 MG/1
10 TABLET ORAL EVERY 12 HOURS
Qty: 60 TAB | Refills: 0 | Status: SHIPPED | OUTPATIENT
Start: 2020-11-21

## 2020-11-21 RX ORDER — POTASSIUM CHLORIDE 20 MEQ/1
40 TABLET, EXTENDED RELEASE ORAL
Status: DISCONTINUED | OUTPATIENT
Start: 2020-11-21 | End: 2020-11-21 | Stop reason: HOSPADM

## 2020-11-21 RX ORDER — FAMOTIDINE 20 MG/1
10 TABLET, FILM COATED ORAL EVERY 12 HOURS
Status: DISCONTINUED | OUTPATIENT
Start: 2020-11-21 | End: 2020-11-21 | Stop reason: HOSPADM

## 2020-11-21 RX ORDER — MAGNESIUM SULFATE HEPTAHYDRATE 40 MG/ML
4 INJECTION, SOLUTION INTRAVENOUS ONCE
Status: DISCONTINUED | OUTPATIENT
Start: 2020-11-21 | End: 2020-11-21 | Stop reason: HOSPADM

## 2020-11-21 RX ORDER — CHOLECALCIFEROL (VITAMIN D3) 125 MCG
5 CAPSULE ORAL
Status: DISCONTINUED | OUTPATIENT
Start: 2020-11-21 | End: 2020-11-21 | Stop reason: HOSPADM

## 2020-11-21 RX ORDER — AMLODIPINE BESYLATE 5 MG/1
5 TABLET ORAL DAILY
Qty: 30 TAB | Refills: 0 | Status: SHIPPED | OUTPATIENT
Start: 2020-11-22 | End: 2021-12-10

## 2020-11-21 RX ADMIN — NYSTATIN: 100000 POWDER TOPICAL at 10:42

## 2020-11-21 RX ADMIN — NYSTATIN: 100000 OINTMENT TOPICAL at 10:42

## 2020-11-21 RX ADMIN — VANCOMYCIN HYDROCHLORIDE 750 MG: 750 INJECTION, POWDER, LYOPHILIZED, FOR SOLUTION INTRAVENOUS at 10:41

## 2020-11-21 RX ADMIN — AMLODIPINE BESYLATE 5 MG: 5 TABLET ORAL at 10:41

## 2020-11-21 RX ADMIN — METOPROLOL SUCCINATE 50 MG: 50 TABLET, EXTENDED RELEASE ORAL at 07:17

## 2020-11-21 RX ADMIN — Medication 10 ML: at 07:17

## 2020-11-21 RX ADMIN — FAMOTIDINE 20 MG: 20 TABLET, FILM COATED ORAL at 10:41

## 2020-11-21 RX ADMIN — INSULIN LISPRO 4 UNITS: 100 INJECTION, SOLUTION INTRAVENOUS; SUBCUTANEOUS at 11:30

## 2020-11-21 RX ADMIN — POTASSIUM BICARBONATE 40 MEQ: 782 TABLET, EFFERVESCENT ORAL at 07:17

## 2020-11-21 RX ADMIN — ALTEPLASE 1 MG: 2.2 INJECTION, POWDER, LYOPHILIZED, FOR SOLUTION INTRAVENOUS at 04:41

## 2020-11-21 NOTE — PROGRESS NOTES
Pharmacy Dosing Services: famotidine    Pharmacist Renal Dosing Progress Note for Famotidine   Physician     The following medication: Famotidine was automatically dose-adjusted per THE [facility] P&T Committee Protocol, with respect to renal function. Consult provided for this   [de-identified] y.o. , female , for the indication of Gastritis. Pt Weight:   Wt Readings from Last 1 Encounters:   11/21/20 69.6 kg (153 lb 7 oz)         Previous Regimen Famotidine 20 mg PO Q12H   Serum Creatinine Lab Results   Component Value Date/Time    Creatinine 0.87 11/21/2020 11:34 AM    Creatinine, POC 1.1 01/16/2020 09:28 AM       Creatinine Clearance Estimated Creatinine Clearance: 48.3 mL/min (based on SCr of 0.87 mg/dL). BUN Lab Results   Component Value Date/Time    BUN 5 (L) 11/21/2020 11:34 AM    BUN, POC 17 01/16/2020 09:28 AM       Dosage changed to:  Famotidine 10 mg PO Q12H    Pharmacy to continue to monitor patient daily. Will make dosage adjustments based upon changing renal function. Signed Hannah Álvarez.  Contact information: [phone]

## 2020-11-21 NOTE — PROGRESS NOTES
Problem: Falls - Risk of  Goal: *Absence of Falls  Description: Document [place] Fall Risk and appropriate interventions in the flowsheet.   Outcome: Progressing Towards Goal  Note: Fall Risk Interventions:       Mentation Interventions: Adequate sleep, hydration, pain control    Medication Interventions: Teach patient to arise slowly, Patient to call before getting OOB    Elimination Interventions: Call light in reach

## 2020-11-21 NOTE — ROUTINE PROCESS
1932 Bedside and Verbal shift change report given to Phil Almaraz RN (oncoming nurse) by FRIDA Comer RN (offgoing nurse). Report included the following information SBAR, Kardex, Intake/Output, MAR and Recent Results. 0815 Bedside and Verbal shift change report given to Dario Van RN (oncoming nurse) by Phil Almaraz RN (offgoing nurse). Report included the following information SBAR, Kardex, Intake/Output, MAR and Recent Results.

## 2020-11-21 NOTE — PROGRESS NOTES
DC Plan: [facility] today @ [facility]P via medical transport    Number for report [phone]    Please include all hard scripts for controlled substances, med rec and dc summary in packet. Please medicate for pain prior to dc if possible and needed to help offset delay when patient first arrives to facility. Chart reviewed as CM on call. Pt admitted to medical by hospitalist. Pt to dc today. Transport has been changed to 4p, MD Iliana Dodge aware. [facility] called and made aware.     Care Management Interventions  Mode of Transport at Discharge: S  Transition of Care Consult (CM Consult): Discharge Planning, Long Term Care(From [facility])  Health Maintenance Reviewed: Yes  Speech Therapy Consult: Yes  Current Support Network: Family Lives [address] for Transition of Care is Related to the Following Treatment Goals : [facility]  The Patient and/or Patient Representative was Provided with a Choice of Provider and Agrees with the Discharge Plan?: Yes  Name of the Patient Representative Who was Provided with a Choice of Provider and Agrees with the Discharge Plan: son/ [address] [place] [address] List was Provided with Basic Dialogue that Supports the Patient's Individualized Plan of Care/Goals, Treatment Preferences and Shares the Quality Data Associated with the Providers?: Yes  Discharge Location  Discharge Placement: Skilled nursing facility(vs LTC at [facility])

## 2020-11-21 NOTE — DISCHARGE SUMMARY
Discharge Summary    Patient: Bridget Quintana MRN: [id number]  CSN: [id number]    YOB: 1940  Age: [de-identified] y.o.   Sex: female    DOA: 11/15/2020 LOS:  LOS: 6 days   Discharge Date:      Primary Care Provider:  Leticia, Not On File    Admission Diagnoses: AMS (altered mental status) [R41.82]  UTI (urinary tract infection) [N39.0]    Discharge Diagnoses:    Problem List as of 11/21/2020 Date Reviewed: 11/9/2020          Codes Class Noted - Resolved    * (Principal) Encephalopathy acute ICD-10-CM: G93.40  ICD-9-CM: 348.30  11/16/2020 - Present        AMS (altered mental status) ICD-10-CM: R41.82  ICD-9-CM: 780.97  11/15/2020 - Present        Anticoagulated ICD-10-CM: Z79.01  ICD-9-CM: V58.61  11/5/2020 - Present        Hydronephrosis, right ICD-10-CM: N13.30  ICD-9-CM: 872  11/4/2020 - Present        Severe sepsis with acute organ dysfunction ([facility] ICD-10-CM: A41.9, R65.20  ICD-9-CM: 038.9, 995.92  11/4/2020 - Present        Bacteremia ICD-10-CM: R78.81  ICD-9-CM: 790.7  6/24/2020 - Present        Enterococcus faecalis infection ICD-10-CM: B95.2  ICD-9-CM: 041.04  6/24/2020 - Present        CKD stage 3 due to type 2 diabetes mellitus ([facility] ICD-10-CM: E11.22, N18.30  ICD-9-CM: 250.40, 585.3  6/24/2020 - Present        Acute renal failure (ARF) ([facility] ICD-10-CM: N17.9  ICD-9-CM: 584.9  6/21/2020 - Present        Elevated INR ICD-10-CM: R79.1  ICD-9-CM: 790.92  6/21/2020 - Present        Nephrostomy complication ([facility] QTO-67-KL: P69.546  ICD-9-CM: 997.5  6/19/2020 - Present        Obstructed nephrostomy tube ([facility] ICD-10-CM: K00.323T  ICD-9-CM: 997.5  1/16/2020 - Present        S/P AKA (above knee amputation), right ([facility] ICD-10-CM: V42.670  ICD-9-CM: V49.76  6/3/2019 - Present        Nephrostomy status ([facility] ICD-10-CM: Z93.6  ICD-9-CM: V44.6  6/3/2019 - Present        ANTONI (acute kidney injury) ([facility] ICD-10-CM: N17.9  ICD-9-CM: 584.9  5/31/2019 - Present        Hyperkalemia ICD-10-CM: E87.5  ICD-9-CM: 276.7 4/12/2019 - Present        Heel ulcer ([facility] ICD-10-CM: L97.409  ICD-9-CM: 707.14  4/12/2019 - Present        Tear of skin of buttock ICD-10-CM: S31.801A  ICD-9-CM: 877.0  11/28/2018 - Present        Pressure injury of right heel, unstageable ([facility] ICD-10-CM: L89.610  ICD-9-CM: 707.07, 707.25  11/28/2018 - Present        Hypomagnesemia ICD-10-CM: E83.42  ICD-9-CM: 275.2  11/28/2018 - Present        UTI (urinary tract infection) ICD-10-CM: N39.0  ICD-9-CM: 599.0  11/26/2018 - Present        Elevated WBC count ICD-10-CM: D72.829  ICD-9-CM: 288.60  11/26/2018 - Present        Diabetes ([facility] ICD-10-CM: E11.9  ICD-9-CM: 250.00  11/26/2018 - Present        Legally blind ICD-10-CM: H54.8  ICD-9-CM: 369.4  11/26/2018 - Present        Hypertension ICD-10-CM: I10  ICD-9-CM: 401.9  11/26/2018 - Present        Thyroid disease ICD-10-CM: E07.9  ICD-9-CM: 246.9  11/26/2018 - Present        Elevated serum creatinine ICD-10-CM: R79.89  ICD-9-CM: 790.99  11/26/2018 - Present        S/P colectomy ICD-10-CM: Z90.49  ICD-9-CM: V45.89  11/26/2018 - Present        Skin lesion of foot ICD-10-CM: L98.9  ICD-9-CM: 709.9  11/26/2018 - Present        Pyelonephritis ICD-10-CM: N12  ICD-9-CM: 590.80  5/18/2018 - Present        S/p nephrectomy ICD-10-CM: Z90.5  ICD-9-CM: V45.73  5/18/2018 - Present        Right flank pain ICD-10-CM: R10.9  ICD-9-CM: 789.09  5/18/2018 - Present        Malfunction of nephrostomy tube ([facility] ICD-10-CM: S28.400T  ICD-9-CM: 997.5  5/18/2018 - Present        Nephrostomy tube displaced ([facility] ICD-10-CM: J24.787U  ICD-9-CM: 997.5  5/18/2018 - Present              Discharge Medications:     Current Discharge Medication List      START taking these medications    Details   amLODIPine (NORVASC) 5 mg tablet Take 1 Tab by mouth daily. Qty: 30 Tab, Refills: 0      famotidine (PEPCID) 10 mg tablet Take 1 Tab by mouth every twelve (12) hours. Qty: 60 Tab, Refills: 0      melatonin 5 mg tablet Take 1 Tab by mouth nightly.   Qty: 30 Tab, Refills: 0         CONTINUE these medications which have NOT CHANGED    Details   gabapentin (NEURONTIN) 300 mg capsule Take 1 Cap by mouth two (2) times a day. Qty: 2 Cap, Refills: 0    Associated Diagnoses: Right flank pain      LORazepam (ATIVAN) 0.5 mg tablet Take 1 Tab by mouth two (2) times daily as needed for Agitation. Max Daily Amount: 1 mg. Qty: 2 Tab, Refills: 0    Associated Diagnoses: Anxiety      naloxone (NARCAN) 4 mg/actuation nasal spray Use 1 spray intranasally, then discard. Repeat with new spray every 2 min as needed for opioid overdose symptoms, alternating nostrils. Qty: 2 Each, Refills: 0      insulin detemir U-100 (Levemir U-100 Insulin) 100 unit/mL injection 10 Units by SubCUTAneous route nightly. Qty: 1 Vial, Refills: 0      amino acids/protein hydrolys (PRO-STAT PROFILE PO) Take  by mouth. ondansetron hcl (ZOFRAN) 4 mg tablet Take 4 mg by mouth every eight (8) hours as needed for Nausea or Vomiting. diphenhydrAMINE (BENADRYL) 25 mg capsule Take 25 mg by mouth every six (6) hours as needed. pollen extracts (PROSTAT PO) Take  by mouth. tiZANidine (ZANAFLEX) 2 mg capsule Take 2 mg by mouth two (2) times a day. traZODone (DESYREL) 50 mg tablet Take 50 mg by mouth nightly. nystatin (MYCOSTATIN) powder Apply 100,000 Units to affected area four (4) times daily. camphor-menthol (SARNA) 0.5-0.5 % lotion Apply  to affected area daily. hydrocortisone (HYTONE) 2.5 % topical cream Apply  to affected area two (2) times a day. use thin layer      timolol (TIMOPTIC) 0.5 % ophthalmic solution 1 Drop two (2) times a day. brimonidine-timolol (COMBIGAN) 0.2-0.5 % drop ophthalmic solution Administer 1 Drop to both eyes every twelve (12) hours. bisacodyl (DULCOLAX, BISACODYL,) 10 mg suppository Insert  into rectum daily as needed. Indications: constipation      metoprolol succinate (TOPROL-XL) 50 mg XL tablet Take 50 mg by mouth daily.       magnesium hydroxide (MILK OF MAGNESIA CONCENTRATED) 2,400 mg/10 mL susp suspension Take 10 mL by mouth. alum-mag hydroxide-simeth (MYLANTA) 200-200-20 mg/5 mL susp Take 30 mL by mouth four (4) times daily as needed. insulin aspart U-100 (NOVOLOG FLEXPEN U-100 INSULIN) 100 unit/mL inpn by SubCUTAneous route. Sliding scale      levothyroxine (SYNTHROID) 50 mcg tablet Take  by mouth Daily (before breakfast). acetaminophen (TYLENOL) 650 mg TbER Take 650 mg by mouth every six (6) hours as needed for Pain or Fever. atorvastatin calcium (ATORVASTATIN PO) Take 20 mg by mouth daily. STOP taking these medications       cefepime 2 gram 2 g IV syringe Comments:   Reason for Stopping:         vancomycin 1,000 mg 1,000 mg, vial-mate 1 Device IVPB Comments:   Reason for Stopping:         cholecalciferol (VITAMIN D3) 1,000 unit tablet Comments:   Reason for Stopping:         apixaban (ELIQUIS PO) Comments:   Reason for Stopping:               Discharge Condition: Fair    Procedures : none    Consults: Neurology      PHYSICAL EXAM   Visit Vitals  BP (!) 190/62   Pulse 83   Temp 99.2 °F (37.3 °C)   Resp 16   Ht 5' 3\" (1.6 m)   Wt 69.6 kg (153 lb 7 oz)   SpO2 99%   BMI 27.18 kg/m²     General: Awake, cooperative, no acute distress    Right BKA    Admission HPI :   Assessment/Plan   56-year-old female with a history of multiple medical problems including chronic indwelling right nephrostomy tube, colostomy, diabetes, legally blind, hypertension, thyroid disease, status post above-knee amputation, chronic kidney disease.          Hospital Course :   1.  Encephalopathy patient had a CT of the head that was negative neurology was consulted her mental status was thought to be metabolic in nature and MRI was not ordered due to coil in her abdomen but serial CT scan showed no significant change EEG was normal there was no seizure it was thought that her encephalopathy was most likely from cefepime  2.   Pyelonephritis with bacteremia she was on vancomycin and ceftriaxone via PICC she received her last dose in the hospitalization and her PICC line will be discontinued her nephrostomy tube was replaced early part of November  3. Diabetes she was kept on a sliding scale insulin as well as long-acting insulin  4. Hypertension not ideally controlled she was started on amlodipine for blood pressure she was continued on her metoprolol daily  5. History of DVT a duplex of her lower extremity was negative in February she was not placed on Eliquis in the hospitalization is not discharged on Eliquis  6. Legally blind but oriented to person place and time her son has visited and was at bedside and was able to calm her down for the most part    7. Electrolyte disturbance patient had several episodes of hypokalemia and hypomagnesia of these were corrected prior to discharge  Activity: Bedrest    Diet: Diabetic Diet    Follow-up: PCP    Disposition:  nursing home    Minutes spent on discharge: 30 minutes      Labs: Results:       Chemistry Recent Labs     11/21/20  1134 11/21/20  0400 11/20/20  0532   * 143* 109*   * 147* 145   K 3.5 2.9* 3.0*   * 120* 118*   CO2 20* 20* 20*   BUN 5* 5* 6*   CREA 0.87 0.81 0.71   CA 7.2* 7.2* 7.3*   AGAP 8 7 7   BUCR 6* 6* 8*      CBC w/Diff No results for input(s): WBC, RBC, HGB, HCT, PLT, GRANS, LYMPH, EOS, HGBEXT, HCTEXT, PLTEXT in the last 72 hours. Cardiac Enzymes No results for input(s): CPK, CKND1, JANE in the last 72 hours. No lab exists for component: CKRMB, TROIP   Coagulation No results for input(s): PTP, INR, APTT, INREXT in the last 72 hours.     Lipid Panel Lab Results   Component Value Date/Time    Cholesterol, total 115 08/28/2019 08:22 AM    HDL Cholesterol 39 (L) 08/28/2019 08:22 AM    LDL, calculated 46.2 08/28/2019 08:22 AM    VLDL, calculated 29.8 08/28/2019 08:22 AM    Triglyceride 149 08/28/2019 08:22 AM    CHOL/HDL Ratio 2.9 08/28/2019 08:22 AM      BNP No results for input(s): BNPP in the last 72 hours. Liver Enzymes No results for input(s): TP, ALB, TBIL, AP in the last 72 hours. No lab exists for component: SGOT, GPT, DBIL   Thyroid Studies Lab Results   Component Value Date/Time    TSH 3.40 01/24/2020 08:35 AM            Significant Diagnostic Studies: Ct Head Wo Cont    Result Date: 11/17/2020  EXAM: CT head INDICATION: Altered mental status. COMPARISON: CT dated 11/15/2020. TECHNIQUE: Axial CT imaging of the head was performed without intravenous contrast. Coronal and sagittal reconstructions were obtained. Dose reduction: One or more dose reduction techniques were used on this CT: automated exposure control, adjustment of the mAs and/or kVp according to patient's size, and iterative reconstruction techniques. The specific techniques utilized on this CT exam have been documented in the patient's electronic medical record. Digital Imaging and Communications in Medicine (DICOM) format image data are available to nonaffiliated external healthcare facilities or entities on a secure, media free, reciprocally searchable basis with patient authorization for at least a 12-month period after this study. _______________ FINDINGS: BRAIN PARENCHYMA: There is no evidence of acute intracranial hemorrhage, mass effect, midline shift, or herniation. No definite CT evidence of acute cortical infarct is seen. Patchy periventricular white matter low density changes are compatible with areas of chronic ischemia and microinfarcts. VENTRICLES/EXTRA-AXIAL SPACES/MENINGES: The ventricles and sulci are normal for age in their size and configuration with diffuse atrophy. OSSEOUS STRUCTURES: No fracture is seen. PARANASAL SINUSES/MASTOIDS: Visualized paranasal sinuses and mastoid air cells are clear. ORBITS: The visualized orbits are unremarkable.  OTHER: None.  _______________     IMPRESSION: Evidence of atrophy and chronic microvascular changes mainly in the deep white matter unchanged since 2 days earlier. No evidence of an acute intracranial process seen. No hemorrhage, definite acute infarct, or mass effect noted. Ct Head Wo Cont    Result Date: 11/15/2020  EXAM: CT head INDICATION: Altered mental status COMPARISON: No prior imaging available for review. TECHNIQUE: Axial CT imaging of the head was performed without intravenous contrast. Standard multiplanar coronal and sagittal reformatted images were obtained and are included in interpretation. One or more dose reduction techniques were used on this CT: automated exposure control, adjustment of the mAs and/or kVp according to patient size, and iterative reconstruction techniques. The specific techniques used on this CT exam have been documented in the patient's electronic medical record. Digital Imaging and Communications in Medicine (DICOM) format image data are available to nonaffiliated external healthcare facilities or entities on a secure, media free, reciprocally searchable basis with patient authorization for at least a 12-month period after this study. _______________ FINDINGS: BRAIN AND POSTERIOR FOSSA: Cortical and cerebellar volume loss is present, appearing within normal limits for patient age. Ventricular size and configuration is normal. Basilar cisterns appear patent. Subcortical and periventricular white matter low-attenuation noted. There is no intracranial hemorrhage, mass effect, or midline shift. Gray-white matter differentiation appears within normal limits. EXTRA-AXIAL SPACES AND MENINGES: There are no abnormal extra-axial fluid collections. CALVARIUM: Intact. SINUSES: Clear. OTHER: None. _______________     IMPRESSION: 1. No acute intracranial abnormality. 2. Subcortical and periventricular white matter low-attenuation; appearance of which favors sequela of chronic ischemic microvascular change. Ct Abd Pelv Wo Cont    Result Date: 11/4/2020  CLINICAL:  Sepsis.  Renal failure COMPARISON: January 16, 2020 TECHNIQUE: Axial CT imaging of the abdomen and pelvis was performed without intravenous contrast. Multiplanar reformats were generated. One or more dose reduction techniques were used on this CT: automated exposure control, adjustment of the mAs and/or kVp according to patient size, and iterative reconstruction techniques. The specific techniques used on this CT exam have been documented in the patient's electronic medical record. Digital Imaging and Communications in the Medicine (DICOM) format image data are available to nonaffiliated external healthcare facilities or entities on a secure, media free, reciprocally searchable basis with patient authorization for at least a 12 month period after this study. _______________ FINDINGS: LOWER CHEST: Minimal right effusion LIVER, BILIARY: Liver is normal. No biliary dilation. Gallbladder absent PANCREAS: Normal. SPLEEN: Normal. ADRENALS: Normal. KIDNEYS: Left kidney absent. Right kidney demonstrates marked hydronephrosis and proximal hydroureter. A nephrostomy catheter is present, however, this has been pulled back and the locking loop may be embedded in the parenchyma. The mid ureter has been embolized. LYMPH NODES: Retroperitoneal lymph nodes. Retrocaval node measuring 12 mm previously measuring 8 mm. Para-aortic node measuring 10 mm previously measuring 8 mm. GASTROINTESTINAL TRACT: No bowel dilation or wall thickening. Left colostomy. PELVIC ORGANS: Uterus and bladder absent VASCULATURE: Prominent vascular calcifications BONES: Degenerative changes in both hips and the lumbar spine OTHER: None. _______________     IMPRESSION: Right nephrostomy has been pulled back. Locking loop may be embedded in the renal parenchyma. Marked right hydronephrosis. Recommend correlation with nephrostomy output. Recommend nephrostomy exchange, repositioning. Absent left kidney. Prior hysterectomy and cystectomy.  Multiple pole retroperitoneal lymph nodes Mildly enlarged retroperitoneal lymph nodes, mildly increased in size from prior study     Ct Abd Pelv W Cont    Result Date: 11/15/2020  EXAM: CT of the abdomen and pelvis INDICATION: [de-identified]year-old patient with generalized abdominal pain, altered mental status. COMPARISON: CT scan 11/4/2020; nephrostomy exchange images 11/5/2020; more distant prior abdominal/pelvic CT 1/16/2020. TECHNIQUE: Axial CT imaging of the abdomen and pelvis was performed with intravenous contrast. Multiplanar reformats were generated. One or more dose reduction techniques were used on this CT: automated exposure control, adjustment of the mAs and/or kVp according to patient size, and iterative reconstruction techniques. The specific techniques used on this CT exam have been documented in the patient's electronic medical record. Digital Imaging and Communications in Medicine (DICOM) format image data are available to nonaffiliated external healthcare facilities or entities on a secure, media free, reciprocally searchable basis with patient authorization for at least a 12-month period after this study. _______________ FINDINGS: LOWER CHEST: There are small bilateral pleural effusions present with compressive atelectasis present at each lung base. Cardiac size appears within normal limits. No evidence of pericardial effusion. LIVER, BILIARY: Hepatic parenchymal enhancement is uniform. No suspicious appearing hepatic lesion. No biliary dilation. , And are surgically absent. PANCREAS: Normal. SPLEEN: Normal. ADRENALS: Normal. KIDNEYS/URETERS/BLADDER: The left kidney is surgically absent. A percutaneous nephrostomy catheter is present within the right kidney. Locking loop of the catheter is present within the renal pelvis. There is no evidence of hydronephrosis involving the right kidney. Cortical obliteration of the right ureter again noted. Right kidney enhances normally. Small amount of low attenuation noted adjacent to the nephrostomy catheter entry site, unchanged.  Urinary bladder is likely surgically absent. PELVIC ORGANS: Uterus is surgically absent. VASCULATURE: Diffuse aortobiiliac atherosclerotic vascular calcification is present without evidence of aneurysmal dilatation. LYMPH NODES: There are scattered subcentimeter mesenteric and retroperitoneal lymph nodes present without evidence of abdominal or pelvic adenopathy. GASTROINTESTINAL TRACT: No focal bowel wall thickening or dilatation. No morphology of bowel obstruction. Left lower quadrant colostomy. No free intraperitoneal gas. BONES: No acute or aggressive osseous abnormalities identified. Bilateral hip joint osteoarthritis and lower lumbar predominant facet joint osteoarthritis and spondylosis, as previously. OTHER: Diffuse body wall edema appearing slightly increased from prior study. _______________     IMPRESSION: 1. Small bilateral pleural effusions with dependent changes of atelectasis. 2. Right-sided percutaneous nephrostomy catheter in stable position from recent prior placement imaging of 11/5/2020. No hydronephrosis involving the right kidney. > Unchanged coil obliteration right ureter. > Left kidney and urinary bladder surgically absent. 3. No focal bowel wall thickening or dilatation. No morphology of bowel obstruction. Left lower quadrant colostomy. 4. Mild interval increase in previously noted relatively diffuse body wall edema. Xr Chest Port    Result Date: 11/15/2020  EXAM: XR CHEST PORT CLINICAL INDICATION/HISTORY: Altered mental status. -Additional: None COMPARISON: Several prior studies, most recently 11/4/2020. TECHNIQUE: Frontal view of the chest _______________ FINDINGS: HEART AND MEDIASTINUM: Left approach PICC line with tip projecting over the superior cavoatrial junction. Stable appearing cardiac size and mediastinal contours. LUNGS AND PLEURAL SPACES: Interval increase in left retrocardiac density present with likely trace left pleural effusion. Right lung bases clear as visualized.  No pneumothorax. Right CP angle clear. BONY THORAX AND SOFT TISSUES: No acute osseous abnormality _______________     IMPRESSION: 1. Left approach PICC line in stable/appropriate position. 2. Interval increased left retrocardiac atelectasis or airspace disease with likely small left pleural effusion. Xr Chest Port    Result Date: 11/4/2020  CLINICAL: Sepsis. Fever COMPARISON: None. A portable view of the chest from 1938 hours: The lungs and pleural spaces are clear. The mediastinum is unremarkable in appearance. IMPRESSION: No acute process. Ir Exchange Nephro Perc Rt Si    Result Date: 11/5/2020  Right percutaneous nephrostomy replaced and repositioned. 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 Dysfunctional nephrostomy with lack of drainage and CT earlier same day demonstrating retraction into the renal parenchyma. Hydronephrosis, lactic acidosis, acute renal failure, and sepsis. TECHNIQUE: Procedure, risks, benefits, and alternatives were discussed with the patient and informed, witnessed consent was obtained. There is bloody fluid in the nephrostomy bag and dry blood in the nephrostomy tube. Dry blood is seen in the nephrostomy dressing and surrounding skin. Patient is on Eliquis twice a day and INR of 2.7. In situ nephrostomy tube and surrounding skin were prepped and draped in usual sterile fashion. Patient is on intravenous Flagyl, meropenem, vancomycin, and Levaquin, therefore, additional preprocedure antibiotics were not indicated. Timeout was observed.  image was obtained. Contrast was injected demonstrating the nephrostomy tube retracted and a short tract leading into the collecting system. In situ nephrostomy tube was removed over wire. Using a Berenstein catheter and Glidewire, tract was navigated and catheter and wire advanced into the upper collecting system. Intraluminal placement confirmed with contrast injection.  Over a short Amplatz wire, a new 10 [place] APDL nephrostomy was placed and retention loop formed in the renal pelvis. Foul-smelling blood tinged urine was collected and submitted for microbiology. Contrast was injected to document intraluminal position. Contrast was also aspirated to document patency. Catheter was secured to the skin using Percu-Stay. Patient tolerated the procedure well without immediate complications. GUIDANCE: Fluoroscopy guidance was used to position (and confirm the position of) the catheter. Image(s) saved in PACS: Fluoroscopy. Fluoroscopy dose (reference air kerma): 44 mGy. IMPRESSION: 1. In situ nephrostomy retracted outside the collecting system. A new 10 [place] nephrostomy tube repositioned into the renal pelvis. 2. Moderate to severe hydronephrosis with foul-smelling bloody urine. Specimen submitted for laboratory evaluation. Ir Guide Insert Picc Over 5 Yrs    Result Date: 11/12/2020  PROCEDURE:  Ultrasound & Fluoroscopic Guided Left Arm PICC Line Placement CLINICAL INDICATION/HISTORY: Need for long-term IV antibiotics PERFORMED BY: Goyo Mendiola PA-C ATTENDING RADIOLOGIST: Tuan Shearer MD SUPERVISION: General TECHNIQUE: After explaining the procedure to the patient, including the potential risks, benefits, and alternatives, informed written and verbal consent was obtained. A time out was performed to verify appropriate patient, procedure, and site at 1333 hours. The procedure was performed following standard maximal barrier technique which includes, cap, mask, sterile gown, sterile gloves, sterile full body drape, hand hygiene with alcohol foam, and 2% chlorhexidine for cutaneous antisepsis. Sterile ultrasound gel and a sterile cover for the US probe was used. Ultrasound evaluation for potential access sites was performed. The left basilic vein is patent and normally compresses. A permanent recording was created for the patient record.   The patient's upper arm was prepped and draped in sterile fashion and 1% Lidocaine was used for local anesthesia. The vein was accessed in the upper arm with a 21 gauge needle under ultrasound guidance. A guide wire was advanced under fluoroscopic control to the superior vena cava. The distance between the superior vena cava and skin puncture site was measured and a 5 [place] double lumen power PICC was cut to the appropriate length. Overall catheter length was 43 cm. The PICC was advanced to the SVC under fluoroscopic control. The line was flushed with heparinized saline and adhered to the skin with a Statlock device. Final coned AP view of the chest was obtained to document catheter position. The patient tolerated the procedure well and there were no immediate complications. FINDINGS: Final coned AP view of the chest shows good position of the PICC with its tip in the SVC. Both ports flushed easily and there was good blood return. Radiation dose (reference air kerma):  2 mGy. Fluoroscopy Time: 0.2 minutes. GUIDANCE: Ultrasound and fluoroscopic guidance was used to position (and confirm the position of) the needle and catheter. Image(s) saved in PACS: Ultrasound and fluoroscopy. IMPRESSION: Successful placement of a double lumen power PICC line via the left arm. The PICC line is ready for immediate use. No results found for this or any previous visit.         CC: Bshsi, Not On File

## 2020-11-21 NOTE — DISCHARGE INSTRUCTIONS
Patient Education        Altered Mental Status: Care Instructions  Your Care Instructions     Altered mental status is a change in how well your brain is working. As a result, you may be confused, be less alert than usual, or act in odd ways. This may include seeing or hearing things that aren't really there (hallucinations). A mental status change has many possible causes. For example, it may be the result of an infection, an imbalance of chemicals in the body, or a chronic disease such as diabetes or COPD. It can also be caused by things such as a head injury, taking certain medicines, or using alcohol or drugs. The doctor may do tests to look for the cause. These tests may include urine tests, blood tests, and imaging tests such as a CT scan. Sometimes a clear cause isn't found. But tests can help the doctor rule out a serious cause of your symptoms. A change in mental status can be scary. But mental status will often return to normal when the cause is treated. So it is important to get any follow-up testing or treatment the doctor has suggested. The doctor has checked you carefully, but problems can develop later. If you notice any problems or new symptoms, get medical treatment right away. Follow-up care is a key part of your treatment and safety. Be sure to make and go to all appointments, and call your doctor if you are having problems. It's also a good idea to know your test results and keep a list of the medicines you take. How can you care for yourself at home? · Be safe with medicines. Take your medicines exactly as prescribed. Call your doctor if you think you are having a problem with your medicine. · Have another adult stay with you until you are better. This can help keep you safe. Ask that person to watch for signs that your mental status is getting worse. When should you call for help? Call 911 anytime you think you may need emergency care.  For example, call if:    · You passed out (lost consciousness). Call your doctor now or seek immediate medical care if:    · Your mental status is getting worse.     · You have new symptoms, such as a fever, chills, or shortness of breath.     · You do not feel safe. Watch closely for changes in your health, and be sure to contact your doctor if:    · You do not get better as expected. Where can you learn more? Go to http://www.[employer].com/  Enter J452 in the search box to learn more about \"Altered Mental Status: Care Instructions. \"  Current as of: November 20, 2019               Content Version: 12.6  © 1037-7998 [employer]. Care instructions adapted under license by [employer] (which disclaims liability or warranty for this information). If you have questions about a medical condition or this instruction, always ask your healthcare professional. [address] any warranty or liability for your use of this information. Patient Education        Urinary Tract Infection in Women: Care Instructions  Your Care Instructions     A urinary tract infection, or UTI, is a general term for an infection anywhere between the kidneys and the urethra (where urine comes out). Most UTIs are bladder infections. They often cause pain or burning when you urinate. UTIs are caused by bacteria and can be cured with antibiotics. Be sure to complete your treatment so that the infection goes away. Follow-up care is a key part of your treatment and safety. Be sure to make and go to all appointments, and call your doctor if you are having problems. It's also a good idea to know your test results and keep a list of the medicines you take. How can you care for yourself at home? · Take your antibiotics as directed. Do not stop taking them just because you feel better. You need to take the full course of antibiotics. · Drink extra water and other fluids for the next day or two.  This may help wash out the bacteria that are causing the infection. (If you have kidney, heart, or liver disease and have to limit fluids, talk with your doctor before you increase your fluid intake.)  · Avoid drinks that are carbonated or have caffeine. They can irritate the bladder. · Urinate often. Try to empty your bladder each time. · To relieve pain, take a hot bath or lay a heating pad set on low over your lower belly or genital area. Never go to sleep with a heating pad in place. To prevent UTIs  · Drink plenty of water each day. This helps you urinate often, which clears bacteria from your system. (If you have kidney, heart, or liver disease and have to limit fluids, talk with your doctor before you increase your fluid intake.)  · Urinate when you need to. · Urinate right after you have sex. · Change sanitary pads often. · Avoid douches, bubble baths, feminine hygiene sprays, and other feminine hygiene products that have deodorants. · After going to the bathroom, wipe from front to back. When should you call for help? Call your doctor now or seek immediate medical care if:    · Symptoms such as fever, chills, nausea, or vomiting get worse or appear for the first time.     · You have new pain in your back just below your rib cage. This is called flank pain.     · There is new blood or pus in your urine.     · You have any problems with your antibiotic medicine. Watch closely for changes in your health, and be sure to contact your doctor if:    · You are not getting better after taking an antibiotic for 2 days.     · Your symptoms go away but then come back. Where can you learn more? Go to http://www.gray.com/  Enter V195 in the search box to learn more about \"Urinary Tract Infection in Women: Care Instructions. \"  Current as of: June 29, 2020               Content Version: 12.6  © 4411-4623 [employer].    Care instructions adapted under license by [employer] (which disclaims liability or warranty for this information). If you have questions about a medical condition or this instruction, always ask your healthcare professional. [address] any warranty or liability for your use of this information.

## 2020-11-21 NOTE — PROGRESS NOTES
Pharmacy Dosing Services: Vancomycin    Consult for Vancomycin Dosing by Pharmacy by Dr. Andrey Steve provided for this [de-identified]y.o. year old female , for indication of Bloodstream infection. Vancomyin random level- 19.5mcg/ml on 11/21/20. Will restart dose at 750mg IV q24h  Goal trough- 15-20mcg/ml    Ht Readings from Last 1 Encounters:   11/19/20 160 cm (63\")        Wt Readings from Last 1 Encounters:   11/19/20 69.8 kg (153 lb 14.1 oz)        Previous Regimen 750mg IV q12h   Last Level 19.5mcg/ml   Serum Creatinine Lab Results   Component Value Date/Time    Creatinine 0.81 11/21/2020 04:00 AM    Creatinine, POC 1.1 01/16/2020 09:28 AM      Creatinine Clearance Estimated Creatinine Clearance: 51.9 mL/min (based on SCr of 0.81 mg/dL).    BUN Lab Results   Component Value Date/Time    BUN 5 (L) 11/21/2020 04:00 AM    BUN, POC 17 01/16/2020 09:28 AM      WBC Lab Results   Component Value Date/Time    WBC 12.5 11/18/2020 04:25 AM      H/H Lab Results   Component Value Date/Time    HGB 8.2 (L) 11/18/2020 04:25 AM      Platelets Lab Results   Component Value Date/Time    PLATELET 869 (H) 69/17/0694 04:25 AM      Temp 98.3 °F (36.8 °C)     Pharmacist Awa Shows, [address]

## 2020-11-21 NOTE — PROGRESS NOTES
Report called to Albina at [facility] admissions. TRANSFER - OUT REPORT:    Verbal report given to Albina (name) on Roni Mo  being transferred to [address] [place] [address](unit) for routine progression of care       Report consisted of patients Situation, Background, Assessment and   Recommendations(SBAR). Information from the following report(s) SBAR, Kardex, Procedure Summary and Recent Results was reviewed with the receiving nurse. Opportunity for questions and clarification was provided.       Patient transported with:   [facility] [facility]

## 2020-11-24 ENCOUNTER — HOSPITAL ENCOUNTER (OUTPATIENT)
Dept: LAB | Age: 80
Discharge: HOME | End: 2020-11-24

## 2020-11-24 PROCEDURE — 87635 SARS-COV-2 COVID-19 AMP PRB: CPT

## 2020-11-25 LAB — SARS-COV-2, COV2NT: NOT DETECTED

## 2020-12-01 ENCOUNTER — HOSPITAL ENCOUNTER (OUTPATIENT)
Dept: LAB | Age: 80
Discharge: HOME | End: 2020-12-01

## 2020-12-01 PROCEDURE — 87635 SARS-COV-2 COVID-19 AMP PRB: CPT

## 2020-12-02 LAB — SARS-COV-2, COV2NT: NOT DETECTED

## 2020-12-08 ENCOUNTER — HOSPITAL ENCOUNTER (OUTPATIENT)
Dept: LAB | Age: 80
Discharge: HOME | End: 2020-12-08

## 2020-12-08 PROCEDURE — 87635 SARS-COV-2 COVID-19 AMP PRB: CPT

## 2020-12-09 LAB — SARS-COV-2, COV2NT: NOT DETECTED

## 2020-12-10 NOTE — PROGRESS NOTES
Physician Progress Note      PATIENT:               Ivy Singleton  CSN #:                  [id number]  :                       1940  ADMIT DATE:       11/15/2020 11:17 AM  DISCH DATE:        2020 3:56 PM  RESPONDING  PROVIDER #:        Michelle Bean MD          QUERY TEXT:    Pt admitted with UTI. Pt noted to have a nephrostomy tube in place which was recently replaced. 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 On Vanco and Cefepime x 2weeks. Repeat UC on 11/15 :No growth  ID consult states: Limited community-acquired Enterococcus faecalis/Morganella morganii  sepsis - in conjunction with R nephrotomy tube dysfunction/accidental plugging. 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     Query created by: Nury Carballo on 2020 8:49 AM      Electronically signed by:  Michelle Bean MD 12/10/2020 4:56 PM

## 2020-12-15 ENCOUNTER — HOSPITAL ENCOUNTER (OUTPATIENT)
Dept: LAB | Age: 80
Discharge: HOME | End: 2020-12-15

## 2020-12-15 PROCEDURE — 87635 SARS-COV-2 COVID-19 AMP PRB: CPT

## 2020-12-16 ENCOUNTER — HOSPITAL ENCOUNTER (OUTPATIENT)
Dept: INTERVENTIONAL RADIOLOGY/VASCULAR | Age: 80
Discharge: HOME | End: 2020-12-16
Attending: UROLOGY
Payer: MEDICARE

## 2020-12-16 DIAGNOSIS — N13.39 OTHER HYDRONEPHROSIS: ICD-10-CM

## 2020-12-16 LAB — SARS-COV-2, COV2NT: NOT DETECTED

## 2020-12-16 PROCEDURE — 74011000636 HC RX REV CODE- 636: Performed by: RADIOLOGY

## 2020-12-16 PROCEDURE — 74011000636 HC RX REV CODE- 636: Performed by: UROLOGY

## 2020-12-16 PROCEDURE — 74011250636 HC RX REV CODE- 250/636: Performed by: RADIOLOGY

## 2020-12-16 PROCEDURE — 50435 EXCHANGE NEPHROSTOMY CATH: CPT

## 2020-12-16 RX ORDER — SODIUM CHLORIDE 9 MG/ML
500 INJECTION, SOLUTION INTRAVENOUS
Status: COMPLETED | OUTPATIENT
Start: 2020-12-16 | End: 2020-12-16

## 2020-12-16 RX ORDER — SODIUM CHLORIDE 9 MG/ML
1-20 INJECTION INTRAMUSCULAR; INTRAVENOUS; SUBCUTANEOUS
Status: DISCONTINUED | OUTPATIENT
Start: 2020-12-16 | End: 2020-12-16

## 2020-12-16 RX ADMIN — IOPAMIDOL 14 ML: 612 INJECTION, SOLUTION INTRAVENOUS at 13:13

## 2020-12-16 RX ADMIN — SODIUM CHLORIDE 500 ML: 900 INJECTION, SOLUTION INTRAVENOUS at 13:01

## 2020-12-22 ENCOUNTER — HOSPITAL ENCOUNTER (OUTPATIENT)
Dept: LAB | Age: 80
Discharge: HOME | End: 2020-12-22

## 2020-12-22 PROCEDURE — 87635 SARS-COV-2 COVID-19 AMP PRB: CPT

## 2020-12-24 LAB — SARS-COV-2, COV2NT: NOT DETECTED

## 2020-12-29 ENCOUNTER — HOSPITAL ENCOUNTER (OUTPATIENT)
Dept: LAB | Age: 80
Discharge: HOME | End: 2020-12-29

## 2020-12-29 PROCEDURE — 87635 SARS-COV-2 COVID-19 AMP PRB: CPT

## 2020-12-30 LAB — SARS-COV-2, COV2NT: NOT DETECTED

## 2021-01-05 ENCOUNTER — HOSPITAL ENCOUNTER (OUTPATIENT)
Dept: LAB | Age: 81
Discharge: HOME | End: 2021-01-05

## 2021-01-05 PROCEDURE — 87635 SARS-COV-2 COVID-19 AMP PRB: CPT

## 2021-01-07 LAB — SARS-COV-2, COV2NT: NOT DETECTED

## 2021-01-12 ENCOUNTER — HOSPITAL ENCOUNTER (OUTPATIENT)
Dept: LAB | Age: 81
Discharge: HOME | End: 2021-01-12

## 2021-01-12 PROCEDURE — 87635 SARS-COV-2 COVID-19 AMP PRB: CPT

## 2021-01-13 LAB — SARS-COV-2, COV2NT: NOT DETECTED

## 2021-01-19 ENCOUNTER — HOSPITAL ENCOUNTER (OUTPATIENT)
Dept: LAB | Age: 81
Discharge: HOME | End: 2021-01-19

## 2021-01-19 PROCEDURE — U0003 INFECTIOUS AGENT DETECTION BY NUCLEIC ACID (DNA OR RNA); SEVERE ACUTE RESPIRATORY SYNDROME CORONAVIRUS 2 (SARS-COV-2) (CORONAVIRUS DISEASE [COVID-19]), AMPLIFIED PROBE TECHNIQUE, MAKING USE OF HIGH THROUGHPUT TECHNOLOGIES AS DESCRIBED BY CMS-2020-01-R: HCPCS

## 2021-01-19 NOTE — PROGRESS NOTES
Pt scheduled for right neph tube exchange on 2/1/21 w/arrival time to [facility] rad waiting room at [facility]  for scheduled procedure to occur at 0900. Pt's son, Elma iraj Hein. Nam Jones at [facility], [place], operated by [facility] aware.

## 2021-01-20 LAB — SARS-COV-2, COV2NT: NOT DETECTED

## 2021-01-26 ENCOUNTER — HOSPITAL ENCOUNTER (OUTPATIENT)
Dept: LAB | Age: 81
Discharge: HOME | End: 2021-01-26

## 2021-01-26 PROCEDURE — U0003 INFECTIOUS AGENT DETECTION BY NUCLEIC ACID (DNA OR RNA); SEVERE ACUTE RESPIRATORY SYNDROME CORONAVIRUS 2 (SARS-COV-2) (CORONAVIRUS DISEASE [COVID-19]), AMPLIFIED PROBE TECHNIQUE, MAKING USE OF HIGH THROUGHPUT TECHNOLOGIES AS DESCRIBED BY CMS-2020-01-R: HCPCS

## 2021-01-27 LAB — SARS-COV-2, COV2NT: NOT DETECTED

## 2021-02-01 ENCOUNTER — HOSPITAL ENCOUNTER (OUTPATIENT)
Dept: INTERVENTIONAL RADIOLOGY/VASCULAR | Age: 81
Discharge: HOME | End: 2021-02-01
Attending: UROLOGY
Payer: MEDICARE

## 2021-02-01 DIAGNOSIS — N13.30 HYDRONEPHROSIS: ICD-10-CM

## 2021-02-01 PROCEDURE — 77030005208 IR EXCHANGE NEPHRO PERC RT SI

## 2021-02-01 PROCEDURE — 74011000636 HC RX REV CODE- 636: Performed by: UROLOGY

## 2021-02-01 RX ORDER — SODIUM CHLORIDE 9 MG/ML
500 INJECTION, SOLUTION INTRAVENOUS
Status: DISCONTINUED | OUTPATIENT
Start: 2021-02-01 | End: 2021-02-01 | Stop reason: CLARIF

## 2021-02-01 RX ADMIN — IOPAMIDOL 10 ML: 612 INJECTION, SOLUTION INTRAVENOUS at 09:28

## 2021-02-02 ENCOUNTER — HOSPITAL ENCOUNTER (OUTPATIENT)
Dept: LAB | Age: 81
Discharge: HOME | End: 2021-02-02

## 2021-02-02 PROCEDURE — U0003 INFECTIOUS AGENT DETECTION BY NUCLEIC ACID (DNA OR RNA); SEVERE ACUTE RESPIRATORY SYNDROME CORONAVIRUS 2 (SARS-COV-2) (CORONAVIRUS DISEASE [COVID-19]), AMPLIFIED PROBE TECHNIQUE, MAKING USE OF HIGH THROUGHPUT TECHNOLOGIES AS DESCRIBED BY CMS-2020-01-R: HCPCS

## 2021-02-03 LAB — SARS-COV-2, COV2NT: NOT DETECTED

## 2021-02-09 ENCOUNTER — HOSPITAL ENCOUNTER (OUTPATIENT)
Dept: LAB | Age: 81
Discharge: HOME | End: 2021-02-09

## 2021-02-09 PROCEDURE — U0003 INFECTIOUS AGENT DETECTION BY NUCLEIC ACID (DNA OR RNA); SEVERE ACUTE RESPIRATORY SYNDROME CORONAVIRUS 2 (SARS-COV-2) (CORONAVIRUS DISEASE [COVID-19]), AMPLIFIED PROBE TECHNIQUE, MAKING USE OF HIGH THROUGHPUT TECHNOLOGIES AS DESCRIBED BY CMS-2020-01-R: HCPCS

## 2021-02-10 LAB — SARS-COV-2, COV2NT: NOT DETECTED

## 2021-02-16 ENCOUNTER — HOSPITAL ENCOUNTER (OUTPATIENT)
Dept: LAB | Age: 81
Discharge: HOME | End: 2021-02-16

## 2021-02-16 PROCEDURE — U0003 INFECTIOUS AGENT DETECTION BY NUCLEIC ACID (DNA OR RNA); SEVERE ACUTE RESPIRATORY SYNDROME CORONAVIRUS 2 (SARS-COV-2) (CORONAVIRUS DISEASE [COVID-19]), AMPLIFIED PROBE TECHNIQUE, MAKING USE OF HIGH THROUGHPUT TECHNOLOGIES AS DESCRIBED BY CMS-2020-01-R: HCPCS

## 2021-02-18 LAB — SARS-COV-2, COV2NT: NOT DETECTED

## 2021-02-23 ENCOUNTER — HOSPITAL ENCOUNTER (OUTPATIENT)
Dept: LAB | Age: 81
Discharge: HOME | End: 2021-02-23

## 2021-02-23 PROCEDURE — U0003 INFECTIOUS AGENT DETECTION BY NUCLEIC ACID (DNA OR RNA); SEVERE ACUTE RESPIRATORY SYNDROME CORONAVIRUS 2 (SARS-COV-2) (CORONAVIRUS DISEASE [COVID-19]), AMPLIFIED PROBE TECHNIQUE, MAKING USE OF HIGH THROUGHPUT TECHNOLOGIES AS DESCRIBED BY CMS-2020-01-R: HCPCS

## 2021-02-24 LAB — SARS-COV-2, COV2NT: NOT DETECTED

## 2021-03-02 ENCOUNTER — HOSPITAL ENCOUNTER (OUTPATIENT)
Dept: LAB | Age: 81
Discharge: HOME | End: 2021-03-02

## 2021-03-02 PROCEDURE — U0003 INFECTIOUS AGENT DETECTION BY NUCLEIC ACID (DNA OR RNA); SEVERE ACUTE RESPIRATORY SYNDROME CORONAVIRUS 2 (SARS-COV-2) (CORONAVIRUS DISEASE [COVID-19]), AMPLIFIED PROBE TECHNIQUE, MAKING USE OF HIGH THROUGHPUT TECHNOLOGIES AS DESCRIBED BY CMS-2020-01-R: HCPCS

## 2021-03-03 LAB — SARS-COV-2, COV2NT: NOT DETECTED

## 2021-03-10 ENCOUNTER — HOSPITAL ENCOUNTER (OUTPATIENT)
Dept: LAB | Age: 81
Discharge: HOME | End: 2021-03-10
Payer: MEDICARE

## 2021-03-10 PROCEDURE — 81001 URINALYSIS AUTO W/SCOPE: CPT

## 2021-03-10 PROCEDURE — 87186 SC STD MICRODIL/AGAR DIL: CPT

## 2021-03-10 PROCEDURE — 87077 CULTURE AEROBIC IDENTIFY: CPT

## 2021-03-10 PROCEDURE — 87086 URINE CULTURE/COLONY COUNT: CPT

## 2021-03-11 ENCOUNTER — HOSPITAL ENCOUNTER (OUTPATIENT)
Dept: LAB | Age: 81
Discharge: HOME | End: 2021-03-11
Payer: MEDICARE

## 2021-03-11 LAB
APPEARANCE UR: ABNORMAL
BACTERIA URNS QL MICRO: ABNORMAL /HPF
BILIRUB UR QL: NEGATIVE
COLOR UR: ABNORMAL
EPITH CASTS URNS QL MICRO: ABNORMAL /LPF (ref 0–5)
GLUCOSE UR STRIP.AUTO-MCNC: NEGATIVE MG/DL
HGB UR QL STRIP: ABNORMAL
KETONES UR QL STRIP.AUTO: NEGATIVE MG/DL
LEUKOCYTE ESTERASE UR QL STRIP.AUTO: ABNORMAL
NITRITE UR QL STRIP.AUTO: NEGATIVE
PH UR STRIP: 6 [PH] (ref 5–8)
PROT UR STRIP-MCNC: 100 MG/DL
RBC #/AREA URNS HPF: ABNORMAL /HPF (ref 0–5)
SP GR UR REFRACTOMETRY: 1.01 (ref 1–1.03)
UROBILINOGEN UR QL STRIP.AUTO: 2 EU/DL (ref 0.2–1)
WBC URNS QL MICRO: ABNORMAL /HPF (ref 0–5)

## 2021-03-11 PROCEDURE — 85025 COMPLETE CBC W/AUTO DIFF WBC: CPT

## 2021-03-11 PROCEDURE — 80048 BASIC METABOLIC PNL TOTAL CA: CPT

## 2021-03-12 ENCOUNTER — HOSPITAL ENCOUNTER (OUTPATIENT)
Dept: LAB | Age: 81
Discharge: HOME | End: 2021-03-12

## 2021-03-12 LAB
ANION GAP SERPL CALC-SCNC: 4 MMOL/L (ref 3–18)
BASOPHILS # BLD: 0 K/UL (ref 0–0.1)
BASOPHILS NFR BLD: 0 % (ref 0–2)
BUN SERPL-MCNC: 38 MG/DL (ref 7–18)
BUN/CREAT SERPL: 28 (ref 12–20)
CALCIUM SERPL-MCNC: 8.3 MG/DL (ref 8.5–10.1)
CHLORIDE SERPL-SCNC: 107 MMOL/L (ref 100–111)
CO2 SERPL-SCNC: 24 MMOL/L (ref 21–32)
CREAT SERPL-MCNC: 1.35 MG/DL (ref 0.6–1.3)
EOSINOPHIL # BLD: 0.4 K/UL (ref 0–0.4)
EOSINOPHIL NFR BLD: 4 % (ref 0–5)
ERYTHROCYTE [DISTWIDTH] IN BLOOD BY AUTOMATED COUNT: 14.2 % (ref 11.6–14.5)
GLUCOSE SERPL-MCNC: 205 MG/DL (ref 74–99)
HCT VFR BLD AUTO: 30.6 % (ref 35–45)
HGB BLD-MCNC: 9.4 G/DL (ref 12–16)
LYMPHOCYTES # BLD: 1.5 K/UL (ref 0.9–3.6)
LYMPHOCYTES NFR BLD: 16 % (ref 21–52)
MCH RBC QN AUTO: 27.6 PG (ref 24–34)
MCHC RBC AUTO-ENTMCNC: 30.7 G/DL (ref 31–37)
MCV RBC AUTO: 90 FL (ref 74–97)
MONOCYTES # BLD: 0.7 K/UL (ref 0.05–1.2)
MONOCYTES NFR BLD: 8 % (ref 3–10)
NEUTS SEG # BLD: 6.6 K/UL (ref 1.8–8)
NEUTS SEG NFR BLD: 72 % (ref 40–73)
PLATELET # BLD AUTO: 372 K/UL (ref 135–420)
PMV BLD AUTO: 9.3 FL (ref 9.2–11.8)
POTASSIUM SERPL-SCNC: 5.7 MMOL/L (ref 3.5–5.5)
RBC # BLD AUTO: 3.4 M/UL (ref 4.2–5.3)
SODIUM SERPL-SCNC: 135 MMOL/L (ref 136–145)
WBC # BLD AUTO: 9.2 K/UL (ref 4.6–13.2)

## 2021-03-14 LAB
BACTERIA SPEC CULT: ABNORMAL
CC UR VC: ABNORMAL
SERVICE CMNT-IMP: ABNORMAL

## 2021-03-20 ENCOUNTER — APPOINTMENT (OUTPATIENT)
Dept: CT IMAGING | Age: 81
DRG: 092 | End: 2021-03-20
Attending: STUDENT IN AN ORGANIZED HEALTH CARE EDUCATION/TRAINING PROGRAM
Payer: MEDICARE

## 2021-03-20 ENCOUNTER — HOSPITAL ENCOUNTER (INPATIENT)
Age: 81
LOS: 5 days | Discharge: NURSING, REHAB OR LONG-TERM CARE | DRG: 092 | End: 2021-03-25
Attending: STUDENT IN AN ORGANIZED HEALTH CARE EDUCATION/TRAINING PROGRAM | Admitting: INTERNAL MEDICINE
Payer: MEDICARE

## 2021-03-20 DIAGNOSIS — N39.0 URINARY TRACT INFECTION ASSOCIATED WITH NEPHROSTOMY CATHETER, INITIAL ENCOUNTER (HCC): ICD-10-CM

## 2021-03-20 DIAGNOSIS — R41.0 DISORIENTATION: Primary | ICD-10-CM

## 2021-03-20 DIAGNOSIS — Z89.611 HISTORY OF ABOVE-KNEE AMPUTATION OF RIGHT LOWER EXTREMITY (HCC): ICD-10-CM

## 2021-03-20 DIAGNOSIS — R79.89 ELEVATED SERUM CREATININE: ICD-10-CM

## 2021-03-20 DIAGNOSIS — G93.40 ENCEPHALOPATHY ACUTE: ICD-10-CM

## 2021-03-20 DIAGNOSIS — Z86.19 HISTORY OF ESBL E. COLI INFECTION: ICD-10-CM

## 2021-03-20 DIAGNOSIS — T83.512A URINARY TRACT INFECTION ASSOCIATED WITH NEPHROSTOMY CATHETER, INITIAL ENCOUNTER (HCC): ICD-10-CM

## 2021-03-20 DIAGNOSIS — L98.429 SKIN ULCER OF SACRUM, UNSPECIFIED ULCER STAGE (HCC): ICD-10-CM

## 2021-03-20 DIAGNOSIS — Z90.49 S/P COLECTOMY: ICD-10-CM

## 2021-03-20 DIAGNOSIS — H54.8 LEGALLY BLIND: ICD-10-CM

## 2021-03-20 DIAGNOSIS — Z90.5 S/P NEPHRECTOMY: ICD-10-CM

## 2021-03-20 PROBLEM — R41.82 ALTERED MENTAL STATUS: Status: ACTIVE | Noted: 2021-03-20

## 2021-03-20 LAB
ANION GAP SERPL CALC-SCNC: 7 MMOL/L (ref 3–18)
APPEARANCE UR: ABNORMAL
ATRIAL RATE: 104 BPM
BACTERIA URNS QL MICRO: ABNORMAL /HPF
BASOPHILS # BLD: 0 K/UL (ref 0–0.1)
BASOPHILS NFR BLD: 0 % (ref 0–2)
BILIRUB UR QL: NEGATIVE
BUN SERPL-MCNC: 34 MG/DL (ref 7–18)
BUN/CREAT SERPL: 23 (ref 12–20)
CALCIUM SERPL-MCNC: 8.7 MG/DL (ref 8.5–10.1)
CALCULATED P AXIS, ECG09: 36 DEGREES
CALCULATED R AXIS, ECG10: 53 DEGREES
CALCULATED T AXIS, ECG11: 73 DEGREES
CHLORIDE SERPL-SCNC: 103 MMOL/L (ref 100–111)
CO2 SERPL-SCNC: 23 MMOL/L (ref 21–32)
COLOR UR: ABNORMAL
CREAT SERPL-MCNC: 1.48 MG/DL (ref 0.6–1.3)
DIAGNOSIS, 93000: NORMAL
DIFFERENTIAL METHOD BLD: ABNORMAL
EOSINOPHIL # BLD: 0.3 K/UL (ref 0–0.4)
EOSINOPHIL NFR BLD: 3 % (ref 0–5)
EPITH CASTS URNS QL MICRO: ABNORMAL /LPF (ref 0–5)
ERYTHROCYTE [DISTWIDTH] IN BLOOD BY AUTOMATED COUNT: 14.1 % (ref 11.6–14.5)
EST. AVERAGE GLUCOSE BLD GHB EST-MCNC: 157 MG/DL
GLUCOSE BLD STRIP.AUTO-MCNC: 177 MG/DL (ref 70–110)
GLUCOSE BLD STRIP.AUTO-MCNC: 220 MG/DL (ref 70–110)
GLUCOSE SERPL-MCNC: 199 MG/DL (ref 74–99)
GLUCOSE UR STRIP.AUTO-MCNC: NEGATIVE MG/DL
HBA1C MFR BLD: 7.1 % (ref 4.2–5.6)
HCT VFR BLD AUTO: 30.4 % (ref 35–45)
HGB BLD-MCNC: 9.8 G/DL (ref 12–16)
HGB UR QL STRIP: ABNORMAL
KETONES UR QL STRIP.AUTO: NEGATIVE MG/DL
LACTATE BLD-SCNC: 1.76 MMOL/L (ref 0.4–2)
LEUKOCYTE ESTERASE UR QL STRIP.AUTO: ABNORMAL
LYMPHOCYTES # BLD: 1.3 K/UL (ref 0.9–3.6)
LYMPHOCYTES NFR BLD: 11 % (ref 21–52)
MCH RBC QN AUTO: 27.8 PG (ref 24–34)
MCHC RBC AUTO-ENTMCNC: 32.2 G/DL (ref 31–37)
MCV RBC AUTO: 86.4 FL (ref 74–97)
MONOCYTES # BLD: 0.7 K/UL (ref 0.05–1.2)
MONOCYTES NFR BLD: 6 % (ref 3–10)
NEUTS SEG # BLD: 9 K/UL (ref 1.8–8)
NEUTS SEG NFR BLD: 80 % (ref 40–73)
NITRITE UR QL STRIP.AUTO: POSITIVE
P-R INTERVAL, ECG05: 140 MS
PH UR STRIP: 5.5 [PH] (ref 5–8)
PLATELET # BLD AUTO: 468 K/UL (ref 135–420)
PMV BLD AUTO: 8.9 FL (ref 9.2–11.8)
POTASSIUM SERPL-SCNC: 4.6 MMOL/L (ref 3.5–5.5)
PROT UR STRIP-MCNC: 100 MG/DL
Q-T INTERVAL, ECG07: 336 MS
QRS DURATION, ECG06: 76 MS
QTC CALCULATION (BEZET), ECG08: 441 MS
RBC # BLD AUTO: 3.52 M/UL (ref 4.2–5.3)
RBC #/AREA URNS HPF: ABNORMAL /HPF (ref 0–5)
SODIUM SERPL-SCNC: 133 MMOL/L (ref 136–145)
SP GR UR REFRACTOMETRY: 1.01 (ref 1–1.03)
TROPONIN I SERPL-MCNC: <0.02 NG/ML (ref 0–0.04)
UROBILINOGEN UR QL STRIP.AUTO: 1 EU/DL (ref 0.2–1)
VENTRICULAR RATE, ECG03: 104 BPM
WBC # BLD AUTO: 11.3 K/UL (ref 4.6–13.2)
WBC URNS QL MICRO: >100 /HPF (ref 0–5)

## 2021-03-20 PROCEDURE — 87086 URINE CULTURE/COLONY COUNT: CPT

## 2021-03-20 PROCEDURE — 74011000250 HC RX REV CODE- 250: Performed by: INTERNAL MEDICINE

## 2021-03-20 PROCEDURE — 87077 CULTURE AEROBIC IDENTIFY: CPT

## 2021-03-20 PROCEDURE — 74011250637 HC RX REV CODE- 250/637: Performed by: INTERNAL MEDICINE

## 2021-03-20 PROCEDURE — 84484 ASSAY OF TROPONIN QUANT: CPT

## 2021-03-20 PROCEDURE — 70450 CT HEAD/BRAIN W/O DYE: CPT

## 2021-03-20 PROCEDURE — 99285 EMERGENCY DEPT VISIT HI MDM: CPT

## 2021-03-20 PROCEDURE — 83605 ASSAY OF LACTIC ACID: CPT

## 2021-03-20 PROCEDURE — 82962 GLUCOSE BLOOD TEST: CPT

## 2021-03-20 PROCEDURE — 74011000250 HC RX REV CODE- 250: Performed by: STUDENT IN AN ORGANIZED HEALTH CARE EDUCATION/TRAINING PROGRAM

## 2021-03-20 PROCEDURE — 74176 CT ABD & PELVIS W/O CONTRAST: CPT

## 2021-03-20 PROCEDURE — 74011250636 HC RX REV CODE- 250/636: Performed by: STUDENT IN AN ORGANIZED HEALTH CARE EDUCATION/TRAINING PROGRAM

## 2021-03-20 PROCEDURE — 74011636637 HC RX REV CODE- 636/637: Performed by: INTERNAL MEDICINE

## 2021-03-20 PROCEDURE — 74011250636 HC RX REV CODE- 250/636: Performed by: INTERNAL MEDICINE

## 2021-03-20 PROCEDURE — 85025 COMPLETE CBC W/AUTO DIFF WBC: CPT

## 2021-03-20 PROCEDURE — 81001 URINALYSIS AUTO W/SCOPE: CPT

## 2021-03-20 PROCEDURE — 83036 HEMOGLOBIN GLYCOSYLATED A1C: CPT

## 2021-03-20 PROCEDURE — 80048 BASIC METABOLIC PNL TOTAL CA: CPT

## 2021-03-20 PROCEDURE — 65270000029 HC RM PRIVATE

## 2021-03-20 PROCEDURE — 87186 SC STD MICRODIL/AGAR DIL: CPT

## 2021-03-20 PROCEDURE — 96374 THER/PROPH/DIAG INJ IV PUSH: CPT

## 2021-03-20 PROCEDURE — 93005 ELECTROCARDIOGRAM TRACING: CPT

## 2021-03-20 RX ORDER — BRIMONIDINE TARTRATE 2 MG/ML
1 SOLUTION/ DROPS OPHTHALMIC EVERY 12 HOURS
Status: DISCONTINUED | OUTPATIENT
Start: 2021-03-20 | End: 2021-03-25 | Stop reason: HOSPADM

## 2021-03-20 RX ORDER — METOPROLOL SUCCINATE 50 MG/1
50 TABLET, EXTENDED RELEASE ORAL DAILY
Status: DISCONTINUED | OUTPATIENT
Start: 2021-03-21 | End: 2021-03-25 | Stop reason: HOSPADM

## 2021-03-20 RX ORDER — CALCIUM CARB/MAGNESIUM CARB 311-232MG
5 TABLET ORAL
Status: DISCONTINUED | OUTPATIENT
Start: 2021-03-20 | End: 2021-03-25 | Stop reason: HOSPADM

## 2021-03-20 RX ORDER — SODIUM CHLORIDE 0.9 % (FLUSH) 0.9 %
5-40 SYRINGE (ML) INJECTION EVERY 8 HOURS
Status: DISCONTINUED | OUTPATIENT
Start: 2021-03-20 | End: 2021-03-25 | Stop reason: HOSPADM

## 2021-03-20 RX ORDER — LEVOTHYROXINE SODIUM 50 UG/1
50 TABLET ORAL
Status: DISCONTINUED | OUTPATIENT
Start: 2021-03-21 | End: 2021-03-25 | Stop reason: HOSPADM

## 2021-03-20 RX ORDER — BRIMONIDINE TARTRATE, TIMOLOL MALEATE 2; 5 MG/ML; MG/ML
1 SOLUTION/ DROPS OPHTHALMIC EVERY 12 HOURS
Status: DISCONTINUED | OUTPATIENT
Start: 2021-03-20 | End: 2021-03-20 | Stop reason: RX

## 2021-03-20 RX ORDER — AMLODIPINE BESYLATE 5 MG/1
5 TABLET ORAL DAILY
Status: DISCONTINUED | OUTPATIENT
Start: 2021-03-21 | End: 2021-03-25 | Stop reason: HOSPADM

## 2021-03-20 RX ORDER — ENOXAPARIN SODIUM 100 MG/ML
40 INJECTION SUBCUTANEOUS EVERY 24 HOURS
Status: DISCONTINUED | OUTPATIENT
Start: 2021-03-20 | End: 2021-03-25 | Stop reason: HOSPADM

## 2021-03-20 RX ORDER — SODIUM CHLORIDE 0.9 % (FLUSH) 0.9 %
5-40 SYRINGE (ML) INJECTION AS NEEDED
Status: DISCONTINUED | OUTPATIENT
Start: 2021-03-20 | End: 2021-03-25 | Stop reason: HOSPADM

## 2021-03-20 RX ORDER — MAGNESIUM SULFATE 100 %
4 CRYSTALS MISCELLANEOUS AS NEEDED
Status: DISCONTINUED | OUTPATIENT
Start: 2021-03-20 | End: 2021-03-25 | Stop reason: HOSPADM

## 2021-03-20 RX ORDER — ATORVASTATIN CALCIUM 20 MG/1
20 TABLET, FILM COATED ORAL DAILY
Status: DISCONTINUED | OUTPATIENT
Start: 2021-03-21 | End: 2021-03-25 | Stop reason: HOSPADM

## 2021-03-20 RX ORDER — FAMOTIDINE 20 MG/1
20 TABLET, FILM COATED ORAL EVERY EVENING
Status: DISCONTINUED | OUTPATIENT
Start: 2021-03-20 | End: 2021-03-25 | Stop reason: HOSPADM

## 2021-03-20 RX ORDER — NYSTATIN 100000 [USP'U]/G
POWDER TOPICAL 2 TIMES DAILY
Status: DISCONTINUED | OUTPATIENT
Start: 2021-03-20 | End: 2021-03-25 | Stop reason: HOSPADM

## 2021-03-20 RX ORDER — LORAZEPAM 0.5 MG/1
0.5 TABLET ORAL
Status: DISCONTINUED | OUTPATIENT
Start: 2021-03-20 | End: 2021-03-25 | Stop reason: HOSPADM

## 2021-03-20 RX ORDER — ADHESIVE BANDAGE
5 BANDAGE TOPICAL DAILY PRN
Status: DISCONTINUED | OUTPATIENT
Start: 2021-03-20 | End: 2021-03-25 | Stop reason: HOSPADM

## 2021-03-20 RX ORDER — ONDANSETRON 4 MG/1
4 TABLET, FILM COATED ORAL
Status: DISCONTINUED | OUTPATIENT
Start: 2021-03-20 | End: 2021-03-25 | Stop reason: HOSPADM

## 2021-03-20 RX ORDER — TIMOLOL MALEATE 5 MG/ML
1 SOLUTION/ DROPS OPHTHALMIC 2 TIMES DAILY
Status: DISCONTINUED | OUTPATIENT
Start: 2021-03-20 | End: 2021-03-25 | Stop reason: HOSPADM

## 2021-03-20 RX ORDER — INSULIN LISPRO 100 [IU]/ML
INJECTION, SOLUTION INTRAVENOUS; SUBCUTANEOUS
Status: DISCONTINUED | OUTPATIENT
Start: 2021-03-20 | End: 2021-03-25 | Stop reason: HOSPADM

## 2021-03-20 RX ADMIN — Medication 5 MG: at 21:37

## 2021-03-20 RX ADMIN — INSULIN LISPRO 2 UNITS: 100 INJECTION, SOLUTION INTRAVENOUS; SUBCUTANEOUS at 21:35

## 2021-03-20 RX ADMIN — ENOXAPARIN SODIUM 40 MG: 40 INJECTION SUBCUTANEOUS at 16:24

## 2021-03-20 RX ADMIN — MEROPENEM 1 G: 1 INJECTION, POWDER, FOR SOLUTION INTRAVENOUS at 20:36

## 2021-03-20 RX ADMIN — Medication 10 ML: at 16:00

## 2021-03-20 RX ADMIN — TIMOLOL MALEATE 1 DROP: 5 SOLUTION OPHTHALMIC at 22:54

## 2021-03-20 RX ADMIN — NYSTATIN: 100000 POWDER TOPICAL at 22:54

## 2021-03-20 RX ADMIN — Medication 10 ML: at 21:38

## 2021-03-20 RX ADMIN — BRIMONIDINE TARTRATE 1 DROP: 2 SOLUTION OPHTHALMIC at 22:54

## 2021-03-20 RX ADMIN — MEROPENEM 1 G: 1 INJECTION, POWDER, FOR SOLUTION INTRAVENOUS at 12:31

## 2021-03-20 NOTE — ED NOTES
EMERGENCY DEPARTMENT HISTORY AND PHYSICAL EXAM      Date: 3/20/2021  Patient Name: Khai Thao    History of Presenting Illness     Chief Complaint   Patient presents with    Altered mental status       History Provided By: Patient, Patient's Son and Patient's Daughter    HPI:  Khai Thao is a 79 y.o. female with history of malignancy, percutaneous nephrostomy tube, intermittent dull dementia, right AKA who presented to the emergency department for altered mental status, is not alert, not following commands, she is protecting her airway. Accompanied by daughter-in-law at bedside. Per staff patient has worsening altered mental status    She has a right percutaneous nephrostomy tube, was recently admitted for urosepsis, has a history of ESBL. During last course of treatment in November, developed encephalopathy from 1 of antibiotics, son, who contacted us by phone, was adamant about a infectious disease consult and I agree. PMH, PSH, family history, social history, allergies reviewed with the patient with significant items noted above.     ---  Pregnancy -     PCP: [facility], Not On File    Current Facility-Administered Medications   Medication Dose Route Frequency Provider Last Rate Last Admin    dextrose 5% and 0.9% NaCl infusion  100 mL/hr IntraVENous CONTINUOUS Niecy Payton  mL/hr at 03/21/21 1255 100 mL/hr at 03/21/21 1255    insulin glargine (LANTUS) injection 10 Units  10 Units SubCUTAneous QHS Niecy Payton MD        meropenem [facility]) 1 g in sterile water (preservative free) 20 mL IV syringe  1 g IntraVENous Q8H Nicholas Nelson MD   1 g at 03/21/21 1255    sodium chloride (NS) flush 5-40 mL  5-40 mL IntraVENous Q8H Niecy Payton MD   10 mL at 03/21/21 1300    sodium chloride (NS) flush 5-40 mL  5-40 mL IntraVENous PRN Niecy Payton MD        enoxaparin (LOVENOX) injection 40 mg  40 mg SubCUTAneous Q24H Niecy Payton MD   40 mg at 03/20/21 1624    amLODIPine (NORVASC) tablet 5 mg  5 mg Oral DAILY Graeme Payton MD        atorvastatin (LIPITOR) tablet 20 mg  20 mg Oral DAILY Graeme Payton MD        famotidine (PEPCID) tablet 20 mg  20 mg Oral QPM Graeme Payton MD   Stopped at 03/20/21 1800    levothyroxine (SYNTHROID) tablet 50 mcg  50 mcg Oral ACB Graeme Payton MD        LORazepam (ATIVAN) tablet 0.5 mg  0.5 mg Oral BID PRN Graeme Payton MD        magnesium hydroxide (MILK OF MAGNESIA) 400 mg/5 mL oral suspension 5 mL  5 mL Oral DAILY PRN Graeme Payton MD        melatonin (rapid dissolve) tablet 5 mg  5 mg Oral QHS Graeme Payton MD   5 mg at 03/20/21 2137    metoprolol succinate (TOPROL-XL) XL tablet 50 mg  50 mg Oral DAILY Graeme Payton MD        multivitamin, tx-iron-ca-min (THERA-M w/ IRON) tablet 1 Tab  1 Tab Oral DAILY Graeme Payton MD        [facility]ron hcl [facility] tablet 4 mg  4 mg Oral Q8H PRN Graeme Payton MD        timolol (TIMOPTIC) 0.5 % ophthalmic solution 1 Drop  1 Drop Both Eyes BID Graeme Payton MD   Stopped at 03/21/21 0900    And    brimonidine (ALPHAGAN) 0.2 % ophthalmic solution 1 Drop  1 Drop Both Eyes Q12H Graeme Payton MD   Stopped at 03/21/21 0900    nystatin (MYCOSTATIN) 100,000 unit/gram powder   Topical BID Graeme Payton MD   Stopped at 03/21/21 0900    insulin lispro (HUMALOG) injection   SubCUTAneous AC&HS Graeme Payton MD   Stopped at 03/21/21 1257    glucose chewable tablet 16 g  4 Tab Oral PRN Graeme Payton MD        glucagon [facility]) injection 1 mg  1 mg IntraMUSCular PRN Graeme Payton MD           Past History     Past Medical History:  Past Medical History:   Diagnosis Date    Cancer [facility])     vaginal and colon cancer    [facility]     Chronic pain     arthritis    Diabetes [facility]     DVT (deep venous thrombosis) ([facility]     Hypertension     Legally blind     Thyroid disease        Past Surgical History:  Past Surgical History:   Procedure Laterality Date    HX ABOVE KNEE AMPUTATION Right     HX COLOSTOMY      HX ORTHOPAEDIC      HX OTHER SURGICAL      kidney drainage tube    HX UROLOGICAL      kidney removal    IR EXCHANGE NEPHRO PERC RT SI  1/28/2019    IR EXCHANGE NEPHRO PERC RT SI  3/21/2019    IR EXCHANGE NEPHRO PERC RT SI  5/13/2019    IR EXCHANGE NEPHRO PERC RT SI  6/27/2019    IR EXCHANGE NEPHRO PERC RT SI  8/16/2019    IR EXCHANGE NEPHRO PERC RT SI  10/3/2019    IR EXCHANGE NEPHRO PERC RT SI  11/18/2019    IR EXCHANGE NEPHRO PERC RT SI  1/6/2020    IR EXCHANGE NEPHRO PERC RT SI  2/24/2020    IR EXCHANGE NEPHRO PERC RT SI  4/13/2020    IR EXCHANGE NEPHRO PERC RT SI  6/1/2020    IR EXCHANGE NEPHRO PERC RT SI  6/19/2020    IR EXCHANGE NEPHRO PERC RT SI  8/10/2020    IR EXCHANGE NEPHRO PERC RT SI  9/30/2020    IR EXCHANGE NEPHRO PERC RT SI  11/5/2020    IR EXCHANGE NEPHRO PERC RT SI  12/16/2020    IR EXCHANGE NEPHRO PERC RT SI  2/1/2021    IR NEPHROSTOMY PERC RT PLC CATH  SI  6/21/2020       Family History:  History reviewed. No pertinent family history. Social History:  Social History     Tobacco Use    Smoking status: Never Smoker    Smokeless tobacco: Never Used   Substance Use Topics    Alcohol use: No    Drug use: Never       Allergies:   Allergies   Allergen Reactions    Morphine Nausea and Vomiting    Pcn [Penicillins] Rash       Review of Systems   Review of Systems    In addition to that documented in the HPI above  All other review of systems negative    Constitutional: Denies fevers or chills  Eyes: Denies vision changes  ENMT: Denies sore throat  CV: Denies chest pain  Resp: Denies SOB  GI: Denies vomiting or diarrhea  : Denies painful urination  MSK: Denies recent trauma  Skin: Denies new rashes  Neuro: Denies new numbness or tingling or weakness  Endocrine: Denies polyuria  Heme: Denies bleeding disorders    Physical Exam     Vitals:    03/21/21 0358 03/21/21 0745 03/21/21 0919 03/21/21 1116   BP: (!) 140/45 (!) 141/72  (!) 153/58   Pulse: (!) 101 (!) 114  (!) 112   Resp: 16 16  16   Temp: 97.8 °F (36.6 °C) 97.6 °F (36.4 °C)  98.1 °F (36.7 °C)   SpO2: 98% 99%  100%   Weight:       Height:   5' 3\" (1.6 m)      Physical Exam    Nursing notes and vital signs reviewed    General: patient not alert, will not respond, moans to any stimuli, will not follow commands. Head: Normocephalic and atraumatic  Eyes: Extraocular muscles intact, no conjunctival pallor  Ear, nose, throat: Normal external exam  Neck: Normal range of motion  Cardiovascular: RRR,  murmur auscultated, warm, well-perfused extremities  Respiratory: Patient is in no respiratory distress, lungs CTAB  GI: soft, non-distended, appliances in place  MSK: No gross deformities appreciated, right AKA  Extremities: pulses intact with good cap refills, no LE pitting edema or calf tenderness  Skin: Warm, dry, and intact  Psych: Appropriate mood and affect. Medical Decision Making   I am the first provider for this patient. I reviewed the vital signs, available nursing notes, past medical history, past surgical history, family history and social history. Vital Signs-Reviewed the patient's vital signs. Visit Vitals  BP (!) 153/58 (BP 1 Location: Left lower arm, BP Patient Position: At rest)   Pulse (!) 112   Temp 98.1 °F (36.7 °C)   Resp 16   Ht 5' 3\" (1.6 m)   Wt 64.3 kg (141 lb 11.2 oz)   SpO2 100%   BMI 25.10 kg/m²       Pulse Oximetry Analysis - 100% on room air     Cardiac Monitor:  Rate: 90 bpm  Rhythm: NSR    EKG interpretation: (Preliminary)  Interpreted by the EP. EKG non diagnostic, without acute ischemic changes, arrhythmia, prolonged QT, or evidence of Brugada      Provider Notes (Medical Decision Making): Arnaldo Prather is a 79 y.o. female with altered mental status, left nephrectomy, right nephrostomy. Right aka, bed bound. Procedures:  Procedures    ED Course:   ED Course as of Mar 21 1340   Sat Mar 20, 2021   1048 UTI noted, ordering rocephin, c/w ams. [DM]   1048 Creatinine at baseline    [DM]   1113 Dr. Faustino Ruelas    [DM]   [phone] Discussed with Dr. Faustino Ruelas, agrees with merrem. She has appointment with IR for replacement of right nephrostomy tube    [DM]   1217 CT head without acute abnormality. [DM]      ED Course User Index  [DM] Susie Billingsley MD      Given merrem. Fluids. No significant change in patient condition. Will plan to admit for UTI with altered mental status. .  The patient understands and agrees with the plan. Spoke with Dr. Nallely Anderson the on call admitting clinician who agrees to admit patient. Appreciate the assistance in the care of this patient. Vitals Review/addressed -     Diagnostic Study Results     Orders Placed This Encounter    CULTURE, URINE     Standing Status:   Standing     Number of Occurrences:   1     Order Specific Question:   Reason for Culture     Answer: Other    CT HEAD WO CONT     Standing Status:   Standing     Number of Occurrences:   1     Order Specific Question:   Transport     Answer:   BED [2]     Order Specific Question:   Reason for Exam     Answer:   AMS     Order Specific Question:   Decision Support Exception     Answer:   Emergency Medical Condition (MA) [1]    CT CHEST ABD PELV WO CONT     Dry scan per Dr. Dominick Steele. Standing Status:   Standing     Number of Occurrences:   1     Order Specific Question:   Transport     Answer:   BED [2]     Order Specific Question:   Reason for Exam     Answer:   INFECTION     Order Specific Question:   Type of contrast.  PLEASE NOTE: IV contrast is NOT utilized with this order.      Answer:   None    CBC WITH AUTOMATED DIFF     Standing Status:   Standing     Number of Occurrences:   1    BASIC METABOLIC PANEL     Standing Status:   Standing     Number of Occurrences:   1    URINALYSIS W/ RFLX MICROSCOPIC Standing Status:   Standing     Number of Occurrences:   1    TROPONIN I     Standing Status:   Standing     Number of Occurrences:   1    URINE MICROSCOPIC ONLY     Standing Status:   Standing     Number of Occurrences:   1    HEMOGLOBIN A1C     Standing Status:   Standing     Number of Occurrences:   1    METABOLIC PANEL, COMPREHENSIVE     Standing Status:   Standing     Number of Occurrences:   3    CBC WITH AUTOMATED DIFF     Standing Status:   Standing     Number of Occurrences:   3    MAGNESIUM     Standing Status:   Standing     Number of Occurrences:   13    POC LACTIC ACID     Standing Status:   Standing     Number of Occurrences:   1    VITAL SIGNS     Standing Status:   Standing     Number of Occurrences:   1    POC GLUCOSE     Standing Status:   Standing     Number of Occurrences:   1    NURSING-MISCELLANEOUS: SEE HYPOGLYCEMIA PROTOCOL BELOW HYPOGLYCEMIA PROTOCOL: Initiate Hypoglycemia protocol if blood glucose is less than 70 mg/dL FOR CONSCIOUS PATIENT: Administer 4 ounces fruit juice OR regular soda OR 4 glucose tablets. FOR UN. .. HYPOGLYCEMIA PROTOCOL:    Initiate Hypoglycemia protocol if blood glucose is less than 70 mg/dL    FOR CONSCIOUS PATIENT: Administer 4 ounces fruit juice OR regular soda OR 4 glucose tablets. FOR UNCONSCIOUS OR CHANGE-IN-MENTAL-STATUS PATIENT: If blood glucose is greater than or equal to 50 mg/dL administer     25 mL of 50% dextrose solution IV push, if blood glucose is less than 50 mg/dL, administer 50 mL IV push. If no IV, administer Glucagon     1 mg IM. Following Hypoglycemic Protocol: Once patient is fully alert and BG greater than 80 mg/dL provide a small snack,  if NPO consider IV fluids with dextrose. Repeat finger stick blood glucose in 15 minutes AFTER treatment, if less than 80 mg/dL repeat hypoglycemic protocol and notify provider. Document all interventions in the Electronic Medical  Record (EMR).      Standing Status:   Standing Number of Occurrences:   1     Order Specific Question:   Description of Order:     Answer:   SEE HYPOGLYCEMIA PROTOCOL BELOW    NURSING-MISCELLANEOUS: Blood glucose targets -- ICU: 140 - 180 mg/dL;  NON-ICU: 100 - 140 mg/dL CONTINUOUS     Standing Status:   Standing     Number of Occurrences:   1     Order Specific Question:   Description of Order:     Answer:   Blood glucose targets -- ICU: 140 - 180 mg/dL;  NON-ICU: 100 - 140 mg/dL    NURSING-MISCELLANEOUS: SEE HYPOGLYCEMIA PROTOCOL BELOW HYPOGLYCEMIA PROTOCOL: Initiate Hypoglycemia protocol if blood glucose is less than 70 mg/dL FOR CONSCIOUS PATIENT: Administer 4 ounces fruit juice OR regular soda OR 4 glucose tablets. FOR UN. .. HYPOGLYCEMIA PROTOCOL:    Initiate Hypoglycemia protocol if blood glucose is less than 70 mg/dL    FOR CONSCIOUS PATIENT: Administer 4 ounces fruit juice OR regular soda OR 4 glucose tablets. FOR UNCONSCIOUS OR CHANGE-IN-MENTAL-STATUS PATIENT: If blood glucose is greater than or equal to 50 mg/dL administer     25 mL of 50% dextrose solution IV push, if blood glucose is less than 50 mg/dL, administer 50 mL IV push. If no IV, administer Glucagon     1 mg IM. Following Hypoglycemic Protocol: Once patient is fully alert and BG greater than 80 mg/dL provide a small snack,  if NPO consider IV fluids with dextrose. Repeat finger stick blood glucose in 15 minutes AFTER treatment, if less than 80 mg/dL repeat hypoglycemic protocol and notify provider. Document all interventions in the Electronic Medical  Record (EMR).      Standing Status:   Standing     Number of Occurrences:   1     Order Specific Question:   Description of Order:     Answer:   SEE HYPOGLYCEMIA PROTOCOL BELOW    NURSING-MISCELLANEOUS: Blood glucose targets -- ICU: 140 - 180 mg/dL;  NON-ICU: 100 - 140 mg/dL CONTINUOUS     Standing Status:   Standing     Number of Occurrences:   1     Order Specific Question:   Description of Order:     Answer:   Blood glucose targets -- ICU: 140 - 180 mg/dL;  NON-ICU: 100 - 140 mg/dL    DO NOT RESUSCITATE     Standing Status:   Standing     Number of Occurrences:   1    IP CONSULT TO INFECTIOUS DISEASES     Standing Status:   Standing     Number of Occurrences:   1     Order Specific Question:   Reason for Consult: Answer:   infect disease     Order Specific Question:   Did you call or speak to the consulting provider? Answer:   No     Order Specific Question:   Consult To     Answer:   ID     Order Specific Question:   Schedule When? Answer:   TODAY    Contact Isolation     Standing Status:   Standing     Number of Occurrences:   1    OXIMETRY, SPOT CHECK     And as needed. Standing Status:   Standing     Number of Occurrences:   40    POC LACTIC ACID     Standing Status:   Standing     Number of Occurrences:   1    GLUCOSE, POC     Standing Status:   Standing     Number of Occurrences:   1    GLUCOSE, POC     Standing Status:   Standing     Number of Occurrences:   1    GLUCOSE, POC     Standing Status:   Standing     Number of Occurrences:   1    GLUCOSE, POC     Standing Status:   Standing     Number of Occurrences:   1    EKG, 12 LEAD, INITIAL     Standing Status:   Standing     Number of Occurrences:   1     Order Specific Question:   Reason for Exam:     Answer: AMS    DISCONTD: cefepime (MAXIPIME) 2 g in sterile water (preservative free) 10 mL IV syringe     Order Specific Question:   Antibiotic Indications     Answer:   Urinary Tract Infection    meropenem (MERREM) 1 g in sterile water (preservative free) 20 mL IV syringe     Order Specific Question:   Antibiotic Indications     Answer:   Urinary Tract Infection    sodium chloride (NS) flush 5-40 mL    sodium chloride (NS) flush 5-40 mL    enoxaparin (LOVENOX) injection 40 mg    amLODIPine (NORVASC) tablet 5 mg     OP SIG:Take 1 Tab by mouth daily.       atorvastatin (LIPITOR) tablet 20 mg    DISCONTD: brimonidine-timoloL (COMBIGAN) 0.2-0.5 % ophthalmic solution 1 Drop     OP SIG:Administer 1 Drop to both eyes every twelve (12) hours.  famotidine (PEPCID) tablet 20 mg     OP SIG:Take 1 Tab by mouth every twelve (12) hours. Order Specific Question:   H2RA INDICATION     Answer:   Symptomatic GERD    levothyroxine (SYNTHROID) tablet 50 mcg     OP SIG:Take  by mouth Daily (before breakfast).  LORazepam (ATIVAN) tablet 0.5 mg     OP SIG:Take 1 Tab by mouth two (2) times daily as needed for Agitation. Max Daily Amount: 1 mg.  magnesium hydroxide (MILK OF MAGNESIA) 400 mg/5 mL oral suspension 5 mL    melatonin (rapid dissolve) tablet 5 mg     OP SIG:Take 1 Tab by mouth nightly.  metoprolol succinate (TOPROL-XL) XL tablet 50 mg     OP SIG:Take 50 mg by mouth daily.  multivitamin, tx-iron-ca-min (THERA-M w/ IRON) tablet 1 Tab    ondansetron hcl (ZOFRAN) tablet 4 mg     OP SIG:Take 4 mg by mouth every eight (8) hours as needed for Nausea or Vomiting.  AND Linked Order Group     timolol (TIMOPTIC) 0.5 % ophthalmic solution 1 Drop     brimonidine (ALPHAGAN) 0.2 % ophthalmic solution 1 Drop    nystatin (MYCOSTATIN) 100,000 unit/gram powder    insulin lispro (HUMALOG) injection    glucose chewable tablet 16 g    glucagon (GLUCAGEN) injection 1 mg    dextrose 5% and 0.9% NaCl infusion    insulin glargine (LANTUS) injection 10 Units    IP CONSULT TO HOSPITALIST     Standing Status:   Standing     Number of Occurrences:   1     Order Specific Question:   Reason for Consult: Answer:   TO ADMIT     Order Specific Question:   Did you call or speak to the consulting provider? Answer:   No     Order Specific Question:   Consult To     Answer:   hospitalist     Order Specific Question:   Schedule When? Answer:   TODAY    INITIAL PHYSICIAN ORDER: INPATIENT Medical; 3.  Patient receiving treatment that can only be provided in an inpatient setting (further clarification in H&P documentation)     Standing Status: Standing     Number of Occurrences:   1     Order Specific Question:   Status: Answer:   INPATIENT [101]     Order Specific Question:   Type of Bed     Answer:   Medical [8]     Order Specific Question:   Inpatient Hospitalization Certified Necessary for the Following Reasons     Answer:   3. Patient receiving treatment that can only be provided in an inpatient setting (further clarification in H&P documentation)     Order Specific Question:   Admitting Diagnosis     Answer:   UTI (urinary tract infection) [460908]     Order Specific Question:   Admitting Diagnosis     Answer:   AMS (altered mental status) [[id number]]     Order Specific Question:   Admitting Physician     Answer:   Eddie Martinez     Order Specific Question:   Attending Physician     Answer:   Eddie Martinez     Order Specific Question:   Estimated Length of Stay     Answer:   3-4 Midnights     Order Specific Question:   Discharge Plan:     Answer:   [facility] [address] (e.g. Adult Home, Nursing Home, etc.)    INITIAL PHYSICIAN ORDER: INPATIENT Medical; 9. Other (further clarification in H&P documentation)     Standing Status:   Standing     Number of Occurrences:   1     Order Specific Question:   Status: Answer:   INPATIENT [101]     Order Specific Question:   Type of Bed     Answer:   Medical [8]     Order Specific Question:   Inpatient Hospitalization Certified Necessary for the Following Reasons     Answer:   9. Other (further clarification in H&P documentation)     Order Specific Question:   Admitting Diagnosis     Answer: Altered mental status [780.97. ICD-9-CM]     Order Specific Question:   Admitting Physician     Answer:   Eddie Storey [[id number]]     Order Specific Question:   Attending Physician     Answer:   Eddie CRISOSTOMO [[id number]]     Order Specific Question:   Estimated Length of Stay     Answer:   2 Midnights     Order Specific Question:   Discharge Plan:     Answer:   Home with Office Follow-up       Labs -     Recent Results (from the past 12 hour(s))   METABOLIC PANEL, COMPREHENSIVE    Collection Time: 03/21/21  4:04 AM   Result Value Ref Range    Sodium 135 (L) 136 - 145 mmol/L    Potassium 5.1 3.5 - 5.5 mmol/L    Chloride 105 100 - 111 mmol/L    CO2 22 21 - 32 mmol/L    Anion gap 8 3.0 - 18 mmol/L    Glucose 134 (H) 74 - 99 mg/dL    BUN 32 (H) 7.0 - 18 MG/DL    Creatinine 1.45 (H) 0.6 - 1.3 MG/DL    BUN/Creatinine ratio 22 (H) 12 - 20      GFR est AA 42 (L) >60 ml/min/1.73m2    GFR est non-AA 35 (L) >60 ml/min/1.73m2    Calcium 9.4 8.5 - 10.1 MG/DL    Bilirubin, total 0.3 0.2 - 1.0 MG/DL    ALT (SGPT) 30 13 - 56 U/L    AST (SGOT) 14 10 - 38 U/L    Alk. phosphatase 179 (H) 45 - 117 U/L    Protein, total 7.4 6.4 - 8.2 g/dL    Albumin 2.5 (L) 3.4 - 5.0 g/dL    Globulin 4.9 (H) 2.0 - 4.0 g/dL    A-G Ratio 0.5 (L) 0.8 - 1.7     CBC WITH AUTOMATED DIFF    Collection Time: 03/21/21  4:04 AM   Result Value Ref Range    WBC 11.1 4.6 - 13.2 K/uL    RBC 3.53 (L) 4.20 - 5.30 M/uL    HGB 9.3 (L) 12.0 - 16.0 g/dL    HCT 30.6 (L) 35.0 - 45.0 %    MCV 86.7 74.0 - 97.0 FL    MCH 26.3 24.0 - 34.0 PG    MCHC 30.4 (L) 31.0 - 37.0 g/dL    RDW 14.4 11.6 - 14.5 %    PLATELET 544 (H) 299 - 420 K/uL    MPV 8.9 (L) 9.2 - 11.8 FL    NEUTROPHILS 76 (H) 40 - 73 %    LYMPHOCYTES 12 (L) 21 - 52 %    MONOCYTES 10 3 - 10 %    EOSINOPHILS 2 0 - 5 %    BASOPHILS 0 0 - 2 %    ABS. NEUTROPHILS 8.5 (H) 1.8 - 8.0 K/UL    ABS. LYMPHOCYTES 1.3 0.9 - 3.6 K/UL    ABS. MONOCYTES 1.1 0.05 - 1.2 K/UL    ABS. EOSINOPHILS 0.2 0.0 - 0.4 K/UL    ABS.  BASOPHILS 0.0 0.0 - 0.1 K/UL    DF AUTOMATED     MAGNESIUM    Collection Time: 03/21/21  4:04 AM   Result Value Ref Range    Magnesium 1.9 1.6 - 2.6 mg/dL   GLUCOSE, POC    Collection Time: 03/21/21  7:50 AM   Result Value Ref Range    Glucose (POC) 183 (H) 70 - 110 mg/dL   GLUCOSE, POC    Collection Time: 03/21/21 11:21 AM   Result Value Ref Range    Glucose (POC) 135 (H) 70 - 110 mg/dL Radiologic Studies -   CT CHEST ABD PELV WO CONT   Final Result      1. Small left pleural effusion with associated atelectasis. 2. No acute radiographic abnormality. Chronic findings including -           > Right percutaneous nephrostomy tube in place. No significant right-sided   hydronephrosis. 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 > The left kidney and urinary bladder are surgically absent.        > Colectomy with left lower quadrant ileostomy. No bowel obstruction or   focal bowel wall thickening. CT HEAD WO CONT   Final Result      1. No acute intracranial abnormalities. Specifically, no hemorrhage, mass effect   or CT evident acute cortical infarction. 2. Generalized cerebral volume loss with sequelae of chronic microvascular   ischemia. CT Results  (Last 48 hours)               03/20/21 1125  CT CHEST ABD PELV WO CONT Final result    Impression:      1. Small left pleural effusion with associated atelectasis. 2. No acute radiographic abnormality. Chronic findings including -            > Right percutaneous nephrostomy tube in place. No significant right-sided   hydronephrosis. 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 > The left kidney and urinary bladder are surgically absent.        > Colectomy with left lower quadrant ileostomy. No bowel obstruction or   focal bowel wall thickening. Narrative:  EXAM: CT of the chest, abdomen, and pelvis       CLINICAL INDICATION/HISTORY: CT chest, abdomen and pelvis without contrast     > Additional: Infection, altered level of consciousness       COMPARISON: CT abdomen/pelvis 11/15/2020       TECHNIQUE: Axial CT imaging of the chest, abdomen, and pelvis was performed   throughout intravenous contrast.  Multiplanar reformats were generated.  One or   more dose reduction techniques were used on this CT: automated exposure control,   adjustment of the mAs and/or kVp according to patient size, and iterative   reconstruction techniques. The specific techniques used on this CT exam have   been documented in the patient's electronic medical record. Digital Imaging and Communications in Medicine (DICOM) format image data are   available to nonaffiliated external healthcare facilities or entities on a   secure, media free, reciprocally searchable basis with patient authorization for   at least a 12 month period after this study. _______________       FINDINGS:       CHEST:       BASE OF NECK: Surgical clips present within the right neck. .       MEDIASTINUM: Three vessel aortic arch. Great vessels unremarkable. Normal   heart size. There is coronary artery disease. No pericardial effusion. LYMPH NODES: No enlarged lymph nodes. AIRWAY: Normal.       LUNGS/PLEURA: No suspicious nodule or mass. Right lung is clear. Small left   pleural effusion with associated areas of atelectasis in the left lower lobe. BONES: No acute or aggressive osseous abnormalities identified. OTHER:  None.       ===============       ABDOMEN/PELVIS:       LIVER, BILIARY: Liver is normal. Prior cholecystectomy. Mild prominence of the   common bile duct, within normal limits for a postcholecystectomy patient. PANCREAS: Mildly atrophic. SPLEEN: Normal with few capsular calcifications. ADRENALS: Normal.       KIDNEYS/URETERS/BLADDER: There is a right-sided percutaneous nephrostomy tube. The locking loop of the catheter is present within the upper pole calyces. No   significant hydronephrosis. Obliteration coils present within the distal right   ureter. Left kidney is absent. Bladder is surgically absent. PELVIC ORGANS: Prior hysterectomy. VASCULATURE: Extensive atherosclerosis. No aneurysmal dilation.        LYMPH NODES: Stable appearance of some mildly prominent retroperitoneal lymph   nodes measuring up to 8 mm in short axis dimension along the mid aorta. GASTROINTESTINAL TRACT: Prior colectomy with diverting left lower quadrant   ileostomy. Some angulation and placement of bowel loops along the ventral   abdominal wall suggestive of nonobstructive peritoneal adhesive disease. No   bowel obstruction or focal bowel wall thickening. BONES: Bones appear osteopenic. There is lower lumbar degenerative facet   arthropathy. There is no acute osseous abnormality. There are degenerative   changes within both hips. OTHER: Soft tissue edema over the proximal left thigh and dependently over the   flanks. Presacral thickening, similar to the prior study, potentially related to   prior radiation therapy.       _______________           03/20/21 1125  CT HEAD WO CONT Final result    Impression:      1. No acute intracranial abnormalities. Specifically, no hemorrhage, mass effect   or CT evident acute cortical infarction. 2. Generalized cerebral volume loss with sequelae of chronic microvascular   ischemia. Narrative:  EXAM: CT head without contrast       CLINICAL INDICATION/HISTORY: Altered mental status      --Additional history: None. COMPARISON: Number [id number]       TECHNIQUE: Axial CT imaging of the head was performed without intravenous   contrast. One or more dose reduction techniques were used on this CT: automated   exposure control, adjustment of the mAs and/or kVp according to patient size,   and iterative reconstruction techniques. The specific techniques used on this   CT exam have been documented in the patient's electronic medical record. Digital Imaging and Communications in Medicine (DICOM) format image data are   available to nonaffiliated external healthcare facilities or entities on a   secure, media free, reciprocally searchable basis with patient authorization for   at least a 12 month period after this study. _______________       FINDINGS:       CALVARIUM: Intact.        SOFT TISSUES/SCALP: Normal.       SINUSES: Clear. BRAIN AND POSTERIOR FOSSA: There is proportional enlargement of the ventricles   and cerebral spinal fluid spaces consistent with generalized cerebral volume   loss. There is no intracranial hemorrhage, mass effect, or midline shift. There are scattered periventricular and subcortical white matter hypodensities   present consistent with nonspecific gliosis. This is most commonly associated   with sequelae of chronic microvascular ischemia. EXTRA-AXIAL SPACES AND MENINGES: There are no abnormal extra-axial fluid   collections. OTHER: There are atherosclerotic calcifications of the carotid siphons and   intradural vertebral arteries. _______________               CXR Results  (Last 48 hours)    None          Disposition     Disposition:  Admit    CLINICAL IMPRESSION:    1. Disorientation    2. Urinary tract infection associated with nephrostomy catheter, initial encounter ([facility] [address]    3. History of above-knee amputation of right lower extremity ([address]    4. Encephalopathy acute    5. Skin ulcer of sacrum, unspecified ulcer stage ([address]    6. S/P colectomy    7. S/p nephrectomy    8. History of ESBL E. coli infection    9. Elevated serum creatinine    10. Legally blind        It should be noted that I will be the provider of record for this patient  Jamil Kingsley MD    Follow-up Information    None         Current Discharge Medication List          Please note that this dictation was completed with [employer], the computer voice recognition software. Quite often unanticipated grammatical, syntax, homophones, and other interpretive errors are inadvertently transcribed by the computer software. Please disregard these errors. Please excuse any errors that have escaped final proofreading.

## 2021-03-20 NOTE — H&P
History & Physical    Patient: Nina Steven MRN: [id number]  CSN: [id number]    YOB: 1940  Age: 79 y.o. Sex: female      DOA: 3/20/2021  Primary Care Provider:  Timo Wright, Not On File      Assessment/Plan     Active Problems:    UTI (urinary tract infection) (11/26/2018)      AMS (altered mental status) (11/15/2020)      Altered mental status (3/20/2021)    71-year-old female with a history of multiple medical problems including chronic indwelling right nephrostomy tube, colostomy, diabetes, legally blind, hypertension, thyroid disease, status post above-knee amputation, chronic kidney disease admitted with altered mental status. Her altered mental status may be due to the urinary tract infection or it could be a reaction to the medicine she was given at the nursing home it is unclear at this time. We have consulted infectious disease and will start meropenem to treat the urinary tract infection and to change the cefepime    DM:  We will do sliding scale    Hypertension: Continue home meds    Hypothyroidism: Continue home meds    I discussed at length with her son who is POA she is DNR    CC: Altered mental status       HPI:     Nina Steven is a 79 y.o. female who was sent over from the nursing home for decreased mental status which has been going on for the past 2days the patient is not able to provide a history but I did get some history from her son on the phone who says that he thinks her mental status changed when they put her on a cefepime type drug several days ago at the nursing home there is no report of chest pain shortness of breath or fever    Past Medical History:   Diagnosis Date    Cancer ([address]     vaginal and colon cancer    Cancer ([address]     Chronic pain     arthritis    Diabetes ([address]     [facility] thrombosis[facility] [facility]     Hypertension     Legally blind     Thyroid disease        Past Surgical History:   Procedure Laterality Date    HX ABOVE KNEE AMPUTATION Right  HX COLOSTOMY      HX ORTHOPAEDIC      HX OTHER SURGICAL      kidney drainage tube    HX UROLOGICAL      kidney removal    IR EXCHANGE NEPHRO PERC RT SI  1/28/2019    IR EXCHANGE NEPHRO PERC RT SI  3/21/2019    IR EXCHANGE NEPHRO PERC RT SI  5/13/2019    IR EXCHANGE NEPHRO PERC RT SI  6/27/2019    IR EXCHANGE NEPHRO PERC RT SI  8/16/2019    IR EXCHANGE NEPHRO PERC RT SI  10/3/2019    IR EXCHANGE NEPHRO PERC RT SI  11/18/2019    IR EXCHANGE NEPHRO PERC RT SI  1/6/2020    IR EXCHANGE NEPHRO PERC RT SI  2/24/2020    IR EXCHANGE NEPHRO PERC RT SI  4/13/2020    IR EXCHANGE NEPHRO PERC RT SI  6/1/2020    IR EXCHANGE NEPHRO PERC RT SI  6/19/2020    IR EXCHANGE NEPHRO PERC RT SI  8/10/2020    IR EXCHANGE NEPHRO PERC RT SI  9/30/2020    IR EXCHANGE NEPHRO PERC RT SI  11/5/2020    IR EXCHANGE NEPHRO PERC RT SI  12/16/2020    IR EXCHANGE NEPHRO PERC RT SI  2/1/2021    IR NEPHROSTOMY PERC RT PLC CATH  SI  6/21/2020       History reviewed. No pertinent family history. Social History     Socioeconomic History    Marital status:      Spouse name: Not on file    Number of children: Not on file    Years of education: Not on file    Highest education level: Not on file   Tobacco Use    Smoking status: Never Smoker    Smokeless tobacco: Never Used   Substance and Sexual Activity    Alcohol use: No    Drug use: Never   Other Topics Concern       Prior to Admission medications    Medication Sig Start Date End Date Taking? Authorizing Provider   amLODIPine (NORVASC) 5 mg tablet Take 1 Tab by mouth daily. 11/22/20   Montez Monte MD   famotidine (PEPCID) 10 mg tablet Take 1 Tab by mouth every twelve (12) hours. 11/21/20   Montez Monte MD   melatonin 5 mg tablet Take 1 Tab by mouth nightly. 11/21/20   Montez Monte MD   LORazepam (ATIVAN) 0.5 mg tablet Take 1 Tab by mouth two (2) times daily as needed for Agitation.  Max Daily Amount: 1 mg. 11/11/20   Vargas Bahena MD naloxone (NARCAN) 4 mg/actuation nasal spray Use 1 spray intranasally, then discard. Repeat with new spray every 2 min as needed for opioid overdose symptoms, alternating nostrils. 11/11/20   Kassandra Rader MD   multivitamin (ONE A DAY) tablet Take 1 Tab by mouth daily. Provider, Historical   ondansetron hcl (ZOFRAN) 4 mg tablet Take 4 mg by mouth every eight (8) hours as needed for Nausea or Vomiting. Provider, Historical   brimonidine-timolol (COMBIGAN) 0.2-0.5 % drop ophthalmic solution Administer 1 Drop to both eyes every twelve (12) hours. Provider, Historical   metoprolol succinate (TOPROL-XL) 50 mg XL tablet Take 50 mg by mouth daily. Provider, Historical   magnesium hydroxide (MILK OF MAGNESIA CONCENTRATED) 2,400 mg/10 mL susp suspension Take 10 mL by mouth. Provider, Historical   levothyroxine (SYNTHROID) 50 mcg tablet Take  by mouth Daily (before breakfast). Provider, Historical   atorvastatin calcium (ATORVASTATIN PO) Take 20 mg by mouth daily. Other, MD Dc       Allergies   Allergen Reactions    Morphine Nausea and Vomiting    Pcn [Penicillins] Rash       Review of Systems  Gen: No fever, chills, malaise, weight loss/gain. Heent: No headache, rhinorrhea, epistaxis, ear pain, hearing loss, sinus pain, neck pain/stiffness, sore throat. Heart: No chest pain, palpitations, LEWIS, pnd, or orthopnea. Resp: No cough, hemoptysis, wheezing and shortness of breath. GI: No nausea, vomiting, diarrhea, constipation, melena or hematochezia. : No urinary obstruction, dysuria or hematuria. Derm: No rash, new skin lesion or pruritis. Musc/skeletal: no bone or joint complains. Vasc: No edema, cyanosis or claudication. Endo: No heat/cold intolerance, no polyuria,polydipsia or polyphagia. Neuro: No unilateral weakness, numbness, tingling. No seizures. Heme: No easy bruising or bleeding.           Physical Exam:     Physical Exam:  Visit Vitals  BP (!) 142/77 (BP 1 Location: Left upper arm, BP Patient Position: At rest)   Pulse (!) 109   Temp 97.6 °F (36.4 °C)   Resp 17   Wt 67.4 kg (148 lb 9.4 oz)   SpO2 100%   BMI 26.32 kg/m²      O2 Device: Room air    Temp (24hrs), Av.4 °F (36.3 °C), Min:97.1 °F (36.2 °C), Max:97.6 °F (36.4 °C)    No intake/output data recorded.   No intake/output data recorded.    General:  Awake, cooperative, no distress.   Head:  Normocephalic, without obvious abnormality, atraumatic.   Eyes:  Conjunctivae/corneas clear, sclera anicteric, PERRL, EOMs intact.   Nose: Nares normal. No drainage or sinus tenderness.   Throat: Lips, mucosa, and tongue normal. .   Neck: Supple, symmetrical, trachea midline, no adenopathy.       Lungs:   Clear to auscultation bilaterally.       Heart:  Regular rate and rhythm, S1, S2 normal, no murmur, click, rub or gallop.     Abdomen: Soft, non-tender. Bowel sounds normal. No masses,  No organomegaly.   Extremities: Extremities normal, atraumatic, no cyanosis or edema.   Pulses: 2+ and symmetric all extremities.   Skin: Skin color, texture, turgor normal. No rashes or lesions   Neurologic: CNII-XII intact. No focal motor or sensory deficit.       Labs Reviewed:    Recent Results (from the past 24 hour(s))   URINALYSIS W/ RFLX MICROSCOPIC    Collection Time: 21 10:11 AM   Result Value Ref Range    Color CESAR      Appearance CLOUDY      Specific gravity 1.013 1.005 - 1.030      pH (UA) 5.5 5.0 - 8.0      Protein 100 (A) NEG mg/dL    Glucose Negative NEG mg/dL    Ketone Negative NEG mg/dL    Bilirubin Negative NEG      Blood LARGE (A) NEG      Urobilinogen 1.0 0.2 - 1.0 EU/dL    Nitrites Positive (A) NEG      Leukocyte Esterase LARGE (A) NEG     URINE MICROSCOPIC ONLY    Collection Time: 21 10:11 AM   Result Value Ref Range    WBC >100 (H) 0 - 5 /hpf    RBC TOO NUMEROUS TO COUNT 0 - 5 /hpf    Epithelial cells 1+ 0 - 5 /lpf    Bacteria 1+ (A) NEG /hpf   CBC WITH AUTOMATED DIFF    Collection Time: 21 10:12 AM   Result  Value Ref Range    WBC 11.3 4.6 - 13.2 K/uL    RBC 3.52 (L) 4.20 - 5.30 M/uL    HGB 9.8 (L) 12.0 - 16.0 g/dL    HCT 30.4 (L) 35.0 - 45.0 %    MCV 86.4 74.0 - 97.0 FL    MCH 27.8 24.0 - 34.0 PG    MCHC 32.2 31.0 - 37.0 g/dL    RDW 14.1 11.6 - 14.5 %    PLATELET 058 (H) 918 - 420 K/uL    MPV 8.9 (L) 9.2 - 11.8 FL    NEUTROPHILS 80 (H) 40 - 73 %    LYMPHOCYTES 11 (L) 21 - 52 %    MONOCYTES 6 3 - 10 %    EOSINOPHILS 3 0 - 5 %    BASOPHILS 0 0 - 2 %    ABS. NEUTROPHILS 9.0 (H) 1.8 - 8.0 K/UL    ABS. LYMPHOCYTES 1.3 0.9 - 3.6 K/UL    ABS. MONOCYTES 0.7 0.05 - 1.2 K/UL    ABS. EOSINOPHILS 0.3 0.0 - 0.4 K/UL    ABS.  BASOPHILS 0.0 0.0 - 0.1 K/UL    DF AUTOMATED     METABOLIC PANEL, BASIC    Collection Time: 03/20/21 10:12 AM   Result Value Ref Range    Sodium 133 (L) 136 - 145 mmol/L    Potassium 4.6 3.5 - 5.5 mmol/L    Chloride 103 100 - 111 mmol/L    CO2 23 21 - 32 mmol/L    Anion gap 7 3.0 - 18 mmol/L    Glucose 199 (H) 74 - 99 mg/dL    BUN 34 (H) 7.0 - 18 MG/DL    Creatinine 1.48 (H) 0.6 - 1.3 MG/DL    BUN/Creatinine ratio 23 (H) 12 - 20      GFR est AA 41 (L) >60 ml/min/1.73m2    GFR est non-AA 34 (L) >60 ml/min/1.73m2    Calcium 8.7 8.5 - 10.1 MG/DL   TROPONIN I    Collection Time: 03/20/21 10:12 AM   Result Value Ref Range    Troponin-I, QT <0.02 0.0 - 0.045 NG/ML   POC LACTIC ACID    Collection Time: 03/20/21 10:16 AM   Result Value Ref Range    Lactic Acid (POC) 1.76 0.40 - 2.00 mmol/L   EKG, 12 LEAD, INITIAL    Collection Time: 03/20/21 10:17 AM   Result Value Ref Range    Ventricular Rate 104 BPM    Atrial Rate 104 BPM    P-R Interval 140 ms    QRS Duration 76 ms    Q-T Interval 336 ms    QTC Calculation (Bezet) 441 ms    Calculated P Axis 36 degrees    Calculated R Axis 53 degrees    Calculated T Axis 73 degrees    Diagnosis       Sinus tachycardia  Otherwise normal ECG  When compared with ECG of 15-NOV-2020 11:58,  Nonspecific T wave abnormality now evident in Lateral leads  Confirmed by Jose Guerin MD, -- ([id number]) on 3/20/2021 2:27:45 PM         Procedures/imaging: see electronic medical records for all procedures/Xrays and details which were not copied into this note but were reviewed prior to creation of Plan            CC: Bsi, Not On File

## 2021-03-20 NOTE — ED NOTES
Verbal shift change report given to Juan Mejia RN (oncoming nurse) by Francisco Coles RN (offgoing nurse). Report included the following information SBAR, Kardex, ED Summary, [facility] and Recent Results.

## 2021-03-20 NOTE — ED NOTES
TRANSFER - OUT REPORT:    Verbal report given to Melodie RN(name) on Tameka Mendez  being transferred to [address] [place] [address](unit) for routine progression of care       Report consisted of patients Situation, Background, Assessment and   Recommendations(SBAR). Information from the following report(s) SBAR, Kardex, ED Summary, [facility] and Recent Results was reviewed with the receiving nurse. Lines:   PICC Single Lumen 03/20/21 Right;Brachial (Active)        Opportunity for questions and clarification was provided.       Patient transported with:   Registered Nurse

## 2021-03-20 NOTE — Clinical Note
Status[de-identified] INPATIENT [101]   Type of Bed: Medical [8]   Inpatient Hospitalization Certified Necessary for the Following Reasons: 3.  Patient receiving treatment that can only be provided in an inpatient setting (further clarification in H&P documentation)   Admitting Diagnosis: UTI (urinary tract infection) [283760]   Admitting Diagnosis: AMS (altered mental status) [6383916]   Admitting Physician: Suresh CRISOSTOMO [[id number]]   Attending Physician: Poly Meyer   Estimated Length of Stay: 3-4 Midnights   Discharge Plan[de-identified] Extended Care Facility (e.g. Adult Home, Nursing Home, etc.)

## 2021-03-20 NOTE — ED TRIAGE NOTES
Pt brought in by ems. Pt reported to be confused at baseline, AAO x 0, but usually will be verbal. Today, pt is only moaning.  Pt has a nephrostomy and a right sided AKA

## 2021-03-21 LAB
ALBUMIN SERPL-MCNC: 2.5 G/DL (ref 3.4–5)
ALBUMIN/GLOB SERPL: 0.5 {RATIO} (ref 0.8–1.7)
ALP SERPL-CCNC: 179 U/L (ref 45–117)
ALT SERPL-CCNC: 30 U/L (ref 13–56)
ANION GAP SERPL CALC-SCNC: 8 MMOL/L (ref 3–18)
AST SERPL-CCNC: 14 U/L (ref 10–38)
BASOPHILS # BLD: 0 K/UL (ref 0–0.1)
BASOPHILS NFR BLD: 0 % (ref 0–2)
BILIRUB SERPL-MCNC: 0.3 MG/DL (ref 0.2–1)
BUN SERPL-MCNC: 32 MG/DL (ref 7–18)
BUN/CREAT SERPL: 22 (ref 12–20)
CALCIUM SERPL-MCNC: 9.4 MG/DL (ref 8.5–10.1)
CHLORIDE SERPL-SCNC: 105 MMOL/L (ref 100–111)
CO2 SERPL-SCNC: 22 MMOL/L (ref 21–32)
CREAT SERPL-MCNC: 1.45 MG/DL (ref 0.6–1.3)
DIFFERENTIAL METHOD BLD: ABNORMAL
EOSINOPHIL # BLD: 0.2 K/UL (ref 0–0.4)
EOSINOPHIL NFR BLD: 2 % (ref 0–5)
ERYTHROCYTE [DISTWIDTH] IN BLOOD BY AUTOMATED COUNT: 14.4 % (ref 11.6–14.5)
GLOBULIN SER CALC-MCNC: 4.9 G/DL (ref 2–4)
GLUCOSE BLD STRIP.AUTO-MCNC: 135 MG/DL (ref 70–110)
GLUCOSE BLD STRIP.AUTO-MCNC: 168 MG/DL (ref 70–110)
GLUCOSE BLD STRIP.AUTO-MCNC: 183 MG/DL (ref 70–110)
GLUCOSE BLD STRIP.AUTO-MCNC: 229 MG/DL (ref 70–110)
GLUCOSE SERPL-MCNC: 134 MG/DL (ref 74–99)
HCT VFR BLD AUTO: 30.6 % (ref 35–45)
HGB BLD-MCNC: 9.3 G/DL (ref 12–16)
LYMPHOCYTES # BLD: 1.3 K/UL (ref 0.9–3.6)
LYMPHOCYTES NFR BLD: 12 % (ref 21–52)
MAGNESIUM SERPL-MCNC: 1.9 MG/DL (ref 1.6–2.6)
MCH RBC QN AUTO: 26.3 PG (ref 24–34)
MCHC RBC AUTO-ENTMCNC: 30.4 G/DL (ref 31–37)
MCV RBC AUTO: 86.7 FL (ref 74–97)
MONOCYTES # BLD: 1.1 K/UL (ref 0.05–1.2)
MONOCYTES NFR BLD: 10 % (ref 3–10)
NEUTS SEG # BLD: 8.5 K/UL (ref 1.8–8)
NEUTS SEG NFR BLD: 76 % (ref 40–73)
PLATELET # BLD AUTO: 485 K/UL (ref 135–420)
PMV BLD AUTO: 8.9 FL (ref 9.2–11.8)
POTASSIUM SERPL-SCNC: 5.1 MMOL/L (ref 3.5–5.5)
PROT SERPL-MCNC: 7.4 G/DL (ref 6.4–8.2)
RBC # BLD AUTO: 3.53 M/UL (ref 4.2–5.3)
SODIUM SERPL-SCNC: 135 MMOL/L (ref 136–145)
WBC # BLD AUTO: 11.1 K/UL (ref 4.6–13.2)

## 2021-03-21 PROCEDURE — 74011000258 HC RX REV CODE- 258: Performed by: INTERNAL MEDICINE

## 2021-03-21 PROCEDURE — 82962 GLUCOSE BLOOD TEST: CPT

## 2021-03-21 PROCEDURE — 74011250636 HC RX REV CODE- 250/636: Performed by: INTERNAL MEDICINE

## 2021-03-21 PROCEDURE — 83735 ASSAY OF MAGNESIUM: CPT

## 2021-03-21 PROCEDURE — 85025 COMPLETE CBC W/AUTO DIFF WBC: CPT

## 2021-03-21 PROCEDURE — 74011000250 HC RX REV CODE- 250: Performed by: STUDENT IN AN ORGANIZED HEALTH CARE EDUCATION/TRAINING PROGRAM

## 2021-03-21 PROCEDURE — 74011636637 HC RX REV CODE- 636/637: Performed by: INTERNAL MEDICINE

## 2021-03-21 PROCEDURE — 74011250636 HC RX REV CODE- 250/636: Performed by: STUDENT IN AN ORGANIZED HEALTH CARE EDUCATION/TRAINING PROGRAM

## 2021-03-21 PROCEDURE — 80053 COMPREHEN METABOLIC PANEL: CPT

## 2021-03-21 PROCEDURE — 65270000029 HC RM PRIVATE

## 2021-03-21 PROCEDURE — 74011250637 HC RX REV CODE- 250/637: Performed by: INTERNAL MEDICINE

## 2021-03-21 PROCEDURE — 74011000250 HC RX REV CODE- 250: Performed by: INTERNAL MEDICINE

## 2021-03-21 RX ORDER — DEXTROSE MONOHYDRATE AND SODIUM CHLORIDE 5; .9 G/100ML; G/100ML
100 INJECTION, SOLUTION INTRAVENOUS CONTINUOUS
Status: DISCONTINUED | OUTPATIENT
Start: 2021-03-21 | End: 2021-03-25 | Stop reason: HOSPADM

## 2021-03-21 RX ORDER — INSULIN GLARGINE 100 [IU]/ML
10 INJECTION, SOLUTION SUBCUTANEOUS
Status: DISCONTINUED | OUTPATIENT
Start: 2021-03-21 | End: 2021-03-25 | Stop reason: HOSPADM

## 2021-03-21 RX ADMIN — MEROPENEM 1 G: 1 INJECTION, POWDER, FOR SOLUTION INTRAVENOUS at 12:55

## 2021-03-21 RX ADMIN — INSULIN GLARGINE 10 UNITS: 100 INJECTION, SOLUTION SUBCUTANEOUS at 22:30

## 2021-03-21 RX ADMIN — NYSTATIN: 100000 POWDER TOPICAL at 21:00

## 2021-03-21 RX ADMIN — Medication 10 ML: at 08:16

## 2021-03-21 RX ADMIN — MEROPENEM 1 G: 1 INJECTION, POWDER, FOR SOLUTION INTRAVENOUS at 03:58

## 2021-03-21 RX ADMIN — INSULIN LISPRO 4 UNITS: 100 INJECTION, SOLUTION INTRAVENOUS; SUBCUTANEOUS at 17:27

## 2021-03-21 RX ADMIN — BRIMONIDINE TARTRATE 1 DROP: 2 SOLUTION OPHTHALMIC at 09:00

## 2021-03-21 RX ADMIN — MEROPENEM 1 G: 1 INJECTION, POWDER, FOR SOLUTION INTRAVENOUS at 18:39

## 2021-03-21 RX ADMIN — TIMOLOL MALEATE 1 DROP: 5 SOLUTION OPHTHALMIC at 16:03

## 2021-03-21 RX ADMIN — ENOXAPARIN SODIUM 40 MG: 40 INJECTION SUBCUTANEOUS at 16:01

## 2021-03-21 RX ADMIN — Medication 10 ML: at 22:00

## 2021-03-21 RX ADMIN — DEXTROSE MONOHYDRATE AND SODIUM CHLORIDE 100 ML/HR: 5; .9 INJECTION, SOLUTION INTRAVENOUS at 12:55

## 2021-03-21 RX ADMIN — INSULIN LISPRO 2 UNITS: 100 INJECTION, SOLUTION INTRAVENOUS; SUBCUTANEOUS at 08:18

## 2021-03-21 RX ADMIN — Medication 10 ML: at 13:00

## 2021-03-21 RX ADMIN — NYSTATIN: 100000 POWDER TOPICAL at 09:00

## 2021-03-21 RX ADMIN — Medication 5 MG: at 22:34

## 2021-03-21 NOTE — PROGRESS NOTES
Problem: Falls - Risk of  Goal: *Absence of Falls  Description: Document [place] Led Fall Risk and appropriate interventions in the flowsheet. Outcome: Progressing Towards Goal  Note: Fall Risk Interventions:       Mentation Interventions: Bed/chair exit alarm         Elimination Interventions:  Toileting schedule/hourly rounds

## 2021-03-21 NOTE — PROGRESS NOTES
Shift summary  Received patient disoriented and altered,only making groaning sounds to touch. Assessment completed per doc flow sheet,ADL care done and patient made comfortable. No acute changes noted. Patient Vitals for the past 12 hrs:   Temp Pulse Resp BP SpO2   03/21/21 0745 97.6 °F (36.4 °C) (!) 114 16 (!) 141/72 99 %   03/21/21 0358 97.8 °F (36.6 °C) (!) 101 16 (!) 140/45 98 %   03/20/21 2304 97.5 °F (36.4 °C) (!) 113 18 (!) 130/47 99 %     Bedside, Verbal and Written shift change report given to FRANCOISE Ortega RN (oncoming nurse) by Brandon Christopher RN   (offgoing nurse). Report included the following information SBAR, Kardex and MAR.

## 2021-03-21 NOTE — PROGRESS NOTES
Reason for Admission:   Chart reviewed; per H&P, patient is [de-identified] 79 y.o. female who was sent over from the nursing home for decreased mental status which has been going on for the past 2days the patient is not able to provide a history but I did get some history from her son on the phone who says that he thinks her mental status changed when they put her on a cefepime type drug several days ago at the nursing home there is no report of chest pain shortness of breath or fever. \"  Patient is a resident of [facility]. RUR Score: Moderate; 22%             PCP: First and Last name:   [facility], Not On File     Name of Practice:    Are you a current patient: Yes/No:    Approximate date of last visit:    Can you participate in a virtual visit if needed:     Do you (patient/family) have any concerns for transition/discharge? Plan for utilizing home health:   TBD    Current Advanced Directive/Advance Care Plan:  DNR      Healthcare Decision Maker:   Click here to complete [address] including selection of the Healthcare Decision Maker Relationship (ie \"Primary\")            Primary Decision Maker: Hemal Forman - Son - [phone]    Transition of Care Plan:      5990 - care manager rounded on patient, she did not open eyes or respond to verbal stimulation. Will continue to follow for discharge needs. Care Management Interventions  PCP Verified by CM:  Yes  Mode of Transport at Discharge: Self  Transition of Care Consult (CM Consult): Discharge Planning, SNF  Current Support Network: [address] [place] [address]  Confirm Follow Up Transport: Other (see comment)(medical transport)  The Plan for Transition of Care is Related to the Following Treatment Goals : return to [facility] when medically stable  Discharge Location  Discharge Placement: Skilled nursing facility

## 2021-03-21 NOTE — PROGRESS NOTES
Comprehensive Nutrition Assessment    Type and Reason for Visit: (P) Initial, Positive nutrition screen, Wound    Nutrition Recommendations/Plan: Recc SLP eval prior to ordering diet with pt with AMS. Recc oral nutritional supplement once diet is advanced. Monitor for adequate PO Intakes. Nutrition Assessment:  (P) PMH: cancer, chronic pain, DM, legally blind, thyroid disease, DVT, HTN, s/p AKA, pt with chronic R nephrostomy tube. Labs reviewed: Na-135, Hpce5f-5.1, POC tests 180-220. Estimated Daily Nutrient Needs:  Energy (kcal): (P) 1550; Weight Used for Energy Requirements: (P) Current  Protein (g): (P) 100; Weight Used for Protein Requirements: (P) Current(1.3g/kg)  Fluid (ml/day): (P) 1550; Method Used for Fluid Requirements: (P) 1 ml/kcal      Nutrition Related Findings:  (P) Labs reviewed: Na-135, Elni2j-6.1, POC tests 180-220. no BM. + small open area to mid sacrum per nursing documentation. Noted this morning documentaiton of pt refusing to drink and eat applesauce. Meds reviewed: Pepcid, lispro, synthroid, melatonin, MVI+M      Wounds:    (P) Pressure injury(small open area to mid sacrum per nursing note)       Current Nutrition Therapies:  No diet orders on file    Anthropometric Measures:  Height:  (P) 5' 3\" (160 cm)  Current Body Wt:  (P) 64.3 kg (141 lb 12.1 oz)   Admission Body Wt:  (P) 141 lb 12.1 oz    Usual Body Wt:  (P) 70.1 kg (154 lb 8.7 oz)(x4 months ago)     Ideal Body Wt:  (P) 115 lbs:  (P) 123.3 %   Adjusted Body Weight:  (P) 156.1; Weight Adjustment for: (P) Amputation   Adjusted BMI:  (P) 27.6    BMI Category:  (P) Overweight (BMI 25.0-29. 9)       Nutrition Diagnosis:   (P) Inadequate oral intake related to (P) cognitive or neurological impairment as evidenced by (P) NPO or clear liquid status due to medical condition    Nutrition Interventions:   Food and/or Nutrient Delivery: (P) Start oral diet, Start oral nutrition supplement  Nutrition Education and Counseling: (P) No recommendations at this time  Coordination of Nutrition Care: (P) Continue to monitor while inpatient    Goals:  (P) Advance diet past clear liquid within the next 3 days       Nutrition Monitoring and Evaluation:   Behavioral-Environmental Outcomes:    Food/Nutrient Intake Outcomes: (P) Diet advancement/tolerance, Food and nutrient intake, Supplement intake  Physical Signs/Symptoms Outcomes: (P) Biochemical data, GI status, Weight, Skin    Discharge Planning:    (P) Too soon to determine     Electronically signed by Rachel Argueta on 3/21/2021 at 9:29 AM

## 2021-03-21 NOTE — PROGRESS NOTES
4797  Bedside and verbal shift change report given to Ariana Maldonado, RN (on coming nurse) by SERGIO Marquez RN (off going nurse). Report included the following information SBAR, Kardex, OR Summary, Intake/Output and MAR.    1049  Notified Dr. Franchesca Simon on pt's diet & vitals, MEWS 4.    1250  Dr. Marian Ward confirms that pt can have Merrem 1g.    1928  Bedside and verbal shift change report given by CHAITANYA Hoffmann (off going nurse) to Macie Shepherd RN(on coming nurse). Report included the following information SBAR, Kardex, OR Summary, Intake/Output and MAR.

## 2021-03-21 NOTE — PROGRESS NOTES
Hospitalist Progress Note    Patient: Maria Victoria Mcclendon MRN: [id number]  CSN: [id number]    YOB: 1940  Age: 79 y.o. Sex: female    DOA: 3/20/2021 LOS:  LOS: 1 day                Assessment and Plan: Active Problems:    UTI (urinary tract infection) (11/26/2018)      AMS (altered mental status) (11/15/2020)      Altered mental status (3/20/2021)        Altered mental status: This is unchanged from yesterday it since we still are not sure whether it is a reaction to the medicine or the urinary tract infection itself    DM: Sliding scale  . We will add Lantus 10 at night to try to get this to come down some A1c was 7.1    UTI: We will continue the meropenem    Hypertension continue home meds:      Hypothyroidism: Continue home meds    She is not eating so we will start some IV fluids. Chief complaint:  65-year-old female with a history of multiple medical problems including chronic indwelling right nephrostomy tube, colostomy, diabetes, legally blind, hypertension, thyroid disease, status post above-knee amputation, chronic kidney disease admitted with altered mental status. Subjective:    She is moaning in response to pain      Review of systems:    General: No fevers or chills. Cardiovascular: No chest pain or pressure. No palpitations. Pulmonary: No shortness of breath. Gastrointestinal: No nausea, vomiting. Objective:    Vital signs/Intake and Output:  Visit Vitals  BP (!) 141/72 (BP 1 Location: Left upper arm, BP Patient Position: At rest)   Pulse (!) 114   Temp 97.6 °F (36.4 °C)   Resp 16   Ht 5' 3\" (1.6 m)   Wt 64.3 kg (141 lb 11.2 oz)   SpO2 99%   BMI 25.10 kg/m²     Current Shift:  03/21 0701 - 03/21 1900  In: 0   Out: 50 [Urine:40]  Last three shifts:  03/19 1901 - 03/21 0700  In: 60 [I.V.:60]  Out: 575 [Urine:425]    Physical Exam:  General: NAD, AAOx3. Non-toxic. HEENT: NC/AT. PERRLA, EOMI.  MMM. Lungs: Nml inspection. CTA B/L. No wheezing, rales or rhonchi. Heart:  S1S2 RRR,  PMI mid 5th IC space. No M/RG. Abdomen: Soft, NT/ND.  BS+. No peritoneal signs. Extremities: No C/C/E. Psych:   Nml affect. Neurologic:  2-12 intact. Strength 5/5 throughout. Sensation symmetrical.          Labs: Results:       Chemistry Recent Labs     03/21/21  0404 03/20/21  1012   * 199*   * 133*   K 5.1 4.6    103   CO2 22 23   BUN 32* 34*   CREA 1.45* 1.48*   CA 9.4 8.7   AGAP 8 7   BUCR 22* 23*   *  --    TP 7.4  --    ALB 2.5*  --    GLOB 4.9*  --    AGRAT 0.5*  --       CBC w/Diff Recent Labs     03/21/21  0404 03/20/21  1012   WBC 11.1 11.3   RBC 3.53* 3.52*   HGB 9.3* 9.8*   HCT 30.6* 30.4*   * 468*   GRANS 76* 80*   LYMPH 12* 11*   EOS 2 3      Cardiac Enzymes No results for input(s): CPK, CKND1, JANE in the last 72 hours. No lab exists for component: CKRMB, TROIP   Coagulation No results for input(s): PTP, INR, APTT, INREXT in the last 72 hours. Lipid Panel Lab Results   Component Value Date/Time    Cholesterol, total 115 08/28/2019 08:22 AM    HDL Cholesterol 39 (L) 08/28/2019 08:22 AM    LDL, calculated 46.2 08/28/2019 08:22 AM    VLDL, calculated 29.8 08/28/2019 08:22 AM    Triglyceride 149 08/28/2019 08:22 AM    CHOL/HDL Ratio 2.9 08/28/2019 08:22 AM      BNP No results for input(s): BNPP in the last 72 hours.    Liver Enzymes Recent Labs     03/21/21  0404   TP 7.4   ALB 2.5*   *      Thyroid Studies Lab Results   Component Value Date/Time    TSH 3.40 01/24/2020 08:35 AM        Procedures/imaging: see electronic medical records for all procedures/Xrays and details which were not copied into this note but were reviewed prior to creation of Plan

## 2021-03-22 ENCOUNTER — HOSPITAL ENCOUNTER (OUTPATIENT)
Dept: INTERVENTIONAL RADIOLOGY/VASCULAR | Age: 81
Discharge: HOME | End: 2021-03-22
Attending: UROLOGY

## 2021-03-22 PROBLEM — A49.8 PSEUDOMONAS AERUGINOSA INFECTION: Status: ACTIVE | Noted: 2021-03-22

## 2021-03-22 LAB
ALBUMIN SERPL-MCNC: 2.3 G/DL (ref 3.4–5)
ALBUMIN/GLOB SERPL: 0.5 {RATIO} (ref 0.8–1.7)
ALP SERPL-CCNC: 153 U/L (ref 45–117)
ALT SERPL-CCNC: 21 U/L (ref 13–56)
ANION GAP SERPL CALC-SCNC: 7 MMOL/L (ref 3–18)
AST SERPL-CCNC: 11 U/L (ref 10–38)
BASOPHILS # BLD: 0 K/UL (ref 0–0.1)
BASOPHILS NFR BLD: 0 % (ref 0–2)
BILIRUB SERPL-MCNC: 0.2 MG/DL (ref 0.2–1)
BUN SERPL-MCNC: 28 MG/DL (ref 7–18)
BUN/CREAT SERPL: 23 (ref 12–20)
CALCIUM SERPL-MCNC: 8.9 MG/DL (ref 8.5–10.1)
CHLORIDE SERPL-SCNC: 109 MMOL/L (ref 100–111)
CO2 SERPL-SCNC: 22 MMOL/L (ref 21–32)
CREAT SERPL-MCNC: 1.22 MG/DL (ref 0.6–1.3)
DIFFERENTIAL METHOD BLD: ABNORMAL
EOSINOPHIL # BLD: 0.3 K/UL (ref 0–0.4)
EOSINOPHIL NFR BLD: 3 % (ref 0–5)
ERYTHROCYTE [DISTWIDTH] IN BLOOD BY AUTOMATED COUNT: 14.4 % (ref 11.6–14.5)
GLOBULIN SER CALC-MCNC: 4.3 G/DL (ref 2–4)
GLUCOSE BLD STRIP.AUTO-MCNC: 168 MG/DL (ref 70–110)
GLUCOSE BLD STRIP.AUTO-MCNC: 174 MG/DL (ref 70–110)
GLUCOSE BLD STRIP.AUTO-MCNC: 190 MG/DL (ref 70–110)
GLUCOSE BLD STRIP.AUTO-MCNC: 194 MG/DL (ref 70–110)
GLUCOSE SERPL-MCNC: 196 MG/DL (ref 74–99)
HCT VFR BLD AUTO: 28.6 % (ref 35–45)
HGB BLD-MCNC: 9.2 G/DL (ref 12–16)
LYMPHOCYTES # BLD: 1.2 K/UL (ref 0.9–3.6)
LYMPHOCYTES NFR BLD: 16 % (ref 21–52)
MAGNESIUM SERPL-MCNC: 1.8 MG/DL (ref 1.6–2.6)
MCH RBC QN AUTO: 27.9 PG (ref 24–34)
MCHC RBC AUTO-ENTMCNC: 32.2 G/DL (ref 31–37)
MCV RBC AUTO: 86.7 FL (ref 74–97)
MONOCYTES # BLD: 0.7 K/UL (ref 0.05–1.2)
MONOCYTES NFR BLD: 9 % (ref 3–10)
NEUTS SEG # BLD: 5.5 K/UL (ref 1.8–8)
NEUTS SEG NFR BLD: 72 % (ref 40–73)
PLATELET # BLD AUTO: 406 K/UL (ref 135–420)
PMV BLD AUTO: 8.9 FL (ref 9.2–11.8)
POTASSIUM SERPL-SCNC: 4.2 MMOL/L (ref 3.5–5.5)
PROT SERPL-MCNC: 6.6 G/DL (ref 6.4–8.2)
RBC # BLD AUTO: 3.3 M/UL (ref 4.2–5.3)
SODIUM SERPL-SCNC: 138 MMOL/L (ref 136–145)
WBC # BLD AUTO: 7.7 K/UL (ref 4.6–13.2)

## 2021-03-22 PROCEDURE — 83735 ASSAY OF MAGNESIUM: CPT

## 2021-03-22 PROCEDURE — 65270000029 HC RM PRIVATE

## 2021-03-22 PROCEDURE — 80053 COMPREHEN METABOLIC PANEL: CPT

## 2021-03-22 PROCEDURE — 74011250636 HC RX REV CODE- 250/636: Performed by: INTERNAL MEDICINE

## 2021-03-22 PROCEDURE — 74011250637 HC RX REV CODE- 250/637: Performed by: INTERNAL MEDICINE

## 2021-03-22 PROCEDURE — 74011000258 HC RX REV CODE- 258: Performed by: INTERNAL MEDICINE

## 2021-03-22 PROCEDURE — 74011250636 HC RX REV CODE- 250/636: Performed by: STUDENT IN AN ORGANIZED HEALTH CARE EDUCATION/TRAINING PROGRAM

## 2021-03-22 PROCEDURE — 85025 COMPLETE CBC W/AUTO DIFF WBC: CPT

## 2021-03-22 PROCEDURE — 36415 COLL VENOUS BLD VENIPUNCTURE: CPT

## 2021-03-22 PROCEDURE — 74011636637 HC RX REV CODE- 636/637: Performed by: INTERNAL MEDICINE

## 2021-03-22 PROCEDURE — 74011000250 HC RX REV CODE- 250: Performed by: STUDENT IN AN ORGANIZED HEALTH CARE EDUCATION/TRAINING PROGRAM

## 2021-03-22 PROCEDURE — 82962 GLUCOSE BLOOD TEST: CPT

## 2021-03-22 PROCEDURE — 74011000250 HC RX REV CODE- 250: Performed by: INTERNAL MEDICINE

## 2021-03-22 RX ORDER — METOPROLOL TARTRATE 5 MG/5ML
5 INJECTION INTRAVENOUS EVERY 6 HOURS
Status: DISCONTINUED | OUTPATIENT
Start: 2021-03-22 | End: 2021-03-25 | Stop reason: HOSPADM

## 2021-03-22 RX ADMIN — Medication 10 ML: at 22:21

## 2021-03-22 RX ADMIN — ENOXAPARIN SODIUM 40 MG: 40 INJECTION SUBCUTANEOUS at 17:17

## 2021-03-22 RX ADMIN — INSULIN LISPRO 2 UNITS: 100 INJECTION, SOLUTION INTRAVENOUS; SUBCUTANEOUS at 13:28

## 2021-03-22 RX ADMIN — NYSTATIN: 100000 POWDER TOPICAL at 09:00

## 2021-03-22 RX ADMIN — MEROPENEM 1 G: 1 INJECTION, POWDER, FOR SOLUTION INTRAVENOUS at 03:27

## 2021-03-22 RX ADMIN — NYSTATIN: 100000 POWDER TOPICAL at 22:22

## 2021-03-22 RX ADMIN — Medication 10 ML: at 06:00

## 2021-03-22 RX ADMIN — DEXTROSE MONOHYDRATE AND SODIUM CHLORIDE 100 ML/HR: 5; .9 INJECTION, SOLUTION INTRAVENOUS at 22:24

## 2021-03-22 RX ADMIN — INSULIN LISPRO 2 UNITS: 100 INJECTION, SOLUTION INTRAVENOUS; SUBCUTANEOUS at 22:21

## 2021-03-22 RX ADMIN — INSULIN LISPRO 2 UNITS: 100 INJECTION, SOLUTION INTRAVENOUS; SUBCUTANEOUS at 07:30

## 2021-03-22 RX ADMIN — INSULIN GLARGINE 10 UNITS: 100 INJECTION, SOLUTION SUBCUTANEOUS at 22:21

## 2021-03-22 RX ADMIN — INSULIN LISPRO 2 UNITS: 100 INJECTION, SOLUTION INTRAVENOUS; SUBCUTANEOUS at 17:17

## 2021-03-22 RX ADMIN — DEXTROSE MONOHYDRATE AND SODIUM CHLORIDE 100 ML/HR: 5; .9 INJECTION, SOLUTION INTRAVENOUS at 08:23

## 2021-03-22 RX ADMIN — MEROPENEM 2 G: 1 INJECTION, POWDER, FOR SOLUTION INTRAVENOUS at 13:31

## 2021-03-22 RX ADMIN — METOPROLOL TARTRATE 5 MG: 5 INJECTION, SOLUTION INTRAVENOUS at 22:20

## 2021-03-22 RX ADMIN — Medication 10 ML: at 13:32

## 2021-03-22 RX ADMIN — INSULIN LISPRO 2 UNITS: 100 INJECTION, SOLUTION INTRAVENOUS; SUBCUTANEOUS at 03:49

## 2021-03-22 NOTE — PROGRESS NOTES
Problem: Risk for Spread of Infection  Goal: Prevent transmission of infectious organism to others  Description: Prevent the transmission of infectious organisms to other patients, staff members, and visitors. Outcome: Progressing Towards Goal     Problem: Patient Education:  Go to Education Activity  Goal: Patient/Family Education  Outcome: Progressing Towards Goal     Problem: Falls - Risk of  Goal: *Absence of Falls  Description: Document Dereje Bare Fall Risk and appropriate interventions in the flowsheet. Outcome: Progressing Towards Goal  Note: Fall Risk Interventions:       Mentation Interventions: Bed/chair exit alarm         Elimination Interventions: Bed/chair exit alarm    History of Falls Interventions: Bed/chair exit alarm         Problem: Patient Education: Go to Patient Education Activity  Goal: Patient/Family Education  Outcome: Progressing Towards Goal     Problem: Pressure Injury - Risk of  Goal: *Prevention of pressure injury  Description: Document Rickie Scale and appropriate interventions in the flowsheet. Outcome: Progressing Towards Goal  Note: Pressure Injury Interventions:  Sensory Interventions: Chair cushion, Float heels    Moisture Interventions: Internal/External fecal devices    Activity Interventions: Chair cushion, Pressure redistribution bed/mattress(bed type)    Mobility Interventions: Pressure redistribution bed/mattress (bed type)    Nutrition Interventions: Discuss nutritional consult with provider    Friction and Shear Interventions: Apply protective barrier, creams and emollients                Problem: Patient Education: Go to Patient Education Activity  Goal: Patient/Family Education  Outcome: Progressing Towards Goal     Problem: Diabetes Self-Management  Goal: *Disease process and treatment process  Description: Define diabetes and identify own type of diabetes; list 3 options for treating diabetes.   Outcome: Progressing Towards Goal  Goal: *Incorporating nutritional management into lifestyle  Description: Describe effect of type, amount and timing of food on blood glucose; list 3 methods for planning meals. Outcome: Progressing Towards Goal  Goal: *Incorporating physical activity into lifestyle  Description: State effect of exercise on blood glucose levels. Outcome: Progressing Towards Goal  Goal: *Developing strategies to promote health/change behavior  Description: Define the ABC's of diabetes; identify appropriate screenings, schedule and personal plan for screenings. Outcome: Progressing Towards Goal  Goal: *Using medications safely  Description: State effect of diabetes medications on diabetes; name diabetes medication taking, action and side effects. Outcome: Progressing Towards Goal  Goal: *Monitoring blood glucose, interpreting and using results  Description: Identify recommended blood glucose targets  and personal targets. Outcome: Progressing Towards Goal  Goal: *Prevention, detection, treatment of acute complications  Description: List symptoms of hyper- and hypoglycemia; describe how to treat low blood sugar and actions for lowering  high blood glucose level. Outcome: Progressing Towards Goal  Goal: *Prevention, detection and treatment of chronic complications  Description: Define the natural course of diabetes and describe the relationship of blood glucose levels to long term complications of diabetes.   Outcome: Progressing Towards Goal  Goal: *Developing strategies to address psychosocial issues  Description: Describe feelings about living with diabetes; identify support needed and support network  Outcome: Progressing Towards Goal  Goal: *Insulin pump training  Outcome: Progressing Towards Goal  Goal: *Sick day guidelines  Outcome: Progressing Towards Goal  Goal: *Patient Specific Goal (EDIT GOAL, INSERT TEXT)  Outcome: Progressing Towards Goal     Problem: Patient Education: Go to Patient Education Activity  Goal: Patient/Family Education  Outcome: Progressing Towards Goal

## 2021-03-22 NOTE — PROGRESS NOTES
Pharmacy Dosing Services: [address] [place] [address]    Pharmacist Renal Dosing Progress Note for    Physician Dr. Uriel Corley    The following medication: Manje[address] was automatically dose-adjusted per [facility] P&T Committee Protocol, with respect to renal function. Consult provided for this   79 y.o. , female , for the indication of UTI. Pt Weight:   Wt Readings from Last 1 Encounters:   03/20/21 64.3 kg (141 lb 11.2 oz)         Previous Regimen Merrem 1g IV q8h   Serum Creatinine Lab Results   Component Value Date/Time    Creatinine 1.22 03/22/2021 01:05 AM    Creatinine, POC 1.1 01/16/2020 09:28 AM       Creatinine Clearance Estimated Creatinine Clearance: 32.7 mL/min (based on SCr of 1.22 mg/dL). BUN Lab Results   Component Value Date/Time    BUN 28 (H) 03/22/2021 01:05 AM    BUN, POC 17 01/16/2020 09:28 AM       Dosage changed to:  Merrem 500mg IV q8h      Pharmacy to continue to monitor patient daily. Will make dosage adjustments based upon changing renal function. [address] PharmD.  Contact information: [phone]

## 2021-03-22 NOTE — WOUND CARE
Wound Care Note:    Chart audited for low carolyn score, patient with high risk for skin breakdown, no documented wounds at time of audit.      Skin Care & Pressure Relief Recommendations  Minimize layers of linen  Pads under patient to optimize support surface  Turn/reposition approximately every 2 hours  Pillow wedges  Manage incontinence   Promote continence; Skin Protective lotion/cream to buttocks and sacrum daily and as needed with incontinence care  Offload heels pillows    Consult wound care if any wounds/ skin care needs noted during admission

## 2021-03-22 NOTE — PROGRESS NOTES
Bedside and Verbal shift change report given to [address] (oncoming nurse) by Kana Aparicio (offgoing nurse). Report included the following information SBAR, Kardex, Intake/Output, MAR and Recent Results. 1949- Pt /64 and . Pt nonverbal and reacts to physical stimuli w/ moaning. Paged Dr. Nuha Lucero received for NPO diet order and speech evaluation. Orders received for metoprolol 5 mg IV q6. Shift Summary-   Patient Vitals for the past 12 hrs:   Temp Pulse Resp BP SpO2   03/23/21 0726 98.2 °F (36.8 °C) (!) 105 18 (!) 154/64 100 %   03/23/21 0506 98.4 °F (36.9 °C) (!) 104 17 (!) 153/55 97 %   03/22/21 2347 98.4 °F (36.9 °C) 99 17 (!) 147/57 100 %   03/22/21 1948 98 °F (36.7 °C) (!) 107 18 (!) 152/64 98 %   Pt had uneventful shift    Bedside and Verbal shift change report given to FRIDA Galvan (oncoming nurse) by [address] (offgoing nurse). Report included the following information SBAR, Kardex, Intake/Output, MAR and Recent Results.

## 2021-03-22 NOTE — PROGRESS NOTES
2130- Received report from Teresa, [address]- Attempted to give pt morning meds however pt refusing oral meds, Will contact pharmacy for alternative. 1030- Pt repositioned to right side. 1330- Rounded on pt, pt resting IV abx given. Pt repositioned to left side. Will continue to monitor. 1700-Pt repositioned.

## 2021-03-22 NOTE — PROGRESS NOTES
Spoke with Cipriano Vincent RN about routine nephrostomy tube exchange, Per IR Dr. Claudette Turpin- procedure on hold until possible sepsis is resolved. Will continue to follow.

## 2021-03-22 NOTE — CONSULTS
[facility]  (A Division of [address] [place] [address])      Consultation Note      Date of Admission: 3/20/2021    Date of Note: 3/22/2021    Referred by: Horacio Mayberry MD    Reason for Referral: UTI      Current Antimicrobials:    Prior Antimicrobials:  1. Meropenem IV (3/20-) #2 1. Cefepime IV (3/17-20)       Assessment: Rec / Plan:   Pseudomonas aeruginosa R pyelonephritis NT-associated. This bug is a real  - the Nephrostomy tube needs to go to effect a cure. Her leukocytosis has resolved which I think it's safe at any point onward to do this. Will need midline/PICC to give her meropenem for 14d    Ertapenem won't work. Other options would be ceftazidime (doesn't cause encephalopathy like cefepime) or an aminoglycoside. ->Meropenem #2/14    Midline  Meropenem at antipseudomonal/renal dosing 2gm IV 12h (vice q8h with normal beans); terminate [id number]  Weekly labs (qMonday) -CBC with diff and BMP; fax to me [phone]. Doesn't need to see me in follow up   Drug-induced encephalopathy  Give it 5d from last dose to clear her brain. It'll get better. Chronic R renal calcinosis  Needs a new tube    Chronic debility/bed-ridden  She'll be back    HTN          Microbiology:  3/20 - UA (+) GNR       3/10 - UA (+) Pseudomonas aeruginosa (R-FQ)     11/15 - UA (-)     11/05 - UA (+) M morganii, P mirabilis, and  E faecalis    Lines / Catheters: peripheral      HPI:  CC:  (moaning)  Ms Bonnie Ferrell is a 80y gentle soul WF with chronic R NT for renal calcinosus who has developed yet another infection of tub/R kidney; pt is bed-ridden/short distance from anus to NT. Had urine sent from NH on 3/10 that grew out a FQ-resistant Pseudomonas aeruginosa for which she was appropriately started on cefepime 3/17 but developed worsening encephalopathy since prompting ED visit on 3/20 where she was admitted and switched to meropenem (renal-dosed).   During her last admission for R NT infection (M morganii) she was initially treated with cefepime but became confused for which it was eventually changed to ertapenem, and she did fine. Since admission 3/20, she has been on low-dosed meropenem; last dose of cefepime was just before admission here (Saturday) -- so I expect her encephalopathy to last a few more days before clearing. Active Hospital Problems    Diagnosis Date Noted    Altered mental status 03/20/2021    AMS (altered mental status) 11/15/2020    UTI (urinary tract infection) 11/26/2018     Past Medical History:   Diagnosis Date    Cancer [facility] [address]     vaginal and colon cancer    Cancer [facility] [address] [facility]     Chronic pain     arthritis    Diabetes ([facility] [address] [facility]     DVT (deep venous thrombosis) (HCC)     Hypertension     Legally blind     Thyroid disease      Past Surgical History:   Procedure Laterality Date    HX ABOVE KNEE AMPUTATION Right     HX COLOSTOMY      HX ORTHOPAEDIC      HX OTHER SURGICAL      kidney drainage tube    HX UROLOGICAL      kidney removal    IR EXCHANGE NEPHRO PERC RT SI  1/28/2019    IR EXCHANGE NEPHRO PERC RT SI  3/21/2019    IR EXCHANGE NEPHRO PERC RT SI  5/13/2019    IR EXCHANGE NEPHRO PERC RT SI  6/27/2019    IR EXCHANGE NEPHRO PERC RT SI  8/16/2019    IR EXCHANGE NEPHRO PERC RT SI  10/3/2019    IR EXCHANGE NEPHRO PERC RT SI  11/18/2019    IR EXCHANGE NEPHRO PERC RT SI  1/6/2020    IR EXCHANGE NEPHRO PERC RT SI  2/24/2020    IR EXCHANGE NEPHRO PERC RT SI  4/13/2020    IR EXCHANGE NEPHRO PERC RT SI  6/1/2020    IR EXCHANGE NEPHRO PERC RT SI  6/19/2020    IR EXCHANGE NEPHRO PERC RT SI  8/10/2020    IR EXCHANGE NEPHRO PERC RT SI  9/30/2020    IR EXCHANGE NEPHRO PERC RT SI  11/5/2020    IR EXCHANGE NEPHRO PERC RT SI  12/16/2020    IR EXCHANGE NEPHRO PERC RT SI  2/1/2021    IR NEPHROSTOMY PERC RT PLC CATH  SI  6/21/2020     History reviewed. No pertinent family history.   Social History     Socioeconomic History    Marital status:      Spouse name: Not on file    Number of children: Not on file    Years of education: Not on file    Highest education level: Not on file   Occupational History    Not on file   Social Needs    Financial resource strain: Not on file    Food insecurity     Worry: Not on file     Inability: Not on file    Transportation needs     Medical: Not on file     Non-medical: Not on file   Tobacco Use    Smoking status: Never Smoker    Smokeless tobacco: Never Used   Substance and Sexual Activity    Alcohol use: No    Drug use: Never    Sexual activity: Not on file   Lifestyle    Physical activity     Days per week: Not on file     Minutes per session: Not on file    Stress: Not on file   Relationships    Social connections     Talks on phone: Not on file     Gets together: Not on file     Attends Zoroastrian service: Not on file     Active member of club or organization: Not on file     Attends meetings of clubs or organizations: Not on file     Relationship status: Not on file    Intimate partner violence     Fear of current or ex partner: Not on file     Emotionally abused: Not on file     Physically abused: Not on file     Forced sexual activity: Not on file   Other Topics Concern     Service Not Asked    Blood Transfusions Not Asked    Caffeine Concern Not Asked    Occupational Exposure Not Asked   [employer] [place] Hazards Not Asked    Sleep Concern Not Asked    Stress Concern Not Asked    Weight Concern Not Asked    Special Diet Not Asked    Back Care Not Asked    Exercise Not Asked    Bike Helmet Not Asked   [address] Not Asked    Self-Exams Not Asked   Social History Narrative    Not on file       Allergies:  Morphine and Pcn [penicillins]     Medications:  Current Facility-Administered Medications   Medication Dose Route Frequency    meropenem (MERREM) 2 g in 0.9% sodium chloride 100 mL IVPB  2 g IntraVENous Q12H    dextrose 5% and 0.9% NaCl infusion  100 mL/hr IntraVENous CONTINUOUS    insulin glargine (LANTUS) injection 10 Units  10 Units SubCUTAneous QHS    sodium chloride (NS) flush 5-40 mL  5-40 mL IntraVENous Q8H    sodium chloride (NS) flush 5-40 mL  5-40 mL IntraVENous PRN    enoxaparin (LOVENOX) injection 40 mg  40 mg SubCUTAneous Q24H    amLODIPine (NORVASC) tablet 5 mg  5 mg Oral DAILY    atorvastatin (LIPITOR) tablet 20 mg  20 mg Oral DAILY    famotidine (PEPCID) tablet 20 mg  20 mg Oral QPM    levothyroxine (SYNTHROID) tablet 50 mcg  50 mcg Oral ACB    LORazepam (ATIVAN) tablet 0.5 mg  0.5 mg Oral BID PRN    magnesium hydroxide (MILK OF MAGNESIA) 400 mg/5 mL oral suspension 5 mL  5 mL Oral DAILY PRN    melatonin (rapid dissolve) tablet 5 mg  5 mg Oral QHS    metoprolol succinate (TOPROL-XL) XL tablet 50 mg  50 mg Oral DAILY    multivitamin, tx-iron-ca-min (THERA-M w/ IRON) tablet 1 Tab  1 Tab Oral DAILY    ondansetron hcl (ZOFRAN) tablet 4 mg  4 mg Oral Q8H PRN    [Held by provider] timolol (TIMOPTIC) 0.5 % ophthalmic solution 1 Drop  1 Drop Both Eyes BID    And    [Held by provider] brimonidine (ALPHAGAN) 0.2 % ophthalmic solution 1 Drop  1 Drop Both Eyes Q12H    nystatin (MYCOSTATIN) 100,000 unit/gram powder   Topical BID    insulin lispro (HUMALOG) injection   SubCUTAneous AC&HS    glucose chewable tablet 16 g  4 Tab Oral PRN    glucagon (GLUCAGEN) injection 1 mg  1 mg IntraMUSCular PRN        ROS:  Review of systems not obtained due to patient factors. Physical Exam:    HPI:  Temp (24hrs), Av.9 °F (36.6 °C), Min:97.5 °F (36.4 °C), Max:98.4 °F (36.9 °C)    Visit Vitals  BP (!) 147/45 (BP 1 Location: Left arm, BP Patient Position: At rest)   Pulse (!) 110   Temp 98 °F (36.7 °C)   Resp 18   Ht 5' 3\" (1.6 m)   Wt 64.3 kg (141 lb 11.2 oz)   SpO2 100%   BMI 25.10 kg/m²       General: Well developed, well nourished 79 y.o. WHITE female in no acute distress.   ENT: ENT exam normal, no neck nodes or sinus tenderness  Head: normocephalic, without obvious abnormality  Mouth:  mucous membranes moist, pharynx normal without lesions  Neck: supple, symmetrical, trachea midline   Cardio:  regular rate and rhythm, S1, S2 normal, no murmur, click, rub or gallop  Chest: inspection normal - no chest wall deformities or tenderness, respiratory effort normal  Lungs: clear to auscultation, no wheezes or rales and unlabored breathing  Abdomen: soft, non-tender. Bowel sounds normal. No masses, no organomegaly.   Extremities:  no redness or tenderness in the calves or thighs, no edema  Neuro: moaning       Lab results:    Chemistry  Recent Labs     03/22/21  0105 03/21/21  0404 03/20/21  1012   * 134* 199*    135* 133*   K 4.2 5.1 4.6    105 103   CO2 22 22 23   BUN 28* 32* 34*   CREA 1.22 1.45* 1.48*   CA 8.9 9.4 8.7   AGAP 7 8 7   BUCR 23* 22* 23*   * 179*  --    TP 6.6 7.4  --    ALB 2.3* 2.5*  --    GLOB 4.3* 4.9*  --    AGRAT 0.5* 0.5*  --        CBC w/ Diff  Recent Labs     03/22/21  0105 03/21/21  0404 03/20/21  1012   WBC 7.7 11.1 11.3   RBC 3.30* 3.53* 3.52*   HGB 9.2* 9.3* 9.8*   HCT 28.6* 30.6* 30.4*    485* 468*   GRANS 72 76* 80*   LYMPH 16* 12* 11*   EOS 3 2 3       Microbiology  All Micro Results     Procedure Component Value Units Date/Time    CULTURE, URINE [[id number]]  (Abnormal) Collected: 03/20/21 1011    Order Status: Completed Specimen: Cath Urine Updated: 03/22/21 1043     Special Requests: NO SPECIAL REQUESTS        Liberal Count --        >100,000  COLONIES/mL       Culture result: GRAM NEGATIVE RODS              Isiah Estrada MD  Cell [phone]  [facility]  3/22/2021   11:58 AM

## 2021-03-22 NOTE — PROGRESS NOTES
D/C Plan: [address] met with pt's son, Genesis Gutierrez ([phone]), earlier today and he has indicated plan is for pt to return to [facility] when medically stable. [facility] is aware of plan return when medially stable. PT WILL NEED TO HAVE A NEGATIVE COVID (PCR) TEST TO ASSIST WITH TRANSITION BACK TO [facility].       Transition of Care Plan:     Communication to Patient/Family:  Met with patient and family, and they are agreeable to the transition plan. The Plan for Transition of Care is related to the following treatment goals: SNF/LTC    The Patient and/or patient representative was provided with a choice of provider and agrees   with the discharge plan. Yes [x] No []    Freedom of choice list was provided with basic dialogue that supports the patient's individualized plan of care/goals and shares the quality data associated with the providers. Yes [x] No []    SNF/Rehab Transition:  Patient has been accepted to [facility] at [address] [place] [address], [address] [place] [address] for SNF and meets criteria for admission. Patient will transported by medial transport and expected to leave when medically stable. Communication to SNF/Rehab:  Bedside RN,  has been notified to update the transition plan to the facility and call report [phone]. Discharge information has been updated on the AVS and communicated through [facility] or [facility]. Discharge instructions to be fax'd to facility at [phone]     Please include all hard scripts for controlled substances, med rec and dc summary, and AVS in packet. Please medicate for pain prior to dc if possible and needed to help offset delay when patient first arrives to facility.     Reviewed and confirmed with facility, [facility] can manage the patient care needs for the following:     Jenny Costa with (X) only those applicable:  Medication:  []Medications are available at the facility  []IV Antibiotics    []Controlled Substance  hard copies available sent. []Weekly Labs    Equipment:  []CPAP/BiPAP  []Wound Vacuum  []Wright or Urinary Device  []PICC/Central Line  []Nebulizer  []Ventilator    Treatment:  []Isolation (for MRSA, VRE, etc.)  []Surgical Drain Management  []Tracheostomy Care  []Dressing Changes  []Dialysis with transportation  []PEG Care  []Oxygen  []Daily Weights for Heart Failure    Dietary:  []Any diet limitations  []Tube Feedings   []Total Parenteral Management (TPN)    Financial Resources:  []Medicaid Application Completed    []UAI Completed and copy given to pt/family. []A screening has previously been completed. []Level II Completed    [] Private pay individual who will not become   financially eligible for Medicaid within 6 months from admission to a [address] [place] [address] facility. [] Individual refused to have screening conducted. []Medicaid Application Completed    []The screening denied because it was determined individual did not need/did not qualify for nursing facility level of care. [] Out of state residents seeking direct admission to a [address] facility. [] Individuals who are inpatients of an out of state hospital, or in state or out of state veterans/ hospital and seek direct admission to a [address] facility  [] Individuals who are pateints or residents of a state owned/operated facility that is licensed by Department of [employer] ([facility]) and seek direct admission to [address] [place] [address]  [] A screening not required for enrollment in [address] [address] [address] services as set out in [address] [place] [address] 01-  [] [facility]) staff shall perform screenings of the [facility] clients. Advanced Care Plan:  []Surrogate Decision Maker of Care  []POA  []Communicated Code Status and copy sent. Other:           Care Management Interventions  PCP Verified by CM:  Yes  Mode of Transport at Discharge: Self  Transition of Care Consult (CM Consult): Discharge Planning, SNF  Current Support Network: Nursing Facility  Confirm Follow Up Transport: Other (see comment)(medical transport)  The Plan for Transition of Care is Related to the Following Treatment Goals : return [facility] when medically stable  Discharge Location  Discharge Placement: Skilled nursing facility

## 2021-03-23 LAB
ALBUMIN SERPL-MCNC: 2.3 G/DL (ref 3.4–5)
ALBUMIN/GLOB SERPL: 0.5 {RATIO} (ref 0.8–1.7)
ALP SERPL-CCNC: 141 U/L (ref 45–117)
ALT SERPL-CCNC: 21 U/L (ref 13–56)
ANION GAP SERPL CALC-SCNC: 5 MMOL/L (ref 3–18)
AST SERPL-CCNC: 9 U/L (ref 10–38)
BACTERIA SPEC CULT: ABNORMAL
BASOPHILS # BLD: 0 K/UL (ref 0–0.1)
BASOPHILS NFR BLD: 0 % (ref 0–2)
BILIRUB SERPL-MCNC: 0.1 MG/DL (ref 0.2–1)
BUN SERPL-MCNC: 21 MG/DL (ref 7–18)
BUN/CREAT SERPL: 18 (ref 12–20)
CALCIUM SERPL-MCNC: 8.3 MG/DL (ref 8.5–10.1)
CC UR VC: ABNORMAL
CHLORIDE SERPL-SCNC: 115 MMOL/L (ref 100–111)
CO2 SERPL-SCNC: 23 MMOL/L (ref 21–32)
CREAT SERPL-MCNC: 1.19 MG/DL (ref 0.6–1.3)
DIFFERENTIAL METHOD BLD: ABNORMAL
EOSINOPHIL # BLD: 0.3 K/UL (ref 0–0.4)
EOSINOPHIL NFR BLD: 4 % (ref 0–5)
ERYTHROCYTE [DISTWIDTH] IN BLOOD BY AUTOMATED COUNT: 14.8 % (ref 11.6–14.5)
GLOBULIN SER CALC-MCNC: 4.3 G/DL (ref 2–4)
GLUCOSE BLD STRIP.AUTO-MCNC: 153 MG/DL (ref 70–110)
GLUCOSE BLD STRIP.AUTO-MCNC: 185 MG/DL (ref 70–110)
GLUCOSE BLD STRIP.AUTO-MCNC: 202 MG/DL (ref 70–110)
GLUCOSE SERPL-MCNC: 145 MG/DL (ref 74–99)
HCT VFR BLD AUTO: 29 % (ref 35–45)
HGB BLD-MCNC: 9 G/DL (ref 12–16)
LYMPHOCYTES # BLD: 1.3 K/UL (ref 0.9–3.6)
LYMPHOCYTES NFR BLD: 18 % (ref 21–52)
MAGNESIUM SERPL-MCNC: 1.7 MG/DL (ref 1.6–2.6)
MCH RBC QN AUTO: 27.3 PG (ref 24–34)
MCHC RBC AUTO-ENTMCNC: 31 G/DL (ref 31–37)
MCV RBC AUTO: 87.9 FL (ref 74–97)
MONOCYTES # BLD: 0.7 K/UL (ref 0.05–1.2)
MONOCYTES NFR BLD: 10 % (ref 3–10)
NEUTS SEG # BLD: 4.7 K/UL (ref 1.8–8)
NEUTS SEG NFR BLD: 68 % (ref 40–73)
PLATELET # BLD AUTO: 371 K/UL (ref 135–420)
PMV BLD AUTO: 8.6 FL (ref 9.2–11.8)
POTASSIUM SERPL-SCNC: 3.7 MMOL/L (ref 3.5–5.5)
PROT SERPL-MCNC: 6.6 G/DL (ref 6.4–8.2)
RBC # BLD AUTO: 3.3 M/UL (ref 4.2–5.3)
SERVICE CMNT-IMP: ABNORMAL
SODIUM SERPL-SCNC: 143 MMOL/L (ref 136–145)
WBC # BLD AUTO: 6.9 K/UL (ref 4.6–13.2)

## 2021-03-23 PROCEDURE — 85025 COMPLETE CBC W/AUTO DIFF WBC: CPT

## 2021-03-23 PROCEDURE — 74011000250 HC RX REV CODE- 250: Performed by: INTERNAL MEDICINE

## 2021-03-23 PROCEDURE — 74011250637 HC RX REV CODE- 250/637: Performed by: INTERNAL MEDICINE

## 2021-03-23 PROCEDURE — 65270000029 HC RM PRIVATE

## 2021-03-23 PROCEDURE — 80053 COMPREHEN METABOLIC PANEL: CPT

## 2021-03-23 PROCEDURE — 74011636637 HC RX REV CODE- 636/637: Performed by: INTERNAL MEDICINE

## 2021-03-23 PROCEDURE — 74011250636 HC RX REV CODE- 250/636: Performed by: INTERNAL MEDICINE

## 2021-03-23 PROCEDURE — 92610 EVALUATE SWALLOWING FUNCTION: CPT

## 2021-03-23 PROCEDURE — 74011000258 HC RX REV CODE- 258: Performed by: INTERNAL MEDICINE

## 2021-03-23 PROCEDURE — 83735 ASSAY OF MAGNESIUM: CPT

## 2021-03-23 PROCEDURE — 74011250637 HC RX REV CODE- 250/637: Performed by: FAMILY MEDICINE

## 2021-03-23 PROCEDURE — 82962 GLUCOSE BLOOD TEST: CPT

## 2021-03-23 RX ORDER — ACETAMINOPHEN 500 MG
1000 TABLET ORAL
Status: DISCONTINUED | OUTPATIENT
Start: 2021-03-23 | End: 2021-03-25 | Stop reason: HOSPADM

## 2021-03-23 RX ADMIN — MEROPENEM 2 G: 1 INJECTION, POWDER, FOR SOLUTION INTRAVENOUS at 00:02

## 2021-03-23 RX ADMIN — INSULIN GLARGINE 10 UNITS: 100 INJECTION, SOLUTION SUBCUTANEOUS at 22:55

## 2021-03-23 RX ADMIN — METOPROLOL TARTRATE 5 MG: 5 INJECTION, SOLUTION INTRAVENOUS at 16:39

## 2021-03-23 RX ADMIN — ONDANSETRON HYDROCHLORIDE 4 MG: 4 TABLET, FILM COATED ORAL at 22:54

## 2021-03-23 RX ADMIN — NYSTATIN: 100000 POWDER TOPICAL at 09:00

## 2021-03-23 RX ADMIN — METOPROLOL TARTRATE 5 MG: 5 INJECTION, SOLUTION INTRAVENOUS at 05:45

## 2021-03-23 RX ADMIN — LORAZEPAM 0.5 MG: 0.5 TABLET ORAL at 23:28

## 2021-03-23 RX ADMIN — Medication 10 ML: at 22:56

## 2021-03-23 RX ADMIN — ENOXAPARIN SODIUM 40 MG: 40 INJECTION SUBCUTANEOUS at 16:39

## 2021-03-23 RX ADMIN — NYSTATIN: 100000 POWDER TOPICAL at 22:56

## 2021-03-23 RX ADMIN — DEXTROSE MONOHYDRATE AND SODIUM CHLORIDE 100 ML/HR: 5; .9 INJECTION, SOLUTION INTRAVENOUS at 23:17

## 2021-03-23 RX ADMIN — Medication 10 ML: at 13:50

## 2021-03-23 RX ADMIN — INSULIN LISPRO 2 UNITS: 100 INJECTION, SOLUTION INTRAVENOUS; SUBCUTANEOUS at 22:55

## 2021-03-23 RX ADMIN — INSULIN LISPRO 2 UNITS: 100 INJECTION, SOLUTION INTRAVENOUS; SUBCUTANEOUS at 08:52

## 2021-03-23 RX ADMIN — MEROPENEM 2 G: 1 INJECTION, POWDER, FOR SOLUTION INTRAVENOUS at 11:59

## 2021-03-23 RX ADMIN — DEXTROSE MONOHYDRATE AND SODIUM CHLORIDE 100 ML/HR: 5; .9 INJECTION, SOLUTION INTRAVENOUS at 12:00

## 2021-03-23 RX ADMIN — METOPROLOL TARTRATE 5 MG: 5 INJECTION, SOLUTION INTRAVENOUS at 22:55

## 2021-03-23 RX ADMIN — METOPROLOL TARTRATE 5 MG: 5 INJECTION, SOLUTION INTRAVENOUS at 10:00

## 2021-03-23 RX ADMIN — INSULIN LISPRO 2 UNITS: 100 INJECTION, SOLUTION INTRAVENOUS; SUBCUTANEOUS at 11:58

## 2021-03-23 RX ADMIN — INSULIN LISPRO 4 UNITS: 100 INJECTION, SOLUTION INTRAVENOUS; SUBCUTANEOUS at 16:40

## 2021-03-23 RX ADMIN — ACETAMINOPHEN 1000 MG: 500 TABLET, FILM COATED ORAL at 23:20

## 2021-03-23 RX ADMIN — Medication 10 ML: at 06:48

## 2021-03-23 RX ADMIN — Medication 5 MG: at 22:54

## 2021-03-23 NOTE — PROGRESS NOTES
Hospitalist Progress Note    Patient: Shari Wei MRN: [id number]  CSN: [id number]    YOB: 1940  Age: 79 y.o. Sex: female    DOA: 3/20/2021 LOS:  LOS: 3 days          Assessment and Plan:    Patient Active Problem List   Diagnosis Code    Pyelonephritis N14    S/p nephrectomy Z90.5    Right flank pain R10.9    Malfunction of nephrostomy tube ([address] T83.098A    Nephrostomy tube displaced ([address] T83.022A    UTI (urinary tract infection) N39.0    Elevated WBC count D72.829    Diabetes ([address] E11.9    Legally blind H54.8    Hypertension I10    Thyroid disease E07.9    Elevated serum creatinine R79.89    S/P colectomy Z90.49    Skin lesion of foot L98.9    Tear of skin of buttock S31.801A    Pressure injury of right heel, unstageable ([facility]) L89.610    Hypomagnesemia E83.42    Hyperkalemia E87.5    Heel ulcer ([address] L97.409    ANTONI (acute kidney injury) ([address] N17.9    S/P AKA (above knee amputation), right ([address] Z89.611    Nephrostomy status ([address] Z93.6    Obstructed nephrostomy tube ([address] T83.092A    Nephrostomy complication ([address] J09.138    Acute renal failure (ARF) ([address] N17.9    Elevated INR R79.1    Bacteremia R78.81    Enterococcus faecalis infection B95.2    CKD stage 3 due to type 2 diabetes mellitus (HCC) E11.22, N18.30    Hydronephrosis, right N13.30    Severe sepsis with acute organ dysfunction (HCC) A41.9, R65.20    Anticoagulated Z79.01    AMS (altered mental status) R41.82    Encephalopathy acute G93.40    Altered mental status R41.82    Pseudomonas aeruginosa infection A528       69-year-old female with a history of multiple medical problems including chronic indwelling right nephrostomy tube, colostomy, diabetes, legally blind, hypertension, thyroid disease, status post above-knee amputation, chronic kidney disease admitted with altered mental status.     Pseudomonas right pyelonephritis/associated with nephrostomy tube -  Will need nephrostomy tube removed  Will need meropenem for 14 days, today is day 2 out of 14, 2gram IV [id number]  Weekly labs qmonday  Will need midline/PICC  Appreciate infectious disease expertise, Dr. Lexie Gregorio    Drug-induced metabolic encephalopathy -  Dr. Lexie Gregorio has advised that this state may improve once medication has cleared her system  Not safe for swallowing, made NPO overnight and speech evaluation today     DM : Blood sugars less than 200, continue Lantus    Hypertension: not taking po meds anymore, continue metoprolol 5mg IV q6 hours    Hypothyroidism: continue home meds    S: She is interacting more clearly today. When I called her name loudly, she said \"What?!!!!!\" and then she answered questions 'yes' and 'no'. Review of systems:    General: No fevers or chills. Cardiovascular: No chest pain or pressure. No palpitations. Pulmonary: No shortness of breath. Gastrointestinal: No nausea, vomiting. Objective:    Vital signs/Intake and Output:  Visit Vitals  BP (!) 154/64   Pulse (!) 105   Temp 98.2 °F (36.8 °C)   Resp 18   Ht 5' 3\" (1.6 m)   Wt 65.1 kg (143 lb 9.6 oz)   SpO2 100%   BMI 25.44 kg/m²     Current Shift:  03/23 0701 - 03/23 1900  In: -   Out: 300 [Urine:300]  Last three shifts:  03/21 1901 - 03/23 0700  In: 0   Out: 1120 [Urine:1050]    Physical Exam:  General: NAD, Non-toxic. HEENT: NC/AT. PERRLA, EOMI.  MMM. Lungs: Nml inspection. CTA B/L. No wheezing, rales or rhonchi. Heart:  S1S2 RRR,  PMI mid 5th IC space. No M/RG. Abdomen: Soft, NT/ND.  BS+. No peritoneal signs. Extremities: No C/C/E. Psych:   Nml affect. Neurologic:  2-12 intact. Strength 5/5 throughout.   Sensation symmetrical.          Labs: Results:       Chemistry Recent Labs     03/23/21  0248 03/22/21  0105 03/21/21  0404   * 196* 134*    138 135*   K 3.7 4.2 5.1   * 109 105   CO2 23 22 22   BUN 21* 28* 32*   CREA 1.19 1.22 1.45*   CA 8.3* 8.9 9.4   AGAP 5 7 8   BUCR 18 23* 22*   * 153* 179*   TP 6.6 6.6 7.4 ALB 2.3* 2.3* 2.5*   GLOB 4.3* 4.3* 4.9*   AGRAT 0.5* 0.5* 0.5*      CBC w/Diff Recent Labs     03/23/21  0248 03/22/21  0105 03/21/21  0404   WBC 6.9 7.7 11.1   RBC 3.30* 3.30* 3.53*   HGB 9.0* 9.2* 9.3*   HCT 29.0* 28.6* 30.6*    406 485*   GRANS 68 72 76*   LYMPH 18* 16* 12*   EOS 4 3 2      Cardiac Enzymes No results for input(s): CPK, CKND1, JANE in the last 72 hours. No lab exists for component: CKRMB, TROIP   Coagulation No results for input(s): PTP, INR, APTT, INREXT, INREXT in the last 72 hours. Lipid Panel Lab Results   Component Value Date/Time    Cholesterol, total 115 08/28/2019 08:22 AM    HDL Cholesterol 39 (L) 08/28/2019 08:22 AM    LDL, calculated 46.2 08/28/2019 08:22 AM    VLDL, calculated 29.8 08/28/2019 08:22 AM    Triglyceride 149 08/28/2019 08:22 AM    CHOL/HDL Ratio 2.9 08/28/2019 08:22 AM      BNP No results for input(s): BNPP in the last 72 hours.    Liver Enzymes Recent Labs     03/23/21  0248   TP 6.6   ALB 2.3*   *      Thyroid Studies Lab Results   Component Value Date/Time    TSH 3.40 01/24/2020 08:35 AM        Procedures/imaging: see electronic medical records for all procedures/Xrays and details which were not copied into this note but were reviewed prior to creation of Plan

## 2021-03-23 NOTE — PROGRESS NOTES
Problem: Dysphagia (Adult)  Goal: *Acute Goals and Plan of Care (Insert Text)  Description: Recommendations:  Diet: Full liquid   Meds: Per patient preference  Aspiration Precautions  Oral Care TID  Feeding assistance    Goals:  Patient will:  1. Tolerate PO trials with 0 s/s overt distress in 4/5 trials  2. Utilize compensatory swallow strategies/maneuvers (decrease bite/sip, size/rate, alt. liq/sol) with min cues in 4/5 trials  Outcome: Progressing Towards Goal    SPEECH LANGUAGE PATHOLOGY BEDSIDE SWALLOW EVALUATION    Patient: Daria Montoya (24 y.o. female)  Date: 3/23/2021  Primary Diagnosis: UTI (urinary tract infection) [N39.0]  AMS (altered mental status) [R41.82]  Altered mental status [R41.82]        Precautions: Aspiration     PLOF: SNF    ASSESSMENT :  Based on the objective data described below, the patient presents with oropharyngeal swallow function that appears grossly intact given presented trials, however patient refused solid and puree trials so a full assessment was unable to be completed. Patient drowsy, unable to verbalize orientation. Minimal command following. Patient familiar to this service from previous hospitalizations; on regular diet at baseline but requires feeding assistance due to visual impairment. Accepted SLP-fed thin liquid + straw with single and serial swallows. Patient observed self-feeding thin liquid +/- straw, puree and solid trials. Pt exhibited adequate swallow timing/reflex and hyolaryngeal excursion, 0 overt s/sx of aspiration. Adamantly refused puree and solid trials. Recommend full liquid diet with strict aspiration precautions, slow rate, small sips. D/w RN, Jaylene Wilcox. SLP will continue to follow as indicated to assess diet tolerance, possible diet advancement and education. Patient will benefit from skilled intervention to address the above impairments.   Patient's rehabilitation potential is considered to be Fair  Factors which may influence rehabilitation potential include:   []            None noted  [x]            Mental ability/status  [x]            Medical condition  []            Home/family situation and support systems  [x]            Safety awareness  []            Pain tolerance/management  []            Other:      PLAN :  Recommendations and Planned Interventions:  Full liquid  Frequency/Duration: Patient will be followed by speech-language pathology 1-2 times per day/4-7 days per week to address goals. Discharge Recommendations: To Be Determined     SUBJECTIVE:   Patient stated No! I want juice! .     OBJECTIVE:     Past Medical History:   Diagnosis Date    Can[facility]     vaginal and colon cancer    Cancer ([facility]     Chronic pain     arthritis    Diabetes ([facility]     DVT (deep venous thrombosis) ([facility]     Hypertension     Legally blind     Thyroid disease      Past Surgical History:   Procedure Laterality Date    HX ABOVE KNEE AMPUTATION Right     HX COLOSTOMY      HX ORTHOPAEDIC      HX OTHER SURGICAL      kidney drainage tube    HX UROLOGICAL      kidney removal    IR EXCHANGE NEPHRO PERC RT SI  1/28/2019    IR EXCHANGE NEPHRO PERC RT SI  3/21/2019    IR EXCHANGE NEPHRO PERC RT SI  5/13/2019    IR EXCHANGE NEPHRO PERC RT SI  6/27/2019    IR EXCHANGE NEPHRO PERC RT SI  8/16/2019    IR EXCHANGE NEPHRO PERC RT SI  10/3/2019    IR EXCHANGE NEPHRO PERC RT SI  11/18/2019    IR EXCHANGE NEPHRO PERC RT SI  1/6/2020    IR EXCHANGE NEPHRO PERC RT SI  2/24/2020    IR EXCHANGE NEPHRO PERC RT SI  4/13/2020    IR EXCHANGE NEPHRO PERC RT SI  6/1/2020    IR EXCHANGE NEPHRO PERC RT SI  6/19/2020    IR EXCHANGE NEPHRO PERC RT SI  8/10/2020    IR EXCHANGE NEPHRO PERC RT SI  9/30/2020    IR EXCHANGE NEPHRO PERC RT SI  11/5/2020    IR EXCHANGE NEPHRO PERC RT SI  12/16/2020    IR EXCHANGE NEPHRO PERC RT SI  2/1/2021    IR NEPHROSTOMY PERC RT PLC CATH  SI  6/21/2020     Home Situation:      Diet prior to admission: Regular/thin liquid   Current Diet:  Full liquid      Cognitive and Communication Status:  Neurologic State: Alert  Orientation Level: Unable to verbalize  Cognition: Poor safety awareness, Decreased command following, Decreased attention/concentration  Perception: Appears intact  Perseveration: No perseveration noted  Safety/Judgement: Awareness of environment  Oral Assessment:  Oral Assessment  Labial: No impairment  Dentition: Natural  Oral Hygiene: Fair  Lingual: No impairment  Velum: Unable to visualize  Mandible: No impairment  P.O. Trials:  Patient Position: HOB 60  Vocal quality prior to P.O.: No impairment  Consistency Presented: Thin liquid  How Presented: SLP-fed/presented;Straw;Successive swallows     Bolus Acceptance: No impairment  Bolus Formation/Control: No impairment     Propulsion: No impairment  Oral Residue: None  Initiation of Swallow: No impairment  Laryngeal Elevation: Functional  Aspiration Signs/Symptoms: None  Pharyngeal Phase Characteristics: No impairment, issues, or problems   Effective Modifications: Small sips and bites  Cues for Modifications: Moderate       Oral Phase Severity: No impairment  Pharyngeal Phase Severity : No impairment    PAIN:  Pain level pre-treatment: 0/10   Pain level post-treatment: 0/10     After treatment:   []            Patient left in no apparent distress sitting up in chair  [x]            Patient left in no apparent distress in bed  [x]            Call bell left within reach  [x]            Nursing notified  []            Family present  []            Caregiver present  []            Bed alarm activated    COMMUNICATION/EDUCATION:   [x]            Aspiration precautions; swallow safety; compensatory techniques. []            Patient/family have participated as able in goal setting and plan of care. []            Patient/family agree to work toward stated goals and plan of care. []            Patient understands intent and goals of therapy; neutral about participation.   [x]            Patient unable to participate in goal setting/plan of care; educ ongoing with interdisciplinary staff  []         Posted safety precautions in patient's room.     Thank you for this referral,  MARIA TERESA Calderón  Time Calculation: 8 mins

## 2021-03-23 NOTE — DIABETES MGMT
GLYCEMIC CONTROL PLAN OF CARE        Diabetes Management:      Assessment: known h/o DM2, HbA1C within recommended range for age + comorbids    BG in target range (non-ICU\" < 180 ; -180):  [] Yes  [x] No      Steroids: No    TDD previous day = 20 (10 Lantus + 10 Humalog Normal Insulin Sensitivity Corrective Coverage)    Recommend: continue with current regimen    Most recent blood glucose results:   Lab Results   Component Value Date/Time     (H) 03/23/2021 02:48 AM    GLUCPOC 185 (H) 03/23/2021 07:32 AM    GLUCPOC 194 (H) 03/22/2021 09:53 PM    GLUCPOC 168 (H) 03/22/2021 05:06 PM         Hypo: No    HbA1C: equivalent  to ave BGlucose of 157 mg/dl for 2-3 months prior to admission  Lab Results   Component Value Date/Time    Hemoglobin A1c 7.1 (H) 03/20/2021 10:12 AM       Adequate glycemic control PTA:  [x] Yes  [] No      Home diabetes medications:   Key Antihyperglycemic Medications     Patient is on no antihyperglycemic meds.         Goals:  Blood glucose will be within target range of 70 - 180 mg/dL by: 3/30    Diet:   Active Orders   Diet    DIET NPO         Education:  _____ Refer to Diabetes Education Record                       ___X__ Education not indicated at this time    Nadeen Nuno MS, RN, CDE  Glycemic Control Team  [phone]  Pager [phone] (M-TH 8:00-4:30P)  *After Hours pager [phone]

## 2021-03-23 NOTE — PROGRESS NOTES
0740 Beside and verbal shift change report given to [facility] (oncoming nurse) by SERGIO Epstein RN (off going nurse). Report included the following information SBAR, Kardex,OR summary, Intake/output and MAR.       0857 Pt refused oral meds. Stated \"No\"    1930 Beside and verbal shift change report given to CHAITANYA Claudio (oncoming nurse) by [facility] (off going nurse). Report included the following information SBAR, Kardex,OR summary, Intake/output and MAR.

## 2021-03-23 NOTE — PROGRESS NOTES
Speech Therapy Note:    SLP orders received and attempted however, patient:      []  Lethargic, unable to be alerted enough for safe PO intake  [x]  Refused participation, moaning and repeating \"no, no\"    []  Off the unit  []  NPO for procedure  []  Other:     SLP will f/u later this day or as medically indicated. D/w RN, [facility], [address].  Thank you for this referral.     Danita Killian M.S., [address] [facility], [place]  Speech Language Pathologist

## 2021-03-24 LAB
GLUCOSE BLD STRIP.AUTO-MCNC: 136 MG/DL (ref 70–110)
GLUCOSE BLD STRIP.AUTO-MCNC: 148 MG/DL (ref 70–110)
GLUCOSE BLD STRIP.AUTO-MCNC: 219 MG/DL (ref 70–110)
GLUCOSE BLD STRIP.AUTO-MCNC: 220 MG/DL (ref 70–110)
MAGNESIUM SERPL-MCNC: 1.5 MG/DL (ref 1.6–2.6)
SARS-COV-2, COV2: NORMAL

## 2021-03-24 PROCEDURE — 77030040361 HC SLV COMPR DVT MDII -B

## 2021-03-24 PROCEDURE — 65270000029 HC RM PRIVATE

## 2021-03-24 PROCEDURE — 83735 ASSAY OF MAGNESIUM: CPT

## 2021-03-24 PROCEDURE — 74011000258 HC RX REV CODE- 258: Performed by: INTERNAL MEDICINE

## 2021-03-24 PROCEDURE — 74011000250 HC RX REV CODE- 250: Performed by: INTERNAL MEDICINE

## 2021-03-24 PROCEDURE — 92526 ORAL FUNCTION THERAPY: CPT

## 2021-03-24 PROCEDURE — 77030040392 HC DRSG OPTIFOAM MDII -A

## 2021-03-24 PROCEDURE — 74011250637 HC RX REV CODE- 250/637: Performed by: FAMILY MEDICINE

## 2021-03-24 PROCEDURE — 74011250636 HC RX REV CODE- 250/636: Performed by: INTERNAL MEDICINE

## 2021-03-24 PROCEDURE — U0003 INFECTIOUS AGENT DETECTION BY NUCLEIC ACID (DNA OR RNA); SEVERE ACUTE RESPIRATORY SYNDROME CORONAVIRUS 2 (SARS-COV-2) (CORONAVIRUS DISEASE [COVID-19]), AMPLIFIED PROBE TECHNIQUE, MAKING USE OF HIGH THROUGHPUT TECHNOLOGIES AS DESCRIBED BY CMS-2020-01-R: HCPCS

## 2021-03-24 PROCEDURE — 74011250637 HC RX REV CODE- 250/637: Performed by: INTERNAL MEDICINE

## 2021-03-24 PROCEDURE — 82962 GLUCOSE BLOOD TEST: CPT

## 2021-03-24 PROCEDURE — 74011636637 HC RX REV CODE- 636/637: Performed by: INTERNAL MEDICINE

## 2021-03-24 RX ADMIN — INSULIN GLARGINE 10 UNITS: 100 INJECTION, SOLUTION SUBCUTANEOUS at 21:32

## 2021-03-24 RX ADMIN — METOPROLOL SUCCINATE 50 MG: 50 TABLET, EXTENDED RELEASE ORAL at 09:57

## 2021-03-24 RX ADMIN — DEXTROSE MONOHYDRATE AND SODIUM CHLORIDE 100 ML/HR: 5; .9 INJECTION, SOLUTION INTRAVENOUS at 21:36

## 2021-03-24 RX ADMIN — Medication 5 MG: at 21:35

## 2021-03-24 RX ADMIN — DEXTROSE MONOHYDRATE AND SODIUM CHLORIDE 100 ML/HR: 5; .9 INJECTION, SOLUTION INTRAVENOUS at 11:18

## 2021-03-24 RX ADMIN — Medication 10 ML: at 06:25

## 2021-03-24 RX ADMIN — NYSTATIN: 100000 POWDER TOPICAL at 10:01

## 2021-03-24 RX ADMIN — MEROPENEM 2 G: 1 INJECTION, POWDER, FOR SOLUTION INTRAVENOUS at 00:36

## 2021-03-24 RX ADMIN — BRIMONIDINE TARTRATE 1 DROP: 2 SOLUTION OPHTHALMIC at 10:01

## 2021-03-24 RX ADMIN — INSULIN LISPRO 4 UNITS: 100 INJECTION, SOLUTION INTRAVENOUS; SUBCUTANEOUS at 12:14

## 2021-03-24 RX ADMIN — METOPROLOL TARTRATE 5 MG: 5 INJECTION, SOLUTION INTRAVENOUS at 04:59

## 2021-03-24 RX ADMIN — ACETAMINOPHEN 1000 MG: 500 TABLET, FILM COATED ORAL at 22:05

## 2021-03-24 RX ADMIN — AMLODIPINE BESYLATE 5 MG: 5 TABLET ORAL at 09:57

## 2021-03-24 RX ADMIN — MULTIPLE VITAMINS W/ MINERALS TAB 1 TABLET: TAB at 09:57

## 2021-03-24 RX ADMIN — BRIMONIDINE TARTRATE 1 DROP: 2 SOLUTION OPHTHALMIC at 21:35

## 2021-03-24 RX ADMIN — ATORVASTATIN CALCIUM 20 MG: 20 TABLET, FILM COATED ORAL at 09:58

## 2021-03-24 RX ADMIN — NYSTATIN: 100000 POWDER TOPICAL at 21:40

## 2021-03-24 RX ADMIN — Medication 10 ML: at 17:52

## 2021-03-24 RX ADMIN — TIMOLOL MALEATE 1 DROP: 5 SOLUTION OPHTHALMIC at 21:36

## 2021-03-24 RX ADMIN — TIMOLOL MALEATE 1 DROP: 5 SOLUTION OPHTHALMIC at 10:02

## 2021-03-24 RX ADMIN — FAMOTIDINE 20 MG: 20 TABLET ORAL at 17:50

## 2021-03-24 RX ADMIN — MEROPENEM 2 G: 1 INJECTION, POWDER, FOR SOLUTION INTRAVENOUS at 12:21

## 2021-03-24 RX ADMIN — LEVOTHYROXINE SODIUM 50 MCG: 0.05 TABLET ORAL at 06:31

## 2021-03-24 RX ADMIN — INSULIN LISPRO 4 UNITS: 100 INJECTION, SOLUTION INTRAVENOUS; SUBCUTANEOUS at 21:31

## 2021-03-24 RX ADMIN — LORAZEPAM 0.5 MG: 0.5 TABLET ORAL at 22:05

## 2021-03-24 NOTE — PROGRESS NOTES
Comprehensive Nutrition Assessment    Type and Reason for Visit: (P) Reassess, Positive nutrition screen    Nutrition Recommendations/Plan: Supplement: Add Glucerna TID    Nutrition Assessment:  (P) pt admitted w/ Pseudomonas right pyelonephritis/associated with nephrostomy tube, drug-induced metabolic encephalopathy, DM, HTN, SLP following        Estimated Daily Nutrient Needs:  Energy (kcal): (P) 1550; Weight Used for Energy Requirements: (P) Current  Protein (g): (P) 100; Weight Used for Protein Requirements: (P) Current  Fluid (ml/day): (P) 1550; Method Used for Fluid Requirements: (P) 1 ml/kcal      Nutrition Related Findings:  (P) Mg-1.5 Meds: D5% @ 100ml/hr, lantus, humlaog, synthroid, MOM, melatonin, lopressor, MVI +BM 3/23/21; recieved diet last night from SLP eval      Wounds:    (P) Pressure injury       Current Nutrition Therapies:  DIET FULL LIQUID    Anthropometric Measures:  Height:  (P) 5' 3\" (160 cm)  Current Body Wt:  (P) 64.9 kg (143 lb)   Admission Body Wt:  (P) 141 lb    Usual Body Wt:  70.1 kg (154 lb 8.7 oz)(x4 months ago)     Ideal Body Wt:  (P) 115 lbs:  (P) 124.3 %   Adjusted Body Weight:  (P) 157.4; Weight Adjustment for: (P) Amputation   Adjusted BMI:  (P) 27.9    BMI Category:  (P) Overweight (BMI 25.0-29. 9)       Nutrition Diagnosis:   (P) Inadequate oral intake related to (P) cognitive or neurological impairment as evidenced by (P) swallowing study results    Nutrition Interventions:   Food and/or Nutrient Delivery: (P) Continue current diet, Start oral nutrition supplement  Nutrition Education and Counseling: (P) No recommendations at this time  Coordination of Nutrition Care: (P) Continue to monitor while inpatient, Interdisciplinary rounds    Goals:  (P) Start ONS in the next 3-5days       Nutrition Monitoring and Evaluation:   Behavioral-Environmental Outcomes:    Food/Nutrient Intake Outcomes: (P) Diet advancement/tolerance, Food and nutrient intake, Supplement intake  Physical Signs/Symptoms Outcomes: (P) Biochemical data, Weight, Skin, GI status    Discharge Planning:    (P) Continue current diet, Continue oral nutrition supplement     Electronically signed by Regan Navarro on 3/24/2021 at 8:40 AM

## 2021-03-24 NOTE — PROGRESS NOTES
D/C Plan: [facility]     Per pt's son, Annalee Cheema ([phone]), JQDG is for pt to return to [facility] when medically stable. [facility] is aware of plan return when medially stable. PT WILL NEED TO HAVE A NEGATIVE COVID (PCR) TEST TO ASSIST WITH TRANSITION BACK TO [facility]. Noted pt will require IVABX upon discharge and will have to have a PICC placed. Pt is not medically stable to transition from an acute care setting at this time. CM to continue to follow and assist.        Transition of Care Plan:      Communication to Patient/Family:  Met with patient and family, and they are agreeable to the transition plan. The Plan for Transition of Care is related to the following treatment goals: SNF/LTC     The Patient and/or patient representative was provided with a choice of provider and agrees   with the discharge plan.  Yes [x]? No []?     Freedom of choice list was provided with basic dialogue that supports the patient's individualized plan of care/goals and shares the quality data associated with the providers.     Yes [x]? No []?     SNF/Rehab Transition:  Patient has been accepted to [facility] at [address] [place] [address] for SNF and meets criteria for admission. Patient will transported by medial transport and expected to leave when medically stable.     Communication to SNF/Rehab:  Bedside RN,  has been notified to update the transition plan to the facility and call report [phone].      Discharge information has been updated on the AVS and communicated through [facility] or [facility]. Discharge instructions to be fax'd to facility at [phone]      Please include all hard scripts for controlled substances, med rec and dc summary, and AVS in packet.  Please medicate for pain prior to dc if possible and needed to help offset delay when patient first arrives to facility.     Reviewed and confirmed with facility, [facility] can manage the patient care needs for the following:      Husam with (X) only those applicable:  Medication:  []? Medications are available at the facility  []? IV Antibiotics    []? Controlled Substance  hard copies available sent.  []? Weekly Labs     Equipment:  []?CPAP/BiPAP  []? Wound Vacuum  []? Wright or Urinary Device  []? PICC/Central Line  []? Nebulizer  []? Ventilator     Treatment:  []? Isolation (for MRSA, VRE, etc.)  []? Surgical Drain Management  []? Tracheostomy Care  []? Dressing Changes  []? Dialysis with transportation  []? PEG Care  []? Oxygen  []? Daily Weights for Heart Failure     Dietary:  []? Any diet limitations  []? Tube Feedings   []? Total Parenteral Management (TPN)     Financial Resources:  []? Medicaid Application Completed     []? [employer] Completed and copy given to pt/family.     []? A screening has previously been completed.     []? Level II Completed     []? Private pay individual who will not become   financially eligible for Medicaid within 6 months from admission to a [address] [place] [address] facility.      []? Individual refused to have screening conducted.      []? Medicaid Application Completed     []? The screening denied because it was determined individual did not need/did not qualify for nursing facility level of care. []? Out of state residents seeking direct admission to a [address] facility.  []? Individuals who are inpatients of an out of state hospital, or in state or out of state veterans/ hospital and seek direct admission to a [address] facility  []? Individuals who are pateints or residents of a state owned/operated facility that is licensed by Department of Behavioral Services (DBHDS) and seek direct admission to [address] facility  []? A screening not required for enrollment in [facility] services as set out in [facility]-  []? [address] [facility]) staff shall perform screenings of the [facility] clients.      Advanced Care Plan:  []? Surrogate Decision Maker of Care  []? POA  []? Communicated Code Status and copy sent.     Other:              Care Management Interventions  PCP Verified by CM:  Yes  Mode of Transport at Discharge: Self  Transition of Care Consult (CM Consult): Discharge Planning, SNF  Current Support Network: [address] [place] [address]  Confirm Follow Up Transport: Other (see comment)(medical transport)  The Plan for Transition of Care is Related to the Following Treatment Goals : return to [facility] when medically stable  Discharge Location  Discharge Placement: Skilled nursing facility

## 2021-03-24 NOTE — PROGRESS NOTES
Hospitalist Progress Note    Patient: Shari Wei MRN: [id number]  CSN: [id number]    YOB: 1940  Age: 79 y.o. Sex: female    DOA: 3/20/2021 LOS:  LOS: 4 days          Assessment and Plan:    Patient Active Problem List   Diagnosis Code    Pyelonephritis N14    S/p nephrectomy Z90.5    Right flank pain R10.9    Malfunction of nephrostomy tube ([address] T83.098A    Nephrostomy tube displaced ([address] T83.022A    UTI (urinary tract infection) N39.0    Elevated WBC count D72.829    Diabetes ([address] E11.9    Legally blind H54.8    Hypertension I10    Thyroid disease E07.9    Elevated serum creatinine R79.89    S/P colectomy Z90.49    Skin lesion of foot L98.9    Tear of skin of buttock S31.801A    Pressure injury of right heel, unstageable ([facility]) L89.610    Hypomagnesemia E83.42    Hyperkalemia E87.5    Heel ulcer ([address] L97.409    ANTONI (acute kidney injury) ([address] N17.9    S/P AKA (above knee amputation), right ([address] Z89.611    Nephrostomy status ([address] Z93.6    Obstructed nephrostomy tube ([address] T83.092A    Nephrostomy complication ([address] M69.801    Acute renal failure (ARF) ([address] N17.9    Elevated INR R79.1    Bacteremia R78.81    Enterococcus faecalis infection B95.2    CKD stage 3 due to type 2 diabetes mellitus (HCC) E11.22, N18.30    Hydronephrosis, right N13.30    Severe sepsis with acute organ dysfunction (HCC) A41.9, R65.20    Anticoagulated Z79.01    AMS (altered mental status) R41.82    Encephalopathy acute G93.40    Altered mental status R41.82    Pseudomonas aeruginosa infection A528       39-year-old female with a history of multiple medical problems including chronic indwelling right nephrostomy tube, colostomy, diabetes, legally blind, hypertension, thyroid disease, status post above-knee amputation, chronic kidney disease admitted with altered mental status.     Discussed overall plan with Dr. Cathryn Gonzales, interventional radiologist.  If patient needs PICC line then he plans to do PICC line and change out his nephrostomy tube all at the same time and on the day of discharge. Discussed overall plan with discharge planning: Per infectious disease expertise, Dr. Anat Long, patient needs a midline or PICC line. Case management will determine if [address] were septum midline. If [address] accepts the midline they will get the medic to do it tomorrow. If [address] does not accept the midline, then I will place an order for PICC line tomorrow and she will get her  PICC line and her nephrostomy tube replacement tomorrow. We expect the patient will be able to be transferred back to [address] [place] [address] tomorrow    Pseudomonas right pyelonephritis/associated with nephrostomy tube -  Will need nephrostomy tube removed  Will need meropenem for 14 days, today is day 2 out of 14, 2gram IV [id number]  Weekly labs qmonday  Will need midline/PICC  Appreciate infectious disease expertise, Dr. Aziza Barraza    Drug-induced metabolic encephalopathy -  Significant improvement patient is now having entire conversations with me and her son is at her bedside    Appreciate speech therapy assessment: Patient is now able to tolerate a regular solids thin liquid diet with aspiration precautions and feeding assistance    Oral soreness concentrated specifically with her lips: Will order soothing ointment. DM : Blood sugars less than 200, continue Lantus    Hypertension: Patient is now taking p.o. meds, IV medication not needed     Hypothyroidism: continue home meds    S: Patient is very interactive today. Speaking in clear sentences with me and her son who is at her bedside. She is very fixated however and concerned about a lesion that she has on her lips and on her gums. However she says the lesion is now gone as she picked it off today. Review of systems:    General: No fevers or chills. Cardiovascular: No chest pain or pressure. No palpitations. Pulmonary: No shortness of breath. Gastrointestinal: No nausea, vomiting. Objective:    Vital signs/Intake and Output:  Visit Vitals  BP (!) 145/62 (BP 1 Location: Left upper arm, BP Patient Position: At rest)   Pulse 76   Temp 97.9 °F (36.6 °C)   Resp 17   Ht 5' 3\" (1.6 m)   Wt 65.1 kg (143 lb 9.6 oz)   SpO2 100%   BMI 25.44 kg/m²     Current Shift:  No intake/output data recorded. Last three shifts:  03/22 1901 - 03/24 0700  In: 100 [I.V.:100]  Out: 1925 [Urine:1775]    Physical Exam:  General: NAD, Non-toxic. HEENT: NC/AT. PERRLA, EOMI.  MMM. Lungs: Nml inspection. CTA B/L. No wheezing, rales or rhonchi. Heart:  S1S2 RRR,  PMI mid 5th IC space. No M/RG. Abdomen: Soft, NT/ND.  BS+. No peritoneal signs. Extremities: No C/C/E. Psych:   Nml affect. Neurologic:  2-12 intact. Strength 5/5 throughout. Sensation symmetrical.          Labs: Results:       Chemistry Recent Labs     03/23/21  0248 03/22/21  0105   * 196*    138   K 3.7 4.2   * 109   CO2 23 22   BUN 21* 28*   CREA 1.19 1.22   CA 8.3* 8.9   AGAP 5 7   BUCR 18 23*   * 153*   TP 6.6 6.6   ALB 2.3* 2.3*   GLOB 4.3* 4.3*   AGRAT 0.5* 0.5*      CBC w/Diff Recent Labs     03/23/21  0248 03/22/21  0105   WBC 6.9 7.7   RBC 3.30* 3.30*   HGB 9.0* 9.2*   HCT 29.0* 28.6*    406   GRANS 68 72   LYMPH 18* 16*   EOS 4 3      Cardiac Enzymes No results for input(s): CPK, CKND1, JANE in the last 72 hours. No lab exists for component: CKRMB, TROIP   Coagulation No results for input(s): PTP, INR, APTT, INREXT, INREXT in the last 72 hours. Lipid Panel Lab Results   Component Value Date/Time    Cholesterol, total 115 08/28/2019 08:22 AM    HDL Cholesterol 39 (L) 08/28/2019 08:22 AM    LDL, calculated 46.2 08/28/2019 08:22 AM    VLDL, calculated 29.8 08/28/2019 08:22 AM    Triglyceride 149 08/28/2019 08:22 AM    CHOL/HDL Ratio 2.9 08/28/2019 08:22 AM      BNP No results for input(s): BNPP in the last 72 hours.    Liver Enzymes Recent Labs     03/23/21  1446 TP 6.6   ALB 2.3*   *      Thyroid Studies Lab Results   Component Value Date/Time    TSH 3.40 01/24/2020 08:35 AM        Procedures/imaging: see electronic medical records for all procedures/Xrays and details which were not copied into this note but were reviewed prior to creation of Plan

## 2021-03-24 NOTE — PROGRESS NOTES
Problem: Risk for Spread of Infection  Goal: Prevent transmission of infectious organism to others  Description: Prevent the transmission of infectious organisms to other patients, staff members, and visitors. Outcome: Progressing Towards Goal     Problem: Patient Education:  Go to Education Activity  Goal: Patient/Family Education  Outcome: Progressing Towards Goal     Problem: Falls - Risk of  Goal: *Absence of Falls  Description: Document Sanya Harman Fall Risk and appropriate interventions in the flowsheet. Outcome: Progressing Towards Goal  Note: Fall Risk Interventions:       Mentation Interventions: Adequate sleep, hydration, pain control, Door open when patient unattended, Family/sitter at bedside, More frequent rounding    Medication Interventions: Patient to call before getting OOB, Teach patient to arise slowly    Elimination Interventions: Call light in reach, Toileting schedule/hourly rounds    History of Falls Interventions: Investigate reason for fall         Problem: Patient Education: Go to Patient Education Activity  Goal: Patient/Family Education  Outcome: Progressing Towards Goal     Problem: Pressure Injury - Risk of  Goal: *Prevention of pressure injury  Description: Document Rickie Scale and appropriate interventions in the flowsheet. Outcome: Progressing Towards Goal  Note: Pressure Injury Interventions:  Sensory Interventions: Assess changes in LOC, Check visual cues for pain, Pressure redistribution bed/mattress (bed type), Minimize linen layers, Turn and reposition approx. every two hours (pillows and wedges if needed)    Moisture Interventions: Absorbent underpads, Internal/External fecal devices, Internal/External urinary devices, Minimize layers, Maintain skin hydration (lotion/cream)    Activity Interventions: Pressure redistribution bed/mattress(bed type)    Mobility Interventions: Pressure redistribution bed/mattress (bed type), PT/OT evaluation, Turn and reposition approx.  every two hours(pillow and wedges)    Nutrition Interventions: Document food/fluid/supplement intake    Friction and Shear Interventions: Foam dressings/transparent film/skin sealants, Minimize layers                Problem: Patient Education: Go to Patient Education Activity  Goal: Patient/Family Education  Outcome: Progressing Towards Goal     Problem: Diabetes Self-Management  Goal: *Disease process and treatment process  Description: Define diabetes and identify own type of diabetes; list 3 options for treating diabetes. Outcome: Progressing Towards Goal  Goal: *Incorporating nutritional management into lifestyle  Description: Describe effect of type, amount and timing of food on blood glucose; list 3 methods for planning meals. Outcome: Progressing Towards Goal  Goal: *Incorporating physical activity into lifestyle  Description: State effect of exercise on blood glucose levels. Outcome: Progressing Towards Goal  Goal: *Developing strategies to promote health/change behavior  Description: Define the ABC's of diabetes; identify appropriate screenings, schedule and personal plan for screenings. Outcome: Progressing Towards Goal  Goal: *Using medications safely  Description: State effect of diabetes medications on diabetes; name diabetes medication taking, action and side effects. Outcome: Progressing Towards Goal  Goal: *Monitoring blood glucose, interpreting and using results  Description: Identify recommended blood glucose targets  and personal targets. Outcome: Progressing Towards Goal  Goal: *Prevention, detection, treatment of acute complications  Description: List symptoms of hyper- and hypoglycemia; describe how to treat low blood sugar and actions for lowering  high blood glucose level.   Outcome: Progressing Towards Goal  Goal: *Prevention, detection and treatment of chronic complications  Description: Define the natural course of diabetes and describe the relationship of blood glucose levels to long term complications of diabetes.   Outcome: Progressing Towards Goal  Goal: *Developing strategies to address psychosocial issues  Description: Describe feelings about living with diabetes; identify support needed and support network  Outcome: Progressing Towards Goal  Goal: *Insulin pump training  Outcome: Progressing Towards Goal  Goal: *Sick day guidelines  Outcome: Progressing Towards Goal  Goal: *Patient Specific Goal (EDIT GOAL, INSERT TEXT)  Outcome: Progressing Towards Goal     Problem: Patient Education: Go to Patient Education Activity  Goal: Patient/Family Education  Outcome: Progressing Towards Goal     Problem: Patient Education: Go to Patient Education Activity  Goal: Patient/Family Education  Outcome: Progressing Towards Goal

## 2021-03-24 NOTE — DIABETES MGMT
GLYCEMIC CONTROL PLAN OF CARE        Diabetes Management:      Assessment: known h/o DM2, HbA1C within recommended range for age + comorbids    BG in target range (non-ICU\" < 180 ; -180):  [] Yes  [x] No      Steroids: No    TDD previous day = 20 (10 Lantus + 10 Humalog Normal Insulin Sensitivity Corrective Coverage)    Recommend: consider conservative dose of mealtime insulin once diet progreses    Most recent blood glucose results:   Lab Results   Component Value Date/Time    GLUCPOC 220 (H) 03/24/2021 11:29 AM    GLUCPOC 136 (H) 03/24/2021 07:40 AM    GLUCPOC 202 (H) 03/23/2021 04:33 PM         Hypo: No    HbA1C: equivalent  to ave BGlucose of 157 mg/dl for 2-3 months prior to admission  Lab Results   Component Value Date/Time    Hemoglobin A1c 7.1 (H) 03/20/2021 10:12 AM       Adequate glycemic control PTA:  [x] Yes  [] No      Home diabetes medications:   Key Antihyperglycemic Medications     Patient is on no antihyperglycemic meds.         Goals:  Blood glucose will be within target range of 70 - 180 mg/dL by: 3/30    Diet:   Active Orders   Diet    DIET FULL LIQUID         Education:  _____ Refer to Diabetes Education Record                       ___X__ Education not indicated at this time    Rober Flor MS, RN, CDE  Glycemic Control Team  [phone]  Pager [phone] (M-TH 8:00-4:30P)  *After Hours pager [phone]

## 2021-03-24 NOTE — PROGRESS NOTES
Problem: Dysphagia (Adult)  Goal: *Acute Goals and Plan of Care (Insert Text)  Description: Recommendations:  Diet: Regular solid/thin liquid   Meds: Per patient preference  Aspiration Precautions  Oral Care TID  Feeding assistance    Goals:  Patient will:  1. Tolerate PO trials with 0 s/s overt distress in 4/5 trials  2. Utilize compensatory swallow strategies/maneuvers (decrease bite/sip, size/rate, alt. liq/sol) with min cues in 4/5 trials  Outcome: Progressing Towards Goal    SPEECH LANGUAGE PATHOLOGY DYSPHAGIA TREATMENT    Patient: Francisco Javier Stewart (38 y.o. female)  Date: 3/24/2021  Diagnosis: UTI (urinary tract infection) [N39.0]  AMS (altered mental status) [R41.82]  Altered mental status [R41.82] Pseudomonas aeruginosa infection       Precautions: Aspiration  PLOF: SNF     ASSESSMENT:  Followed up with dysphagia intervention. A&Ox3. Required significant encouragement for PO intake. Patient accepted SLP-fed thin liquid + straw and solid trials. Pt exhibited adequate bolus cohesion, manipulation and A-P transit. Further exhibited adequate swallow timing/reflex and hyolaryngeal excursion. Able to manipulate and clear with 0 clinical s/s aspiration. Recommend upgrade to regular solid, thin liquid diet with aspiration precautions, feeding assistance. SLP will follow up 1-2x to assess diet tolerance and education. Progression toward goals:  [x]         Improving appropriately and progressing toward goals  []         Improving slowly and progressing toward goals  []         Not making progress toward goals and plan of care will be adjusted     PLAN:  Recommendations and Planned Interventions:  Regular/thin liquid   Patient continues to benefit from skilled intervention to address the above impairments. Continue treatment per established plan of care. Discharge Recommendations:  Skilled Nursing Facility     SUBJECTIVE:   Patient stated My swallowing is fine, just believe me and leave me alone.     OBJECTIVE: Cognitive and Communication Status:  Neurologic State: Alert, Eyes open spontaneously  Orientation Level: Oriented to person, Oriented to situation, Disoriented to place, Disoriented to time  Cognition: Memory loss, Follows commands  Perception: Appears intact  Perseveration: No perseveration noted  Safety/Judgement: Awareness of environment  Dysphagia Treatment:  Oral Assessment:  Oral Assessment  Labial: No impairment  Dentition: Natural  Oral Hygiene: Fair  Lingual: No impairment  Velum: Unable to visualize  Mandible: No impairment  P.O. Trials:   Patient Position: HOB 60   Vocal quality prior to P.O.: No impairment   Consistency Presented: Thin liquid   How Presented: SLP-fed/presented, Straw, Successive swallows       Bolus Acceptance: No impairment   Bolus Formation/Control: No impairment       Propulsion: No impairment   Oral Residue: None   Initiation of Swallow: No impairment   Laryngeal Elevation: Functional   Aspiration Signs/Symptoms: None   Pharyngeal Phase Characteristics: No impairment, issues, or problems    Effective Modifications: Small sips and bites   Cues for Modifications: Moderate         Oral Phase Severity: No impairment   Pharyngeal Phase Severity : No impairment    PAIN:  Pain level pre-treatment: 0/10   Pain level post-treatment: 0/10     After treatment:   []              Patient left in no apparent distress sitting up in chair  [x]              Patient left in no apparent distress in bed  [x]              Call bell left within reach  []              Nursing notified  []              Family present  []              Caregiver present  []              Bed alarm activated      COMMUNICATION/EDUCATION:   [x] Aspiration precautions; swallow safety; compensatory techniques  []        Patient unable to participate in education; education ongoing with staff  []  Posted safety precautions in patient's room.   [] Oral-motor/laryngeal strengthening exercises      MARIA TERESA Brar  Time Calculation: 10 mins

## 2021-03-24 NOTE — CONSULTS
[facility]  (A Division of [address] [place] [address])    Follow-up Note      Date of Admission: 3/20/2021       Date of Note:  3/24/2021    Summary:    Ms Quincy Lucero is a 79y gentle soul WF with chronic R NT for renal calcinosus who has developed yet another infection of tub/R kidney; pt is bed-ridden/short distance from anus to NT. Had urine sent from NH on 3/10 that grew out a FQ-resistant Pseudomonas aeruginosa for which she was appropriately started on cefepime 3/17 but developed worsening encephalopathy since prompting ED visit on 3/20 where she was admitted and switched to meropenem (renal-dosed). During her last admission for R NT infection (M morganii) she was initially treated with cefepime but became confused for which it was eventually changed to ertapenem, and she did fine.     Since admission 3/20, she has been on low-dosed meropenem; last dose of cefepime was just before admission here (Saturday) -- so I expect her encephalopathy to last a few more days before clearing. CC:  \"I like the food here\"    Interval History:  Events last few days reviewed/tracked from afar. Back to baseline mentation again the last 24h. Denies any pain. Knows she needs NT changed out and repeat COVID-19 test before going back, but states she's had the vaccine already. Current Antimicrobials:    Prior Antimicrobials:  1. Meropenem IV (3/20-) #4        Assessment: Rec / Plan:   Pseudomonas aeruginosa R pyelonephritis NT-associated. Looks much better (was cefepime-effect); back to baseline. Sepsis over. Finish the 2wk course; change the nephrostomy tube before going back, o/w she'll be back again after finishing the meropenem ->Meropenem #2/14    PICC in  Finish the course at [facility]  Needs nephrostomy tube exchanged first.  [facility].    Drug-induced encephalopathy      Chronic R renal calcinosis    Chronic debility/bed-ridden    HTN        Microbiology: 3/20 - UA (+) Pseudomonas aeruginosa (R-FQ)                                            3/10 - UA (+) Pseudomonas aeruginosa (R-FQ)                                         11/15 - UA (-)                                         11/05 - UA (+) M morganii, P mirabilis, and  E faecalis     Lines / Catheters:         R PICC        Patient Active Problem List   Diagnosis Code    Pyelonephritis N12    S/p nephrectomy Z90.5    Right flank pain R10.9    Malfunction of nephrostomy tube ([address] T83.098A    Nephrostomy tube displaced ([address] T83.022A    UTI (urinary tract infection) N39.0    Elevated WBC count D72.829    Diabetes ([address] E11.9    Legally blind H54.8    Hypertension I10    Thyroid disease E07.9    Elevated serum creatinine R79.89    S/P colectomy Z90.49    Skin lesion of foot L98.9    Tear of skin of buttock S31.801A    Pressure injury of right heel, unstageable ([facility]) L89.610    Hypomagnesemia E83.42    Hyperkalemia E87.5    Heel ulcer ([address] L97.409    ANTONI (acute kidney injury) ([address] N17.9    S/P AKA (above knee amputation), right ([address] Z89.611    Nephrostomy status ([facility]) Z93.6    Obstructed nephrostomy tube ([address] T83.092A    Nephrostomy complication ([facility]) N70.173    Acute renal failure (ARF) ([facility]) N17.9    Elevated INR R79.1    Bacteremia R78.81    Enterococcus faecalis infection B95.2    CKD stage 3 due to type 2 diabetes mellitus ([facility]) E11.22, N18.30    Hydronephrosis, right N13.30    Severe sepsis with acute organ dysfunction ([facility]) A41.9, R65.20    Anticoagulated Z79.01    AMS (altered mental status) R41.82    Encephalopathy acute G93.40    Altered mental status R41.82    Pseudomonas aeruginosa infection A49.8       Current Facility-Administered Medications   Medication Dose Route Frequency    acetaminophen (TYLENOL) tablet 1,000 mg  1,000 mg Oral Q8H PRN    meropenem (MERREM) 2 g in 0.9% sodium chloride 100 mL IVPB  2 g IntraVENous Q12H    metoprolol (LOPRESSOR) injection 5 mg  5 mg IntraVENous Q6H    dextrose 5% and 0.9% NaCl infusion  100 mL/hr IntraVENous CONTINUOUS    insulin glargine (LANTUS) injection 10 Units  10 Units SubCUTAneous QHS    sodium chloride (NS) flush 5-40 mL  5-40 mL IntraVENous Q8H    sodium chloride (NS) flush 5-40 mL  5-40 mL IntraVENous PRN    enoxaparin (LOVENOX) injection 40 mg  40 mg SubCUTAneous Q24H    amLODIPine (NORVASC) tablet 5 mg  5 mg Oral DAILY    atorvastatin (LIPITOR) tablet 20 mg  20 mg Oral DAILY    famotidine (PEPCID) tablet 20 mg  20 mg Oral QPM    levothyroxine (SYNTHROID) tablet 50 mcg  50 mcg Oral ACB    LORazepam (ATIVAN) tablet 0.5 mg  0.5 mg Oral BID PRN    magnesium hydroxide (MILK OF MAGNESIA) 400 mg/5 mL oral suspension 5 mL  5 mL Oral DAILY PRN    melatonin (rapid dissolve) tablet 5 mg  5 mg Oral QHS    metoprolol succinate (TOPROL-XL) XL tablet 50 mg  50 mg Oral DAILY    multivitamin, tx-iron-ca-min (THERA-M w/ IRON) tablet 1 Tab  1 Tab Oral DAILY    ondansetron hcl (ZOFRAN) tablet 4 mg  4 mg Oral Q8H PRN    timolol (TIMOPTIC) 0.5 % ophthalmic solution 1 Drop  1 Drop Both Eyes BID    And    brimonidine (ALPHAGAN) 0.2 % ophthalmic solution 1 Drop  1 Drop Both Eyes Q12H    nystatin (MYCOSTATIN) 100,000 unit/gram powder   Topical BID    insulin lispro (HUMALOG) injection   SubCUTAneous AC&HS    glucose chewable tablet 16 g  4 Tab Oral PRN    glucagon (GLUCAGEN) injection 1 mg  1 mg IntraMUSCular PRN         Review of Systems - General ROS: negative for - chills, fever or night sweats  Respiratory ROS: no cough, shortness of breath, or wheezing  Cardiovascular ROS: no chest pain or dyspnea on exertion  Gastrointestinal ROS: no abdominal pain, change in bowel habits, or black or bloody stools       Objective:    Visit Vitals  BP (!) 140/46 (BP 1 Location: Left upper arm, BP Patient Position: At rest)   Pulse 76   Temp 97.2 °F (36.2 °C)   Resp 17   Ht 5' 3\" (1.6 m)   Wt 65.1 kg (143 lb 9.6 oz)   SpO2 100% BMI 25.44 kg/m²       Temp (24hrs), Av.7 °F (36.5 °C), Min:97.2 °F (36.2 °C), Max:98.2 °F (36.8 °C)      GEN: blind WDWN WF in NAD  HEENT: blind  CHEST: CTA  CVS:RRR           Lab results:    Chemistry  Recent Labs     21  0248 21  0105   * 196*    138   K 3.7 4.2   * 109   CO2 23 22   BUN 21* 28*   CREA 1.19 1.22   CA 8.3* 8.9   AGAP 5 7   BUCR 18 23*   * 153*   TP 6.6 6.6   ALB 2.3* 2.3*   GLOB 4.3* 4.3*   AGRAT 0.5* 0.5*       CBC w/ Diff  Recent Labs     21  0248 21  0105   WBC 6.9 7.7   RBC 3.30* 3.30*   HGB 9.0* 9.2*   HCT 29.0* 28.6*    406   GRANS 68 72   LYMPH 18* 16*   EOS 4 3       Microbiology  All Micro Results     Procedure Component Value Units Date/Time    CULTURE, URINE [[id number]]  (Abnormal)  (Susceptibility) Collected: 21 1011    Order Status: Completed Specimen: Cath Urine Updated: 21 1131     Special Requests: NO SPECIAL REQUESTS        [place] Count --        >100,000  COLONIES/mL       Culture result: PSEUDOMONAS AERUGINOSA              Ron Franco MD  Cell [phone]  [facility]   3/24/2021   4:30 PM

## 2021-03-24 NOTE — PROGRESS NOTES
5676- Bedside and Verbal shift change report given to [address] [place] [address] , RN (oncoming nurse) by Arlyn Meraz RN (offgoing nurse). Report included the following information SBAR, Kardex, Intake/Output and MAR. 1015- Spoke to Dr. Adam Acosta regarding holding IV metropolol since the patient took her PO metoprolol. She stated that as long as the patient is taking PO metoprolol, to hold the IV metoprolol. 1200- Spoke to Dr. Adam Acosta about getting patient a wound care consult. She verbalized understanding. 1730- Spoke to Dr. Adam Acosta to inquire about plan for possible PICC line placement and nephrostomy tube change. She states to hold the lovenox for now. 1930- Bedside and Verbal shift change report given to KAJAL Bal RN (oncoming nurse) by [address] [place] [address] , RN (offgoing nurse).  Report included the following information SBAR, Kardex, Intake/Output and MAR.   '

## 2021-03-24 NOTE — ROUTINE PROCESS
1928 Bedside and Verbal shift change report given to Catherine Bragg RN (oncoming nurse) by Monica Daley RN (offgoing nurse). Report included the following information SBAR, Kardex, Intake/Output, MAR and Recent Results. 2254 Pt C/O nausea, PRN Zofran administered will follow up on effectiveness. Drsg to buttocks CDI, Nephrostomy to right abd patent and draining clear, yellow urine with no odor. Colostomy to left abd CDI and draining soft brown stool. 2320 Pt C/O 5/10 generalized pain and eye pain, Dr Onesimo Vasquez notified, N.O for PRN Tylenol q 8 hr, will follow up on effectiveness. Timolol and Alphagan drops also un-held to begin in the morning. 2328 PRN Ativan administered for increased agitation and anxiety. Pt appears upset stating, \"that my son must be dead since he hasn't come seen me, please check to see who my POA is.\" Ruslan Cm in room to console. Writer also attempted to call son but no answer, voicemail left will re-attempt at a later time. 0715 Bedside and Verbal shift change report given to [id number] Shannon Lerner (oncoming nurse) by Catherine Bragg RN (offgoing nurse). Report included the following information SBAR, Kardex, Intake/Output, MAR and Recent Results.

## 2021-03-25 ENCOUNTER — APPOINTMENT (OUTPATIENT)
Dept: INTERVENTIONAL RADIOLOGY/VASCULAR | Age: 81
DRG: 092 | End: 2021-03-25
Attending: FAMILY MEDICINE
Payer: MEDICARE

## 2021-03-25 VITALS
BODY MASS INDEX: 27.27 KG/M2 | WEIGHT: 153.9 LBS | OXYGEN SATURATION: 100 % | RESPIRATION RATE: 16 BRPM | DIASTOLIC BLOOD PRESSURE: 47 MMHG | SYSTOLIC BLOOD PRESSURE: 151 MMHG | HEART RATE: 81 BPM | TEMPERATURE: 97.8 F | HEIGHT: 63 IN

## 2021-03-25 LAB
COVID-19 RAPID TEST, COVR: NOT DETECTED
GLUCOSE BLD STRIP.AUTO-MCNC: 106 MG/DL (ref 70–110)
GLUCOSE BLD STRIP.AUTO-MCNC: 125 MG/DL (ref 70–110)
GLUCOSE BLD STRIP.AUTO-MCNC: 158 MG/DL (ref 70–110)
GLUCOSE BLD STRIP.AUTO-MCNC: 176 MG/DL (ref 70–110)
MAGNESIUM SERPL-MCNC: 1.3 MG/DL (ref 1.6–2.6)
SARS-COV-2, COV2NT: NOT DETECTED
SOURCE, COVRS: NORMAL

## 2021-03-25 PROCEDURE — 36592 COLLECT BLOOD FROM PICC: CPT

## 2021-03-25 PROCEDURE — 74011636637 HC RX REV CODE- 636/637: Performed by: INTERNAL MEDICINE

## 2021-03-25 PROCEDURE — C1769 GUIDE WIRE: HCPCS

## 2021-03-25 PROCEDURE — 74011000258 HC RX REV CODE- 258: Performed by: INTERNAL MEDICINE

## 2021-03-25 PROCEDURE — 74011000636 HC RX REV CODE- 636: Performed by: RADIOLOGY

## 2021-03-25 PROCEDURE — U0003 INFECTIOUS AGENT DETECTION BY NUCLEIC ACID (DNA OR RNA); SEVERE ACUTE RESPIRATORY SYNDROME CORONAVIRUS 2 (SARS-COV-2) (CORONAVIRUS DISEASE [COVID-19]), AMPLIFIED PROBE TECHNIQUE, MAKING USE OF HIGH THROUGHPUT TECHNOLOGIES AS DESCRIBED BY CMS-2020-01-R: HCPCS

## 2021-03-25 PROCEDURE — 82962 GLUCOSE BLOOD TEST: CPT

## 2021-03-25 PROCEDURE — 74011250636 HC RX REV CODE- 250/636: Performed by: INTERNAL MEDICINE

## 2021-03-25 PROCEDURE — 87635 SARS-COV-2 COVID-19 AMP PRB: CPT

## 2021-03-25 PROCEDURE — 0T25X0Z CHANGE DRAINAGE DEVICE IN KIDNEY, EXTERNAL APPROACH: ICD-10-PCS | Performed by: RADIOLOGY

## 2021-03-25 PROCEDURE — 74011250636 HC RX REV CODE- 250/636: Performed by: RADIOLOGY

## 2021-03-25 PROCEDURE — 74011250637 HC RX REV CODE- 250/637: Performed by: INTERNAL MEDICINE

## 2021-03-25 PROCEDURE — 83735 ASSAY OF MAGNESIUM: CPT

## 2021-03-25 RX ORDER — NYSTATIN 100000 [USP'U]/G
POWDER TOPICAL 2 TIMES DAILY
Qty: 60 G | Refills: 0 | Status: SHIPPED
Start: 2021-03-25 | End: 2021-12-10

## 2021-03-25 RX ORDER — ACETAMINOPHEN 500 MG
1000 TABLET ORAL
Qty: 30 TAB | Refills: 0 | Status: SHIPPED
Start: 2021-03-25

## 2021-03-25 RX ORDER — SODIUM CHLORIDE 9 MG/ML
500 INJECTION, SOLUTION INTRAVENOUS
Status: COMPLETED | OUTPATIENT
Start: 2021-03-25 | End: 2021-03-25

## 2021-03-25 RX ORDER — BRIMONIDINE TARTRATE 2 MG/ML
1 SOLUTION/ DROPS OPHTHALMIC EVERY 12 HOURS
Qty: 10 ML | Refills: 0 | Status: SHIPPED
Start: 2021-03-25 | End: 2021-12-10

## 2021-03-25 RX ORDER — TIMOLOL MALEATE 5 MG/ML
1 SOLUTION/ DROPS OPHTHALMIC 2 TIMES DAILY
Qty: 10 ML | Refills: 0 | Status: SHIPPED
Start: 2021-03-25 | End: 2021-12-10

## 2021-03-25 RX ORDER — INSULIN GLARGINE 100 [IU]/ML
10 INJECTION, SOLUTION SUBCUTANEOUS
Qty: 1 VIAL | Refills: 0 | Status: SHIPPED
Start: 2021-03-25 | End: 2021-12-10

## 2021-03-25 RX ADMIN — BRIMONIDINE TARTRATE 1 DROP: 2 SOLUTION OPHTHALMIC at 09:29

## 2021-03-25 RX ADMIN — MEROPENEM 2 G: 1 INJECTION, POWDER, FOR SOLUTION INTRAVENOUS at 12:39

## 2021-03-25 RX ADMIN — AMLODIPINE BESYLATE 5 MG: 5 TABLET ORAL at 09:31

## 2021-03-25 RX ADMIN — INSULIN LISPRO 2 UNITS: 100 INJECTION, SOLUTION INTRAVENOUS; SUBCUTANEOUS at 12:39

## 2021-03-25 RX ADMIN — LORAZEPAM 0.5 MG: 0.5 TABLET ORAL at 09:42

## 2021-03-25 RX ADMIN — IOPAMIDOL 10 ML: 612 INJECTION, SOLUTION INTRAVENOUS at 10:14

## 2021-03-25 RX ADMIN — LEVOTHYROXINE SODIUM 50 MCG: 0.05 TABLET ORAL at 06:43

## 2021-03-25 RX ADMIN — SODIUM CHLORIDE 500 ML: 900 INJECTION, SOLUTION INTRAVENOUS at 10:14

## 2021-03-25 RX ADMIN — TIMOLOL MALEATE 1 DROP: 5 SOLUTION OPHTHALMIC at 09:29

## 2021-03-25 RX ADMIN — MEROPENEM 2 G: 1 INJECTION, POWDER, FOR SOLUTION INTRAVENOUS at 00:24

## 2021-03-25 RX ADMIN — METOPROLOL SUCCINATE 50 MG: 50 TABLET, EXTENDED RELEASE ORAL at 09:31

## 2021-03-25 RX ADMIN — NYSTATIN: 100000 POWDER TOPICAL at 09:32

## 2021-03-25 RX ADMIN — ENOXAPARIN SODIUM 40 MG: 40 INJECTION SUBCUTANEOUS at 15:20

## 2021-03-25 RX ADMIN — DEXTROSE MONOHYDRATE AND SODIUM CHLORIDE 100 ML/HR: 5; .9 INJECTION, SOLUTION INTRAVENOUS at 09:31

## 2021-03-25 RX ADMIN — MULTIPLE VITAMINS W/ MINERALS TAB 1 TABLET: TAB at 09:31

## 2021-03-25 RX ADMIN — ATORVASTATIN CALCIUM 20 MG: 20 TABLET, FILM COATED ORAL at 09:31

## 2021-03-25 RX ADMIN — Medication 10 ML: at 12:40

## 2021-03-25 NOTE — PROGRESS NOTES
S: Attempt by nursing staff to give discharge report to discharge facility as directed. B: Around 133 pm, [facility] was contacted by phone and handoff report was attempted to be given. Mathieu Najera verbalized that report would not be able to be taken until the facility received faxed discharge paperwork. Understanding was verbalized and Kaveh Montague was informed that Case management and MD staff would be informed that discharge summary needed to be completed for patient acceptance to facility. Understanding was verbalized. A: As of 133 pm, patient presented with no current discharge order on file for patient. Attending provider Dr. Rebeca Coffey was paged by  at [phone] am to inquire about discharge paperwork and [phone] pm but no response was received. At 1334 pm  Esperanza Townsend was contacted and informed that report could not be given to discharge facility because no discharge order presented on file and that no discharge paperwork had been faxed to [facility] for them to accept patient.  Sharita Burkett stated that Dr. Rebeca Coffey was aware of patient's prospective discharge from hospital to facility via medical transport around 1630 pm and stated that Dr. eRbeca Coffey was aware that discharge orders needed to be inputted as directed. Awaiting MD response. Will notify Nursing Manager Dorothy Cuevas RN and Charge RN Cyndee Yates RN will be notified for follow up.   R: Will continue with prescribed plan of care as directed.    Reginaldo Little  3/25/2021  1:40 PM

## 2021-03-25 NOTE — PROGRESS NOTES
S: Patient RAPID COVID 19 swab collected as directed and sent down to lab. B: Patient had prospective discharge orders home to [facility] around 1630 pm.  Naomi Bai verbalized during am rounds that patient needed to have RAPID COVID 19 swab performed prior to discharge from facility. Dr. Adam Acosta inputted order for collection. Understanding was verbalized. A: At 1300 pm, patient's RAPID COVID 19 swab was collected as directed via patient's left nare and sent down to lab for processing as directed. Awaiting results. R: Will continue with prescribed plan of care as directed.    Reginaldo Little  3/25/2021  1:05 PM

## 2021-03-25 NOTE — PROGRESS NOTES
Hospitalist Progress Note    Patient: Drew Ernst MRN: [id number]  CSN: [id number]    YOB: 1940  Age: 79 y.o. Sex: female    DOA: 3/20/2021 LOS:  LOS: 5 days          Assessment and Plan:    Patient Active Problem List   Diagnosis Code    Pyelonephritis N14    S/p nephrectomy Z90.5    Right flank pain R10.9    Malfunction of nephrostomy tube ([address] T83.098A    Nephrostomy tube displaced ([address] T83.022A    UTI (urinary tract infection) N39.0    Elevated WBC count D72.829    Diabetes ([address] E11.9    Legally blind H54.8    Hypertension I10    Thyroid disease E07.9    Elevated serum creatinine R79.89    S/P colectomy Z90.49    Skin lesion of foot L98.9    Tear of skin of buttock S31.801A    Pressure injury of right heel, unstageable ([facility]) L89.610    Hypomagnesemia E83.42    Hyperkalemia E87.5    Heel ulcer ([address] L97.409    ANTONI (acute kidney injury) ([address] N17.9    S/P AKA (above knee amputation), right ([address] Z89.611    Nephrostomy status ([address] Z93.6    Obstructed nephrostomy tube ([address] T83.092A    Nephrostomy complication ([address] E32.512    Acute renal failure (ARF) ([address] N17.9    Elevated INR R79.1    Bacteremia R78.81    Enterococcus faecalis infection B95.2    CKD stage 3 due to type 2 diabetes mellitus ([facility]) E11.22, N18.30    Hydronephrosis, right N13.30    Severe sepsis with acute organ dysfunction ([facility]) A41.9, R65.20    Anticoagulated Z79.01    AMS (altered mental status) R41.82    Encephalopathy acute G93.40    Altered mental status R41.82    Pseudomonas aeruginosa infection A528       54-year-old female with a history of multiple medical problems including chronic indwelling right nephrostomy tube, colostomy, diabetes, legally blind, hypertension, thyroid disease, status post above-knee amputation, chronic kidney disease admitted with altered mental status.     Pt is s/p PICC line  Pt is now s/p 10FR NEPHROSTOMY TUBE EXCHANGE      Pseudomonas right pyelonephritis/associated with nephrostomy tube -  DC home on meropenem for 14 days, 2gram IV [id number]  Weekly labs qmonday  Appreciate infectious disease expertise, Dr. Ayan De Luna    Drug-induced metabolic encephalopathy -  Significant improvement    Appreciate speech therapy assessment: Patient is now able to tolerate a regular solids thin liquid diet with aspiration precautions and feeding assistance    DM : Blood sugars less than 200, continue Lantus    Hypertension: Patient is now taking p.o. meds    Hypothyroidism: continue home meds    S: Pt very talkative. On her way to the angio suite. Review of systems:    General: No fevers or chills. Cardiovascular: No chest pain or pressure. No palpitations. Pulmonary: No shortness of breath. Gastrointestinal: No nausea, vomiting. Objective:    Vital signs/Intake and Output:  Visit Vitals  BP (!) 139/36 (BP 1 Location: Left lower arm, BP Patient Position: At rest)   Pulse 71   Temp 97.8 °F (36.6 °C)   Resp 17   Ht 5' 2.99\" (1.6 m)   Wt 69.8 kg (153 lb 14.4 oz)   SpO2 100%   BMI 27.27 kg/m²     Current Shift:  No intake/output data recorded. Last three shifts:  03/23 1901 - 03/25 0700  In: 1200 [I.V.:1200]  Out: 3350 [Urine:2625]    Physical Exam:  General: NAD, Non-toxic. HEENT: NC/AT. PERRLA, EOMI.  MMM. Lungs: Nml inspection. CTA B/L. No wheezing, rales or rhonchi. Heart:  S1S2 RRR,  PMI mid 5th IC space. No M/RG. Abdomen: Soft, NT/ND.  BS+. No peritoneal signs. Extremities: No C/C/E. Psych:   Nml affect. Neurologic:  2-12 intact. Strength 5/5 throughout.   Sensation symmetrical.          Labs: Results:       Chemistry Recent Labs     03/23/21  0248   *      K 3.7   *   CO2 23   BUN 21*   CREA 1.19   CA 8.3*   AGAP 5   BUCR 18   *   TP 6.6   ALB 2.3*   GLOB 4.3*   AGRAT 0.5*      CBC w/Diff Recent Labs     03/23/21  0248   WBC 6.9   RBC 3.30*   HGB 9.0*   HCT 29.0*      GRANS 68   LYMPH 18*   EOS 4      Cardiac Enzymes No results for input(s): CPK, CKND1, JANE in the last 72 hours. No lab exists for component: CKRMB, TROIP   Coagulation No results for input(s): PTP, INR, APTT, INREXT, INREXT in the last 72 hours. Lipid Panel Lab Results   Component Value Date/Time    Cholesterol, total 115 08/28/2019 08:22 AM    HDL Cholesterol 39 (L) 08/28/2019 08:22 AM    LDL, calculated 46.2 08/28/2019 08:22 AM    VLDL, calculated 29.8 08/28/2019 08:22 AM    Triglyceride 149 08/28/2019 08:22 AM    CHOL/HDL Ratio 2.9 08/28/2019 08:22 AM      BNP No results for input(s): BNPP in the last 72 hours.    Liver Enzymes Recent Labs     03/23/21  0248   TP 6.6   ALB 2.3*   *      Thyroid Studies Lab Results   Component Value Date/Time    TSH 3.40 01/24/2020 08:35 AM        Procedures/imaging: see electronic medical records for all procedures/Xrays and details which were not copied into this note but were reviewed prior to creation of Plan

## 2021-03-25 NOTE — PROGRESS NOTES
Patient presents compliant to current plan of care as directed.    Reginaldo Little  3/25/2021  10:50 AM

## 2021-03-25 NOTE — PROGRESS NOTES
Resting in bed, talking on phone. No distress noted, denied pain. Nephrostomy bag emptied to clear yellow urine and colostomy bag emptied to watery brown stool. Refused eye drops for shift. SCD's to L LE    0200 Pt sleeping, audio book on at high volume    0630 Nephrostomy emptied, no drainage noted in colostomy bag. Plan for PICC line insertion and change of Nephrostomy tube today. Also D\C to SNF when ready    0730 Bedside and Verbal shift change report given to Gerhardt Search, RN (oncoming nurse) by Evi Alcaraz RN   (offgoing nurse). Report included the following information SBAR, Kardex, Intake/Output, MAR and Recent Results.

## 2021-03-25 NOTE — DISCHARGE SUMMARY
Discharge Summary    Patient: Tameka Mendez MRN: [id number]  CSN: [id number]    YOB: 1940  Age: 79 y.o.   Sex: female    DOA: 3/20/2021 LOS:  LOS: 5 days   Discharge Date:      Primary Care Provider:  Leticia, Not On File    Admission Diagnoses: UTI (urinary tract infection) [N39.0]  AMS (altered mental status) [R41.82]  Altered mental status [R41.82]    Discharge Diagnoses:    Problem List as of 3/25/2021 Date Reviewed: 11/9/2020          Codes Class Noted - Resolved    * (Principal) Pseudomonas aeruginosa infection ICD-10-CM: A49.8  ICD-9-CM: 041.7  3/22/2021 - Present        Altered mental status ICD-10-CM: R41.82  ICD-9-CM: 780.97  3/20/2021 - Present        Encephalopathy acute ICD-10-CM: G93.40  ICD-9-CM: 348.30  11/16/2020 - Present        AMS (altered mental status) ICD-10-CM: R41.82  ICD-9-CM: 780.97  11/15/2020 - Present        Anticoagulated ICD-10-CM: Z79.01  ICD-9-CM: V58.61  11/5/2020 - Present        Hydronephrosis, right ICD-10-CM: N13.30  ICD-9-CM: 549  11/4/2020 - Present        Severe sepsis with acute organ dysfunction ([facility] ICD-10-CM: A41.9, R65.20  ICD-9-CM: 038.9, 995.92  11/4/2020 - Present        Bacteremia ICD-10-CM: R78.81  ICD-9-CM: 790.7  6/24/2020 - Present        Enterococcus faecalis infection ICD-10-CM: B95.2  ICD-9-CM: 041.04  6/24/2020 - Present        CKD stage 3 due to type 2 diabetes mellitus ([facility] ICD-10-CM: E11.22, N18.30  ICD-9-CM: 250.40, 585.3  6/24/2020 - Present        Acute renal failure (ARF) ([facility] ICD-10-CM: N17.9  ICD-9-CM: 584.9  6/21/2020 - Present        Elevated INR ICD-10-CM: R79.1  ICD-9-CM: 790.92  6/21/2020 - Present        Nephrostomy complication ([facility] BPX-28-XP: G76.718  ICD-9-CM: 997.5  6/19/2020 - Present        Obstructed nephrostomy tube ([facility] ICD-10-CM: Q21.772C  ICD-9-CM: 997.5  1/16/2020 - Present        S/P AKA (above knee amputation), right ([facility] ICD-10-CM: E50.247  ICD-9-CM: V49.76  6/3/2019 - Present        Nephrostomy status [facility]) ICD-10-CM: Z93.6  ICD-9-CM: V44.6  6/3/2019 - Present        ANTONI (acute kidney injury) ([place] [address].) ICD-10-CM: N17.9  ICD-9-CM: 584.9  5/31/2019 - Present        Hyperkalemia ICD-10-CM: E87.5  ICD-9-CM: 276.7  4/12/2019 - Present        Heel ulcer ([place] [address].) ICD-10-CM: L97.409  ICD-9-CM: 707.14  4/12/2019 - Present        Tear of skin of buttock ICD-10-CM: S31.801A  ICD-9-CM: 877.0  11/28/2018 - Present        Pressure injury of right heel, unstageable ([place] [address].) ICD-10-CM: L89.610  ICD-9-CM: 707.07, 707.25  11/28/2018 - Present        Hypomagnesemia ICD-10-CM: E83.42  ICD-9-CM: 275.2  11/28/2018 - Present        UTI (urinary tract infection) ICD-10-CM: N39.0  ICD-9-CM: 599.0  11/26/2018 - Present        Elevated WBC count ICD-10-CM: D72.829  ICD-9-CM: 288.60  11/26/2018 - Present        Diabetes ([place] [address].) ICD-10-CM: E11.9  ICD-9-CM: 250.00  11/26/2018 - Present        Legally blind ICD-10-CM: H54.8  ICD-9-CM: 369.4  11/26/2018 - Present        Hypertension ICD-10-CM: I10  ICD-9-CM: 401.9  11/26/2018 - Present        Thyroid disease ICD-10-CM: E07.9  ICD-9-CM: 246.9  11/26/2018 - Present        Elevated serum creatinine ICD-10-CM: R79.89  ICD-9-CM: 790.99  11/26/2018 - Present        S/P colectomy ICD-10-CM: Z90.49  ICD-9-CM: V45.89  11/26/2018 - Present        Skin lesion of foot ICD-10-CM: L98.9  ICD-9-CM: 709.9  11/26/2018 - Present        Pyelonephritis ICD-10-CM: N12  ICD-9-CM: 590.80  5/18/2018 - Present        S/p nephrectomy ICD-10-CM: Z90.5  ICD-9-CM: V45.73  5/18/2018 - Present        Right flank pain ICD-10-CM: R10.9  ICD-9-CM: 789.09  5/18/2018 - Present        Malfunction of nephrostomy tube ([facility] ICD-10-CM: M77.166R  ICD-9-CM: 997.5  5/18/2018 - Present        Nephrostomy tube displaced ([facility] ICD-10-CM: R03.140W  ICD-9-CM: 997.5  5/18/2018 - Present              Discharge Medications:     Current Discharge Medication List      START taking these medications    Details   acetaminophen (TYLENOL) 500 mg tablet Take 2 Tabs by mouth every eight (8) hours as needed for Pain or Fever. Qty: 30 Tab, Refills: 0      insulin glargine (LANTUS) 100 unit/mL injection 10 Units by SubCUTAneous route nightly. Qty: 1 Vial, Refills: 0      lip protectant with sunscreen (CARMEX) crea Apply  to affected area as needed for Dry Skin. Qty: 1 Tube, Refills: 0      0.9% sodium chloride solp 100 mL with meropenem 1 gram solr 2 g 2 g by IntraVENous route every twelve (12) hours every twelve (12) hours for 14 days. Qty: 28 g, Refills: 0      brimonidine (ALPHAGAN) 0.2 % ophthalmic solution Administer 1 Drop to both eyes every twelve (12) hours. Qty: 10 mL, Refills: 0         CONTINUE these medications which have CHANGED    Details   nystatin (MYCOSTATIN) powder Apply  to affected area two (2) times a day. Qty: 60 g, Refills: 0      timolol (TIMOPTIC) 0.5 % ophthalmic solution Administer 1 Drop to both eyes two (2) times a day. Qty: 10 mL, Refills: 0         CONTINUE these medications which have NOT CHANGED    Details   amLODIPine (NORVASC) 5 mg tablet Take 1 Tab by mouth daily. Qty: 30 Tab, Refills: 0      famotidine (PEPCID) 10 mg tablet Take 1 Tab by mouth every twelve (12) hours. Qty: 60 Tab, Refills: 0      melatonin 5 mg tablet Take 1 Tab by mouth nightly. Qty: 30 Tab, Refills: 0      LORazepam (ATIVAN) 0.5 mg tablet Take 1 Tab by mouth two (2) times daily as needed for Agitation. Max Daily Amount: 1 mg. Qty: 2 Tab, Refills: 0    Associated Diagnoses: Anxiety      multivitamin (ONE A DAY) tablet Take 1 Tab by mouth daily. ondansetron hcl (ZOFRAN) 4 mg tablet Take 4 mg by mouth every eight (8) hours as needed for Nausea or Vomiting. brimonidine-timolol (COMBIGAN) 0.2-0.5 % drop ophthalmic solution Administer 1 Drop to both eyes every twelve (12) hours. metoprolol succinate (TOPROL-XL) 50 mg XL tablet Take 50 mg by mouth daily.       magnesium hydroxide (MILK OF MAGNESIA CONCENTRATED) 2,400 mg/10 mL susp suspension Take 10 mL by mouth.      levothyroxine (SYNTHROID) 50 mcg tablet Take  by mouth Daily (before breakfast). atorvastatin calcium (ATORVASTATIN PO) Take 20 mg by mouth daily. STOP taking these medications       naloxone (NARCAN) 4 mg/actuation nasal spray Comments:   Reason for Stopping:         gabapentin (NEURONTIN) 300 mg capsule Comments:   Reason for Stopping:         insulin detemir U-100 (Levemir U-100 Insulin) 100 unit/mL injection Comments:   Reason for Stopping:         amino acids/protein hydrolys (PRO-STAT PROFILE PO) Comments:   Reason for Stopping:         diphenhydrAMINE (BENADRYL) 25 mg capsule Comments:   Reason for Stopping:         pollen extracts (PROSTAT PO) Comments:   Reason for Stopping:         tiZANidine (ZANAFLEX) 2 mg capsule Comments:   Reason for Stopping:         traZODone (DESYREL) 50 mg tablet Comments:   Reason for Stopping:         camphor-menthol (SARNA) 0.5-0.5 % lotion Comments:   Reason for Stopping:         hydrocortisone (HYTONE) 2.5 % topical cream Comments:   Reason for Stopping:         bisacodyl (DULCOLAX, BISACODYL,) 10 mg suppository Comments:   Reason for Stopping:         alum-mag hydroxide-simeth (MYLANTA) 200-200-20 mg/5 mL susp Comments:   Reason for Stopping:         insulin aspart U-100 (NOVOLOG FLEXPEN U-100 INSULIN) 100 unit/mL inpn Comments:   Reason for Stopping:         acetaminophen (TYLENOL) 650 mg TbER Comments:   Reason for Stopping:               Discharge Condition: improved     Procedures : Status post PICC line, status post 10 Spanish nephrostomy tube exchange     Consults: Dr. Drew German -Infectious Disease     PHYSICAL EXAM   Visit Vitals  BP (!) 138/32 (BP 1 Location: Left lower arm, BP Patient Position: At rest)   Pulse 76   Temp 97.8 °F (36.6 °C)   Resp 18   Ht 5' 2.99\" (1.6 m)   Wt 69.8 kg (153 lb 14.4 oz)   SpO2 100%   BMI 27.27 kg/m²     General: Awake, cooperative, no acute distress    HEENT: NC, Atraumatic. PERRLA, EOMI.  Anicteric sclerae. Lungs:  CTA Bilaterally. No Wheezing/Rhonchi/Rales. Heart:  Regular  rhythm,  No murmur, No Rubs, No Gallops  Abdomen: Soft, Non distended, Non tender. +Bowel sounds,   Extremities: No c/c/e  Psych:   Not anxious or agitated. Neurologic:  No acute neurological deficits. Admission HPI : Sherryle China is a 79 y.o. female who was sent over from the nursing home for decreased mental status which has been going on for the past 2days the patient is not able to provide a history but I did get some history from her son on the phone who says that he thinks her mental status changed when they put her on a cefepime type drug several days ago at the nursing home there is no report of chest pain shortness of breath or fever    Hospital Course : 79-year-old female with a history of multiple medical problems including chronic indwelling right nephrostomy tube, colostomy, diabetes, legally blind, hypertension, thyroid disease, status post above-knee amputation, chronic kidney disease admitted with altered mental status. Patient was found to have right pyelonephritis caused by Pseudomonas bacteria. She received IV meropenem while she was inpatient. Infectious disease was consulted. Dr. Millie Altamirano expertise was appreciated. Patient will be required to be discharged on 14 days of meropenem 2 g IV every 12 hours with weekly labs on Monday. Patient will need weekly labs with CBC and BMP every Monday while taking IV antibiotics. Follow-up with infectious disease doctor not necessary. Assessment: Rec / Plan:   Pseudomonas aeruginosa R pyelonephritis NT-associated. This bug is a real  - the Nephrostomy tube needs to go to effect a cure. Her leukocytosis has resolved which I think it's safe at any point onward to do this. Will need midline/PICC to give her meropenem for 14d     Ertapenem won't work.  Other options would be ceftazidime (doesn't cause encephalopathy like cefepime) or an aminoglycoside. Meropenem at antipseudomonal/renal dosing 2gm IV 12h (vice q8h with normal beans); terminate 5APR21  Weekly labs (qMonday) -CBC with diff and BMP; fax to me [phone]. Doesn't need to see me in follow up   Drug-induced encephalopathy  Give it 5d from last dose to clear her brain. It'll get better.     Chronic R renal calcinosis  Needs a new tube     Chronic debility/bed-ridden  She'll be back     HTN       Patient also experienced drug-induced metabolic encephalopathy while she was inpatient. During her inpatient stay patient's metabolic encephalopathy improved and resolved back to baseline. She received a speech therapy assessment while she was inpatient and her diet based on assessment is regular solids with thin liquids with aspiration precautions and feeding assistance. Diabetes mellitus was managed while she was inpatient and she had blood sugars less than 200. Discontinue previous insulin regimen and restarted new regimen with Lantus please see discharge orders. Continue current management for hypertension and hypothyroidism. Activity: Patient is bedridden with chronic debility, activity as tolerated within this framework    Diet: ADA diet, regular solids thin liquid diet with aspiration precautions and feeding assistance    Follow-up: Follow-up with infectious disease doctor is not indicated. Patient can follow-up with her primary care physician and your medical director at [address] [place] [address] as appropriate.      Disposition: Transfer back to [address] [place] [address]    Minutes spent on discharge: 60 minutes      Labs: Results:       Chemistry Recent Labs     03/23/21  0248   *      K 3.7   *   CO2 23   BUN 21*   CREA 1.19   CA 8.3*   AGAP 5   BUCR 18   *   TP 6.6   ALB 2.3*   GLOB 4.3*   AGRAT 0.5*      CBC w/Diff Recent Labs     03/23/21  0248   WBC 6.9   RBC 3.30*   HGB 9.0*   HCT 29.0*      GRANS 68   LYMPH 18*   EOS 4      Cardiac Enzymes No results for input(s): CPK, CKND1, JANE in the last 72 hours. No lab exists for component: CKRMB, TROIP   Coagulation No results for input(s): PTP, INR, APTT, INREXT in the last 72 hours. Lipid Panel Lab Results   Component Value Date/Time    Cholesterol, total 115 08/28/2019 08:22 AM    HDL Cholesterol 39 (L) 08/28/2019 08:22 AM    LDL, calculated 46.2 08/28/2019 08:22 AM    VLDL, calculated 29.8 08/28/2019 08:22 AM    Triglyceride 149 08/28/2019 08:22 AM    CHOL/HDL Ratio 2.9 08/28/2019 08:22 AM      BNP No results for input(s): BNPP in the last 72 hours. Liver Enzymes Recent Labs     03/23/21  0248   TP 6.6   ALB 2.3*   *      Thyroid Studies Lab Results   Component Value Date/Time    TSH 3.40 01/24/2020 08:35 AM            Significant Diagnostic Studies: Ct Head Wo Cont    Result Date: 3/20/2021  EXAM: CT head without contrast  CLINICAL INDICATION/HISTORY: Altered mental status    --Additional history: None. COMPARISON: Number 17 2020 TECHNIQUE: Axial CT imaging of the head was performed without intravenous contrast. One or more dose reduction techniques were used on this CT: automated exposure control, adjustment of the mAs and/or kVp according to patient size, and iterative reconstruction techniques. The specific techniques used on this CT exam have been documented in the patient's electronic medical record. Digital Imaging and Communications in Medicine (DICOM) format image data are available to nonaffiliated external healthcare facilities or entities on a secure, media free, reciprocally searchable basis with patient authorization for at least a 12 month period after this study. _______________ FINDINGS: CALVARIUM: Intact. SOFT TISSUES/SCALP: Normal. SINUSES: Clear. BRAIN AND POSTERIOR FOSSA: There is proportional enlargement of the ventricles and cerebral spinal fluid spaces consistent with generalized cerebral volume loss. There is no intracranial hemorrhage, mass effect, or midline shift. There are scattered periventricular and subcortical white matter hypodensities present consistent with nonspecific gliosis. This is most commonly associated with sequelae of chronic microvascular ischemia. EXTRA-AXIAL SPACES AND MENINGES: There are no abnormal extra-axial fluid collections. OTHER: There are atherosclerotic calcifications of the carotid siphons and intradural vertebral arteries. _______________     1. No acute intracranial abnormalities. Specifically, no hemorrhage, mass effect or CT evident acute cortical infarction. 2. Generalized cerebral volume loss with sequelae of chronic microvascular ischemia. Ct Chest Abd Pelv Wo Cont    Result Date: 3/20/2021  EXAM: CT of the chest, abdomen, and pelvis CLINICAL INDICATION/HISTORY: CT chest, abdomen and pelvis without contrast   > Additional: Infection, altered level of consciousness COMPARISON: CT abdomen/pelvis 11/15/2020 TECHNIQUE: Axial CT imaging of the chest, abdomen, and pelvis was performed throughout intravenous contrast.  Multiplanar reformats were generated. One or more dose reduction techniques were used on this CT: automated exposure control, adjustment of the mAs and/or kVp according to patient size, and iterative reconstruction techniques. The specific techniques used on this CT exam have been documented in the patient's electronic medical record. Digital Imaging and Communications in Medicine (DICOM) format image data are available to nonaffiliated external healthcare facilities or entities on a secure, media free, reciprocally searchable basis with patient authorization for at least a 12 month period after this study. _______________ FINDINGS: CHEST: BASE OF NECK: Surgical clips present within the right neck. . MEDIASTINUM: Three vessel aortic arch. Great vessels unremarkable. Normal heart size. There is coronary artery disease. No pericardial effusion. LYMPH NODES: No enlarged lymph nodes.  AIRWAY: Normal. LUNGS/PLEURA: No suspicious nodule or mass. Right lung is clear. Small left pleural effusion with associated areas of atelectasis in the left lower lobe. BONES: No acute or aggressive osseous abnormalities identified. OTHER:  None. =============== ABDOMEN/PELVIS: LIVER, BILIARY: Liver is normal. Prior cholecystectomy. Mild prominence of the common bile duct, within normal limits for a postcholecystectomy patient. PANCREAS: Mildly atrophic. SPLEEN: Normal with few capsular calcifications. ADRENALS: Normal. KIDNEYS/URETERS/BLADDER: There is a right-sided percutaneous nephrostomy tube. The locking loop of the catheter is present within the upper pole calyces. No significant hydronephrosis. Obliteration coils present within the distal right ureter. Left kidney is absent. Bladder is surgically absent. PELVIC ORGANS: Prior hysterectomy. VASCULATURE: Extensive atherosclerosis. No aneurysmal dilation. LYMPH NODES: Stable appearance of some mildly prominent retroperitoneal lymph nodes measuring up to 8 mm in short axis dimension along the mid aorta. GASTROINTESTINAL TRACT: Prior colectomy with diverting left lower quadrant ileostomy. Some angulation and placement of bowel loops along the ventral abdominal wall suggestive of nonobstructive peritoneal adhesive disease. No bowel obstruction or focal bowel wall thickening. BONES: Bones appear osteopenic. There is lower lumbar degenerative facet arthropathy. There is no acute osseous abnormality. There are degenerative changes within both hips. OTHER: Soft tissue edema over the proximal left thigh and dependently over the flanks. Presacral thickening, similar to the prior study, potentially related to prior radiation therapy. _______________     1. Small left pleural effusion with associated atelectasis. 2. No acute radiographic abnormality. Chronic findings including -      > Right percutaneous nephrostomy tube in place. No significant right-sided hydronephrosis.       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 > The left kidney and urinary bladder are surgically absent.      > Colectomy with left lower quadrant ileostomy. No bowel obstruction or focal bowel wall thickening. Ir Exchange Nephro Perc Rt Si    Result Date: 3/25/2021  PLANNED PROCEDURE: Right nephrostomy tube replacement ATTENDING: Jose Angel Ariza M.D. COMPLICATIONS: None SPECIMEN: None DOSE: Fluoroscopic dose (reference air kerma): 7 mGy INDICATION: Routine right nephrostomy tube exchange. TECHNIQUE: Informed consent was obtained. Procedural time out was performed. Maximal sterile barrier technique (including cap, mask, sterile gown, sterile gloves, large sterile sheet, hand hygiene, and cutaneous antisepsis per current guidelines) was followed. Initial  image demonstrated indwelling nephrostomy tube. Contrast injection was performed demonstrating tip within the upper pole calyx. The catheter was then removed over wire and a new 10 Austrian nephrostomy tube was placed with distal tip in the renal pelvis under fluoroscopic guidance. Repeat injection demonstrated appropriate position. The catheter was secured in place and passed to gravity drainage. Sterile dressing was applied. Routine right nephrostomy tube exchange as above. No results found for this or any previous visit.         CC: Bshsi, Not On File

## 2021-03-25 NOTE — PROGRESS NOTES
03/25/21 1511 Vital Signs Temp 97.8 °F (36.6 °C) Temp Source Oral  
Pulse (Heart Rate) 81 Heart Rate Source Monitor Resp Rate 16  
O2 Sat (%) 100 % Level of Consciousness Alert  
BP (!) 151/47 MAP (Calculated) 82 BP 1 Method Automatic  
BP 1 Location Left leg BP Patient Position At rest  
MEWS Score 1 Pain 1 Pain Scale 1 Numeric (0 - 10) Pain Intensity 1 0 Patient Stated Pain Goal 0

## 2021-03-25 NOTE — PROGRESS NOTES
D/C Plan: [address] has been notified pt is medically stable to transition back to [facility] today. CM contacted pt's son, Cornelio Magallon ([phone] he is in agreement with plan to return to [facility]. .  CM contacted [facility] and have been notified they will now accept a rapid COVID test since the PCR COVID test has not been resulted. PT WILL NEED TO HAVE A NEGATIVE COVID (can be a rapid) TEST TO ASSIST WITH TRANSITION BACK TO [facility]. CM to continue to follow and assist.        Transition of Care Plan:      Communication to Patient/Family:  Met with patient and family, and they are agreeable to the transition plan. The Plan for Transition of Care is related to the following treatment goals: SNF/LTC     The Patient and/or patient representative was provided with a choice of provider and agrees   with the discharge plan.  Yes [x]? ? No []? ?     Freedom of choice list was provided with basic dialogue that supports the patient's individualized plan of care/goals and shares the quality data associated with the providers.     Yes [x]? ? No []? ?     SNF/Rehab Transition:  Patient has been accepted to [facility] at [address] and meets criteria for admission. Patient will transported by medial transport and expected to leave today at 4:30.     Communication to SNF/Rehab:  Bedside RN, Ida Fossoda been notified to update the transition plan to the facility and call report [phone].      Discharge information has been updated on the AVS and communicated through [facility] or [facility].     Discharge instructions to be fax'd to facility at [phone]      Please include all hard scripts for controlled substances, med rec and dc summary, and AVS in packet.  Please medicate for pain prior to dc if possible and needed to help offset delay when patient first arrives to facility.     Reviewed and confirmed with facility, [facility] can manage the patient care needs for the following:      Husam with (X) only those applicable:  Medication:  []??Medications are available at the facility  []??IV Antibiotics    []??Controlled Substance – hard copies available sent.  []??Weekly Labs     Equipment:  []??CPAP/BiPAP  []??Wound Vacuum  []??Wright or Urinary Device  []??PICC/Central Line  []??Nebulizer  []??Ventilator     Treatment:  []??Isolation (for MRSA, VRE, etc.)  []??Surgical Drain Management  []??Tracheostomy Care  []??Dressing Changes  []??Dialysis with transportation  []??PEG Care  []??Oxygen  []??Daily Weights for Heart Failure     Dietary:  []??Any diet limitations  []??Tube Feedings   []??Total Parenteral Management (TPN)     Financial Resources:  []??Medicaid Application Completed     []??UAI Completed and copy given to pt/family.     []??A screening has previously been completed.     []??Level II Completed     []?? Private pay individual who will not become   financially eligible for Medicaid within 6 months from admission to a [facility].      []?? Individual refused to have screening conducted.      []??Medicaid Application Completed     []??The screening denied because it was determined individual did not need/did not qualify for nursing facility level of care.  []?? Out of state residents seeking direct admission to a VA nursing facility.  []?? Individuals who are inpatients of an out of state hospital, or in state or out of state veterans/ hospital and seek direct admission to a VA nursing facility  []?? Individuals who are pateints or residents of a state owned/operated facility that is licensed by Department of Behavioral Services (DBHDS) and seek direct admission to VA nursing facility  []?? A screening not required for enrollment in Medicaid Hospice services as set out in 12 VAC 30-  []?? [facility] ([facility]) staff shall perform screenings of the [facility] clients.      Advanced Care Plan:  []??Surrogate Decision Maker of  Care  []? ?POA  []??Communicated Code Status and copy sent.     Other:               Care Management Interventions  PCP Verified by CM:  Yes  Mode of Transport at Discharge: Self  Transition of Care Consult (CM Consult): Discharge Planning, SNF  Current Support Network: [address] [place] [address]  Confirm Follow Up Transport: Other (see comment)(medical transport)  The Plan for Transition of Care is Related to the Following Treatment Goals : return to [facility] when medically stable  Discharge Location  Discharge Placement: Skilled nursing facility

## 2021-03-25 NOTE — PROGRESS NOTES
Speech Therapy Note:    Follow up attempted at 955. Could not progress with ST intervention because patient:      []  Lethargic, unable to be alerted enough for safe PO intake  []  Refused participation  [x]  Off the unit for nephrostomy tube change   []  NPO for procedure  []  Other:     Re-attempted tx at [phone]. Patient adamantly refused PO intake despite encouragement, stating \"No!\". Will follow up next day or as medically indicated.      Danita Killian M.S., [address] [facility]  Speech Language Pathologist

## 2021-03-25 NOTE — PROGRESS NOTES
03/25/21 1511   Vital Signs   Temp 97.8 °F (36.6 °C)   Temp Source Oral   Pulse (Heart Rate) 81   Heart Rate Source Monitor   Resp Rate 16   O2 Sat (%) 100 %   Level of Consciousness Alert   BP (!) 151/47   MAP (Calculated) 82   BP 1 Method Automatic   BP 1 Location Left lower arm   BP Patient Position At rest   MEWS Score 1   Pain 1   Pain Scale 1 Numeric (0 - 10)   Pain Intensity 1 0   Patient Stated Pain Goal 0       S: Patient discharge completed/ Discharge handoff report was given/ Patient awaiting medical transport. B: Patient presented with discharge orders to [facility]. As of 1430 pm,  Naomi Bai contacted floor and stated that patient was ready for discharge from facility and that discharge orders had been entered as directed and sent over to receiving facility of [facility]. Understanding was verbalized. A: At 1534 pm, [facility] was contacted and receiving nurse Maile Daley was provided telephone handoff report was informed about patient's vital signs listed above, stable status, left lower quadrant colostomy and last void via colostomy on 3/24/2021. Additionally, Nurse Magalie Barron was informed about right back nephrostomy site, right Upper arm single lumen PICC line needed for IV antibiotic therapy upon disccharge and 1500 pm blood sugar level of 125 that did not require coverage. Nurse Magalie Barron was informed that patient presented legally blind and was hard of hearing and required the utilization of hearing aides. Nurse Magalie Barron was informed that patient would be leaving facility around 1630 pm via medical transport. Understanding and acceptance of handoff report was verbalized. R: Will continue with prescribed plan of care as directed.    Reginaldo Little  3/25/2021  3:42 PM

## 2021-03-26 LAB — SARS-COV-2, COV2NT: NOT DETECTED

## 2021-03-28 LAB — GLUCOSE BLD STRIP.AUTO-MCNC: 176 MG/DL (ref 70–110)

## 2021-03-29 ENCOUNTER — HOSPITAL ENCOUNTER (OUTPATIENT)
Dept: LAB | Age: 81
Discharge: HOME | End: 2021-03-29
Payer: COMMERCIAL

## 2021-03-29 LAB
ANION GAP SERPL CALC-SCNC: 3 MMOL/L (ref 3–18)
BASOPHILS # BLD: 0 K/UL (ref 0–0.1)
BASOPHILS NFR BLD: 0 % (ref 0–2)
BUN SERPL-MCNC: 12 MG/DL (ref 7–18)
BUN/CREAT SERPL: 13 (ref 12–20)
CALCIUM SERPL-MCNC: 7.7 MG/DL (ref 8.5–10.1)
CHLORIDE SERPL-SCNC: 111 MMOL/L (ref 100–111)
CO2 SERPL-SCNC: 29 MMOL/L (ref 21–32)
CREAT SERPL-MCNC: 0.89 MG/DL (ref 0.6–1.3)
DIFFERENTIAL METHOD BLD: ABNORMAL
EOSINOPHIL # BLD: 0.7 K/UL (ref 0–0.4)
EOSINOPHIL NFR BLD: 9 % (ref 0–5)
ERYTHROCYTE [DISTWIDTH] IN BLOOD BY AUTOMATED COUNT: 15.2 % (ref 11.6–14.5)
GLUCOSE SERPL-MCNC: 89 MG/DL (ref 74–99)
HCT VFR BLD AUTO: 27.8 % (ref 35–45)
HGB BLD-MCNC: 8.6 G/DL (ref 12–16)
LYMPHOCYTES # BLD: 1.8 K/UL (ref 0.9–3.6)
LYMPHOCYTES NFR BLD: 24 % (ref 21–52)
MCH RBC QN AUTO: 27.1 PG (ref 24–34)
MCHC RBC AUTO-ENTMCNC: 30.9 G/DL (ref 31–37)
MCV RBC AUTO: 87.7 FL (ref 74–97)
MONOCYTES # BLD: 0.6 K/UL (ref 0.05–1.2)
MONOCYTES NFR BLD: 9 % (ref 3–10)
NEUTS SEG # BLD: 4.2 K/UL (ref 1.8–8)
NEUTS SEG NFR BLD: 58 % (ref 40–73)
PLATELET # BLD AUTO: 320 K/UL (ref 135–420)
PMV BLD AUTO: 9.6 FL (ref 9.2–11.8)
POTASSIUM SERPL-SCNC: 4.2 MMOL/L (ref 3.5–5.5)
RBC # BLD AUTO: 3.17 M/UL (ref 4.2–5.3)
SODIUM SERPL-SCNC: 143 MMOL/L (ref 136–145)
WBC # BLD AUTO: 7.2 K/UL (ref 4.6–13.2)

## 2021-03-29 PROCEDURE — 80048 BASIC METABOLIC PNL TOTAL CA: CPT

## 2021-03-29 PROCEDURE — 85025 COMPLETE CBC W/AUTO DIFF WBC: CPT

## 2021-03-30 ENCOUNTER — HOSPITAL ENCOUNTER (OUTPATIENT)
Dept: LAB | Age: 81
Discharge: HOME | End: 2021-03-30

## 2021-03-30 PROCEDURE — U0003 INFECTIOUS AGENT DETECTION BY NUCLEIC ACID (DNA OR RNA); SEVERE ACUTE RESPIRATORY SYNDROME CORONAVIRUS 2 (SARS-COV-2) (CORONAVIRUS DISEASE [COVID-19]), AMPLIFIED PROBE TECHNIQUE, MAKING USE OF HIGH THROUGHPUT TECHNOLOGIES AS DESCRIBED BY CMS-2020-01-R: HCPCS

## 2021-03-31 LAB — SARS-COV-2, COV2NT: NOT DETECTED

## 2021-04-06 ENCOUNTER — HOSPITAL ENCOUNTER (OUTPATIENT)
Dept: LAB | Age: 81
Discharge: HOME | End: 2021-04-06

## 2021-04-06 PROCEDURE — U0003 INFECTIOUS AGENT DETECTION BY NUCLEIC ACID (DNA OR RNA); SEVERE ACUTE RESPIRATORY SYNDROME CORONAVIRUS 2 (SARS-COV-2) (CORONAVIRUS DISEASE [COVID-19]), AMPLIFIED PROBE TECHNIQUE, MAKING USE OF HIGH THROUGHPUT TECHNOLOGIES AS DESCRIBED BY CMS-2020-01-R: HCPCS

## 2021-04-07 LAB — SARS-COV-2, COV2NT: NOT DETECTED

## 2021-04-13 ENCOUNTER — HOSPITAL ENCOUNTER (OUTPATIENT)
Dept: LAB | Age: 81
Discharge: HOME | End: 2021-04-13

## 2021-04-13 PROCEDURE — U0003 INFECTIOUS AGENT DETECTION BY NUCLEIC ACID (DNA OR RNA); SEVERE ACUTE RESPIRATORY SYNDROME CORONAVIRUS 2 (SARS-COV-2) (CORONAVIRUS DISEASE [COVID-19]), AMPLIFIED PROBE TECHNIQUE, MAKING USE OF HIGH THROUGHPUT TECHNOLOGIES AS DESCRIBED BY CMS-2020-01-R: HCPCS

## 2021-04-14 LAB — SARS-COV-2, COV2NT: NOT DETECTED

## 2021-05-10 ENCOUNTER — HOSPITAL ENCOUNTER (OUTPATIENT)
Dept: INTERVENTIONAL RADIOLOGY/VASCULAR | Age: 81
Discharge: HOME | End: 2021-05-10
Attending: UROLOGY
Payer: MEDICARE

## 2021-05-10 DIAGNOSIS — N13.39 OTHER HYDRONEPHROSIS: ICD-10-CM

## 2021-05-10 PROCEDURE — 74011000636 HC RX REV CODE- 636: Performed by: RADIOLOGY

## 2021-05-10 PROCEDURE — 74011250636 HC RX REV CODE- 250/636: Performed by: RADIOLOGY

## 2021-05-10 PROCEDURE — C1769 GUIDE WIRE: HCPCS

## 2021-05-10 RX ORDER — SODIUM CHLORIDE 9 MG/ML
500 INJECTION, SOLUTION INTRAVENOUS
Status: COMPLETED | OUTPATIENT
Start: 2021-05-10 | End: 2021-05-10

## 2021-05-10 RX ADMIN — IOPAMIDOL 15 ML: 612 INJECTION, SOLUTION INTRAVENOUS at 09:12

## 2021-05-10 RX ADMIN — SODIUM CHLORIDE 500 ML: 900 INJECTION, SOLUTION INTRAVENOUS at 09:11

## 2021-05-10 NOTE — H&P
The patient is an appropriate candidate to undergo PCN exchange. Patient assessed immediately prior to induction. Anesthesia plan as follows: Local/No Anesthesia. History and Physical update:  H&P was reviewed and the patient was examined. No changes have occurred in the patient's condition since the H&P was completed.     George Duong MD  Vascular & Interventional Radiology  [facility]  5/10/2021

## 2021-05-10 NOTE — PROCEDURES
Vascular & Interventional Radiology Brief Procedure Note    Interventional Radiologist: Evelyn Mix MD    Pre-operative Diagnosis:  PCN exchange    Post-operative Diagnosis: Same as pre-op dx    Procedure(s) Performed:  same    Anesthesia:  Local      Findings:  Uncomplicated PCN exchange.      Complications: None    Estimated Blood Loss:  minimal    Tubes and Drains: None    Specimens: None    Condition: Good       Evelyn Mix MD  [facility]  Vascular & Interventional Radiology  5/10/2021

## 2021-06-06 ENCOUNTER — HOSPITAL ENCOUNTER (INPATIENT)
Age: 81
LOS: 5 days | Discharge: NURSING, REHAB OR LONG-TERM CARE | DRG: 698 | End: 2021-06-11
Attending: STUDENT IN AN ORGANIZED HEALTH CARE EDUCATION/TRAINING PROGRAM | Admitting: FAMILY MEDICINE
Payer: MEDICARE

## 2021-06-06 DIAGNOSIS — E87.5 ACUTE HYPERKALEMIA: ICD-10-CM

## 2021-06-06 DIAGNOSIS — T83.022A NEPHROSTOMY TUBE DISPLACED (HCC): Primary | ICD-10-CM

## 2021-06-06 PROBLEM — B35.6 TINEA CRURIS: Status: ACTIVE | Noted: 2021-06-06

## 2021-06-06 LAB
ALBUMIN SERPL-MCNC: 2.9 G/DL (ref 3.4–5)
ALBUMIN/GLOB SERPL: 0.7 {RATIO} (ref 0.8–1.7)
ALP SERPL-CCNC: 116 U/L (ref 45–117)
ALT SERPL-CCNC: 28 U/L (ref 13–56)
ANION GAP SERPL CALC-SCNC: 8 MMOL/L (ref 3–18)
AST SERPL-CCNC: 26 U/L (ref 10–38)
ATRIAL RATE: 76 BPM
BASOPHILS # BLD: 0 K/UL (ref 0–0.1)
BASOPHILS NFR BLD: 0 % (ref 0–2)
BILIRUB SERPL-MCNC: 0.2 MG/DL (ref 0.2–1)
BUN SERPL-MCNC: 43 MG/DL (ref 7–18)
BUN/CREAT SERPL: 37 (ref 12–20)
CALCIUM SERPL-MCNC: 8.8 MG/DL (ref 8.5–10.1)
CALCULATED P AXIS, ECG09: 46 DEGREES
CALCULATED R AXIS, ECG10: 51 DEGREES
CALCULATED T AXIS, ECG11: 61 DEGREES
CHLORIDE SERPL-SCNC: 111 MMOL/L (ref 100–111)
CO2 SERPL-SCNC: 19 MMOL/L (ref 21–32)
CREAT SERPL-MCNC: 1.15 MG/DL (ref 0.6–1.3)
DIAGNOSIS, 93000: NORMAL
DIFFERENTIAL METHOD BLD: ABNORMAL
EOSINOPHIL # BLD: 0.4 K/UL (ref 0–0.4)
EOSINOPHIL NFR BLD: 4 % (ref 0–5)
ERYTHROCYTE [DISTWIDTH] IN BLOOD BY AUTOMATED COUNT: 16 % (ref 11.6–14.5)
GLOBULIN SER CALC-MCNC: 4.3 G/DL (ref 2–4)
GLUCOSE BLD STRIP.AUTO-MCNC: 181 MG/DL (ref 70–110)
GLUCOSE SERPL-MCNC: 202 MG/DL (ref 74–99)
HCT VFR BLD AUTO: 35.8 % (ref 35–45)
HGB BLD-MCNC: 11.3 G/DL (ref 12–16)
LYMPHOCYTES # BLD: 2.3 K/UL (ref 0.9–3.6)
LYMPHOCYTES NFR BLD: 24 % (ref 21–52)
MCH RBC QN AUTO: 27.4 PG (ref 24–34)
MCHC RBC AUTO-ENTMCNC: 31.6 G/DL (ref 31–37)
MCV RBC AUTO: 86.9 FL (ref 74–97)
MONOCYTES # BLD: 0.7 K/UL (ref 0.05–1.2)
MONOCYTES NFR BLD: 7 % (ref 3–10)
NEUTS SEG # BLD: 6.1 K/UL (ref 1.8–8)
NEUTS SEG NFR BLD: 64 % (ref 40–73)
P-R INTERVAL, ECG05: 154 MS
PLATELET # BLD AUTO: 365 K/UL (ref 135–420)
PMV BLD AUTO: 9.5 FL (ref 9.2–11.8)
POTASSIUM SERPL-SCNC: 5.8 MMOL/L (ref 3.5–5.5)
PROT SERPL-MCNC: 7.2 G/DL (ref 6.4–8.2)
Q-T INTERVAL, ECG07: 386 MS
QRS DURATION, ECG06: 80 MS
QTC CALCULATION (BEZET), ECG08: 434 MS
RBC # BLD AUTO: 4.12 M/UL (ref 4.2–5.3)
SODIUM SERPL-SCNC: 138 MMOL/L (ref 136–145)
VENTRICULAR RATE, ECG03: 76 BPM
WBC # BLD AUTO: 9.5 K/UL (ref 4.6–13.2)

## 2021-06-06 PROCEDURE — 83036 HEMOGLOBIN GLYCOSYLATED A1C: CPT

## 2021-06-06 PROCEDURE — 85025 COMPLETE CBC W/AUTO DIFF WBC: CPT

## 2021-06-06 PROCEDURE — 93005 ELECTROCARDIOGRAM TRACING: CPT

## 2021-06-06 PROCEDURE — 94640 AIRWAY INHALATION TREATMENT: CPT

## 2021-06-06 PROCEDURE — 99285 EMERGENCY DEPT VISIT HI MDM: CPT

## 2021-06-06 PROCEDURE — 74011000250 HC RX REV CODE- 250: Performed by: FAMILY MEDICINE

## 2021-06-06 PROCEDURE — 80053 COMPREHEN METABOLIC PANEL: CPT

## 2021-06-06 PROCEDURE — 65270000029 HC RM PRIVATE

## 2021-06-06 PROCEDURE — 74011636637 HC RX REV CODE- 636/637: Performed by: FAMILY MEDICINE

## 2021-06-06 PROCEDURE — 82962 GLUCOSE BLOOD TEST: CPT

## 2021-06-06 PROCEDURE — 96375 TX/PRO/DX INJ NEW DRUG ADDON: CPT

## 2021-06-06 PROCEDURE — 96374 THER/PROPH/DIAG INJ IV PUSH: CPT

## 2021-06-06 PROCEDURE — 36415 COLL VENOUS BLD VENIPUNCTURE: CPT

## 2021-06-06 PROCEDURE — 74011250636 HC RX REV CODE- 250/636: Performed by: PHYSICIAN ASSISTANT

## 2021-06-06 PROCEDURE — 74011636637 HC RX REV CODE- 636/637: Performed by: PHYSICIAN ASSISTANT

## 2021-06-06 PROCEDURE — 74011250637 HC RX REV CODE- 250/637: Performed by: PHYSICIAN ASSISTANT

## 2021-06-06 PROCEDURE — 74011000250 HC RX REV CODE- 250: Performed by: PHYSICIAN ASSISTANT

## 2021-06-06 PROCEDURE — 74011250637 HC RX REV CODE- 250/637: Performed by: INTERNAL MEDICINE

## 2021-06-06 PROCEDURE — 74011250637 HC RX REV CODE- 250/637: Performed by: FAMILY MEDICINE

## 2021-06-06 RX ORDER — METOPROLOL SUCCINATE 50 MG/1
50 TABLET, EXTENDED RELEASE ORAL DAILY
Status: DISCONTINUED | OUTPATIENT
Start: 2021-06-07 | End: 2021-06-11 | Stop reason: HOSPADM

## 2021-06-06 RX ORDER — ACETAMINOPHEN 500 MG
1000 TABLET ORAL
Status: DISCONTINUED | OUTPATIENT
Start: 2021-06-06 | End: 2021-06-11 | Stop reason: HOSPADM

## 2021-06-06 RX ORDER — OXYCODONE AND ACETAMINOPHEN 5; 325 MG/1; MG/1
1-2 TABLET ORAL
Status: DISCONTINUED | OUTPATIENT
Start: 2021-06-06 | End: 2021-06-11 | Stop reason: HOSPADM

## 2021-06-06 RX ORDER — LEVOTHYROXINE SODIUM 50 UG/1
50 TABLET ORAL
Status: DISCONTINUED | OUTPATIENT
Start: 2021-06-07 | End: 2021-06-11 | Stop reason: HOSPADM

## 2021-06-06 RX ORDER — ATORVASTATIN CALCIUM 20 MG/1
20 TABLET, FILM COATED ORAL
Status: DISCONTINUED | OUTPATIENT
Start: 2021-06-06 | End: 2021-06-11 | Stop reason: HOSPADM

## 2021-06-06 RX ORDER — FAMOTIDINE 20 MG/1
10 TABLET, FILM COATED ORAL EVERY 12 HOURS
Status: DISCONTINUED | OUTPATIENT
Start: 2021-06-06 | End: 2021-06-11 | Stop reason: HOSPADM

## 2021-06-06 RX ORDER — ALBUTEROL SULFATE 0.83 MG/ML
5 SOLUTION RESPIRATORY (INHALATION)
Status: COMPLETED | OUTPATIENT
Start: 2021-06-06 | End: 2021-06-06

## 2021-06-06 RX ORDER — MAGNESIUM SULFATE 100 %
4 CRYSTALS MISCELLANEOUS AS NEEDED
Status: DISCONTINUED | OUTPATIENT
Start: 2021-06-06 | End: 2021-06-11 | Stop reason: HOSPADM

## 2021-06-06 RX ORDER — TIMOLOL MALEATE 5 MG/ML
1 SOLUTION/ DROPS OPHTHALMIC 2 TIMES DAILY
Status: DISCONTINUED | OUTPATIENT
Start: 2021-06-06 | End: 2021-06-11 | Stop reason: HOSPADM

## 2021-06-06 RX ORDER — BRIMONIDINE TARTRATE 2 MG/ML
1 SOLUTION/ DROPS OPHTHALMIC EVERY 12 HOURS
Status: DISCONTINUED | OUTPATIENT
Start: 2021-06-06 | End: 2021-06-11 | Stop reason: HOSPADM

## 2021-06-06 RX ORDER — SODIUM POLYSTYRENE SULFONATE 15 G/60ML
15 SUSPENSION ORAL; RECTAL
Status: COMPLETED | OUTPATIENT
Start: 2021-06-06 | End: 2021-06-06

## 2021-06-06 RX ORDER — CALCIUM GLUCONATE 94 MG/ML
1 INJECTION, SOLUTION INTRAVENOUS
Status: COMPLETED | OUTPATIENT
Start: 2021-06-06 | End: 2021-06-06

## 2021-06-06 RX ORDER — AMLODIPINE BESYLATE 5 MG/1
5 TABLET ORAL DAILY
Status: DISCONTINUED | OUTPATIENT
Start: 2021-06-07 | End: 2021-06-11 | Stop reason: HOSPADM

## 2021-06-06 RX ORDER — INSULIN LISPRO 100 [IU]/ML
INJECTION, SOLUTION INTRAVENOUS; SUBCUTANEOUS
Status: DISCONTINUED | OUTPATIENT
Start: 2021-06-06 | End: 2021-06-11 | Stop reason: HOSPADM

## 2021-06-06 RX ORDER — NYSTATIN 100000 [USP'U]/G
POWDER TOPICAL 2 TIMES DAILY
Status: DISCONTINUED | OUTPATIENT
Start: 2021-06-06 | End: 2021-06-11 | Stop reason: HOSPADM

## 2021-06-06 RX ORDER — CALCIUM CARB/MAGNESIUM CARB 311-232MG
5 TABLET ORAL
Status: DISCONTINUED | OUTPATIENT
Start: 2021-06-06 | End: 2021-06-11 | Stop reason: HOSPADM

## 2021-06-06 RX ORDER — SODIUM BICARBONATE 1 MEQ/ML
50 SYRINGE (ML) INTRAVENOUS ONCE
Status: COMPLETED | OUTPATIENT
Start: 2021-06-06 | End: 2021-06-06

## 2021-06-06 RX ORDER — ENOXAPARIN SODIUM 100 MG/ML
30 INJECTION SUBCUTANEOUS EVERY 24 HOURS
Status: DISCONTINUED | OUTPATIENT
Start: 2021-06-06 | End: 2021-06-11 | Stop reason: HOSPADM

## 2021-06-06 RX ORDER — DEXTROSE 50 % IN WATER (D50W) INTRAVENOUS SYRINGE
25-50 AS NEEDED
Status: DISCONTINUED | OUTPATIENT
Start: 2021-06-06 | End: 2021-06-11 | Stop reason: HOSPADM

## 2021-06-06 RX ORDER — ONDANSETRON 4 MG/1
4 TABLET, FILM COATED ORAL
Status: DISCONTINUED | OUTPATIENT
Start: 2021-06-06 | End: 2021-06-11 | Stop reason: HOSPADM

## 2021-06-06 RX ORDER — INSULIN GLARGINE 100 [IU]/ML
10 INJECTION, SOLUTION SUBCUTANEOUS
Status: DISCONTINUED | OUTPATIENT
Start: 2021-06-06 | End: 2021-06-11 | Stop reason: HOSPADM

## 2021-06-06 RX ORDER — DEXTROSE 50 % IN WATER (D50W) INTRAVENOUS SYRINGE
25
Status: COMPLETED | OUTPATIENT
Start: 2021-06-06 | End: 2021-06-06

## 2021-06-06 RX ADMIN — SODIUM BICARBONATE 50 MEQ: 84 INJECTION, SOLUTION INTRAVENOUS at 18:15

## 2021-06-06 RX ADMIN — ALBUTEROL SULFATE 5 MG: 2.5 SOLUTION RESPIRATORY (INHALATION) at 18:12

## 2021-06-06 RX ADMIN — Medication 5 MG: at 22:41

## 2021-06-06 RX ADMIN — SODIUM POLYSTYRENE SULFONATE 15 G: 15 SUSPENSION ORAL; RECTAL at 18:28

## 2021-06-06 RX ADMIN — TIMOLOL MALEATE 1 DROP: 5 SOLUTION OPHTHALMIC at 22:43

## 2021-06-06 RX ADMIN — INSULIN GLARGINE 10 UNITS: 100 INJECTION, SOLUTION SUBCUTANEOUS at 22:42

## 2021-06-06 RX ADMIN — FAMOTIDINE 10 MG: 20 TABLET ORAL at 20:49

## 2021-06-06 RX ADMIN — NYSTATIN: 100000 POWDER TOPICAL at 22:43

## 2021-06-06 RX ADMIN — DEXTROSE MONOHYDRATE 25 G: 25 INJECTION, SOLUTION INTRAVENOUS at 18:22

## 2021-06-06 RX ADMIN — INSULIN LISPRO 2 UNITS: 100 INJECTION, SOLUTION INTRAVENOUS; SUBCUTANEOUS at 22:42

## 2021-06-06 RX ADMIN — ATORVASTATIN CALCIUM 20 MG: 20 TABLET, FILM COATED ORAL at 22:41

## 2021-06-06 RX ADMIN — OXYCODONE HYDROCHLORIDE AND ACETAMINOPHEN 2 TABLET: 5; 325 TABLET ORAL at 22:41

## 2021-06-06 RX ADMIN — CALCIUM GLUCONATE 1 G: 98 INJECTION, SOLUTION INTRAVENOUS at 18:20

## 2021-06-06 RX ADMIN — HUMAN INSULIN 5 UNITS: 100 INJECTION, SOLUTION SUBCUTANEOUS at 18:22

## 2021-06-06 NOTE — ED PROVIDER NOTES
EMERGENCY DEPARTMENT HISTORY AND PHYSICAL EXAM    Date: 6/6/2021  Patient Name: Griffin Aldridge    History of Presenting Illness     Chief Complaint   Patient presents with    Catheter Change         History Provided By: Patient    Chief Complaint: nephrostomy tube out      Additional History (Context):   3:10 PM  Griffin Aldridge is a 79 y.o. female with PMHX cancer, diabetes, hypertension, DVT, kidney removal presents to the emergency department C/O nephrostomy tube pulled out during a transfer at [address] today. PCP: Leticia, Not On File, NP    Current Facility-Administered Medications   Medication Dose Route Frequency Provider Last Rate Last Admin    acetaminophen (TYLENOL) tablet 1,000 mg  1,000 mg Oral Q6H PRN Farzad Chaidez MD        [START ON 6/7/2021] amLODIPine (NORVASC) tablet 5 mg  5 mg Oral DAILY Farzad Chaidez MD        . PHARMACY TO SUBSTITUTE PER PROTOCOL (Reordered from: atorvastatin calcium (ATORVASTATIN PO))    Per Protocol Farzad Chaidez MD        famotidine (PEPCID) tablet 10 mg  10 mg Oral Q12H Eulis Ruff, MD Thayne Pummel Severa Crosby ON 6/7/2021] levothyroxine (SYNTHROID) tablet 50 mcg  50 mcg Oral ACB Farzad Chaidez MD        insulin glargine (LANTUS) injection 10 Units  10 Units SubCUTAneous QHS Farzad Chaidez MD        melatonin tablet 5 mg  5 mg Oral QHS Farzad Chaidez MD        [START ON 6/7/2021] metoprolol succinate (TOPROL-XL) XL tablet 50 mg  50 mg Oral DAILY Farzad Chaidez MD        nystatin (MYCOSTATIN) 100,000 unit/gram powder   Topical BID Farzad Chaidez MD        ondansetron hcl [facility]) tablet 4 mg  4 mg Oral Q8H PRN Farzad Chaidez MD        timolol (TIMOPTIC) 0.5 % ophthalmic solution 1 Drop  1 Drop Both Eyes BID Farzad Chaidez MD        . PHARMACY TO SUBSTITUTE PER PROTOCOL (Reordered from: brimonidine-timolol (COMBIGAN) 0.2-0.5 % drop ophthalmic solution)    Per Protocol Farzad Chaidez MD        brimonidine (ALPHAGAN) 0.2 % ophthalmic solution 1 Drop  1 Drop Both Eyes Q12H Farzad Chaidez MD         Current Outpatient Medications   Medication Sig Dispense Refill    acetaminophen (TYLENOL) 500 mg tablet Take 2 Tabs by mouth every eight (8) hours as needed for Pain or Fever. 30 Tab 0    nystatin (MYCOSTATIN) powder Apply  to affected area two (2) times a day. 60 g 0    insulin glargine (LANTUS) 100 unit/mL injection 10 Units by SubCUTAneous route nightly. 1 Vial 0    lip protectant with sunscreen (CARMEX) crea Apply  to affected area as needed for Dry Skin. 1 Tube 0    timolol (TIMOPTIC) 0.5 % ophthalmic solution Administer 1 Drop to both eyes two (2) times a day. 10 mL 0    brimonidine (ALPHAGAN) 0.2 % ophthalmic solution Administer 1 Drop to both eyes every twelve (12) hours. 10 mL 0    amLODIPine (NORVASC) 5 mg tablet Take 1 Tab by mouth daily. 30 Tab 0    famotidine (PEPCID) 10 mg tablet Take 1 Tab by mouth every twelve (12) hours. 60 Tab 0    melatonin 5 mg tablet Take 1 Tab by mouth nightly. 30 Tab 0    LORazepam (ATIVAN) 0.5 mg tablet Take 1 Tab by mouth two (2) times daily as needed for Agitation. Max Daily Amount: 1 mg. 2 Tab 0    multivitamin (ONE A DAY) tablet Take 1 Tab by mouth daily.  ondansetron hcl (ZOFRAN) 4 mg tablet Take 4 mg by mouth every eight (8) hours as needed for Nausea or Vomiting.  brimonidine-timolol (COMBIGAN) 0.2-0.5 % drop ophthalmic solution Administer 1 Drop to both eyes every twelve (12) hours.  metoprolol succinate (TOPROL-XL) 50 mg XL tablet Take 50 mg by mouth daily.  magnesium hydroxide (MILK OF MAGNESIA CONCENTRATED) 2,400 mg/10 mL susp suspension Take 10 mL by mouth.  levothyroxine (SYNTHROID) 50 mcg tablet Take  by mouth Daily (before breakfast).  atorvastatin calcium (ATORVASTATIN PO) Take 20 mg by mouth daily.          Past History     Past Medical History:  Past Medical History:   Diagnosis Date    Cancer ([facility]     vaginal and colon cancer    Cancer ([facility]     Chronic pain     arthritis    Diabetes ([facility]     [facility]  Hypertension     Legally blind     Thyroid disease        Past Surgical History:  Past Surgical History:   Procedure Laterality Date    HX ABOVE KNEE AMPUTATION Right     HX COLOSTOMY      HX ORTHOPAEDIC      HX OTHER SURGICAL      kidney drainage tube    HX UROLOGICAL      kidney removal    IR EXCHANGE NEPHRO PERC RT SI  1/28/2019    IR EXCHANGE NEPHRO PERC RT SI  3/21/2019    IR EXCHANGE NEPHRO PERC RT SI  5/13/2019    IR EXCHANGE NEPHRO PERC RT SI  6/27/2019    IR EXCHANGE NEPHRO PERC RT SI  8/16/2019    IR EXCHANGE NEPHRO PERC RT SI  10/3/2019    IR EXCHANGE NEPHRO PERC RT SI  11/18/2019    IR EXCHANGE NEPHRO PERC RT SI  1/6/2020    IR EXCHANGE NEPHRO PERC RT SI  2/24/2020    IR EXCHANGE NEPHRO PERC RT SI  4/13/2020    IR EXCHANGE NEPHRO PERC RT SI  6/1/2020    IR EXCHANGE NEPHRO PERC RT SI  6/19/2020    IR EXCHANGE NEPHRO PERC RT SI  8/10/2020    IR EXCHANGE NEPHRO PERC RT SI  9/30/2020    IR EXCHANGE NEPHRO PERC RT SI  11/5/2020    IR EXCHANGE NEPHRO PERC RT SI  12/16/2020    IR EXCHANGE NEPHRO PERC RT SI  2/1/2021    IR EXCHANGE NEPHRO PERC RT SI  3/25/2021    IR EXCHANGE NEPHRO PERC RT SI  5/10/2021    IR NEPHROSTOMY PERC RT PLC CATH  SI  6/21/2020       Family History:  No family history on file. Social History:  Social History     Tobacco Use    Smoking status: Never Smoker    Smokeless tobacco: Never Used   Substance Use Topics    Alcohol use: No    Drug use: Never       Allergies: Allergies   Allergen Reactions    Morphine Nausea and Vomiting    Pcn [Penicillins] Rash       Review of Systems   Review of Systems   Constitutional: Negative for chills and fever. Respiratory: Negative for shortness of breath. Cardiovascular: Negative for chest pain. Gastrointestinal: Negative for abdominal pain, diarrhea, nausea and vomiting. Musculoskeletal: Negative for back pain. Skin: Positive for wound. All other systems reviewed and are negative.       Physical Exam Vitals:    06/06/21 1514 06/06/21 1812 06/06/21 1912   BP: (!) 123/97 (!) 115/54 (!) 133/57   Pulse: 74 79 (!) 109   Resp: 12  18   Temp: 97.2 °F (36.2 °C)     SpO2: 100%  100%     Physical Exam  Vitals and nursing note reviewed. Constitutional:       Appearance: She is well-developed. HENT:      Head: Normocephalic and atraumatic. Cardiovascular:      Rate and Rhythm: Normal rate and regular rhythm. Heart sounds: Normal heart sounds. No murmur heard. Pulmonary:      Effort: Pulmonary effort is normal. No respiratory distress. Breath sounds: Normal breath sounds. No wheezing or rales. Abdominal:      General: Bowel sounds are normal.      Palpations: Abdomen is soft. Tenderness: There is no abdominal tenderness. Comments: Colostomy bag in place to the left lower abdomen   Musculoskeletal:      Cervical back: Normal range of motion and neck supple. Back:       Comments: Decubitus ulcer about 1 cm in diameter to sacrum   Neurological:      Mental Status: She is alert and oriented to person, place, and time. Psychiatric:         Judgment: Judgment normal.           Diagnostic Study Results     Labs:     Recent Results (from the past 12 hour(s))   CBC WITH AUTOMATED DIFF    Collection Time: 06/06/21  3:20 PM   Result Value Ref Range    WBC 9.5 4.6 - 13.2 K/uL    RBC 4.12 (L) 4.20 - 5.30 M/uL    HGB 11.3 (L) 12.0 - 16.0 g/dL    HCT 35.8 35.0 - 45.0 %    MCV 86.9 74.0 - 97.0 FL    MCH 27.4 24.0 - 34.0 PG    MCHC 31.6 31.0 - 37.0 g/dL    RDW 16.0 (H) 11.6 - 14.5 %    PLATELET 121 493 - 013 K/uL    MPV 9.5 9.2 - 11.8 FL    NEUTROPHILS 64 40 - 73 %    LYMPHOCYTES 24 21 - 52 %    MONOCYTES 7 3 - 10 %    EOSINOPHILS 4 0 - 5 %    BASOPHILS 0 0 - 2 %    ABS. NEUTROPHILS 6.1 1.8 - 8.0 K/UL    ABS. LYMPHOCYTES 2.3 0.9 - 3.6 K/UL    ABS. MONOCYTES 0.7 0.05 - 1.2 K/UL    ABS. EOSINOPHILS 0.4 0.0 - 0.4 K/UL    ABS.  BASOPHILS 0.0 0.0 - 0.1 K/UL    DF AUTOMATED     METABOLIC PANEL, COMPREHENSIVE    Collection Time: 06/06/21  4:55 PM   Result Value Ref Range    Sodium 138 136 - 145 mmol/L    Potassium 5.8 (H) 3.5 - 5.5 mmol/L    Chloride 111 100 - 111 mmol/L    CO2 19 (L) 21 - 32 mmol/L    Anion gap 8 3.0 - 18 mmol/L    Glucose 202 (H) 74 - 99 mg/dL    BUN 43 (H) 7.0 - 18 MG/DL    Creatinine 1.15 0.6 - 1.3 MG/DL    BUN/Creatinine ratio 37 (H) 12 - 20      GFR est AA 55 (L) >60 ml/min/1.73m2    GFR est non-AA 45 (L) >60 ml/min/1.73m2    Calcium 8.8 8.5 - 10.1 MG/DL    Bilirubin, total 0.2 0.2 - 1.0 MG/DL    ALT (SGPT) 28 13 - 56 U/L    AST (SGOT) 26 10 - 38 U/L    Alk. phosphatase 116 45 - 117 U/L    Protein, total 7.2 6.4 - 8.2 g/dL    Albumin 2.9 (L) 3.4 - 5.0 g/dL    Globulin 4.3 (H) 2.0 - 4.0 g/dL    A-G Ratio 0.7 (L) 0.8 - 1.7     EKG, 12 LEAD, INITIAL    Collection Time: 06/06/21  6:00 PM   Result Value Ref Range    Ventricular Rate 76 BPM    Atrial Rate 76 BPM    P-R Interval 154 ms    QRS Duration 80 ms    Q-T Interval 386 ms    QTC Calculation (Bezet) 434 ms    Calculated P Axis 46 degrees    Calculated R Axis 51 degrees    Calculated T Axis 61 degrees    Diagnosis       Normal sinus rhythm  Normal ECG  When compared with ECG of 20-MAR-2021 10:17,  Nonspecific T wave abnormality no longer evident in Lateral leads         Radiologic Studies:   No orders to display     CT Results  (Last 48 hours)    None        CXR Results  (Last 48 hours)    None          Medical Decision Making   I am the first provider for this patient. I reviewed the vital signs, available nursing notes, past medical history, past surgical history, family history and social history. Vital Signs: Reviewed the patient's vital signs.     Pulse Oximetry Analysis: 100% on RA       Records Reviewed: Nursing Notes and Old Medical Records     EKG interpretation: (Preliminary)  6:07 PM   Normal sinus rhythm rate 76 bpm no STEMI  EKG read by Vishnu Jackson PA-C   at 6:07 PM        Procedures:  Procedures    ED Course: 3:10 PM Initial assessment performed. The patients presenting problems have been discussed, and they are in agreement with the care plan formulated and outlined with them. I have encouraged them to ask questions as they arise throughout their visit. 6:14 PM   Case discussed Gideon Lockett, plan to replace nephrostomy tube in the morning      Core Measures:  For Hospitalized Patients:    1. Hospitalization Decision Time:  The decision to hospitalize the patient was made by Santos Escobedo PA-C   at 6:24 PM on 6/6/2021    2. Aspirin: Aspirin was not given because the patient did not present with a stroke at the time of their Emergency Department evaluation    6:23 PM  Patient is being admitted to the hospital by Maura Licona. The results of their tests and reasons for their admission have been discussed with them and/or available family. They convey agreement and understanding for the need to be admitted and for their admission diagnosis. CONDITIONS ON ADMISSION:  Sepsis is not present at the time of admission. Deep Vein Thrombosis is not present at the time of admission. Thrombosis is not present at the time of admission. Pneumonia is not present at the time of admission. Pressure Ulcer is present at the time of admission. CLINICAL IMPRESSION:    1. Nephrostomy tube displaced ([address]    2. Acute hyperkalemia          Discussion:  Pt presents with after discharge for ostomy tube had been pulled out while at [address]. Patient is afebrile without leukocytosis. Her potassium was slightly elevated but she has no EKG changes. Treated hyperkalemia will admit for nephrostomy placement in the morning. Diagnosis and Disposition       CLINICAL IMPRESSION:    1. Nephrostomy tube displaced ([address]    2. Acute hyperkalemia        PLAN:  1. admit         Please note that this dictation was completed with Intellistream, the [employer] voice recognition software.   Quite often unanticipated grammatical, syntax, homophones, and other interpretive errors are inadvertently transcribed by the computer software. Please disregard these errors. Please excuse any errors that have escaped final proofreading.

## 2021-06-06 NOTE — ED TRIAGE NOTES
Pt arrives via ems stretcher from [facility] with c\o nephrostomy tube being pulled out during a transfer at the nursing [place]

## 2021-06-06 NOTE — H&P
History & Physical    Patient: Justo Eisenberg MRN: [id number]  CSN: [id number]    YOB: 1940  Age: 79 y.o. Sex: female      DOA: 6/6/2021  Primary Care Provider:  Karolina Alvarez, Not On File, NP    Assessment/Plan     Principal Problem:    Displacement of nephrostomy tube ([address] (6/6/2021)    Active Problems:    S/p nephrectomy (5/18/2018)      Malfunction of nephrostomy tube ([address] (5/18/2018)      Legally blind (11/26/2018)      Hypertension (11/26/2018)      S/P colectomy (11/26/2018)      Tear of skin of buttock (11/28/2018)      Hyperkalemia (4/12/2019)      S/P AKA (above knee amputation), right ([address] (6/3/2019)      CKD stage 3 due to type 2 diabetes mellitus ([address] (6/24/2020)    39-year-old female with a history of multiple medical problems including chronic indwelling right nephrostomy tube, colostomy, diabetes, legally blind, hypertension, thyroid disease, status post above-knee amputation, chronic kidney disease admitted for nephrolithotomy tube displace. Admitted to general medical floor    Nephrolithotomy tube displacement: Case discussed Zuleyma BAIRES at ER, plan to replace nephrostomy tube in the morning. Hyperkalemia:Mild. Will repeat K level. Tinea Cruis: Topic antifungal cream to bilateral groain area. Decubitus ulcer: We will consult wound care. Frequent position change    DM: We will check hemoglobin A1c, resume Lantus, sliding scale    Hypertension: Continue home meds    Hypothyroidism: Continue levothyroxine. Legally blind: Continue eyedrops. S/p cholectomy    S/p right AKA    Monitor labs.     GI DVT prophylaxis    She is a DNR/DNI    Anticipate a short stay    CC: \" My tube is out\"       HPI:     Justo Eisenberg is a 79 y.o. female  with a history of multiple medical problems including chronic indwelling right nephrostomy tube, colostomy, diabetes, legally blind, hypertension, thyroid disease, status post above-knee amputation, chronic kidney disease  who was brought by EMS for nephrolithotomy tube displacement. She is a resident of [address] she states that she feel something wrong on her back after the nursing staff repositioned her today at the SNF. \"  I needed my tube back for my urine\". No chest pain no shortness of breath. No nausea vomiting diarrhea. At the ER, her work-up only revealed a mild hyperkalemia. Case was discussed with interventional radiology who suggested admit the patient for nephrolithotomy tube placement. Past Medical History:   Diagnosis Date    Cancer [facility])     vaginal and colon cancer    [facility]     Chronic pain     arthritis    Diabetes ([facility]     DVT (deep venous thrombosis) (HCC)     Hypertension     Legally blind     Thyroid disease        Past Surgical History:   Procedure Laterality Date    HX ABOVE KNEE AMPUTATION Right     HX COLOSTOMY      HX ORTHOPAEDIC      HX OTHER SURGICAL      kidney drainage tube    HX UROLOGICAL      kidney removal    IR EXCHANGE NEPHRO PERC RT SI  1/28/2019    IR EXCHANGE NEPHRO PERC RT SI  3/21/2019    IR EXCHANGE NEPHRO PERC RT SI  5/13/2019    IR EXCHANGE NEPHRO PERC RT SI  6/27/2019    IR EXCHANGE NEPHRO PERC RT SI  8/16/2019    IR EXCHANGE NEPHRO PERC RT SI  10/3/2019    IR EXCHANGE NEPHRO PERC RT SI  11/18/2019    IR EXCHANGE NEPHRO PERC RT SI  1/6/2020    IR EXCHANGE NEPHRO PERC RT SI  2/24/2020    IR EXCHANGE NEPHRO PERC RT SI  4/13/2020    IR EXCHANGE NEPHRO PERC RT SI  6/1/2020    IR EXCHANGE NEPHRO PERC RT SI  6/19/2020    IR EXCHANGE NEPHRO PERC RT SI  8/10/2020    IR EXCHANGE NEPHRO PERC RT SI  9/30/2020    IR EXCHANGE NEPHRO PERC RT SI  11/5/2020    IR EXCHANGE NEPHRO PERC RT SI  12/16/2020    IR EXCHANGE NEPHRO PERC RT SI  2/1/2021    IR EXCHANGE NEPHRO PERC RT SI  3/25/2021    IR EXCHANGE NEPHRO PERC RT SI  5/10/2021    IR NEPHROSTOMY PERC RT PLC CATH  SI  6/21/2020       No family history on file.     Social History Socioeconomic History    Marital status:      Spouse name: Not on file    Number of children: Not on file    Years of education: Not on file    Highest education level: Not on file   Tobacco Use    Smoking status: Never Smoker    Smokeless tobacco: Never Used   Substance and Sexual Activity    Alcohol use: No    Drug use: Never   Other Topics Concern     Social Determinants of Health     Financial Resource Strain:     Difficulty of Paying Living Expenses:    Food Insecurity:     Worried About Running Out of Food in the Last Year:     [address] in the Last Year:    Transportation Needs:     Lack of Transportation (Medical):  Lack of Transportation (Non-Medical):    Physical Activity:     Days of Exercise per Week:     Minutes of Exercise per Session:    Stress:     Feeling of Stress :    Social Connections:     Frequency of Communication with Friends and Family:     Frequency of Social Gatherings with Friends and Family:     Attends Sabianist Services:     Active Member of Clubs or Organizations:     Attends Club or Organization Meetings:     Marital Status:        Prior to Admission medications    Medication Sig Start Date End Date Taking? Authorizing Provider   acetaminophen (TYLENOL) 500 mg tablet Take 2 Tabs by mouth every eight (8) hours as needed for Pain or Fever. 3/25/21   Carlo England MD   nystatin (MYCOSTATIN) powder Apply  to affected area two (2) times a day. 3/25/21   aCrlo England MD   insulin glargine (LANTUS) 100 unit/mL injection 10 Units by SubCUTAneous route nightly. 3/25/21   Carlo England MD   lip protectant with sunscreen (CARMEX) crea Apply  to affected area as needed for Dry Skin. 3/25/21   Carlo England MD   timolol (TIMOPTIC) 0.5 % ophthalmic solution Administer 1 Drop to both eyes two (2) times a day.  3/25/21   Carlo England MD   brimonidine (ALPHAGAN) 0.2 % ophthalmic solution Administer 1 Drop to both eyes every twelve (12) hours. 3/25/21   Latonya England MD   amLODIPine (NORVASC) 5 mg tablet Take 1 Tab by mouth daily. 11/22/20   Vanessa Monte MD   famotidine (PEPCID) 10 mg tablet Take 1 Tab by mouth every twelve (12) hours. 11/21/20   Vanessa Monte MD   melatonin 5 mg tablet Take 1 Tab by mouth nightly. 11/21/20   Vanessa Monte MD   LORazepam (ATIVAN) 0.5 mg tablet Take 1 Tab by mouth two (2) times daily as needed for Agitation. Max Daily Amount: 1 mg. 11/11/20   Keith Robertson MD   multivitamin (ONE A DAY) tablet Take 1 Tab by mouth daily. Provider, Historical   ondansetron hcl (ZOFRAN) 4 mg tablet Take 4 mg by mouth every eight (8) hours as needed for Nausea or Vomiting. Provider, Historical   brimonidine-timolol (COMBIGAN) 0.2-0.5 % drop ophthalmic solution Administer 1 Drop to both eyes every twelve (12) hours. Provider, Historical   metoprolol succinate (TOPROL-XL) 50 mg XL tablet Take 50 mg by mouth daily. Provider, Historical   magnesium hydroxide (MILK OF MAGNESIA CONCENTRATED) 2,400 mg/10 mL susp suspension Take 10 mL by mouth. Provider, Historical   levothyroxine (SYNTHROID) 50 mcg tablet Take  by mouth Daily (before breakfast). Provider, Historical   atorvastatin calcium (ATORVASTATIN PO) Take 20 mg by mouth daily. Other, MD Dc       Allergies   Allergen Reactions    Morphine Nausea and Vomiting    Pcn [Penicillins] Rash       Review of Systems  GENERAL: No fevers or chills. HEENT: Legally blind, no earache, tinnitus, sore throat or sinus congestion. Hard hearing. NECK: No pain or stiffness. CARDIOVASCULAR: No chest pain or pressure. No palpitations. PULMONARY: No shortness of breath, cough or wheeze. GASTROINTESTINAL: No abdominal pain, nausea, vomiting or diarrhea, melena or   bright red blood per rectum. GENITOURINARY: No urinary frequency, urgency, hesitancy or dysuria.   MUSCULOSKELETAL: No joint or muscle pain, no back pain, no recent trauma. DERMATOLOGIC: + rash, + itching, positive decubital ulcer stage II.   ENDOCRINE: No polyuria, polydipsia, no heat or cold intolerance. No recent change inweight. NEUROLOGIC: No headache, seizures, numbness, tingling or weakness. PSYCHIATRIC: No depression, anxiety, mood disorder, no loss of interest in normal       activity or change in sleep pattern. Physical Exam:     Physical Exam:  Visit Vitals  BP (!) 133/57   Pulse (!) 109   Temp 97.2 °F (36.2 °C)   Resp 18   SpO2 100%           Temp (24hrs), Av.2 °F (36.2 °C), Min:97.2 °F (36.2 °C), Max:97.2 °F (36.2 °C)    No intake/output data recorded. No intake/output data recorded. General: WD, WN. Alert, cooperative, no acute distress    HEENT: NC, Atraumatic. PERRLA, EOMI. Anicteric sclerae. Lungs: CTA Bilaterally. No Wheezing/Rhonchi/Rales. Heart:  Regular  rhythm,  No murmur, No Rubs, No Gallops  Abdomen: Soft, + colostomy in place to LL abdominal + wet dressing to R. back  Extremities: Erythema/tinea cruris to bilateral groins. Decubitus ulcer about 1 cm in diameter to sacrum   Psych:   Good insight. Not anxious or agitated. Neurologic:  CN 2-12 grossly intact, Alert and oriented X 3. No acute neurological                                 Deficits,     Labs Reviewed:    Recent Results (from the past 24 hour(s))   CBC WITH AUTOMATED DIFF    Collection Time: 21  3:20 PM   Result Value Ref Range    WBC 9.5 4.6 - 13.2 K/uL    RBC 4.12 (L) 4.20 - 5.30 M/uL    HGB 11.3 (L) 12.0 - 16.0 g/dL    HCT 35.8 35.0 - 45.0 %    MCV 86.9 74.0 - 97.0 FL    MCH 27.4 24.0 - 34.0 PG    MCHC 31.6 31.0 - 37.0 g/dL    RDW 16.0 (H) 11.6 - 14.5 %    PLATELET 933 720 - 223 K/uL    MPV 9.5 9.2 - 11.8 FL    NEUTROPHILS 64 40 - 73 %    LYMPHOCYTES 24 21 - 52 %    MONOCYTES 7 3 - 10 %    EOSINOPHILS 4 0 - 5 %    BASOPHILS 0 0 - 2 %    ABS. NEUTROPHILS 6.1 1.8 - 8.0 K/UL    ABS. LYMPHOCYTES 2.3 0.9 - 3.6 K/UL    ABS.  MONOCYTES 0.7 0.05 - 1.2 K/UL ABS. EOSINOPHILS 0.4 0.0 - 0.4 K/UL    ABS. BASOPHILS 0.0 0.0 - 0.1 K/UL    DF AUTOMATED     METABOLIC PANEL, COMPREHENSIVE    Collection Time: 06/06/21  4:55 PM   Result Value Ref Range    Sodium 138 136 - 145 mmol/L    Potassium 5.8 (H) 3.5 - 5.5 mmol/L    Chloride 111 100 - 111 mmol/L    CO2 19 (L) 21 - 32 mmol/L    Anion gap 8 3.0 - 18 mmol/L    Glucose 202 (H) 74 - 99 mg/dL    BUN 43 (H) 7.0 - 18 MG/DL    Creatinine 1.15 0.6 - 1.3 MG/DL    BUN/Creatinine ratio 37 (H) 12 - 20      GFR est AA 55 (L) >60 ml/min/1.73m2    GFR est non-AA 45 (L) >60 ml/min/1.73m2    Calcium 8.8 8.5 - 10.1 MG/DL    Bilirubin, total 0.2 0.2 - 1.0 MG/DL    ALT (SGPT) 28 13 - 56 U/L    AST (SGOT) 26 10 - 38 U/L    Alk. phosphatase 116 45 - 117 U/L    Protein, total 7.2 6.4 - 8.2 g/dL    Albumin 2.9 (L) 3.4 - 5.0 g/dL    Globulin 4.3 (H) 2.0 - 4.0 g/dL    A-G Ratio 0.7 (L) 0.8 - 1.7     EKG, 12 LEAD, INITIAL    Collection Time: 06/06/21  6:00 PM   Result Value Ref Range    Ventricular Rate 76 BPM    Atrial Rate 76 BPM    P-R Interval 154 ms    QRS Duration 80 ms    Q-T Interval 386 ms    QTC Calculation (Bezet) 434 ms    Calculated P Axis 46 degrees    Calculated R Axis 51 degrees    Calculated T Axis 61 degrees    Diagnosis       Normal sinus rhythm  Normal ECG  When compared with ECG of 20-MAR-2021 10:17,  Nonspecific T wave abnormality no longer evident in Lateral leads         I personally reviewed the lab and EKG  _________  Lissette Chun MD      Procedures: see electronic medical records for all procedures/Xrays and details which were not copied into this note but were reviewed prior to creation of Plan.

## 2021-06-06 NOTE — Clinical Note
Status[de-identified] INPATIENT [101]   Type of Bed: Medical [8]   Inpatient Hospitalization Certified Necessary for the Following Reasons: 3.  Patient receiving treatment that can only be provided in an inpatient setting (further clarification in H&P documentation)   Admitting Diagnosis: Displacement of nephrostomy tube [facility]) [[id number]]   Admitting Diagnosis: Hyperkalemia [[id number]]   Admitting Physician: Kimberli Jeffries [[id number]]   Attending Physician: Kimberli Jeffries [[id number]]   Estimated Length of Stay: 2 Midnights   Discharge Plan[de-identified] Home with Office Follow-up

## 2021-06-06 NOTE — ED NOTES
Assumed c/o pt. Report taken. Pt resting, awaiting inpatient bed assignment. VSS.  Repositioned for comfort

## 2021-06-07 ENCOUNTER — APPOINTMENT (OUTPATIENT)
Dept: INTERVENTIONAL RADIOLOGY/VASCULAR | Age: 81
DRG: 698 | End: 2021-06-07
Attending: RADIOLOGY
Payer: MEDICARE

## 2021-06-07 LAB
ANION GAP SERPL CALC-SCNC: 7 MMOL/L (ref 3–18)
APPEARANCE UR: ABNORMAL
BACTERIA URNS QL MICRO: ABNORMAL /HPF
BASOPHILS # BLD: 0 K/UL (ref 0–0.1)
BASOPHILS NFR BLD: 0 % (ref 0–2)
BILIRUB UR QL: NEGATIVE
BUN SERPL-MCNC: 50 MG/DL (ref 7–18)
BUN/CREAT SERPL: 28 (ref 12–20)
CALCIUM SERPL-MCNC: 8.8 MG/DL (ref 8.5–10.1)
CHLORIDE SERPL-SCNC: 109 MMOL/L (ref 100–111)
CO2 SERPL-SCNC: 23 MMOL/L (ref 21–32)
COLOR UR: YELLOW
CREAT SERPL-MCNC: 1.78 MG/DL (ref 0.6–1.3)
DIFFERENTIAL METHOD BLD: ABNORMAL
EOSINOPHIL # BLD: 0.1 K/UL (ref 0–0.4)
EOSINOPHIL NFR BLD: 1 % (ref 0–5)
EPITH CASTS URNS QL MICRO: ABNORMAL /LPF (ref 0–5)
ERYTHROCYTE [DISTWIDTH] IN BLOOD BY AUTOMATED COUNT: 16.1 % (ref 11.6–14.5)
EST. AVERAGE GLUCOSE BLD GHB EST-MCNC: 154 MG/DL
GLUCOSE BLD STRIP.AUTO-MCNC: 114 MG/DL (ref 70–110)
GLUCOSE BLD STRIP.AUTO-MCNC: 176 MG/DL (ref 70–110)
GLUCOSE BLD STRIP.AUTO-MCNC: 265 MG/DL (ref 70–110)
GLUCOSE SERPL-MCNC: 166 MG/DL (ref 74–99)
GLUCOSE UR STRIP.AUTO-MCNC: 100 MG/DL
HBA1C MFR BLD: 7 % (ref 4.2–5.6)
HCT VFR BLD AUTO: 30.9 % (ref 35–45)
HGB BLD-MCNC: 10 G/DL (ref 12–16)
HGB UR QL STRIP: ABNORMAL
INR PPP: 1.2 (ref 0.8–1.2)
KETONES UR QL STRIP.AUTO: NEGATIVE MG/DL
LEUKOCYTE ESTERASE UR QL STRIP.AUTO: ABNORMAL
LYMPHOCYTES # BLD: 1.3 K/UL (ref 0.9–3.6)
LYMPHOCYTES NFR BLD: 10 % (ref 21–52)
MAGNESIUM SERPL-MCNC: 1.8 MG/DL (ref 1.6–2.6)
MCH RBC QN AUTO: 27.9 PG (ref 24–34)
MCHC RBC AUTO-ENTMCNC: 32.4 G/DL (ref 31–37)
MCV RBC AUTO: 86.1 FL (ref 74–97)
MONOCYTES # BLD: 1 K/UL (ref 0.05–1.2)
MONOCYTES NFR BLD: 8 % (ref 3–10)
NEUTS SEG # BLD: 9.8 K/UL (ref 1.8–8)
NEUTS SEG NFR BLD: 80 % (ref 40–73)
NITRITE UR QL STRIP.AUTO: NEGATIVE
PH UR STRIP: 6 [PH] (ref 5–8)
PLATELET # BLD AUTO: 301 K/UL (ref 135–420)
PMV BLD AUTO: 9.3 FL (ref 9.2–11.8)
POTASSIUM SERPL-SCNC: 5.3 MMOL/L (ref 3.5–5.5)
PROT UR STRIP-MCNC: 100 MG/DL
PROTHROMBIN TIME: 14.8 SEC (ref 11.5–15.2)
RBC # BLD AUTO: 3.59 M/UL (ref 4.2–5.3)
RBC #/AREA URNS HPF: ABNORMAL /HPF (ref 0–5)
SODIUM SERPL-SCNC: 139 MMOL/L (ref 136–145)
SP GR UR REFRACTOMETRY: 1.02 (ref 1–1.03)
UROBILINOGEN UR QL STRIP.AUTO: 0.2 EU/DL (ref 0.2–1)
WBC # BLD AUTO: 12.2 K/UL (ref 4.6–13.2)
WBC URNS QL MICRO: ABNORMAL /HPF (ref 0–5)

## 2021-06-07 PROCEDURE — 74011250636 HC RX REV CODE- 250/636: Performed by: HOSPITALIST

## 2021-06-07 PROCEDURE — 36415 COLL VENOUS BLD VENIPUNCTURE: CPT

## 2021-06-07 PROCEDURE — 74011250636 HC RX REV CODE- 250/636: Performed by: RADIOLOGY

## 2021-06-07 PROCEDURE — 74011000250 HC RX REV CODE- 250: Performed by: FAMILY MEDICINE

## 2021-06-07 PROCEDURE — 0HTRXZZ RESECTION OF TOE NAIL, EXTERNAL APPROACH: ICD-10-PCS | Performed by: PODIATRIST

## 2021-06-07 PROCEDURE — C1729 CATH, DRAINAGE: HCPCS

## 2021-06-07 PROCEDURE — 87186 SC STD MICRODIL/AGAR DIL: CPT

## 2021-06-07 PROCEDURE — 74011000636 HC RX REV CODE- 636: Performed by: RADIOLOGY

## 2021-06-07 PROCEDURE — 80048 BASIC METABOLIC PNL TOTAL CA: CPT

## 2021-06-07 PROCEDURE — 83735 ASSAY OF MAGNESIUM: CPT

## 2021-06-07 PROCEDURE — 87086 URINE CULTURE/COLONY COUNT: CPT

## 2021-06-07 PROCEDURE — 65270000029 HC RM PRIVATE

## 2021-06-07 PROCEDURE — 85025 COMPLETE CBC W/AUTO DIFF WBC: CPT

## 2021-06-07 PROCEDURE — 81001 URINALYSIS AUTO W/SCOPE: CPT

## 2021-06-07 PROCEDURE — 92610 EVALUATE SWALLOWING FUNCTION: CPT

## 2021-06-07 PROCEDURE — 2709999900 HC NON-CHARGEABLE SUPPLY

## 2021-06-07 PROCEDURE — 82962 GLUCOSE BLOOD TEST: CPT

## 2021-06-07 PROCEDURE — 85610 PROTHROMBIN TIME: CPT

## 2021-06-07 PROCEDURE — 74011250637 HC RX REV CODE- 250/637: Performed by: FAMILY MEDICINE

## 2021-06-07 PROCEDURE — 87077 CULTURE AEROBIC IDENTIFY: CPT

## 2021-06-07 PROCEDURE — 0T25X0Z CHANGE DRAINAGE DEVICE IN KIDNEY, EXTERNAL APPROACH: ICD-10-PCS | Performed by: RADIOLOGY

## 2021-06-07 PROCEDURE — 74011636637 HC RX REV CODE- 636/637: Performed by: FAMILY MEDICINE

## 2021-06-07 RX ORDER — LIDOCAINE HYDROCHLORIDE 10 MG/ML
1-20 INJECTION INFILTRATION; PERINEURAL
Status: DISCONTINUED | OUTPATIENT
Start: 2021-06-07 | End: 2021-06-07

## 2021-06-07 RX ORDER — SODIUM CHLORIDE 9 MG/ML
500 INJECTION, SOLUTION INTRAVENOUS
Status: DISCONTINUED | OUTPATIENT
Start: 2021-06-07 | End: 2021-06-07 | Stop reason: CLARIF

## 2021-06-07 RX ORDER — SODIUM CHLORIDE 9 MG/ML
50 INJECTION, SOLUTION INTRAVENOUS CONTINUOUS
Status: DISCONTINUED | OUTPATIENT
Start: 2021-06-07 | End: 2021-06-11 | Stop reason: HOSPADM

## 2021-06-07 RX ORDER — SODIUM CHLORIDE 9 MG/ML
500 INJECTION, SOLUTION INTRAVENOUS
Status: COMPLETED | OUTPATIENT
Start: 2021-06-07 | End: 2021-06-07

## 2021-06-07 RX ADMIN — FAMOTIDINE 10 MG: 20 TABLET ORAL at 23:06

## 2021-06-07 RX ADMIN — INSULIN GLARGINE 10 UNITS: 100 INJECTION, SOLUTION SUBCUTANEOUS at 23:06

## 2021-06-07 RX ADMIN — NYSTATIN: 100000 POWDER TOPICAL at 23:12

## 2021-06-07 RX ADMIN — IOPAMIDOL 20 ML: 612 INJECTION, SOLUTION INTRAVENOUS at 11:42

## 2021-06-07 RX ADMIN — ATORVASTATIN CALCIUM 20 MG: 20 TABLET, FILM COATED ORAL at 23:06

## 2021-06-07 RX ADMIN — INSULIN LISPRO 2 UNITS: 100 INJECTION, SOLUTION INTRAVENOUS; SUBCUTANEOUS at 12:19

## 2021-06-07 RX ADMIN — SODIUM CHLORIDE 75 ML/HR: 900 INJECTION, SOLUTION INTRAVENOUS at 16:18

## 2021-06-07 RX ADMIN — INSULIN LISPRO 6 UNITS: 100 INJECTION, SOLUTION INTRAVENOUS; SUBCUTANEOUS at 17:14

## 2021-06-07 RX ADMIN — TIMOLOL MALEATE 1 DROP: 5 SOLUTION OPHTHALMIC at 23:12

## 2021-06-07 RX ADMIN — NYSTATIN: 100000 POWDER TOPICAL at 09:00

## 2021-06-07 RX ADMIN — SODIUM CHLORIDE 500 ML: 900 INJECTION, SOLUTION INTRAVENOUS at 11:46

## 2021-06-07 RX ADMIN — Medication 5 MG: at 23:06

## 2021-06-07 RX ADMIN — BRIMONIDINE TARTRATE 1 DROP: 2 SOLUTION OPHTHALMIC at 23:12

## 2021-06-07 NOTE — ROUTINE PROCESS
TRANSFER - IN REPORT:    Verbal report received from Ruthie(name) on Leanna Miners  being received from ED(unit) for routine progression of care      Report consisted of patients Situation, Background, Assessment and   Recommendations(SBAR). Information from the following report(s) SBAR, Kardex, ED Summary, [facility] and Recent Results was reviewed with the receiving nurse. Opportunity for questions and clarification was provided. Assessment completed upon patients arrival to unit and care assumed.

## 2021-06-07 NOTE — PROGRESS NOTES
Hospitalist Progress Note-critical care note     Patient: Thee Corona MRN: [id number]  CSN: [id number]    YOB: 1940  Age: 79 y.o. Sex: female    DOA: 6/6/2021 LOS:  LOS: 1 day            Chief complaint: displacement of nephrostomy tube , ckd 3. Assessment/Plan         Hospital Problems  Date Reviewed: 6/6/2021        Codes Class Noted POA    * (Principal) Displacement of nephrostomy tube ([facility] ICD-10-CM: K89.911T  ICD-9-CM: 997.5  6/6/2021 Unknown        Tinea cruris ICD-10-CM: B35.6  ICD-9-CM: 110.3  6/6/2021 Unknown        CKD stage 3 due to type 2 diabetes mellitus ([facility] ICD-10-CM: E11.22, N18.30  ICD-9-CM: 250.40, 585.3  6/24/2020 Yes        S/P AKA (above knee amputation), right ([facility] ICD-10-CM: Y82.717  ICD-9-CM: V49.76  6/3/2019 Yes        Hyperkalemia ICD-10-CM: E87.5  ICD-9-CM: 276.7  4/12/2019 Unknown        Tear of skin of buttock ICD-10-CM: S31.801A  ICD-9-CM: 877.0  11/28/2018 Yes        Legally blind ICD-10-CM: H54.8  ICD-9-CM: 369.4  11/26/2018 Yes        Hypertension ICD-10-CM: I10  ICD-9-CM: 401.9  11/26/2018 Yes        S/P colectomy ICD-10-CM: Z90.49  ICD-9-CM: V45.89  11/26/2018 Yes        S/p nephrectomy ICD-10-CM: Z90.5  ICD-9-CM: V45.73  5/18/2018 Yes        Malfunction of nephrostomy tube [facility]) ICD-10-CM: H66.921Z  ICD-9-CM: 997.5  5/18/2018 Yes                 59-year-old female with a history of multiple medical problems including chronic indwelling right nephrostomy tube, colostomy, diabetes, legally blind, hypertension, thyroid disease, status post above-knee amputation, chronic kidney disease admitted for nephrolithotomy tube displace.       Nephrolithotomy tube displacement:   Discussed with IR , rt reinertion of rt PCN     Hyperkalemia:  Resolved and will continue to monitoring      Tinea Cruis: Topic antifungal cream to bilateral groain area.     Decubitus ulcer:  consult wound care.   Frequent position change     DM:  Continue Lantus, sliding scale     Hypertension: Continue home meds     Hypothyroidism: Continue levothyroxine.     Legally blind: Continue eyedrops.     S/p colectomy      S/p right AKA  Fall precaution     ckd3   Mild elevated today, will give iv hydration now   Cr monitoring       Hemal Forman Son [phone]     All questions have been answered. 35  total min's spent on patient care including >50% on counseling/coordinating care. Discussed the above assessments. also discussed labs, medications and hospital course      Subjective: I am [facility]   Rn: IR need ordered   Disposition :1-2 days   Review of systems:    General: No fevers or chills. Cardiovascular: No chest pain or pressure. No palpitations. Pulmonary: No shortness of breath. Gastrointestinal: No nausea, vomiting. Vital signs/Intake and Output:  Visit Vitals  BP (!) 115/32   Pulse 74   Temp 98 °F (36.7 °C)   Resp 18   Ht 5' 2.01\" (1.575 m)   Wt 63 kg (139 lb)   SpO2 100%   BMI 25.42 kg/m²     Current Shift:  No intake/output data recorded. Last three shifts:  06/05 1901 - 06/07 0700  In: -   Out: 50     Physical Exam:  General: WD, WN. Alert, cooperative, no acute distress    HEENT: NC, Atraumatic. Bilateral blind -eye closed   Lungs: CTA Bilaterally. No Wheezing/Rhonchi/Rales. Heart:  Regular  rhythm,  No murmur, No Rubs, No Gallops  Abdomen: Soft, Non distended, Non tender. +Bowel sounds, . Opening noted from previous PCN , colectomy bag noted   Extremities: No c/c/e, rt aka   Psych:   Not anxious or agitated. Neurologic:  No acute neurological deficit.              Labs: Results:       Chemistry Recent Labs     06/06/21  1655   *      K 5.8*      CO2 19*   BUN 43*   CREA 1.15   CA 8.8   AGAP 8   BUCR 37*      TP 7.2   ALB 2.9*   GLOB 4.3*   AGRAT 0.7*      CBC w/Diff Recent Labs     06/06/21  1520   WBC 9.5   RBC 4.12*   HGB 11.3*   HCT 35.8      GRANS 64   LYMPH 24   EOS 4      Cardiac Enzymes No results for input(s): CPK, CKND1, JANE in the last 72 hours. No lab exists for component: CKRMB, TROIP   Coagulation No results for input(s): PTP, INR, APTT, INREXT in the last 72 hours. Lipid Panel Lab Results   Component Value Date/Time    Cholesterol, total 115 08/28/2019 08:22 AM    HDL Cholesterol 39 (L) 08/28/2019 08:22 AM    LDL, calculated 46.2 08/28/2019 08:22 AM    VLDL, calculated 29.8 08/28/2019 08:22 AM    Triglyceride 149 08/28/2019 08:22 AM    CHOL/HDL Ratio 2.9 08/28/2019 08:22 AM      BNP No results for input(s): BNPP in the last 72 hours. Liver Enzymes Recent Labs     06/06/21  1655   TP 7.2   ALB 2.9*         Thyroid Studies Lab Results   Component Value Date/Time    TSH 3.40 01/24/2020 08:35 AM        Procedures/imaging: see electronic medical records for all procedures/Xrays and details which were not copied into this note but were reviewed prior to creation of Plan    IR EXCHANGE NEPHRO PERC RT SI    Result Date: 5/10/2021  PROCEDURE: Fluoroscopic guided right nephrostomy tube exchange HISTORY: History of long-term right nephrostomy tube, request for routine exchange/replacement over wire. COMPARISON: Prior x-rays. GUIDANCE: Fluoroscopic guidance was used to position (and confirm the position of) the catheter. Image(s) saved in PACS: Fluoroscopic TECHNIQUE/FINDINGS: Informed consent was obtained. Procedural time out was performed. Maximal sterile barrier technique was used for the procedure including sterile cap, sterile mask, sterile gown, sterile gloves and a large sterile sheet. Proper hand hygiene along with proper skin antiseptic was followed, utilizing 2% chlorhexidine solution for skin preparation.  view of the abdomen demonstrate right nephrostomy tube in place, mildly retracted laterally. Small amount of contrast confirmed tube to be partially pulled back into a mid/upper pole calyx. This was then cut and removed over wire.  Over 035 guidewire a new 10 Palestinian pigtail drain was advanced into the renal pelvis. Pigtail formed and secured. Small amount of contrast injected into the catheter to confirm positioning. Patient tolerated procedure well without complication. Fluoroscopic time: 0.5 minutes Fluoroscopic dose (reference air kerma): 5 mGy     Uncomplicated nephrostomy tube exchange.       Georgia Ruvalcaba MD

## 2021-06-07 NOTE — DIABETES MGMT
GLYCEMIC CONTROL PLAN OF CARE        Diabetes Management:      Assessment: known h/o T2DM HbA1C within recommended range for age + comorbids on basal regimen  - admitted for displacement of nephrostomy tube     Recommend: continue basal + corrective    BG in target range (non-ICU\" < 180 ; -180):  [] Yes  [x] No      Steroids: no    TDD previous day = 17 (5 Lantus + 2 unit Humalog Normal Insulin Sensitivity Corrective Coverage + 5 units regular)      Most recent blood glucose results:   Lab Results   Component Value Date/Time     (H) 06/07/2021 08:50 AM     (H) 06/06/2021 04:55 PM    GLUCPOC 181 (H) 06/06/2021 09:24 PM         Hypo: No    HbA1C: equivalent  to ave BGlucose of 154  mg/dl for 2-3 months prior to admission  Lab Results   Component Value Date/Time    Hemoglobin A1c 7.0 (H) 06/06/2021 03:20 PM       Adequate glycemic control PTA:  [x] Yes  [] No      Home diabetes medications:  Key Antihyperglycemic Medications             insulin glargine (LANTUS) 100 unit/mL injection 10 Units by SubCUTAneous route nightly. Goals:  Blood glucose will be within target range of 70 - 180 mg/dL by: 6/30  Diet:   Active Orders   Diet    DIET NPO       No data found.     Education:  _____ Refer to Diabetes Education Record                       ___X__ Education not indicated at this time      Myra Cooper 87, RN, CDE  Glycemic Control Team  [phone]  Pager [phone] (M-TH 8:00-4:30P)  *After Hours pager [phone]

## 2021-06-07 NOTE — WOUND CARE
IP WOUND CONSULT     [address] RECORD NUMBER:  [id number]  AGE: 79 y.o. GENDER: female  : 1940  TODAY'S DATE:  2021    GENERAL     [] Follow-up   [] New Consult    Shantel Puente is a 79 y.o. female referred by:   [] Physician  [] Nursing  [] Other:         PAST MEDICAL HISTORY    Past Medical History:   Diagnosis Date    [facility]     vaginal and colon cancer    Cancer ([facility]     Chronic pain     arthritis    Diabetes ([facility]     DVT (deep venous thrombosis) ([facility]     Hypertension     Legally blind     Thyroid disease         PAST SURGICAL HISTORY    Past Surgical History:   Procedure Laterality Date    HX ABOVE KNEE AMPUTATION Right     HX COLOSTOMY      HX ORTHOPAEDIC      HX OTHER SURGICAL      kidney drainage tube    HX UROLOGICAL      kidney removal    IR EXCHANGE NEPHRO PERC RT SI  2019    IR EXCHANGE NEPHRO PERC RT SI  3/21/2019    IR EXCHANGE NEPHRO PERC RT SI  2019    IR EXCHANGE NEPHRO PERC RT SI  2019    IR EXCHANGE NEPHRO PERC RT SI  2019    IR EXCHANGE NEPHRO PERC RT SI  10/3/2019    IR EXCHANGE NEPHRO PERC RT SI  2019    IR EXCHANGE NEPHRO PERC RT SI  2020    IR EXCHANGE NEPHRO PERC RT SI  2020    IR EXCHANGE NEPHRO PERC RT SI  2020    IR EXCHANGE NEPHRO PERC RT SI  2020    IR EXCHANGE NEPHRO PERC RT SI  2020    IR EXCHANGE NEPHRO PERC RT SI  8/10/2020    IR EXCHANGE NEPHRO PERC RT SI  2020    IR EXCHANGE NEPHRO PERC RT SI  2020    IR EXCHANGE NEPHRO PERC RT SI  2020    IR EXCHANGE NEPHRO PERC RT SI  2021    IR EXCHANGE NEPHRO PERC RT SI  3/25/2021    IR EXCHANGE NEPHRO PERC RT SI  5/10/2021    IR NEPHROSTOMY PERC RT PLC CATH  SI  2020    IR NEPHROSTOMY PERC RT PLC CATH  SI  2021       FAMILY HISTORY    No family history on file.     SOCIAL HISTORY    Social History     Tobacco Use    Smoking status: Never Smoker    Smokeless tobacco: Never Used   Substance Use Topics  Alcohol use: No    Drug use: Never       ALLERGIES    Allergies   Allergen Reactions    Morphine Nausea and Vomiting    Pcn [Penicillins] Rash       MEDICATIONS    No current facility-administered medications on file prior to encounter. Current Outpatient Medications on File Prior to Encounter   Medication Sig Dispense Refill    acetaminophen (TYLENOL) 500 mg tablet Take 2 Tabs by mouth every eight (8) hours as needed for Pain or Fever. 30 Tab 0    nystatin (MYCOSTATIN) powder Apply  to affected area two (2) times a day. 60 g 0    insulin glargine (LANTUS) 100 unit/mL injection 10 Units by SubCUTAneous route nightly. 1 Vial 0    lip protectant with sunscreen (CARMEX) crea Apply  to affected area as needed for Dry Skin. 1 Tube 0    timolol (TIMOPTIC) 0.5 % ophthalmic solution Administer 1 Drop to both eyes two (2) times a day. 10 mL 0    brimonidine (ALPHAGAN) 0.2 % ophthalmic solution Administer 1 Drop to both eyes every twelve (12) hours. 10 mL 0    amLODIPine (NORVASC) 5 mg tablet Take 1 Tab by mouth daily. 30 Tab 0    famotidine (PEPCID) 10 mg tablet Take 1 Tab by mouth every twelve (12) hours. 60 Tab 0    melatonin 5 mg tablet Take 1 Tab by mouth nightly. 30 Tab 0    LORazepam (ATIVAN) 0.5 mg tablet Take 1 Tab by mouth two (2) times daily as needed for Agitation. Max Daily Amount: 1 mg. 2 Tab 0    multivitamin (ONE A DAY) tablet Take 1 Tab by mouth daily.  ondansetron hcl (ZOFRAN) 4 mg tablet Take 4 mg by mouth every eight (8) hours as needed for Nausea or Vomiting.  brimonidine-timolol (COMBIGAN) 0.2-0.5 % drop ophthalmic solution Administer 1 Drop to both eyes every twelve (12) hours.  metoprolol succinate (TOPROL-XL) 50 mg XL tablet Take 50 mg by mouth daily.  magnesium hydroxide (MILK OF MAGNESIA CONCENTRATED) 2,400 mg/10 mL susp suspension Take 10 mL by mouth.  levothyroxine (SYNTHROID) 50 mcg tablet Take  by mouth Daily (before breakfast).       atorvastatin calcium (ATORVASTATIN PO) Take 20 mg by mouth daily. Wt Readings from Last 3 Encounters:   06/07/21 63 kg (139 lb)   03/25/21 69.8 kg (153 lb 14.4 oz)   11/21/20 69.6 kg (153 lb 7 oz)       [unfilled]  Visit Vitals  BP (!) 145/48   Pulse 87   Temp 98.5 °F (36.9 °C)   Resp 16   Ht 5' 2.01\" (1.575 m) Comment: per [facility] records   Wt 63 kg (139 lb)   SpO2 100%   BMI 25.42 kg/m²       ASSESSMENT     Skin impairment Identification:  Type: pressure    Contributing Factors: chronic pressure and decreased mobility    Wound Heel Left;Right (Active)   Number of days: 786       Wound Gluteal fold/cleft (Active)   Number of days: 786       Wound Foot Left;Plantar eschar to heel of left foot  06/19/20 (Active)   Number of days: 353       Wound Sacral/coccyx stage 2 decubitous ulcers on buttocks and sacral area 06/19/20 (Active)   Number of days: 353       Wound Abdomen redness with moisture under the folds of the abdomen 06/19/20 (Active)   Number of days: 353       Wound Axilla redness and moisture to bilateral axilla  06/19/20 (Active)   Number of days: 353       Wound Abdomen Right;Quadrant; Upper faint bruise over upper right abdomen above the umbilicus 67/09/74 (Active)   Number of days: 353       Wound Toe (Comment  which one) Left left great toe  (Active)   Number of days: 210       Wound Sacral/coccyx (Active)   Wound Image   06/07/21 1501   Wound Etiology Pressure Stage 3 06/07/21 1501   Dressing Status Intact 06/07/21 1501   Cleansed Cleansed with saline 06/07/21 1501   Dressing/Treatment Foam 06/07/21 1501   Dressing Change Due 06/08/21 06/07/21 1501   Wound Length (cm) 1.5 cm 06/07/21 1501   Wound Width (cm) 1 cm 06/07/21 1501   Wound Depth (cm) 0.2 cm 06/07/21 1501   Wound Surface Area (cm^2) 1.5 cm^2 06/07/21 1501   Wound Volume (cm^3) 0.3 cm^3 06/07/21 1501   Wound Assessment [facility] 06/07/21 1501   Drainage Amount Small 06/07/21 1501   Drainage Description Serous 06/07/21 1501   Wound Odor None 06/07/21 1501   Carol-Wound/Incision Assessment Blanchable erythema 06/07/21 1501   Edges Undefined edges 06/07/21 1501   Wound Thickness Description Full thickness 06/07/21 1501   Number of days: 0          PLAN     Skin Care & Pressure Relief Recommendations  Minimize layers of linen  Pads under patient to optimize support surface  Turn/reposition approximately every 2 hours  Manage incontinence   Promote continence; Skin Protective lotion/cream to buttocks and sacrum daily and as needed with incontinence care    Physician/Provider notified: Yes  Recommendations: Protective foam applied, will request order from MD for santyl    Teaching completed with:   [x] Patient           [] Family member       [] Caregiver          [] Nursing  [] Other    Patient/Caregiver Teaching:  Level of patient/caregiver understanding able to:   [] Indicates understanding       [] Needs reinforcement  [] Unsuccessful      [] Verbal Understanding  [] Demonstrated understanding       [x] No evidence of learning  [] Refused teaching         [] N/A       Electronically signed by April L Madalynn Boxer, RN on 6/7/2021 at 3:03 PM

## 2021-06-07 NOTE — PROCEDURES
Vascular & Interventional Radiology Brief Procedure Note    Interventional Radiologist: Bekah Meza    Pre-operative Diagnosis:  Inadvertent removal of chronic indwelling right percutaneous nephrostomy (PCN). Post-operative Diagnosis: Same as pre-op dx    Procedure(s) Performed:  Reinsertion of right PCN. Anesthesia:  None. Patient refuses local.    Findings:  Successful reinsertion of right PCN through previous percutaneous tract. Tip in renal pelvis. Complications: None    Estimated Blood Loss:  None. Tubes and Drains: 10 Fr APDL. Specimens: None    Condition: Good    Disposition: To floor. Plan: Per primary service. Can D/C from IR standpoint.       Evaristo Palacio MD  [facility]  Vascular & Interventional Radiology  6/7/2021

## 2021-06-07 NOTE — PROGRESS NOTES
Assumed patient care at this time. Received report from Mayela Lawrence.     0802-Assessment complete and documented in flowsheets. 0849-Called interventional radiology for patient needing nephrostomy tube placement. Told nurse they need an order to reinsert. 0851-Paged Dr. Georgiana Leonard regarding no order regarding nephrostomy tube placement. 0900-Dr. Georgiana Leonard to put in orders for patient. Shift Summary-  Pt is alert and oriented x 4. Pt had uneventful shift. Pt does not ambulate. Pt has a R nephrostomy tube that was placed today.

## 2021-06-07 NOTE — PROGRESS NOTES
Reason for Admission:   Displacement of nephrostomy tube                     RUR Score:   Mod; 22%             PCP: First and Last name:   Bshsi, Not On File, NP     Name of Practice:    Are you a current patient: Yes/No:    Approximate date of last visit:    Can you participate in a virtual visit if needed:     Do you (patient/family) have any concerns for transition/discharge? Plan for utilizing home health:   Unlikely     Current Advanced Directive/Advance Care Plan:  DNR      Healthcare Decision Maker:   Click here to complete [address] including selection of the Healthcare Decision Maker Relationship (ie \"Primary\")            Primary Decision Maker: Hemal Forman - Son - [phone]    Transition of Care Plan:    Return to [address] [place] [address]       Chart reviewed. Per H&P Sybil Langley is a 79 y.o. female  with a history of multiple medical problems including chronic indwelling right nephrostomy tube, colostomy, diabetes, legally blind, hypertension, thyroid disease, status post above-knee amputation, chronic kidney disease  who was brought by EMS for nephrolithotomy tube displacement. She is a resident of [address]. she states that she feel something wrong on her back after the nursing staff repositioned her today at the SNF. \"  I needed my tube back for my urine\". No chest pain no shortness of breath. No nausea vomiting diarrhea. At the ER, her work-up only revealed a mild hyperkalemia. Case was discussed with interventional radiology who suggested admit the patient for nephrolithotomy tube placement. \"      Noted pt is a LTC resident at [address] [place] [address] . JASBIR contacted pt's son, Casey Cool ([phone]), and he has indicated plan is for pt to return to [address] [place] [address]  when medically stable. [address] [place] [address]  is aware of plan return when medially stable.      Care Management Interventions  Mode of Transport at Discharge: BLS  Transition of Care Consult (CM Consult): [facility] Maintenance Reviewed: Yes  Current Support Network: [address], Family Lives Nearby  The Plan for Transition of Care is Related to the Following Treatment Goals : Return to [address] [place] [address]  The Patient and/or Patient Representative was Provided with a Choice of Provider and Agrees with the Discharge Plan?: Yes  Name of the Patient Representative Who was Provided with a Choice of Provider and Agrees with the Discharge Plan: Stu avery Ped of Choice List was Provided with Basic Dialogue that Supports the Patient's Individualized Plan of Care/Goals, Treatment Preferences and Shares the Quality Data Associated with the Providers?: Yes  Discharge Location  Discharge Placement: [facility] (vs SNF at [address] [place] [address])

## 2021-06-07 NOTE — PROGRESS NOTES
Bedside and Verbal shift change report given to George Tristan (oncoming nurse) by Norris Elizabeth (offgoing nurse). Report included the following information SBAR, Kardex, Intake/Output, MAR and Recent Results.

## 2021-06-07 NOTE — ED NOTES
Receiving RN on MS notified that I was not able to draw blood from pt's current Iv. RRT is transporting pt to the floor and will place a Midline and draw a repeat potassium level on the pt when midline placed. Also notified Receiving RN that I was unable to administer Lovenox prior to transfer because it was not yet reviewed and able to be drawn out of the pixis prior to pt transfer    Attempted to call and inform RN x2.

## 2021-06-07 NOTE — PROGRESS NOTES
Problem: Dysphagia (Adult)  Goal: *Acute Goals and Plan of Care (Insert Text)  Description: Patient will:  1. Tolerate PO trials with 0 s/s overt distress in 4/5 trials  2. Utilize compensatory swallow strategies/maneuvers (decrease bite/sip, size/rate, alt. liq/sol) with min cues in 4/5 trials  3. Perform oral-motor/laryngeal exercises to increase oropharyngeal swallow function with min cues  4. Complete an objective swallow study (i.e., MBSS) to assess swallow integrity, r/o aspiration, and determine of safest LRD, min A    Rec:     Easy to Chew with thin liquids  Aspiration precautions  HOB >45 during po intake, remain >30 for 30-45 minutes after po   Small bites/sips; alternate liquid/solid with slow feeding rate   Oral care TID  Meds per preference      Outcome: Resolved/Met   [address] EVALUATION AND DISCHARGE    Patient: Yola Edmondson [de-identified]79 y.o. female)  Date: 6/7/2021  Primary Diagnosis: Displacement of nephrostomy tube (HCC) [T83.022A]  Hyperkalemia [E87.5]        Precautions: [place]; Visually impaired     PLOF: Per H&P    ASSESSMENT :  Clinical beside swallow eval completed per MD orders. Functional communication intact. Intelligibility >90%. Cognitive-linguistic function appears at baseline. OM examination revealed oral motor structures functional for mastication and deglutition. Presented with thin liquid, puree, and solid trials. Tolerated thi liquids via single sip , by straw, and in successive swallows. Exhibited bolus cohesion, manipulation and A-P transit. Further exhibited efficient swallow timing/reflex and hyolaryngeal excursion. Pt able to manipulate and clear with 0 clinical s/s aspiration and/or oropharyngeal dysphagia. Pt safe for Easy to Chew solids, thin  liquid diet. 0 formal ST needs for dysphagia indicated at this time. SLP educated pt on role of speech therapist in current setting with re: speech/swallow.  SLP available for re-evaluation if indicated by MD. Results d/w Megan TAVARES who request diet order be placed with consideration for CKD and DM. Thank you for this referral.   Maine Euceda, SLP  [place], [address] [facility]  Speech-Language Pathologist       PLAN :  Recommendations and Planned Interventions:  No formal ST needs ID'd for dysphagia. Eval only. Discharge Recommendations: None     SUBJECTIVE:   Patient stated Give me my apple juice and don't open it.     OBJECTIVE:     Past Medical History:   Diagnosis Date    Cancer ([facility]     vaginal and colon cancer    Cancer ([facility]     Chronic pain     arthritis    Diabetes ([facility]     DVT (deep venous thrombosis) (HCC)     Hypertension     Legally blind     Thyroid disease      Past Surgical History:   Procedure Laterality Date    HX ABOVE KNEE AMPUTATION Right     HX COLOSTOMY      HX ORTHOPAEDIC      HX OTHER SURGICAL      kidney drainage tube    HX UROLOGICAL      kidney removal    IR EXCHANGE NEPHRO PERC RT SI  1/28/2019    IR EXCHANGE NEPHRO PERC RT SI  3/21/2019    IR EXCHANGE NEPHRO PERC RT SI  5/13/2019    IR EXCHANGE NEPHRO PERC RT SI  6/27/2019    IR EXCHANGE NEPHRO PERC RT SI  8/16/2019    IR EXCHANGE NEPHRO PERC RT SI  10/3/2019    IR EXCHANGE NEPHRO PERC RT SI  11/18/2019    IR EXCHANGE NEPHRO PERC RT SI  1/6/2020    IR EXCHANGE NEPHRO PERC RT SI  2/24/2020    IR EXCHANGE NEPHRO PERC RT SI  4/13/2020    IR EXCHANGE NEPHRO PERC RT SI  6/1/2020    IR EXCHANGE NEPHRO PERC RT SI  6/19/2020    IR EXCHANGE NEPHRO PERC RT SI  8/10/2020    IR EXCHANGE NEPHRO PERC RT SI  9/30/2020    IR EXCHANGE NEPHRO PERC RT SI  11/5/2020    IR EXCHANGE NEPHRO PERC RT SI  12/16/2020    IR EXCHANGE NEPHRO PERC RT SI  2/1/2021    IR EXCHANGE NEPHRO PERC RT SI  3/25/2021    IR EXCHANGE NEPHRO PERC RT SI  5/10/2021    IR NEPHROSTOMY PERC RT PLC CATH  SI  6/21/2020     Home Situation:   Home Situation  Home Environment: Long term care  One/Two Story Residence: One story  Living Alone: No  Support Systems: Long term acute care  Patient Expects to be Discharged to[de-identified] Long-term care  Current DME Used/Available at Home: None      Cognitive and Communication Status:  Neurologic State: Alert, Appropriate for age  Orientation Level: Appropriate for age, Oriented X4              Oral Assessment:     P.O. Trials:     Vocal quality prior to P.O.:         PAIN:  Pain level pre-treatment: 0/10   Pain level post-treatment: 0/10   Pain Intervention(s): Medication (see MAR); Rest, Ice, Repositioning   Response to intervention: Nurse notified, See doc flow    After evaluation:   []            Patient left in no apparent distress sitting up in chair  [x]            Patient left in no apparent distress in bed  [x]            Call bell left within reach  [x]            Nursing notified  []            Family present  []            Caregiver present  []            Bed alarm activated      COMMUNICATION/EDUCATION:   [x]            Aspiration precautions; swallow safety; compensatory techniques. []            Patient/family have participated as able in goal setting and plan of care. []            Patient/family agree to work toward stated goals and plan of care. []            Patient understands intent and goals of therapy; neutral about participation. [x]            Patient unable to participate in goal setting/plan of care; educ ongoing with interdisciplinary staff  [x]         Posted safety precautions in patient's room.     Thank you for this referral.  Tobi Francis, SLP  MA, CCC-SLP  Speech-Language Pathologist    Time Calculation: 14 mins

## 2021-06-07 NOTE — PROGRESS NOTES
Shift summary: Pt is A/O, on bed rest with R BKA. Colostomy on L side, R flank opening due to Nephrostomy tube displacement with gauze dressing. BS ACHS, had been NPO since midnight for IR placement of Nephrostomy tube. WC consult order in for sacral wound. Shift uneventful    0750 Bedside and Verbal shift change report given to Stephannie Galeazzi, RN (oncoming nurse) by Mario Guerrero RN   (offgoing nurse). Report included the following information SBAR, Kardex, Intake/Output, MAR and Recent Results.

## 2021-06-07 NOTE — ED NOTES
TRANSFER - OUT REPORT:    Verbal report given to CHAITANYA Reyes (name) on Angela Webster  being transferred to [place] [address] (unit) for routine progression of care       Report consisted of patients Situation, Background, Assessment and   Recommendations(SBAR). Information from the following report(s) SBAR, ED Summary and MAR was reviewed with the receiving nurse. Lines:   PICC Single Lumen 03/20/21 Right;Brachial (Active)       Peripheral IV 06/06/21 Left Hand (Active)   Site Assessment Clean, dry, & intact 06/06/21 1520   Phlebitis Assessment 0 06/06/21 1520   Infiltration Assessment 0 06/06/21 1520   Dressing Status Clean, dry, & intact 06/06/21 1520   Dressing Type Transparent 06/06/21 1520   Hub Color/Line Status Pink;Patent; Flushed 06/06/21 1520   Action Taken Blood drawn 06/06/21 1520        Opportunity for questions and clarification was provided. Patient transported with:   Tech     Pt to floor AOx4, Bandage in place on right flank for nephrostomy replacement. AKA on RLE, Colostomy, and Yest like rash in the fold of her thighs and under her breasts. SL in place.  Pt on RA

## 2021-06-08 LAB
ANION GAP SERPL CALC-SCNC: 9 MMOL/L (ref 3–18)
APPEARANCE UR: ABNORMAL
BACTERIA URNS QL MICRO: ABNORMAL /HPF
BILIRUB UR QL: NEGATIVE
BUN SERPL-MCNC: 38 MG/DL (ref 7–18)
BUN/CREAT SERPL: 31 (ref 12–20)
CALCIUM SERPL-MCNC: 8.4 MG/DL (ref 8.5–10.1)
CHLORIDE SERPL-SCNC: 105 MMOL/L (ref 100–111)
CO2 SERPL-SCNC: 24 MMOL/L (ref 21–32)
COLOR UR: YELLOW
CREAT SERPL-MCNC: 1.21 MG/DL (ref 0.6–1.3)
EPITH CASTS URNS QL MICRO: ABNORMAL /LPF (ref 0–5)
GLUCOSE BLD STRIP.AUTO-MCNC: 121 MG/DL (ref 70–110)
GLUCOSE BLD STRIP.AUTO-MCNC: 142 MG/DL (ref 70–110)
GLUCOSE BLD STRIP.AUTO-MCNC: 224 MG/DL (ref 70–110)
GLUCOSE BLD STRIP.AUTO-MCNC: 230 MG/DL (ref 70–110)
GLUCOSE SERPL-MCNC: 108 MG/DL (ref 74–99)
GLUCOSE UR STRIP.AUTO-MCNC: NEGATIVE MG/DL
HGB UR QL STRIP: ABNORMAL
KETONES UR QL STRIP.AUTO: NEGATIVE MG/DL
LEUKOCYTE ESTERASE UR QL STRIP.AUTO: ABNORMAL
MAGNESIUM SERPL-MCNC: 1.8 MG/DL (ref 1.6–2.6)
NITRITE UR QL STRIP.AUTO: NEGATIVE
PH UR STRIP: 6.5 [PH] (ref 5–8)
POTASSIUM SERPL-SCNC: 4.5 MMOL/L (ref 3.5–5.5)
PROT UR STRIP-MCNC: 30 MG/DL
RBC #/AREA URNS HPF: ABNORMAL /HPF (ref 0–5)
SODIUM SERPL-SCNC: 138 MMOL/L (ref 136–145)
SP GR UR REFRACTOMETRY: 1.01 (ref 1–1.03)
UROBILINOGEN UR QL STRIP.AUTO: 0.2 EU/DL (ref 0.2–1)
WBC URNS QL MICRO: ABNORMAL /HPF (ref 0–5)

## 2021-06-08 PROCEDURE — 74011000250 HC RX REV CODE- 250: Performed by: HOSPITALIST

## 2021-06-08 PROCEDURE — 80048 BASIC METABOLIC PNL TOTAL CA: CPT

## 2021-06-08 PROCEDURE — 83735 ASSAY OF MAGNESIUM: CPT

## 2021-06-08 PROCEDURE — 65270000029 HC RM PRIVATE

## 2021-06-08 PROCEDURE — 82962 GLUCOSE BLOOD TEST: CPT

## 2021-06-08 PROCEDURE — 74011636637 HC RX REV CODE- 636/637: Performed by: FAMILY MEDICINE

## 2021-06-08 PROCEDURE — 74011250636 HC RX REV CODE- 250/636: Performed by: FAMILY MEDICINE

## 2021-06-08 PROCEDURE — 74011250637 HC RX REV CODE- 250/637: Performed by: FAMILY MEDICINE

## 2021-06-08 PROCEDURE — 81001 URINALYSIS AUTO W/SCOPE: CPT

## 2021-06-08 PROCEDURE — 74011250636 HC RX REV CODE- 250/636: Performed by: HOSPITALIST

## 2021-06-08 RX ADMIN — NYSTATIN: 100000 POWDER TOPICAL at 21:40

## 2021-06-08 RX ADMIN — BRIMONIDINE TARTRATE 1 DROP: 2 SOLUTION OPHTHALMIC at 09:52

## 2021-06-08 RX ADMIN — MEROPENEM 500 MG: 500 INJECTION, POWDER, FOR SOLUTION INTRAVENOUS at 11:58

## 2021-06-08 RX ADMIN — AMLODIPINE BESYLATE 5 MG: 5 TABLET ORAL at 09:51

## 2021-06-08 RX ADMIN — INSULIN LISPRO 4 UNITS: 100 INJECTION, SOLUTION INTRAVENOUS; SUBCUTANEOUS at 11:50

## 2021-06-08 RX ADMIN — SODIUM CHLORIDE 50 ML/HR: 900 INJECTION, SOLUTION INTRAVENOUS at 21:38

## 2021-06-08 RX ADMIN — ATORVASTATIN CALCIUM 20 MG: 20 TABLET, FILM COATED ORAL at 21:39

## 2021-06-08 RX ADMIN — LEVOTHYROXINE SODIUM 50 MCG: 0.1 TABLET ORAL at 06:57

## 2021-06-08 RX ADMIN — INSULIN LISPRO 4 UNITS: 100 INJECTION, SOLUTION INTRAVENOUS; SUBCUTANEOUS at 21:44

## 2021-06-08 RX ADMIN — LEVOTHYROXINE SODIUM 50 MCG: 0.1 TABLET ORAL at 07:08

## 2021-06-08 RX ADMIN — FAMOTIDINE 10 MG: 20 TABLET ORAL at 21:39

## 2021-06-08 RX ADMIN — METOPROLOL SUCCINATE 50 MG: 50 TABLET, EXTENDED RELEASE ORAL at 09:52

## 2021-06-08 RX ADMIN — INSULIN GLARGINE 10 UNITS: 100 INJECTION, SOLUTION SUBCUTANEOUS at 21:44

## 2021-06-08 RX ADMIN — TIMOLOL MALEATE 1 DROP: 5 SOLUTION OPHTHALMIC at 09:52

## 2021-06-08 RX ADMIN — Medication 5 MG: at 21:39

## 2021-06-08 RX ADMIN — FAMOTIDINE 10 MG: 20 TABLET ORAL at 09:51

## 2021-06-08 RX ADMIN — ENOXAPARIN SODIUM 30 MG: 30 INJECTION SUBCUTANEOUS at 09:55

## 2021-06-08 NOTE — PROGRESS NOTES
2103 -  Head to toe assessment performed at this time. Pt denies any chest pain or SOB. Pt denies any numbness or tingling to extremities. Pt encouraged to manage pain. Pt educated on the side effects of medications taken. Pt left with call light within reach and bed in low position. Will continue to monitor.

## 2021-06-08 NOTE — ROUTINE PROCESS
Bedside and Verbal shift change report given to Abby Rahman RN (oncoming nurse) by JENI Garcia (offgoing nurse). Report included the following information SBAR, Kardex, Intake/Output, MAR and Recent Results.

## 2021-06-08 NOTE — PROGRESS NOTES
Shift Summary:  Patient slept through the night. Nephrostomy tube intact, draining yellow urine. Colostomy intact with brown liquid stool. Patient refused lab draw and refused IV placement. Will try later this morning. 0720 - Bedside and Verbal shift change report given to [address] (oncoming nurse) by Karson Elizabeth (offgoing nurse). Report included the following information SBAR, Kardex, Intake/Output and MAR.       0755 - IV site obtained by medic, Marlan Severs. Will attempt to draw labs.

## 2021-06-08 NOTE — PROGRESS NOTES
Comprehensive Nutrition Assessment    Type and Reason for Visit: Initial, Wound    Nutrition Recommendations/Plan: Supplement: will order Evans BID to help w/ wound healing. Nutrition Assessment:  Hx of diabetes, legally blind, HTN, thryoid disease, CKD stage 3, S/P AKA. Admitted d/t  Displacement of nephrostomy tube. Malnutrition Assessment:  Malnutrition Status: At risk for malnutrition (specify) (-3% in about 3 months)      Estimated Daily Nutrient Needs:  Energy (kcal): (P) 9192-1578; Weight Used for Energy Requirements: (P) Admission  Protein (g): (P) 63-95; Weight Used for Protein Requirements: (P) Admission (1-1.5g)  Fluid (ml/day): (P) 1797-4193; Method Used for Fluid Requirements: (P) 1 ml/kcal      Nutrition Related Findings:  (P) Labs: GFR est AA 52, BUN 38. Med: humalog, lantus, synthroid, pepcid, melatonin, lipitor. +BM 6/6/21. Spoke w/ nurse about pt PO intake: pt normally only eats breakfast and dinner; does not normally have lunch. Wounds:    Pressure injury, Stage III (Sacral)    Current Nutrition Therapies:  ADULT DIET Easy to Chew; 5 carb choices (75 gm/meal); Low Phosphorus (Less than 1000 mg)    Anthropometric Measures:  · Height:  5' 2\" (157.5 cm)  · Current Body Wt:  63 kg (139 lb)   · Admission Body Wt:  (P) 139 lb    · Usual Body Wt:  64.9 kg (143 lb 1.3 oz) (Last RD note 3/24/2021)     · Ideal Body Wt:  110 lbs:  126.4 %   · Adjusted Body Weight:  153; Weight Adjustment for: Amputation   · Adjusted BMI:  28    · BMI Category: Overweight (BMI 25.0-29. 9)       Nutrition Diagnosis:   · Inadequate oral intake related to inadequate protein-energy intake as evidenced by wounds (Pressure injury)    Nutrition Interventions:   Food and/or Nutrient Delivery: (P) Continue current diet, Start oral nutrition supplement  Nutrition Education and Counseling: (P) No recommendations at this time  Coordination of Nutrition Care: (P) Continue to monitor while inpatient    Goals:  (P) Encourage PO intake > 50% at most meals throughout the next 1-3 days       Nutrition Monitoring and Evaluation:   Behavioral-Environmental Outcomes: (P) None identified  Food/Nutrient Intake Outcomes: (P) Food and nutrient intake, Supplement intake, Diet advancement/tolerance  Physical Signs/Symptoms Outcomes: (P) Biochemical data, Skin, Weight, GI status, Nausea/vomiting    Discharge Planning:    (P) Too soon to determine     Electronically signed by Onesimo Bejarano on 6/8/2021 at 11:26 AM

## 2021-06-08 NOTE — PROGRESS NOTES
Pharmacy Dosing Services:  Meropenem    Indication:  UTI    Previous Regimen Meropenem 1 gm IV q6hrs   Serum Creatinine Lab Results   Component Value Date/Time    Creatinine 1.78 (H) 06/07/2021 08:50 AM    Creatinine, POC 1.1 01/16/2020 09:28 AM      Creatinine Clearance Estimated Creatinine Clearance: 21.6 mL/min (A) (based on SCr of 1.78 mg/dL (H)). BUN Lab Results   Component Value Date/Time    BUN 50 (H) 06/07/2021 08:50 AM    BUN, POC 17 01/16/2020 09:28 AM         Dose administration notes:   Changed to Meropenem 500 mg IV q12hrs per renal dosing protocol    Plan:  Continue to monitor     Husam Eduardo  [phone]

## 2021-06-08 NOTE — DIABETES MGMT
GLYCEMIC CONTROL PLAN OF CARE        Diabetes Management:      Assessment: known h/o T2DM HbA1C within recommended range for age + comorbids on basal regimen  - admitted for displacement of nephrostomy tube     Recommend: continue basal + corrective    BG in target range (non-ICU\" < 180 ; -180):  [] Yes  [x] No      Steroids: no    TDD previous day = 18 (10 Lantus + 8 unit Humalog Normal Insulin Sensitivity Corrective Coverage)      Most recent blood glucose results:   Lab Results   Component Value Date/Time     (H) 06/08/2021 08:50 AM    GLUCPOC 121 (H) 06/08/2021 07:28 AM    GLUCPOC 114 (H) 06/07/2021 11:01 PM    GLUCPOC 265 (H) 06/07/2021 04:54 PM         Hypo: No    HbA1C: equivalent  to ave BGlucose of 154  mg/dl for 2-3 months prior to admission  Lab Results   Component Value Date/Time    Hemoglobin A1c 7.0 (H) 06/06/2021 03:20 PM       Adequate glycemic control PTA:  [x] Yes  [] No      Home diabetes medications:  Key Antihyperglycemic Medications             insulin glargine (LANTUS) 100 unit/mL injection 10 Units by SubCUTAneous route nightly. Goals:  Blood glucose will be within target range of 70 - 180 mg/dL by: 6/30  Diet:   Active Orders   Diet    ADULT DIET Easy to Chew; 5 carb choices (75 gm/meal); Low Phosphorus (Less than 1000 mg)       No data found.     Education:  _____ Refer to Diabetes Education Record                       ___X__ Education not indicated at this time      Samanta Trinh, RN, CDE  Glycemic Control Team  [phone]  Pager [phone] (M-TH 8:00-4:30P)  *After Hours pager [phone]

## 2021-06-08 NOTE — PROGRESS NOTES
Hospitalist Progress Note-critical care note     Patient: Angela Webster MRN: [id number]  CSN: [id number]    YOB: 1940  Age: 79 y.o. Sex: female    DOA: 6/6/2021 LOS:  LOS: 2 days            Chief complaint: displacement of nephrostomy tube , ckd 3. Assessment/Plan         Hospital Problems  Date Reviewed: 6/6/2021        Codes Class Noted POA    * (Principal) Displacement of nephrostomy tube ([facility] ICD-10-CM: Q75.701L  ICD-9-CM: 997.5  6/6/2021 Unknown        Tinea cruris ICD-10-CM: B35.6  ICD-9-CM: 110.3  6/6/2021 Unknown        CKD stage 3 due to type 2 diabetes mellitus ([facility] ICD-10-CM: E11.22, N18.30  ICD-9-CM: 250.40, 585.3  6/24/2020 Yes        S/P AKA (above knee amputation), right ([facility] ICD-10-CM: A77.802  ICD-9-CM: V49.76  6/3/2019 Yes        Hyperkalemia ICD-10-CM: E87.5  ICD-9-CM: 276.7  4/12/2019 Unknown        Tear of skin of buttock ICD-10-CM: S31.801A  ICD-9-CM: 877.0  11/28/2018 Yes        Legally blind ICD-10-CM: H54.8  ICD-9-CM: 369.4  11/26/2018 Yes        Hypertension ICD-10-CM: I10  ICD-9-CM: 401.9  11/26/2018 Yes        S/P colectomy ICD-10-CM: Z90.49  ICD-9-CM: V45.89  11/26/2018 Yes        S/p nephrectomy ICD-10-CM: Z90.5  ICD-9-CM: V45.73  5/18/2018 Yes        Malfunction of nephrostomy tube [facility]) ICD-10-CM: M39.033W  ICD-9-CM: 997.5  5/18/2018 Yes                 70-year-old female with a history of multiple medical problems including chronic indwelling right nephrostomy tube, colostomy, diabetes, legally blind, hypertension, thyroid disease, status post above-knee amputation, chronic kidney disease admitted for nephrostomy  tube displace.       Nephrolithotomy tube displacement:   Discussed with IR , rt reinertion of rt PCN   Tolerated the procedure well     Pyelonephritis ?   Will send urine for cx, start merrem , will f/u with the  Urine cx    Hyperkalemia:  Resolved      Tinea Cruis: Topic antifungal cream to bilateral groain area.     Decubitus ulcer:  Continue wound care. Frequent position change     DM:  Continue Lantus, sliding scale     Hypertension: Continue home meds     Hypothyroidism:   On levothyroxine.     Legally blind: Continue eyedrops.     S/p colectomy      S/p right AKA  Fall precaution     ckd3   Cr wnl now per Iv hydration     Will continue to remain in the hospital   Hemal Forman Son [phone]     He has concerns about the bad reaction from last admission. merrem started-which she was d/c with last admission, will have id on board   Subjective: can you low my bed ? Laurel Benz I feel fine, can you move the table away ? All questions have been answered. 35 total min's spent on patient care including >50% on counseling/coordinating care. Discussed the above assessments. also discussed labs, medications and hospital course    Disposition :1-2 days   Review of systems:    General: No fevers or chills. Cardiovascular: No chest pain or pressure. No palpitations. Pulmonary: No shortness of breath. Gastrointestinal: No nausea, vomiting. Vital signs/Intake and Output:  Visit Vitals  BP (!) 96/24 (BP 1 Location: Right upper arm, BP Patient Position: At rest)   Pulse 77   Temp 97.7 °F (36.5 °C)   Resp 18   Ht 5' 2\" (1.575 m)   Wt 63 kg (139 lb)   SpO2 99%   BMI 25.42 kg/m²     Current Shift:  No intake/output data recorded. Last three shifts:  06/06 1901 - 06/08 0700  In: 0   Out: 1000 [Urine:925]    Physical Exam:  General: WD, WN. Alert, cooperative, no acute distress    HEENT: NC, Atraumatic. Bilateral blind   Lungs: CTA Bilaterally. No Wheezing/Rhonchi/Rales. Heart:  Regular  rhythm,  No murmur, No Rubs, No Gallops  Abdomen: Soft, Non distended, Non tender. +Bowel sounds, .colectomy bag noted, rt side nephrectomy tube   Extremities: No c/c/e, rt aka   Psych:   Not anxious or agitated. Neurologic:  No acute neurological deficit.              Labs: Results:       Chemistry Recent Labs     06/08/21  0850 06/07/21  0850 06/06/21  1655   * 166* 202*    139 138   K 4.5 5.3 5.8*    109 111   CO2 24 23 19*   BUN 38* 50* 43*   CREA 1.21 1.78* 1.15   CA 8.4* 8.8 8.8   AGAP 9 7 8   BUCR 31* 28* 37*   AP  --   --  116   TP  --   --  7.2   ALB  --   --  2.9*   GLOB  --   --  4.3*   AGRAT  --   --  0.7*      CBC w/Diff Recent Labs     06/07/21  0850 06/06/21  1520   WBC 12.2 9.5   RBC 3.59* 4.12*   HGB 10.0* 11.3*   HCT 30.9* 35.8    365   GRANS 80* 64   LYMPH 10* 24   EOS 1 4      Cardiac Enzymes No results for input(s): CPK, CKND1, JANE in the last 72 hours. No lab exists for component: CKRMB, TROIP   Coagulation Recent Labs     06/07/21  0850   PTP 14.8   INR 1.2       Lipid Panel Lab Results   Component Value Date/Time    Cholesterol, total 115 08/28/2019 08:22 AM    HDL Cholesterol 39 (L) 08/28/2019 08:22 AM    LDL, calculated 46.2 08/28/2019 08:22 AM    VLDL, calculated 29.8 08/28/2019 08:22 AM    Triglyceride 149 08/28/2019 08:22 AM    CHOL/HDL Ratio 2.9 08/28/2019 08:22 AM      BNP No results for input(s): BNPP in the last 72 hours. Liver Enzymes Recent Labs     06/06/21  1655   TP 7.2   ALB 2.9*         Thyroid Studies Lab Results   Component Value Date/Time    TSH 3.40 01/24/2020 08:35 AM        Procedures/imaging: see electronic medical records for all procedures/Xrays and details which were not copied into this note but were reviewed prior to creation of Plan    IR EXCHANGE NEPHRO PERC RT SI    Result Date: 5/10/2021  PROCEDURE: Fluoroscopic guided right nephrostomy tube exchange HISTORY: History of long-term right nephrostomy tube, request for routine exchange/replacement over wire. COMPARISON: Prior x-rays. GUIDANCE: Fluoroscopic guidance was used to position (and confirm the position of) the catheter. Image(s) saved in PACS: Fluoroscopic TECHNIQUE/FINDINGS: Informed consent was obtained. Procedural time out was performed.  Maximal sterile barrier technique was used for the procedure including sterile cap, sterile mask, sterile gown, sterile gloves and a large sterile sheet. Proper hand hygiene along with proper skin antiseptic was followed, utilizing 2% chlorhexidine solution for skin preparation.  view of the abdomen demonstrate right nephrostomy tube in place, mildly retracted laterally. Small amount of contrast confirmed tube to be partially pulled back into a mid/upper pole calyx. This was then cut and removed over wire. Over 035 guidewire a new 10 [place] pigtail drain was advanced into the renal pelvis. Pigtail formed and secured. Small amount of contrast injected into the catheter to confirm positioning. Patient tolerated procedure well without complication. Fluoroscopic time: 0.5 minutes Fluoroscopic dose (reference air kerma): 5 mGy     Uncomplicated nephrostomy tube exchange.       Colton Calderon MD

## 2021-06-08 NOTE — CONSULTS
[facility]  (A Division of [address] [place] [address])                                                                                                                       Consultation Note    Magaly Heller MD  Work Cell #: [phone]( Mon-Fri, 7am-5pm)  If no call or text back within 30 minutes from me, please call office number. (Prefer text when possible)  Office #:     21  #[phone]   Office Fax: [phone]     Date of Admission: 6/6/2021Date of Note: 6/8/2021      Reason for Referral: I was asked to see this unfortunate patient by Dr Heidi Soto for evaluation and management of possible UTI. Thank you for involving me in the care of this patient. Please do not hesitate to contact me on the above number if question or concern. Current Antimicrobials:    Prior Antimicrobials:    Meropenem- 6/8 to date    Immunosuppressive drugs: n/a n/a       Assessment- ID related:    · S/P chronic indwelling right PCN- re-insertion for dislodgement 6/7/21  · Abnormal UA-- possible UTI? · Leucocytosis - mild to 12.2    Other Medical Issues:  · Post nephroectomy  · S/P colectomy  · S/P right AKA 5/20/21  · Hard of hearing and legally blind  · CKD  · DM      Past ID Issues:  --Positive urine culture Pseudmonas 3/2021  --Positive ESBL culture documented in chart Recommendation for ID issues I am following:    Hard to interpret UA for UTI in patient with chronic indwelling tube. She is afebrile, reason for admission was mechanical issue-- dislodgement of tube. -- FU urine culture     Just monitoring off ABX for clinical progress/wbc may be reasonable approach inthis patient, as clinically doesn't appear septic since admission. Can monitor wbc off abx. Will dw with Dr Yaa Ferrari. Will follow     HPI:  Debby Liang is 80 y/o WF who is hard of hearing, and legally blind, with PMH as listed below. She was admitted on 6/6/21 for dislodged right PCN.  No fever/chills/rigors. Has her right PCN placed again by IR 6/7. She has abn UA, and urine culture pending. WBC is slightly up from normal to 12.2 this morning. She is placed on meropenem by Primary team.    On exam, she appears comfortable. But everywhere I touch during exam hurts. Seems to have hyper sensation for pain. No F/C/R/cough. Clear yellow urine from PCN.          Active Hospital Problems    Diagnosis Date Noted    Displacement of nephrostomy tube ([address] 06/06/2021    Tinea cruris 06/06/2021    CKD stage 3 due to type 2 diabetes mellitus ([address] 06/24/2020    S/P AKA (above knee amputation), right ([facility]) 06/03/2019    Hyperkalemia 04/12/2019    Tear of skin of buttock 11/28/2018    Legally blind 11/26/2018    S/P colectomy 11/26/2018    Hypertension 11/26/2018    S/p nephrectomy 05/18/2018    Malfunction of nephrostomy tube ([address] 05/18/2018     Past Medical History:   Diagnosis Date    Cancer ([address]     vaginal and colon cancer    Cancer ([address]     Chronic pain     arthritis    Diabetes ([address]     DVT (deep venous thrombosis) ([address]     Hypertension     Legally blind     Thyroid disease      Past Surgical History:   Procedure Laterality Date    HX ABOVE KNEE AMPUTATION Right     HX COLOSTOMY      HX ORTHOPAEDIC      HX OTHER SURGICAL      kidney drainage tube    HX UROLOGICAL      kidney removal    IR EXCHANGE NEPHRO PERC RT SI  1/28/2019    IR EXCHANGE NEPHRO PERC RT SI  3/21/2019    IR EXCHANGE NEPHRO PERC RT SI  5/13/2019    IR EXCHANGE NEPHRO PERC RT SI  6/27/2019    IR EXCHANGE NEPHRO PERC RT SI  8/16/2019    IR EXCHANGE NEPHRO PERC RT SI  10/3/2019    IR EXCHANGE NEPHRO PERC RT SI  11/18/2019    IR EXCHANGE NEPHRO PERC RT SI  1/6/2020    IR EXCHANGE NEPHRO PERC RT SI  2/24/2020    IR EXCHANGE NEPHRO PERC RT SI  4/13/2020    IR EXCHANGE NEPHRO PERC RT SI  6/1/2020    IR EXCHANGE NEPHRO PERC RT SI  6/19/2020    IR EXCHANGE NEPHRO PERC RT SI  8/10/2020    IR EXCHANGE NEPHRO PERC RT SI  9/30/2020    IR EXCHANGE NEPHRO PERC RT SI  11/5/2020    IR EXCHANGE NEPHRO PERC RT SI  12/16/2020    IR EXCHANGE NEPHRO PERC RT SI  2/1/2021    IR EXCHANGE NEPHRO PERC RT SI  3/25/2021    IR EXCHANGE NEPHRO PERC RT SI  5/10/2021    IR NEPHROSTOMY PERC RT PLC CATH  SI  6/21/2020    IR NEPHROSTOMY PERC RT PLC CATH  SI  6/7/2021     No family history on file. Social History     Socioeconomic History    Marital status:      Spouse name: Not on file    Number of children: Not on file    Years of education: Not on file    Highest education level: Not on file   Occupational History    Not on file   Tobacco Use    Smoking status: Never Smoker    Smokeless tobacco: Never Used   Substance and Sexual Activity    Alcohol use: No    Drug use: Never    Sexual activity: Not on file   Other Topics Concern     Service Not Asked    Blood Transfusions Not Asked    Caffeine Concern Not Asked    Occupational Exposure Not Asked    Hobby Hazards Not Asked    Sleep Concern Not Asked    Stress Concern Not Asked    Weight Concern Not Asked    Special Diet Not Asked    Back Care Not Asked    Exercise Not Asked    Bike Helmet Not Asked   [address] Not Asked    Self-Exams Not Asked   Social History Narrative    Not on file     Social Determinants of Health     Financial Resource Strain:     Difficulty of Paying Living Expenses:    Food Insecurity:     Worried About Running Out of Food in the Last Year:     Ran Out of Food in the Last Year:    Transportation Needs:     Lack of Transportation (Medical):      Lack of Transportation (Non-Medical):    Physical Activity:     Days of Exercise per Week:     Minutes of Exercise per Session:    Stress:     Feeling of Stress :    Social Connections:     Frequency of Communication with Friends and Family:     Frequency of Social Gatherings with Friends and Family:     Attends [employer] Services:     Active Member of Clubs or Organizations:     Attends Club or Organization Meetings:     Marital Status:    Intimate Partner Violence:     Fear of Current or Ex-Partner:     Emotionally Abused:     Physically Abused:     Sexually Abused: Allergies:  Morphine and Pcn [penicillins]     Medications:  Current Facility-Administered Medications   Medication Dose Route Frequency    meropenem (MERREM) 500 mg in sterile water (preservative free) 10 mL IV syringe  0.5 g IntraVENous Q12H    0.9% sodium chloride infusion  50 mL/hr IntraVENous CONTINUOUS    collagenase (SANTYL) 250 unit/gram ointment   Topical DAILY    acetaminophen (TYLENOL) tablet 1,000 mg  1,000 mg Oral Q6H PRN    amLODIPine (NORVASC) tablet 5 mg  5 mg Oral DAILY    atorvastatin (LIPITOR) tablet 20 mg  20 mg Oral QHS    famotidine (PEPCID) tablet 10 mg  10 mg Oral Q12H    levothyroxine (SYNTHROID) tablet 50 mcg  50 mcg Oral ACB    insulin glargine (LANTUS) injection 10 Units  10 Units SubCUTAneous QHS    melatonin (rapid dissolve) tablet 5 mg  5 mg Oral QHS    metoprolol succinate (TOPROL-XL) XL tablet 50 mg  50 mg Oral DAILY    nystatin (MYCOSTATIN) 100,000 unit/gram powder   Topical BID    ondansetron hcl (ZOFRAN) tablet 4 mg  4 mg Oral Q8H PRN    timolol (TIMOPTIC) 0.5 % ophthalmic solution 1 Drop  1 Drop Both Eyes BID    brimonidine (ALPHAGAN) 0.2 % ophthalmic solution 1 Drop  1 Drop Both Eyes Q12H    insulin lispro (HUMALOG) injection   SubCUTAneous AC&HS    glucose chewable tablet 16 g  4 Tablet Oral PRN    glucagon (GLUCAGEN) injection 1 mg  1 mg IntraMUSCular PRN    dextrose (D50W) injection syrg 12.5-25 g  25-50 mL IntraVENous PRN    enoxaparin (LOVENOX) injection 30 mg  30 mg SubCUTAneous Q24H    oxyCODONE-acetaminophen (PERCOCET) 5-325 mg per tablet 1-2 Tablet  1-2 Tablet Oral Q6H PRN        ROS:  A comprehensive review of systems was negative.      Physical Exam:    Temp (24hrs), Av.8 °F (36.6 °C), Min:97.3 °F (36.3 °C), Max:98.2 °F (36.8 °C)    Visit Vitals  BP (!) 96/24 (BP 1 Location: Right upper arm, BP Patient Position: At rest)   Pulse 77   Temp 97.7 °F (36.5 °C)   Resp 18   Ht 5' 2\" (1.575 m)   Wt 63 kg (139 lb)   SpO2 99%   BMI 25.42 kg/m²     Right PIV    General: Well developed, well nourished 79 y.o. WHITE female in no acute distress. ENT: ENT exam normal  Head: normocephalic, without obvious abnormality, atraumatic  Mouth:  mucous membranes moist, pharynx normal without lesions  Neck: supple, symmetrical, trachea midline   Cardio:  regular rate and rhythm, S1, S2 normal, no murmur  Chest: inspection normal - no chest wall deformities or tenderness, respiratory effort normal  Lungs: clear to auscultation, no wheezes or rales and unlabored breathing  Abdomen: soft, non-tender. Bowel sounds normally. SHAISTA: PCN-right with clear urine  Extremities:  no redness or tenderness in the calves or thighs, no edema  Neuro: Grossly normal CN without deviation, except hard of hearing and legally blind.   Moves UE both sides     Microbiology:  Blood culture: N/A    Urine culture:N/A    Respiratory culture: N/A    Body Fluid/ CSF/drainage culture:N/A    Cath tip/ PICC culture: N/A      Lines / Catheters:  Lab results:    Chemistry  Recent Labs     06/08/21  0850 06/07/21  0850 06/06/21  1655   * 166* 202*    139 138   K 4.5 5.3 5.8*    109 111   CO2 24 23 19*   BUN 38* 50* 43*   CREA 1.21 1.78* 1.15   CA 8.4* 8.8 8.8   AGAP 9 7 8   BUCR 31* 28* 37*   AP  --   --  116   TP  --   --  7.2   ALB  --   --  2.9*   GLOB  --   --  4.3*   AGRAT  --   --  0.7*       CBC w/ Diff  Recent Labs     06/07/21  0850 06/06/21  1520   WBC 12.2 9.5   RBC 3.59* 4.12*   HGB 10.0* 11.3*   HCT 30.9* 35.8    365   GRANS 80* 64   LYMPH 10* 24   EOS 1 4       Imaging:   None available

## 2021-06-09 LAB
ERYTHROCYTE [DISTWIDTH] IN BLOOD BY AUTOMATED COUNT: 16.2 % (ref 11.6–14.5)
GLUCOSE BLD STRIP.AUTO-MCNC: 129 MG/DL (ref 70–110)
GLUCOSE BLD STRIP.AUTO-MCNC: 174 MG/DL (ref 70–110)
GLUCOSE BLD STRIP.AUTO-MCNC: 248 MG/DL (ref 70–110)
GLUCOSE BLD STRIP.AUTO-MCNC: 96 MG/DL (ref 70–110)
HCT VFR BLD AUTO: 29.5 % (ref 35–45)
HGB BLD-MCNC: 9.7 G/DL (ref 12–16)
MCH RBC QN AUTO: 28.3 PG (ref 24–34)
MCHC RBC AUTO-ENTMCNC: 32.9 G/DL (ref 31–37)
MCV RBC AUTO: 86 FL (ref 74–97)
PLATELET # BLD AUTO: 217 K/UL (ref 135–420)
PMV BLD AUTO: 11.4 FL (ref 9.2–11.8)
RBC # BLD AUTO: 3.43 M/UL (ref 4.2–5.3)
WBC # BLD AUTO: 9.7 K/UL (ref 4.6–13.2)

## 2021-06-09 PROCEDURE — 65270000029 HC RM PRIVATE

## 2021-06-09 PROCEDURE — 77030040392 HC DRSG OPTIFOAM MDII -A

## 2021-06-09 PROCEDURE — 74011250636 HC RX REV CODE- 250/636: Performed by: FAMILY MEDICINE

## 2021-06-09 PROCEDURE — 74011250637 HC RX REV CODE- 250/637: Performed by: FAMILY MEDICINE

## 2021-06-09 PROCEDURE — 82962 GLUCOSE BLOOD TEST: CPT

## 2021-06-09 PROCEDURE — 85027 COMPLETE CBC AUTOMATED: CPT

## 2021-06-09 PROCEDURE — 74011636637 HC RX REV CODE- 636/637: Performed by: FAMILY MEDICINE

## 2021-06-09 RX ADMIN — ATORVASTATIN CALCIUM 20 MG: 20 TABLET, FILM COATED ORAL at 21:58

## 2021-06-09 RX ADMIN — AMLODIPINE BESYLATE 5 MG: 5 TABLET ORAL at 09:29

## 2021-06-09 RX ADMIN — FAMOTIDINE 10 MG: 20 TABLET ORAL at 21:58

## 2021-06-09 RX ADMIN — ENOXAPARIN SODIUM 30 MG: 30 INJECTION SUBCUTANEOUS at 09:29

## 2021-06-09 RX ADMIN — METOPROLOL SUCCINATE 50 MG: 50 TABLET, EXTENDED RELEASE ORAL at 09:28

## 2021-06-09 RX ADMIN — FAMOTIDINE 10 MG: 20 TABLET ORAL at 09:29

## 2021-06-09 RX ADMIN — LEVOTHYROXINE SODIUM 50 MCG: 0.1 TABLET ORAL at 05:57

## 2021-06-09 RX ADMIN — INSULIN GLARGINE 10 UNITS: 100 INJECTION, SOLUTION SUBCUTANEOUS at 21:59

## 2021-06-09 RX ADMIN — NYSTATIN: 100000 POWDER TOPICAL at 22:00

## 2021-06-09 RX ADMIN — INSULIN LISPRO 2 UNITS: 100 INJECTION, SOLUTION INTRAVENOUS; SUBCUTANEOUS at 17:15

## 2021-06-09 RX ADMIN — INSULIN LISPRO 4 UNITS: 100 INJECTION, SOLUTION INTRAVENOUS; SUBCUTANEOUS at 21:59

## 2021-06-09 NOTE — ROUTINE PROCESS
1945 Bedside and Verbal shift change report given to Gisell Hernadez RN (oncoming nurse) by Elia Dudley RN (offgoing nurse). Report included the following information SBAR, Kardex, Intake/Output, MAR and Recent Results. 2139 Drsg to sacrum CDI. Urostomy and colostomy patent and draining, no c/o pain or nausea. 0541 Pt refusing labs states she ain't letting no one stick her we need to call the doctor and get a PICC line. [id number] Bedside and Verbal shift change report given to Elia Dudley RN (oncoming nurse) by Gisell Hernadez RN (offgoing nurse). Report included the following information SBAR, Kardex, Intake/Output, MAR and Recent Results.

## 2021-06-09 NOTE — PROGRESS NOTES
Problem: Falls - Risk of  Goal: *Absence of Falls  Description: Document Cynthia Koehler Fall Risk and appropriate interventions in the flowsheet.   Outcome: Progressing Towards Goal  Note: Fall Risk Interventions:            Medication Interventions: Evaluate medications/consider consulting pharmacy    Elimination Interventions: Call light in reach, Patient to call for help with toileting needs

## 2021-06-09 NOTE — PROGRESS NOTES
Hospitalist Progress Note-critical care note     Patient: Leanna Russell MRN: [id number]  CSN: [id number]    YOB: 1940  Age: 79 y.o. Sex: female    DOA: 6/6/2021 LOS:  LOS: 3 days            Chief complaint: displacement of nephrostomy tube , ckd 3. Assessment/Plan         Hospital Problems  Date Reviewed: 6/6/2021        Codes Class Noted POA    * (Principal) Displacement of nephrostomy tube ([facility].) ICD-10-CM: C99.817Z  ICD-9-CM: 997.5  6/6/2021 Unknown        Tinea cruris ICD-10-CM: B35.6  ICD-9-CM: 110.3  6/6/2021 Unknown        CKD stage 3 due to type 2 diabetes mellitus ([facility] ICD-10-CM: E11.22, N18.30  ICD-9-CM: 250.40, 585.3  6/24/2020 Yes        S/P AKA (above knee amputation), right ([facility] ICD-10-CM: C18.811  ICD-9-CM: V49.76  6/3/2019 Yes        Hyperkalemia ICD-10-CM: E87.5  ICD-9-CM: 276.7  4/12/2019 Unknown        Tear of skin of buttock ICD-10-CM: S31.801A  ICD-9-CM: 877.0  11/28/2018 Yes        Legally blind ICD-10-CM: H54.8  ICD-9-CM: 369.4  11/26/2018 Yes        Hypertension ICD-10-CM: I10  ICD-9-CM: 401.9  11/26/2018 Yes        S/P colectomy ICD-10-CM: Z90.49  ICD-9-CM: V45.89  11/26/2018 Yes        S/p nephrectomy ICD-10-CM: Z90.5  ICD-9-CM: V45.73  5/18/2018 Yes        Malfunction of nephrostomy tube [facility]) ICD-10-CM: C43.205Y  ICD-9-CM: 997.5  5/18/2018 Yes                 62-year-old female with a history of multiple medical problems including chronic indwelling right nephrostomy tube, colostomy, diabetes, legally blind, hypertension, thyroid disease, status post above-knee amputation, chronic kidney disease admitted for nephrostomy  tube displace.       Nephrolithotomy tube displacement:   Discussed with IR , rt reinertion of rt PCN   Tolerated the procedure well     Pyelonephritis ?   Will hold abx for now per id recommendation  So far cx negative     Hyperkalemia:  Resolved      Tinea Cruis: Topic antifungal cream to bilateral groain area.     Decubitus ulcer:  Continue wound care.  Frequent position change     DM:  Continue Lantus, sliding scale continue current regimen      Hypertension: Continue home meds     Hypothyroidism:   On levothyroxine.     Legally blind: Continue eyedrops. , fall precaution      S/p colectomy      S/p right AKA  Fall precaution     ckd3   Cr wnl now per Iv hydration     Subjective: no sleep well, so many people come   Disposition :1-2 days   Review of systems:    General: No fevers or chills. Cardiovascular: No chest pain or pressure. No palpitations. Pulmonary: No shortness of breath. Gastrointestinal: No nausea, vomiting. Vital signs/Intake and Output:  Visit Vitals  BP (!) 116/45 (BP 1 Location: Left lower arm, BP Patient Position: At rest)   Pulse 78   Temp 98.2 °F (36.8 °C)   Resp 17   Ht 5' 2\" (1.575 m)   Wt 63 kg (139 lb)   SpO2 99%   BMI 25.42 kg/m²     Current Shift:  No intake/output data recorded. Last three shifts:  06/07 1901 - 06/09 0700  In: 0   Out: 1925 [Urine:1700]    Physical Exam:  General: WD, WN. Alert, cooperative, no acute distress    HEENT: NC, Atraumatic. Bilateral blind   Lungs: CTA Bilaterally. No Wheezing/Rhonchi/Rales. Heart:  Regular  rhythm,  No murmur, No Rubs, No Gallops  Abdomen: Soft, Non distended, Non tender. +Bowel sounds, .colectomy bag noted, rt side nephrectomy tube -clear urine in bag   Extremities: No c/c/e, rt aka   Psych:   Not anxious or agitated. Neurologic:  No acute neurological deficit.              Labs: Results:       Chemistry Recent Labs     06/08/21  0850 06/07/21  0850 06/06/21  1655   * 166* 202*    139 138   K 4.5 5.3 5.8*    109 111   CO2 24 23 19*   BUN 38* 50* 43*   CREA 1.21 1.78* 1.15   CA 8.4* 8.8 8.8   AGAP 9 7 8   BUCR 31* 28* 37*   AP  --   --  116   TP  --   --  7.2   ALB  --   --  2.9*   GLOB  --   --  4.3*   AGRAT  --   --  0.7*      CBC w/Diff Recent Labs     06/09/21  1235 06/07/21  0850 06/06/21  1520   WBC 9.7 12.2 9.5   RBC 3.43* 3.59* 4.12*   HGB 9.7* 10.0* 11.3*   HCT 29.5* 30.9* 35.8   PLT PENDING 301 365   GRANS  --  80* 64   LYMPH  --  10* 24   EOS  --  1 4      Cardiac Enzymes No results for input(s): CPK, CKND1, JANE in the last 72 hours. No lab exists for component: CKRMB, TROIP   Coagulation Recent Labs     06/07/21  0850   PTP 14.8   INR 1.2       Lipid Panel Lab Results   Component Value Date/Time    Cholesterol, total 115 08/28/2019 08:22 AM    HDL Cholesterol 39 (L) 08/28/2019 08:22 AM    LDL, calculated 46.2 08/28/2019 08:22 AM    VLDL, calculated 29.8 08/28/2019 08:22 AM    Triglyceride 149 08/28/2019 08:22 AM    CHOL/HDL Ratio 2.9 08/28/2019 08:22 AM      BNP No results for input(s): BNPP in the last 72 hours. Liver Enzymes Recent Labs     06/06/21  1655   TP 7.2   ALB 2.9*         Thyroid Studies Lab Results   Component Value Date/Time    TSH 3.40 01/24/2020 08:35 AM        Procedures/imaging: see electronic medical records for all procedures/Xrays and details which were not copied into this note but were reviewed prior to creation of Plan    IR EXCHANGE NEPHRO PERC RT SI    Result Date: 5/10/2021  PROCEDURE: Fluoroscopic guided right nephrostomy tube exchange HISTORY: History of long-term right nephrostomy tube, request for routine exchange/replacement over wire. COMPARISON: Prior x-rays. GUIDANCE: Fluoroscopic guidance was used to position (and confirm the position of) the catheter. Image(s) saved in PACS: Fluoroscopic TECHNIQUE/FINDINGS: Informed consent was obtained. Procedural time out was performed. Maximal sterile barrier technique was used for the procedure including sterile cap, sterile mask, sterile gown, sterile gloves and a large sterile sheet. Proper hand hygiene along with proper skin antiseptic was followed, utilizing 2% chlorhexidine solution for skin preparation.  view of the abdomen demonstrate right nephrostomy tube in place, mildly retracted laterally.  Small amount of contrast confirmed tube to be partially pulled back into a mid/upper pole calyx. This was then cut and removed over wire. Over 035 guidewire a new 10 [place] pigtail drain was advanced into the renal pelvis. Pigtail formed and secured. Small amount of contrast injected into the catheter to confirm positioning. Patient tolerated procedure well without complication. Fluoroscopic time: 0.5 minutes Fluoroscopic dose (reference air kerma): 5 mGy     Uncomplicated nephrostomy tube exchange.       Jennifer Carrasco MD

## 2021-06-09 NOTE — PROGRESS NOTES
D/C Plan: [facility] 6/10/21     Provider has indicated pt will likely be medically stable to transition back to [facility] tomorrow afternoon. CM contacted pt's son, Bev Rivera ([phone]) and notified him of the above. He is in agreement with transfer back to this facility if medically stable tomorrow. Anticipate pt will transition back to [facility] tomorrow. [facility] has also been made aware of the above.     Transition of Care Plan:      Communication to Patient/Family:  Met with patient and family, and they are agreeable to the transition plan. The Plan for Transition of Care is related to the following treatment goals: SNF/LTC     The Patient and/or patient representative, Bev Rivera, was provided with a choice of provider and agrees   with the discharge plan.  Yes [x]? No []?     Freedom of choice list was provided with basic dialogue that supports the patient's individualized plan of care/goals and shares the quality data associated with the providers.     Yes [x]? No []?     SNF/Rehab Transition:  Patient has been accepted to [facility] at [address] [place] [address] for SNF and meets criteria for admission. Patient will transported by medical transport and expected to leave tomorrow (6/10/21).    Communication to SNF/Rehab:  Bedside RN,  has been notified to update the transition plan to the facility and call report [phone].      Discharge information has been updated on the AVS and communicated through [facility] or [facility] Link. Discharge instructions to be fax'd to facility at [phone]      Please include all hard scripts for controlled substances, med rec and dc summary, and AVS in packet.  Please medicate for pain prior to dc if possible and needed to help offset delay when patient first arrives to facility.     Reviewed and confirmed with facility, [facility] can manage the patient care needs for the following:      Husam with (X) only those applicable:  Medication:  []? Medications are available at the facility  []? IV Antibiotics    []? Controlled Substance  hard copies available sent.  []? Weekly Labs     Equipment:  []?CPAP/BiPAP  []? Wound Vacuum  []? Wright or Urinary Device  []? PICC/Central Line  []? Nebulizer  []? Ventilator     Treatment:  []? Isolation (for MRSA, VRE, etc.)  []? Surgical Drain Management  []? Tracheostomy Care  []? Dressing Changes  []? Dialysis with transportation  []? PEG Care  []? Oxygen  []? Daily Weights for Heart Failure     Dietary:  []? Any diet limitations  []? Tube Feedings   []? Total Parenteral Management (TPN)     Financial Resources:  []? Medicaid Application Completed     []? UAI Completed and copy given to pt/family.     [x]? A screening has previously been completed.     []? Level II Completed     []? Private pay individual who will not become   financially eligible for Medicaid within 6 months from admission to a [address] [place] [address] facility.      []? Individual refused to have screening conducted.      []? Medicaid Application Completed     []? The screening denied because it was determined individual did not need/did not qualify for nursing facility level of care. []? Out of state residents seeking direct admission to a [address] facility.  []? Individuals who are inpatients of an out of state hospital, or in state or out of state veterans/ hospital and seek direct admission to a [address] facility  []? Individuals who are pateints or residents of a state owned/operated facility that is licensed by Department of Behavioral Services (DBHDS) and seek direct admission to [address] facility  []? A screening not required for enrollment in [facility] services as set out in [facility]-  []? [address] [facility]) staff shall perform screenings of the [facility] clients.      Advanced Care Plan:  []? Surrogate Decision Maker of Care  []? POA  []? Communicated Code Status and copy sent.     Other:              Care Management Interventions  Mode of Transport at Discharge: S  Transition of Care Consult (CM Consult): [address] Maintenance Reviewed: Yes  Current Support Network: [address], Family Lives Nearby  The Plan for Transition of Care is Related to the Following Treatment Goals : Return to [place]  The Patient and/or Patient Representative was Provided with a Choice of Provider and Agrees with the Discharge Plan?: Yes  Name of the Patient Representative Who was Provided with a Choice of Provider and Agrees with the Discharge Plan: Adriana avery of Choice List was Provided with Basic Dialogue that Supports the Patient's Individualized Plan of Care/Goals, Treatment Preferences and Shares the Quality Data Associated with the Providers?: Yes  Discharge Location  Discharge Placement: [address] [facility] (vs SNF at [place])

## 2021-06-09 NOTE — PROGRESS NOTES
[facility]  (A Division of [address] [place] [address])                                                                                                                       Magaly Jason MD  Work Cell #: [phone]( Mon-Fri, 7am-5pm)  If no call or text back within 30 minutes from me, please call office number. (Prefer text when possible)  Office #:     [phone]  [phone]   Office Fax: [phone]     Date of Admission: 6/6/2021Date of Note: 6/9/2021      Reason for consult FU: Right PCN change,  possible UTI. Current Antimicrobials:    Prior Antimicrobials:    Meropenem 500 mg- 6/8 X1    Immunosuppressive drugs: n/a n/a       Assessment- ID related:    · S/P chronic indwelling right PCN- re-insertion for dislodgement 6/7/21  · Abnormal UA-- possible UTI? · Leucocytosis - mild to 12.2    Other Medical Issues:  · Post nephroectomy  · S/P colectomy  · S/P right AKA 5/20/21  · Hard of hearing and legally blind  · CKD  · DM      Past ID Issues:  --Positive urine culture Pseudmonas 3/2021  --Positive ESBL culture documented in chart Recommendation for ID issues I am following:    Hard to interpret UA for UTI in patient with chronic indwelling tube. She is afebrile, reason for admission was mechanical issue-- dislodgement of tube. -- FU urine culture 6/7: NGSF   -- repeat UA 6/8- TMTC wbc, clinically stable    Just monitoring off ABX for clinical progress/wbc may be reasonable approach inthis patient, as clinically doesn't appear septic since admission. Can monitor wbc off abx. Central line/PICC: None    Condition: stable    Subjective: \" my viens are to small for blood draw\"  She has no fever/flank pain/ N/V  Draining urine via right PCN ok  She complains of blood draw, but agreeable to do so if I think it is needed.      Review of System:  See above     HPI:  Yola Edmondson is 78 y/o WF who is hard of hearing, and legally blind, with PMH as listed below.  She was admitted on 6/6/21 for dislodged right PCN. No fever/chills/rigors. Has her right PCN placed again by IR 6/7. She has abn UA, and urine culture pending. WBC is slightly up from normal to 12.2 this morning. She is placed on meropenem by Primary team.    On exam, she appears comfortable. But everywhere I touch during exam hurts. Seems to have hyper sensation for pain. No F/C/R/cough. Clear yellow urine from PCN.          Active Hospital Problems    Diagnosis Date Noted    Displacement of nephrostomy tube ([address] 06/06/2021    Tinea cruris 06/06/2021    CKD stage 3 due to type 2 diabetes mellitus ([address] 06/24/2020    S/P AKA (above knee amputation), right ([facility]) 06/03/2019    Hyperkalemia 04/12/2019    Tear of skin of buttock 11/28/2018    Legally blind 11/26/2018    S/P colectomy 11/26/2018    Hypertension 11/26/2018    S/p nephrectomy 05/18/2018    Malfunction of nephrostomy tube ([address] 05/18/2018     Past Medical History:   Diagnosis Date    Cancer ([address]     vaginal and colon cancer    Cancer ([address]     Chronic pain     arthritis    Diabetes ([address]     DVT (deep venous thrombosis) ([address]     Hypertension     Legally blind     Thyroid disease      Past Surgical History:   Procedure Laterality Date    HX ABOVE KNEE AMPUTATION Right     HX COLOSTOMY      HX ORTHOPAEDIC      HX OTHER SURGICAL      kidney drainage tube    HX UROLOGICAL      kidney removal    IR EXCHANGE NEPHRO PERC RT SI  1/28/2019    IR EXCHANGE NEPHRO PERC RT SI  3/21/2019    IR EXCHANGE NEPHRO PERC RT SI  5/13/2019    IR EXCHANGE NEPHRO PERC RT SI  6/27/2019    IR EXCHANGE NEPHRO PERC RT SI  8/16/2019    IR EXCHANGE NEPHRO PERC RT SI  10/3/2019    IR EXCHANGE NEPHRO PERC RT SI  11/18/2019    IR EXCHANGE NEPHRO PERC RT SI  1/6/2020    IR EXCHANGE NEPHRO PERC RT SI  2/24/2020    IR EXCHANGE NEPHRO PERC RT SI  4/13/2020    IR EXCHANGE NEPHRO PERC RT SI  6/1/2020    IR EXCHANGE NEPHRO PERC RT SI 6/19/2020    IR EXCHANGE NEPHRO PERC RT SI  8/10/2020    IR EXCHANGE NEPHRO PERC RT SI  9/30/2020    IR EXCHANGE NEPHRO PERC RT SI  11/5/2020    IR EXCHANGE NEPHRO PERC RT SI  12/16/2020    IR EXCHANGE NEPHRO PERC RT SI  2/1/2021    IR EXCHANGE NEPHRO PERC RT SI  3/25/2021    IR EXCHANGE NEPHRO PERC RT SI  5/10/2021    IR NEPHROSTOMY PERC RT PLC CATH  SI  6/21/2020    IR NEPHROSTOMY PERC RT PLC CATH  SI  6/7/2021     No family history on file. Social History     Socioeconomic History    Marital status:      Spouse name: Not on file    Number of children: Not on file    Years of education: Not on file    Highest education level: Not on file   Occupational History    Not on file   Tobacco Use    Smoking status: Never Smoker    Smokeless tobacco: Never Used   Substance and Sexual Activity    Alcohol use: No    Drug use: Never    Sexual activity: Not on file   Other Topics Concern     Service Not Asked    Blood Transfusions Not Asked    Caffeine Concern Not Asked    Occupational Exposure Not Asked    Hobby Hazards Not Asked    Sleep Concern Not Asked    Stress Concern Not Asked    Weight Concern Not Asked    Special Diet Not Asked    Back Care Not Asked    Exercise Not Asked    Bike Helmet Not Asked   [address] [facility] Not Asked    Self-Exams Not Asked   Social History Narrative    Not on file     Social Determinants of Health     Financial Resource Strain:     Difficulty of Paying Living Expenses:    Food Insecurity:     Worried About Running Out of Food in the Last Year:     Ran Out of Food in the Last Year:    Transportation Needs:     Lack of Transportation (Medical):      Lack of Transportation (Non-Medical):    Physical Activity:     Days of Exercise per Week:     Minutes of Exercise per Session:    Stress:     Feeling of Stress :    Social Connections:     Frequency of Communication with Friends and Family:     Frequency of Social Gatherings with Friends and Family:     Attends Cheondo[employer] Services:     Active Member of Clubs or Organizations:     Attends Club or Organization Meetings:     Marital Status:    Intimate Partner Violence:     Fear of Current or Ex-Partner:     Emotionally Abused:     Physically Abused:     Sexually Abused: Allergies:  Morphine and Pcn [penicillins]     Medications:  Current Facility-Administered Medications   Medication Dose Route Frequency    0.9% sodium chloride infusion  50 mL/hr IntraVENous CONTINUOUS    collagenase (SANTYL) 250 unit/gram ointment   Topical DAILY    acetaminophen (TYLENOL) tablet 1,000 mg  1,000 mg Oral Q6H PRN    amLODIPine (NORVASC) tablet 5 mg  5 mg Oral DAILY    atorvastatin (LIPITOR) tablet 20 mg  20 mg Oral QHS    famotidine (PEPCID) tablet 10 mg  10 mg Oral Q12H    levothyroxine (SYNTHROID) tablet 50 mcg  50 mcg Oral ACB    insulin glargine (LANTUS) injection 10 Units  10 Units SubCUTAneous QHS    melatonin (rapid dissolve) tablet 5 mg  5 mg Oral QHS    metoprolol succinate (TOPROL-XL) XL tablet 50 mg  50 mg Oral DAILY    nystatin (MYCOSTATIN) 100,000 unit/gram powder   Topical BID    ondansetron hcl (ZOFRAN) tablet 4 mg  4 mg Oral Q8H PRN    timolol (TIMOPTIC) 0.5 % ophthalmic solution 1 Drop  1 Drop Both Eyes BID    brimonidine (ALPHAGAN) 0.2 % ophthalmic solution 1 Drop  1 Drop Both Eyes Q12H    insulin lispro (HUMALOG) injection   SubCUTAneous AC&HS    glucose chewable tablet 16 g  4 Tablet Oral PRN    glucagon (GLUCAGEN) injection 1 mg  1 mg IntraMUSCular PRN    dextrose (D50W) injection syrg 12.5-25 g  25-50 mL IntraVENous PRN    enoxaparin (LOVENOX) injection 30 mg  30 mg SubCUTAneous Q24H    oxyCODONE-acetaminophen (PERCOCET) 5-325 mg per tablet 1-2 Tablet  1-2 Tablet Oral Q6H PRN        ROS:  A comprehensive review of systems was negative.      Physical Exam:    Temp (24hrs), Av.3 °F (36.8 °C), Min:97.7 °F (36.5 °C), Max:99.3 °F (37.4 °C)    Visit Vitals  BP (!) 129/56 (BP 1 Location: Left lower arm, BP Patient Position: At rest)   Pulse 76   Temp 98.1 °F (36.7 °C)   Resp 18   Ht 5' 2\" (1.575 m)   Wt 63 kg (139 lb)   SpO2 100%   BMI 25.42 kg/m²     Right PIV    General: Well developed, well nourished 79 y.o. WHITE female in no acute distress. ENT: ENT exam normal  Head: normocephalic, without obvious abnormality, atraumatic  Mouth:  mucous membranes moist, pharynx normal without lesions  Neck: supple, symmetrical, trachea midline   Cardio:  regular rate and rhythm, S1, S2 normal, no murmur  Chest: inspection normal - no chest wall deformities or tenderness, respiratory effort normal  Lungs: clear to auscultation, no wheezes or rales and unlabored breathing  Abdomen: soft, non-tender. Bowel sounds normally. SHAISTA: PCN-right with clear urine  Extremities:  no redness or tenderness in the calves or thighs, no edema  Right AKA  Neuro: Grossly normal CN without deviation, except hard of hearing and legally blind.   Moves UE both sides     Microbiology:  Blood culture: N/A    Urine culture:N/A    Respiratory culture: N/A    Body Fluid/ CSF/drainage culture:N/A    Cath tip/ PICC culture: N/A      Lines / Catheters:  Lab results:    Chemistry  Recent Labs     06/08/21  0850 06/07/21  0850 06/06/21  1655   * 166* 202*    139 138   K 4.5 5.3 5.8*    109 111   CO2 24 23 19*   BUN 38* 50* 43*   CREA 1.21 1.78* 1.15   CA 8.4* 8.8 8.8   AGAP 9 7 8   BUCR 31* 28* 37*   AP  --   --  116   TP  --   --  7.2   ALB  --   --  2.9*   GLOB  --   --  4.3*   AGRAT  --   --  0.7*       CBC w/ Diff  Recent Labs     06/07/21  0850 06/06/21  1520   WBC 12.2 9.5   RBC 3.59* 4.12*   HGB 10.0* 11.3*   HCT 30.9* 35.8    365   GRANS 80* 64   LYMPH 10* 24   EOS 1 4       Imaging:   None available

## 2021-06-10 LAB
BASOPHILS # BLD: 0 K/UL (ref 0–0.1)
BASOPHILS NFR BLD: 0 % (ref 0–2)
DIFFERENTIAL METHOD BLD: ABNORMAL
EOSINOPHIL # BLD: 0.3 K/UL (ref 0–0.4)
EOSINOPHIL NFR BLD: 3 % (ref 0–5)
ERYTHROCYTE [DISTWIDTH] IN BLOOD BY AUTOMATED COUNT: 15.7 % (ref 11.6–14.5)
GLUCOSE BLD STRIP.AUTO-MCNC: 104 MG/DL (ref 70–110)
GLUCOSE BLD STRIP.AUTO-MCNC: 127 MG/DL (ref 70–110)
GLUCOSE BLD STRIP.AUTO-MCNC: 183 MG/DL (ref 70–110)
GLUCOSE BLD STRIP.AUTO-MCNC: 252 MG/DL (ref 70–110)
HCT VFR BLD AUTO: 31.8 % (ref 35–45)
HGB BLD-MCNC: 10.1 G/DL (ref 12–16)
LYMPHOCYTES # BLD: 1.8 K/UL (ref 0.9–3.6)
LYMPHOCYTES NFR BLD: 19 % (ref 21–52)
MAGNESIUM SERPL-MCNC: 1.4 MG/DL (ref 1.6–2.6)
MCH RBC QN AUTO: 27.8 PG (ref 24–34)
MCHC RBC AUTO-ENTMCNC: 31.8 G/DL (ref 31–37)
MCV RBC AUTO: 87.6 FL (ref 74–97)
MONOCYTES # BLD: 0.7 K/UL (ref 0.05–1.2)
MONOCYTES NFR BLD: 7 % (ref 3–10)
NEUTS SEG # BLD: 6.6 K/UL (ref 1.8–8)
NEUTS SEG NFR BLD: 70 % (ref 40–73)
PLATELET # BLD AUTO: 300 K/UL (ref 135–420)
PMV BLD AUTO: 9.5 FL (ref 9.2–11.8)
PROCALCITONIN SERPL-MCNC: <0.05 NG/ML
RBC # BLD AUTO: 3.63 M/UL (ref 4.2–5.3)
WBC # BLD AUTO: 9.5 K/UL (ref 4.6–13.2)

## 2021-06-10 PROCEDURE — 74011250636 HC RX REV CODE- 250/636: Performed by: HOSPITALIST

## 2021-06-10 PROCEDURE — 74011636637 HC RX REV CODE- 636/637: Performed by: FAMILY MEDICINE

## 2021-06-10 PROCEDURE — 74011250636 HC RX REV CODE- 250/636: Performed by: FAMILY MEDICINE

## 2021-06-10 PROCEDURE — 83735 ASSAY OF MAGNESIUM: CPT

## 2021-06-10 PROCEDURE — 74011250637 HC RX REV CODE- 250/637: Performed by: FAMILY MEDICINE

## 2021-06-10 PROCEDURE — 74011250637 HC RX REV CODE- 250/637: Performed by: INTERNAL MEDICINE

## 2021-06-10 PROCEDURE — 82962 GLUCOSE BLOOD TEST: CPT

## 2021-06-10 PROCEDURE — 36415 COLL VENOUS BLD VENIPUNCTURE: CPT

## 2021-06-10 PROCEDURE — 84145 PROCALCITONIN (PCT): CPT

## 2021-06-10 PROCEDURE — 65270000029 HC RM PRIVATE

## 2021-06-10 PROCEDURE — 85025 COMPLETE CBC W/AUTO DIFF WBC: CPT

## 2021-06-10 RX ORDER — MAGNESIUM SULFATE HEPTAHYDRATE 40 MG/ML
2 INJECTION, SOLUTION INTRAVENOUS
Status: COMPLETED | OUTPATIENT
Start: 2021-06-10 | End: 2021-06-10

## 2021-06-10 RX ORDER — LINEZOLID 600 MG/1
600 TABLET, FILM COATED ORAL EVERY 12 HOURS
Status: DISCONTINUED | OUTPATIENT
Start: 2021-06-10 | End: 2021-06-11 | Stop reason: HOSPADM

## 2021-06-10 RX ADMIN — MAGNESIUM SULFATE HEPTAHYDRATE 2 G: 40 INJECTION, SOLUTION INTRAVENOUS at 12:25

## 2021-06-10 RX ADMIN — ENOXAPARIN SODIUM 30 MG: 30 INJECTION SUBCUTANEOUS at 11:30

## 2021-06-10 RX ADMIN — LINEZOLID 600 MG: 600 TABLET, FILM COATED ORAL at 21:04

## 2021-06-10 RX ADMIN — Medication 5 MG: at 21:04

## 2021-06-10 RX ADMIN — LINEZOLID 600 MG: 600 TABLET, FILM COATED ORAL at 11:31

## 2021-06-10 RX ADMIN — ATORVASTATIN CALCIUM 20 MG: 20 TABLET, FILM COATED ORAL at 21:04

## 2021-06-10 RX ADMIN — INSULIN LISPRO 2 UNITS: 100 INJECTION, SOLUTION INTRAVENOUS; SUBCUTANEOUS at 12:25

## 2021-06-10 RX ADMIN — AMLODIPINE BESYLATE 5 MG: 5 TABLET ORAL at 11:31

## 2021-06-10 RX ADMIN — MAGNESIUM SULFATE HEPTAHYDRATE 2 G: 40 INJECTION, SOLUTION INTRAVENOUS at 13:25

## 2021-06-10 RX ADMIN — INSULIN LISPRO 6 UNITS: 100 INJECTION, SOLUTION INTRAVENOUS; SUBCUTANEOUS at 21:14

## 2021-06-10 RX ADMIN — INSULIN GLARGINE 10 UNITS: 100 INJECTION, SOLUTION SUBCUTANEOUS at 21:05

## 2021-06-10 RX ADMIN — METOPROLOL SUCCINATE 50 MG: 50 TABLET, EXTENDED RELEASE ORAL at 11:31

## 2021-06-10 RX ADMIN — FAMOTIDINE 10 MG: 20 TABLET ORAL at 11:31

## 2021-06-10 RX ADMIN — LEVOTHYROXINE SODIUM 50 MCG: 0.1 TABLET ORAL at 05:31

## 2021-06-10 RX ADMIN — NYSTATIN: 100000 POWDER TOPICAL at 21:00

## 2021-06-10 RX ADMIN — SODIUM CHLORIDE 50 ML/HR: 900 INJECTION, SOLUTION INTRAVENOUS at 12:27

## 2021-06-10 RX ADMIN — FAMOTIDINE 10 MG: 20 TABLET ORAL at 21:07

## 2021-06-10 NOTE — PROGRESS NOTES
Hospitalist Progress Note-critical care note     Patient: Shantel Puente MRN: [id number]  CSN: [id number]    YOB: 1940  Age: 79 y.o. Sex: female    DOA: 6/6/2021 LOS:  LOS: 4 days            Chief complaint: displacement of nephrostomy tube , ckd 3.      Assessment/Plan         Hospital Problems  Date Reviewed: 6/6/2021        Codes Class Noted POA    * (Principal) Displacement of nephrostomy tube ([facility] ICD-10-CM: W46.956A  ICD-9-CM: 997.5  6/6/2021 Unknown        Tinea cruris ICD-10-CM: B35.6  ICD-9-CM: 110.3  6/6/2021 Unknown        CKD stage 3 due to type 2 diabetes mellitus ([facility] ICD-10-CM: E11.22, N18.30  ICD-9-CM: 250.40, 585.3  6/24/2020 Yes        S/P AKA (above knee amputation), right ([facility] ICD-10-CM: B12.610  ICD-9-CM: V49.76  6/3/2019 Yes        Hyperkalemia ICD-10-CM: E87.5  ICD-9-CM: 276.7  4/12/2019 Unknown        Tear of skin of buttock ICD-10-CM: S31.801A  ICD-9-CM: 877.0  11/28/2018 Yes        Hypomagnesemia ICD-10-CM: E83.42  ICD-9-CM: 275.2  11/28/2018 Yes        Legally blind ICD-10-CM: H54.8  ICD-9-CM: 369.4  11/26/2018 Yes        Hypertension ICD-10-CM: I10  ICD-9-CM: 401.9  11/26/2018 Yes        S/P colectomy ICD-10-CM: Z90.49  ICD-9-CM: V45.89  11/26/2018 Yes        S/p nephrectomy ICD-10-CM: Z90.5  ICD-9-CM: V45.73  5/18/2018 Yes        Malfunction of nephrostomy tube [facility]) ICD-10-CM: F92.385A  ICD-9-CM: 997.5  5/18/2018 Yes                 54-year-old female with a history of multiple medical problems including chronic indwelling right nephrostomy tube, colostomy, diabetes, legally blind, hypertension, thyroid disease, status post above-knee amputation, chronic kidney disease admitted for nephrostomy  tube displace.       Nephrolithotomy tube displacement:   Discussed with IR , rt reinertion of rt PCN   Tolerated the procedure well     Positive urine cx   Appreciated ID on board, recommend zyvox for 10 days   She tolerated the pill well     Ingrown toe nail   Appreciated for dr. Lien Power on board    Hyperkalemia:  Resolved      Tinea Cruis: Topic antifungal cream to bilateral groain area.     Decubitus ulcer:  Continue wound care. Frequent position change     DM:  Continue Lantus, sliding scale continue current regimen      Hypertension: Continue home meds     Hypothyroidism:   On levothyroxine.     Legally blind: Continue eyedrops. , fall precaution      S/p colectomy      S/p right AKA  Fall precaution     Hypomagnesemia  Mg replacement   Check bmp today, lab so far not obtained    ckd3   Cr wnl now per Iv hydration     Subjective: I feel fine, I have ingrown toe nail over 2 years, no one take care of it. rn : pain in the left big toe     Hemal Forman Son [phone]     He was updated , he said he just visited his mother   All questions have been answered. 35 total min's spent on patient care including >50% on counseling/coordinating care. Discussed the above assessments. also discussed labs, medications and hospital course      Disposition :1-2 days   Review of systems:    General: No fevers or chills. Cardiovascular: No chest pain or pressure. No palpitations. Pulmonary: No shortness of breath. Gastrointestinal: No nausea, vomiting. Vital signs/Intake and Output:  Visit Vitals  BP (!) 118/51   Pulse 71   Temp 98.2 °F (36.8 °C)   Resp 18   Ht 5' 2\" (1.575 m)   Wt 63 kg (139 lb)   SpO2 100%   BMI 25.42 kg/m²     Current Shift:  No intake/output data recorded. Last three shifts:  06/08 1901 - 06/10 0700  In: -   Out: 2350 [Urine:2000]    Physical Exam:  General: WD, WN. Alert, cooperative, no acute distress    HEENT: NC, Atraumatic. Bilateral blind   Lungs: CTA Bilaterally. No Wheezing/Rhonchi/Rales. Heart:  Regular  rhythm,  No murmur, No Rubs, No Gallops  Abdomen: Soft, Non distended, Non tender. +Bowel sounds, .colectomy bag noted, rt side nephrectomy tube -clear urine in bag   Extremities: No c/c/e, rt aka , ingrown toe nail of left foot.    Psych:   Not anxious or agitated. Neurologic:  No acute neurological deficit. Labs: Results:       Chemistry Recent Labs     06/08/21  0850   *      K 4.5      CO2 24   BUN 38*   CREA 1.21   CA 8.4*   AGAP 9   BUCR 31*      CBC w/Diff Recent Labs     06/10/21  0710 06/09/21  1235   WBC 9.5 9.7   RBC 3.63* 3.43*   HGB 10.1* 9.7*   HCT 31.8* 29.5*    217   GRANS 70  --    LYMPH 19*  --    EOS 3  --       Cardiac Enzymes No results for input(s): CPK, CKND1, JANE in the last 72 hours. No lab exists for component: CKRMB, TROIP   Coagulation No results for input(s): PTP, INR, APTT, INREXT, INREXT in the last 72 hours. Lipid Panel Lab Results   Component Value Date/Time    Cholesterol, total 115 08/28/2019 08:22 AM    HDL Cholesterol 39 (L) 08/28/2019 08:22 AM    LDL, calculated 46.2 08/28/2019 08:22 AM    VLDL, calculated 29.8 08/28/2019 08:22 AM    Triglyceride 149 08/28/2019 08:22 AM    CHOL/HDL Ratio 2.9 08/28/2019 08:22 AM      BNP No results for input(s): BNPP in the last 72 hours. Liver Enzymes No results for input(s): TP, ALB, TBIL, AP in the last 72 hours. No lab exists for component: SGOT, GPT, DBIL   Thyroid Studies Lab Results   Component Value Date/Time    TSH 3.40 01/24/2020 08:35 AM        Procedures/imaging: see electronic medical records for all procedures/Xrays and details which were not copied into this note but were reviewed prior to creation of Plan    IR EXCHANGE NEPHRO PERC RT SI    Result Date: 5/10/2021  PROCEDURE: Fluoroscopic guided right nephrostomy tube exchange HISTORY: History of long-term right nephrostomy tube, request for routine exchange/replacement over wire. COMPARISON: Prior x-rays. GUIDANCE: Fluoroscopic guidance was used to position (and confirm the position of) the catheter. Image(s) saved in PACS: Fluoroscopic TECHNIQUE/FINDINGS: Informed consent was obtained. Procedural time out was performed.  Maximal sterile barrier technique was used for the procedure including sterile cap, sterile mask, sterile gown, sterile gloves and a large sterile sheet. Proper hand hygiene along with proper skin antiseptic was followed, utilizing 2% chlorhexidine solution for skin preparation.  view of the abdomen demonstrate right nephrostomy tube in place, mildly retracted laterally. Small amount of contrast confirmed tube to be partially pulled back into a mid/upper pole calyx. This was then cut and removed over wire. Over 035 guidewire a new 10 Swedish pigtail drain was advanced into the renal pelvis. Pigtail formed and secured. Small amount of contrast injected into the catheter to confirm positioning. Patient tolerated procedure well without complication. Fluoroscopic time: 0.5 minutes Fluoroscopic dose (reference air kerma): 5 mGy     Uncomplicated nephrostomy tube exchange.       Tiarra Barbour MD

## 2021-06-10 NOTE — PROGRESS NOTES
D/C Plan: [address] [place] [address]     Noted pt is not medically stable to transfer back to [address] [place] [address] at this  time. [address] [place] [address] has been made aware of the above. Anticipate pt will transition back to [address] [place] [address] with in the next 24 hours. CM to continue to follow and assist.     Transition of Care Plan:      Communication to Patient/Family:  Met with patient and family, and they are agreeable to the transition plan. The Plan for Transition of Care is related to the following treatment goals: SNF/LTC     The Patient and/or patient representative, Bryan Herrera provided with a choice of provider and agrees   with the discharge plan.  Yes [x]? ? No []? ?     Freedom of choice list was provided with basic dialogue that supports the patient's individualized plan of care/goals and shares the quality data associated with the providers.     Yes [x]? ? No []? ?     SNF/Rehab Transition:  Patient has been accepted to [facility] at [address] [facility], [address] [place] [address] for SNF and meets criteria for admission. Patient will transported by medical transport and expected to leave when medically stable.       Communication to SNF/Rehab:  Bedside RNChhaya been notified to update the transition plan to the facility and call report [phone].      Discharge information has been updated on the AVS and communicated through [facility] or [facility] Link.     Discharge instructions to be fax'd to facility at [phone]      Please include all hard scripts for controlled substances, med rec and dc summary, and AVS in packet. Please medicate for pain prior to dc if possible and needed to help offset delay when patient first arrives to facility.     Reviewed and confirmed with facility, [facility] can manage the patient care needs for the following:      Husam with (X) only those applicable:  Medication:  []??Medications are available at the facility  []? ?IV Antibiotics    []? ? Controlled Substance  hard copies available sent.  []?? Weekly Labs     Equipment:  []??CPAP/BiPAP  []? ? Wound Vacuum  []? ? Wright or Urinary Device  []? ?PICC/Central Line  []? ?Nebulizer  []? ? Ventilator     Treatment:  []??Isolation (for MRSA, VRE, etc.)  []? ?Surgical Drain Management  []? ? Tracheostomy Care  []? ?Dressing Changes  []? ?Dialysis with transportation  []? ?PEG Care  []? ? Oxygen  []? ?Daily Weights for Heart Failure     Dietary:  []??Any diet limitations  []? ?Tube Feedings   []? ? Total Parenteral Management (TPN)     Financial Resources:  []??Medicaid Application Completed     []?? UAI Completed and copy given to pt/family.     [x]? ? A screening has previously been completed.     []? ?Level II Completed     []? ? Private pay individual who will not become   financially eligible for Medicaid within 6 months from admission to a [address] [facility].      []? ? Individual refused to have screening conducted.      []? ? Medicaid Application Completed     []? ? The screening denied because it was determined individual did not need/did not qualify for nursing facility level of care. []?? Out of state residents seeking direct admission to a [address]  []?? Individuals who are inpatients of an out of state hospital, or in state or out of state veterans/ hospital and seek direct admission to a [address] facility  []? ? Individuals who are pateints or residents of a state owned/operated facility that is licensed by Department of Behavioral Services (DBHDS) and seek direct admission to [address] facility  []? ? A screening not required for enrollment in [facility] services as set out in [facility]-  []? ?[facility]) staff shall perform screenings of the [facility] clients.      Advanced Care Plan:  []??Surrogate Decision Maker of Care  []? ?POA  []??Communicated Code Status and copy sent.     Other:            Care Management Interventions  Mode of Transport at Discharge: BLS  Transition of Care Consult (CM Consult): [facility] [address] Maintenance Reviewed: Yes  Current Support Network: [address], Family Lives Nearby  The Plan for Transition of Care is Related to the Following Treatment Goals : Return to [facility]  The Patient and/or Patient Representative was Provided with a Choice of Provider and Agrees with the Discharge Plan?: Yes  Name of the Patient Representative Who was Provided with a Choice of Provider and Agrees with the Discharge Plan: Caden avery [place] of Choice List was Provided with Basic Dialogue that Supports the Patient's Individualized Plan of Care/Goals, Treatment Preferences and Shares the Quality Data Associated with the Providers?: Yes  Discharge Location  Discharge Placement: [address] [facility] (vs SNF at [facility])

## 2021-06-10 NOTE — PROGRESS NOTES
Problem: Falls - Risk of  Goal: *Absence of Falls  Description: Document Denise Shields Fall Risk and appropriate interventions in the flowsheet.   Outcome: Progressing Towards Goal  Note: Fall Risk Interventions:            Medication Interventions: Evaluate medications/consider consulting pharmacy    Elimination Interventions: Call light in reach

## 2021-06-10 NOTE — ROUTINE PROCESS
1928 Bedside and Verbal shift change report given to Roopa Street RN (oncoming nurse) by Zacarias Tse RN (offgoing nurse). Report included the following information SBAR, Kardex, Intake/Output, MAR and Recent Results. 2158 Drsg to sacrum CDI. Urostomy and colostomy patent and draining, pt denies any pain at this time. 0725 Bedside and Verbal shift change report given to Zacarias Tse RN (oncoming nurse) by Roopa Street RN (offgoing nurse). Report included the following information SBAR, Kardex, Intake/Output, MAR and Accordion.

## 2021-06-10 NOTE — PROGRESS NOTES
[facility]  (A Division of [address] [place] [address])                                                                                                                       Magaly Merino MD  Work Cell #: [phone]( Mon-Fri, 7am-5pm)  If no call or text back within 30 minutes from me, please call office number. (Prefer text when possible)  Office #:     [phone]  [phone]   Office Fax: [phone]     Date of Admission: 6/6/2021Date of Note: 6/10/2021      Reason for consult FU: Right PCN change,  possible UTI. Current Antimicrobials:    Prior Antimicrobials:    Meropenem 500 mg- 6/8 X1    Immunosuppressive drugs: n/a n/a       Assessment- ID related:    · S/P chronic indwelling right PCN- re-insertion for dislodgement 6/7/21  · Abnormal UA-- possible UTI? · Leucocytosis - mild to 12.2    Other Medical Issues:  · Post nephroectomy  · S/P colectomy  · S/P right AKA 5/20/21  · Hard of hearing and legally blind  · CKD  · DM      Past ID Issues:  --Positive urine culture Pseudmonas 3/2021  --Positive ESBL culture documented in chart Recommendation for ID issues I am following:    Hard to interpret UA for UTI in patient with chronic indwelling tube. She is afebrile, reason for admission was mechanical issue-- dislodgement of tube. -- FU urine culture 6/7: Enterococcus spp >100K, SS pending  -- repeat UA 6/8- TMTC wbc, clinically stable    While symptoms are minimal and wbc is lower, her persistent abn UA and E.fecalis >100K, in presence of PCN, therefore will treat with oral linezolid 600mg BID X10 days    Can dc  from ID side  BHAKTI Franco line/PICC: None    Condition: stable    Subjective:   She has no fever/flank pain/ N/V  Draining urine via right PCN ok  She feels ok     Review of System:  See above     HPI:  Gera Walsh is 78 y/o WF who is hard of hearing, and legally blind, with PMH as listed below.   She was admitted on 6/6/21 for dislodged right PCN. No fever/chills/rigors. Has her right PCN placed again by IR 6/7. She has abn UA, and urine culture pending. WBC is slightly up from normal to 12.2 this morning. She is placed on meropenem by Primary team.    On exam, she appears comfortable. But everywhere I touch during exam hurts. Seems to have hyper sensation for pain. No F/C/R/cough. Clear yellow urine from PCN.          Active Hospital Problems    Diagnosis Date Noted    Displacement of nephrostomy tube ([address] 06/06/2021    Tinea cruris 06/06/2021    CKD stage 3 due to type 2 diabetes mellitus ([address] 06/24/2020    S/P AKA (above knee amputation), right (HCC) 06/03/2019    Hyperkalemia 04/12/2019    Tear of skin of buttock 11/28/2018    Hypomagnesemia 11/28/2018    Legally blind 11/26/2018    S/P colectomy 11/26/2018    Hypertension 11/26/2018    S/p nephrectomy 05/18/2018    Malfunction of nephrostomy tube ([address] 05/18/2018     Past Medical History:   Diagnosis Date    Cancer ([address]     vaginal and colon cancer    Cancer ([address]     Chronic pain     arthritis    Diabetes ([address]     DVT (deep venous thrombosis) ([address]     Hypertension     Legally blind     Thyroid disease      Past Surgical History:   Procedure Laterality Date    HX ABOVE KNEE AMPUTATION Right     HX COLOSTOMY      HX ORTHOPAEDIC      HX OTHER SURGICAL      kidney drainage tube    HX UROLOGICAL      kidney removal    IR EXCHANGE NEPHRO PERC RT SI  1/28/2019    IR EXCHANGE NEPHRO PERC RT SI  3/21/2019    IR EXCHANGE NEPHRO PERC RT SI  5/13/2019    IR EXCHANGE NEPHRO PERC RT SI  6/27/2019    IR EXCHANGE NEPHRO PERC RT SI  8/16/2019    IR EXCHANGE NEPHRO PERC RT SI  10/3/2019    IR EXCHANGE NEPHRO PERC RT SI  11/18/2019    IR EXCHANGE NEPHRO PERC RT SI  1/6/2020    IR EXCHANGE NEPHRO PERC RT SI  2/24/2020    IR EXCHANGE NEPHRO PERC RT SI  4/13/2020    IR EXCHANGE NEPHRO PERC RT SI  6/1/2020    IR EXCHANGE NEPHRO PERC RT SI  6/19/2020    IR EXCHANGE NEPHRO PERC RT SI  8/10/2020    IR EXCHANGE NEPHRO PERC RT SI  9/30/2020    IR EXCHANGE NEPHRO PERC RT SI  11/5/2020    IR EXCHANGE NEPHRO PERC RT SI  12/16/2020    IR EXCHANGE NEPHRO PERC RT SI  2/1/2021    IR EXCHANGE NEPHRO PERC RT SI  3/25/2021    IR EXCHANGE NEPHRO PERC RT SI  5/10/2021    IR NEPHROSTOMY PERC RT PLC CATH  SI  6/21/2020    IR NEPHROSTOMY PERC RT PLC CATH  SI  6/7/2021     No family history on file. Social History     Socioeconomic History    Marital status:      Spouse name: Not on file    Number of children: Not on file    Years of education: Not on file    Highest education level: Not on file   Occupational History    Not on file   Tobacco Use    Smoking status: Never Smoker    Smokeless tobacco: Never Used   Substance and Sexual Activity    Alcohol use: No    Drug use: Never    Sexual activity: Not on file   Other Topics Concern     Service Not Asked    Blood Transfusions Not Asked    Caffeine Concern Not Asked    Occupational Exposure Not Asked    Hobby Hazards Not Asked    Sleep Concern Not Asked    Stress Concern Not Asked    Weight Concern Not Asked    Special Diet Not Asked    Back Care Not Asked    Exercise Not Asked    Bike Helmet Not Asked   [address] Not Asked    Self-Exams Not Asked   Social History Narrative    Not on file     Social Determinants of Health     Financial Resource Strain:     Difficulty of Paying Living Expenses:    Food Insecurity:     Worried About Running Out of Food in the Last Year:     Ran Out of Food in the Last Year:    Transportation Needs:     Lack of Transportation (Medical):      Lack of Transportation (Non-Medical):    Physical Activity:     Days of Exercise per Week:     Minutes of Exercise per Session:    Stress:     Feeling of Stress :    Social Connections:     Frequency of Communication with Friends and Family:     Frequency of Social Gatherings with Friends and Family:     Attends Yazidi Services:     Active Member of Clubs or Organizations:     Attends Club or Organization Meetings:     Marital Status:    Intimate Partner Violence:     Fear of Current or Ex-Partner:     Emotionally Abused:     Physically Abused:     Sexually Abused: Allergies:  Morphine and Pcn [penicillins]     Medications:  Current Facility-Administered Medications   Medication Dose Route Frequency    linezolid (ZYVOX) tablet 600 mg  600 mg Oral Q12H    0.9% sodium chloride infusion  50 mL/hr IntraVENous CONTINUOUS    collagenase (SANTYL) 250 unit/gram ointment   Topical DAILY    acetaminophen (TYLENOL) tablet 1,000 mg  1,000 mg Oral Q6H PRN    amLODIPine (NORVASC) tablet 5 mg  5 mg Oral DAILY    atorvastatin (LIPITOR) tablet 20 mg  20 mg Oral QHS    famotidine (PEPCID) tablet 10 mg  10 mg Oral Q12H    levothyroxine (SYNTHROID) tablet 50 mcg  50 mcg Oral ACB    insulin glargine (LANTUS) injection 10 Units  10 Units SubCUTAneous QHS    melatonin (rapid dissolve) tablet 5 mg  5 mg Oral QHS    metoprolol succinate (TOPROL-XL) XL tablet 50 mg  50 mg Oral DAILY    nystatin (MYCOSTATIN) 100,000 unit/gram powder   Topical BID    ondansetron hcl (ZOFRAN) tablet 4 mg  4 mg Oral Q8H PRN    timolol (TIMOPTIC) 0.5 % ophthalmic solution 1 Drop  1 Drop Both Eyes BID    brimonidine (ALPHAGAN) 0.2 % ophthalmic solution 1 Drop  1 Drop Both Eyes Q12H    insulin lispro (HUMALOG) injection   SubCUTAneous AC&HS    glucose chewable tablet 16 g  4 Tablet Oral PRN    glucagon (GLUCAGEN) injection 1 mg  1 mg IntraMUSCular PRN    dextrose (D50W) injection syrg 12.5-25 g  25-50 mL IntraVENous PRN    enoxaparin (LOVENOX) injection 30 mg  30 mg SubCUTAneous Q24H    oxyCODONE-acetaminophen (PERCOCET) 5-325 mg per tablet 1-2 Tablet  1-2 Tablet Oral Q6H PRN        ROS:  A comprehensive review of systems was negative.      Physical Exam:    Temp (24hrs), Av.3 °F (36.8 °C), Min:98.2 °F (36.8 °C), Max:98.4 °F (36.9 °C)    Visit Vitals  BP (!) 118/51   Pulse 71   Temp 98.2 °F (36.8 °C)   Resp 18   Ht 5' 2\" (1.575 m)   Wt 63 kg (139 lb)   SpO2 100%   BMI 25.42 kg/m²     Right PIV    General: Well developed, well nourished 79 y.o. WHITE female in no acute distress. ENT: ENT exam normal  Head: normocephalic, without obvious abnormality, atraumatic  Mouth:  mucous membranes moist, pharynx normal without lesions  Chest: inspection normal - no chest wall deformities or tenderness, respiratory effort normal  Abdomen: soft, non-tender. Bowel sounds normally. SHAISTA: PCN-right with clear urine  Extremities:  no redness or tenderness in the calves or thighs, no edema  Right AKA  Neuro: Grossly normal CN without deviation, except hard of hearing and legally blind.   Moves UE both sides     Microbiology:  Blood culture: N/A    Urine culture:  6/7-- UA abn with many wbc, Enterococcus spp>100K  6/8- UA abn- TNTC wbc    Respiratory culture: N/A    Body Fluid/ CSF/drainage culture:N/A    Cath tip/ PICC culture: N/A      Lines / Catheters:  Lab results:    Chemistry  Recent Labs     06/08/21  0850   *      K 4.5      CO2 24   BUN 38*   CREA 1.21   CA 8.4*   AGAP 9   BUCR 31*       CBC w/ Diff  Recent Labs     06/10/21  0710 06/09/21  1235   WBC 9.5 9.7   RBC 3.63* 3.43*   HGB 10.1* 9.7*   HCT 31.8* 29.5*    217   GRANS 70  --    LYMPH 19*  --    EOS 3  --        Imaging:   None available

## 2021-06-11 VITALS
HEIGHT: 62 IN | OXYGEN SATURATION: 100 % | DIASTOLIC BLOOD PRESSURE: 44 MMHG | HEART RATE: 73 BPM | RESPIRATION RATE: 18 BRPM | TEMPERATURE: 97.6 F | WEIGHT: 147 LBS | BODY MASS INDEX: 27.05 KG/M2 | SYSTOLIC BLOOD PRESSURE: 125 MMHG

## 2021-06-11 LAB
GLUCOSE BLD STRIP.AUTO-MCNC: 159 MG/DL (ref 70–110)
GLUCOSE BLD STRIP.AUTO-MCNC: 163 MG/DL (ref 70–110)

## 2021-06-11 PROCEDURE — 74011636637 HC RX REV CODE- 636/637: Performed by: FAMILY MEDICINE

## 2021-06-11 PROCEDURE — 74011250637 HC RX REV CODE- 250/637: Performed by: INTERNAL MEDICINE

## 2021-06-11 PROCEDURE — 74011250637 HC RX REV CODE- 250/637: Performed by: FAMILY MEDICINE

## 2021-06-11 PROCEDURE — 82962 GLUCOSE BLOOD TEST: CPT

## 2021-06-11 PROCEDURE — 74011250636 HC RX REV CODE- 250/636: Performed by: HOSPITALIST

## 2021-06-11 PROCEDURE — 74011250636 HC RX REV CODE- 250/636: Performed by: FAMILY MEDICINE

## 2021-06-11 RX ORDER — LINEZOLID 600 MG/1
600 TABLET, FILM COATED ORAL EVERY 12 HOURS
Qty: 16 TABLET | Refills: 0 | Status: SHIPPED | OUTPATIENT
Start: 2021-06-11 | End: 2021-06-19

## 2021-06-11 RX ADMIN — FAMOTIDINE 10 MG: 20 TABLET ORAL at 08:43

## 2021-06-11 RX ADMIN — INSULIN LISPRO 2 UNITS: 100 INJECTION, SOLUTION INTRAVENOUS; SUBCUTANEOUS at 08:47

## 2021-06-11 RX ADMIN — AMLODIPINE BESYLATE 5 MG: 5 TABLET ORAL at 08:43

## 2021-06-11 RX ADMIN — SODIUM CHLORIDE 50 ML/HR: 900 INJECTION, SOLUTION INTRAVENOUS at 11:31

## 2021-06-11 RX ADMIN — METOPROLOL SUCCINATE 50 MG: 50 TABLET, EXTENDED RELEASE ORAL at 08:43

## 2021-06-11 RX ADMIN — INSULIN LISPRO 2 UNITS: 100 INJECTION, SOLUTION INTRAVENOUS; SUBCUTANEOUS at 11:32

## 2021-06-11 RX ADMIN — NYSTATIN: 100000 POWDER TOPICAL at 08:50

## 2021-06-11 RX ADMIN — LEVOTHYROXINE SODIUM 50 MCG: 0.1 TABLET ORAL at 07:20

## 2021-06-11 RX ADMIN — ENOXAPARIN SODIUM 30 MG: 30 INJECTION SUBCUTANEOUS at 08:43

## 2021-06-11 RX ADMIN — LINEZOLID 600 MG: 600 TABLET, FILM COATED ORAL at 08:43

## 2021-06-11 NOTE — ROUTINE PROCESS
TRANSFER - OUT REPORT:    Verbal report given to zara Reina RN  being transferred to [facility] for routine progression of care       Report consisted of patients Situation, Background, Assessment and Recommendations(SBAR). Information from the following report(s) SBAR, Kardex, Intake/Output, MAR and Recent Results was reviewed with the receiving nurse. Opportunity for questions and clarification was provided.

## 2021-06-11 NOTE — PROGRESS NOTES
Problem: Pressure Injury - Risk of  Goal: *Prevention of pressure injury  Description: Document Rickie Scale and appropriate interventions in the flowsheet. Outcome: Progressing Towards Goal  Note: Pressure Injury Interventions:  Sensory Interventions: Assess changes in LOC    Moisture Interventions: Absorbent underpads, Apply protective barrier, creams and emollients    Activity Interventions: Increase time out of bed, Pressure redistribution bed/mattress(bed type)    Mobility Interventions: HOB 30 degrees or less, Pressure redistribution bed/mattress (bed type)    Nutrition Interventions: Document food/fluid/supplement intake    Friction and Shear Interventions: HOB 30 degrees or less, Foam dressings/transparent film/skin sealants                Problem: Patient Education: Go to Patient Education Activity  Goal: Patient/Family Education  Outcome: Progressing Towards Goal     Problem: Falls - Risk of  Goal: *Absence of Falls  Description: Document Romeo Fall Risk and appropriate interventions in the flowsheet. Outcome: Progressing Towards Goal  Note: Fall Risk Interventions:  Mobility Interventions: Communicate number of staff needed for ambulation/transfer         Medication Interventions: Bed/chair exit alarm    Elimination Interventions: Bed/chair exit alarm              Problem: Patient Education: Go to Patient Education Activity  Goal: Patient/Family Education  Outcome: Progressing Towards Goal     Problem: Diabetes Self-Management  Goal: *Disease process and treatment process  Description: Define diabetes and identify own type of diabetes; list 3 options for treating diabetes. Outcome: Progressing Towards Goal  Goal: *Incorporating nutritional management into lifestyle  Description: Describe effect of type, amount and timing of food on blood glucose; list 3 methods for planning meals.   Outcome: Progressing Towards Goal     Problem: Patient Education: Go to Patient Education Activity  Goal: Patient/Family Education  Outcome: Progressing Towards Goal

## 2021-06-11 NOTE — PROGRESS NOTES
D/C Plan: [facility]    Pt is medically stable to transfer back to [facility] today. CM contacted pt's son, Yessy Lau ([phone]), and he is aware and in agreement with plan to transfer back to [facility] today. Transition of Care Plan:     Communication to Patient/Family:  Met with patient and family, and they are agreeable to the transition plan. The Plan for Transition of Care is related to the following treatment goals: SNF/LTC    The Patient and/or patient representative  was provided with a choice of provider and agrees   with the discharge plan. Yes [x] No []    Freedom of choice list was provided with basic dialogue that supports the patient's individualized plan of care/goals and shares the quality data associated with the providers. Yes [x] No []    SNF/Rehab Transition:  Patient has been accepted to [facility] at [address] [place] [address] for SNF and meets criteria for admission. Patient will transported by medical transport and expected to leave at 1:00pm.    Communication to SNF/Rehab:  Bedside RN,  has been notified to update the transition plan to the facility and call report [phone]. Discharge information has been updated on the AVS and communicated through [facility] or [facility]. Discharge instructions to be fax'd to facility at [phone]     Please include all hard scripts for controlled substances, med rec and dc summary, and AVS in packet. Please medicate for pain prior to dc if possible and needed to help offset delay when patient first arrives to facility. Reviewed and confirmed with facility, [facility] can manage the patient care needs for the following:     Lan Sanders with (X) only those applicable:  Medication:  []Medications are available at the facility  []IV Antibiotics    []Controlled Substance  hard copies available sent.   []Weekly Labs    Equipment:  []CPAP/BiPAP  []Wound Vacuum  []Wright or Urinary Device  []PICC/Central Line  []Nebulizer  []Ventilator    Treatment:  []Isolation (for MRSA, VRE, etc.)  []Surgical Drain Management  []Tracheostomy Care  []Dressing Changes  []Dialysis with transportation  []PEG Care  []Oxygen  []Daily Weights for Heart Failure    Dietary:  []Any diet limitations  []Tube Feedings   []Total Parenteral Management (TPN)    Financial Resources:  []Medicaid Application Completed    []UAI Completed and copy given to pt/family. []A screening has previously been completed. []Level II Completed    [] Private pay individual who will not become   financially eligible for Medicaid within 6 months from admission to a [facility] facility. [] Individual refused to have screening conducted. []Medicaid Application Completed    []The screening denied because it was determined individual did not need/did not qualify for nursing facility level of care. [] Out of state residents seeking direct admission to a [address] facility. [] Individuals who are inpatients of an out of state hospital, or in state or out of state veterans/ hospital and seek direct admission to a [address] facility  [] Individuals who are pateints or residents of a state owned/operated facility that is licensed by [employer] ([employer]) and seek direct admission to [address] [place] [address]  [] A screening not required for enrollment in [facility] [address] services as set out in [address] [place] [address] 30-  [] [facility] [place] [facility]) staff shall perform screenings of the [facility] clients. Advanced Care Plan:  []Surrogate Decision Maker of Care  []POA  []Communicated Code Status and copy sent.     Other:   LTC resident        Care Management Interventions  Mode of Transport at Discharge: BLS  Transition of Care Consult (CM Consult): [address] Maintenance Reviewed: Yes  Current Support Network: [address], Family Lives Nearby  The Plan for Transition of Care is Related to the Following Treatment Goals : Return to [address] [place] [address]  The Patient and/or Patient Representative was Provided with a Choice of Provider and Agrees with the Discharge Plan?: Yes  Name of the Patient Representative Who was Provided with a Choice of Provider and Agrees with the Discharge Plan: Krzysztof avery of Choice List was Provided with Basic Dialogue that Supports the Patient's Individualized Plan of Care/Goals, Treatment Preferences and Shares the Quality Data Associated with the Providers?: Yes  Discharge Location  Discharge Placement: [facility] (vs SNF at [address] [place] [address])

## 2021-06-11 NOTE — PROGRESS NOTES
Problem: Pressure Injury - Risk of  Goal: *Prevention of pressure injury  Description: Document Rickie Scale and appropriate interventions in the flowsheet. Outcome: Progressing Towards Goal  Note: Pressure Injury Interventions:  Sensory Interventions: Assess changes in LOC    Moisture Interventions: Minimize layers    Activity Interventions: Pressure redistribution bed/mattress(bed type)    Mobility Interventions: Pressure redistribution bed/mattress (bed type)    Nutrition Interventions: Document food/fluid/supplement intake    Friction and Shear Interventions: Minimize layers                Problem: Patient Education: Go to Patient Education Activity  Goal: Patient/Family Education  Outcome: Progressing Towards Goal     Problem: Falls - Risk of  Goal: *Absence of Falls  Description: Document Romeo Fall Risk and appropriate interventions in the flowsheet. Outcome: Progressing Towards Goal  Note: Fall Risk Interventions:            Medication Interventions: Evaluate medications/consider consulting pharmacy    Elimination Interventions: Call light in reach              Problem: Diabetes Self-Management  Goal: *Disease process and treatment process  Description: Define diabetes and identify own type of diabetes; list 3 options for treating diabetes.   Outcome: Progressing Towards Goal

## 2021-06-11 NOTE — CONSULTS
Podiatry Consult    Subjective:         Date of Consultation: Keesha 10, 2021     Referring Physician: Mark Morgan  Patient is a 79 y.o.  female who is being seen for painful ingrown left big toenail. Started a few weeks ago, but she could not get anyone to help her were she lived.     Patient Active Problem List    Diagnosis Date Noted    Displacement of nephrostomy tube ([address] 06/06/2021    Tinea cruris 06/06/2021    Pseudomonas aeruginosa infection 03/22/2021    Altered mental status 03/20/2021    Encephalopathy acute 11/16/2020    AMS (altered mental status) 11/15/2020    Anticoagulated 11/05/2020    Hydronephrosis, right 11/04/2020    Severe sepsis with acute organ dysfunction ([address] 11/04/2020    Bacteremia 06/24/2020    Enterococcus faecalis infection 06/24/2020    CKD stage 3 due to type 2 diabetes mellitus ([address] 06/24/2020    Acute renal failure (ARF) ([address] 06/21/2020    Elevated INR 06/21/2020    Nephrostomy complication ([address] 53/59/8522    Obstructed nephrostomy tube ([address] 01/16/2020    S/P AKA (above knee amputation), right ([address] 06/03/2019    Nephrostomy status ([address] 06/03/2019    ANTONI (acute kidney injury) ([address] 05/31/2019    Hyperkalemia 04/12/2019    Heel ulcer ([address] 04/12/2019    Tear of skin of buttock 11/28/2018    Pressure injury of right heel, unstageable ([address] 11/28/2018    Hypomagnesemia 11/28/2018    UTI (urinary tract infection) 11/26/2018    Elevated WBC count 11/26/2018    Diabetes ([address] 11/26/2018    Legally blind 11/26/2018    Hypertension 11/26/2018    Thyroid disease 11/26/2018    Elevated serum creatinine 11/26/2018    S/P colectomy 11/26/2018    Skin lesion of foot 11/26/2018    Pyelonephritis 05/18/2018    S/p nephrectomy 05/18/2018    Right flank pain 05/18/2018    Malfunction of nephrostomy tube ([facility] 05/18/2018    Nephrostomy tube displaced ([address] 05/18/2018     Past Medical History:   Diagnosis Date    Cancer [facility])     vaginal and colon cancer  [facility]     Chronic pain     arthritis    Diabetes ([facility]     DVT (deep venous thrombosis) ([facility])     Hypertension     Legally blind     Thyroid disease       Past Surgical History:   Procedure Laterality Date    HX ABOVE KNEE AMPUTATION Right     HX COLOSTOMY      HX ORTHOPAEDIC      HX OTHER SURGICAL      kidney drainage tube    HX UROLOGICAL      kidney removal    IR EXCHANGE NEPHRO PERC RT SI  1/28/2019    IR EXCHANGE NEPHRO PERC RT SI  3/21/2019    IR EXCHANGE NEPHRO PERC RT SI  5/13/2019    IR EXCHANGE NEPHRO PERC RT SI  6/27/2019    IR EXCHANGE NEPHRO PERC RT SI  8/16/2019    IR EXCHANGE NEPHRO PERC RT SI  10/3/2019    IR EXCHANGE NEPHRO PERC RT SI  11/18/2019    IR EXCHANGE NEPHRO PERC RT SI  1/6/2020    IR EXCHANGE NEPHRO PERC RT SI  2/24/2020    IR EXCHANGE NEPHRO PERC RT SI  4/13/2020    IR EXCHANGE NEPHRO PERC RT SI  6/1/2020    IR EXCHANGE NEPHRO PERC RT SI  6/19/2020    IR EXCHANGE NEPHRO PERC RT SI  8/10/2020    IR EXCHANGE NEPHRO PERC RT SI  9/30/2020    IR EXCHANGE NEPHRO PERC RT SI  11/5/2020    IR EXCHANGE NEPHRO PERC RT SI  12/16/2020    IR EXCHANGE NEPHRO PERC RT SI  2/1/2021    IR EXCHANGE NEPHRO PERC RT SI  3/25/2021    IR EXCHANGE NEPHRO PERC RT SI  5/10/2021    IR NEPHROSTOMY PERC RT PLC CATH  SI  6/21/2020    IR NEPHROSTOMY PERC RT PLC CATH  SI  6/7/2021      No family history on file.    Social History     Tobacco Use    Smoking status: Never Smoker    Smokeless tobacco: Never Used   Substance Use Topics    Alcohol use: No     Current Facility-Administered Medications   Medication Dose Route Frequency Provider Last Rate Last Admin    linezolid (ZYVOX) tablet 600 mg  600 mg Oral Q12H Magaly Harvey MD   600 mg at 06/10/21 1131    0.9% sodium chloride infusion  50 mL/hr IntraVENous CONTINUOUS Jenny Garcia MD 50 mL/hr at 06/10/21 1227 50 mL/hr at 06/10/21 1227    collagenase (SANTYL) 250 unit/gram ointment   Topical DAILY Jenny Garcia MD       Angela Horn acetaminophen (TYLENOL) tablet 1,000 mg  1,000 mg Oral Q6H PRN Peyton Hensley MD        amLODIPine (NORVASC) tablet 5 mg  5 mg Oral DAILY Peyton Hensley MD   5 mg at 06/10/21 1131    atorvastatin (LIPITOR) tablet 20 mg  20 mg Oral QHS Peyton Hensley MD   20 mg at 06/09/21 2158    famotidine (PEPCID) tablet 10 mg  10 mg Oral Q12H Peyton Hensley MD   10 mg at 06/10/21 1131    levothyroxine (SYNTHROID) tablet 50 mcg  50 mcg Oral ACB Peyton Hensley MD   50 mcg at 06/10/21 0531    insulin glargine (LANTUS) injection 10 Units  10 Units SubCUTAneous QHS Peyton Hensley MD   10 Units at 06/09/21 2159    melatonin (rapid dissolve) tablet 5 mg  5 mg Oral QHS Peyton Hensley MD   5 mg at 06/08/21 2139    metoprolol succinate (TOPROL-XL) XL tablet 50 mg  50 mg Oral DAILY Peyton Hensley MD   50 mg at 06/10/21 1131    nystatin (MYCOSTATIN) 100,000 unit/gram powder   Topical BID Peyton Hensley MD   Given at 06/09/21 2200    ondansetron hcl (ZOFRAN) tablet 4 mg  4 mg Oral Q8H PRN Peyton Hensley MD        timolol (TIMOPTIC) 0.5 % ophthalmic solution 1 Drop  1 Drop Both Eyes BID Peyton Hensley MD   1 Drop at 06/08/21 0952    brimonidine (ALPHAGAN) 0.2 % ophthalmic solution 1 Drop  1 Drop Both Eyes Q12H Peyton Hensley MD   1 Drop at 06/08/21 0124    insulin lispro (HUMALOG) injection   SubCUTAneous AC&HS Peyton Hensley MD   2 Units at 06/10/21 1225    glucose chewable tablet 16 g  4 Tablet Oral PRN Peyton Hensley MD        glucagon [facility]) injection 1 mg  1 mg IntraMUSCular PRN Peyton Hensley MD        dextrose (D50W) injection syrg 12.5-25 g  25-50 mL IntraVENous PRN Peyton Hensley MD        enoxaparin (LOVENOX) injection 30 mg  30 mg SubCUTAneous Q24H Peyton Hensley MD   30 mg at 06/10/21 1130    oxyCODONE-acetaminophen (PERCOCET) 5-325 mg per tablet 1-2 Tablet  1-2 Tablet Oral Q6H PRN Eleno Monte MD   2 Tablet at 06/06/21 3067      Allergies   Allergen Reactions    Morphine Nausea and Vomiting    Pcn [Penicillins] Rash        Review of Systems:  A comprehensive review of systems was negative except for that written in the History of Present Illness. Objective:     Patient Vitals for the past 8 hrs:   BP Temp Pulse Resp SpO2   06/10/21 1627 (!) 147/54 97.8 °F (36.6 °C) 72 17 100 %     Temp (24hrs), Av.1 °F (36.7 °C), Min:97.8 °F (36.6 °C), Max:98.4 °F (36.9 °C)      Physical Exam:      Vascular:  Dorsalis pedis pulses is 0/4 left. Posterior tibial pulses is 0/4 left. Capillary fill time is less than 3 seconds, left. Edema is not observed left lower extremity. Varicosities are not observed left lower extremity. Derm:  Skin temperature of lower extremities warm to cool proximal to distal left. Inspection of skin shows ingrown left medial hallux nail. Also hypertrophic, dystrophic, elongated nails left foot. Ortho:  Muscle strength 35 for all groups tested left. Muscle tone normal. Inspection/palpation of bones - joints- muscle shows - within normal limits left lower extremity. Neuro:  Light touch, sharp/dull, vibratory, and proprioception sensations all intact left lower extremity. Deep tendon reflexes intact left.     Lab Review:   Recent Results (from the past 24 hour(s))   GLUCOSE, POC    Collection Time: 21  9:54 PM   Result Value Ref Range    Glucose (POC) 248 (H) 70 - 110 mg/dL   PROCALCITONIN    Collection Time: 06/10/21  7:10 AM   Result Value Ref Range    Procalcitonin <0.05 ng/mL   MAGNESIUM    Collection Time: 06/10/21  7:10 AM   Result Value Ref Range    Magnesium 1.4 (L) 1.6 - 2.6 mg/dL   CBC WITH AUTOMATED DIFF    Collection Time: 06/10/21  7:10 AM   Result Value Ref Range    WBC 9.5 4.6 - 13.2 K/uL    RBC 3.63 (L) 4.20 - 5.30 M/uL    HGB 10.1 (L) 12.0 - 16.0 g/dL    HCT 31.8 (L) 35.0 - 45.0 %    MCV 87.6 74.0 - 97.0 FL    MCH 27.8 24.0 - 34.0 PG    MCHC 31.8 31.0 - 37.0 g/dL    RDW 15.7 (H) 11.6 - 14.5 %    PLATELET 440 568 - 340 K/uL    MPV 9.5 9.2 - 11.8 FL    NEUTROPHILS 70 40 - 73 %    LYMPHOCYTES 19 (L) 21 - 52 %    MONOCYTES 7 3 - 10 %    EOSINOPHILS 3 0 - 5 %    BASOPHILS 0 0 - 2 %    ABS. NEUTROPHILS 6.6 1.8 - 8.0 K/UL    ABS. LYMPHOCYTES 1.8 0.9 - 3.6 K/UL    ABS. MONOCYTES 0.7 0.05 - 1.2 K/UL    ABS. EOSINOPHILS 0.3 0.0 - 0.4 K/UL    ABS. BASOPHILS 0.0 0.0 - 0.1 K/UL    DF AUTOMATED     GLUCOSE, POC    Collection Time: 06/10/21  8:14 AM   Result Value Ref Range    Glucose (POC) 104 70 - 110 mg/dL   GLUCOSE, POC    Collection Time: 06/10/21 11:28 AM   Result Value Ref Range    Glucose (POC) 183 (H) 70 - 110 mg/dL   GLUCOSE, POC    Collection Time: 06/10/21  4:29 PM   Result Value Ref Range    Glucose (POC) 127 (H) 70 - 110 mg/dL       Impression:     diabetes mellitus   Status post right BKA  Painful onychocryptosis left medial hallux  Onychomycosis left foot      Recommendation:      Consult completed for painful chronic ingrown left big toenail. Patient is a diabetic with history of right BKA. Reviewed procedure with patient and rational for plan. Anesthesia obtained with approximately 3 cc of 0.5%Marcaine plain into the  left big toe. The site was prepped in the usual sterile manner with betadine/hibiclens. Under sterile conditions, using sterile technique, the medial  border of the nail plate was avulsed to the eponychium and removed. Using NAOH/Phenol as a chemical agent, the nail matrix was treated with three digital q-tips and diluted afterwards with Acetic Acid/Alcohol. Digit was warm and pink upon completion. Sterile dressing applied with triple antibiotic. Reviewed post-op instructions. Manual debridement of mycotic nails left foot x 5, as patient is blind with diabetes, ASO/PVD/PN and history of amputation. I will follow patient as needed.

## 2021-06-11 NOTE — DISCHARGE SUMMARY
Discharge Summary    Patient: Leanna Russell MRN: [id number]  CSN: [id number]    YOB: 1940  Age: 79 y.o. Sex: female    DOA: 6/6/2021 LOS:  LOS: 5 days   Discharge Date:      Primary Care Provider:  Leticia, Not On File, NP    Admission Diagnoses: Displacement of nephrostomy tube ([facility] [address] Linford Pill  Hyperkalemia [E87.5]    Discharge Diagnoses:    Hospital Problems  Date Reviewed: 6/6/2021        Codes Class Noted POA    * (Principal) Displacement of nephrostomy tube ([facility] [address] ICD-10-CM: D17.690U  ICD-9-CM: 997.5  6/6/2021 Unknown        Tinea cruris ICD-10-CM: B35.6  ICD-9-CM: 110.3  6/6/2021 Unknown        CKD stage 3 due to type 2 diabetes mellitus ([facility] [address] ICD-10-CM: E11.22, N18.30  ICD-9-CM: 250.40, 585.3  6/24/2020 Yes        S/P AKA (above knee amputation), right ([facility] [address] ICD-10-CM: R72.439  ICD-9-CM: V49.76  6/3/2019 Yes        Hyperkalemia ICD-10-CM: E87.5  ICD-9-CM: 276.7  4/12/2019 Unknown        Tear of skin of buttock ICD-10-CM: S31.801A  ICD-9-CM: 877.0  11/28/2018 Yes        Hypomagnesemia ICD-10-CM: E83.42  ICD-9-CM: 275.2  11/28/2018 Yes        Legally blind ICD-10-CM: H54.8  ICD-9-CM: 369.4  11/26/2018 Yes        Hypertension ICD-10-CM: I10  ICD-9-CM: 401.9  11/26/2018 Yes        S/P colectomy ICD-10-CM: Z90.49  ICD-9-CM: V45.89  11/26/2018 Yes        S/p nephrectomy ICD-10-CM: Z90.5  ICD-9-CM: V45.73  5/18/2018 Yes        Malfunction of nephrostomy tube [facility]) ICD-10-CM: S44.427H  ICD-9-CM: 997.5  5/18/2018 Yes              Discharge Condition: stable     Discharge Medications:     Current Discharge Medication List      START taking these medications    Details   linezolid (ZYVOX) 600 mg tablet Take 1 Tablet by mouth every twelve (12) hours for 8 days.   Qty: 16 Tablet, Refills: 0  Start date: 6/11/2021, End date: 6/19/2021         CONTINUE these medications which have NOT CHANGED    Details   acetaminophen (TYLENOL) 500 mg tablet Take 2 Tabs by mouth every eight (8) hours as needed for Pain or Fever.  Qty: 30 Tab, Refills: 0      nystatin (MYCOSTATIN) powder Apply  to affected area two (2) times a day. Qty: 60 g, Refills: 0      insulin glargine (LANTUS) 100 unit/mL injection 10 Units by SubCUTAneous route nightly. Qty: 1 Vial, Refills: 0      timolol (TIMOPTIC) 0.5 % ophthalmic solution Administer 1 Drop to both eyes two (2) times a day. Qty: 10 mL, Refills: 0      brimonidine (ALPHAGAN) 0.2 % ophthalmic solution Administer 1 Drop to both eyes every twelve (12) hours. Qty: 10 mL, Refills: 0      amLODIPine (NORVASC) 5 mg tablet Take 1 Tab by mouth daily. Qty: 30 Tab, Refills: 0      famotidine (PEPCID) 10 mg tablet Take 1 Tab by mouth every twelve (12) hours. Qty: 60 Tab, Refills: 0      melatonin 5 mg tablet Take 1 Tab by mouth nightly. Qty: 30 Tab, Refills: 0      ondansetron hcl (ZOFRAN) 4 mg tablet Take 4 mg by mouth every eight (8) hours as needed for Nausea or Vomiting.      metoprolol succinate (TOPROL-XL) 50 mg XL tablet Take 50 mg by mouth daily. levothyroxine (SYNTHROID) 50 mcg tablet Take  by mouth Daily (before breakfast). atorvastatin calcium (ATORVASTATIN PO) Take 20 mg by mouth daily. Procedures :   Ir insert nephrotomy tube   Manual debridement of mycotic nails     Consults: ID and podiatrist       PHYSICAL EXAM   Visit Vitals  BP (!) 132/47   Pulse 69   Temp 97.8 °F (36.6 °C)   Resp 20   Ht 5' 2\" (1.575 m)   Wt 66.7 kg (147 lb)   SpO2 100%   BMI 26.89 kg/m²     General: Awake, cooperative, no acute distress    HEENT: NC, Atraumatic. EOMI. Anicteric sclerae. Lungs:  CTA Bilaterally. No Wheezing/Rhonchi/Rales. Heart:  Regular  rhythm,  No murmur, No Rubs, No Gallops  Abdomen: Soft, Non distended, Non tender. +Bowel sounds, colectomy bag noted , nephrectomy tube noted with clear urine   Extremities: No c/c, rt bka   Psych:   Not anxious or agitated. Neurologic:  No acute neurological deficits.                                      Admission HPI :     Zach Shaffer John Moreland is a 79 y.o. female  with a history of multiple medical problems including chronic indwelling right nephrostomy tube, colostomy, diabetes, legally blind, hypertension, thyroid disease, status post above-knee amputation, chronic kidney disease  who was brought by EMS for nephrolithotomy tube displacement. She is a resident of [address] she states that she feel something wrong on her back after the nursing staff repositioned her today at the [facility]. \"  I needed my tube back for my urine\". No chest pain no shortness of breath. No nausea vomiting diarrhea. At the ER, her work-up only revealed a mild hyperkalemia. Case was discussed with interventional radiology who suggested admit the patient for nephrolithotomy tube placement. Hospital Course :  26-year-old female with a history of multiple medical problems including chronic indwelling right nephrostomy tube, colostomy, diabetes, legally blind, hypertension, thyroid disease, status post above-knee amputation, chronic kidney disease admitted for nephrolithotomy tube displace. IR was insert the tube. And she tolerated the procedure well. UA POSSIBLE ENTEROCOCCUS SPECIES -ID recommend zyvox for 10 days treatment. She remained afebrile and hyperkalemia and raoul resolved. Mg was replaced. She refused to recheck lab. She also had ingrown -become pain full-podiatrist did Manual debridement of mycotic nails . She tolerated very well. She remained afebrile, no leukocytosis , hemodynamic stable. Discharge planning discussed with patient, pt agrees  with the plan and no questions and concerns at this point. Activity: Activity as tolerated    Diet: regular diet -easy to chew     Follow-up: PCP    Disposition: [facility]     Minutes spent on discharge: 45 min       Labs: Results:       Chemistry No results for input(s): GLU, NA, K, CL, CO2, BUN, CREA, CA, AGAP, BUCR, TBIL, AP, TP, ALB, GLOB, AGRAT in the last 72 hours.     No lab exists for component: GPT   CBC w/Diff Recent Labs     06/10/21  0710 06/09/21  1235   WBC 9.5 9.7   RBC 3.63* 3.43*   HGB 10.1* 9.7*   HCT 31.8* 29.5*    217   GRANS 70  --    LYMPH 19*  --    EOS 3  --       Cardiac Enzymes No results for input(s): CPK, CKND1, JANE in the last 72 hours. No lab exists for component: CKRMB, TROIP   Coagulation No results for input(s): PTP, INR, APTT, INREXT in the last 72 hours. Lipid Panel Lab Results   Component Value Date/Time    Cholesterol, total 115 08/28/2019 08:22 AM    HDL Cholesterol 39 (L) 08/28/2019 08:22 AM    LDL, calculated 46.2 08/28/2019 08:22 AM    VLDL, calculated 29.8 08/28/2019 08:22 AM    Triglyceride 149 08/28/2019 08:22 AM    CHOL/HDL Ratio 2.9 08/28/2019 08:22 AM      BNP No results for input(s): BNPP in the last 72 hours. Liver Enzymes No results for input(s): TP, ALB, TBIL, AP in the last 72 hours. No lab exists for component: SGOT, GPT, DBIL   Thyroid Studies Lab Results   Component Value Date/Time    TSH 3.40 01/24/2020 08:35 AM          @micro    Significant Diagnostic Studies: IR NEPHROSTOMY PERC RT PLC CATH  SI    Result Date: 6/7/2021  PROCEDURE:  RIGHT PERCUTANEOUS NEPHROSTOMY (PCN) REINSERTION INDICATION: Chronic indwelling right PCN. Inadvertent removal of right PCN. ______________________________ TECHNIQUE/FINDINGS:  The patient's right flank and was prepped and draped in sterile fashion . Using a Berenstein catheter and Bentson wire, the percutaneous tract was negotiated and the wire advanced into the renal pelvis. Contrast was injected demonstrating good position within the pelvis. A new 10 Jordanian percutaneous locking APDL catheter was placed with loop in the left renal pelvis. Contrast was injected to document position. Catheter was aspirated demonstrating good drainage of the upper collecting system. The catheter was adhered to the skin using a Percustay device (patient refused suturing of the catheter).   The patient tolerated the procedure well and there were no immediate complications. Radiation dose (reference air kerma):  21 mGy. Preprocedure timeout: 1134 hours. GUIDANCE: Fluoroscopy guidance was used to position (and confirm the position of) the catheter. Image(s) saved in PACS: Fluoroscopy ______________________________     Successful reinsertion of a right percutaneous nephrostomy catheter.             Myles Arredondo [facility]     CC: Bshsi, Not On File, NP

## 2021-06-11 NOTE — PROGRESS NOTES
Podiatry:  Patient seen for chronic painful ingrown left big toenail. She is diabetic with ASO/PVD and right BKA. Performed Permanent ingrown nail removal left medial hallux and debridement of mycotic nails. See consult for details.

## 2021-06-11 NOTE — PROGRESS NOTES
46- assessed patient     2100- assisted  Dr. Lewis Portillo with bedside ingrown nail procedure on left great toe, gauze bandage applied, bandage to be changed every couple days per MD    Patient rested all evening    Bedside shift change report given to Ryan (oncoming nurse) by TAM Natarajan RN (offgoing nurse). Report included the following information SBAR.

## 2021-06-11 NOTE — PROGRESS NOTES
Physician Progress Note      PATIENT:               Chip Sheffield  CSN #:                  [id number]  :                       1940  ADMIT DATE:       2021 3:09 PM  [address] [place] DATE:  RESPONDING  PROVIDER #:        Unk Demetrius YOON MD          QUERY TEXT:    Pt admitted with Decubitus ulcer per H&P. Roly Mich Noted Wound Care RN  documentation of stage 3 sacral/coccyx pressure wound in  note. If possible, please document in progress notes and discharge summary:    The medical record reflects the following:  Risk Factors: Bed confinement and chronic pressure  Clinical Indicators:  Wound Care RN: stage 3 sacral/coccyx pressure wound  Treatment: Wound Care eval and recs for Skin Care & Pressure Relief    Thank you,  [address] [place] [address],[address], CCDS  [phone]  Options provided:  -- stage 3 sacral/coccyx pressure ulcer confirmed present on admission  -- Other - I will add my own diagnosis  -- Disagree - Not applicable / Not valid  -- Disagree - Clinically unable to determine / Unknown  -- Refer to Clinical Documentation Reviewer    PROVIDER RESPONSE TEXT:    The diagnosis of stage 3 sacral/coccyx pressure ulcer was confirmed as present on admission. Query created by: Robby Mojica on 2021 12:32 PM      QUERY TEXT:    Pt admitted with displacement of nephrostomy tube. Pt noted to have increase in creatinine from 1.15 on admission to 1.78 on . Treated with IVF and on  creatinine is 1.21. If possible, please document in the progress notes and discharge summary if you are evaluating and/or treating any of the following: The medical record reflects the following:  Risk Factors: CKD 3; displaced nephrolithotomy tube  Clinical Indicators:  IM notes \"ckd3 Mild elevated today, will give iv hydration now  Cr monitoring\"?   Treatment: IVFCDS    Thank you Dr. Raegan Moore RN, CCDS  [phone]    RIFLE    RISK:  Increased SCr x 1.5 or GFR decrease > 25% (within 7 days)    INJURY:  Increased SCr x 2.0 or GFR decreased > 50%    FAILURE:  Increased SCr x 3.0 or GFR decrease > 75% or SCr >4.0 mg/dL or acute increase >0.5 mg/dL    LOSS:  Persistent acute renal failure = complete loss of kidney function > 4 weeks    END STAGE:  End stage of kidney disease > 3 months      AKIN    STAGE  1:  Increase in SCr >/= 0.3 mg/dL or >/= 150% to 200% (1.5 to 2-fold) from baseline (within 48 hours)    STAGE  2:  Increase in SCr to more than 200% to 300% (>2-3 fold) from baseline    STAGE  3:  Increase in SCr to more than 300% (>3-fold) from baseline or SCr >/= 4.0 mg/dL with an acute increase of at least 0.5 mg/dL or initiation of renal replacement therapy      KDIGO    STAGE  1:  Increase in SCr by >/= 0.3 mg/dL within 48 hours or increase in SCr 1.5 to 1.9 times baseline which is known or presumed to have occurred within the prior 7 days    STAGE  2:  Increase in SCr to 2.0 to 2.9 times baseline    STAGE  3:  Increase in SCr to 3.0 times baseline or increase in SCr to >/= baseline or increase in SCr to >/= 4.0 mg/dL or initiation of renal replacement therapy  Options provided:  -- Acute kidney failure  -- Acute kidney failure with acute tubular necrosis  -- Acute kidney injury  -- Other - I will add my own diagnosis  -- Disagree - Not applicable / Not valid  -- Disagree - Clinically unable to determine / Unknown  -- Refer to Clinical Documentation Reviewer    PROVIDER RESPONSE TEXT:    This patient is in Acute kidney failure. Query created by: Raulito Colon on 6/8/2021 12:50 PM      QUERY TEXT:    Patient admitted with nephrostomy tube displacement. Patient noted to have ***. If possible, please document in the progress notes and discharge summary if you are evaluating and/or treating any of the following:       The medical record reflects the following:  Risk Factors: S/P chronic indwelling right PCN- re-insertion for dislodgement 6/7/21  Clinical Indicators: Urine culture >100,000 colonies/mL   POSSIBLE ENTEROCOCCUS SPECIES  6/10  ID note: Abnormal UA-- possible UTI? Hard to interpret UA for UTI in patient with chronic indwelling tube. Treatment: Urine culture; oral linezolid 600mg BID X10 days    Jake sanz,  Darylene Gearing RN/CCDS  [phone]  Options provided:  -- Possible Urinary Tract Infection (UTI)  -- Bacteriuria  -- Possible UTI due to nephrostomy tube  -- Other - I will add my own diagnosis  -- Disagree - Not applicable / Not valid  -- Disagree - Clinically unable to determine / Unknown  -- Refer to Clinical Documentation Reviewer    PROVIDER RESPONSE TEXT:    This patient has a possible UTI.     Query created by: Sana Ochoa on 6/11/2021 9:12 AM      Electronically signed by:  Eulogio YOON MD 6/11/2021 9:25 AM

## 2021-06-12 LAB
BACTERIA SPEC CULT: ABNORMAL
CC UR VC: ABNORMAL
SERVICE CMNT-IMP: ABNORMAL

## 2021-06-18 ENCOUNTER — HOSPITAL ENCOUNTER (OUTPATIENT)
Dept: LAB | Age: 81
Discharge: HOME | End: 2021-06-18
Payer: MEDICARE

## 2021-06-18 LAB
ALBUMIN SERPL-MCNC: 2.7 G/DL (ref 3.4–5)
ALBUMIN/GLOB SERPL: 0.6 {RATIO} (ref 0.8–1.7)
ALP SERPL-CCNC: 103 U/L (ref 45–117)
ALT SERPL-CCNC: 30 U/L (ref 13–56)
ANION GAP SERPL CALC-SCNC: 7 MMOL/L (ref 3–18)
AST SERPL-CCNC: 34 U/L (ref 10–38)
BASOPHILS # BLD: 0.1 K/UL (ref 0–0.1)
BASOPHILS NFR BLD: 1 % (ref 0–2)
BILIRUB SERPL-MCNC: 0.3 MG/DL (ref 0.2–1)
BUN SERPL-MCNC: 32 MG/DL (ref 7–18)
BUN/CREAT SERPL: 27 (ref 12–20)
CALCIUM SERPL-MCNC: 8.9 MG/DL (ref 8.5–10.1)
CHLORIDE SERPL-SCNC: 110 MMOL/L (ref 100–111)
CO2 SERPL-SCNC: 22 MMOL/L (ref 21–32)
CREAT SERPL-MCNC: 1.19 MG/DL (ref 0.6–1.3)
DIFFERENTIAL METHOD BLD: ABNORMAL
EOSINOPHIL # BLD: 0.3 K/UL (ref 0–0.4)
EOSINOPHIL NFR BLD: 3 % (ref 0–5)
ERYTHROCYTE [DISTWIDTH] IN BLOOD BY AUTOMATED COUNT: 15.9 % (ref 11.6–14.5)
GLOBULIN SER CALC-MCNC: 4.2 G/DL (ref 2–4)
GLUCOSE SERPL-MCNC: 147 MG/DL (ref 74–99)
HCT VFR BLD AUTO: 34.8 % (ref 35–45)
HGB BLD-MCNC: 10.9 G/DL (ref 12–16)
LYMPHOCYTES # BLD: 1.6 K/UL (ref 0.9–3.6)
LYMPHOCYTES NFR BLD: 16 % (ref 21–52)
MAGNESIUM SERPL-MCNC: 1.6 MG/DL (ref 1.6–2.6)
MCH RBC QN AUTO: 27.9 PG (ref 24–34)
MCHC RBC AUTO-ENTMCNC: 31.3 G/DL (ref 31–37)
MCV RBC AUTO: 89 FL (ref 74–97)
MONOCYTES # BLD: 0.4 K/UL (ref 0.05–1.2)
MONOCYTES NFR BLD: 4 % (ref 3–10)
NEUTS SEG # BLD: 7.9 K/UL (ref 1.8–8)
NEUTS SEG NFR BLD: 76 % (ref 40–73)
PLATELET # BLD AUTO: 254 K/UL (ref 135–420)
PMV BLD AUTO: 10.2 FL (ref 9.2–11.8)
POTASSIUM SERPL-SCNC: 5.6 MMOL/L (ref 3.5–5.5)
PROT SERPL-MCNC: 6.9 G/DL (ref 6.4–8.2)
RBC # BLD AUTO: 3.91 M/UL (ref 4.2–5.3)
SODIUM SERPL-SCNC: 139 MMOL/L (ref 136–145)
WBC # BLD AUTO: 10.3 K/UL (ref 4.6–13.2)

## 2021-06-18 PROCEDURE — 80053 COMPREHEN METABOLIC PANEL: CPT

## 2021-06-18 PROCEDURE — 83735 ASSAY OF MAGNESIUM: CPT

## 2021-06-18 PROCEDURE — 85025 COMPLETE CBC W/AUTO DIFF WBC: CPT

## 2021-06-22 ENCOUNTER — HOSPITAL ENCOUNTER (OUTPATIENT)
Dept: LAB | Age: 81
Discharge: HOME | End: 2021-06-22
Payer: MEDICARE

## 2021-06-22 LAB
ALBUMIN SERPL-MCNC: 2.5 G/DL (ref 3.4–5)
ALBUMIN/GLOB SERPL: 0.8 {RATIO} (ref 0.8–1.7)
ALP SERPL-CCNC: 92 U/L (ref 45–117)
ALT SERPL-CCNC: 32 U/L (ref 13–56)
ANION GAP SERPL CALC-SCNC: 5 MMOL/L (ref 3–18)
AST SERPL-CCNC: 21 U/L (ref 10–38)
BASOPHILS # BLD: 0 K/UL (ref 0–0.1)
BASOPHILS NFR BLD: 0 % (ref 0–2)
BILIRUB SERPL-MCNC: 0.2 MG/DL (ref 0.2–1)
BUN SERPL-MCNC: 40 MG/DL (ref 7–18)
BUN/CREAT SERPL: 37 (ref 12–20)
CALCIUM SERPL-MCNC: 8.3 MG/DL (ref 8.5–10.1)
CHLORIDE SERPL-SCNC: 112 MMOL/L (ref 100–111)
CO2 SERPL-SCNC: 23 MMOL/L (ref 21–32)
CREAT SERPL-MCNC: 1.07 MG/DL (ref 0.6–1.3)
DIFFERENTIAL METHOD BLD: ABNORMAL
EOSINOPHIL # BLD: 0.3 K/UL (ref 0–0.4)
EOSINOPHIL NFR BLD: 4 % (ref 0–5)
ERYTHROCYTE [DISTWIDTH] IN BLOOD BY AUTOMATED COUNT: 15.9 % (ref 11.6–14.5)
GLOBULIN SER CALC-MCNC: 3.3 G/DL (ref 2–4)
GLUCOSE SERPL-MCNC: 103 MG/DL (ref 74–99)
HCT VFR BLD AUTO: 27.2 % (ref 35–45)
HGB BLD-MCNC: 8.6 G/DL (ref 12–16)
LYMPHOCYTES # BLD: 2.1 K/UL (ref 0.9–3.6)
LYMPHOCYTES NFR BLD: 29 % (ref 21–52)
MAGNESIUM SERPL-MCNC: 1.7 MG/DL (ref 1.6–2.6)
MCH RBC QN AUTO: 27.8 PG (ref 24–34)
MCHC RBC AUTO-ENTMCNC: 31.6 G/DL (ref 31–37)
MCV RBC AUTO: 88 FL (ref 74–97)
MONOCYTES # BLD: 0.7 K/UL (ref 0.05–1.2)
MONOCYTES NFR BLD: 10 % (ref 3–10)
NEUTS SEG # BLD: 4 K/UL (ref 1.8–8)
NEUTS SEG NFR BLD: 57 % (ref 40–73)
PLATELET # BLD AUTO: 159 K/UL (ref 135–420)
PMV BLD AUTO: 10 FL (ref 9.2–11.8)
POTASSIUM SERPL-SCNC: 5.4 MMOL/L (ref 3.5–5.5)
PROT SERPL-MCNC: 5.8 G/DL (ref 6.4–8.2)
RBC # BLD AUTO: 3.09 M/UL (ref 4.2–5.3)
SODIUM SERPL-SCNC: 140 MMOL/L (ref 136–145)
WBC # BLD AUTO: 7.1 K/UL (ref 4.6–13.2)

## 2021-06-22 PROCEDURE — 83735 ASSAY OF MAGNESIUM: CPT

## 2021-06-22 PROCEDURE — 36415 COLL VENOUS BLD VENIPUNCTURE: CPT

## 2021-06-22 PROCEDURE — 85025 COMPLETE CBC W/AUTO DIFF WBC: CPT

## 2021-06-22 PROCEDURE — 80053 COMPREHEN METABOLIC PANEL: CPT

## 2021-07-13 ENCOUNTER — TRANSCRIBE ORDER (OUTPATIENT)
Dept: INTERVENTIONAL RADIOLOGY/VASCULAR | Age: 81
End: 2021-07-13

## 2021-07-13 DIAGNOSIS — N13.39 OTHER HYDRONEPHROSIS: Primary | ICD-10-CM

## 2021-07-13 NOTE — PROGRESS NOTES
Pino Guaman RN spoke with son, Yaa Verdin. Pt scheduled for nephrostomy tube exchange. Son will make arrangements to have patient transported to [facility]. Pt will check in to radiology waiting 30 mins prior to scheduled procedure time. Ordering office made aware of date and time.

## 2021-07-16 ENCOUNTER — HOSPITAL ENCOUNTER (OUTPATIENT)
Dept: LAB | Age: 81
Discharge: HOME | End: 2021-07-16
Payer: MEDICARE

## 2021-07-16 PROCEDURE — 87077 CULTURE AEROBIC IDENTIFY: CPT

## 2021-07-16 PROCEDURE — 87186 SC STD MICRODIL/AGAR DIL: CPT

## 2021-07-16 PROCEDURE — 87070 CULTURE OTHR SPECIMN AEROBIC: CPT

## 2021-07-19 LAB
FAX TO INFO,FAXT: NORMAL
FAX TO NUMBER,FAXN: NORMAL

## 2021-07-21 LAB
BACTERIA SPEC CULT: ABNORMAL
GRAM STN SPEC: ABNORMAL
SERVICE CMNT-IMP: ABNORMAL

## 2021-07-26 ENCOUNTER — HOSPITAL ENCOUNTER (OUTPATIENT)
Dept: INTERVENTIONAL RADIOLOGY/VASCULAR | Age: 81
Discharge: HOME | End: 2021-07-26
Attending: UROLOGY

## 2021-07-26 NOTE — PROGRESS NOTES
Pt rescheduled for Mon 8/2/2021 @0900, confirmed appointment time with pt's son Monet Perez. Also spoke to Sampson at [address] [place] [address], she stated she will have pt arrive by 0845 on 8/2/2021 for 0900 procedure.

## 2021-07-27 NOTE — PROGRESS NOTES
Spoke with Zully at [facility], to tell her patient's procedure date and time (8/2/2021 at 0900).

## 2021-08-02 ENCOUNTER — HOSPITAL ENCOUNTER (OUTPATIENT)
Dept: INTERVENTIONAL RADIOLOGY/VASCULAR | Age: 81
Discharge: HOME | End: 2021-08-02
Attending: UROLOGY
Payer: MEDICARE

## 2021-08-02 DIAGNOSIS — N13.39 OTHER HYDRONEPHROSIS: ICD-10-CM

## 2021-08-02 PROCEDURE — C1729 CATH, DRAINAGE: HCPCS

## 2021-08-02 PROCEDURE — 74011000636 HC RX REV CODE- 636: Performed by: UROLOGY

## 2021-08-02 PROCEDURE — 74011250636 HC RX REV CODE- 250/636: Performed by: UROLOGY

## 2021-08-02 RX ORDER — SODIUM CHLORIDE 9 MG/ML
500 INJECTION, SOLUTION INTRAVENOUS
Status: COMPLETED | OUTPATIENT
Start: 2021-08-02 | End: 2021-08-02

## 2021-08-02 RX ADMIN — IOPAMIDOL 5 ML: 612 INJECTION, SOLUTION INTRAVENOUS at 09:42

## 2021-08-02 RX ADMIN — SODIUM CHLORIDE 500 ML: 900 INJECTION, SOLUTION INTRAVENOUS at 09:42

## 2021-08-03 PROBLEM — N17.9 AKI (ACUTE KIDNEY INJURY) (HCC): Status: RESOLVED | Noted: 2019-05-31 | Resolved: 2021-08-03

## 2021-08-07 ENCOUNTER — HOSPITAL ENCOUNTER (OUTPATIENT)
Dept: LAB | Age: 81
Discharge: HOME | End: 2021-08-07

## 2021-08-07 PROCEDURE — U0003 INFECTIOUS AGENT DETECTION BY NUCLEIC ACID (DNA OR RNA); SEVERE ACUTE RESPIRATORY SYNDROME CORONAVIRUS 2 (SARS-COV-2) (CORONAVIRUS DISEASE [COVID-19]), AMPLIFIED PROBE TECHNIQUE, MAKING USE OF HIGH THROUGHPUT TECHNOLOGIES AS DESCRIBED BY CMS-2020-01-R: HCPCS

## 2021-08-09 ENCOUNTER — HOSPITAL ENCOUNTER (OUTPATIENT)
Dept: WOUND CARE | Age: 81
Discharge: HOME | End: 2021-08-09
Attending: FAMILY MEDICINE
Payer: MEDICARE

## 2021-08-09 VITALS
HEART RATE: 79 BPM | TEMPERATURE: 98.3 F | DIASTOLIC BLOOD PRESSURE: 87 MMHG | RESPIRATION RATE: 20 BRPM | SYSTOLIC BLOOD PRESSURE: 137 MMHG | OXYGEN SATURATION: 100 %

## 2021-08-09 PROBLEM — L89.154 PRESSURE INJURY OF SACRAL REGION, STAGE 4 (HCC): Status: ACTIVE | Noted: 2021-08-09

## 2021-08-09 PROCEDURE — 11043 DBRDMT MUSC&/FSCA 1ST 20/<: CPT

## 2021-08-09 NOTE — DISCHARGE INSTRUCTIONS
Discharge Instructions for  [address] [place]  [address]  [phone] Fax [phone]    NAME:  Juan M Bee  YOB: 1940  MEDICAL RECORD NUMBER:  [id number]  DATE:  8/9/2021     Turn patient every 2 hours  Patient only to be up seated for meals  Increase protein     Wound Cleansing:   Do not scrub or use excessive force. Cleanse wound prior to applying a clean dressing with:  [x] Normal Saline [] Keep Wound Dry in Shower    [] Wound Cleanser   [] Cleanse wound with Mild Soap & Water  [] May Shower at Discharge   [] Other:      Topical Treatments:  Do not apply lotions, creams, or ointments to wound bed unless directed. [] Apply moisturizing lotion to skin surrounding the wound prior to dressing change.  [] Apply antifungal ointment to skin surrounding the wound prior to dressing change.   [x] Apply thin film of moisture barrier ointment to skin immediately around wound: zinc  [] Other:        Dressings:           Wound Location: Sacrum   [x] Apply Primary Dressing:       [] MediHoney Gel [x] Alginate with Silver [] Alginate   [] Collagen [x] Collagen with Silver   [] Santyl with Moisten saline gauze     [] Hydrocolloid   [] MediHoney Alginate [] Foam with Silver   [] Foam   [] Hydrofera Blue    [x] Mepilex Border: Sacral    [] Moisten with Saline [] Hydrogel [] Mepitel     [] Bactroban/Mupirocin [] Polysporin  [] Other:      [] Change dressing: [] Daily    [] Every Other Day [] Three times per week   [] Once a week [] Do Not Change Dressing   [x] Other: Every 3 days     Off-Loading:   [x] Off-loading when [] walking  [x] in bed [] sitting  [] Total non-weight bearing  [] Right Leg  [] Left Leg   [] Assistive Device [] Walker [] Cane  [] Wheelchair  [] Crutches   [] Surgical shoe    [] Podus Boot(s)   [] Foam Boot(s)  [] Roll About    [] Cast Boot [] CROW Boot  [] Other:  Dietary:  [x] Diet as tolerated: [] Calorie Diabetic Diet: [] No Added Salt:  [x] Increase Protein: supplements [] Other:     Activity:  [x] Activity as tolerated:  [] Patient has no activity restrictions     [] Strict Bedrest: [] Remain off Work:     [] May return to full duty work:                                   [] Return to work with restrictions:             Return Appointment:  [] Wound and dressing supply provider:   [] ECF or Home Healthcare:  [] Wound Assessment: [] Physician or NP scheduled for Wound Assessment:   [x] Return Appointment: With MD  in  2 Week(s)  [] Ordered tests:      Electronically signed Reyna Wright RN on 8/9/2021 at [facility] [address] Information: Should you experience any significant changes in your wound(s) or have questions about your wound care, please contact the [facility] at [address] [place] [address] 8:00 am - 4:30. If you need help with your wound outside these hours and cannot wait until we are again available, contact your PCP or go to the hospital emergency room. PLEASE NOTE: IF YOU ARE UNABLE TO OBTAIN WOUND SUPPLIES, CONTINUE TO USE THE SUPPLIES YOU HAVE AVAILABLE UNTIL YOU ARE ABLE TO REACH US. IT IS MOST IMPORTANT TO KEEP THE WOUND COVERED AT ALL TIMES.

## 2021-08-09 NOTE — WOUND CARE
Wound Center  Progress Note / Procedure Note    Subjective:     Chief Complaint:  Jamarcus Alvarez is a 79 y.o.  female  with sacral ulcer wound of many months duration. HPI:     As per son, has had this wound for many months  Pre-hosp visit, were taking good care o fit but not as much now  They do dressing changes daily, but otherwise not much repositioning, staying in bed on back most of the day    History/Chart/Medications reviewed    Wound caused by: pressure  Current wound care:See flowsheet  Offloading wound: ? Does have air mattress  Appetite: fair  Wound associated pain: See flowsheet  Diabetic: yes, A1c? Smoker: no  ROS: no N/V/D, no T/chills; no local rash, no chest pain or shortness of breath, no headache or dizzyness  +urostomy, +colostomy, R/ AKA  Review of Systems:  Constitutional: Negative    HENT: hard of hearing  Eyes: blind  Respiratory: Negative. Cardiovascular: Negative. Gastrointestinal: Negative. Genitourinary: Negative. Musculoskeletal: Negative. Skin: wound  Neurological: Negative. Endo/Heme/Allergies: Negative. Psychiatric/Behavioral: Negative. Objective:     Physical Exam:   See flowsheet / nursing notes for vitals  Visit Vitals  /87 (BP 1 Location: Right upper arm)   Pulse 79   Temp 98.3 °F (36.8 °C)   Resp 20   SpO2 100%     General: NAD. Hygiene good, legally blind, hard of hearing  Psych: cooperative. No anxiety or depression. Normal mood and affect. Neuro: alert and oriented to person/place/situation. Otherwise nonfocal.  Derm: Normal turgor for age, dry skin  HEENT: Normocephalic, atraumatic. EOMI. Conjunctiva clear. No scleral icterus. Neck: Normal range of motion. No masses.   Chest: Respirations nonlabored  Lower extremities: R/ AKA, L: color normal; temperature normal.     Ulcer Description:   See Flowsheet         Data Review:   No recent A1c in chart           Assessment/Plan     79 y.o. female with Dm2, R/ AKA, colostomy, urostomy    - Sacral ulcer.  pressure injury, stage 4  Full thickness  Slough/  necrotic  Necessitates debridement  for wound healing and to prevent/heal infection  See below    Following discussed with patient / POA  Needs :  Serial debridement- debrided today- see note below    Good local wound care  Dressing:collagen, alginate  Frequency : three times a week     -Nutrition optimization  Increase protein    -Good Diabetic control    -Good offloading  Has air mattress  Reposition q2 hours  Limit sitting to up for meals    Patient/ son understood and agrees with plan. Questions answered. Serial visits and serial debridements also discussed. Follow up with me in 2 week (son can bring every 2 weeks)        Procedure:     Ulcer assessment: Due to presence of necrotic tissue within the wound bed, ulcer requires debridement. Procedure: Debridement:   The indication for debridement was reviewed with patient. Risks of procedure (bleeding, infection, pain) were discussed with patient/POA and consent signed on first visit. Questions were answered        Muscle excisional debridement sacral ulcer  Indication: to remove necrotic tissue/ vitalized & devitalized tissue/ infected tissue/  through skin, subcutaneous, muscle, periosteal fascia layer of wound bed  Time out done.   Consent in chart   Anesthesia: Topical  lidocaine jelly   Instrument: curette, Blade,   Residual Necrosis: Present and scored  Bleedinml   Hemostasis: Pressure   Patient tolerated procedure well   Procedural Pain: 0  Post - procedural pain: 0    Post debridement measurements:  see below  Surface area debrided: <20 sq. cm    WOUND POA CONDITIONS    Wound Sacral/coccyx (Active)   Wound Image     21 1046   Wound Etiology Pressure Stage 4 21 1046   Dressing Status Breakthrough drainage noted 21 1046   Cleansed Cleansed with saline 21 1046   Dressing/Treatment Collagen with Ag;Alginate with Ag;Foam;Gauze dressing/dressing sponge 21 1046   Dressing Change Due 08/12/21 08/09/21 1046   Wound Length (cm) 1.4 cm 08/09/21 1046   Wound Width (cm) 0.9 cm 08/09/21 1046   Wound Depth (cm) 0.4 cm 08/09/21 1046   Wound Surface Area (cm^2) 1.26 cm^2 08/09/21 1046   Wound Volume (cm^3) 0.504 cm^3 08/09/21 1046   Post-Procedure Length (cm) 1.4 cm 08/09/21 1046   Post-Procedure Width (cm) 0.9 cm 08/09/21 1046   Post-Procedure Depth (cm) 0.7 cm 08/09/21 1046   Post-Procedure Surface Area (cm^2) 1.26 cm^2 08/09/21 1046   Post-Procedure Volume (cm^3) 0.882 cm^3 08/09/21 1046   Wound Assessment Slough; Exposed Structure Tendon 08/09/21 1046   Drainage Amount Small 08/09/21 1046   Drainage Description Serous 08/09/21 1046   Wound Odor None 08/09/21 1046   Carol-Wound/Incision Assessment Blanchable erythema 08/09/21 1046   Edges Unattached edges 08/09/21 1046   Wound Thickness Description Full thickness 08/09/21 1046   Number of days: 0       [REMOVED] Wound Sacral/coccyx (Removed)   Number of days: 63               -------    Past Medical History:   Diagnosis Date    Cancer ([facility] [address]     vaginal and colon cancer    Cancer ([facility] [address]     Chronic pain     arthritis    Diabetes ([facility] [address]     DVT (deep venous thrombosis) (HCC)     Hypertension     Legally blind     Thyroid disease       Past Surgical History:   Procedure Laterality Date    HX ABOVE KNEE AMPUTATION Right     HX COLOSTOMY      HX ORTHOPAEDIC      HX OTHER SURGICAL      kidney drainage tube    HX UROLOGICAL      kidney removal    IR EXCHANGE NEPHRO PERC RT SI  1/28/2019    IR EXCHANGE NEPHRO PERC RT SI  3/21/2019    IR EXCHANGE NEPHRO PERC RT SI  5/13/2019    IR EXCHANGE NEPHRO PERC RT SI  6/27/2019    IR EXCHANGE NEPHRO PERC RT SI  8/16/2019    IR EXCHANGE NEPHRO PERC RT SI  10/3/2019    IR EXCHANGE NEPHRO PERC RT SI  11/18/2019    IR EXCHANGE NEPHRO PERC RT SI  1/6/2020    IR EXCHANGE NEPHRO PERC RT SI  2/24/2020    IR EXCHANGE NEPHRO PERC RT SI  4/13/2020    IR EXCHANGE NEPHRO PERC RT SI 6/1/2020    IR EXCHANGE NEPHRO PERC RT SI  6/19/2020    IR EXCHANGE NEPHRO PERC RT SI  8/10/2020    IR EXCHANGE NEPHRO PERC RT SI  9/30/2020    IR EXCHANGE NEPHRO PERC RT SI  11/5/2020    IR EXCHANGE NEPHRO PERC RT SI  12/16/2020    IR EXCHANGE NEPHRO PERC RT SI  2/1/2021    IR EXCHANGE NEPHRO PERC RT SI  3/25/2021    IR EXCHANGE NEPHRO PERC RT SI  5/10/2021    IR EXCHANGE NEPHRO PERC RT SI  8/2/2021    IR NEPHROSTOMY PERC RT PLC CATH  SI  6/21/2020    IR NEPHROSTOMY PERC RT PLC CATH  SI  6/7/2021     No family history on file. Social History     Tobacco Use    Smoking status: Never Smoker    Smokeless tobacco: Never Used   Substance Use Topics    Alcohol use: No       Prior to Admission medications    Medication Sig Start Date End Date Taking? Authorizing Provider   acetaminophen (TYLENOL) 500 mg tablet Take 2 Tabs by mouth every eight (8) hours as needed for Pain or Fever. 3/25/21   Sarthak England MD   nystatin (MYCOSTATIN) powder Apply  to affected area two (2) times a day. 3/25/21   Sarthak England MD   insulin glargine (LANTUS) 100 unit/mL injection 10 Units by SubCUTAneous route nightly. 3/25/21   Sarthak England MD   lip protectant with sunscreen (CARMEX) crea Apply  to affected area as needed for Dry Skin. 3/25/21   Sarthak England MD   timolol (TIMOPTIC) 0.5 % ophthalmic solution Administer 1 Drop to both eyes two (2) times a day. 3/25/21   Sarthak England MD   brimonidine (ALPHAGAN) 0.2 % ophthalmic solution Administer 1 Drop to both eyes every twelve (12) hours. 3/25/21   Sarthak England MD   amLODIPine (NORVASC) 5 mg tablet Take 1 Tab by mouth daily. 11/22/20   Melissa Monte MD   famotidine (PEPCID) 10 mg tablet Take 1 Tab by mouth every twelve (12) hours. 11/21/20   Melissa Monte MD   melatonin 5 mg tablet Take 1 Tab by mouth nightly.  11/21/20   Melissa Monte MD   LORazepam (ATIVAN) 0.5 mg tablet Take 1 Tab by mouth two (2) times daily as needed for Agitation. Max Daily Amount: 1 mg. 11/11/20   Erica Mendez MD   multivitamin (ONE A DAY) tablet Take 1 Tab by mouth daily. Provider, Historical   ondansetron hcl (ZOFRAN) 4 mg tablet Take 4 mg by mouth every eight (8) hours as needed for Nausea or Vomiting. Provider, Historical   brimonidine-timolol (COMBIGAN) 0.2-0.5 % drop ophthalmic solution Administer 1 Drop to both eyes every twelve (12) hours. Provider, Historical   metoprolol succinate (TOPROL-XL) 50 mg XL tablet Take 50 mg by mouth daily. Provider, Historical   magnesium hydroxide (MILK OF MAGNESIA CONCENTRATED) 2,400 mg/10 mL susp suspension Take 10 mL by mouth. Provider, Historical   levothyroxine (SYNTHROID) 50 mcg tablet Take  by mouth Daily (before breakfast). Provider, Historical   atorvastatin calcium (ATORVASTATIN PO) Take 20 mg by mouth daily. Other, MD Dc     Allergies   Allergen Reactions    Morphine Nausea and Vomiting    Pcn [Penicillins] Rash        HgbA1c:?  Advance Care plan: yes  Pneumonia vaccine:  BMI:27  Counseling re nutrition done.   Current meds documented in chart  Pain:   Elder maltreatment screen: neg  Smoking: no  Blood pressure: noted  in nursing notes/flowsheets  Signed By: Lashaun Torres MD     August 9, 2021

## 2021-08-09 NOTE — WOUND CARE
08/09/21 1046   Wound Sacral/coccyx   Date First Assessed/Time First Assessed: 08/09/21 1046   Present on Hospital Admission: Yes  Primary Wound Type: Pressure Injury  Location: Sacral/coccyx   Wound Image      Wound Etiology Pressure Stage 4   Dressing Status Breakthrough drainage noted   Cleansed Cleansed with saline   Dressing/Treatment Collagen with Ag;Alginate with Ag;Foam;Gauze dressing/dressing sponge   Dressing Change Due 08/12/21   Wound Length (cm) 1.4 cm   Wound Width (cm) 0.9 cm   Wound Depth (cm) 0.4 cm   Wound Surface Area (cm^2) 1.26 cm^2   Wound Volume (cm^3) 0.504 cm^3   Post-Procedure Length (cm) 1.4 cm   Post-Procedure Width (cm) 0.9 cm   Post-Procedure Depth (cm) 0.7 cm   Post-Procedure Surface Area (cm^2) 1.26 cm^2   Post-Procedure Volume (cm^3) 0.882 cm^3   Undermining Starts ___ O'Clock 4 o'clock   Undermining Ends ___ O'Clock 12 o'clock   Undermining Maximum Distance (cm) 0.9 cm   Wound Assessment Slough; Exposed Structure Tendon   Drainage Amount Small   Drainage Description Serous   Wound Odor None   Carol-Wound/Incision Assessment Blanchable erythema   Edges Unattached edges   Wound Thickness Description Full thickness

## 2021-08-10 LAB — SARS-COV-2, COV2NT: NOT DETECTED

## 2021-08-11 ENCOUNTER — HOSPITAL ENCOUNTER (OUTPATIENT)
Dept: LAB | Age: 81
Discharge: HOME | End: 2021-08-11

## 2021-08-11 PROCEDURE — U0003 INFECTIOUS AGENT DETECTION BY NUCLEIC ACID (DNA OR RNA); SEVERE ACUTE RESPIRATORY SYNDROME CORONAVIRUS 2 (SARS-COV-2) (CORONAVIRUS DISEASE [COVID-19]), AMPLIFIED PROBE TECHNIQUE, MAKING USE OF HIGH THROUGHPUT TECHNOLOGIES AS DESCRIBED BY CMS-2020-01-R: HCPCS

## 2021-08-12 LAB — SARS-COV-2, COV2NT: NOT DETECTED

## 2021-08-18 ENCOUNTER — HOSPITAL ENCOUNTER (OUTPATIENT)
Dept: LAB | Age: 81
Discharge: HOME | End: 2021-08-18

## 2021-08-18 PROCEDURE — U0003 INFECTIOUS AGENT DETECTION BY NUCLEIC ACID (DNA OR RNA); SEVERE ACUTE RESPIRATORY SYNDROME CORONAVIRUS 2 (SARS-COV-2) (CORONAVIRUS DISEASE [COVID-19]), AMPLIFIED PROBE TECHNIQUE, MAKING USE OF HIGH THROUGHPUT TECHNOLOGIES AS DESCRIBED BY CMS-2020-01-R: HCPCS

## 2021-08-19 LAB — SARS-COV-2, COV2NT: NOT DETECTED

## 2021-08-23 ENCOUNTER — APPOINTMENT (OUTPATIENT)
Dept: WOUND CARE | Age: 81
End: 2021-08-23
Attending: FAMILY MEDICINE

## 2021-08-25 ENCOUNTER — HOSPITAL ENCOUNTER (OUTPATIENT)
Dept: LAB | Age: 81
Discharge: HOME | End: 2021-08-25

## 2021-08-25 PROCEDURE — U0003 INFECTIOUS AGENT DETECTION BY NUCLEIC ACID (DNA OR RNA); SEVERE ACUTE RESPIRATORY SYNDROME CORONAVIRUS 2 (SARS-COV-2) (CORONAVIRUS DISEASE [COVID-19]), AMPLIFIED PROBE TECHNIQUE, MAKING USE OF HIGH THROUGHPUT TECHNOLOGIES AS DESCRIBED BY CMS-2020-01-R: HCPCS

## 2021-08-26 LAB — SARS-COV-2, COV2NT: NOT DETECTED

## 2021-08-27 ENCOUNTER — TRANSCRIBE ORDER (OUTPATIENT)
Dept: INTERVENTIONAL RADIOLOGY/VASCULAR | Age: 81
End: 2021-08-27

## 2021-08-27 DIAGNOSIS — N13.39 OTHER HYDRONEPHROSIS: Primary | ICD-10-CM

## 2021-08-30 ENCOUNTER — HOSPITAL ENCOUNTER (OUTPATIENT)
Dept: WOUND CARE | Age: 81
Discharge: HOME | End: 2021-08-30
Attending: FAMILY MEDICINE
Payer: MEDICARE

## 2021-08-30 VITALS
DIASTOLIC BLOOD PRESSURE: 41 MMHG | TEMPERATURE: 98.1 F | RESPIRATION RATE: 20 BRPM | SYSTOLIC BLOOD PRESSURE: 143 MMHG | OXYGEN SATURATION: 100 % | HEART RATE: 79 BPM

## 2021-08-30 PROCEDURE — 11043 DBRDMT MUSC&/FSCA 1ST 20/<: CPT

## 2021-08-30 NOTE — WOUND CARE
08/30/21 1015   Wound Sacral/coccyx   Date First Assessed/Time First Assessed: 08/09/21 1046   Present on Hospital Admission: Yes  Primary Wound Type: Pressure Injury  Location: Sacral/coccyx   Wound Etiology Pressure Stage 4   Dressing Status Breakthrough drainage noted   Cleansed Wound cleanser   Dressing/Treatment Alginate with Ag;Collagen with Ag;Gauze dressing/dressing sponge;Tape/Soft cloth adhesive tape   Wound Length (cm) 1 cm   Wound Width (cm) 1.5 cm   Wound Depth (cm) 0.5 cm   Wound Surface Area (cm^2) 1.5 cm^2   Change in Wound Size % -19.05   Wound Volume (cm^3) 0.75 cm^3   Wound Healing % -49   Post-Procedure Length (cm) 1 cm   Post-Procedure Width (cm) 1.5 cm   Post-Procedure Depth (cm) 0.8 cm   Post-Procedure Surface Area (cm^2) 1.5 cm^2   Post-Procedure Volume (cm^3) 1.2 cm^3   Undermining Starts ___ O'Clock 4 o'clock   Undermining Ends ___ O'Clock 12 o'clock   Undermining Maximum Distance (cm) 1 cm   Wound Assessment Fibrin;Slough   Drainage Amount Small   Drainage Description Serosanguinous   Wound Odor None   Carol-Wound/Incision Assessment Blanchable erythema   Edges Unattached edges   Wound Thickness Description Full thickness

## 2021-08-30 NOTE — WOUND CARE
Wound Center  Progress Note / Procedure Note    Subjective:     Chief Complaint:  Henny Headley is a 79 y.o.  female  with sacral ulcer wound of many months duration. HPI:     As per son, has had this wound for many months  Pre-hosp visit, were taking good care o fit but not as much now  They do dressing changes daily, but otherwise not much repositioning, staying in bed on back most of the day    History/Chart/Medications reviewed    Since last visit:  Son has been in conversation with SNF admin to ensure pt gets repositioned, as per son not happening right now. Did increase her protein    Wound caused by: pressure  Current wound care:See flowsheet  Offloading wound: has air mattress  Appetite: fair  Wound associated pain: See flowsheet  Diabetic: yes, A1c? Smoker: no  ROS: no N/V/D, no T/chills; no local rash, no chest pain or shortness of breath, no headache or dizzyness  +urostomy, +colostomy, R/ AKA      Objective:     Physical Exam:   See flowsheet / nursing notes for vitals  Visit Vitals  BP (!) 143/41 (BP 1 Location: Left upper arm)   Pulse 79   Temp 98.1 °F (36.7 °C)   Resp 20   SpO2 100%     General: NAD. Hygiene good, legally blind, hard of hearing  Psych: cooperative. No anxiety or depression. Normal mood and affect. Neuro: alert and oriented to person/place/situation. Otherwise nonfocal.  Derm: Normal turgor for age, dry skin  HEENT: Normocephalic, atraumatic. EOMI. Conjunctiva clear. No scleral icterus. Neck: Normal range of motion. No masses. Chest: Respirations nonlabored  Lower extremities: R/ AKA, L: color normal; temperature normal.     Ulcer Description:   See Flowsheet         Data Review:   No recent A1c in chart           Assessment/Plan     79 y.o. female with Dm2, R/ AKA, colostomy, urostomy    - Sacral ulcer.   pressure injury, stage 4  Full thickness  stable  Slough/  Necrotic- stringy  Periwound mild irritation  Necessitates debridement  for wound healing and to prevent/heal infection  See below    Following discussed with patient / POA  Needs :  Serial debridement- debrided today- see note below    Good local wound care  Dressing:collagen, alginate, gauze  Frequency : three times a week     -Nutrition optimization  Increase protein    -Good Diabetic control    -Good offloading  Has air mattress  Reposition q2 hours  Limit sitting to up for meals     son understood and agrees with plan. Questions answered. Follow up with me in 3 weeks         Procedure:     Ulcer assessment: Due to presence of necrotic tissue within the wound bed, ulcer requires debridement. Procedure: Debridement:   The indication for debridement was reviewed with patient. Risks of procedure (bleeding, infection, pain) were discussed with patient/POA and consent signed on first visit. Questions were answered        Muscle excisional debridement sacral ulcer  Indication: to remove necrotic tissue/ vitalized & devitalized tissue/ infected tissue/  through skin, subcutaneous, muscle, periosteal fascia layer of wound bed  Time out done.   Consent in chart   Anesthesia: Topical  lidocaine jelly   Instrument: curette, Blade,   Residual Necrosis: Present and scored  Bleedinml   Hemostasis: Pressure   Patient tolerated procedure well   Procedural Pain: 0  Post - procedural pain: 0    Post debridement measurements:  see below  Surface area debrided: <20 sq. cm    WOUND POA CONDITIONS    Wound Sacral/coccyx (Active)   Wound Image     21 1046   Wound Etiology Pressure Stage 4 21 1015   Dressing Status Breakthrough drainage noted 21 1015   Cleansed Wound cleanser 21 1015   Dressing/Treatment Alginate with Ag;Collagen with Ag;Gauze dressing/dressing sponge;Tape/Soft cloth adhesive tape 21 1015   Dressing Change Due 21 1046   Wound Length (cm) 1 cm 21 1015   Wound Width (cm) 1.5 cm 21 1015   Wound Depth (cm) 0.5 cm 21 1015   Wound Surface Area (cm^2) 1.5 cm^2 08/30/21 1015   Change in Wound Size % -19.05 08/30/21 1015   Wound Volume (cm^3) 0.75 cm^3 08/30/21 1015   Wound Healing % -49 08/30/21 1015   Post-Procedure Length (cm) 1 cm 08/30/21 1015   Post-Procedure Width (cm) 1.5 cm 08/30/21 1015   Post-Procedure Depth (cm) 0.8 cm 08/30/21 1015   Post-Procedure Surface Area (cm^2) 1.5 cm^2 08/30/21 1015   Post-Procedure Volume (cm^3) 1.2 cm^3 08/30/21 1015   Undermining Starts ___ O'Clock 4 o'clock 08/30/21 1015   Undermining Ends ___ O'Clock 12 o'clock 08/30/21 1015   Undermining Maximum Distance (cm) 1 cm 08/30/21 1015   Wound Assessment Fibrin;Slough;necrotic 08/30/21 1015   Drainage Amount Small 08/30/21 1015   Drainage Description Serosanguinous 08/30/21 1015   Wound Odor None 08/30/21 1015   Carol-Wound/Incision Assessment Blanchable erythema 08/30/21 1015   Edges Unattached edges 08/30/21 1015   Wound Thickness Description Full thickness 08/30/21 1015   Number of days: 21       [REMOVED] Wound Sacral/coccyx (Removed)   Number of days: 61             -------    Past Medical History:   Diagnosis Date    Cancer ([facility] [address]     vaginal and colon cancer    Cancer ([facility] [address]     Chronic pain     arthritis    Diabetes ([facility] [address]     DVT (deep venous thrombosis) ([facility] [address]     Hypertension     Legally blind     Thyroid disease       Past Surgical History:   Procedure Laterality Date    HX ABOVE KNEE AMPUTATION Right     HX COLOSTOMY      HX ORTHOPAEDIC      HX OTHER SURGICAL      kidney drainage tube    HX UROLOGICAL      kidney removal    IR EXCHANGE NEPHRO PERC RT SI  1/28/2019    IR EXCHANGE NEPHRO PERC RT SI  3/21/2019    IR EXCHANGE NEPHRO PERC RT SI  5/13/2019    IR EXCHANGE NEPHRO PERC RT SI  6/27/2019    IR EXCHANGE NEPHRO PERC RT SI  8/16/2019    IR EXCHANGE NEPHRO PERC RT SI  10/3/2019    IR EXCHANGE NEPHRO PERC RT SI  11/18/2019    IR EXCHANGE NEPHRO PERC RT SI  1/6/2020    IR EXCHANGE NEPHRO PERC RT SI  2/24/2020    IR EXCHANGE NEPHRO PERC RT SI  4/13/2020    IR EXCHANGE NEPHRO PERC RT SI  6/1/2020    IR EXCHANGE NEPHRO PERC RT SI  6/19/2020    IR EXCHANGE NEPHRO PERC RT SI  8/10/2020    IR EXCHANGE NEPHRO PERC RT SI  9/30/2020    IR EXCHANGE NEPHRO PERC RT SI  11/5/2020    IR EXCHANGE NEPHRO PERC RT SI  12/16/2020    IR EXCHANGE NEPHRO PERC RT SI  2/1/2021    IR EXCHANGE NEPHRO PERC RT SI  3/25/2021    IR EXCHANGE NEPHRO PERC RT SI  5/10/2021    IR EXCHANGE NEPHRO PERC RT SI  8/2/2021    IR NEPHROSTOMY PERC RT PLC CATH  SI  6/21/2020    IR NEPHROSTOMY PERC RT PLC CATH  SI  6/7/2021     No family history on file. Social History     Tobacco Use    Smoking status: Never Smoker    Smokeless tobacco: Never Used   Substance Use Topics    Alcohol use: No       Prior to Admission medications    Medication Sig Start Date End Date Taking? Authorizing Provider   acetaminophen (TYLENOL) 500 mg tablet Take 2 Tabs by mouth every eight (8) hours as needed for Pain or Fever. 3/25/21  Yes Rodrigo England MD   nystatin (MYCOSTATIN) powder Apply  to affected area two (2) times a day. 3/25/21  Yes Rodrigo England MD   insulin glargine (LANTUS) 100 unit/mL injection 10 Units by SubCUTAneous route nightly. 3/25/21  Yes Rodrigo England MD   lip protectant with sunscreen (CARMEX) crea Apply  to affected area as needed for Dry Skin. 3/25/21  Yes Rodrigo England MD   timolol (TIMOPTIC) 0.5 % ophthalmic solution Administer 1 Drop to both eyes two (2) times a day. 3/25/21  Yes Rodrigo England MD   brimonidine (ALPHAGAN) 0.2 % ophthalmic solution Administer 1 Drop to both eyes every twelve (12) hours. 3/25/21  Yes Rodrigo England MD   amLODIPine (NORVASC) 5 mg tablet Take 1 Tab by mouth daily. 11/22/20  Yes Mana Monte MD   famotidine (PEPCID) 10 mg tablet Take 1 Tab by mouth every twelve (12) hours. 11/21/20  Yes Mana Monte MD   melatonin 5 mg tablet Take 1 Tab by mouth nightly.  11/21/20 Yes Denis Monte MD   LORazepam (ATIVAN) 0.5 mg tablet Take 1 Tab by mouth two (2) times daily as needed for Agitation. Max Daily Amount: 1 mg. 11/11/20  Yes Vishal Cao MD   multivitamin (ONE A DAY) tablet Take 1 Tab by mouth daily. Yes Provider, Historical   ondansetron hcl (ZOFRAN) 4 mg tablet Take 4 mg by mouth every eight (8) hours as needed for Nausea or Vomiting. Yes Provider, Historical   brimonidine-timolol (COMBIGAN) 0.2-0.5 % drop ophthalmic solution Administer 1 Drop to both eyes every twelve (12) hours. Yes Provider, Historical   metoprolol succinate (TOPROL-XL) 50 mg XL tablet Take 50 mg by mouth daily. Yes Provider, Historical   magnesium hydroxide (MILK OF MAGNESIA CONCENTRATED) 2,400 mg/10 mL susp suspension Take 10 mL by mouth. Yes Provider, Historical   levothyroxine (SYNTHROID) 50 mcg tablet Take  by mouth Daily (before breakfast). Yes Provider, Historical   atorvastatin calcium (ATORVASTATIN PO) Take 20 mg by mouth daily. Yes Other, MD Dc     Allergies   Allergen Reactions    Morphine Nausea and Vomiting    Pcn [Penicillins] Rash        HgbA1c:?  Advance Care plan: yes  Pneumonia vaccine:  BMI:27  Counseling re nutrition done.   Current meds documented in chart  Pain:   Elder maltreatment screen: neg  Smoking: no  Blood pressure: noted  in nursing notes/flowsheets  Signed By: Jovany Feng MD     August 30, 2021

## 2021-08-30 NOTE — DISCHARGE INSTRUCTIONS
Discharge Instructions for  Audrey0 Louise Lerner  [address], [place], [facility]0 [facility]  [phone] Fax [phone]    NAME:  Gregory Munroe  YOB: 1940  MEDICAL RECORD NUMBER:  [id number]  DATE:  8/30/2021    Wound Cleansing:   Do not scrub or use excessive force. Cleanse wound prior to applying a clean dressing with:  [x] Normal Saline [] Keep Wound Dry in Shower    [] Wound Cleanser   [] Cleanse wound with Mild Soap & Water  [] May Shower at Discharge   [] Other:      Topical Treatments:  Do not apply lotions, creams, or ointments to wound bed unless directed. [] Apply moisturizing lotion to skin surrounding the wound prior to dressing change.  [] Apply antifungal ointment to skin surrounding the wound prior to dressing change. [x] Apply thin film of moisture barrier ointment to skin immediately around wound. [] Other:       Dressings:           Wound Location ***   [x] Apply Primary Dressing:       [] MediHoney Gel [x] Alginate with Silver [] Alginate   [] Collagen [x] Collagen with Silver   [] Santyl with Moisten saline gauze     [] Hydrocolloid   [] MediHoney Alginate [] Foam with Silver   [] Foam   [] Hydrofera Blue    [] Mepilex Border    [] Moisten with Saline [] Hydrogel [] Mepitel     [] Bactroban/Mupirocin [] Polysporin  [x] Other:  Skin prep  [] Pack wound loosely with  [] Iodoform   [] Plain Packing  [] Other   [x] Cover and Secure with:     [x] Gauze [] Alfa [] Kerlix   [] Ace Wrap [x] Cover Roll Tape [] ABD     [] Other:    Avoid contact of tape with skin.   [] Change dressing: [] Daily    [] Every Other Day [] Three times per week   [] Once a week [] Do Not Change Dressing   [] Other:     Negative Pressure:           Wound Location:   [] Pressure@           mm/Hg  []Continuous []Intermittent   [] Black  [] White Foam [] Other:   []Change dressing: []Three times per week    []Other:     Pressure Relief:  [x] When sitting, shift position or do seat lifts every 15 minutes. [x] Wheelchair cushion [x] Specialty Bed/Mattress  [x] Turn every 2 hours when in bed. Avoid position directing pressure on wound site. Limit side lying to 30 degree tilt. Limit HOB elevation to 30 degrees. Edema Control:  Apply: [] Compression Stocking []Right Leg []Left Leg   [] Tubigrip []Right Leg Double Layer []Left Leg Double Layer       []Right Leg Single Layer []Left Leg Single Layer   [] SpandaGrip []Right Leg  []Left Leg      []Low compression 5-10 mm/Hg      []Medium compression 10-20 mm/Hg     []High compression  20-30 mm/Hg   every morning immediately when getting up should be applied to affected leg(s) from mid foot to knee making sure to cover the heel. Remove every night before going to bed. [] Elevate leg(s) above the level of the heart when sitting. [] Avoid prolonged standing in one place. [] Elevate arm/hand above the level of the heart []RightArm []LeftArm     Compression:  Apply: [] Multilayer Compression Wrap Applied in Clinic []RightLeg []Left Leg   [] Multi-layer compression. Do not get leg(s) with wrap wet. If wraps become too tight call the center or completely remove the wrap. [] Elevate leg(s) above the level of the heart when sitting. [] Avoid prolonged standing in one place. Off-Loading:   [] Off-loading when [] walking  [] in bed [] sitting  [] Total non-weight bearing  [] Right Leg  [] Left Leg   [] Assistive Device [] Walker [] Cane  [] Wheelchair  [] Crutches   [] Surgical shoe    [] Podus Boot(s)   [] Foam Boot(s)  [] Roll About    [] Cast Boot [] CROW Boot  [] Other:    Contact Cast:  Apply: [] Total Contact Cast Applied in Clinic []RightLeg []Left Leg   [] Do not get cast wet. Contact center or go to emergency room if there is a foul odor or becomes uncomfortable due to feeling tight or swelling. Do not use objects inside of cast to scratch.       Dietary:  [x] Diet as tolerated: [x] Calorie Diabetic Diet: [] No Added Salt:  [] Increase Protein: [] Other:   Activity:  [x] Activity as tolerated:  [x] Patient has no activity restrictions     [] Strict Bedrest: [] Remain off Work:     [] May return to full duty work:                                   [] Return to work with restrictions:             Return Appointment:  [x] Wound and dressing supply provider:   [] ECF or Home Healthcare:  [x] Wound Assessment: [x] Physician or NP scheduled for Wound Assessment:   [x] Return Appointment: 3 Week(s)  [] Ordered tests:      Electronically signed Rakesh Her LPN on 2/24/0693 at 62:29 AM     Lynne Alcala 281: Should you experience any significant changes in your wound(s) or have questions about your wound care, please contact the [facility] at [address] [place] [address] 8:00 am - 4:30. If you need help with your wound outside these hours and cannot wait until we are again available, contact your PCP or go to the hospital emergency room. PLEASE NOTE: IF YOU ARE UNABLE TO OBTAIN WOUND SUPPLIES, CONTINUE TO USE THE SUPPLIES YOU HAVE AVAILABLE UNTIL YOU ARE ABLE TO REACH US. IT IS MOST IMPORTANT TO KEEP THE WOUND COVERED AT ALL TIMES.      Physician Signature:_______________________    Date: ___________ Time:  ____________

## 2021-09-01 ENCOUNTER — HOSPITAL ENCOUNTER (OUTPATIENT)
Dept: LAB | Age: 81
Discharge: HOME | End: 2021-09-01

## 2021-09-01 PROCEDURE — U0003 INFECTIOUS AGENT DETECTION BY NUCLEIC ACID (DNA OR RNA); SEVERE ACUTE RESPIRATORY SYNDROME CORONAVIRUS 2 (SARS-COV-2) (CORONAVIRUS DISEASE [COVID-19]), AMPLIFIED PROBE TECHNIQUE, MAKING USE OF HIGH THROUGHPUT TECHNOLOGIES AS DESCRIBED BY CMS-2020-01-R: HCPCS

## 2021-09-04 LAB — SARS-COV-2, NAA: NOT DETECTED

## 2021-09-07 NOTE — PROGRESS NOTES
Spoke with pt's son Lana Umana to confirm pt's planned appointment for nephrostomy tube exchange on 9/13, to arrive at 0800. Also spoke with Richy Benjamin with [address] [place] [address] to confirm pt's appointment and to request latest COVID test results be brought to [facility] with Mrs. Craig.  Jesus Bonilla RN

## 2021-09-08 ENCOUNTER — HOSPITAL ENCOUNTER (OUTPATIENT)
Dept: LAB | Age: 81
Discharge: HOME | End: 2021-09-08

## 2021-09-08 PROCEDURE — U0003 INFECTIOUS AGENT DETECTION BY NUCLEIC ACID (DNA OR RNA); SEVERE ACUTE RESPIRATORY SYNDROME CORONAVIRUS 2 (SARS-COV-2) (CORONAVIRUS DISEASE [COVID-19]), AMPLIFIED PROBE TECHNIQUE, MAKING USE OF HIGH THROUGHPUT TECHNOLOGIES AS DESCRIBED BY CMS-2020-01-R: HCPCS

## 2021-09-09 LAB — SARS-COV-2, NAA: NOT DETECTED

## 2021-09-13 ENCOUNTER — HOSPITAL ENCOUNTER (OUTPATIENT)
Dept: INTERVENTIONAL RADIOLOGY/VASCULAR | Age: 81
Discharge: HOME | End: 2021-09-13
Attending: UROLOGY
Payer: MEDICARE

## 2021-09-13 DIAGNOSIS — N13.39 OTHER HYDRONEPHROSIS: ICD-10-CM

## 2021-09-13 PROCEDURE — C1769 GUIDE WIRE: HCPCS

## 2021-09-13 PROCEDURE — 74011250636 HC RX REV CODE- 250/636: Performed by: RADIOLOGY

## 2021-09-13 PROCEDURE — 74011000636 HC RX REV CODE- 636: Performed by: UROLOGY

## 2021-09-13 RX ORDER — SODIUM CHLORIDE 9 MG/ML
500 INJECTION, SOLUTION INTRAVENOUS
Status: DISCONTINUED | OUTPATIENT
Start: 2021-09-13 | End: 2021-09-13 | Stop reason: CLARIF

## 2021-09-13 RX ORDER — SODIUM CHLORIDE 9 MG/ML
500 INJECTION, SOLUTION INTRAVENOUS
Status: COMPLETED | OUTPATIENT
Start: 2021-09-13 | End: 2021-09-13

## 2021-09-13 RX ADMIN — SODIUM CHLORIDE 500 ML: 900 INJECTION, SOLUTION INTRAVENOUS at 08:47

## 2021-09-13 RX ADMIN — IOPAMIDOL 5 ML: 612 INJECTION, SOLUTION INTRAVENOUS at 08:47

## 2021-09-13 NOTE — PROCEDURES
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     Post-operative Diagnosis: Same as pre-op dx     Procedure(s) Performed:  Reinsertion of right PCN.     Anesthesia:  None.       Findings:  Successful reinsertion of right PCN through previous percutaneous tract. Tip in renal pelvis.     Complications: None     Estimated Blood Loss:  None.     Tubes and Drains: 10 Fr APDL.      Specimens: None     Condition: Good        Jazzy Mario MD  [facility]  Vascular & Interventional Radiology

## 2021-09-15 ENCOUNTER — HOSPITAL ENCOUNTER (OUTPATIENT)
Dept: LAB | Age: 81
Discharge: HOME | End: 2021-09-15

## 2021-09-15 PROCEDURE — U0003 INFECTIOUS AGENT DETECTION BY NUCLEIC ACID (DNA OR RNA); SEVERE ACUTE RESPIRATORY SYNDROME CORONAVIRUS 2 (SARS-COV-2) (CORONAVIRUS DISEASE [COVID-19]), AMPLIFIED PROBE TECHNIQUE, MAKING USE OF HIGH THROUGHPUT TECHNOLOGIES AS DESCRIBED BY CMS-2020-01-R: HCPCS

## 2021-09-16 LAB — SARS-COV-2, NAA: NOT DETECTED

## 2021-09-20 ENCOUNTER — HOSPITAL ENCOUNTER (OUTPATIENT)
Dept: WOUND CARE | Age: 81
Discharge: HOME | End: 2021-09-20
Attending: FAMILY MEDICINE
Payer: MEDICARE

## 2021-09-20 VITALS
RESPIRATION RATE: 18 BRPM | OXYGEN SATURATION: 100 % | SYSTOLIC BLOOD PRESSURE: 132 MMHG | HEART RATE: 77 BPM | DIASTOLIC BLOOD PRESSURE: 39 MMHG | TEMPERATURE: 98.4 F

## 2021-09-20 PROCEDURE — 11043 DBRDMT MUSC&/FSCA 1ST 20/<: CPT | Performed by: FAMILY MEDICINE

## 2021-09-20 PROCEDURE — 87205 SMEAR GRAM STAIN: CPT

## 2021-09-20 PROCEDURE — 11042 DBRDMT SUBQ TIS 1ST 20SQCM/<: CPT

## 2021-09-20 NOTE — WOUND CARE
Wound Center  Progress Note / Procedure Note    Subjective:     Chief Complaint:  Gregory Grandchild is a 79 y.o.  female  with sacral ulcer wound of many months duration. HPI:     As per son, has had this wound for many months  Pre-hosp visit, were taking good care o fit but not as much now  They do dressing changes daily, but otherwise not much repositioning, staying in bed on back most of the day    History/Chart/Medications reviewed    Since last visit:  Son has meeting with SNF - to address issues- including wound care- not being turned, etc, dressing olny changed twice a week    Wound caused by: pressure  Current wound care:See flowsheet  Offloading wound: has air mattress  Appetite: fair  Wound associated pain: See flowsheet  Diabetic: yes, A1c? Smoker: no  ROS: no N/V/D, no T/chills; no local rash, no chest pain or shortness of breath, no headache or dizzyness  +urostomy, +colostomy, R/ AKA      Objective:     Physical Exam:   See flowsheet / nursing notes for vitals  Visit Vitals  BP (!) 132/39 (BP 1 Location: Left upper arm)   Pulse 77   Temp 98.4 °F (36.9 °C)   Resp 18   SpO2 100%     General: NAD. Hygiene good, legally blind, hard of hearing  Psych: cooperative. No anxiety or depression. Normal mood and affect. Neuro: alert and oriented to person/place/situation. Otherwise nonfocal.  Derm: Normal turgor for age, dry skin  HEENT: Normocephalic, atraumatic. EOMI. Conjunctiva clear. No scleral icterus. Neck: Normal range of motion. No masses. Chest: Respirations nonlabored  Lower extremities: R/ AKA, L: color normal; temperature normal.     Ulcer Description:   See Flowsheet         Data Review:   No recent A1c in chart           Assessment/Plan     79 y.o. female with Dm2, R/ AKA, colostomy, urostomy    - Sacral ulcer.   pressure injury, stage 4  Full thickness  stable  Slough/  Necrotic- stringy  Periwound mild irritation/ redness  Necessitates debridement  for wound healing and to prevent/heal infection  See below  Culture done post debridement- wound stagnant    Following discussed with patient / POA  Needs :  Serial debridement- debrided today- see note below    Good local wound care  Dressing:collagen, alginate, gauze  Frequency : three times a week     -Nutrition optimization  Having more protein supplements    -Good Diabetic control    -Good offloading  Has air mattress  Reposition q2 hours  Limit sitting to up for meals     son understood and agrees with plan. Questions answered. Follow up with me in 2 weeks         Procedure:     Ulcer assessment: Due to presence of necrotic tissue within the wound bed, ulcer requires debridement. Procedure: Debridement:   The indication for debridement was reviewed with patient. Risks of procedure (bleeding, infection, pain) were discussed with patient/POA and consent signed on first visit. Questions were answered        Muscle excisional debridement sacral ulcer  Indication: to remove necrotic tissue/ vitalized & devitalized tissue/ infected tissue/  through skin, subcutaneous, muscle, periosteal fascia layer of wound bed  Time out done.   Consent in chart   Anesthesia: Topical  lidocaine jelly   Instrument: curette  Residual Necrosis: Present and scored  Bleedinml   Hemostasis: Pressure   Patient tolerated procedure well   Procedural Pain: 0  Post - procedural pain: 0    Post debridement measurements:  see below  Surface area debrided: <20 sq. cm    WOUND POA CONDITIONS    Wound Sacral/coccyx (Active)   Wound Image   21 1029   Wound Etiology Pressure Stage 4 21 1029   Dressing Status Intact 21 1029   Cleansed Irrigated with saline 21 1029   Dressing/Treatment Alginate;Collagen with Ag 21 1029   Dressing Change Due 21 1046   Wound Length (cm) 1.2 cm 21 1029   Wound Width (cm) 1 cm 21 1029   Wound Depth (cm) 0.6 cm 21 1029   Wound Surface Area (cm^2) 1.2 cm^2 21 1029   Change in Wound Size % 4.76 09/20/21 1029   Wound Volume (cm^3) 0.72 cm^3 09/20/21 1029   Wound Healing % -43 09/20/21 1029   Post-Procedure Length (cm) 1.2 cm 09/20/21 1029   Post-Procedure Width (cm) 1 cm 09/20/21 1029   Post-Procedure Depth (cm) 0.9 cm 09/20/21 1029   Post-Procedure Surface Area (cm^2) 1.2 cm^2 09/20/21 1029   Post-Procedure Volume (cm^3) 1.08 cm^3 09/20/21 1029   Undermining Starts ___ O'Clock 12 o'clock 09/20/21 1029   Undermining Ends ___ O'Clock 12 o'clock 09/20/21 1029   Undermining Maximum Distance (cm) 2 cm 09/20/21 1029   Wound Assessment Slough 09/20/21 1029   Drainage Amount Moderate 09/20/21 1029   Drainage Description Serosanguinous 09/20/21 1029   Wound Odor None 09/20/21 1029   Carol-Wound/Incision Assessment Blanchable erythema 09/20/21 1029   Edges Unattached edges 09/20/21 1029   Wound Thickness Description Full thickness 09/20/21 1029   Number of days: 42       [REMOVED] Wound Sacral/coccyx (Removed)   Number of days: 61             -------    Past Medical History:   Diagnosis Date    Cancer ([facility]     vaginal and colon cancer    Cancer ([facility]     Chronic pain     arthritis    Diabetes ([facility]     DVT (deep venous thrombosis) (HCC)     Hypertension     Legally blind     Thyroid disease       Past Surgical History:   Procedure Laterality Date    HX ABOVE KNEE AMPUTATION Right     HX COLOSTOMY      HX ORTHOPAEDIC      HX OTHER SURGICAL      kidney drainage tube    HX UROLOGICAL      kidney removal    IR EXCHANGE NEPHRO PERC RT SI  1/28/2019    IR EXCHANGE NEPHRO PERC RT SI  3/21/2019    IR EXCHANGE NEPHRO PERC RT SI  5/13/2019    IR EXCHANGE NEPHRO PERC RT SI  6/27/2019    IR EXCHANGE NEPHRO PERC RT SI  8/16/2019    IR EXCHANGE NEPHRO PERC RT SI  10/3/2019    IR EXCHANGE NEPHRO PERC RT SI  11/18/2019    IR EXCHANGE NEPHRO PERC RT SI  1/6/2020    IR EXCHANGE NEPHRO PERC RT SI  2/24/2020    IR EXCHANGE NEPHRO PERC RT SI  4/13/2020    IR EXCHANGE NEPHRO PERC RT SI  6/1/2020    IR EXCHANGE NEPHRO PERC RT SI  6/19/2020    IR EXCHANGE NEPHRO PERC RT SI  8/10/2020    IR EXCHANGE NEPHRO PERC RT SI  9/30/2020    IR EXCHANGE NEPHRO PERC RT SI  11/5/2020    IR EXCHANGE NEPHRO PERC RT SI  12/16/2020    IR EXCHANGE NEPHRO PERC RT SI  2/1/2021    IR EXCHANGE NEPHRO PERC RT SI  3/25/2021    IR EXCHANGE NEPHRO PERC RT SI  5/10/2021    IR EXCHANGE NEPHRO PERC RT SI  8/2/2021    IR EXCHANGE NEPHRO PERC RT SI  9/13/2021    IR NEPHROSTOMY PERC RT PLC CATH  SI  6/21/2020    IR NEPHROSTOMY PERC RT PLC CATH  SI  6/7/2021     No family history on file. Social History     Tobacco Use    Smoking status: Never Smoker    Smokeless tobacco: Never Used   Substance Use Topics    Alcohol use: No       Prior to Admission medications    Medication Sig Start Date End Date Taking? Authorizing Provider   acetaminophen (TYLENOL) 500 mg tablet Take 2 Tabs by mouth every eight (8) hours as needed for Pain or Fever. 3/25/21   Klaus England MD   nystatin (MYCOSTATIN) powder Apply  to affected area two (2) times a day. 3/25/21   Klaus England MD   insulin glargine (LANTUS) 100 unit/mL injection 10 Units by SubCUTAneous route nightly. 3/25/21   Klaus England MD   lip protectant with sunscreen (CARMEX) crea Apply  to affected area as needed for Dry Skin. 3/25/21   Klaus England MD   timolol (TIMOPTIC) 0.5 % ophthalmic solution Administer 1 Drop to both eyes two (2) times a day. 3/25/21   Klaus England MD   brimonidine (ALPHAGAN) 0.2 % ophthalmic solution Administer 1 Drop to both eyes every twelve (12) hours. 3/25/21   Klaus England MD   amLODIPine (NORVASC) 5 mg tablet Take 1 Tab by mouth daily. 11/22/20   Rhina Monte MD   famotidine (PEPCID) 10 mg tablet Take 1 Tab by mouth every twelve (12) hours. 11/21/20   Rhina Monte MD   melatonin 5 mg tablet Take 1 Tab by mouth nightly.  11/21/20   Rhina Monte MD   LORazepam (ATIVAN) 0.5 mg tablet Take 1 Tab by mouth two (2) times daily as needed for Agitation. Max Daily Amount: 1 mg. 11/11/20   Delma Baez MD   multivitamin (ONE A DAY) tablet Take 1 Tab by mouth daily. Provider, Historical   ondansetron hcl (ZOFRAN) 4 mg tablet Take 4 mg by mouth every eight (8) hours as needed for Nausea or Vomiting. Provider, Historical   brimonidine-timolol (COMBIGAN) 0.2-0.5 % drop ophthalmic solution Administer 1 Drop to both eyes every twelve (12) hours. Provider, Historical   metoprolol succinate (TOPROL-XL) 50 mg XL tablet Take 50 mg by mouth daily. Provider, Historical   magnesium hydroxide (MILK OF MAGNESIA CONCENTRATED) 2,400 mg/10 mL susp suspension Take 10 mL by mouth. Provider, Historical   levothyroxine (SYNTHROID) 50 mcg tablet Take  by mouth Daily (before breakfast). Provider, Historical   atorvastatin calcium (ATORVASTATIN PO) Take 20 mg by mouth daily. Other, MD Dc     Allergies   Allergen Reactions    Morphine Nausea and Vomiting    Pcn [Penicillins] Rash        HgbA1c:?  Advance Care plan: yes  Pneumonia vaccine:  BMI:27  Counseling re nutrition done.   Current meds documented in chart  Pain:   Elder maltreatment screen: neg  Smoking: no  Blood pressure: noted  in nursing notes/flowsheets  Signed By: Vandana Mcclure MD     September 20, 2021

## 2021-09-22 ENCOUNTER — HOSPITAL ENCOUNTER (OUTPATIENT)
Dept: LAB | Age: 81
Discharge: HOME | End: 2021-09-22

## 2021-09-22 LAB
BACTERIA SPEC CULT: NORMAL
GRAM STN SPEC: NORMAL
SERVICE CMNT-IMP: NORMAL

## 2021-09-22 PROCEDURE — U0003 INFECTIOUS AGENT DETECTION BY NUCLEIC ACID (DNA OR RNA); SEVERE ACUTE RESPIRATORY SYNDROME CORONAVIRUS 2 (SARS-COV-2) (CORONAVIRUS DISEASE [COVID-19]), AMPLIFIED PROBE TECHNIQUE, MAKING USE OF HIGH THROUGHPUT TECHNOLOGIES AS DESCRIBED BY CMS-2020-01-R: HCPCS

## 2021-09-24 LAB — SARS-COV-2, NAA: NOT DETECTED

## 2021-09-24 NOTE — WOUND CARE
09/20/21 1029   Wound Sacral/coccyx   Date First Assessed/Time First Assessed: 08/09/21 1046   Present on Hospital Admission: Yes  Primary Wound Type: Pressure Injury  Location: Sacral/coccyx   Wound Image    Wound Etiology Pressure Stage 4   Dressing Status Intact   Cleansed Irrigated with saline   Dressing/Treatment Alginate;Collagen with Ag   Dressing Change Due 10/04/21   Wound Length (cm) 1.2 cm   Wound Width (cm) 1 cm   Wound Depth (cm) 0.6 cm   Wound Surface Area (cm^2) 1.2 cm^2   Change in Wound Size % 4.76   Wound Volume (cm^3) 0.72 cm^3   Wound Healing % -43   Post-Procedure Length (cm) 1.2 cm   Post-Procedure Width (cm) 1 cm   Post-Procedure Depth (cm) 0.9 cm   Post-Procedure Surface Area (cm^2) 1.2 cm^2   Post-Procedure Volume (cm^3) 1.08 cm^3   Undermining Starts ___ O'Clock 12 o'clock   Undermining Ends ___ O'Clock 12 o'clock   Undermining Maximum Distance (cm) 2 cm   Wound Assessment Slough   Drainage Amount Moderate   Drainage Description Serosanguinous   Wound Odor None   Carol-Wound/Incision Assessment Blanchable erythema   Edges Unattached edges   Wound Thickness Description Full thickness     Post debridement picture:

## 2021-09-29 ENCOUNTER — HOSPITAL ENCOUNTER (OUTPATIENT)
Dept: LAB | Age: 81
Discharge: HOME | End: 2021-09-29

## 2021-09-29 PROCEDURE — U0003 INFECTIOUS AGENT DETECTION BY NUCLEIC ACID (DNA OR RNA); SEVERE ACUTE RESPIRATORY SYNDROME CORONAVIRUS 2 (SARS-COV-2) (CORONAVIRUS DISEASE [COVID-19]), AMPLIFIED PROBE TECHNIQUE, MAKING USE OF HIGH THROUGHPUT TECHNOLOGIES AS DESCRIBED BY CMS-2020-01-R: HCPCS

## 2021-09-30 LAB — SARS-COV-2, NAA: NOT DETECTED

## 2021-10-04 ENCOUNTER — HOSPITAL ENCOUNTER (OUTPATIENT)
Dept: WOUND CARE | Age: 81
Discharge: HOME | End: 2021-10-04
Attending: FAMILY MEDICINE
Payer: MEDICARE

## 2021-10-04 VITALS
SYSTOLIC BLOOD PRESSURE: 112 MMHG | HEART RATE: 68 BPM | RESPIRATION RATE: 18 BRPM | DIASTOLIC BLOOD PRESSURE: 75 MMHG | TEMPERATURE: 97.8 F | OXYGEN SATURATION: 98 %

## 2021-10-04 PROCEDURE — 11043 DBRDMT MUSC&/FSCA 1ST 20/<: CPT

## 2021-10-04 RX ORDER — LIDOCAINE HYDROCHLORIDE 40 MG/ML
SOLUTION TOPICAL ONCE
Status: CANCELLED | OUTPATIENT
Start: 2021-10-04 | End: 2021-10-04

## 2021-10-04 RX ORDER — LIDOCAINE HYDROCHLORIDE 20 MG/ML
JELLY TOPICAL ONCE
Status: CANCELLED | OUTPATIENT
Start: 2021-10-04 | End: 2021-10-04

## 2021-10-04 NOTE — WOUND CARE
Wound Center  Progress Note / Procedure Note    Subjective:     Chief Complaint:  Rossy Lim is a 79 y.o.  female  with sacral ulcer wound of many months duration. HPI:     As per son, has had this wound for many months  Pre-hosp visit, were taking good care o fit but not as much now  They do dressing changes daily, but otherwise not much repositioning, staying in bed on back most of the day    History/Chart/Medications reviewed    Since last visit:  As per son, dressing didn't get changed for 8 days  Working with SNF  C/o more pain- not getting prn doses as should    Wound caused by: pressure  Current wound care:See flowsheet  Offloading wound: has air mattress  Appetite: fair  Wound associated pain: See flowsheet  Diabetic: yes, A1c? Smoker: no  ROS: no N/V/D, no T/chills; no local rash, no chest pain or shortness of breath, no headache or dizzyness  +urostomy, +colostomy, R/ AKA      Objective:     Physical Exam:   See flowsheet / nursing notes for vitals  Visit Vitals  /75 (BP 1 Location: Left upper arm)   Pulse 68   Temp 97.8 °F (36.6 °C)   Resp 18   SpO2 98%     General: NAD. Hygiene good, legally blind, hard of hearing  Psych: cooperative. No anxiety or depression. Normal mood and affect. Neuro: alert and oriented to person/place/situation. Otherwise nonfocal.  Derm: Normal turgor for age, dry skin  HEENT: Normocephalic, atraumatic. EOMI. Conjunctiva clear. No scleral icterus. Neck: Normal range of motion. No masses. Chest: Respirations nonlabored  Lower extremities: R/ AKA, L: color normal; temperature normal.     Ulcer Description:   See Flowsheet         Data Review:   Culture result:   Date Value Ref Range Status   09/20/2021 HEAVY MIXED ENTERIC [id number]   Final                Assessment/Plan     79 y.o. female with Dm2, R/ AKA, colostomy, urostomy    - Sacral ulcer.   pressure injury, stage 4  Full thickness  stable  Slough/  Necrotic- stringy  Periwound mild irritation/ redness  Necessitates debridement  for wound healing and to prevent/heal infection  See below    - more pain, necrotic itssue adherent to bone  R/o   Osteomyelitis  Order sacral (pelvis xray)  CBC, sed rate, crp  Also albumin, bmp    Following discussed with patient / POA  Needs :  Serial debridement- debrided today- see note below    Good local wound care  Dressing:D/C collagen, alginate, gauze  strt santyl with packing strips  Frequency : daily    -Nutrition optimization  Having more protein supplements    -Good Diabetic control    -Good offloading  Has air mattress  Reposition q2 hours  Limit sitting to up for meals     son understood and agrees with plan. Questions answered. Follow up with me in 2 weeks         Procedure:     Ulcer assessment: Due to presence of necrotic tissue within the wound bed, ulcer requires debridement. Procedure: Debridement:   The indication for debridement was reviewed with patient. Risks of procedure (bleeding, infection, pain) were discussed with patient/POA and consent signed on first visit. Questions were answered        Muscle excisional debridement sacral ulcer  Indication: to remove necrotic tissue/ vitalized & devitalized tissue/ infected tissue/  through skin, subcutaneous, muscle, periosteal fascia layer of wound bed  Time out done.   Consent in chart   Anesthesia: Topical  lidocaine jelly   Instrument: curette  Residual Necrosis: Present and scored  Bleedinml   Hemostasis: Pressure   Patient tolerated procedure well   Procedural Pain: 0  Post - procedural pain: 0    Post debridement measurements:  see below  Surface area debrided: <20 sq. cm    WOUND POA CONDITIONS    Wound Sacral/coccyx (Active)   Wound Image   10/04/21 1052   Wound Etiology Pressure Stage 4 10/04/21 1052   Dressing Status Breakthrough drainage noted 10/04/21 1052   Cleansed Irrigated with saline 10/04/21 1052   Dressing/Treatment Alginate;Collagen with Ag 21 1029   Dressing Change Due 10/04/21 09/20/21 1029   Wound Length (cm) 1.5 cm 10/04/21 1052   Wound Width (cm) 0.8 cm 10/04/21 1052   Wound Depth (cm) 0.4 cm 10/04/21 1052   Wound Surface Area (cm^2) 1.2 cm^2 10/04/21 1052   Change in Wound Size % 4.76 10/04/21 1052   Wound Volume (cm^3) 0.48 cm^3 10/04/21 1052   Wound Healing % 5 10/04/21 1052   Post-Procedure Length (cm) 1.5 cm    Post-Procedure Width (cm) 0.8 cm    Post-Procedure Depth (cm) 0.7 cm    Post-Procedure Surface Area (cm^2)     Post-Procedure Volume (cm^3)     Undermining Starts ___ O'Clock 12 o'clock 10/04/21 1052   Undermining Ends ___ O'Clock 12 o'clock 10/04/21 1052   Undermining Maximum Distance (cm) 2 cm 09/20/21 1029   Wound Assessment Slough 10/04/21 1052   Drainage Amount Moderate 10/04/21 1052   Drainage Description Serosanguinous 09/20/21 1029   Wound Odor None 10/04/21 1052   Carol-Wound/Incision Assessment Blanchable erythema 10/04/21 1052   Edges Unattached edges 10/04/21 1052   Wound Thickness Description Full thickness 10/04/21 1052   Number of days: 64       [REMOVED] Wound Sacral/coccyx (Removed)   Number of days: 61             -------    Past Medical History:   Diagnosis Date    Cancer ([facility]     vaginal and colon cancer    Cancer ([facility]     Chronic pain     arthritis    Diabetes ([facility]     DVT (deep venous thrombosis) (HCC)     Hypertension     Legally blind     Thyroid disease       Past Surgical History:   Procedure Laterality Date    HX ABOVE KNEE AMPUTATION Right     HX COLOSTOMY      HX ORTHOPAEDIC      HX OTHER SURGICAL      kidney drainage tube    HX UROLOGICAL      kidney removal    IR EXCHANGE NEPHRO PERC RT SI  1/28/2019    IR EXCHANGE NEPHRO PERC RT SI  3/21/2019    IR EXCHANGE NEPHRO PERC RT SI  5/13/2019    IR EXCHANGE NEPHRO PERC RT SI  6/27/2019    IR EXCHANGE NEPHRO PERC RT SI  8/16/2019    IR EXCHANGE NEPHRO PERC RT SI  10/3/2019    IR EXCHANGE NEPHRO PERC RT SI  11/18/2019    IR EXCHANGE NEPHRO PERC RT SI  1/6/2020    IR EXCHANGE NEPHRO PERC RT SI  2/24/2020    IR EXCHANGE NEPHRO PERC RT SI  4/13/2020    IR EXCHANGE NEPHRO PERC RT SI  6/1/2020    IR EXCHANGE NEPHRO PERC RT SI  6/19/2020    IR EXCHANGE NEPHRO PERC RT SI  8/10/2020    IR EXCHANGE NEPHRO PERC RT SI  9/30/2020    IR EXCHANGE NEPHRO PERC RT SI  11/5/2020    IR EXCHANGE NEPHRO PERC RT SI  12/16/2020    IR EXCHANGE NEPHRO PERC RT SI  2/1/2021    IR EXCHANGE NEPHRO PERC RT SI  3/25/2021    IR EXCHANGE NEPHRO PERC RT SI  5/10/2021    IR EXCHANGE NEPHRO PERC RT SI  8/2/2021    IR EXCHANGE NEPHRO PERC RT SI  9/13/2021    IR NEPHROSTOMY PERC RT PLC CATH  SI  6/21/2020    IR NEPHROSTOMY PERC RT PLC CATH  SI  6/7/2021     No family history on file. Social History     Tobacco Use    Smoking status: Never Smoker    Smokeless tobacco: Never Used   Substance Use Topics    Alcohol use: No       Prior to Admission medications    Medication Sig Start Date End Date Taking? Authorizing Provider   acetaminophen (TYLENOL) 500 mg tablet Take 2 Tabs by mouth every eight (8) hours as needed for Pain or Fever. 3/25/21   Rajendra England MD   nystatin (MYCOSTATIN) powder Apply  to affected area two (2) times a day. 3/25/21   Rajendra England MD   insulin glargine (LANTUS) 100 unit/mL injection 10 Units by SubCUTAneous route nightly. 3/25/21   Rajendra England MD   lip protectant with sunscreen (CARMEX) crea Apply  to affected area as needed for Dry Skin. 3/25/21   Rajendra England MD   timolol (TIMOPTIC) 0.5 % ophthalmic solution Administer 1 Drop to both eyes two (2) times a day. 3/25/21   Rajendra England MD   brimonidine (ALPHAGAN) 0.2 % ophthalmic solution Administer 1 Drop to both eyes every twelve (12) hours. 3/25/21   Rajendra England MD   amLODIPine (NORVASC) 5 mg tablet Take 1 Tab by mouth daily. 11/22/20   Ho Monte MD   famotidine (PEPCID) 10 mg tablet Take 1 Tab by mouth every twelve (12) hours.  11/21/20 Scott-Brown, Valentino Clos, MD   melatonin 5 mg tablet Take 1 Tab by mouth nightly. 11/21/20   Scott-Brown, Valentino Clos, MD   LORazepam (ATIVAN) 0.5 mg tablet Take 1 Tab by mouth two (2) times daily as needed for Agitation. Max Daily Amount: 1 mg. 11/11/20   Tal Perea MD   multivitamin (ONE A DAY) tablet Take 1 Tab by mouth daily. Provider, Historical   ondansetron hcl (ZOFRAN) 4 mg tablet Take 4 mg by mouth every eight (8) hours as needed for Nausea or Vomiting. Provider, Historical   brimonidine-timolol (COMBIGAN) 0.2-0.5 % drop ophthalmic solution Administer 1 Drop to both eyes every twelve (12) hours. Provider, Historical   metoprolol succinate (TOPROL-XL) 50 mg XL tablet Take 50 mg by mouth daily. Provider, Historical   magnesium hydroxide (MILK OF MAGNESIA CONCENTRATED) 2,400 mg/10 mL susp suspension Take 10 mL by mouth. Provider, Historical   levothyroxine (SYNTHROID) 50 mcg tablet Take  by mouth Daily (before breakfast). Provider, Historical   atorvastatin calcium (ATORVASTATIN PO) Take 20 mg by mouth daily. Other, MD Dc     Allergies   Allergen Reactions    Morphine Nausea and Vomiting    Pcn [Penicillins] Rash        HgbA1c:?  Advance Care plan: yes  Pneumonia vaccine:  BMI:27  Counseling re nutrition done.   Current meds documented in chart  Pain:   Elder maltreatment screen: neg  Smoking: no  Blood pressure: noted  in nursing notes/flowsheets  Signed By: Rosangela Reynolds MD     October 4, 2021

## 2021-10-04 NOTE — WOUND CARE
10/04/21 1052   Wound Sacral/coccyx   Date First Assessed/Time First Assessed: 08/09/21 1046   Present on Hospital Admission: Yes  Primary Wound Type: Pressure Injury  Location: Sacral/coccyx   Wound Image    Wound Etiology Pressure Stage 4   Dressing Status Breakthrough drainage noted   Cleansed Irrigated with saline   Dressing/Treatment Gauze dressing/dressing sponge  (Santyl gauze packing strips)   Dressing Change Due   (daily)   Wound Length (cm) 1.5 cm   Wound Width (cm) 0.8 cm   Wound Depth (cm) 0.4 cm   Wound Surface Area (cm^2) 1.2 cm^2   Change in Wound Size % 4.76   Wound Volume (cm^3) 0.48 cm^3   Wound Healing % 5   Post-Procedure Length (cm) 1.5 cm   Post-Procedure Width (cm) 0.8 cm   Post-Procedure Depth (cm) 0.7 cm   Post-Procedure Surface Area (cm^2) 1.2 cm^2   Post-Procedure Volume (cm^3) 0.84 cm^3   Undermining Starts ___ O'Clock 12 o'clock   Undermining Ends ___ O'Clock 12 o'clock   Undermining Maximum Distance (cm) 1.4 cm   Wound Assessment Slough   Drainage Amount Moderate   Drainage Description Serosanguinous   Wound Odor None   Carol-Wound/Incision Assessment Blanchable erythema   Edges Unattached edges   Wound Thickness Description Full thickness     Post debridement picture:

## 2021-10-04 NOTE — WOUND CARE
Discharge Instructions for  [address]  [address] [place], [address]  [phone] Fax [phone]    NAME:  Angel Monroy  YOB: 1940  MEDICAL RECORD NUMBER:  [id number]  DATE:  10/4/2021    Wound Cleansing:   Do not scrub or use excessive force. Cleanse wound prior to applying a clean dressing with:  [x] Normal Saline [] Keep Wound Dry in Shower    [] Wound Cleanser   [] Cleanse wound with Mild Soap & Water  [] May Shower at Discharge   [] Other:      Topical Treatments:  Do not apply lotions, creams, or ointments to wound bed unless directed. [] Apply moisturizing lotion to skin surrounding the wound prior to dressing change.  [] Apply antifungal ointment to skin surrounding the wound prior to dressing change.  [] Apply thin film of moisture barrier ointment to skin immediately around wound. [] Other:         Dressings:           Wound Location - Sacral Wound   [x] Apply Primary Dressing:          [x] Santyl with Moisten saline gauze packing strips daily       [x] Cover and Secure with:     [x] Gauze [] Alfa [] Kerlix   [] Ace Wrap [] Cover Roll Tape [] ABD     [x] Other: cover dressing   Avoid contact of tape with skin.   [x] Change dressing: [x] Daily    [] Every Other Day [] Three times per week   [] Once a week [] Do Not Change Dressing   [] Other:       Dietary:  [x] Diet as tolerated: [] Calorie Diabetic Diet: [] No Added Salt:  [] Increase Protein: [] Other:       Activity:  [x] Activity as tolerated:  [] Patient has no activity restrictions     [] Strict Bedrest: [] Remain off Work:     [] May return to full duty work:                           Return to work with restrictions:             Return Appointment:  [] Wound and dressing supply provider:   [] ECF or Home Healthcare:  [] Wound Assessment: [] Physician or NP scheduled for Wound Assessment:   [] Return Appointment: With Dr. Clint Sosa  in [address] [place] [address])  [x] Ordered tests: XRAY of Sacrum, CBC,Sed Rate, BMP, Albumin and CRP to be completed this week       Electronically signed Yunior Clark RN on 10/4/2021 at 11:32 AM     [address] [address] Information: Should you experience any significant changes in your wound(s) or have questions about your wound care, please contact the [address] at [address] [place] [address] 8:00 am - 4:30. If you need help with your wound outside these hours and cannot wait until we are again available, contact your PCP or go to the hospital emergency room. PLEASE NOTE: IF YOU ARE UNABLE TO OBTAIN WOUND SUPPLIES, CONTINUE TO USE THE SUPPLIES YOU HAVE AVAILABLE UNTIL YOU ARE ABLE TO REACH US. IT IS MOST IMPORTANT TO KEEP THE WOUND COVERED AT ALL TIMES.      Physician Signature:_______________________    Date: ___________ Time:  ____________

## 2021-10-06 ENCOUNTER — HOSPITAL ENCOUNTER (OUTPATIENT)
Dept: LAB | Age: 81
Discharge: HOME | End: 2021-10-06

## 2021-10-06 LAB
ALBUMIN SERPL-MCNC: 2.3 G/DL (ref 3.4–5)
ALBUMIN/GLOB SERPL: 0.5 {RATIO} (ref 0.8–1.7)
ALP SERPL-CCNC: 133 U/L (ref 45–117)
ALT SERPL-CCNC: 23 U/L (ref 13–56)
ANION GAP SERPL CALC-SCNC: 9 MMOL/L (ref 3–18)
AST SERPL-CCNC: 22 U/L (ref 10–38)
BILIRUB SERPL-MCNC: 0.2 MG/DL (ref 0.2–1)
BUN SERPL-MCNC: 46 MG/DL (ref 7–18)
BUN/CREAT SERPL: 35 (ref 12–20)
CALCIUM SERPL-MCNC: 8.6 MG/DL (ref 8.5–10.1)
CHLORIDE SERPL-SCNC: 104 MMOL/L (ref 100–111)
CO2 SERPL-SCNC: 22 MMOL/L (ref 21–32)
CREAT SERPL-MCNC: 1.3 MG/DL (ref 0.6–1.3)
CRP SERPL-MCNC: 5.8 MG/DL (ref 0–0.3)
GLOBULIN SER CALC-MCNC: 4.5 G/DL (ref 2–4)
GLUCOSE SERPL-MCNC: 223 MG/DL (ref 74–99)
POTASSIUM SERPL-SCNC: 5.9 MMOL/L (ref 3.5–5.5)
PREALB SERPL-MCNC: 15.9 MG/DL (ref 20–40)
PROT SERPL-MCNC: 6.8 G/DL (ref 6.4–8.2)
SODIUM SERPL-SCNC: 135 MMOL/L (ref 136–145)

## 2021-10-06 PROCEDURE — 86140 C-REACTIVE PROTEIN: CPT

## 2021-10-06 PROCEDURE — 80053 COMPREHEN METABOLIC PANEL: CPT

## 2021-10-06 PROCEDURE — 84134 ASSAY OF PREALBUMIN: CPT

## 2021-10-06 PROCEDURE — U0003 INFECTIOUS AGENT DETECTION BY NUCLEIC ACID (DNA OR RNA); SEVERE ACUTE RESPIRATORY SYNDROME CORONAVIRUS 2 (SARS-COV-2) (CORONAVIRUS DISEASE [COVID-19]), AMPLIFIED PROBE TECHNIQUE, MAKING USE OF HIGH THROUGHPUT TECHNOLOGIES AS DESCRIBED BY CMS-2020-01-R: HCPCS

## 2021-10-07 LAB — SARS-COV-2, COV2NT: NOT DETECTED

## 2021-10-11 NOTE — DISCHARGE INSTRUCTIONS
Discharge Instructions for  [address]  [address] [place], [facility]  [phone] Fax [phone]    NAME:  Dinah Moritz  YOB: 1940  MEDICAL RECORD NUMBER:  [id number]  DATE:  10/4/2021    Wound Cleansing:   Do not scrub or use excessive force. Cleanse wound prior to applying a clean dressing with:  [] Normal Saline [] Keep Wound Dry in Shower    [] Wound Cleanser   [] Cleanse wound with Mild Soap & Water  [] May Shower at Discharge   [] Other:      Topical Treatments:  Do not apply lotions, creams, or ointments to wound bed unless directed. [] Apply moisturizing lotion to skin surrounding the wound prior to dressing change.  [] Apply antifungal ointment to skin surrounding the wound prior to dressing change.  [] Apply thin film of moisture barrier ointment to skin immediately around wound. [] Other:       Dressings:           Wound Location ***   [] Apply Primary Dressing:       [] MediHoney Gel [] Alginate with Silver [] Alginate   [] Collagen [] Collagen with Silver   [] Santyl with Moisten saline gauze     [] Hydrocolloid   [] MediHoney Alginate [] Foam with Silver   [] Foam   [] Hydrofera Blue    [] Mepilex Border    [] Moisten with Saline [] Hydrogel [] Mepitel     [] Bactroban/Mupirocin [] Polysporin  [] Other:    [] Pack wound loosely with  [] Iodoform   [] Plain Packing  [] Other   [] Cover and Secure with:     [] Gauze [] Alfa [] Kerlix   [] Ace Wrap [] Cover Roll Tape [] ABD     [] Other:    Avoid contact of tape with skin.   [] Change dressing: [] Daily    [] Every Other Day [] Three times per week   [] Once a week [] Do Not Change Dressing   [] Other:     Negative Pressure:           Wound Location:   [] Pressure@           mm/Hg  []Continuous []Intermittent   [] Black  [] White Foam [] Other:   []Change dressing: []Three times per week    []Other:     Pressure Relief:  [] When sitting, shift position or do seat lifts every 15 minutes.  [] Wheelchair cushion [] Specialty Bed/Mattress  [] Turn every 2 hours when in bed. Avoid position directing pressure on wound site. Limit side lying to 30 degree tilt. Limit HOB elevation to 30 degrees. Edema Control:  Apply: [] Compression Stocking []Right Leg []Left Leg   [] Tubigrip []Right Leg Double Layer []Left Leg Double Layer       []Right Leg Single Layer []Left Leg Single Layer   [] SpandaGrip []Right Leg  []Left Leg      []Low compression 5-10 mm/Hg      []Medium compression 10-20 mm/Hg     []High compression  20-30 mm/Hg   every morning immediately when getting up should be applied to affected leg(s) from mid foot to knee making sure to cover the heel. Remove every night before going to bed. [] Elevate leg(s) above the level of the heart when sitting. [] Avoid prolonged standing in one place. [] Elevate arm/hand above the level of the heart []RightArm []LeftArm     Compression:  Apply: [] Multilayer Compression Wrap Applied in Clinic []RightLeg []Left Leg   [] Multi-layer compression. Do not get leg(s) with wrap wet. If wraps become too tight call the center or completely remove the wrap. [] Elevate leg(s) above the level of the heart when sitting. [] Avoid prolonged standing in one place. Off-Loading:   [] Off-loading when [] walking  [] in bed [] sitting  [] Total non-weight bearing  [] Right Leg  [] Left Leg   [] Assistive Device [] Walker [] Cane  [] Wheelchair  [] Crutches   [] Surgical shoe    [] Podus Boot(s)   [] Foam Boot(s)  [] Roll About    [] Cast Boot [] CROW Boot  [] Other:    Contact Cast:  Apply: [] Total Contact Cast Applied in Clinic []RightLeg []Left Leg   [] Do not get cast wet. Contact center or go to emergency room if there is a foul odor or becomes uncomfortable due to feeling tight or swelling. Do not use objects inside of cast to scratch.       Dietary:  [] Diet as tolerated: [] Calorie Diabetic Diet: [] No Added Salt:  [] Increase Protein: [] Other:   Activity:  [] Activity as tolerated:  [] Patient has no activity restrictions     [] Strict Bedrest: [] Remain off Work:     [] May return to full duty work:                                   [] Return to work with restrictions:             Return Appointment:  [] Wound and dressing supply provider:   [] ECF or Home Healthcare:  [] Wound Assessment: [] Physician or NP scheduled for Wound Assessment:   [] Return Appointment: With ***  in  *** Week(s)  [] Ordered tests:      Electronically signed Luisa Martinez LPN on [id number]/81/[id number] at 3:07 PM     [address]: Should you experience any significant changes in your wound(s) or have questions about your wound care, please contact the [facility] Main at [address] [place] [address] 8:00 am - 4:30. If you need help with your wound outside these hours and cannot wait until we are again available, contact your PCP or go to the hospital emergency room. PLEASE NOTE: IF YOU ARE UNABLE TO OBTAIN WOUND SUPPLIES, CONTINUE TO USE THE SUPPLIES YOU HAVE AVAILABLE UNTIL YOU ARE ABLE TO REACH US. IT IS MOST IMPORTANT TO KEEP THE WOUND COVERED AT ALL TIMES.      Physician Signature:_______________________    Date: ___________ Time:  ____________

## 2021-10-18 ENCOUNTER — HOSPITAL ENCOUNTER (OUTPATIENT)
Dept: WOUND CARE | Age: 81
Discharge: HOME | End: 2021-10-18
Attending: FAMILY MEDICINE
Payer: MEDICARE

## 2021-10-18 DIAGNOSIS — L89.154 PRESSURE INJURY OF SACRAL REGION, STAGE 4 (HCC): Primary | ICD-10-CM

## 2021-10-18 PROCEDURE — 11043 DBRDMT MUSC&/FSCA 1ST 20/<: CPT

## 2021-10-18 RX ORDER — LIDOCAINE HYDROCHLORIDE 20 MG/ML
JELLY TOPICAL ONCE
Status: DISCONTINUED | OUTPATIENT
Start: 2021-10-18 | End: 2021-10-19 | Stop reason: HOSPADM

## 2021-10-18 RX ORDER — LIDOCAINE HYDROCHLORIDE 20 MG/ML
JELLY TOPICAL ONCE
Status: CANCELLED | OUTPATIENT
Start: 2021-10-18 | End: 2021-10-18

## 2021-10-18 RX ORDER — LIDOCAINE HYDROCHLORIDE 40 MG/ML
SOLUTION TOPICAL ONCE
Status: CANCELLED | OUTPATIENT
Start: 2021-10-18 | End: 2021-10-18

## 2021-10-18 NOTE — WOUND CARE
Discharge Instructions for  [address]  [address] [place], [place], [address] [facility]  [phone] Fax [phone]     NAME:  Shari Wei  YOB: 1940  MEDICAL RECORD NUMBER:  [id number]  DATE:  10/18/2021     Wound Cleansing:   Do not scrub or use excessive force. Cleanse wound prior to applying a clean dressing with:  [x]? Normal Saline        []? Keep Wound Dry in Shower    []? Wound Cleanser   []? Cleanse wound with Mild Soap & Water  []? May Shower at Discharge   []? Other:       Topical Treatments:  Do not apply lotions, creams, or ointments to wound bed unless directed. []? Apply moisturizing lotion to skin surrounding the wound prior to dressing change. []? Apply antifungal ointment to skin surrounding the wound prior to dressing change. []? Apply thin film of moisture barrier ointment to skin immediately around wound. []? Other:                     Dressings:                  Wound Location - Sacral Wound     [x]? Apply Primary Dressing:                                             [x]? Apply Santyl to wound followed by saline moistened gauze and cover dressing daily                 [x]? Cover and Secure with:                   [x]? Gauze        []? [facility]           []? Kerlix              []? Ace Wrap   []? Cover Roll Tape     []? ABD                                      [x]? Other: cover dressing              Avoid contact of tape with skin. [x]? Change dressing:  [x]? Daily           []? Every Other Day    []? Three times per week              []? Once a week          []? Do Not Change Dressing    []? Other:                   Dietary:  [x]? Diet as tolerated:   []? Calorie Diabetic Diet:         []? No Added Salt:  []? Increase Protein:   []? Other:                 Activity:  [x]? Activity as tolerated:  []? Patient has no activity restrictions     []? Strict Bedrest:           []? Remain off Work:     []?  May return to full duty work: Return to work with restrictions:                 Return Appointment:  []? Wound and dressing supply provider:   []? ECF or Home Healthcare:  []? Wound Assessment:         []? Physician or NP scheduled for Wound Assessment:   []? Return Appointment: With Dr. Kenny Mccall  in 2 Week(s) 11/1/2021 at 10:30 AM  [x]? Ordered tests:  CBC,Sed Rate, CRP and Pre -albumin to be completed this week         Electronically signed Rebekah Hernandez RN on 10/18/21 at 11:40 [address] [employer]  Information: Should you experience any significant changes in your wound(s) or have questions about your wound care, please contact the [address] [place] [address] at [address] [place] [address] 8:00 am - 4:30. If you need help with your wound outside these hours and cannot wait until we are again available, contact your PCP or go to the hospital emergency room.      PLEASE NOTE: IF YOU ARE UNABLE TO [address], CONTINUE TO USE THE SUPPLIES YOU HAVE AVAILABLE UNTIL YOU ARE ABLE TO [address] [facility]. IT IS MOST IMPORTANT TO KEEP THE WOUND COVERED AT ALL TIMES.      Physician Signature:_______________________     Date: ___________ Time:  ____________

## 2021-10-18 NOTE — WOUND CARE
Wound Center  Progress Note / Procedure Note    Subjective:     Chief Complaint:  Shari Wei is a 79 y.o.  female  with sacral ulcer wound of many months duration. HPI:     As per son, has had this wound for many months  Pre-hosp visit, were taking good care o fit but not as much now  They do dressing changes daily, but otherwise not much repositioning, staying in bed on back most of the day    History/Chart/Medications reviewed    Since last visit:  As per son,getting respositioned more    Wound caused by: pressure  Current wound care:See flowsheet  Offloading wound: has air mattress  Appetite: fair  Wound associated pain: See flowsheet  Diabetic: yes, A1c? Smoker: no  ROS: no N/V/D, no T/chills; no local rash, no chest pain or shortness of breath, no headache or dizzyness  +urostomy, +colostomy, R/ AKA      Objective:     Physical Exam:   See flowsheet / nursing notes for vitals  There were no vitals taken for this visit. General: NAD. Hygiene good, legally blind, hard of hearing  Psych: cooperative. No anxiety or depression. Normal mood and affect. Neuro: alert and oriented to person/place/situation. Otherwise nonfocal.  Derm: Normal turgor for age, dry skin  HEENT: Normocephalic, atraumatic. EOMI. Conjunctiva clear. No scleral icterus. Neck: Normal range of motion. No masses. Chest: Respirations nonlabored  Lower extremities: R/ AKA, L: color normal; temperature normal.     Ulcer Description:   See Flowsheet         Data Review:   Culture result:   Date Value Ref Range Status   09/20/2021 HEAVY MIXED ENTERIC KHUSHBOO   Final       CBC, sed rate crp prealb  Not done  Xray - no acute process         Assessment/Plan     79 y.o. female with Dm2, R/ AKA, colostomy, urostomy    - Sacral ulcer.   pressure injury, stage 4  Full thickness  stable  Slough/  Necrotic- stringy  Periwound  irritation/ redness- improved  Necessitates debridement  for wound healing and to prevent/heal infection  See below    - more pain, necrotic itssue adherent to bone  R/o   Osteomyelitis  Xray reviewed  Will consider further MRI/CT once we see sed rate and crp rsults   sed rate, crp still to do      Following discussed with patient / POA  Needs :  Serial debridement- debrided today- see note below    Good local wound care  Dressing:  santyl with packing strips  Frequency : daily    -Nutrition optimization  Having more protein supplements    -Good Diabetic control    -Good offloading  Has air mattress  Reposition q2 hours  Limit sitting to up for meals     son understood and agrees with plan. Questions answered. Follow up with me in 2 weeks         Procedure:     Ulcer assessment: Due to presence of necrotic tissue within the wound bed, ulcer requires debridement. Procedure: Debridement:   The indication for debridement was reviewed with patient. Risks of procedure (bleeding, infection, pain) were discussed with patient/POA and consent signed on first visit. Questions were answered        Muscle excisional debridement sacral ulcer  Indication: to remove necrotic tissue/ vitalized & devitalized tissue/ infected tissue/  through skin, subcutaneous, muscle, periosteal fascia layer of wound bed  Time out done.   Consent in chart   Anesthesia: Topical  lidocaine jelly   Instrument: curette  Residual Necrosis: Present and scored  Bleedinml   Hemostasis: Pressure   Patient tolerated procedure well   Procedural Pain: 0  Post - procedural pain: 0    Post debridement measurements:  see below  Surface area debrided: <20 sq. cm      WOUND POA CONDITIONS    Wound Sacral/coccyx (Active)   Wound Image   10/18/21 1122   Wound Etiology Pressure Stage 4 10/18/21 1122   Dressing Status New dressing applied 10/18/21 1122   Cleansed Irrigated with saline 10/18/21 1122   Dressing/Treatment Silicone border 74/14/63 1122   Dressing Change Due 10/04/21 09/20/21 1029   Wound Length (cm) 1.2 cm 10/18/21 1122   Wound Width (cm) 0.5 cm 10/18/21 1122 Wound Depth (cm) 0.4 cm 10/18/21 1122   Wound Surface Area (cm^2) 0.6 cm^2 10/18/21 1122   Change in Wound Size % 52.38 10/18/21 1122   Wound Volume (cm^3) 0.24 cm^3 10/18/21 1122   Wound Healing % 52 10/18/21 1122   Post-Procedure Length (cm) 1.2 cm 10/18/21 1122   Post-Procedure Width (cm) 0.7 cm 10/18/21 1122   Post-Procedure Depth (cm) 0.7 cm 10/18/21 1122   Post-Procedure Surface Area (cm^2) 0.84 cm^2 10/18/21 1122   Post-Procedure Volume (cm^3) 0.588 cm^3 10/18/21 1122   Undermining Starts ___ O'Clock 12 o'clock 10/18/21 1122   Undermining Ends ___ O'Clock 12 o'clock 10/18/21 1122   Undermining Maximum Distance (cm) 1 cm 10/18/21 1122   Wound Assessment Slough 10/18/21 1122   Drainage Amount Moderate 10/18/21 1122   Drainage Description Serosanguinous 10/18/21 1122   Wound Odor None 10/18/21 1122   Carol-Wound/Incision Assessment Blanchable erythema 10/18/21 1122   Edges Defined edges 10/18/21 1122   Wound Thickness Description Full thickness 10/18/21 1122   Number of days: 77       [REMOVED] Wound Sacral/coccyx (Removed)   Number of days: 63           -------    Past Medical History:   Diagnosis Date    Cancer ([facility] [address]     vaginal and colon cancer    Cancer ([facility] [address]     Chronic pain     arthritis    Diabetes ([facility] [address]     DVT (deep venous thrombosis) (HCC)     Hypertension     Legally blind     Thyroid disease       Past Surgical History:   Procedure Laterality Date    HX ABOVE KNEE AMPUTATION Right     HX COLOSTOMY      HX ORTHOPAEDIC      HX OTHER SURGICAL      kidney drainage tube    HX UROLOGICAL      kidney removal    IR EXCHANGE NEPHRO PERC RT SI  1/28/2019    IR EXCHANGE NEPHRO PERC RT SI  3/21/2019    IR EXCHANGE NEPHRO PERC RT SI  5/13/2019    IR EXCHANGE NEPHRO PERC RT SI  6/27/2019    IR EXCHANGE NEPHRO PERC RT SI  8/16/2019    IR EXCHANGE NEPHRO PERC RT SI  10/3/2019    IR EXCHANGE NEPHRO PERC RT SI  11/18/2019    IR EXCHANGE NEPHRO PERC RT SI  1/6/2020    IR EXCHANGE NEPHRO PERC RT SI  2/24/2020    IR EXCHANGE NEPHRO PERC RT SI  4/13/2020    IR EXCHANGE NEPHRO PERC RT SI  6/1/2020    IR EXCHANGE NEPHRO PERC RT SI  6/19/2020    IR EXCHANGE NEPHRO PERC RT SI  8/10/2020    IR EXCHANGE NEPHRO PERC RT SI  9/30/2020    IR EXCHANGE NEPHRO PERC RT SI  11/5/2020    IR EXCHANGE NEPHRO PERC RT SI  12/16/2020    IR EXCHANGE NEPHRO PERC RT SI  2/1/2021    IR EXCHANGE NEPHRO PERC RT SI  3/25/2021    IR EXCHANGE NEPHRO PERC RT SI  5/10/2021    IR EXCHANGE NEPHRO PERC RT SI  8/2/2021    IR EXCHANGE NEPHRO PERC RT SI  9/13/2021    IR NEPHROSTOMY PERC RT PLC CATH  SI  6/21/2020    IR NEPHROSTOMY PERC RT PLC CATH  SI  6/7/2021     No family history on file. Social History     Tobacco Use    Smoking status: Never Smoker    Smokeless tobacco: Never Used   Substance Use Topics    Alcohol use: No       Prior to Admission medications    Medication Sig Start Date End Date Taking? Authorizing Provider   acetaminophen (TYLENOL) 500 mg tablet Take 2 Tabs by mouth every eight (8) hours as needed for Pain or Fever. 3/25/21   Marcelle England MD   nystatin (MYCOSTATIN) powder Apply  to affected area two (2) times a day. 3/25/21   Marcelle England MD   insulin glargine (LANTUS) 100 unit/mL injection 10 Units by SubCUTAneous route nightly. 3/25/21   Marcelle England MD   lip protectant with sunscreen (CARMEX) crea Apply  to affected area as needed for Dry Skin. 3/25/21   Marcelle England MD   timolol (TIMOPTIC) 0.5 % ophthalmic solution Administer 1 Drop to both eyes two (2) times a day. 3/25/21   Marcelle England MD   brimonidine (ALPHAGAN) 0.2 % ophthalmic solution Administer 1 Drop to both eyes every twelve (12) hours. 3/25/21   Marcelle England MD   amLODIPine (NORVASC) 5 mg tablet Take 1 Tab by mouth daily. 11/22/20   Kory Monte MD   famotidine (PEPCID) 10 mg tablet Take 1 Tab by mouth every twelve (12) hours.  11/21/20   Kory Monte MD melatonin 5 mg tablet Take 1 Tab by mouth nightly. 11/21/20   Ted Monte MD   LORazepam (ATIVAN) 0.5 mg tablet Take 1 Tab by mouth two (2) times daily as needed for Agitation. Max Daily Amount: 1 mg. 11/11/20   Martinez Horowitz MD   multivitamin (ONE A DAY) tablet Take 1 Tab by mouth daily. Provider, Historical   ondansetron hcl (ZOFRAN) 4 mg tablet Take 4 mg by mouth every eight (8) hours as needed for Nausea or Vomiting. Provider, Historical   brimonidine-timolol (COMBIGAN) 0.2-0.5 % drop ophthalmic solution Administer 1 Drop to both eyes every twelve (12) hours. Provider, Historical   metoprolol succinate (TOPROL-XL) 50 mg XL tablet Take 50 mg by mouth daily. Provider, Historical   magnesium hydroxide (MILK OF MAGNESIA CONCENTRATED) 2,400 mg/10 mL susp suspension Take 10 mL by mouth. Provider, Historical   levothyroxine (SYNTHROID) 50 mcg tablet Take  by mouth Daily (before breakfast). Provider, Historical   atorvastatin calcium (ATORVASTATIN PO) Take 20 mg by mouth daily. Other, MD Dc     Allergies   Allergen Reactions    Morphine Nausea and Vomiting    Pcn [Penicillins] Rash        HgbA1c:?  Advance Care plan: yes  Pneumonia vaccine:  BMI:27  Counseling re nutrition done.   Current meds documented in chart  Pain:   Elder maltreatment screen: neg  Smoking: no  Blood pressure: noted  in nursing notes/flowsheets  Signed By: Avinash Dow MD     October 18, 2021

## 2021-10-20 ENCOUNTER — TRANSCRIBE ORDER (OUTPATIENT)
Dept: INTERVENTIONAL RADIOLOGY/VASCULAR | Age: 81
End: 2021-10-20

## 2021-10-20 DIAGNOSIS — N13.39 OTHER HYDRONEPHROSIS: Primary | ICD-10-CM

## 2021-10-20 NOTE — PROGRESS NOTES
Spoke to pt son Amara Cox to schedule appointment for 11/09/2021 at 0830. Spoke to Chelsey Marr at [facility] and informed facility of the need for transportation and to arrive at 0800 11/9.

## 2021-10-22 ENCOUNTER — HOSPITAL ENCOUNTER (OUTPATIENT)
Dept: LAB | Age: 81
Discharge: HOME | End: 2021-10-22
Payer: MEDICARE

## 2021-10-22 VITALS
DIASTOLIC BLOOD PRESSURE: 44 MMHG | HEART RATE: 100 BPM | SYSTOLIC BLOOD PRESSURE: 135 MMHG | TEMPERATURE: 97.8 F | OXYGEN SATURATION: 100 % | RESPIRATION RATE: 18 BRPM

## 2021-10-22 LAB
CRP SERPL-MCNC: 5.6 MG/DL (ref 0–0.3)
ERYTHROCYTE [DISTWIDTH] IN BLOOD BY AUTOMATED COUNT: 14.5 % (ref 11.6–14.5)
ERYTHROCYTE [SEDIMENTATION RATE] IN BLOOD: 138 MM/HR (ref 0–30)
HCT VFR BLD AUTO: 31.3 % (ref 35–45)
HGB BLD-MCNC: 9.8 G/DL (ref 12–16)
MCH RBC QN AUTO: 27 PG (ref 24–34)
MCHC RBC AUTO-ENTMCNC: 31.3 G/DL (ref 31–37)
MCV RBC AUTO: 86.2 FL (ref 78–100)
PLATELET # BLD AUTO: 429 K/UL (ref 135–420)
PMV BLD AUTO: 9.6 FL (ref 9.2–11.8)
PREALB SERPL-MCNC: 16.3 MG/DL (ref 20–40)
RBC # BLD AUTO: 3.63 M/UL (ref 4.2–5.3)
WBC # BLD AUTO: 12.9 K/UL (ref 4.6–13.2)

## 2021-10-22 PROCEDURE — 86140 C-REACTIVE PROTEIN: CPT

## 2021-10-22 PROCEDURE — 36415 COLL VENOUS BLD VENIPUNCTURE: CPT

## 2021-10-22 PROCEDURE — 85027 COMPLETE CBC AUTOMATED: CPT

## 2021-10-22 PROCEDURE — 85652 RBC SED RATE AUTOMATED: CPT

## 2021-10-22 PROCEDURE — 84134 ASSAY OF PREALBUMIN: CPT

## 2021-10-22 NOTE — WOUND CARE
10/18/21 1122   Wound Sacral/coccyx   Date First Assessed/Time First Assessed: 08/09/21 1046   Present on Hospital Admission: Yes  Primary Wound Type: Pressure Injury  Location: Sacral/coccyx   Wound Image    Wound Etiology Pressure Stage 4   Dressing Status New dressing applied   Cleansed Irrigated with saline   Dressing/Treatment Silicone border  (santyl, saline moistened gauze gauze, )   Wound Length (cm) 1.2 cm   Wound Width (cm) 0.5 cm   Wound Depth (cm) 0.4 cm   Wound Surface Area (cm^2) 0.6 cm^2   Change in Wound Size % 52.38   Wound Volume (cm^3) 0.24 cm^3   Wound Healing % 52   Post-Procedure Length (cm) 1.2 cm   Post-Procedure Width (cm) 0.7 cm   Post-Procedure Depth (cm) 0.7 cm   Post-Procedure Surface Area (cm^2) 0.84 cm^2   Post-Procedure Volume (cm^3) 0.588 cm^3   Undermining Starts ___ O'Clock 12 o'clock   Undermining Ends ___ O'Clock 12 o'clock   Undermining Maximum Distance (cm) 1 cm   Wound Assessment Slough   Drainage Amount Moderate   Drainage Description Serosanguinous   Wound Odor None   Carol-Wound/Incision Assessment Blanchable erythema   Edges Defined edges   Wound Thickness Description Full thickness

## 2021-10-26 ENCOUNTER — HOSPITAL ENCOUNTER (OUTPATIENT)
Dept: LAB | Age: 81
Discharge: HOME | End: 2021-10-26

## 2021-10-26 PROCEDURE — U0003 INFECTIOUS AGENT DETECTION BY NUCLEIC ACID (DNA OR RNA); SEVERE ACUTE RESPIRATORY SYNDROME CORONAVIRUS 2 (SARS-COV-2) (CORONAVIRUS DISEASE [COVID-19]), AMPLIFIED PROBE TECHNIQUE, MAKING USE OF HIGH THROUGHPUT TECHNOLOGIES AS DESCRIBED BY CMS-2020-01-R: HCPCS

## 2021-10-27 LAB — SARS-COV-2, COV2NT: NOT DETECTED

## 2021-11-01 ENCOUNTER — APPOINTMENT (OUTPATIENT)
Dept: WOUND CARE | Age: 81
End: 2021-11-01
Attending: FAMILY MEDICINE

## 2021-11-02 ENCOUNTER — HOSPITAL ENCOUNTER (OUTPATIENT)
Dept: LAB | Age: 81
Discharge: HOME | End: 2021-11-02

## 2021-11-02 PROCEDURE — U0003 INFECTIOUS AGENT DETECTION BY NUCLEIC ACID (DNA OR RNA); SEVERE ACUTE RESPIRATORY SYNDROME CORONAVIRUS 2 (SARS-COV-2) (CORONAVIRUS DISEASE [COVID-19]), AMPLIFIED PROBE TECHNIQUE, MAKING USE OF HIGH THROUGHPUT TECHNOLOGIES AS DESCRIBED BY CMS-2020-01-R: HCPCS

## 2021-11-03 LAB — SARS-COV-2, COV2NT: NOT DETECTED

## 2021-11-09 ENCOUNTER — HOSPITAL ENCOUNTER (OUTPATIENT)
Dept: INTERVENTIONAL RADIOLOGY/VASCULAR | Age: 81
Discharge: HOME | End: 2021-11-09
Attending: UROLOGY
Payer: MEDICARE

## 2021-11-09 DIAGNOSIS — N13.39 OTHER HYDRONEPHROSIS: ICD-10-CM

## 2021-11-09 PROCEDURE — 74011250636 HC RX REV CODE- 250/636: Performed by: UROLOGY

## 2021-11-09 PROCEDURE — 74011000636 HC RX REV CODE- 636: Performed by: UROLOGY

## 2021-11-09 PROCEDURE — C1769 GUIDE WIRE: HCPCS

## 2021-11-09 RX ORDER — SODIUM CHLORIDE 9 MG/ML
500 INJECTION, SOLUTION INTRAVENOUS
Status: COMPLETED | OUTPATIENT
Start: 2021-11-09 | End: 2021-11-09

## 2021-11-09 RX ADMIN — SODIUM CHLORIDE 500 ML: 900 INJECTION, SOLUTION INTRAVENOUS at 08:56

## 2021-11-09 RX ADMIN — IOPAMIDOL 10 ML: 612 INJECTION, SOLUTION INTRAVENOUS at 08:56

## 2021-11-15 ENCOUNTER — HOSPITAL ENCOUNTER (OUTPATIENT)
Dept: WOUND CARE | Age: 81
Discharge: HOME | End: 2021-11-15
Attending: FAMILY MEDICINE
Payer: MEDICARE

## 2021-11-15 VITALS
SYSTOLIC BLOOD PRESSURE: 137 MMHG | DIASTOLIC BLOOD PRESSURE: 50 MMHG | TEMPERATURE: 98 F | HEART RATE: 99 BPM | OXYGEN SATURATION: 100 % | RESPIRATION RATE: 18 BRPM

## 2021-11-15 DIAGNOSIS — L89.154 PRESSURE INJURY OF SACRAL REGION, STAGE 4 (HCC): Primary | ICD-10-CM

## 2021-11-15 PROCEDURE — 11043 DBRDMT MUSC&/FSCA 1ST 20/<: CPT

## 2021-11-15 RX ORDER — LIDOCAINE HYDROCHLORIDE 20 MG/ML
JELLY TOPICAL ONCE
Status: CANCELLED | OUTPATIENT
Start: 2021-11-15 | End: 2021-11-15

## 2021-11-15 RX ORDER — LIDOCAINE HYDROCHLORIDE 40 MG/ML
SOLUTION TOPICAL ONCE
Status: CANCELLED | OUTPATIENT
Start: 2021-11-15 | End: 2021-11-15

## 2021-11-15 RX ORDER — LIDOCAINE HYDROCHLORIDE 20 MG/ML
JELLY TOPICAL ONCE
Status: COMPLETED | OUTPATIENT
Start: 2021-11-15 | End: 2021-11-15

## 2021-11-15 RX ADMIN — LIDOCAINE HYDROCHLORIDE: 20 JELLY TOPICAL at 12:00

## 2021-11-15 NOTE — DISCHARGE INSTRUCTIONS
Discharge Instructions for  [address] [place]  [address] [place], [facility]  [phone] Fax [phone]    NAME:  Tonie Reyes  YOB: 1940  MEDICAL RECORD NUMBER:  [id number]  DATE:  11/15/2021    Wound Cleansing:   Do not scrub or use excessive force. Cleanse wound prior to applying a clean dressing with:  [x] Normal Saline [] Keep Wound Dry in Shower    [] Wound Cleanser   [x] Cleanse wound with Mild Soap & Water  [] May Shower at Discharge   [] Other:      Topical Treatments:  Do not apply lotions, creams, or ointments to wound bed unless directed. [x] Apply antifungal ointment to skin surrounding the wound prior to dressing change. [x] Apply thin film of moisture barrier ointment to skin immediately around wound. ZINC if available  [] Other:       Dressings:           Wound Location -sacrum   [x] Apply Primary Dressing:         [x] Santyl       [x] Pack wound loosely with    [x] Plain Packing   [x] Cover and Secure with:      [x] Other: cover dressing     [x] Change dressing:  [x] Every Other Day          Pressure Relief:  [x] When sitting, shift position or do seat lifts every 15 minutes. [x] Wheelchair cushion [x] Specialty Bed/Mattress  [x] Turn every 2 hours when in bed. Avoid position directing pressure on wound site. Limit side lying to 30 degree tilt. Limit HOB elevation to 30 degrees.     Off-Loading:   [x] Off-loading when [] walking  [x] in bed [x] sitting    Dietary:  [] Diet as tolerated: [] Calorie Diabetic Diet: [] No Added Salt:  [x] Increase Protein: [] Other:   Activity:  [x] Activity as tolerated:  [] Patient has no activity restrictions     [] Strict Bedrest: [] Remain off Work:     [] May return to full duty work:                                   [] Return to work with restrictions:             Return Appointment:  [] Wound and dressing supply provider:   [] ECF or Home Healthcare:  [] Wound Assessment: [] Physician or NP scheduled for Wound Assessment:   [] Return Appointment: With Raudel Lopez  in  [facility])  [] Ordered tests:      Electronically signed Eliana Garcia RN on 11/15/2021 at 12:34 PM     Lynne Alcala [address]: Should you experience any significant changes in your wound(s) or have questions about your wound care, please contact the [facility] at [address] [place] [address] 8:00 am - 4:30. If you need help with your wound outside these hours and cannot wait until we are again available, contact your PCP or go to the hospital emergency room. PLEASE NOTE: IF YOU ARE UNABLE TO OBTAIN WOUND SUPPLIES, CONTINUE TO USE THE SUPPLIES YOU HAVE AVAILABLE UNTIL YOU ARE ABLE TO REACH US. IT IS MOST IMPORTANT TO KEEP THE WOUND COVERED AT ALL TIMES.      Physician Signature:_______________________    Date: ___________ Time:  ____________

## 2021-11-15 NOTE — WOUND CARE
Wound Center  Progress Note / Procedure Note    Subjective:     Chief Complaint:  Dinah Moritz is a 79 y.o.  female  with sacral ulcer wound of many months duration. HPI:     As per son, has had this wound for many months  Pre-hosp visit, were taking good care o fit but not as much now  They do dressing changes daily, but otherwise not much repositioning, staying in bed on back most of the day    History/Chart/Medications reviewed    Since last visit:  No new issues, ongoing issues with SNF    Wound caused by: pressure  Current wound care:See flowsheet  Offloading wound: has air mattress  Appetite: fair  Wound associated pain: See flowsheet  Diabetic: yes, A1c? Smoker: no  ROS: no N/V/D, no T/chills; no local rash, no chest pain or shortness of breath, no headache or dizzyness  +urostomy, +colostomy, R/ AKA      Objective:     Physical Exam:   See flowsheet / nursing notes for vitals  Visit Vitals  BP (!) 137/50 (BP 1 Location: Left upper arm, BP Patient Position: Lying)   Pulse 99   Temp 98 °F (36.7 °C)   Resp 18   SpO2 100%     General: NAD. Hygiene good, legally blind, hard of hearing  Psych: cooperative. No anxiety or depression. Normal mood and affect. Neuro: alert and oriented to person/place/situation. Otherwise nonfocal.  Derm: Normal turgor for age, dry skin  HEENT: Normocephalic, atraumatic. EOMI. Conjunctiva clear. No scleral icterus. Neck: Normal range of motion. No masses. Chest: Respirations nonlabored  Lower extremities: R/ AKA, L: color normal; temperature normal.     Ulcer Description:   See Flowsheet         Data Review:   Culture result:   Date Value Ref Range Status   09/20/2021 HEAVY MIXED ENTERIC KHUSHBOO   Final       CBC, sed rate crp prealb  Not done  Xray - no acute process         Assessment/Plan     79 y.o. female with Dm2, R/ AKA, colostomy, urostomy    - Sacral ulcer.   pressure injury, stage 4  Full thickness  stable  Slough/  Necrotic- stringy  Periwound  irritation/ redness- stable  Necessitates debridement  for wound healing and to prevent/heal infection  See below    - more pain, necrotic itssue adherent to bone  R/o   Osteomyelitis  Xray reviewed  Will consider further MRI/CT once we see sed rate and crp rsults   sed rate, crp still to do      Following discussed with patient / POA  Needs :  Serial debridement- debrided today- see note below    Good local wound care  Dressing:  santyl with packing strips  Frequency : daily    -Nutrition optimization  Having more protein supplements    -Good Diabetic control    -Good offloading  Has air mattress  Reposition q2 hours  Limit sitting to up for meals     son understood and agrees with plan. Questions answered. Follow up with me in 2 weeks         Procedure:     Ulcer assessment: Due to presence of necrotic tissue within the wound bed, ulcer requires debridement. Procedure: Debridement:   The indication for debridement was reviewed with patient. Risks of procedure (bleeding, infection, pain) were discussed with patient/POA and consent signed on first visit. Questions were answered        Muscle excisional debridement sacral ulcer  Indication: to remove necrotic tissue/ vitalized & devitalized tissue/ infected tissue/  through skin, subcutaneous, muscle, periosteal fascia layer of wound bed  Time out done.   Consent in chart   Anesthesia: Topical  lidocaine jelly   Instrument: curette  Residual Necrosis: Present and scored  Bleedinml   Hemostasis: Pressure   Patient tolerated procedure well   Procedural Pain: 0  Post - procedural pain: 0    Post debridement measurements:  see below  Surface area debrided: <20 sq. cm      WOUND POA CONDITIONS    Wound Sacral/coccyx (Active)   Wound Image    11/15/21 1153   Wound Etiology Pressure Stage 4 11/15/21 1153   Dressing Status Breakthrough drainage noted 11/15/21 1153   Cleansed Wound cleanser 11/15/21 1153   Dressing/Treatment Silicone border 48/44/69 1153   Dressing Change Due 10/04/21 09/20/21 1029   Wound Length (cm) 1 cm 11/15/21 1153   Wound Width (cm) 1 cm 11/15/21 1153   Wound Depth (cm) 0.5 cm 11/15/21 1153   Wound Surface Area (cm^2) 1 cm^2 11/15/21 1153   Change in Wound Size % 20.63 11/15/21 1153   Wound Volume (cm^3) 0.5 cm^3 11/15/21 1153   Wound Healing % 1 11/15/21 1153   Post-Procedure Length (cm) 1.2 cm 11/15/21 1153   Post-Procedure Width (cm) 1.2 cm 11/15/21 1153   Post-Procedure Depth (cm) 0.6 cm 11/15/21 1153   Post-Procedure Surface Area (cm^2) 1.44 cm^2 11/15/21 1153   Post-Procedure Volume (cm^3) 0.864 cm^3 11/15/21 1153   Undermining Starts ___ O'Clock 12 o'clock 10/18/21 1122   Undermining Ends ___ O'Clock 12 o'clock 10/18/21 1122   Undermining Maximum Distance (cm) 1 cm 10/18/21 1122   Wound Assessment Fibrin 11/15/21 1153   Drainage Amount Small 11/15/21 1153   Drainage Description Serous 11/15/21 1153   Wound Odor None 11/15/21 1153   Carol-Wound/Incision Assessment Dry/flaky; Fragile;  Intact 11/15/21 1153   Edges Defined edges 11/15/21 1153   Wound Thickness Description Full thickness 10/18/21 1122   Number of days: 98       [REMOVED] Wound Sacral/coccyx (Removed)   Number of days: 63             -------    Past Medical History:   Diagnosis Date    Cancer ([facility] [address]     vaginal and colon cancer    Cancer ([facility] [address]     Chronic pain     arthritis    Diabetes ([facility] [address]     DVT (deep venous thrombosis) (HCC)     Hypertension     Legally blind     Thyroid disease       Past Surgical History:   Procedure Laterality Date    HX ABOVE KNEE AMPUTATION Right     HX COLOSTOMY      HX ORTHOPAEDIC      HX OTHER SURGICAL      kidney drainage tube    HX UROLOGICAL      kidney removal    IR EXCHANGE NEPHRO PERC RT SI  1/28/2019    IR EXCHANGE NEPHRO PERC RT SI  3/21/2019    IR EXCHANGE NEPHRO PERC RT SI  5/13/2019    IR EXCHANGE NEPHRO PERC RT SI  6/27/2019    IR EXCHANGE NEPHRO PERC RT SI  8/16/2019    IR EXCHANGE NEPHRO PERC RT SI  10/3/2019    IR EXCHANGE NEPHRO PERC RT SI  11/18/2019    IR EXCHANGE NEPHRO PERC RT SI  1/6/2020    IR EXCHANGE NEPHRO PERC RT SI  2/24/2020    IR EXCHANGE NEPHRO PERC RT SI  4/13/2020    IR EXCHANGE NEPHRO PERC RT SI  6/1/2020    IR EXCHANGE NEPHRO PERC RT SI  6/19/2020    IR EXCHANGE NEPHRO PERC RT SI  8/10/2020    IR EXCHANGE NEPHRO PERC RT SI  9/30/2020    IR EXCHANGE NEPHRO PERC RT SI  11/5/2020    IR EXCHANGE NEPHRO PERC RT SI  12/16/2020    IR EXCHANGE NEPHRO PERC RT SI  2/1/2021    IR EXCHANGE NEPHRO PERC RT SI  3/25/2021    IR EXCHANGE NEPHRO PERC RT SI  5/10/2021    IR EXCHANGE NEPHRO PERC RT SI  8/2/2021    IR EXCHANGE NEPHRO PERC RT SI  9/13/2021    IR EXCHANGE NEPHRO PERC RT SI  11/9/2021    IR NEPHROSTOMY PERC RT PLC CATH  SI  6/21/2020    IR NEPHROSTOMY PERC RT PLC CATH  SI  6/7/2021     No family history on file. Social History     Tobacco Use    Smoking status: Never Smoker    Smokeless tobacco: Never Used   Substance Use Topics    Alcohol use: No       Prior to Admission medications    Medication Sig Start Date End Date Taking? Authorizing Provider   acetaminophen (TYLENOL) 500 mg tablet Take 2 Tabs by mouth every eight (8) hours as needed for Pain or Fever. 3/25/21   Cesario England MD   nystatin (MYCOSTATIN) powder Apply  to affected area two (2) times a day. 3/25/21   Cesario England MD   insulin glargine (LANTUS) 100 unit/mL injection 10 Units by SubCUTAneous route nightly. 3/25/21   Cesario England MD   lip protectant with sunscreen (CARMEX) crea Apply  to affected area as needed for Dry Skin. 3/25/21   Cesario England MD   timolol (TIMOPTIC) 0.5 % ophthalmic solution Administer 1 Drop to both eyes two (2) times a day. 3/25/21   Cesario England MD   brimonidine (ALPHAGAN) 0.2 % ophthalmic solution Administer 1 Drop to both eyes every twelve (12) hours. 3/25/21   Cesario England MD   amLODIPine (NORVASC) 5 mg tablet Take 1 Tab by mouth daily.  11/22/20 Radhames Monte MD   famotidine (PEPCID) 10 mg tablet Take 1 Tab by mouth every twelve (12) hours. 11/21/20   Radhames Monte MD   melatonin 5 mg tablet Take 1 Tab by mouth nightly. 11/21/20   Radhames Monte MD   LORazepam (ATIVAN) 0.5 mg tablet Take 1 Tab by mouth two (2) times daily as needed for Agitation. Max Daily Amount: 1 mg. 11/11/20   Dmitry Rivera MD   multivitamin (ONE A DAY) tablet Take 1 Tab by mouth daily. Provider, Historical   ondansetron hcl (ZOFRAN) 4 mg tablet Take 4 mg by mouth every eight (8) hours as needed for Nausea or Vomiting. Provider, Historical   brimonidine-timolol (COMBIGAN) 0.2-0.5 % drop ophthalmic solution Administer 1 Drop to both eyes every twelve (12) hours. Provider, Historical   metoprolol succinate (TOPROL-XL) 50 mg XL tablet Take 50 mg by mouth daily. Provider, Historical   magnesium hydroxide (MILK OF MAGNESIA CONCENTRATED) 2,400 mg/10 mL susp suspension Take 10 mL by mouth. Provider, Historical   levothyroxine (SYNTHROID) 50 mcg tablet Take  by mouth Daily (before breakfast). Provider, Historical   atorvastatin calcium (ATORVASTATIN PO) Take 20 mg by mouth daily. Other, MD Dc     Allergies   Allergen Reactions    Morphine Nausea and Vomiting    Pcn [Penicillins] Rash        HgbA1c:?  Advance Care plan: yes  Pneumonia vaccine:  BMI:27  Counseling re nutrition done.   Current meds documented in chart  Pain:   Elder maltreatment screen: neg  Smoking: no  Blood pressure: noted  in nursing notes/flowsheets  Signed By: Cally Porter MD     November 15, 2021

## 2021-11-15 NOTE — WOUND CARE
11/15/21 1153   Wound Sacral/coccyx   Date First Assessed/Time First Assessed: 08/09/21 1046   Present on Hospital Admission: Yes  Primary Wound Type: Pressure Injury  Location: Sacral/coccyx   Wound Image     Wound Etiology Pressure Stage 4   Dressing Status Breakthrough drainage noted   Cleansed Wound cleanser   Dressing/Treatment Silicone border   Wound Length (cm) 1 cm   Wound Width (cm) 1 cm   Wound Depth (cm) 0.5 cm   Wound Surface Area (cm^2) 1 cm^2   Change in Wound Size % 20.63   Wound Volume (cm^3) 0.5 cm^3   Wound Healing % 1   Post-Procedure Length (cm) 1.2 cm   Post-Procedure Width (cm) 1.2 cm   Post-Procedure Depth (cm) 0.6 cm   Post-Procedure Surface Area (cm^2) 1.44 cm^2   Post-Procedure Volume (cm^3) 0.864 cm^3   Wound Assessment Fibrin   Drainage Amount Small   Drainage Description Serous   Wound Odor None   Carol-Wound/Incision Assessment Dry/flaky; Fragile; Intact   Edges Defined edges     Applied Santyl, 1/4 \" packing, cover dressing and zinc cream on dry cracky skin. Discharge Instructions for  [address], [address] [place] [address]  [phone] Fax [phone]    NAME:  Shari Wei  YOB: 1940  MEDICAL RECORD NUMBER:  [id number]  DATE:  11/15/2021    Wound Cleansing:   Do not scrub or use excessive force. Cleanse wound prior to applying a clean dressing with:  [x] Normal Saline [] Keep Wound Dry in Shower    [] Wound Cleanser   [x] Cleanse wound with Mild Soap & Water  [] May Shower at Discharge   [] Other:      Topical Treatments:  Do not apply lotions, creams, or ointments to wound bed unless directed. [x] Apply antifungal ointment to skin surrounding the wound prior to dressing change. [x] Apply thin film of moisture barrier ointment to skin immediately around wound.  ZINC if available  [] Other:       Dressings:           Wound Location -Sacrum   [x] Apply Primary Dressing:         [x] Santyl       [x] Pack wound loosely with    [x] Plain Packing   [x] Cover and Secure with:      [x] Other: cover dressing     [x] Change dressing:  [x] Every Other Day      Pressure Relief:  [x] When sitting, shift position or do seat lifts every 15 minutes. [x] Wheelchair cushion [x] Specialty Bed/Mattress  [x] Turn every 2 hours when in bed. Avoid position directing pressure on wound site. Limit side lying to 30 degree tilt. Limit HOB elevation to 30 degrees. Off-Loading:   [x] Off-loading when [] walking  [x] in bed [x] sitting    Dietary:  [] Diet as tolerated: [] Calorie Diabetic Diet: [] No Added Salt:  [x] Increase Protein: [] Other:   Activity:  [x] Activity as tolerated:  [] Patient has no activity restrictions     [] Strict Bedrest: [] Remain off Work:     [] May return to full duty work:                                   [] Return to work with restrictions:             Return Appointment:  [] Wound and dressing supply provider:   [] ECF or Home Healthcare:  [] Wound Assessment: [] Physician or NP scheduled for Wound Assessment:   [] Return Appointment: With Joycelyn Bowden  in  3 Week(s)  [] Ordered tests:      Electronically signed Edna Gregory RN on 11/15/2021 at 12:34 PM     Lynne Alcala 281: Should you experience any significant changes in your wound(s) or have questions about your wound care, please contact the [facility] at [address] [place] [address] Street 8:00 am - 4:30. If you need help with your wound outside these hours and cannot wait until we are again available, contact your PCP or go to the hospital emergency room.

## 2021-11-27 ENCOUNTER — HOSPITAL ENCOUNTER (OUTPATIENT)
Dept: LAB | Age: 81
Discharge: HOME | End: 2021-11-27
Payer: MEDICARE

## 2021-11-27 LAB
ALBUMIN SERPL-MCNC: 1.9 G/DL (ref 3.4–5)
ALBUMIN/GLOB SERPL: 0.4 {RATIO} (ref 0.8–1.7)
ALP SERPL-CCNC: 168 U/L (ref 45–117)
ALT SERPL-CCNC: 36 U/L (ref 13–56)
ANION GAP SERPL CALC-SCNC: 8 MMOL/L (ref 3–18)
AST SERPL-CCNC: 22 U/L (ref 10–38)
BASOPHILS # BLD: 0 K/UL (ref 0–0.1)
BASOPHILS NFR BLD: 0 % (ref 0–2)
BILIRUB SERPL-MCNC: 0.2 MG/DL (ref 0.2–1)
BUN SERPL-MCNC: 58 MG/DL (ref 7–18)
BUN/CREAT SERPL: 28 (ref 12–20)
CALCIUM SERPL-MCNC: 8 MG/DL (ref 8.5–10.1)
CHLORIDE SERPL-SCNC: 94 MMOL/L (ref 100–111)
CO2 SERPL-SCNC: 25 MMOL/L (ref 21–32)
CREAT SERPL-MCNC: 2.05 MG/DL (ref 0.6–1.3)
DIFFERENTIAL METHOD BLD: ABNORMAL
EOSINOPHIL # BLD: 0.3 K/UL (ref 0–0.4)
EOSINOPHIL NFR BLD: 2 % (ref 0–5)
ERYTHROCYTE [DISTWIDTH] IN BLOOD BY AUTOMATED COUNT: 14.9 % (ref 11.6–14.5)
GLOBULIN SER CALC-MCNC: 4.3 G/DL (ref 2–4)
GLUCOSE SERPL-MCNC: 183 MG/DL (ref 74–99)
HCT VFR BLD AUTO: 29.4 % (ref 35–45)
HGB BLD-MCNC: 9.1 G/DL (ref 12–16)
IMM GRANULOCYTES # BLD AUTO: 0.1 K/UL (ref 0–0.04)
IMM GRANULOCYTES NFR BLD AUTO: 1 % (ref 0–0.5)
LYMPHOCYTES # BLD: 1.6 K/UL (ref 0.9–3.6)
LYMPHOCYTES NFR BLD: 10 % (ref 21–52)
MAGNESIUM SERPL-MCNC: 1.9 MG/DL (ref 1.6–2.6)
MCH RBC QN AUTO: 25.7 PG (ref 24–34)
MCHC RBC AUTO-ENTMCNC: 31 G/DL (ref 31–37)
MCV RBC AUTO: 83.1 FL (ref 78–100)
MONOCYTES # BLD: 0.9 K/UL (ref 0.05–1.2)
MONOCYTES NFR BLD: 6 % (ref 3–10)
NEUTS SEG # BLD: 12.9 K/UL (ref 1.8–8)
NEUTS SEG NFR BLD: 81 % (ref 40–73)
NRBC # BLD: 0 K/UL (ref 0–0.01)
NRBC BLD-RTO: 0 PER 100 WBC
PLATELET # BLD AUTO: 510 K/UL (ref 135–420)
PMV BLD AUTO: 9.2 FL (ref 9.2–11.8)
POTASSIUM SERPL-SCNC: 5.4 MMOL/L (ref 3.5–5.5)
PROT SERPL-MCNC: 6.2 G/DL (ref 6.4–8.2)
RBC # BLD AUTO: 3.54 M/UL (ref 4.2–5.3)
SODIUM SERPL-SCNC: 127 MMOL/L (ref 136–145)
WBC # BLD AUTO: 15.9 K/UL (ref 4.6–13.2)

## 2021-11-27 PROCEDURE — 83735 ASSAY OF MAGNESIUM: CPT

## 2021-11-27 PROCEDURE — 85025 COMPLETE CBC W/AUTO DIFF WBC: CPT

## 2021-11-27 PROCEDURE — 80053 COMPREHEN METABOLIC PANEL: CPT

## 2021-11-30 ENCOUNTER — HOSPITAL ENCOUNTER (OUTPATIENT)
Dept: LAB | Age: 81
Discharge: HOME | End: 2021-11-30
Payer: MEDICARE

## 2021-11-30 LAB
APPEARANCE UR: CLEAR
BACTERIA URNS QL MICRO: ABNORMAL /HPF
BILIRUB UR QL: NEGATIVE
COLOR UR: YELLOW
EPITH CASTS URNS QL MICRO: ABNORMAL /LPF (ref 0–5)
GLUCOSE UR STRIP.AUTO-MCNC: NEGATIVE MG/DL
HGB UR QL STRIP: ABNORMAL
KETONES UR QL STRIP.AUTO: NEGATIVE MG/DL
LEUKOCYTE ESTERASE UR QL STRIP.AUTO: ABNORMAL
NITRITE UR QL STRIP.AUTO: NEGATIVE
PH UR STRIP: >8.5 [PH] (ref 5–8)
PROT UR STRIP-MCNC: 100 MG/DL
RBC #/AREA URNS HPF: ABNORMAL /HPF (ref 0–5)
SP GR UR REFRACTOMETRY: 1.01 (ref 1–1.03)
UROBILINOGEN UR QL STRIP.AUTO: 0.2 EU/DL (ref 0.2–1)
WBC URNS QL MICRO: ABNORMAL /HPF (ref 0–5)

## 2021-11-30 PROCEDURE — 87186 SC STD MICRODIL/AGAR DIL: CPT

## 2021-11-30 PROCEDURE — 87086 URINE CULTURE/COLONY COUNT: CPT

## 2021-11-30 PROCEDURE — 87077 CULTURE AEROBIC IDENTIFY: CPT

## 2021-11-30 PROCEDURE — 81001 URINALYSIS AUTO W/SCOPE: CPT

## 2021-12-06 ENCOUNTER — HOSPITAL ENCOUNTER (INPATIENT)
Age: 81
LOS: 3 days | Discharge: NURSING, REHAB OR LONG-TERM CARE | DRG: 981 | End: 2021-12-10
Attending: EMERGENCY MEDICINE | Admitting: INTERNAL MEDICINE
Payer: MEDICARE

## 2021-12-06 ENCOUNTER — HOSPITAL ENCOUNTER (OUTPATIENT)
Dept: WOUND CARE | Age: 81
Discharge: HOME | DRG: 981 | End: 2021-12-06
Attending: FAMILY MEDICINE
Payer: MEDICARE

## 2021-12-06 ENCOUNTER — APPOINTMENT (OUTPATIENT)
Dept: CT IMAGING | Age: 81
DRG: 981 | End: 2021-12-06
Attending: PHYSICIAN ASSISTANT
Payer: MEDICARE

## 2021-12-06 VITALS
OXYGEN SATURATION: 100 % | DIASTOLIC BLOOD PRESSURE: 55 MMHG | TEMPERATURE: 98.2 F | SYSTOLIC BLOOD PRESSURE: 135 MMHG | HEART RATE: 97 BPM | RESPIRATION RATE: 16 BRPM

## 2021-12-06 DIAGNOSIS — L89.154 PRESSURE INJURY OF SACRAL REGION, STAGE 4 (HCC): Primary | ICD-10-CM

## 2021-12-06 DIAGNOSIS — E11.22 TYPE 2 DIABETES MELLITUS WITH CHRONIC KIDNEY DISEASE, WITH LONG-TERM CURRENT USE OF INSULIN, UNSPECIFIED CKD STAGE (HCC): ICD-10-CM

## 2021-12-06 DIAGNOSIS — N13.30 HYDRONEPHROSIS, UNSPECIFIED HYDRONEPHROSIS TYPE: ICD-10-CM

## 2021-12-06 DIAGNOSIS — Z79.4 TYPE 2 DIABETES MELLITUS WITH CHRONIC KIDNEY DISEASE, WITH LONG-TERM CURRENT USE OF INSULIN, UNSPECIFIED CKD STAGE (HCC): ICD-10-CM

## 2021-12-06 DIAGNOSIS — R10.9 FLANK PAIN, ACUTE: Primary | ICD-10-CM

## 2021-12-06 DIAGNOSIS — N18.9 CHRONIC KIDNEY DISEASE, UNSPECIFIED CKD STAGE: ICD-10-CM

## 2021-12-06 DIAGNOSIS — Z90.5 STATUS POST NEPHRECTOMY: ICD-10-CM

## 2021-12-06 DIAGNOSIS — T83.022A NEPHROSTOMY TUBE DISPLACED (HCC): ICD-10-CM

## 2021-12-06 LAB
ALBUMIN SERPL-MCNC: 2.2 G/DL (ref 3.4–5)
ALBUMIN/GLOB SERPL: 0.4 {RATIO} (ref 0.8–1.7)
ALP SERPL-CCNC: 124 U/L (ref 45–117)
ALT SERPL-CCNC: 37 U/L (ref 13–56)
ANION GAP SERPL CALC-SCNC: 9 MMOL/L (ref 3–18)
AST SERPL-CCNC: 25 U/L (ref 10–38)
BASOPHILS # BLD: 0.2 K/UL (ref 0–0.1)
BASOPHILS NFR BLD: 1 % (ref 0–2)
BILIRUB SERPL-MCNC: 0.2 MG/DL (ref 0.2–1)
BUN SERPL-MCNC: 41 MG/DL (ref 7–18)
BUN/CREAT SERPL: 27 (ref 12–20)
CALCIUM SERPL-MCNC: 8.4 MG/DL (ref 8.5–10.1)
CHLORIDE SERPL-SCNC: 107 MMOL/L (ref 100–111)
CO2 SERPL-SCNC: 20 MMOL/L (ref 21–32)
CREAT SERPL-MCNC: 1.52 MG/DL (ref 0.6–1.3)
DIFFERENTIAL METHOD BLD: ABNORMAL
EOSINOPHIL # BLD: 0.4 K/UL (ref 0–0.4)
EOSINOPHIL NFR BLD: 2 % (ref 0–5)
ERYTHROCYTE [DISTWIDTH] IN BLOOD BY AUTOMATED COUNT: 15.4 % (ref 11.6–14.5)
GLOBULIN SER CALC-MCNC: 5.6 G/DL (ref 2–4)
GLUCOSE SERPL-MCNC: 229 MG/DL (ref 74–99)
HCT VFR BLD AUTO: 31.6 % (ref 35–45)
HGB BLD-MCNC: 9.7 G/DL (ref 12–16)
IMM GRANULOCYTES # BLD AUTO: 0 K/UL (ref 0–0.04)
IMM GRANULOCYTES NFR BLD AUTO: 0 % (ref 0–0.5)
LIPASE SERPL-CCNC: 218 U/L (ref 73–393)
LYMPHOCYTES # BLD: 1.6 K/UL (ref 0.9–3.6)
LYMPHOCYTES NFR BLD: 9 % (ref 21–52)
MAGNESIUM SERPL-MCNC: 1.3 MG/DL (ref 1.6–2.6)
MCH RBC QN AUTO: 25.8 PG (ref 24–34)
MCHC RBC AUTO-ENTMCNC: 30.7 G/DL (ref 31–37)
MCV RBC AUTO: 84 FL (ref 78–100)
MONOCYTES # BLD: 0.7 K/UL (ref 0.05–1.2)
MONOCYTES NFR BLD: 4 % (ref 3–10)
NEUTS SEG # BLD: 15.4 K/UL (ref 1.8–8)
NEUTS SEG NFR BLD: 84 % (ref 40–73)
NRBC # BLD: 0 K/UL (ref 0–0.01)
NRBC BLD-RTO: 0 PER 100 WBC
PLATELET # BLD AUTO: 540 K/UL (ref 135–420)
PLATELET COMMENTS,PCOM: ABNORMAL
PMV BLD AUTO: 8.8 FL (ref 9.2–11.8)
POTASSIUM SERPL-SCNC: 4.9 MMOL/L (ref 3.5–5.5)
PROT SERPL-MCNC: 7.8 G/DL (ref 6.4–8.2)
RBC # BLD AUTO: 3.76 M/UL (ref 4.2–5.3)
RBC MORPH BLD: ABNORMAL
SODIUM SERPL-SCNC: 136 MMOL/L (ref 136–145)
WBC # BLD AUTO: 18.3 K/UL (ref 4.6–13.2)

## 2021-12-06 PROCEDURE — 74176 CT ABD & PELVIS W/O CONTRAST: CPT

## 2021-12-06 PROCEDURE — 11043 DBRDMT MUSC&/FSCA 1ST 20/<: CPT

## 2021-12-06 PROCEDURE — 96375 TX/PRO/DX INJ NEW DRUG ADDON: CPT

## 2021-12-06 PROCEDURE — 0KBN0ZZ EXCISION OF RIGHT HIP MUSCLE, OPEN APPROACH: ICD-10-PCS | Performed by: FAMILY MEDICINE

## 2021-12-06 PROCEDURE — 85025 COMPLETE CBC W/AUTO DIFF WBC: CPT

## 2021-12-06 PROCEDURE — 96374 THER/PROPH/DIAG INJ IV PUSH: CPT

## 2021-12-06 PROCEDURE — 80053 COMPREHEN METABOLIC PANEL: CPT

## 2021-12-06 PROCEDURE — 83735 ASSAY OF MAGNESIUM: CPT

## 2021-12-06 PROCEDURE — 0KBP0ZZ EXCISION OF LEFT HIP MUSCLE, OPEN APPROACH: ICD-10-PCS | Performed by: FAMILY MEDICINE

## 2021-12-06 PROCEDURE — 83690 ASSAY OF LIPASE: CPT

## 2021-12-06 PROCEDURE — 74011250636 HC RX REV CODE- 250/636: Performed by: PHYSICIAN ASSISTANT

## 2021-12-06 PROCEDURE — 99285 EMERGENCY DEPT VISIT HI MDM: CPT

## 2021-12-06 RX ORDER — MORPHINE SULFATE 4 MG/ML
4 INJECTION INTRAVENOUS
Status: COMPLETED | OUTPATIENT
Start: 2021-12-06 | End: 2021-12-06

## 2021-12-06 RX ORDER — LIDOCAINE HYDROCHLORIDE 20 MG/ML
JELLY TOPICAL ONCE
Status: COMPLETED | OUTPATIENT
Start: 2021-12-06 | End: 2021-12-06

## 2021-12-06 RX ORDER — LIDOCAINE HYDROCHLORIDE 40 MG/ML
SOLUTION TOPICAL ONCE
Status: CANCELLED | OUTPATIENT
Start: 2021-12-06 | End: 2021-12-06

## 2021-12-06 RX ORDER — LIDOCAINE HYDROCHLORIDE 20 MG/ML
JELLY TOPICAL ONCE
Status: CANCELLED | OUTPATIENT
Start: 2021-12-06 | End: 2021-12-06

## 2021-12-06 RX ORDER — ONDANSETRON 2 MG/ML
4 INJECTION INTRAMUSCULAR; INTRAVENOUS
Status: COMPLETED | OUTPATIENT
Start: 2021-12-06 | End: 2021-12-06

## 2021-12-06 RX ADMIN — MORPHINE SULFATE 4 MG: 4 INJECTION INTRAVENOUS at 21:07

## 2021-12-06 RX ADMIN — ONDANSETRON 4 MG: 2 INJECTION INTRAMUSCULAR; INTRAVENOUS at 21:07

## 2021-12-06 RX ADMIN — LIDOCAINE HYDROCHLORIDE: 20 JELLY TOPICAL at 11:00

## 2021-12-06 NOTE — WOUND CARE
Wound Center  Progress Note / Procedure Note    Subjective:     Chief Complaint:  Shari Wei is a 79 y.o.  female  with sacral ulcer wound of many months duration. HPI:     As per son, has had this wound for many months  Pre-hosp visit, were taking good care o fit but not as much now  They do dressing changes daily, but otherwise not much repositioning, staying in bed on back most of the day    History/Chart/Medications reviewed    Since last visit:  No new issues    Wound caused by: pressure  Current wound care:See flowsheet  Offloading wound: has air mattress  Appetite: fair  Wound associated pain: See flowsheet  Diabetic: yes, A1c? Smoker: no  ROS: no N/V/D, no T/chills; no local rash, no chest pain or shortness of breath, no headache or dizzyness  +urostomy, +colostomy, R/ AKA      Objective:     Physical Exam:   See flowsheet / nursing notes for vitals  Visit Vitals  BP (!) 135/55 (BP 1 Location: Left upper arm)   Pulse 97   Temp 98.2 °F (36.8 °C)   Resp 16   SpO2 100%     General: NAD. Hygiene good, legally blind, hard of hearing  Psych: cooperative. No anxiety or depression. Normal mood and affect. Neuro: alert and oriented to person/place/situation. Otherwise nonfocal.  Derm: Normal turgor for age, dry skin  HEENT: Normocephalic, atraumatic. EOMI. Conjunctiva clear. No scleral icterus. Neck: Normal range of motion. No masses. Chest: Respirations nonlabored  Lower extremities: R/ AKA, L: color normal; temperature normal.     Ulcer Description:   See Flowsheet         Data Review:   Culture result:   Date Value Ref Range Status   11/30/2021 PROTEUS MIRABILIS (PREDOMINATING) (A)   Final   11/30/2021 DIPHTHEROIDS (A)   Final       CBC, sed rate crp prealb  Not done  Xray - no acute process         Assessment/Plan     79 y.o. female with Dm2, R/ AKA, colostomy, urostomy    - Sacral ulcer.   pressure injury, stage 4  Full thickness  Periwound and wound - appear the healthiest they've evr been  Decreased slough, more granular  Periwound  healthier  Necessitates debridement  for wound healing and to prevent/heal infection  See below      R/o   Osteomyelitis  Xray reviewed  Will consider further MRI/CT once we see sed rate and crp rsults   sed rate, crp still to do      Following discussed with patient / POA  Needs :  Serial debridement- debrided today- see note below    Good local wound care  Dressing:  santyl with packing strips  Frequency : daily    -Nutrition optimization  Having more protein supplements    -Good Diabetic control    -Good offloading  Has air mattress  Reposition q2 hours  Limit sitting to up for meals     son understood and agrees with plan. Questions answered. Follow up with me in 2 weeks         Procedure:     Ulcer assessment: Due to presence of necrotic tissue within the wound bed, ulcer requires debridement. Procedure: Debridement:   The indication for debridement was reviewed with patient. Risks of procedure (bleeding, infection, pain) were discussed with patient/POA and consent signed on first visit. Questions were answered        Muscle excisional debridement sacral ulcer  Indication: to remove necrotic tissue/ vitalized & devitalized tissue/ infected tissue/  through skin, subcutaneous, muscle, periosteal fascia layer of wound bed  Time out done.   Consent in chart   Anesthesia: Topical  lidocaine jelly   Instrument: curette  Residual Necrosis: Present and scored  Bleedinml   Hemostasis: Pressure   Patient tolerated procedure well   Procedural Pain: 0  Post - procedural pain: 0    Post debridement measurements:  see below  Surface area debrided: <20 sq. cm    WOUND POA CONDITIONS    Wound Sacral/coccyx (Active)   Wound Image    21 1048   Wound Etiology Pressure Stage 4 21 1048   Dressing Status Intact 21 1048   Cleansed Wound cleanser 21 1048   Dressing/Treatment Silicone border  1048   Dressing Change Due 10/04/21 09/20/21 1029 Wound Length (cm) 1.2 cm 12/06/21 1048   Wound Width (cm) 0.5 cm 12/06/21 1048   Wound Depth (cm) 0.8 cm 12/06/21 1048   Wound Surface Area (cm^2) 0.6 cm^2 12/06/21 1048   Change in Wound Size % 52.38 12/06/21 1048   Wound Volume (cm^3) 0.48 cm^3 12/06/21 1048   Wound Healing % 5 12/06/21 1048   Post-Procedure Length (cm) 1.4 cm 12/06/21 1048   Post-Procedure Width (cm) 0.7 cm 12/06/21 1048   Post-Procedure Depth (cm) 1 cm 12/06/21 1048   Post-Procedure Surface Area (cm^2) 0.98 cm^2 12/06/21 1048   Post-Procedure Volume (cm^3) 0.98 cm^3 12/06/21 1048   Undermining Starts ___ O'Clock 12 o'clock 12/06/21 1048   Undermining Ends ___ O'Clock 12 o'clock 12/06/21 1048   Undermining Maximum Distance (cm) 2 cm 12/06/21 1048   Wound Assessment Devitalized tissue 12/06/21 1048   Drainage Amount Small 12/06/21 1048   Drainage Description Serous 12/06/21 1048   Wound Odor None 12/06/21 1048   Carol-Wound/Incision Assessment Dry/flaky 12/06/21 1048   Edges Defined edges 11/15/21 1153   Wound Thickness Description Full thickness 10/18/21 1122   Number of days: 119       [REMOVED] Wound Sacral/coccyx (Removed)   Number of days: 63           -------    Past Medical History:   Diagnosis Date    [facility] ([facility]     vaginal and colon cancer    Cancer ([facility]     Chronic pain     arthritis    Diabetes ([facility]     DVT (deep venous thrombosis) (HCC)     Hypertension     Legally blind     Thyroid disease       Past Surgical History:   Procedure Laterality Date    HX ABOVE KNEE AMPUTATION Right     HX COLOSTOMY      HX ORTHOPAEDIC      HX OTHER SURGICAL      kidney drainage tube    HX UROLOGICAL      kidney removal    IR EXCHANGE NEPHRO PERC RT SI  1/28/2019    IR EXCHANGE NEPHRO PERC RT SI  3/21/2019    IR EXCHANGE NEPHRO PERC RT SI  5/13/2019    IR EXCHANGE NEPHRO PERC RT SI  6/27/2019    IR EXCHANGE NEPHRO PERC RT SI  8/16/2019    IR EXCHANGE NEPHRO PERC RT SI  10/3/2019    IR EXCHANGE NEPHRO PERC RT SI  11/18/2019    IR EXCHANGE NEPHRO PERC RT SI  1/6/2020    IR EXCHANGE NEPHRO PERC RT SI  2/24/2020    IR EXCHANGE NEPHRO PERC RT SI  4/13/2020    IR EXCHANGE NEPHRO PERC RT SI  6/1/2020    IR EXCHANGE NEPHRO PERC RT SI  6/19/2020    IR EXCHANGE NEPHRO PERC RT SI  8/10/2020    IR EXCHANGE NEPHRO PERC RT SI  9/30/2020    IR EXCHANGE NEPHRO PERC RT SI  11/5/2020    IR EXCHANGE NEPHRO PERC RT SI  12/16/2020    IR EXCHANGE NEPHRO PERC RT SI  2/1/2021    IR EXCHANGE NEPHRO PERC RT SI  3/25/2021    IR EXCHANGE NEPHRO PERC RT SI  5/10/2021    IR EXCHANGE NEPHRO PERC RT SI  8/2/2021    IR EXCHANGE NEPHRO PERC RT SI  9/13/2021    IR EXCHANGE NEPHRO PERC RT SI  11/9/2021    IR NEPHROSTOMY PERC RT PLC CATH  SI  6/21/2020    IR NEPHROSTOMY PERC RT PLC CATH  SI  6/7/2021     No family history on file. Social History     Tobacco Use    Smoking status: Never Smoker    Smokeless tobacco: Never Used   Substance Use Topics    Alcohol use: No       Prior to Admission medications    Medication Sig Start Date End Date Taking? Authorizing Provider   acetaminophen (TYLENOL) 500 mg tablet Take 2 Tabs by mouth every eight (8) hours as needed for Pain or Fever. 3/25/21   Bree England MD   nystatin (MYCOSTATIN) powder Apply  to affected area two (2) times a day. 3/25/21   rBee England MD   insulin glargine (LANTUS) 100 unit/mL injection 10 Units by SubCUTAneous route nightly. 3/25/21   Bree England MD   lip protectant with sunscreen (CARMEX) crea Apply  to affected area as needed for Dry Skin. 3/25/21   Bree England MD   timolol (TIMOPTIC) 0.5 % ophthalmic solution Administer 1 Drop to both eyes two (2) times a day. 3/25/21   Bree England MD   brimonidine (ALPHAGAN) 0.2 % ophthalmic solution Administer 1 Drop to both eyes every twelve (12) hours. 3/25/21   Bree England MD   amLODIPine (NORVASC) 5 mg tablet Take 1 Tab by mouth daily.  11/22/20   Valerio Monte MD famotidine (PEPCID) 10 mg tablet Take 1 Tab by mouth every twelve (12) hours. 11/21/20   Peg Monte MD   melatonin 5 mg tablet Take 1 Tab by mouth nightly. 11/21/20   Peg Monte MD   LORazepam (ATIVAN) 0.5 mg tablet Take 1 Tab by mouth two (2) times daily as needed for Agitation. Max Daily Amount: 1 mg. 11/11/20   Gini Severe, MD   multivitamin (ONE A DAY) tablet Take 1 Tab by mouth daily. Provider, Historical   ondansetron hcl (ZOFRAN) 4 mg tablet Take 4 mg by mouth every eight (8) hours as needed for Nausea or Vomiting. Provider, Historical   brimonidine-timolol (COMBIGAN) 0.2-0.5 % drop ophthalmic solution Administer 1 Drop to both eyes every twelve (12) hours. Provider, Historical   metoprolol succinate (TOPROL-XL) 50 mg XL tablet Take 50 mg by mouth daily. Provider, Historical   magnesium hydroxide (MILK OF MAGNESIA CONCENTRATED) 2,400 mg/10 mL susp suspension Take 10 mL by mouth. Provider, Historical   levothyroxine (SYNTHROID) 50 mcg tablet Take  by mouth Daily (before breakfast). Provider, Historical   atorvastatin calcium (ATORVASTATIN PO) Take 20 mg by mouth daily. Other, MD Dc     Allergies   Allergen Reactions    Morphine Nausea and Vomiting    Pcn [Penicillins] Rash        HgbA1c:?  Advance Care plan: yes  Pneumonia vaccine:  BMI:27  Counseling re nutrition done.   Current meds documented in chart  Pain:   Elder maltreatment screen: neg  Smoking: no  Blood pressure: noted  in nursing notes/flowsheets  Signed By: Ai Contreras MD     December 6, 2021

## 2021-12-07 ENCOUNTER — APPOINTMENT (OUTPATIENT)
Dept: GENERAL RADIOLOGY | Age: 81
DRG: 981 | End: 2021-12-07
Attending: INTERNAL MEDICINE
Payer: MEDICARE

## 2021-12-07 ENCOUNTER — APPOINTMENT (OUTPATIENT)
Dept: VASCULAR SURGERY | Age: 81
DRG: 981 | End: 2021-12-07
Attending: INTERNAL MEDICINE
Payer: MEDICARE

## 2021-12-07 ENCOUNTER — APPOINTMENT (OUTPATIENT)
Dept: INTERVENTIONAL RADIOLOGY/VASCULAR | Age: 81
DRG: 981 | End: 2021-12-07
Attending: INTERNAL MEDICINE
Payer: MEDICARE

## 2021-12-07 PROBLEM — N13.30 HYDRONEPHROSIS: Status: ACTIVE | Noted: 2021-12-07

## 2021-12-07 PROBLEM — R10.9 ABDOMINAL PAIN: Status: ACTIVE | Noted: 2021-12-07

## 2021-12-07 LAB
ALBUMIN SERPL-MCNC: 2.1 G/DL (ref 3.4–5)
ALBUMIN/GLOB SERPL: 0.5 {RATIO} (ref 0.8–1.7)
ALP SERPL-CCNC: 116 U/L (ref 45–117)
ALT SERPL-CCNC: 34 U/L (ref 13–56)
ANION GAP SERPL CALC-SCNC: 10 MMOL/L (ref 3–18)
AST SERPL-CCNC: 18 U/L (ref 10–38)
BASOPHILS # BLD: 0.1 K/UL (ref 0–0.1)
BASOPHILS NFR BLD: 0 % (ref 0–2)
BILIRUB SERPL-MCNC: 0.2 MG/DL (ref 0.2–1)
BUN SERPL-MCNC: 51 MG/DL (ref 7–18)
BUN/CREAT SERPL: 29 (ref 12–20)
CALCIUM SERPL-MCNC: 8.1 MG/DL (ref 8.5–10.1)
CHLORIDE SERPL-SCNC: 108 MMOL/L (ref 100–111)
CO2 SERPL-SCNC: 17 MMOL/L (ref 21–32)
CREAT SERPL-MCNC: 1.73 MG/DL (ref 0.6–1.3)
DIFFERENTIAL METHOD BLD: ABNORMAL
EOSINOPHIL # BLD: 0.1 K/UL (ref 0–0.4)
EOSINOPHIL NFR BLD: 1 % (ref 0–5)
ERYTHROCYTE [DISTWIDTH] IN BLOOD BY AUTOMATED COUNT: 15.5 % (ref 11.6–14.5)
EST. AVERAGE GLUCOSE BLD GHB EST-MCNC: 169 MG/DL
GLOBULIN SER CALC-MCNC: 4.3 G/DL (ref 2–4)
GLUCOSE BLD STRIP.AUTO-MCNC: 186 MG/DL (ref 70–110)
GLUCOSE BLD STRIP.AUTO-MCNC: 227 MG/DL (ref 70–110)
GLUCOSE BLD STRIP.AUTO-MCNC: 227 MG/DL (ref 70–110)
GLUCOSE BLD STRIP.AUTO-MCNC: 266 MG/DL (ref 70–110)
GLUCOSE SERPL-MCNC: 223 MG/DL (ref 74–99)
HBA1C MFR BLD: 7.5 % (ref 4.2–5.6)
HCT VFR BLD AUTO: 29.3 % (ref 35–45)
HGB BLD-MCNC: 8.8 G/DL (ref 12–16)
IMM GRANULOCYTES # BLD AUTO: 0.1 K/UL (ref 0–0.04)
IMM GRANULOCYTES NFR BLD AUTO: 1 % (ref 0–0.5)
LYMPHOCYTES # BLD: 1.3 K/UL (ref 0.9–3.6)
LYMPHOCYTES NFR BLD: 6 % (ref 21–52)
MCH RBC QN AUTO: 25.3 PG (ref 24–34)
MCHC RBC AUTO-ENTMCNC: 30 G/DL (ref 31–37)
MCV RBC AUTO: 84.2 FL (ref 78–100)
MONOCYTES # BLD: 0.7 K/UL (ref 0.05–1.2)
MONOCYTES NFR BLD: 3 % (ref 3–10)
NEUTS SEG # BLD: 17.8 K/UL (ref 1.8–8)
NEUTS SEG NFR BLD: 89 % (ref 40–73)
NRBC # BLD: 0 K/UL (ref 0–0.01)
NRBC BLD-RTO: 0 PER 100 WBC
PLATELET # BLD AUTO: 502 K/UL (ref 135–420)
PMV BLD AUTO: 9.3 FL (ref 9.2–11.8)
POTASSIUM SERPL-SCNC: 5.5 MMOL/L (ref 3.5–5.5)
PROT SERPL-MCNC: 6.4 G/DL (ref 6.4–8.2)
RBC # BLD AUTO: 3.48 M/UL (ref 4.2–5.3)
SODIUM SERPL-SCNC: 135 MMOL/L (ref 136–145)
WBC # BLD AUTO: 20 K/UL (ref 4.6–13.2)

## 2021-12-07 PROCEDURE — 74011000258 HC RX REV CODE- 258: Performed by: INTERNAL MEDICINE

## 2021-12-07 PROCEDURE — 87077 CULTURE AEROBIC IDENTIFY: CPT

## 2021-12-07 PROCEDURE — 82962 GLUCOSE BLOOD TEST: CPT

## 2021-12-07 PROCEDURE — 74011636637 HC RX REV CODE- 636/637: Performed by: INTERNAL MEDICINE

## 2021-12-07 PROCEDURE — 74011250636 HC RX REV CODE- 250/636: Performed by: RADIOLOGY

## 2021-12-07 PROCEDURE — 36415 COLL VENOUS BLD VENIPUNCTURE: CPT

## 2021-12-07 PROCEDURE — 74011000636 HC RX REV CODE- 636: Performed by: RADIOLOGY

## 2021-12-07 PROCEDURE — 0T25X0Z CHANGE DRAINAGE DEVICE IN KIDNEY, EXTERNAL APPROACH: ICD-10-PCS | Performed by: RADIOLOGY

## 2021-12-07 PROCEDURE — 71045 X-RAY EXAM CHEST 1 VIEW: CPT

## 2021-12-07 PROCEDURE — 80053 COMPREHEN METABOLIC PANEL: CPT

## 2021-12-07 PROCEDURE — 74011250637 HC RX REV CODE- 250/637: Performed by: INTERNAL MEDICINE

## 2021-12-07 PROCEDURE — 74011250636 HC RX REV CODE- 250/636: Performed by: PHYSICIAN ASSISTANT

## 2021-12-07 PROCEDURE — 93970 EXTREMITY STUDY: CPT

## 2021-12-07 PROCEDURE — 85025 COMPLETE CBC W/AUTO DIFF WBC: CPT

## 2021-12-07 PROCEDURE — 83036 HEMOGLOBIN GLYCOSYLATED A1C: CPT

## 2021-12-07 PROCEDURE — C1769 GUIDE WIRE: HCPCS

## 2021-12-07 PROCEDURE — 65270000029 HC RM PRIVATE

## 2021-12-07 PROCEDURE — 74011250636 HC RX REV CODE- 250/636: Performed by: INTERNAL MEDICINE

## 2021-12-07 PROCEDURE — 87186 SC STD MICRODIL/AGAR DIL: CPT

## 2021-12-07 PROCEDURE — 87086 URINE CULTURE/COLONY COUNT: CPT

## 2021-12-07 RX ORDER — HYDROMORPHONE HYDROCHLORIDE 1 MG/ML
1 INJECTION, SOLUTION INTRAMUSCULAR; INTRAVENOUS; SUBCUTANEOUS
Status: DISCONTINUED | OUTPATIENT
Start: 2021-12-07 | End: 2021-12-10 | Stop reason: HOSPADM

## 2021-12-07 RX ORDER — AMOXICILLIN 250 MG
1 CAPSULE ORAL 2 TIMES DAILY
COMMUNITY
End: 2021-12-10

## 2021-12-07 RX ORDER — SODIUM CHLORIDE 0.9 % (FLUSH) 0.9 %
5-40 SYRINGE (ML) INJECTION AS NEEDED
Status: DISCONTINUED | OUTPATIENT
Start: 2021-12-07 | End: 2021-12-10 | Stop reason: HOSPADM

## 2021-12-07 RX ORDER — ARGININE/GLUTAMINE/CALCIUM BMB 7G-7G-1.5G
POWDER IN PACKET (EA) ORAL 2 TIMES DAILY
COMMUNITY

## 2021-12-07 RX ORDER — SODIUM CHLORIDE 9 MG/ML
500 INJECTION, SOLUTION INTRAVENOUS
Status: COMPLETED | OUTPATIENT
Start: 2021-12-07 | End: 2021-12-07

## 2021-12-07 RX ORDER — POLYETHYLENE GLYCOL 3350 17 G/17G
17 POWDER, FOR SOLUTION ORAL DAILY PRN
Status: DISCONTINUED | OUTPATIENT
Start: 2021-12-07 | End: 2021-12-10 | Stop reason: HOSPADM

## 2021-12-07 RX ORDER — TIMOLOL MALEATE 5 MG/ML
1 SOLUTION/ DROPS OPHTHALMIC 2 TIMES DAILY
Status: CANCELLED | OUTPATIENT
Start: 2021-12-07

## 2021-12-07 RX ORDER — DEXTROSE 50 % IN WATER (D50W) INTRAVENOUS SYRINGE
25-50 AS NEEDED
Status: DISCONTINUED | OUTPATIENT
Start: 2021-12-07 | End: 2021-12-10 | Stop reason: HOSPADM

## 2021-12-07 RX ORDER — NYSTATIN 100000 U/G
100000 CREAM TOPICAL 2 TIMES DAILY
COMMUNITY

## 2021-12-07 RX ORDER — DOCUSATE SODIUM 100 MG/1
100 CAPSULE, LIQUID FILLED ORAL
COMMUNITY

## 2021-12-07 RX ORDER — GABAPENTIN 300 MG/1
300 CAPSULE ORAL 2 TIMES DAILY
Status: ON HOLD | COMMUNITY
End: 2022-05-02 | Stop reason: SDUPTHER

## 2021-12-07 RX ORDER — DOXYCYCLINE 100 MG/1
100 TABLET ORAL 2 TIMES DAILY
COMMUNITY
End: 2021-12-10

## 2021-12-07 RX ORDER — ENOXAPARIN SODIUM 100 MG/ML
30 INJECTION SUBCUTANEOUS DAILY
Status: DISCONTINUED | OUTPATIENT
Start: 2021-12-08 | End: 2021-12-08

## 2021-12-07 RX ORDER — ONDANSETRON 4 MG/1
4 TABLET, FILM COATED ORAL
Status: CANCELLED | OUTPATIENT
Start: 2021-12-07

## 2021-12-07 RX ORDER — MORPHINE SULFATE 4 MG/ML
4 INJECTION INTRAVENOUS
Status: COMPLETED | OUTPATIENT
Start: 2021-12-07 | End: 2021-12-07

## 2021-12-07 RX ORDER — ONDANSETRON 2 MG/ML
4 INJECTION INTRAMUSCULAR; INTRAVENOUS
Status: COMPLETED | OUTPATIENT
Start: 2021-12-07 | End: 2021-12-07

## 2021-12-07 RX ORDER — INSULIN GLARGINE 100 [IU]/ML
10 INJECTION, SOLUTION SUBCUTANEOUS
Status: CANCELLED | OUTPATIENT
Start: 2021-12-07

## 2021-12-07 RX ORDER — ACETAMINOPHEN 325 MG/1
650 TABLET ORAL
Status: DISCONTINUED | OUTPATIENT
Start: 2021-12-07 | End: 2021-12-10 | Stop reason: HOSPADM

## 2021-12-07 RX ORDER — SODIUM CHLORIDE 0.9 % (FLUSH) 0.9 %
5-40 SYRINGE (ML) INJECTION EVERY 8 HOURS
Status: DISCONTINUED | OUTPATIENT
Start: 2021-12-07 | End: 2021-12-10 | Stop reason: HOSPADM

## 2021-12-07 RX ORDER — MORPHINE SULFATE 2 MG/ML
2 INJECTION, SOLUTION INTRAMUSCULAR; INTRAVENOUS
Status: DISCONTINUED | OUTPATIENT
Start: 2021-12-07 | End: 2021-12-07

## 2021-12-07 RX ORDER — LEVOTHYROXINE SODIUM 50 UG/1
50 TABLET ORAL
Status: CANCELLED | OUTPATIENT
Start: 2021-12-07

## 2021-12-07 RX ORDER — POLYETHYLENE GLYCOL 3350 17 G/17G
17 POWDER, FOR SOLUTION ORAL DAILY
COMMUNITY
End: 2021-12-10

## 2021-12-07 RX ORDER — AMLODIPINE BESYLATE 5 MG/1
5 TABLET ORAL DAILY
Status: CANCELLED | OUTPATIENT
Start: 2021-12-07

## 2021-12-07 RX ORDER — INSULIN ASPART 100 [IU]/ML
INJECTION, SOLUTION INTRAVENOUS; SUBCUTANEOUS
COMMUNITY

## 2021-12-07 RX ORDER — NYSTATIN 100000 [USP'U]/G
POWDER TOPICAL 2 TIMES DAILY
Status: CANCELLED | OUTPATIENT
Start: 2021-12-07

## 2021-12-07 RX ORDER — ONDANSETRON 2 MG/ML
4 INJECTION INTRAMUSCULAR; INTRAVENOUS
Status: DISCONTINUED | OUTPATIENT
Start: 2021-12-07 | End: 2021-12-10 | Stop reason: HOSPADM

## 2021-12-07 RX ORDER — ONDANSETRON 4 MG/1
4 TABLET, ORALLY DISINTEGRATING ORAL
Status: DISCONTINUED | OUTPATIENT
Start: 2021-12-07 | End: 2021-12-10 | Stop reason: HOSPADM

## 2021-12-07 RX ORDER — ACETAMINOPHEN 650 MG/1
650 SUPPOSITORY RECTAL
Status: DISCONTINUED | OUTPATIENT
Start: 2021-12-07 | End: 2021-12-10 | Stop reason: HOSPADM

## 2021-12-07 RX ORDER — MAGNESIUM SULFATE 100 %
4 CRYSTALS MISCELLANEOUS AS NEEDED
Status: DISCONTINUED | OUTPATIENT
Start: 2021-12-07 | End: 2021-12-10 | Stop reason: HOSPADM

## 2021-12-07 RX ORDER — CHOLECALCIFEROL (VITAMIN D3) 125 MCG
5 CAPSULE ORAL
Status: CANCELLED | OUTPATIENT
Start: 2021-12-07

## 2021-12-07 RX ORDER — INSULIN LISPRO 100 [IU]/ML
INJECTION, SOLUTION INTRAVENOUS; SUBCUTANEOUS
Status: DISCONTINUED | OUTPATIENT
Start: 2021-12-07 | End: 2021-12-10 | Stop reason: HOSPADM

## 2021-12-07 RX ORDER — METOPROLOL SUCCINATE 50 MG/1
50 TABLET, EXTENDED RELEASE ORAL DAILY
Status: CANCELLED | OUTPATIENT
Start: 2021-12-07

## 2021-12-07 RX ADMIN — IOPAMIDOL 20 ML: 612 INJECTION, SOLUTION INTRAVENOUS at 11:53

## 2021-12-07 RX ADMIN — MORPHINE SULFATE 4 MG: 4 INJECTION INTRAVENOUS at 01:04

## 2021-12-07 RX ADMIN — HYDROMORPHONE HYDROCHLORIDE 1 MG: 1 INJECTION, SOLUTION INTRAMUSCULAR; INTRAVENOUS; SUBCUTANEOUS at 15:29

## 2021-12-07 RX ADMIN — ACETAMINOPHEN 650 MG: 325 TABLET ORAL at 22:03

## 2021-12-07 RX ADMIN — HYDROMORPHONE HYDROCHLORIDE 1 MG: 1 INJECTION, SOLUTION INTRAMUSCULAR; INTRAVENOUS; SUBCUTANEOUS at 10:17

## 2021-12-07 RX ADMIN — HYDROMORPHONE HYDROCHLORIDE 1 MG: 1 INJECTION, SOLUTION INTRAMUSCULAR; INTRAVENOUS; SUBCUTANEOUS at 03:40

## 2021-12-07 RX ADMIN — Medication 10 ML: at 22:09

## 2021-12-07 RX ADMIN — SODIUM CHLORIDE 500 ML: 900 INJECTION, SOLUTION INTRAVENOUS at 11:53

## 2021-12-07 RX ADMIN — INSULIN LISPRO 6 UNITS: 100 INJECTION, SOLUTION INTRAVENOUS; SUBCUTANEOUS at 17:00

## 2021-12-07 RX ADMIN — CEFTRIAXONE 1 G: 1 INJECTION, POWDER, FOR SOLUTION INTRAMUSCULAR; INTRAVENOUS at 03:39

## 2021-12-07 RX ADMIN — ONDANSETRON 4 MG: 2 INJECTION INTRAMUSCULAR; INTRAVENOUS at 10:17

## 2021-12-07 RX ADMIN — Medication 10 ML: at 14:00

## 2021-12-07 RX ADMIN — ONDANSETRON 4 MG: 2 INJECTION INTRAMUSCULAR; INTRAVENOUS at 01:04

## 2021-12-07 RX ADMIN — INSULIN LISPRO 2 UNITS: 100 INJECTION, SOLUTION INTRAVENOUS; SUBCUTANEOUS at 22:09

## 2021-12-07 RX ADMIN — AZITHROMYCIN MONOHYDRATE 500 MG: 500 INJECTION, POWDER, LYOPHILIZED, FOR SOLUTION INTRAVENOUS at 03:51

## 2021-12-07 NOTE — ROUTINE PROCESS
TRANSFER - IN REPORT:    Verbal report received from Janel Maier RN(name) on Willow Amaya  being received from ED(unit) for routine progression of care      Report consisted of patients Situation, Background, Assessment and   Recommendations(SBAR). Information from the following report(s) SBAR, Kardex, Intake/Output and MAR was reviewed with the receiving nurse. Opportunity for questions and clarification was provided. Assessment completed upon patients arrival to unit and care assumed.

## 2021-12-07 NOTE — H&P
History & Physical    Patient: Dona Costa MRN: [id number]  [facility]: [id number]    YOB: 1940  Age: 79 y.o. Sex: female      DOA: 12/6/2021    Chief Complaint: No chief complaint on file. HPI:     Dona Costa is a 79 y.o.  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 Patient is not yelling in pain due to sacral wound discomfort despite being given 4 mg milligrams of morphine in the emergency room. She is also having nausea from the morphine that is 4 to respond to Zofran.   Patient is fully vaccinated from Covid    Past Medical History:   Diagnosis Date    Cancer [facility])     vaginal and colon cancer    Cancer ([facility]     Chronic pain     arthritis    Diabetes ([facility]     DVT (deep venous thrombosis) (HCC)     Hypertension     Legally blind     Thyroid disease        Past Surgical History:   Procedure Laterality Date    HX ABOVE KNEE AMPUTATION Right     HX COLOSTOMY      HX ORTHOPAEDIC      HX OTHER SURGICAL      kidney drainage tube    HX UROLOGICAL      kidney removal    IR EXCHANGE NEPHRO PERC RT SI  1/28/2019    IR EXCHANGE NEPHRO PERC RT SI  3/21/2019    IR EXCHANGE NEPHRO PERC RT SI  5/13/2019    IR EXCHANGE NEPHRO PERC RT SI  6/27/2019    IR EXCHANGE NEPHRO PERC RT SI  8/16/2019    IR EXCHANGE NEPHRO PERC RT SI  10/3/2019    IR EXCHANGE NEPHRO PERC RT SI  11/18/2019    IR EXCHANGE NEPHRO PERC RT SI 1/6/2020    IR EXCHANGE NEPHRO PERC RT SI  2/24/2020    IR EXCHANGE NEPHRO PERC RT SI  4/13/2020    IR EXCHANGE NEPHRO PERC RT SI  6/1/2020    IR EXCHANGE NEPHRO PERC RT SI  6/19/2020    IR EXCHANGE NEPHRO PERC RT SI  8/10/2020    IR EXCHANGE NEPHRO PERC RT SI  9/30/2020    IR EXCHANGE NEPHRO PERC RT SI  11/5/2020    IR EXCHANGE NEPHRO PERC RT SI  12/16/2020    IR EXCHANGE NEPHRO PERC RT SI  2/1/2021    IR EXCHANGE NEPHRO PERC RT SI  3/25/2021    IR EXCHANGE NEPHRO PERC RT SI  5/10/2021    IR EXCHANGE NEPHRO PERC RT SI  8/2/2021    IR EXCHANGE NEPHRO PERC RT SI  9/13/2021    IR EXCHANGE NEPHRO PERC RT SI  11/9/2021    IR NEPHROSTOMY PERC RT PLC CATH  SI  6/21/2020    IR NEPHROSTOMY PERC RT PLC CATH  SI  6/7/2021       No family history on file. Social History     Socioeconomic History    Marital status:    Tobacco Use    Smoking status: Never Smoker    Smokeless tobacco: Never Used   Substance and Sexual Activity    Alcohol use: No    Drug use: Never       Prior to Admission medications    Medication Sig Start Date End Date Taking? Authorizing Provider   acetaminophen (TYLENOL) 500 mg tablet Take 2 Tabs by mouth every eight (8) hours as needed for Pain or Fever. 3/25/21   Eb England MD   nystatin (MYCOSTATIN) powder Apply  to affected area two (2) times a day. 3/25/21   Eb England MD   insulin glargine (LANTUS) 100 unit/mL injection 10 Units by SubCUTAneous route nightly. 3/25/21   Eb England MD   lip protectant with sunscreen (CARMEX) crea Apply  to affected area as needed for Dry Skin. 3/25/21   Eb England MD   timolol (TIMOPTIC) 0.5 % ophthalmic solution Administer 1 Drop to both eyes two (2) times a day. 3/25/21   Eb England MD   brimonidine (ALPHAGAN) 0.2 % ophthalmic solution Administer 1 Drop to both eyes every twelve (12) hours.  3/25/21   Eb England MD   amLODIPine (NORVASC) 5 mg tablet Take 1 Tab by mouth daily. 11/22/20   Scott-Brown, Porter Lundborg, MD   famotidine (PEPCID) 10 mg tablet Take 1 Tab by mouth every twelve (12) hours. 11/21/20   Scott-Brown, Porter Lundborg, MD   melatonin 5 mg tablet Take 1 Tab by mouth nightly. 11/21/20   Scott-Brown, Porter Lundborg, MD   LORazepam (ATIVAN) 0.5 mg tablet Take 1 Tab by mouth two (2) times daily as needed for Agitation. Max Daily Amount: 1 mg. 11/11/20   Tracey Craft MD   multivitamin (ONE A DAY) tablet Take 1 Tab by mouth daily. Provider, Historical   ondansetron hcl (ZOFRAN) 4 mg tablet Take 4 mg by mouth every eight (8) hours as needed for Nausea or Vomiting. Provider, Historical   brimonidine-timolol (COMBIGAN) 0.2-0.5 % drop ophthalmic solution Administer 1 Drop to both eyes every twelve (12) hours. Provider, Historical   metoprolol succinate (TOPROL-XL) 50 mg XL tablet Take 50 mg by mouth daily. Provider, Historical   magnesium hydroxide (MILK OF MAGNESIA CONCENTRATED) 2,400 mg/10 mL susp suspension Take 10 mL by mouth. Provider, Historical   levothyroxine (SYNTHROID) 50 mcg tablet Take  by mouth Daily (before breakfast). Provider, Historical   atorvastatin calcium (ATORVASTATIN PO) Take 20 mg by mouth daily. Other, MD Dc       Allergies   Allergen Reactions    Morphine Nausea and Vomiting    Pcn [Penicillins] Rash         Review of Systems  GENERAL: Patient alert, awake and oriented times 3, able to communicate full sentences and not in distress. But is yelling in pain HEENT: N she is legally blind, no earache, tinnitus, sore throat or sinus congestion. NECK: No pain or stiffness. PULMONARY: No shortness of breath, cough or wheeze. She is being treated for pneumonia  Cardiovascular: no pnd or orthopnea, no CP  GASTROINTESTINAL+ abdominal pain, nausea, vomiting or diarrhea, melena or bright red blood per rectum.   colOstomy draining nephrostomy not draining  GENITOURINARY: No urinary frequency, urgency, hesitancy or dysuria has chronic indwelling nephrostomy tube and history of ESBL. MUSCULOSKELETAL: No joint or muscle pain+back pain, no recent trauma. Trauma to sacral area stage IV decub recent right BKA on gabapentin  DERMATOLOGIC: No rash, no itching, no lesions. ENDOCRINE: No polyuria, polydipsia, no heat or cold intolerance. No recent change in weight. HEMATOLOGICAL: No anemia or easy bruising or bleeding. NEUROLOGIC: No headache, seizures, numbness, tingling or weakness. She is on gabapentin for chronic pain from her BKA      Physical Exam:     Physical Exam:  Visit Vitals  BP (!) 117/50   Pulse (!) 115   Temp 98.2 °F (36.8 °C)   Resp 15   Ht 5' 2\" (1.575 m)   Wt 64.4 kg (142 lb)   SpO2 100%   BMI 25.97 kg/m²           Temp (24hrs), Av.2 °F (36.8 °C), Min:98.2 °F (36.8 °C), Max:98.2 °F (36.8 °C)    No intake/output data recorded. No intake/output data recorded. General:  Alert, cooperative, no distress, appears stated ag yelling in pain e. Head: Normocephalic, without obvious abnormality, atraumatic. Eyes:  Conjunctivae/corneas clear. Eyes are closed as she is legally blind   Nose: Nares normal. No drainage or sinus tenderness. Neck: Supple, symmetrical, trachea midline, no adenopathy, thyroid: no enlargement, no carotid bruit and no JVD. Lungs:   Clear to auscultation bilaterally. Heart:  Regular rate and rhythm, S1, S2 normal.  Irregularly irregular with tachycardia     Abdomen: Soft, non-tender. Bowel sounds normal.  Left-sided colostomy and right-sided posterior ostomy incision area looks clean dry and intact   Extremities: Extremities normal, atraumatic, right BKA   Pulses: 2+ and symmetric all extremities. Skin:  No rashes or lesions   Neurologic: AAOx3, No focal motor or sensory deficit. Labs Reviewed: All lab results for the last 24 hours reviewed.    and EKG    Procedures/imaging: see electronic medical records for all procedures/Xrays and details which were not copied into this note but were reviewed prior to creation of Plan      Assessment/Plan     Active Problems:      1. Dislodgment of nephrostomy tube  I have set her up for IR and nephrostomy tube exchange  2. UTI although no urine is able to be obtained due to blockage patient is also on antibiotics for pneumonia  3. Pneumonia we will start her on Rocephin and Zithromax  4. Right BKA she is continued on her gabapentin  5. Stage IV sacral decub recently seen by wound care will consult to see if they need to add anything else there was recommendation for possible deeper debridement pending sed rate  6. Diabetes she is placed on sliding scale insulin her Lantus for tonight is held until she is eating  7. History of DVT previously on Eliquis obtaining med rec to confirm that she is no longer on this as when we discharged her we had stopped it a year ago check duplex of lower extremity   A.   Legal blindness resume home eyedrops  DVT/GI Prophylaxis: Gema Collins MD  12/6/2021 11:55 PM

## 2021-12-07 NOTE — ED PROVIDER NOTES
EMERGENCY DEPARTMENT HISTORY AND PHYSICAL EXAM    Date: 12/6/2021  Patient Name: Ijeoma Alba    History of Presenting Illness     Time Seen: 8:55 PM    CC: Severe right sided abdominal pain / flank pain    History Provided By: Patient and Nursing home facility personnel    Additional History (Context): Ijeoma Alba is a 79 y.o. female presents to the emergency room with complaints of severe right flank, right-sided abdominal pain. Patient with a history of a chronic indwelling right nephrostomy tube, colostomy, diabetes mellitus, hypertension, chronic kidney disease. Status post left nephrectomy. Patient states about 5 hours ago she started to experience severe pain in her right flank and right side of her abdomen. She was seen earlier today at the wound care clinic and believes that it may have been displaced at that time. Pain is severe, constant pain. Patient felt her nephrostomy bag and note was empty. Immediately thought it was nonfunctioning. Afebrile. No chills or aches. She is nauseous without any vomiting. Patient has a colostomy. Functioning. History of vaginal and colon cancer. PCP: Dc Hopper MD    Current Facility-Administered Medications   Medication Dose Route Frequency Provider Last Rate Last Admin    morphine injection 4 mg  4 mg IntraVENous NOW Husam Nance PA        ondansetron (ZOFRAN) injection 4 mg  4 mg IntraVENous NOW Husam Nance PA         Current Outpatient Medications   Medication Sig Dispense Refill    acetaminophen (TYLENOL) 500 mg tablet Take 2 Tabs by mouth every eight (8) hours as needed for Pain or Fever. 30 Tab 0    nystatin (MYCOSTATIN) powder Apply  to affected area two (2) times a day. 60 g 0    insulin glargine (LANTUS) 100 unit/mL injection 10 Units by SubCUTAneous route nightly. 1 Vial 0    lip protectant with sunscreen (CARMEX) crea Apply  to affected area as needed for Dry Skin.  1 Tube 0    timolol (TIMOPTIC) 0.5 % ophthalmic solution Administer 1 Drop to both eyes two (2) times a day. 10 mL 0    brimonidine (ALPHAGAN) 0.2 % ophthalmic solution Administer 1 Drop to both eyes every twelve (12) hours. 10 mL 0    amLODIPine (NORVASC) 5 mg tablet Take 1 Tab by mouth daily. 30 Tab 0    famotidine (PEPCID) 10 mg tablet Take 1 Tab by mouth every twelve (12) hours. 60 Tab 0    melatonin 5 mg tablet Take 1 Tab by mouth nightly. 30 Tab 0    LORazepam (ATIVAN) 0.5 mg tablet Take 1 Tab by mouth two (2) times daily as needed for Agitation. Max Daily Amount: 1 mg. 2 Tab 0    multivitamin (ONE A DAY) tablet Take 1 Tab by mouth daily.  ondansetron hcl (ZOFRAN) 4 mg tablet Take 4 mg by mouth every eight (8) hours as needed for Nausea or Vomiting.  brimonidine-timolol (COMBIGAN) 0.2-0.5 % drop ophthalmic solution Administer 1 Drop to both eyes every twelve (12) hours.  metoprolol succinate (TOPROL-XL) 50 mg XL tablet Take 50 mg by mouth daily.  magnesium hydroxide (MILK OF MAGNESIA CONCENTRATED) 2,400 mg/10 mL susp suspension Take 10 mL by mouth.  levothyroxine (SYNTHROID) 50 mcg tablet Take  by mouth Daily (before breakfast).  atorvastatin calcium (ATORVASTATIN PO) Take 20 mg by mouth daily.          Past History     Past Medical History:  Past Medical History:   Diagnosis Date    Cancer ([facility]     vaginal and colon cancer    Cancer ([facility]     Chronic pain     arthritis    Diabetes ([facility]     DVT (deep venous thrombosis) ([facility])     Hypertension     Legally blind     Thyroid disease        Past Surgical History:  Past Surgical History:   Procedure Laterality Date    HX ABOVE KNEE AMPUTATION Right     HX COLOSTOMY      HX ORTHOPAEDIC      HX OTHER SURGICAL      kidney drainage tube    HX UROLOGICAL      kidney removal    IR EXCHANGE NEPHRO PERC RT SI  1/28/2019    IR EXCHANGE NEPHRO PERC RT SI  3/21/2019    IR EXCHANGE NEPHRO PERC RT SI  5/13/2019    IR EXCHANGE NEPHRO PERC RT SI  6/27/2019    IR EXCHANGE NEPHRO PERC RT SI  8/16/2019    IR EXCHANGE NEPHRO PERC RT SI  10/3/2019    IR EXCHANGE NEPHRO PERC RT SI  11/18/2019    IR EXCHANGE NEPHRO PERC RT SI  1/6/2020    IR EXCHANGE NEPHRO PERC RT SI  2/24/2020    IR EXCHANGE NEPHRO PERC RT SI  4/13/2020    IR EXCHANGE NEPHRO PERC RT SI  6/1/2020    IR EXCHANGE NEPHRO PERC RT SI  6/19/2020    IR EXCHANGE NEPHRO PERC RT SI  8/10/2020    IR EXCHANGE NEPHRO PERC RT SI  9/30/2020    IR EXCHANGE NEPHRO PERC RT SI  11/5/2020    IR EXCHANGE NEPHRO PERC RT SI  12/16/2020    IR EXCHANGE NEPHRO PERC RT SI  2/1/2021    IR EXCHANGE NEPHRO PERC RT SI  3/25/2021    IR EXCHANGE NEPHRO PERC RT SI  5/10/2021    IR EXCHANGE NEPHRO PERC RT SI  8/2/2021    IR EXCHANGE NEPHRO PERC RT SI  9/13/2021    IR EXCHANGE NEPHRO PERC RT SI  11/9/2021    IR NEPHROSTOMY PERC RT PLC CATH  SI  6/21/2020    IR NEPHROSTOMY PERC RT PLC CATH  SI  6/7/2021       Family History:  No family history on file. Social History:  Social History     Tobacco Use    Smoking status: Never Smoker    Smokeless tobacco: Never Used   Substance Use Topics    Alcohol use: No    Drug use: Never       Allergies: Allergies   Allergen Reactions    Morphine Nausea and Vomiting    Pcn [Penicillins] Rash         Review of Systems   Review of Systems   Constitutional: Positive for fatigue. Negative for chills and fever. Respiratory: Negative for cough, chest tightness and shortness of breath. Cardiovascular: Negative for chest pain and palpitations. Gastrointestinal: Positive for abdominal pain and nausea. Negative for diarrhea and vomiting. History of colostomy   Genitourinary: Positive for flank pain. ? Nephrostomy tube displacement  Severe right flank pain  Nephrostomy tube output significantly decreased   Musculoskeletal: Positive for back pain. Neurological: Negative for dizziness, light-headedness and headaches. Psychiatric/Behavioral: The patient is nervous/anxious.     All other systems reviewed and are negative. Physical Exam     Vitals:    12/06/21 2036 12/06/21 2121   BP:  (!) 117/50   Pulse: (!) 115    Resp: 15    Temp: 98.2 °F (36.8 °C)    SpO2: 100%    Weight: 64.4 kg (142 lb)    Height: 5' 2\" (1.575 m)      Physical Exam  Vitals and nursing note reviewed. Constitutional:       General: She is in acute distress. Appearance: She is well-developed, well-groomed and normal weight. She is not ill-appearing or diaphoretic. Comments: Very uncomfortable appearing 67-year-old female. Moaning in discomfort. Vital signs show her to be slightly tachycardic 115 otherwise stable. HENT:      Mouth/Throat:      Mouth: Mucous membranes are moist.      Pharynx: Oropharynx is clear. Eyes:      General: No scleral icterus. Conjunctiva/sclera: Conjunctivae normal.   Cardiovascular:      Rate and Rhythm: Regular rhythm. Tachycardia present. Pulses: Normal pulses. Heart sounds: Normal heart sounds. Pulmonary:      Effort: Pulmonary effort is normal.      Breath sounds: Normal breath sounds and air entry. Abdominal:      General: Bowel sounds are normal. There is no distension. Palpations: Abdomen is soft. Tenderness: There is abdominal tenderness in the right lower quadrant and periumbilical area. There is right CVA tenderness, guarding and rebound. There is no left CVA tenderness. Hernia: No hernia is present. Comments: Slightly rounded abdomen  Colostomy present. Functioning  Increased pain to palpation right lower quadrant, right middle quadrant extending posteriorly towards the right flank. The remainder the abdomen nontender. Musculoskeletal:      Cervical back: Neck supple. Comments: Right BKA  Remaining extremities within normal limits   Skin:     General: Skin is warm and dry. Coloration: Skin is pale. Neurological:      General: No focal deficit present.       Mental Status: She is alert and oriented to person, place, and time.   Psychiatric:         Mood and Affect: Mood is anxious. Behavior: Behavior is cooperative. Cognition and Memory: Cognition is impaired. Nursing note and vitals reviewed         Diagnostic Study Results     Labs -     Recent Results (from the past 12 hour(s))   CBC WITH AUTOMATED DIFF    Collection Time: 12/06/21  8:56 PM   Result Value Ref Range    WBC 18.3 (H) 4.6 - 13.2 K/uL    RBC 3.76 (L) 4.20 - 5.30 M/uL    HGB 9.7 (L) 12.0 - 16.0 g/dL    HCT 31.6 (L) 35.0 - 45.0 %    MCV 84.0 78.0 - 100.0 FL    MCH 25.8 24.0 - 34.0 PG    MCHC 30.7 (L) 31.0 - 37.0 g/dL    RDW 15.4 (H) 11.6 - 14.5 %    PLATELET 949 (H) 036 - 420 K/uL    MPV 8.8 (L) 9.2 - 11.8 FL    NRBC 0.0 0  WBC    ABSOLUTE NRBC 0.00 0.00 - 0.01 K/uL    NEUTROPHILS 84 (H) 40 - 73 %    LYMPHOCYTES 9 (L) 21 - 52 %    MONOCYTES 4 3 - 10 %    EOSINOPHILS 2 0 - 5 %    BASOPHILS 1 0 - 2 %    IMMATURE GRANULOCYTES 0 0.0 - 0.5 %    ABS. NEUTROPHILS 15.4 (H) 1.8 - 8.0 K/UL    ABS. LYMPHOCYTES 1.6 0.9 - 3.6 K/UL    ABS. MONOCYTES 0.7 0.05 - 1.2 K/UL    ABS. EOSINOPHILS 0.4 0.0 - 0.4 K/UL    ABS. BASOPHILS 0.2 (H) 0.0 - 0.1 K/UL    ABS. IMM. GRANS. 0.0 0.00 - 0.04 K/UL    DF AUTOMATED      PLATELET COMMENTS LARGE PLATELETS      RBC COMMENTS ANISOCYTOSIS  SLIGHT       METABOLIC PANEL, COMPREHENSIVE    Collection Time: 12/06/21  8:56 PM   Result Value Ref Range    Sodium 136 136 - 145 mmol/L    Potassium 4.9 3.5 - 5.5 mmol/L    Chloride 107 100 - 111 mmol/L    CO2 20 (L) 21 - 32 mmol/L    Anion gap 9 3.0 - 18 mmol/L    Glucose 229 (H) 74 - 99 mg/dL    BUN 41 (H) 7.0 - 18 MG/DL    Creatinine 1.52 (H) 0.6 - 1.3 MG/DL    BUN/Creatinine ratio 27 (H) 12 - 20      GFR est AA 40 (L) >60 ml/min/1.73m2    GFR est non-AA 33 (L) >60 ml/min/1.73m2    Calcium 8.4 (L) 8.5 - 10.1 MG/DL    Bilirubin, total 0.2 0.2 - 1.0 MG/DL    ALT (SGPT) 37 13 - 56 U/L    AST (SGOT) 25 10 - 38 U/L    Alk.  phosphatase 124 (H) 45 - 117 U/L    Protein, total 7.8 6.4 - 8.2 g/dL    Albumin 2.2 (L) 3.4 - 5.0 g/dL    Globulin 5.6 (H) 2.0 - 4.0 g/dL    A-G Ratio 0.4 (L) 0.8 - 1.7     LIPASE    Collection Time: 12/06/21  8:56 PM   Result Value Ref Range    Lipase 218 73 - 393 U/L   MAGNESIUM    Collection Time: 12/06/21  8:56 PM   Result Value Ref Range    Magnesium 1.3 (L) 1.6 - 2.6 mg/dL       Radiologic Studies  CT ABD PELV WO CONT   Final Result      Right-sided percutaneous nephrostomy tube with the loop portion in the upper   pole calyx. There is severe right hydronephrosis and prominent dilated renal   pelvis and proximal ureter. Findings are concerning for tube malfunction. Correlate as to whether the tube is functioning properly. Left nephrectomy. Prior cystectomy and hysterectomy. Prior colectomy with left lower quadrant   colostomy. CT Results  (Last 48 hours)               12/06/21 2249  CT ABD PELV WO CONT Final result    Impression:      Right-sided percutaneous nephrostomy tube with the loop portion in the upper   pole calyx. There is severe right hydronephrosis and prominent dilated renal   pelvis and proximal ureter. Findings are concerning for tube malfunction. Correlate as to whether the tube is functioning properly. Left nephrectomy. Prior cystectomy and hysterectomy. Prior colectomy with left lower quadrant   colostomy. Narrative:  EXAM: CT of the abdomen and pelvis       INDICATION: Severe right flank pain       COMPARISON: November 15, 2020       TECHNIQUE: Axial CT imaging of the abdomen and pelvis was performed without   intravenous contrast. Multiplanar reformats were generated. One or more dose   reduction techniques were used on this CT: automated exposure control,   adjustment of the mAs and/or kVp according to patient size, and iterative   reconstruction techniques. The specific techniques used on this CT exam have   been documented in the patient's electronic medical record.  Digital Imaging and   Communications in Medicine (DICOM) format image data are available to   nonaffiliated external healthcare facilities or entities on a secure, media   free, reciprocally searchable basis with patient authorization for at least a   12-month period after this study. _______________       FINDINGS:       LOWER CHEST: There is small to moderate left pleural effusion with left lower   lobe atelectasis. LIVER, BILIARY: Liver is normal. No biliary dilation. Cholecystectomy       PANCREAS: Normal.       SPLEEN: Normal.       ADRENALS: Right adrenal is unremarkable. Suggestion of left adrenalectomy. KIDNEYS: There is a percutaneous nephrostomy tube in the midpole the right   kidney. The loop of the nephrostomy is in the upper pole collecting system. There is severe right hydronephrosis. This is concerning for malfunction of the   nephrostomy tube. There is moderate right hydroureter. Obliteration coils are   present in the mid right ureter. Left nephrectomy. VASCULATURE: Severe calcific atherosclerosis. LYMPH NODES: No enlarged lymph nodes. GASTROINTESTINAL TRACT: No bowel dilation or wall thickening. Prior colectomy. Colostomy in the left lower quadrant. No bowel obstruction. PELVIC ORGANS: Hysterectomy and cystectomy. BONES: No acute or aggressive osseous abnormalities identified. OTHER: None.       _______________               CXR Results  (Last 48 hours)    None            Medical Decision Making   I am the first provider for this patient. I reviewed the vital signs, available nursing notes, past medical history, past surgical history, family history and social history. Vital Signs-Reviewed the patient's vital signs.     Pulse Oximetry Analysis 100% on room air    Records Reviewed: Nursing Notes, Old Medical Records, Previous Radiology Studies and Previous Laboratory Studies    DDX:  Severe right flank pain  Concern for displaced nephrostomy tube  Hydronephrosis  Status post nephrectomy on the left side  Rule out other intra-abdominal sources of pain    Provider Notes:   79 y.o. female     Procedures:  Procedures    ED Course:   Initial assessment performed. The patients presenting problems have been discussed, and they are in agreement with the care plan formulated and outlined with them. I have encouraged them to ask questions as they arise throughout their visit. ED Physician Discussion Note:   CBC shows an elevated white count 18,300. H&H is stable 9.7 and 31.6 respectively. Does have a left shift with 84 segs and nine lymphs. Still waiting to obtain urinalysis  Chemistries show mild decrease in CO2.?  Dehydration. BUN and creatinine are chronically elevated 41 and 1.52 respectively. GFR 33  Elevated glucose 229  CT scan concerning for displaced nephrostomy tube per radiology    Spoke to urologist Dr. Amanda Villasenor. He is very much aware of this patient as he takes care for about once a year. States this happens frequently and she will just need to have her nephrostomy tube replaced by interventional radiology tomorrow. Will follow in the hospital if necessary. Spoke to hospitalist Dr. Samuel Lopez. Advised of the patient's condition. Agrees to admit patient to the hospital for further evaluation and treatment. We will set up for IR in the morning. Diagnosis and Disposition       CLINICAL IMPRESSION:    1. Flank pain, acute    2. Hydronephrosis, unspecified hydronephrosis type    3. Nephrostomy tube displaced ([address]    4. Chronic kidney disease, unspecified CKD stage    5. Status post nephrectomy    6. Type 2 diabetes mellitus with chronic kidney disease, with long-term current use of insulin, unspecified CKD stage ([address]        PLAN:  1. Isaiah Dave      Please note that this dictation was completed with [employer], the [employer] voice recognition software.   Quite often unanticipated grammatical, syntax, homophones, and other interpretive errors are inadvertently transcribed by the computer software. Please disregard these errors. Please excuse any errors that have escaped final proofreading.

## 2021-12-07 NOTE — ROUTINE PROCESS
Bedside and Verbal shift change report given to [address] [place] [address] [address] (oncoming nurse) by Tsering Hall RN (offgoing nurse). Report included the following information SBAR, Kardex, Intake/Output and MAR.

## 2021-12-07 NOTE — ROUTINE PROCESS
TRANSFER - OUT REPORT:    Verbal report given to Nilda RN on Jacquelin Lemus  being transferred to [facility] for routine progression of care       Report consisted of patients Situation, Background, Assessment and   Recommendations(SBAR). Information from the following report(s) SBAR was reviewed with the receiving nurse. Lines:   Peripheral IV 12/06/21 Posterior; Left Forearm (Active)   Site Assessment Clean, dry, & intact 12/06/21 2107   Phlebitis Assessment 0 12/06/21 2107   Infiltration Assessment 0 12/06/21 2107   Dressing Status Clean, dry, & intact 12/06/21 2107        Opportunity for questions and clarification was provided.       Patient transported with:   [employer]

## 2021-12-07 NOTE — ED TRIAGE NOTES
Pt brought in by EMS from [address] [place] [address] with c/o abdominal pain. Pt with nephrostomy bag that has not been draining.

## 2021-12-07 NOTE — PROGRESS NOTES
Hospitalist Progress Note    Patient: Mauricio Morales MRN: [id number]  [facility]: [id number]    YOB: 1940  Age: 79 y.o.   Sex: female    DOA: 12/6/2021 LOS:  LOS: 0 days                Assessment/Plan     Patient Active Problem List   Diagnosis Code    Pyelonephritis N14    S/p nephrectomy Z90.5    Right flank pain R10.9    Malfunction of nephrostomy tube ([address] T83.098A    Nephrostomy tube displaced ([address] T83.022A    UTI (urinary tract infection) N39.0    Elevated WBC count D72.829    Diabetes ([address] E11.9    Legally blind H54.8    Hypertension I10    Thyroid disease E07.9    Elevated serum creatinine R79.89    S/P colectomy Z90.49    Skin lesion of foot L98.9    Tear of skin of buttock S31.801A    Pressure injury of right heel, unstageable ([facility]) L89.610    Hypomagnesemia E83.42    Hyperkalemia E87.5    Heel ulcer ([facility]) L97.409    S/P AKA (above knee amputation), right ([address] Z89.611    Nephrostomy status ([address] Z93.6    Obstructed nephrostomy tube ([address] T83.092A    Nephrostomy complication ([facility]) U03.341    Acute renal failure (ARF) ([facility]) N17.9    Elevated INR R79.1    Bacteremia R78.81    Enterococcus faecalis infection B95.2    CKD stage 3 due to type 2 diabetes mellitus ([facility]) E11.22, N18.30    Hydronephrosis, right N13.30    Severe sepsis with acute organ dysfunction ([facility]) A41.9, R65.20    Anticoagulated Z79.01    AMS (altered mental status) R41.82    Encephalopathy acute G93.40    Altered mental status R41.82    Pseudomonas aeruginosa infection A49.8    Displacement of nephrostomy tube ([facility]) T83.022A    Tinea cruris B35.6    Pressure injury of sacral region, stage 4 ([facility]) L89.154    Hydronephrosis N13.30    Abdominal pain R10.9        Chief complaint :  Right flank pain  79 y.o.  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 Noted to have dislodged nephrostomy tube. Dislodged nephrostomy tube -  S/p tube exchange     Possible pneumonia  UTI -  On ceftriaxone, azithromycin  Recent urine cultures 11/ with proteus. Follow cultures 12/07/2021    DM -  On SSI    Sacral decubitus ulcer -  Local wound care. Right AKA -      Disposition : 1-2 days    Review of systems  General: No fevers or chills. Cardiovascular: No chest pain or pressure. No palpitations. Pulmonary: No shortness of breath. Gastrointestinal: No nausea, vomiting. Physical Exam:  General: Awake, cooperative, no acute distress    HEENT: NC, Atraumatic. PERRLA, anicteric sclerae. Lungs: CTA Bilaterally. No Wheezing/Rhonchi/Rales. Heart:  S1 S2,  No murmur, No Rubs, No Gallops  Abdomen: Soft, Non distended, Non tender.  +Bowel sounds,   Extremities: Right AKA  Psych:   Not anxious or agitated. Neurologic:  No acute neurological deficit.            Vital signs/Intake and Output:  Visit Vitals  /64 (BP 1 Location: Left upper arm, BP Patient Position: At rest)   Pulse (!) 111   Temp 98.4 °F (36.9 °C)   Resp 16   Ht 5' 2\" (1.575 m)   Wt 64 kg (141 lb)   SpO2 100%   BMI 25.79 kg/m²     Current Shift:  12/07 0701 - 12/07 1900  In: 300 [I.V.:300]  Out: 75 [Urine:75]  Last three shifts:  12/05 1901 - 12/07 0700  In: 423 [I.V.:423]  Out: -             Labs: Results:       Chemistry Recent Labs     12/07/21 0357 12/06/21 2056   * 229*   * 136   K 5.5 4.9    107   CO2 17* 20*   BUN 51* 41*   CREA 1.73* 1.52*   CA 8.1* 8.4*   AGAP 10 9   BUCR 29* 27*    124*   TP 6.4 7.8   ALB 2.1* 2.2*   GLOB 4.3* 5.6*   AGRAT 0.5* 0.4*      CBC w/Diff Recent Labs     12/07/21 0357 12/06/21 2056   WBC 20.0* 18.3*   RBC 3.48* 3.76*   HGB 8.8* 9.7*   HCT 29.3* 31.6*   * 540*   GRANS 89* 84*   LYMPH 6* 9*   EOS 1 2      Cardiac Enzymes No results for input(s): CPK, CKND1, JANE in the last 72 hours. No lab exists for component: CKRMB, TROIP   Coagulation No results for input(s): PTP, INR, APTT, INREXT in the last 72 hours. Lipid Panel Lab Results   Component Value Date/Time    Cholesterol, total 115 08/28/2019 08:22 AM    HDL Cholesterol 39 (L) 08/28/2019 08:22 AM    LDL, calculated 46.2 08/28/2019 08:22 AM    VLDL, calculated 29.8 08/28/2019 08:22 AM    Triglyceride 149 08/28/2019 08:22 AM    CHOL/HDL Ratio 2.9 08/28/2019 08:22 AM      BNP No results for input(s): BNPP in the last 72 hours.    Liver Enzymes Recent Labs     12/07/21  0357   TP 6.4   ALB 2.1*         Thyroid Studies Lab Results   Component Value Date/Time    TSH 3.40 01/24/2020 08:35 AM        Procedures/imaging: see electronic medical records for all procedures/Xrays and details which were not copied into this note but were reviewed prior to creation of Plan

## 2021-12-07 NOTE — PROGRESS NOTES
0130-patient answers questions appropriately, but has episodes of confusion. Lungs CTA on RA. BS active x 4 quads. Chronic indwelling nephrostomy tube with drainage bag to right side, no drainage. Colostomy bag to left side, intact. IV access of 22 gauge to left forearm, flushed without difficulty. Right AKA. Sacral wound to mid sacrum with dressing in place, moderate amount of blackish drainage. Buttock area around sacral wound is pink. Left great toe with non-blanchable red area and second toe with tiny red area. Toe nails are thick. Patient is legally blind, and hard of hearing. Patient is c/o pain rated 8/10. Skin is thin, pale, and fragile. Call bell explained and placed at hand. 0340-Dilaudid 1 mg IV given for pain rated 6/10.  0425-Patient resting with breathing even and unlabored, no pain noted, visual rating of 0/10. Shift summary-patient is bedrest here and as her normal activity. She comes from [facility]. Pain seems to be relieved with Dilaudid and she is resting quietly a this time. IV ATB given with no adverse effects noted.

## 2021-12-07 NOTE — WOUND CARE
12/06/21 1048   Wound Sacral/coccyx   Date First Assessed/Time First Assessed: 08/09/21 1046   Present on Hospital Admission: Yes  Primary Wound Type: Pressure Injury  Location: Sacral/coccyx   Wound Image     Wound Etiology Pressure Stage 4   Dressing Status Intact   Cleansed Wound cleanser   Dressing/Treatment Silicone border   Wound Length (cm) 1.2 cm   Wound Width (cm) 0.5 cm   Wound Depth (cm) 0.8 cm   Wound Surface Area (cm^2) 0.6 cm^2   Change in Wound Size % 52.38   Wound Volume (cm^3) 0.48 cm^3   Wound Healing % 5   Post-Procedure Length (cm) 1.4 cm   Post-Procedure Width (cm) 0.7 cm   Post-Procedure Depth (cm) 1 cm   Post-Procedure Surface Area (cm^2) 0.98 cm^2   Post-Procedure Volume (cm^3) 0.98 cm^3   Undermining Starts ___ O'Clock 12 o'clock   Undermining Ends ___ O'Clock 12 o'clock   Undermining Maximum Distance (cm) 2 cm   Wound Assessment Devitalized tissue   Drainage Amount Small   Drainage Description Serous   Wound Odor None   Carol-Wound/Incision Assessment Dry/flaky       Applied santyl, guaze, border. Orders to be faxed to Leopoldo Nims. Discharge Instructions for  [address]  [phone] Fax [phone]    NAME:  Raz Gatiac  YOB: 1940  MEDICAL RECORD NUMBER:  [id number]  DATE:  12/6/2021    Wound Cleansing:   Do not scrub or use excessive force. Cleanse wound prior to applying a clean dressing with:  [x] Normal Saline    [x] Wound Cleanser   [x] Cleanse wound with Mild Soap & Water       Dressings:           Wound Location sacrum  [x] Apply Primary Dressing:        [x] Mepilex Border     [x] Other:  Santyl     [x] Cover and Secure with:     [x] Gauze    Avoid contact of tape with skin. [x] Change dressing: [x] Daily         Pressure Relief:  [x] When sitting, shift position or do seat lifts every 15 minutes.   [x] Wheelchair cushion [] Specialty Bed/Mattress  [x] Turn every 2 hours when in bed. Avoid position directing pressure on wound site. Limit side lying to 30 degree tilt. Limit HOB elevation to 30 degrees. Dietary:   [x] No Added Salt:  [x] Increase Protein:    Activity:  [x] Activity as tolerated:                             Return Appointment:  [x] Return Appointment: With Alextommy Garciasin  in  2 Week(s)       Electronically signed Hudson Fuller Information: Should you experience any significant changes in your wound(s) or have questions about your wound care, please contact the [facility] at [address] [place] [address] 8:00 am - 4:30. If you need help with your wound outside these hours and cannot wait until we are again available, contact your PCP or go to the hospital emergency room.

## 2021-12-07 NOTE — PROCEDURES
Vascular & Interventional Radiology Brief Procedure Note    Interventional Radiologist: Jack Hollis    Pre-operative Diagnosis:  Dislodged right percutaneous nephrostomy (PCN) on CT. No output from right PCN. Post-operative Diagnosis: Same as pre-op dx    Procedure(s) Performed:  Right PCN exchange. Anesthesia:  None. Findings:  Right PCN pulled back into upper pole calyx. Successful exchange and reposition of right PCN into renal pelvis. Complications: None    Estimated Blood Loss:  minimal    Tubes and Drains: 10 Fr APDL. Specimens: Per hospitalist.    Condition: Stable. Disposition: To floor. Plan: Right PCN to gravity drainage. Per primary service.       Ashley Underwood MD  [facility]  Vascular & Interventional Radiology  12/7/2021

## 2021-12-07 NOTE — DISCHARGE INSTRUCTIONS
Discharge Instructions for  Medhat Lerner  [address] [place] [address] [place]  [phone] Fax [phone]    NAME:  Herberth Dos Santos  YOB: 1940  MEDICAL RECORD NUMBER:  [id number]  DATE:  12/6/2021    Wound Cleansing:   Do not scrub or use excessive force. Cleanse wound prior to applying a clean dressing with:  [x] Normal Saline    [x] Wound Cleanser   [x] Cleanse wound with Mild Soap & Water       Dressings:           Wound Location sacrum  [x] Apply Primary Dressing:        [x] Mepilex Border     [x] Other:  Santyl     [x] Cover and Secure with:     [x] Gauze    Avoid contact of tape with skin. [x] Change dressing: [x] Daily         Pressure Relief:  [x] When sitting, shift position or do seat lifts every 15 minutes. [x] Wheelchair cushion [] Specialty Bed/Mattress  [x] Turn every 2 hours when in bed. Avoid position directing pressure on wound site. Limit side lying to 30 degree tilt. Limit HOB elevation to 30 degrees. Dietary:   [x] No Added Salt:  [x] Increase Protein:    Activity:  [x] Activity as tolerated:                             Return Appointment:  [x] Return Appointment: With Edgar Montoya  in  2 Week(s)       Electronically signed Horacio Rivas Information: Should you experience any significant changes in your wound(s) or have questions about your wound care, please contact the [facility] at [address] [place] [address] 8:00 am - 4:30. If you need help with your wound outside these hours and cannot wait until we are again available, contact your PCP or go to the hospital emergency room.

## 2021-12-07 NOTE — PROGRESS NOTES
Reason for Admission:   Dislodgment of nephrostomy tube                 RUR Score:    High; 22%       PCP: First and Last name:  Dc Hopper MD     Name of Practice:   Are you a current patient: Yes/No:   Approximate date of last visit:    Can you do a virtual visit with your PCP:              Resources/supports as identified by patient/family:                   Top Challenges facing patient (as identified by patient/family and CM): Finances/Medication cost?                    Transportation? Support system or lack thereof? Living arrangements? Self-care/ADLs/Cognition? Current Advanced Directive/Advance Care Plan:  DNR      Healthcare Decision Maker:   Click here to complete HealthCare Decision Makers including selection of the Healthcare Decision Maker Relationship (ie \"Primary\")      Primary Decision Maker: DicksonHemal - Son - [phone]    Payor Source Payor: [employer] / Plan: [address] / Product Type: Medicare /                             Plan for utilizing home health:    N/A                 Transition of Care Plan:     Return to [address] [place] [address] (formerly [facility])             Chart reviewed. Per H&P Dk Adamson is a 79 y.o.  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   Patient is not yelling in pain due to sacral wound discomfort despite being given 4 mg milligrams of morphine in the emergency room. She is also having nausea from the morphine that is 4 to respond to Zofran. Patient is fully vaccinated from Covid. \"      Noted pt is a LTC resident at [facility] (formerly) [address] [place] [address]. CM contacted pt's son, Montez Garza ([phone]), [facility] he has indicated plan is for pt to return to this facility when medically stable.  [facility] (formerly [address] [place] [address]) is aware of plan return when medially stable.   CM to continue to follow and assist.    Care Management Interventions  Mode of Transport at Discharge: S  Transition of Care Consult (CM Consult): [address] Maintenance Reviewed: Yes  Support Systems: Child(chari)  The Plan for Transition of Care is Related to the Following Treatment Goals : Return to [facility] (formerly [address] [place] [address])  The Patient and/or Patient Representative was Provided with a Choice of Provider and Agrees with the Discharge Plan?: Yes  Name of the Patient Representative Who was Provided with a Choice of Provider and Agrees with the Discharge Plan: sonAnna of Choice List was Provided with Basic Dialogue that Supports the Patient's Individualized Plan of Care/Goals, Treatment Preferences and Shares the Quality Data Associated with the Providers?: Yes  Discharge Location  Discharge Placement: [facility]

## 2021-12-08 LAB
ALBUMIN SERPL-MCNC: 1.8 G/DL (ref 3.4–5)
ALBUMIN/GLOB SERPL: 0.5 {RATIO} (ref 0.8–1.7)
ALP SERPL-CCNC: 108 U/L (ref 45–117)
ALT SERPL-CCNC: 30 U/L (ref 13–56)
ANION GAP SERPL CALC-SCNC: 10 MMOL/L (ref 3–18)
AST SERPL-CCNC: 14 U/L (ref 10–38)
BASOPHILS # BLD: 0 K/UL (ref 0–0.1)
BASOPHILS NFR BLD: 0 % (ref 0–2)
BILIRUB SERPL-MCNC: 0.3 MG/DL (ref 0.2–1)
BUN SERPL-MCNC: 58 MG/DL (ref 7–18)
BUN/CREAT SERPL: 22 (ref 12–20)
CALCIUM SERPL-MCNC: 7.8 MG/DL (ref 8.5–10.1)
CHLORIDE SERPL-SCNC: 109 MMOL/L (ref 100–111)
CO2 SERPL-SCNC: 19 MMOL/L (ref 21–32)
CREAT SERPL-MCNC: 2.66 MG/DL (ref 0.6–1.3)
DIFFERENTIAL METHOD BLD: ABNORMAL
EOSINOPHIL # BLD: 0.1 K/UL (ref 0–0.4)
EOSINOPHIL NFR BLD: 0 % (ref 0–5)
ERYTHROCYTE [DISTWIDTH] IN BLOOD BY AUTOMATED COUNT: 15.7 % (ref 11.6–14.5)
GLOBULIN SER CALC-MCNC: 4 G/DL (ref 2–4)
GLUCOSE BLD STRIP.AUTO-MCNC: 141 MG/DL (ref 70–110)
GLUCOSE BLD STRIP.AUTO-MCNC: 142 MG/DL (ref 70–110)
GLUCOSE BLD STRIP.AUTO-MCNC: 263 MG/DL (ref 70–110)
GLUCOSE SERPL-MCNC: 128 MG/DL (ref 74–99)
HCT VFR BLD AUTO: 26.2 % (ref 35–45)
HGB BLD-MCNC: 7.9 G/DL (ref 12–16)
IMM GRANULOCYTES # BLD AUTO: 0.1 K/UL (ref 0–0.04)
IMM GRANULOCYTES NFR BLD AUTO: 1 % (ref 0–0.5)
LYMPHOCYTES # BLD: 1.9 K/UL (ref 0.9–3.6)
LYMPHOCYTES NFR BLD: 11 % (ref 21–52)
MAGNESIUM SERPL-MCNC: 1.1 MG/DL (ref 1.6–2.6)
MCH RBC QN AUTO: 25.7 PG (ref 24–34)
MCHC RBC AUTO-ENTMCNC: 30.2 G/DL (ref 31–37)
MCV RBC AUTO: 85.3 FL (ref 78–100)
MONOCYTES # BLD: 1 K/UL (ref 0.05–1.2)
MONOCYTES NFR BLD: 6 % (ref 3–10)
NEUTS SEG # BLD: 13.7 K/UL (ref 1.8–8)
NEUTS SEG NFR BLD: 82 % (ref 40–73)
NRBC # BLD: 0 K/UL (ref 0–0.01)
NRBC BLD-RTO: 0 PER 100 WBC
PLATELET # BLD AUTO: 495 K/UL (ref 135–420)
PMV BLD AUTO: 9.2 FL (ref 9.2–11.8)
POTASSIUM SERPL-SCNC: 5.2 MMOL/L (ref 3.5–5.5)
PROT SERPL-MCNC: 5.8 G/DL (ref 6.4–8.2)
RBC # BLD AUTO: 3.07 M/UL (ref 4.2–5.3)
SODIUM SERPL-SCNC: 138 MMOL/L (ref 136–145)
WBC # BLD AUTO: 16.8 K/UL (ref 4.6–13.2)

## 2021-12-08 PROCEDURE — 87040 BLOOD CULTURE FOR BACTERIA: CPT

## 2021-12-08 PROCEDURE — 80053 COMPREHEN METABOLIC PANEL: CPT

## 2021-12-08 PROCEDURE — 74011250636 HC RX REV CODE- 250/636: Performed by: FAMILY MEDICINE

## 2021-12-08 PROCEDURE — 36415 COLL VENOUS BLD VENIPUNCTURE: CPT

## 2021-12-08 PROCEDURE — 74011250636 HC RX REV CODE- 250/636: Performed by: INTERNAL MEDICINE

## 2021-12-08 PROCEDURE — 74011636637 HC RX REV CODE- 636/637: Performed by: INTERNAL MEDICINE

## 2021-12-08 PROCEDURE — 65270000029 HC RM PRIVATE

## 2021-12-08 PROCEDURE — 83735 ASSAY OF MAGNESIUM: CPT

## 2021-12-08 PROCEDURE — 82962 GLUCOSE BLOOD TEST: CPT

## 2021-12-08 PROCEDURE — 74011000258 HC RX REV CODE- 258: Performed by: INTERNAL MEDICINE

## 2021-12-08 PROCEDURE — 85025 COMPLETE CBC W/AUTO DIFF WBC: CPT

## 2021-12-08 RX ORDER — HEPARIN SODIUM 5000 [USP'U]/ML
5000 INJECTION, SOLUTION INTRAVENOUS; SUBCUTANEOUS EVERY 8 HOURS
Status: DISCONTINUED | OUTPATIENT
Start: 2021-12-08 | End: 2021-12-10 | Stop reason: HOSPADM

## 2021-12-08 RX ORDER — MAGNESIUM SULFATE 1 G/100ML
1 INJECTION INTRAVENOUS ONCE
Status: COMPLETED | OUTPATIENT
Start: 2021-12-08 | End: 2021-12-08

## 2021-12-08 RX ADMIN — CEFTRIAXONE 1 G: 1 INJECTION, POWDER, FOR SOLUTION INTRAMUSCULAR; INTRAVENOUS at 02:17

## 2021-12-08 RX ADMIN — Medication 10 ML: at 14:00

## 2021-12-08 RX ADMIN — MAGNESIUM SULFATE HEPTAHYDRATE 1 G: 1 INJECTION, SOLUTION INTRAVENOUS at 20:55

## 2021-12-08 RX ADMIN — AZITHROMYCIN MONOHYDRATE 500 MG: 500 INJECTION, POWDER, LYOPHILIZED, FOR SOLUTION INTRAVENOUS at 02:18

## 2021-12-08 RX ADMIN — INSULIN LISPRO 6 UNITS: 100 INJECTION, SOLUTION INTRAVENOUS; SUBCUTANEOUS at 22:08

## 2021-12-08 RX ADMIN — HEPARIN SODIUM 5000 UNITS: 5000 INJECTION INTRAVENOUS; SUBCUTANEOUS at 20:56

## 2021-12-08 RX ADMIN — ENOXAPARIN SODIUM 30 MG: 30 INJECTION SUBCUTANEOUS at 10:23

## 2021-12-08 RX ADMIN — Medication 10 ML: at 23:09

## 2021-12-08 RX ADMIN — Medication 10 ML: at 06:00

## 2021-12-08 NOTE — WOUND CARE
IP WOUND CONSULT     [address] RECORD NUMBER:  [id number]  AGE: 79 y.o.    GENDER: female  : 1940  TODAY'S DATE:  2021    GENERAL     [] Follow-up   [x] New Consult    Praful Lafleur is a 79 y.o. female referred by:   [] Physician  [x] Nursing  [] Other:         PAST MEDICAL HISTORY    Past Medical History:   Diagnosis Date    [facility]     vaginal and colon cancer    Cancer [facility]     Chronic pain     arthritis    Diabetes [facility]     DVT [facility]deep venous thrombosis) [facility]     Hypertension     Legally blind     Thyroid disease         PAST SURGICAL HISTORY    Past Surgical History:   Procedure Laterality Date    HX ABOVE KNEE AMPUTATION Right     HX COLOSTOMY      HX ORTHOPAEDIC      HX OTHER SURGICAL      kidney drainage tube    HX UROLOGICAL      kidney removal    IR CHANGE NEPHROSTOMY PYELOS TUBE RT  2021    IR EXCHANGE NEPHRO PERC RT SI  2019    IR EXCHANGE NEPHRO PERC RT SI  3/21/2019    IR EXCHANGE NEPHRO PERC RT SI  2019    IR EXCHANGE NEPHRO PERC RT SI  2019    IR EXCHANGE NEPHRO PERC RT SI  2019    IR EXCHANGE NEPHRO PERC RT SI  10/3/2019    IR EXCHANGE NEPHRO PERC RT SI  2019    IR EXCHANGE NEPHRO PERC RT SI  2020    IR EXCHANGE NEPHRO PERC RT SI  2020    IR EXCHANGE NEPHRO PERC RT SI  2020    IR EXCHANGE NEPHRO PERC RT SI  2020    IR EXCHANGE NEPHRO PERC RT SI  2020    IR EXCHANGE NEPHRO PERC RT SI  8/10/2020    IR EXCHANGE NEPHRO PERC RT SI  2020    IR EXCHANGE NEPHRO PERC RT SI  2020    IR EXCHANGE NEPHRO PERC RT SI  2020    IR EXCHANGE NEPHRO PERC RT SI  2021    IR EXCHANGE NEPHRO PERC RT SI  3/25/2021    IR EXCHANGE NEPHRO PERC RT SI  5/10/2021    IR EXCHANGE NEPHRO PERC RT SI  2021    IR EXCHANGE NEPHRO PERC RT SI  2021    IR EXCHANGE NEPHRO PERC RT SI  2021    IR NEPHROSTOMY PERC RT PLC CATH  SI  2020    IR NEPHROSTOMY PERC RT PLC CATH  SI  2021 FAMILY HISTORY    History reviewed. No pertinent family history. SOCIAL HISTORY    Social History     Tobacco Use    Smoking status: Never Smoker    Smokeless tobacco: Never Used   Substance Use Topics    Alcohol use: No    Drug use: Never       ALLERGIES    Allergies   Allergen Reactions    Morphine Nausea and Vomiting    Pcn [Penicillins] Rash       MEDICATIONS    No current facility-administered medications on file prior to encounter. Current Outpatient Medications on File Prior to Encounter   Medication Sig Dispense Refill    doxycycline (ADOXA) 100 mg tablet Take 100 mg by mouth two (2) times a day.  docusate sodium (Colace) 100 mg capsule Take 100 mg by mouth nightly.  polyethylene glycol (MIRALAX) 17 gram packet Take 17 g by mouth daily.  insulin detemir U-100 (Levemir U-100 Insulin) 100 unit/mL injection 10 Units by SubCUTAneous route nightly.  Arginine-Glutamine-Calcium HMB (Evans) 7-7-1.5 gram pwpk Take  by mouth two (2) times a day.  gabapentin (NEURONTIN) 300 mg capsule Take 300 mg by mouth two (2) times a day.  nystatin (MYCOSTATIN) topical cream Apply 100,000 g to affected area two (2) times a day.  pollen extracts (PROSTAT PO) Take  by mouth two (2) times a day.  senna-docusate (PERICOLACE) 8.6-50 mg per tablet Take 1 Tablet by mouth two (2) times a day.  insulin aspart U-100 (NovoLOG Flexpen U-100 Insulin) 100 unit/mL (3 mL) inpn by SubCUTAneous route. 151-200 = 2u  201-250 = 4u  251-300 = 6u  301-350 = 8u  351-400 = 10u  401 + = 12u notify MD      magnesium hydroxide (MILK OF MAGNESIA PO) Take 1,200 mg by mouth.  acetaminophen (TYLENOL) 500 mg tablet Take 2 Tabs by mouth every eight (8) hours as needed for Pain or Fever. (Patient taking differently: Take 325 mg by mouth every six (6) hours as needed for Pain or Fever. Give two tablets.) 30 Tab 0    nystatin (MYCOSTATIN) powder Apply  to affected area two (2) times a day.  60 g 0  insulin glargine (LANTUS) 100 unit/mL injection 10 Units by SubCUTAneous route nightly. (Patient not taking: Reported on 12/7/2021) 1 Vial 0    lip protectant with sunscreen (CARMEX) crea Apply  to affected area as needed for Dry Skin. 1 Tube 0    timolol (TIMOPTIC) 0.5 % ophthalmic solution Administer 1 Drop to both eyes two (2) times a day. (Patient not taking: Reported on 12/7/2021) 10 mL 0    brimonidine (ALPHAGAN) 0.2 % ophthalmic solution Administer 1 Drop to both eyes every twelve (12) hours. (Patient not taking: Reported on 12/7/2021) 10 mL 0    amLODIPine (NORVASC) 5 mg tablet Take 1 Tab by mouth daily. 30 Tab 0    famotidine (PEPCID) 10 mg tablet Take 1 Tab by mouth every twelve (12) hours. 60 Tab 0    melatonin 5 mg tablet Take 1 Tab by mouth nightly. 30 Tab 0    LORazepam (ATIVAN) 0.5 mg tablet Take 1 Tab by mouth two (2) times daily as needed for Agitation. Max Daily Amount: 1 mg. 2 Tab 0    multivitamin (ONE A DAY) tablet Take 1 Tab by mouth daily.  ondansetron hcl (ZOFRAN) 4 mg tablet Take 4 mg by mouth every eight (8) hours as needed for Nausea or Vomiting.  brimonidine-timolol (COMBIGAN) 0.2-0.5 % drop ophthalmic solution Administer 1 Drop to both eyes every twelve (12) hours. (Patient not taking: Reported on 12/7/2021)      metoprolol succinate (TOPROL-XL) 50 mg XL tablet Take 50 mg by mouth daily.  levothyroxine (SYNTHROID) 50 mcg tablet Take  by mouth Daily (before breakfast).  atorvastatin calcium (ATORVASTATIN PO) Take 20 mg by mouth daily.          Wt Readings from Last 3 Encounters:   12/07/21 64 kg (141 lb)   06/11/21 66.7 kg (147 lb)   03/25/21 69.8 kg (153 lb 14.4 oz)       [unfilled]  Visit Vitals  BP (!) 103/44 (BP 1 Location: Left upper arm, BP Patient Position: At rest)   Pulse 90   Temp 98.2 °F (36.8 °C)   Resp 16   Ht 5' 2\" (1.575 m)   Wt 64 kg (141 lb)   SpO2 100%   BMI 25.79 kg/m²       ASSESSMENT     Skin impairment Identification:  Type: pressure    Contributing Factors: chronic pressure, decreased mobility and shear force    Wound Sacral/coccyx (Active)   Wound Image    12/06/21 1048   Wound Etiology Pressure Stage 4 12/06/21 1048   Dressing Status Old drainage noted; Dry; Intact 12/07/21 1524   Cleansed Wound cleanser 12/06/21 1048   Dressing/Treatment Foam; Silicone border 45/04/41 1524   Wound Length (cm) 1.2 cm 12/06/21 1048   Wound Width (cm) 0.5 cm 12/06/21 1048   Wound Depth (cm) 0.8 cm 12/06/21 1048   Wound Surface Area (cm^2) 0.6 cm^2 12/06/21 1048   Change in Wound Size % 52.38 12/06/21 1048   Wound Volume (cm^3) 0.48 cm^3 12/06/21 1048   Wound Healing % 5 12/06/21 1048   Post-Procedure Length (cm) 1.4 cm 12/06/21 1048   Post-Procedure Width (cm) 0.7 cm 12/06/21 1048   Post-Procedure Depth (cm) 1 cm 12/06/21 1048   Post-Procedure Surface Area (cm^2) 0.98 cm^2 12/06/21 1048   Post-Procedure Volume (cm^3) 0.98 cm^3 12/06/21 1048   Undermining Starts ___ O'Clock 12 o'clock 12/06/21 1048   Undermining Ends ___ O'Clock 12 o'clock 12/06/21 1048   Undermining Maximum Distance (cm) 2 cm 12/06/21 1048   Wound Assessment Devitalized tissue 12/06/21 1048   Drainage Amount Small 12/07/21 1524   Drainage Description Black 12/07/21 1524   Wound Odor None 12/07/21 1524   Carol-Wound/Incision Assessment Dry/flaky 12/07/21 1524   Number of days: 121          PLAN     Skin Care & Pressure Relief Recommendations  Minimize layers of linen  Pads under patient to optimize support surface  Turn/reposition approximately every 2 hours  Pillow wedges  Please use in bed position system  Promote continence; Skin Protective lotion/cream to buttocks and sacrum daily and as needed with incontinence care  Offload heels pillows      Recommendations:  Pt followed in wound clinic every 2 weeks. Patient seen in clinic on Monday 12/6/2021.   Recommend to continue daily santyl dressings to sacral wound as ordered by wound clinic MD.  Wound care will follow during hospitalization.     Teaching completed with:   [] Patient           [] Family member       [] Caregiver          [] Nursing  [] Other    Patient/Caregiver Teaching:  Level of patient/caregiver understanding able to:   [] Indicates understanding       [] Needs reinforcement  [] Unsuccessful      [] Verbal Understanding  [] Demonstrated understanding       [] No evidence of learning  [] Refused teaching         [] N/A       Electronically signed by Everrett Boeck, RN on 12/8/2021 at 8:11 AM

## 2021-12-08 NOTE — PROGRESS NOTES
Hospitalist Progress Note    Patient: Alexi Mandujano MRN: [id number]  CSN: [id number]    YOB: 1940  Age: 79 y.o.   Sex: female    DOA: 12/6/2021 LOS:  LOS: 1 day                Assessment/Plan     Patient Active Problem List   Diagnosis Code    Pyelonephritis N14    S/p nephrectomy Z90.5    Right flank pain R10.9    Malfunction of nephrostomy tube ([address] T83.098A    Nephrostomy tube displaced ([address] T83.022A    UTI (urinary tract infection) N39.0    Elevated WBC count D72.829    Diabetes ([address] E11.9    Legally blind H54.8    Hypertension I10    Thyroid disease E07.9    Elevated serum creatinine R79.89    S/P colectomy Z90.49    Skin lesion of foot L98.9    Tear of skin of buttock S31.801A    Pressure injury of right heel, unstageable ([facility]) L89.610    Hypomagnesemia E83.42    Hyperkalemia E87.5    Heel ulcer ([facility]) L97.409    S/P AKA (above knee amputation), right ([address] Z89.611    Nephrostomy status ([address] Z93.6    Obstructed nephrostomy tube ([address] T83.092A    Nephrostomy complication (HCC) O03.548    Acute renal failure (ARF) ([facility]) N17.9    Elevated INR R79.1    Bacteremia R78.81    Enterococcus faecalis infection B95.2    CKD stage 3 due to type 2 diabetes mellitus (HCC) E11.22, N18.30    Hydronephrosis, right N13.30    Severe sepsis with acute organ dysfunction ([facility]) A41.9, R65.20    Anticoagulated Z79.01    AMS (altered mental status) R41.82    Encephalopathy acute G93.40    Altered mental status R41.82    Pseudomonas aeruginosa infection A49.8    Displacement of nephrostomy tube ([facility]) T83.022A    Tinea cruris B35.6    Pressure injury of sacral region, stage 4 ([facility]) L89.154    Hydronephrosis N13.30    Abdominal pain R10.9        Chief complaint :  Right flank pain  79 y.o.  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 Noted to have dislodged nephrostomy tube. Dislodged nephrostomy tube -  S/p tube exchange     Possible pneumonia  UTI -  On ceftriaxone, azithromycin  Recent urine cultures 11/ with proteus. Follow cultures 12/07/2021    DM -  On SSI    Sacral decubitus ulcer -  Local wound care. Right AKA -      Disposition : 1-2 days    Review of systems  General: No fevers or chills. Cardiovascular: No chest pain or pressure. No palpitations. Pulmonary: No shortness of breath. Gastrointestinal: No nausea, vomiting. Physical Exam:  General: Awake, cooperative, no acute distress    HEENT: NC, Atraumatic. PERRLA, anicteric sclerae. Lungs: CTA Bilaterally. No Wheezing/Rhonchi/Rales. Heart:  S1 S2,  No murmur, No Rubs, No Gallops  Abdomen: Soft, Non distended, Non tender.  +Bowel sounds,   Extremities: Right AKA  Psych:   Not anxious or agitated. Neurologic:  No acute neurological deficit. Vital signs/Intake and Output:  Visit Vitals  BP (!) 115/47 (BP 1 Location: Left upper arm, BP Patient Position: At rest)   Pulse 93   Temp 98.5 °F (36.9 °C)   Resp 18   Ht 5' 2\" (1.575 m)   Wt 64 kg (141 lb)   SpO2 100%   BMI 25.79 kg/m²     Current Shift:  No intake/output data recorded.   Last three shifts:  12/06 1901 - 12/08 0700  In: 723 [I.V.:723]  Out: 325 [Urine:325]            Labs: Results:       Chemistry Recent Labs     12/08/21  0018 12/07/21  0357 12/06/21 2056   * 223* 229*    135* 136   K 5.2 5.5 4.9    108 107   CO2 19* 17* 20*   BUN 58* 51* 41*   CREA 2.66* 1.73* 1.52*   CA 7.8* 8.1* 8.4*   AGAP 10 10 9   BUCR 22* 29* 27*    116 124*   TP 5.8* 6.4 7.8   ALB 1.8* 2.1* 2.2*   GLOB 4.0 4.3* 5.6*   AGRAT 0.5* 0.5* 0.4*      CBC w/Diff Recent Labs     12/08/21  0018 12/07/21  0357 12/06/21 2056   WBC 16.8* 20.0* 18.3*   RBC 3.07* 3.48* 3.76*   HGB 7.9* 8.8* 9.7*   HCT 26.2* 29.3* 31.6*   PLT 495* 502* 540*   GRANS 82* 89* 84*   LYMPH 11* 6* 9*   EOS 0 1 2      Cardiac Enzymes No results for input(s): CPK, CKND1, JANE in the last 72 hours. No lab exists for component: CKRMB, TROIP   Coagulation No results for input(s): PTP, INR, APTT, INREXT, INREXT in the last 72 hours. Lipid Panel Lab Results   Component Value Date/Time    Cholesterol, total 115 08/28/2019 08:22 AM    HDL Cholesterol 39 (L) 08/28/2019 08:22 AM    LDL, calculated 46.2 08/28/2019 08:22 AM    VLDL, calculated 29.8 08/28/2019 08:22 AM    Triglyceride 149 08/28/2019 08:22 AM    CHOL/HDL Ratio 2.9 08/28/2019 08:22 AM      BNP No results for input(s): BNPP in the last 72 hours.    Liver Enzymes Recent Labs     12/08/21  0018   TP 5.8*   ALB 1.8*         Thyroid Studies Lab Results   Component Value Date/Time    TSH 3.40 01/24/2020 08:35 AM        Procedures/imaging: see electronic medical records for all procedures/Xrays and details which were not copied into this note but were reviewed prior to creation of Plan

## 2021-12-08 NOTE — PROGRESS NOTES
Problem: Pressure Injury - Risk of  Goal: *Prevention of pressure injury  Description: Document Rickie Scale and appropriate interventions in the flowsheet. Outcome: Progressing Towards Goal  Note: Pressure Injury Interventions:  Sensory Interventions: Assess changes in LOC, Assess need for specialty bed, Avoid rigorous massage over bony prominences, Check visual cues for pain, Discuss PT/OT consult with provider, Float heels, Keep linens dry and wrinkle-free, Minimize linen layers, Monitor skin under medical devices, Pressure redistribution bed/mattress (bed type), Turn and reposition approx. every two hours (pillows and wedges if needed)         Activity Interventions: Pressure redistribution bed/mattress(bed type)    Mobility Interventions: Pressure redistribution bed/mattress (bed type), PT/OT evaluation, Float heels    Nutrition Interventions: Document food/fluid/supplement intake, Discuss nutritional consult with provider    Friction and Shear Interventions: Apply protective barrier, creams and emollients, Feet elevated on foot rest, Foam dressings/transparent film/skin sealants, HOB 30 degrees or less, Minimize layers, Transferring/repositioning devices                Problem: Patient Education: Go to Patient Education Activity  Goal: Patient/Family Education  Outcome: Progressing Towards Goal     Problem: Falls - Risk of  Goal: *Absence of Falls  Description: Document Romeo Fall Risk and appropriate interventions in the flowsheet.   Outcome: Progressing Towards Goal  Note: Fall Risk Interventions:  Mobility Interventions: Bed/chair exit alarm, Communicate number of staff needed for ambulation/transfer, PT Consult for mobility concerns, PT Consult for assist device competence    Mentation Interventions: Adequate sleep, hydration, pain control, Bed/chair exit alarm, Door open when patient unattended, Evaluate medications/consider consulting pharmacy, Toileting rounds, Update white board, Reorient patient, Gait belt with transfers/ambulation    Medication Interventions: Bed/chair exit alarm, Evaluate medications/consider consulting pharmacy, Patient to call before getting OOB    Elimination Interventions: Bed/chair exit alarm, Call light in reach, Toilet paper/wipes in reach, Toileting schedule/hourly rounds              Problem: Patient Education: Go to Patient Education Activity  Goal: Patient/Family Education  Outcome: Progressing Towards Goal     Problem: Diabetes Self-Management  Goal: *Disease process and treatment process  Description: Define diabetes and identify own type of diabetes; list 3 options for treating diabetes. Outcome: Progressing Towards Goal  Goal: *Incorporating nutritional management into lifestyle  Description: Describe effect of type, amount and timing of food on blood glucose; list 3 methods for planning meals. Outcome: Progressing Towards Goal  Goal: *Incorporating physical activity into lifestyle  Description: State effect of exercise on blood glucose levels. Outcome: Progressing Towards Goal  Goal: *Developing strategies to promote health/change behavior  Description: Define the ABC's of diabetes; identify appropriate screenings, schedule and personal plan for screenings. Outcome: Progressing Towards Goal  Goal: *Using medications safely  Description: State effect of diabetes medications on diabetes; name diabetes medication taking, action and side effects. Outcome: Progressing Towards Goal  Goal: *Monitoring blood glucose, interpreting and using results  Description: Identify recommended blood glucose targets  and personal targets. Outcome: Progressing Towards Goal  Goal: *Prevention, detection, treatment of acute complications  Description: List symptoms of hyper- and hypoglycemia; describe how to treat low blood sugar and actions for lowering  high blood glucose level.   Outcome: Progressing Towards Goal  Goal: *Prevention, detection and treatment of chronic complications  Description: Define the natural course of diabetes and describe the relationship of blood glucose levels to long term complications of diabetes.   Outcome: Progressing Towards Goal  Goal: *Developing strategies to address psychosocial issues  Description: Describe feelings about living with diabetes; identify support needed and support network  Outcome: Progressing Towards Goal  Goal: *Insulin pump training  Outcome: Progressing Towards Goal  Goal: *Sick day guidelines  Outcome: Progressing Towards Goal  Goal: *Patient Specific Goal (EDIT GOAL, INSERT TEXT)  Outcome: Progressing Towards Goal     Problem: Patient Education: Go to Patient Education Activity  Goal: Patient/Family Education  Outcome: Progressing Towards Goal

## 2021-12-09 PROBLEM — G93.40 ENCEPHALOPATHY ACUTE: Status: RESOLVED | Noted: 2020-11-16 | Resolved: 2021-12-09

## 2021-12-09 PROBLEM — N13.30 HYDRONEPHROSIS: Status: RESOLVED | Noted: 2021-12-07 | Resolved: 2021-12-09

## 2021-12-09 LAB
ALBUMIN SERPL-MCNC: 1.7 G/DL (ref 3.4–5)
ALBUMIN/GLOB SERPL: 0.4 {RATIO} (ref 0.8–1.7)
ALP SERPL-CCNC: 121 U/L (ref 45–117)
ALT SERPL-CCNC: 27 U/L (ref 13–56)
ANION GAP SERPL CALC-SCNC: 9 MMOL/L (ref 3–18)
AST SERPL-CCNC: 19 U/L (ref 10–38)
BASOPHILS # BLD: 0.1 K/UL (ref 0–0.1)
BASOPHILS NFR BLD: 0 % (ref 0–2)
BILIRUB SERPL-MCNC: 0.2 MG/DL (ref 0.2–1)
BUN SERPL-MCNC: 56 MG/DL (ref 7–18)
BUN/CREAT SERPL: 24 (ref 12–20)
CALCIUM SERPL-MCNC: 7.9 MG/DL (ref 8.5–10.1)
CHLORIDE SERPL-SCNC: 109 MMOL/L (ref 100–111)
CO2 SERPL-SCNC: 18 MMOL/L (ref 21–32)
CREAT SERPL-MCNC: 2.38 MG/DL (ref 0.6–1.3)
DIFFERENTIAL METHOD BLD: ABNORMAL
EOSINOPHIL # BLD: 0.2 K/UL (ref 0–0.4)
EOSINOPHIL NFR BLD: 2 % (ref 0–5)
ERYTHROCYTE [DISTWIDTH] IN BLOOD BY AUTOMATED COUNT: 15.9 % (ref 11.6–14.5)
GLOBULIN SER CALC-MCNC: 3.9 G/DL (ref 2–4)
GLUCOSE BLD STRIP.AUTO-MCNC: 147 MG/DL (ref 70–110)
GLUCOSE BLD STRIP.AUTO-MCNC: 160 MG/DL (ref 70–110)
GLUCOSE BLD STRIP.AUTO-MCNC: 232 MG/DL (ref 70–110)
GLUCOSE SERPL-MCNC: 144 MG/DL (ref 74–99)
HCT VFR BLD AUTO: 24.8 % (ref 35–45)
HGB BLD-MCNC: 7.6 G/DL (ref 12–16)
IMM GRANULOCYTES # BLD AUTO: 0.1 K/UL (ref 0–0.04)
IMM GRANULOCYTES NFR BLD AUTO: 1 % (ref 0–0.5)
LYMPHOCYTES # BLD: 1.2 K/UL (ref 0.9–3.6)
LYMPHOCYTES NFR BLD: 9 % (ref 21–52)
MCH RBC QN AUTO: 26.2 PG (ref 24–34)
MCHC RBC AUTO-ENTMCNC: 30.6 G/DL (ref 31–37)
MCV RBC AUTO: 85.5 FL (ref 78–100)
MONOCYTES # BLD: 0.9 K/UL (ref 0.05–1.2)
MONOCYTES NFR BLD: 7 % (ref 3–10)
NEUTS SEG # BLD: 10.9 K/UL (ref 1.8–8)
NEUTS SEG NFR BLD: 82 % (ref 40–73)
NRBC # BLD: 0 K/UL (ref 0–0.01)
NRBC BLD-RTO: 0 PER 100 WBC
PLATELET # BLD AUTO: 428 K/UL (ref 135–420)
PMV BLD AUTO: 9.3 FL (ref 9.2–11.8)
POTASSIUM SERPL-SCNC: 5 MMOL/L (ref 3.5–5.5)
PROT SERPL-MCNC: 5.6 G/DL (ref 6.4–8.2)
RBC # BLD AUTO: 2.9 M/UL (ref 4.2–5.3)
SODIUM SERPL-SCNC: 136 MMOL/L (ref 136–145)
WBC # BLD AUTO: 13.3 K/UL (ref 4.6–13.2)

## 2021-12-09 PROCEDURE — 74011250636 HC RX REV CODE- 250/636: Performed by: INTERNAL MEDICINE

## 2021-12-09 PROCEDURE — 36415 COLL VENOUS BLD VENIPUNCTURE: CPT

## 2021-12-09 PROCEDURE — 80053 COMPREHEN METABOLIC PANEL: CPT

## 2021-12-09 PROCEDURE — 74011250636 HC RX REV CODE- 250/636: Performed by: FAMILY MEDICINE

## 2021-12-09 PROCEDURE — 74011636637 HC RX REV CODE- 636/637: Performed by: INTERNAL MEDICINE

## 2021-12-09 PROCEDURE — 85025 COMPLETE CBC W/AUTO DIFF WBC: CPT

## 2021-12-09 PROCEDURE — 77030037255 HC PCH OST FLX SENSURA COLO -A

## 2021-12-09 PROCEDURE — 82962 GLUCOSE BLOOD TEST: CPT

## 2021-12-09 PROCEDURE — 74011000258 HC RX REV CODE- 258: Performed by: INTERNAL MEDICINE

## 2021-12-09 PROCEDURE — 65270000029 HC RM PRIVATE

## 2021-12-09 RX ORDER — LINEZOLID 600 MG/1
600 TABLET, FILM COATED ORAL EVERY 12 HOURS
Status: DISCONTINUED | OUTPATIENT
Start: 2021-12-09 | End: 2021-12-10 | Stop reason: HOSPADM

## 2021-12-09 RX ADMIN — INSULIN LISPRO 4 UNITS: 100 INJECTION, SOLUTION INTRAVENOUS; SUBCUTANEOUS at 17:18

## 2021-12-09 RX ADMIN — AZITHROMYCIN MONOHYDRATE 500 MG: 500 INJECTION, POWDER, LYOPHILIZED, FOR SOLUTION INTRAVENOUS at 02:12

## 2021-12-09 RX ADMIN — HYDROMORPHONE HYDROCHLORIDE 1 MG: 1 INJECTION, SOLUTION INTRAMUSCULAR; INTRAVENOUS; SUBCUTANEOUS at 22:00

## 2021-12-09 RX ADMIN — HEPARIN SODIUM 5000 UNITS: 5000 INJECTION INTRAVENOUS; SUBCUTANEOUS at 12:24

## 2021-12-09 RX ADMIN — CEFTRIAXONE 1 G: 1 INJECTION, POWDER, FOR SOLUTION INTRAMUSCULAR; INTRAVENOUS at 02:12

## 2021-12-09 RX ADMIN — HEPARIN SODIUM 5000 UNITS: 5000 INJECTION INTRAVENOUS; SUBCUTANEOUS at 18:36

## 2021-12-09 RX ADMIN — INSULIN LISPRO 2 UNITS: 100 INJECTION, SOLUTION INTRAVENOUS; SUBCUTANEOUS at 22:00

## 2021-12-09 RX ADMIN — Medication 10 ML: at 08:23

## 2021-12-09 RX ADMIN — INSULIN LISPRO 2 UNITS: 100 INJECTION, SOLUTION INTRAVENOUS; SUBCUTANEOUS at 08:21

## 2021-12-09 RX ADMIN — HEPARIN SODIUM 5000 UNITS: 5000 INJECTION INTRAVENOUS; SUBCUTANEOUS at 03:00

## 2021-12-09 NOTE — PROGRESS NOTES
Problem: Pressure Injury - Risk of  Goal: *Prevention of pressure injury  Description: Document Rickie Scale and appropriate interventions in the flowsheet. Outcome: Progressing Towards Goal  Note: Pressure Injury Interventions:  Sensory Interventions: Assess changes in LOC, Check visual cues for pain    Moisture Interventions: Absorbent underpads    Activity Interventions: Pressure redistribution bed/mattress(bed type), PT/OT evaluation    Mobility Interventions: Pressure redistribution bed/mattress (bed type)    Nutrition Interventions: Document food/fluid/supplement intake    Friction and Shear Interventions: Minimize layers                Problem: Patient Education: Go to Patient Education Activity  Goal: Patient/Family Education  Outcome: Progressing Towards Goal     Problem: Falls - Risk of  Goal: *Absence of Falls  Description: Document Romeo Fall Risk and appropriate interventions in the flowsheet. Outcome: Progressing Towards Goal  Note: Fall Risk Interventions:  Mobility Interventions: Communicate number of staff needed for ambulation/transfer    Mentation Interventions: Door open when patient unattended    Medication Interventions: Teach patient to arise slowly    Elimination Interventions: Bed/chair exit alarm              Problem: Patient Education: Go to Patient Education Activity  Goal: Patient/Family Education  Outcome: Progressing Towards Goal     Problem: Diabetes Self-Management  Goal: *Disease process and treatment process  Description: Define diabetes and identify own type of diabetes; list 3 options for treating diabetes. Outcome: Progressing Towards Goal  Goal: *Incorporating nutritional management into lifestyle  Description: Describe effect of type, amount and timing of food on blood glucose; list 3 methods for planning meals.   Outcome: Progressing Towards Goal  Goal: *Incorporating physical activity into lifestyle  Description: State effect of exercise on blood glucose levels. Outcome: Progressing Towards Goal  Goal: *Developing strategies to promote health/change behavior  Description: Define the ABC's of diabetes; identify appropriate screenings, schedule and personal plan for screenings. Outcome: Progressing Towards Goal  Goal: *Using medications safely  Description: State effect of diabetes medications on diabetes; name diabetes medication taking, action and side effects. Outcome: Progressing Towards Goal  Goal: *Monitoring blood glucose, interpreting and using results  Description: Identify recommended blood glucose targets  and personal targets. Outcome: Progressing Towards Goal  Goal: *Prevention, detection, treatment of acute complications  Description: List symptoms of hyper- and hypoglycemia; describe how to treat low blood sugar and actions for lowering  high blood glucose level. Outcome: Progressing Towards Goal  Goal: *Prevention, detection and treatment of chronic complications  Description: Define the natural course of diabetes and describe the relationship of blood glucose levels to long term complications of diabetes. Outcome: Progressing Towards Goal  Goal: *Developing strategies to address psychosocial issues  Description: Describe feelings about living with diabetes; identify support needed and support network  Outcome: Progressing Towards Goal  Goal: *Insulin pump training  Outcome: Progressing Towards Goal  Goal: *Sick day guidelines  Outcome: Progressing Towards Goal  Goal: *Patient Specific Goal (EDIT GOAL, INSERT TEXT)  Outcome: Progressing Towards Goal     Problem: Patient Education: Go to Patient Education Activity  Goal: Patient/Family Education  Outcome: Progressing Towards Goal     Problem: Risk for Spread of Infection  Goal: Prevent transmission of infectious organism to others  Description: Prevent the transmission of infectious organisms to other patients, staff members, and visitors.   Outcome: Progressing Towards Goal     Problem: Patient Education:  Go to Education Activity  Goal: Patient/Family Education  Outcome: Progressing Towards Goal

## 2021-12-09 NOTE — PROGRESS NOTES
D/C Plan: Return to [facility] (formerly [address] [place] [address])      CM spoke with provider and have been notified pt will likely be medically stable with in the next 24-48 hours. CM notified the above facility of possible return tomorrow. CM contacted pt's son, Africa Anglin ([phone]), RICARDO he is aware and in agreement with plan return Friday vs Saturday. Please contact pt's son, Africa Anglin ([phone]) or his wife Allen Hamilton ([phone]) when pt is discharged and transport time has been confirmed. CM reviewed CHRIS with pt's son. Family verbalized an understanding. Anticipate pt will transition to the above facility. Transition of Care Plan: [facility]    Communication to Patient/Family:  Met with patient and family, and they are agreeable to the transition plan. The Plan for Transition of Care is related to the following treatment goals: LTC    The Patient and/or patient representative was provided with a choice of provider and agrees   with the discharge plan. Yes [x] No []    Freedom of choice list was provided with basic dialogue that supports the patient's individualized plan of care/goals and shares the quality data associated with the providers. Yes [x] No []    SNF/Rehab Transition:  Patient has been accepted to [facility] at [address] [place] [address], [address] [place] [address] for SNF and meets criteria for admission. Patient will transported by medical transport and expected to leave Friday vs Saturday. Communication to SNF/Rehab:  Bedside RN,  has been notified to update the transition plan to the facility and call report [phone]     Discharge information has been updated on the AVS and communicated through [facility] or [facility]. Discharge instructions to be fax'd to facility at [phone]     Please include all hard scripts for controlled substances, med rec and dc summary, and AVS in packet.  Please medicate for pain prior to dc if possible and needed to help offset delay when patient first arrives to facility. Reviewed and confirmed with facility, [facility] can manage the patient care needs for the following:     Cherrie Guerrier with (X) only those applicable:  Medication:  []Medications are available at the facility  []IV Antibiotics    []Controlled Substance - hard copies available sent. []Weekly Labs    Equipment:  []CPAP/BiPAP  []Wound Vacuum  []Wright or Urinary Device  []PICC/Central Line  []Nebulizer  []Ventilator    Treatment:  []Isolation (for MRSA, VRE, etc.)  []Surgical Drain Management  []Tracheostomy Care  []Dressing Changes  []Dialysis with transportation  []PEG Care  []Oxygen  []Daily Weights for Heart Failure    Dietary:  []Any diet limitations  []Tube Feedings   []Total Parenteral Management (TPN)    Financial Resources:  []Medicaid Application Completed    []UAI Completed and copy given to pt/family. [x]A screening has previously been completed. []Level II Completed    [] Private pay individual who will not become   financially eligible for Medicaid within 6 months from admission to a [address] [place] [address]. [] Individual refused to have screening conducted. []Medicaid Application Completed    []The screening denied because it was determined individual did not need/did not qualify for nursing facility level of care. [] Out of state residents seeking direct admission to a [address] facility. [] Individuals who are inpatients of an out of state hospital, or in state or out of state veterans/ hospital and seek direct admission to a [address] facility  [] Individuals who are pateints or residents of a state owned/operated facility that is licensed by [employer] and seek direct admission to [address] [place] [address]  [] A screening not required for enrollment in [facility] services as set out in [facility]-  [] [facility]) staff shall perform screenings of the [facility] clients. Advanced Care Plan:  []Surrogate Decision Maker of Care  []POA  []Communicated Code Status and copy sent.     Other:         Care Management Interventions  Mode of Transport at Discharge: BLS  Transition of Care Consult (CM Consult): [address] Maintenance Reviewed: Yes  Support Systems: Child(chari)  The Plan for Transition of Care is Related to the Following Treatment Goals : Return to [address] [place] [address] (formerly [address] [place] [address])  The Patient and/or Patient Representative was Provided with a Choice of Provider and Agrees with the Discharge Plan?: Yes  Name of the Patient Representative Who was Provided with a Choice of Provider and Agrees with the Discharge Plan: Marcus avery of Choice List was Provided with Basic Dialogue that Supports the Patient's Individualized Plan of Care/Goals, Treatment Preferences and Shares the Quality Data Associated with the Providers?: Yes  Discharge Location  Discharge Placement: [address] [place] [address]

## 2021-12-09 NOTE — CONSULTS
[facility]  (A Division of [address] [place] [address])                                                                                                                      Reva Lozoya MD  Office #: - Option # [phone]  Fax #: [phone]     Date of Admission: 12/6/2021Date of Note: 12/9/2021      Reason for Consult: Evaluation and antibiotic management of UTI. Thank you for involving me in the care of this patient. Please do not hesitate to contact me on the above number if question or concern. Current Antimicrobials:    Prior Antimicrobials:  Ceftriaxone/Zithromax 12/6 to date   NA       Assessment- ID related:  --------------------------------------------------------------------------  · Probable UTI--> dislodged nephrostomy tube R, post X-change 12/7/21  · Severe R hydronephrosis 2/2 above  · Leucocytosis 2/2 above  · Acute on chronic renal failure  --UCX >100K P.mirablis 11/30  --UCX E.fecalis and GNR 12/7( PCN change)  · Scral DU- wound clean per [facility]  · PCN allergy    Other Medical Issues- Mx per respective team:  · S/P nephrectomy L  · S/P colectomy  · S/P R AKA  · Hard of hearing/blind  · DM     Recommendation for ID issues I am following:  -----------------------------------------------------------------------------    -Cont ceftriaxone, will DC zithromax and start linezolid 600mg BID PO  -FU urine culture 12/7 until final    FU cbc w/diff, bmp    Will further streamline based on final cultures    Will follow          HPI:  Bree Rojas is a 79 y.o. WHITE/NON- with PMH as listed above. She is known to me from prior admission, due to dislodgement of PCN and urinary infection. She is difficult and unco-operative to get history out of her. Poor of hearing/ blind. Chart reivewed. Noted admission from Wound clinic, due to low OP from PCN tube. CT AP done confirmed dislodged tube with severe R hydronephrosis.  CXR abn with atelectasis and trace effusion, she didn't have cough/SOB/ chest pain. Started on Zithromax and ceftriaxone. PCN change done and working good. Denies F/C/R.    Myra Conception on 12/7- GNR and E.fecalis +>    SNF resident.        Active Hospital Problems    Diagnosis Date Noted    Abdominal pain 12/07/2021    Displacement of nephrostomy tube ([facility] 06/06/2021    Acute renal failure (ARF) ([address] 06/21/2020    Diabetes ([facility] 11/26/2018    Right flank pain 05/18/2018    Pyelonephritis 05/18/2018     Past Medical History:   Diagnosis Date    Cancer ([facility]     vaginal and colon cancer    Cancer ([facility]     Chronic pain     arthritis    Diabetes ([facility]     DVT (deep venous thrombosis) ([facility]     Hypertension     Legally blind     Thyroid disease      Past Surgical History:   Procedure Laterality Date    HX ABOVE KNEE AMPUTATION Right     HX COLOSTOMY      HX ORTHOPAEDIC      HX OTHER SURGICAL      kidney drainage tube    HX UROLOGICAL      kidney removal    IR CHANGE NEPHROSTOMY PYELOS TUBE RT  12/7/2021    IR EXCHANGE NEPHRO PERC RT SI  1/28/2019    IR EXCHANGE NEPHRO PERC RT SI  3/21/2019    IR EXCHANGE NEPHRO PERC RT SI  5/13/2019    IR EXCHANGE NEPHRO PERC RT SI  6/27/2019    IR EXCHANGE NEPHRO PERC RT SI  8/16/2019    IR EXCHANGE NEPHRO PERC RT SI  10/3/2019    IR EXCHANGE NEPHRO PERC RT SI  11/18/2019    IR EXCHANGE NEPHRO PERC RT SI  1/6/2020    IR EXCHANGE NEPHRO PERC RT SI  2/24/2020    IR EXCHANGE NEPHRO PERC RT SI  4/13/2020    IR EXCHANGE NEPHRO PERC RT SI  6/1/2020    IR EXCHANGE NEPHRO PERC RT SI  6/19/2020    IR EXCHANGE NEPHRO PERC RT SI  8/10/2020    IR EXCHANGE NEPHRO PERC RT SI  9/30/2020    IR EXCHANGE NEPHRO PERC RT SI  11/5/2020    IR EXCHANGE NEPHRO PERC RT SI  12/16/2020    IR EXCHANGE NEPHRO PERC RT SI  2/1/2021    IR EXCHANGE NEPHRO PERC RT SI  3/25/2021    IR EXCHANGE NEPHRO PERC RT SI  5/10/2021    IR EXCHANGE NEPHRO PERC RT SI  8/2/2021    IR EXCHANGE NEPHRO PERC RT SI  9/13/2021    IR EXCHANGE NEPHRO PERC RT SI  11/9/2021    IR NEPHROSTOMY PERC RT PLC CATH  SI  6/21/2020    IR NEPHROSTOMY PERC RT PLC CATH  SI  6/7/2021     History reviewed. No pertinent family history. Social History     Socioeconomic History    Marital status:      Spouse name: Not on file    Number of children: Not on file    Years of education: Not on file    Highest education level: Not on file   Occupational History    Not on file   Tobacco Use    Smoking status: Never Smoker    Smokeless tobacco: Never Used   Substance and Sexual Activity    Alcohol use: No    Drug use: Never    Sexual activity: Not on file   Other Topics Concern     Service Not Asked    Blood Transfusions Not Asked    Caffeine Concern Not Asked    Occupational Exposure Not Asked    Hobby Hazards Not Asked    Sleep Concern Not Asked    Stress Concern Not Asked    Weight Concern Not Asked    Special Diet Not Asked    Back Care Not Asked    Exercise Not Asked    Bike Helmet Not Asked   [address] Not Asked    Self-Exams Not Asked   Social History Narrative    Not on file     Social Determinants of Health     Financial Resource Strain:     Difficulty of Paying Living Expenses: Not on file   Food Insecurity:     Worried About Running Out of Food in the Last Year: Not on file    Samreen of Food in the Last Year: Not on file   Transportation Needs:     Lack of Transportation (Medical): Not on file    Lack of Transportation (Non-Medical):  Not on file   Physical Activity:     Days of Exercise per Week: Not on file    Minutes of Exercise per Session: Not on file   Stress:     Feeling of Stress : Not on file   Social Connections:     Frequency of Communication with Friends and Family: Not on file    Frequency of Social Gatherings with Friends and Family: Not on file    Attends Rastafari Services: Not on file    Active Member of Clubs or Organizations: Not on file    Attends Club or Organization Meetings: Not on file    Marital Status: Not on file   Intimate Partner Violence:     Fear of Current or Ex-Partner: Not on file    Emotionally Abused: Not on file    Physically Abused: Not on file    Sexually Abused: Not on file   Housing Stability:     Unable to Pay for Housing in the Last Year: Not on file    Number of Places Lived in the Last Year: Not on file    Unstable Housing in the Last Year: Not on file       Allergies:  Morphine and Pcn [penicillins]     Medications:  Current Facility-Administered Medications   Medication Dose Route Frequency    heparin (porcine) injection 5,000 Units  5,000 Units SubCUTAneous Q8H    cefTRIAXone (ROCEPHIN) 1 g in 0.9% sodium chloride (MBP/ADV) 50 mL MBP  1 g IntraVENous Q24H    insulin lispro (HUMALOG) injection   SubCUTAneous AC&HS    glucose chewable tablet 16 g  4 Tablet Oral PRN    glucagon (GLUCAGEN) injection 1 mg  1 mg IntraMUSCular PRN    dextrose (D50W) injection syrg 12.5-25 g  25-50 mL IntraVENous PRN    azithromycin (ZITHROMAX) 500 mg in 0.9% sodium chloride 250 mL (VIAL-MATE)  500 mg IntraVENous Q24H    HYDROmorphone (DILAUDID) injection 1 mg  1 mg IntraVENous Q3H PRN    sodium chloride (NS) flush 5-40 mL  5-40 mL IntraVENous Q8H    sodium chloride (NS) flush 5-40 mL  5-40 mL IntraVENous PRN    acetaminophen (TYLENOL) tablet 650 mg  650 mg Oral Q6H PRN    Or    acetaminophen (TYLENOL) suppository 650 mg  650 mg Rectal Q6H PRN    polyethylene glycol (MIRALAX) packet 17 g  17 g Oral DAILY PRN    ondansetron (ZOFRAN ODT) tablet 4 mg  4 mg Oral Q8H PRN    Or    ondansetron (ZOFRAN) injection 4 mg  4 mg IntraVENous Q6H PRN        ROS:  Review of systems not obtained due to patient factors.          Physical Exam:    Temp (24hrs), Av.5 °F (36.9 °C), Min:98.2 °F (36.8 °C), Max:98.8 °F (37.1 °C)    Visit Vitals  BP (!) 122/30 (BP 1 Location: Right arm, BP Patient Position: At rest)   Pulse 92   Temp 98.3 °F (36.8 °C)   Resp 16   Ht 5' 2\" (1.575 m)   Wt 64 kg (141 lb)   SpO2 100%   BMI 25.79 kg/m²          GEN: WD Chronically ill looking, not in resp distress. She prefers to have her ear plug back to her radio, and not be  interviewed  HEENT: Unicteric. CHEST: Non laboured breathing. CVS:RRR, no mur/gallop  SHAISTA: PCN in place-R  Skin: Dry and intact on limited exam. DU not seen  CNS: A, not in distress. Hard of hearing?  blind     Microbiology  All Micro Results     Procedure Component Value Units Date/Time    CULTURE, URINE [[id number]]  (Abnormal) Collected: 12/07/21 1200    Order Status: Completed Specimen: Cath Urine Updated: 12/09/21 1121     Special Requests: NO SPECIAL REQUESTS        [place] Count --        4000  COLONIES/mL       Culture result:       GRAM NEGATIVE RODS (COLONY COUNT = 2,000 COL/ML)                  ENTEROCOCCUS FAECALIS (COLONY COUNT = 2,000 COL/ML)          CULTURE, BLOOD [[id number]] Collected: 12/08/21 0920    Order Status: Completed Specimen: Blood Updated: 12/08/21 1003    CULTURE, BLOOD [[id number]] Collected: 12/08/21 0910    Order Status: Completed Specimen: Blood Updated: 12/08/21 1003    CULTURE, URINE [[id number]]     Order Status: Canceled Specimen: Cath Urine     CULTURE, URINE [[id number]] Collected: 12/06/21 2200    Order Status: Canceled Specimen: Cath Urine            Lab results:    Chemistry  Recent Labs     12/09/21 0305 12/08/21 0018 12/07/21  0357   * 128* 223*    138 135*   K 5.0 5.2 5.5    109 108   CO2 18* 19* 17*   BUN 56* 58* 51*   CREA 2.38* 2.66* 1.73*   CA 7.9* 7.8* 8.1*   AGAP 9 10 10   BUCR 24* 22* 29*   * 108 116   TP 5.6* 5.8* 6.4   ALB 1.7* 1.8* 2.1*   GLOB 3.9 4.0 4.3*   AGRAT 0.4* 0.5* 0.5*       CBC w/ Diff  Recent Labs     12/09/21  0305 12/08/21  0018 12/07/21  0357   WBC 13.3* 16.8* 20.0*   RBC 2.90* 3.07* 3.48*   HGB 7.6* 7.9* 8.8*   HCT 24.8* 26.2* 29.3*   * 495* 502*   GRANS 82* 82* 89*   LYMPH 9* 11* 6*   EOS 2 0 1       Imaging: report posted below as per radiologist : report reviewed 12/9/21

## 2021-12-09 NOTE — PROGRESS NOTES
Enter patient's room at the start of shift. Pt stated,\" I was cleaned and moved, please do not move me\". Pt was very irritable because of the vital signs. Pt refused heparin injection in abdomen. Pt allowed me to give second heparin in arm.

## 2021-12-09 NOTE — PROGRESS NOTES
Bedside and verbal shift change report given to Masoud Nassar RN (on coming nurse) by Lanie Kruger RN (off going nurse). Report included the following information SBAR, Kardex, OR Summary, Intake/Output and MAR. Shift summary: No new events. Ostomy CDI. Bedside and verbal shift change report given by Masoud Nassar RN (off going nurse) to [id number] , RN(on coming nurse). Report included the following information SBAR, Kardex, OR Summary, Intake/Output and MAR.

## 2021-12-09 NOTE — PROGRESS NOTES
PT order received and chart was reviewed. Attempted to see patient for evaluation however she declines stating that she does not get out of bed and lives in a nursing home. This is in agreement with previous PT notes from >2 years ago stating that patient is a total care patient in [address] [place] [address] and if she gets out of bed a jazz lift is used. Will discontinue PT order at this time as patient is at her baseline with mobility.   Andrzej Richardson, PT

## 2021-12-09 NOTE — PROGRESS NOTES
Physician Progress Note      PATIENT:               Cruzito Reyes  CSN #:                  [id number]  :                       1940  ADMIT DATE:       2021 8:30 PM  [address] [place] DATE:  RESPONDING  PROVIDER #:        Nemo Amado MD          QUERY TEXT:    Pt admitted with displacement of nephrostomy tube. Pt noted to have pneumonia per H&P. If possible, please document in the progress notes and discharge summary if you are evaluating and/or treating any of the following:    Note: CAP and HCAP indicate where the pneumonia was acquired, not a specific type. 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 Correlate clinically to exclude recurrent pneumonia. ?  > Per H&P Lungs:  Clear to auscultation bilaterally  PULMONARY: No shortness of breath, cough or wheeze. She is being treated for pneumonia  > , 89 in ED  > WBC 18.3, 20.0    Treatment: receiving Rocephin and Zithromax, Rocephin    Thank you,  Tova Chris, RN, BSN, [place], [facility]  [phone]  Options provided:  -- Gram negative pneumonia  -- Gram positive pneumonia  -- Bacterial pneumonia  -- Aspiration pneumonia  -- Other - I will add my own diagnosis  -- Disagree - Not applicable / Not valid  -- Disagree - Clinically unable to determine / Unknown  -- Refer to Clinical Documentation Reviewer    PROVIDER RESPONSE TEXT:    Provider is clinically unable to determine a response to this query. Query created by: Xiomy Dubois on 2021 12:21 PM      QUERY TEXT:    Pt admitted with displacement of nephrostomy tube. Pt noted to have chronic indwelling nephrostomy tube.  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 Nursing staff has found that she had little urine output from her nephrostomy tube and she was brought to our emergency room. UTI although no urine is able to be obtained due to blockage patient is also on antibiotics for pneumonia. Treatment: Receiving IV ABT Rocephin and Zithromax    Thank you,  Jamie Castro RN, BSN [facility]  [phone]  Options provided:  -- UTI due to nephrostomy tube  -- UTI not due to nephrostomy tube  -- UTI as a complication of displacement of nephrostomy tube  -- UTI not a complication of displacement of nephrostomy tube  -- Other - I will add my own diagnosis  -- Disagree - Not applicable / Not valid  -- Disagree - Clinically unable to determine / Unknown  -- Refer to Clinical Documentation Reviewer    PROVIDER RESPONSE TEXT:    UTI is due to nephrostomy tube. Query created by: Marisol Chun on 12/7/2021 12:31 PM      QUERY TEXT:    Pt admitted with dislodged nephrostomy tube. Pt noted to have possible pneumonia, UTI, elevated WBC, tachycardia andhyptension. If possible, please document in the progress notes and discharge summary if you are evaluating and /or treating any of the following:     The medical record reflects the following:    Risk Factors: DM, CKD, sacral decubitus, UTI, possible pneumonia    Clinical Indicators:  > WBC 16.8* 20.0* 18.3*  > BP 12/6 135/55, 117/50, 140/48, 12/7 103/44, 115/47, 123/30  > HR 12/6 115, 97, 98, 96  > 12/7 urine culture gram negative rods    Treatment: Receiving NS infusion, Rocephin, Zithromax    Thank you,  Boogie Cain RN, BSN, [place]  [phone]  Options provided:  -- Sepsis, present on admission  -- Sepsis, not present on admission  -- Pneumonia and UTI without Sepsis  -- Other - I will add my own diagnosis  -- Disagree - Not applicable / Not valid  -- Disagree - Clinically unable to determine / Unknown  -- Refer to Clinical Documentation Reviewer    PROVIDER RESPONSE TEXT:    This patient has sepsis that was not present on admission.     Query created by: Kieran Oviedo on 12/9/2021 7:17 AM      Electronically signed by:  Rakesh Hicks MD 12/9/2021 11:00 AM

## 2021-12-10 ENCOUNTER — TRANSCRIBE ORDER (OUTPATIENT)
Dept: INTERVENTIONAL RADIOLOGY/VASCULAR | Age: 81
End: 2021-12-10

## 2021-12-10 VITALS
RESPIRATION RATE: 16 BRPM | BODY MASS INDEX: 25.95 KG/M2 | WEIGHT: 141 LBS | DIASTOLIC BLOOD PRESSURE: 46 MMHG | OXYGEN SATURATION: 100 % | HEART RATE: 90 BPM | HEIGHT: 62 IN | SYSTOLIC BLOOD PRESSURE: 146 MMHG | TEMPERATURE: 98 F

## 2021-12-10 LAB
GLUCOSE BLD STRIP.AUTO-MCNC: 157 MG/DL (ref 70–110)
GLUCOSE BLD STRIP.AUTO-MCNC: 164 MG/DL (ref 70–110)
GLUCOSE BLD STRIP.AUTO-MCNC: 220 MG/DL (ref 70–110)

## 2021-12-10 PROCEDURE — 74011000258 HC RX REV CODE- 258: Performed by: INTERNAL MEDICINE

## 2021-12-10 PROCEDURE — 74011250637 HC RX REV CODE- 250/637: Performed by: INTERNAL MEDICINE

## 2021-12-10 PROCEDURE — 74011250636 HC RX REV CODE- 250/636: Performed by: INTERNAL MEDICINE

## 2021-12-10 PROCEDURE — 74011250636 HC RX REV CODE- 250/636: Performed by: FAMILY MEDICINE

## 2021-12-10 PROCEDURE — 82962 GLUCOSE BLOOD TEST: CPT

## 2021-12-10 PROCEDURE — 74011636637 HC RX REV CODE- 636/637: Performed by: INTERNAL MEDICINE

## 2021-12-10 RX ORDER — LINEZOLID 600 MG/1
600 TABLET, FILM COATED ORAL EVERY 12 HOURS
Qty: 12 TABLET | Refills: 0 | Status: SHIPPED
Start: 2021-12-10 | End: 2021-12-16

## 2021-12-10 RX ORDER — CEFUROXIME AXETIL 500 MG/1
500 TABLET ORAL 2 TIMES DAILY
Qty: 12 TABLET | Refills: 0 | Status: SHIPPED
Start: 2021-12-10 | End: 2021-12-16

## 2021-12-10 RX ADMIN — CEFTRIAXONE 1 G: 1 INJECTION, POWDER, FOR SOLUTION INTRAMUSCULAR; INTRAVENOUS at 02:36

## 2021-12-10 RX ADMIN — LINEZOLID 600 MG: 600 TABLET, FILM COATED ORAL at 00:07

## 2021-12-10 RX ADMIN — INSULIN LISPRO 4 UNITS: 100 INJECTION, SOLUTION INTRAVENOUS; SUBCUTANEOUS at 09:20

## 2021-12-10 RX ADMIN — INSULIN LISPRO 2 UNITS: 100 INJECTION, SOLUTION INTRAVENOUS; SUBCUTANEOUS at 14:15

## 2021-12-10 RX ADMIN — HYDROMORPHONE HYDROCHLORIDE 1 MG: 1 INJECTION, SOLUTION INTRAMUSCULAR; INTRAVENOUS; SUBCUTANEOUS at 02:37

## 2021-12-10 RX ADMIN — HYDROMORPHONE HYDROCHLORIDE 1 MG: 1 INJECTION, SOLUTION INTRAMUSCULAR; INTRAVENOUS; SUBCUTANEOUS at 06:32

## 2021-12-10 RX ADMIN — HEPARIN SODIUM 5000 UNITS: 5000 INJECTION INTRAVENOUS; SUBCUTANEOUS at 02:36

## 2021-12-10 RX ADMIN — Medication 10 ML: at 00:07

## 2021-12-10 RX ADMIN — HEPARIN SODIUM 5000 UNITS: 5000 INJECTION INTRAVENOUS; SUBCUTANEOUS at 14:15

## 2021-12-10 RX ADMIN — LINEZOLID 600 MG: 600 TABLET, FILM COATED ORAL at 09:20

## 2021-12-10 NOTE — PROGRESS NOTES
[facility]  (A Division of [address] [place] [address])                                                                                                                      Bandar Ro MD  Office #: - Option # [phone]  Fax #: [phone]     Dr Fariba Duff covering weekend & available for questions/ consults by phone. I will be back on Monday, Dec 13, 2021. Thanks. Date of Admission: 12/6/2021Date of Note: 12/10/2021  Reason for Follow Up: Evaluation and antibiotic management of UTI. Thank you for involving me in the care of this patient. Please do not hesitate to contact me on the above number if question or concern. Current Antimicrobials:    Prior Antimicrobials:  Ceftriaxone 12/6 to date  Linezolid PO 12/9 to date   zithromax 12/6 to 12/9       Assessment- ID related:  --------------------------------------------------------------------------  · Probable UTI--> dislodged nephrostomy tube R, post X-change 12/7/21  · Severe R hydronephrosis 2/2 above  · Leucocytosis 2/2 above  · Acute on chronic renal failure  --UCX >100K P.mirablis 11/30  --UCX E.fecalis and GNR 12/7( PCN change)  · Scral DU- wound clean per [facility]  · PCN allergy    Other Medical Issues- Mx per respective team:  · S/P nephrectomy L  · S/P colectomy  · S/P R AKA  · Hard of hearing/blind  · DM     Recommendation for ID issues I am following:  -----------------------------------------------------------------------------    -Cont ceftriaxone, and linezolid 600mg BID PO  ( PCN allergy, cant use Amp)  -FU urine culture 12/7 until final  --ABX  option would be Cefuroxime 500 mg BID + linezolid PO -> 12/16( provided GNR is same Proteus spp)    FU cbc w/diff, bmp periodically            Subjective:  \" I need help\"  Asking to adjust her position, etc  Afebrile, WBC down to 13.3K. Cr is slowly declining. Denies back pain/fevers.  Hard of hearing and also sounds annoyed with questions  Denies rash/ itching      HPI:  Willow Amaya is a 79 y.o. WHITE/NON- with PMH as listed above. She is known to me from prior admission, due to dislodgement of PCN and urinary infection. She is difficult and unco-operative to get history out of her. Poor of hearing/ blind. Chart reivewed. Noted admission from Wound clinic, due to low OP from PCN tube. CT AP done confirmed dislodged tube with severe R hydronephrosis. CXR abn with atelectasis and trace effusion, she didn't have cough/SOB/ chest pain. Started on Zithromax and ceftriaxone. PCN change done and working good. Denies F/C/R.    [place] [facility] on 12/7- GNR and E.fecalis +>    SNF resident.        Active Hospital Problems    Diagnosis Date Noted    Abdominal pain 12/07/2021    Displacement of nephrostomy tube ([address] 06/06/2021    Acute renal failure (ARF) ([address] 06/21/2020    Diabetes ([address] 11/26/2018    Right flank pain 05/18/2018    Pyelonephritis 05/18/2018     Past Medical History:   Diagnosis Date    Cancer ([address]     vaginal and colon cancer    Cancer ([address]     Chronic pain     arthritis    Diabetes ([address]     DVT (deep venous thrombosis) (HCC)     Hypertension     Legally blind     Thyroid disease      Past Surgical History:   Procedure Laterality Date    HX ABOVE KNEE AMPUTATION Right     HX COLOSTOMY      HX ORTHOPAEDIC      HX OTHER SURGICAL      kidney drainage tube    HX UROLOGICAL      kidney removal    IR CHANGE NEPHROSTOMY PYELOS TUBE RT  12/7/2021    IR EXCHANGE NEPHRO PERC RT SI  1/28/2019    IR EXCHANGE NEPHRO PERC RT SI  3/21/2019    IR EXCHANGE NEPHRO PERC RT SI  5/13/2019    IR EXCHANGE NEPHRO PERC RT SI  6/27/2019    IR EXCHANGE NEPHRO PERC RT SI  8/16/2019    IR EXCHANGE NEPHRO PERC RT SI  10/3/2019    IR EXCHANGE NEPHRO PERC RT SI  11/18/2019    IR EXCHANGE NEPHRO PERC RT SI  1/6/2020    IR EXCHANGE NEPHRO PERC RT SI  2/24/2020    IR EXCHANGE NEPHRO PERC RT SI  4/13/2020    IR EXCHANGE NEPHRO PERC RT SI  6/1/2020    IR EXCHANGE NEPHRO PERC RT SI  6/19/2020    IR EXCHANGE NEPHRO PERC RT SI  8/10/2020    IR EXCHANGE NEPHRO PERC RT SI  9/30/2020    IR EXCHANGE NEPHRO PERC RT SI  11/5/2020    IR EXCHANGE NEPHRO PERC RT SI  12/16/2020    IR EXCHANGE NEPHRO PERC RT SI  2/1/2021    IR EXCHANGE NEPHRO PERC RT SI  3/25/2021    IR EXCHANGE NEPHRO PERC RT SI  5/10/2021    IR EXCHANGE NEPHRO PERC RT SI  8/2/2021    IR EXCHANGE NEPHRO PERC RT SI  9/13/2021    IR EXCHANGE NEPHRO PERC RT SI  11/9/2021    IR NEPHROSTOMY PERC RT PLC CATH  SI  6/21/2020    IR NEPHROSTOMY PERC RT PLC CATH  SI  6/7/2021     History reviewed. No pertinent family history. Social History     Socioeconomic History    Marital status:      Spouse name: Not on file    Number of children: Not on file    Years of education: Not on file    Highest education level: Not on file   Occupational History    Not on file   Tobacco Use    Smoking status: Never Smoker    Smokeless tobacco: Never Used   Substance and Sexual Activity    Alcohol use: No    Drug use: Never    Sexual activity: Not on file   Other Topics Concern     Service Not Asked    Blood Transfusions Not Asked    Caffeine Concern Not Asked    Occupational Exposure Not Asked    Hobby Hazards Not Asked    Sleep Concern Not Asked    Stress Concern Not Asked    Weight Concern Not Asked    Special Diet Not Asked    Back Care Not Asked    Exercise Not Asked    Bike Helmet Not Asked   [address] Not Asked    Self-Exams Not Asked   Social History Narrative    Not on file     Social Determinants of Health     Financial Resource Strain:     Difficulty of Paying Living Expenses: Not on file   Food Insecurity:     Worried About Running Out of Food in the Last Year: Not on file    Samreen of Food in the Last Year: Not on file   Transportation Needs:     Lack of Transportation (Medical): Not on file    Lack of Transportation (Non-Medical):  Not on file   Physical Activity:     Days of Exercise per Week: Not on file    Minutes of Exercise per Session: Not on file   Stress:     Feeling of Stress : Not on file   Social Connections:     Frequency of Communication with Friends and Family: Not on file    Frequency of Social Gatherings with Friends and Family: Not on file    Attends Rastafarian Services: Not on file    Active Member of Clubs or Organizations: Not on file    Attends Club or Organization Meetings: Not on file    Marital Status: Not on file   Intimate Partner Violence:     Fear of Current or Ex-Partner: Not on file    Emotionally Abused: Not on file    Physically Abused: Not on file    Sexually Abused: Not on file   Housing Stability:     Unable to Pay for Housing in the Last Year: Not on file    Number of Places Lived in the Last Year: Not on file    Unstable Housing in the Last Year: Not on file       Allergies:  Morphine and Pcn [penicillins]     Medications:  Current Facility-Administered Medications   Medication Dose Route Frequency    linezolid (ZYVOX) tablet 600 mg  600 mg Oral Q12H    heparin (porcine) injection 5,000 Units  5,000 Units SubCUTAneous Q8H    cefTRIAXone (ROCEPHIN) 1 g in 0.9% sodium chloride (MBP/ADV) 50 mL MBP  1 g IntraVENous Q24H    insulin lispro (HUMALOG) injection   SubCUTAneous AC&HS    glucose chewable tablet 16 g  4 Tablet Oral PRN    glucagon (GLUCAGEN) injection 1 mg  1 mg IntraMUSCular PRN    dextrose (D50W) injection syrg 12.5-25 g  25-50 mL IntraVENous PRN    HYDROmorphone (DILAUDID) injection 1 mg  1 mg IntraVENous Q3H PRN    sodium chloride (NS) flush 5-40 mL  5-40 mL IntraVENous Q8H    sodium chloride (NS) flush 5-40 mL  5-40 mL IntraVENous PRN    acetaminophen (TYLENOL) tablet 650 mg  650 mg Oral Q6H PRN    Or    acetaminophen (TYLENOL) suppository 650 mg  650 mg Rectal Q6H PRN    polyethylene glycol (MIRALAX) packet 17 g  17 g Oral DAILY PRN    ondansetron (ZOFRAN ODT) tablet 4 mg  4 mg Oral Q8H PRN    Or    ondansetron (ZOFRAN) injection 4 mg  4 mg IntraVENous Q6H PRN      Physical Exam:    Temp (24hrs), Av.2 °F (36.8 °C), Min:98.1 °F (36.7 °C), Max:98.3 °F (36.8 °C)    Visit Vitals  BP (!) 130/44   Pulse 84   Temp 98.3 °F (36.8 °C)   Resp 16   Ht 5' 2\" (1.575 m)   Wt 64 kg (141 lb)   SpO2 100%   BMI 25.79 kg/m²      GEN: WD Chronically ill looking, not in resp distress. HEENT: Unicteric. CHEST: Non laboured breathing. SHAISTA: PCN in place-R  Skin: Dry and intact on limited exam. DU not seen  CNS: A, not in distress.  Hard of hearing-blind     Microbiology  All Micro Results     Procedure Component Value Units Date/Time    CULTURE, BLOOD [[id number]] Collected: 21 0920    Order Status: Completed Specimen: Blood Updated: 12/10/21 1030     Special Requests: NO SPECIAL REQUESTS        Culture result: NO GROWTH 2 DAYS       CULTURE, BLOOD [[id number]] Collected: 21 0910    Order Status: Completed Specimen: Blood Updated: 12/10/21 1030     Special Requests: NO SPECIAL REQUESTS        Culture result: NO GROWTH 2 DAYS       CULTURE, URINE [[id number]]  (Abnormal) Collected: 21 1200    Order Status: Completed Specimen: Cath Urine Updated: 21 1121     Special Requests: NO SPECIAL REQUESTS        [place] Count --        4000  COLONIES/mL       Culture result:       GRAM NEGATIVE RODS (COLONY COUNT = 2,000 COL/ML)                  ENTEROCOCCUS FAECALIS (COLONY COUNT = 2,000 COL/ML)          CULTURE, URINE [[id number]]     Order Status: Canceled Specimen: Cath Urine     CULTURE, URINE [[id number]] Collected: 21 2200    Order Status: Canceled Specimen: Cath Urine            Lab results:    Chemistry  Recent Labs     21  0305 21  0018   * 128*    138   K 5.0 5.2    109   CO2 18* 19*   BUN 56* 58*   CREA 2.38* 2.66*   CA 7.9* 7.8*   AGAP 9 10   BUCR 24* 22*   * 108   TP 5.6* 5.8*   ALB 1.7* 1.8*   GLOB 3.9 4.0   AGRAT 0.4* 0.5*     CBC w/ Diff  Recent Labs 12/09/21  0305 12/08/21  0018   WBC 13.3* 16.8*   RBC 2.90* 3.07*   HGB 7.6* 7.9*   HCT 24.8* 26.2*   * 495*   GRANS 82* 82*   LYMPH 9* 11*   EOS 2 0     Imaging: report posted below as per radiologist : report reviewed 12/9/21

## 2021-12-10 NOTE — DISCHARGE SUMMARY
Discharge Summary    Patient: Swapnil Moyer MRN: [id number]  CSN: [id number]    YOB: 1940  Age: 79 y.o.   Sex: female    DOA: 12/6/2021 LOS:  LOS: 3 days   Discharge Date:      Primary Care Provider:  Other, MD Dc    Admission Diagnoses: Right flank pain [R10.9]  Abdominal pain [R10.9]  Displacement of nephrostomy tube ([facility] [place]  Hydronephrosis [N13.30]    Discharge Diagnoses:    Problem List as of 12/10/2021 Date Reviewed: 12/7/2021          Codes Class Noted - Resolved    Abdominal pain ICD-10-CM: R10.9  ICD-9-CM: 789.00  12/7/2021 - Present        Pressure injury of sacral region, stage 4 ([facility] ICD-10-CM: J51.061  ICD-9-CM: 707.03, 707.24  8/9/2021 - Present        * (Principal) Displacement of nephrostomy tube ([facility] ICD-10-CM: U20.383R  ICD-9-CM: 997.5  6/6/2021 - Present        Pseudomonas aeruginosa infection ICD-10-CM: A49.8  ICD-9-CM: 041.7  3/22/2021 - Present        CKD stage 3 due to type 2 diabetes mellitus ([facility] ICD-10-CM: E11.22, N18.30  ICD-9-CM: 250.40, 585.3  6/24/2020 - Present        Acute renal failure (ARF) ([facility] ICD-10-CM: N17.9  ICD-9-CM: 584.9  6/21/2020 - Present        Nephrostomy complication ([facility] GTM-49-QU: A15.191  ICD-9-CM: 997.5  6/19/2020 - Present        S/P AKA (above knee amputation), right ([facility] ICD-10-CM: E94.219  ICD-9-CM: V49.76  6/3/2019 - Present        Nephrostomy status ([facility] ICD-10-CM: Z93.6  ICD-9-CM: V44.6  6/3/2019 - Present        Pressure injury of right heel, unstageable ([facility] ICD-10-CM: L89.610  ICD-9-CM: 707.07, 707.25  11/28/2018 - Present        UTI (urinary tract infection) ICD-10-CM: N39.0  ICD-9-CM: 599.0  11/26/2018 - Present        Diabetes ([facility] ICD-10-CM: E11.9  ICD-9-CM: 250.00  11/26/2018 - Present        Legally blind ICD-10-CM: H54.8  ICD-9-CM: 369.4  11/26/2018 - Present        Hypertension ICD-10-CM: I10  ICD-9-CM: 401.9  11/26/2018 - Present        S/P colectomy ICD-10-CM: Z90.49  ICD-9-CM: V45.89  11/26/2018 - Present Pyelonephritis ICD-10-CM: N12  ICD-9-CM: 590.80  5/18/2018 - Present        S/p nephrectomy ICD-10-CM: Z90.5  ICD-9-CM: V45.73  5/18/2018 - Present        Right flank pain ICD-10-CM: R10.9  ICD-9-CM: 789.09  5/18/2018 - Present        RESOLVED: Hydronephrosis ICD-10-CM: N13.30  ICD-9-CM: 696  12/7/2021 - 12/9/2021        RESOLVED: Encephalopathy acute ICD-10-CM: G93.40  ICD-9-CM: 348.30  11/16/2020 - 12/9/2021        RESOLVED: ANTONI (acute kidney injury) ([facility] ICD-10-CM: N17.9  ICD-9-CM: 584.9  5/31/2019 - 8/3/2021              Discharge Medications:     Current Discharge Medication List      START taking these medications    Details   linezolid (ZYVOX) 600 mg tablet Take 1 Tablet by mouth every twelve (12) hours for 12 doses. Qty: 12 Tablet, Refills: 0  Start date: 12/10/2021, End date: 12/16/2021      cefUROXime (CEFTIN) 500 mg tablet Take 1 Tablet by mouth two (2) times a day for 6 days. Qty: 12 Tablet, Refills: 0  Start date: 12/10/2021, End date: 12/16/2021         CONTINUE these medications which have NOT CHANGED    Details   docusate sodium (Colace) 100 mg capsule Take 100 mg by mouth nightly. insulin detemir U-100 (Levemir U-100 Insulin) 100 unit/mL injection 10 Units by SubCUTAneous route nightly. Arginine-Glutamine-Calcium HMB (Evans) 7-7-1.5 gram pwpk Take  by mouth two (2) times a day.      gabapentin (NEURONTIN) 300 mg capsule Take 300 mg by mouth two (2) times a day. nystatin (MYCOSTATIN) topical cream Apply 100,000 g to affected area two (2) times a day. insulin aspart U-100 (NovoLOG Flexpen U-100 Insulin) 100 unit/mL (3 mL) inpn by SubCUTAneous route. 151-200 = 2u  201-250 = 4u  251-300 = 6u  301-350 = 8u  351-400 = 10u  401 + = 12u notify MD      acetaminophen (TYLENOL) 500 mg tablet Take 2 Tabs by mouth every eight (8) hours as needed for Pain or Fever. Qty: 30 Tab, Refills: 0      famotidine (PEPCID) 10 mg tablet Take 1 Tab by mouth every twelve (12) hours.   Qty: 60 Tab, Refills: 0      melatonin 5 mg tablet Take 1 Tab by mouth nightly. Qty: 30 Tab, Refills: 0      multivitamin (ONE A DAY) tablet Take 1 Tab by mouth daily. metoprolol succinate (TOPROL-XL) 50 mg XL tablet Take 50 mg by mouth daily. levothyroxine (SYNTHROID) 50 mcg tablet Take  by mouth Daily (before breakfast). atorvastatin calcium (ATORVASTATIN PO) Take 20 mg by mouth daily.          STOP taking these medications       doxycycline (ADOXA) 100 mg tablet Comments:   Reason for Stopping:         polyethylene glycol (MIRALAX) 17 gram packet Comments:   Reason for Stopping:         pollen extracts (PROSTAT PO) Comments:   Reason for Stopping:         senna-docusate (PERICOLACE) 8.6-50 mg per tablet Comments:   Reason for Stopping:         magnesium hydroxide (MILK OF MAGNESIA PO) Comments:   Reason for Stopping:         nystatin (MYCOSTATIN) powder Comments:   Reason for Stopping:         insulin glargine (LANTUS) 100 unit/mL injection Comments:   Reason for Stopping:         lip protectant with sunscreen (CARMEX) crea Comments:   Reason for Stopping:         timolol (TIMOPTIC) 0.5 % ophthalmic solution Comments:   Reason for Stopping:         brimonidine (ALPHAGAN) 0.2 % ophthalmic solution Comments:   Reason for Stopping:         amLODIPine (NORVASC) 5 mg tablet Comments:   Reason for Stopping:         LORazepam (ATIVAN) 0.5 mg tablet Comments:   Reason for Stopping:         ondansetron hcl (ZOFRAN) 4 mg tablet Comments:   Reason for Stopping:         brimonidine-timolol (COMBIGAN) 0.2-0.5 % drop ophthalmic solution Comments:   Reason for Stopping:               Discharge Condition: Good    Procedures : percutaneous nephrostomy tube exchange    Consults: Infectious Disease      PHYSICAL EXAM   Visit Vitals  BP (!) 130/44   Pulse 84   Temp 98.3 °F (36.8 °C)   Resp 16   Ht 5' 2\" (1.575 m)   Wt 64 kg (141 lb)   SpO2 100%   BMI 25.79 kg/m²     General: Awake, cooperative, no acute distress    HEENT: NC, Atraumatic. EOMI. Anicteric sclerae. Lungs:  CTA Bilaterally. No Wheezing/Rhonchi/Rales. Heart:  Regular  rhythm,  No murmur, No Rubs, No Gallops  Abdomen: Soft, Non distended, Non tender. +Bowel sounds,   Extremities: Right AKA  Psych:   Not anxious or agitated. Neurologic:  No acute neurological deficits. Admission HPI :   Mikki Galo is a 79 y.o.  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 Patient is not yelling in pain due to sacral wound discomfort despite being given 4 mg milligrams of morphine in the emergency room. She is also having nausea from the morphine that is 4 to respond to Zofran. Patient is fully vaccinated from [facility] Course :   Ms. Laurita Bustamante was admitted to medical floor, she did not had any acute events during hospitalization. Her percutaneous nephrostomy tube was exchanged by interventional radiology. For her UTI her urine cultures on 11/30/2010 1 growth Proteus multidrug-resistant her urine cultures from 12/7/2021 growing Enterococcus species. Blood cultures no growth to date. ID consulted, she received ceftriaxone and azithromycin. ID changed to ceftriaxone and linezolid. ID recommended discharging on cefuroxime and linezolid to complete course until 12/16/2021. For her other chronic stable medical problems we continued her home medications.  Continued local wound care for her pressure ulcers. Her renal function improving after nephrostomy tube exchange. She will be transferred back to skilled nursing facility. Activity: Activity as tolerated    Diet: Regular Diet    Follow-up: PCP    Disposition: SNF    Minutes spent on discharge: 45       Labs: Results:       Chemistry Recent Labs     12/09/21 0305 12/08/21 0018   * 128*    138   K 5.0 5.2    109   CO2 18* 19*   BUN 56* 58*   CREA 2.38* 2.66*   CA 7.9* 7.8*   AGAP 9 10   BUCR 24* 22*   * 108   TP 5.6* 5.8*   ALB 1.7* 1.8*   GLOB 3.9 4.0   AGRAT 0.4* 0.5*      CBC w/Diff Recent Labs     12/09/21 0305 12/08/21 0018   WBC 13.3* 16.8*   RBC 2.90* 3.07*   HGB 7.6* 7.9*   HCT 24.8* 26.2*   * 495*   GRANS 82* 82*   LYMPH 9* 11*   EOS 2 0      Cardiac Enzymes No results for input(s): CPK, CKND1, JANE in the last 72 hours. No lab exists for component: CKRMB, TROIP   Coagulation No results for input(s): PTP, INR, APTT, INREXT in the last 72 hours. Lipid Panel Lab Results   Component Value Date/Time    Cholesterol, total 115 08/28/2019 08:22 AM    HDL Cholesterol 39 (L) 08/28/2019 08:22 AM    LDL, calculated 46.2 08/28/2019 08:22 AM    VLDL, calculated 29.8 08/28/2019 08:22 AM    Triglyceride 149 08/28/2019 08:22 AM    CHOL/HDL Ratio 2.9 08/28/2019 08:22 AM      BNP No results for input(s): BNPP in the last 72 hours. Liver Enzymes Recent Labs     12/09/21 0305   TP 5.6*   ALB 1.7*   *      Thyroid Studies Lab Results   Component Value Date/Time    TSH 3.40 01/24/2020 08:35 AM            Significant Diagnostic Studies: CT ABD PELV WO CONT    Result Date: 12/6/2021  EXAM: CT of the abdomen and pelvis INDICATION: Severe right flank pain COMPARISON: November 15, 2020 TECHNIQUE: Axial CT imaging of the abdomen and pelvis was performed without intravenous contrast. Multiplanar reformats were generated.  One or more dose reduction techniques were used on this CT: automated exposure control, adjustment of the mAs and/or kVp according to patient size, and iterative reconstruction techniques. The specific techniques used on this CT exam have been documented in the patient's electronic medical record. Digital Imaging and Communications in Medicine (DICOM) format image data are available to nonaffiliated external healthcare facilities or entities on a secure, media free, reciprocally searchable basis with patient authorization for at least a 12-month period after this study. _______________ FINDINGS: LOWER CHEST: There is small to moderate left pleural effusion with left lower lobe atelectasis. LIVER, BILIARY: Liver is normal. No biliary dilation. Cholecystectomy PANCREAS: Normal. SPLEEN: Normal. ADRENALS: Right adrenal is unremarkable. Suggestion of left adrenalectomy. KIDNEYS: There is a percutaneous nephrostomy tube in the midpole the right kidney. The loop of the nephrostomy is in the upper pole collecting system. There is severe right hydronephrosis. This is concerning for malfunction of the nephrostomy tube. There is moderate right hydroureter. Obliteration coils are present in the mid right ureter. Left nephrectomy. VASCULATURE: Severe calcific atherosclerosis. LYMPH NODES: No enlarged lymph nodes. GASTROINTESTINAL TRACT: No bowel dilation or wall thickening. Prior colectomy. Colostomy in the left lower quadrant. No bowel obstruction. PELVIC ORGANS: Hysterectomy and cystectomy. BONES: No acute or aggressive osseous abnormalities identified. OTHER: None. _______________     Right-sided percutaneous nephrostomy tube with the loop portion in the upper pole calyx. There is severe right hydronephrosis and prominent dilated renal pelvis and proximal ureter. Findings are concerning for tube malfunction. Correlate as to whether the tube is functioning properly. Left nephrectomy. Prior cystectomy and hysterectomy. Prior colectomy with left lower quadrant colostomy.     XR CHEST PORT    Result Date: 12/7/2021  EXAM:  PORTABLE CHEST INDICATION:  Shortness of breath. TECHNIQUE:  Portable, AP view. COMPARISON:  11/15/2020 ____________________ FINDINGS:  SUPPORT DEVICES: None. Interval removal of left arm PICC. HEART AND MEDIASTINUM: Cardiomediastinal silhouette appears within normal limits. Normal caliber thoracic aorta. LUNGS AND PLEURAL SPACES: Decreasing left basilar opacity consistent with pleural effusion and adjacent consolidation. No pneumothorax or pulmonary edema. BONY THORAX AND SOFT TISSUES: No acute osseous abnormality. ____________________     Decrease in left basilar opacity consistent with decreasing with residual trace pleural effusion and adjacent consolidation likely atelectasis. Correlate clinically to exclude recurrent pneumonia. * **  *     IR CHANGE NEPHROSTOMY PYELOS TUBE RT    Result Date: 12/7/2021  PROCEDURE:   RIGHT PERCUTANEOUS NEPHROSTOMY EXCHANGE INDICATION:  Right percutaneous nephrostomy (PCN) not draining. CT demonstrated dislodged PCN. ______________________ TECHNIQUE/FINDINGS: Telephone consent obtained from patient's son/power of . The patient's right flank and pre-existing nephrostomy catheter was prepped and draped in sterile fashion. Initial contrast injection demonstrates tip of nephrostomy within upper pole calyx. Over an Amplatz super stiff wire, the pre-existing nephrostomy was removed under fluoroscopic guidance. A new 10 Irish locking APD catheter was repositioned into the right renal pelvis. Contrast was injected to document position. Aspiration of the catheter demonstrates good drainage of the upper collecting system. The catheter was sutured to the skin using 2-0 Ethilon sutures. Post procedure pause was performed. The patient tolerated the procedure well and there were no immediate complications. Preprocedure time out:  1151 hours. Radiation dose (reference air kerma):  15 mGy.  Preprocedure prophylactic IV antibiotics:  Per SIR guidelines, no antibiotics indicated. GUIDANCE:  Fluoroscopy guidance was used to position (and confirm the position of) the catheter. Image(s) saved in PACS:  Fluoroscopy _______________________     Successful exchange and reposition of a right nephrostomy catheter for the same. DUPLEX LOWER EXT VENOUS BILAT    Result Date: 12/7/2021  · No evidence of deep vein thrombosis in the left lower extremity. · No evidence of any acute deep vein thrombosis noted in the right common femoral and femoral vein assessed. No results found for this or any previous visit. Please note that this dictation was completed with Xumii, the computer voice recognition software. Quite often unanticipated grammatical, syntax, homophones, and other interpretive errors are inadvertently transcribed by the computer software. Please disregard these errors. Please excuse any errors that have escaped final proofreading.      CC: Ruchi, MD Dc

## 2021-12-10 NOTE — PROGRESS NOTES
Bedside and verbal shift change report given to Mumtaz Funez RN (on coming nurse) CHAITANYA Aparicio (off going nurse). Report included the following information SBAR, Kardex, OR Summary, Intake/Output and MAR.    1444 Pt discharge from hospital to facility for rehab. Report called to [facility] spoke with Radha Ramirez LPN  regarding MAR, code status, allergies, diet, hospital summary and any follow up appointments. Personal property sent along with patient. Properly removed armband. D/C'd IV. Cath CDI. Awaiting EMS transport. Resp even and non-laborerd. No s/s of distress noted.

## 2021-12-10 NOTE — PROGRESS NOTES
D/C Plan: Return to [address] [facility])       CM has been notifeid pt is medically stable to transition back to the above facility today,  CM attemtped to contact pt's son, Lacho Guadarrama ([phone]) and his wife Gael Abdalla ([phone]) and left a vm for both with plan transfer today at 2:00pm.  [facility] is aware and prepared to accept this pt back today.       Transition of Care Plan: [facility]     Communication to Patient/Family:  Met with patient and family, and they are agreeable to the transition plan. The Plan for Transition of Care is related to the following treatment goals: LTC     The Patient and/or patient representative was provided with a choice of provider and agrees   with the discharge plan.  Yes [x]? No []?     Freedom of choice list was provided with basic dialogue that supports the patient's individualized plan of care/goals and shares the quality data associated with the providers.     Yes [x]? No []?     SNF/Rehab Transition:  Patient has been accepted to [facility] at [address] [place] [address] for SNF and meets criteria for admission. Patient will transported by medical transport and expected to leave at 2:00pm.     Communication to SNF/Rehab:  Bedside RN,  has been notified to update the transition plan to the facility and call report [phone]      Discharge information has been updated on the AVS and communicated through [facility] or [facility]. Discharge instructions to be fax'd to facility at [phone]      Please include all hard scripts for controlled substances, med rec and dc summary, and AVS in packet. Please medicate for pain prior to dc if possible and needed to help offset delay when patient first arrives to facility.     Reviewed and confirmed with facility, [facility] can manage the patient care needs for the following:      Husam with (X) only those applicable:  Medication:  []? Medications are available at the facility  []? IV Antibiotics    []? Controlled Substance - hard copies available sent.  []? Weekly Labs     Equipment:  []?CPAP/BiPAP  []? Wound Vacuum  []? Wright or Urinary Device  []? PICC/Central Line  []? Nebulizer  []? Ventilator     Treatment:  []? Isolation (for MRSA, VRE, etc.)  []? Surgical Drain Management  []? Tracheostomy Care  []? Dressing Changes  []? Dialysis with transportation  []? PEG Care  []? Oxygen  []? Daily Weights for Heart Failure     Dietary:  []? Any diet limitations  []? Tube Feedings   []? Total Parenteral Management (TPN)     Financial Resources:  []? Medicaid Application Completed     []? UAI Completed and copy given to pt/family.     [x]? A screening has previously been completed.     []? Level II Completed     []? Private pay individual who will not become   financially eligible for Medicaid within 6 months from admission to a [address] [place] [address] facility.      []? Individual refused to have screening conducted.      []? Medicaid Application Completed     []? The screening denied because it was determined individual did not need/did not qualify for nursing facility level of care. []? Out of state residents seeking direct admission to a [address] facility.  []? Individuals who are inpatients of an out of state hospital, or in state or out of state veterans/ hospital and seek direct admission to a [address] facility  []? Individuals who are pateints or residents of a state owned/operated facility that is licensed by Department of Behavioral Services (DBHDS) and seek direct admission to [address] facility  []? A screening not required for enrollment in [facility] services as set out in [address] [place] [address] 30-  []? [address] [facility]) staff shall perform screenings of the [facility] clients.      Advanced Care Plan:  []? Surrogate Decision Maker of Care  []? POA  []? Communicated Code Status and copy sent.     Other:            Care Management Interventions  Mode of Transport at Discharge: BLS  Transition of Care Consult (CM Consult): [address] Maintenance Reviewed: Yes  Support Systems: Child(chari)  The Plan for Transition of Care is Related to the Following Treatment Goals : Return to [address] [place] [address] (formerly [address] [place] [address])  The Patient and/or Patient Representative was Provided with a Choice of Provider and Agrees with the Discharge Plan?: Yes  Name of the Patient Representative Who was Provided with a Choice of Provider and Agrees with the Discharge Plan: Quinten avery of Choice List was Provided with Basic Dialogue that Supports the Patient's Individualized Plan of Care/Goals, Treatment Preferences and Shares the Quality Data Associated with the Providers?: Yes  Discharge Location  Discharge Placement: [address] [place] [address]

## 2021-12-10 NOTE — PROGRESS NOTES
Verbal shift [facility] by Jen Crandall RN. Report included the following information SBAR, Kardex, Summary, Intake/Output and MAR.

## 2021-12-20 ENCOUNTER — APPOINTMENT (OUTPATIENT)
Dept: WOUND CARE | Age: 81
End: 2021-12-20
Attending: FAMILY MEDICINE

## 2021-12-22 ENCOUNTER — TRANSCRIBE ORDER (OUTPATIENT)
Dept: INTERVENTIONAL RADIOLOGY/VASCULAR | Age: 81
End: 2021-12-22

## 2021-12-22 DIAGNOSIS — N13.39 OTHER HYDRONEPHROSIS: Primary | ICD-10-CM

## 2021-12-22 NOTE — PROGRESS NOTES
Spoke to pt son Pete Parra to schedule pt nephrostomy tube exchange. Son confirmed date 1/27/2022 at 55. RN spoke to Nyla Barraza at nursing facility to arrange transport.  Cherylene Leas

## 2022-01-03 ENCOUNTER — APPOINTMENT (OUTPATIENT)
Dept: WOUND CARE | Age: 82
End: 2022-01-03
Attending: FAMILY MEDICINE

## 2022-01-10 ENCOUNTER — HOSPITAL ENCOUNTER (OUTPATIENT)
Dept: WOUND CARE | Age: 82
Discharge: HOME | End: 2022-01-10
Attending: FAMILY MEDICINE
Payer: MEDICARE

## 2022-01-10 VITALS
HEART RATE: 76 BPM | RESPIRATION RATE: 18 BRPM | OXYGEN SATURATION: 100 % | TEMPERATURE: 98.4 F | DIASTOLIC BLOOD PRESSURE: 37 MMHG | SYSTOLIC BLOOD PRESSURE: 134 MMHG

## 2022-01-10 DIAGNOSIS — L89.154 PRESSURE INJURY OF SACRAL REGION, STAGE 4 (HCC): Primary | ICD-10-CM

## 2022-01-10 PROCEDURE — 11043 DBRDMT MUSC&/FSCA 1ST 20/<: CPT

## 2022-01-10 RX ORDER — LIDOCAINE HYDROCHLORIDE 20 MG/ML
JELLY TOPICAL ONCE
Status: COMPLETED | OUTPATIENT
Start: 2022-01-10 | End: 2022-01-10

## 2022-01-10 RX ORDER — LIDOCAINE HYDROCHLORIDE 20 MG/ML
JELLY TOPICAL ONCE
Status: CANCELLED | OUTPATIENT
Start: 2022-01-10 | End: 2022-01-10

## 2022-01-10 RX ORDER — LIDOCAINE HYDROCHLORIDE 40 MG/ML
SOLUTION TOPICAL ONCE
Status: CANCELLED | OUTPATIENT
Start: 2022-01-10 | End: 2022-01-10

## 2022-01-10 RX ADMIN — LIDOCAINE HYDROCHLORIDE: 20 JELLY TOPICAL at 10:00

## 2022-01-10 NOTE — WOUND CARE
Wound Center  Progress Note / Procedure Note    Subjective:     Chief Complaint:  Jerry Carvajal is a 79 y.o.  female  with sacral ulcer wound of many months duration. HPI:     As per son, has had this wound for many months  Pre-hosp visit, were taking good care o fit but not as much now  They do dressing changes daily, but otherwise not much repositioning, staying in bed on back most of the day    History/Chart/Medications reviewed    Since last visit:  SNF staff not turning  Has new bed    Wound caused by: pressure  Current wound care:See flowsheet  Offloading wound: has air mattress  Appetite: fair  Wound associated pain: See flowsheet  Diabetic: yes, A1c? Smoker: no  ROS: no N/V/D, no T/chills; no local rash, no chest pain or shortness of breath, no headache or dizzyness  +urostomy, +colostomy, R/ AKA      Objective:     Physical Exam:   See flowsheet / nursing notes for vitals  Visit Vitals  BP (!) 134/37 (BP 1 Location: Left upper arm)   Pulse 76   Temp 98.4 °F (36.9 °C)   Resp 18   SpO2 100%     General: NAD. Hygiene good, legally blind, hard of hearing  Psych: cooperative. No anxiety or depression. Normal mood and affect. Neuro: alert and oriented to person/place/situation. Otherwise nonfocal.  Derm: Normal turgor for age, dry skin  HEENT: Normocephalic, atraumatic. EOMI. Conjunctiva clear. No scleral icterus. Neck: Normal range of motion. No masses. Chest: Respirations nonlabored  Lower extremities: R/ AKA, L: color normal; temperature normal.     Ulcer Description:   See Flowsheet         Data Review:   Culture result:   Date Value Ref Range Status   12/08/2021 NO GROWTH 6 DAYS   Final       CBC, sed rate crp prealb  Not done  Xray - no acute process         Assessment/Plan     79 y.o. female with Dm2, R/ AKA, colostomy, urostomy    - Sacral ulcer.   pressure injury, stage 4  Full thickness  DETERIORATED - 4x as big, much deeper also  All necrotic  Necessitates debridement  for wound healing and to prevent/heal infection  See below    periwound dry redness  Needs some A&D    R/o   Osteomyelitis  Xray reviewed  Will consider further MRI/CT once we see sed rate and crp rsults   sed rate, crp still to do      Following discussed with patient / POA  Needs :  Serial debridement- debrided today- see note below    Good local wound care  Dressing:  santyl with packing strips  Frequency : daily    -Nutrition optimization  Having more protein supplements    -Good Diabetic control    -Good offloading  Has air mattress  Reposition q2 hours  Limit sitting to up for meals     son understood and agrees with plan. Questions answered. Follow up with me in 2 weeks         Procedure:     Ulcer assessment: Due to presence of necrotic tissue within the wound bed, ulcer requires debridement. Procedure: Debridement:   The indication for debridement was reviewed with patient. Risks of procedure (bleeding, infection, pain) were discussed with patient/POA and consent signed on first visit. Questions were answered        Muscle excisional debridement sacral ulcer  Indication: to remove necrotic tissue/ vitalized & devitalized tissue/ infected tissue/  through skin, subcutaneous, muscle, periosteal fascia layer of wound bed  Time out done.   Consent in chart   Anesthesia: Topical  lidocaine jelly   Instrument: curette  Residual Necrosis: Present and scored  Bleedinml   Hemostasis: Pressure   Patient tolerated procedure well   Procedural Pain: 0  Post - procedural pain: 0    Post debridement measurements:  see below  Surface area debrided: <20 sq. cm    WOUND POA CONDITIONS    Wound Sacral/coccyx (Active)   Wound Image   01/10/22 1009   Wound Etiology Pressure Stage 4 01/10/22 1009   Dressing Status Intact 01/10/22 1009   Cleansed Irrigated with saline 01/10/22 1009   Dressing/Treatment Moisten with saline;Silicone border 2554/60 1009   Dressing Change Due 10/04/21 09/20/21 1029   Wound Length (cm) 3 cm 01/10/22 1009 Wound Width (cm) 2.7 cm 01/10/22 1009   Wound Depth (cm) 0.7 cm 01/10/22 1009   Wound Surface Area (cm^2) 8.1 cm^2 01/10/22 1009   Change in Wound Size % -542.86 01/10/22 1009   Wound Volume (cm^3) 5.67 cm^3 01/10/22 1009   Wound Healing % -1025 01/10/22 1009   Post-Procedure Length (cm) 3 cm 01/10/22 1009   Post-Procedure Width (cm) 2.7 cm 01/10/22 1009   Post-Procedure Depth (cm) 1 cm 01/10/22 1009   Post-Procedure Surface Area (cm^2) 8.1 cm^2 01/10/22 1009   Post-Procedure Volume (cm^3) 8.1 cm^3 01/10/22 1009   Undermining Starts ___ O'Clock 6 o'clock 01/10/22 1009   Undermining Ends ___ O'Clock 11 o'clock 01/10/22 1009   Undermining Maximum Distance (cm) 1.7 cm 01/10/22 1009   Wound Assessment Pink/red;Slough 01/10/22 1009   Drainage Amount Small 01/10/22 1009   Drainage Description Serous 01/10/22 1009   Wound Odor Fecal 01/10/22 1009   Carol-Wound/Incision Assessment Non-Blanchable erythema 01/10/22 1009   Edges Defined edges 01/10/22 1009   Wound Thickness Description Full thickness 01/10/22 1009   Number of days: 154       [REMOVED] Wound Sacral/coccyx (Removed)   Number of days: 63         -------    Past Medical History:   Diagnosis Date    Cancer ([facility]     vaginal and colon cancer    Cancer ([facility]     Chronic pain     arthritis    Diabetes ([facility]     DVT (deep venous thrombosis) (HCC)     Hypertension     Legally blind     Thyroid disease       Past Surgical History:   Procedure Laterality Date    HX ABOVE KNEE AMPUTATION Right     HX COLOSTOMY      HX ORTHOPAEDIC      HX OTHER SURGICAL      kidney drainage tube    HX UROLOGICAL      kidney removal    IR CHANGE NEPHROSTOMY PYELOS TUBE RT  12/7/2021    IR EXCHANGE NEPHRO PERC RT SI  1/28/2019    IR EXCHANGE NEPHRO PERC RT SI  3/21/2019    IR EXCHANGE NEPHRO PERC RT SI  5/13/2019    IR EXCHANGE NEPHRO PERC RT SI  6/27/2019    IR EXCHANGE NEPHRO PERC RT SI  8/16/2019    IR EXCHANGE NEPHRO PERC RT SI  10/3/2019    IR EXCHANGE NEPHRO PERC RT SI  11/18/2019    IR EXCHANGE NEPHRO PERC RT SI  1/6/2020    IR EXCHANGE NEPHRO PERC RT SI  2/24/2020    IR EXCHANGE NEPHRO PERC RT SI  4/13/2020    IR EXCHANGE NEPHRO PERC RT SI  6/1/2020    IR EXCHANGE NEPHRO PERC RT SI  6/19/2020    IR EXCHANGE NEPHRO PERC RT SI  8/10/2020    IR EXCHANGE NEPHRO PERC RT SI  9/30/2020    IR EXCHANGE NEPHRO PERC RT SI  11/5/2020    IR EXCHANGE NEPHRO PERC RT SI  12/16/2020    IR EXCHANGE NEPHRO PERC RT SI  2/1/2021    IR EXCHANGE NEPHRO PERC RT SI  3/25/2021    IR EXCHANGE NEPHRO PERC RT SI  5/10/2021    IR EXCHANGE NEPHRO PERC RT SI  8/2/2021    IR EXCHANGE NEPHRO PERC RT SI  9/13/2021    IR EXCHANGE NEPHRO PERC RT SI  11/9/2021    IR NEPHROSTOMY PERC RT PLC CATH  SI  6/21/2020    IR NEPHROSTOMY PERC RT PLC CATH  SI  6/7/2021     No family history on file. Social History     Tobacco Use    Smoking status: Never Smoker    Smokeless tobacco: Never Used   Substance Use Topics    Alcohol use: No       Prior to Admission medications    Medication Sig Start Date End Date Taking? Authorizing Provider   docusate sodium (Colace) 100 mg capsule Take 100 mg by mouth nightly. Provider, Historical   insulin detemir U-100 (Levemir U-100 Insulin) 100 unit/mL injection 10 Units by SubCUTAneous route nightly. Provider, Historical   Arginine-Glutamine-Calcium HMB (Evans) 7-7-1.5 gram pwpk Take  by mouth two (2) times a day. Provider, Historical   gabapentin (NEURONTIN) 300 mg capsule Take 300 mg by mouth two (2) times a day. Provider, Historical   nystatin (MYCOSTATIN) topical cream Apply 100,000 g to affected area two (2) times a day. Provider, Historical   insulin aspart U-100 (NovoLOG Flexpen U-100 Insulin) 100 unit/mL (3 mL) inpn by SubCUTAneous route.  151-200 = 2u  201-250 = 4u  251-300 = 6u  301-350 = 8u  351-400 = 10u  401 + = 12u notify MD    Provider, Historical   acetaminophen (TYLENOL) 500 mg tablet Take 2 Tabs by mouth every eight (8) hours as needed for Pain or Fever. Patient taking differently: Take 325 mg by mouth every six (6) hours as needed for Pain or Fever. Give two tablets. 3/25/21   Teule-Hekima, Sheilah Phoenix, MD   famotidine (PEPCID) 10 mg tablet Take 1 Tab by mouth every twelve (12) hours. 11/21/20   Sunny Monte MD   melatonin 5 mg tablet Take 1 Tab by mouth nightly. 11/21/20   Sunny Monte MD   multivitamin (ONE A DAY) tablet Take 1 Tab by mouth daily. Provider, Historical   metoprolol succinate (TOPROL-XL) 50 mg XL tablet Take 50 mg by mouth daily. Provider, Historical   levothyroxine (SYNTHROID) 50 mcg tablet Take  by mouth Daily (before breakfast). Provider, Historical   atorvastatin calcium (ATORVASTATIN PO) Take 20 mg by mouth daily.     Other, MD Dc     Allergies   Allergen Reactions    Morphine Nausea and Vomiting    Pcn [Penicillins] Rash          Signed By: Francoise Camacho MD     January 10, 2022

## 2022-01-10 NOTE — WOUND CARE
In wound care clinic today:  Cleanse wound with NS or soap and water or commercial wound cleanser  Apply the following topically to wound bed: Santyl, nickel thick to wound  Apply the following to leigha-wound: A&D ointment to dry, reddened area to lower left buttock  Apply the following dressings: Dry gauze dressing    For Home Care/Self Care  Frequency: Daily  Cleanse wound with NS or soap and water or commercial wound cleanser        Apply the following to wound bed: Santyl packing strips  Apply the following to skin around wound: A&D ointment  Apply the following dressings: Dry gauze dressing    Change dressing daily    Patient to return for wound care in:  14 Days to see Dr. Cinda Mcclure. Inspect your wounds, looking for signs of infection which may include the following:  Increase in redness  Red \"streaks\" from wound  Increase in swelling  Fever  Unusual odor  Change in the amount of wound drainage. Should you experience any significant changes in your wound(s) or have any questions regarding your home care instructions please contact the wound center or your home health company. If after regular business hours, please call your family doctor or local emergency room. Edema Control: Elevate legs as much as possible. Avoid standing in one position for more than 10 minutes. Avoid setting with legs down. Do not cross legs while sitting. Off-Loading:Frequent position changes. Do not cross legs while sitting. Shift weight every 20 minutes or more when sitting for prolonged periods of time.

## 2022-01-10 NOTE — WOUND CARE
01/10/22 1009   Wound Sacral/coccyx   Date First Assessed/Time First Assessed: 08/09/21 1046   Present on Hospital Admission: Yes  Primary Wound Type: Pressure Injury  Location: Sacral/coccyx   Wound Image    Wound Etiology Pressure Stage 4   Dressing Status Intact   Cleansed Irrigated with saline   Dressing/Treatment Moisten with saline;Silicone border  (santyl)   Dressing Change Due   (daily)   Wound Length (cm) 3 cm   Wound Width (cm) 2.7 cm   Wound Depth (cm) 0.7 cm   Wound Surface Area (cm^2) 8.1 cm^2   Change in Wound Size % -542.86   Wound Volume (cm^3) 5.67 cm^3   Wound Healing % -1025   Post-Procedure Length (cm) 3 cm   Post-Procedure Width (cm) 2.7 cm   Post-Procedure Depth (cm) 1 cm   Post-Procedure Surface Area (cm^2) 8.1 cm^2   Post-Procedure Volume (cm^3) 8.1 cm^3   Undermining Starts ___ O'Clock 6 o'clock   Undermining Ends ___ O'Clock 11 o'clock   Undermining Maximum Distance (cm) 1.7 cm   Wound Assessment [place]/red;Slough   Drainage Amount Small   Drainage Description Serous   Wound Odor Fecal   Carol-Wound/Incision Assessment Non-Blanchable erythema   Edges Defined edges   Wound Thickness Description Full thickness     Post debridement picture:

## 2022-01-24 ENCOUNTER — HOSPITAL ENCOUNTER (OUTPATIENT)
Dept: WOUND CARE | Age: 82
Discharge: HOME | End: 2022-01-24
Attending: FAMILY MEDICINE
Payer: MEDICARE

## 2022-01-24 VITALS
RESPIRATION RATE: 20 BRPM | HEART RATE: 94 BPM | OXYGEN SATURATION: 98 % | DIASTOLIC BLOOD PRESSURE: 45 MMHG | SYSTOLIC BLOOD PRESSURE: 129 MMHG | TEMPERATURE: 97.9 F

## 2022-01-24 DIAGNOSIS — L89.154 PRESSURE INJURY OF SACRAL REGION, STAGE 4 (HCC): Primary | ICD-10-CM

## 2022-01-24 PROCEDURE — 11043 DBRDMT MUSC&/FSCA 1ST 20/<: CPT

## 2022-01-24 RX ORDER — LIDOCAINE HYDROCHLORIDE 20 MG/ML
JELLY TOPICAL ONCE
Status: CANCELLED | OUTPATIENT
Start: 2022-01-24 | End: 2022-01-24

## 2022-01-24 RX ORDER — LIDOCAINE HYDROCHLORIDE 40 MG/ML
SOLUTION TOPICAL ONCE
Status: CANCELLED | OUTPATIENT
Start: 2022-01-24 | End: 2022-01-24

## 2022-01-24 RX ORDER — LIDOCAINE HYDROCHLORIDE 20 MG/ML
JELLY TOPICAL ONCE
Status: COMPLETED | OUTPATIENT
Start: 2022-01-24 | End: 2022-01-24

## 2022-01-24 RX ADMIN — LIDOCAINE HYDROCHLORIDE: 20 JELLY TOPICAL at 09:20

## 2022-01-24 NOTE — WOUND CARE
Discharge Instructions for  [address]  [address], [address] [facility]  [phone] Fax [phone]    NAME:  Sathya Montanez  YOB: 1940  MEDICAL RECORD NUMBER:  [id number]  DATE:  1/24/2022    Wound Cleansing:   Do not scrub or use excessive force. Cleanse wound prior to applying a clean dressing with:  [x] Normal Saline   [x] Cleanse wound with Mild Soap & Water       Topical Treatments:  [x] Other:  A & D Ointment on excoriated buttock   Dressings:           Wound Location Sacrum  [x] Apply Primary Dressing:         [x] Santyl with Moisten saline gauze       [x] Mepilex Border         Avoid contact of tape with skin. [x] Change dressing: [x] Daily         Pressure Relief:  [x] When sitting, shift position or do seat lifts every 15 minutes. [x] Wheelchair cushion [] Specialty Bed/Mattress  [x] Turn every 2 hours when in bed. Avoid position directing pressure on wound site. Limit side lying to 30 degree tilt. Limit HOB elevation to 30 degrees. Dietary:   [x] Calorie Diabetic Diet: [x] No Added Salt:  [x] Increase Protein:    Activity:  [x] Activity as tolerated:      Return Appointment:    [x] Return Appointment: With Aline Mills  in  [address] [place] [address])        Electronically signed Carleen Cm RN on 1/24/2022 at 11:04 AM     Lynne Alcala 281: Should you experience any significant changes in your wound(s) or have questions about your wound care, please contact the [facility] Main at [address] [place] [address] 8:00 am - 4:30. If you need help with your wound outside these hours and cannot wait until we are again available, contact your PCP or go to the hospital emergency room.

## 2022-01-24 NOTE — WOUND CARE
01/24/22 0930   Wound Sacral/coccyx   Date First Assessed/Time First Assessed: 08/09/21 1046   Present on Hospital Admission: Yes  Primary Wound Type: Pressure Injury  Location: Sacral/coccyx   Wound Image     Wound Etiology Pressure Stage 4   Dressing Status Intact   Cleansed Irrigated with saline   Dressing/Treatment Moisten with saline;Silicone border   Wound Length (cm) 3.5 cm   Wound Width (cm) 2.9 cm   Wound Depth (cm) 0.8 cm   Wound Surface Area (cm^2) 10.15 cm^2   Change in Wound Size % -705.56   Wound Volume (cm^3) 8.12 cm^3   Wound Healing % -1511   Post-Procedure Length (cm) 3.7 cm   Post-Procedure Width (cm) 3.1 cm   Post-Procedure Depth (cm) 1 cm   Post-Procedure Surface Area (cm^2) 11.47 cm^2   Post-Procedure Volume (cm^3) 11.47 cm^3   Undermining Starts ___ O'Clock 6 o'clock   Undermining Ends ___ O'Clock 11 o'clock   Undermining Maximum Distance (cm) 1.5 cm   Wound Assessment Devitalized tissue   Drainage Amount Moderate   Drainage Description Serosanguinous   Wound Odor Mild   Carol-Wound/Incision Assessment Dry/flaky;Erosion;Fragile     Applied, santyl, 4x4, silicone border.    Orders to be sent to [facility] nursing and rehap

## 2022-01-24 NOTE — DISCHARGE INSTRUCTIONS
-- DO NOT REPLY / DO NOT REPLY ALL --  -- Message is from the [facility]--    Transfer to RN      Chief Complaint:  Pt was throwing up and unable to urinate     Caller Information       Type Contact Phone    01/10/2022 11:39 AM CST Phone (Incoming) Lul Bowman (Self) [phone] (M)          Provider Name:  Christian RUBIN Practice Site Name:  [facility]     Alternative Phone Number:       Discharge Instructions for  [address]  [address], [address] [facility]  [phone] Fax [phone]    NAME:  Jesse Vasquez  YOB: 1940  MEDICAL RECORD NUMBER:  [id number]  DATE:  1/24/2022    Wound Cleansing:   Do not scrub or use excessive force. Cleanse wound prior to applying a clean dressing with:  [x] Normal Saline [] Keep Wound Dry in Shower   [x] Cleanse wound with Mild Soap & Water  [] May Shower at Discharge   [] Other:      Topical Treatments:  Do not apply lotions, creams, or ointments to wound bed unless directed. [] Apply moisturizing lotion to skin surrounding the wound prior to dressing change.  [] Apply antifungal ointment to skin surrounding the wound prior to dressing change.  [] Apply thin film of moisture barrier ointment to skin immediately around wound. [] Other:       Dressings:           Wound Location   [] Apply Primary Dressing:       [] MediHoney Gel [] Alginate with Silver [] Alginate   [] Collagen [] Collagen with Silver   [x] Santyl with Moisten saline gauze     [] Hydrocolloid   [] MediHoney Alginate [] Foam with Silver   [] Foam   [] Hydrofera Blue    [x] Mepilex Border    [] Moisten with Saline [] Hydrogel [] Mepitel     [] Bactroban/Mupirocin [] Polysporin  [] Other:    [] Pack wound loosely with  [] Iodoform   [] Plain Packing  [] Other   [] Cover and Secure with:     [] Gauze [] Alfa [] Kerlix   [] Ace Wrap [] Cover Roll Tape [] ABD     [] Other:    Avoid contact of tape with skin. [] Change dressing: [x] Daily    [] Every Other Day [] Three times per week   [] Once a week [] Do Not Change Dressing   [] Other:         Pressure Relief:  [x] When sitting, shift position or do seat lifts every 15 minutes. [x] Wheelchair cushion [] Specialty Bed/Mattress  [x] Turn every 2 hours when in bed. Avoid position directing pressure on wound site. Limit side lying to 30 degree tilt.   Limit HOB elevation to 30 degrees. Dietary:  [] Diet as tolerated: [x] Calorie Diabetic Diet: [x] No Added Salt:  [x] Increase Protein: [] Other:   Activity:  [x] Activity as tolerated:  [] Patient has no activity restrictions     [] Strict Bedrest: [] Remain off Work:     [] May return to full duty work:                                   [] Return to work with restrictions:             Return Appointment:  [] Wound and dressing supply provider:   [] ECF or Home Healthcare:  [] Wound Assessment: [] Physician or NP scheduled for Wound Assessment:   [x] Return Appointment: With Amanda Hernandez  in  2 Week(s)  [] Ordered tests:      Electronically signed Judd Leggett RN on 1/24/2022 at 11:04 AM     Lynne Alcala 281: Should you experience any significant changes in your wound(s) or have questions about your wound care, please contact the [facility] Main at [address] [place] [address] 8:00 am - 4:30. If you need help with your wound outside these hours and cannot wait until we are again available, contact your PCP or go to the hospital emergency room. PLEASE NOTE: IF YOU ARE UNABLE TO OBTAIN WOUND SUPPLIES, CONTINUE TO USE THE SUPPLIES YOU HAVE AVAILABLE UNTIL YOU ARE ABLE TO REACH US. IT IS MOST IMPORTANT TO KEEP THE WOUND COVERED AT ALL TIMES.      Physician Signature:_______________________    Date: ___________ Time:  ____________

## 2022-01-24 NOTE — WOUND CARE
Wound Center  Progress Note / Procedure Note    Subjective:     Chief Complaint:  Radu Flores is a 79 y.o.  female  with sacral ulcer wound of many months duration. HPI:     As per son, has had this wound for many months  Pre-hosp visit, were taking good care o fit but not as much now  They do dressing changes daily, but otherwise not much repositioning, staying in bed on back most of the day    History/Chart/Medications reviewed    Since last visit:  Things getting better at SNF, also new wound nurse there as of last week    Wound caused by: pressure  Current wound care:See flowsheet  Offloading wound: has air mattress  Appetite: fair  Wound associated pain: See flowsheet  Diabetic: yes, A1c? Smoker: no  ROS: no N/V/D, no T/chills; no local rash, no chest pain or shortness of breath, no headache or dizzyness  +urostomy, +colostomy, R/ AKA      Objective:     Physical Exam:   See flowsheet / nursing notes for vitals  Visit Vitals  BP (!) 129/45   Pulse 98   Temp 98.3 °F (36.8 °C)   Resp 20     General: NAD. Hygiene good, legally blind, hard of hearing  Psych: cooperative. No anxiety or depression. Normal mood and affect. Neuro: alert and oriented to person/place/situation. Otherwise nonfocal.  Derm: Normal turgor for age, dry skin  HEENT: Normocephalic, atraumatic. EOMI. Conjunctiva clear. No scleral icterus. Neck: Normal range of motion. No masses. Chest: Respirations nonlabored  Lower extremities: R/ AKA, L: color normal; temperature normal.     Ulcer Description:   See Flowsheet         Data Review:   Culture result:   Date Value Ref Range Status   12/08/2021 NO GROWTH 6 DAYS   Final       CBC, sed rate crp prealb  Not done  Xray - no acute process         Assessment/Plan     79 y.o. female with Dm2, R/ AKA, colostomy, urostomy    - Sacral ulcer.   pressure injury, stage 4  Full thickness  stable  All necrotic  Necessitates debridement  for wound healing and to prevent/heal infection  See below    periwound dry redness  Needs some A&D    R/o   Osteomyelitis  Xray reviewed  Will consider further MRI/CT once we see sed rate and crp rsults   sed rate, crp still to do      Following discussed with patient / POA  Needs :  Serial debridement- debrided today- see note below    Good local wound care  Dressing:  santyl with packing strips  Frequency : daily    -Nutrition optimization  + protein supplements    -Good Diabetic control    -Good offloading  Has air mattress  Reposition q2 hours  Limit sitting to up for meals     son understood and agrees with plan. Questions answered. Follow up with me in 2 weeks         Procedure:     Ulcer assessment: Due to presence of necrotic tissue within the wound bed, ulcer requires debridement. Procedure: Debridement:   The indication for debridement was reviewed with patient. Risks of procedure (bleeding, infection, pain) were discussed with patient/POA and consent signed on first visit. Questions were answered        Muscle excisional debridement sacral ulcer  Indication: to remove necrotic tissue/ vitalized & devitalized tissue/ infected tissue/  through skin, subcutaneous, muscle, periosteal fascia layer of wound bed  Time out done.   Consent in chart   Anesthesia: Topical  lidocaine jelly   Instrument: curette  Residual Necrosis: Present and scored  Bleedinml   Hemostasis: Pressure   Patient tolerated procedure well   Procedural Pain: 0  Post - procedural pain: 0    Post debridement measurements:  see below  Surface area debrided: <20 sq. cm    WOUND POA CONDITIONS    Wound Sacral/coccyx (Active)   Wound Image    22 0930   Wound Etiology Pressure Stage 4 22 0930   Dressing Status Intact 22 0930   Cleansed Irrigated with saline 22 0930   Dressing/Treatment Moisten with saline;Silicone border 90/32/75 0930   Dressing Change Due 10/04/21 09/20/21 1029   Wound Length (cm) 3.5 cm 22 0930   Wound Width (cm) 2.9 cm 22 0930 Wound Depth (cm) 0.8 cm 01/24/22 0930   Wound Surface Area (cm^2) 10.15 cm^2 01/24/22 0930   Change in Wound Size % -705.56 01/24/22 0930   Wound Volume (cm^3) 8.12 cm^3 01/24/22 0930   Wound Healing % -1511 01/24/22 0930   Post-Procedure Length (cm) 3.7 cm 01/24/22 0930   Post-Procedure Width (cm) 3.1 cm 01/24/22 0930   Post-Procedure Depth (cm) 1 cm 01/24/22 0930   Post-Procedure Surface Area (cm^2) 11.47 cm^2 01/24/22 0930   Post-Procedure Volume (cm^3) 11.47 cm^3 01/24/22 0930   Undermining Starts ___ O'Clock 6 o'clock 01/24/22 0930   Undermining Ends ___ O'Clock 11 o'clock 01/24/22 0930   Undermining Maximum Distance (cm) 1.5 cm 01/24/22 0930   Wound Assessment Devitalized tissue 01/24/22 0930   Drainage Amount Moderate 01/24/22 0930   Drainage Description Serosanguinous 01/24/22 0930   Wound Odor Mild 01/24/22 0930   Carol-Wound/Incision Assessment Dry/flaky;Erosion;Fragile 01/24/22 0930   Edges Defined edges 01/10/22 1009   Wound Thickness Description Full thickness 01/10/22 1009   Number of days: 168       [REMOVED] Wound Sacral/coccyx (Removed)   Number of days: 61           -------    Past Medical History:   Diagnosis Date    [facility]     vaginal and colon cancer    Cancer [facility]     Chronic pain     arthritis    Diabetes ([facility]     DVT [facility]deep venous thrombosis) ([facility]     Hypertension     Legally blind     Thyroid disease       Past Surgical History:   Procedure Laterality Date    HX ABOVE KNEE AMPUTATION Right     HX COLOSTOMY      HX ORTHOPAEDIC      HX OTHER SURGICAL      kidney drainage tube    HX UROLOGICAL      kidney removal    IR CHANGE NEPHROSTOMY PYELOS TUBE RT  12/7/2021    IR EXCHANGE NEPHRO PERC RT SI  1/28/2019    IR EXCHANGE NEPHRO PERC RT SI  3/21/2019    IR EXCHANGE NEPHRO PERC RT SI  5/13/2019    IR EXCHANGE NEPHRO PERC RT SI  6/27/2019    IR EXCHANGE NEPHRO PERC RT SI  8/16/2019    IR EXCHANGE NEPHRO PERC RT SI  10/3/2019    IR EXCHANGE NEPHRO PERC RT SI 11/18/2019    IR EXCHANGE NEPHRO PERC RT SI  1/6/2020    IR EXCHANGE NEPHRO PERC RT SI  2/24/2020    IR EXCHANGE NEPHRO PERC RT SI  4/13/2020    IR EXCHANGE NEPHRO PERC RT SI  6/1/2020    IR EXCHANGE NEPHRO PERC RT SI  6/19/2020    IR EXCHANGE NEPHRO PERC RT SI  8/10/2020    IR EXCHANGE NEPHRO PERC RT SI  9/30/2020    IR EXCHANGE NEPHRO PERC RT SI  11/5/2020    IR EXCHANGE NEPHRO PERC RT SI  12/16/2020    IR EXCHANGE NEPHRO PERC RT SI  2/1/2021    IR EXCHANGE NEPHRO PERC RT SI  3/25/2021    IR EXCHANGE NEPHRO PERC RT SI  5/10/2021    IR EXCHANGE NEPHRO PERC RT SI  8/2/2021    IR EXCHANGE NEPHRO PERC RT SI  9/13/2021    IR EXCHANGE NEPHRO PERC RT SI  11/9/2021    IR NEPHROSTOMY PERC RT PLC CATH  SI  6/21/2020    IR NEPHROSTOMY PERC RT PLC CATH  SI  6/7/2021     No family history on file. Social History     Tobacco Use    Smoking status: Never Smoker    Smokeless tobacco: Never Used   Substance Use Topics    Alcohol use: No       Prior to Admission medications    Medication Sig Start Date End Date Taking? Authorizing Provider   docusate sodium (Colace) 100 mg capsule Take 100 mg by mouth nightly. Provider, Historical   insulin detemir U-100 (Levemir U-100 Insulin) 100 unit/mL injection 10 Units by SubCUTAneous route nightly. Provider, Historical   Arginine-Glutamine-Calcium HMB (Evans) 7-7-1.5 gram pwpk Take  by mouth two (2) times a day. Provider, Historical   gabapentin (NEURONTIN) 300 mg capsule Take 300 mg by mouth two (2) times a day. Provider, Historical   nystatin (MYCOSTATIN) topical cream Apply 100,000 g to affected area two (2) times a day. Provider, Historical   insulin aspart U-100 (NovoLOG Flexpen U-100 Insulin) 100 unit/mL (3 mL) inpn by SubCUTAneous route.  151-200 = 2u  201-250 = 4u  251-300 = 6u  301-350 = 8u  351-400 = 10u  401 + = 12u notify MD    Provider, Historical   acetaminophen (TYLENOL) 500 mg tablet Take 2 Tabs by mouth every eight (8) hours as needed for Pain or Fever. Patient taking differently: Take 325 mg by mouth every six (6) hours as needed for Pain or Fever. Give two tablets. 3/25/21   Deepak England MD   famotidine (PEPCID) 10 mg tablet Take 1 Tab by mouth every twelve (12) hours. 11/21/20   Gene Monte MD   melatonin 5 mg tablet Take 1 Tab by mouth nightly. 11/21/20   Gene Monte MD   multivitamin (ONE A DAY) tablet Take 1 Tab by mouth daily. Provider, Historical   metoprolol succinate (TOPROL-XL) 50 mg XL tablet Take 50 mg by mouth daily. Provider, Historical   levothyroxine (SYNTHROID) 50 mcg tablet Take  by mouth Daily (before breakfast). Provider, Historical   atorvastatin calcium (ATORVASTATIN PO) Take 20 mg by mouth daily.     Other, MD Dc     Allergies   Allergen Reactions    Morphine Nausea and Vomiting    Pcn [Penicillins] Rash          Signed By: Tim Barakat MD     January 24, 2022

## 2022-01-24 NOTE — ROUTINE PROCESS
0700 received SBAR from night shift RN. Patient sleeping. Patient alert and oriented x 4. No complaint of pain/nausea. Colostomy and nephrostomy patent/draining. Mepilex dressing on sacrum clean/dry/intact. Patient eating roughly half of meals today. No complaint of pain during shift. Patient resting in bed, slept most of the day. Bedside and Verbal shift change report given to Franchesca Nettles RN (oncoming nurse) by Gerda Galloway RN (offgoing nurse). Report included the following information SBAR, Kardex, Intake/Output, MAR and Recent Results. 99

## 2022-01-27 ENCOUNTER — HOSPITAL ENCOUNTER (OUTPATIENT)
Dept: INTERVENTIONAL RADIOLOGY/VASCULAR | Age: 82
Discharge: HOME | End: 2022-01-27
Attending: UROLOGY
Payer: MEDICARE

## 2022-01-27 DIAGNOSIS — N13.39 OTHER HYDRONEPHROSIS: ICD-10-CM

## 2022-01-27 PROCEDURE — C2617 STENT, NON-COR, TEM W/O DEL: HCPCS

## 2022-01-27 PROCEDURE — 74011000636 HC RX REV CODE- 636: Performed by: RADIOLOGY

## 2022-01-27 PROCEDURE — 74011250636 HC RX REV CODE- 250/636: Performed by: RADIOLOGY

## 2022-01-27 RX ORDER — SODIUM CHLORIDE 9 MG/ML
500 INJECTION, SOLUTION INTRAVENOUS
Status: COMPLETED | OUTPATIENT
Start: 2022-01-27 | End: 2022-01-27

## 2022-01-27 RX ADMIN — SODIUM CHLORIDE 500 ML: 9 INJECTION, SOLUTION INTRAVENOUS at 10:58

## 2022-01-27 RX ADMIN — IOPAMIDOL 8 ML: 612 INJECTION, SOLUTION INTRAVENOUS at 10:58

## 2022-02-07 ENCOUNTER — HOSPITAL ENCOUNTER (OUTPATIENT)
Dept: WOUND CARE | Age: 82
Discharge: HOME | End: 2022-02-07
Attending: FAMILY MEDICINE
Payer: MEDICARE

## 2022-02-07 VITALS
SYSTOLIC BLOOD PRESSURE: 126 MMHG | TEMPERATURE: 97.9 F | HEART RATE: 92 BPM | DIASTOLIC BLOOD PRESSURE: 82 MMHG | OXYGEN SATURATION: 98 % | RESPIRATION RATE: 22 BRPM

## 2022-02-07 DIAGNOSIS — L89.154 PRESSURE INJURY OF SACRAL REGION, STAGE 4 (HCC): Primary | ICD-10-CM

## 2022-02-07 PROCEDURE — 88305 TISSUE EXAM BY PATHOLOGIST: CPT

## 2022-02-07 PROCEDURE — 11044 DBRDMT BONE 1ST 20 SQ CM/<: CPT

## 2022-02-07 PROCEDURE — 88311 DECALCIFY TISSUE: CPT

## 2022-02-07 PROCEDURE — 88307 TISSUE EXAM BY PATHOLOGIST: CPT

## 2022-02-07 RX ORDER — LIDOCAINE HYDROCHLORIDE 20 MG/ML
JELLY TOPICAL ONCE
Status: CANCELLED | OUTPATIENT
Start: 2022-02-07 | End: 2022-02-07

## 2022-02-07 RX ORDER — LIDOCAINE HYDROCHLORIDE 20 MG/ML
JELLY TOPICAL ONCE
Status: COMPLETED | OUTPATIENT
Start: 2022-02-07 | End: 2022-02-07

## 2022-02-07 RX ORDER — LIDOCAINE HYDROCHLORIDE 40 MG/ML
SOLUTION TOPICAL ONCE
Status: CANCELLED | OUTPATIENT
Start: 2022-02-07 | End: 2022-02-07

## 2022-02-07 RX ADMIN — LIDOCAINE HYDROCHLORIDE: 20 JELLY TOPICAL at 11:00

## 2022-02-07 NOTE — WOUND CARE
02/07/22 0941   Wound Sacral/coccyx   Date First Assessed/Time First Assessed: 08/09/21 1046   Present on Hospital Admission: Yes  Primary Wound Type: Pressure Injury  Location: Sacral/coccyx   Wound Image     Wound Etiology Pressure Stage 4   Dressing Status Breakthrough drainage noted   Cleansed Wound cleanser   Dressing Change Due 02/09/22   Wound Length (cm) 3.5 cm   Wound Width (cm) 2.5 cm   Wound Depth (cm) 0.2 cm   Wound Surface Area (cm^2) 8.75 cm^2   Change in Wound Size % -594.44   Wound Volume (cm^3) 1.75 cm^3   Wound Healing % -247   Post-Procedure Length (cm) 3.5 cm   Post-Procedure Width (cm) 2.5 cm   Post-Procedure Depth (cm) 0.5 cm   Post-Procedure Surface Area (cm^2) 8.75 cm^2   Post-Procedure Volume (cm^3) 4.375 cm^3   Undermining Starts ___ O'Clock 7 o'clock   Undermining Ends ___ O'Clock 12 o'clock   Undermining Maximum Distance (cm) 1.2 cm  (post debridement 3)   Wound Assessment Slough;Pale granulation tissue; Exposed Structure Bone   Drainage Amount Large   Drainage Description Serosanguinous   Wound Odor Mild   Carol-Wound/Incision Assessment Blanchable erythema; Excoriated   Edges Unattached edges   Wound Thickness Description Full thickness

## 2022-02-07 NOTE — WOUND CARE
Wound Center  Progress Note / Procedure Note    Subjective:     Chief Complaint:  Bree Rojas is a 79 y.o.  female  with sacral ulcer wound of many months duration. HPI:     As per son, has had this wound for many months  Pre-hosp visit, were taking good care o fit but not as much now  They do dressing changes daily, but otherwise not much repositioning, staying in bed on back most of the day    History/Chart/Medications reviewed    Since last visit:  Very short staffed at SNF- sometimes 1 aide to 40 patients    Wound caused by: pressure  Current wound care:See flowsheet  Offloading wound: has air mattress  Appetite: fair  Wound associated pain: See flowsheet  Diabetic: yes, A1c? Smoker: no  ROS: no N/V/D, no T/chills; no local rash, no chest pain or shortness of breath, no headache or dizzyness  +urostomy, +colostomy, R/ AKA      Objective:     Physical Exam:   See flowsheet / nursing notes for vitals  There were no vitals taken for this visit. General: NAD. Hygiene good, legally blind, hard of hearing  Psych: cooperative. No anxiety or depression. Normal mood and affect. Neuro: alert and oriented to person/place/situation. Otherwise nonfocal.  Derm: Normal turgor for age, dry skin  HEENT: Normocephalic, atraumatic. EOMI. Conjunctiva clear. No scleral icterus. Neck: Normal range of motion. No masses. Chest: Respirations nonlabored  Lower extremities: R/ AKA, L: color normal; temperature normal.     Ulcer Description:   See Flowsheet         Data Review:   Culture result:   Date Value Ref Range Status   12/08/2021 NO GROWTH 6 DAYS   Final       CBC, sed rate crp prealb  Not done  Xray - no acute process         Assessment/Plan     79 y.o. female with Dm2, R/ AKA, colostomy, urostomy    - Sacral ulcer.   pressure injury, stage 4  Full thickness    IMPROVED- mix of  Necrotic and granulation tissue  Necessitates debridement  for wound healing and to prevent/heal infection  See below    periwound dry redness  Foam dressing causing ongoing irritation- see change    R/o   Osteomyelitis  Xray reviewed  Will consider further MRI/CT once we see sed rate and crp rsults   sed rate, crp still to do      Following discussed with patient / POA  Needs :  Serial debridement- debrided today- see note below    Good local wound care  Dressing:  santyl with packing strips, ADD 4x4s, ABD pad and tape  Frequency : 3x/week    -Nutrition optimization  + protein supplements    -Good Diabetic control    -Good offloading  Has air mattress  Reposition q2 hours  Limit sitting to up for meals     son understood and agrees with plan. Questions answered. Reassured patient- adv to visualize being turned - some evidence to this- wound will do better    Follow up with me in 2 weeks         Procedure:     Ulcer assessment: Due to presence of necrotic tissue within the wound bed, ulcer requires debridement. Procedure: Debridement:   The indication for debridement was reviewed with patient. Risks of procedure (bleeding, infection, pain) were discussed with patient/POA and consent signed on first visit. Questions were answered        Bone excisional debridement sacral ulcer  Indication: to remove necrotic tissue/ vitalized & devitalized tissue/ infected tissue/  through skin, subcutaneous, muscle, periosteal fascia and bone layer of wound bed  Time out done. Consent in chart   Anesthesia: Topical  lidocaine jelly   Instrument: curette  Residual Necrosis: Present and scored  Bleedinml   Hemostasis: Pressure   Patient tolerated procedure well   Procedural Pain: 0  Post - procedural pain: 0    Post debridement measurements:  see below  Surface area debrided: <20 sq.  Cm  Fascia and bone fragment sent for biopsy    WOUND POA CONDITIONS    Wound Sacral/coccyx (Active)   Wound Etiology Pressure Stage 4 22 0941   Dressing Status Breakthrough drainage noted 22 0941   Cleansed Wound cleanser 22 0941   Dressing Change Due 22 02/07/22 0941   Wound Length (cm) 3.5 cm 02/07/22 0941   Wound Width (cm) 2.5 cm 02/07/22 0941   Wound Depth (cm) 0.2 cm 02/07/22 0941   Wound Surface Area (cm^2) 8.75 cm^2 02/07/22 0941   Change in Wound Size % -594.44 02/07/22 0941   Wound Volume (cm^3) 1.75 cm^3 02/07/22 0941   Wound Healing % -247 02/07/22 0941   Post-Procedure Length (cm) 3.5 cm 02/07/22 0941   Post-Procedure Width (cm) 2.5 cm 02/07/22 0941   Post-Procedure Depth (cm) 0.5 cm 02/07/22 0941   Post-Procedure Surface Area (cm^2) 8.75 cm^2 02/07/22 0941   Post-Procedure Volume (cm^3) 4.375 cm^3 02/07/22 0941   Undermining Starts ___ O'Clock 7 o'clock 02/07/22 0941   Undermining Ends ___ O'Clock 12 o'clock 02/07/22 0941   Undermining Maximum Distance (cm) 1.2 cm 02/07/22 0941   Wound Assessment Slough;Pale granulation tissue; necrotic tisssue 02/07/22 0941   Drainage Amount Large 02/07/22 0941   Drainage Description Serosanguinous 02/07/22 0941   Wound Odor Mild 02/07/22 0941   Carol-Wound/Incision Assessment Blanchable erythema; Excoriated 02/07/22 0941   Edges Unattached edges 02/07/22 0941   Wound Thickness Description Full thickness 02/07/22 0941   Number of days: 182         -------    Past Medical History:   Diagnosis Date    Cancer ([facility]     vaginal and colon cancer    Cancer ([facility]     Chronic pain     arthritis    Diabetes ([facility]     DVT (deep venous thrombosis) ([facility])     Hypertension     Legally blind     Thyroid disease       Past Surgical History:   Procedure Laterality Date    HX ABOVE KNEE AMPUTATION Right     HX COLOSTOMY      HX ORTHOPAEDIC      HX OTHER SURGICAL      kidney drainage tube    HX UROLOGICAL      kidney removal    IR CHANGE NEPHROSTOMY PYELOS TUBE RT  12/7/2021    IR EXCHANGE NEPHRO PERC RT SI  1/28/2019    IR EXCHANGE NEPHRO PERC RT SI  3/21/2019    IR EXCHANGE NEPHRO PERC RT SI  5/13/2019    IR EXCHANGE NEPHRO PERC RT SI  6/27/2019    IR EXCHANGE NEPHRO PERC RT SI  8/16/2019    IR EXCHANGE NEPHRO PERC RT SI  10/3/2019    IR EXCHANGE NEPHRO PERC RT SI  11/18/2019    IR EXCHANGE NEPHRO PERC RT SI  1/6/2020    IR EXCHANGE NEPHRO PERC RT SI  2/24/2020    IR EXCHANGE NEPHRO PERC RT SI  4/13/2020    IR EXCHANGE NEPHRO PERC RT SI  6/1/2020    IR EXCHANGE NEPHRO PERC RT SI  6/19/2020    IR EXCHANGE NEPHRO PERC RT SI  8/10/2020    IR EXCHANGE NEPHRO PERC RT SI  9/30/2020    IR EXCHANGE NEPHRO PERC RT SI  11/5/2020    IR EXCHANGE NEPHRO PERC RT SI  12/16/2020    IR EXCHANGE NEPHRO PERC RT SI  2/1/2021    IR EXCHANGE NEPHRO PERC RT SI  3/25/2021    IR EXCHANGE NEPHRO PERC RT SI  5/10/2021    IR EXCHANGE NEPHRO PERC RT SI  8/2/2021    IR EXCHANGE NEPHRO PERC RT SI  9/13/2021    IR EXCHANGE NEPHRO PERC RT SI  11/9/2021    IR EXCHANGE NEPHRO PERC RT SI  1/27/2022    IR NEPHROSTOMY PERC RT PLC CATH  SI  6/21/2020    IR NEPHROSTOMY PERC RT PLC CATH  SI  6/7/2021     No family history on file. Social History     Tobacco Use    Smoking status: Never Smoker    Smokeless tobacco: Never Used   Substance Use Topics    Alcohol use: No       Prior to Admission medications    Medication Sig Start Date End Date Taking? Authorizing Provider   docusate sodium (Colace) 100 mg capsule Take 100 mg by mouth nightly. Provider, Historical   insulin detemir U-100 (Levemir U-100 Insulin) 100 unit/mL injection 10 Units by SubCUTAneous route nightly. Provider, Historical   Arginine-Glutamine-Calcium HMB (Evans) 7-7-1.5 gram pwpk Take  by mouth two (2) times a day. Provider, Historical   gabapentin (NEURONTIN) 300 mg capsule Take 300 mg by mouth two (2) times a day. Provider, Historical   nystatin (MYCOSTATIN) topical cream Apply 100,000 g to affected area two (2) times a day. Provider, Historical   insulin aspart U-100 (NovoLOG Flexpen U-100 Insulin) 100 unit/mL (3 mL) inpn by SubCUTAneous route.  151-200 = 2u  201-250 = 4u  251-300 = 6u  301-350 = 8u  351-400 = 10u  401 + = 12u notify MD    Provider, Historical acetaminophen (TYLENOL) 500 mg tablet Take 2 Tabs by mouth every eight (8) hours as needed for Pain or Fever. Patient taking differently: Take 325 mg by mouth every six (6) hours as needed for Pain or Fever. Give two tablets. 3/25/21   Henny England MD   famotidine (PEPCID) 10 mg tablet Take 1 Tab by mouth every twelve (12) hours. 11/21/20   Priti Monte MD   melatonin 5 mg tablet Take 1 Tab by mouth nightly. 11/21/20   Pirti Monte MD   multivitamin (ONE A DAY) tablet Take 1 Tab by mouth daily. Provider, Historical   metoprolol succinate (TOPROL-XL) 50 mg XL tablet Take 50 mg by mouth daily. Provider, Historical   levothyroxine (SYNTHROID) 50 mcg tablet Take  by mouth Daily (before breakfast). Provider, Historical   atorvastatin calcium (ATORVASTATIN PO) Take 20 mg by mouth daily.     Other, MD Dc     Allergies   Allergen Reactions    Morphine Nausea and Vomiting    Pcn [Penicillins] Rash          Signed By: Jocelynn Negron MD     February 7, 2022

## 2022-02-21 ENCOUNTER — HOSPITAL ENCOUNTER (OUTPATIENT)
Dept: WOUND CARE | Age: 82
Discharge: HOME | End: 2022-02-21
Attending: FAMILY MEDICINE
Payer: MEDICARE

## 2022-02-21 VITALS
RESPIRATION RATE: 20 BRPM | OXYGEN SATURATION: 100 % | TEMPERATURE: 98.3 F | SYSTOLIC BLOOD PRESSURE: 113 MMHG | HEART RATE: 77 BPM | DIASTOLIC BLOOD PRESSURE: 32 MMHG

## 2022-02-21 DIAGNOSIS — L89.154 PRESSURE INJURY OF SACRAL REGION, STAGE 4 (HCC): Primary | ICD-10-CM

## 2022-02-21 PROCEDURE — 11043 DBRDMT MUSC&/FSCA 1ST 20/<: CPT

## 2022-02-21 RX ORDER — LIDOCAINE HYDROCHLORIDE 20 MG/ML
JELLY TOPICAL ONCE
Status: COMPLETED | OUTPATIENT
Start: 2022-02-21 | End: 2022-02-21

## 2022-02-21 RX ORDER — LIDOCAINE HYDROCHLORIDE 40 MG/ML
SOLUTION TOPICAL ONCE
OUTPATIENT
Start: 2022-02-21 | End: 2022-02-21

## 2022-02-21 RX ORDER — LIDOCAINE HYDROCHLORIDE 20 MG/ML
JELLY TOPICAL ONCE
Status: CANCELLED | OUTPATIENT
Start: 2022-02-21 | End: 2022-02-21

## 2022-02-21 RX ADMIN — LIDOCAINE HYDROCHLORIDE: 20 JELLY TOPICAL at 10:05

## 2022-02-21 NOTE — DISCHARGE INSTRUCTIONS
Discharge Instructions for  [address]  [address] [place], [facility]  [phone] Fax [phone]    NAME:  Silvia Braun  YOB: 1940  MEDICAL RECORD NUMBER:  [id number]  DATE:  2/21/2022    Wound Cleansing:   Do not scrub or use excessive force. Cleanse wound prior to applying a clean dressing with:  [] Normal Saline [x] Keep Wound Dry in Shower    [x] Wound Cleanser   [] Cleanse wound with Mild Soap & Water  [] May Shower at Discharge   [] Other:      Topical Treatments:  Do not apply lotions, creams, or ointments to wound bed unless directed. Dressings:           Wound Location sacral  [x] Apply Primary Dressing:       [] MediHoney Gel [x] Alginate with Silver [] Alginate   [x] Collagen [] Collagen with Silver   [] Santyl with Moisten saline gauze     [] Hydrocolloid   [] MediHoney Alginate [] Foam with Silver   [] Foam   [] Hydrofera Blue    [] Mepilex Border    [] Moisten with Saline [] Hydrogel [] Mepitel     [] Bactroban/Mupirocin [] Polysporin  [] Other:      [x] Cover and Secure with:     [x] Gauze [] Alfa [] Kerlix   [] Ace Wrap [] Cover Roll Tape [x] ABD     [] Other:    Avoid contact of tape with skin. [x] Change dressing: [] Daily    [] Every Other Day [x] Three times per week   [] Once a week [] Do Not Change Dressing   [x] Other: hypafix/paper tape    Pressure Relief:  [x] When sitting, shift position or do seat lifts every 15 minutes. [x] Wheelchair cushion [] Specialty Bed/Mattress  [x] Turn every 2 hours when in bed. Avoid position directing pressure on wound site. Limit side lying to 30 degree tilt. Limit HOB elevation to 30 degrees.     Dietary:  [x] Diet as tolerated: [] Calorie Diabetic Diet: [] No Added Salt:  [] Increase Protein: [] Other:   Activity:  [x] Activity as tolerated:  [] Patient has no activity restrictions     [] Strict Bedrest: [] Remain off Work:     [] May return to full duty work: [] Return to work with restrictions:             Return Appointment:  [] Wound and dressing supply provider:   [] ECF or Home Healthcare:  [] Wound Assessment: [] Physician or NP scheduled for Wound Assessment:   [x] Return Appointment: With Dr. Teresa Ann in  [address] [place] [address])  [] Ordered tests:      Electronically signed Raffi Gonsales RN on 2/21/2022 at [address]: Should you experience any significant changes in your wound(s) or have questions about your wound care, please contact the [address] [place] [address] at [address] [place] [address] 8:00 am - 4:30. If you need help with your wound outside these hours and cannot wait until we are again available, contact your PCP or go to the hospital emergency room. PLEASE NOTE: IF YOU ARE UNABLE TO OBTAIN WOUND SUPPLIES, CONTINUE TO USE THE SUPPLIES YOU HAVE AVAILABLE UNTIL YOU ARE ABLE TO REACH US. IT IS MOST IMPORTANT TO KEEP THE WOUND COVERED AT ALL TIMES.      Physician Signature:_______________________    Date: ___________ Time:  ____________

## 2022-02-21 NOTE — WOUND CARE
Wound Center  Progress Note / Procedure Note    Subjective:     Chief Complaint:  Jerry Carvajal is a 79 y.o.  female  with sacral ulcer wound of many months duration. HPI:     As per son, has had this wound for many months  Pre-hosp visit, were taking good care o fit but not as much now  They do dressing changes daily, but otherwise not much repositioning, staying in bed on back most of the day    History/Chart/Medications reviewed    Since last visit:  No new issues    Wound caused by: pressure  Current wound care:See flowsheet  Offloading wound: has air mattress  Appetite: fair  Wound associated pain: See flowsheet  Diabetic: yes, A1c? Smoker: no  ROS: no N/V/D, no T/chills; no local rash, no chest pain or shortness of breath, no headache or dizzyness  +urostomy, +colostomy, R/ AKA      Objective:     Physical Exam:   See flowsheet / nursing notes for vitals  Visit Vitals  BP (!) 113/32 (BP 1 Location: Right upper arm, BP Patient Position: At rest;Lying left side)   Pulse 77   Temp 98.3 °F (36.8 °C)   Resp 20   SpO2 100%     General: NAD. Hygiene good, legally blind, hard of hearing  Psych: cooperative. No anxiety or depression. Normal mood and affect. Neuro: alert and oriented to person/place/situation. Otherwise nonfocal.  Derm: Normal turgor for age, dry skin  HEENT: Normocephalic, atraumatic. EOMI. Conjunctiva clear. No scleral icterus. Neck: Normal range of motion. No masses. Chest: Respirations nonlabored  Lower extremities: R/ AKA, L: color normal; temperature normal.     Ulcer Description:   See Flowsheet         Data Review:   Culture result:   Date Value Ref Range Status   12/08/2021 NO GROWTH 6 DAYS   Final       CBC, sed rate crp prealb  Not done  Xray - no acute process     Sacral ulcer, biopsy: 2/8/22       Ulcer with dense acute and chronic inflammation.        Small focus of bone showing acute osteomyelitis.      Assessment/Plan     79 y.o. female with Dm2, R/ AKA, colostomy, urostomy    - Sacral ulcer. pressure injury, stage 4  Full thickness  SMALLER  IMPROVED- mix of  Necrotic and granulation tissue  Necessitates debridement  for wound healing and to prevent/heal infection  See below    periwound dry redness  Foam dressing causing ongoing irritation- see change    R/o   Osteomyelitis  Confirmed by biopsy        Following discussed with patient / POA  Needs :  Serial debridement- debrided today- see note below    Good local wound care  Dressing: START collagen, silver alginate  D/C santyl with packing strips, ADD 4x4s, ABD pad and tape  Frequency : 3x/week    -Nutrition optimization  + protein supplements    -Good Diabetic control    -Good offloading  Has air mattress  Reposition q2 hours  Limit sitting to up for meals     son understood and agrees with plan. Questions answered. Follow up with me in 2 weeks   Discuss biopsy results next visit        Procedure:     Ulcer assessment: Due to presence of necrotic tissue within the wound bed, ulcer requires debridement. Procedure: Debridement:   The indication for debridement was reviewed with patient. Risks of procedure (bleeding, infection, pain) were discussed with patient/POA and consent signed on first visit. Questions were answered        Muscle excisional debridement sacral ulcer  Indication: to remove necrotic tissue/ vitalized & devitalized tissue/ infected tissue/  through skin, subcutaneous, muscle, periosteal fascia  layer of wound bed  Time out done. Consent in chart   Anesthesia: Topical  lidocaine jelly   Instrument: curette  Residual Necrosis: Present and scored  Bleedinml   Hemostasis: Pressure   Patient tolerated procedure well   Procedural Pain: 0  Post - procedural pain: 0    Post debridement measurements:  see below  Surface area debrided: <20 sq.  Cm    WOUND POA CONDITIONS    Wound Sacral/coccyx (Active)   Wound Image   22 1006   Wound Etiology Pressure Stage 4 22 1006   Dressing Status New drainage noted; Intact; Other (Comment) 02/21/22 1006   Cleansed Wound cleanser 02/21/22 1006   Dressing/Treatment Collagen;Alginate with Ag;Dry dressing;Silicone border 92/80/77 1006   Dressing Change Due 02/23/22 02/21/22 1006   Wound Length (cm) 3 cm 02/21/22 1006   Wound Width (cm) 1.8 cm 02/21/22 1006   Wound Depth (cm) 0.6 cm 02/21/22 1006   Wound Surface Area (cm^2) 5.4 cm^2 02/21/22 1006   Change in Wound Size % -328.57 02/21/22 1006   Wound Volume (cm^3) 3.24 cm^3 02/21/22 1006   Wound Healing % -543 02/21/22 1006   Post-Procedure Length (cm) 3 cm 02/21/22 1006   Post-Procedure Width (cm) 1.8 cm 02/21/22 1006   Post-Procedure Depth (cm) 0.9 cm 02/21/22 1006   Post-Procedure Surface Area (cm^2) 5.4 cm^2 02/21/22 1006   Post-Procedure Volume (cm^3) 4.86 cm^3 02/21/22 1006   Distance Tunneling (cm) 0.3 cm 02/21/22 1006   Direction of Tunnel 6 o'clock 02/21/22 1006   Undermining Starts ___ O'Clock 7 o'clock 02/07/22 0941   Undermining Ends ___ O'Clock 12 o'clock 02/07/22 0941   Undermining Maximum Distance (cm) 1.2 cm 02/07/22 0941   Wound Assessment Pale granulation tissue;Slough 02/21/22 1006   Drainage Amount Copious 02/21/22 1006   Drainage Description Serosanguinous 02/21/22 1006   Wound Odor Moderate;Mild 02/21/22 1006   Carol-Wound/Incision Assessment Blanchable erythema; Excoriated 02/21/22 1006   Edges Attached edges; Defined edges 02/21/22 1006   Wound Thickness Description Full thickness 02/21/22 1006   Number of days: 196       [REMOVED] Wound Sacral/coccyx (Removed)   Number of days: 63             -------    Past Medical History:   Diagnosis Date    Cancer ([address]     vaginal and colon cancer    [facility]     Chronic pain     arthritis    Diabetes ([facility]     DVT (deep venous thrombosis) (HCC)     Hypertension     Legally blind     Thyroid disease       Past Surgical History:   Procedure Laterality Date    HX ABOVE KNEE AMPUTATION Right     HX COLOSTOMY      HX ORTHOPAEDIC      HX OTHER SURGICAL      kidney drainage tube    HX UROLOGICAL      kidney removal    IR CHANGE NEPHROSTOMY PYELOS TUBE RT  12/7/2021    IR EXCHANGE NEPHRO PERC RT SI  1/28/2019    IR EXCHANGE NEPHRO PERC RT SI  3/21/2019    IR EXCHANGE NEPHRO PERC RT SI  5/13/2019    IR EXCHANGE NEPHRO PERC RT SI  6/27/2019    IR EXCHANGE NEPHRO PERC RT SI  8/16/2019    IR EXCHANGE NEPHRO PERC RT SI  10/3/2019    IR EXCHANGE NEPHRO PERC RT SI  11/18/2019    IR EXCHANGE NEPHRO PERC RT SI  1/6/2020    IR EXCHANGE NEPHRO PERC RT SI  2/24/2020    IR EXCHANGE NEPHRO PERC RT SI  4/13/2020    IR EXCHANGE NEPHRO PERC RT SI  6/1/2020    IR EXCHANGE NEPHRO PERC RT SI  6/19/2020    IR EXCHANGE NEPHRO PERC RT SI  8/10/2020    IR EXCHANGE NEPHRO PERC RT SI  9/30/2020    IR EXCHANGE NEPHRO PERC RT SI  11/5/2020    IR EXCHANGE NEPHRO PERC RT SI  12/16/2020    IR EXCHANGE NEPHRO PERC RT SI  2/1/2021    IR EXCHANGE NEPHRO PERC RT SI  3/25/2021    IR EXCHANGE NEPHRO PERC RT SI  5/10/2021    IR EXCHANGE NEPHRO PERC RT SI  8/2/2021    IR EXCHANGE NEPHRO PERC RT SI  9/13/2021    IR EXCHANGE NEPHRO PERC RT SI  11/9/2021    IR EXCHANGE NEPHRO PERC RT SI  1/27/2022    IR NEPHROSTOMY PERC RT PLC CATH  SI  6/21/2020    IR NEPHROSTOMY PERC RT PLC CATH  SI  6/7/2021     History reviewed. No pertinent family history. Social History     Tobacco Use    Smoking status: Never Smoker    Smokeless tobacco: Never Used   Substance Use Topics    Alcohol use: No       Prior to Admission medications    Medication Sig Start Date End Date Taking? Authorizing Provider   docusate sodium (Colace) 100 mg capsule Take 100 mg by mouth nightly. Provider, Historical   insulin detemir U-100 (Levemir U-100 Insulin) 100 unit/mL injection 10 Units by SubCUTAneous route nightly. Provider, Historical   Arginine-Glutamine-Calcium HMB (Evans) 7-7-1.5 gram pwpk Take  by mouth two (2) times a day.     Provider, Historical   gabapentin (NEURONTIN) 300 mg capsule Take 300 mg by mouth two (2) times a day. Provider, Historical   nystatin (MYCOSTATIN) topical cream Apply 100,000 g to affected area two (2) times a day. Provider, Historical   insulin aspart U-100 (NovoLOG Flexpen U-100 Insulin) 100 unit/mL (3 mL) inpn by SubCUTAneous route. 151-200 = 2u  201-250 = 4u  251-300 = 6u  301-350 = 8u  351-400 = 10u  401 + = 12u notify MD    Provider, Historical   acetaminophen (TYLENOL) 500 mg tablet Take 2 Tabs by mouth every eight (8) hours as needed for Pain or Fever. Patient taking differently: Take 325 mg by mouth every six (6) hours as needed for Pain or Fever. Give two tablets. 3/25/21   Teule-Hekima, Sheilah Phoenix, MD   famotidine (PEPCID) 10 mg tablet Take 1 Tab by mouth every twelve (12) hours. 11/21/20   Sunny Monte MD   melatonin 5 mg tablet Take 1 Tab by mouth nightly. 11/21/20   Sunny Monte MD   multivitamin (ONE A DAY) tablet Take 1 Tab by mouth daily. Provider, Historical   metoprolol succinate (TOPROL-XL) 50 mg XL tablet Take 50 mg by mouth daily. Provider, Historical   levothyroxine (SYNTHROID) 50 mcg tablet Take  by mouth Daily (before breakfast). Provider, Historical   atorvastatin calcium (ATORVASTATIN PO) Take 20 mg by mouth daily.     Other, MD Dc     Allergies   Allergen Reactions    Morphine Nausea and Vomiting    Pcn [Penicillins] Rash          Signed By: Francoise Camacho MD     February 21, 2022

## 2022-03-04 ENCOUNTER — TRANSCRIBE ORDER (OUTPATIENT)
Dept: INTERVENTIONAL RADIOLOGY/VASCULAR | Age: 82
End: 2022-03-04

## 2022-03-04 DIAGNOSIS — N13.39 OTHER HYDRONEPHROSIS: Primary | ICD-10-CM

## 2022-03-04 NOTE — PROGRESS NOTES
Spoke to pt son Pete Parra and scheduled appointment for Clyde Mason, called facility to set up transportation. Per , pt transportation should be set up like she has an 0800 appointment time.

## 2022-03-07 ENCOUNTER — HOSPITAL ENCOUNTER (OUTPATIENT)
Dept: WOUND CARE | Age: 82
Discharge: HOME | End: 2022-03-07
Attending: FAMILY MEDICINE
Payer: MEDICARE

## 2022-03-07 VITALS
TEMPERATURE: 98.6 F | HEART RATE: 78 BPM | SYSTOLIC BLOOD PRESSURE: 115 MMHG | OXYGEN SATURATION: 100 % | DIASTOLIC BLOOD PRESSURE: 50 MMHG | RESPIRATION RATE: 18 BRPM

## 2022-03-07 DIAGNOSIS — L89.154 PRESSURE INJURY OF SACRAL REGION, STAGE 4 (HCC): Primary | ICD-10-CM

## 2022-03-07 PROBLEM — L89.314 PRESSURE INJURY OF RIGHT BUTTOCK, STAGE 4 (HCC): Status: ACTIVE | Noted: 2022-03-07

## 2022-03-07 PROCEDURE — 11044 DBRDMT BONE 1ST 20 SQ CM/<: CPT

## 2022-03-07 PROCEDURE — 11043 DBRDMT MUSC&/FSCA 1ST 20/<: CPT

## 2022-03-07 RX ORDER — LIDOCAINE HYDROCHLORIDE 20 MG/ML
JELLY TOPICAL ONCE
OUTPATIENT
Start: 2022-03-07 | End: 2022-03-07

## 2022-03-07 RX ORDER — LIDOCAINE HYDROCHLORIDE 20 MG/ML
JELLY TOPICAL ONCE
Status: COMPLETED | OUTPATIENT
Start: 2022-03-07 | End: 2022-03-07

## 2022-03-07 RX ORDER — LIDOCAINE HYDROCHLORIDE 40 MG/ML
SOLUTION TOPICAL ONCE
OUTPATIENT
Start: 2022-03-07 | End: 2022-03-07

## 2022-03-07 RX ADMIN — LIDOCAINE HYDROCHLORIDE: 20 JELLY TOPICAL at 09:20

## 2022-03-07 NOTE — WOUND CARE
Discharge Instructions for  Medhat Lerner  [address], [place], [address] [facility]  [phone] Fax [phone]    NAME:  Jerry Carvajal  YOB: 1940  MEDICAL RECORD NUMBER:  [id number]  DATE:  3/7/2022    Wound Cleansing:   Do not scrub or use excessive force. Cleanse wound prior to applying a clean dressing with:  [x] Normal Saline   [x] Wound Cleanser   [x] Cleanse wound with Mild Soap & Water         Topical Treatments:    [x] Other:  Antifungal cream to groin and area under breast       Dressings:           Wound Location sacrum & Gluteal fold wounds   [x] Apply Primary Dressing:        [x] Alginate with Silver to superior coccyx wound    [x] Collagen to both coccyx wounds [x] Other:  [x] Pack wound loosely with 1/4 inch plain packing to inferior wound on coccyx    [x] Cover and Secure with:     [x] Gauze  [x] Other: mepilex border (Silicone foam border) Avoid contact of tape with skin. [x] Change dressing:  [x] Every Other Day        Pressure Relief:  [x] When sitting, shift position or do seat lifts every 15 minutes. [x] Wheelchair cushion   [x] Turn every 2 hours when in bed. Avoid position directing pressure on wound site. Limit side lying to 30 degree tilt. Limit HOB elevation to 30 degrees. Dietary:  [x] Diet as tolerated:   [x] Increase Protein:    Activity:  [x] Activity as tolerated:              Return Appointment:   [x] Physician or NP scheduled for Wound Assessment:   [x] Return Appointment: With Dr. Petey Ventura  in  [facility])       Electronically signed Nguyễn Hitchcock RN on 3/7/2022 at 9:56 AM     [address] [place] [address] Information: Should you experience any significant changes in your wound(s) or have questions about your wound care, please contact the [facility] at [address] [place] [address] 8:00 am - 4:30.   If you need help with your wound outside these hours and cannot wait until we are again available, contact your PCP or go to the hospital emergency room.

## 2022-03-07 NOTE — PROGRESS NOTES
Wound Center  Progress Note / Procedure Note    Subjective:     Chief Complaint:  Jacquelin Lemus is a 79 y.o.  female  with sacral ulcer wound of many months duration. HPI:     As per son, has had this wound for many months  Pre-hosp visit, were taking good care o fit but not as much now  They do dressing changes daily, but otherwise not much repositioning, staying in bed on back most of the day    History/Chart/Medications reviewed    Since last visit:  Is not being offloaded  Still having ongoing issues with SNF  C/o rash- burning      Wound caused by: pressure  Current wound care:See flowsheet  Offloading wound: has air mattress  Appetite: fair  Wound associated pain: See flowsheet  Diabetic: yes, A1c? Smoker: no  ROS: no N/V/D, no T/chills; no local rash, no chest pain or shortness of breath, no headache or dizzyness  +urostomy, +colostomy, R/ AKA      Objective:     Physical Exam:   See flowsheet / nursing notes for vitals  There were no vitals taken for this visit. General: NAD. Hygiene good, legally blind, hard of hearing  Psych: cooperative. No anxiety or depression. Normal mood and affect. Neuro: alert and oriented to person/place/situation. Otherwise nonfocal.  Derm: Normal turgor for age, dry skin  HEENT: Normocephalic, atraumatic. EOMI. Conjunctiva clear. No scleral icterus. Neck: Normal range of motion. No masses. Chest: Respirations nonlabored  Lower extremities: R/ AKA, L: color normal; temperature normal.     Ulcer Description:   See Flowsheet  inner groin  Diffuse redness       Data Review:   Culture result:   Date Value Ref Range Status   12/08/2021 NO GROWTH 6 DAYS   Final       CBC, sed rate crp prealb  Not done  Xray - no acute process     Sacral ulcer, biopsy: 2/8/22       Ulcer with dense acute and chronic inflammation.        Small focus of bone showing acute osteomyelitis.      Assessment/Plan     79 y.o. female with Dm2, R/ AKA, colostomy, urostomy    - Sacral ulcer.  pressure injury, stage 4  Full thickness  stable   mix of  Necrotic and granulation tissue, hypergranulation, friable tissue  Necessitates debridement  for wound healing and to prevent/heal infection  See below    periwound dry redness- improved      R/o   Osteomyelitis  Confirmed by biopsy  Removed offending bone- appears to be granulating over- to monitor closely    NEW  Distal sacrum/coccyx (labeled as Right gluteal fold/buttock )  Pressure, stage 4  Slough/necrotic tissue/grnaular  Necessitates debridement  for wound healing and to prevent/heal infection  See below  Debrided bone    NEw rash, inner groin  Diffuse redness  Burning pain  Nystatin BID for 4 weeks      Following discussed with patient / POA  Needs :  Serial debridement- debrided today- see note below    Good local wound care  Dressing:   Sacrum: collagen, silver alginate, 4x4s, ABD pad and tape  Distal sacrum/coccyx: collagen with packing strips,  4x4s, ABD pad and tape  Frequency : 3x/week    -Nutrition optimization  + protein supplements    -Good Diabetic control    -Good offloading  Has air mattress  Reposition q2 hours  Limit sitting to up for meals     son understood and agrees with plan. Questions answered. Follow up with me in 2 weeks           Procedure:     Ulcer assessment: Due to presence of necrotic tissue within the wound bed, ulcer requires debridement. Procedure: Debridement:   The indication for debridement was reviewed with patient. Risks of procedure (bleeding, infection, pain) were discussed with patient/POA and consent signed on first visit. Questions were answered        Muscle excisional debridement sacral ulcer  Indication: to remove necrotic tissue/ vitalized & devitalized tissue/ infected tissue/  through skin, subcutaneous, muscle, periosteal fascia  layer of wound bed  Time out done.   Consent in chart   Anesthesia: Topical  lidocaine jelly   Instrument: curette  Residual Necrosis: Present and scored, silver nitrate  Bleedinml   Hemostasis: Pressure   Patient tolerated procedure well   Procedural Pain: 0  Post - procedural pain: 0    Post debridement measurements:  see below  Surface area debrided: <20 sq. Cm    Procedure note: Chemical Cauterization  Chemical cauterization to sacral wound using Silver Nitrate  Indication: Hypergranulation, friable tissue  Consent in chart   Procedural Pain: 0  Post - procedural pain: 0   Patient tolerated procedure well. Bone excisional debridement R/ glueal fold/cleft ulcer  Indication: to remove necrotic tissue/ vitalized & devitalized tissue/ infected tissue/  through skin, subcutaneous, muscle, periosteal fascia and bone   layer of wound bed  Time out done. Consent in chart   Anesthesia: Topical  lidocaine jelly   Instrument: curette  Residual Necrosis: Present and scored  Bleedinml   Hemostasis: Pressure   Patient tolerated procedure well   Procedural Pain: 0  Post - procedural pain: 0    Post debridement measurements:  see below  Surface area debrided: <20 sq. Cm    WOUND POA CONDITIONS    Wound Sacral/coccyx (Active)   Wound Image    22   Wound Etiology Pressure Stage 4 22   Dressing Status Breakthrough drainage noted;New drainage noted 22 [facility]   Cleansed Wound cleanser 22 [facility]   Dressing/Treatment Alginate with Ag;Collagen 22   Dressing Change Due 22   Wound Length (cm) 3 cm 22   Wound Width (cm) 2 cm 22   Wound Depth (cm) 0.2 cm 22   Wound Surface Area (cm^2) 6 cm^2 22   Change in Wound Size % -376.19 22   Wound Volume (cm^3) 1.2 cm^3 22   Wound Healing % -138 22   Post-Procedure Length (cm) 3.1 cm 22   Post-Procedure Width (cm) 2.1 cm 22   Post-Procedure Depth (cm) 0.4 cm 22   Post-Procedure Surface Area (cm^2) 6.51 cm^2 22 1002   Post-Procedure Volume (cm^3) 2. 604 cm^3 22 1002 Distance Tunneling (cm) 0.3 cm 02/21/22 1006   Direction of Tunnel 6 o'clock 02/21/22 1006   Undermining Starts ___ O'Clock 7 o'clock 02/07/22 0941   Undermining Ends ___ O'Clock 12 o'clock 02/07/22 0941   Undermining Maximum Distance (cm) 1.2 cm 02/07/22 0941   Wound Assessment Exposed Structure Muscle;Pale granulation tissue 03/07/22 1002   Drainage Amount Copious 03/07/22 1002   Drainage Description Serous 03/07/22 1002   Wound Odor Mild 03/07/22 1002   Carol-Wound/Incision Assessment Blanchable erythema;Fragile; Intact 03/07/22 1002   Edges Attached edges; Defined edges 02/21/22 1006   Wound Thickness Description Full thickness 02/21/22 1006   Number of days: 210       Wound Gluteal fold/cleft Right (Active)   Wound Image   03/07/22 1002   Wound Etiology Pressure Stage 4 03/07/22 1002   Dressing Status Breakthrough drainage noted 03/07/22 1002   Cleansed Wound cleanser 03/07/22 1002   Dressing/Treatment Collagen;Packing 03/07/22 1002   Dressing Change Due 03/28/22 03/07/22 1002   Wound Length (cm) 0.3 cm 03/07/22 1002   Wound Width (cm) 0.3 cm 03/07/22 1002   Wound Depth (cm) 1 cm 03/07/22 1002   Wound Surface Area (cm^2) 0.09 cm^2 03/07/22 1002   Wound Volume (cm^3) 0.09 cm^3 03/07/22 1002   Post-Procedure Length (cm) 0.5 cm 03/07/22 1002   Post-Procedure Width (cm) 0.5 cm 03/07/22 1002   Post-Procedure Depth (cm) 1.5 cm 03/07/22 1002   Post-Procedure Surface Area (cm^2) 0.25 cm^2 03/07/22 1002   Post-Procedure Volume (cm^3) 0.375 cm^3 03/07/22 1002   Distance Tunneling (cm) 2 cm 03/07/22 1002   Direction of Tunnel 4 o'clock 03/07/22 1002   Wound Assessment Devitalized tissue; Exposed Structure Bone 03/07/22 1002   Drainage Amount Moderate 03/07/22 1002   Drainage Description Sanguineous 03/07/22 1002   Carol-Wound/Incision Assessment Blanchable erythema;Fragile; Intact 03/07/22 1002   Number of days: 0       [REMOVED] Wound Sacral/coccyx (Removed)   Number of days: 63             -------    Past Medical History: Diagnosis Date    Cancer [facility])     vaginal and colon cancer    [facility]     Chronic pain     arthritis    Diabetes ([facility]     DVT (deep venous thrombosis) (HCC)     Hypertension     Legally blind     Thyroid disease       Past Surgical History:   Procedure Laterality Date    HX ABOVE KNEE AMPUTATION Right     HX COLOSTOMY      HX ORTHOPAEDIC      HX OTHER SURGICAL      kidney drainage tube    HX UROLOGICAL      kidney removal    IR CHANGE NEPHROSTOMY PYELOS TUBE RT  12/7/2021    IR EXCHANGE NEPHRO PERC RT SI  1/28/2019    IR EXCHANGE NEPHRO PERC RT SI  3/21/2019    IR EXCHANGE NEPHRO PERC RT SI  5/13/2019    IR EXCHANGE NEPHRO PERC RT SI  6/27/2019    IR EXCHANGE NEPHRO PERC RT SI  8/16/2019    IR EXCHANGE NEPHRO PERC RT SI  10/3/2019    IR EXCHANGE NEPHRO PERC RT SI  11/18/2019    IR EXCHANGE NEPHRO PERC RT SI  1/6/2020    IR EXCHANGE NEPHRO PERC RT SI  2/24/2020    IR EXCHANGE NEPHRO PERC RT SI  4/13/2020    IR EXCHANGE NEPHRO PERC RT SI  6/1/2020    IR EXCHANGE NEPHRO PERC RT SI  6/19/2020    IR EXCHANGE NEPHRO PERC RT SI  8/10/2020    IR EXCHANGE NEPHRO PERC RT SI  9/30/2020    IR EXCHANGE NEPHRO PERC RT SI  11/5/2020    IR EXCHANGE NEPHRO PERC RT SI  12/16/2020    IR EXCHANGE NEPHRO PERC RT SI  2/1/2021    IR EXCHANGE NEPHRO PERC RT SI  3/25/2021    IR EXCHANGE NEPHRO PERC RT SI  5/10/2021    IR EXCHANGE NEPHRO PERC RT SI  8/2/2021    IR EXCHANGE NEPHRO PERC RT SI  9/13/2021    IR EXCHANGE NEPHRO PERC RT SI  11/9/2021    IR EXCHANGE NEPHRO PERC RT SI  1/27/2022    IR NEPHROSTOMY PERC RT PLC CATH  SI  6/21/2020    IR NEPHROSTOMY PERC RT PLC CATH  SI  6/7/2021     No family history on file. Social History     Tobacco Use    Smoking status: Never Smoker    Smokeless tobacco: Never Used   Substance Use Topics    Alcohol use: No       Prior to Admission medications    Medication Sig Start Date End Date Taking?  Authorizing Provider   docusate sodium (Colace) 100 mg capsule Take 100 mg by mouth nightly. Provider, Historical   insulin detemir U-100 (Levemir U-100 Insulin) 100 unit/mL injection 10 Units by SubCUTAneous route nightly. Provider, Historical   Arginine-Glutamine-Calcium HMB (Evans) 7-7-1.5 gram pwpk Take  by mouth two (2) times a day. Provider, Historical   gabapentin (NEURONTIN) 300 mg capsule Take 300 mg by mouth two (2) times a day. Provider, Historical   nystatin (MYCOSTATIN) topical cream Apply 100,000 g to affected area two (2) times a day. Provider, Historical   insulin aspart U-100 (NovoLOG Flexpen U-100 Insulin) 100 unit/mL (3 mL) inpn by SubCUTAneous route. 151-200 = 2u  201-250 = 4u  251-300 = 6u  301-350 = 8u  351-400 = 10u  401 + = 12u notify MD    Provider, Historical   acetaminophen (TYLENOL) 500 mg tablet Take 2 Tabs by mouth every eight (8) hours as needed for Pain or Fever. Patient taking differently: Take 325 mg by mouth every six (6) hours as needed for Pain or Fever. Give two tablets. 3/25/21   Kathryn England MD   famotidine (PEPCID) 10 mg tablet Take 1 Tab by mouth every twelve (12) hours. 11/21/20   Haydee Monte MD   melatonin 5 mg tablet Take 1 Tab by mouth nightly. 11/21/20   Haydee Monte MD   multivitamin (ONE A DAY) tablet Take 1 Tab by mouth daily. Provider, Historical   metoprolol succinate (TOPROL-XL) 50 mg XL tablet Take 50 mg by mouth daily. Provider, Historical   levothyroxine (SYNTHROID) 50 mcg tablet Take  by mouth Daily (before breakfast). Provider, Historical   atorvastatin calcium (ATORVASTATIN PO) Take 20 mg by mouth daily.     Other, MD Dc     Allergies   Allergen Reactions    Morphine Nausea and Vomiting    Pcn [Penicillins] Rash          Signed By: Huong Tony MD     March 7, 2022

## 2022-03-07 NOTE — WOUND CARE
03/07/22 1002   Wound Gluteal fold/cleft Right   Date First Assessed/Time First Assessed: 03/07/22 0920   Primary Wound Type: Pressure Injury  Location: Gluteal fold/cleft  Wound Location Orientation: Right   Wound Image    Wound Etiology Pressure Stage 4   Dressing Status Breakthrough drainage noted   Cleansed Wound cleanser   Dressing/Treatment Collagen;Packing   Dressing Change Due 03/28/22   Wound Length (cm) 0.3 cm   Wound Width (cm) 0.3 cm   Wound Depth (cm) 1 cm   Wound Surface Area (cm^2) 0.09 cm^2   Wound Volume (cm^3) 0.09 cm^3   Post-Procedure Length (cm) 0.5 cm   Post-Procedure Width (cm) 0.5 cm   Post-Procedure Depth (cm) 1.5 cm   Post-Procedure Surface Area (cm^2) 0.25 cm^2   Post-Procedure Volume (cm^3) 0.375 cm^3   Distance Tunneling (cm) 2 cm   Direction of Tunnel 4 o'clock   Wound Assessment Devitalized tissue; Exposed Structure Bone   Drainage Amount Moderate   Drainage Description Sanguineous   Carol-Wound/Incision Assessment Blanchable erythema;Fragile; Intact   Wound Sacral/coccyx   Date First Assessed/Time First Assessed: 08/09/21 1046   Present on Hospital Admission: Yes  Primary Wound Type: Pressure Injury  Location: Sacral/coccyx   Wound Image     Wound Etiology Pressure Stage 4   Dressing Status Breakthrough drainage noted;New drainage noted   Cleansed Wound cleanser   Dressing/Treatment Alginate with Ag;Collagen   Dressing Change Due 03/28/22   Wound Length (cm) 3 cm   Wound Width (cm) 2 cm   Wound Depth (cm) 0.2 cm   Wound Surface Area (cm^2) 6 cm^2   Change in Wound Size % -376.19   Wound Volume (cm^3) 1.2 cm^3   Wound Healing % -138   Post-Procedure Length (cm) 3.1 cm   Post-Procedure Width (cm) 2.1 cm   Post-Procedure Depth (cm) 0.4 cm   Post-Procedure Surface Area (cm^2) 6.51 cm^2   Post-Procedure Volume (cm^3) 2. 604 cm^3   Wound Assessment Exposed Structure Muscle;Pale granulation tissue   Drainage Amount Copious   Drainage Description Serous   Wound Odor Mild   Carol-Wound/Incision Assessment Blanchable erythema;Fragile; Intact     Applied collagen, aliginate,  Packing, 4x4 and silicone border to sacrum & gluteal fold wounds. Applied antifungal cream to groin, ischial areas and under breast.     Orders sent with patient's family to [facility].

## 2022-03-07 NOTE — DISCHARGE INSTRUCTIONS
Discharge Instructions for  [address]  [address] [place], [address] [facility]  [phone] Fax [phone]    NAME:  Jacquelin Lemus  YOB: 1940  MEDICAL RECORD NUMBER:  [id number]  DATE:  3/7/2022    Wound Cleansing:   Do not scrub or use excessive force. Cleanse wound prior to applying a clean dressing with:  [] Normal Saline [] Keep Wound Dry in Shower    [x] Wound Cleanser   [] Cleanse wound with Mild Soap & Water  [] May Shower at Discharge   [] Other:      Topical Treatments:  Do not apply lotions, creams, or ointments to wound bed unless directed. [] Apply moisturizing lotion to skin surrounding the wound prior to dressing change.  [] Apply antifungal ointment to skin surrounding the wound prior to dressing change.  [] Apply thin film of moisture barrier ointment to skin immediately around wound. [x] Other:  Antifungal cream to groin and area under breast   Dressings:           Wound Location ***   [] Apply Primary Dressing:       [] MediHoney Gel [x] Alginate with Silver to superior coccyx wound [] Alginate   [x] Collagen to both coccyx wounds [] Collagen with Silver   [] Santyl with Moisten saline gauze     [] Hydrocolloid   [] MediHoney Alginate [] Foam with Silver   [] Foam   [] Hydrofera Blue    [] Mepilex Border    [] Moisten with Saline [] Hydrogel [] Mepitel     [] Bactroban/Mupirocin [] Polysporin  [x] Other:  [x] Pack wound loosely with 1/4 inch plain packing to inferior wound on coccyx [] Iodoform   [] Plain Packing  [] Other   [x] Cover and Secure with:     [x] Gauze [] Alfa [] Kerlix   [] Ace Wrap [] Cover Roll Tape [] ABD     [x] Other: mepilex border Avoid contact of tape with skin. [x] Change dressing: [] Daily    [x] Every Other Day [] Three times per week   [] Once a week [] Do Not Change Dressing   [] Other:    Pressure Relief:  [x] When sitting, shift position or do seat lifts every 15 minutes.   [x] Wheelchair cushion [] Specialty Bed/Mattress  [x] Turn every 2 hours when in bed. Avoid position directing pressure on wound site. Limit side lying to 30 degree tilt. Limit HOB elevation to 30 degrees. Dietary:  [x] Diet as tolerated: [] Calorie Diabetic Diet: [] No Added Salt:  [x] Increase Protein: [] Other:   Activity:  [x] Activity as tolerated:  [] Patient has no activity restrictions     [] Strict Bedrest: [] Remain off Work:     [] May return to full duty work:                                   [] Return to work with restrictions:             Return Appointment:  [] Wound and dressing supply provider:   [] ECF or Home Healthcare:  [] Wound Assessment: [x] Physician or NP scheduled for Wound Assessment:   [] Return Appointment: With Dr. Farheen Churchill  in  [address] [place] [address])  [] Ordered tests:      Electronically signed Zenaida Villasenor RN on 3/7/2022 at 9:56 AM     Lynne Alcala 281: Should you experience any significant changes in your wound(s) or have questions about your wound care, please contact the [facility] at [address] [place] [address] 8:00 am - 4:30. If you need help with your wound outside these hours and cannot wait until we are again available, contact your PCP or go to the hospital emergency room. PLEASE NOTE: IF YOU ARE UNABLE TO OBTAIN WOUND SUPPLIES, CONTINUE TO USE THE SUPPLIES YOU HAVE AVAILABLE UNTIL YOU ARE ABLE TO REACH US. IT IS MOST IMPORTANT TO KEEP THE WOUND COVERED AT ALL TIMES.      Physician Signature:_______________________    Date: ___________ Time:  ____________

## 2022-03-18 PROBLEM — I10 HYPERTENSION: Status: ACTIVE | Noted: 2018-11-26

## 2022-03-18 PROBLEM — N12 PYELONEPHRITIS: Status: ACTIVE | Noted: 2018-05-18

## 2022-03-18 PROBLEM — R10.9 RIGHT FLANK PAIN: Status: ACTIVE | Noted: 2018-05-18

## 2022-03-18 PROBLEM — N39.0 UTI (URINARY TRACT INFECTION): Status: ACTIVE | Noted: 2018-11-26

## 2022-03-19 PROBLEM — L89.610 PRESSURE INJURY OF RIGHT HEEL, UNSTAGEABLE (HCC): Status: ACTIVE | Noted: 2018-11-28

## 2022-03-19 PROBLEM — N17.9 ACUTE RENAL FAILURE (ARF) (HCC): Status: ACTIVE | Noted: 2020-06-21

## 2022-03-19 PROBLEM — Z90.5 S/P NEPHRECTOMY: Status: ACTIVE | Noted: 2018-05-18

## 2022-03-19 PROBLEM — N99.528 NEPHROSTOMY COMPLICATION (HCC): Status: ACTIVE | Noted: 2020-06-19

## 2022-03-19 PROBLEM — Z89.611 S/P AKA (ABOVE KNEE AMPUTATION), RIGHT (HCC): Status: ACTIVE | Noted: 2019-06-03

## 2022-03-19 PROBLEM — E11.22 CKD STAGE 3 DUE TO TYPE 2 DIABETES MELLITUS (HCC): Status: ACTIVE | Noted: 2020-06-24

## 2022-03-19 PROBLEM — N18.30 CKD STAGE 3 DUE TO TYPE 2 DIABETES MELLITUS (HCC): Status: ACTIVE | Noted: 2020-06-24

## 2022-03-19 PROBLEM — E11.9 DIABETES (HCC): Status: ACTIVE | Noted: 2018-11-26

## 2022-03-19 PROBLEM — T83.022A DISPLACEMENT OF NEPHROSTOMY TUBE (HCC): Status: ACTIVE | Noted: 2021-06-06

## 2022-03-19 PROBLEM — L89.154 PRESSURE INJURY OF SACRAL REGION, STAGE 4 (HCC): Status: ACTIVE | Noted: 2021-08-09

## 2022-03-19 PROBLEM — A49.8 PSEUDOMONAS AERUGINOSA INFECTION: Status: ACTIVE | Noted: 2021-03-22

## 2022-03-19 PROBLEM — Z90.49 S/P COLECTOMY: Status: ACTIVE | Noted: 2018-11-26

## 2022-03-20 PROBLEM — H54.8 LEGALLY BLIND: Status: ACTIVE | Noted: 2018-11-26

## 2022-03-20 PROBLEM — R10.9 ABDOMINAL PAIN: Status: ACTIVE | Noted: 2021-12-07

## 2022-03-20 PROBLEM — Z93.6 NEPHROSTOMY STATUS (HCC): Status: ACTIVE | Noted: 2019-06-03

## 2022-03-20 PROBLEM — L89.314 PRESSURE INJURY OF RIGHT BUTTOCK, STAGE 4 (HCC): Status: ACTIVE | Noted: 2022-03-07

## 2022-03-21 ENCOUNTER — HOSPITAL ENCOUNTER (OUTPATIENT)
Dept: INTERVENTIONAL RADIOLOGY/VASCULAR | Age: 82
Discharge: HOME | End: 2022-03-21
Attending: UROLOGY
Payer: MEDICARE

## 2022-03-21 DIAGNOSIS — N13.39 OTHER HYDRONEPHROSIS: ICD-10-CM

## 2022-03-21 PROCEDURE — 74011000636 HC RX REV CODE- 636: Performed by: RADIOLOGY

## 2022-03-21 PROCEDURE — 50435 EXCHANGE NEPHROSTOMY CATH: CPT

## 2022-03-21 PROCEDURE — 74011250636 HC RX REV CODE- 250/636: Performed by: RADIOLOGY

## 2022-03-21 RX ORDER — SODIUM CHLORIDE 9 MG/ML
500 INJECTION, SOLUTION INTRAVENOUS
Status: COMPLETED | OUTPATIENT
Start: 2022-03-21 | End: 2022-03-21

## 2022-03-21 RX ADMIN — IOPAMIDOL 5 ML: 612 INJECTION, SOLUTION INTRAVENOUS at 09:52

## 2022-03-21 RX ADMIN — SODIUM CHLORIDE 500 ML: 9 INJECTION, SOLUTION INTRAVENOUS at 09:52

## 2022-03-28 ENCOUNTER — APPOINTMENT (OUTPATIENT)
Dept: WOUND CARE | Age: 82
End: 2022-03-28
Attending: FAMILY MEDICINE

## 2022-04-04 ENCOUNTER — APPOINTMENT (OUTPATIENT)
Dept: WOUND CARE | Age: 82
End: 2022-04-04
Attending: FAMILY MEDICINE

## 2022-04-18 ENCOUNTER — TRANSCRIBE ORDER (OUTPATIENT)
Dept: INTERVENTIONAL RADIOLOGY/VASCULAR | Age: 82
End: 2022-04-18

## 2022-04-18 DIAGNOSIS — N13.39 OTHER HYDRONEPHROSIS: Primary | ICD-10-CM

## 2022-04-19 NOTE — PROGRESS NOTES
Spoke with Pari Zapata (patients son) for scheduling nephrostomy tube exchange appointment. Pari Zapata verified all patient information is up to date and correct. Contacted [facility] living facility to arrange transport for appointment, arrive at 0800 for 0830 appointment.

## 2022-04-28 ENCOUNTER — HOSPITAL ENCOUNTER (INPATIENT)
Age: 82
LOS: 4 days | Discharge: NURSING, REHAB OR LONG-TERM CARE | DRG: 698 | End: 2022-05-02
Attending: EMERGENCY MEDICINE | Admitting: FAMILY MEDICINE
Payer: MEDICARE

## 2022-04-28 ENCOUNTER — APPOINTMENT (OUTPATIENT)
Dept: CT IMAGING | Age: 82
DRG: 698 | End: 2022-04-28
Attending: EMERGENCY MEDICINE
Payer: MEDICARE

## 2022-04-28 DIAGNOSIS — T83.022A NEPHROSTOMY TUBE DISPLACED (HCC): Primary | ICD-10-CM

## 2022-04-28 DIAGNOSIS — G62.9 NEUROPATHY: ICD-10-CM

## 2022-04-28 LAB
ALBUMIN SERPL-MCNC: 2.3 G/DL (ref 3.4–5)
ALBUMIN/GLOB SERPL: 0.5 {RATIO} (ref 0.8–1.7)
ALP SERPL-CCNC: 133 U/L (ref 45–117)
ALT SERPL-CCNC: 31 U/L (ref 13–56)
ANION GAP SERPL CALC-SCNC: 9 MMOL/L (ref 3–18)
AST SERPL-CCNC: 19 U/L (ref 10–38)
BASOPHILS # BLD: 0.1 K/UL (ref 0–0.1)
BASOPHILS NFR BLD: 0 % (ref 0–2)
BILIRUB SERPL-MCNC: 0.3 MG/DL (ref 0.2–1)
BUN SERPL-MCNC: 39 MG/DL (ref 7–18)
BUN/CREAT SERPL: 25 (ref 12–20)
CALCIUM SERPL-MCNC: 8.3 MG/DL (ref 8.5–10.1)
CHLORIDE SERPL-SCNC: 110 MMOL/L (ref 100–111)
CO2 SERPL-SCNC: 19 MMOL/L (ref 21–32)
CREAT SERPL-MCNC: 1.55 MG/DL (ref 0.6–1.3)
DIFFERENTIAL METHOD BLD: ABNORMAL
EOSINOPHIL # BLD: 0.3 K/UL (ref 0–0.4)
EOSINOPHIL NFR BLD: 2 % (ref 0–5)
ERYTHROCYTE [DISTWIDTH] IN BLOOD BY AUTOMATED COUNT: 15.4 % (ref 11.6–14.5)
GLOBULIN SER CALC-MCNC: 5 G/DL (ref 2–4)
GLUCOSE SERPL-MCNC: 241 MG/DL (ref 74–99)
HCT VFR BLD AUTO: 29.5 % (ref 35–45)
HGB BLD-MCNC: 9.2 G/DL (ref 12–16)
IMM GRANULOCYTES # BLD AUTO: 0.1 K/UL (ref 0–0.04)
IMM GRANULOCYTES NFR BLD AUTO: 1 % (ref 0–0.5)
LYMPHOCYTES # BLD: 1.4 K/UL (ref 0.9–3.6)
LYMPHOCYTES NFR BLD: 8 % (ref 21–52)
MCH RBC QN AUTO: 26 PG (ref 24–34)
MCHC RBC AUTO-ENTMCNC: 31.2 G/DL (ref 31–37)
MCV RBC AUTO: 83.3 FL (ref 78–100)
MONOCYTES # BLD: 1 K/UL (ref 0.05–1.2)
MONOCYTES NFR BLD: 6 % (ref 3–10)
NEUTS SEG # BLD: 14.2 K/UL (ref 1.8–8)
NEUTS SEG NFR BLD: 84 % (ref 40–73)
NRBC # BLD: 0 K/UL (ref 0–0.01)
NRBC BLD-RTO: 0 PER 100 WBC
PLATELET # BLD AUTO: 450 K/UL (ref 135–420)
PMV BLD AUTO: 9.3 FL (ref 9.2–11.8)
POTASSIUM SERPL-SCNC: 5.1 MMOL/L (ref 3.5–5.5)
PROT SERPL-MCNC: 7.3 G/DL (ref 6.4–8.2)
RBC # BLD AUTO: 3.54 M/UL (ref 4.2–5.3)
SODIUM SERPL-SCNC: 138 MMOL/L (ref 136–145)
WBC # BLD AUTO: 17 K/UL (ref 4.6–13.2)

## 2022-04-28 PROCEDURE — 74176 CT ABD & PELVIS W/O CONTRAST: CPT

## 2022-04-28 PROCEDURE — 96365 THER/PROPH/DIAG IV INF INIT: CPT

## 2022-04-28 PROCEDURE — 74011250636 HC RX REV CODE- 250/636: Performed by: EMERGENCY MEDICINE

## 2022-04-28 PROCEDURE — 65270000029 HC RM PRIVATE

## 2022-04-28 PROCEDURE — 80053 COMPREHEN METABOLIC PANEL: CPT

## 2022-04-28 PROCEDURE — 74011250637 HC RX REV CODE- 250/637: Performed by: EMERGENCY MEDICINE

## 2022-04-28 PROCEDURE — 85025 COMPLETE CBC W/AUTO DIFF WBC: CPT

## 2022-04-28 PROCEDURE — 96372 THER/PROPH/DIAG INJ SC/IM: CPT

## 2022-04-28 PROCEDURE — 99285 EMERGENCY DEPT VISIT HI MDM: CPT

## 2022-04-28 RX ORDER — MORPHINE SULFATE 4 MG/ML
4 INJECTION INTRAVENOUS
Status: COMPLETED | OUTPATIENT
Start: 2022-04-28 | End: 2022-04-28

## 2022-04-28 RX ORDER — ONDANSETRON 4 MG/1
4 TABLET, ORALLY DISINTEGRATING ORAL
Status: COMPLETED | OUTPATIENT
Start: 2022-04-28 | End: 2022-04-28

## 2022-04-28 RX ADMIN — ONDANSETRON 4 MG: 4 TABLET, ORALLY DISINTEGRATING ORAL at 20:36

## 2022-04-28 RX ADMIN — MORPHINE SULFATE 4 MG: 4 INJECTION INTRAVENOUS at 20:23

## 2022-04-28 NOTE — ED TRIAGE NOTES
[facility] during a bed bath

## 2022-04-28 NOTE — Clinical Note
Patient Class[de-identified] OBSERVATION [738]   Type of Bed: Medical [8]   Cardiac Monitoring Required?: No   Reason for Observation: IR consult   Admitting Diagnosis: Nephrostomy tube displaced [facility]) [[id number]]   Admitting Physician: Shannon Galeano [[id number]]   Attending Physician: Shannon Galeano [[id number]]

## 2022-04-29 ENCOUNTER — APPOINTMENT (OUTPATIENT)
Dept: INTERVENTIONAL RADIOLOGY/VASCULAR | Age: 82
DRG: 698 | End: 2022-04-29
Attending: FAMILY MEDICINE
Payer: MEDICARE

## 2022-04-29 ENCOUNTER — APPOINTMENT (OUTPATIENT)
Dept: GENERAL RADIOLOGY | Age: 82
DRG: 698 | End: 2022-04-29
Attending: RADIOLOGY
Payer: MEDICARE

## 2022-04-29 ENCOUNTER — APPOINTMENT (OUTPATIENT)
Dept: ULTRASOUND IMAGING | Age: 82
DRG: 698 | End: 2022-04-29
Attending: FAMILY MEDICINE
Payer: MEDICARE

## 2022-04-29 PROBLEM — L89.159 DECUBITUS ULCER OF COCCYGEAL REGION: Status: ACTIVE | Noted: 2022-04-29

## 2022-04-29 PROBLEM — J90 PLEURAL EFFUSION ON LEFT: Status: ACTIVE | Noted: 2022-04-29

## 2022-04-29 PROBLEM — T83.022A NEPHROSTOMY TUBE DISPLACED (HCC): Status: ACTIVE | Noted: 2022-04-29

## 2022-04-29 PROBLEM — N13.30 HYDRONEPHROSIS, RIGHT: Status: ACTIVE | Noted: 2021-12-07

## 2022-04-29 LAB
APPEARANCE FLD: CLEAR
APPEARANCE UR: ABNORMAL
BACTERIA URNS QL MICRO: ABNORMAL /HPF
BILIRUB UR QL: NEGATIVE
BODY FLD TYPE: NORMAL
COLOR FLD: YELLOW
COLOR UR: YELLOW
EOSINOPHIL NFR FLD MANUAL: 2 %
EPITH CASTS URNS QL MICRO: NEGATIVE /LPF (ref 0–5)
GLUCOSE BLD STRIP.AUTO-MCNC: 147 MG/DL (ref 70–110)
GLUCOSE BLD STRIP.AUTO-MCNC: 168 MG/DL (ref 70–110)
GLUCOSE BLD STRIP.AUTO-MCNC: 193 MG/DL (ref 70–110)
GLUCOSE BLD STRIP.AUTO-MCNC: 233 MG/DL (ref 70–110)
GLUCOSE UR STRIP.AUTO-MCNC: NEGATIVE MG/DL
HGB UR QL STRIP: ABNORMAL
INR PPP: 1.2 (ref 0.8–1.2)
KETONES UR QL STRIP.AUTO: NEGATIVE MG/DL
LDH FLD L TO P-CCNC: 128 U/L
LEUKOCYTE ESTERASE UR QL STRIP.AUTO: ABNORMAL
LYMPHOCYTES NFR FLD: 83 %
MACROPHAGES NFR FLD: 10 %
MONOCYTES NFR FLD: 0 %
NEUTROPHILS NFR FLD: 5 %
NEUTS BAND # FLD: 0 %
NITRITE UR QL STRIP.AUTO: POSITIVE
NUC CELL # FLD: 979 /CU MM
PH FLD: 7.3 [PH]
PH UR STRIP: 7 [PH] (ref 5–8)
PROT FLD-MCNC: 5 G/DL
PROT UR STRIP-MCNC: 100 MG/DL
PROTHROMBIN TIME: 14.4 SEC (ref 11.5–15.2)
RBC # FLD: 3000 /CU MM
RBC #/AREA URNS HPF: ABNORMAL /HPF (ref 0–5)
SP GR UR REFRACTOMETRY: 1.01 (ref 1–1.03)
SPECIMEN SOURCE FLD: ABNORMAL
SPECIMEN SOURCE FLD: NORMAL
SPECIMEN SOURCE FLD: NORMAL
UROBILINOGEN UR QL STRIP.AUTO: 0.2 EU/DL (ref 0.2–1)
WBC MORPH BLD: ABNORMAL
WBC URNS QL MICRO: ABNORMAL /HPF (ref 0–5)

## 2022-04-29 PROCEDURE — G0378 HOSPITAL OBSERVATION PER HR: HCPCS

## 2022-04-29 PROCEDURE — 85610 PROTHROMBIN TIME: CPT

## 2022-04-29 PROCEDURE — 74011000250 HC RX REV CODE- 250: Performed by: FAMILY MEDICINE

## 2022-04-29 PROCEDURE — 87086 URINE CULTURE/COLONY COUNT: CPT

## 2022-04-29 PROCEDURE — C1769 GUIDE WIRE: HCPCS

## 2022-04-29 PROCEDURE — 36415 COLL VENOUS BLD VENIPUNCTURE: CPT

## 2022-04-29 PROCEDURE — 87186 SC STD MICRODIL/AGAR DIL: CPT

## 2022-04-29 PROCEDURE — 77030041504 US THORACENTESIS LT NDL W IMAGE

## 2022-04-29 PROCEDURE — 65270000029 HC RM PRIVATE

## 2022-04-29 PROCEDURE — 83986 ASSAY PH BODY FLUID NOS: CPT

## 2022-04-29 PROCEDURE — 74011000636 HC RX REV CODE- 636: Performed by: RADIOLOGY

## 2022-04-29 PROCEDURE — 74011250636 HC RX REV CODE- 250/636: Performed by: RADIOLOGY

## 2022-04-29 PROCEDURE — 0T25X0Z CHANGE DRAINAGE DEVICE IN KIDNEY, EXTERNAL APPROACH: ICD-10-PCS | Performed by: RADIOLOGY

## 2022-04-29 PROCEDURE — 74011636637 HC RX REV CODE- 636/637: Performed by: FAMILY MEDICINE

## 2022-04-29 PROCEDURE — 74011000258 HC RX REV CODE- 258: Performed by: FAMILY MEDICINE

## 2022-04-29 PROCEDURE — 83615 LACTATE (LD) (LDH) ENZYME: CPT

## 2022-04-29 PROCEDURE — 84157 ASSAY OF PROTEIN OTHER: CPT

## 2022-04-29 PROCEDURE — 88112 CYTOPATH CELL ENHANCE TECH: CPT

## 2022-04-29 PROCEDURE — 0W993ZZ DRAINAGE OF RIGHT PLEURAL CAVITY, PERCUTANEOUS APPROACH: ICD-10-PCS | Performed by: RADIOLOGY

## 2022-04-29 PROCEDURE — 88305 TISSUE EXAM BY PATHOLOGIST: CPT

## 2022-04-29 PROCEDURE — 74011250636 HC RX REV CODE- 250/636: Performed by: FAMILY MEDICINE

## 2022-04-29 PROCEDURE — 82962 GLUCOSE BLOOD TEST: CPT

## 2022-04-29 PROCEDURE — 81001 URINALYSIS AUTO W/SCOPE: CPT

## 2022-04-29 PROCEDURE — 74011250636 HC RX REV CODE- 250/636: Performed by: EMERGENCY MEDICINE

## 2022-04-29 PROCEDURE — 71046 X-RAY EXAM CHEST 2 VIEWS: CPT

## 2022-04-29 PROCEDURE — 89051 BODY FLUID CELL COUNT: CPT

## 2022-04-29 PROCEDURE — 74011250637 HC RX REV CODE- 250/637: Performed by: FAMILY MEDICINE

## 2022-04-29 PROCEDURE — 87077 CULTURE AEROBIC IDENTIFY: CPT

## 2022-04-29 RX ORDER — MAGNESIUM SULFATE 100 %
16 CRYSTALS MISCELLANEOUS AS NEEDED
Status: DISCONTINUED | OUTPATIENT
Start: 2022-04-29 | End: 2022-05-02 | Stop reason: HOSPADM

## 2022-04-29 RX ORDER — NYSTATIN 100000 U/G
1 CREAM TOPICAL 2 TIMES DAILY
Status: DISCONTINUED | OUTPATIENT
Start: 2022-04-29 | End: 2022-05-02 | Stop reason: HOSPADM

## 2022-04-29 RX ORDER — GABAPENTIN 300 MG/1
300 CAPSULE ORAL 2 TIMES DAILY
Status: DISCONTINUED | OUTPATIENT
Start: 2022-04-29 | End: 2022-05-02 | Stop reason: HOSPADM

## 2022-04-29 RX ORDER — METOPROLOL SUCCINATE 50 MG/1
50 TABLET, EXTENDED RELEASE ORAL DAILY
Status: DISCONTINUED | OUTPATIENT
Start: 2022-04-29 | End: 2022-05-02 | Stop reason: HOSPADM

## 2022-04-29 RX ORDER — ENOXAPARIN SODIUM 100 MG/ML
30 INJECTION SUBCUTANEOUS DAILY
Status: DISCONTINUED | OUTPATIENT
Start: 2022-04-29 | End: 2022-05-02 | Stop reason: HOSPADM

## 2022-04-29 RX ORDER — DOCUSATE SODIUM 100 MG/1
100 CAPSULE, LIQUID FILLED ORAL
Status: DISCONTINUED | OUTPATIENT
Start: 2022-04-29 | End: 2022-05-02 | Stop reason: HOSPADM

## 2022-04-29 RX ORDER — LIDOCAINE HYDROCHLORIDE 10 MG/ML
10 INJECTION, SOLUTION EPIDURAL; INFILTRATION; INTRACAUDAL; PERINEURAL
Status: DISCONTINUED | OUTPATIENT
Start: 2022-04-29 | End: 2022-04-29 | Stop reason: RX

## 2022-04-29 RX ORDER — LEVOTHYROXINE SODIUM 50 UG/1
50 TABLET ORAL
Status: DISCONTINUED | OUTPATIENT
Start: 2022-04-29 | End: 2022-05-02 | Stop reason: HOSPADM

## 2022-04-29 RX ORDER — INSULIN LISPRO 100 [IU]/ML
INJECTION, SOLUTION INTRAVENOUS; SUBCUTANEOUS
Status: DISCONTINUED | OUTPATIENT
Start: 2022-04-29 | End: 2022-05-02 | Stop reason: HOSPADM

## 2022-04-29 RX ORDER — ACETAMINOPHEN 325 MG/1
650 TABLET ORAL
Status: DISCONTINUED | OUTPATIENT
Start: 2022-04-29 | End: 2022-05-02 | Stop reason: HOSPADM

## 2022-04-29 RX ORDER — DEXTROSE MONOHYDRATE 100 MG/ML
0-250 INJECTION, SOLUTION INTRAVENOUS AS NEEDED
Status: DISCONTINUED | OUTPATIENT
Start: 2022-04-29 | End: 2022-05-02 | Stop reason: HOSPADM

## 2022-04-29 RX ORDER — FAMOTIDINE 20 MG/1
20 TABLET, FILM COATED ORAL 2 TIMES DAILY
Status: DISCONTINUED | OUTPATIENT
Start: 2022-04-29 | End: 2022-04-29

## 2022-04-29 RX ORDER — SODIUM CHLORIDE 0.9 % (FLUSH) 0.9 %
5-40 SYRINGE (ML) INJECTION EVERY 8 HOURS
Status: DISCONTINUED | OUTPATIENT
Start: 2022-04-29 | End: 2022-05-02 | Stop reason: HOSPADM

## 2022-04-29 RX ORDER — SODIUM CHLORIDE 9 MG/ML
50 INJECTION, SOLUTION INTRAVENOUS CONTINUOUS
Status: DISCONTINUED | OUTPATIENT
Start: 2022-04-29 | End: 2022-04-29

## 2022-04-29 RX ORDER — FAMOTIDINE 20 MG/1
10 TABLET, FILM COATED ORAL EVERY 12 HOURS
Status: DISCONTINUED | OUTPATIENT
Start: 2022-04-29 | End: 2022-05-02 | Stop reason: HOSPADM

## 2022-04-29 RX ORDER — ONDANSETRON 2 MG/ML
4 INJECTION INTRAMUSCULAR; INTRAVENOUS
Status: DISCONTINUED | OUTPATIENT
Start: 2022-04-29 | End: 2022-05-02 | Stop reason: HOSPADM

## 2022-04-29 RX ORDER — POLYETHYLENE GLYCOL 3350 17 G/17G
17 POWDER, FOR SOLUTION ORAL DAILY PRN
Status: DISCONTINUED | OUTPATIENT
Start: 2022-04-29 | End: 2022-05-02 | Stop reason: HOSPADM

## 2022-04-29 RX ORDER — SODIUM CHLORIDE 9 MG/ML
500 INJECTION, SOLUTION INTRAVENOUS
Status: DISCONTINUED | OUTPATIENT
Start: 2022-04-29 | End: 2022-05-02 | Stop reason: HOSPADM

## 2022-04-29 RX ORDER — ACETAMINOPHEN 650 MG/1
650 SUPPOSITORY RECTAL
Status: DISCONTINUED | OUTPATIENT
Start: 2022-04-29 | End: 2022-05-02 | Stop reason: HOSPADM

## 2022-04-29 RX ORDER — SODIUM CHLORIDE 0.9 % (FLUSH) 0.9 %
5-40 SYRINGE (ML) INJECTION AS NEEDED
Status: DISCONTINUED | OUTPATIENT
Start: 2022-04-29 | End: 2022-05-02 | Stop reason: HOSPADM

## 2022-04-29 RX ORDER — CALCIUM CARB/MAGNESIUM CARB 311-232MG
5 TABLET ORAL
Status: DISCONTINUED | OUTPATIENT
Start: 2022-04-29 | End: 2022-05-02 | Stop reason: HOSPADM

## 2022-04-29 RX ORDER — ATORVASTATIN CALCIUM 10 MG/1
20 TABLET, FILM COATED ORAL
Status: DISCONTINUED | OUTPATIENT
Start: 2022-04-29 | End: 2022-05-02 | Stop reason: HOSPADM

## 2022-04-29 RX ORDER — NYSTATIN 100000 U/G
100000 CREAM TOPICAL 2 TIMES DAILY
Status: DISCONTINUED | OUTPATIENT
Start: 2022-04-29 | End: 2022-04-29

## 2022-04-29 RX ORDER — MORPHINE SULFATE 4 MG/ML
4 INJECTION INTRAVENOUS
Status: COMPLETED | OUTPATIENT
Start: 2022-04-29 | End: 2022-04-29

## 2022-04-29 RX ORDER — OXYCODONE HYDROCHLORIDE 5 MG/1
10 TABLET ORAL
Status: DISCONTINUED | OUTPATIENT
Start: 2022-04-29 | End: 2022-05-02 | Stop reason: HOSPADM

## 2022-04-29 RX ORDER — LIDOCAINE HYDROCHLORIDE 10 MG/ML
10 INJECTION, SOLUTION EPIDURAL; INFILTRATION; INTRACAUDAL; PERINEURAL
Status: COMPLETED | OUTPATIENT
Start: 2022-04-29 | End: 2022-04-29

## 2022-04-29 RX ORDER — ONDANSETRON 4 MG/1
4 TABLET, ORALLY DISINTEGRATING ORAL
Status: DISCONTINUED | OUTPATIENT
Start: 2022-04-29 | End: 2022-05-02 | Stop reason: HOSPADM

## 2022-04-29 RX ADMIN — METOPROLOL SUCCINATE 50 MG: 50 TABLET, EXTENDED RELEASE ORAL at 08:44

## 2022-04-29 RX ADMIN — FAMOTIDINE 10 MG: 20 TABLET, FILM COATED ORAL at 08:44

## 2022-04-29 RX ADMIN — MORPHINE SULFATE 4 MG: 4 INJECTION INTRAVENOUS at 01:39

## 2022-04-29 RX ADMIN — ATORVASTATIN CALCIUM 20 MG: 10 TABLET, FILM COATED ORAL at 04:06

## 2022-04-29 RX ADMIN — ENOXAPARIN SODIUM 30 MG: 100 INJECTION SUBCUTANEOUS at 18:20

## 2022-04-29 RX ADMIN — SODIUM CHLORIDE 500 ML: 900 INJECTION, SOLUTION INTRAVENOUS at 10:53

## 2022-04-29 RX ADMIN — LEVOTHYROXINE SODIUM 50 MCG: 0.05 TABLET ORAL at 06:34

## 2022-04-29 RX ADMIN — Medication 4 UNITS: at 22:31

## 2022-04-29 RX ADMIN — SODIUM CHLORIDE, PRESERVATIVE FREE 10 ML: 5 INJECTION INTRAVENOUS at 06:34

## 2022-04-29 RX ADMIN — GABAPENTIN 300 MG: 300 CAPSULE ORAL at 08:43

## 2022-04-29 RX ADMIN — OXYCODONE 10 MG: 5 TABLET ORAL at 21:36

## 2022-04-29 RX ADMIN — IOPAMIDOL 25 ML: 612 INJECTION, SOLUTION INTRAVENOUS at 10:53

## 2022-04-29 RX ADMIN — CEFTRIAXONE 1 G: 1 INJECTION, POWDER, FOR SOLUTION INTRAMUSCULAR; INTRAVENOUS at 02:38

## 2022-04-29 RX ADMIN — LIDOCAINE HYDROCHLORIDE 10 ML: 10 INJECTION, SOLUTION EPIDURAL; INFILTRATION; INTRACAUDAL; PERINEURAL at 11:18

## 2022-04-29 RX ADMIN — NYSTATIN 1 G: 100000 CREAM TOPICAL at 21:36

## 2022-04-29 RX ADMIN — SODIUM CHLORIDE, PRESERVATIVE FREE 10 ML: 5 INJECTION INTRAVENOUS at 21:37

## 2022-04-29 RX ADMIN — OXYCODONE 10 MG: 5 TABLET ORAL at 09:03

## 2022-04-29 RX ADMIN — Medication 2 UNITS: at 08:44

## 2022-04-29 RX ADMIN — DOCUSATE SODIUM 100 MG: 100 CAPSULE ORAL at 21:36

## 2022-04-29 RX ADMIN — Medication 2 UNITS: at 12:55

## 2022-04-29 RX ADMIN — DOCUSATE SODIUM 100 MG: 100 CAPSULE ORAL at 02:37

## 2022-04-29 RX ADMIN — Medication 1 TABLET: at 08:44

## 2022-04-29 RX ADMIN — ATORVASTATIN CALCIUM 20 MG: 10 TABLET, FILM COATED ORAL at 21:36

## 2022-04-29 RX ADMIN — SODIUM CHLORIDE, PRESERVATIVE FREE 10 ML: 5 INJECTION INTRAVENOUS at 17:40

## 2022-04-29 RX ADMIN — Medication 5 MG: at 02:37

## 2022-04-29 RX ADMIN — CEFTRIAXONE 1 G: 1 INJECTION, POWDER, FOR SOLUTION INTRAMUSCULAR; INTRAVENOUS at 04:05

## 2022-04-29 RX ADMIN — FAMOTIDINE 10 MG: 20 TABLET, FILM COATED ORAL at 21:55

## 2022-04-29 RX ADMIN — Medication 5 MG: at 21:36

## 2022-04-29 RX ADMIN — ONDANSETRON 4 MG: 2 INJECTION INTRAMUSCULAR; INTRAVENOUS at 09:03

## 2022-04-29 RX ADMIN — FAMOTIDINE 10 MG: 20 TABLET, FILM COATED ORAL at 02:37

## 2022-04-29 RX ADMIN — GABAPENTIN 300 MG: 300 CAPSULE ORAL at 21:36

## 2022-04-29 RX ADMIN — NYSTATIN 1 G: 100000 CREAM TOPICAL at 09:00

## 2022-04-29 NOTE — ROUTINE PROCESS
Ultrasound guided Left thoracentesis was performed. Pt tolerated procedure well. 400 mL of pleural fluid was drained and sent to lab for testing. X-ray performed bedside. Pt being transported back to room in stable condition. Nurse Chantell Dennis given report.

## 2022-04-29 NOTE — PROGRESS NOTES
Reason for Admission:  displaced right nephrostomy tube                     RUR Score:   N/A                  Plan for utilizing home health:    N/A      PCP: First and Last name:  Isaac Banks MD     Name of Practice:    Are you a current patient: Yes/No:    Approximate date of last visit:    Can you participate in a virtual visit with your PCP:                     Current Advanced Directive/Advance Care Plan: DNR      Healthcare Decision Maker:   Click here to complete [address] including selection of the Healthcare Decision Maker Relationship (ie \"Primary\")             Primary Decision Maker: Hemal Forman - Son - [phone]                  Transition of Care Plan:    Return to [facility]                  Chart reviewed. Pt admitted due to displaced right nephrostomy tube. Noted pt is a LTC resident at [facility] contacted pt's son, Eri Kelly ([phone]), XWY he has indicated plan is for pt to return to this facility when medically stable.  Noted pt has a discharge order. CM notified family of discharge order. Mr. Flaca Hemphill would like to speak with provider before pt is transferred back to [facility]. CM contacted [facility] and they are prepared to accept pt back today. CM will request an afternoon transport. CM to continue to follow and assist.      1430:  CM has been notified the attending provider did not discharge this pt. CM spoke with provider and have been notified pt will remain inpt through the weekend. CM contacted pt's son, Eri Kelly ([phone]), [facility] provided an update. Anticipate pt will return to the above facility when medically stable. Transition of Care Plan: [facility]     Communication to Patient/Family:  Met with patient and family, and they are agreeable to the transition plan.  The Plan for Transition of Care is related to the following treatment goals: LTC     The Patient and/or patient representative was provided with a choice of provider and agrees   with the discharge plan.  Yes [x]? ? No []? ?     Freedom of choice list was provided with basic dialogue that supports the patient's individualized plan of care/goals and shares the quality data associated with the providers.     Yes [x]? ? No []? ?     SNF/Rehab Transition:  Patient has been accepted to [address] [place] [address] at [place] for SNF and meets criteria for admission. Flori Caba will transported by medical transport and expected to leave when medically stable.     Communication to SNF/Rehab:  Bedside RN, Jamie Perea been notified to update the transition plan to the facility and call report [phone]      Discharge information has been updated on the AVS and communicated through [facility] or [facility].     Discharge instructions to be fax'd to facility.     Please include all hard scripts for controlled substances, med rec and dc summary, and AVS in packet. Please medicate for pain prior to dc if possible and needed to help offset delay when patient first arrives to facility.     Reviewed and confirmed with facility, [facility] can manage the patient care needs for the following:      Husam with (X) only those applicable:  Medication:  []??Medications are available at the facility  []? ?IV Antibiotics    []? ? Controlled Substance - hard copies available sent.  []?? Weekly Labs     Equipment:  []??CPAP/BiPAP  []? ? Wound Vacuum  []? ? Wright or Urinary Device  []? ?PICC/Central Line  []? ?Nebulizer  []? ? Ventilator     Treatment:  []??Isolation (for MRSA, VRE, etc.)  []? ?Surgical Drain Management  []? ? Tracheostomy Care  []? ?Dressing Changes  []? ?Dialysis with transportation  []? ?PEG Care  []? ? Oxygen  []? ?Daily Weights for Heart Failure     Dietary:  []??Any diet limitations  []? ?Tube Feedings   []? ? Total Parenteral Management (TPN)     Financial Resources:  []??Medicaid Application Completed     []?? [employer] Completed and copy given to pt/family.     [x]? ? A screening has previously been completed.     []? ?Level II Completed     []? ? Private pay individual who will not become   financially eligible for Medicaid within 6 months from admission to a [address].      []? ? Individual refused to have screening conducted.      []? ? Medicaid Application Completed     []? ? The screening denied because it was determined individual did not need/did not qualify for nursing facility level of care. []?? Out of state residents seeking direct admission to a [address].  []?? Individuals who are inpatients of an out of state hospital, or in state or out of state veterans/ hospital and seek direct admission to a [address] facility  []? ? Individuals who are pateints or residents of a state owned/operated facility that is licensed by Department of Behavioral Services (DBHDS) and seek direct admission to [address] facility  []? ? A screening not required for enrollment in [address] [address] [address] services as set out in [address] [place] [address] 62-  []? ?[facility] staff shall perform screenings of the [facility] clients.      Advanced Care Plan:  []??Surrogate Decision Maker of Care  []? ?POA  []??Communicated Code Status and copy sent.     Other:            Care Management Interventions  Mode of Transport at Discharge: S  Transition of Care Consult (CM Consult): Discharge [address] Maintenance Reviewed: Yes  Support Systems: Child(chari)  The Plan for Transition of Care is Related to the Following Treatment Goals : Return to [facility]  The Patient and/or Patient Representative was Provided with a Choice of Provider and Agrees with the Discharge Plan?: Yes  Name of the Patient Representative Who was Provided with a Choice of Provider and Agrees with the Discharge Plan: gena  Freedom of Choice List was Provided with Basic Dialogue that Supports the Patient's Individualized Plan of Care/Goals, Treatment Preferences and Shares the Quality Data Associated with the Providers?: Yes  Discharge Location  Patient Expects to be Discharged to[de-identified] [facility]

## 2022-04-29 NOTE — ROUTINE PROCESS
Bedside shift change report given to Tio Eastman (oncoming nurse) by Janina Shaffer RN (offgoing nurse). Report included the following information SBAR, Kardex, Intake/Output, MAR and Recent Results.

## 2022-04-29 NOTE — ED PROVIDER NOTES
79-year-old female past medical history of thyroid disease blindness hypertension diabetes DVT chronic pain and colon cancer presents to the emergency department with right flank pain and dislodged urostomy tube. Patient was getting bathed approximately 1:30 PM the accidentally pulled out part of the tube. Urine is still produced in the bag. Patient is also having pain at the site going to the right lower quadrant. No nausea no vomiting no fevers or chills no trauma reported. No other issues expressed.   Worse with pressure better with rest.           Past Medical History:   Diagnosis Date    [facility]     vaginal and colon cancer    Cancer ([facility]     Chronic pain     arthritis    Diabetes ([facility]     DVT (deep venous thrombosis) ([facility]     Hypertension     Legally blind     Thyroid disease        Past Surgical History:   Procedure Laterality Date    HX ABOVE KNEE AMPUTATION Right     HX COLOSTOMY      HX ORTHOPAEDIC      HX OTHER SURGICAL      kidney drainage tube    HX UROLOGICAL      kidney removal    IR CHANGE NEPHROSTOMY PYELOS TUBE RT  12/7/2021    IR EXCHANGE NEPHRO PERC RT SI  1/28/2019    IR EXCHANGE NEPHRO PERC RT SI  3/21/2019    IR EXCHANGE NEPHRO PERC RT SI  5/13/2019    IR EXCHANGE NEPHRO PERC RT SI  6/27/2019    IR EXCHANGE NEPHRO PERC RT SI  8/16/2019    IR EXCHANGE NEPHRO PERC RT SI  10/3/2019    IR EXCHANGE NEPHRO PERC RT SI  11/18/2019    IR EXCHANGE NEPHRO PERC RT SI  1/6/2020    IR EXCHANGE NEPHRO PERC RT SI  2/24/2020    IR EXCHANGE NEPHRO PERC RT SI  4/13/2020    IR EXCHANGE NEPHRO PERC RT SI  6/1/2020    IR EXCHANGE NEPHRO PERC RT SI  6/19/2020    IR EXCHANGE NEPHRO PERC RT SI  8/10/2020    IR EXCHANGE NEPHRO PERC RT SI  9/30/2020    IR EXCHANGE NEPHRO PERC RT SI  11/5/2020    IR EXCHANGE NEPHRO PERC RT SI  12/16/2020    IR EXCHANGE NEPHRO PERC RT SI  2/1/2021    IR EXCHANGE NEPHRO PERC RT SI  3/25/2021    IR EXCHANGE NEPHRO PERC RT SI  5/10/2021    IR EXCHANGE NEPHRO PERC RT SI  8/2/2021    IR EXCHANGE NEPHRO PERC RT SI  9/13/2021    IR EXCHANGE NEPHRO PERC RT SI  11/9/2021    IR EXCHANGE NEPHRO PERC RT SI  1/27/2022    IR EXCHANGE NEPHRO PERC RT SI  3/21/2022    IR NEPHROSTOMY PERC RT PLC CATH  SI  6/21/2020    IR NEPHROSTOMY PERC RT PLC CATH  SI  6/7/2021         History reviewed. No pertinent family history. Social History     Socioeconomic History    Marital status:      Spouse name: Not on file    Number of children: Not on file    Years of education: Not on file    Highest education level: Not on file   Occupational History    Not on file   Tobacco Use    Smoking status: Never Smoker    Smokeless tobacco: Never Used   Substance and Sexual Activity    Alcohol use: No    Drug use: Never    Sexual activity: Not on file   Other Topics Concern     Service Not Asked    Blood Transfusions Not Asked    Caffeine Concern Not Asked    Occupational Exposure Not Asked    Hobby Hazards Not Asked    Sleep Concern Not Asked    Stress Concern Not Asked    Weight Concern Not Asked    Special Diet Not Asked    Back Care Not Asked    Exercise Not Asked    Bike Helmet Not Asked   [facility] Not Asked    Self-Exams Not Asked   Social History Narrative    Not on file     Social Determinants of Health     Financial Resource Strain:     Difficulty of Paying Living Expenses: Not on file   Food Insecurity:     Worried About Running Out of Food in the Last Year: Not on file    [place] of Food in the Last Year: Not on file   Transportation Needs:     Lack of Transportation (Medical): Not on file    Lack of Transportation (Non-Medical):  Not on file   Physical Activity:     Days of Exercise per Week: Not on file    Minutes of Exercise per Session: Not on file   Stress:     Feeling of Stress : Not on file   Social Connections:     Frequency of Communication with Friends and Family: Not on file    Frequency of Social Gatherings with Friends and Family: Not on file    Attends [employer] Services: Not on file    Active Member of Clubs or Organizations: Not on file    Attends Club or Organization Meetings: Not on file    Marital Status: Not on file   Intimate Partner Violence:     Fear of Current or Ex-Partner: Not on file    Emotionally Abused: Not on file    Physically Abused: Not on file    Sexually Abused: Not on file   Housing Stability:     Unable to Pay for Housing in the Last Year: Not on file    Number of [place] in the Last Year: Not on file    Unstable Housing in the Last Year: Not on file         ALLERGIES: Morphine and Pcn [penicillins]    Review of Systems   Constitutional: Negative. HENT: Negative. Respiratory: Negative. Cardiovascular: Negative. Gastrointestinal: Positive for abdominal pain. Negative for abdominal distention, anal bleeding, blood in stool, constipation, diarrhea, nausea, rectal pain and vomiting. Musculoskeletal: Positive for back pain. Hematological: Negative. Psychiatric/Behavioral: Negative. All other systems reviewed and are negative. Vitals:    04/28/22 1835 04/28/22 1927   BP: (!) 165/88    Pulse: 85    Resp: 18    Temp: 97.8 °F (36.6 °C)    SpO2: 96%    Weight: 64 kg (141 lb) 64 kg (141 lb 1.5 oz)   Height:  5' 3\" (1.6 m)            Physical Exam  Vitals reviewed. Constitutional:       General: She is in acute distress. Appearance: She is obese. She is ill-appearing. She is not toxic-appearing or diaphoretic. HENT:      Head: Normocephalic and atraumatic. Nose: Nose normal.   Neck:      Vascular: No carotid bruit. Cardiovascular:      Rate and Rhythm: Normal rate and regular rhythm. Pulses: Normal pulses. Heart sounds: Normal heart sounds. No murmur heard. No friction rub. No gallop. Pulmonary:      Effort: Pulmonary effort is normal. No respiratory distress. Breath sounds: Normal breath sounds. No stridor.  No wheezing, rhonchi or rales. Chest:      Chest wall: No tenderness. Abdominal:      Palpations: Abdomen is soft. There is no mass. Tenderness: There is abdominal tenderness. There is right CVA tenderness. There is no left CVA tenderness, guarding or rebound. Hernia: No hernia is present. Musculoskeletal:         General: No swelling, tenderness, deformity or signs of injury. Normal range of motion. Cervical back: Normal range of motion and neck supple. No rigidity or tenderness. Right lower leg: No edema. Left lower leg: No edema. Lymphadenopathy:      Cervical: No cervical adenopathy. Skin:     General: Skin is warm. Capillary Refill: Capillary refill takes less than 2 seconds. Coloration: Skin is not jaundiced or pale. Findings: No bruising, erythema, lesion or rash. Neurological:      General: No focal deficit present. Mental Status: She is alert. Psychiatric:         Mood and Affect: Mood normal.         Behavior: Behavior normal.          MDM  Number of Diagnoses or Management Options  Diagnosis management comments: Patient is improved. Discussed case with urology he states that she needs to go to IR for replacement of nephrostomy tube. Patient is no longer in pain.   Spoke to hospitalist will admit patient         Procedures

## 2022-04-29 NOTE — PROCEDURES
INTERVENTIONAL RADIOLOGY NOTE:    Procedure:  US guided Thoracentesis     Pre-operative Diagnosis: Pleural effusion    Post-operative Diagnosis: same    Procedure Details: Informed consent obtained. Standard aseptic technique. Ultrasound guidance. right posterior intercostal approach. 1% lidocaine for local anesthesia. 5 [employer] Yueh sheathed needle connected to vacuum bottle. clear fluid removed. Please see final dictated report for full details. Complications:  No immediate. Condition: Stable. Impression:  Successful thoracentesis. Plan: Post procedure chest xray pending.       Amandeep Yoon MD  [address] [place] [address] [facility]     4/29/2022

## 2022-04-29 NOTE — PROGRESS NOTES
TRANSFER - IN REPORT:    Verbal report received from FRANCOISE Milner RN(name) on Kip Mendez  being received from ED(unit) for routine progression of care      Report consisted of patients Situation, Background, Assessment and   Recommendations(SBAR). Information from the following report(s) SBAR, ED Summary, [facility] and Recent Results was reviewed with the receiving nurse. Opportunity for questions and clarification was provided. Assessment completed upon patients arrival to unit and care assumed.

## 2022-04-29 NOTE — PROGRESS NOTES
Assumed patient care. Patient is resting quietly on bed. Bed at lowest positions, wheels locked and call light within reach.

## 2022-04-29 NOTE — H&P
History & Physical    Patient: Mando Louis MRN: [id number]  [facility]: [id number]    YOB: 1940  Age: 79 y.o. Sex: female      DOA: 4/28/2022  Primary Care Provider:  Ivonne Robles MD      Assessment/Plan     Patient Active Problem List   Diagnosis Code    Pyelonephritis N14    S/p nephrectomy Z90.5    Right flank pain R10.9    UTI (urinary tract infection) N39.0    Diabetes ([address] E11.9    Legally blind H54.8    Hypertension I10    S/P colectomy Z90.49    Pressure injury of right heel, unstageable (HCC) L89.610    S/P AKA (above knee amputation), right ([address] Z89.611    Nephrostomy status ([address] Z93.6    Nephrostomy complication ([address] M30.333    Acute renal failure (ARF) ([address] N17.9    CKD stage 3 due to type 2 diabetes mellitus ([address] E11.22, N18.30    Pseudomonas aeruginosa infection A49.8    Displacement of nephrostomy tube ([address] T83.022A    Pressure injury of sacral region, stage 4 (HCC) L89.154    Hydronephrosis, right N13.30    Abdominal pain R10.9    Pressure injury of right buttock, stage 4 (HCC) L89.314    Nephrostomy tube displaced ([address] T83.022A    Pleural effusion on left J90    Decubitus ulcer of coccygeal region L89.159     80 y/o female with 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  She also has a chronic left pleural effusion.     Nephrostomy tube displaced with right hydronephrosis-tube is in renal parenchyma  -Replacement by IR in the morning  -Rocephin for empiric antibiotic treatment as urine cannot be sampled and there is purulent discharge surrounding nephrostomy tube     Left pleural effusion-present since Nov 2020 and unilateral; not hypoxic  -I have ordered a diagnostic thoracentesis, pt wants to proceed with this    Coccygeal decubitus ulcer  -Wound care consult  -Santyl ordered    CKD stage 3 due to type 2 diabetes mellitus-creatinine is improved from baseline  -Avoid nephrotoxins    HTN  -Continue home meds    Legally blind and s/p AKA-resides in NH     DNR per [facility] paperwork     Prophylaxis: SCDs, pepcid PO, holding anticoagulation periprocedurally     Estimated length of stay : 1 night observation     MD Regine Petersonist  ----------------------------------------------------------------------------------------------------------------------------------------------------------------------------  CC: displaced right nephrostomy tube       HPI:     Juan M Bee is an 79 y.o. 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 She is not having output from her nephrostomy bag. She has had multiple nephrostomy tube exchanges in the past by IR. She denies fever, nausea, vomiting, or diarrhea.     Past Medical History:   Diagnosis Date    Cancer [facility])     vaginal and colon cancer    [facility]     Chronic pain     arthritis    Diabetes ([facility]     DVT (deep venous thrombosis) (HCC)     Hypertension     Legally blind     Thyroid disease        Past Surgical History:   Procedure Laterality Date    HX ABOVE KNEE AMPUTATION Right     HX COLOSTOMY      HX ORTHOPAEDIC      HX OTHER SURGICAL      kidney drainage tube    HX UROLOGICAL      kidney removal    IR CHANGE NEPHROSTOMY PYELOS TUBE RT  12/7/2021    IR EXCHANGE NEPHRO PERC RT SI  1/28/2019    IR EXCHANGE NEPHRO PERC RT SI  3/21/2019    IR EXCHANGE NEPHRO PERC RT SI  5/13/2019    IR EXCHANGE NEPHRO PERC RT SI  6/27/2019    IR EXCHANGE NEPHRO PERC RT SI  8/16/2019    IR EXCHANGE NEPHRO PERC RT SI  10/3/2019    IR EXCHANGE NEPHRO PERC RT SI  11/18/2019    IR EXCHANGE NEPHRO PERC RT SI  1/6/2020    IR EXCHANGE NEPHRO PERC RT SI  2/24/2020    IR EXCHANGE NEPHRO PERC RT SI  4/13/2020    IR EXCHANGE NEPHRO PERC RT SI  6/1/2020    IR EXCHANGE NEPHRO PERC RT SI  6/19/2020    IR EXCHANGE NEPHRO PERC RT SI  8/10/2020    IR EXCHANGE NEPHRO PERC RT SI  9/30/2020    IR EXCHANGE NEPHRO PERC RT SI  11/5/2020    IR EXCHANGE NEPHRO PERC RT SI  12/16/2020    IR EXCHANGE NEPHRO PERC RT SI  2/1/2021    IR EXCHANGE NEPHRO PERC RT SI  3/25/2021    IR EXCHANGE NEPHRO PERC RT SI  5/10/2021    IR EXCHANGE NEPHRO PERC RT SI  8/2/2021    IR EXCHANGE NEPHRO PERC RT SI  9/13/2021    IR EXCHANGE NEPHRO PERC RT SI  11/9/2021    IR EXCHANGE NEPHRO PERC RT SI  1/27/2022    IR EXCHANGE NEPHRO PERC RT SI  3/21/2022    IR NEPHROSTOMY PERC RT PLC CATH  SI  6/21/2020    IR NEPHROSTOMY PERC RT PLC CATH  SI  6/7/2021       History reviewed. No pertinent family history. No known problems. Social History     Socioeconomic History    Marital status:    Tobacco Use    Smoking status: Never Smoker    Smokeless tobacco: Never Used   Substance and Sexual Activity    Alcohol use: No    Drug use: Never       Prior to Admission medications    Medication Sig Start Date End Date Taking? Authorizing Provider   docusate sodium (Colace) 100 mg capsule Take 100 mg by mouth nightly. Provider, Historical   insulin detemir U-100 (Levemir U-100 Insulin) 100 unit/mL injection 10 Units by SubCUTAneous route nightly. Provider, Historical   Arginine-Glutamine-Calcium HMB (Evans) 7-7-1.5 gram pwpk Take  by mouth two (2) times a day. Provider, Historical   gabapentin (NEURONTIN) 300 mg capsule Take 300 mg by mouth two (2) times a day. Provider, Historical   nystatin (MYCOSTATIN) topical cream Apply 100,000 g to affected area two (2) times a day. Provider, Historical   insulin aspart U-100 (NovoLOG Flexpen U-100 Insulin) 100 unit/mL (3 mL) inpn by SubCUTAneous route.  151-200 = 2u  201-250 = 4u  251-300 = 6u  301-350 = 8u  351-400 = 10u  401 + = 12u notify MD    Provider, Historical   acetaminophen (TYLENOL) 500 mg tablet Take 2 Tabs by mouth every eight (8) hours as needed for Pain or Fever. Patient taking differently: Take 325 mg by mouth every six (6) hours as needed for Pain or Fever. Give two tablets. 3/25/21   Sawyer England MD   famotidine (PEPCID) 10 mg tablet Take 1 Tab by mouth every twelve (12) hours. 20   Rajni Monte MD   melatonin 5 mg tablet Take 1 Tab by mouth nightly. 20   Rajni Monte MD   multivitamin (ONE A DAY) tablet Take 1 Tab by mouth daily. Provider, Historical   metoprolol succinate (TOPROL-XL) 50 mg XL tablet Take 50 mg by mouth daily. Provider, Historical   levothyroxine (SYNTHROID) 50 mcg tablet Take  by mouth Daily (before breakfast). Provider, Historical   atorvastatin calcium (ATORVASTATIN PO) Take 20 mg by mouth daily. Other, MD Dc       Allergies   Allergen Reactions    Morphine Nausea and Vomiting    Pcn [Penicillins] Rash       Review of Systems  Unable to obtain due to pt condition    Physical Exam:     Physical Exam:  Visit Vitals  BP (!) 130/45 (BP 1 Location: Right upper arm, BP Patient Position: At rest)   Pulse 75   Temp 97.9 °F (36.6 °C)   Resp 16   Ht 5' 3\" (1.6 m)   Wt 64 kg (141 lb 1.5 oz)   SpO2 100%   BMI 24.99 kg/m²      O2 Device: None (Room air)    Temp (24hrs), Av.8 °F (36.6 °C), Min:97.6 °F (36.4 °C), Max:97.9 °F (36.6 °C)     1901 -  0700  In: 50 [I.V.:50]  Out: -    No intake/output data recorded. General:  Awake, cooperative, no distress. Head:  Normocephalic, without obvious abnormality, atraumatic. Eyes:  Conjunctivae/corneas clear, sclera anicteric. Neck: Supple, symmetrical, trachea midline. Lungs:   Clear to auscultation bilaterally. Heart:  Regular rate and rhythm, S1, S2 normal, no murmur, click, rub or gallop. Abdomen: Soft, non-tender. Colostomy in place.  Right nephrostomy tube in place with purulent dressing and foul odor. Bowel sounds normal. No masses,  No organomegaly. Extremities: Right AKA. Left lower extremity without cyanosis or edema. Capillary refill normal.   Pulses: 2+ and symmetric all extremities. Skin: Skin color pink, turgor normal. Coccygeal decubitus ulcer   Neurologic: No focal motor or sensory deficit. Labs Reviewed: All lab results for the last 24 hours reviewed. Recent Results (from the past 24 hour(s))   METABOLIC PANEL, COMPREHENSIVE    Collection Time: 04/28/22  9:10 PM   Result Value Ref Range    Sodium 138 136 - 145 mmol/L    Potassium 5.1 3.5 - 5.5 mmol/L    Chloride 110 100 - 111 mmol/L    CO2 19 (L) 21 - 32 mmol/L    Anion gap 9 3.0 - 18 mmol/L    Glucose 241 (H) 74 - 99 mg/dL    BUN 39 (H) 7.0 - 18 MG/DL    Creatinine 1.55 (H) 0.6 - 1.3 MG/DL    BUN/Creatinine ratio 25 (H) 12 - 20      GFR est AA 39 (L) >60 ml/min/1.73m2    GFR est non-AA 32 (L) >60 ml/min/1.73m2    Calcium 8.3 (L) 8.5 - 10.1 MG/DL    Bilirubin, total 0.3 0.2 - 1.0 MG/DL    ALT (SGPT) 31 13 - 56 U/L    AST (SGOT) 19 10 - 38 U/L    Alk. phosphatase 133 (H) 45 - 117 U/L    Protein, total 7.3 6.4 - 8.2 g/dL    Albumin 2.3 (L) 3.4 - 5.0 g/dL    Globulin 5.0 (H) 2.0 - 4.0 g/dL    A-G Ratio 0.5 (L) 0.8 - 1.7     CBC WITH AUTOMATED DIFF    Collection Time: 04/28/22  9:10 PM   Result Value Ref Range    WBC 17.0 (H) 4.6 - 13.2 K/uL    RBC 3.54 (L) 4.20 - 5.30 M/uL    HGB 9.2 (L) 12.0 - 16.0 g/dL    HCT 29.5 (L) 35.0 - 45.0 %    MCV 83.3 78.0 - 100.0 FL    MCH 26.0 24.0 - 34.0 PG    MCHC 31.2 31.0 - 37.0 g/dL    RDW 15.4 (H) 11.6 - 14.5 %    PLATELET 619 (H) 803 - 420 K/uL    MPV 9.3 9.2 - 11.8 FL    NRBC 0.0 0  WBC    ABSOLUTE NRBC 0.00 0.00 - 0.01 K/uL    NEUTROPHILS 84 (H) 40 - 73 %    LYMPHOCYTES 8 (L) 21 - 52 %    MONOCYTES 6 3 - 10 %    EOSINOPHILS 2 0 - 5 %    BASOPHILS 0 0 - 2 %    IMMATURE GRANULOCYTES 1 (H) 0.0 - 0.5 %    ABS.  NEUTROPHILS 14.2 (H) 1.8 - 8.0 K/UL    ABS. LYMPHOCYTES 1.4 0.9 - 3.6 K/UL    ABS. MONOCYTES 1.0 0.05 - 1.2 K/UL    ABS. EOSINOPHILS 0.3 0.0 - 0.4 K/UL    ABS. BASOPHILS 0.1 0.0 - 0.1 K/UL    ABS. IMM. GRANS. 0.1 (H) 0.00 - 0.04 K/UL    DF AUTOMATED       Results  CT ABD PELV WO CONT (Accession [id number]) (Order [id number])    Allergies       Not Specified: Morphine;  Pcn [Penicillins]     Exam Information    Status Exam Begun  Exam Ended    Final [99] 4/28/2022 22:54 4/28/2022 11:00 PM [id number] 11:00 PM     Result Information    Status: Final result (Exam End: 4/28/2022 23:00) Provider Status: Open       CT ABD PELV WO CONT: Patient Communication     Released  Not seen     Study Result    Narrative & Impression   EXAM: CT of the abdomen and pelvis     INDICATION: Pain.     COMPARISON: December 6, 2021.     TECHNIQUE: Axial CT imaging of the abdomen and pelvis was performed with  intravenous contrast. Multiplanar reformats were generated. Dose reduction  techniques used: automated exposure control, adjustment of the mAs and/or kVp  according to patient size, and iterative reconstruction techniques. Digital  imaging and communications in Medicine (DICOM) format image data are available  to nonaffiliated external healthcare facilities or entities on a secure, media  free, reciprocally searchable basis with patient authorization for at least 12  months after this study. _______________     FINDINGS:     LOWER CHEST: There is a partially imaged small to moderate left pleural effusion  with associated atelectasis.     LIVER, BILIARY: Liver is normal. No biliary dilation. There has been a prior  cholecystectomy.     PANCREAS: Normal.     SPLEEN: Normal.     ADRENALS: Normal.     KIDNEYS: Left kidney is not seen. A right percutaneous nephrostomy tube has been  retracted from the upper pole collecting system is now along the border of the  renal parenchyma and upper pole collecting system.  There is moderate right  hydronephrosis and hydroureter to the level of apparent surgical clips along the  mid right ureter. The urinary bladder is not seen.     LYMPH NODES: No enlarged lymph nodes.     GASTROINTESTINAL TRACT: No bowel dilation or wall thickening.     PELVIC ORGANS: Unremarkable.     VASCULATURE: There is atherosclerotic plaque of the abdominal aorta and proximal  branches.      BONES: The bony structures are osteopenic. There appears to be a coccygeal  region decubitus ulcer with coccygeal sclerosis.      OTHER: Mild soft tissue anasarca is noted.     _______________     IMPRESSION     Mild to moderate left pleural effusion with associated atelectasis similar to  previous.     Right percutaneous nephrostomy tube appears to have been retracted from the  right upper pole collecting system and now lies along the border of the renal  parenchyma and collecting system. There is moderate right hydronephrosis and  hydroureter.  There has been a prior cystectomy.     Apparent right coccygeal region decubitus ulcer.     There is mild soft tissue anasarca.

## 2022-04-29 NOTE — PROGRESS NOTES
0433  Pt is in unit 3S, rm 309.    0545  Urine sample sent to lab.    0732  Bedside and verbal shift change report given by CHAITANYA Hoffmann (off going nurse) to Kerrie Licona RN(on coming nurse). Report included the following information SBAR, Kardex, Intake/Output and MAR.

## 2022-04-29 NOTE — DIABETES MGMT
Diabetes/ Glycemic Control Plan of Care  Recommendations:   Anticipate patient requiring basal insulin. Assessment: Patient with known history of type 2 diabetes admitted for displacement of nephrostomy tube. Patient off of the floor for procedure at this time. One BG of 241 mg/dl yesterday evening. Anticipate patient to require basal insulin. PTA meds indicate patient takes Determir and Novolog. Plan to follow up. Recent Glucose Results:   Lab Results   Component Value Date/Time     (H) 04/28/2022 09:10 PM      BG within target range (non-ICU: <180; -180):  [] Yes    [x] No   Current insulin orders: corrective Humalog ACHS  Total Daily Dose previous 24 hours = 2 units     Plan/Goals:   Blood glucose will be within target of 70 - 180 mg/dl within 72 hours  Reinforce dietary and medication compliance at home.          Education:  [] Refer to Diabetes Education Record                       [x] Education not provided at this time     aRman Qiu RD  Glycemic Control Team  [phone]    Monday-Friday   9 am - 3 pm

## 2022-04-30 LAB
ALBUMIN SERPL-MCNC: 2 G/DL (ref 3.4–5)
ALBUMIN/GLOB SERPL: 0.4 {RATIO} (ref 0.8–1.7)
ALP SERPL-CCNC: 143 U/L (ref 45–117)
ALT SERPL-CCNC: 42 U/L (ref 13–56)
ANION GAP SERPL CALC-SCNC: 7 MMOL/L (ref 3–18)
AST SERPL-CCNC: 29 U/L (ref 10–38)
BILIRUB SERPL-MCNC: 0.2 MG/DL (ref 0.2–1)
BUN SERPL-MCNC: 51 MG/DL (ref 7–18)
BUN/CREAT SERPL: 20 (ref 12–20)
CALCIUM SERPL-MCNC: 8.2 MG/DL (ref 8.5–10.1)
CHLORIDE SERPL-SCNC: 107 MMOL/L (ref 100–111)
CO2 SERPL-SCNC: 22 MMOL/L (ref 21–32)
CREAT SERPL-MCNC: 2.61 MG/DL (ref 0.6–1.3)
ERYTHROCYTE [DISTWIDTH] IN BLOOD BY AUTOMATED COUNT: 16 % (ref 11.6–14.5)
EST. AVERAGE GLUCOSE BLD GHB EST-MCNC: 169 MG/DL
GLOBULIN SER CALC-MCNC: 5.4 G/DL (ref 2–4)
GLUCOSE BLD STRIP.AUTO-MCNC: 137 MG/DL (ref 70–110)
GLUCOSE BLD STRIP.AUTO-MCNC: 160 MG/DL (ref 70–110)
GLUCOSE BLD STRIP.AUTO-MCNC: 233 MG/DL (ref 70–110)
GLUCOSE BLD STRIP.AUTO-MCNC: 335 MG/DL (ref 70–110)
GLUCOSE SERPL-MCNC: 155 MG/DL (ref 74–99)
HBA1C MFR BLD: 7.5 % (ref 4.2–5.6)
HCT VFR BLD AUTO: 27.7 % (ref 35–45)
HGB BLD-MCNC: 8.3 G/DL (ref 12–16)
MCH RBC QN AUTO: 26.3 PG (ref 24–34)
MCHC RBC AUTO-ENTMCNC: 30 G/DL (ref 31–37)
MCV RBC AUTO: 87.9 FL (ref 78–100)
NRBC # BLD: 0 K/UL (ref 0–0.01)
NRBC BLD-RTO: 0 PER 100 WBC
PLATELET # BLD AUTO: 407 K/UL (ref 135–420)
PMV BLD AUTO: 9.7 FL (ref 9.2–11.8)
POTASSIUM SERPL-SCNC: 5.2 MMOL/L (ref 3.5–5.5)
PROT SERPL-MCNC: 7.4 G/DL (ref 6.4–8.2)
RBC # BLD AUTO: 3.15 M/UL (ref 4.2–5.3)
SODIUM SERPL-SCNC: 136 MMOL/L (ref 136–145)
WBC # BLD AUTO: 10.8 K/UL (ref 4.6–13.2)

## 2022-04-30 PROCEDURE — 74011636637 HC RX REV CODE- 636/637: Performed by: FAMILY MEDICINE

## 2022-04-30 PROCEDURE — 74011250636 HC RX REV CODE- 250/636: Performed by: FAMILY MEDICINE

## 2022-04-30 PROCEDURE — 83036 HEMOGLOBIN GLYCOSYLATED A1C: CPT

## 2022-04-30 PROCEDURE — 74011250637 HC RX REV CODE- 250/637: Performed by: FAMILY MEDICINE

## 2022-04-30 PROCEDURE — 80053 COMPREHEN METABOLIC PANEL: CPT

## 2022-04-30 PROCEDURE — G0378 HOSPITAL OBSERVATION PER HR: HCPCS

## 2022-04-30 PROCEDURE — 82962 GLUCOSE BLOOD TEST: CPT

## 2022-04-30 PROCEDURE — 65270000029 HC RM PRIVATE

## 2022-04-30 PROCEDURE — 74011000258 HC RX REV CODE- 258: Performed by: FAMILY MEDICINE

## 2022-04-30 PROCEDURE — 36415 COLL VENOUS BLD VENIPUNCTURE: CPT

## 2022-04-30 PROCEDURE — 74011000250 HC RX REV CODE- 250: Performed by: FAMILY MEDICINE

## 2022-04-30 PROCEDURE — 85027 COMPLETE CBC AUTOMATED: CPT

## 2022-04-30 RX ADMIN — SODIUM CHLORIDE, PRESERVATIVE FREE 10 ML: 5 INJECTION INTRAVENOUS at 16:46

## 2022-04-30 RX ADMIN — Medication 4 UNITS: at 22:10

## 2022-04-30 RX ADMIN — FAMOTIDINE 10 MG: 20 TABLET, FILM COATED ORAL at 22:11

## 2022-04-30 RX ADMIN — DOCUSATE SODIUM 100 MG: 100 CAPSULE ORAL at 22:11

## 2022-04-30 RX ADMIN — Medication 1 TABLET: at 08:12

## 2022-04-30 RX ADMIN — SODIUM CHLORIDE, PRESERVATIVE FREE 10 ML: 5 INJECTION INTRAVENOUS at 22:16

## 2022-04-30 RX ADMIN — Medication 2 UNITS: at 08:12

## 2022-04-30 RX ADMIN — OXYCODONE 10 MG: 5 TABLET ORAL at 16:55

## 2022-04-30 RX ADMIN — SODIUM CHLORIDE, PRESERVATIVE FREE 10 ML: 5 INJECTION INTRAVENOUS at 06:45

## 2022-04-30 RX ADMIN — NYSTATIN 1 G: 100000 CREAM TOPICAL at 08:07

## 2022-04-30 RX ADMIN — LEVOTHYROXINE SODIUM 50 MCG: 0.05 TABLET ORAL at 08:12

## 2022-04-30 RX ADMIN — COLLAGENASE SANTYL: 250 OINTMENT TOPICAL at 08:12

## 2022-04-30 RX ADMIN — ATORVASTATIN CALCIUM 20 MG: 10 TABLET, FILM COATED ORAL at 22:12

## 2022-04-30 RX ADMIN — GABAPENTIN 300 MG: 300 CAPSULE ORAL at 22:12

## 2022-04-30 RX ADMIN — NYSTATIN 1 G: 100000 CREAM TOPICAL at 22:12

## 2022-04-30 RX ADMIN — Medication 8 UNITS: at 17:17

## 2022-04-30 RX ADMIN — Medication 5 MG: at 22:11

## 2022-04-30 RX ADMIN — CEFTRIAXONE 1 G: 1 INJECTION, POWDER, FOR SOLUTION INTRAMUSCULAR; INTRAVENOUS at 01:46

## 2022-04-30 RX ADMIN — ENOXAPARIN SODIUM 30 MG: 100 INJECTION SUBCUTANEOUS at 08:11

## 2022-04-30 RX ADMIN — FAMOTIDINE 10 MG: 20 TABLET, FILM COATED ORAL at 08:11

## 2022-04-30 RX ADMIN — GABAPENTIN 300 MG: 300 CAPSULE ORAL at 08:11

## 2022-04-30 NOTE — PROGRESS NOTES
Hospitalist Progress Note    Patient: Ira Lucero MRN: [id number]  [facility]: [id number]    YOB: 1940  Age: 79 y.o.   Sex: female    DOA: 4/28/2022 LOS:  LOS: 0 days            Patient Active Problem List   Diagnosis Code    Pyelonephritis N14    S/p nephrectomy Z90.5    Right flank pain R10.9    UTI (urinary tract infection) N39.0    Diabetes ([address] E11.9    Legally blind H54.8    Hypertension I10    S/P colectomy Z90.49    Pressure injury of right heel, unstageable ([facility]) L89.610    S/P AKA (above knee amputation), right ([address] Z89.611    Nephrostomy status ([address] Z93.6    Nephrostomy complication ([facility]) O62.401    Acute renal failure (ARF) ([address] N17.9    CKD stage 3 due to type 2 diabetes mellitus ([address] E11.22, N18.30    Pseudomonas aeruginosa infection A49.8    Displacement of nephrostomy tube ([address] T83.022A    Pressure injury of sacral region, stage 4 ([facility]) L89.154    Hydronephrosis, right N13.30    Abdominal pain R10.9    Pressure injury of right buttock, stage 4 ([facility]) L89.314    Nephrostomy tube displaced ([facility]) T83.022A    Pleural effusion on left J90    Decubitus ulcer of coccygeal region L89.159        IMPRESSION and Plan:    Ira Lucero is a 79 y.o. female with   Patient Active Problem List    Diagnosis Date Noted    Nephrostomy tube displaced ([address] 04/29/2022    Pleural effusion on left 04/29/2022    Decubitus ulcer of coccygeal region 04/29/2022    Pressure injury of right buttock, stage 4 ([address] 03/07/2022    Hydronephrosis, right 12/07/2021    Abdominal pain 12/07/2021    Pressure injury of sacral region, stage 4 ([address] 08/09/2021    Displacement of nephrostomy tube ([address] 06/06/2021    Pseudomonas aeruginosa infection 03/22/2021    CKD stage 3 due to type 2 diabetes mellitus ([facility] 06/24/2020    [facility] 06/21/2020    Nephrostomy complication ([facility] 33/16/1150    S/P AKA (above knee amputation), right ([facility] 06/03/2019    Nephrostomy status ([facility] 06/03/2019    Pressure injury of right heel, unstageable ([address] 11/28/2018    UTI (urinary tract infection) 11/26/2018    Diabetes ([address] 11/26/2018    Legally blind 11/26/2018    Hypertension 11/26/2018    S/P colectomy 11/26/2018    Pyelonephritis 05/18/2018    S/p nephrectomy 05/18/2018    Right flank pain 05/18/2018     Principal Problem:    Nephrostomy tube displaced ([address] (4/29/2022)    Active Problems:    Legally blind (11/26/2018)      Hypertension (11/26/2018)      S/P AKA (above knee amputation), right ([address] (6/3/2019)      CKD stage 3 due to type 2 diabetes mellitus ([address] (6/24/2020)      Hydronephrosis, right (12/7/2021)      Pleural effusion on left (4/29/2022)      Decubitus ulcer of coccygeal region (4/29/2022)          78 y/o female with prior vaginal and colon cancer with left nephrectomy, right nephrostomy, and colostomy, legal blindness, type 2 diabetes mellitus, and right AKA is admitted with displacement of her nephrostomy tube. She also has a chronic left pleural effusion.     Nephrostomy tube displaced with right hydronephrosis- replaced.    Left pleural effusion-s/p   thoracentesis.        Coccygeal decubitus ulcer  -Wound care consult  -Santyl ordered     CKD stage 3 due to type 2 diabetes mellitus-creatinine is improved from baseline  -Avoid nephrotoxins     HTN  -Continue home meds     Legally blind and s/p AKA-resides in NH     DNR per [facility] paperwork     Prophylaxis: SCDs, pepcid PO, holding anticoagulation periprocedurally       Patient's condition is fair  SNF on monday      Recommend to continue hospitalization. Discussed with patient. Chief Complaints:   Chief Complaint   Patient presents with    Urinary Catheter Problem     SUBJECTIVE:  Pt is seen and examined. Resting comfortably. Denies an ypain      Review of systems:    Review of Systems   Constitutional: Positive for malaise/fatigue. HENT: Negative. Eyes: Negative. Respiratory: Positive for shortness of breath. Cardiovascular: Positive for leg swelling. Negative for chest pain, palpitations and orthopnea. Gastrointestinal: Negative. Negative for abdominal pain, diarrhea and heartburn. Genitourinary: Negative for dysuria and hematuria. Skin: Negative. Neurological: Positive for weakness. Psychiatric/Behavioral: Negative for depression, substance abuse and suicidal ideas. The patient is not nervous/anxious. PE:  Patient Vitals for the past 24 hrs:   BP Temp Pulse Resp SpO2   04/30/22 1148 (!) 105/50 98.7 °F (37.1 °C) 79 20 99 %   04/30/22 0726 (!) 101/44 98.3 °F (36.8 °C) 87 16 97 %   04/30/22 0618 -- -- -- 18 --   04/30/22 0148 (!) 100/58 -- -- -- --   04/30/22 0000 (!) 78/50 98.5 °F (36.9 °C) 77 18 100 %   04/29/22 1940 (!) 112/33 98.4 °F (36.9 °C) 78 18 100 %   04/29/22 1642 117/84 98.1 °F (36.7 °C) 71 16 99 %       Intake/Output Summary (Last 24 hours) at 4/30/2022 1333  Last data filed at 4/30/2022 0245  Gross per 24 hour   Intake --   Output 250 ml   Net -250 ml     Patient Vitals for the past 120 hrs:   Weight   04/28/22 1835 64 kg (141 lb)   04/28/22 1927 64 kg (141 lb 1.5 oz)         Physical Exam  Vitals and nursing note reviewed. Constitutional:       General: She is in acute distress. Neck:      Vascular: No JVD. Cardiovascular:      Rate and Rhythm: Normal rate and regular rhythm. Heart sounds: Normal heart sounds. Pulmonary:      Effort: No respiratory distress. Breath sounds: Normal breath sounds. Abdominal:      General: Bowel sounds are normal. There is no distension. Palpations: Abdomen is soft. Tenderness: There is no abdominal tenderness. There is no rebound. Musculoskeletal:         General: Normal range of motion. Cervical back: Normal range of motion and neck supple. Skin:     General: Skin is warm and dry. Neurological:      Mental Status: She is alert.    Psychiatric:         Mood and Affect: Affect normal.             Intake and Output:  Current Shift:  No intake/output data recorded.   Last three shifts:  04/28 1901 - 04/30 0700  In: [de-identified] [P.O.:30; I.V.:50]  Out: 12 [Urine:210]    Lab/Data Reviewed:  Recent Results (from the past 8 hour(s))   GLUCOSE, POC    Collection Time: 04/30/22  7:22 AM   Result Value Ref Range    Glucose (POC) 160 (H) 70 - 110 mg/dL   GLUCOSE, POC    Collection Time: 04/30/22 11:45 AM   Result Value Ref Range    Glucose (POC) 137 (H) 70 - 110 mg/dL     Medications:  Current Facility-Administered Medications   Medication Dose Route Frequency    sodium chloride (NS) flush 5-40 mL  5-40 mL IntraVENous Q8H    sodium chloride (NS) flush 5-40 mL  5-40 mL IntraVENous PRN    acetaminophen (TYLENOL) tablet 650 mg  650 mg Oral Q6H PRN    Or    acetaminophen (TYLENOL) suppository 650 mg  650 mg Rectal Q6H PRN    polyethylene glycol (MIRALAX) packet 17 g  17 g Oral DAILY PRN    ondansetron (ZOFRAN ODT) tablet 4 mg  4 mg Oral Q8H PRN    Or    ondansetron (ZOFRAN) injection 4 mg  4 mg IntraVENous Q6H PRN    enoxaparin (LOVENOX) injection 30 mg  30 mg SubCUTAneous DAILY    cefTRIAXone (ROCEPHIN) 1 g in 0.9% sodium chloride (MBP/ADV) 50 mL MBP  1 g IntraVENous Q24H    atorvastatin (LIPITOR) tablet 20 mg  20 mg Oral QHS    docusate sodium (COLACE) capsule 100 mg  100 mg Oral QHS    famotidine (PEPCID) tablet 10 mg  10 mg Oral Q12H    gabapentin (NEURONTIN) capsule 300 mg  300 mg Oral BID    levothyroxine (SYNTHROID) tablet 50 mcg  50 mcg Oral ACB    melatonin (rapid dissolve) tablet 5 mg  5 mg Oral QHS    metoprolol succinate (TOPROL-XL) XL tablet 50 mg  50 mg Oral DAILY    multivitamin, tx-iron-ca-min (THERA-M w/ IRON) tablet 1 Tablet  1 Tablet Oral DAILY    insulin lispro (HUMALOG) injection   SubCUTAneous AC&HS    glucose chewable tablet 16 g  16 g Oral PRN    glucagon (GLUCAGEN) injection 1 mg  1 mg IntraMUSCular PRN    dextrose 10% infusion 0-250 mL  0-250 mL IntraVENous PRN    collagenase (SANTYL) 250 unit/gram ointment   Topical DAILY    nystatin (MYCOSTATIN) 100,000 unit/gram cream 1 g  1 g Topical BID    oxyCODONE IR (ROXICODONE) tablet 10 mg  10 mg Oral Q6H PRN    0.9% sodium chloride infusion 500 mL  500 mL Irrigation RAD CONTINUOUS    iopamidoL (ISOVUE 300) 61 % contrast injection 1-100 mL  1-100 mL IntraCATHeter RAD CONTINUOUS       Recent Results (from the past 24 hour(s))   GLUCOSE, POC    Collection Time: 04/29/22  4:21 PM   Result Value Ref Range    Glucose (POC) 147 (H) 70 - 110 mg/dL   GLUCOSE, POC    Collection Time: 04/29/22 10:03 PM   Result Value Ref Range    Glucose (POC) 233 (H) 70 - 110 mg/dL   HEMOGLOBIN A1C WITH EAG    Collection Time: 04/30/22  4:36 AM   Result Value Ref Range    Hemoglobin A1c 7.5 (H) 4.2 - 5.6 %    Est. average glucose 933 mg/dL   METABOLIC PANEL, COMPREHENSIVE    Collection Time: 04/30/22  4:36 AM   Result Value Ref Range    Sodium 136 136 - 145 mmol/L    Potassium 5.2 3.5 - 5.5 mmol/L    Chloride 107 100 - 111 mmol/L    CO2 22 21 - 32 mmol/L    Anion gap 7 3.0 - 18 mmol/L    Glucose 155 (H) 74 - 99 mg/dL    BUN 51 (H) 7.0 - 18 MG/DL    Creatinine 2.61 (H) 0.6 - 1.3 MG/DL    BUN/Creatinine ratio 20 12 - 20      GFR est AA 21 (L) >60 ml/min/1.73m2    GFR est non-AA 18 (L) >60 ml/min/1.73m2    Calcium 8.2 (L) 8.5 - 10.1 MG/DL    Bilirubin, total 0.2 0.2 - 1.0 MG/DL    ALT (SGPT) 42 13 - 56 U/L    AST (SGOT) 29 10 - 38 U/L    Alk.  phosphatase 143 (H) 45 - 117 U/L    Protein, total 7.4 6.4 - 8.2 g/dL    Albumin 2.0 (L) 3.4 - 5.0 g/dL    Globulin 5.4 (H) 2.0 - 4.0 g/dL    A-G Ratio 0.4 (L) 0.8 - 1.7     CBC W/O DIFF    Collection Time: 04/30/22  4:36 AM   Result Value Ref Range    WBC 10.8 4.6 - 13.2 K/uL    RBC 3.15 (L) 4.20 - 5.30 M/uL    HGB 8.3 (L) 12.0 - 16.0 g/dL    HCT 27.7 (L) 35.0 - 45.0 %    MCV 87.9 78.0 - 100.0 FL    MCH 26.3 24.0 - 34.0 PG    MCHC 30.0 (L) 31.0 - 37.0 g/dL    RDW 16.0 (H) 11.6 - 14.5 %    PLATELET 407 135 - 420 K/uL    MPV 9.7 9.2 - 11.8 FL    NRBC 0.0 0  WBC    ABSOLUTE NRBC 0.00 0.00 - 0.01 K/uL   GLUCOSE, POC    Collection Time: 04/30/22  7:22 AM   Result Value Ref Range    Glucose (POC) 160 (H) 70 - 110 mg/dL   GLUCOSE, POC    Collection Time: 04/30/22 11:45 AM   Result Value Ref Range    Glucose (POC) 137 (H) 70 - 110 mg/dL       Procedures/imaging: see electronic medical records for all procedures/Xrays and details which were not copied into this note but were reviewed prior to creation of Thedolauren Leong MD   4/30/2022, 1:33 PM

## 2022-04-30 NOTE — PROGRESS NOTES
Problem: Risk for Spread of Infection  Goal: Prevent transmission of infectious organism to others  Description: Prevent the transmission of infectious organisms to other patients, staff members, and visitors. Outcome: Progressing Towards Goal     Problem: Patient Education:  Go to Education Activity  Goal: Patient/Family Education  Outcome: Progressing Towards Goal     Problem: Falls - Risk of  Goal: *Absence of Falls  Description: Document Ananth Hedrickair Fall Risk and appropriate interventions in the flowsheet.   Outcome: Progressing Towards Goal  Note: Fall Risk Interventions:            Medication Interventions: Bed/chair exit alarm,Patient to call before getting OOB    Elimination Interventions: Call light in reach,Bed/chair exit alarm

## 2022-04-30 NOTE — ROUTINE PROCESS
Bedside shift change report given to 1215 Taj Antony (oncoming nurse) by Laura Luther RN (offgoing nurse). Report included the following information SBAR, Kardex, Intake/Output, MAR and Recent Results.

## 2022-05-01 LAB
ALBUMIN SERPL-MCNC: 1.8 G/DL (ref 3.4–5)
ALBUMIN/GLOB SERPL: 0.4 {RATIO} (ref 0.8–1.7)
ALP SERPL-CCNC: 161 U/L (ref 45–117)
ALT SERPL-CCNC: 46 U/L (ref 13–56)
ANION GAP SERPL CALC-SCNC: 7 MMOL/L (ref 3–18)
AST SERPL-CCNC: 36 U/L (ref 10–38)
BILIRUB SERPL-MCNC: 0.1 MG/DL (ref 0.2–1)
BUN SERPL-MCNC: 46 MG/DL (ref 7–18)
BUN/CREAT SERPL: 20 (ref 12–20)
CALCIUM SERPL-MCNC: 8.1 MG/DL (ref 8.5–10.1)
CHLORIDE SERPL-SCNC: 109 MMOL/L (ref 100–111)
CO2 SERPL-SCNC: 21 MMOL/L (ref 21–32)
CREAT SERPL-MCNC: 2.34 MG/DL (ref 0.6–1.3)
ERYTHROCYTE [DISTWIDTH] IN BLOOD BY AUTOMATED COUNT: 15.9 % (ref 11.6–14.5)
GLOBULIN SER CALC-MCNC: 4.9 G/DL (ref 2–4)
GLUCOSE BLD STRIP.AUTO-MCNC: 132 MG/DL (ref 70–110)
GLUCOSE BLD STRIP.AUTO-MCNC: 160 MG/DL (ref 70–110)
GLUCOSE BLD STRIP.AUTO-MCNC: 206 MG/DL (ref 70–110)
GLUCOSE BLD STRIP.AUTO-MCNC: 253 MG/DL (ref 70–110)
GLUCOSE SERPL-MCNC: 214 MG/DL (ref 74–99)
HCT VFR BLD AUTO: 24.9 % (ref 35–45)
HGB BLD-MCNC: 7.7 G/DL (ref 12–16)
MCH RBC QN AUTO: 26.5 PG (ref 24–34)
MCHC RBC AUTO-ENTMCNC: 30.9 G/DL (ref 31–37)
MCV RBC AUTO: 85.6 FL (ref 78–100)
NRBC # BLD: 0 K/UL (ref 0–0.01)
NRBC BLD-RTO: 0 PER 100 WBC
PLATELET # BLD AUTO: 401 K/UL (ref 135–420)
PMV BLD AUTO: 9.1 FL (ref 9.2–11.8)
POTASSIUM SERPL-SCNC: 4.8 MMOL/L (ref 3.5–5.5)
PROT SERPL-MCNC: 6.7 G/DL (ref 6.4–8.2)
RBC # BLD AUTO: 2.91 M/UL (ref 4.2–5.3)
SODIUM SERPL-SCNC: 137 MMOL/L (ref 136–145)
WBC # BLD AUTO: 10.4 K/UL (ref 4.6–13.2)

## 2022-05-01 PROCEDURE — 82962 GLUCOSE BLOOD TEST: CPT

## 2022-05-01 PROCEDURE — 74011636637 HC RX REV CODE- 636/637: Performed by: FAMILY MEDICINE

## 2022-05-01 PROCEDURE — 74011000258 HC RX REV CODE- 258: Performed by: FAMILY MEDICINE

## 2022-05-01 PROCEDURE — 74011000250 HC RX REV CODE- 250: Performed by: FAMILY MEDICINE

## 2022-05-01 PROCEDURE — 65270000029 HC RM PRIVATE

## 2022-05-01 PROCEDURE — 36415 COLL VENOUS BLD VENIPUNCTURE: CPT

## 2022-05-01 PROCEDURE — 74011250637 HC RX REV CODE- 250/637: Performed by: FAMILY MEDICINE

## 2022-05-01 PROCEDURE — 74011250636 HC RX REV CODE- 250/636: Performed by: FAMILY MEDICINE

## 2022-05-01 PROCEDURE — 80053 COMPREHEN METABOLIC PANEL: CPT

## 2022-05-01 PROCEDURE — 85027 COMPLETE CBC AUTOMATED: CPT

## 2022-05-01 RX ADMIN — SODIUM CHLORIDE, PRESERVATIVE FREE 10 ML: 5 INJECTION INTRAVENOUS at 14:00

## 2022-05-01 RX ADMIN — Medication 4 UNITS: at 08:42

## 2022-05-01 RX ADMIN — GABAPENTIN 300 MG: 300 CAPSULE ORAL at 08:43

## 2022-05-01 RX ADMIN — GABAPENTIN 300 MG: 300 CAPSULE ORAL at 20:44

## 2022-05-01 RX ADMIN — NYSTATIN 1 G: 100000 CREAM TOPICAL at 08:44

## 2022-05-01 RX ADMIN — FAMOTIDINE 10 MG: 20 TABLET, FILM COATED ORAL at 20:44

## 2022-05-01 RX ADMIN — METOPROLOL SUCCINATE 50 MG: 50 TABLET, EXTENDED RELEASE ORAL at 08:43

## 2022-05-01 RX ADMIN — ATORVASTATIN CALCIUM 20 MG: 10 TABLET, FILM COATED ORAL at 21:20

## 2022-05-01 RX ADMIN — ENOXAPARIN SODIUM 30 MG: 100 INJECTION SUBCUTANEOUS at 08:43

## 2022-05-01 RX ADMIN — Medication 2 UNITS: at 11:47

## 2022-05-01 RX ADMIN — Medication 1 TABLET: at 08:43

## 2022-05-01 RX ADMIN — LEVOTHYROXINE SODIUM 50 MCG: 0.05 TABLET ORAL at 06:46

## 2022-05-01 RX ADMIN — SODIUM CHLORIDE, PRESERVATIVE FREE 10 ML: 5 INJECTION INTRAVENOUS at 06:46

## 2022-05-01 RX ADMIN — COLLAGENASE SANTYL: 250 OINTMENT TOPICAL at 08:44

## 2022-05-01 RX ADMIN — FAMOTIDINE 10 MG: 20 TABLET, FILM COATED ORAL at 08:43

## 2022-05-01 RX ADMIN — Medication 5 MG: at 21:20

## 2022-05-01 RX ADMIN — DOCUSATE SODIUM 100 MG: 100 CAPSULE ORAL at 20:44

## 2022-05-01 RX ADMIN — Medication 6 UNITS: at 21:20

## 2022-05-01 RX ADMIN — CEFTRIAXONE 1 G: 1 INJECTION, POWDER, FOR SOLUTION INTRAMUSCULAR; INTRAVENOUS at 02:07

## 2022-05-01 NOTE — PROGRESS NOTES
1955 - Head to toe assessment completed at this time. Pt resting in bed. Pt rated pain 2/10. No distress noted. 24G Left Hand INT. Nephrostomy tube in place on left side draining. Colostomy in place on left side. Call bell within reach. Will continue to monitor. 0000 - Pt resting on bed with eyes closed. Respirations even and unlabored. Call bell within reach. 0400 - Pt continue to rest in bed with eyes closed. Respirations even and unlabored. No distress noted. Call bell within reach.

## 2022-05-01 NOTE — ROUTINE PROCESS
Bedside and Verbal shift change report given to Patricia Bianchi RN by Brien Iglesias RN. Report included the following information Kardex.

## 2022-05-01 NOTE — PROGRESS NOTES
Hospitalist Progress Note    Patient: Jess Benz MRN: [id number]  [facility]: [id number]    YOB: 1940  Age: 79 y.o.   Sex: female    DOA: 4/28/2022 LOS:  LOS: 1 day            Patient Active Problem List   Diagnosis Code    Pyelonephritis N14    S/p nephrectomy Z90.5    Right flank pain R10.9    UTI (urinary tract infection) N39.0    Diabetes ([address] E11.9    Legally blind H54.8    Hypertension I10    S/P colectomy Z90.49    Pressure injury of right heel, unstageable ([facility]) L89.610    S/P AKA (above knee amputation), right ([address] Z89.611    Nephrostomy status ([address] Z93.6    Nephrostomy complication ([facility]) I73.147    Acute renal failure (ARF) ([address] N17.9    CKD stage 3 due to type 2 diabetes mellitus ([address] E11.22, N18.30    Pseudomonas aeruginosa infection A49.8    Displacement of nephrostomy tube ([address] T83.022A    Pressure injury of sacral region, stage 4 ([facility]) L89.154    Hydronephrosis, right N13.30    Abdominal pain R10.9    Pressure injury of right buttock, stage 4 ([facility]) L89.314    Nephrostomy tube displaced ([facility]) T83.022A    Pleural effusion on left J90    Decubitus ulcer of coccygeal region L89.159        IMPRESSION and Plan:    Jess Benz is a 79 y.o. female with   Patient Active Problem List    Diagnosis Date Noted    Nephrostomy tube displaced ([address] 04/29/2022    Pleural effusion on left 04/29/2022    Decubitus ulcer of coccygeal region 04/29/2022    Pressure injury of right buttock, stage 4 ([address] 03/07/2022    Hydronephrosis, right 12/07/2021    Abdominal pain 12/07/2021    Pressure injury of sacral region, stage 4 ([address] 08/09/2021    Displacement of nephrostomy tube ([address] 06/06/2021    Pseudomonas aeruginosa infection 03/22/2021    CKD stage 3 due to type 2 diabetes mellitus ([facility] 06/24/2020    Acute renal failure (ARF) ([facility] 06/21/2020    Nephrostomy complication ([facility] 99/92/6180    S/P AKA (above knee amputation), right ([facility] 06/03/2019    Nephrostomy status [facility] 06/03/2019    Pressure injury of right heel, unstageable ([address] 11/28/2018    UTI (urinary tract infection) 11/26/2018    Diabetes ([address] 11/26/2018    Legally blind 11/26/2018    Hypertension 11/26/2018    S/P colectomy 11/26/2018    Pyelonephritis 05/18/2018    S/p nephrectomy 05/18/2018    Right flank pain 05/18/2018     Principal Problem:    Nephrostomy tube displaced ([address] (4/29/2022)    Active Problems:    Legally blind (11/26/2018)      Hypertension (11/26/2018)      S/P AKA (above knee amputation), right ([address] (6/3/2019)      CKD stage 3 due to type 2 diabetes mellitus ([address] (6/24/2020)      Hydronephrosis, right (12/7/2021)      Pleural effusion on left (4/29/2022)      Decubitus ulcer of coccygeal region (4/29/2022)          78 y/o female with prior vaginal and colon cancer with left nephrectomy, right nephrostomy, and colostomy, legal blindness, type 2 diabetes mellitus, and right AKA is admitted with displacement of her nephrostomy tube. She also has a chronic left pleural effusion.     Nephrostomy tube displaced with right hydronephrosis- replaced.    Left pleural effusion-s/p   thoracentesis.        Coccygeal decubitus ulcer  -Wound care consult  -Santyl ordered     CKD stage 3 due to type 2 diabetes mellitus-creatinine is improved from baseline  -Avoid nephrotoxins     HTN  -Continue home meds     Legally blind and s/p AKA-resides in NH       Prophylaxis: SCDs, pepcid PO, holding anticoagulation periprocedurally       Patient's condition is fair  SNF on monday      Recommend to continue hospitalization. Discussed with patient. Chief Complaints:   Chief Complaint   Patient presents with    Urinary Catheter Problem     SUBJECTIVE:  Pt is seen and examined. Denies any pain. \" I feel okay\"      Review of systems:    Review of Systems   Constitutional: Positive for malaise/fatigue. HENT: Negative. Eyes: Negative. Respiratory: Negative for shortness of breath. Cardiovascular: Negative for chest pain, palpitations, orthopnea and leg swelling. Gastrointestinal: Negative. Negative for abdominal pain, diarrhea and heartburn. Genitourinary: Negative for dysuria and hematuria. Skin: Negative. Neurological: Positive for weakness. Psychiatric/Behavioral: Negative for depression, substance abuse and suicidal ideas. The patient is not nervous/anxious. PE:  Patient Vitals for the past 24 hrs:   BP Temp Pulse Resp SpO2   05/01/22 0743 (!) 101/51 98.1 °F (36.7 °C) 89 18 97 %   05/01/22 0325 (!) 112/44 98 °F (36.7 °C) 86 18 98 %   04/30/22 2315 (!) 105/52 98.8 °F (37.1 °C) 86 18 98 %   04/30/22 1955 (!) 117/40 98.2 °F (36.8 °C) 85 18 99 %   04/30/22 1617 (!) 101/51 98.3 °F (36.8 °C) (!) 101 20 96 %   04/30/22 1148 (!) 105/50 98.7 °F (37.1 °C) 79 20 99 %       Intake/Output Summary (Last 24 hours) at 5/1/2022 0856  Last data filed at 5/1/2022 0700  Gross per 24 hour   Intake 400 ml   Output 150 ml   Net 250 ml     Patient Vitals for the past 120 hrs:   Weight   04/28/22 1835 64 kg (141 lb)   04/28/22 1927 64 kg (141 lb 1.5 oz)         Physical Exam  Vitals and nursing note reviewed. Constitutional:       General: She is in acute distress. Neck:      Vascular: No JVD. Cardiovascular:      Rate and Rhythm: Normal rate and regular rhythm. Heart sounds: Normal heart sounds. Pulmonary:      Effort: No respiratory distress. Breath sounds: Normal breath sounds. Abdominal:      General: Bowel sounds are normal. There is no distension. Palpations: Abdomen is soft. Tenderness: There is no abdominal tenderness. There is no rebound. Musculoskeletal:         General: Normal range of motion. Cervical back: Normal range of motion and neck supple. Skin:     General: Skin is warm and dry. Neurological:      Mental Status: She is alert.    Psychiatric:         Mood and Affect: Affect normal.             Intake and Output:  Current Shift:  No intake/output data recorded. Last three shifts:  04/29 1901 - 05/01 0700  In: 400 [P.O.:100; I.V.:300]  Out: 400 [Urine:300]    Lab/Data Reviewed:  Recent Results (from the past 8 hour(s))   METABOLIC PANEL, COMPREHENSIVE    Collection Time: 05/01/22  7:22 AM   Result Value Ref Range    Sodium 137 136 - 145 mmol/L    Potassium 4.8 3.5 - 5.5 mmol/L    Chloride 109 100 - 111 mmol/L    CO2 21 21 - 32 mmol/L    Anion gap 7 3.0 - 18 mmol/L    Glucose 214 (H) 74 - 99 mg/dL    BUN 46 (H) 7.0 - 18 MG/DL    Creatinine 2.34 (H) 0.6 - 1.3 MG/DL    BUN/Creatinine ratio 20 12 - 20      GFR est AA 24 (L) >60 ml/min/1.73m2    GFR est non-AA 20 (L) >60 ml/min/1.73m2    Calcium 8.1 (L) 8.5 - 10.1 MG/DL    Bilirubin, total 0.1 (L) 0.2 - 1.0 MG/DL    ALT (SGPT) 46 13 - 56 U/L    AST (SGOT) 36 10 - 38 U/L    Alk.  phosphatase 161 (H) 45 - 117 U/L    Protein, total 6.7 6.4 - 8.2 g/dL    Albumin 1.8 (L) 3.4 - 5.0 g/dL    Globulin 4.9 (H) 2.0 - 4.0 g/dL    A-G Ratio 0.4 (L) 0.8 - 1.7     CBC W/O DIFF    Collection Time: 05/01/22  7:22 AM   Result Value Ref Range    WBC 10.4 4.6 - 13.2 K/uL    RBC 2.91 (L) 4.20 - 5.30 M/uL    HGB 7.7 (L) 12.0 - 16.0 g/dL    HCT 24.9 (L) 35.0 - 45.0 %    MCV 85.6 78.0 - 100.0 FL    MCH 26.5 24.0 - 34.0 PG    MCHC 30.9 (L) 31.0 - 37.0 g/dL    RDW 15.9 (H) 11.6 - 14.5 %    PLATELET 622 373 - 675 K/uL    MPV 9.1 (L) 9.2 - 11.8 FL    NRBC 0.0 0  WBC    ABSOLUTE NRBC 0.00 0.00 - 0.01 K/uL   GLUCOSE, POC    Collection Time: 05/01/22  7:41 AM   Result Value Ref Range    Glucose (POC) 206 (H) 70 - 110 mg/dL     Medications:  Current Facility-Administered Medications   Medication Dose Route Frequency    sodium chloride (NS) flush 5-40 mL  5-40 mL IntraVENous Q8H    sodium chloride (NS) flush 5-40 mL  5-40 mL IntraVENous PRN    acetaminophen (TYLENOL) tablet 650 mg  650 mg Oral Q6H PRN    Or    acetaminophen (TYLENOL) suppository 650 mg  650 mg Rectal Q6H PRN    polyethylene glycol (MIRALAX) packet 17 g 17 g Oral DAILY PRN    ondansetron (ZOFRAN ODT) tablet 4 mg  4 mg Oral Q8H PRN    Or    ondansetron (ZOFRAN) injection 4 mg  4 mg IntraVENous Q6H PRN    enoxaparin (LOVENOX) injection 30 mg  30 mg SubCUTAneous DAILY    cefTRIAXone (ROCEPHIN) 1 g in 0.9% sodium chloride (MBP/ADV) 50 mL MBP  1 g IntraVENous Q24H    atorvastatin (LIPITOR) tablet 20 mg  20 mg Oral QHS    docusate sodium (COLACE) capsule 100 mg  100 mg Oral QHS    famotidine (PEPCID) tablet 10 mg  10 mg Oral Q12H    gabapentin (NEURONTIN) capsule 300 mg  300 mg Oral BID    levothyroxine (SYNTHROID) tablet 50 mcg  50 mcg Oral ACB    melatonin (rapid dissolve) tablet 5 mg  5 mg Oral QHS    metoprolol succinate (TOPROL-XL) XL tablet 50 mg  50 mg Oral DAILY    multivitamin, tx-iron-ca-min (THERA-M w/ IRON) tablet 1 Tablet  1 Tablet Oral DAILY    insulin lispro (HUMALOG) injection   SubCUTAneous AC&HS    glucose chewable tablet 16 g  16 g Oral PRN    glucagon (GLUCAGEN) injection 1 mg  1 mg IntraMUSCular PRN    dextrose 10% infusion 0-250 mL  0-250 mL IntraVENous PRN    collagenase (SANTYL) 250 unit/gram ointment   Topical DAILY    nystatin (MYCOSTATIN) 100,000 unit/gram cream 1 g  1 g Topical BID    oxyCODONE IR (ROXICODONE) tablet 10 mg  10 mg Oral Q6H PRN    0.9% sodium chloride infusion 500 mL  500 mL Irrigation RAD CONTINUOUS    iopamidoL (ISOVUE 300) 61 % contrast injection 1-100 mL  1-100 mL IntraCATHeter RAD CONTINUOUS       Recent Results (from the past 24 hour(s))   GLUCOSE, POC    Collection Time: 04/30/22 11:45 AM   Result Value Ref Range    Glucose (POC) 137 (H) 70 - 110 mg/dL   GLUCOSE, POC    Collection Time: 04/30/22  5:00 PM   Result Value Ref Range    Glucose (POC) 335 (H) 70 - 110 mg/dL   GLUCOSE, POC    Collection Time: 04/30/22  9:07 PM   Result Value Ref Range    Glucose (POC) 233 (H) 70 - 943 mg/dL   METABOLIC PANEL, COMPREHENSIVE    Collection Time: 05/01/22  7:22 AM   Result Value Ref Range    Sodium 137 136 - 145 mmol/L    Potassium 4.8 3.5 - 5.5 mmol/L    Chloride 109 100 - 111 mmol/L    CO2 21 21 - 32 mmol/L    Anion gap 7 3.0 - 18 mmol/L    Glucose 214 (H) 74 - 99 mg/dL    BUN 46 (H) 7.0 - 18 MG/DL    Creatinine 2.34 (H) 0.6 - 1.3 MG/DL    BUN/Creatinine ratio 20 12 - 20      GFR est AA 24 (L) >60 ml/min/1.73m2    GFR est non-AA 20 (L) >60 ml/min/1.73m2    Calcium 8.1 (L) 8.5 - 10.1 MG/DL    Bilirubin, total 0.1 (L) 0.2 - 1.0 MG/DL    ALT (SGPT) 46 13 - 56 U/L    AST (SGOT) 36 10 - 38 U/L    Alk.  phosphatase 161 (H) 45 - 117 U/L    Protein, total 6.7 6.4 - 8.2 g/dL    Albumin 1.8 (L) 3.4 - 5.0 g/dL    Globulin 4.9 (H) 2.0 - 4.0 g/dL    A-G Ratio 0.4 (L) 0.8 - 1.7     CBC W/O DIFF    Collection Time: 05/01/22  7:22 AM   Result Value Ref Range    WBC 10.4 4.6 - 13.2 K/uL    RBC 2.91 (L) 4.20 - 5.30 M/uL    HGB 7.7 (L) 12.0 - 16.0 g/dL    HCT 24.9 (L) 35.0 - 45.0 %    MCV 85.6 78.0 - 100.0 FL    MCH 26.5 24.0 - 34.0 PG    MCHC 30.9 (L) 31.0 - 37.0 g/dL    RDW 15.9 (H) 11.6 - 14.5 %    PLATELET 437 374 - 675 K/uL    MPV 9.1 (L) 9.2 - 11.8 FL    NRBC 0.0 0  WBC    ABSOLUTE NRBC 0.00 0.00 - 0.01 K/uL   GLUCOSE, POC    Collection Time: 05/01/22  7:41 AM   Result Value Ref Range    Glucose (POC) 206 (H) 70 - 110 mg/dL       Procedures/imaging: see electronic medical records for all procedures/Xrays and details which were not copied into this note but were reviewed prior to creation of Radha Reed MD   5/1/2022, 1:33 PM

## 2022-05-02 VITALS
SYSTOLIC BLOOD PRESSURE: 120 MMHG | WEIGHT: 141.09 LBS | OXYGEN SATURATION: 96 % | TEMPERATURE: 98.8 F | HEART RATE: 80 BPM | HEIGHT: 63 IN | DIASTOLIC BLOOD PRESSURE: 50 MMHG | BODY MASS INDEX: 25 KG/M2 | RESPIRATION RATE: 16 BRPM

## 2022-05-02 LAB
ALBUMIN SERPL-MCNC: 1.9 G/DL (ref 3.4–5)
ALBUMIN/GLOB SERPL: 0.4 {RATIO} (ref 0.8–1.7)
ALP SERPL-CCNC: 185 U/L (ref 45–117)
ALT SERPL-CCNC: 43 U/L (ref 13–56)
ANION GAP SERPL CALC-SCNC: 8 MMOL/L (ref 3–18)
AST SERPL-CCNC: 27 U/L (ref 10–38)
BACTERIA SPEC CULT: ABNORMAL
BILIRUB SERPL-MCNC: 0.2 MG/DL (ref 0.2–1)
BUN SERPL-MCNC: 44 MG/DL (ref 7–18)
BUN/CREAT SERPL: 21 (ref 12–20)
CALCIUM SERPL-MCNC: 8.2 MG/DL (ref 8.5–10.1)
CC UR VC: ABNORMAL
CC UR VC: ABNORMAL
CHLORIDE SERPL-SCNC: 111 MMOL/L (ref 100–111)
CO2 SERPL-SCNC: 20 MMOL/L (ref 21–32)
CREAT SERPL-MCNC: 2.05 MG/DL (ref 0.6–1.3)
ERYTHROCYTE [DISTWIDTH] IN BLOOD BY AUTOMATED COUNT: 15.7 % (ref 11.6–14.5)
GLOBULIN SER CALC-MCNC: 4.6 G/DL (ref 2–4)
GLUCOSE BLD STRIP.AUTO-MCNC: 207 MG/DL (ref 70–110)
GLUCOSE SERPL-MCNC: 137 MG/DL (ref 74–99)
HCT VFR BLD AUTO: 27.2 % (ref 35–45)
HGB BLD-MCNC: 8.4 G/DL (ref 12–16)
MCH RBC QN AUTO: 26.6 PG (ref 24–34)
MCHC RBC AUTO-ENTMCNC: 30.9 G/DL (ref 31–37)
MCV RBC AUTO: 86.1 FL (ref 78–100)
NRBC # BLD: 0 K/UL (ref 0–0.01)
NRBC BLD-RTO: 0 PER 100 WBC
PLATELET # BLD AUTO: 373 K/UL (ref 135–420)
PMV BLD AUTO: 9.6 FL (ref 9.2–11.8)
POTASSIUM SERPL-SCNC: 5.2 MMOL/L (ref 3.5–5.5)
PROT SERPL-MCNC: 6.5 G/DL (ref 6.4–8.2)
RBC # BLD AUTO: 3.16 M/UL (ref 4.2–5.3)
SERVICE CMNT-IMP: ABNORMAL
SERVICE CMNT-IMP: ABNORMAL
SODIUM SERPL-SCNC: 139 MMOL/L (ref 136–145)
WBC # BLD AUTO: 8.4 K/UL (ref 4.6–13.2)

## 2022-05-02 PROCEDURE — 85027 COMPLETE CBC AUTOMATED: CPT

## 2022-05-02 PROCEDURE — 74011250637 HC RX REV CODE- 250/637: Performed by: INTERNAL MEDICINE

## 2022-05-02 PROCEDURE — 74011636637 HC RX REV CODE- 636/637: Performed by: FAMILY MEDICINE

## 2022-05-02 PROCEDURE — 80053 COMPREHEN METABOLIC PANEL: CPT

## 2022-05-02 PROCEDURE — 82962 GLUCOSE BLOOD TEST: CPT

## 2022-05-02 PROCEDURE — 36415 COLL VENOUS BLD VENIPUNCTURE: CPT

## 2022-05-02 PROCEDURE — 74011250637 HC RX REV CODE- 250/637: Performed by: FAMILY MEDICINE

## 2022-05-02 PROCEDURE — 74011000258 HC RX REV CODE- 258: Performed by: FAMILY MEDICINE

## 2022-05-02 PROCEDURE — 74011250636 HC RX REV CODE- 250/636: Performed by: HOSPITALIST

## 2022-05-02 PROCEDURE — 74011250636 HC RX REV CODE- 250/636: Performed by: FAMILY MEDICINE

## 2022-05-02 RX ORDER — GABAPENTIN 300 MG/1
300 CAPSULE ORAL 2 TIMES DAILY
Qty: 4 CAPSULE | Refills: 0 | Status: SHIPPED | OUTPATIENT
Start: 2022-05-02

## 2022-05-02 RX ORDER — LINEZOLID 600 MG/1
600 TABLET, FILM COATED ORAL EVERY 12 HOURS
Qty: 14 TABLET | Refills: 0 | Status: SHIPPED | OUTPATIENT
Start: 2022-05-02 | End: 2022-05-09

## 2022-05-02 RX ORDER — VANCOMYCIN HYDROCHLORIDE
1250 ONCE
Status: DISCONTINUED | OUTPATIENT
Start: 2022-05-02 | End: 2022-05-02 | Stop reason: ALTCHOICE

## 2022-05-02 RX ORDER — LINEZOLID 600 MG/1
600 TABLET, FILM COATED ORAL EVERY 12 HOURS
Status: DISCONTINUED | OUTPATIENT
Start: 2022-05-02 | End: 2022-05-02 | Stop reason: HOSPADM

## 2022-05-02 RX ADMIN — Medication 2 UNITS: at 11:01

## 2022-05-02 RX ADMIN — CEFTRIAXONE 1 G: 1 INJECTION, POWDER, FOR SOLUTION INTRAMUSCULAR; INTRAVENOUS at 02:28

## 2022-05-02 RX ADMIN — Medication 1 TABLET: at 09:33

## 2022-05-02 RX ADMIN — METOPROLOL SUCCINATE 50 MG: 50 TABLET, EXTENDED RELEASE ORAL at 09:33

## 2022-05-02 RX ADMIN — FAMOTIDINE 10 MG: 20 TABLET, FILM COATED ORAL at 09:33

## 2022-05-02 RX ADMIN — VANCOMYCIN HYDROCHLORIDE 1250 MG: 500 INJECTION, POWDER, LYOPHILIZED, FOR SOLUTION INTRAVENOUS at 10:55

## 2022-05-02 RX ADMIN — LINEZOLID 600 MG: 600 TABLET, FILM COATED ORAL at 12:31

## 2022-05-02 RX ADMIN — COLLAGENASE SANTYL: 250 OINTMENT TOPICAL at 09:31

## 2022-05-02 RX ADMIN — GABAPENTIN 300 MG: 300 CAPSULE ORAL at 09:34

## 2022-05-02 RX ADMIN — ENOXAPARIN SODIUM 30 MG: 100 INJECTION SUBCUTANEOUS at 09:32

## 2022-05-02 NOTE — ACP (ADVANCE CARE PLANNING)
.Advance Care Planning     General Advance Care Planning (ACP) Conversation      Date of Conversation: chart reviewed  Conducted with: chart reviewed 5/2/2022    Healthcare Decision Maker:     Primary Decision Maker: Hemal Forman - Misael - [phone]  Click here to complete [address] including selection of the Healthcare Decision Maker Relationship (ie \"Primary\")          Content/Action Overview:   Pt has a DNR on file  Reviewed DNR/DNI and patient  Pt has a DNR on file    Length of Voluntary ACP Conversation in minutes:  10 min    [address]

## 2022-05-02 NOTE — PROGRESS NOTES
Problem: Risk for Spread of Infection  Goal: Prevent transmission of infectious organism to others  Description: Prevent the transmission of infectious organisms to other patients, staff members, and visitors. Outcome: Progressing Towards Goal     Problem: Falls - Risk of  Goal: *Absence of Falls  Description: Document Sulaimanmariam Lawrence Fall Risk and appropriate interventions in the flowsheet.   Outcome: Progressing Towards Goal  Note: Fall Risk Interventions:       Mentation Interventions: Adequate sleep, hydration, pain control,Bed/chair exit alarm,More frequent rounding,Update white board    Medication Interventions: Patient to call before getting OOB,Teach patient to arise slowly,Bed/chair exit alarm    Elimination Interventions: Bed/chair exit alarm,Call light in reach,Patient to call for help with toileting needs,Stay With Me (per policy)

## 2022-05-02 NOTE — CONSULTS
Pt seen and examined, full note to follow. Known to me from prior admissions. Imp:    MRSA and E.fecalis( ID pending)-- form urine, recent PCN X-change  Chronic medical conditions as listed in notes    Plan:  Linezolid 600mg PO BID will cover MRSA as well as E.fecalis( ID still pending, but doesn't )- X7 days  --no need for IV  Vanco( she doesn't  Have IV access anyway)    Thanks for the consult    Magaly hCo MD  [facility]  Office #:     [phone]  [id number] [phone]   Office Fax: [phone]

## 2022-05-02 NOTE — PROGRESS NOTES
8037- Pt refused morning vitals and blood sugar check. Pt stated \"I'm not doing it, I don't need my sugar check and I don't need no vitals. \"

## 2022-05-02 NOTE — DIABETES MGMT
GLYCEMIC CONTROL PLAN OF CARE        Diabetes Management:      Recommend: BG > 200 mg/dL x2 yesterday, initiate conservative dose of basal insulin    *Lantus 5 units daily    Assessment:  DX:   1. Nephrostomy tube displaced ([address]       Fasting Morning Blood Glucose   Lab Results   Component Value Date/Time    Glucose 137 (H) 05/02/2022 02:48 AM    Glucose (POC) 207 (H) 05/02/2022 10:54 AM    Glucose,  (H) 01/16/2020 09:28 AM     Steroids: no  BG in target range (non-ICU\" < 180 ; -180):  [] Yes  [x] No      Current Insulin regimen: - Humalog Normal Insulin Sensitivity Corrective Coverage    TDD previous day = 12 units - Humalog Normal Insulin Sensitivity Corrective Coverage    Most recent blood glucose results:   Lab Results   Component Value Date/Time     (H) 05/02/2022 02:48 AM    GLUCPOC 207 (H) 05/02/2022 10:54 AM    GLUCPOC 253 (H) 05/01/2022 09:01 PM    GLUCPOC 132 (H) 05/01/2022 04:05 PM         IV Fluids containing dextrose: none    Hypo: no  HbA1C: equivalent  to ave BGlucose of 169 mg/dl for 2-3 months prior to admission  Lab Results   Component Value Date/Time    Hemoglobin A1c 7.5 (H) 04/30/2022 04:36 AM       Adequate glycemic control PTA:  [] Yes  [x] No      Home diabetes medications:  Key Antihyperglycemic Medications             insulin detemir U-100 (Levemir U-100 Insulin) 100 unit/mL injection 10 Units by SubCUTAneous route nightly. insulin aspart U-100 (NovoLOG Flexpen U-100 Insulin) 100 unit/mL (3 mL) inpn by SubCUTAneous route.  151-200 = 2u  201-250 = 4u  251-300 = 6u  301-350 = 8u  351-400 = 10u  401 + = 12u notify MD          Goals:  Blood glucose will be within target range of 70 - 180 mg/dL by: 5/15    Diet:   Active Orders   Diet    ADULT DIET Regular; 3 carb choices (45 gm/meal)       Patient Vitals for the past 168 hrs:   % Diet Eaten   05/01/22 2045 0%   05/01/22 1731 26 - 50%   05/01/22 0900 51 - 75%     Education:  _____ Refer to Diabetes Education Record __X___ Education not indicated at this time      Miguel A Sender MS, RN, CDE  Glycemic Control Team  [phone]

## 2022-05-02 NOTE — CONSULTS
[facility]  (A Division of [address] [place] [address])                                                                                                                      Jamari Ratliff MD  Office #: - Option # [phone]  Fax #: [phone]     Date of Admission: 4/28/2022Date of Note: 5/2/2022      Reason for Referral: Evaluation and antibiotic management of MRSA positive urine culture requested by Dr Lacie Traore. Thank you for involving me in the care of this patient. Please do not hesitate to contact me on the above number if question or concern. Current Antimicrobials:    Prior Antimicrobials:  CTX 4/29 to date  Vanco IV- starting 5/2/22    Immunosuppressive drugs:        Assessment- ID related:  --------------------------------------------------------------------------  · MRSA and E.fecalis + urine culture from R PCN -Changed 4/29/22-- no ongoing sepsis without targeted ABX for above 2 pathogens-- could be colonization -- since new tubing, will treat briefly  · Dislodged R PCN- reason for admission  · R pleural effusion-- thoracentesis 400 CC 4/29  · Leucocytosis : resolved    Other Medical Issues- Mx per respective team:    · R PCN -chronic  · S/P AKA- R  · CKD  · Sacral DU- with erythema  · S/P   · Legally blind and [place]     Recommendation for ID issues I am following:  ------------------------------------------------------------------------------    DW with IV tech-- no need for IV access, can be treated with linezolid 600 mg PO BID X7 days    Can DC from ID side           HPI:  Cristiane Rosa is a 79 y.o. WHITE/NON- with PMH as listed below-- known to me from prior admissions for similar problem. Difficult to get history from her due to [facility]. She resides in SNF and was brought in to ED 4/28 due to dislodged R PCN and increasing right flank pain. IR placed new PCN, urine culture taken X2- + for MRSA and E.fecalis. she didn't have high fever or leucocytosis.   She also had R pleural fluid tapped on 4/29. At time of exam, attempt was being made to establish IV. She c/o soreness on her back and was asking for pillow.          Active Hospital Problems    Diagnosis Date Noted    Nephrostomy tube displaced ([address] 04/29/2022    Pleural effusion on left 04/29/2022    Decubitus ulcer of coccygeal region 04/29/2022    Hydronephrosis, right 12/07/2021    CKD stage 3 due to type 2 diabetes mellitus ([address] 06/24/2020    S/P AKA (above knee amputation), right ([address] 06/03/2019    Hypertension 11/26/2018    Legally blind 11/26/2018     Past Medical History:   Diagnosis Date    Cancer ([address]     vaginal and colon cancer    Cancer ([address]     Chronic pain     arthritis    Diabetes ([address]     DVT (deep venous thrombosis) ([address]     Hypertension     Legally blind     Thyroid disease      Past Surgical History:   Procedure Laterality Date    HX ABOVE KNEE AMPUTATION Right     HX COLOSTOMY      HX ORTHOPAEDIC      HX OTHER SURGICAL      kidney drainage tube    HX UROLOGICAL      kidney removal    IR CHANGE NEPHROSTOMY PYELOS TUBE RT  12/7/2021    IR EXCHANGE NEPHRO PERC RT SI  1/28/2019    IR EXCHANGE NEPHRO PERC RT SI  3/21/2019    IR EXCHANGE NEPHRO PERC RT SI  5/13/2019    IR EXCHANGE NEPHRO PERC RT SI  6/27/2019    IR EXCHANGE NEPHRO PERC RT SI  8/16/2019    IR EXCHANGE NEPHRO PERC RT SI  10/3/2019    IR EXCHANGE NEPHRO PERC RT SI  11/18/2019    IR EXCHANGE NEPHRO PERC RT SI  1/6/2020    IR EXCHANGE NEPHRO PERC RT SI  2/24/2020    IR EXCHANGE NEPHRO PERC RT SI  4/13/2020    IR EXCHANGE NEPHRO PERC RT SI  6/1/2020    IR EXCHANGE NEPHRO PERC RT SI  6/19/2020    IR EXCHANGE NEPHRO PERC RT SI  8/10/2020    IR EXCHANGE NEPHRO PERC RT SI  9/30/2020    IR EXCHANGE NEPHRO PERC RT SI  11/5/2020    IR EXCHANGE NEPHRO PERC RT SI  12/16/2020    IR EXCHANGE NEPHRO PERC RT SI  2/1/2021    IR EXCHANGE NEPHRO PERC RT SI  3/25/2021    IR EXCHANGE NEPHRO PERC RT SI 5/10/2021    IR EXCHANGE NEPHRO PERC RT SI  8/2/2021    IR EXCHANGE NEPHRO PERC RT SI  9/13/2021    IR EXCHANGE NEPHRO PERC RT SI  11/9/2021    IR EXCHANGE NEPHRO PERC RT SI  1/27/2022    IR EXCHANGE NEPHRO PERC RT SI  3/21/2022    IR EXCHANGE NEPHRO PERC RT SI  4/29/2022    IR NEPHROSTOMY PERC RT PLC CATH  SI  6/21/2020    IR NEPHROSTOMY PERC RT PLC CATH  SI  6/7/2021     History reviewed. No pertinent family history. Social History     Socioeconomic History    Marital status:      Spouse name: Not on file    Number of children: Not on file    Years of education: Not on file    Highest education level: Not on file   Occupational History    Not on file   Tobacco Use    Smoking status: Never Smoker    Smokeless tobacco: Never Used   Substance and Sexual Activity    Alcohol use: No    Drug use: Never    Sexual activity: Not on file   Other Topics Concern     Service Not Asked    Blood Transfusions Not Asked    Caffeine Concern Not Asked    Occupational Exposure Not Asked    Hobby Hazards Not Asked    Sleep Concern Not Asked    Stress Concern Not Asked    Weight Concern Not Asked    Special Diet Not Asked    Back Care Not Asked    Exercise Not Asked    Bike Helmet Not Asked   [facility] Not Asked    Self-Exams Not Asked   Social History Narrative    Not on file     Social Determinants of Health     Financial Resource Strain:     Difficulty of Paying Living Expenses: Not on file   Food Insecurity:     Worried About Running Out of Food in the Last Year: Not on file    Samreen of Food in the Last Year: Not on file   Transportation Needs:     Lack of Transportation (Medical): Not on file    Lack of Transportation (Non-Medical):  Not on file   Physical Activity:     Days of Exercise per Week: Not on file    Minutes of Exercise per Session: Not on file   Stress:     Feeling of Stress : Not on file   Social Connections:     Frequency of Communication with Friends and Family: Not on file    Frequency of Social Gatherings with Friends and Family: Not on file    Attends Christian Services: Not on file    Active Member of Clubs or Organizations: Not on file    Attends Club or Organization Meetings: Not on file    Marital Status: Not on file   Intimate Partner Violence:     Fear of Current or Ex-Partner: Not on file    Emotionally Abused: Not on file    Physically Abused: Not on file    Sexually Abused: Not on file   Housing Stability:     Unable to Pay for Housing in the Last Year: Not on file    Number of Places Lived in the Last Year: Not on file    Unstable Housing in the Last Year: Not on file       Allergies:  Morphine and Pcn [penicillins]     Medications:  Current Facility-Administered Medications   Medication Dose Route Frequency    Vancomycin Pharmacy to Dose  1 Each Other Rx Dosing/Monitoring    vancomycin (VANCOCIN) 1250 mg in  ml infusion  1,250 mg IntraVENous ONCE    [START ON 5/3/2022] Vancomycin Random 5/3/2022 at 1000  1 Each Other ONCE    sodium chloride (NS) flush 5-40 mL  5-40 mL IntraVENous Q8H    sodium chloride (NS) flush 5-40 mL  5-40 mL IntraVENous PRN    acetaminophen (TYLENOL) tablet 650 mg  650 mg Oral Q6H PRN    Or    acetaminophen (TYLENOL) suppository 650 mg  650 mg Rectal Q6H PRN    polyethylene glycol (MIRALAX) packet 17 g  17 g Oral DAILY PRN    ondansetron (ZOFRAN ODT) tablet 4 mg  4 mg Oral Q8H PRN    Or    ondansetron (ZOFRAN) injection 4 mg  4 mg IntraVENous Q6H PRN    enoxaparin (LOVENOX) injection 30 mg  30 mg SubCUTAneous DAILY    atorvastatin (LIPITOR) tablet 20 mg  20 mg Oral QHS    docusate sodium (COLACE) capsule 100 mg  100 mg Oral QHS    famotidine (PEPCID) tablet 10 mg  10 mg Oral Q12H    gabapentin (NEURONTIN) capsule 300 mg  300 mg Oral BID    levothyroxine (SYNTHROID) tablet 50 mcg  50 mcg Oral ACB    melatonin (rapid dissolve) tablet 5 mg  5 mg Oral QHS    metoprolol succinate (TOPROL-XL) XL tablet 50 mg  50 mg Oral DAILY    multivitamin, tx-iron-ca-min (THERA-M w/ IRON) tablet 1 Tablet  1 Tablet Oral DAILY    insulin lispro (HUMALOG) injection   SubCUTAneous AC&HS    glucose chewable tablet 16 g  16 g Oral PRN    glucagon (GLUCAGEN) injection 1 mg  1 mg IntraMUSCular PRN    dextrose 10% infusion 0-250 mL  0-250 mL IntraVENous PRN    collagenase (SANTYL) 250 unit/gram ointment   Topical DAILY    nystatin (MYCOSTATIN) 100,000 unit/gram cream 1 g  1 g Topical BID    oxyCODONE IR (ROXICODONE) tablet 10 mg  10 mg Oral Q6H PRN    0.9% sodium chloride infusion 500 mL  500 mL Irrigation RAD CONTINUOUS    iopamidoL (ISOVUE 300) 61 % contrast injection 1-100 mL  1-100 mL IntraCATHeter RAD CONTINUOUS        ROS:  Review of systems not obtained due to patient factors. Physical Exam:    Temp (24hrs), Av.8 °F (37.1 °C), Min:98.8 °F (37.1 °C), Max:98.8 °F (37.1 °C)    Visit Vitals  BP (!) 117/42 (BP 1 Location: Left upper arm, BP Patient Position: At rest)   Pulse 76   Temp 98.8 °F (37.1 °C)   Resp 18   Ht 5' 3\" (1.6 m)   Wt 64 kg (141 lb 1.5 oz)   SpO2 98%   BMI 24.99 kg/m²          GEN: WD Chronically ill looking, on RA-- not in resp distress. HEENT: Unicteric. EOMI intact  CHEST: Non laboured breathing- CTA  CVS:RRR  ABd: soft, colostomy in place. SHAISTA: R pcn with clear urine  EXT: Sacral DU reviewed in media section  Skin: Dry and intact. CNS: A, [place] and legally blind.  Moves all extremites     Microbiology  All Micro Results     Procedure Component Value Units Date/Time    CULTURE, URINE [[id number]]  (Abnormal)  (Susceptibility) Collected: 22 1030    Order Status: Completed Specimen: Urine from Nephrostomy Updated: 22 0349     Special Requests: NO SPECIAL REQUESTS        [place] Count --        >100,000  COLONIES/mL       Culture result:       * Methicillin Resistant Staphylococcus aureus *            ENTEROCOCCUS FAECALIS         (NOTE) MRSA WAS CALLED TO AND READ BACK BY CAR MD AT 1527 ON 05/02/2022 BY MARIELLE     CULTURE, URINE [[id number]]  (Abnormal)  (Susceptibility) Collected: 04/29/22 0536    Order Status: Completed Specimen: Cath Urine Updated: 05/02/22 1527     Special Requests: NO SPECIAL REQUESTS        [place] Count --        >100,000  COLONIES/mL       Culture result:       * Methicillin Resistant Staphylococcus aureus *                  ENTEROCOCCUS FAECALIS REFER TO [id number] FOR SENSITIVITIES            (NOTE) MRSA WAS CALLED TO AND READ BACK BY CAR MCLAUGHLIN AT 1527 ON 05/02/2022 BY MARIELLE                Lines / Catheters:  Lab results:    Chemistry  Recent Labs     05/02/22 0248 05/01/22 0722 04/30/22  0436   * 214* 155*    137 136   K 5.2 4.8 5.2    109 107   CO2 20* 21 22   BUN 44* 46* 51*   CREA 2.05* 2.34* 2.61*   CA 8.2* 8.1* 8.2*   AGAP 8 7 7   BUCR 21* 20 20   * 161* 143*   TP 6.5 6.7 7.4   ALB 1.9* 1.8* 2.0*   GLOB 4.6* 4.9* 5.4*   AGRAT 0.4* 0.4* 0.4*       CBC w/ Diff  Recent Labs     05/02/22 0248 05/01/22 0722 04/30/22  0436   WBC 8.4 10.4 10.8   RBC 3.16* 2.91* 3.15*   HGB 8.4* 7.7* 8.3*   HCT 27.2* 24.9* 27.7*    401 407     Imaging: report reviewed and as posted by radiologist

## 2022-05-02 NOTE — WOUND CARE
IP WOUND CONSULT    Chito Rodriguez  MEDICAL RECORD NUMBER:  [id number]  AGE: 79 y.o.    GENDER: female  : 1940  TODAY'S DATE:  2022    GENERAL     [] Follow-up   [x] New Consult    Chito Rodriguez is a 79 y.o. female referred by:   [] Physician  [x] Nursing  [] Other:         PAST MEDICAL HISTORY    Past Medical History:   Diagnosis Date    [facility]     vaginal and colon cancer    Cancer [facility]     Chronic pain     arthritis    Diabetes ([facility]     DVT (deep venous thrombosis) ([facility]     Hypertension     Legally blind     Thyroid disease         PAST SURGICAL HISTORY    Past Surgical History:   Procedure Laterality Date    HX ABOVE KNEE AMPUTATION Right     HX COLOSTOMY      HX ORTHOPAEDIC      HX OTHER SURGICAL      kidney drainage tube    HX UROLOGICAL      kidney removal    IR CHANGE NEPHROSTOMY PYELOS TUBE RT  2021    IR EXCHANGE NEPHRO PERC RT SI  2019    IR EXCHANGE NEPHRO PERC RT SI  3/21/2019    IR EXCHANGE NEPHRO PERC RT SI  2019    IR EXCHANGE NEPHRO PERC RT SI  2019    IR EXCHANGE NEPHRO PERC RT SI  2019    IR EXCHANGE NEPHRO PERC RT SI  10/3/2019    IR EXCHANGE NEPHRO PERC RT SI  2019    IR EXCHANGE NEPHRO PERC RT SI  2020    IR EXCHANGE NEPHRO PERC RT SI  2020    IR EXCHANGE NEPHRO PERC RT SI  2020    IR EXCHANGE NEPHRO PERC RT SI  2020    IR EXCHANGE NEPHRO PERC RT SI  2020    IR EXCHANGE NEPHRO PERC RT SI  8/10/2020    IR EXCHANGE NEPHRO PERC RT SI  2020    IR EXCHANGE NEPHRO PERC RT SI  2020    IR EXCHANGE NEPHRO PERC RT SI  2020    IR EXCHANGE NEPHRO PERC RT SI  2021    IR EXCHANGE NEPHRO PERC RT SI  3/25/2021    IR EXCHANGE NEPHRO PERC RT SI  5/10/2021    IR EXCHANGE NEPHRO PERC RT SI  2021    IR EXCHANGE NEPHRO PERC RT SI  2021    IR EXCHANGE NEPHRO PERC RT SI  2021    IR EXCHANGE NEPHRO PERC RT SI  2022    IR EXCHANGE NEPHRO PERC RT SI  3/21/2022    IR EXCHANGE NEPHRO PERC RT SI  4/29/2022    IR NEPHROSTOMY PERC RT PLC CATH  SI  6/21/2020    IR NEPHROSTOMY PERC RT PLC CATH  SI  6/7/2021       FAMILY HISTORY    History reviewed. No pertinent family history. SOCIAL HISTORY    Social History     Tobacco Use    Smoking status: Never Smoker    Smokeless tobacco: Never Used   Substance Use Topics    Alcohol use: No    Drug use: Never       ALLERGIES    Allergies   Allergen Reactions    Morphine Nausea and Vomiting    Pcn [Penicillins] Rash       MEDICATIONS    No current facility-administered medications on file prior to encounter. Current Outpatient Medications on File Prior to Encounter   Medication Sig Dispense Refill    docusate sodium (Colace) 100 mg capsule Take 100 mg by mouth nightly.  insulin detemir U-100 (Levemir U-100 Insulin) 100 unit/mL injection 10 Units by SubCUTAneous route nightly.  Arginine-Glutamine-Calcium HMB (Evans) 7-7-1.5 gram pwpk Take  by mouth two (2) times a day.  gabapentin (NEURONTIN) 300 mg capsule Take 300 mg by mouth two (2) times a day.  nystatin (MYCOSTATIN) topical cream Apply 100,000 g to affected area two (2) times a day.  insulin aspart U-100 (NovoLOG Flexpen U-100 Insulin) 100 unit/mL (3 mL) inpn by SubCUTAneous route. 151-200 = 2u  201-250 = 4u  251-300 = 6u  301-350 = 8u  351-400 = 10u  401 + = 12u notify MD      acetaminophen (TYLENOL) 500 mg tablet Take 2 Tabs by mouth every eight (8) hours as needed for Pain or Fever. (Patient taking differently: Take 325 mg by mouth every six (6) hours as needed for Pain or Fever. Give two tablets.) 30 Tab 0    famotidine (PEPCID) 10 mg tablet Take 1 Tab by mouth every twelve (12) hours. 60 Tab 0    melatonin 5 mg tablet Take 1 Tab by mouth nightly. 30 Tab 0    multivitamin (ONE A DAY) tablet Take 1 Tab by mouth daily.  metoprolol succinate (TOPROL-XL) 50 mg XL tablet Take 50 mg by mouth daily.       levothyroxine (SYNTHROID) 50 mcg tablet Take  by mouth Daily (before breakfast).  atorvastatin calcium (ATORVASTATIN PO) Take 20 mg by mouth daily. Wt Readings from Last 3 Encounters:   04/28/22 64 kg (141 lb 1.5 oz)   12/07/21 64 kg (141 lb)   06/11/21 66.7 kg (147 lb)       [unfilled]  Visit Vitals  BP (!) 117/42 (BP 1 Location: Left upper arm, BP Patient Position: At rest)   Pulse 76   Temp 98.8 °F (37.1 °C)   Resp 18   Ht 5' 3\" (1.6 m)   Wt 64 kg (141 lb 1.5 oz)   SpO2 98%   BMI 24.99 kg/m²       ASSESSMENT     Skin impairment Identification:  Type: pressure    Contributing Factors: chronic pressure, decreased mobility, incontinence of stool and incontinence of urine    Wound Sacral/coccyx (Active)   Wound Image   05/02/22 1207   Dressing Status New drainage noted 05/02/22 1207   Cleansed Cleansed with saline 05/02/22 1207   Dressing/Treatment Gauze dressing/dressing sponge;Silicone border 10/94/00 1207   Wound Length (cm) 8 cm 05/02/22 1207   Wound Width (cm) 6 cm 05/02/22 1207   Wound Surface Area (cm^2) 48 cm^2 05/02/22 1207   Change in Wound Size % -3709.52 05/02/22 [address] 05/02/22 1207   Drainage Amount Small 05/02/22 1207   Drainage Description Serous 05/02/22 1207   Wound Odor None 05/02/22 1207   Carol-Wound/Incision Assessment Blanchable erythema 05/02/22 1207   Edges Defined edges 05/02/22 1207   Wound Thickness Description Full thickness 05/02/22 1207   Number of days: 266       Wound Gluteal fold/cleft Right (Active)   Number of days: 56          PLAN     Skin Care & Pressure Relief Recommendations  Minimize layers of linen  Pads under patient to optimize support surface  Turn/reposition approximately every 2 hours  Pillow wedges  Manage incontinence   Promote continence; Skin Protective lotion/cream to buttocks and sacrum daily and as needed with incontinence care  Offload heels pillows    Recommendations: continue santyl dressings. Make sure that gauze is used with the santyl.     Teaching completed with:   [x] Patient           [] Family member       [] Caregiver          [x] Nursing  [] Other    Patient/Caregiver Teaching:  Level of patient/caregiver understanding able to:   [x] Indicates understanding       [] Needs reinforcement  [] Unsuccessful      [] Verbal Understanding  [] Demonstrated understanding       [] No evidence of learning  [] Refused teaching         [] N/A       Electronically signed by Nina Armas RN on 5/2/2022 at 12:12 PM

## 2022-05-02 NOTE — PROGRESS NOTES
D/C Plan: Return to Kavitha Hernandes is a LTC resident at [facility].  CM spoke with provider and have been notified pt is medically stable to return to the above facility. CM contacted pt's son, Myles Bean ([phone]), UAR he is in agreement with plan return today. CM spoke with pt's nurse and she has also been made aware. Anticipate pt will transition back to [facility] at 2:00pm today. Facility ([facility]) has been notified of plan return today. CM to continue to follow and assist as needed.       Transition of Care Plan: [facility]     Communication to Patient/Family:  Met with patient and family, and they are agreeable to the transition plan. The Plan for Transition of Care is related to the following treatment goals: LTC     The Patient and/or patient representative was provided with a choice of provider and agrees   with the discharge plan.  Yes [x]? ?? No []???     Freedom of choice list was provided with basic dialogue that supports the patient's individualized plan of care/goals and shares the quality data associated with the providers.     Yes [x]? ?? No []???     SNF/Rehab Transition:  Patient has been accepted to [address] [place] [address] at [address] for SNF and meets criteria for admission. Maylin Winter will transported by medical transport and expected to leave at 2:00pm.     Communication to SNF/Rehab:  Bedside RN, Arnoldo Doyle been notified to update the transition plan to the facility and call report [phone]      Discharge information has been updated on the AVS and communicated through [facility] or [facility].     Discharge instructions to be fax'd to facility.     Please include all hard scripts for controlled substances, med rec and dc summary, and AVS in packet.  Please medicate for pain prior to dc if possible and needed to help offset delay when patient first arrives to facility.     Reviewed and confirmed with facility, [facility] can manage the patient care needs for the following:      Husam with (X) only those applicable:  Medication:  []? ? ? Medications are available at the facility  []? ??IV Antibiotics    []? ? ? Controlled Substance - hard copies available sent.  []?? ?Weekly Labs     Equipment:  []???CPAP/BiPAP  []? ? ? Wound Vacuum  []? ? ? Wright or Urinary Device  []? ??PICC/Central Line  []? ? ?Nebulizer  []? ? ? Ventilator     Treatment:  []? ? ?Isolation (for MRSA, VRE, etc.)  []? ??Surgical Drain Management  []? ? ? Tracheostomy Care  []? ? ?Dressing Changes  []? ? ? Dialysis with transportation  []? ??PEG Care  []? ? ? Oxygen  []? ? ?Daily Weights for Heart Failure     Dietary:  []? ? ? Any diet limitations  []? ? ?Tube Feedings   []? ? ? Total Parenteral Management (TPN)     Financial Resources:  []? ? ? Medicaid Application Completed     []? ??[facility]AI Completed and copy given to pt/family.     [x]? ? ? A screening has previously been completed.     []? ? ?Level II Completed     []? ?? Private pay individual who will not become   financially eligible for Medicaid within 6 months from admission to a [facility] [facility].      []??? Individual refused to have screening conducted.      []? ? ? Medicaid Application Completed     []? ? ? The screening denied because it was determined individual did not need/did not qualify for nursing facility level of care.  []??? Out of state residents seeking direct admission to a [address] facility.  []??? Individuals who are inpatients of an out of state hospital, or in state or out of state veterans/ hospital and seek direct admission to a [address] facility  []? ?? Individuals who are pateints or residents of a state owned/operated facility that is licensed by [address] ([place]) and seek direct admission to [address] facility  []? ?? A screening not required for enrollment in [facility] Hospice services as set out in [facility]-  []??? [address] [place]) staff shall perform screenings of the [facility] clients.      Advanced Care Plan:  []? ? ?Surrogate Decision Maker of Care  []? ??POA  []? ?? Communicated Code Status and copy sent.     Other:              Care Management Interventions  Mode of Transport at Discharge[facility] [place]  Transition of Care Consult (CM Consult): Discharge [address] Maintenance Reviewed: Yes  Support Systems: Child(chari)  The Plan for Transition of Care is Related to the Following Treatment Goals : Return to [address] [place] [address]  The Patient and/or Patient Representative was Provided with a Choice of Provider and Agrees with the Discharge Plan?: Yes  Name of the Patient Representative Who was Provided with a Choice of Provider and Agrees with the Discharge Plan: gena  Freedom of Choice List was Provided with Basic Dialogue that Supports the Patient's Individualized Plan of Care/Goals, Treatment Preferences and Shares the Quality Data Associated with the Providers?: Yes  Discharge Location  Patient Expects to be Discharged to[de-identified] [facility]

## 2022-05-02 NOTE — PROGRESS NOTES
1956 Assumed patient care at this time. Report received from [facility].   2045 Assessment completed. 6663 Patient ripped out IV. Extremely agitated and does not want another one. Verbally aggressive with staff. Cursing. Patient's repositioned. Refused vital signs. Colostomy and urostomy emptied. Dr. Karly George made aware. Bedside and verbal shift change report given to Charter Amira (oncoming nurse) by Lily Eid RN (offgoing nurse). Report included the following information: SBAR, Kardex, MAR, and recent results.

## 2022-05-02 NOTE — PROGRESS NOTES
.[address] Pharmacokinetic Monitoring Service - Vancomycin     Gala Carrasco is a 79 y.o. female starting on vancomycin therapy for UTI x 7 days. Pharmacy consulted by Dr Maria L Hill  for monitoring and adjustment. Target Concentration: Dosing based on anticipated concentration <15 mg/L due to renal impairment/insufficiency    Pertinent Laboratory Values: Wt Readings from Last 1 Encounters:   04/28/22 64 kg (141 lb 1.5 oz)     Temp Readings from Last 1 Encounters:   05/01/22 98.8 °F (37.1 °C)     No components found for: PROCAL  Estimated Creatinine Clearance: 19 mL/min (A) (based on SCr of 2.05 mg/dL (H)). Recent Labs     05/02/22  0248 05/01/22  0722   WBC 8.4 10.4       Pertinent Cultures:  Culture Date Source Results   4/29/2022 Urine  STAPHYLOCOCCUS AUREUS       MRSA Nasal Swab: N/A. Non-respiratory infection. .    Plan:  Concentration-guided dosing due to renal impairment/insufficiency  Start vancomycin 1250 mg once  Renal labs as indicated   Vancomycin concentration ordered for 5/3/22 @ 1000   Pharmacy will continue to monitor patient and adjust therapy as indicated    Thank you for the consult,  Judd Mcgraw, [facility]  5/2/2022 8:38 AM

## 2022-05-02 NOTE — DISCHARGE SUMMARY
Discharge Summary    Patient: Gala Carrasco MRN: [id number]  CSN: [id number]    YOB: 1940  Age: 79 y.o. Sex: female    DOA: 4/28/2022 LOS:  LOS: 4 days   Discharge Date:      Primary Care Provider:  Adela Tello MD    Admission Diagnoses: Nephrostomy tube displaced [facility]) Mariela Lobe    Discharge Diagnoses:    Hospital Problems  Date Reviewed: 4/29/2022          Codes Class Noted POA    * (Principal) Nephrostomy tube displaced ([facility] ICD-10-CM: S63.634A  ICD-9-CM: 997.5  4/29/2022 Yes        Pleural effusion on left ICD-10-CM: J90  ICD-9-CM: 511.9  4/29/2022 Yes        Decubitus ulcer of coccygeal region ICD-10-CM: L89.159  ICD-9-CM: 707.03, 707.20  4/29/2022 Yes        Hydronephrosis, right ICD-10-CM: N13.30  ICD-9-CM: 999  12/7/2021 Yes        CKD stage 3 due to type 2 diabetes mellitus ([facility] ICD-10-CM: E11.22, N18.30  ICD-9-CM: 250.40, 585.3  6/24/2020 Yes        S/P AKA (above knee amputation), right ([facility] ICD-10-CM: B55.493  ICD-9-CM: V49.76  6/3/2019 Yes        Legally blind ICD-10-CM: H54.8  ICD-9-CM: 369.4  11/26/2018 Yes        Hypertension ICD-10-CM: I10  ICD-9-CM: 401.9  11/26/2018 Yes              Discharge Condition: stable     Discharge Medications:     Current Discharge Medication List      START taking these medications    Details   linezolid (ZYVOX) 600 mg tablet Take 1 Tablet by mouth every twelve (12) hours for 14 doses. Qty: 14 Tablet, Refills: 0  Start date: 5/2/2022, End date: 5/9/2022         CONTINUE these medications which have CHANGED    Details   gabapentin (NEURONTIN) 300 mg capsule Take 1 Capsule by mouth two (2) times a day. Max Daily Amount: 600 mg. Qty: 4 Capsule, Refills: 0  Start date: 5/2/2022    Associated Diagnoses: Neuropathy         CONTINUE these medications which have NOT CHANGED    Details   docusate sodium (Colace) 100 mg capsule Take 100 mg by mouth nightly.       insulin detemir U-100 (Levemir U-100 Insulin) 100 unit/mL injection 10 Units by SubCUTAneous route nightly. Arginine-Glutamine-Calcium HMB (Evans) 7-7-1.5 gram pwpk Take  by mouth two (2) times a day. nystatin (MYCOSTATIN) topical cream Apply 100,000 g to affected area two (2) times a day. insulin aspart U-100 (NovoLOG Flexpen U-100 Insulin) 100 unit/mL (3 mL) inpn by SubCUTAneous route. 151-200 = 2u  201-250 = 4u  251-300 = 6u  301-350 = 8u  351-400 = 10u  401 + = 12u notify MD      acetaminophen (TYLENOL) 500 mg tablet Take 2 Tabs by mouth every eight (8) hours as needed for Pain or Fever. Qty: 30 Tab, Refills: 0      famotidine (PEPCID) 10 mg tablet Take 1 Tab by mouth every twelve (12) hours. Qty: 60 Tab, Refills: 0      melatonin 5 mg tablet Take 1 Tab by mouth nightly. Qty: 30 Tab, Refills: 0      multivitamin (ONE A DAY) tablet Take 1 Tab by mouth daily. metoprolol succinate (TOPROL-XL) 50 mg XL tablet Take 50 mg by mouth daily. levothyroxine (SYNTHROID) 50 mcg tablet Take  by mouth Daily (before breakfast). atorvastatin calcium (ATORVASTATIN PO) Take 20 mg by mouth daily. Procedures : Thoracentesis, PCN replaced    Consults: IR, ID      PHYSICAL EXAM   Visit Vitals  BP (!) 117/42 (BP 1 Location: Left upper arm, BP Patient Position: At rest)   Pulse 76   Temp 98.8 °F (37.1 °C)   Resp 18   Ht 5' 3\" (1.6 m)   Wt 64 kg (141 lb 1.5 oz)   SpO2 98%   BMI 24.99 kg/m²     General: Awake, cooperative, no acute distress    HEENT: NC, Atraumatic. PERRLA, EOMI. Anicteric sclerae with eyes closed   Lungs:  CTA Bilaterally. No Wheezing/Rhonchi/Rales. Heart:  Regular  rhythm,  No murmur, No Rubs, No Gallops  Abdomen: Soft, Non distended, Non tender. Colostomy in place. Right nephrostomy tube in place   Extremities: No c/c/e, rt aka   Psych:   Not anxious or agitated. Neurologic:  No acute neurological deficits. Admission HPI :   Sierra Boyers an 79 y. o. female with prior vaginal and colon cancer with left nephrectomy, right nephrostomy, and colostomy, legal blindness, type 2 diabetes mellitus, and right AKA who is at [facility] home and was transferred for dislodgement of her right nephrostomy tube during her bath tonight. She is not having output from her nephrostomy bag. She has had multiple nephrostomy tube exchanges in the past by IR. She denies fever, nausea, vomiting, or diarrhea.       Hospital Course :  82 y/o female with prior vaginal and colon cancer with left nephrectomy, right nephrostomy, and colostomy, legal blindness, type 2 diabetes mellitus, and right AKA is admitted with displacement of her nephrostomy tube. She also has a chronic left pleural effusion.     Nephrostomy tube displaced with right hydronephrosis- replaced per IR and also received  thoracentesis. She tolerated the procedure well. She also received iv abx for uti and urine cx indicated staph aures. ID is consulted and recommend zyvax for 7 days. She raoul on ckd3-4. She received iv hydration and her renal function improving on discharge. She refused IV replacement during her stay. Recommend to continue po hydration. Cr got improving after PCN replaced. Discharge planning discussed with patient, pt agrees  with the plan and no questions and concerns at this point.        Activity: Activity as tolerated    Diet: Diabetic Diet    Follow-up: PCP, urologist as needed     Disposition: [facility]     Minutes spent on discharge: 45 min       Labs: Results:       Chemistry Recent Labs     05/02/22 0248 05/01/22 0722 04/30/22  0436   * 214* 155*    137 136   K 5.2 4.8 5.2    109 107   CO2 20* 21 22   BUN 44* 46* 51*   CREA 2.05* 2.34* 2.61*   CA 8.2* 8.1* 8.2*   AGAP 8 7 7   BUCR 21* 20 20   * 161* 143*   TP 6.5 6.7 7.4   ALB 1.9* 1.8* 2.0*   GLOB 4.6* 4.9* 5.4*   AGRAT 0.4* 0.4* 0.4*      CBC w/Diff Recent Labs     05/02/22 0248 05/01/22 0722 04/30/22  0436   WBC 8.4 10.4 10.8   RBC 3.16* 2.91* 3.15*   HGB 8.4* 7.7* 8.3*   HCT 27.2* 24.9* 27.7*    401 407      Cardiac Enzymes No results for input(s): CPK, CKND1, JANE in the last 72 hours. No lab exists for component: CKRMB, TROIP   Coagulation No results for input(s): PTP, INR, APTT, INREXT, INREXT in the last 72 hours. Lipid Panel Lab Results   Component Value Date/Time    Cholesterol, total 115 08/28/2019 08:22 AM    HDL Cholesterol 39 (L) 08/28/2019 08:22 AM    LDL, calculated 46.2 08/28/2019 08:22 AM    VLDL, calculated 29.8 08/28/2019 08:22 AM    Triglyceride 149 08/28/2019 08:22 AM    CHOL/HDL Ratio 2.9 08/28/2019 08:22 AM      BNP No results for input(s): BNPP in the last 72 hours. Liver Enzymes Recent Labs     05/02/22  0248   TP 6.5   ALB 1.9*   *      Thyroid Studies Lab Results   Component Value Date/Time    TSH 3.40 01/24/2020 08:35 AM          @micro    Significant Diagnostic Studies: CT ABD PELV WO CONT    Result Date: 4/29/2022  EXAM: CT of the abdomen and pelvis INDICATION: Pain. COMPARISON: December 6, 2021. TECHNIQUE: Axial CT imaging of the abdomen and pelvis was performed with intravenous contrast. Multiplanar reformats were generated. Dose reduction techniques used: automated exposure control, adjustment of the mAs and/or kVp according to patient size, and iterative reconstruction techniques. Digital imaging and communications in Medicine (DICOM) format image data are available to nonaffiliated external healthcare facilities or entities on a secure, media free, reciprocally searchable basis with patient authorization for at least 12 months after this study. _______________ FINDINGS: LOWER CHEST: There is a partially imaged small to moderate left pleural effusion with associated atelectasis. LIVER, BILIARY: Liver is normal. No biliary dilation. There has been a prior cholecystectomy. PANCREAS: Normal. SPLEEN: Normal. ADRENALS: Normal. KIDNEYS: Left kidney is not seen.  A right percutaneous nephrostomy tube has been retracted from the upper pole collecting system is now along the border of the renal parenchyma and upper pole collecting system. There is moderate right hydronephrosis and hydroureter to the level of apparent surgical clips along the mid right ureter. The urinary bladder is not seen. LYMPH NODES: No enlarged lymph nodes. GASTROINTESTINAL TRACT: No bowel dilation or wall thickening. PELVIC ORGANS: Unremarkable. VASCULATURE: There is atherosclerotic plaque of the abdominal aorta and proximal branches. BONES: The bony structures are osteopenic. There appears to be a coccygeal region decubitus ulcer with coccygeal sclerosis. OTHER: Mild soft tissue anasarca is noted. _______________     Mild to moderate left pleural effusion with associated atelectasis similar to previous. Right percutaneous nephrostomy tube appears to have been retracted from the right upper pole collecting system and now lies along the border of the renal parenchyma and collecting system. There is moderate right hydronephrosis and hydroureter. There has been a prior cystectomy. Apparent right coccygeal region decubitus ulcer. There is mild soft tissue anasarca. IR EXCHANGE NEPHRO PERC RT SI    Result Date: 4/29/2022  PLANNED PROCEDURE: Right nephrostomy tube replacement ATTENDING: Ny Poole M.D. COMPLICATIONS: None SPECIMEN: None DOSE: Fluoroscopic dose (reference air kerma): 11 mGy INDICATION: Dislodged right nephrostomy tube exchange. TECHNIQUE: Informed consent was obtained. Procedural time out was performed. Maximal sterile barrier technique (including cap, mask, sterile gown, sterile gloves, large sterile sheet, hand hygiene, and cutaneous antisepsis per current guidelines) was followed. Initial  image demonstrated indwelling nephrostomy tube, dislodged. Contrast injection was performed demonstrating tip dislodged within the renal parenchyma.  The catheter was then removed over wire and a new 10 [place] nephrostomy tube was placed with distal tip in the renal pelvis under fluoroscopic guidance. Repeat injection demonstrated appropriate position. The catheter was secured in place and passed to gravity drainage. Specimen sent for culture. Sterile dressing was applied. Dislodged right nephrostomy tube, replaced and repositioned as above. XR CHEST INSP AND EXP    Result Date: 4/29/2022  EXAM: XR CHEST INSP AND EXP CLINICAL INDICATION/HISTORY: s/p right thora -Additional: None COMPARISON: 12/7/2021 TECHNIQUE: Frontal view of the chest _______________ FINDINGS: HEART AND MEDIASTINUM: Normal cardiac size and mediastinal contours. LUNGS AND PLEURAL SPACES: Status post left thoracentesis with loculated pneumothorax in the left lung base likely trapped lung. BONY THORAX AND SOFT TISSUES: No acute osseous abnormality _______________     Status post left thoracentesis with small loculated pneumothorax in the left lung base likely trapped lung. US THORACENTESIS LT NDL W IMAGE    Result Date: 4/29/2022  ULTRASOUND-GUIDED THERAPEUTIC THORACENTESIS ATTENDING: Ninoska Gonzalez M.D. COMPLICATIONS: None. MEDICATIONS: Lidocaine 1% subcutaneous. INDICATION: Effusion, shortness of breath. PROCEDURE: Informed consent was obtained where the risks, benefits and alternatives to the procedure were explained. All elements of maximal sterile barrier technique followed including: cap and mask and sterile gown and sterile gloves and a large sterile sheet and hand hygiene and 2% chlorhexidine for cutaneous antisepsis (or acceptable alternative antiseptics, per current guidelines). A qualified radiology nurse monitored the patient vitals signs and condition throughout the procedure. The patient's thorax was prepped and draped in sterile fashion. Using sonographic guidance, a 5 [place]unice Yueh needle catheter was inserted into the pleural fluid collection. Initial aspirate yielded clear yellow fluid; total of 0.4 L was drained prior to removing the catheter.  Sterile dressing was applied to the area. Patient tolerated procedure well. US-guided therapeutic right thoracentesis performed. [facility]     CC:  Shaji Bhatt MD

## 2022-05-02 NOTE — PROGRESS NOTES
.[address] [facility] Pharmacokinetic Monitoring Service - Vancomycin     Surinder Billy is a 79 y.o. female starting on vancomycin therapy for UTI x 7 days. Pharmacy consulted by Dr Kemar Le  for monitoring and adjustment. Target Concentration: Dosing based on anticipated concentration <15 mg/L due to renal impairment/insufficiency    Pertinent Laboratory Values: Wt Readings from Last 1 Encounters:   04/28/22 64 kg (141 lb 1.5 oz)     Temp Readings from Last 1 Encounters:   05/01/22 98.8 °F (37.1 °C)     No components found for: PROCAL  Estimated Creatinine Clearance: 19 mL/min (A) (based on SCr of 2.05 mg/dL (H)). Recent Labs     05/02/22  0248 05/01/22  0722   WBC 8.4 10.4       Pertinent Cultures:  Culture Date Source Results   4/29/2022 Urine  STAPHYLOCOCCUS AUREUS       MRSA Nasal Swab: N/A. Non-respiratory infection. .    Plan:  Concentration-guided dosing due to renal impairment/insufficiency  Start vancomycin 1250 mg once  Renal labs as indicated   Vancomycin concentration ordered for 5/3/22 @ 1000   Pharmacy will continue to monitor patient and adjust therapy as indicated    Thank you for the consult,  Katt Rick, [facility]  5/2/2022 8:38 AM

## 2022-05-09 ENCOUNTER — HOSPITAL ENCOUNTER (OUTPATIENT)
Dept: INTERVENTIONAL RADIOLOGY/VASCULAR | Age: 82
Discharge: HOME | End: 2022-05-09
Attending: UROLOGY

## 2022-06-03 NOTE — PROGRESS NOTES
Spoke with Gaby Herrera, patients son for scheduling purposes a right neph tube exchange. Confirmed 6/8 appointment @ 0930 with an arrival time of 0900 to radiology waiting room. [employer] contacted for transportation set up.

## 2022-06-09 ENCOUNTER — HOSPITAL ENCOUNTER (OUTPATIENT)
Dept: INTERVENTIONAL RADIOLOGY/VASCULAR | Age: 82
Discharge: HOME | End: 2022-06-09
Attending: UROLOGY | Admitting: UROLOGY
Payer: MEDICARE

## 2022-06-09 DIAGNOSIS — N13.39 OTHER HYDRONEPHROSIS: ICD-10-CM

## 2022-06-09 PROCEDURE — 74011000636 HC RX REV CODE- 636: Performed by: RADIOLOGY

## 2022-06-09 PROCEDURE — 50435 EXCHANGE NEPHROSTOMY CATH: CPT

## 2022-06-09 PROCEDURE — 97602 WOUND(S) CARE NON-SELECTIVE: CPT

## 2022-06-09 PROCEDURE — 74011250636 HC RX REV CODE- 250/636: Performed by: RADIOLOGY

## 2022-06-09 RX ORDER — SODIUM CHLORIDE 9 MG/ML
500 INJECTION, SOLUTION INTRAVENOUS
Status: DISCONTINUED | OUTPATIENT
Start: 2022-06-09 | End: 2022-06-16 | Stop reason: HOSPADM

## 2022-06-09 RX ADMIN — SODIUM CHLORIDE 500 ML: 9 INJECTION, SOLUTION INTRAVENOUS at 10:50

## 2022-06-09 RX ADMIN — IOPAMIDOL 6 ML: 612 INJECTION, SOLUTION INTRAVENOUS at 10:50

## 2022-06-09 NOTE — WOUND CARE
IP WOUND CONSULT    Katie Wang  MEDICAL RECORD NUMBER:  [id number]  AGE: 79 y.o.    GENDER: female  : 1940  TODAY'S DATE:  2022    GENERAL     [] Follow-up   [x] New Consult    Katie Wang is a 79 y.o. female referred by:   [] Physician  [x] Nursing  [] Other:         PAST MEDICAL HISTORY    Past Medical History:   Diagnosis Date    Cancer ([facility]     vaginal and colon cancer    Cancer ([facility]     Chronic pain     arthritis    Diabetes ([facility]     DVT (deep venous thrombosis) ([facility]     Hypertension     Legally blind     Thyroid disease         PAST SURGICAL HISTORY    Past Surgical History:   Procedure Laterality Date    HX ABOVE KNEE AMPUTATION Right     HX COLOSTOMY      HX ORTHOPAEDIC      HX OTHER SURGICAL      kidney drainage tube    HX UROLOGICAL      kidney removal    IR CHANGE NEPHROSTOMY PYELOS TUBE RT  2021    IR EXCHANGE NEPHRO PERC RT SI  2019    IR EXCHANGE NEPHRO PERC RT SI  3/21/2019    IR EXCHANGE NEPHRO PERC RT SI  2019    IR EXCHANGE NEPHRO PERC RT SI  2019    IR EXCHANGE NEPHRO PERC RT SI  2019    IR EXCHANGE NEPHRO PERC RT SI  10/3/2019    IR EXCHANGE NEPHRO PERC RT SI  2019    IR EXCHANGE NEPHRO PERC RT SI  2020    IR EXCHANGE NEPHRO PERC RT SI  2020    IR EXCHANGE NEPHRO PERC RT SI  2020    IR EXCHANGE NEPHRO PERC RT SI  2020    IR EXCHANGE NEPHRO PERC RT SI  2020    IR EXCHANGE NEPHRO PERC RT SI  8/10/2020    IR EXCHANGE NEPHRO PERC RT SI  2020    IR EXCHANGE NEPHRO PERC RT SI  2020    IR EXCHANGE NEPHRO PERC RT SI  2020    IR EXCHANGE NEPHRO PERC RT SI  2021    IR EXCHANGE NEPHRO PERC RT SI  3/25/2021    IR EXCHANGE NEPHRO PERC RT SI  5/10/2021    IR EXCHANGE NEPHRO PERC RT SI  2021    IR EXCHANGE NEPHRO PERC RT SI  2021    IR EXCHANGE NEPHRO PERC RT SI  2021    IR EXCHANGE NEPHRO PERC RT SI  2022    IR EXCHANGE NEPHRO PERC RT SI  3/21/2022    IR EXCHANGE NEPHRO PERC RT SI  4/29/2022    IR NEPHROSTOMY PERC RT PLC CATH  SI  6/21/2020    IR NEPHROSTOMY PERC RT PLC CATH  SI  6/7/2021       FAMILY HISTORY    No family history on file. SOCIAL HISTORY    Social History     Tobacco Use    Smoking status: Never Smoker    Smokeless tobacco: Never Used   Substance Use Topics    Alcohol use: No    Drug use: Never       ALLERGIES    Allergies   Allergen Reactions    Morphine Nausea and Vomiting    Pcn [Penicillins] Rash       MEDICATIONS    No current facility-administered medications on file prior to encounter. Current Outpatient Medications on File Prior to Encounter   Medication Sig Dispense Refill    gabapentin (NEURONTIN) 300 mg capsule Take 1 Capsule by mouth two (2) times a day. Max Daily Amount: 600 mg. 4 Capsule 0    docusate sodium (Colace) 100 mg capsule Take 100 mg by mouth nightly.  insulin detemir U-100 (Levemir U-100 Insulin) 100 unit/mL injection 10 Units by SubCUTAneous route nightly.  Arginine-Glutamine-Calcium HMB (Evans) 7-7-1.5 gram pwpk Take  by mouth two (2) times a day.  nystatin (MYCOSTATIN) topical cream Apply 100,000 g to affected area two (2) times a day.  insulin aspart U-100 (NovoLOG Flexpen U-100 Insulin) 100 unit/mL (3 mL) inpn by SubCUTAneous route. 151-200 = 2u  201-250 = 4u  251-300 = 6u  301-350 = 8u  351-400 = 10u  401 + = 12u notify MD      acetaminophen (TYLENOL) 500 mg tablet Take 2 Tabs by mouth every eight (8) hours as needed for Pain or Fever. (Patient taking differently: Take 325 mg by mouth every six (6) hours as needed for Pain or Fever. Give two tablets.) 30 Tab 0    famotidine (PEPCID) 10 mg tablet Take 1 Tab by mouth every twelve (12) hours. 60 Tab 0    melatonin 5 mg tablet Take 1 Tab by mouth nightly. 30 Tab 0    multivitamin (ONE A DAY) tablet Take 1 Tab by mouth daily.  metoprolol succinate (TOPROL-XL) 50 mg XL tablet Take 50 mg by mouth daily.       levothyroxine (SYNTHROID) 50 mcg tablet Take  by mouth Daily (before breakfast).  atorvastatin calcium (ATORVASTATIN PO) Take 20 mg by mouth daily. Wt Readings from Last 3 Encounters:   04/28/22 64 kg (141 lb 1.5 oz)   12/07/21 64 kg (141 lb)   06/11/21 66.7 kg (147 lb)       [email or web address] were no vitals taken for this visit. ASSESSMENT     Skin impairment Identification:  Type: moisture associated dermatits    Contributing Factors: drainage from around tube    Wound Back Right; Lower 06/09/22 (Active)   Wound Image   06/09/22 1121   Wound Etiology Other (Comment) 06/09/22 1121   Dressing Status Other (Comment) 06/09/22 1121   Cleansed Not cleansed 06/09/22 1121   Dressing/Treatment Alginate with Ag;Gauze dressing/dressing sponge;Tape/Soft cloth adhesive tape 06/09/22 1121   Wound Length (cm) 5.5 cm 06/09/22 1121   Wound Width (cm) 6.5 cm 06/09/22 1121   Wound Surface Area (cm^2) 35.75 cm^2 06/09/22 1121   Wound Assessment Other (Comment) 06/09/22 1121   Drainage Amount None 06/09/22 1121   Wound Odor None 06/09/22 1121   Carol-Wound/Incision Assessment Intact 06/09/22 1121   Edges Other (Comment) 06/09/22 1121   Wound Thickness Description Partial thickness 06/09/22 1121   Number of days: 0      Call placed to [address] and rehab. Spoke with bed side nurse. Let her know about the above findings and the dressing I placed of Calcium alginate with silver under IRs gauze dressing.     PLAN       Recommendations: alginate with silver and cover with gauze    Teaching completed with:   [x] Patient           [] Family member       [] Caregiver          [] Nursing  [] Other    Patient/Caregiver Teaching:  Level of patient/caregiver understanding able to:   [x] Indicates understanding       [] Needs reinforcement  [] Unsuccessful      [] Verbal Understanding  [] Demonstrated understanding       [] No evidence of learning  [] Refused teaching         [] N/A       Electronically signed by Ronaldo Myers RN on 6/9/2022 at 11:26 AM

## 2022-06-09 NOTE — WOUND CARE
Wound Care recommendations from  [address], [address] [facility]  [phone] Fax [phone]    NAME:  Ira Lucero  YOB: 1940  MEDICAL RECORD NUMBER:  [id number]  DATE:  6/9/2022    Wound Cleansing:   Do not scrub or use excessive force. Cleanse wound prior to applying a clean dressing with: Wound Cleanser or with Mild Soap & Water     Dressings:           Wound Location right lower back   [x] Apply Primary Dressing:  Alginate with Silver  [x] Cover and Secure with:  Gauze Cover Roll Tape  Change:  Three times per week         Electronically signed Dayron Jha RN on 6/9/2022 at 11:37 AM

## 2022-07-13 ENCOUNTER — TRANSCRIBE ORDER (OUTPATIENT)
Dept: INTERVENTIONAL RADIOLOGY/VASCULAR | Age: 82
End: 2022-07-13

## 2022-07-13 DIAGNOSIS — N13.39 OTHER HYDRONEPHROSIS: Primary | ICD-10-CM

## 2022-07-13 NOTE — PROGRESS NOTES
Scheduled appointment with gena Alcantara. Betsy Oneal [phone] aware and will set up transportation for 1000.

## 2022-07-25 ENCOUNTER — HOSPITAL ENCOUNTER (OUTPATIENT)
Dept: INTERVENTIONAL RADIOLOGY/VASCULAR | Age: 82
Discharge: HOME | End: 2022-07-25
Attending: UROLOGY
Payer: MEDICARE

## 2022-07-25 DIAGNOSIS — N13.39 OTHER HYDRONEPHROSIS: ICD-10-CM

## 2022-07-25 PROCEDURE — 74011250636 HC RX REV CODE- 250/636: Performed by: RADIOLOGY

## 2022-07-25 PROCEDURE — 74011000636 HC RX REV CODE- 636: Performed by: RADIOLOGY

## 2022-07-25 PROCEDURE — 50435 EXCHANGE NEPHROSTOMY CATH: CPT

## 2022-07-25 RX ORDER — SODIUM CHLORIDE 9 MG/ML
500 INJECTION, SOLUTION INTRAVENOUS
Status: DISCONTINUED | OUTPATIENT
Start: 2022-07-25 | End: 2022-07-29 | Stop reason: HOSPADM

## 2022-07-25 RX ADMIN — IOPAMIDOL 5 ML: 612 INJECTION, SOLUTION INTRAVENOUS at 11:12

## 2022-07-25 RX ADMIN — SODIUM CHLORIDE 500 ML: 9 INJECTION, SOLUTION INTRAVENOUS at 11:12

## 2022-07-25 NOTE — WOUND CARE
IP WOUND CONSULT    Brook Cheng  MEDICAL RECORD NUMBER:  [id number]  AGE: 79 y.o.    GENDER: female  : 1940  TODAY'S DATE:  2022    GENERAL     [] Follow-up   [x] New Consult    Brook Cheng is a 79 y.o. female referred by:   [] Physician  [x] Nursing  [] Other:         PAST MEDICAL HISTORY    Past Medical History:   Diagnosis Date    Cancer ([facility]     vaginal and colon cancer    Cancer ([facility] [address]     Chronic pain     arthritis    Diabetes ([facility] [address]     DVT (deep venous thrombosis) ([facility] [address]     Hypertension     Legally blind     Thyroid disease         PAST SURGICAL HISTORY    Past Surgical History:   Procedure Laterality Date    HX ABOVE KNEE AMPUTATION Right     HX COLOSTOMY      HX ORTHOPAEDIC      HX OTHER SURGICAL      kidney drainage tube    HX UROLOGICAL      kidney removal    IR CHANGE NEPHROSTOMY PYELOS TUBE RT  2021    IR EXCHANGE NEPHRO PERC RT SI  2019    IR EXCHANGE NEPHRO PERC RT SI  3/21/2019    IR EXCHANGE NEPHRO PERC RT SI  2019    IR EXCHANGE NEPHRO PERC RT SI  2019    IR EXCHANGE NEPHRO PERC RT SI  2019    IR EXCHANGE NEPHRO PERC RT SI  10/3/2019    IR EXCHANGE NEPHRO PERC RT SI  2019    IR EXCHANGE NEPHRO PERC RT SI  2020    IR EXCHANGE NEPHRO PERC RT SI  2020    IR EXCHANGE NEPHRO PERC RT SI  2020    IR EXCHANGE NEPHRO PERC RT SI  2020    IR EXCHANGE NEPHRO PERC RT SI  2020    IR EXCHANGE NEPHRO PERC RT SI  8/10/2020    IR EXCHANGE NEPHRO PERC RT SI  2020    IR EXCHANGE NEPHRO PERC RT SI  2020    IR EXCHANGE NEPHRO PERC RT SI  2020    IR EXCHANGE NEPHRO PERC RT SI  2021    IR EXCHANGE NEPHRO PERC RT SI  3/25/2021    IR EXCHANGE NEPHRO PERC RT SI  5/10/2021    IR EXCHANGE NEPHRO PERC RT SI  2021    IR EXCHANGE NEPHRO PERC RT SI  2021    IR EXCHANGE NEPHRO PERC RT SI  2021    IR EXCHANGE NEPHRO PERC RT SI  2022    IR EXCHANGE NEPHRO PERC RT SI  3/21/2022    IR EXCHANGE NEPHRO PERC RT SI  2022    IR EXCHANGE NEPHRO PERC RT SI  6/9/2022    IR EXCHANGE NEPHRO PERC RT SI  7/25/2022    IR NEPHROSTOMY PERC RT PLC CATH  SI  6/21/2020    IR NEPHROSTOMY PERC RT PLC CATH  SI  6/7/2021       FAMILY HISTORY    No family history on file. SOCIAL HISTORY    Social History     Tobacco Use    Smoking status: Never    Smokeless tobacco: Never   Substance Use Topics    Alcohol use: No    Drug use: Never       ALLERGIES    Allergies   Allergen Reactions    Morphine Nausea and Vomiting    Pcn [Penicillins] Rash       MEDICATIONS    Current Outpatient Medications on File Prior to Encounter   Medication Sig Dispense Refill    gabapentin (NEURONTIN) 300 mg capsule Take 1 Capsule by mouth two (2) times a day. Max Daily Amount: 600 mg. 4 Capsule 0    docusate sodium (Colace) 100 mg capsule Take 100 mg by mouth nightly. insulin detemir U-100 (Levemir U-100 Insulin) 100 unit/mL injection 10 Units by SubCUTAneous route nightly. Arginine-Glutamine-Calcium HMB (Evans) 7-7-1.5 gram pwpk Take  by mouth two (2) times a day. nystatin (MYCOSTATIN) topical cream Apply 100,000 g to affected area two (2) times a day. insulin aspart U-100 (NovoLOG Flexpen U-100 Insulin) 100 unit/mL (3 mL) inpn by SubCUTAneous route. 151-200 = 2u  201-250 = 4u  251-300 = 6u  301-350 = 8u  351-400 = 10u  401 + = 12u notify MD      acetaminophen (TYLENOL) 500 mg tablet Take 2 Tabs by mouth every eight (8) hours as needed for Pain or Fever. (Patient taking differently: Take 325 mg by mouth every six (6) hours as needed for Pain or Fever. Give two tablets.) 30 Tab 0    famotidine (PEPCID) 10 mg tablet Take 1 Tab by mouth every twelve (12) hours. 60 Tab 0    melatonin 5 mg tablet Take 1 Tab by mouth nightly. 30 Tab 0    multivitamin (ONE A DAY) tablet Take 1 Tab by mouth daily. metoprolol succinate (TOPROL-XL) 50 mg XL tablet Take 50 mg by mouth daily. levothyroxine (SYNTHROID) 50 mcg tablet Take  by mouth Daily (before breakfast). atorvastatin calcium (ATORVASTATIN PO) Take 20 mg by mouth daily. No current facility-administered medications on file prior to encounter. Wt Readings from Last 3 Encounters:   04/28/22 64 kg (141 lb 1.5 oz)   12/07/21 64 kg (141 lb)   06/11/21 66.7 kg (147 lb)       Asís@[facility] were no vitals taken for this visit. ASSESSMENT     Skin impairment Identification:  Type: moisture related Skin Damage with appearance of fungal component. Wound care was not able to attach picture to this particular episode. Contributing Factors: none        PLAN   Recommendations: patient would benefit from antifungal treatment.   If daily dressing changes with a topical treatment are not possible wound care nurse would recommend considering systemic antifungal.           Electronically signed by Krystyna Santos RN on 7/25/2022 at 1:26 PM

## 2022-08-17 ENCOUNTER — TELEPHONE (OUTPATIENT)
Dept: WOUND CARE | Age: 82
End: 2022-08-17

## 2022-08-26 ENCOUNTER — TRANSCRIBE ORDER (OUTPATIENT)
Dept: INTERVENTIONAL RADIOLOGY/VASCULAR | Age: 82
End: 2022-08-26

## 2022-08-26 DIAGNOSIS — N13.39 OTHER HYDRONEPHROSIS: Primary | ICD-10-CM

## 2022-09-15 NOTE — PROGRESS NOTES
Chava Dimas at [facility] [phone] arranging transport for Elba's 1100 appointment on Thursday 9/22. Letty Side son is aware of the appointment time. Pt had immanuel-cath accessed as ordered by JUSTINA Jaquez. Emla cream and port accessed with a power port and roblero 20 gauge needle. Accessed under sterile technique--instructed pt about the procedure also. Pt had easy access with immediate blood return--flushed with sterile NS and also Heparin flush. EKG done and pt had robaxin for her muscle spasm and then JUSTINA Jaquez gave her d/c papers at 2315. Pt then had deaccessing of the port and dressing applied. Pt then was d/abdullahi at 2330 with her daughter.

## 2022-09-22 ENCOUNTER — HOSPITAL ENCOUNTER (OUTPATIENT)
Dept: INTERVENTIONAL RADIOLOGY/VASCULAR | Age: 82
Discharge: HOME | End: 2022-09-22
Attending: UROLOGY
Payer: MEDICARE

## 2022-09-22 DIAGNOSIS — N13.39 OTHER HYDRONEPHROSIS: ICD-10-CM

## 2022-09-22 PROCEDURE — 50435 EXCHANGE NEPHROSTOMY CATH: CPT

## 2022-09-22 PROCEDURE — 74011250636 HC RX REV CODE- 250/636: Performed by: RADIOLOGY

## 2022-09-22 PROCEDURE — 74011000636 HC RX REV CODE- 636: Performed by: RADIOLOGY

## 2022-09-22 RX ORDER — SODIUM CHLORIDE 9 MG/ML
500 INJECTION, SOLUTION INTRAVENOUS
Status: DISCONTINUED | OUTPATIENT
Start: 2022-09-22 | End: 2022-09-26 | Stop reason: HOSPADM

## 2022-09-22 RX ADMIN — IOPAMIDOL 3 ML: 612 INJECTION, SOLUTION INTRAVENOUS at 11:34

## 2022-09-22 RX ADMIN — SODIUM CHLORIDE 500 ML: 9 INJECTION, SOLUTION INTRAVENOUS at 11:34

## 2022-10-26 ENCOUNTER — TRANSCRIBE ORDER (OUTPATIENT)
Dept: INTERVENTIONAL RADIOLOGY/VASCULAR | Age: 82
End: 2022-10-26

## 2022-10-26 DIAGNOSIS — N13.39 OTHER HYDRONEPHROSIS: Primary | ICD-10-CM

## 2022-10-26 NOTE — PROGRESS NOTES
Spoke with Theo Ceballos, pt's son, to schedule for right nephrostomy tube exchange. Pt to arrive via medical transport on 11/2 at 1100. Vijaya April at [employer] [employer] ( [phone] ) notified to arrange transport.     Florencia Lindsay RN

## 2022-11-02 RX ORDER — SODIUM CHLORIDE 9 MG/ML
500 INJECTION, SOLUTION INTRAVENOUS
Status: CANCELLED | OUTPATIENT
Start: 2022-11-02

## 2022-11-04 ENCOUNTER — HOSPITAL ENCOUNTER (OUTPATIENT)
Dept: INTERVENTIONAL RADIOLOGY/VASCULAR | Age: 82
Discharge: HOME | End: 2022-11-04
Attending: UROLOGY
Payer: MEDICARE

## 2022-11-04 DIAGNOSIS — N13.39 OTHER HYDRONEPHROSIS: ICD-10-CM

## 2022-11-04 PROCEDURE — 74011250636 HC RX REV CODE- 250/636: Performed by: RADIOLOGY

## 2022-11-04 PROCEDURE — 74011000636 HC RX REV CODE- 636: Performed by: RADIOLOGY

## 2022-11-04 PROCEDURE — 50435 EXCHANGE NEPHROSTOMY CATH: CPT

## 2022-11-04 RX ORDER — SODIUM CHLORIDE 9 MG/ML
500 INJECTION, SOLUTION INTRAVENOUS
Status: DISCONTINUED | OUTPATIENT
Start: 2022-11-04 | End: 2022-11-08 | Stop reason: HOSPADM

## 2022-11-04 RX ADMIN — SODIUM CHLORIDE 500 ML: 9 INJECTION, SOLUTION INTRAVENOUS at 11:37

## 2022-11-04 RX ADMIN — IOPAMIDOL 10 ML: 612 INJECTION, SOLUTION INTRAVENOUS at 11:37

## 2022-12-12 ENCOUNTER — TRANSCRIBE ORDER (OUTPATIENT)
Dept: INTERVENTIONAL RADIOLOGY/VASCULAR | Age: 82
End: 2022-12-12

## 2022-12-12 DIAGNOSIS — N13.39 OTHER HYDRONEPHROSIS: Primary | ICD-10-CM

## 2022-12-20 ENCOUNTER — HOSPITAL ENCOUNTER (OUTPATIENT)
Dept: INTERVENTIONAL RADIOLOGY/VASCULAR | Age: 82
Discharge: HOME | End: 2022-12-20
Attending: UROLOGY
Payer: MEDICARE

## 2022-12-20 PROCEDURE — 74011250636 HC RX REV CODE- 250/636: Performed by: RADIOLOGY

## 2022-12-20 PROCEDURE — 50435 EXCHANGE NEPHROSTOMY CATH: CPT

## 2022-12-20 PROCEDURE — 74011000636 HC RX REV CODE- 636: Performed by: RADIOLOGY

## 2022-12-20 RX ORDER — SODIUM CHLORIDE 9 MG/ML
500 INJECTION, SOLUTION INTRAVENOUS
Status: DISCONTINUED | OUTPATIENT
Start: 2022-12-20 | End: 2022-12-24 | Stop reason: HOSPADM

## 2022-12-20 RX ADMIN — SODIUM CHLORIDE 500 ML: 9 INJECTION, SOLUTION INTRAVENOUS at 08:51

## 2022-12-20 RX ADMIN — IOPAMIDOL 6 ML: 612 INJECTION, SOLUTION INTRAVENOUS at 08:51

## 2023-01-25 ENCOUNTER — TRANSCRIBE ORDER (OUTPATIENT)
Dept: INTERVENTIONAL RADIOLOGY/VASCULAR | Age: 83
End: 2023-01-25

## 2023-01-25 DIAGNOSIS — N13.39 OTHER HYDRONEPHROSIS: Primary | ICD-10-CM

## 2023-01-25 NOTE — PROGRESS NOTES
Spoke with Juliann Santos at "[employer] [place] (# [phone]), she will have transportation arranged for patient to arrive at 1030 to the radiology [facility] for an 1100 Neph Tube xchange, 2/6/2023

## 2023-02-06 ENCOUNTER — HOSPITAL ENCOUNTER (OUTPATIENT)
Dept: INTERVENTIONAL RADIOLOGY/VASCULAR | Age: 83
Discharge: HOME | End: 2023-02-06
Attending: UROLOGY
Payer: MEDICARE

## 2023-02-06 DIAGNOSIS — N13.39 OTHER HYDRONEPHROSIS: ICD-10-CM

## 2023-02-06 PROCEDURE — 74011000636 HC RX REV CODE- 636: Performed by: RADIOLOGY

## 2023-02-06 PROCEDURE — 50435 EXCHANGE NEPHROSTOMY CATH: CPT

## 2023-02-06 PROCEDURE — 74011250636 HC RX REV CODE- 250/636: Performed by: RADIOLOGY

## 2023-02-06 RX ORDER — NALOXONE HYDROCHLORIDE 0.4 MG/ML
0.4 INJECTION, SOLUTION INTRAMUSCULAR; INTRAVENOUS; SUBCUTANEOUS AS NEEDED
Status: DISCONTINUED | OUTPATIENT
Start: 2023-02-06 | End: 2023-02-10 | Stop reason: HOSPADM

## 2023-02-06 RX ORDER — SODIUM CHLORIDE 9 MG/ML
500 INJECTION, SOLUTION INTRAVENOUS
Status: CANCELLED | OUTPATIENT
Start: 2023-02-06

## 2023-02-06 RX ORDER — ONDANSETRON 2 MG/ML
4 INJECTION INTRAMUSCULAR; INTRAVENOUS
Status: DISCONTINUED | OUTPATIENT
Start: 2023-02-06 | End: 2023-02-10 | Stop reason: HOSPADM

## 2023-02-06 RX ORDER — SODIUM CHLORIDE 9 MG/ML
500 INJECTION, SOLUTION INTRAVENOUS
Status: COMPLETED | OUTPATIENT
Start: 2023-02-06 | End: 2023-02-06

## 2023-02-06 RX ADMIN — SODIUM CHLORIDE 500 ML: 9 INJECTION, SOLUTION INTRAVENOUS at 11:16

## 2023-02-06 RX ADMIN — IOPAMIDOL 10 ML: 612 INJECTION, SOLUTION INTRAVENOUS at 11:16

## 2023-02-06 NOTE — BRIEF OP NOTE
Interventional Radiology Brief Procedure Note    Patient: Brian Red MRN: [id number]  SSN: [id number]    YOB: 1940  Age: 79 y.o. Sex: female      Date of Procedure: 2/6/2023    Procedure(s): Percutaneous Nephrostomy Tube Exchange    Performed By: Lisseth Stephens MD     none    Anesthesia: None    Estimated Blood Loss: None    Specimens: None    Findings:     - Written and verbal informed consent was obtained. - Time Out was performed. - Permanent image storage performed on PACS. - Image-guided fluoro exchange left nephrostomy tube performed using maximum barrier precautions and sterile technique. - Please refer to report on PACS for full details. Implants: None    Plan: dc home     Follow Up: with PCP    Signed By: Lisseth Stephens MD     February 6, 2023     Interventional Radiology Brief Procedure Note    Patient: Brian Red MRN: [id number]  SSN: [id number]    YOB: 1940  Age: 79 y.o. Sex: female      Date of Procedure: 2/6/2023    Procedure(s): Percutaneous Nephrostomy Tube Exchange    Performed By: Lisseth Stephens MD     none    Anesthesia: None    Estimated Blood Loss: None    Specimens: None    Findings:     - Written and verbal informed consent was obtained. - Time Out was performed. - Permanent image storage performed on PACS. - Image-guided fluoro exchange performed using maximum barrier precautions and sterile technique. - Please refer to report on PACS for full details.      Implants: None    Plan: dc home     Follow Up: PCP    Signed By: Lisseth Stephens MD     February 6, 2023

## 2023-03-17 NOTE — PROGRESS NOTES
Spoke to pt son Brown Winchester and scheduled appointment for 1200 with an arrival time of 1130. Coordinated with [facility], Shola Meza, for transportation.      Eboni Bullock -

## 2023-03-24 ENCOUNTER — HOSPITAL ENCOUNTER (OUTPATIENT)
Facility: HOSPITAL | Age: 83
End: 2023-03-24

## 2023-03-24 DIAGNOSIS — N13.39 OTHER HYDRONEPHROSIS: ICD-10-CM

## 2023-03-24 PROCEDURE — 6360000004 HC RX CONTRAST MEDICATION: Performed by: STUDENT IN AN ORGANIZED HEALTH CARE EDUCATION/TRAINING PROGRAM

## 2023-03-24 PROCEDURE — 50435 EXCHANGE NEPHROSTOMY CATH: CPT

## 2023-03-24 PROCEDURE — 2580000003 HC RX 258: Performed by: STUDENT IN AN ORGANIZED HEALTH CARE EDUCATION/TRAINING PROGRAM

## 2023-03-24 RX ORDER — SODIUM CHLORIDE 9 MG/ML
INJECTION, SOLUTION INTRAVENOUS ONCE
Status: COMPLETED | OUTPATIENT
Start: 2023-03-24 | End: 2023-03-24

## 2023-03-24 RX ADMIN — IOPAMIDOL 6 ML: 612 INJECTION, SOLUTION INTRAVENOUS at 11:31

## 2023-03-24 RX ADMIN — SODIUM CHLORIDE: 9 INJECTION, SOLUTION INTRAVENOUS at 11:31

## 2023-05-01 ENCOUNTER — HOSPITAL ENCOUNTER (OUTPATIENT)
Facility: HOSPITAL | Age: 83
Discharge: HOME | End: 2023-05-04

## 2023-05-01 DIAGNOSIS — N13.39 OTHER HYDRONEPHROSIS: ICD-10-CM

## 2023-05-01 PROCEDURE — 50435 EXCHANGE NEPHROSTOMY CATH: CPT

## 2023-05-01 PROCEDURE — 6360000004 HC RX CONTRAST MEDICATION: Performed by: STUDENT IN AN ORGANIZED HEALTH CARE EDUCATION/TRAINING PROGRAM

## 2023-05-01 PROCEDURE — 2580000003 HC RX 258: Performed by: STUDENT IN AN ORGANIZED HEALTH CARE EDUCATION/TRAINING PROGRAM

## 2023-05-01 RX ORDER — SODIUM CHLORIDE 9 MG/ML
INJECTION, SOLUTION INTRAVENOUS ONCE
Status: COMPLETED | OUTPATIENT
Start: 2023-05-01 | End: 2023-05-01

## 2023-05-01 RX ADMIN — SODIUM CHLORIDE: 9 INJECTION, SOLUTION INTRAVENOUS at 10:46

## 2023-05-01 RX ADMIN — IOPAMIDOL 10 ML: 612 INJECTION, SOLUTION INTRAVENOUS at 09:10

## 2023-07-20 NOTE — PRE-PROCEDURE INSTRUCTIONS
Spoke to patient's facility to schedule appointment. Instructed to arrive at 8:30 for 9:00 appointment. Pt denies AC use. Medications and allergies reviewed. Opportunity for questions provided. Update provided to Pt's LUCIA Man.

## 2023-07-27 ENCOUNTER — HOSPITAL ENCOUNTER (OUTPATIENT)
Facility: HOSPITAL | Age: 83
Discharge: HOME | End: 2023-07-27
Attending: UROLOGY
Payer: MEDICARE

## 2023-07-27 DIAGNOSIS — N13.39 OTHER HYDRONEPHROSIS: ICD-10-CM

## 2023-07-27 PROCEDURE — 50435 EXCHANGE NEPHROSTOMY CATH: CPT

## 2023-07-27 PROCEDURE — 2580000003 HC RX 258: Performed by: RADIOLOGY

## 2023-07-27 PROCEDURE — 6360000004 HC RX CONTRAST MEDICATION: Performed by: RADIOLOGY

## 2023-07-27 RX ORDER — SODIUM CHLORIDE 9 MG/ML
INJECTION, SOLUTION INTRAVENOUS ONCE
Status: COMPLETED | OUTPATIENT
Start: 2023-07-27 | End: 2023-07-27

## 2023-07-27 RX ADMIN — IOPAMIDOL 10 ML: 612 INJECTION, SOLUTION INTRAVENOUS at 09:29

## 2023-07-27 RX ADMIN — SODIUM CHLORIDE: 9 INJECTION, SOLUTION INTRAVENOUS at 09:29

## 2023-08-30 NOTE — PROGRESS NOTES
Appointment arranged with maxim Ryan. Miguel A Jimenez at [employer] made aware of appointment time to set up transportation.

## 2023-09-06 ENCOUNTER — HOSPITAL ENCOUNTER (OUTPATIENT)
Facility: HOSPITAL | Age: 83
Discharge: HOME | End: 2023-09-09
Attending: UROLOGY
Payer: MEDICARE

## 2023-09-06 DIAGNOSIS — N13.39 OTHER HYDRONEPHROSIS: ICD-10-CM

## 2023-09-06 PROCEDURE — 6360000004 HC RX CONTRAST MEDICATION: Performed by: UROLOGY

## 2023-09-06 PROCEDURE — 50435 EXCHANGE NEPHROSTOMY CATH: CPT

## 2023-09-06 RX ORDER — SODIUM CHLORIDE 9 MG/ML
INJECTION, SOLUTION INTRAVENOUS ONCE
Status: COMPLETED | OUTPATIENT
Start: 2023-09-06 | End: 2023-09-06

## 2023-09-06 RX ADMIN — IOPAMIDOL 15 ML: 612 INJECTION, SOLUTION INTRAVENOUS at 10:25

## 2023-09-06 RX ADMIN — SODIUM CHLORIDE: 9 INJECTION, SOLUTION INTRAVENOUS at 10:25

## 2023-09-30 NOTE — PROGRESS NOTES
Received OT eval and treat orders. Screened patient to identify level of assistance needed for ADLs and functional mobility/transfers. Patient presents to be at baseline and is dependent in performing ADLs and functional mobility/transfers. Pt is a resident at [address] [place] [address] and has been receiving total care for ADLs transfers with [facility] [employer]. Pt is not appropriate for skilled OT interventions at this time. Current OT orders weill be discontinued.     Thank you for this referral.    Richy Cortez, OTR/L Attending Only

## 2023-10-18 NOTE — PROGRESS NOTES
Spoke to son Skip Malloy to schedule appointment. [facility] to arrange transportation to bring pt at 0900 on 10/24.

## 2023-10-23 NOTE — PRE-PROCEDURE INSTRUCTIONS
Spoke to Pt's son Kevon Dominique to reschedule appt d/t equipment under repair. Spoke to [facility] to cancel original transport and schedule new transport at 0900 on Weds 11/01.

## 2023-11-01 ENCOUNTER — HOSPITAL ENCOUNTER (OUTPATIENT)
Facility: HOSPITAL | Age: 83
Discharge: HOME | End: 2023-11-04
Attending: UROLOGY
Payer: MEDICARE

## 2023-11-01 DIAGNOSIS — N13.39 OTHER HYDRONEPHROSIS: ICD-10-CM

## 2023-11-01 PROCEDURE — 6360000004 HC RX CONTRAST MEDICATION: Performed by: UROLOGY

## 2023-11-01 PROCEDURE — 50435 EXCHANGE NEPHROSTOMY CATH: CPT

## 2023-11-01 RX ORDER — SODIUM CHLORIDE 9 MG/ML
INJECTION, SOLUTION INTRAVENOUS ONCE
Status: DISCONTINUED | OUTPATIENT
Start: 2023-11-01 | End: 2023-11-05 | Stop reason: HOSPADM

## 2023-11-01 RX ADMIN — IOPAMIDOL 10 ML: 612 INJECTION, SOLUTION INTRAVENOUS at 09:25

## 2023-11-11 ENCOUNTER — APPOINTMENT (OUTPATIENT)
Facility: HOSPITAL | Age: 83
End: 2023-11-11
Payer: MEDICARE

## 2023-11-11 ENCOUNTER — HOSPITAL ENCOUNTER (INPATIENT)
Facility: HOSPITAL | Age: 83
LOS: 13 days | Discharge: NURSING, REHAB OR LONG-TERM CARE | End: 2023-11-24
Attending: EMERGENCY MEDICINE | Admitting: INTERNAL MEDICINE
Payer: MEDICARE

## 2023-11-11 DIAGNOSIS — R10.84 GENERALIZED ABDOMINAL PAIN: ICD-10-CM

## 2023-11-11 DIAGNOSIS — K56.50 SMALL BOWEL OBSTRUCTION DUE TO ADHESIONS (HCC): ICD-10-CM

## 2023-11-11 DIAGNOSIS — N39.0 URINARY TRACT INFECTION IN FEMALE: ICD-10-CM

## 2023-11-11 DIAGNOSIS — G62.9 NEUROPATHY: ICD-10-CM

## 2023-11-11 DIAGNOSIS — E87.5 ACUTE HYPERKALEMIA: Primary | ICD-10-CM

## 2023-11-11 PROBLEM — K56.609 SBO (SMALL BOWEL OBSTRUCTION) (HCC): Status: ACTIVE | Noted: 2023-11-11

## 2023-11-11 LAB
ALBUMIN SERPL-MCNC: 3.2 G/DL (ref 3.4–5)
ALBUMIN/GLOB SERPL: 0.6 (ref 0.8–1.7)
ALP SERPL-CCNC: 148 U/L (ref 45–117)
ALT SERPL-CCNC: 29 U/L (ref 13–56)
ANION GAP SERPL CALC-SCNC: 6 MMOL/L (ref 3–18)
ANION GAP SERPL CALC-SCNC: 7 MMOL/L (ref 3–18)
APPEARANCE UR: ABNORMAL
APTT PPP: 28.1 SEC (ref 23–36.4)
AST SERPL-CCNC: 24 U/L (ref 10–38)
BACTERIA URNS QL MICRO: ABNORMAL /HPF
BASOPHILS # BLD: 0 K/UL (ref 0–0.1)
BASOPHILS NFR BLD: 0 % (ref 0–2)
BILIRUB SERPL-MCNC: 0.5 MG/DL (ref 0.2–1)
BILIRUB UR QL: NEGATIVE
BUN SERPL-MCNC: 38 MG/DL (ref 7–18)
BUN SERPL-MCNC: 41 MG/DL (ref 7–18)
BUN/CREAT SERPL: 25 (ref 12–20)
BUN/CREAT SERPL: 26 (ref 12–20)
CALCIUM SERPL-MCNC: 8.1 MG/DL (ref 8.5–10.1)
CALCIUM SERPL-MCNC: 9.1 MG/DL (ref 8.5–10.1)
CHLORIDE SERPL-SCNC: 109 MMOL/L (ref 100–111)
CHLORIDE SERPL-SCNC: 116 MMOL/L (ref 100–111)
CK SERPL-CCNC: 44 U/L (ref 26–192)
CO2 SERPL-SCNC: 20 MMOL/L (ref 21–32)
CO2 SERPL-SCNC: 21 MMOL/L (ref 21–32)
COLOR UR: YELLOW
CREAT SERPL-MCNC: 1.48 MG/DL (ref 0.6–1.3)
CREAT SERPL-MCNC: 1.66 MG/DL (ref 0.6–1.3)
DIFFERENTIAL METHOD BLD: ABNORMAL
EKG ATRIAL RATE: 80 BPM
EKG DIAGNOSIS: NORMAL
EKG P AXIS: 62 DEGREES
EKG P-R INTERVAL: 150 MS
EKG Q-T INTERVAL: 380 MS
EKG QRS DURATION: 78 MS
EKG QTC CALCULATION (BAZETT): 438 MS
EKG R AXIS: 68 DEGREES
EKG T AXIS: 71 DEGREES
EKG VENTRICULAR RATE: 80 BPM
EOSINOPHIL # BLD: 0.2 K/UL (ref 0–0.4)
EOSINOPHIL NFR BLD: 1 % (ref 0–5)
EPITH CASTS URNS QL MICRO: ABNORMAL /LPF (ref 0–5)
ERYTHROCYTE [DISTWIDTH] IN BLOOD BY AUTOMATED COUNT: 13.8 % (ref 11.6–14.5)
GLOBULIN SER CALC-MCNC: 5.1 G/DL (ref 2–4)
GLUCOSE BLD STRIP.AUTO-MCNC: 240 MG/DL (ref 70–110)
GLUCOSE BLD STRIP.AUTO-MCNC: 304 MG/DL (ref 70–110)
GLUCOSE BLD STRIP.AUTO-MCNC: 308 MG/DL (ref 70–110)
GLUCOSE SERPL-MCNC: 278 MG/DL (ref 74–99)
GLUCOSE SERPL-MCNC: 342 MG/DL (ref 74–99)
GLUCOSE UR STRIP.AUTO-MCNC: NEGATIVE MG/DL
HCT VFR BLD AUTO: 43.4 % (ref 35–45)
HGB BLD-MCNC: 13.9 G/DL (ref 12–16)
HGB UR QL STRIP: NEGATIVE
IMM GRANULOCYTES # BLD AUTO: 0 K/UL (ref 0–0.04)
IMM GRANULOCYTES NFR BLD AUTO: 0 % (ref 0–0.5)
INR PPP: 1 (ref 0.9–1.1)
KETONES UR QL STRIP.AUTO: NEGATIVE MG/DL
LACTATE SERPL-SCNC: 2 MMOL/L (ref 0.4–2)
LEUKOCYTE ESTERASE UR QL STRIP.AUTO: ABNORMAL
LIPASE SERPL-CCNC: 55 U/L (ref 13–75)
LYMPHOCYTES # BLD: 1.5 K/UL (ref 0.9–3.6)
LYMPHOCYTES NFR BLD: 8 % (ref 21–52)
MAGNESIUM SERPL-MCNC: 2 MG/DL (ref 1.6–2.6)
MCH RBC QN AUTO: 29 PG (ref 24–34)
MCHC RBC AUTO-ENTMCNC: 32 G/DL (ref 31–37)
MCV RBC AUTO: 90.4 FL (ref 78–100)
MONOCYTES # BLD: 1.1 K/UL (ref 0.05–1.2)
MONOCYTES NFR BLD: 6 % (ref 3–10)
NEUTS SEG # BLD: 16.2 K/UL (ref 1.8–8)
NEUTS SEG NFR BLD: 85 % (ref 40–73)
NITRITE UR QL STRIP.AUTO: NEGATIVE
NRBC # BLD: 0 K/UL (ref 0–0.01)
NRBC BLD-RTO: 0 PER 100 WBC
PH UR STRIP: >8.5 [PH] (ref 5–8)
PLATELET # BLD AUTO: 369 K/UL (ref 135–420)
PLATELET COMMENT: ABNORMAL
PMV BLD AUTO: 9.4 FL (ref 9.2–11.8)
POTASSIUM SERPL-SCNC: 4.8 MMOL/L (ref 3.5–5.5)
POTASSIUM SERPL-SCNC: 6.2 MMOL/L (ref 3.5–5.5)
PROT SERPL-MCNC: 8.3 G/DL (ref 6.4–8.2)
PROT UR STRIP-MCNC: 300 MG/DL
PROTHROMBIN TIME: 13.4 SEC (ref 11.9–14.7)
RBC # BLD AUTO: 4.8 M/UL (ref 4.2–5.3)
RBC #/AREA URNS HPF: ABNORMAL /HPF (ref 0–5)
RBC MORPH BLD: ABNORMAL
SODIUM SERPL-SCNC: 137 MMOL/L (ref 136–145)
SODIUM SERPL-SCNC: 142 MMOL/L (ref 136–145)
SP GR UR REFRACTOMETRY: 1.02 (ref 1–1.03)
TRI-PHOS CRY URNS QL MICRO: ABNORMAL
TROPONIN I SERPL HS-MCNC: 6 NG/L (ref 0–54)
UROBILINOGEN UR QL STRIP.AUTO: 0.2 EU/DL (ref 0.2–1)
WBC # BLD AUTO: 19 K/UL (ref 4.6–13.2)
WBC URNS QL MICRO: ABNORMAL /HPF (ref 0–5)

## 2023-11-11 PROCEDURE — 85730 THROMBOPLASTIN TIME PARTIAL: CPT

## 2023-11-11 PROCEDURE — 82962 GLUCOSE BLOOD TEST: CPT

## 2023-11-11 PROCEDURE — 74018 RADEX ABDOMEN 1 VIEW: CPT

## 2023-11-11 PROCEDURE — 83735 ASSAY OF MAGNESIUM: CPT

## 2023-11-11 PROCEDURE — 83605 ASSAY OF LACTIC ACID: CPT

## 2023-11-11 PROCEDURE — 96375 TX/PRO/DX INJ NEW DRUG ADDON: CPT

## 2023-11-11 PROCEDURE — A4216 STERILE WATER/SALINE, 10 ML: HCPCS | Performed by: INTERNAL MEDICINE

## 2023-11-11 PROCEDURE — 93005 ELECTROCARDIOGRAM TRACING: CPT | Performed by: EMERGENCY MEDICINE

## 2023-11-11 PROCEDURE — 2500000003 HC RX 250 WO HCPCS: Performed by: INTERNAL MEDICINE

## 2023-11-11 PROCEDURE — 84484 ASSAY OF TROPONIN QUANT: CPT

## 2023-11-11 PROCEDURE — 80053 COMPREHEN METABOLIC PANEL: CPT

## 2023-11-11 PROCEDURE — 85025 COMPLETE CBC W/AUTO DIFF WBC: CPT

## 2023-11-11 PROCEDURE — 87040 BLOOD CULTURE FOR BACTERIA: CPT

## 2023-11-11 PROCEDURE — 6370000000 HC RX 637 (ALT 250 FOR IP): Performed by: INTERNAL MEDICINE

## 2023-11-11 PROCEDURE — 6360000002 HC RX W HCPCS: Performed by: EMERGENCY MEDICINE

## 2023-11-11 PROCEDURE — 82550 ASSAY OF CK (CPK): CPT

## 2023-11-11 PROCEDURE — 6370000000 HC RX 637 (ALT 250 FOR IP): Performed by: EMERGENCY MEDICINE

## 2023-11-11 PROCEDURE — 1100000003 HC PRIVATE W/ TELEMETRY

## 2023-11-11 PROCEDURE — 2580000003 HC RX 258: Performed by: EMERGENCY MEDICINE

## 2023-11-11 PROCEDURE — 81001 URINALYSIS AUTO W/SCOPE: CPT

## 2023-11-11 PROCEDURE — 99285 EMERGENCY DEPT VISIT HI MDM: CPT

## 2023-11-11 PROCEDURE — 87154 CUL TYP ID BLD PTHGN 6+ TRGT: CPT

## 2023-11-11 PROCEDURE — 83690 ASSAY OF LIPASE: CPT

## 2023-11-11 PROCEDURE — 93010 ELECTROCARDIOGRAM REPORT: CPT | Performed by: INTERNAL MEDICINE

## 2023-11-11 PROCEDURE — 96366 THER/PROPH/DIAG IV INF ADDON: CPT

## 2023-11-11 PROCEDURE — 85610 PROTHROMBIN TIME: CPT

## 2023-11-11 PROCEDURE — 2580000003 HC RX 258: Performed by: INTERNAL MEDICINE

## 2023-11-11 PROCEDURE — 87086 URINE CULTURE/COLONY COUNT: CPT

## 2023-11-11 PROCEDURE — 2500000003 HC RX 250 WO HCPCS: Performed by: EMERGENCY MEDICINE

## 2023-11-11 PROCEDURE — 87077 CULTURE AEROBIC IDENTIFY: CPT

## 2023-11-11 PROCEDURE — 96365 THER/PROPH/DIAG IV INF INIT: CPT

## 2023-11-11 PROCEDURE — 74176 CT ABD & PELVIS W/O CONTRAST: CPT

## 2023-11-11 PROCEDURE — 6360000002 HC RX W HCPCS: Performed by: INTERNAL MEDICINE

## 2023-11-11 PROCEDURE — 87186 SC STD MICRODIL/AGAR DIL: CPT

## 2023-11-11 RX ORDER — ONDANSETRON 2 MG/ML
4 INJECTION INTRAMUSCULAR; INTRAVENOUS
Status: ACTIVE | OUTPATIENT
Start: 2023-11-11 | End: 2023-11-12

## 2023-11-11 RX ORDER — ACETAMINOPHEN 325 MG/1
650 TABLET ORAL EVERY 6 HOURS PRN
Status: DISCONTINUED | OUTPATIENT
Start: 2023-11-11 | End: 2023-11-24 | Stop reason: HOSPADM

## 2023-11-11 RX ORDER — MORPHINE SULFATE 4 MG/ML
4 INJECTION, SOLUTION INTRAMUSCULAR; INTRAVENOUS
Status: COMPLETED | OUTPATIENT
Start: 2023-11-11 | End: 2023-11-11

## 2023-11-11 RX ORDER — POTASSIUM CHLORIDE 20 MEQ/1
40 TABLET, EXTENDED RELEASE ORAL PRN
Status: DISCONTINUED | OUTPATIENT
Start: 2023-11-11 | End: 2023-11-24 | Stop reason: HOSPADM

## 2023-11-11 RX ORDER — ONDANSETRON 2 MG/ML
4 INJECTION INTRAMUSCULAR; INTRAVENOUS
Status: COMPLETED | OUTPATIENT
Start: 2023-11-11 | End: 2023-11-11

## 2023-11-11 RX ORDER — ONDANSETRON 4 MG/1
4 TABLET, ORALLY DISINTEGRATING ORAL EVERY 8 HOURS PRN
Status: DISCONTINUED | OUTPATIENT
Start: 2023-11-11 | End: 2023-11-24 | Stop reason: HOSPADM

## 2023-11-11 RX ORDER — CIPROFLOXACIN 2 MG/ML
400 INJECTION, SOLUTION INTRAVENOUS EVERY 12 HOURS
Status: DISCONTINUED | OUTPATIENT
Start: 2023-11-11 | End: 2023-11-11

## 2023-11-11 RX ORDER — SODIUM CHLORIDE 0.9 % (FLUSH) 0.9 %
5-40 SYRINGE (ML) INJECTION EVERY 12 HOURS SCHEDULED
Status: DISCONTINUED | OUTPATIENT
Start: 2023-11-11 | End: 2023-11-24 | Stop reason: HOSPADM

## 2023-11-11 RX ORDER — ENOXAPARIN SODIUM 100 MG/ML
30 INJECTION SUBCUTANEOUS DAILY
Status: DISCONTINUED | OUTPATIENT
Start: 2023-11-11 | End: 2023-11-14

## 2023-11-11 RX ORDER — GABAPENTIN 300 MG/1
300 CAPSULE ORAL 2 TIMES DAILY
Status: DISCONTINUED | OUTPATIENT
Start: 2023-11-11 | End: 2023-11-24 | Stop reason: HOSPADM

## 2023-11-11 RX ORDER — INSULIN LISPRO 100 [IU]/ML
0-4 INJECTION, SOLUTION INTRAVENOUS; SUBCUTANEOUS NIGHTLY
Status: DISCONTINUED | OUTPATIENT
Start: 2023-11-11 | End: 2023-11-24 | Stop reason: HOSPADM

## 2023-11-11 RX ORDER — POTASSIUM CHLORIDE 7.45 MG/ML
10 INJECTION INTRAVENOUS PRN
Status: DISCONTINUED | OUTPATIENT
Start: 2023-11-11 | End: 2023-11-24 | Stop reason: HOSPADM

## 2023-11-11 RX ORDER — LEVOTHYROXINE SODIUM 0.05 MG/1
50 TABLET ORAL
Status: DISCONTINUED | OUTPATIENT
Start: 2023-11-12 | End: 2023-11-24 | Stop reason: HOSPADM

## 2023-11-11 RX ORDER — ONDANSETRON 2 MG/ML
4 INJECTION INTRAMUSCULAR; INTRAVENOUS EVERY 6 HOURS PRN
Status: DISCONTINUED | OUTPATIENT
Start: 2023-11-11 | End: 2023-11-24 | Stop reason: HOSPADM

## 2023-11-11 RX ORDER — SODIUM CHLORIDE 9 MG/ML
INJECTION, SOLUTION INTRAVENOUS PRN
Status: DISCONTINUED | OUTPATIENT
Start: 2023-11-11 | End: 2023-11-24 | Stop reason: HOSPADM

## 2023-11-11 RX ORDER — SODIUM CHLORIDE 9 MG/ML
INJECTION, SOLUTION INTRAVENOUS CONTINUOUS
Status: DISPENSED | OUTPATIENT
Start: 2023-11-11 | End: 2023-11-13

## 2023-11-11 RX ORDER — DEXTROSE MONOHYDRATE 25 G/50ML
25 INJECTION, SOLUTION INTRAVENOUS ONCE
Status: COMPLETED | OUTPATIENT
Start: 2023-11-11 | End: 2023-11-11

## 2023-11-11 RX ORDER — SODIUM CHLORIDE 0.9 % (FLUSH) 0.9 %
5-40 SYRINGE (ML) INJECTION PRN
Status: DISCONTINUED | OUTPATIENT
Start: 2023-11-11 | End: 2023-11-24 | Stop reason: HOSPADM

## 2023-11-11 RX ORDER — INSULIN LISPRO 100 [IU]/ML
0-4 INJECTION, SOLUTION INTRAVENOUS; SUBCUTANEOUS
Status: DISCONTINUED | OUTPATIENT
Start: 2023-11-11 | End: 2023-11-24 | Stop reason: HOSPADM

## 2023-11-11 RX ORDER — MAGNESIUM SULFATE IN WATER 40 MG/ML
2000 INJECTION, SOLUTION INTRAVENOUS PRN
Status: DISCONTINUED | OUTPATIENT
Start: 2023-11-11 | End: 2023-11-24 | Stop reason: HOSPADM

## 2023-11-11 RX ORDER — METOPROLOL SUCCINATE 50 MG/1
50 TABLET, EXTENDED RELEASE ORAL DAILY
Status: DISCONTINUED | OUTPATIENT
Start: 2023-11-11 | End: 2023-11-24 | Stop reason: HOSPADM

## 2023-11-11 RX ORDER — ALBUTEROL SULFATE 2.5 MG/3ML
2.5 SOLUTION RESPIRATORY (INHALATION)
Status: COMPLETED | OUTPATIENT
Start: 2023-11-11 | End: 2023-11-11

## 2023-11-11 RX ORDER — ACETAMINOPHEN 650 MG/1
650 SUPPOSITORY RECTAL EVERY 6 HOURS PRN
Status: DISCONTINUED | OUTPATIENT
Start: 2023-11-11 | End: 2023-11-24 | Stop reason: HOSPADM

## 2023-11-11 RX ORDER — CALCIUM GLUCONATE 20 MG/ML
1000 INJECTION, SOLUTION INTRAVENOUS ONCE
Status: COMPLETED | OUTPATIENT
Start: 2023-11-11 | End: 2023-11-11

## 2023-11-11 RX ORDER — CIPROFLOXACIN 2 MG/ML
400 INJECTION, SOLUTION INTRAVENOUS EVERY 24 HOURS
Status: DISCONTINUED | OUTPATIENT
Start: 2023-11-12 | End: 2023-11-14

## 2023-11-11 RX ADMIN — ONDANSETRON 4 MG: 2 INJECTION INTRAMUSCULAR; INTRAVENOUS at 11:19

## 2023-11-11 RX ADMIN — DEXTROSE MONOHYDRATE 25 G: 25 INJECTION, SOLUTION INTRAVENOUS at 12:46

## 2023-11-11 RX ADMIN — ENOXAPARIN SODIUM 30 MG: 100 INJECTION SUBCUTANEOUS at 17:50

## 2023-11-11 RX ADMIN — SODIUM CHLORIDE: 9 INJECTION, SOLUTION INTRAVENOUS at 17:35

## 2023-11-11 RX ADMIN — INSULIN LISPRO 3 UNITS: 100 INJECTION, SOLUTION INTRAVENOUS; SUBCUTANEOUS at 18:18

## 2023-11-11 RX ADMIN — INSULIN LISPRO 4 UNITS: 100 INJECTION, SOLUTION INTRAVENOUS; SUBCUTANEOUS at 21:03

## 2023-11-11 RX ADMIN — MORPHINE SULFATE 4 MG: 4 INJECTION INTRAVENOUS at 11:17

## 2023-11-11 RX ADMIN — SODIUM CHLORIDE, PRESERVATIVE FREE 10 ML: 5 INJECTION INTRAVENOUS at 20:53

## 2023-11-11 RX ADMIN — INSULIN HUMAN 6 UNITS: 100 INJECTION, SOLUTION PARENTERAL at 12:46

## 2023-11-11 RX ADMIN — ALBUTEROL SULFATE 2.5 MG: 2.5 SOLUTION RESPIRATORY (INHALATION) at 12:47

## 2023-11-11 RX ADMIN — SODIUM ZIRCONIUM CYCLOSILICATE 5 G: 5 POWDER, FOR SUSPENSION ORAL at 15:47

## 2023-11-11 RX ADMIN — CIPROFLOXACIN 400 MG: 400 INJECTION, SOLUTION INTRAVENOUS at 17:49

## 2023-11-11 RX ADMIN — CEFTRIAXONE 1000 MG: 1 INJECTION, POWDER, FOR SOLUTION INTRAMUSCULAR; INTRAVENOUS at 14:48

## 2023-11-11 RX ADMIN — MORPHINE SULFATE 4 MG: 4 INJECTION INTRAVENOUS at 14:48

## 2023-11-11 RX ADMIN — CALCIUM GLUCONATE 1000 MG: 20 INJECTION, SOLUTION INTRAVENOUS at 12:47

## 2023-11-11 RX ADMIN — FAMOTIDINE 20 MG: 10 INJECTION, SOLUTION INTRAVENOUS at 17:49

## 2023-11-11 ASSESSMENT — ENCOUNTER SYMPTOMS
NAUSEA: 0
CONSTIPATION: 0
WHEEZING: 0
SHORTNESS OF BREATH: 0
DIARRHEA: 0
RHINORRHEA: 0
EYES NEGATIVE: 1
SINUS PRESSURE: 0
VOMITING: 0
ABDOMINAL DISTENTION: 1
BLOOD IN STOOL: 0
ABDOMINAL PAIN: 1
BACK PAIN: 0
COUGH: 0
SORE THROAT: 0
NAUSEA: 1
CHEST TIGHTNESS: 0
TROUBLE SWALLOWING: 0

## 2023-11-11 ASSESSMENT — PAIN SCALES - GENERAL
PAINLEVEL_OUTOF10: 10
PAINLEVEL_OUTOF10: 0
PAINLEVEL_OUTOF10: 10

## 2023-11-11 ASSESSMENT — PAIN - FUNCTIONAL ASSESSMENT: PAIN_FUNCTIONAL_ASSESSMENT: 0-10

## 2023-11-11 NOTE — ED NOTES
Medication given per [facility] order with no complaints at this time. Patient had an episode of vomiting that resided. Medication given per [facility] order. Education regarding medication provided. Tolerated well with no complaints. Instructed patient to utilize the call light if he/she needs anything further. Will continue to monitor.        Thu Thornton RN  11/11/23 9979

## 2023-11-11 NOTE — ED TRIAGE NOTES
Patient arrives to ed via ems stretcher from [facility] with complaints of severe abdominal pain since this am. Pt has colostomy and urostomy, output noted in both bags. Pt blind and hard of hearing, appears to be in severe pain att.

## 2023-11-11 NOTE — ED NOTES
NG tube placed by this RN. Gastric contents seen by this RN and Post MD upon placement. KUB ordered on the patient for placement verification.           Misael Walker RN  11/11/23 5198

## 2023-11-11 NOTE — ED NOTES
EKG completed. Bloodwork obtained by RRT Tech. Patient resting on the stretcher at this time. Chest rise and fall noted. VSS. No further complaints at this time. Will continue to monitor according to facility protocol.        Ricarda Burns RN  11/11/23 0377

## 2023-11-11 NOTE — ED NOTES
Patient taken to inpatient floor 343. Report called to [facility] with no further questions. Outstanding orders reviewed and discussed with the receiving nurse (if applicable). Will remove from ED trackboard.        Sharri Hargrove RN  11/11/23 9532

## 2023-11-11 NOTE — PROGRESS NOTES
Pt seen/examined. Extensive/complex abd surgical hx -- pt very high surgical risk. Labs/CT reviewed. SBO, partial (ostomy w output). Rec NPO/NGT/IVF. Follow abd exam and labs. Following.     Consult dictated [id number]  RP

## 2023-11-11 NOTE — ED NOTES
Attempted to call floor nurse to state that the patient is in the room. Connected to heart monitor. Fall precautions in place. Will sign off from the patient.       Jerrod Alcantar RN  11/11/23 9808

## 2023-11-11 NOTE — ED PROVIDER NOTES
THE [facility] EMERGENCY DEPT  EMERGENCY DEPARTMENT ENCOUNTER      Pt Name: Kimberly Jose  MRN: [id number]  [address] [facility] 1940  Date of evaluation: 11/11/2023  Provider: Demetrio Gallegos. Tania Arndt       Chief Complaint   Patient presents with    Abdominal Pain         HISTORY OF PRESENT ILLNESS   (Location/Symptom, Timing/Onset, Context/Setting, Quality, Duration, Modifying Factors, Severity)  Note limiting factors. Kimberly Jose is a 79 y.o. female with past medical history of anxiety, colostomy, diabetes, hard of hearing, vaginal and colon cancer, nephrostomy, colostomy, cholecystectomy, appendectomy who presents to the emergency department who presents emergency department chief complaint of severe abdominal pain that started earlier this morning that is 10 out of 10 pain scale. She reports associated nausea but no vomiting. She denies any fever and no chest pain, palpitations, flank pain, dizziness, focal weakness, constipation, diarrhea, numbness, and no other complaints. The history is provided by the patient and the EMS personnel. No  was used. Nursing Notes were reviewed. REVIEW OF SYSTEMS    (2-9 systems for level 4, 10 or more for level 5)     Review of Systems   Constitutional:  Negative for chills, diaphoresis and fever. HENT:  Negative for congestion, rhinorrhea, sore throat and trouble swallowing. Eyes:  Negative for visual disturbance. Respiratory:  Negative for cough and shortness of breath. Cardiovascular:  Negative for chest pain and palpitations. Gastrointestinal:  Positive for abdominal pain and nausea. Negative for diarrhea and vomiting. Endocrine: Negative for cold intolerance and heat intolerance. Genitourinary:  Negative for flank pain and pelvic pain. Musculoskeletal:  Negative for back pain, neck pain and neck stiffness. Skin:  Negative for rash. Neurological:  Negative for dizziness, numbness and headaches.    Hematological:  Negative body systems and/or organ systems due to acute hyperkalemia in the setting of small bowel obstruction and urinary tract infection. Gave emergent multiple medications lower potassium, gave broad-spectrum antibiotic, placed NGT, admitted with general surgeon consulted. Services included the following:  -reviewing nursing notes and old charts  -vital sign assessments  -direct patient care  -medication orders and management  -interpreting and reviewing diagnostic studies/labs  -re-evaluations  -documentation time  -Consults    Aggregate critical care time was 48 minutes, which includes only time during which I was engaged in work directly related to the patient's care as described above, whether I was at bedside or elsewhere in the Emergency Department. It did not include time spent performing other reported procedures or the services of residents, students, nurses, or advance practice providers.    ~Dr. Dylan Pozo      FINAL IMPRESSION      1. Acute hyperkalemia    2. Small bowel obstruction due to adhesions ([address])    3. Generalized abdominal pain    4. Urinary tract infection in female          DISPOSITION/PLAN   DISPOSITION Admitted 11/11/2023 02:41:42 PM      PATIENT REFERRED TO:  No follow-up provider specified. DISCHARGE MEDICATIONS:  New Prescriptions    No medications on file     Controlled Substances Monitoring:          No data to display                Dictation disclaimer: Please note that this dictation was completed with [employer], the computer voice recognition software. Quite often unanticipated grammatical, syntax, homophones, and other interpretive errors are inadvertently transcribed by the computer software. Please disregard these errors. Please excuse any errors that have escaped final proofreading. My signature above authenticates this document and my orders, the final    diagnosis (es), discharge prescription (s), and instructions in the Epic    record. Demetrio Arndt DO (electronically

## 2023-11-11 NOTE — H&P
HISTORY AND PHYSICAL EXAMINATION      Assessment:     Patient Active Problem List    Diagnosis Date Noted    SBO (small bowel obstruction) ([address]) 11/11/2023    Nephrostomy tube displaced ([address]) 04/29/2022    Pleural effusion on left 04/29/2022    Decubitus ulcer of coccygeal region 04/29/2022    Pressure injury of right buttock, stage 4 ([address]) 03/07/2022    Abdominal pain 12/07/2021    Hydronephrosis, right 12/07/2021    Pressure injury of sacral region, stage 4 ([address]) 08/09/2021    Displacement of nephrostomy tube ([address]) 06/06/2021    Pseudomonas aeruginosa infection 03/22/2021    CKD stage 3 due to type 2 diabetes mellitus ([address]) 06/24/2020    Acute renal failure (ARF) ([address]) 06/21/2020    Nephrostomy complication ([address]) 20/65/2742    S/P AKA (above knee amputation), right ([address]) 06/03/2019    Nephrostomy status ([address]) 06/03/2019    Pressure injury of right heel, unstageable ([address]) 11/28/2018    Hypertension 11/26/2018    UTI (urinary tract infection) 11/26/2018    S/P colectomy 11/26/2018    Diabetes ([address]) 11/26/2018    Legally blind 11/26/2018    Pyelonephritis 05/18/2018    Right flank pain 05/18/2018    S/p nephrectomy 05/18/2018     1) SBO -- mostly due to adhesions  2) Hyperekalemia  3) DENNY  4) UTI  5) DM-ii  6) weakness    Plan:   Admit to tele  Bowel rest  NGT  Consult surg ( ER MD to notify surg)  Repeat BMP  Fu labs as ordered  iVF  IV empiric abx  Accuchecks and ssi    GI/DVT Prophylaxis -- sq lovenox  Code Status: --full    Subjective:     Kaylee Umaña is a 79 y.o. 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 PT presented to ER with complaint of severe abdominal pain that started earlier this morning that is 10 out of 10 pain scale. She reports associated nausea but no vomiting.   She denies any fever and no chest pain, palpitations, flank pain, opacification of the renal collecting system. The catheter was secured to the patient's skin using Stayfix external adhesive fixation device. A dry sterile dressing was applied. There were no immediate complications. The patient tolerated the procedure without difficulty. SEDATION:  None ESTIMATED BLOOD LOSS:  < 1 ml AIR KERMA:  8 mGy     Technically successful fluoroscopic guided right nephrostomy tube exchange. Routine exchange in 8-12 weeks is recommended.           Hernandez Santana MD    11/11/2023, 2:43 PM

## 2023-11-12 ENCOUNTER — APPOINTMENT (OUTPATIENT)
Facility: HOSPITAL | Age: 83
End: 2023-11-12
Payer: MEDICARE

## 2023-11-12 LAB
ACCESSION NUMBER, LLC1M: ABNORMAL
ACINETOBACTER CALCOAC BAUMANNII COMPLEX BY PCR: NOT DETECTED
B FRAGILIS DNA BLD POS QL NAA+NON-PROBE: NOT DETECTED
BIOFIRE TEST COMMENT: ABNORMAL
C ALBICANS DNA BLD POS QL NAA+NON-PROBE: NOT DETECTED
C AURIS DNA BLD POS QL NAA+NON-PROBE: NOT DETECTED
C GATTII+NEOFOR DNA BLD POS QL NAA+N-PRB: NOT DETECTED
C GLABRATA DNA BLD POS QL NAA+NON-PROBE: NOT DETECTED
C KRUSEI DNA BLD POS QL NAA+NON-PROBE: NOT DETECTED
C PARAP DNA BLD POS QL NAA+NON-PROBE: NOT DETECTED
C TROPICLS DNA BLD POS QL NAA+NON-PROBE: NOT DETECTED
E CLOAC COMP DNA BLD POS NAA+NON-PROBE: NOT DETECTED
E COLI DNA BLD POS QL NAA+NON-PROBE: NOT DETECTED
E FAECALIS DNA BLD POS QL NAA+NON-PROBE: NOT DETECTED
E FAECIUM DNA BLD POS QL NAA+NON-PROBE: NOT DETECTED
ENTEROBACTERALES DNA BLD POS NAA+N-PRB: NOT DETECTED
GLUCOSE BLD STRIP.AUTO-MCNC: 157 MG/DL (ref 70–110)
GLUCOSE BLD STRIP.AUTO-MCNC: 163 MG/DL (ref 70–110)
GLUCOSE BLD STRIP.AUTO-MCNC: 232 MG/DL (ref 70–110)
GLUCOSE BLD STRIP.AUTO-MCNC: 236 MG/DL (ref 70–110)
GP B STREP DNA BLD POS QL NAA+NON-PROBE: NOT DETECTED
HAEM INFLU DNA BLD POS QL NAA+NON-PROBE: NOT DETECTED
K OXYTOCA DNA BLD POS QL NAA+NON-PROBE: NOT DETECTED
KLEBSIELLA SP DNA BLD POS QL NAA+NON-PRB: NOT DETECTED
KLEBSIELLA SP DNA BLD POS QL NAA+NON-PRB: NOT DETECTED
L MONOCYTOG DNA BLD POS QL NAA+NON-PROBE: NOT DETECTED
MAGNESIUM SERPL-MCNC: 1.8 MG/DL (ref 1.6–2.6)
N MEN DNA BLD POS QL NAA+NON-PROBE: NOT DETECTED
P AERUGINOSA DNA BLD POS NAA+NON-PROBE: NOT DETECTED
PHOSPHATE SERPL-MCNC: 4 MG/DL (ref 2.5–4.9)
PROTEUS SP DNA BLD POS QL NAA+NON-PROBE: NOT DETECTED
RESISTANT GENE TARGETS: ABNORMAL
S AUREUS DNA BLD POS QL NAA+NON-PROBE: NOT DETECTED
S AUREUS+CONS DNA BLD POS NAA+NON-PROBE: DETECTED
S EPIDERMIDIS DNA BLD POS QL NAA+NON-PRB: NOT DETECTED
S LUGDUNENSIS DNA BLD POS QL NAA+NON-PRB: NOT DETECTED
S MALTOPHILIA DNA BLD POS QL NAA+NON-PRB: NOT DETECTED
S MARCESCENS DNA BLD POS NAA+NON-PROBE: NOT DETECTED
S PNEUM DNA BLD POS QL NAA+NON-PROBE: NOT DETECTED
S PYO DNA BLD POS QL NAA+NON-PROBE: NOT DETECTED
SALMONELLA DNA BLD POS QL NAA+NON-PROBE: NOT DETECTED
STREPTOCOCCUS DNA BLD POS NAA+NON-PROBE: NOT DETECTED

## 2023-11-12 PROCEDURE — 6370000000 HC RX 637 (ALT 250 FOR IP): Performed by: INTERNAL MEDICINE

## 2023-11-12 PROCEDURE — A4216 STERILE WATER/SALINE, 10 ML: HCPCS | Performed by: INTERNAL MEDICINE

## 2023-11-12 PROCEDURE — 84100 ASSAY OF PHOSPHORUS: CPT

## 2023-11-12 PROCEDURE — 2580000003 HC RX 258: Performed by: INTERNAL MEDICINE

## 2023-11-12 PROCEDURE — 83735 ASSAY OF MAGNESIUM: CPT

## 2023-11-12 PROCEDURE — 1100000003 HC PRIVATE W/ TELEMETRY

## 2023-11-12 PROCEDURE — 2500000003 HC RX 250 WO HCPCS: Performed by: INTERNAL MEDICINE

## 2023-11-12 PROCEDURE — 82962 GLUCOSE BLOOD TEST: CPT

## 2023-11-12 PROCEDURE — 74018 RADEX ABDOMEN 1 VIEW: CPT

## 2023-11-12 PROCEDURE — 36415 COLL VENOUS BLD VENIPUNCTURE: CPT

## 2023-11-12 PROCEDURE — 6360000002 HC RX W HCPCS: Performed by: INTERNAL MEDICINE

## 2023-11-12 RX ORDER — DEXTROSE MONOHYDRATE 100 MG/ML
INJECTION, SOLUTION INTRAVENOUS CONTINUOUS PRN
Status: DISCONTINUED | OUTPATIENT
Start: 2023-11-12 | End: 2023-11-24 | Stop reason: HOSPADM

## 2023-11-12 RX ORDER — INSULIN GLARGINE 100 [IU]/ML
5 INJECTION, SOLUTION SUBCUTANEOUS EVERY MORNING
Status: DISCONTINUED | OUTPATIENT
Start: 2023-11-12 | End: 2023-11-24 | Stop reason: HOSPADM

## 2023-11-12 RX ORDER — DIPHENHYDRAMINE HYDROCHLORIDE 50 MG/ML
25 INJECTION INTRAMUSCULAR; INTRAVENOUS EVERY 6 HOURS PRN
Status: CANCELLED | OUTPATIENT
Start: 2023-11-12

## 2023-11-12 RX ADMIN — SODIUM CHLORIDE: 9 INJECTION, SOLUTION INTRAVENOUS at 22:51

## 2023-11-12 RX ADMIN — FAMOTIDINE 20 MG: 10 INJECTION, SOLUTION INTRAVENOUS at 10:37

## 2023-11-12 RX ADMIN — LEVOTHYROXINE SODIUM 50 MCG: 0.05 TABLET ORAL at 10:37

## 2023-11-12 RX ADMIN — SODIUM CHLORIDE: 9 INJECTION, SOLUTION INTRAVENOUS at 04:15

## 2023-11-12 RX ADMIN — CIPROFLOXACIN 400 MG: 400 INJECTION, SOLUTION INTRAVENOUS at 10:30

## 2023-11-12 RX ADMIN — SODIUM CHLORIDE, PRESERVATIVE FREE 10 ML: 5 INJECTION INTRAVENOUS at 09:00

## 2023-11-12 RX ADMIN — VANCOMYCIN HYDROCHLORIDE 750 MG: 750 INJECTION, POWDER, LYOPHILIZED, FOR SOLUTION INTRAVENOUS at 15:46

## 2023-11-12 RX ADMIN — SODIUM CHLORIDE, PRESERVATIVE FREE 10 ML: 5 INJECTION INTRAVENOUS at 21:42

## 2023-11-12 RX ADMIN — METOPROLOL SUCCINATE 50 MG: 50 TABLET, EXTENDED RELEASE ORAL at 10:37

## 2023-11-12 RX ADMIN — INSULIN GLARGINE 5 UNITS: 100 INJECTION, SOLUTION SUBCUTANEOUS at 10:39

## 2023-11-12 RX ADMIN — ENOXAPARIN SODIUM 30 MG: 100 INJECTION SUBCUTANEOUS at 10:38

## 2023-11-12 RX ADMIN — VANCOMYCIN HYDROCHLORIDE 1000 MG: 1 INJECTION, POWDER, LYOPHILIZED, FOR SOLUTION INTRAVENOUS at 14:18

## 2023-11-12 RX ADMIN — GABAPENTIN 300 MG: 300 CAPSULE ORAL at 10:44

## 2023-11-12 RX ADMIN — INSULIN LISPRO 1 UNITS: 100 INJECTION, SOLUTION INTRAVENOUS; SUBCUTANEOUS at 12:47

## 2023-11-12 ASSESSMENT — ENCOUNTER SYMPTOMS
BLOOD IN STOOL: 0
CONSTIPATION: 0
ABDOMINAL PAIN: 1
CHEST TIGHTNESS: 0
SINUS PRESSURE: 0
COUGH: 0
SHORTNESS OF BREATH: 0
ABDOMINAL DISTENTION: 0
NAUSEA: 0
EYES NEGATIVE: 1
VOMITING: 0
DIARRHEA: 0
BACK PAIN: 0
WHEEZING: 0
SORE THROAT: 0

## 2023-11-12 NOTE — PROGRESS NOTES
Hospitalist Progress Note    Patient: Jane Dobbs MRN: [id number]  CSN: [id number]    YOB: 1940  Age: 79 y.o. Sex: female    DOA: 11/11/2023 LOS:  LOS: 1 day            Patient Active Problem List   Diagnosis    Pyelonephritis    Hypertension    UTI (urinary tract infection)    Right flank pain    Pressure injury of right heel, unstageable ([facility])    S/P colectomy    Pseudomonas aeruginosa infection    Displacement of nephrostomy tube ([address])    Nephrostomy complication ([address])    S/p nephrectomy    Acute renal failure (ARF) ([facility])    S/P AKA (above knee amputation), right ([facility])    CKD stage 3 due to type 2 diabetes mellitus ([facility])    Pressure injury of sacral region, stage 4 ([address])    Diabetes ([address])    Pressure injury of right buttock, stage 4 ([facility])    Nephrostomy status ([address])    Abdominal pain    Legally blind    Nephrostomy tube displaced ([address])    Pleural effusion on left    Hydronephrosis, right    Decubitus ulcer of coccygeal region    SBO (small bowel obstruction) ([address])        Active Hospital Problems    Diagnosis     SBO (small bowel obstruction) ([address]) [K56.609]      1) SBO with  Extensive/complex abd surgical hx -- mostly due to adhesions. Appreciate surg assistance  NGT and ivf  Will repeat labs today  2) Hyperkalemia  -- repeat k improved. Cont to fu  3) DENNY with ckd-- Cr improving. Cont to fu  4) UTI -- continue on IV rocephin and cipro. Await uc  5) DM-ii -- bs are higher. Restart low dose lantus. Accuchecks and ssi  6) weakness --  OOB. Pt/OT once stable                  Patient's condition is fair        Recommend to continue hospitalization. Discussed with patient. Chief Complaints:   Chief Complaint   Patient presents with    Abdominal Pain     SUBJECTIVE:  Pt is seen and examined. \" I am lot better today than I was\"     Abd pain is much improved      Review of systems:    Review of Systems   Constitutional:  Positive for activity change, appetite change and fatigue.  Negative for Protime-INR    Collection Time: 11/11/23 11:10 AM   Result Value Ref Range    Protime 13.4 11.9 - 14.7 sec    INR 1.0 0.9 - 1.1     APTT    Collection Time: 11/11/23 11:10 AM   Result Value Ref Range    PTT 28.1 23.0 - 36.4 SEC   Magnesium    Collection Time: 11/11/23 11:10 AM   Result Value Ref Range    Magnesium 2.0 1.6 - 2.6 mg/dL   EKG 12 Lead    Collection Time: 11/11/23 11:39 AM   Result Value Ref Range    Ventricular Rate 80 BPM    Atrial Rate 80 BPM    P-R Interval 150 ms    QRS Duration 78 ms    Q-T Interval 380 ms    QTc Calculation (Bazett) 438 ms    P Axis 62 degrees    R Axis 68 degrees    T Axis 71 degrees    Diagnosis       Normal sinus rhythm  Normal ECG  When compared with ECG of 06-JUN-2021 18:00,  No significant change was found  Confirmed by Denisse Guajardo ([id number]) on 11/11/2023 2:21:37 PM     Urinalysis    Collection Time: 11/11/23  1:00 PM   Result Value Ref Range    Color, UA YELLOW      Appearance TURBID      Specific Gravity, UA 1.020 1.005 - 1.030      pH, Urine >8.5 (H) 5.0 - 8.0    Protein,  (A) NEG mg/dL    Glucose, UA Negative NEG mg/dL    Ketones, Urine Negative NEG mg/dL    Bilirubin Urine Negative NEG      Blood, Urine Negative NEG      Urobilinogen, Urine 0.2 0.2 - 1.0 EU/dL    Nitrite, Urine Negative NEG      Leukocyte Esterase, Urine LARGE (A) NEG     Urinalysis, Micro    Collection Time: 11/11/23  1:00 PM   Result Value Ref Range    WBC, UA 11 to 20 0 - 5 /hpf    RBC, UA 0 to 3 0 - 5 /hpf    Epithelial Cells UA 1+ 0 - 5 /lpf    BACTERIA, URINE 2+ (A) NEG /hpf    TRIPLE PHOSPHATE CRYSTALS 1+ (A) NEG   Basic Metabolic Panel    Collection Time: 11/11/23  2:52 PM   Result Value Ref Range    Sodium 142 136 - 145 mmol/L    Potassium 4.8 3.5 - 5.5 mmol/L    Chloride 116 (H) 100 - 111 mmol/L    CO2 20 (L) 21 - 32 mmol/L    Anion Gap 6 3.0 - 18 mmol/L    Glucose 342 (H) 74 - 99 mg/dL    BUN 38 (H) 7.0 - 18 MG/DL    Creatinine 1.48 (H) 0.6 - 1.3 MG/DL    Bun/Cre Ratio 26 (H) 12 - 20

## 2023-11-12 NOTE — PROGRESS NOTES
DC Plan: return to [address] [place] [address] when medically stable    Care manager met with patient earlier today but unable to get much in the way of meaningful assessment; contacted maxim Moraes who verified patient is a long time resident of [address] [place] [address] and will return there when medically stable.

## 2023-11-13 PROBLEM — E11.9 DIABETES (HCC): Status: ACTIVE | Noted: 2018-11-26

## 2023-11-13 PROBLEM — T83.022A NEPHROSTOMY TUBE DISPLACED (HCC): Status: ACTIVE | Noted: 2022-04-29

## 2023-11-13 PROBLEM — Z89.611 S/P AKA (ABOVE KNEE AMPUTATION), RIGHT (HCC): Status: ACTIVE | Noted: 2019-06-03

## 2023-11-13 PROBLEM — R78.81 POSITIVE BLOOD CULTURE: Status: ACTIVE | Noted: 2023-11-13

## 2023-11-13 PROBLEM — I10 HYPERTENSION: Status: ACTIVE | Noted: 2018-11-26

## 2023-11-13 PROBLEM — H54.8 LEGALLY BLIND: Status: ACTIVE | Noted: 2018-11-26

## 2023-11-13 PROBLEM — Z90.49 S/P COLECTOMY: Status: ACTIVE | Noted: 2018-11-26

## 2023-11-13 PROBLEM — Z93.6 NEPHROSTOMY STATUS (HCC): Status: ACTIVE | Noted: 2019-06-03

## 2023-11-13 PROBLEM — D72.829 LEUKOCYTOSIS: Status: ACTIVE | Noted: 2023-11-13

## 2023-11-13 PROBLEM — N39.0 UTI (URINARY TRACT INFECTION): Status: ACTIVE | Noted: 2018-11-26

## 2023-11-13 LAB
ALBUMIN SERPL-MCNC: 2.1 G/DL (ref 3.4–5)
ALBUMIN/GLOB SERPL: 0.6 (ref 0.8–1.7)
ALP SERPL-CCNC: 102 U/L (ref 45–117)
ALT SERPL-CCNC: 22 U/L (ref 13–56)
ANION GAP SERPL CALC-SCNC: 6 MMOL/L (ref 3–18)
AST SERPL-CCNC: 10 U/L (ref 10–38)
BASOPHILS # BLD: 0 K/UL (ref 0–0.1)
BASOPHILS NFR BLD: 1 % (ref 0–2)
BILIRUB SERPL-MCNC: 0.5 MG/DL (ref 0.2–1)
BUN SERPL-MCNC: 35 MG/DL (ref 7–18)
BUN/CREAT SERPL: 24 (ref 12–20)
CALCIUM SERPL-MCNC: 7.6 MG/DL (ref 8.5–10.1)
CHLORIDE SERPL-SCNC: 119 MMOL/L (ref 100–111)
CO2 SERPL-SCNC: 19 MMOL/L (ref 21–32)
CREAT SERPL-MCNC: 1.44 MG/DL (ref 0.6–1.3)
DIFFERENTIAL METHOD BLD: ABNORMAL
EOSINOPHIL # BLD: 0.2 K/UL (ref 0–0.4)
EOSINOPHIL NFR BLD: 3 % (ref 0–5)
ERYTHROCYTE [DISTWIDTH] IN BLOOD BY AUTOMATED COUNT: 13.8 % (ref 11.6–14.5)
GLOBULIN SER CALC-MCNC: 3.7 G/DL (ref 2–4)
GLUCOSE BLD STRIP.AUTO-MCNC: 135 MG/DL (ref 70–110)
GLUCOSE BLD STRIP.AUTO-MCNC: 148 MG/DL (ref 70–110)
GLUCOSE BLD STRIP.AUTO-MCNC: 182 MG/DL (ref 70–110)
GLUCOSE BLD STRIP.AUTO-MCNC: 194 MG/DL (ref 70–110)
GLUCOSE SERPL-MCNC: 205 MG/DL (ref 74–99)
HCT VFR BLD AUTO: 33.4 % (ref 35–45)
HGB BLD-MCNC: 10.3 G/DL (ref 12–16)
IMM GRANULOCYTES # BLD AUTO: 0 K/UL (ref 0–0.04)
IMM GRANULOCYTES NFR BLD AUTO: 0 % (ref 0–0.5)
LYMPHOCYTES # BLD: 0.9 K/UL (ref 0.9–3.6)
LYMPHOCYTES NFR BLD: 11 % (ref 21–52)
MAGNESIUM SERPL-MCNC: 1.5 MG/DL (ref 1.6–2.6)
MCH RBC QN AUTO: 28.6 PG (ref 24–34)
MCHC RBC AUTO-ENTMCNC: 30.8 G/DL (ref 31–37)
MCV RBC AUTO: 92.8 FL (ref 78–100)
MONOCYTES # BLD: 0.7 K/UL (ref 0.05–1.2)
MONOCYTES NFR BLD: 8 % (ref 3–10)
NEUTS SEG # BLD: 6.6 K/UL (ref 1.8–8)
NEUTS SEG NFR BLD: 78 % (ref 40–73)
NRBC # BLD: 0 K/UL (ref 0–0.01)
NRBC BLD-RTO: 0 PER 100 WBC
PHOSPHATE SERPL-MCNC: 3.1 MG/DL (ref 2.5–4.9)
PLATELET # BLD AUTO: 250 K/UL (ref 135–420)
PMV BLD AUTO: 9.5 FL (ref 9.2–11.8)
POTASSIUM SERPL-SCNC: 5.2 MMOL/L (ref 3.5–5.5)
PROT SERPL-MCNC: 5.8 G/DL (ref 6.4–8.2)
RBC # BLD AUTO: 3.6 M/UL (ref 4.2–5.3)
SODIUM SERPL-SCNC: 144 MMOL/L (ref 136–145)
VANCOMYCIN SERPL-MCNC: 22.9 UG/ML (ref 5–40)
WBC # BLD AUTO: 8.5 K/UL (ref 4.6–13.2)

## 2023-11-13 PROCEDURE — 36415 COLL VENOUS BLD VENIPUNCTURE: CPT

## 2023-11-13 PROCEDURE — 6360000002 HC RX W HCPCS: Performed by: INTERNAL MEDICINE

## 2023-11-13 PROCEDURE — 2500000003 HC RX 250 WO HCPCS: Performed by: INTERNAL MEDICINE

## 2023-11-13 PROCEDURE — A4216 STERILE WATER/SALINE, 10 ML: HCPCS | Performed by: INTERNAL MEDICINE

## 2023-11-13 PROCEDURE — 2580000003 HC RX 258: Performed by: INTERNAL MEDICINE

## 2023-11-13 PROCEDURE — 99223 1ST HOSP IP/OBS HIGH 75: CPT | Performed by: NURSE PRACTITIONER

## 2023-11-13 PROCEDURE — 84100 ASSAY OF PHOSPHORUS: CPT

## 2023-11-13 PROCEDURE — 87040 BLOOD CULTURE FOR BACTERIA: CPT

## 2023-11-13 PROCEDURE — 80202 ASSAY OF VANCOMYCIN: CPT

## 2023-11-13 PROCEDURE — 83735 ASSAY OF MAGNESIUM: CPT

## 2023-11-13 PROCEDURE — 85025 COMPLETE CBC W/AUTO DIFF WBC: CPT

## 2023-11-13 PROCEDURE — 6370000000 HC RX 637 (ALT 250 FOR IP): Performed by: INTERNAL MEDICINE

## 2023-11-13 PROCEDURE — 1100000003 HC PRIVATE W/ TELEMETRY

## 2023-11-13 PROCEDURE — 80053 COMPREHEN METABOLIC PANEL: CPT

## 2023-11-13 PROCEDURE — 82962 GLUCOSE BLOOD TEST: CPT

## 2023-11-13 RX ADMIN — FAMOTIDINE 20 MG: 10 INJECTION, SOLUTION INTRAVENOUS at 08:29

## 2023-11-13 RX ADMIN — VANCOMYCIN HYDROCHLORIDE 500 MG: 500 INJECTION, POWDER, LYOPHILIZED, FOR SOLUTION INTRAVENOUS at 21:13

## 2023-11-13 RX ADMIN — METOPROLOL SUCCINATE 50 MG: 50 TABLET, EXTENDED RELEASE ORAL at 08:29

## 2023-11-13 RX ADMIN — ENOXAPARIN SODIUM 30 MG: 100 INJECTION SUBCUTANEOUS at 08:29

## 2023-11-13 RX ADMIN — GABAPENTIN 300 MG: 300 CAPSULE ORAL at 08:29

## 2023-11-13 RX ADMIN — SODIUM CHLORIDE, PRESERVATIVE FREE 10 ML: 5 INJECTION INTRAVENOUS at 08:31

## 2023-11-13 RX ADMIN — CIPROFLOXACIN 400 MG: 400 INJECTION, SOLUTION INTRAVENOUS at 08:31

## 2023-11-13 RX ADMIN — INSULIN GLARGINE 5 UNITS: 100 INJECTION, SOLUTION SUBCUTANEOUS at 08:29

## 2023-11-13 NOTE — CONSULTS
[address]    Name:  Malika March  MR#:   [id number]  :  1940  ACCOUNT #:  [de-identified]  DATE OF SERVICE:  2023    GENERAL SURGERY CONSULTATION    REASON FOR CONSULTATION:  Small-bowel obstruction. HISTORY OF PRESENT ILLNESS:  The patient is an 24-year-old white female with an extensive abdominal surgical history, previous admissions to multiple facilities for small-bowel obstruction. Currently, admitted through the [facility] Emergency Room to the hospitalist service for a one-day history of acute onset abdominal distention and pain with associated nausea. She is a nursing home resident. She is blind, she is somewhat hard of hearing. She denies any fever or chills. She denies any chest pain or shortness of breath. By report, her ostomy has continued to function, albeit somewhat less over the last 24 hours, but has continued to function. Currently, the patient does feel somewhat better in terms of abdominal discomfort since the NG tube has been inserted. PAST MEDICAL HISTORY:  History of vaginal and colon cancer, blindness, diabetes, peripheral vascular disease. PAST SURGICAL HISTORY:  Details are lacking, but she had a small midline incision through which she had a left nephrectomy, a right urostomy or nephrostomy performed, colectomy of some kind, and a subsequent colostomy; all at the same operation presentation. She has also had a right above-knee amputation. CURRENT MEDICATIONS:  Please see med-rec sheet. In reviewing all medicines, do not see that she is on any blood thinners or significant antiplatelet medications. ALLERGIES:  TO BACTRIM, MORPHINE, AND PENICILLIN. SOCIAL HISTORY:  She does not smoke or use tobacco products. She does not drink alcohol. She does not use any illicit drugs. FAMILY HISTORY:  Noncontributory.     REVIEW OF SYSTEMS:  A 12-point review of systems was reviewed with the patient, was negative apart from that

## 2023-11-13 NOTE — PROGRESS NOTES
unchanged. Stool and gas are present in the colon. There is a percutaneous nephrostomy on the right. 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 NG tube in the stomach. Persistent small bowel dilatation in the left lower quadrant, similar to prior study. XR ABDOMEN (KUB) (SINGLE AP VIEW)    Result Date: 11/11/2023  EXAM:  XR ABDOMEN (KUB) (SINGLE AP VIEW) INDICATION: NGT placement. COMPARISON: CT 11/11/2023. Tamiko Carpenter TECHNIQUE: Supine frontal abdomen (KUB). FINDINGS: Enteric tube traverses expected course to below the diaphragm into the left upper quadrant. Right-sided percutaneous nephrostomy catheter is in position. No dilated small bowel. Stool and gas are present in the colon. No pathologic calcification. Osseous structures diffuse osteopenia and degenerative spine changes but otherwise are age-appropriate. Atherosclerotic calcification's are noted with vascular occlusion coils seen in the right lower quadrant. Post surgical clips are seen within the abdomen. Enteric tube in expected position. Stable chronic changes as above. CT ABDOMEN PELVIS WO CONTRAST Additional Contrast? None    Result Date: 11/11/2023  EXAM: CT ABDOMEN PELVIS WO CONTRAST INDICATION: abdominal pain. GFR 31 COMPARISON: 4/28/2022 IV CONTRAST: None. ORAL CONTRAST: 0 TECHNIQUE: Thin axial images were obtained through the abdomen and pelvis. Coronal and sagittal reformats were generated. CT dose reduction was achieved through use of a standardized protocol tailored for this examination and automatic exposure control for dose modulation. The absence of intravenous contrast material reduces the sensitivity for evaluation of the vasculature and solid organs. FINDINGS: LOWER THORAX: Coronary artery calcification. Peribronchial cuffing. LIVER: No mass. BILIARY TREE: Cholecystectomy. CBD is not dilated. SPLEEN: within normal limits. PANCREAS: No focal abnormality. Pancreatic atrophy. ADRENALS: Unremarkable.  KIDNEYS/URETERS: No calculus or

## 2023-11-13 NOTE — ACP (ADVANCE CARE PLANNING)
[address] [phone]     General Advance Care Planning (ACP) Conversation    CODE STATUS:  DNR / DNI. DDNR on file. AMD: Pt has AMD on file. Contact information is updated in Advance Planning tab of EHR. Date of Conversation: 11/13/2023  Conducted with: Patient with Decision Making Capacity    Healthcare Decision Maker:    Primary Decision Maker: Alin Lewis - Bianka - [phone]  Click here to complete Healthcare Decision Makers including selection of the Healthcare Decision Maker Relationship (ie \"Primary\"). Today we documented Decision Maker(s) consistent with ACP documents on file. Content/Action Overview: Has ACP document(s) on file - reflects the patient's care preferences  Reviewed DNR/DNI and patient confirms current DNR status - completed forms on file (place new order if needed)  treatment goals, benefit/burden of treatment options, and AMD.    Palliative team, Rosanna Cobian NP and this SW met with pt at bedside for initial assessment. Upon entry, pt laying in bed with head elevated. Pt with NG tube for drainage. Pt's voice hoarse and soft, due to tube in place. Pt able to verify has  AMD and that is lists Preethi Tse. She reports she would want Harry Chery as secondary, then Tea Robles (daughter) in event Jeanne Pierce unable to fulfill his role as MPoA. Pt too preoccupied with getting NG tube taken out to continue initial assessment further. Will call Jeanne Pierce to discuss AMD information and potentially obtain additional information regarding pt's functionality prior to admission. AMD addressed: On file, accurate as follows:      MPoA:  Oneda Pages   Relationship:  Son  Contact Information:  [phone]    Goals of care addressed: Accurate, as documented on DDNR on file. DNR / DNI.       5395 hr- LMSW made telephone contact with pt's son, Jeanne Pierce via telephone at [phone], to review

## 2023-11-14 LAB
ANION GAP SERPL CALC-SCNC: 6 MMOL/L (ref 3–18)
BACTERIA SPEC CULT: ABNORMAL
BACTERIA SPEC CULT: ABNORMAL
BUN SERPL-MCNC: 26 MG/DL (ref 7–18)
BUN/CREAT SERPL: 21 (ref 12–20)
CALCIUM SERPL-MCNC: 7.7 MG/DL (ref 8.5–10.1)
CHLORIDE SERPL-SCNC: 122 MMOL/L (ref 100–111)
CO2 SERPL-SCNC: 16 MMOL/L (ref 21–32)
CREAT SERPL-MCNC: 1.23 MG/DL (ref 0.6–1.3)
GLUCOSE BLD STRIP.AUTO-MCNC: 124 MG/DL (ref 70–110)
GLUCOSE BLD STRIP.AUTO-MCNC: 127 MG/DL (ref 70–110)
GLUCOSE BLD STRIP.AUTO-MCNC: 128 MG/DL (ref 70–110)
GLUCOSE BLD STRIP.AUTO-MCNC: 91 MG/DL (ref 70–110)
GLUCOSE SERPL-MCNC: 145 MG/DL (ref 74–99)
GRAM STN SPEC: ABNORMAL
POTASSIUM SERPL-SCNC: 4.8 MMOL/L (ref 3.5–5.5)
SERVICE CMNT-IMP: ABNORMAL
SERVICE CMNT-IMP: ABNORMAL
SODIUM SERPL-SCNC: 144 MMOL/L (ref 136–145)

## 2023-11-14 PROCEDURE — 80048 BASIC METABOLIC PNL TOTAL CA: CPT

## 2023-11-14 PROCEDURE — 36415 COLL VENOUS BLD VENIPUNCTURE: CPT

## 2023-11-14 PROCEDURE — 82962 GLUCOSE BLOOD TEST: CPT

## 2023-11-14 PROCEDURE — 6370000000 HC RX 637 (ALT 250 FOR IP): Performed by: HOSPITALIST

## 2023-11-14 PROCEDURE — 1100000003 HC PRIVATE W/ TELEMETRY

## 2023-11-14 PROCEDURE — 6370000000 HC RX 637 (ALT 250 FOR IP): Performed by: INTERNAL MEDICINE

## 2023-11-14 PROCEDURE — 2580000003 HC RX 258: Performed by: INTERNAL MEDICINE

## 2023-11-14 PROCEDURE — 6360000002 HC RX W HCPCS: Performed by: INTERNAL MEDICINE

## 2023-11-14 PROCEDURE — 99232 SBSQ HOSP IP/OBS MODERATE 35: CPT | Performed by: NURSE PRACTITIONER

## 2023-11-14 PROCEDURE — 2500000003 HC RX 250 WO HCPCS: Performed by: INTERNAL MEDICINE

## 2023-11-14 PROCEDURE — A4216 STERILE WATER/SALINE, 10 ML: HCPCS | Performed by: INTERNAL MEDICINE

## 2023-11-14 RX ORDER — PETROLATUM,WHITE
0.25 OINTMENT IN PACKET (GRAM) TOPICAL EVERY 12 HOURS PRN
Status: ON HOLD | COMMUNITY
End: 2023-11-24 | Stop reason: HOSPADM

## 2023-11-14 RX ORDER — ENOXAPARIN SODIUM 100 MG/ML
40 INJECTION SUBCUTANEOUS DAILY
Status: DISCONTINUED | OUTPATIENT
Start: 2023-11-15 | End: 2023-11-17

## 2023-11-14 RX ORDER — DOXEPIN HYDROCHLORIDE 3 MG/1
3 TABLET ORAL NIGHTLY
Status: ON HOLD | COMMUNITY
Start: 2023-11-11 | End: 2023-11-24 | Stop reason: HOSPADM

## 2023-11-14 RX ORDER — CIPROFLOXACIN 2 MG/ML
400 INJECTION, SOLUTION INTRAVENOUS EVERY 12 HOURS
Status: DISCONTINUED | OUTPATIENT
Start: 2023-11-14 | End: 2023-11-17

## 2023-11-14 RX ORDER — LANOLIN ALCOHOL/MO/W.PET/CERES
400 CREAM (GRAM) TOPICAL DAILY
Status: ON HOLD | COMMUNITY
End: 2023-11-24 | Stop reason: HOSPADM

## 2023-11-14 RX ORDER — CARBOXYMETHYLCELLULOSE SODIUM 5 MG/ML
1 SOLUTION/ DROPS OPHTHALMIC 3 TIMES DAILY
Status: DISCONTINUED | OUTPATIENT
Start: 2023-11-14 | End: 2023-11-15

## 2023-11-14 RX ADMIN — GABAPENTIN 300 MG: 300 CAPSULE ORAL at 21:29

## 2023-11-14 RX ADMIN — METOPROLOL SUCCINATE 50 MG: 50 TABLET, EXTENDED RELEASE ORAL at 08:57

## 2023-11-14 RX ADMIN — LEVOTHYROXINE SODIUM 50 MCG: 0.05 TABLET ORAL at 06:18

## 2023-11-14 RX ADMIN — SODIUM CHLORIDE, PRESERVATIVE FREE 10 ML: 5 INJECTION INTRAVENOUS at 08:59

## 2023-11-14 RX ADMIN — CIPROFLOXACIN 400 MG: 400 INJECTION, SOLUTION INTRAVENOUS at 08:58

## 2023-11-14 RX ADMIN — FAMOTIDINE 20 MG: 10 INJECTION, SOLUTION INTRAVENOUS at 08:57

## 2023-11-14 RX ADMIN — SODIUM CHLORIDE, PRESERVATIVE FREE 10 ML: 5 INJECTION INTRAVENOUS at 21:29

## 2023-11-14 RX ADMIN — Medication 1 DROP: at 14:52

## 2023-11-14 RX ADMIN — CIPROFLOXACIN 400 MG: 400 INJECTION, SOLUTION INTRAVENOUS at 21:29

## 2023-11-14 RX ADMIN — INSULIN GLARGINE 5 UNITS: 100 INJECTION, SOLUTION SUBCUTANEOUS at 08:59

## 2023-11-14 RX ADMIN — GABAPENTIN 300 MG: 300 CAPSULE ORAL at 08:57

## 2023-11-14 RX ADMIN — ENOXAPARIN SODIUM 30 MG: 100 INJECTION SUBCUTANEOUS at 08:58

## 2023-11-14 NOTE — PROGRESS NOTES
Nurse from [address] [place] [address] called about the message that this RN left regarding Patient medication list. Nurse stated that she will fax over a medication list. This RN will update Patient's medication list once the fax is received. There are no other concerns at time.

## 2023-11-14 NOTE — PROGRESS NOTES
Pharmacy Renal Dosing Services:     Cipro 400 mg IV q24h was changed to Cipro 400 mg IV q12h due to improved renal function     Indication: UTI x 7 days   Serum Creatinine Lab Results   Component Value Date/Time    CREAPOC 1.1 01/16/2020 09:28 AM      Creatinine Clearance Estimated Creatinine Clearance: 33 mL/min (based on SCr of 1.23 mg/dL).    BUN Lab Results   Component Value Date/Time    BUN 26 11/14/2023 05:21 AM    BUNPOC 17 01/16/2020 09:28 AM         [address] [facility]

## 2023-11-14 NOTE — PROGRESS NOTES
This RN called [facility] to regarding an eye ointment that Patient is requesting. An updated medication list was not sent on this admission. Per Patient she stated that she was getting this particular eye ointment daily.  at [address] [facility] took this RN information and stated that she will give it to [address] Staff to give this RN a call. There are no other concerns at this time. This RN will inform Patient.

## 2023-11-14 NOTE — PROGRESS NOTES
Palliative Medicine Consult    Patient Name: Galina Dozier  YOB: 1940    Date of Initial Consult: 2023  Date of follow-up: 2023  Reason for Consult: Goals of care  Requesting Provider: Randall Albarran MD   Primary Care Physician: Madai Vaughn MD      SUMMARY:   Galina Dozier is a 79 y.o. with a past history of vaginal and colon cancer with left nephrectomy, right nephrostomy, and colostomy, legal blindness, type 2 diabetes  mellitus, chronic kidney disease stage III, and right AKA, who was admitted on 2023 from [address] [place] [address] with a diagnosis of small bowel obstruction, hyperkalemia, DENNY with chronic kidney disease stage III, and UTI. Current medical issues leading to Palliative Medicine involvement include: Goals of care discussions. 2023: Patient is awake, alert, listening to audiobook with her headphones on. Oriented x4, reports she is feeling better today and is glad to have her NG tube out. She reports she wants some food. Patient completed an updated AMD today. PALLIATIVE DIAGNOSES:   Goals of care discussion/advance care planning  Small bowel obstruction  DENNY with chronic kidney disease stage III  UTI  Debility       PLAN:   Goals of care discussion/advance care plannin2023: Palliative medicine team including Ramon Brown LMSW and I met with patient at patient's bedside. Patient is awake, alert, and oriented x4, reports feeling better today and glad to have NG tube out. She wants some food. Revisited patient's current AMD and offered completion of a new AMD so that she can name a secondary MPOA. Patient completed a new AMD today naming her son/MPOA Richard Engle as primary medical power of  and son Royal Record as secondary medical power of . Patient is consistent with her decision for DNR/DNI. DDNR document on file. Patient wants to return to [address] [place] [address] at discharge.   Will sign off as goals of care have Smoking status: Never    Smokeless tobacco: Never   Substance Use Topics    Alcohol use: No     Allergies   Allergen Reactions    Bactrim [Sulfamethoxazole-Trimethoprim] Other (See Comments)    Morphine Nausea And Vomiting    Penicillins Rash      Current Facility-Administered Medications   Medication Dose Route Frequency    carboxymethylcellulose PF (REFRESH PLUS) 0.5 % ophthalmic solution 1 drop  1 drop Both Eyes TID    vancomycin (VANCOCIN) 750 mg in sodium chloride 0.9 % 250 mL IVPB (vial-mate)  750 mg IntraVENous Q24H    [START ON 11/15/2023] Vancomycin random level due 11/15/23 with am labs   Other Once    ciprofloxacin (CIPRO) IVPB 400 mg  400 mg IntraVENous Q12H    [START ON 11/15/2023] enoxaparin (LOVENOX) injection 40 mg  40 mg SubCUTAneous Daily    insulin glargine (LANTUS) injection vial 5 Units  5 Units SubCUTAneous QAM    glucose chewable tablet 16 g  4 tablet Oral PRN    dextrose bolus 10% 125 mL  125 mL IntraVENous PRN    Or    dextrose bolus 10% 250 mL  250 mL IntraVENous PRN    glucagon (rDNA) injection 1 mg  1 mg SubCUTAneous PRN    dextrose 10 % infusion   IntraVENous Continuous PRN    vancomycin (VANCOCIN) IV - Pharmacy to Dose   Other RX Placeholder    sodium chloride flush 0.9 % injection 5-40 mL  5-40 mL IntraVENous 2 times per day    sodium chloride flush 0.9 % injection 5-40 mL  5-40 mL IntraVENous PRN    0.9 % sodium chloride infusion   IntraVENous PRN    ondansetron (ZOFRAN-ODT) disintegrating tablet 4 mg  4 mg Oral Q8H PRN    Or    ondansetron (ZOFRAN) injection 4 mg  4 mg IntraVENous Q6H PRN    acetaminophen (TYLENOL) tablet 650 mg  650 mg Oral Q6H PRN    Or    acetaminophen (TYLENOL) suppository 650 mg  650 mg Rectal Q6H PRN    magnesium sulfate 2000 mg in 50 mL IVPB premix  2,000 mg IntraVENous PRN    potassium chloride 10 mEq/100 mL IVPB (Peripheral Line)  10 mEq IntraVENous PRN    potassium chloride (KLOR-CON M) extended release tablet 40 mEq  40 mEq Oral PRN    Or    potassium

## 2023-11-14 NOTE — PROGRESS NOTES
Hospitalist Progress Note-critical care note     Patient: Lori Murrieta MRN: [id number]  CSN: [id number]    YOB: 1940  Age: 79 y.o. Sex: female    DOA: 11/11/2023 LOS:  LOS: 3 days            Chief complaint: sbo nephrostomy tube in    Assessment/Plan         Active Hospital Problems    Diagnosis Date Noted    Positive blood culture [R78.81] 11/13/2023    Leukocytosis [D72.829] 11/13/2023    SBO (small bowel obstruction) ([address]) [K56.609] 11/11/2023    S/P AKA (above knee amputation), right ([address]) [Z89.611] 06/03/2019    Nephrostomy status ([address]) [Z93.6] 06/03/2019    S/P colectomy [Z90.49] 11/26/2018    Legally blind [H54.8] 11/26/2018    Diabetes ([address]) [E11.9] 11/26/2018    Hypertension [I10] 11/26/2018    UTI (urinary tract infection) [N39.0] 11/26/2018          SBO (small bowel obstruction) ([address]) [K56.609]        SBO with  Extensive/complex abd surgical hx -- mostly due to adhesions. resolved now   Appreciate surg assistance  Will have speech evaluation     Hyperkalemia  -- resolved     DENNY with ckd3 back to her baseline     UTI -- continue  cipro. Await uc    Positive blood cx   Vanc added   Will repeat bcx      DM-II - bs are higher. Restart low dose lantus. Accuchecks and ssi  weakness --  OOB. Pt/OT once stable   Debility   Palliative care for code status and care goal     Nephrostomy  status    With nephrostomy tube in place ,     Uti and positive bcx   Will have id on board  Continue current iv abx         Subjective I want eye oiment -I used it in snf , I want to eat     RN will call facility to update home meds     Put eye drop for dry eyes  , id consult, wound care consult          TIME: E/M Time spent with patient and patient care issues: [] 31-40 mins  [] 41-49 mins  [x] 50 mins or more. Disposition :tbd,   Review of systems:    General: No fevers or chills. Cardiovascular: No chest pain or pressure. No palpitations. Pulmonary: No shortness of breath.    Gastrointestinal: No nausea,

## 2023-11-14 NOTE — PROGRESS NOTES
[address] [address] [address] [facility]. [phone]     CODE STATUS:  DNR / DNI. DDNR on file. AMD: Pt has NEWLY COMPLETED AMD on file. Contact information is updated in Advance Planning tab of EHR. Palliative team, Solo Jonas NP and this LMSW met with pt at bedside for follow up visit. Upon entry, pt laying in bed with head slightly elevated, face covered by blanket, listening to audio book. Pt reports she's feeling better today and is glad to have NG tube out. She reports she wants some food and would like an orange popcicle if possible. LMSW readdressed conversation from yesteday and notified pt did speak with son/MPoA, Steffi Adjutant, who verbalized agreement with completing new AMD that identifies a secondary MPoA. Pt reports she does want to do this. The following identified and AMD completed as follows:       Primary MPoA:  Steffi Adjutant  Relationship:  Son  Contact Information: [phone]    Secondary MPoA:  Ellie Leyva  Relationship: Son  Contact Information:  [phone]    Pt signed on her own behalf, and this DALLAS and Solo Jonas NP signed as witnesses. Pt reports no further questions or concerns at this time. Thank you for this referral to Palliative care. Goals of care and surrogate decision makers have been addressed. All palliative care needs have been addressed at this time. Please reconsult if additional palliative needs arise.       Alex Israel LMSW, APHSW-C  Palliative Medicine Inpatient   [facility]  [phone]  [facility]   Palliative COPE Line: [phone] ([phone])

## 2023-11-14 NOTE — PLAN OF CARE
Problem: Discharge Planning  Goal: Discharge to home or other facility with appropriate resources  11/14/2023 1155 by Gurdeep Padron RN  Outcome: Progressing  11/14/2023 0025 by Chemo Beltran RN  Outcome: Progressing     Problem: Pain  Goal: Verbalizes/displays adequate comfort level or baseline comfort level  11/14/2023 1155 by Gurdeep Padron RN  Outcome: Progressing  11/14/2023 0025 by Chemo Beltran RN  Outcome: Progressing     Problem: Skin/Tissue Integrity  Goal: Absence of new skin breakdown  Description: 1. Monitor for areas of redness and/or skin breakdown  2. Assess vascular access sites hourly  3. Every 4-6 hours minimum:  Change oxygen saturation probe site  4. Every 4-6 hours:  If on nasal continuous positive airway pressure, respiratory therapy assess nares and determine need for appliance change or resting period.   11/14/2023 1155 by Gurdeep Padron RN  Outcome: Progressing  11/14/2023 0025 by Chemo Beltran RN  Outcome: Progressing     Problem: Safety - Adult  Goal: Free from fall injury  11/14/2023 1155 by Gurdeep Padron RN  Outcome: Progressing  11/14/2023 0025 by Chemo Beltran RN  Outcome: Progressing     Problem: Chronic Conditions and Co-morbidities  Goal: Patient's chronic conditions and co-morbidity symptoms are monitored and maintained or improved  11/14/2023 1155 by Gurdeep Padron RN  Outcome: Progressing  11/14/2023 0025 by Chemo Beltran RN  Outcome: Progressing

## 2023-11-14 NOTE — PROGRESS NOTES
Pharmacist Review and Automatic Dose Adjustment of Prophylactic Enoxaparin    *Review reason for admission/hospital problem list*    The reviewing pharmacist has made an adjustment to the ordered enoxaparin dose or converted to UFH per the approved [facility] protocol and table as identified below. Christian Broussard is a 79 y.o. female. Recent Labs     11/11/23  1452 11/13/23  0508 11/14/23  0521   CREATININE 1.48* 1.44* 1.23       Estimated Creatinine Clearance: 33 mL/min (based on SCr of 1.23 mg/dL).     Height:   Ht Readings from Last 1 Encounters:   11/11/23 1.6 m (5' 3\")     Weight:  Wt Readings from Last 1 Encounters:   11/14/23 72 kg (158 lb 11.7 oz)               Based upon the patient's weight and renal function, the ordered enoxaparin dose of Lovenox 30 mg daily has been changed to Lovenox 40 mg sc daily     MAYNOR VALDEZ RPH , BCPS

## 2023-11-14 NOTE — PROGRESS NOTES
Physician Progress Note      PATIENT:               Elton Franco  CSN #:                  [id number]  :                       1940  ADMIT DATE:       2023 10:15 AM  [address] DATE:  RESPONDING  PROVIDER #:        Cristi Rosales MD          QUERY TEXT:    Pt admitted with SBO. Pt noted to have Positive blood culture, WBC 19 with   left shift and UTI, If possible, please document in the progress notes and   discharge summary if you are evaluating and /or treating any of the following: The medical record reflects the following:  Risk Factors: UTI  Clinical Indicators:  Positive blood culture, WBC 19 with left shift and UTI,   positive urine cx  / Hosp/  UTI -- continue on IV rocephin and cipro    Treatment:  IV rocephin and cipro, Vancomycin    Thank You  Sharon Hurley RN,CDI,CRCR  Options provided:  -- Sepsis, present on admission  -- Sepsis, present on admission, now resolved  -- UTI without Sepsis  -- Sepsis was ruled out  -- Other - I will add my own diagnosis  -- Disagree - Not applicable / Not valid  -- Disagree - Clinically unable to determine / Unknown  -- Refer to Clinical Documentation Reviewer    PROVIDER RESPONSE TEXT:    After further study, sepsis was ruled out for this patient.     Query created by: Sharon Hurley on 2023 8:11 AM      Electronically signed by:  Cristi Rosales MD 2023 10:50 AM

## 2023-11-14 NOTE — CONSULTS
[facility]  (A Division of [facility])                                                           Date of Admission: 11/11/2023       Reason for Consult: Bacteremia  Referring MD: Dr Jaqui Swift    Thank you for involving me in the care of this patient. Please do not hesitate to contact me on the above number if question or concern. Current Antimicrobials:    Prior Antimicrobials:    Vancomycin 11/11 to date  Cipro 11/11 to date   Ceftriaxone X1 11/11   Allergy to antibiotics:Bactrim/Penicillin       Assessment--ID related:     S.captis-- MSSE- bacteremia- from skin. SBO-reason for admission-- improving, she is advancing in her diet  Right PCN--Possible UTI-- GNR>100K from urine culture- ID pending     Diagnosis     Positive blood culture [R78.81]     Leukocytosis [D72.829]     SBO (small bowel obstruction) (HCC) [K56.609]     S/P AKA (above knee amputation), right ([address]) [Z89.611]     Nephrostomy status ([address]) [Z93.6]     S/P colectomy [Z90.49]     Legally blind [H54.8]     Diabetes ([address]) [E11.9]     Hypertension [I10]     UTI (urinary tract infection) [N39.0]         Recommendation -- ID related:     FU GNR in urine culture-- adjust antibiotic coverage as indiccated. If not ESBL bacteria, can consider to DC contact isolation per facility protocol  CoNS bacteremia in this lady is ikely procurement contamination- not a virulent or invasive bacteria outside setting of central line associated infection, it is not growing in urine, so doubt real  Follow her progress on Cipro  Will follow     HPI:      Elton Franco is a 79 y.o. female known to me from prior admissions. PMH includes anxiety, colostomy, diabetes, hard of hearing, vaginal and colon cancer, nephrostomy, colostomy, cholecystectomy, appendectomy - presented to ED with severe abdominal pain. Was afebrile , her WBC elevated to 19K, and Cr 1.48.  CT AP showed SBO , surgical evaluation and conservative management she improved. She was on emperic vanco and Cipro from admission, after cultures taken. On 11/12, blood culture showed GPC that turned out to be 4/4 S.captis. her urine culture with GNR - ID pending    ID consulted regarding bacteremia.     Past Medical History:   Diagnosis Date    Anxiety     Cancer ([address])     Cancer ([address])     vaginal and colon cancer    Chronic kidney disease     Chronic pain     arthritis    Colostomy present ([address])     Diabetes ([address])     Dry eye syndrome     DVT (deep venous thrombosis) (HCC)     Dysphagia     Encephalopathy     Glaucoma     Hypertension     Hypomagnesemia     Hypothyroid     Ileostomy in place [facility])     Legally blind     Thyroid disease      Past Surgical History:   Procedure Laterality Date    ABOVE KNEE AMPUTATION Right     COLOSTOMY      IR NEPHROSTOMY EXCHANGE CATHETER  8/2/2021    IR NEPHROSTOMY EXCHANGE CATHETER  9/13/2021    IR NEPHROSTOMY EXCHANGE CATHETER  11/9/2021    IR NEPHROSTOMY EXCHANGE CATHETER  12/7/2021    IR NEPHROSTOMY EXCHANGE CATHETER  1/27/2022    IR NEPHROSTOMY EXCHANGE CATHETER  3/21/2022    IR NEPHROSTOMY EXCHANGE CATHETER  4/29/2022    IR NEPHROSTOMY EXCHANGE CATHETER  6/9/2022    IR NEPHROSTOMY EXCHANGE CATHETER  7/25/2022    IR NEPHROSTOMY EXCHANGE CATHETER  9/22/2022    IR NEPHROSTOMY EXCHANGE CATHETER  11/4/2022    IR NEPHROSTOMY EXCHANGE CATHETER  12/20/2022    IR NEPHROSTOMY EXCHANGE CATHETER  2/6/2023    IR NEPHROSTOMY EXCHANGE CATHETER  5/10/2021    IR NEPHROSTOMY EXCHANGE CATHETER  12/16/2020    IR NEPHROSTOMY EXCHANGE CATHETER  2/1/2021    IR NEPHROSTOMY EXCHANGE CATHETER  3/25/2021    IR NEPHROSTOMY EXCHANGE CATHETER  11/5/2020    IR NEPHROSTOMY EXCHANGE CATHETER  9/30/2020    IR NEPHROSTOMY EXCHANGE CATHETER  8/10/2020    IR NEPHROSTOMY EXCHANGE CATHETER  6/19/2020    IR NEPHROSTOMY EXCHANGE CATHETER  6/1/2020    IR NEPHROSTOMY EXCHANGE CATHETER  4/13/2020    IR NEPHROSTOMY EXCHANGE CATHETER  1/28/2019    IR NEPHROSTOMY EXCHANGE CATHETER

## 2023-11-15 LAB
ANION GAP SERPL CALC-SCNC: 9 MMOL/L (ref 3–18)
BUN SERPL-MCNC: 20 MG/DL (ref 7–18)
BUN/CREAT SERPL: 15 (ref 12–20)
CALCIUM SERPL-MCNC: 7.6 MG/DL (ref 8.5–10.1)
CHLORIDE SERPL-SCNC: 121 MMOL/L (ref 100–111)
CO2 SERPL-SCNC: 14 MMOL/L (ref 21–32)
CREAT SERPL-MCNC: 1.31 MG/DL (ref 0.6–1.3)
GLUCOSE BLD STRIP.AUTO-MCNC: 111 MG/DL (ref 70–110)
GLUCOSE BLD STRIP.AUTO-MCNC: 115 MG/DL (ref 70–110)
GLUCOSE BLD STRIP.AUTO-MCNC: 141 MG/DL (ref 70–110)
GLUCOSE BLD STRIP.AUTO-MCNC: 193 MG/DL (ref 70–110)
GLUCOSE SERPL-MCNC: 197 MG/DL (ref 74–99)
POTASSIUM SERPL-SCNC: 4.7 MMOL/L (ref 3.5–5.5)
SODIUM SERPL-SCNC: 144 MMOL/L (ref 136–145)

## 2023-11-15 PROCEDURE — 6370000000 HC RX 637 (ALT 250 FOR IP): Performed by: INTERNAL MEDICINE

## 2023-11-15 PROCEDURE — 2580000003 HC RX 258: Performed by: INTERNAL MEDICINE

## 2023-11-15 PROCEDURE — 1100000003 HC PRIVATE W/ TELEMETRY

## 2023-11-15 PROCEDURE — 36415 COLL VENOUS BLD VENIPUNCTURE: CPT

## 2023-11-15 PROCEDURE — 97162 PT EVAL MOD COMPLEX 30 MIN: CPT

## 2023-11-15 PROCEDURE — 80048 BASIC METABOLIC PNL TOTAL CA: CPT

## 2023-11-15 PROCEDURE — 82962 GLUCOSE BLOOD TEST: CPT

## 2023-11-15 PROCEDURE — A4216 STERILE WATER/SALINE, 10 ML: HCPCS | Performed by: INTERNAL MEDICINE

## 2023-11-15 PROCEDURE — 97530 THERAPEUTIC ACTIVITIES: CPT

## 2023-11-15 PROCEDURE — 2500000003 HC RX 250 WO HCPCS: Performed by: INTERNAL MEDICINE

## 2023-11-15 PROCEDURE — 6370000000 HC RX 637 (ALT 250 FOR IP): Performed by: HOSPITALIST

## 2023-11-15 PROCEDURE — 6360000002 HC RX W HCPCS: Performed by: INTERNAL MEDICINE

## 2023-11-15 RX ORDER — DOXEPIN HYDROCHLORIDE 3 MG/1
3 TABLET ORAL NIGHTLY
Status: DISCONTINUED | OUTPATIENT
Start: 2023-11-15 | End: 2023-11-15

## 2023-11-15 RX ORDER — ATORVASTATIN CALCIUM 20 MG/1
20 TABLET, FILM COATED ORAL NIGHTLY
Status: DISCONTINUED | COMMUNITY
Start: 2023-11-15 | End: 2023-11-24 | Stop reason: HOSPADM

## 2023-11-15 RX ORDER — MINERAL OIL AND WHITE PETROLATUM 150; 830 MG/G; MG/G
OINTMENT OPHTHALMIC PRN
Status: DISCONTINUED | OUTPATIENT
Start: 2023-11-15 | End: 2023-11-24 | Stop reason: HOSPADM

## 2023-11-15 RX ORDER — LANOLIN ALCOHOL/MO/W.PET/CERES
400 CREAM (GRAM) TOPICAL DAILY
Status: DISCONTINUED | OUTPATIENT
Start: 2023-11-15 | End: 2023-11-24 | Stop reason: HOSPADM

## 2023-11-15 RX ORDER — MECOBALAMIN 5000 MCG
5 TABLET,DISINTEGRATING ORAL NIGHTLY
Status: DISCONTINUED | OUTPATIENT
Start: 2023-11-15 | End: 2023-11-24 | Stop reason: HOSPADM

## 2023-11-15 RX ADMIN — SODIUM CHLORIDE, PRESERVATIVE FREE 10 ML: 5 INJECTION INTRAVENOUS at 21:24

## 2023-11-15 RX ADMIN — METOPROLOL SUCCINATE 50 MG: 50 TABLET, EXTENDED RELEASE ORAL at 09:08

## 2023-11-15 RX ADMIN — CIPROFLOXACIN 400 MG: 400 INJECTION, SOLUTION INTRAVENOUS at 09:08

## 2023-11-15 RX ADMIN — Medication 5 MG: at 21:35

## 2023-11-15 RX ADMIN — FAMOTIDINE 20 MG: 10 INJECTION, SOLUTION INTRAVENOUS at 09:08

## 2023-11-15 RX ADMIN — ONDANSETRON 4 MG: 2 INJECTION INTRAMUSCULAR; INTRAVENOUS at 20:20

## 2023-11-15 RX ADMIN — MICONAZOLE NITRATE: 20 CREAM TOPICAL at 21:24

## 2023-11-15 RX ADMIN — GABAPENTIN 300 MG: 300 CAPSULE ORAL at 09:08

## 2023-11-15 RX ADMIN — INSULIN GLARGINE 5 UNITS: 100 INJECTION, SOLUTION SUBCUTANEOUS at 09:09

## 2023-11-15 RX ADMIN — Medication 400 MG: at 09:08

## 2023-11-15 RX ADMIN — LEVOTHYROXINE SODIUM 50 MCG: 0.05 TABLET ORAL at 06:52

## 2023-11-15 RX ADMIN — CIPROFLOXACIN 400 MG: 400 INJECTION, SOLUTION INTRAVENOUS at 21:24

## 2023-11-15 RX ADMIN — GABAPENTIN 300 MG: 300 CAPSULE ORAL at 21:24

## 2023-11-15 RX ADMIN — SODIUM CHLORIDE, PRESERVATIVE FREE 10 ML: 5 INJECTION INTRAVENOUS at 09:09

## 2023-11-15 RX ADMIN — MICONAZOLE NITRATE: 20 CREAM TOPICAL at 11:50

## 2023-11-15 RX ADMIN — ATORVASTATIN CALCIUM 20 MG: 20 TABLET, FILM COATED ORAL at 21:24

## 2023-11-15 RX ADMIN — ENOXAPARIN SODIUM 40 MG: 100 INJECTION SUBCUTANEOUS at 09:08

## 2023-11-15 NOTE — PROGRESS NOTES
[facility]  (A Division of [facility])                                                           Date of Admission: 11/11/2023       Reason for Consult: Bacteremia  Referring MD: Dr Mariia Daly      Current Antimicrobials:    Prior Antimicrobials:    Cipro  IV 11/11 to date   Ceftriaxone X1 11/11  Vancomycin 11/11 to 11/14   Allergy to antibiotics:Bactrim/Penicillin       Assessment--ID related:     S.captis-- MSSE- bacteremia- from skin. SBO-reason for admission-- improving, she is advancing in her diet  Right PCN--Possible UTI-- GNR>100K from urine culture- ID pending     Diagnosis     Positive blood culture [R78.81]     Leukocytosis [D72.829]     SBO (small bowel obstruction) (HCC) [K56.609]     S/P AKA (above knee amputation), right ([address]) [Z89.611]     Nephrostomy status ([address]) [Z93.6]     S/P colectomy [Z90.49]     Legally blind [H54.8]     Diabetes ([address]) [E11.9]     Hypertension [I10]     UTI (urinary tract infection) [N39.0]         Recommendation -- ID related:     Cont cipro   FU GNR in urine culture-- adjust antibiotic coverage as indiccated. If not ESBL bacteria, can consider to DC contact isolation per facility protocol  Urine culture with GNR bacteria-- final ID pending  Will follow      Today's Visit:      Afebrile-98.2  No new complaints from patient       Notes/Labs/Cultures and Imaging reports reviewed -- wound RN eval noted     HPI:      Jordan Vinson is a 79 y.o. female known to me from prior admissions. PMH includes anxiety, colostomy, diabetes, hard of hearing, vaginal and colon cancer, nephrostomy, colostomy, cholecystectomy, appendectomy - presented to ED with severe abdominal pain. Was afebrile , her WBC elevated to 19K, and Cr 1.48. CT AP showed SBO , surgical evaluation and conservative management she improved. She was on emperic vanco and Cipro from admission, after cultures taken.   On 11/12, blood culture showed GPC that turned out to be 4/4

## 2023-11-15 NOTE — CONSULTS
Wound care nurse attempted to assess patient for sacral and buttock wounds. Patient refused to turn at this time because it would cause her IV to occlude and alarm. Patient stated \" It is just too hard\" . Patient bedside nurse in room and aware. Contact wound care nurse if patient agreeable to assess. Wound care available as needed.

## 2023-11-15 NOTE — PROGRESS NOTES
Speech-Pathology Screen/Discharge  Attempted to see patient for bedside swallow evaluation, second attempt. Patient with lunch tray at bedside, did not appear to have eaten much. Patient again refusing to participate in PO trials. Reports that she is tolerating current diet without issue. Nursing also reports that patient not demo'ing difficulty or discomfort during PO intake. At this time, ST will d/c off case. Please re consult as needed.    Thank you  Mehreen Munoz M.S., CCC-SLP

## 2023-11-15 NOTE — PROGRESS NOTES
Hospitalist Progress Note-critical care note     Patient: Snow Neal MRN: [id number]  CSN: [id number]    YOB: 1940  Age: 79 y.o. Sex: female    DOA: 11/11/2023 LOS:  LOS: 4 days            Chief complaint: sbo nephrostomy tube in    Assessment/Plan         Active Hospital Problems    Diagnosis Date Noted    Positive blood culture [R78.81] 11/13/2023    Leukocytosis [D72.829] 11/13/2023    SBO (small bowel obstruction) ([address]) [K56.609] 11/11/2023    S/P AKA (above knee amputation), right ([address]) [Z89.611] 06/03/2019    Nephrostomy status ([address]) [Z93.6] 06/03/2019    S/P colectomy [Z90.49] 11/26/2018    Legally blind [H54.8] 11/26/2018    Diabetes ([address]) [E11.9] 11/26/2018    Hypertension [I10] 11/26/2018    UTI (urinary tract infection) [N39.0] 11/26/2018          SBO (small bowel obstruction) ([address]) [K56.609]        SBO with  Extensive/complex abd surgical hx -- mostly due to adhesions. resolved now   Appreciate surg assistance    Hyperkalemia  -- resolved     DENNY with ckd3 back to her baseline     UTI -- continue  cipro. Await ucx -g- wei -complicated uti with nephrostomy tube     Positive blood cx    Repeated so far negative, contamination per ID      DM-II - continue current dose lantus. Accuchecks and ssi     Debility   Palliative care for code status and care goal -dnr /I now     Nephrostomy  status    With nephrostomy tube in place ,         Subjective I want chocolate milk and mashed potatoes with gravy on top, I feel fine      RN will call facility to update home meds        TIME: E/M Time spent with patient and patient care issues: [] 31-40 mins  [x] 41-49 mins  [] 50 mins or more. Disposition :waiting final urine cx results -hope 1-2 days   Review of systems:    General: No fevers or chills. Cardiovascular: No chest pain or pressure. No palpitations. Pulmonary: No shortness of breath. Gastrointestinal: No nausea, vomiting.      Vital signs/Intake and Output:  Visit Vitals  BP (!)

## 2023-11-16 PROBLEM — E87.20 METABOLIC ACIDOSIS: Status: ACTIVE | Noted: 2023-11-16

## 2023-11-16 LAB
ANION GAP SERPL CALC-SCNC: 11 MMOL/L (ref 3–18)
BACTERIA SPEC CULT: ABNORMAL
BACTERIA SPEC CULT: ABNORMAL
BUN SERPL-MCNC: 19 MG/DL (ref 7–18)
BUN/CREAT SERPL: 12 (ref 12–20)
CALCIUM SERPL-MCNC: 7.4 MG/DL (ref 8.5–10.1)
CC UR VC: ABNORMAL
CHLORIDE SERPL-SCNC: 120 MMOL/L (ref 100–111)
CO2 SERPL-SCNC: 13 MMOL/L (ref 21–32)
CREAT SERPL-MCNC: 1.59 MG/DL (ref 0.6–1.3)
GLUCOSE BLD STRIP.AUTO-MCNC: 119 MG/DL (ref 70–110)
GLUCOSE BLD STRIP.AUTO-MCNC: 139 MG/DL (ref 70–110)
GLUCOSE BLD STRIP.AUTO-MCNC: 143 MG/DL (ref 70–110)
GLUCOSE BLD STRIP.AUTO-MCNC: 167 MG/DL (ref 70–110)
GLUCOSE SERPL-MCNC: 160 MG/DL (ref 74–99)
LACTATE SERPL-SCNC: 1 MMOL/L (ref 0.4–2)
POTASSIUM SERPL-SCNC: 5.3 MMOL/L (ref 3.5–5.5)
SERVICE CMNT-IMP: ABNORMAL
SODIUM SERPL-SCNC: 144 MMOL/L (ref 136–145)

## 2023-11-16 PROCEDURE — A4216 STERILE WATER/SALINE, 10 ML: HCPCS | Performed by: INTERNAL MEDICINE

## 2023-11-16 PROCEDURE — 82962 GLUCOSE BLOOD TEST: CPT

## 2023-11-16 PROCEDURE — 36415 COLL VENOUS BLD VENIPUNCTURE: CPT

## 2023-11-16 PROCEDURE — 2580000003 HC RX 258: Performed by: HOSPITALIST

## 2023-11-16 PROCEDURE — 6370000000 HC RX 637 (ALT 250 FOR IP): Performed by: HOSPITALIST

## 2023-11-16 PROCEDURE — 2580000003 HC RX 258: Performed by: INTERNAL MEDICINE

## 2023-11-16 PROCEDURE — 83605 ASSAY OF LACTIC ACID: CPT

## 2023-11-16 PROCEDURE — 2500000003 HC RX 250 WO HCPCS: Performed by: INTERNAL MEDICINE

## 2023-11-16 PROCEDURE — 1100000003 HC PRIVATE W/ TELEMETRY

## 2023-11-16 PROCEDURE — 2500000003 HC RX 250 WO HCPCS: Performed by: HOSPITALIST

## 2023-11-16 PROCEDURE — 6370000000 HC RX 637 (ALT 250 FOR IP): Performed by: INTERNAL MEDICINE

## 2023-11-16 PROCEDURE — 80048 BASIC METABOLIC PNL TOTAL CA: CPT

## 2023-11-16 PROCEDURE — 6360000002 HC RX W HCPCS: Performed by: INTERNAL MEDICINE

## 2023-11-16 RX ORDER — SODIUM BICARBONATE IN D5W 150/1000ML
PLASTIC BAG, INJECTION (ML) INTRAVENOUS CONTINUOUS
Status: DISCONTINUED | OUTPATIENT
Start: 2023-11-16 | End: 2023-11-16

## 2023-11-16 RX ADMIN — INSULIN GLARGINE 5 UNITS: 100 INJECTION, SOLUTION SUBCUTANEOUS at 09:34

## 2023-11-16 RX ADMIN — ENOXAPARIN SODIUM 40 MG: 100 INJECTION SUBCUTANEOUS at 09:33

## 2023-11-16 RX ADMIN — Medication 5 MG: at 21:25

## 2023-11-16 RX ADMIN — GABAPENTIN 300 MG: 300 CAPSULE ORAL at 09:33

## 2023-11-16 RX ADMIN — SODIUM CHLORIDE, PRESERVATIVE FREE 10 ML: 5 INJECTION INTRAVENOUS at 09:34

## 2023-11-16 RX ADMIN — Medication 400 MG: at 09:33

## 2023-11-16 RX ADMIN — GABAPENTIN 300 MG: 300 CAPSULE ORAL at 21:24

## 2023-11-16 RX ADMIN — FAMOTIDINE 20 MG: 10 INJECTION, SOLUTION INTRAVENOUS at 09:33

## 2023-11-16 RX ADMIN — MICONAZOLE NITRATE: 20 CREAM TOPICAL at 21:24

## 2023-11-16 RX ADMIN — SODIUM BICARBONATE: 84 INJECTION, SOLUTION INTRAVENOUS at 21:20

## 2023-11-16 RX ADMIN — CIPROFLOXACIN 400 MG: 400 INJECTION, SOLUTION INTRAVENOUS at 09:32

## 2023-11-16 RX ADMIN — METOPROLOL SUCCINATE 50 MG: 50 TABLET, EXTENDED RELEASE ORAL at 09:33

## 2023-11-16 RX ADMIN — LEVOTHYROXINE SODIUM 50 MCG: 0.05 TABLET ORAL at 06:10

## 2023-11-16 RX ADMIN — CIPROFLOXACIN 400 MG: 400 INJECTION, SOLUTION INTRAVENOUS at 21:22

## 2023-11-16 RX ADMIN — ATORVASTATIN CALCIUM 20 MG: 20 TABLET, FILM COATED ORAL at 21:24

## 2023-11-16 RX ADMIN — MICONAZOLE NITRATE 1 APPLICATION: 20 CREAM TOPICAL at 09:34

## 2023-11-16 ASSESSMENT — PAIN SCALES - GENERAL
PAINLEVEL_OUTOF10: 0
PAINLEVEL_OUTOF10: 0

## 2023-11-16 NOTE — PROGRESS NOTES
Isolation pt - Verbal - phone review with pt's son, Vidal Fleming. Pt's son agrees with discharge plan.

## 2023-11-16 NOTE — PROGRESS NOTES
SW spoke with patient and son regarding DC. Transport for 1600 via stretcher. [facility]    Address: [address] [facility], [address] [place] [address]    Phone: [phone]    DC summary to follow.

## 2023-11-16 NOTE — DISCHARGE SUMMARY
Chart reviewed, urine culture final  M.morgani and Proteus mirabilis- both sensitive to Cipro  Can swithch to oral Cipro on DC to Nov 21    No further suggestion   Will sign off    Katie Murphy MD  [facility])  Office: [phone] -Option #8  Office fax:  [phone]

## 2023-11-16 NOTE — PROGRESS NOTES
Occupational Therapy Screen/DISCHARGE  Orders received. Chart reviewed. Patient received from long term care at [facility]. Per Physical Therapy patient requires total A for all ADLs at baseline, and mechanical lift for transfers. Patient is being discharged back to [facility] today according to . Patient is at baseline functional status and does not required skilled OT services at this level of care. Patient will be discharged from OT.   Thank you for this referral  Florencia Schuler OTR/OPHELIA

## 2023-11-16 NOTE — PROGRESS NOTES
Patients discharged has been held due to labs per MD. Francisco Hatchet reached out to SNF and transport cancelled. SW to follow.

## 2023-11-16 NOTE — PROGRESS NOTES
Physician Progress Note      PATIENT:               Natalya Reed  CSN #:                  [id number]  :                       1940  ADMIT DATE:       2023 10:15 AM  DISCH DATE:  RESPONDING  PROVIDER #:        Dallas Murphy MD          QUERY TEXT:    Pt admitted with SBO. Pt noted to have nephrostomy tube. If possible, please   document in the progress notes and discharge summary if you are evaluating   and/or treating any of the following: The medical record reflects the following:  Risk Factors: UTI  Clinical Indicators: 43/56/ Hosp/-complicated uti with nephrostomy tube    Urine Cx: ? >100,000  COLONIES/mL  Culture ? Morganella morganii ssp morganii? Abnormal?  Culture ? Proteus mirabilis? Abnormal?    Treatment: Zosyn, Cipro, Vancomycin, Rocephin    Thank You  Jeremie Lancaster RN,CDI,CRCR  Options provided:  -- UTI due to nephrostomy tube  -- UTI not due to nephrostomy tube  -- Other - I will add my own diagnosis  -- Disagree - Not applicable / Not valid  -- Disagree - Clinically unable to determine / Unknown  -- Refer to Clinical Documentation Reviewer    PROVIDER RESPONSE TEXT:    UTI is due to nephrostomy tube. Query created by: Jeremie Lancaster on 2023 11:38 AM      Electronically signed by:   Dallas Murphy MD 2023 6:54 PM

## 2023-11-17 LAB
ANION GAP SERPL CALC-SCNC: 10 MMOL/L (ref 3–18)
BUN SERPL-MCNC: 19 MG/DL (ref 7–18)
BUN/CREAT SERPL: 10 (ref 12–20)
CALCIUM SERPL-MCNC: 7.5 MG/DL (ref 8.5–10.1)
CHLORIDE SERPL-SCNC: 115 MMOL/L (ref 100–111)
CO2 SERPL-SCNC: 17 MMOL/L (ref 21–32)
CREAT SERPL-MCNC: 1.86 MG/DL (ref 0.6–1.3)
ERYTHROCYTE [DISTWIDTH] IN BLOOD BY AUTOMATED COUNT: 13.7 % (ref 11.6–14.5)
GLUCOSE BLD STRIP.AUTO-MCNC: 141 MG/DL (ref 70–110)
GLUCOSE BLD STRIP.AUTO-MCNC: 153 MG/DL (ref 70–110)
GLUCOSE BLD STRIP.AUTO-MCNC: 224 MG/DL (ref 70–110)
GLUCOSE SERPL-MCNC: 164 MG/DL (ref 74–99)
HCT VFR BLD AUTO: 30.8 % (ref 35–45)
HGB BLD-MCNC: 9.8 G/DL (ref 12–16)
MCH RBC QN AUTO: 28.5 PG (ref 24–34)
MCHC RBC AUTO-ENTMCNC: 31.8 G/DL (ref 31–37)
MCV RBC AUTO: 89.5 FL (ref 78–100)
NRBC # BLD: 0 K/UL (ref 0–0.01)
NRBC BLD-RTO: 0 PER 100 WBC
PLATELET # BLD AUTO: 220 K/UL (ref 135–420)
PMV BLD AUTO: 9.4 FL (ref 9.2–11.8)
POTASSIUM SERPL-SCNC: 4.2 MMOL/L (ref 3.5–5.5)
RBC # BLD AUTO: 3.44 M/UL (ref 4.2–5.3)
SODIUM SERPL-SCNC: 142 MMOL/L (ref 136–145)
WBC # BLD AUTO: 7.9 K/UL (ref 4.6–13.2)

## 2023-11-17 PROCEDURE — 80048 BASIC METABOLIC PNL TOTAL CA: CPT

## 2023-11-17 PROCEDURE — 2580000003 HC RX 258: Performed by: STUDENT IN AN ORGANIZED HEALTH CARE EDUCATION/TRAINING PROGRAM

## 2023-11-17 PROCEDURE — 36415 COLL VENOUS BLD VENIPUNCTURE: CPT

## 2023-11-17 PROCEDURE — 1100000003 HC PRIVATE W/ TELEMETRY

## 2023-11-17 PROCEDURE — 85027 COMPLETE CBC AUTOMATED: CPT

## 2023-11-17 PROCEDURE — 6370000000 HC RX 637 (ALT 250 FOR IP): Performed by: FAMILY MEDICINE

## 2023-11-17 PROCEDURE — 2500000003 HC RX 250 WO HCPCS: Performed by: INTERNAL MEDICINE

## 2023-11-17 PROCEDURE — 6370000000 HC RX 637 (ALT 250 FOR IP): Performed by: INTERNAL MEDICINE

## 2023-11-17 PROCEDURE — 2500000003 HC RX 250 WO HCPCS: Performed by: STUDENT IN AN ORGANIZED HEALTH CARE EDUCATION/TRAINING PROGRAM

## 2023-11-17 PROCEDURE — 6370000000 HC RX 637 (ALT 250 FOR IP): Performed by: HOSPITALIST

## 2023-11-17 PROCEDURE — 2580000003 HC RX 258: Performed by: INTERNAL MEDICINE

## 2023-11-17 PROCEDURE — 6360000002 HC RX W HCPCS: Performed by: INTERNAL MEDICINE

## 2023-11-17 PROCEDURE — 82962 GLUCOSE BLOOD TEST: CPT

## 2023-11-17 PROCEDURE — A4216 STERILE WATER/SALINE, 10 ML: HCPCS | Performed by: INTERNAL MEDICINE

## 2023-11-17 RX ORDER — ENOXAPARIN SODIUM 100 MG/ML
30 INJECTION SUBCUTANEOUS DAILY
Status: DISCONTINUED | OUTPATIENT
Start: 2023-11-18 | End: 2023-11-20 | Stop reason: ALTCHOICE

## 2023-11-17 RX ORDER — SODIUM BICARBONATE 650 MG/1
1300 TABLET ORAL 3 TIMES DAILY
Status: DISCONTINUED | OUTPATIENT
Start: 2023-11-17 | End: 2023-11-18

## 2023-11-17 RX ORDER — CIPROFLOXACIN 2 MG/ML
400 INJECTION, SOLUTION INTRAVENOUS EVERY 24 HOURS
Status: DISPENSED | OUTPATIENT
Start: 2023-11-18 | End: 2023-11-19

## 2023-11-17 RX ADMIN — LEVOTHYROXINE SODIUM 50 MCG: 0.05 TABLET ORAL at 06:31

## 2023-11-17 RX ADMIN — INSULIN GLARGINE 5 UNITS: 100 INJECTION, SOLUTION SUBCUTANEOUS at 09:14

## 2023-11-17 RX ADMIN — GABAPENTIN 300 MG: 300 CAPSULE ORAL at 21:42

## 2023-11-17 RX ADMIN — FAMOTIDINE 20 MG: 10 INJECTION, SOLUTION INTRAVENOUS at 09:23

## 2023-11-17 RX ADMIN — Medication 5 MG: at 21:42

## 2023-11-17 RX ADMIN — SODIUM BICARBONATE 650 MG TABLET 1300 MG: at 22:56

## 2023-11-17 RX ADMIN — ENOXAPARIN SODIUM 40 MG: 100 INJECTION SUBCUTANEOUS at 09:22

## 2023-11-17 RX ADMIN — GABAPENTIN 300 MG: 300 CAPSULE ORAL at 09:13

## 2023-11-17 RX ADMIN — Medication 400 MG: at 09:13

## 2023-11-17 RX ADMIN — INSULIN LISPRO 1 UNITS: 100 INJECTION, SOLUTION INTRAVENOUS; SUBCUTANEOUS at 18:27

## 2023-11-17 RX ADMIN — ATORVASTATIN CALCIUM 20 MG: 20 TABLET, FILM COATED ORAL at 21:42

## 2023-11-17 RX ADMIN — METOPROLOL SUCCINATE 50 MG: 50 TABLET, EXTENDED RELEASE ORAL at 09:13

## 2023-11-17 RX ADMIN — MICONAZOLE NITRATE: 20 CREAM TOPICAL at 21:44

## 2023-11-17 RX ADMIN — SODIUM BICARBONATE: 84 INJECTION INTRAVENOUS at 13:42

## 2023-11-17 RX ADMIN — SODIUM CHLORIDE, PRESERVATIVE FREE 10 ML: 5 INJECTION INTRAVENOUS at 09:13

## 2023-11-17 RX ADMIN — CIPROFLOXACIN 400 MG: 400 INJECTION, SOLUTION INTRAVENOUS at 09:48

## 2023-11-17 ASSESSMENT — PAIN SCALES - GENERAL
PAINLEVEL_OUTOF10: 0

## 2023-11-17 NOTE — PROGRESS NOTES
Pharmacy Dosing Services: Ciprofloxacin    Indication:  UTI - 7 day Tx    Previous Regimen Ciprofloxacin 400 mg IV q12hrs - 7 day Tx   Serum Creatinine Lab Results   Component Value Date/Time    CREAPOC 1.1 01/16/2020 09:28 AM      Creatinine Clearance Estimated Creatinine Clearance: 21 mL/min (A) (based on SCr of 1.86 mg/dL (H)). BUN Lab Results   Component Value Date/Time    BUN 19 11/17/2023 11:57 AM    BUNPOC 17 01/16/2020 09:28 AM         Dose administration notes:   Changed to Ciprofloxacin 400 mgIV q24hrs x 1 remaining dose ( total 7 day Tx )  ( due to decreased renal function )    Plan:  Continue to monitor      Holden Campos  [facility]

## 2023-11-17 NOTE — PROGRESS NOTES
Patient likely to DC back to SNF tomorrow. Facility agreeable with weekend return if stable. SW to follow.

## 2023-11-17 NOTE — PROGRESS NOTES
Bedside and Verbal shift change report given to HEMANTH Paul RN (oncoming nurse) by MADDY Fisher RN (offgoing nurse). Report included the following information Nurse Handoff Report.

## 2023-11-17 NOTE — PROGRESS NOTES
Pharmacist Review and Automatic Dose Adjustment of Prophylactic Enoxaparin    *Review reason for admission/hospital problem list*    The reviewing pharmacist has made an adjustment to the ordered enoxaparin dose or converted to UFH per the approved [facility] protocol and table as identified below. Mylene Corley is a 79 y.o. female. Recent Labs     11/15/23  0923 11/16/23  1411 11/17/23  1157   CREATININE 1.31* 1.59* 1.86*       Estimated Creatinine Clearance: 21 mL/min (A) (based on SCr of 1.86 mg/dL (H)).     Height:   Ht Readings from Last 1 Encounters:   11/11/23 1.6 m (5' 3\")     Weight:  Wt Readings from Last 1 Encounters:   11/17/23 69.3 kg (152 lb 12.5 oz)               Plan: Based upon the patient's weight and renal function, the ordered enoxaparin dose of 40mg sq q24hrs has been changed/converted to   Lovenox 30 mg sq q24hrs per protocol    CHRIS  [address] [facility]

## 2023-11-17 NOTE — PROGRESS NOTES
Hospitalist Progress Note    Patient: Sherman Thakkar MRN: [id number]  [facility]: [id number]    YOB: 1940  Age: 79 y.o. Sex: female    DOA: 11/11/2023 LOS:  LOS: 6 days                Assessment/Plan     Active Hospital Problems    Diagnosis     Metabolic acidosis [W24.77]     Positive blood culture [R78.81]     Leukocytosis [D72.829]     SBO (small bowel obstruction) ([facility]) [K56.609]     S/P AKA (above knee amputation), right ([address]) [Z89.611]     Nephrostomy status ([facility]) [Z93.6]     S/P colectomy [Z90.49]     Legally blind [H54.8]     Diabetes ([address]) [E11.9]     Hypertension [I10]     UTI (urinary tract infection) [N39.0]         Chief complaint :  SBO  79 y.o. 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 SBO with  Extensive/complex abd surgical hx --   mostly due to adhesions. resolved now   Seen by general surgery  Having output through colostomy tube  Advanced diet to soft     Hyperkalemia  --   resolved      DENNY with ckd3 -  Metabolic acidosis  on bicarb tabs  Lactic acid in normal range  Nephrology consulted     UTI -- continue  cipro. UTI with nephrostomy tube  Urine cultures growing morganella morgani     Positive blood cx   Coag neg staph, contamination per ID      DM-II -   continue current dose lantus. Accuchecks and ssi     Debility   Palliative care for code status and care goal -dnr /I now          Disposition : TBD    Review of systems  General: No fevers or chills. Cardiovascular: No chest pain or pressure. No palpitations. Pulmonary: No shortness of breath. Gastrointestinal: see above. Physical Exam:  General: Awake, cooperative, no acute distress    HEENT: NC, Atraumatic. PERRLA, anicteric sclerae. Lungs: CTA Bilaterally. No Wheezing/Rhonchi/Rales.   Heart:  S1 S2,  No murmur, No Rubs, No Gallops  Abdomen: Soft, Non Plan    TIME: E/M Time spent with patient and patient care issues: [] 31-40 mins  [x] 41-49 mins  [] 50 mins or more. This time also includes physician non-face-to-face service time visit on the date of service such as  Preparing to see the patient (eg, review of tests)  Obtaining and/or reviewing separately obtained history  Performing a medically necessary appropriate examination and/or evaluation  Counseling and educating the patient/family/caregiver  Ordering medications, tests, or procedures  Referring and communicating with other health care professionals as needed  Documenting clinical information in the electronic or other health record  Independently interpreting results (not reported separately) and communicating results to the patient/family/caregiver  Care coordination and discharge planning with Case Management.

## 2023-11-17 NOTE — CONSULTS
RENAL CONSULT  2023    Patient:  Joanne Whitlock  :  1940  Gender:  female  MRN #:  [id number]    Reason for Consult: Acute renal failure and metabolic acidosis     History of Present Illness:    Joanne Whitlock is a 79y.o. year old female presented with abdominal pain, admitted for small bowel obstruction . She has h/o prior vaginal and colon cancer with left nephrectomy, right nephrostomy, and colostomy, legal blindness, type 2 diabetes  mellitus,  CKD stage  3GA , and right AKA . On conservative management with colostomy output     Nephrology consulted for persistent acidosis  When I saw her this afternoon she reports doing okay, report output from ostomy and nephrostomy   Denied fever , chest pain and shortness of breath .          Past Medical History:   Diagnosis Date    Anxiety     Cancer ([address])     Cancer ([address])     vaginal and colon cancer    Chronic kidney disease     Chronic pain     arthritis    Colostomy present ([address])     Diabetes ([address])     Dry eye syndrome     DVT (deep venous thrombosis) (HCC)     Dysphagia     Encephalopathy     Glaucoma     Hypertension     Hypomagnesemia     Hypothyroid     Ileostomy in place [facility])     Legally blind     Thyroid disease      Past Surgical History:   Procedure Laterality Date    ABOVE KNEE AMPUTATION Right     COLOSTOMY      IR NEPHROSTOMY EXCHANGE CATHETER  2021    IR NEPHROSTOMY EXCHANGE CATHETER  2021    IR NEPHROSTOMY EXCHANGE CATHETER  2021    IR NEPHROSTOMY EXCHANGE CATHETER  2021    IR NEPHROSTOMY EXCHANGE CATHETER  2022    IR NEPHROSTOMY EXCHANGE CATHETER  3/21/2022    IR NEPHROSTOMY EXCHANGE CATHETER  2022    IR NEPHROSTOMY EXCHANGE CATHETER  2022    IR NEPHROSTOMY EXCHANGE CATHETER  2022    IR NEPHROSTOMY EXCHANGE CATHETER  2022    IR NEPHROSTOMY EXCHANGE CATHETER  2022    IR NEPHROSTOMY EXCHANGE CATHETER  2022    IR NEPHROSTOMY EXCHANGE CATHETER  2023    IR NEPHROSTOMY EXCHANGE CATHETER awake,  alert and comfortable  HEENT: No JVD, anicteric sclera, no neck swelling  Lung: clear to auscultation, no crackles and wheeze  Heart: s1s2 regualr no rubs or murmur  Abdomen: soft, colostomy bag in LLE and right nephrostomy tube   Ext: no  edema and pulsation intact    CNS- Oriented to time , place      Laboratory Data:    Lab Results   Component Value Date    BUN 19 (H) 11/17/2023    BUN 19 (H) 11/16/2023    BUN 20 (H) 11/15/2023     11/17/2023     11/16/2023     11/15/2023    CO2 17 (L) 11/17/2023    CO2 13 (L) 11/16/2023    CO2 14 (L) 11/15/2023    GLUCOSE 164 (H) 11/17/2023    GLUCOSE 160 (H) 11/16/2023    GLUCOSE 197 (H) 11/15/2023     Lab Results   Component Value Date    WBC 7.9 11/17/2023    HGB 9.8 (L) 11/17/2023    HCT 30.8 (L) 11/17/2023     Lab Results   Component Value Date    CALCIUM 7.5 (L) 11/17/2023     Lab Results   Component Value Date    HDL 39 (L) 08/28/2019     No results found for: \"SPECIMENTYP\", \"TURBIDITY\"    Imaging Reveiwed:    CT abdomen/pelvis: :- 11/11/23- IMPRESSION:  No hydronephrosis with right percutaneous nephrostomy tube in place. Small bowel obstruction with adhesion  in the left hemipelvis. Assessment:   Valinda Dandy is a 79y.o. year old female presented with abdominal pain, admitted for small bowel obstruction . She has h/o prior vaginal and colon cancer with left nephrectomy, right nephrostomy, and colostomy, legal blindness, type 2 diabetes  mellitus,  CKD stage  3GA , and right AKA .   On conservative management with colostomy output     DENNY on CKD stage 3GA- due to volume depletion , improving now   Metabolic acidosis - due to bicarbonate loss in colostomy/ nephrostomy   UTI  Small bowel obstruction       Plan:    - she has negative fluid balance from colostomy and nephrostomy output   - switch fluid to 150 meq sodium bicarbonate in 1 liter of DW5 - increase rate to 150 cc/hour  Once serum bicarb is above 20 mmol/lt, switch to oral

## 2023-11-17 NOTE — PROGRESS NOTES
1920:  Bedside shift report received from LOLIS Tao RN. Patient resting quietly in bed with headphones on. Denies pain and no needs are stated.

## 2023-11-18 PROBLEM — N17.9 ACUTE RENAL FAILURE SUPERIMPOSED ON STAGE 3 CHRONIC KIDNEY DISEASE (HCC): Status: ACTIVE | Noted: 2023-11-18

## 2023-11-18 PROBLEM — N18.30 ACUTE RENAL FAILURE SUPERIMPOSED ON STAGE 3 CHRONIC KIDNEY DISEASE (HCC): Status: ACTIVE | Noted: 2023-11-18

## 2023-11-18 LAB
ANION GAP SERPL CALC-SCNC: 5 MMOL/L (ref 3–18)
BUN SERPL-MCNC: 20 MG/DL (ref 7–18)
BUN/CREAT SERPL: 9 (ref 12–20)
CALCIUM SERPL-MCNC: 7.2 MG/DL (ref 8.5–10.1)
CHLORIDE SERPL-SCNC: 114 MMOL/L (ref 100–111)
CO2 SERPL-SCNC: 22 MMOL/L (ref 21–32)
CREAT SERPL-MCNC: 2.23 MG/DL (ref 0.6–1.3)
ERYTHROCYTE [DISTWIDTH] IN BLOOD BY AUTOMATED COUNT: 13.5 % (ref 11.6–14.5)
GLUCOSE BLD STRIP.AUTO-MCNC: 172 MG/DL (ref 70–110)
GLUCOSE BLD STRIP.AUTO-MCNC: 178 MG/DL (ref 70–110)
GLUCOSE BLD STRIP.AUTO-MCNC: 184 MG/DL (ref 70–110)
GLUCOSE BLD STRIP.AUTO-MCNC: 217 MG/DL (ref 70–110)
GLUCOSE SERPL-MCNC: 155 MG/DL (ref 74–99)
HCT VFR BLD AUTO: 27.5 % (ref 35–45)
HGB BLD-MCNC: 8.8 G/DL (ref 12–16)
MCH RBC QN AUTO: 28.7 PG (ref 24–34)
MCHC RBC AUTO-ENTMCNC: 32 G/DL (ref 31–37)
MCV RBC AUTO: 89.6 FL (ref 78–100)
NRBC # BLD: 0 K/UL (ref 0–0.01)
NRBC BLD-RTO: 0 PER 100 WBC
PLATELET # BLD AUTO: 227 K/UL (ref 135–420)
PMV BLD AUTO: 9.4 FL (ref 9.2–11.8)
POTASSIUM SERPL-SCNC: 4 MMOL/L (ref 3.5–5.5)
RBC # BLD AUTO: 3.07 M/UL (ref 4.2–5.3)
SODIUM SERPL-SCNC: 141 MMOL/L (ref 136–145)
WBC # BLD AUTO: 6.2 K/UL (ref 4.6–13.2)

## 2023-11-18 PROCEDURE — 1100000003 HC PRIVATE W/ TELEMETRY

## 2023-11-18 PROCEDURE — 85027 COMPLETE CBC AUTOMATED: CPT

## 2023-11-18 PROCEDURE — 80048 BASIC METABOLIC PNL TOTAL CA: CPT

## 2023-11-18 PROCEDURE — 6370000000 HC RX 637 (ALT 250 FOR IP): Performed by: HOSPITALIST

## 2023-11-18 PROCEDURE — 6370000000 HC RX 637 (ALT 250 FOR IP): Performed by: STUDENT IN AN ORGANIZED HEALTH CARE EDUCATION/TRAINING PROGRAM

## 2023-11-18 PROCEDURE — 82962 GLUCOSE BLOOD TEST: CPT

## 2023-11-18 PROCEDURE — 36415 COLL VENOUS BLD VENIPUNCTURE: CPT

## 2023-11-18 PROCEDURE — 6370000000 HC RX 637 (ALT 250 FOR IP): Performed by: INTERNAL MEDICINE

## 2023-11-18 RX ORDER — SODIUM BICARBONATE 650 MG/1
1300 TABLET ORAL 3 TIMES DAILY
Status: DISCONTINUED | OUTPATIENT
Start: 2023-11-18 | End: 2023-11-19

## 2023-11-18 RX ORDER — SODIUM CHLORIDE, SODIUM LACTATE, POTASSIUM CHLORIDE, CALCIUM CHLORIDE 600; 310; 30; 20 MG/100ML; MG/100ML; MG/100ML; MG/100ML
INJECTION, SOLUTION INTRAVENOUS CONTINUOUS
Status: DISCONTINUED | OUTPATIENT
Start: 2023-11-18 | End: 2023-11-24 | Stop reason: HOSPADM

## 2023-11-18 RX ADMIN — GABAPENTIN 300 MG: 300 CAPSULE ORAL at 21:14

## 2023-11-18 RX ADMIN — GABAPENTIN 300 MG: 300 CAPSULE ORAL at 11:32

## 2023-11-18 RX ADMIN — ATORVASTATIN CALCIUM 20 MG: 20 TABLET, FILM COATED ORAL at 21:14

## 2023-11-18 RX ADMIN — Medication 5 MG: at 21:14

## 2023-11-18 RX ADMIN — MICONAZOLE NITRATE: 20 CREAM TOPICAL at 11:43

## 2023-11-18 RX ADMIN — METOPROLOL SUCCINATE 50 MG: 50 TABLET, EXTENDED RELEASE ORAL at 11:32

## 2023-11-18 RX ADMIN — SODIUM BICARBONATE 650 MG TABLET 1300 MG: at 21:14

## 2023-11-18 RX ADMIN — LEVOTHYROXINE SODIUM 50 MCG: 0.05 TABLET ORAL at 07:13

## 2023-11-18 RX ADMIN — INSULIN GLARGINE 5 UNITS: 100 INJECTION, SOLUTION SUBCUTANEOUS at 11:32

## 2023-11-18 RX ADMIN — Medication 400 MG: at 11:32

## 2023-11-18 RX ADMIN — INSULIN LISPRO 1 UNITS: 100 INJECTION, SOLUTION INTRAVENOUS; SUBCUTANEOUS at 11:38

## 2023-11-18 RX ADMIN — MICONAZOLE NITRATE: 20 CREAM TOPICAL at 21:18

## 2023-11-18 RX ADMIN — SODIUM BICARBONATE 650 MG TABLET 1300 MG: at 18:09

## 2023-11-18 ASSESSMENT — PAIN SCALES - GENERAL: PAINLEVEL_OUTOF10: 0

## 2023-11-18 NOTE — PROGRESS NOTES
Hospitalist Progress Note-critical care note     Patient: Maxwell Hurst MRN: [id number]  CSN: [id number]    YOB: 1940  Age: 79 y.o. Sex: female    DOA: 11/11/2023 LOS:  LOS: 7 days            Chief complaint: sbo nephrostomy tube in, denny on ckd3     Assessment/Plan         Active Hospital Problems    Diagnosis Date Noted    Acute renal failure superimposed on stage 3 chronic kidney disease ([address]) [N17.9, N18.30] 63/59/1608    Metabolic acidosis [Q98.15] 11/16/2023    Positive blood culture [R78.81] 11/13/2023    Leukocytosis [D72.829] 11/13/2023    SBO (small bowel obstruction) ([address]) [K56.609] 11/11/2023    S/P AKA (above knee amputation), right ([address]) [Z89.611] 06/03/2019    Nephrostomy status ([address]) [Z93.6] 06/03/2019    S/P colectomy [Z90.49] 11/26/2018    Legally blind [H54.8] 11/26/2018    Diabetes ([address]) [E11.9] 11/26/2018    Hypertension [I10] 11/26/2018    UTI (urinary tract infection) [N39.0] 11/26/2018       79 y.o. 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 SBO with  Extensive/complex abd surgical hx --   mostly due to adhesions. resolved now   Seen by general surgery  Having output through colostomy tube  Advanced diet to soft     Hyperkalemia  --   resolved      DENNY with ckd3 -worsening renal function   Metabolic acidosis  on bicarb tab-bicarb gtt off now    Renal consult -on fluid   Continue renal function monitoring      UTI -- continue  cipro. UTI with nephrostomy tube  Urine cultures growing morganella morgani     Positive blood cx   Coag neg staph, contamination per ID      DM-II -   continue current dose lantus.  Accuchecks and ssi     Debility   Palliative care for code status and care goal -dnr /I now       Subjective I  want to go home, may  I go today ?, some pain in my stomach          TIME: E/M Time spent with hours.    Invalid input(s): \"TBIL\", \"AP\", \"SGOT\", \"GPT\", \"DBIL\"   Thyroid Studies Lab Results   Component Value Date/Time    TSH 3.40 01/24/2020 08:35 AM        Procedures/imaging: see electronic medical records for all procedures/Xrays and details which were not copied into this note but were reviewed prior to creation of Plan    XR ABDOMEN (KUB) (SINGLE AP VIEW)    Result Date: 11/12/2023  EXAM:  XR ABDOMEN (KUB) (SINGLE AP VIEW) INDICATION: Small bowel obstruction COMPARISON: 11/11/2023. TECHNIQUE: Supine frontal abdomen (KUB). FINDINGS: There is an NG tube in the stomach. Small bowel dilatation in the left lower quadrant is unchanged. Stool and gas are present in the colon. There is a percutaneous nephrostomy on the right. 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 NG tube in the stomach. Persistent small bowel dilatation in the left lower quadrant, similar to prior study. XR ABDOMEN (KUB) (SINGLE AP VIEW)    Result Date: 11/11/2023  EXAM:  XR ABDOMEN (KUB) (SINGLE AP VIEW) INDICATION: NGT placement. COMPARISON: CT 11/11/2023. Lisa Pole TECHNIQUE: Supine frontal abdomen (KUB). FINDINGS: Enteric tube traverses expected course to below the diaphragm into the left upper quadrant. Right-sided percutaneous nephrostomy catheter is in position. No dilated small bowel. Stool and gas are present in the colon. No pathologic calcification. Osseous structures diffuse osteopenia and degenerative spine changes but otherwise are age-appropriate. Atherosclerotic calcification's are noted with vascular occlusion coils seen in the right lower quadrant. Post surgical clips are seen within the abdomen. Enteric tube in expected position. Stable chronic changes as above. CT ABDOMEN PELVIS WO CONTRAST Additional Contrast? None    Result Date: 11/11/2023  EXAM: CT ABDOMEN PELVIS WO CONTRAST INDICATION: abdominal pain. GFR 31 COMPARISON: 4/28/2022 IV CONTRAST: None.  ORAL CONTRAST: 0 TECHNIQUE: Thin axial images were obtained through the

## 2023-11-18 NOTE — PROGRESS NOTES
2220:  In room for rounds, noted patient's IV seems to have infiltrated. Redness and swelling to site. IV fluid stopped at this time. Notified MD. Abraham Cesar:  Orders received for oral sodium bicarb. 2256:  Oral sodium bicarb given per orders. Patient's left arm is elevated on a pillow. Offered patient ice pack or warm compress and she declined both. Will monitor. 0802: Remainder of shift uneventful. Bedside report given to ABDELRAHMAN Love

## 2023-11-19 LAB
ANION GAP SERPL CALC-SCNC: 8 MMOL/L (ref 3–18)
BACTERIA SPEC CULT: NORMAL
BACTERIA SPEC CULT: NORMAL
BUN SERPL-MCNC: 25 MG/DL (ref 7–18)
BUN/CREAT SERPL: 8 (ref 12–20)
CALCIUM SERPL-MCNC: 7.4 MG/DL (ref 8.5–10.1)
CHLORIDE SERPL-SCNC: 117 MMOL/L (ref 100–111)
CO2 SERPL-SCNC: 18 MMOL/L (ref 21–32)
CREAT SERPL-MCNC: 2.96 MG/DL (ref 0.6–1.3)
ERYTHROCYTE [DISTWIDTH] IN BLOOD BY AUTOMATED COUNT: 13.8 % (ref 11.6–14.5)
GLUCOSE BLD STRIP.AUTO-MCNC: 151 MG/DL (ref 70–110)
GLUCOSE BLD STRIP.AUTO-MCNC: 151 MG/DL (ref 70–110)
GLUCOSE BLD STRIP.AUTO-MCNC: 204 MG/DL (ref 70–110)
GLUCOSE BLD STRIP.AUTO-MCNC: 248 MG/DL (ref 70–110)
GLUCOSE SERPL-MCNC: 171 MG/DL (ref 74–99)
HCT VFR BLD AUTO: 35.8 % (ref 35–45)
HGB BLD-MCNC: 11 G/DL (ref 12–16)
MCH RBC QN AUTO: 28.4 PG (ref 24–34)
MCHC RBC AUTO-ENTMCNC: 30.7 G/DL (ref 31–37)
MCV RBC AUTO: 92.5 FL (ref 78–100)
NRBC # BLD: 0 K/UL (ref 0–0.01)
NRBC BLD-RTO: 0 PER 100 WBC
PLATELET # BLD AUTO: 254 K/UL (ref 135–420)
PMV BLD AUTO: 10.3 FL (ref 9.2–11.8)
POTASSIUM SERPL-SCNC: 5.4 MMOL/L (ref 3.5–5.5)
RBC # BLD AUTO: 3.87 M/UL (ref 4.2–5.3)
SERVICE CMNT-IMP: NORMAL
SERVICE CMNT-IMP: NORMAL
SODIUM SERPL-SCNC: 143 MMOL/L (ref 136–145)
WBC # BLD AUTO: 7.7 K/UL (ref 4.6–13.2)

## 2023-11-19 PROCEDURE — 6370000000 HC RX 637 (ALT 250 FOR IP): Performed by: STUDENT IN AN ORGANIZED HEALTH CARE EDUCATION/TRAINING PROGRAM

## 2023-11-19 PROCEDURE — 1100000003 HC PRIVATE W/ TELEMETRY

## 2023-11-19 PROCEDURE — 6360000002 HC RX W HCPCS: Performed by: INTERNAL MEDICINE

## 2023-11-19 PROCEDURE — 36415 COLL VENOUS BLD VENIPUNCTURE: CPT

## 2023-11-19 PROCEDURE — 82962 GLUCOSE BLOOD TEST: CPT

## 2023-11-19 PROCEDURE — 6370000000 HC RX 637 (ALT 250 FOR IP): Performed by: INTERNAL MEDICINE

## 2023-11-19 PROCEDURE — 6370000000 HC RX 637 (ALT 250 FOR IP): Performed by: HOSPITALIST

## 2023-11-19 PROCEDURE — 80048 BASIC METABOLIC PNL TOTAL CA: CPT

## 2023-11-19 PROCEDURE — 85027 COMPLETE CBC AUTOMATED: CPT

## 2023-11-19 RX ORDER — SODIUM BICARBONATE 650 MG/1
1300 TABLET ORAL 4 TIMES DAILY
Status: DISCONTINUED | OUTPATIENT
Start: 2023-11-19 | End: 2023-11-24

## 2023-11-19 RX ADMIN — GABAPENTIN 300 MG: 300 CAPSULE ORAL at 08:59

## 2023-11-19 RX ADMIN — INSULIN GLARGINE 5 UNITS: 100 INJECTION, SOLUTION SUBCUTANEOUS at 08:57

## 2023-11-19 RX ADMIN — MICONAZOLE NITRATE: 20 CREAM TOPICAL at 09:03

## 2023-11-19 RX ADMIN — ATORVASTATIN CALCIUM 20 MG: 20 TABLET, FILM COATED ORAL at 21:25

## 2023-11-19 RX ADMIN — LEVOTHYROXINE SODIUM 50 MCG: 0.05 TABLET ORAL at 06:21

## 2023-11-19 RX ADMIN — MICONAZOLE NITRATE: 20 CREAM TOPICAL at 21:30

## 2023-11-19 RX ADMIN — GABAPENTIN 300 MG: 300 CAPSULE ORAL at 21:25

## 2023-11-19 RX ADMIN — ENOXAPARIN SODIUM 30 MG: 100 INJECTION SUBCUTANEOUS at 08:56

## 2023-11-19 RX ADMIN — Medication 5 MG: at 21:25

## 2023-11-19 RX ADMIN — SODIUM BICARBONATE 650 MG TABLET 1300 MG: at 08:57

## 2023-11-19 RX ADMIN — Medication 400 MG: at 09:00

## 2023-11-19 RX ADMIN — SODIUM BICARBONATE 650 MG TABLET 1300 MG: at 21:25

## 2023-11-19 RX ADMIN — INSULIN LISPRO 1 UNITS: 100 INJECTION, SOLUTION INTRAVENOUS; SUBCUTANEOUS at 12:00

## 2023-11-19 RX ADMIN — SODIUM BICARBONATE 650 MG TABLET 1300 MG: at 14:58

## 2023-11-19 ASSESSMENT — PAIN SCALES - GENERAL: PAINLEVEL_OUTOF10: 0

## 2023-11-19 NOTE — PROGRESS NOTES
Hospitalist Progress Note-critical care note     Patient: Kimberly Jose MRN: [id number]  [facility]: [id number]    YOB: 1940  Age: 79 y.o. Sex: female    DOA: 11/11/2023 LOS:  LOS: 8 days            Chief complaint: sbo nephrostomy tube in, denny on ckd3     Assessment/Plan         Active Hospital Problems    Diagnosis Date Noted    Acute renal failure superimposed on stage 3 chronic kidney disease ([address]) [N17.9, N18.30] 89/05/7420    Metabolic acidosis [O58.85] 11/16/2023    Positive blood culture [R78.81] 11/13/2023    Leukocytosis [D72.829] 11/13/2023    SBO (small bowel obstruction) ([address]) [K56.609] 11/11/2023    S/P AKA (above knee amputation), right ([address]) [Z89.611] 06/03/2019    Nephrostomy status ([address]) [Z93.6] 06/03/2019    S/P colectomy [Z90.49] 11/26/2018    Legally blind [H54.8] 11/26/2018    Diabetes ([address]) [E11.9] 11/26/2018    Hypertension [I10] 11/26/2018    UTI (urinary tract infection) [N39.0] 11/26/2018       79 y.o. 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 SBO with  Extensive/complex abd surgical hx --   mostly due to adhesions. resolved now   Seen by general surgery  Advanced diet to soft     Hyperkalemia  --   resolved      DENNY with ckd3 -worsening renal function   Metabolic acidosis  on bicarb tab-bicarb gtt off now    Renal consult -on fluid -not receiving loss iv acces-discussed with RN -needs iv access please   Continue renal function monitoring      UTI -- continue  cipro. UTI with nephrostomy tube  Urine cultures growing morganella morgani     Positive blood cx   Coag neg staph, contamination per ID      DM-II -   continue current dose lantus.  Accuchecks and ssi     Debility   Palliative care for code status and care goal -dnr /I now       Subjective I  am ok      Rn still no iv access   Decreased urine out-put, Invalid input(s): \"BNPP\"   Liver Enzymes No results for input(s): \"TP\", \"ALB\" in the last 72 hours. Invalid input(s): \"TBIL\", \"AP\", \"SGOT\", \"GPT\", \"DBIL\"   Thyroid Studies Lab Results   Component Value Date/Time    TSH 3.40 01/24/2020 08:35 AM        Procedures/imaging: see electronic medical records for all procedures/Xrays and details which were not copied into this note but were reviewed prior to creation of Plan    XR ABDOMEN (KUB) (SINGLE AP VIEW)    Result Date: 11/12/2023  EXAM:  XR ABDOMEN (KUB) (SINGLE AP VIEW) INDICATION: Small bowel obstruction COMPARISON: 11/11/2023. TECHNIQUE: Supine frontal abdomen (KUB). FINDINGS: There is an NG tube in the stomach. Small bowel dilatation in the left lower quadrant is unchanged. Stool and gas are present in the colon. There is a percutaneous nephrostomy on the right. 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 NG tube in the stomach. Persistent small bowel dilatation in the left lower quadrant, similar to prior study. XR ABDOMEN (KUB) (SINGLE AP VIEW)    Result Date: 11/11/2023  EXAM:  XR ABDOMEN (KUB) (SINGLE AP VIEW) INDICATION: NGT placement. COMPARISON: CT 11/11/2023. Paolo Queen TECHNIQUE: Supine frontal abdomen (KUB). FINDINGS: Enteric tube traverses expected course to below the diaphragm into the left upper quadrant. Right-sided percutaneous nephrostomy catheter is in position. No dilated small bowel. Stool and gas are present in the colon. No pathologic calcification. Osseous structures diffuse osteopenia and degenerative spine changes but otherwise are age-appropriate. Atherosclerotic calcification's are noted with vascular occlusion coils seen in the right lower quadrant. Post surgical clips are seen within the abdomen. Enteric tube in expected position. Stable chronic changes as above. CT ABDOMEN PELVIS WO CONTRAST Additional Contrast? None    Result Date: 11/11/2023  EXAM: CT ABDOMEN PELVIS WO CONTRAST INDICATION: abdominal pain.  GFR 31 COMPARISON:

## 2023-11-19 NOTE — PLAN OF CARE
Problem: Discharge Planning  Goal: Discharge to home or other facility with appropriate resources  Outcome: Progressing  Flowsheets (Taken 11/18/2023 2000)  Discharge to home or other facility with appropriate resources:   Identify barriers to discharge with patient and caregiver   Identify discharge learning needs (meds, wound care, etc)     Problem: Pain  Goal: Verbalizes/displays adequate comfort level or baseline comfort level  Outcome: Progressing  Flowsheets (Taken 11/18/2023 2000)  Verbalizes/displays adequate comfort level or baseline comfort level:   Encourage patient to monitor pain and request assistance   Assess pain using appropriate pain scale   Administer analgesics based on type and severity of pain and evaluate response   Implement non-pharmacological measures as appropriate and evaluate response     Problem: Skin/Tissue Integrity  Goal: Absence of new skin breakdown  Description: 1. Monitor for areas of redness and/or skin breakdown  2. Assess vascular access sites hourly  3. Every 4-6 hours minimum:  Change oxygen saturation probe site  4. Every 4-6 hours:  If on nasal continuous positive airway pressure, respiratory therapy assess nares and determine need for appliance change or resting period.   Outcome: Progressing     Problem: Safety - Adult  Goal: Free from fall injury  Outcome: Progressing     Problem: Chronic Conditions and Co-morbidities  Goal: Patient's chronic conditions and co-morbidity symptoms are monitored and maintained or improved  Outcome: Progressing  Flowsheets (Taken 11/18/2023 2000)  Care Plan - Patient's Chronic Conditions and Co-Morbidity Symptoms are Monitored and Maintained or Improved:   Monitor and assess patient's chronic conditions and comorbid symptoms for stability, deterioration, or improvement   Collaborate with multidisciplinary team to address chronic and comorbid conditions and prevent exacerbation or deterioration   Update acute care plan with appropriate

## 2023-11-20 LAB
ANION GAP SERPL CALC-SCNC: 10 MMOL/L (ref 3–18)
BUN SERPL-MCNC: 25 MG/DL (ref 7–18)
BUN/CREAT SERPL: 7 (ref 12–20)
CALCIUM SERPL-MCNC: 6.7 MG/DL (ref 8.5–10.1)
CHLORIDE SERPL-SCNC: 109 MMOL/L (ref 100–111)
CO2 SERPL-SCNC: 22 MMOL/L (ref 21–32)
CREAT SERPL-MCNC: 3.42 MG/DL (ref 0.6–1.3)
GLUCOSE BLD STRIP.AUTO-MCNC: 163 MG/DL (ref 70–110)
GLUCOSE BLD STRIP.AUTO-MCNC: 164 MG/DL (ref 70–110)
GLUCOSE BLD STRIP.AUTO-MCNC: 171 MG/DL (ref 70–110)
GLUCOSE BLD STRIP.AUTO-MCNC: 199 MG/DL (ref 70–110)
GLUCOSE SERPL-MCNC: 213 MG/DL (ref 74–99)
POTASSIUM SERPL-SCNC: 4.2 MMOL/L (ref 3.5–5.5)
SODIUM SERPL-SCNC: 141 MMOL/L (ref 136–145)

## 2023-11-20 PROCEDURE — 82962 GLUCOSE BLOOD TEST: CPT

## 2023-11-20 PROCEDURE — 80048 BASIC METABOLIC PNL TOTAL CA: CPT

## 2023-11-20 PROCEDURE — A4216 STERILE WATER/SALINE, 10 ML: HCPCS | Performed by: INTERNAL MEDICINE

## 2023-11-20 PROCEDURE — 2580000003 HC RX 258: Performed by: INTERNAL MEDICINE

## 2023-11-20 PROCEDURE — 36415 COLL VENOUS BLD VENIPUNCTURE: CPT

## 2023-11-20 PROCEDURE — 2500000003 HC RX 250 WO HCPCS: Performed by: INTERNAL MEDICINE

## 2023-11-20 PROCEDURE — 6370000000 HC RX 637 (ALT 250 FOR IP): Performed by: STUDENT IN AN ORGANIZED HEALTH CARE EDUCATION/TRAINING PROGRAM

## 2023-11-20 PROCEDURE — 1100000003 HC PRIVATE W/ TELEMETRY

## 2023-11-20 PROCEDURE — 6370000000 HC RX 637 (ALT 250 FOR IP): Performed by: INTERNAL MEDICINE

## 2023-11-20 PROCEDURE — 2580000003 HC RX 258: Performed by: STUDENT IN AN ORGANIZED HEALTH CARE EDUCATION/TRAINING PROGRAM

## 2023-11-20 PROCEDURE — 6370000000 HC RX 637 (ALT 250 FOR IP): Performed by: HOSPITALIST

## 2023-11-20 PROCEDURE — 6360000002 HC RX W HCPCS: Performed by: INTERNAL MEDICINE

## 2023-11-20 PROCEDURE — 97602 WOUND(S) CARE NON-SELECTIVE: CPT

## 2023-11-20 RX ORDER — HEPARIN SODIUM 5000 [USP'U]/ML
5000 INJECTION, SOLUTION INTRAVENOUS; SUBCUTANEOUS EVERY 8 HOURS
Status: DISCONTINUED | OUTPATIENT
Start: 2023-11-21 | End: 2023-11-24 | Stop reason: HOSPADM

## 2023-11-20 RX ADMIN — Medication 5 MG: at 22:04

## 2023-11-20 RX ADMIN — SODIUM CHLORIDE, POTASSIUM CHLORIDE, SODIUM LACTATE AND CALCIUM CHLORIDE: 600; 310; 30; 20 INJECTION, SOLUTION INTRAVENOUS at 11:15

## 2023-11-20 RX ADMIN — ENOXAPARIN SODIUM 30 MG: 100 INJECTION SUBCUTANEOUS at 10:25

## 2023-11-20 RX ADMIN — METOPROLOL SUCCINATE 50 MG: 50 TABLET, EXTENDED RELEASE ORAL at 10:20

## 2023-11-20 RX ADMIN — GABAPENTIN 300 MG: 300 CAPSULE ORAL at 22:04

## 2023-11-20 RX ADMIN — ATORVASTATIN CALCIUM 20 MG: 20 TABLET, FILM COATED ORAL at 22:04

## 2023-11-20 RX ADMIN — MICONAZOLE NITRATE: 20 CREAM TOPICAL at 10:46

## 2023-11-20 RX ADMIN — MICONAZOLE NITRATE: 20 CREAM TOPICAL at 22:10

## 2023-11-20 RX ADMIN — SODIUM CHLORIDE, PRESERVATIVE FREE 10 ML: 5 INJECTION INTRAVENOUS at 22:11

## 2023-11-20 RX ADMIN — Medication 400 MG: at 10:20

## 2023-11-20 RX ADMIN — SODIUM BICARBONATE 650 MG TABLET 1300 MG: at 17:17

## 2023-11-20 RX ADMIN — FAMOTIDINE 20 MG: 10 INJECTION, SOLUTION INTRAVENOUS at 10:21

## 2023-11-20 RX ADMIN — LEVOTHYROXINE SODIUM 50 MCG: 0.05 TABLET ORAL at 10:20

## 2023-11-20 RX ADMIN — SODIUM CHLORIDE, PRESERVATIVE FREE 10 ML: 5 INJECTION INTRAVENOUS at 10:31

## 2023-11-20 RX ADMIN — SODIUM BICARBONATE 650 MG TABLET 1300 MG: at 12:22

## 2023-11-20 RX ADMIN — INSULIN GLARGINE 5 UNITS: 100 INJECTION, SOLUTION SUBCUTANEOUS at 10:21

## 2023-11-20 RX ADMIN — SODIUM BICARBONATE 650 MG TABLET 1300 MG: at 10:20

## 2023-11-20 RX ADMIN — SODIUM BICARBONATE 650 MG TABLET 1300 MG: at 22:04

## 2023-11-20 RX ADMIN — GABAPENTIN 300 MG: 300 CAPSULE ORAL at 10:20

## 2023-11-20 NOTE — PLAN OF CARE
Problem: Discharge Planning  Goal: Discharge to home or other facility with appropriate resources  Outcome: Progressing  Flowsheets (Taken 11/19/2023 0830)  Discharge to home or other facility with appropriate resources: Identify barriers to discharge with patient and caregiver     Problem: Pain  Goal: Verbalizes/displays adequate comfort level or baseline comfort level  Outcome: Progressing     Problem: Skin/Tissue Integrity  Goal: Absence of new skin breakdown  Description: 1. Monitor for areas of redness and/or skin breakdown  2. Assess vascular access sites hourly  3. Every 4-6 hours minimum:  Change oxygen saturation probe site  4. Every 4-6 hours:  If on nasal continuous positive airway pressure, respiratory therapy assess nares and determine need for appliance change or resting period.   Outcome: Progressing     Problem: Safety - Adult  Goal: Free from fall injury  Outcome: Progressing     Problem: Chronic Conditions and Co-morbidities  Goal: Patient's chronic conditions and co-morbidity symptoms are monitored and maintained or improved  Outcome: Progressing  Flowsheets (Taken 11/19/2023 0830)  Care Plan - Patient's Chronic Conditions and Co-Morbidity Symptoms are Monitored and Maintained or Improved: Monitor and assess patient's chronic conditions and comorbid symptoms for stability, deterioration, or improvement

## 2023-11-20 NOTE — CONSULTS
IP WOUND CONSULT    Aaron Grey  MEDICAL RECORD NUMBER:  [id number]  AGE: 79 y.o.    GENDER: female  : 1940  TODAY'S DATE:  2023    GENERAL     [] Follow-up   [x] New Consult    [x] Present on Admission  [] Hospital Acquired    Aaron Grey is a 79 y.o. female referred by:   [x] Physician  [] Nursing  [] Other:         PAST MEDICAL HISTORY    Past Medical History:   Diagnosis Date    Anxiety     Cancer ([address])     Cancer ([address])     vaginal and colon cancer    Chronic kidney disease     Chronic pain     arthritis    Colostomy present ([address])     Diabetes ([address])     Dry eye syndrome     DVT (deep venous thrombosis) (HCC)     Dysphagia     Encephalopathy     Glaucoma     Hypertension     Hypomagnesemia     Hypothyroid     Ileostomy in place [facility])     Legally blind     Thyroid disease         PAST SURGICAL HISTORY    Past Surgical History:   Procedure Laterality Date    ABOVE KNEE AMPUTATION Right     COLOSTOMY      IR NEPHROSTOMY EXCHANGE CATHETER  2021    IR NEPHROSTOMY EXCHANGE CATHETER  2021    IR NEPHROSTOMY EXCHANGE CATHETER  2021    IR NEPHROSTOMY EXCHANGE CATHETER  2021    IR NEPHROSTOMY EXCHANGE CATHETER  2022    IR NEPHROSTOMY EXCHANGE CATHETER  3/21/2022    IR NEPHROSTOMY EXCHANGE CATHETER  2022    IR NEPHROSTOMY EXCHANGE CATHETER  2022    IR NEPHROSTOMY EXCHANGE CATHETER  2022    IR NEPHROSTOMY EXCHANGE CATHETER  2022    IR NEPHROSTOMY EXCHANGE CATHETER  2022    IR NEPHROSTOMY EXCHANGE CATHETER  2022    IR NEPHROSTOMY EXCHANGE CATHETER  2023    IR NEPHROSTOMY EXCHANGE CATHETER  5/10/2021    IR NEPHROSTOMY EXCHANGE CATHETER  2020    IR NEPHROSTOMY EXCHANGE CATHETER  2021    IR NEPHROSTOMY EXCHANGE CATHETER  3/25/2021    IR NEPHROSTOMY EXCHANGE CATHETER  2020    IR NEPHROSTOMY EXCHANGE CATHETER  2020    IR NEPHROSTOMY EXCHANGE CATHETER  8/10/2020    IR NEPHROSTOMY EXCHANGE CATHETER  2020    IR NEPHROSTOMY EXCHANGE

## 2023-11-20 NOTE — PROGRESS NOTES
Pharmacist Review and Automatic Dose Adjustment of Prophylactic Enoxaparin    *Review reason for admission/hospital problem list*    The reviewing pharmacist has made an adjustment to the ordered enoxaparin dose or converted to UFH per the approved [address] [facility] [address] protocol and table as identified below. Natalya Reed is a 79 y.o. female. Recent Labs     11/18/23  0240 11/19/23  0619 11/20/23  1048   CREATININE 2.23* 2.96* 3.42*       Estimated Creatinine Clearance: 12 mL/min (A) (based on SCr of 3.42 mg/dL (H)).     Height:   Ht Readings from Last 1 Encounters:   11/11/23 1.6 m (5' 3\")     Weight:  Wt Readings from Last 1 Encounters:   11/20/23 70 kg (154 lb 5.2 oz)               Plan: Based upon the patient's weight and renal function, the ordered enoxaparin 30 mg SQ daily has been adjusted to heparin 5000 units SQ q8h      Thank you,  Mindi Hodgkin, [facility]

## 2023-11-20 NOTE — WOUND CARE
IP Ostomy Consult    [place] RECORD NUMBER:  [id number]  AGE: 79 y.o.    GENDER: female  : 1940  TODAY'S DATE:  2023    GENERAL     [] Follow-up   [x] New Consult    Pranay Og is a 79 y.o. female referred by:   [x] Physician  [] Nursing  [] Other:         PAST MEDICAL HISTORY    Past Medical History:   Diagnosis Date    Anxiety     Cancer ([address])     Cancer ([address])     vaginal and colon cancer    Chronic kidney disease     Chronic pain     arthritis    Colostomy present ([address])     Diabetes ([address])     Dry eye syndrome     DVT (deep venous thrombosis) (HCC)     Dysphagia     Encephalopathy     Glaucoma     Hypertension     Hypomagnesemia     Hypothyroid     Ileostomy in place [facility])     Legally blind     Thyroid disease         PAST SURGICAL HISTORY    Past Surgical History:   Procedure Laterality Date    ABOVE KNEE AMPUTATION Right     COLOSTOMY      IR NEPHROSTOMY EXCHANGE CATHETER  2021    IR NEPHROSTOMY EXCHANGE CATHETER  2021    IR NEPHROSTOMY EXCHANGE CATHETER  2021    IR NEPHROSTOMY EXCHANGE CATHETER  2021    IR NEPHROSTOMY EXCHANGE CATHETER  2022    IR NEPHROSTOMY EXCHANGE CATHETER  3/21/2022    IR NEPHROSTOMY EXCHANGE CATHETER  2022    IR NEPHROSTOMY EXCHANGE CATHETER  2022    IR NEPHROSTOMY EXCHANGE CATHETER  2022    IR NEPHROSTOMY EXCHANGE CATHETER  2022    IR NEPHROSTOMY EXCHANGE CATHETER  2022    IR NEPHROSTOMY EXCHANGE CATHETER  2022    IR NEPHROSTOMY EXCHANGE CATHETER  2023    IR NEPHROSTOMY EXCHANGE CATHETER  5/10/2021    IR NEPHROSTOMY EXCHANGE CATHETER  2020    IR NEPHROSTOMY EXCHANGE CATHETER  2021    IR NEPHROSTOMY EXCHANGE CATHETER  3/25/2021    IR NEPHROSTOMY EXCHANGE CATHETER  2020    IR NEPHROSTOMY EXCHANGE CATHETER  2020    IR NEPHROSTOMY EXCHANGE CATHETER  8/10/2020    IR NEPHROSTOMY EXCHANGE CATHETER  2020    IR NEPHROSTOMY EXCHANGE CATHETER  2020    IR NEPHROSTOMY EXCHANGE CATHETER

## 2023-11-21 ENCOUNTER — APPOINTMENT (OUTPATIENT)
Facility: HOSPITAL | Age: 83
End: 2023-11-21
Payer: MEDICARE

## 2023-11-21 LAB
GLUCOSE BLD STRIP.AUTO-MCNC: 130 MG/DL (ref 70–110)
GLUCOSE BLD STRIP.AUTO-MCNC: 161 MG/DL (ref 70–110)
GLUCOSE BLD STRIP.AUTO-MCNC: 167 MG/DL (ref 70–110)
GLUCOSE BLD STRIP.AUTO-MCNC: 171 MG/DL (ref 70–110)

## 2023-11-21 PROCEDURE — 82962 GLUCOSE BLOOD TEST: CPT

## 2023-11-21 PROCEDURE — 2580000003 HC RX 258: Performed by: INTERNAL MEDICINE

## 2023-11-21 PROCEDURE — 74176 CT ABD & PELVIS W/O CONTRAST: CPT

## 2023-11-21 PROCEDURE — P9047 ALBUMIN (HUMAN), 25%, 50ML: HCPCS | Performed by: STUDENT IN AN ORGANIZED HEALTH CARE EDUCATION/TRAINING PROGRAM

## 2023-11-21 PROCEDURE — 6370000000 HC RX 637 (ALT 250 FOR IP): Performed by: STUDENT IN AN ORGANIZED HEALTH CARE EDUCATION/TRAINING PROGRAM

## 2023-11-21 PROCEDURE — 6360000002 HC RX W HCPCS: Performed by: STUDENT IN AN ORGANIZED HEALTH CARE EDUCATION/TRAINING PROGRAM

## 2023-11-21 PROCEDURE — 1100000003 HC PRIVATE W/ TELEMETRY

## 2023-11-21 PROCEDURE — 2580000003 HC RX 258: Performed by: STUDENT IN AN ORGANIZED HEALTH CARE EDUCATION/TRAINING PROGRAM

## 2023-11-21 PROCEDURE — 76770 US EXAM ABDO BACK WALL COMP: CPT

## 2023-11-21 PROCEDURE — 6370000000 HC RX 637 (ALT 250 FOR IP): Performed by: INTERNAL MEDICINE

## 2023-11-21 PROCEDURE — 6360000002 HC RX W HCPCS: Performed by: INTERNAL MEDICINE

## 2023-11-21 PROCEDURE — A4216 STERILE WATER/SALINE, 10 ML: HCPCS | Performed by: INTERNAL MEDICINE

## 2023-11-21 PROCEDURE — 6370000000 HC RX 637 (ALT 250 FOR IP): Performed by: HOSPITALIST

## 2023-11-21 PROCEDURE — 2500000003 HC RX 250 WO HCPCS: Performed by: INTERNAL MEDICINE

## 2023-11-21 RX ORDER — ALBUMIN (HUMAN) 12.5 G/50ML
25 SOLUTION INTRAVENOUS EVERY 6 HOURS
Status: COMPLETED | OUTPATIENT
Start: 2023-11-21 | End: 2023-11-23

## 2023-11-21 RX ADMIN — SODIUM BICARBONATE 650 MG TABLET 1300 MG: at 17:28

## 2023-11-21 RX ADMIN — ALBUMIN (HUMAN) 25 G: 0.25 INJECTION, SOLUTION INTRAVENOUS at 17:31

## 2023-11-21 RX ADMIN — ACETAMINOPHEN 650 MG: 325 TABLET ORAL at 19:34

## 2023-11-21 RX ADMIN — SODIUM BICARBONATE 650 MG TABLET 1300 MG: at 09:26

## 2023-11-21 RX ADMIN — INSULIN GLARGINE 5 UNITS: 100 INJECTION, SOLUTION SUBCUTANEOUS at 09:30

## 2023-11-21 RX ADMIN — GABAPENTIN 300 MG: 300 CAPSULE ORAL at 09:27

## 2023-11-21 RX ADMIN — GABAPENTIN 300 MG: 300 CAPSULE ORAL at 19:34

## 2023-11-21 RX ADMIN — FAMOTIDINE 20 MG: 10 INJECTION, SOLUTION INTRAVENOUS at 09:27

## 2023-11-21 RX ADMIN — Medication 400 MG: at 09:27

## 2023-11-21 RX ADMIN — SODIUM CHLORIDE, PRESERVATIVE FREE 10 ML: 5 INJECTION INTRAVENOUS at 09:27

## 2023-11-21 RX ADMIN — ATORVASTATIN CALCIUM 20 MG: 20 TABLET, FILM COATED ORAL at 19:34

## 2023-11-21 RX ADMIN — HEPARIN SODIUM 5000 UNITS: 5000 INJECTION INTRAVENOUS; SUBCUTANEOUS at 17:28

## 2023-11-21 RX ADMIN — ALBUMIN (HUMAN) 25 G: 0.25 INJECTION, SOLUTION INTRAVENOUS at 12:44

## 2023-11-21 RX ADMIN — SODIUM CHLORIDE, POTASSIUM CHLORIDE, SODIUM LACTATE AND CALCIUM CHLORIDE: 600; 310; 30; 20 INJECTION, SOLUTION INTRAVENOUS at 15:27

## 2023-11-21 RX ADMIN — Medication 5 MG: at 19:35

## 2023-11-21 RX ADMIN — HEPARIN SODIUM 5000 UNITS: 5000 INJECTION INTRAVENOUS; SUBCUTANEOUS at 09:27

## 2023-11-21 RX ADMIN — SODIUM BICARBONATE 650 MG TABLET 1300 MG: at 19:34

## 2023-11-21 RX ADMIN — MICONAZOLE NITRATE: 20 CREAM TOPICAL at 09:53

## 2023-11-21 RX ADMIN — SODIUM CHLORIDE, POTASSIUM CHLORIDE, SODIUM LACTATE AND CALCIUM CHLORIDE: 600; 310; 30; 20 INJECTION, SOLUTION INTRAVENOUS at 02:55

## 2023-11-21 RX ADMIN — SODIUM BICARBONATE 650 MG TABLET 1300 MG: at 12:44

## 2023-11-21 ASSESSMENT — PAIN DESCRIPTION - LOCATION: LOCATION: ARM

## 2023-11-21 ASSESSMENT — PAIN SCALES - GENERAL: PAINLEVEL_OUTOF10: 5

## 2023-11-21 ASSESSMENT — PAIN DESCRIPTION - ORIENTATION: ORIENTATION: LEFT

## 2023-11-21 NOTE — PROGRESS NOTES
Comprehensive Nutrition Assessment      Type and Reason for Visit:  Initial, Wound    Nutrition Recommendations/Plan:   Diet as ordered  Obtain new wt when feasible  Add Bryant (each provides 80 kcals, 2.5 g collagen protein, 300 mg vitamin C, 9.5 mg zinc)  Twice daily to promote wound healing  Continue to monitor tolerance of PO, compliance of oral supplements, weight, labs, and plan of care during admission. Attempt NFPE in next visit      Malnutrition Assessment:  Malnutrition Status: At risk for malnutrition (Comment) (acute on chronic KD stage 3) (11/21/23 1527)    Context:  Acute Illness     Findings of the 6 clinical characteristics of malnutrition:  Energy Intake:  No significant decrease in energy intake  Weight Loss:  Unable to assess     Body Fat Loss:  Unable to assess     Muscle Mass Loss:  Unable to assess    Fluid Accumulation:  Mild (+1 pitting) Extremities, Generalized   Strength:   MIRIAN    Nutrition Assessment:    +7.7% * 10 days  Pete Terry is a 79 y.o. female  admitted for SBO. Hx of Anxiety, Cancer, Chronic kidney disease, Chronic pain, Colostomy present, Diabetes, blindness, DVT, Dysphagia, Encephalopathy, Hypertension, Hypomagnesemia, Hypothyroid, Ileostomy in place, and Thyroid disease. Per visit pt is not in room. . Significant wt change noticed since admission : +7.7 % * 10 days. Question current wt accuracy. Current on Dysphagia - Soft and Bite Sized diet -  po intake %.    Last BM (including prior to admit): 11/20/23  Edema: Generalized  Edema Generalized: +1, Pitting           11/21/2023    12:14 AM 11/20/2023     6:45 AM 11/19/2023     2:52 AM 11/18/2023    12:28 AM 11/17/2023     3:53 AM 11/15/2023     7:51 PM 11/14/2023     4:00 AM   Weight Metrics   Weight 161 lb 2.5 oz 154 lb 5.2 oz 153 lb 7 oz 158 lb 4.6 oz 152 lb 12.5 oz 156 lb 1.4 oz 158 lb 11.7 oz   BMI (Calculated) 28.6 kg/m2 27.4 kg/m2 27.2 kg/m2 28.1 kg/m2 27.1 kg/m2 27.7 kg/m2 28.2 kg/m2       Nutrition

## 2023-11-21 NOTE — PROGRESS NOTES
no Results   Component Value Date/Time    TSH 3.40 01/24/2020 08:35 AM        Procedures/imaging: see electronic medical records for all procedures/Xrays and details which were not copied into this note but were reviewed prior to creation of Plan    XR ABDOMEN (KUB) (SINGLE AP VIEW)    Result Date: 11/12/2023  EXAM:  XR ABDOMEN (KUB) (SINGLE AP VIEW) INDICATION: Small bowel obstruction COMPARISON: 11/11/2023. TECHNIQUE: Supine frontal abdomen (KUB). FINDINGS: There is an NG tube in the stomach. Small bowel dilatation in the left lower quadrant is unchanged. Stool and gas are present in the colon. There is a percutaneous nephrostomy on the right. 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 NG tube in the stomach. Persistent small bowel dilatation in the left lower quadrant, similar to prior study. XR ABDOMEN (KUB) (SINGLE AP VIEW)    Result Date: 11/11/2023  EXAM:  XR ABDOMEN (KUB) (SINGLE AP VIEW) INDICATION: NGT placement. COMPARISON: CT 11/11/2023. Gillis Corrente TECHNIQUE: Supine frontal abdomen (KUB). FINDINGS: Enteric tube traverses expected course to below the diaphragm into the left upper quadrant. Right-sided percutaneous nephrostomy catheter is in position. No dilated small bowel. Stool and gas are present in the colon. No pathologic calcification. Osseous structures diffuse osteopenia and degenerative spine changes but otherwise are age-appropriate. Atherosclerotic calcification's are noted with vascular occlusion coils seen in the right lower quadrant. Post surgical clips are seen within the abdomen. Enteric tube in expected position. Stable chronic changes as above. CT ABDOMEN PELVIS WO CONTRAST Additional Contrast? None    Result Date: 11/11/2023  EXAM: CT ABDOMEN PELVIS WO CONTRAST INDICATION: abdominal pain. GFR 31 COMPARISON: 4/28/2022 IV CONTRAST: None. ORAL CONTRAST: 0 TECHNIQUE: Thin axial images were obtained through the abdomen and pelvis. Coronal and sagittal reformats were generated.  CT dose reduction was achieved through use of a standardized protocol tailored for this examination and automatic exposure control for dose modulation. The absence of intravenous contrast material reduces the sensitivity for evaluation of the vasculature and solid organs. FINDINGS: LOWER THORAX: Coronary artery calcification. Peribronchial cuffing. LIVER: No mass. BILIARY TREE: Cholecystectomy. CBD is not dilated. SPLEEN: within normal limits. PANCREAS: No focal abnormality. Pancreatic atrophy. ADRENALS: Unremarkable. KIDNEYS/URETERS: No calculus or hydronephrosis. Right renal percutaneous nephrostomy. Prior left nephrectomy. STOMACH: Distended with fluid. SMALL BOWEL: . Distended proximal small bowel with air-fluid levels to the level of the pelvis. PERITONEUM: No ascites or pneumoperitoneum. RETROPERITONEUM: No lymphadenopathy or aortic aneurysm. Extensive vascular calcification. REPRODUCTIVE ORGANS: Prior hysterectomy URINARY BLADDER: Previously resected with ileoconduit BONES: No destructive bone lesion. ABDOMINAL WALL: No mass or hernia. ADDITIONAL COMMENTS: Soft tissue thickening associated with the coccyx. No hydronephrosis with right percutaneous nephrostomy tube in place. Small bowel obstruction with adhesion  in the left hemipelvis. IR GUIDED NEPHROSTOMY CATH EXCHANGE    Result Date: 11/1/2023  PROCEDURE:  FLUOROSCOPIC GUIDED NEPHROSTOMY TUBE EXCHANGE HISTORY: Kimberly Jose is a 79year old Female with Other hydronephrosis. :  Braulio Habermann, NP ATTENDING:  Radha Wells MD CONSENT:  After full discussion of the procedure, including risks, benefits and alternatives, both verbal and written consent were obtained. TECHNIQUE: A timeout was called to verify the correct patient, procedure, site and allergies. Patient declined 1% lidocaine for local anesthesia. The patient was placed prone on the fluoroscopy table. Initial  images were obtained. The skin was prepped and draped in sterile fashion.   A

## 2023-11-21 NOTE — PROGRESS NOTES
Patient not stable for DC back to [facility] as of yet. Patient continuing to have worsening renal functioning. Palliative care following as well regarding goals of care. SW to follow.

## 2023-11-21 NOTE — PROGRESS NOTES
conducted an initial consultation and spiritual assessment for Cabrera Willis, who is a 79 y.o.,female. Initiated a relationship of care and support. Listened empathically. Provided information about Spiritual Care Services. Patient processed feelings about current hospitalization. Offered prayer and assurance of continued prayers on patients behalf. Chart reviewed. Patient expressed gratitude for Spiritual Care visit. Chaplains will continue to follow and will provide pastoral care as needed or requested.  recommends bedside caregivers page  on duty if patient shows signs of acute spiritual or emotional distress. [facility] [id number], [address] [facility] M.Div.   [facility] [place]  [phone] - Office

## 2023-11-22 LAB
ALBUMIN SERPL-MCNC: 3.1 G/DL (ref 3.4–5)
ALBUMIN/GLOB SERPL: 1.3 (ref 0.8–1.7)
ALP SERPL-CCNC: 47 U/L (ref 45–117)
ALT SERPL-CCNC: 12 U/L (ref 13–56)
ANION GAP SERPL CALC-SCNC: 9 MMOL/L (ref 3–18)
AST SERPL-CCNC: 10 U/L (ref 10–38)
BILIRUB SERPL-MCNC: 0.4 MG/DL (ref 0.2–1)
BUN SERPL-MCNC: 29 MG/DL (ref 7–18)
BUN/CREAT SERPL: 10 (ref 12–20)
CALCIUM SERPL-MCNC: 7 MG/DL (ref 8.5–10.1)
CHLORIDE SERPL-SCNC: 110 MMOL/L (ref 100–111)
CO2 SERPL-SCNC: 20 MMOL/L (ref 21–32)
CREAT SERPL-MCNC: 2.79 MG/DL (ref 0.6–1.3)
GLOBULIN SER CALC-MCNC: 2.4 G/DL (ref 2–4)
GLUCOSE BLD STRIP.AUTO-MCNC: 137 MG/DL (ref 70–110)
GLUCOSE BLD STRIP.AUTO-MCNC: 142 MG/DL (ref 70–110)
GLUCOSE BLD STRIP.AUTO-MCNC: 183 MG/DL (ref 70–110)
GLUCOSE SERPL-MCNC: 112 MG/DL (ref 74–99)
POTASSIUM SERPL-SCNC: 4.1 MMOL/L (ref 3.5–5.5)
PROT SERPL-MCNC: 5.5 G/DL (ref 6.4–8.2)
SODIUM SERPL-SCNC: 139 MMOL/L (ref 136–145)

## 2023-11-22 PROCEDURE — 2580000003 HC RX 258: Performed by: STUDENT IN AN ORGANIZED HEALTH CARE EDUCATION/TRAINING PROGRAM

## 2023-11-22 PROCEDURE — 80053 COMPREHEN METABOLIC PANEL: CPT

## 2023-11-22 PROCEDURE — 2580000003 HC RX 258: Performed by: INTERNAL MEDICINE

## 2023-11-22 PROCEDURE — 6360000002 HC RX W HCPCS: Performed by: STUDENT IN AN ORGANIZED HEALTH CARE EDUCATION/TRAINING PROGRAM

## 2023-11-22 PROCEDURE — 6370000000 HC RX 637 (ALT 250 FOR IP): Performed by: INTERNAL MEDICINE

## 2023-11-22 PROCEDURE — 6370000000 HC RX 637 (ALT 250 FOR IP): Performed by: HOSPITALIST

## 2023-11-22 PROCEDURE — 6360000002 HC RX W HCPCS: Performed by: INTERNAL MEDICINE

## 2023-11-22 PROCEDURE — A4216 STERILE WATER/SALINE, 10 ML: HCPCS | Performed by: INTERNAL MEDICINE

## 2023-11-22 PROCEDURE — 36415 COLL VENOUS BLD VENIPUNCTURE: CPT

## 2023-11-22 PROCEDURE — 6370000000 HC RX 637 (ALT 250 FOR IP): Performed by: STUDENT IN AN ORGANIZED HEALTH CARE EDUCATION/TRAINING PROGRAM

## 2023-11-22 PROCEDURE — 2500000003 HC RX 250 WO HCPCS: Performed by: INTERNAL MEDICINE

## 2023-11-22 PROCEDURE — 1100000003 HC PRIVATE W/ TELEMETRY

## 2023-11-22 PROCEDURE — P9047 ALBUMIN (HUMAN), 25%, 50ML: HCPCS | Performed by: STUDENT IN AN ORGANIZED HEALTH CARE EDUCATION/TRAINING PROGRAM

## 2023-11-22 PROCEDURE — 82962 GLUCOSE BLOOD TEST: CPT

## 2023-11-22 RX ADMIN — ALBUMIN (HUMAN) 25 G: 0.25 INJECTION, SOLUTION INTRAVENOUS at 00:45

## 2023-11-22 RX ADMIN — SODIUM BICARBONATE 650 MG TABLET 1300 MG: at 22:46

## 2023-11-22 RX ADMIN — GABAPENTIN 300 MG: 300 CAPSULE ORAL at 11:33

## 2023-11-22 RX ADMIN — ALBUMIN (HUMAN) 25 G: 0.25 INJECTION, SOLUTION INTRAVENOUS at 14:38

## 2023-11-22 RX ADMIN — HEPARIN SODIUM 5000 UNITS: 5000 INJECTION INTRAVENOUS; SUBCUTANEOUS at 11:50

## 2023-11-22 RX ADMIN — Medication 400 MG: at 11:33

## 2023-11-22 RX ADMIN — ACETAMINOPHEN 650 MG: 325 TABLET ORAL at 18:25

## 2023-11-22 RX ADMIN — SODIUM CHLORIDE, POTASSIUM CHLORIDE, SODIUM LACTATE AND CALCIUM CHLORIDE: 600; 310; 30; 20 INJECTION, SOLUTION INTRAVENOUS at 22:51

## 2023-11-22 RX ADMIN — SODIUM CHLORIDE, POTASSIUM CHLORIDE, SODIUM LACTATE AND CALCIUM CHLORIDE: 600; 310; 30; 20 INJECTION, SOLUTION INTRAVENOUS at 14:38

## 2023-11-22 RX ADMIN — SODIUM BICARBONATE 650 MG TABLET 1300 MG: at 18:26

## 2023-11-22 RX ADMIN — HEPARIN SODIUM 5000 UNITS: 5000 INJECTION INTRAVENOUS; SUBCUTANEOUS at 19:00

## 2023-11-22 RX ADMIN — INSULIN GLARGINE 5 UNITS: 100 INJECTION, SOLUTION SUBCUTANEOUS at 11:58

## 2023-11-22 RX ADMIN — ALBUMIN (HUMAN) 25 G: 0.25 INJECTION, SOLUTION INTRAVENOUS at 05:52

## 2023-11-22 RX ADMIN — ATORVASTATIN CALCIUM 20 MG: 20 TABLET, FILM COATED ORAL at 22:46

## 2023-11-22 RX ADMIN — HEPARIN SODIUM 5000 UNITS: 5000 INJECTION INTRAVENOUS; SUBCUTANEOUS at 00:49

## 2023-11-22 RX ADMIN — SODIUM CHLORIDE, PRESERVATIVE FREE 10 ML: 5 INJECTION INTRAVENOUS at 22:49

## 2023-11-22 RX ADMIN — SODIUM CHLORIDE, PRESERVATIVE FREE 10 ML: 5 INJECTION INTRAVENOUS at 07:01

## 2023-11-22 RX ADMIN — MICONAZOLE NITRATE: 20 CREAM TOPICAL at 14:40

## 2023-11-22 RX ADMIN — MICONAZOLE NITRATE: 20 CREAM TOPICAL at 22:49

## 2023-11-22 RX ADMIN — SODIUM BICARBONATE 650 MG TABLET 1300 MG: at 11:58

## 2023-11-22 RX ADMIN — FAMOTIDINE 20 MG: 10 INJECTION, SOLUTION INTRAVENOUS at 11:33

## 2023-11-22 RX ADMIN — ALBUMIN (HUMAN) 25 G: 0.25 INJECTION, SOLUTION INTRAVENOUS at 18:16

## 2023-11-22 RX ADMIN — Medication 5 MG: at 22:46

## 2023-11-22 RX ADMIN — LEVOTHYROXINE SODIUM 50 MCG: 0.05 TABLET ORAL at 05:53

## 2023-11-22 RX ADMIN — GABAPENTIN 300 MG: 300 CAPSULE ORAL at 22:46

## 2023-11-22 ASSESSMENT — PAIN DESCRIPTION - LOCATION: LOCATION: LEG

## 2023-11-22 ASSESSMENT — PAIN DESCRIPTION - DESCRIPTORS: DESCRIPTORS: SHOOTING;SHARP

## 2023-11-22 ASSESSMENT — PAIN DESCRIPTION - ORIENTATION: ORIENTATION: RIGHT

## 2023-11-22 ASSESSMENT — PAIN SCALES - GENERAL: PAINLEVEL_OUTOF10: 9

## 2023-11-22 NOTE — PROGRESS NOTES
0000: After taking 0000 VS patient stated \"why did you want me up for this?  Please dont wake me up at later for vitals\"

## 2023-11-22 NOTE — PROGRESS NOTES
pelvis. Coronal and sagittal reformats were generated. CT dose reduction was achieved through use of a standardized protocol tailored for this examination and automatic exposure control for dose modulation. The absence of intravenous contrast material reduces the sensitivity for evaluation of the vasculature and solid organs. FINDINGS: LOWER THORAX: Coronary artery calcification. Peribronchial cuffing. LIVER: No mass. BILIARY TREE: Cholecystectomy. CBD is not dilated. SPLEEN: within normal limits. PANCREAS: No focal abnormality. Pancreatic atrophy. ADRENALS: Unremarkable. KIDNEYS/URETERS: No calculus or hydronephrosis. Right renal percutaneous nephrostomy. Prior left nephrectomy. STOMACH: Distended with fluid. SMALL BOWEL: . Distended proximal small bowel with air-fluid levels to the level of the pelvis. PERITONEUM: No ascites or pneumoperitoneum. RETROPERITONEUM: No lymphadenopathy or aortic aneurysm. Extensive vascular calcification. REPRODUCTIVE ORGANS: Prior hysterectomy URINARY BLADDER: Previously resected with ileoconduit BONES: No destructive bone lesion. ABDOMINAL WALL: No mass or hernia. ADDITIONAL COMMENTS: Soft tissue thickening associated with the coccyx. No hydronephrosis with right percutaneous nephrostomy tube in place. Small bowel obstruction with adhesion  in the left hemipelvis. IR GUIDED NEPHROSTOMY CATH EXCHANGE    Result Date: 11/1/2023  PROCEDURE:  FLUOROSCOPIC GUIDED NEPHROSTOMY TUBE EXCHANGE HISTORY: Jordan Vinson is a 79year old Female with Other hydronephrosis. :  Lacey Patton NP ATTENDING:  Qian Nguyen MD CONSENT:  After full discussion of the procedure, including risks, benefits and alternatives, both verbal and written consent were obtained. TECHNIQUE: A timeout was called to verify the correct patient, procedure, site and allergies. Patient declined 1% lidocaine for local anesthesia. The patient was placed prone on the fluoroscopy table.   Initial  images were

## 2023-11-23 LAB
ALBUMIN SERPL-MCNC: 4.8 G/DL (ref 3.4–5)
ANION GAP SERPL CALC-SCNC: 9 MMOL/L (ref 3–18)
BUN SERPL-MCNC: 25 MG/DL (ref 7–18)
BUN/CREAT SERPL: 9 (ref 12–20)
CALCIUM SERPL-MCNC: 7.4 MG/DL (ref 8.5–10.1)
CHLORIDE SERPL-SCNC: 107 MMOL/L (ref 100–111)
CO2 SERPL-SCNC: 24 MMOL/L (ref 21–32)
CREAT SERPL-MCNC: 2.72 MG/DL (ref 0.6–1.3)
GLUCOSE BLD STRIP.AUTO-MCNC: 140 MG/DL (ref 70–110)
GLUCOSE BLD STRIP.AUTO-MCNC: 151 MG/DL (ref 70–110)
GLUCOSE BLD STRIP.AUTO-MCNC: 165 MG/DL (ref 70–110)
GLUCOSE BLD STRIP.AUTO-MCNC: 178 MG/DL (ref 70–110)
GLUCOSE SERPL-MCNC: 137 MG/DL (ref 74–99)
PHOSPHATE SERPL-MCNC: 3.2 MG/DL (ref 2.5–4.9)
POTASSIUM SERPL-SCNC: 4.1 MMOL/L (ref 3.5–5.5)
SODIUM SERPL-SCNC: 140 MMOL/L (ref 136–145)

## 2023-11-23 PROCEDURE — 2580000003 HC RX 258: Performed by: INTERNAL MEDICINE

## 2023-11-23 PROCEDURE — 36415 COLL VENOUS BLD VENIPUNCTURE: CPT

## 2023-11-23 PROCEDURE — 1100000003 HC PRIVATE W/ TELEMETRY

## 2023-11-23 PROCEDURE — P9047 ALBUMIN (HUMAN), 25%, 50ML: HCPCS | Performed by: STUDENT IN AN ORGANIZED HEALTH CARE EDUCATION/TRAINING PROGRAM

## 2023-11-23 PROCEDURE — 82962 GLUCOSE BLOOD TEST: CPT

## 2023-11-23 PROCEDURE — 6360000002 HC RX W HCPCS: Performed by: STUDENT IN AN ORGANIZED HEALTH CARE EDUCATION/TRAINING PROGRAM

## 2023-11-23 PROCEDURE — 6370000000 HC RX 637 (ALT 250 FOR IP): Performed by: INTERNAL MEDICINE

## 2023-11-23 PROCEDURE — 2580000003 HC RX 258: Performed by: STUDENT IN AN ORGANIZED HEALTH CARE EDUCATION/TRAINING PROGRAM

## 2023-11-23 PROCEDURE — 2500000003 HC RX 250 WO HCPCS: Performed by: INTERNAL MEDICINE

## 2023-11-23 PROCEDURE — A4216 STERILE WATER/SALINE, 10 ML: HCPCS | Performed by: INTERNAL MEDICINE

## 2023-11-23 PROCEDURE — 80069 RENAL FUNCTION PANEL: CPT

## 2023-11-23 PROCEDURE — 6370000000 HC RX 637 (ALT 250 FOR IP): Performed by: STUDENT IN AN ORGANIZED HEALTH CARE EDUCATION/TRAINING PROGRAM

## 2023-11-23 PROCEDURE — 6370000000 HC RX 637 (ALT 250 FOR IP): Performed by: HOSPITALIST

## 2023-11-23 PROCEDURE — 6360000002 HC RX W HCPCS: Performed by: INTERNAL MEDICINE

## 2023-11-23 RX ADMIN — METOPROLOL SUCCINATE 50 MG: 50 TABLET, EXTENDED RELEASE ORAL at 11:21

## 2023-11-23 RX ADMIN — ALUMINUM HYDROXIDE, MAGNESIUM HYDROXIDE, AND SIMETHICONE 40 ML: 1200; 120; 1200 SUSPENSION ORAL at 11:32

## 2023-11-23 RX ADMIN — Medication 400 MG: at 11:17

## 2023-11-23 RX ADMIN — SODIUM BICARBONATE 650 MG TABLET 1300 MG: at 11:17

## 2023-11-23 RX ADMIN — HEPARIN SODIUM 5000 UNITS: 5000 INJECTION INTRAVENOUS; SUBCUTANEOUS at 02:17

## 2023-11-23 RX ADMIN — SODIUM BICARBONATE 650 MG TABLET 1300 MG: at 20:55

## 2023-11-23 RX ADMIN — INSULIN GLARGINE 5 UNITS: 100 INJECTION, SOLUTION SUBCUTANEOUS at 11:18

## 2023-11-23 RX ADMIN — SODIUM CHLORIDE, POTASSIUM CHLORIDE, SODIUM LACTATE AND CALCIUM CHLORIDE: 600; 310; 30; 20 INJECTION, SOLUTION INTRAVENOUS at 21:59

## 2023-11-23 RX ADMIN — ALBUMIN (HUMAN) 25 G: 0.25 INJECTION, SOLUTION INTRAVENOUS at 00:09

## 2023-11-23 RX ADMIN — SODIUM BICARBONATE 650 MG TABLET 1300 MG: at 13:57

## 2023-11-23 RX ADMIN — FAMOTIDINE 20 MG: 10 INJECTION, SOLUTION INTRAVENOUS at 11:31

## 2023-11-23 RX ADMIN — SODIUM CHLORIDE, PRESERVATIVE FREE 10 ML: 5 INJECTION INTRAVENOUS at 11:33

## 2023-11-23 RX ADMIN — ALBUMIN (HUMAN) 25 G: 0.25 INJECTION, SOLUTION INTRAVENOUS at 06:17

## 2023-11-23 RX ADMIN — HEPARIN SODIUM 5000 UNITS: 5000 INJECTION INTRAVENOUS; SUBCUTANEOUS at 11:16

## 2023-11-23 RX ADMIN — ATORVASTATIN CALCIUM 20 MG: 20 TABLET, FILM COATED ORAL at 20:55

## 2023-11-23 RX ADMIN — MICONAZOLE NITRATE: 20 CREAM TOPICAL at 21:06

## 2023-11-23 RX ADMIN — SODIUM BICARBONATE 650 MG TABLET 1300 MG: at 18:26

## 2023-11-23 RX ADMIN — MICONAZOLE NITRATE: 20 CREAM TOPICAL at 11:33

## 2023-11-23 RX ADMIN — Medication 5 MG: at 20:55

## 2023-11-23 RX ADMIN — SODIUM CHLORIDE, POTASSIUM CHLORIDE, SODIUM LACTATE AND CALCIUM CHLORIDE: 600; 310; 30; 20 INJECTION, SOLUTION INTRAVENOUS at 14:08

## 2023-11-23 RX ADMIN — GABAPENTIN 300 MG: 300 CAPSULE ORAL at 11:18

## 2023-11-23 RX ADMIN — HEPARIN SODIUM 5000 UNITS: 5000 INJECTION INTRAVENOUS; SUBCUTANEOUS at 18:26

## 2023-11-23 RX ADMIN — LEVOTHYROXINE SODIUM 50 MCG: 0.05 TABLET ORAL at 06:17

## 2023-11-23 RX ADMIN — GABAPENTIN 300 MG: 300 CAPSULE ORAL at 20:55

## 2023-11-23 NOTE — PLAN OF CARE
Problem: Discharge Planning  Goal: Discharge to home or other facility with appropriate resources  Outcome: Progressing  Flowsheets (Taken 11/22/2023 2007)  Discharge to home or other facility with appropriate resources:   Identify barriers to discharge with patient and caregiver   Arrange for needed discharge resources and transportation as appropriate   Identify discharge learning needs (meds, wound care, etc)     Problem: Pain  Goal: Verbalizes/displays adequate comfort level or baseline comfort level  11/23/2023 0318 by Esme Beltran RN  Outcome: Progressing  Flowsheets (Taken 11/22/2023 2007)  Verbalizes/displays adequate comfort level or baseline comfort level:   Encourage patient to monitor pain and request assistance   Assess pain using appropriate pain scale   Administer analgesics based on type and severity of pain and evaluate response     Problem: Safety - Adult  Goal: Free from fall injury  11/23/2023 0318 by Esme Beltran RN  Outcome: Progressing  Flowsheets (Taken 11/22/2023 2007)  Free From Fall Injury: Instruct family/caregiver on patient safety     Problem: Chronic Conditions and Co-morbidities  Goal: Patient's chronic conditions and co-morbidity symptoms are monitored and maintained or improved  11/23/2023 0318 by Esme Beltran RN  Outcome: Progressing  Flowsheets (Taken 11/22/2023 2007)  Care Plan - Patient's Chronic Conditions and Co-Morbidity Symptoms are Monitored and Maintained or Improved:   Monitor and assess patient's chronic conditions and comorbid symptoms for stability, deterioration, or improvement   Collaborate with multidisciplinary team to address chronic and comorbid conditions and prevent exacerbation or deterioration   Update acute care plan with appropriate goals if chronic or comorbid symptoms are exacerbated and prevent overall improvement and discharge     Problem: Infection - Adult  Goal: Absence of infection at discharge  11/23/2023 0318 by Florencio Owen

## 2023-11-23 NOTE — PROGRESS NOTES
Hospitalist Progress Note-critical care note     Patient: Pranay Og MRN: [id number]  CSN: [id number]    YOB: 1940  Age: 79 y.o. Sex: female    DOA: 11/11/2023 LOS:  LOS: 12 days            Chief complaint: sbo nephrostomy tube in, denny on ckd3     Assessment/Plan         Active Hospital Problems    Diagnosis Date Noted    Acute renal failure superimposed on stage 3 chronic kidney disease ([address]) [N17.9, N18.30] 11/16/4432    Metabolic acidosis [T90.90] 11/16/2023    Positive blood culture [R78.81] 11/13/2023    Leukocytosis [D72.829] 11/13/2023    SBO (small bowel obstruction) ([address]) [K56.609] 11/11/2023    S/P AKA (above knee amputation), right ([address]) [Z89.611] 06/03/2019    Nephrostomy status ([address]) [Z93.6] 06/03/2019    S/P colectomy [Z90.49] 11/26/2018    Legally blind [H54.8] 11/26/2018    Diabetes ([address]) [E11.9] 11/26/2018    Hypertension [I10] 11/26/2018    UTI (urinary tract infection) [N39.0] 11/26/2018       79 y.o. 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 SBO with  Extensive/complex abd surgical hx --   mostly due to adhesions. resolved now   Seen by general surgery  Advanced diet to soft     Hyperkalemia  --   resolved      DENNY with ckd3 -improving and stable  -ct abdomen done today, no obstruction   Metabolic acidosis  on bicarb tab-bicarb gtt off now -on po bicarb albumin in fluid    Renal consult -on fluid -not receiving loss iv acces-discussed with RN      UTI -- continue  cipro. -completed   UTI with nephrostomy tube  Urine cultures growing morganella morgani     Positive blood cx   Coag neg staph, contamination per ID      DM-II -   continue current dose lantus.  Accuchecks and ssi     Debility   Palliative care for code status and care goal -dnr /I now       Subjective  my stomach hurt , no n/v  Will give gi cocktail

## 2023-11-24 VITALS
HEART RATE: 89 BPM | DIASTOLIC BLOOD PRESSURE: 45 MMHG | TEMPERATURE: 98.3 F | WEIGHT: 169.31 LBS | RESPIRATION RATE: 16 BRPM | BODY MASS INDEX: 30 KG/M2 | SYSTOLIC BLOOD PRESSURE: 143 MMHG | HEIGHT: 63 IN | OXYGEN SATURATION: 99 %

## 2023-11-24 LAB
ANION GAP SERPL CALC-SCNC: 9 MMOL/L (ref 3–18)
BUN SERPL-MCNC: 23 MG/DL (ref 7–18)
BUN/CREAT SERPL: 8 (ref 12–20)
CALCIUM SERPL-MCNC: 7.5 MG/DL (ref 8.5–10.1)
CHLORIDE SERPL-SCNC: 106 MMOL/L (ref 100–111)
CO2 SERPL-SCNC: 26 MMOL/L (ref 21–32)
CREAT SERPL-MCNC: 2.76 MG/DL (ref 0.6–1.3)
GLUCOSE BLD STRIP.AUTO-MCNC: 132 MG/DL (ref 70–110)
GLUCOSE BLD STRIP.AUTO-MCNC: 153 MG/DL (ref 70–110)
GLUCOSE BLD STRIP.AUTO-MCNC: 183 MG/DL (ref 70–110)
GLUCOSE SERPL-MCNC: 148 MG/DL (ref 74–99)
POTASSIUM SERPL-SCNC: 4.5 MMOL/L (ref 3.5–5.5)
SODIUM SERPL-SCNC: 141 MMOL/L (ref 136–145)

## 2023-11-24 PROCEDURE — 6360000002 HC RX W HCPCS: Performed by: INTERNAL MEDICINE

## 2023-11-24 PROCEDURE — 6370000000 HC RX 637 (ALT 250 FOR IP): Performed by: INTERNAL MEDICINE

## 2023-11-24 PROCEDURE — 6370000000 HC RX 637 (ALT 250 FOR IP): Performed by: STUDENT IN AN ORGANIZED HEALTH CARE EDUCATION/TRAINING PROGRAM

## 2023-11-24 PROCEDURE — 2580000003 HC RX 258: Performed by: INTERNAL MEDICINE

## 2023-11-24 PROCEDURE — 82962 GLUCOSE BLOOD TEST: CPT

## 2023-11-24 PROCEDURE — A4216 STERILE WATER/SALINE, 10 ML: HCPCS | Performed by: INTERNAL MEDICINE

## 2023-11-24 PROCEDURE — 6370000000 HC RX 637 (ALT 250 FOR IP): Performed by: HOSPITALIST

## 2023-11-24 PROCEDURE — 36415 COLL VENOUS BLD VENIPUNCTURE: CPT

## 2023-11-24 PROCEDURE — 2500000003 HC RX 250 WO HCPCS: Performed by: INTERNAL MEDICINE

## 2023-11-24 PROCEDURE — 80048 BASIC METABOLIC PNL TOTAL CA: CPT

## 2023-11-24 RX ORDER — SODIUM BICARBONATE 650 MG/1
1300 TABLET ORAL 2 TIMES DAILY
Status: DISCONTINUED | OUTPATIENT
Start: 2023-11-24 | End: 2023-11-24 | Stop reason: HOSPADM

## 2023-11-24 RX ORDER — SODIUM BICARBONATE 650 MG/1
1300 TABLET ORAL 2 TIMES DAILY
Qty: 14 TABLET | Refills: 0 | Status: SHIPPED | OUTPATIENT
Start: 2023-11-24

## 2023-11-24 RX ORDER — MINERAL OIL AND WHITE PETROLATUM 150; 830 MG/G; MG/G
OINTMENT OPHTHALMIC PRN
Qty: 3.5 G | Refills: 0
Start: 2023-11-24

## 2023-11-24 RX ORDER — GABAPENTIN 300 MG/1
300 CAPSULE ORAL 2 TIMES DAILY
Qty: 2 CAPSULE | Refills: 0 | Status: SHIPPED | OUTPATIENT
Start: 2023-11-24 | End: 2023-11-25

## 2023-11-24 RX ADMIN — MICONAZOLE NITRATE: 20 CREAM TOPICAL at 08:36

## 2023-11-24 RX ADMIN — Medication 400 MG: at 08:36

## 2023-11-24 RX ADMIN — LEVOTHYROXINE SODIUM 50 MCG: 0.05 TABLET ORAL at 06:24

## 2023-11-24 RX ADMIN — GABAPENTIN 300 MG: 300 CAPSULE ORAL at 08:36

## 2023-11-24 RX ADMIN — FAMOTIDINE 20 MG: 10 INJECTION, SOLUTION INTRAVENOUS at 08:36

## 2023-11-24 RX ADMIN — HEPARIN SODIUM 5000 UNITS: 5000 INJECTION INTRAVENOUS; SUBCUTANEOUS at 02:20

## 2023-11-24 RX ADMIN — SODIUM CHLORIDE, PRESERVATIVE FREE 10 ML: 5 INJECTION INTRAVENOUS at 08:40

## 2023-11-24 RX ADMIN — SODIUM BICARBONATE 650 MG TABLET 1300 MG: at 08:36

## 2023-11-24 RX ADMIN — INSULIN GLARGINE 5 UNITS: 100 INJECTION, SOLUTION SUBCUTANEOUS at 08:36

## 2023-11-24 RX ADMIN — HEPARIN SODIUM 5000 UNITS: 5000 INJECTION INTRAVENOUS; SUBCUTANEOUS at 11:16

## 2023-11-24 ASSESSMENT — PAIN SCALES - GENERAL: PAINLEVEL_OUTOF10: 0

## 2023-11-24 NOTE — PROGRESS NOTES
Attempted to call report for discharge to [id number] [employer] with no answer. Will attempt to call report again. Mando Torres RN    1570:  2nd attempt to call report to [phone] [employer]. Report given to Chetan Garcia RN.

## 2023-11-24 NOTE — PLAN OF CARE
Patient renal status per nephrology is stable. Communicate with patients family.  Will continue to monitor patient

## 2023-11-24 NOTE — PROGRESS NOTES
Reviewed IMM with patient's son Apurva Nelson over the phone (patient in isolation and legally blind). Original placed in chart and copy placed in discharge packet to return to [address] [facility].

## 2023-11-24 NOTE — PLAN OF CARE
Problem: Discharge Planning  Goal: Discharge to home or other facility with appropriate resources  Outcome: Adequate for Discharge  Flowsheets (Taken 11/24/2023 0715 by Caitlyn Tolentino RN)  Discharge to home or other facility with appropriate resources:   Identify barriers to discharge with patient and caregiver   Arrange for needed discharge resources and transportation as appropriate   Identify discharge learning needs (meds, wound care, etc)   Refer to discharge planning if patient needs post-hospital services based on physician order or complex needs related to functional status, cognitive ability or social support system     Problem: Pain  Goal: Verbalizes/displays adequate comfort level or baseline comfort level  Outcome: Adequate for Discharge  Flowsheets (Taken 11/24/2023 0830)  Verbalizes/displays adequate comfort level or baseline comfort level:   Encourage patient to monitor pain and request assistance   Assess pain using appropriate pain scale   Administer analgesics based on type and severity of pain and evaluate response   Implement non-pharmacological measures as appropriate and evaluate response   Consider cultural and social influences on pain and pain management     Problem: Skin/Tissue Integrity  Goal: Absence of new skin breakdown  Description: 1. Monitor for areas of redness and/or skin breakdown  2. Assess vascular access sites hourly  3. Every 4-6 hours minimum:  Change oxygen saturation probe site  4. Every 4-6 hours:  If on nasal continuous positive airway pressure, respiratory therapy assess nares and determine need for appliance change or resting period.   Outcome: Adequate for Discharge     Problem: Safety - Adult  Goal: Free from fall injury  Outcome: Adequate for Discharge  Flowsheets (Taken 11/24/2023 1507 by Caitlyn Tolentino RN)  Free From Fall Injury: Instruct family/caregiver on patient safety     Problem: Chronic Conditions and Co-morbidities  Goal: Patient's chronic conditions

## 2023-11-24 NOTE — PROGRESS NOTES
[facility] [address] [address] [facility] Phone: [phone] via transport at . Patient agreeable with return to facility as well as son and daughter-in-law. SW made facility aware of return's time. DC summary to follow.

## 2023-11-24 NOTE — DISCHARGE SUMMARY
Discharge Summary    Patient: Glenroy Hernandez MRN: [id number]  CSN: [id number]    YOB: 1940  Age: 79 y.o. Sex: female    DOA: 11/11/2023 LOS:  LOS: 13 days   Discharge Date:      Primary Care Provider:  Dave Jameson MD    Admission Diagnoses: Acute hyperkalemia [E87.5]  SBO (small bowel obstruction) ([address]) [K56.609]  Generalized abdominal pain [R10.84]  Small bowel obstruction due to adhesions [facility]) [K56.50]    Discharge Diagnoses:     Active Hospital Problems    Diagnosis Date Noted    Acute renal failure superimposed on stage 3 chronic kidney disease ([address]) [N17.9, N18.30] 27/63/1422    Metabolic acidosis [F74.28] 11/16/2023    Positive blood culture [R78.81] 11/13/2023    Leukocytosis [D72.829] 11/13/2023    SBO (small bowel obstruction) ([address]) [K56.609] 11/11/2023    S/P AKA (above knee amputation), right ([address]) [Z89.611] 06/03/2019    Nephrostomy status ([address]) [Z93.6] 06/03/2019    S/P colectomy [Z90.49] 11/26/2018    Legally blind [H54.8] 11/26/2018    Diabetes ([address]) [E11.9] 11/26/2018    Hypertension [I10] 11/26/2018    UTI (urinary tract infection) [N39.0] 11/26/2018       Discharge Condition: stable     Discharge Medications:        Medication List        START taking these medications      lubrifresh P.M. ophthalmic ointment  Place into both eyes as needed (dry eyes)     sodium bicarbonate 650 MG tablet  Take 2 tablets by mouth 2 times daily for 7 days             CHANGE how you take these medications      insulin detemir 100 UNIT/ML injection vial  Commonly known as: LEVEMIR  Inject 5 Units into the skin nightly  What changed:   how much to take  when to take this            CONTINUE taking these medications      acetaminophen 500 MG tablet  Commonly known as: TYLENOL Q6hr prn for pain      ATORVASTATIN CALCIUM PO  20 mg once nightly      famotidine 10 MG tablet  Commonly known as: PEPCID once daily      gabapentin 300 MG capsule  Commonly known as: NEURONTIN  Take 1 capsule by mouth 2 Reviewed below with client:Patient aware of date, time and place of Covid test, and informed that once they get their COVID test :  Do NOT travel out of home area  Self -quarantine is recommended to minimizes your risk of exposure before your procedure, if you are unable to self-quarantine, please continue to wear a mask, practice social distancing and perform good hand hygiene.  Immediately report any new symptoms or suspected/known exposures to COVID-19 cases to your surgeon’s office  fever of 100.4 or more  new cough, or cough that gets worse  difficulty breathing  sore throat  stomach problems: nausea, vomiting or diarrhea  loss of taste or smell  chills and/or repeated shaking with chills  muscle pain  headache  Maintain physical distancing (at least 6 feet) at all times both at home and away from home  Wash hands frequently and thoroughly with soap and water (lather at least 20 seconds) or disinfect with alcohol-based hand  before eating. This should also be done by the person who will be bringing you to and from the procedure.  Only one visitor allowed into building. They must remain in designated area assigned by the nursing staff and can not eat in the facility. The visitor may have a drink if it is covered with a lid or cap. Please do not drink while care team members are in the room.  It will be an expectation for the patient/support person to wear a mask at all times during their stay.    Patient is aware they will not get notified of a negative result   advanced over the guidewire into the renal pelvis. The pigtail was formed under fluoroscopy and the retention suture was locked in place. Catheter position was confirmed by contrast injection through the new nephrostomy catheter, revealing opacification of the renal collecting system. The catheter was secured to the patient's skin using Stayfix external adhesive fixation device. A dry sterile dressing was applied. There were no immediate complications. The patient tolerated the procedure without difficulty. SEDATION:  None ESTIMATED BLOOD LOSS:  < 1 ml AIR KERMA:  8 mGy     Technically successful fluoroscopic guided right nephrostomy tube exchange. Routine exchange in 8-12 weeks is recommended. [facility]     CC:  Rene Foote MD

## 2023-11-24 NOTE — PLAN OF CARE
Problem: Pain  Goal: Verbalizes/displays adequate comfort level or baseline comfort level  11/23/2023 2351 by Blaise Mccall RN  Outcome: Progressing  11/23/2023 2011 by Dana Peterson RN  Outcome: Progressing     Problem: Skin/Tissue Integrity  Goal: Absence of new skin breakdown  Description: 1. Monitor for areas of redness and/or skin breakdown  2. Assess vascular access sites hourly  3. Every 4-6 hours minimum:  Change oxygen saturation probe site  4. Every 4-6 hours:  If on nasal continuous positive airway pressure, respiratory therapy assess nares and determine need for appliance change or resting period.   11/23/2023 2351 by Blaise Mccall RN  Outcome: Progressing  11/23/2023 2011 by Dana Peterson RN  Outcome: Progressing     Problem: Safety - Adult  Goal: Free from fall injury  11/23/2023 2351 by Blaise Mccall RN  Outcome: Progressing  11/23/2023 2011 by Dana Peterson RN  Outcome: Progressing     Problem: Metabolic/Fluid and Electrolytes - Adult  Goal: Electrolytes maintained within normal limits  11/23/2023 2351 by Blaise Mccall RN  Outcome: Progressing  11/23/2023 2011 by Dana Peterson RN  Outcome: Progressing  Flowsheets (Taken 11/23/2023 0800)  Electrolytes maintained within normal limits: Monitor labs and assess patient for signs and symptoms of electrolyte imbalances  Goal: Hemodynamic stability and optimal renal function maintained  11/23/2023 2351 by Blaise Mccall RN  Outcome: Progressing  11/23/2023 2011 by Dana Peterson RN  Outcome: Progressing  Flowsheets (Taken 11/23/2023 0800)  Hemodynamic stability and optimal renal function maintained: Monitor labs and assess for signs and symptoms of volume excess or deficit  Goal: Glucose maintained within prescribed range  11/23/2023 2351 by Blaise Mccall RN  Outcome: Progressing  11/23/2023 2011 by Dana Peterson RN  Outcome: Progressing  Flowsheets (Taken 11/23/2023 0800)  Glucose maintained within prescribed range:

## 2023-11-24 NOTE — PROGRESS NOTES
Attempted to call report for discharge to [address] [facility] with no answer. Will attempt to call report again.      Corinne Birkenhead, RN

## 2023-12-13 PROBLEM — N39.0 UTI (URINARY TRACT INFECTION): Status: RESOLVED | Noted: 2018-11-26 | Resolved: 2023-12-13

## 2023-12-21 ENCOUNTER — HOSPITAL ENCOUNTER (OUTPATIENT)
Facility: HOSPITAL | Age: 83
Discharge: HOME | End: 2023-12-24
Attending: UROLOGY
Payer: MEDICARE

## 2023-12-21 DIAGNOSIS — N13.30 HYDRONEPHROSIS, UNSPECIFIED HYDRONEPHROSIS TYPE: ICD-10-CM

## 2023-12-21 PROCEDURE — 50435 EXCHANGE NEPHROSTOMY CATH: CPT

## 2023-12-21 PROCEDURE — 6360000004 HC RX CONTRAST MEDICATION: Performed by: UROLOGY

## 2023-12-21 RX ORDER — SODIUM CHLORIDE 9 MG/ML
INJECTION, SOLUTION INTRAVENOUS ONCE
Status: COMPLETED | OUTPATIENT
Start: 2023-12-21 | End: 2023-12-21

## 2023-12-21 RX ADMIN — SODIUM CHLORIDE: 9 INJECTION, SOLUTION INTRAVENOUS at 09:27

## 2023-12-21 RX ADMIN — IOPAMIDOL 15 ML: 612 INJECTION, SOLUTION INTRAVENOUS at 09:26

## 2023-12-25 ENCOUNTER — HOSPITAL ENCOUNTER (INPATIENT)
Facility: HOSPITAL | Age: 83
LOS: 2 days | Discharge: NURSING, REHAB OR LONG-TERM CARE | DRG: 388 | End: 2023-12-27
Attending: EMERGENCY MEDICINE | Admitting: INTERNAL MEDICINE
Payer: MEDICARE

## 2023-12-25 ENCOUNTER — APPOINTMENT (OUTPATIENT)
Facility: HOSPITAL | Age: 83
DRG: 388 | End: 2023-12-25
Payer: MEDICARE

## 2023-12-25 DIAGNOSIS — K56.600 PARTIAL SMALL BOWEL OBSTRUCTION (HCC): ICD-10-CM

## 2023-12-25 DIAGNOSIS — J69.0 ASPIRATION PNEUMONIA OF LEFT LOWER LOBE, UNSPECIFIED ASPIRATION PNEUMONIA TYPE (HCC): Primary | ICD-10-CM

## 2023-12-25 DIAGNOSIS — G62.9 NEUROPATHY: ICD-10-CM

## 2023-12-25 PROBLEM — K56.7 ILEUS (HCC): Status: ACTIVE | Noted: 2023-12-25

## 2023-12-25 LAB
ALBUMIN SERPL-MCNC: 2.8 G/DL (ref 3.4–5)
ALBUMIN/GLOB SERPL: 0.6 (ref 0.8–1.7)
ALP SERPL-CCNC: 130 U/L (ref 45–117)
ALT SERPL-CCNC: 26 U/L (ref 13–56)
ANION GAP SERPL CALC-SCNC: 5 MMOL/L (ref 3–18)
AST SERPL-CCNC: 28 U/L (ref 10–38)
BASOPHILS # BLD: 0.1 K/UL (ref 0–0.1)
BASOPHILS NFR BLD: 1 % (ref 0–2)
BILIRUB SERPL-MCNC: 0.3 MG/DL (ref 0.2–1)
BUN SERPL-MCNC: 31 MG/DL (ref 7–18)
BUN/CREAT SERPL: 21 (ref 12–20)
CALCIUM SERPL-MCNC: 8.7 MG/DL (ref 8.5–10.1)
CHLORIDE SERPL-SCNC: 104 MMOL/L (ref 100–111)
CO2 SERPL-SCNC: 28 MMOL/L (ref 21–32)
CREAT SERPL-MCNC: 1.48 MG/DL (ref 0.6–1.3)
DIFFERENTIAL METHOD BLD: ABNORMAL
EOSINOPHIL # BLD: 0.4 K/UL (ref 0–0.4)
EOSINOPHIL NFR BLD: 3 % (ref 0–5)
ERYTHROCYTE [DISTWIDTH] IN BLOOD BY AUTOMATED COUNT: 13.7 % (ref 11.6–14.5)
GLOBULIN SER CALC-MCNC: 4.8 G/DL (ref 2–4)
GLUCOSE BLD STRIP.AUTO-MCNC: 165 MG/DL (ref 70–110)
GLUCOSE BLD STRIP.AUTO-MCNC: 283 MG/DL (ref 70–110)
GLUCOSE BLD STRIP.AUTO-MCNC: 301 MG/DL (ref 70–110)
GLUCOSE SERPL-MCNC: 233 MG/DL (ref 74–99)
HCT VFR BLD AUTO: 37.2 % (ref 35–45)
HGB BLD-MCNC: 11.5 G/DL (ref 12–16)
IMM GRANULOCYTES # BLD AUTO: 0.1 K/UL (ref 0–0.04)
IMM GRANULOCYTES NFR BLD AUTO: 0 % (ref 0–0.5)
LACTATE BLD-SCNC: 1.59 MMOL/L (ref 0.4–2)
LIPASE SERPL-CCNC: 77 U/L (ref 13–75)
LYMPHOCYTES # BLD: 2.4 K/UL (ref 0.9–3.6)
LYMPHOCYTES NFR BLD: 17 % (ref 21–52)
MCH RBC QN AUTO: 27.9 PG (ref 24–34)
MCHC RBC AUTO-ENTMCNC: 30.9 G/DL (ref 31–37)
MCV RBC AUTO: 90.3 FL (ref 78–100)
MONOCYTES # BLD: 0.9 K/UL (ref 0.05–1.2)
MONOCYTES NFR BLD: 6 % (ref 3–10)
NEUTS SEG # BLD: 10.2 K/UL (ref 1.8–8)
NEUTS SEG NFR BLD: 73 % (ref 40–73)
NRBC # BLD: 0 K/UL (ref 0–0.01)
NRBC BLD-RTO: 0 PER 100 WBC
PLATELET # BLD AUTO: 504 K/UL (ref 135–420)
PMV BLD AUTO: 10.2 FL (ref 9.2–11.8)
POTASSIUM SERPL-SCNC: 4.5 MMOL/L (ref 3.5–5.5)
PROT SERPL-MCNC: 7.6 G/DL (ref 6.4–8.2)
RBC # BLD AUTO: 4.12 M/UL (ref 4.2–5.3)
SODIUM SERPL-SCNC: 137 MMOL/L (ref 136–145)
WBC # BLD AUTO: 14 K/UL (ref 4.6–13.2)

## 2023-12-25 PROCEDURE — 2500000003 HC RX 250 WO HCPCS: Performed by: INTERNAL MEDICINE

## 2023-12-25 PROCEDURE — 6360000002 HC RX W HCPCS: Performed by: EMERGENCY MEDICINE

## 2023-12-25 PROCEDURE — 6360000002 HC RX W HCPCS: Performed by: INTERNAL MEDICINE

## 2023-12-25 PROCEDURE — 2580000003 HC RX 258: Performed by: EMERGENCY MEDICINE

## 2023-12-25 PROCEDURE — 82962 GLUCOSE BLOOD TEST: CPT

## 2023-12-25 PROCEDURE — 6360000004 HC RX CONTRAST MEDICATION: Performed by: EMERGENCY MEDICINE

## 2023-12-25 PROCEDURE — 1100000003 HC PRIVATE W/ TELEMETRY

## 2023-12-25 PROCEDURE — 83605 ASSAY OF LACTIC ACID: CPT

## 2023-12-25 PROCEDURE — 6370000000 HC RX 637 (ALT 250 FOR IP): Performed by: INTERNAL MEDICINE

## 2023-12-25 PROCEDURE — 74176 CT ABD & PELVIS W/O CONTRAST: CPT

## 2023-12-25 PROCEDURE — 99285 EMERGENCY DEPT VISIT HI MDM: CPT

## 2023-12-25 PROCEDURE — 85025 COMPLETE CBC W/AUTO DIFF WBC: CPT

## 2023-12-25 PROCEDURE — 71045 X-RAY EXAM CHEST 1 VIEW: CPT

## 2023-12-25 PROCEDURE — 83690 ASSAY OF LIPASE: CPT

## 2023-12-25 PROCEDURE — 87040 BLOOD CULTURE FOR BACTERIA: CPT

## 2023-12-25 PROCEDURE — A4216 STERILE WATER/SALINE, 10 ML: HCPCS | Performed by: INTERNAL MEDICINE

## 2023-12-25 PROCEDURE — 2580000003 HC RX 258: Performed by: INTERNAL MEDICINE

## 2023-12-25 PROCEDURE — 83036 HEMOGLOBIN GLYCOSYLATED A1C: CPT

## 2023-12-25 PROCEDURE — 80053 COMPREHEN METABOLIC PANEL: CPT

## 2023-12-25 PROCEDURE — 96374 THER/PROPH/DIAG INJ IV PUSH: CPT

## 2023-12-25 RX ORDER — SODIUM CHLORIDE 9 MG/ML
INJECTION, SOLUTION INTRAVENOUS PRN
Status: DISCONTINUED | OUTPATIENT
Start: 2023-12-25 | End: 2023-12-27 | Stop reason: HOSPADM

## 2023-12-25 RX ORDER — ONDANSETRON 2 MG/ML
4 INJECTION INTRAMUSCULAR; INTRAVENOUS EVERY 6 HOURS PRN
Status: DISCONTINUED | OUTPATIENT
Start: 2023-12-25 | End: 2023-12-27 | Stop reason: HOSPADM

## 2023-12-25 RX ORDER — ACETAMINOPHEN 325 MG/1
650 TABLET ORAL EVERY 6 HOURS PRN
Status: DISCONTINUED | OUTPATIENT
Start: 2023-12-25 | End: 2023-12-27 | Stop reason: HOSPADM

## 2023-12-25 RX ORDER — METOPROLOL SUCCINATE 50 MG/1
50 TABLET, EXTENDED RELEASE ORAL DAILY
Status: DISCONTINUED | OUTPATIENT
Start: 2023-12-25 | End: 2023-12-27 | Stop reason: HOSPADM

## 2023-12-25 RX ORDER — INSULIN LISPRO 100 [IU]/ML
0-4 INJECTION, SOLUTION INTRAVENOUS; SUBCUTANEOUS NIGHTLY
Status: DISCONTINUED | OUTPATIENT
Start: 2023-12-25 | End: 2023-12-27 | Stop reason: HOSPADM

## 2023-12-25 RX ORDER — HEPARIN SODIUM 5000 [USP'U]/ML
5000 INJECTION, SOLUTION INTRAVENOUS; SUBCUTANEOUS EVERY 8 HOURS SCHEDULED
Status: DISCONTINUED | OUTPATIENT
Start: 2023-12-25 | End: 2023-12-27 | Stop reason: HOSPADM

## 2023-12-25 RX ORDER — SODIUM CHLORIDE 0.9 % (FLUSH) 0.9 %
5-40 SYRINGE (ML) INJECTION EVERY 12 HOURS SCHEDULED
Status: DISCONTINUED | OUTPATIENT
Start: 2023-12-25 | End: 2023-12-27 | Stop reason: HOSPADM

## 2023-12-25 RX ORDER — SODIUM CHLORIDE 9 MG/ML
INJECTION, SOLUTION INTRAVENOUS CONTINUOUS
Status: DISPENSED | OUTPATIENT
Start: 2023-12-25 | End: 2023-12-27

## 2023-12-25 RX ORDER — GLUCAGON 1 MG/ML
1 KIT INJECTION PRN
Status: DISCONTINUED | OUTPATIENT
Start: 2023-12-25 | End: 2023-12-27 | Stop reason: HOSPADM

## 2023-12-25 RX ORDER — POTASSIUM CHLORIDE 20 MEQ/1
40 TABLET, EXTENDED RELEASE ORAL PRN
Status: DISCONTINUED | OUTPATIENT
Start: 2023-12-25 | End: 2023-12-27 | Stop reason: HOSPADM

## 2023-12-25 RX ORDER — SODIUM CHLORIDE 0.9 % (FLUSH) 0.9 %
5-40 SYRINGE (ML) INJECTION PRN
Status: DISCONTINUED | OUTPATIENT
Start: 2023-12-25 | End: 2023-12-27 | Stop reason: HOSPADM

## 2023-12-25 RX ORDER — ACETAMINOPHEN 650 MG/1
650 SUPPOSITORY RECTAL EVERY 6 HOURS PRN
Status: DISCONTINUED | OUTPATIENT
Start: 2023-12-25 | End: 2023-12-27 | Stop reason: HOSPADM

## 2023-12-25 RX ORDER — CLINDAMYCIN PHOSPHATE 600 MG/50ML
600 INJECTION, SOLUTION INTRAVENOUS EVERY 8 HOURS
Status: DISCONTINUED | OUTPATIENT
Start: 2023-12-25 | End: 2023-12-27 | Stop reason: HOSPADM

## 2023-12-25 RX ORDER — POTASSIUM CHLORIDE 7.45 MG/ML
10 INJECTION INTRAVENOUS PRN
Status: DISCONTINUED | OUTPATIENT
Start: 2023-12-25 | End: 2023-12-27 | Stop reason: HOSPADM

## 2023-12-25 RX ORDER — DEXTROSE MONOHYDRATE 100 MG/ML
INJECTION, SOLUTION INTRAVENOUS CONTINUOUS PRN
Status: DISCONTINUED | OUTPATIENT
Start: 2023-12-25 | End: 2023-12-27 | Stop reason: HOSPADM

## 2023-12-25 RX ORDER — ONDANSETRON 4 MG/1
4 TABLET, ORALLY DISINTEGRATING ORAL EVERY 8 HOURS PRN
Status: DISCONTINUED | OUTPATIENT
Start: 2023-12-25 | End: 2023-12-27 | Stop reason: HOSPADM

## 2023-12-25 RX ORDER — CIPROFLOXACIN 2 MG/ML
400 INJECTION, SOLUTION INTRAVENOUS EVERY 12 HOURS
Status: DISCONTINUED | OUTPATIENT
Start: 2023-12-25 | End: 2023-12-25

## 2023-12-25 RX ORDER — INSULIN LISPRO 100 [IU]/ML
0-4 INJECTION, SOLUTION INTRAVENOUS; SUBCUTANEOUS
Status: DISCONTINUED | OUTPATIENT
Start: 2023-12-25 | End: 2023-12-27 | Stop reason: HOSPADM

## 2023-12-25 RX ORDER — ONDANSETRON 2 MG/ML
4 INJECTION INTRAMUSCULAR; INTRAVENOUS
Status: COMPLETED | OUTPATIENT
Start: 2023-12-25 | End: 2023-12-25

## 2023-12-25 RX ORDER — 0.9 % SODIUM CHLORIDE 0.9 %
1000 INTRAVENOUS SOLUTION INTRAVENOUS ONCE
Status: COMPLETED | OUTPATIENT
Start: 2023-12-25 | End: 2023-12-25

## 2023-12-25 RX ORDER — MORPHINE SULFATE 4 MG/ML
4 INJECTION, SOLUTION INTRAMUSCULAR; INTRAVENOUS
Status: COMPLETED | OUTPATIENT
Start: 2023-12-25 | End: 2023-12-25

## 2023-12-25 RX ORDER — LEVOTHYROXINE SODIUM 0.07 MG/1
75 TABLET ORAL
Status: DISCONTINUED | OUTPATIENT
Start: 2023-12-26 | End: 2023-12-26 | Stop reason: DRUGHIGH

## 2023-12-25 RX ADMIN — ONDANSETRON 4 MG: 2 INJECTION INTRAMUSCULAR; INTRAVENOUS at 10:55

## 2023-12-25 RX ADMIN — CLINDAMYCIN PHOSPHATE 600 MG: 600 INJECTION, SOLUTION INTRAVENOUS at 14:49

## 2023-12-25 RX ADMIN — INSULIN LISPRO 2 UNITS: 100 INJECTION, SOLUTION INTRAVENOUS; SUBCUTANEOUS at 17:02

## 2023-12-25 RX ADMIN — FAMOTIDINE 10 MG: 10 INJECTION, SOLUTION INTRAVENOUS at 21:00

## 2023-12-25 RX ADMIN — SODIUM CHLORIDE 1000 ML: 9 INJECTION, SOLUTION INTRAVENOUS at 09:21

## 2023-12-25 RX ADMIN — FAMOTIDINE 10 MG: 10 INJECTION, SOLUTION INTRAVENOUS at 15:07

## 2023-12-25 RX ADMIN — MORPHINE SULFATE 4 MG: 4 INJECTION, SOLUTION INTRAMUSCULAR; INTRAVENOUS at 12:03

## 2023-12-25 RX ADMIN — ACETAMINOPHEN 650 MG: 325 TABLET ORAL at 15:23

## 2023-12-25 RX ADMIN — ONDANSETRON 4 MG: 2 INJECTION INTRAMUSCULAR; INTRAVENOUS at 12:06

## 2023-12-25 RX ADMIN — HEPARIN SODIUM 5000 UNITS: 5000 INJECTION INTRAVENOUS; SUBCUTANEOUS at 14:57

## 2023-12-25 RX ADMIN — CEFTRIAXONE 1000 MG: 1 INJECTION, POWDER, FOR SOLUTION INTRAMUSCULAR; INTRAVENOUS at 14:56

## 2023-12-25 RX ADMIN — ONDANSETRON 4 MG: 2 INJECTION INTRAMUSCULAR; INTRAVENOUS at 09:21

## 2023-12-25 RX ADMIN — CLINDAMYCIN PHOSPHATE 600 MG: 600 INJECTION, SOLUTION INTRAVENOUS at 23:30

## 2023-12-25 RX ADMIN — HEPARIN SODIUM 5000 UNITS: 5000 INJECTION INTRAVENOUS; SUBCUTANEOUS at 22:30

## 2023-12-25 RX ADMIN — SODIUM CHLORIDE, PRESERVATIVE FREE 10 ML: 5 INJECTION INTRAVENOUS at 21:00

## 2023-12-25 RX ADMIN — INSULIN LISPRO 3 UNITS: 100 INJECTION, SOLUTION INTRAVENOUS; SUBCUTANEOUS at 15:23

## 2023-12-25 RX ADMIN — METOPROLOL SUCCINATE 50 MG: 50 TABLET, EXTENDED RELEASE ORAL at 14:56

## 2023-12-25 RX ADMIN — SODIUM CHLORIDE: 9 INJECTION, SOLUTION INTRAVENOUS at 14:52

## 2023-12-25 RX ADMIN — IOHEXOL 50 ML: 240 INJECTION, SOLUTION INTRATHECAL; INTRAVASCULAR; INTRAVENOUS; ORAL at 09:01

## 2023-12-25 NOTE — PLAN OF CARE
Problem: Discharge Planning  Goal: Discharge to home or other facility with appropriate resources  Outcome: Progressing  Flowsheets (Taken 12/25/2023 1402)  Discharge to home or other facility with appropriate resources:   Identify barriers to discharge with patient and caregiver   Identify discharge learning needs (meds, wound care, etc)   Arrange for needed discharge resources and transportation as appropriate     Problem: Skin/Tissue Integrity  Goal: Absence of new skin breakdown  Description: 1. Monitor for areas of redness and/or skin breakdown  2. Assess vascular access sites hourly  3. Every 4-6 hours minimum:  Change oxygen saturation probe site  4. Every 4-6 hours:  If on nasal continuous positive airway pressure, respiratory therapy assess nares and determine need for appliance change or resting period.   Outcome: Progressing

## 2023-12-25 NOTE — ED NOTES
Per ED MD, hold clindamycin ABX until blood cultures are established.  RRT at bedside for blood retrieval

## 2023-12-25 NOTE — ED PROVIDER NOTES
[facility] EMERGENCY DEPT  EMERGENCY DEPARTMENT ENCOUNTER    Patient Name: Denita Beal  MRN: [id number]  YOB: 1940  Provider: Alka Silva MD  PCP: Miriam Zhang MD   Time/Date of evaluation: 8:26 AM EST on 12/25/23    History of Presenting Illness     Chief Complaint   Patient presents with    Abdominal Pain       History Provided by: EMS and Patient   History is limited by: Nothing    HISTORY Christina De La Cruz): Denita Beal is a 79 y.o. female with a PMHX of Colon cancer, vaginal cancer, CKD, colostomy, diabetes, DVTs, hypertension  who presents to the emergency department (room 8) by EMS from SNF C/O abdominal pain onset this morning. Associated sxs include decreased bowel sounds. Patient denies any other sxs or complaints. EMS reports that patient was visited by family last night and that they provided a meal for her that was large and extravagant compared to her normal intake. Nursing home staff states that bowel sounds were absent this morning. Nursing Notes were all reviewed and agreed with or any disagreements were addressed in the HPI.     Past History     PAST MEDICAL HISTORY:  Past Medical History:   Diagnosis Date    Anxiety     Cancer ([address])     Cancer ([address])     vaginal and colon cancer    Chronic kidney disease     Chronic pain     arthritis    Colostomy present ([address])     Diabetes ([address])     Dry eye syndrome     DVT (deep venous thrombosis) (HCC)     Dysphagia     Encephalopathy     Glaucoma     Hypertension     Hypomagnesemia     Hypothyroid     Ileostomy in place [facility])     Legally blind     Thyroid disease        PAST SURGICAL HISTORY:  Past Surgical History:   Procedure Laterality Date    ABOVE KNEE AMPUTATION Right     COLOSTOMY      IR NEPHROSTOMY EXCHANGE CATHETER  8/2/2021    IR NEPHROSTOMY EXCHANGE CATHETER  9/13/2021    IR NEPHROSTOMY EXCHANGE CATHETER  11/9/2021    IR NEPHROSTOMY EXCHANGE CATHETER  12/7/2021    IR NEPHROSTOMY EXCHANGE CATHETER  1/27/2022    IR NEPHROSTOMY

## 2023-12-25 NOTE — ED NOTES
Attempted to draw blood cultures from midline in [facility], unable to aspirate enough blood for culture bottles. Attempted to straight stick patient for blood cultures, unsuccessful.      RRT notified

## 2023-12-25 NOTE — H&P
HISTORY AND PHYSICAL EXAMINATION      Assessment:     Patient Active Problem List    Diagnosis Date Noted    Ileus ([address]) 12/25/2023    Acute renal failure superimposed on stage 3 chronic kidney disease ([address]) 32/54/9986    Metabolic acidosis 62/98/2966    Positive blood culture 11/13/2023    Leukocytosis 11/13/2023    SBO (small bowel obstruction) ([address]) 11/11/2023    Nephrostomy tube displaced ([address]) 04/29/2022    Pleural effusion on left 04/29/2022    Decubitus ulcer of coccygeal region 04/29/2022    Pressure injury of right buttock, stage 4 ([address]) 03/07/2022    Abdominal pain 12/07/2021    Hydronephrosis, right 12/07/2021    Pressure injury of sacral region, stage 4 ([address]) 08/09/2021    Displacement of nephrostomy tube ([address]) 06/06/2021    Pseudomonas aeruginosa infection 03/22/2021    CKD stage 3 due to type 2 diabetes mellitus ([address]) 06/24/2020    Acute renal failure (ARF) ([address]) 06/21/2020    Nephrostomy complication ([address]) 60/30/6235    S/P AKA (above knee amputation), right ([address]) 06/03/2019    Nephrostomy status ([address]) 06/03/2019    Pressure injury of right heel, unstageable ([address]) 11/28/2018    Hypertension 11/26/2018    S/P colectomy 11/26/2018    Diabetes ([address]) 11/26/2018    Legally blind 11/26/2018    Pyelonephritis 05/18/2018    Right flank pain 05/18/2018    S/p nephrectomy 05/18/2018          79 y.o. 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 SBO with  Extensive/complex abd surgical hx       DENNY with ckd3          Aspiration pneumonia --mostly due to vomiting     UTI with nephrostomy tube  Urine cultures growing morganella morgani         DM-II -       Debility     Plan:   Admit to hospital   surgery has been consulted. NPO with bowel rest for now.  IVF   IV empiric abx  Fu labs  Accuchecks and ssi  Pain meds but avoid narcotics if

## 2023-12-25 NOTE — ED NOTES
Patient proceeded to vomit moderate amounts of green liquid after morphine administration, zofran PRN given per [facility]

## 2023-12-25 NOTE — ED NOTES
Report received from prior shift RN. Patient currently back from CT, attached to bedside monitor,  vomiting. MD aware and medication ordered and given per [facility]. Awaiting further orders and results at this time.

## 2023-12-25 NOTE — ED NOTES
RRT still at bedside for blood cultures.  XR notified to come when finished prior to transport upstairs

## 2023-12-25 NOTE — PROGRESS NOTES
TRANSFER - IN REPORT:    Verbal report received from Ladi Narvaez  on Cabrera Willis  being received from ER for routine progression of patient care      Report consisted of patient's Situation, Background, Assessment and   Recommendations(SBAR). Information from the following report(s) Nurse Handoff Report was reviewed with the receiving nurse. Opportunity for questions and clarification was provided. Assessment completed upon patient's arrival to unit and care assumed.

## 2023-12-26 PROBLEM — R10.9 RIGHT FLANK PAIN: Status: RESOLVED | Noted: 2018-05-18 | Resolved: 2023-12-26

## 2023-12-26 PROBLEM — K56.600 PARTIAL SMALL BOWEL OBSTRUCTION (HCC): Status: ACTIVE | Noted: 2023-12-26

## 2023-12-26 PROBLEM — N12 PYELONEPHRITIS: Status: RESOLVED | Noted: 2018-05-18 | Resolved: 2023-12-26

## 2023-12-26 PROBLEM — A49.8 PSEUDOMONAS AERUGINOSA INFECTION: Status: RESOLVED | Noted: 2021-03-22 | Resolved: 2023-12-26

## 2023-12-26 LAB
ALBUMIN SERPL-MCNC: 2.4 G/DL (ref 3.4–5)
ALBUMIN/GLOB SERPL: 0.6 (ref 0.8–1.7)
ALP SERPL-CCNC: 120 U/L (ref 45–117)
ALT SERPL-CCNC: 36 U/L (ref 13–56)
ANION GAP SERPL CALC-SCNC: 6 MMOL/L (ref 3–18)
AST SERPL-CCNC: 62 U/L (ref 10–38)
BASOPHILS # BLD: 0 K/UL (ref 0–0.1)
BASOPHILS NFR BLD: 0 % (ref 0–2)
BILIRUB SERPL-MCNC: 0.4 MG/DL (ref 0.2–1)
BUN SERPL-MCNC: 29 MG/DL (ref 7–18)
BUN/CREAT SERPL: 22 (ref 12–20)
CALCIUM SERPL-MCNC: 7.6 MG/DL (ref 8.5–10.1)
CHLORIDE SERPL-SCNC: 106 MMOL/L (ref 100–111)
CO2 SERPL-SCNC: 23 MMOL/L (ref 21–32)
CREAT SERPL-MCNC: 1.3 MG/DL (ref 0.6–1.3)
DIFFERENTIAL METHOD BLD: ABNORMAL
EOSINOPHIL # BLD: 0.3 K/UL (ref 0–0.4)
EOSINOPHIL NFR BLD: 3 % (ref 0–5)
ERYTHROCYTE [DISTWIDTH] IN BLOOD BY AUTOMATED COUNT: 13.8 % (ref 11.6–14.5)
EST. AVERAGE GLUCOSE BLD GHB EST-MCNC: 160 MG/DL
GLOBULIN SER CALC-MCNC: 4 G/DL (ref 2–4)
GLUCOSE BLD STRIP.AUTO-MCNC: 141 MG/DL (ref 70–110)
GLUCOSE BLD STRIP.AUTO-MCNC: 216 MG/DL (ref 70–110)
GLUCOSE BLD STRIP.AUTO-MCNC: 218 MG/DL (ref 70–110)
GLUCOSE BLD STRIP.AUTO-MCNC: 235 MG/DL (ref 70–110)
GLUCOSE SERPL-MCNC: 193 MG/DL (ref 74–99)
HBA1C MFR BLD: 7.2 % (ref 4.2–5.6)
HCT VFR BLD AUTO: 28.6 % (ref 35–45)
HGB BLD-MCNC: 8.7 G/DL (ref 12–16)
IMM GRANULOCYTES # BLD AUTO: 0 K/UL (ref 0–0.04)
IMM GRANULOCYTES NFR BLD AUTO: 0 % (ref 0–0.5)
LYMPHOCYTES # BLD: 1.9 K/UL (ref 0.9–3.6)
LYMPHOCYTES NFR BLD: 20 % (ref 21–52)
MCH RBC QN AUTO: 27.5 PG (ref 24–34)
MCHC RBC AUTO-ENTMCNC: 30.4 G/DL (ref 31–37)
MCV RBC AUTO: 90.5 FL (ref 78–100)
MONOCYTES # BLD: 0.8 K/UL (ref 0.05–1.2)
MONOCYTES NFR BLD: 8 % (ref 3–10)
NEUTS SEG # BLD: 6.4 K/UL (ref 1.8–8)
NEUTS SEG NFR BLD: 68 % (ref 40–73)
NRBC # BLD: 0 K/UL (ref 0–0.01)
NRBC BLD-RTO: 0 PER 100 WBC
PLATELET # BLD AUTO: 355 K/UL (ref 135–420)
PMV BLD AUTO: 10.3 FL (ref 9.2–11.8)
POTASSIUM SERPL-SCNC: 5 MMOL/L (ref 3.5–5.5)
PROT SERPL-MCNC: 6.4 G/DL (ref 6.4–8.2)
RBC # BLD AUTO: 3.16 M/UL (ref 4.2–5.3)
SODIUM SERPL-SCNC: 135 MMOL/L (ref 136–145)
WBC # BLD AUTO: 9.4 K/UL (ref 4.6–13.2)

## 2023-12-26 PROCEDURE — 6370000000 HC RX 637 (ALT 250 FOR IP): Performed by: INTERNAL MEDICINE

## 2023-12-26 PROCEDURE — 1100000003 HC PRIVATE W/ TELEMETRY

## 2023-12-26 PROCEDURE — A4216 STERILE WATER/SALINE, 10 ML: HCPCS | Performed by: INTERNAL MEDICINE

## 2023-12-26 PROCEDURE — 6360000002 HC RX W HCPCS: Performed by: EMERGENCY MEDICINE

## 2023-12-26 PROCEDURE — 2580000003 HC RX 258: Performed by: INTERNAL MEDICINE

## 2023-12-26 PROCEDURE — 82962 GLUCOSE BLOOD TEST: CPT

## 2023-12-26 PROCEDURE — 2500000003 HC RX 250 WO HCPCS: Performed by: INTERNAL MEDICINE

## 2023-12-26 PROCEDURE — 80053 COMPREHEN METABOLIC PANEL: CPT

## 2023-12-26 PROCEDURE — 36415 COLL VENOUS BLD VENIPUNCTURE: CPT

## 2023-12-26 PROCEDURE — 6360000002 HC RX W HCPCS: Performed by: INTERNAL MEDICINE

## 2023-12-26 PROCEDURE — 85025 COMPLETE CBC W/AUTO DIFF WBC: CPT

## 2023-12-26 RX ORDER — LEVOTHYROXINE SODIUM 0.05 MG/1
50 TABLET ORAL
Status: DISCONTINUED | OUTPATIENT
Start: 2023-12-27 | End: 2023-12-27 | Stop reason: HOSPADM

## 2023-12-26 RX ADMIN — FAMOTIDINE 10 MG: 10 INJECTION, SOLUTION INTRAVENOUS at 11:03

## 2023-12-26 RX ADMIN — FAMOTIDINE 10 MG: 10 INJECTION, SOLUTION INTRAVENOUS at 21:15

## 2023-12-26 RX ADMIN — INSULIN LISPRO 1 UNITS: 100 INJECTION, SOLUTION INTRAVENOUS; SUBCUTANEOUS at 14:38

## 2023-12-26 RX ADMIN — LEVOTHYROXINE SODIUM 75 MCG: 0.07 TABLET ORAL at 05:18

## 2023-12-26 RX ADMIN — CLINDAMYCIN PHOSPHATE 600 MG: 600 INJECTION, SOLUTION INTRAVENOUS at 15:48

## 2023-12-26 RX ADMIN — SODIUM CHLORIDE, PRESERVATIVE FREE 10 ML: 5 INJECTION INTRAVENOUS at 11:03

## 2023-12-26 RX ADMIN — HEPARIN SODIUM 5000 UNITS: 5000 INJECTION INTRAVENOUS; SUBCUTANEOUS at 05:18

## 2023-12-26 RX ADMIN — HEPARIN SODIUM 5000 UNITS: 5000 INJECTION INTRAVENOUS; SUBCUTANEOUS at 14:37

## 2023-12-26 RX ADMIN — HEPARIN SODIUM 5000 UNITS: 5000 INJECTION INTRAVENOUS; SUBCUTANEOUS at 21:15

## 2023-12-26 RX ADMIN — CLINDAMYCIN PHOSPHATE 600 MG: 600 INJECTION, SOLUTION INTRAVENOUS at 23:02

## 2023-12-26 RX ADMIN — CLINDAMYCIN PHOSPHATE 600 MG: 600 INJECTION, SOLUTION INTRAVENOUS at 06:42

## 2023-12-26 RX ADMIN — CEFTRIAXONE 1000 MG: 1 INJECTION, POWDER, FOR SOLUTION INTRAMUSCULAR; INTRAVENOUS at 14:26

## 2023-12-26 RX ADMIN — SODIUM CHLORIDE: 9 INJECTION, SOLUTION INTRAVENOUS at 06:42

## 2023-12-26 RX ADMIN — SODIUM CHLORIDE, PRESERVATIVE FREE 10 ML: 5 INJECTION INTRAVENOUS at 21:14

## 2023-12-26 RX ADMIN — INSULIN LISPRO 1 UNITS: 100 INJECTION, SOLUTION INTRAVENOUS; SUBCUTANEOUS at 18:48

## 2023-12-26 NOTE — PROGRESS NOTES
Consulted for PSBO. Pt with very extensive and complicated surgical history as well as medical problems. Presented with abd pain after unusual holiday meal.  Some reported stool outpt. Pt subsequently started vomiting  Stable  WBC elevated and UA abnormal  Reviewed CT scan results  Recommend NGT for vomiting at this time. Non-surgical mgmt.   Will follow

## 2023-12-26 NOTE — CARE COORDINATION
Case Management Assessment  Initial Evaluation    Date/Time of Evaluation: 12/26/2023 10:47 AM  Assessment Completed by: Marcin Flaherty RN    If patient is discharged prior to next notation, then this note serves as note for discharge by case management. Patient Name: Mark Christy                   YOB: 1940  Diagnosis: Ileus ([address]) [K56.7]  Partial small bowel obstruction ([address]) [K56.600]  Aspiration pneumonia of left lower lobe, unspecified aspiration pneumonia type ([address]) [J69.0]                   Date / Time: 12/25/2023  8:18 AM    Patient Admission Status: Inpatient   Readmission Risk (Low < 19, Mod (19-27), High > 27): Readmission Risk Score: 20.9    Current PCP: Jolanta Ruiz MD  PCP verified by CM? Yes    Chart Reviewed: Yes      History Provided by: Child/Family  Patient Orientation: Alert and Oriented    Patient Cognition: Alert    Hospitalization in the last 30 days (Readmission):  No    If yes, Readmission Assessment in  Navigator will be completed. Advance Directives:      Code Status: DNR   Patient's Primary Decision Maker is:      Primary Decision Maker: Alin Lewis - Child - [phone]    Secondary Decision Maker: Jarret Diallo Child - [phone]    Discharge Planning:    Patient lives with: Other (Comment) (Long term care facility) Type of Home: Long-Term Care  Primary Care Giver:  Other (Comment) (Long term care)  Patient Support Systems include: Children, Other (Comment) (Long term care)   Current Financial resources: Medicare  Current community resources:    Current services prior to admission: Other (Comment) (Long term care)            Current DME:              Type of Home Care services:   (Long term care)    ADLS  Prior functional level: Assistance with the following:, Bathing, Dressing, Toileting, Mobility  Current functional level: Assistance with the following:, Bathing, Dressing, Toileting, Mobility    PT AM-PAC:   /24  OT AM-PAC:   /24    Family can

## 2023-12-27 VITALS
RESPIRATION RATE: 18 BRPM | DIASTOLIC BLOOD PRESSURE: 40 MMHG | HEART RATE: 82 BPM | WEIGHT: 156.6 LBS | TEMPERATURE: 98.1 F | BODY MASS INDEX: 27.75 KG/M2 | HEIGHT: 63 IN | OXYGEN SATURATION: 100 % | SYSTOLIC BLOOD PRESSURE: 125 MMHG

## 2023-12-27 LAB
GLUCOSE BLD STRIP.AUTO-MCNC: 170 MG/DL (ref 70–110)
GLUCOSE BLD STRIP.AUTO-MCNC: 222 MG/DL (ref 70–110)

## 2023-12-27 PROCEDURE — 6370000000 HC RX 637 (ALT 250 FOR IP): Performed by: INTERNAL MEDICINE

## 2023-12-27 PROCEDURE — 6370000000 HC RX 637 (ALT 250 FOR IP): Performed by: HOSPITALIST

## 2023-12-27 PROCEDURE — 6360000002 HC RX W HCPCS: Performed by: INTERNAL MEDICINE

## 2023-12-27 PROCEDURE — 6360000002 HC RX W HCPCS: Performed by: EMERGENCY MEDICINE

## 2023-12-27 PROCEDURE — 2580000003 HC RX 258: Performed by: INTERNAL MEDICINE

## 2023-12-27 PROCEDURE — 2500000003 HC RX 250 WO HCPCS: Performed by: INTERNAL MEDICINE

## 2023-12-27 PROCEDURE — A4216 STERILE WATER/SALINE, 10 ML: HCPCS | Performed by: INTERNAL MEDICINE

## 2023-12-27 PROCEDURE — 82962 GLUCOSE BLOOD TEST: CPT

## 2023-12-27 RX ORDER — OXYCODONE HYDROCHLORIDE 5 MG/1
5 TABLET ORAL EVERY 4 HOURS PRN
Status: ON HOLD | COMMUNITY
Start: 2023-10-02 | End: 2023-12-27 | Stop reason: HOSPADM

## 2023-12-27 RX ORDER — GABAPENTIN 300 MG/1
300 CAPSULE ORAL 2 TIMES DAILY
Qty: 2 CAPSULE | Refills: 0 | Status: SHIPPED | OUTPATIENT
Start: 2023-12-27 | End: 2023-12-28

## 2023-12-27 RX ADMIN — SODIUM CHLORIDE: 9 INJECTION, SOLUTION INTRAVENOUS at 12:02

## 2023-12-27 RX ADMIN — CEFTRIAXONE 1000 MG: 1 INJECTION, POWDER, FOR SOLUTION INTRAMUSCULAR; INTRAVENOUS at 12:02

## 2023-12-27 RX ADMIN — ACETAMINOPHEN 650 MG: 325 TABLET ORAL at 04:58

## 2023-12-27 RX ADMIN — METOPROLOL SUCCINATE 50 MG: 50 TABLET, EXTENDED RELEASE ORAL at 08:22

## 2023-12-27 RX ADMIN — FAMOTIDINE 10 MG: 10 INJECTION, SOLUTION INTRAVENOUS at 08:22

## 2023-12-27 RX ADMIN — HEPARIN SODIUM 5000 UNITS: 5000 INJECTION INTRAVENOUS; SUBCUTANEOUS at 14:15

## 2023-12-27 RX ADMIN — HEPARIN SODIUM 5000 UNITS: 5000 INJECTION INTRAVENOUS; SUBCUTANEOUS at 05:00

## 2023-12-27 RX ADMIN — SODIUM CHLORIDE, PRESERVATIVE FREE 10 ML: 5 INJECTION INTRAVENOUS at 08:23

## 2023-12-27 RX ADMIN — CLINDAMYCIN PHOSPHATE 600 MG: 600 INJECTION, SOLUTION INTRAVENOUS at 06:09

## 2023-12-27 RX ADMIN — LEVOTHYROXINE SODIUM 50 MCG: 0.05 TABLET ORAL at 05:00

## 2023-12-27 NOTE — CARE COORDINATION
The patient will be DC back to [facility] today. The patient and son were notified of her DC and are agreeable. Transportation has been set up for 3:30 .  RN was notified and can call report to [phone]

## 2023-12-27 NOTE — PLAN OF CARE
Problem: Discharge Planning  Goal: Discharge to home or other facility with appropriate resources  12/26/2023 2000 by Kal Fuentes RN  Outcome: Progressing  12/26/2023 1827 by Genaro Walsh RN  Outcome: Progressing     Problem: Skin/Tissue Integrity  Goal: Absence of new skin breakdown  Description: 1. Monitor for areas of redness and/or skin breakdown  2. Assess vascular access sites hourly  3. Every 4-6 hours minimum:  Change oxygen saturation probe site  4. Every 4-6 hours:  If on nasal continuous positive airway pressure, respiratory therapy assess nares and determine need for appliance change or resting period.   12/26/2023 2000 by Kal Fuentes RN  Outcome: Progressing  12/26/2023 1827 by Genaro Walsh RN  Outcome: Progressing     Problem: Safety - Adult  Goal: Free from fall injury  12/26/2023 2000 by Kal Fuentes RN  Outcome: Progressing  12/26/2023 1827 by Genaro Walsh RN  Outcome: Progressing     Problem: Pain  Goal: Verbalizes/displays adequate comfort level or baseline comfort level  12/26/2023 2000 by Kal Fuentes RN  Outcome: Progressing  12/26/2023 1827 by Genaro Walsh RN  Outcome: Progressing

## 2023-12-27 NOTE — DISCHARGE SUMMARY
definitely identified. PERITONEUM: No ascites or pneumoperitoneum. RETROPERITONEUM: No lymphadenopathy. Atherosclerosis without aneurysm. REPRODUCTIVE ORGANS: Hysterectomy. URINARY BLADDER: Surgically absent. BONES: No destructive bone lesion. Degenerative changes. Chronic erosive change and fragmentation of the coccyx. ABDOMINAL WALL: No mass or hernia. ADDITIONAL COMMENTS: Pelvic floor descent. 1.  Hysterectomy, cystectomy, rectal resection, and distal colonic resection with left lower quadrant descending colostomy. Findings reflect ileus versus partial distal small bowel obstruction. 2.  Right percutaneous nephrostomy tube. No right hydronephrosis punctate nonobstructing right renal calculi. Embolization coils in the lower right ureter. 3.  Left nephrectomy. 4.  Nonspecific small pericardial effusion. 5.  Left lower lobe mild atelectasis and bronchial wall thickening with bronchiectasis, correlate for atypical infection, bronchitis, and/or aspiration. IR GUIDED NEPHROSTOMY CATH EXCHANGE    Result Date: 12/21/2023  PROCEDURE:  FLUOROSCOPIC GUIDED NEPHROSTOMY TUBE EXCHANGE HISTORY: Jesica Bailey is a 79year old Female with Unspecified hydronephrosis. :  Go Ray NP ATTENDING:  Shola Smith MD CONSENT:  After full discussion of the procedure, including risks, benefits and alternatives, both verbal and written consent were obtained. TECHNIQUE: A timeout was called to verify the correct patient, procedure, site and allergies. The patient declined 1% lidocaine for local anesthesia. The patient was placed prone on the fluoroscopy table. Initial  images were obtained. The skin was prepped and draped in sterile fashion. A nephrostogram was performed via the pre-existing right nephrostomy catheter. The catheter was transected and a 0.035 guidewire was advanced under fluoroscopic guidance into the renal collecting system.   The pre-existing nephrostomy catheter was removed and a new 10 [place]

## 2023-12-27 NOTE — PROGRESS NOTES
Consulted for PSBO  Pt stable. Comfortable. No pain  Abd benign. Stool gas and liquid in ostomy bag.      No surgical indication - diet ad matti at will

## 2023-12-27 NOTE — PROGRESS NOTES
1500 - Tried X 3 to call report at [facility] - no answer. Even called the DON - answering machine. Will try again later.

## 2023-12-27 NOTE — PROGRESS NOTES
1539-Tried to call report again to [facility] - no answer. Transport here to get patient. Tele removed earlier and IVX2 removed earlier. Pt transported out via stretcher to [facility].

## 2023-12-31 ENCOUNTER — HOSPITAL ENCOUNTER (EMERGENCY)
Facility: HOSPITAL | Age: 83
Discharge: HOME | End: 2023-12-31
Attending: STUDENT IN AN ORGANIZED HEALTH CARE EDUCATION/TRAINING PROGRAM
Payer: MEDICARE

## 2023-12-31 ENCOUNTER — APPOINTMENT (OUTPATIENT)
Facility: HOSPITAL | Age: 83
End: 2023-12-31
Payer: MEDICARE

## 2023-12-31 VITALS
OXYGEN SATURATION: 99 % | DIASTOLIC BLOOD PRESSURE: 51 MMHG | HEIGHT: 63 IN | WEIGHT: 156.53 LBS | BODY MASS INDEX: 27.73 KG/M2 | SYSTOLIC BLOOD PRESSURE: 150 MMHG | HEART RATE: 103 BPM | TEMPERATURE: 97.7 F | RESPIRATION RATE: 18 BRPM

## 2023-12-31 DIAGNOSIS — N39.0 URINARY TRACT INFECTION ASSOCIATED WITH NEPHROSTOMY CATHETER, INITIAL ENCOUNTER (HCC): ICD-10-CM

## 2023-12-31 DIAGNOSIS — R10.84 GENERALIZED ABDOMINAL PAIN: Primary | ICD-10-CM

## 2023-12-31 DIAGNOSIS — T83.512A URINARY TRACT INFECTION ASSOCIATED WITH NEPHROSTOMY CATHETER, INITIAL ENCOUNTER (HCC): ICD-10-CM

## 2023-12-31 DIAGNOSIS — R79.89 ELEVATED LACTIC ACID LEVEL: ICD-10-CM

## 2023-12-31 LAB
ALBUMIN SERPL-MCNC: 2.9 G/DL (ref 3.4–5)
ALBUMIN/GLOB SERPL: 0.7 (ref 0.8–1.7)
ALP SERPL-CCNC: 185 U/L (ref 45–117)
ALT SERPL-CCNC: 23 U/L (ref 13–56)
ANION GAP SERPL CALC-SCNC: 10 MMOL/L (ref 3–18)
APPEARANCE UR: CLEAR
AST SERPL-CCNC: 21 U/L (ref 10–38)
BACTERIA SPEC CULT: NORMAL
BACTERIA SPEC CULT: NORMAL
BACTERIA URNS QL MICRO: ABNORMAL /HPF
BASOPHILS # BLD: 0 K/UL (ref 0–0.1)
BASOPHILS NFR BLD: 0 % (ref 0–2)
BILIRUB SERPL-MCNC: 0.5 MG/DL (ref 0.2–1)
BILIRUB UR QL: NEGATIVE
BUN SERPL-MCNC: 20 MG/DL (ref 7–18)
BUN/CREAT SERPL: 16 (ref 12–20)
CALCIUM SERPL-MCNC: 8.5 MG/DL (ref 8.5–10.1)
CHLORIDE SERPL-SCNC: 104 MMOL/L (ref 100–111)
CO2 SERPL-SCNC: 22 MMOL/L (ref 21–32)
COLOR UR: YELLOW
CREAT SERPL-MCNC: 1.25 MG/DL (ref 0.6–1.3)
DIFFERENTIAL METHOD BLD: ABNORMAL
EOSINOPHIL # BLD: 0.1 K/UL (ref 0–0.4)
EOSINOPHIL NFR BLD: 1 % (ref 0–5)
EPITH CASTS URNS QL MICRO: ABNORMAL /LPF (ref 0–5)
ERYTHROCYTE [DISTWIDTH] IN BLOOD BY AUTOMATED COUNT: 14.1 % (ref 11.6–14.5)
GLOBULIN SER CALC-MCNC: 4.2 G/DL (ref 2–4)
GLUCOSE SERPL-MCNC: 206 MG/DL (ref 74–99)
GLUCOSE UR STRIP.AUTO-MCNC: 100 MG/DL
HCT VFR BLD AUTO: 38.6 % (ref 35–45)
HGB BLD-MCNC: 12.2 G/DL (ref 12–16)
HGB UR QL STRIP: ABNORMAL
IMM GRANULOCYTES # BLD AUTO: 0 K/UL (ref 0–0.04)
IMM GRANULOCYTES NFR BLD AUTO: 0 % (ref 0–0.5)
KETONES UR QL STRIP.AUTO: 15 MG/DL
LACTATE BLD-SCNC: 0.67 MMOL/L (ref 0.4–2)
LACTATE BLD-SCNC: 2.64 MMOL/L (ref 0.4–2)
LEUKOCYTE ESTERASE UR QL STRIP.AUTO: ABNORMAL
LIPASE SERPL-CCNC: 22 U/L (ref 13–75)
LYMPHOCYTES # BLD: 1.1 K/UL (ref 0.9–3.6)
LYMPHOCYTES NFR BLD: 11 % (ref 21–52)
MCH RBC QN AUTO: 27.7 PG (ref 24–34)
MCHC RBC AUTO-ENTMCNC: 31.6 G/DL (ref 31–37)
MCV RBC AUTO: 87.5 FL (ref 78–100)
MONOCYTES # BLD: 0.7 K/UL (ref 0.05–1.2)
MONOCYTES NFR BLD: 7 % (ref 3–10)
NEUTS SEG # BLD: 8.3 K/UL (ref 1.8–8)
NEUTS SEG NFR BLD: 81 % (ref 40–73)
NITRITE UR QL STRIP.AUTO: NEGATIVE
NRBC # BLD: 0 K/UL (ref 0–0.01)
NRBC BLD-RTO: 0 PER 100 WBC
PH UR STRIP: 5.5 (ref 5–8)
PLATELET # BLD AUTO: 461 K/UL (ref 135–420)
PMV BLD AUTO: 9.6 FL (ref 9.2–11.8)
POTASSIUM SERPL-SCNC: 5.3 MMOL/L (ref 3.5–5.5)
PROT SERPL-MCNC: 7.1 G/DL (ref 6.4–8.2)
PROT UR STRIP-MCNC: 100 MG/DL
RBC # BLD AUTO: 4.41 M/UL (ref 4.2–5.3)
RBC #/AREA URNS HPF: ABNORMAL /HPF (ref 0–5)
SERVICE CMNT-IMP: NORMAL
SERVICE CMNT-IMP: NORMAL
SODIUM SERPL-SCNC: 136 MMOL/L (ref 136–145)
SP GR UR REFRACTOMETRY: 1.02 (ref 1–1.03)
UROBILINOGEN UR QL STRIP.AUTO: 0.2 EU/DL (ref 0.2–1)
WBC # BLD AUTO: 10.3 K/UL (ref 4.6–13.2)
WBC URNS QL MICRO: ABNORMAL /HPF (ref 0–5)

## 2023-12-31 PROCEDURE — 83605 ASSAY OF LACTIC ACID: CPT

## 2023-12-31 PROCEDURE — 99284 EMERGENCY DEPT VISIT MOD MDM: CPT

## 2023-12-31 PROCEDURE — 85025 COMPLETE CBC W/AUTO DIFF WBC: CPT

## 2023-12-31 PROCEDURE — 74176 CT ABD & PELVIS W/O CONTRAST: CPT

## 2023-12-31 PROCEDURE — 83690 ASSAY OF LIPASE: CPT

## 2023-12-31 PROCEDURE — 80053 COMPREHEN METABOLIC PANEL: CPT

## 2023-12-31 PROCEDURE — 96365 THER/PROPH/DIAG IV INF INIT: CPT

## 2023-12-31 PROCEDURE — 81001 URINALYSIS AUTO W/SCOPE: CPT

## 2023-12-31 PROCEDURE — 96375 TX/PRO/DX INJ NEW DRUG ADDON: CPT

## 2023-12-31 PROCEDURE — 2580000003 HC RX 258: Performed by: STUDENT IN AN ORGANIZED HEALTH CARE EDUCATION/TRAINING PROGRAM

## 2023-12-31 PROCEDURE — 6360000002 HC RX W HCPCS: Performed by: STUDENT IN AN ORGANIZED HEALTH CARE EDUCATION/TRAINING PROGRAM

## 2023-12-31 PROCEDURE — 87086 URINE CULTURE/COLONY COUNT: CPT

## 2023-12-31 RX ORDER — MORPHINE SULFATE 4 MG/ML
4 INJECTION, SOLUTION INTRAMUSCULAR; INTRAVENOUS
Status: COMPLETED | OUTPATIENT
Start: 2023-12-31 | End: 2023-12-31

## 2023-12-31 RX ORDER — 0.9 % SODIUM CHLORIDE 0.9 %
1000 INTRAVENOUS SOLUTION INTRAVENOUS ONCE
Status: COMPLETED | OUTPATIENT
Start: 2023-12-31 | End: 2023-12-31

## 2023-12-31 RX ORDER — CEFDINIR 300 MG/1
300 CAPSULE ORAL 2 TIMES DAILY
Qty: 10 CAPSULE | Refills: 0 | Status: SHIPPED | OUTPATIENT
Start: 2023-12-31 | End: 2023-12-31

## 2023-12-31 RX ORDER — MORPHINE SULFATE 4 MG/ML
4 INJECTION, SOLUTION INTRAMUSCULAR; INTRAVENOUS
Status: DISCONTINUED | OUTPATIENT
Start: 2023-12-31 | End: 2023-12-31

## 2023-12-31 RX ORDER — CEFDINIR 300 MG/1
300 CAPSULE ORAL 2 TIMES DAILY
Qty: 10 CAPSULE | Refills: 0 | Status: SHIPPED | OUTPATIENT
Start: 2023-12-31 | End: 2024-01-05

## 2023-12-31 RX ADMIN — SODIUM CHLORIDE 1000 ML: 9 INJECTION, SOLUTION INTRAVENOUS at 12:25

## 2023-12-31 RX ADMIN — MORPHINE SULFATE 4 MG: 4 INJECTION, SOLUTION INTRAMUSCULAR; INTRAVENOUS at 14:20

## 2023-12-31 RX ADMIN — CEFTRIAXONE 1000 MG: 1 INJECTION, POWDER, FOR SOLUTION INTRAMUSCULAR; INTRAVENOUS at 15:17

## 2023-12-31 ASSESSMENT — PAIN - FUNCTIONAL ASSESSMENT: PAIN_FUNCTIONAL_ASSESSMENT: 0-10

## 2023-12-31 ASSESSMENT — PAIN SCALES - GENERAL: PAINLEVEL_OUTOF10: 6

## 2023-12-31 NOTE — ED NOTES
Attempted to obtain IV access on this patient. This RN was unsuccessful.     Blood was drawn, however, and sent to the lab.     MD sinclair.

## 2023-12-31 NOTE — PROGRESS NOTES
Patient given discharge paperwork. Instructions reviewed verbally and all questions answered. Removed PIV. Patient's wristband and stickers removed and shredded. Report attempted x2 to [facility]; no answer and no VM available. Report given to MMT staff.

## 2023-12-31 NOTE — ED PROVIDER NOTES
minutes  Due to a high probability of clinically significant, life threatening deterioration, the patient required my highest level of preparedness to intervene emergently and I personally spent this critical care time directly and personally managing the patient. This critical care time included obtaining a history; examining the patient; pulse oximetry; ordering and review of studies; arranging urgent treatment with development of a management plan; evaluation of patient's response to treatment; frequent reassessment; and, discussions with other providers.  This critical care time was performed to assess and manage the high probability of imminent, life-threatening deterioration that could result in multi-organ failure. It was exclusive of separately billable procedures and treating other patients and teaching time.        Diagnosis     Clinical Impression:   1. Generalized abdominal pain    2. Urinary tract infection associated with nephrostomy catheter, initial encounter ([facility])    3. Elevated lactic acid level        Disposition: home     Martha Bolaños MD  [address] [id number]  [facility] [address] [phone]    Go to   As needed, If symptoms worsen       Disclaimer: Sections of this note are dictated using utilizing voice recognition software.  Minor typographical errors may be present. If questions arise, please do not hesitate to contact me or call our department.         Clinton Wu MD  12/31/23 8992

## 2023-12-31 NOTE — DISCHARGE INSTRUCTIONS
You were evaluated for abdominal pain and acute cystitis .  Based on your work-up it was deemed that she was stable for discharge.  Please  your medication of cefdinir which was prescribed to you.  Please follow-up with your primary care physician if you have any further concerns and go over your work-up.  If you experience any chest pain, shortness of breath, worsening abdominal pain, vomiting blood, worsening headache, seizures, or any worsening of your symptoms please return to the emergency department immediately.  If you have any pending results or any further questions please contact the emergency department at [phone]

## 2023-12-31 NOTE — ED TRIAGE NOTES
Pt brought in by medic from [facility] with abd discomfort. X 1 day. Pt also with nausea dn dry heaves starting today. Pt with ostomy in place and nephrostomy. Ostomy output has been decreased.     Pt with hx of bowel obstruction.

## 2024-01-02 LAB
BACTERIA SPEC CULT: NORMAL
SERVICE CMNT-IMP: NORMAL

## 2024-01-25 NOTE — PRE-PROCEDURE INSTRUCTIONS
Spoke to pt son Alin to schedule original appt. on 1/29. Due to transportation conflicts, appointment moved to 1/30 at 1130 with arrival at 1100. [place] and has given his brother Holden's contact information to handle the appointment. LVM with Holden to inform him of appointment change. Waiting to hear back from him to confirm. Satinder Ogden has already consented to procedure on behalf of his mother.       1/26: Brother Holden confirmed appointment

## 2024-01-30 ENCOUNTER — HOSPITAL ENCOUNTER (OUTPATIENT)
Facility: HOSPITAL | Age: 84
Discharge: HOME | End: 2024-02-02
Attending: UROLOGY
Payer: MEDICARE

## 2024-01-30 DIAGNOSIS — N13.39 OTHER HYDRONEPHROSIS: ICD-10-CM

## 2024-01-30 PROCEDURE — 6360000004 HC RX CONTRAST MEDICATION: Performed by: UROLOGY

## 2024-01-30 PROCEDURE — 2580000003 HC RX 258: Performed by: UROLOGY

## 2024-01-30 PROCEDURE — 97602 WOUND(S) CARE NON-SELECTIVE: CPT

## 2024-01-30 PROCEDURE — 50435 EXCHANGE NEPHROSTOMY CATH: CPT

## 2024-01-30 RX ORDER — SODIUM CHLORIDE 9 MG/ML
INJECTION, SOLUTION INTRAVENOUS ONCE
Status: COMPLETED | OUTPATIENT
Start: 2024-01-30 | End: 2024-01-30

## 2024-01-30 RX ADMIN — IOPAMIDOL 5 ML: 612 INJECTION, SOLUTION INTRAVENOUS at 10:47

## 2024-01-30 RX ADMIN — SODIUM CHLORIDE: 9 INJECTION, SOLUTION INTRAVENOUS at 10:47

## 2024-01-30 NOTE — CONSULTS
IP WOUND CONSULT    Aileen Ley  MEDICAL RECORD NUMBER:  [id number]  AGE: 83 y.o.   GENDER: female  : 1940  TODAY'S DATE:  2024    GENERAL     [] Follow-up   [x] New Consult    [x] Present on Admission  [] Hospital Acquired    Aileen Ley is a 83 y.o. female referred by:   [] Physician  [x] Nursing  [] Other:         PAST MEDICAL HISTORY    Past Medical History:   Diagnosis Date    Anxiety     Cancer (HCC)     Cancer (HCC)     vaginal and colon cancer    Chronic kidney disease     Chronic pain     arthritis    Colostomy present (HCC)     Diabetes (HCC)     Dry eye syndrome     DVT (deep venous thrombosis) (HCC)     Dysphagia     Encephalopathy     Glaucoma     Hypertension     Hypomagnesemia     Hypothyroid     Ileostomy in place (HCC)     Legally blind     Thyroid disease         PAST SURGICAL HISTORY    Past Surgical History:   Procedure Laterality Date    ABOVE KNEE AMPUTATION Right     COLOSTOMY      IR NEPHROSTOMY EXCHANGE CATHETER  2021    IR NEPHROSTOMY EXCHANGE CATHETER  2021    IR NEPHROSTOMY EXCHANGE CATHETER  2021    IR NEPHROSTOMY EXCHANGE CATHETER  2021    IR NEPHROSTOMY EXCHANGE CATHETER  2022    IR NEPHROSTOMY EXCHANGE CATHETER  3/21/2022    IR NEPHROSTOMY EXCHANGE CATHETER  2022    IR NEPHROSTOMY EXCHANGE CATHETER  2022    IR NEPHROSTOMY EXCHANGE CATHETER  2022    IR NEPHROSTOMY EXCHANGE CATHETER  2022    IR NEPHROSTOMY EXCHANGE CATHETER  2022    IR NEPHROSTOMY EXCHANGE CATHETER  2022    IR NEPHROSTOMY EXCHANGE CATHETER  2023    IR NEPHROSTOMY EXCHANGE CATHETER  5/10/2021    IR NEPHROSTOMY EXCHANGE CATHETER  2020    IR NEPHROSTOMY EXCHANGE CATHETER  2021    IR NEPHROSTOMY EXCHANGE CATHETER  3/25/2021    IR NEPHROSTOMY EXCHANGE CATHETER  2020    IR NEPHROSTOMY EXCHANGE CATHETER  2020    IR NEPHROSTOMY EXCHANGE CATHETER  8/10/2020    IR NEPHROSTOMY EXCHANGE CATHETER  2020    IR NEPHROSTOMY EXCHANGE

## 2024-01-30 NOTE — PLAN OF CARE
Wound Care recommendations from  [facility]  Inpatient Wound Nurse  [address] [place] [address]  [phone] Fax [phone]    NAME:  Aileen Ley  YOB: 1940  MEDICAL RECORD NUMBER:  [id number]  DATE:  1/30/2024    Wound Cleansing:   Do not scrub or use excessive force.  Cleanse wound prior to applying a clean dressing with:    [x] Wound Cleanser  or  [x] Cleanse wound with Mild Soap & Water    Do not try to clean all of the Zinc off patient, just the dirty layer.     Topical Treatments:  Do not apply lotions, creams, or ointments to wound bed unless directed.   [x]    Apply a thick amount zinc based barrier cream to sacral area.      Dressings:           Wound Location sacrum   [x] Apply Primary Dressing:          [x] Mepilex Border  or other silicone border     Pressure Relief:  [x] When sitting, shift position or do seat lifts every 15 minutes.  [x] Turn every 2 hours when in bed.  Avoid position directing pressure on wound site.  Limit side lying to 30 degree tilt.  Limit HOB elevation to 30 degrees.          Electronically signed Phoebe Holloway RN on 1/30/2024 at 11:15 AM

## 2024-01-31 ENCOUNTER — TELEPHONE (OUTPATIENT)
Facility: HOSPITAL | Age: 84
End: 2024-01-31

## 2024-01-31 NOTE — TELEPHONE ENCOUNTER
Patient's family called stating that the facility did not receive the orders written yesterday.  Call placed to [facility] received current fax number for facility and faxed orders to that number through epic.

## 2024-03-09 ENCOUNTER — HOSPITAL ENCOUNTER (EMERGENCY)
Facility: HOSPITAL | Age: 84
End: 2024-03-09
Payer: MEDICARE

## 2024-03-09 ENCOUNTER — HOSPITAL ENCOUNTER (EMERGENCY)
Facility: HOSPITAL | Age: 84
Discharge: HOME | End: 2024-03-10
Attending: EMERGENCY MEDICINE
Payer: MEDICARE

## 2024-03-09 ENCOUNTER — HOSPITAL ENCOUNTER (EMERGENCY)
Facility: HOSPITAL | Age: 84
Discharge: ANOTHER HOSPITAL | End: 2024-03-09
Attending: EMERGENCY MEDICINE
Payer: MEDICARE

## 2024-03-09 VITALS
RESPIRATION RATE: 16 BRPM | SYSTOLIC BLOOD PRESSURE: 150 MMHG | HEART RATE: 74 BPM | OXYGEN SATURATION: 98 % | DIASTOLIC BLOOD PRESSURE: 61 MMHG

## 2024-03-09 VITALS
OXYGEN SATURATION: 100 % | DIASTOLIC BLOOD PRESSURE: 34 MMHG | TEMPERATURE: 97.8 F | HEART RATE: 75 BPM | RESPIRATION RATE: 18 BRPM | WEIGHT: 156 LBS | SYSTOLIC BLOOD PRESSURE: 155 MMHG | BODY MASS INDEX: 28.71 KG/M2 | HEIGHT: 62 IN

## 2024-03-09 VITALS
DIASTOLIC BLOOD PRESSURE: 79 MMHG | SYSTOLIC BLOOD PRESSURE: 144 MMHG | HEART RATE: 72 BPM | RESPIRATION RATE: 18 BRPM | OXYGEN SATURATION: 97 % | TEMPERATURE: 97.5 F

## 2024-03-09 DIAGNOSIS — N99.528 NEPHROSTOMY COMPLICATION (HCC): Primary | ICD-10-CM

## 2024-03-09 DIAGNOSIS — T83.022A NEPHROSTOMY TUBE DISPLACED (HCC): Primary | ICD-10-CM

## 2024-03-09 LAB
GLUCOSE BLD STRIP.AUTO-MCNC: 236 MG/DL (ref 65–117)
SERVICE CMNT-IMP: ABNORMAL

## 2024-03-09 PROCEDURE — 96374 THER/PROPH/DIAG INJ IV PUSH: CPT

## 2024-03-09 PROCEDURE — 96375 TX/PRO/DX INJ NEW DRUG ADDON: CPT

## 2024-03-09 PROCEDURE — 99284 EMERGENCY DEPT VISIT MOD MDM: CPT

## 2024-03-09 PROCEDURE — C1769 GUIDE WIRE: HCPCS

## 2024-03-09 PROCEDURE — 6360000002 HC RX W HCPCS: Performed by: EMERGENCY MEDICINE

## 2024-03-09 PROCEDURE — 99285 EMERGENCY DEPT VISIT HI MDM: CPT

## 2024-03-09 PROCEDURE — 6360000004 HC RX CONTRAST MEDICATION: Performed by: STUDENT IN AN ORGANIZED HEALTH CARE EDUCATION/TRAINING PROGRAM

## 2024-03-09 PROCEDURE — 82962 GLUCOSE BLOOD TEST: CPT

## 2024-03-09 PROCEDURE — C1713 ANCHOR/SCREW BN/BN,TIS/BN: HCPCS

## 2024-03-09 RX ORDER — ACETAMINOPHEN 325 MG/1
650 TABLET ORAL
Status: COMPLETED | OUTPATIENT
Start: 2024-03-09 | End: 2024-03-10

## 2024-03-09 RX ORDER — MORPHINE SULFATE 4 MG/ML
4 INJECTION, SOLUTION INTRAMUSCULAR; INTRAVENOUS
Status: COMPLETED | OUTPATIENT
Start: 2024-03-09 | End: 2024-03-09

## 2024-03-09 RX ORDER — ONDANSETRON 2 MG/ML
4 INJECTION INTRAMUSCULAR; INTRAVENOUS
Status: COMPLETED | OUTPATIENT
Start: 2024-03-09 | End: 2024-03-09

## 2024-03-09 RX ADMIN — IOPAMIDOL 10 ML: 755 INJECTION, SOLUTION INTRAVENOUS at 21:00

## 2024-03-09 RX ADMIN — ONDANSETRON 4 MG: 2 INJECTION INTRAMUSCULAR; INTRAVENOUS at 16:38

## 2024-03-09 RX ADMIN — MORPHINE SULFATE 4 MG: 4 INJECTION, SOLUTION INTRAMUSCULAR; INTRAVENOUS at 16:37

## 2024-03-09 ASSESSMENT — PAIN DESCRIPTION - ORIENTATION
ORIENTATION: RIGHT
ORIENTATION: RIGHT

## 2024-03-09 ASSESSMENT — ENCOUNTER SYMPTOMS
SORE THROAT: 0
BACK PAIN: 0
COUGH: 0
ABDOMINAL PAIN: 0

## 2024-03-09 ASSESSMENT — PAIN DESCRIPTION - LOCATION
LOCATION: FLANK

## 2024-03-09 ASSESSMENT — PAIN SCALES - GENERAL
PAINLEVEL_OUTOF10: 10

## 2024-03-09 ASSESSMENT — PAIN - FUNCTIONAL ASSESSMENT: PAIN_FUNCTIONAL_ASSESSMENT: 0-10

## 2024-03-09 ASSESSMENT — PAIN DESCRIPTION - DESCRIPTORS: DESCRIPTORS: ACHING

## 2024-03-09 NOTE — ED NOTES
LDA removed in [facility] Care for documentation purposes only.  Patient admitted to hospital with; site 1- Peripheral IV, which at time of admission is Clean, Dry, and intact, no signs or symptoms of phlebitis. No signs or symptoms of infiltration. Site 2- N/A, which at time of admission is N/A. And site 3- N/A, which at time of admission is N/A.

## 2024-03-09 NOTE — ED TRIAGE NOTES
Patient to ED via EMS for accidental removal of right nephrostomy tube. Patient is resident of [facility]; had nephrostomy removed accidentally during a shower

## 2024-03-09 NOTE — ED PROVIDER NOTES
[facility] EMERGENCY DEPT  EMERGENCY DEPARTMENT ENCOUNTER    Patient Name: Aileen Ley  MRN: [id number]  YOB: 1940  Provider: Sathya Lakhani MD  PCP: Martha Bolaños MD   Time/Date of evaluation: 11:38 AM EST on 3/9/24    History of Presenting Illness     Chief Complaint   Patient presents with    nephrostomy removed       History Provided by: Patient   History is limited by: Nothing    HISTORY (Narative):   Aileen Ley is a 83 y.o. female with a PMHX of blindness, hard of hearing, CKD, colostomy, diabetes, hypertension, left nephrectomy, right nephrostomy  who presents to the emergency department (room 15) by EMS from [facility] C/O displaced right nephrostomy tube onset immediately prior to arrival. Associated sxs include bleeding from nephrostomy site. Patient denies any other sxs or complaints.     Nursing Notes were all reviewed and agreed with or any disagreements were addressed in the HPI.    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)     Legally blind     Thyroid disease        PAST SURGICAL HISTORY:  Past Surgical History:   Procedure Laterality Date    ABOVE KNEE AMPUTATION Right     COLOSTOMY      IR NEPHROSTOMY EXCHANGE CATHETER  8/2/2021    IR NEPHROSTOMY EXCHANGE CATHETER  9/13/2021    IR NEPHROSTOMY EXCHANGE CATHETER  11/9/2021    IR NEPHROSTOMY EXCHANGE CATHETER  12/7/2021    IR NEPHROSTOMY EXCHANGE CATHETER  1/27/2022    IR NEPHROSTOMY EXCHANGE CATHETER  3/21/2022    IR NEPHROSTOMY EXCHANGE CATHETER  4/29/2022    IR NEPHROSTOMY EXCHANGE CATHETER  6/9/2022    IR NEPHROSTOMY EXCHANGE    Stress: Not on file   Social Connections: Not on file   Intimate Partner Violence: Not on file   Depression: Not on file   Housing Stability: Low Risk  (12/26/2023)    Housing Stability Vital Sign     Unable to Pay for Housing in the Last Year: No     Number of Places Lived in the Last Year: 1     Unstable Housing in the Last Year: No   Interpersonal Safety: Not At Risk (12/26/2023)    Interpersonal Safety ([facility] HRSN)     How often does anyone, including family and friends, physically hurt you?: Never     How often does anyone, including family and friends, scream or curse at you?: Not on file     How often does anyone, including family and friends, insult or talk down to you?: Not on file     How often does anyone, including family and friends, threaten you with harm?: Not on file   Utilities: Not At Risk (12/26/2023)    [facility] Utilities     Threatened with loss of utilities: No       Review of Systems     Negative except as listed above in HPI.    Physical Exam     Vitals:    03/09/24 1135 03/09/24 1500   BP: (!) 181/59 (!) 155/34   Pulse: 75    Resp: 18    Temp: 97.8 °F (36.6 °C)    TempSrc: Oral    SpO2: 99% 100%   Weight: 70.8 kg (156 lb)    Height: 1.575 m (5' 2\")      Physical Exam  Vitals and nursing note reviewed.   Constitutional:       General: She is not in acute distress.     Appearance: Normal appearance. She is normal weight. She is not ill-appearing.   HENT:      Head: Normocephalic and atraumatic.      Nose: Nose normal. No rhinorrhea.      Mouth/Throat:      Mouth: Mucous membranes are moist.      Pharynx: No oropharyngeal exudate or posterior oropharyngeal erythema.   Eyes:      General:         Right eye: No discharge.         Left eye: No discharge.      Extraocular Movements: Extraocular movements intact.      Conjunctiva/sclera: Conjunctivae normal.      Pupils: Pupils are equal, round, and reactive to light.   Cardiovascular:      Rate and Rhythm: Normal rate and regular rhythm.      Heart

## 2024-03-10 PROCEDURE — 6370000000 HC RX 637 (ALT 250 FOR IP): Performed by: STUDENT IN AN ORGANIZED HEALTH CARE EDUCATION/TRAINING PROGRAM

## 2024-03-10 RX ADMIN — ACETAMINOPHEN 650 MG: 325 TABLET ORAL at 00:15

## 2024-03-10 NOTE — ED NOTES
Received patient in signout.    Here for incidental removal of nephrostomy tube.  IR consulted and currently replacing tube.  Otherwise patient without complaint.  Vitals normal.    I evaluated patient on return to the emergency department.  She has no complaints, sleeping comfortably.  Her vitals are reassuring.  Will discharge back to her facility     Clifton Sims MD  03/09/24 6178

## 2024-03-10 NOTE — ED PROVIDER NOTES
[facility] EMERGENCY DEP  EMERGENCY DEPARTMENT ENCOUNTER      Pt Name: Aileen Ley  MRN: [id number]  Birthdate 1940  Date of evaluation: 3/9/2024  Provider: Delma Galvan MD    CHIEF COMPLAINT       Chief Complaint   Patient presents with    Needs Nephrostomy Tube         HISTORY OF PRESENT ILLNESS    Aileen Ley is an 82 yo F with h/o right AKA, colostomy, blindness and CKD with left nephrectomy and right nephrostomy.  She is a resident at [facility] and this morning during bathing her nephrostomy tube was dislodged.  She was taken to [facility] but they did not have IR availability.  They spoke with IR at [facility] who recommended transfer to ED for IR drain placement noting that she would likely be able to be discharged following the procedure.            Additional history from independent historians:     Review of External Medical Records:     Nursing Notes were reviewed.    REVIEW OF SYSTEMS       Review of Systems   Constitutional:  Negative for fever.   HENT:  Negative for sore throat.    Eyes:  Negative for visual disturbance.   Respiratory:  Negative for cough.    Cardiovascular:  Negative for chest pain.   Gastrointestinal:  Negative for abdominal pain.   Genitourinary:  Negative for dysuria.   Musculoskeletal:  Negative for back pain.   Skin:  Negative for rash.   Neurological:  Negative for headaches.       Except as noted above the remainder of the review of systems was reviewed and negative.       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

## 2024-03-10 NOTE — ED NOTES
Bedside and Verbal shift change report given to Nichol   (oncoming nurse) by Nga (offgoing nurse). Report included the following information ED Encounter Summary and ED SBAR.

## 2024-03-10 NOTE — PROCEDURES
Brief Postoperative Note    Aileen Ley  YOB: 1940  [id number]    Pre-operative Diagnosis: Dislodged right nephrostomy     Post-operative Diagnosis: Same    Procedure: Nephrostomy replacement     Anesthesia: None    Surgeons/Assistants: Zelda Avila MD    Estimated Blood Loss: None    Complications: None    Specimens: Was Not Obtained    Findings:   Replacement of the dislodged nephrostomy with a new 10 Marshallese drain.     Plan:  Maintain drain to gravity drainage. No need to flush.   Okay for discharge from IR standpoint. Appreciate excellent care and coordination of [facility] and [facility] EDs.     Electronically signed by Zelda Avila MD on 3/9/2024 at 9:05 PM

## 2024-03-10 NOTE — ED NOTES
YAZMIN hernandez 7am -ride cx.     [facility] called eta 2330.     [facility]  (Santa) at 2200 and informed she will be back tonight and her pickup is 2330.      Son, Alin Lewis, called to be informed of his mother's departure.

## 2024-03-10 NOTE — PROGRESS NOTES
TRANSFER - OUT REPORT:    Verbal report given to NYA Key on Aileen Ley  being transferred to ER for routine post-op       Report consisted of patient's Situation, Background, Assessment and   Recommendations(SBAR).     Information from the following report(s) Nurse Handoff Report was reviewed with the receiving nurse.           Lines:   Peripheral IV 03/09/24 Left Antecubital (Active)        Opportunity for questions and clarification was provided.      Patient transported with:  Registered Nurse

## 2024-03-10 NOTE — CONSULTS
Radiology History and Physical    Patient: Aileen Ley 83 y.o. female       Chief Complaint: Needs Nephrostomy Tube    History of Present Illness: Ms. Ley is an 83 y.o. woman with history of multiple cancers and multiple abdominal surgeries most notable for left nephrectomy, pelvic dissection, and right ureteric embolization. She is now managed with chronic right PCN, which became dislodged earlier today. IR consulted for replacement.     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  Social History     Socioeconomic History    Marital status:      Spouse name: Not on file    Number of children: Not on file    Years of education: Not on file    Highest education level: Not on file   Occupational History    Not on file   Tobacco Use    Smoking status: Never    Smokeless tobacco: Never   Substance and Sexual Activity    Alcohol use: No    Drug use: Never    Sexual activity: Not on file   Other Topics Concern    Not on file   Social History Narrative    Not on file     Social Determinants of Health     Financial Resource Strain: Not on file   Food Insecurity: No Food Insecurity (12/26/2023)    Hunger Vital Sign     Worried About Running Out of Food in the Last Year: Never true     Ran Out of Food in the Last Year: Never true   Transportation Needs: No Transportation Needs (12/26/2023)    PRAPARE - Transportation     Lack of Transportation (Medical): No     Lack of Transportation (Non-Medical): No   Physical Activity: Not on file   Stress: Not on file   Social Connections: Not on

## 2024-05-08 ENCOUNTER — HOSPITAL ENCOUNTER (OUTPATIENT)
Facility: HOSPITAL | Age: 84
Discharge: HOME | End: 2024-05-11
Attending: UROLOGY
Payer: MEDICARE

## 2024-05-08 DIAGNOSIS — N13.39 OTHER HYDRONEPHROSIS: ICD-10-CM

## 2024-05-08 PROCEDURE — 50435 EXCHANGE NEPHROSTOMY CATH: CPT

## 2024-05-08 PROCEDURE — 6360000004 HC RX CONTRAST MEDICATION: Performed by: UROLOGY

## 2024-05-08 RX ORDER — SODIUM CHLORIDE 9 MG/ML
INJECTION, SOLUTION INTRAVENOUS ONCE
Status: DISCONTINUED | OUTPATIENT
Start: 2024-05-08 | End: 2024-05-12 | Stop reason: HOSPADM

## 2024-05-08 RX ADMIN — IOPAMIDOL 6 ML: 612 INJECTION, SOLUTION INTRAVENOUS at 09:12

## 2024-07-01 NOTE — PRE-PROCEDURE INSTRUCTIONS
Spoke to patient's son and facility to schedule appointment.  Instructed to arrive at 8:30am for 9am appointment.  Instructed no need to be NPO at midnight.   Pt denies anticoagulation use.   Medications and allergies reviewed.  Opportunity for questions provided.

## 2024-07-11 ENCOUNTER — HOSPITAL ENCOUNTER (OUTPATIENT)
Facility: HOSPITAL | Age: 84
Discharge: HOME | End: 2024-07-11
Attending: UROLOGY
Payer: MEDICARE

## 2024-07-11 DIAGNOSIS — N13.39 OTHER HYDRONEPHROSIS: ICD-10-CM

## 2024-07-11 PROCEDURE — 50435 EXCHANGE NEPHROSTOMY CATH: CPT

## 2024-07-11 PROCEDURE — C1769 GUIDE WIRE: HCPCS

## 2024-07-11 PROCEDURE — 6360000004 HC RX CONTRAST MEDICATION: Performed by: UROLOGY

## 2024-07-11 RX ORDER — SODIUM CHLORIDE 9 MG/ML
INJECTION, SOLUTION INTRAVENOUS ONCE
Status: DISPENSED | OUTPATIENT
Start: 2024-07-11

## 2024-07-11 RX ADMIN — IOPAMIDOL 8 ML: 612 INJECTION, SOLUTION INTRAVENOUS at 09:23

## 2024-08-08 ENCOUNTER — APPOINTMENT (OUTPATIENT)
Facility: HOSPITAL | Age: 84
End: 2024-08-08
Attending: EMERGENCY MEDICINE
Payer: MEDICARE

## 2024-08-08 ENCOUNTER — HOSPITAL ENCOUNTER (EMERGENCY)
Facility: HOSPITAL | Age: 84
Discharge: ANOTHER HOSPITAL | End: 2024-08-08
Attending: EMERGENCY MEDICINE
Payer: MEDICARE

## 2024-08-08 VITALS
RESPIRATION RATE: 22 BRPM | HEIGHT: 62 IN | OXYGEN SATURATION: 99 % | BODY MASS INDEX: 29.44 KG/M2 | TEMPERATURE: 98.3 F | SYSTOLIC BLOOD PRESSURE: 183 MMHG | WEIGHT: 160 LBS | DIASTOLIC BLOOD PRESSURE: 124 MMHG | HEART RATE: 72 BPM

## 2024-08-08 DIAGNOSIS — M25.473 SWELLING OF ANKLE: Primary | ICD-10-CM

## 2024-08-08 DIAGNOSIS — I70.90 ARTERIAL OCCLUSION: ICD-10-CM

## 2024-08-08 LAB
APTT PPP: 34.7 SEC (ref 23–36.4)
ERYTHROCYTE [DISTWIDTH] IN BLOOD BY AUTOMATED COUNT: 14 % (ref 11.6–14.5)
HCT VFR BLD AUTO: 32.8 % (ref 35–45)
HGB BLD-MCNC: 10.1 G/DL (ref 12–16)
MCH RBC QN AUTO: 28.9 PG (ref 24–34)
MCHC RBC AUTO-ENTMCNC: 30.8 G/DL (ref 31–37)
MCV RBC AUTO: 94 FL (ref 78–100)
NRBC # BLD: 0 K/UL (ref 0–0.01)
NRBC BLD-RTO: 0 PER 100 WBC
PLATELET # BLD AUTO: 417 K/UL (ref 135–420)
PMV BLD AUTO: 9.8 FL (ref 9.2–11.8)
RBC # BLD AUTO: 3.49 M/UL (ref 4.2–5.3)
WBC # BLD AUTO: 8.5 K/UL (ref 4.6–13.2)

## 2024-08-08 PROCEDURE — 96374 THER/PROPH/DIAG INJ IV PUSH: CPT

## 2024-08-08 PROCEDURE — 99285 EMERGENCY DEPT VISIT HI MDM: CPT

## 2024-08-08 PROCEDURE — 85730 THROMBOPLASTIN TIME PARTIAL: CPT

## 2024-08-08 PROCEDURE — 93926 LOWER EXTREMITY STUDY: CPT

## 2024-08-08 PROCEDURE — 6360000002 HC RX W HCPCS: Performed by: EMERGENCY MEDICINE

## 2024-08-08 PROCEDURE — 85027 COMPLETE CBC AUTOMATED: CPT

## 2024-08-08 RX ORDER — HEPARIN SODIUM 1000 [USP'U]/ML
40 INJECTION, SOLUTION INTRAVENOUS; SUBCUTANEOUS PRN
Status: DISCONTINUED | OUTPATIENT
Start: 2024-08-08 | End: 2024-08-08 | Stop reason: HOSPADM

## 2024-08-08 RX ORDER — HEPARIN SODIUM 1000 [USP'U]/ML
80 INJECTION, SOLUTION INTRAVENOUS; SUBCUTANEOUS PRN
Status: DISCONTINUED | OUTPATIENT
Start: 2024-08-08 | End: 2024-08-08 | Stop reason: HOSPADM

## 2024-08-08 RX ORDER — HEPARIN SODIUM 1000 [USP'U]/ML
80 INJECTION, SOLUTION INTRAVENOUS; SUBCUTANEOUS ONCE
Status: COMPLETED | OUTPATIENT
Start: 2024-08-08 | End: 2024-08-08

## 2024-08-08 RX ORDER — HEPARIN SODIUM 10000 [USP'U]/100ML
5-30 INJECTION, SOLUTION INTRAVENOUS CONTINUOUS
Status: DISCONTINUED | OUTPATIENT
Start: 2024-08-08 | End: 2024-08-08 | Stop reason: HOSPADM

## 2024-08-08 RX ADMIN — HEPARIN SODIUM 18 UNITS/KG/HR: 10000 INJECTION, SOLUTION INTRAVENOUS at 17:34

## 2024-08-08 RX ADMIN — HEPARIN SODIUM 5800 UNITS: 1000 INJECTION INTRAVENOUS; SUBCUTANEOUS at 17:28

## 2024-08-08 NOTE — ED PROVIDER NOTES
condition. This care involved high complexity decision making to assess, manipulate, and support vital system functions, to treat this degreee vital organ system failure and to prevent further life threatening deterioration of the patient’s condition.    Sathya Lakhani MD    Diagnosis and Disposition     DISPOSITION Decision To Transfer 08/08/2024 04:34:05 PM    TRANSFER PROGRESS NOTE:  Discussed impending transfer with Patient and/or family.  Pt and/or family instructed that Pt would be transferred to [facility].  Discussed reasoning for transfer and future treatment plan. Family and Pt understands and agrees with care plan.    CLINICAL IMPRESSION    1. Swelling of ankle    2. Arterial occlusion        PLAN    Transfer to [facility]    I Sathya Lakhani MD am the primary clinician of record.    [employer] Disclaimer     Please note that this dictation was completed with [employer], the computer voice recognition software.  Quite often unanticipated grammatical, syntax, homophones, and other interpretive errors are inadvertently transcribed by the computer software.  Please disregard these errors.  Please excuse any errors that have escaped final proofreading.    Sathya Lakhani MD  (Electronically signed)            Sathya Lakhani MD  08/08/24 2011

## 2024-08-08 NOTE — ED TRIAGE NOTES
Patient arrives from [facility] for a blockage in the leg. Per nurse, patient had a PVL study done this morning and saw occlusion from the groin to the foot.     Extensive medical history. Patient is blind.     AxOx4

## 2024-08-08 NOTE — ED NOTES
Pt is accepted to [facility] ED-ED  Accepting: Dr. Denisse Yi  Number for report: [phone]  MMT Transport: 8:30pm

## 2024-08-08 NOTE — ED NOTES
Pt 22G in right AC show signs of infiltrations. PIV removed, RRT medic called to re-establish IV access.

## 2024-08-08 NOTE — ED NOTES
Notified pt son, Alin, that the patient is going to be transferred, he verbalized understanding. Will call and update him with ETA of transport when information is available.

## 2024-08-09 LAB
ECHO BSA: 1.78 M2
VAS LEFT ATA DIST PSV: 76.4 CM/S
VAS LEFT POP A DIST PSV: 99.1 CM/S
VAS LEFT POP A PROX PSV: 70.6 CM/S
VAS LEFT POP A PROX VEL RATIO: 0.69
VAS LEFT PTA DIST PSV: 12.6 CM/S
VAS LEFT SFA DIST PSV: 101.7 CM/S
VAS LEFT SFA DIST VEL RATIO: 254.25
VAS LEFT SFA MID PSV: 0.4 CM/S
VAS LEFT SFA PROX PSV: 0 CM/S

## 2024-09-09 ENCOUNTER — HOSPITAL ENCOUNTER (OUTPATIENT)
Facility: HOSPITAL | Age: 84
Discharge: HOME | End: 2024-09-12
Attending: UROLOGY
Payer: MEDICARE

## 2024-09-09 DIAGNOSIS — N13.39 OTHER HYDRONEPHROSIS: ICD-10-CM

## 2024-09-09 PROCEDURE — 2580000003 HC RX 258: Performed by: UROLOGY

## 2024-09-09 PROCEDURE — 50435 EXCHANGE NEPHROSTOMY CATH: CPT

## 2024-09-09 PROCEDURE — 6360000004 HC RX CONTRAST MEDICATION: Performed by: UROLOGY

## 2024-09-09 RX ORDER — SODIUM CHLORIDE 9 MG/ML
INJECTION, SOLUTION INTRAVENOUS ONCE
Status: COMPLETED | OUTPATIENT
Start: 2024-09-09 | End: 2024-09-09

## 2024-09-09 RX ORDER — IOPAMIDOL 612 MG/ML
30 INJECTION, SOLUTION INTRAVASCULAR
Status: COMPLETED | OUTPATIENT
Start: 2024-09-09 | End: 2024-09-09

## 2024-09-09 RX ADMIN — IOPAMIDOL 10 ML: 612 INJECTION, SOLUTION INTRAVENOUS at 09:21

## 2024-09-09 RX ADMIN — SODIUM CHLORIDE: 9 INJECTION, SOLUTION INTRAVENOUS at 09:21

## 2024-10-17 NOTE — PRE-PROCEDURE INSTRUCTIONS
Spoke to patient's son Alin to schedule appointment.  Instructed to arrive at 8:30am for 9am appointment.  Instructed no need to be NPO at midnight and must have  to transport home.   Pt denies anticoagulation use.   Medications and allergies reviewed.  Opportunity for questions provided.

## 2024-10-28 ENCOUNTER — HOSPITAL ENCOUNTER (OUTPATIENT)
Facility: HOSPITAL | Age: 84
Discharge: HOME | End: 2024-10-31
Attending: UROLOGY
Payer: MEDICARE

## 2024-10-28 DIAGNOSIS — N13.39 OTHER HYDRONEPHROSIS: ICD-10-CM

## 2024-10-28 PROCEDURE — 6360000004 HC RX CONTRAST MEDICATION: Performed by: UROLOGY

## 2024-10-28 PROCEDURE — 2580000003 HC RX 258: Performed by: UROLOGY

## 2024-10-28 PROCEDURE — 50435 EXCHANGE NEPHROSTOMY CATH: CPT

## 2024-10-28 RX ORDER — IOPAMIDOL 612 MG/ML
30 INJECTION, SOLUTION INTRAVASCULAR
Status: COMPLETED | OUTPATIENT
Start: 2024-10-28 | End: 2024-10-28

## 2024-10-28 RX ORDER — SODIUM CHLORIDE 9 MG/ML
INJECTION, SOLUTION INTRAVENOUS ONCE
Status: COMPLETED | OUTPATIENT
Start: 2024-10-28 | End: 2024-10-28

## 2024-10-28 RX ADMIN — IOPAMIDOL 10 ML: 612 INJECTION, SOLUTION INTRAVENOUS at 09:31

## 2024-10-28 RX ADMIN — SODIUM CHLORIDE: 9 INJECTION, SOLUTION INTRAVENOUS at 09:31

## 2024-11-23 NOTE — PROGRESS NOTES
1353 Verbal  report given to Jose Luis Luis RN  by Dev Montesinos RN from ED. Report included the following information SBAR, Kardex, OR Summary, Intake/Output and MAR.    1420 Pt arrive to unit. 1425 Dual skin assessment done with Roberto Ingram RN. Sacrum redden and small opening to mid sacrum. Change mepilex to sacrum. 1620 Verbal order with read back per Dr Benny Delvalle apply nystatin powder to left pannus BID.    1642 Noted PICC  Line dressing soiled, Dressing changed, CDI. 1715 Verbal order with read back per MD glucose check ac/hs. Shift summary: no s/s of distress noted. Pt is in bed with and bed in lowest position call light and phone with in reach. 1935 Bedside and verbal shift change report given by Jose Luis Luis RN (off going nurse) to Alexandria Turpin RN(on coming nurse). Report included the following information SBAR, Kardex, OR Summary, Intake/Output and MAR. Patient

## 2024-12-16 NOTE — PRE-PROCEDURE INSTRUCTIONS
Spoke to patient's son to re-schedule appointment due to lack of transport from facility.  Instructed to arrive at 9am for 9:30am appointment.  Pt denies anticoagulation use.   Medications and allergies reviewed.  Opportunity for questions provided.

## 2024-12-23 ENCOUNTER — HOSPITAL ENCOUNTER (OUTPATIENT)
Facility: HOSPITAL | Age: 84
Discharge: HOME | End: 2024-12-26
Attending: UROLOGY
Payer: MEDICARE

## 2024-12-23 DIAGNOSIS — N13.39 OTHER HYDRONEPHROSIS: ICD-10-CM

## 2024-12-23 PROCEDURE — 6360000004 HC RX CONTRAST MEDICATION: Performed by: STUDENT IN AN ORGANIZED HEALTH CARE EDUCATION/TRAINING PROGRAM

## 2024-12-23 PROCEDURE — 97602 WOUND(S) CARE NON-SELECTIVE: CPT

## 2024-12-23 PROCEDURE — 50435 EXCHANGE NEPHROSTOMY CATH: CPT

## 2024-12-23 PROCEDURE — 2580000003 HC RX 258: Performed by: UROLOGY

## 2024-12-23 RX ORDER — IOPAMIDOL 612 MG/ML
100 INJECTION, SOLUTION INTRAVASCULAR
Status: COMPLETED | OUTPATIENT
Start: 2024-12-23 | End: 2024-12-23

## 2024-12-23 RX ORDER — SODIUM CHLORIDE 9 MG/ML
INJECTION, SOLUTION INTRAVENOUS ONCE
Status: COMPLETED | OUTPATIENT
Start: 2024-12-23 | End: 2024-12-23

## 2024-12-23 RX ADMIN — IOPAMIDOL 8 ML: 612 INJECTION, SOLUTION INTRAVENOUS at 09:42

## 2024-12-23 RX ADMIN — SODIUM CHLORIDE: 9 INJECTION, SOLUTION INTRAVENOUS at 09:41

## 2024-12-23 NOTE — CONSULTS
6/19/2020    IR GUIDED NEPHROSTOMY CATH EXCHANGE  6/1/2020    IR GUIDED NEPHROSTOMY CATH EXCHANGE  4/13/2020    IR GUIDED NEPHROSTOMY CATH EXCHANGE  1/28/2019    IR GUIDED NEPHROSTOMY CATH EXCHANGE  3/21/2019    IR GUIDED NEPHROSTOMY CATH EXCHANGE  5/13/2019    IR GUIDED NEPHROSTOMY CATH EXCHANGE  6/27/2019    IR GUIDED NEPHROSTOMY CATH EXCHANGE  8/16/2019    IR GUIDED NEPHROSTOMY CATH EXCHANGE  10/3/2019    IR GUIDED NEPHROSTOMY CATH EXCHANGE  11/18/2019    IR GUIDED NEPHROSTOMY CATH EXCHANGE  1/6/2020    IR GUIDED NEPHROSTOMY CATH EXCHANGE  2/24/2020    IR GUIDED NEPHROSTOMY CATH PLACEMENT RIGHT  6/21/2020    IR NEPHROSTOMY PERCUTANEOUS RIGHT 6/21/2020 [facility]    IR GUIDED NEPHROSTOMY CATH PLACEMENT RIGHT  6/7/2021    IR NEPHROSTOMY PERCUTANEOUS RIGHT 6/7/2021 [facility]    IR GUIDED NEPHROSTOMY CATH PLACEMENT RIGHT  11/26/2018    IR NEPHROSTOMY PERCUTANEOUS RIGHT 11/26/2018 [facility]    IR GUIDED NEPHROSTOMY CATH PLACEMENT RIGHT  6/7/2021    IR GUIDED NEPHROSTOMY CATH PLACEMENT RIGHT  6/21/2020    IR GUIDED NEPHROSTOMY CATH PLACEMENT RIGHT  3/9/2024    IR NEPHROSTOMY PERCUTANEOUS RIGHT 3/9/2024 [facility]    ORTHOPEDIC SURGERY      OTHER SURGICAL HISTORY      kidney drainage tube    UROLOGICAL SURGERY      kidney removal       FAMILY HISTORY    No family history on file.    SOCIAL HISTORY    Social History     Tobacco Use    Smoking status: Never    Smokeless tobacco: Never   Substance Use Topics    Alcohol use: No    Drug use: Never       ALLERGIES    Allergies   Allergen Reactions    Bactrim [Sulfamethoxazole-Trimethoprim] Other (See Comments)    Morphine Nausea And Vomiting    Penicillins Rash       MEDICATIONS    Current Outpatient Medications on File Prior to Encounter   Medication Sig Dispense Refill    gabapentin (NEURONTIN) 300 MG capsule Take 1 capsule by mouth 2 times daily for 1 day. Max Daily Amount: 600 mg 2 capsule 0    insulin detemir (LEVEMIR) 100 UNIT/ML injection vial

## 2024-12-24 ENCOUNTER — HOSPITAL ENCOUNTER (INPATIENT)
Facility: HOSPITAL | Age: 84
LOS: 9 days | Discharge: NURSING, REHAB OR LONG-TERM CARE | DRG: 641 | End: 2025-01-02
Attending: EMERGENCY MEDICINE | Admitting: HOSPITALIST
Payer: MEDICARE

## 2024-12-24 DIAGNOSIS — F41.9 ANXIETY: Primary | ICD-10-CM

## 2024-12-24 DIAGNOSIS — N30.00 ACUTE CYSTITIS WITHOUT HEMATURIA: ICD-10-CM

## 2024-12-24 PROBLEM — E87.5 HYPERKALEMIA: Status: ACTIVE | Noted: 2024-12-24

## 2024-12-24 LAB
ALBUMIN SERPL-MCNC: 3.1 G/DL (ref 3.4–5)
ALBUMIN/GLOB SERPL: 0.6 (ref 0.8–1.7)
ALP SERPL-CCNC: 139 U/L (ref 45–117)
ALT SERPL-CCNC: 22 U/L (ref 13–56)
ANION GAP SERPL CALC-SCNC: 10 MMOL/L (ref 3–18)
APPEARANCE UR: ABNORMAL
AST SERPL-CCNC: 15 U/L (ref 10–38)
BACTERIA URNS QL MICRO: ABNORMAL /HPF
BASOPHILS # BLD: 0.1 K/UL (ref 0–0.1)
BASOPHILS NFR BLD: 1 % (ref 0–2)
BILIRUB SERPL-MCNC: 0.5 MG/DL (ref 0.2–1)
BILIRUB UR QL: NEGATIVE
BUN SERPL-MCNC: 74 MG/DL (ref 7–18)
BUN/CREAT SERPL: 25 (ref 12–20)
CALCIUM SERPL-MCNC: 8 MG/DL (ref 8.5–10.1)
CHLORIDE SERPL-SCNC: 110 MMOL/L (ref 100–111)
CO2 SERPL-SCNC: 16 MMOL/L (ref 21–32)
COLOR UR: YELLOW
CREAT SERPL-MCNC: 2.95 MG/DL (ref 0.6–1.3)
DIFFERENTIAL METHOD BLD: ABNORMAL
EOSINOPHIL # BLD: 0.4 K/UL (ref 0–0.4)
EOSINOPHIL NFR BLD: 3 % (ref 0–5)
EPITH CASTS URNS QL MICRO: ABNORMAL /LPF (ref 0–5)
ERYTHROCYTE [DISTWIDTH] IN BLOOD BY AUTOMATED COUNT: 15.9 % (ref 11.6–14.5)
GLOBULIN SER CALC-MCNC: 5.1 G/DL (ref 2–4)
GLUCOSE BLD STRIP.AUTO-MCNC: 148 MG/DL (ref 70–110)
GLUCOSE SERPL-MCNC: 126 MG/DL (ref 74–99)
GLUCOSE UR STRIP.AUTO-MCNC: NEGATIVE MG/DL
HCT VFR BLD AUTO: 32 % (ref 35–45)
HGB BLD-MCNC: 9.9 G/DL (ref 12–16)
HGB UR QL STRIP: ABNORMAL
IMM GRANULOCYTES # BLD AUTO: 0.1 K/UL (ref 0–0.04)
IMM GRANULOCYTES NFR BLD AUTO: 0 % (ref 0–0.5)
KETONES UR QL STRIP.AUTO: NEGATIVE MG/DL
LEUKOCYTE ESTERASE UR QL STRIP.AUTO: ABNORMAL
LIPASE SERPL-CCNC: 55 U/L (ref 13–75)
LYMPHOCYTES # BLD: 1.9 K/UL (ref 0.9–3.6)
LYMPHOCYTES NFR BLD: 15 % (ref 21–52)
MCH RBC QN AUTO: 28.5 PG (ref 24–34)
MCHC RBC AUTO-ENTMCNC: 30.9 G/DL (ref 31–37)
MCV RBC AUTO: 92.2 FL (ref 78–100)
MONOCYTES # BLD: 0.7 K/UL (ref 0.05–1.2)
MONOCYTES NFR BLD: 6 % (ref 3–10)
NEUTS SEG # BLD: 9.3 K/UL (ref 1.8–8)
NEUTS SEG NFR BLD: 75 % (ref 40–73)
NITRITE UR QL STRIP.AUTO: NEGATIVE
NRBC # BLD: 0 K/UL (ref 0–0.01)
NRBC BLD-RTO: 0 PER 100 WBC
OTHER: ABNORMAL
PH UR STRIP: 5.5 (ref 5–8)
PLATELET # BLD AUTO: 463 K/UL (ref 135–420)
PMV BLD AUTO: 9.6 FL (ref 9.2–11.8)
POTASSIUM SERPL-SCNC: 5.7 MMOL/L (ref 3.5–5.5)
PROT SERPL-MCNC: 8.2 G/DL (ref 6.4–8.2)
PROT UR STRIP-MCNC: 100 MG/DL
RBC # BLD AUTO: 3.47 M/UL (ref 4.2–5.3)
RBC #/AREA URNS HPF: ABNORMAL /HPF (ref 0–5)
SODIUM SERPL-SCNC: 136 MMOL/L (ref 136–145)
SP GR UR REFRACTOMETRY: 1.02 (ref 1–1.03)
TROPONIN I SERPL HS-MCNC: 6 NG/L (ref 0–54)
TSH SERPL DL<=0.05 MIU/L-ACNC: 2.51 UIU/ML (ref 0.36–3.74)
UROBILINOGEN UR QL STRIP.AUTO: 0.2 EU/DL (ref 0.2–1)
WBC # BLD AUTO: 12.4 K/UL (ref 4.6–13.2)
WBC URNS QL MICRO: ABNORMAL /HPF (ref 0–5)

## 2024-12-24 PROCEDURE — 6370000000 HC RX 637 (ALT 250 FOR IP): Performed by: HOSPITALIST

## 2024-12-24 PROCEDURE — 6360000002 HC RX W HCPCS: Performed by: EMERGENCY MEDICINE

## 2024-12-24 PROCEDURE — 85025 COMPLETE CBC W/AUTO DIFF WBC: CPT

## 2024-12-24 PROCEDURE — 93005 ELECTROCARDIOGRAM TRACING: CPT | Performed by: EMERGENCY MEDICINE

## 2024-12-24 PROCEDURE — 82962 GLUCOSE BLOOD TEST: CPT

## 2024-12-24 PROCEDURE — 87086 URINE CULTURE/COLONY COUNT: CPT

## 2024-12-24 PROCEDURE — 81001 URINALYSIS AUTO W/SCOPE: CPT

## 2024-12-24 PROCEDURE — 80053 COMPREHEN METABOLIC PANEL: CPT

## 2024-12-24 PROCEDURE — 2580000003 HC RX 258: Performed by: EMERGENCY MEDICINE

## 2024-12-24 PROCEDURE — 83690 ASSAY OF LIPASE: CPT

## 2024-12-24 PROCEDURE — 87186 SC STD MICRODIL/AGAR DIL: CPT

## 2024-12-24 PROCEDURE — 1100000000 HC RM PRIVATE

## 2024-12-24 PROCEDURE — 99285 EMERGENCY DEPT VISIT HI MDM: CPT

## 2024-12-24 PROCEDURE — 84443 ASSAY THYROID STIM HORMONE: CPT

## 2024-12-24 PROCEDURE — 96366 THER/PROPH/DIAG IV INF ADDON: CPT

## 2024-12-24 PROCEDURE — 87088 URINE BACTERIA CULTURE: CPT

## 2024-12-24 PROCEDURE — 6360000002 HC RX W HCPCS: Performed by: HOSPITALIST

## 2024-12-24 PROCEDURE — 2580000003 HC RX 258: Performed by: HOSPITALIST

## 2024-12-24 PROCEDURE — 84484 ASSAY OF TROPONIN QUANT: CPT

## 2024-12-24 PROCEDURE — 96365 THER/PROPH/DIAG IV INF INIT: CPT

## 2024-12-24 PROCEDURE — 94640 AIRWAY INHALATION TREATMENT: CPT

## 2024-12-24 PROCEDURE — 0T25X0Z CHANGE DRAINAGE DEVICE IN KIDNEY, EXTERNAL APPROACH: ICD-10-PCS | Performed by: STUDENT IN AN ORGANIZED HEALTH CARE EDUCATION/TRAINING PROGRAM

## 2024-12-24 RX ORDER — ALBUTEROL SULFATE 0.83 MG/ML
2.5 SOLUTION RESPIRATORY (INHALATION)
Status: COMPLETED | OUTPATIENT
Start: 2024-12-24 | End: 2024-12-24

## 2024-12-24 RX ORDER — POTASSIUM CHLORIDE 7.45 MG/ML
10 INJECTION INTRAVENOUS PRN
Status: DISCONTINUED | OUTPATIENT
Start: 2024-12-24 | End: 2025-01-02 | Stop reason: HOSPADM

## 2024-12-24 RX ORDER — INSULIN GLARGINE 100 [IU]/ML
5 INJECTION, SOLUTION SUBCUTANEOUS NIGHTLY
Status: DISCONTINUED | OUTPATIENT
Start: 2024-12-24 | End: 2025-01-02 | Stop reason: HOSPADM

## 2024-12-24 RX ORDER — MAGNESIUM SULFATE IN WATER 40 MG/ML
2000 INJECTION, SOLUTION INTRAVENOUS PRN
Status: DISCONTINUED | OUTPATIENT
Start: 2024-12-24 | End: 2025-01-02 | Stop reason: HOSPADM

## 2024-12-24 RX ORDER — SODIUM CHLORIDE 9 MG/ML
INJECTION, SOLUTION INTRAVENOUS PRN
Status: DISCONTINUED | OUTPATIENT
Start: 2024-12-24 | End: 2025-01-02 | Stop reason: HOSPADM

## 2024-12-24 RX ORDER — ENOXAPARIN SODIUM 100 MG/ML
30 INJECTION SUBCUTANEOUS DAILY
Status: DISCONTINUED | OUTPATIENT
Start: 2024-12-25 | End: 2024-12-24 | Stop reason: SDUPTHER

## 2024-12-24 RX ORDER — LEVOFLOXACIN 5 MG/ML
750 INJECTION, SOLUTION INTRAVENOUS
Status: COMPLETED | OUTPATIENT
Start: 2024-12-24 | End: 2024-12-24

## 2024-12-24 RX ORDER — SODIUM CHLORIDE 9 MG/ML
INJECTION, SOLUTION INTRAVENOUS CONTINUOUS
Status: DISCONTINUED | OUTPATIENT
Start: 2024-12-24 | End: 2024-12-27

## 2024-12-24 RX ORDER — POLYETHYLENE GLYCOL 3350 17 G/17G
17 POWDER, FOR SOLUTION ORAL DAILY PRN
Status: DISCONTINUED | OUTPATIENT
Start: 2024-12-24 | End: 2025-01-02 | Stop reason: HOSPADM

## 2024-12-24 RX ORDER — SODIUM BICARBONATE 650 MG/1
1300 TABLET ORAL 2 TIMES DAILY
Status: DISCONTINUED | OUTPATIENT
Start: 2024-12-24 | End: 2024-12-26

## 2024-12-24 RX ORDER — SODIUM CHLORIDE 0.9 % (FLUSH) 0.9 %
5-40 SYRINGE (ML) INJECTION PRN
Status: DISCONTINUED | OUTPATIENT
Start: 2024-12-24 | End: 2025-01-02 | Stop reason: HOSPADM

## 2024-12-24 RX ORDER — SODIUM CHLORIDE 0.9 % (FLUSH) 0.9 %
5-40 SYRINGE (ML) INJECTION EVERY 12 HOURS SCHEDULED
Status: DISCONTINUED | OUTPATIENT
Start: 2024-12-24 | End: 2025-01-02 | Stop reason: HOSPADM

## 2024-12-24 RX ORDER — 0.9 % SODIUM CHLORIDE 0.9 %
500 INTRAVENOUS SOLUTION INTRAVENOUS ONCE
Status: COMPLETED | OUTPATIENT
Start: 2024-12-24 | End: 2024-12-24

## 2024-12-24 RX ORDER — ACETAMINOPHEN 325 MG/1
650 TABLET ORAL EVERY 6 HOURS PRN
Status: DISCONTINUED | OUTPATIENT
Start: 2024-12-24 | End: 2025-01-02 | Stop reason: HOSPADM

## 2024-12-24 RX ORDER — ONDANSETRON 2 MG/ML
4 INJECTION INTRAMUSCULAR; INTRAVENOUS EVERY 6 HOURS PRN
Status: DISCONTINUED | OUTPATIENT
Start: 2024-12-24 | End: 2025-01-02 | Stop reason: HOSPADM

## 2024-12-24 RX ORDER — ENOXAPARIN SODIUM 100 MG/ML
40 INJECTION SUBCUTANEOUS DAILY
Status: DISCONTINUED | OUTPATIENT
Start: 2024-12-24 | End: 2024-12-24

## 2024-12-24 RX ORDER — ONDANSETRON 4 MG/1
4 TABLET, ORALLY DISINTEGRATING ORAL EVERY 8 HOURS PRN
Status: DISCONTINUED | OUTPATIENT
Start: 2024-12-24 | End: 2025-01-02 | Stop reason: HOSPADM

## 2024-12-24 RX ORDER — METOPROLOL SUCCINATE 50 MG/1
50 TABLET, EXTENDED RELEASE ORAL DAILY
Status: DISCONTINUED | OUTPATIENT
Start: 2024-12-24 | End: 2025-01-02 | Stop reason: HOSPADM

## 2024-12-24 RX ORDER — LEVOTHYROXINE SODIUM 50 UG/1
50 TABLET ORAL
Status: DISCONTINUED | OUTPATIENT
Start: 2024-12-25 | End: 2025-01-02 | Stop reason: HOSPADM

## 2024-12-24 RX ORDER — HEPARIN SODIUM 5000 [USP'U]/ML
5000 INJECTION, SOLUTION INTRAVENOUS; SUBCUTANEOUS EVERY 8 HOURS SCHEDULED
Status: DISCONTINUED | OUTPATIENT
Start: 2024-12-25 | End: 2024-12-30 | Stop reason: ALTCHOICE

## 2024-12-24 RX ORDER — POTASSIUM CHLORIDE 1500 MG/1
40 TABLET, EXTENDED RELEASE ORAL PRN
Status: DISCONTINUED | OUTPATIENT
Start: 2024-12-24 | End: 2025-01-02 | Stop reason: HOSPADM

## 2024-12-24 RX ORDER — ACETAMINOPHEN 650 MG/1
650 SUPPOSITORY RECTAL EVERY 6 HOURS PRN
Status: DISCONTINUED | OUTPATIENT
Start: 2024-12-24 | End: 2025-01-02 | Stop reason: HOSPADM

## 2024-12-24 RX ORDER — CALCIUM GLUCONATE 20 MG/ML
1000 INJECTION, SOLUTION INTRAVENOUS ONCE
Status: COMPLETED | OUTPATIENT
Start: 2024-12-24 | End: 2024-12-24

## 2024-12-24 RX ADMIN — SODIUM BICARBONATE 650 MG TABLET 1300 MG: at 20:17

## 2024-12-24 RX ADMIN — CEFEPIME 1000 MG: 1 INJECTION, POWDER, FOR SOLUTION INTRAMUSCULAR; INTRAVENOUS at 16:51

## 2024-12-24 RX ADMIN — ACETAMINOPHEN 650 MG: 325 TABLET ORAL at 20:17

## 2024-12-24 RX ADMIN — SODIUM BICARBONATE 650 MG TABLET 1300 MG: at 22:57

## 2024-12-24 RX ADMIN — CALCIUM GLUCONATE 1000 MG: 20 INJECTION, SOLUTION INTRAVENOUS at 11:51

## 2024-12-24 RX ADMIN — VANCOMYCIN HYDROCHLORIDE 1000 MG: 1 INJECTION, POWDER, LYOPHILIZED, FOR SOLUTION INTRAVENOUS at 14:42

## 2024-12-24 RX ADMIN — SODIUM CHLORIDE: 9 INJECTION, SOLUTION INTRAVENOUS at 22:56

## 2024-12-24 RX ADMIN — METOPROLOL SUCCINATE 50 MG: 50 TABLET, EXTENDED RELEASE ORAL at 14:27

## 2024-12-24 RX ADMIN — SODIUM CHLORIDE 500 ML: 9 INJECTION, SOLUTION INTRAVENOUS at 16:47

## 2024-12-24 RX ADMIN — INSULIN GLARGINE 5 UNITS: 100 INJECTION, SOLUTION SUBCUTANEOUS at 20:26

## 2024-12-24 RX ADMIN — ALBUTEROL SULFATE 2.5 MG: 2.5 SOLUTION RESPIRATORY (INHALATION) at 15:35

## 2024-12-24 RX ADMIN — LEVOFLOXACIN 750 MG: 5 INJECTION, SOLUTION INTRAVENOUS at 14:26

## 2024-12-24 RX ADMIN — ENOXAPARIN SODIUM 40 MG: 100 INJECTION SUBCUTANEOUS at 14:26

## 2024-12-24 ASSESSMENT — PAIN DESCRIPTION - LOCATION: LOCATION: KNEE;SACRUM

## 2024-12-24 ASSESSMENT — PAIN SCALES - GENERAL
PAINLEVEL_OUTOF10: 6
PAINLEVEL_OUTOF10: 0

## 2024-12-24 ASSESSMENT — PAIN DESCRIPTION - DESCRIPTORS: DESCRIPTORS: ACHING

## 2024-12-24 ASSESSMENT — PAIN DESCRIPTION - ORIENTATION: ORIENTATION: LEFT

## 2024-12-24 NOTE — PROGRESS NOTES
Palliative Medicine    Thank you for the consultation to the Palliative Medicine Team. We will review the chart and meet with the patient as soon as possible.    There is an AMD on file naming Alin Hessormick, son, and Holden Debbie, son as MPOAs.    There is a DDNR on file. The DNR/DNI choice has been supported in previous admissions.     Sachi Pichardo, RN, MSN  [phone]

## 2024-12-24 NOTE — PROGRESS NOTES
This patient arrived to the unit via transport. Patient is Aox4. Patient son is at the bedside. Patient was educated about her surroundings, and also cleaned up do having a full colostomy bag. During initial full body assessment it was noted that Patient has a possible stage 2 pressure injury on her sacrum and possible stage 3/4 to her left lateral leg. Patient was left in bed, with the bed in it's lowest position, and with their personal items within reach. Their are no other concerns at this time.

## 2024-12-24 NOTE — PLAN OF CARE
Problem: Safety - Adult  Goal: Free from fall injury  Outcome: Adequate for Discharge     Problem: Chronic Conditions and Co-morbidities  Goal: Patient's chronic conditions and co-morbidity symptoms are monitored and maintained or improved  Outcome: Adequate for Discharge  Flowsheets (Taken 12/24/2024 2187)  Care Plan - Patient's Chronic Conditions and Co-Morbidity Symptoms are Monitored and Maintained or Improved:   Monitor and assess patient's chronic conditions and comorbid symptoms for stability, deterioration, or improvement   Collaborate with multidisciplinary team to address chronic and comorbid conditions and prevent exacerbation or deterioration   Update acute care plan with appropriate goals if chronic or comorbid symptoms are exacerbated and prevent overall improvement and discharge     Problem: Skin/Tissue Integrity  Goal: Absence of new skin breakdown  Description: 1.  Monitor for areas of redness and/or skin breakdown  2.  Assess vascular access sites hourly  3.  Every 4-6 hours minimum:  Change oxygen saturation probe site  4.  Every 4-6 hours:  If on nasal continuous positive airway pressure, respiratory therapy assess nares and determine need for appliance change or resting period.  Outcome: Adequate for Discharge     Problem: ABCDS Injury Assessment  Goal: Absence of physical injury  Outcome: Adequate for Discharge

## 2024-12-24 NOTE — ED NOTES
TRANSFER - OUT REPORT:    Verbal report given to Rosario FAY on Aileen Ley  being transferred to [facility] for routine progression of patient care       Report consisted of patient's Situation, Background, Assessment and   Recommendations(SBAR).     Information from the following report(s) Nurse Handoff Report, ED Encounter Summary, ED SBAR, Adult Overview, Cardiac Rhythm NSR, Quality Measures, and Neuro Assessment was reviewed with the receiving nurse.        Lines:   Peripheral IV 12/24/24 Right Forearm (Active)        Opportunity for questions and clarification was provided.      Patient transported with:  Monitor and Registered Nurse

## 2024-12-24 NOTE — ED PROVIDER NOTES
EMERGENCY DEPARTMENT HISTORY AND PHYSICAL EXAM    1:50 PM      Date: 12/24/2024  Patient Name: Aileen Ley    History of Presenting Illness     Chief Complaint   Patient presents with    abnormal labs    Urinary Tract Infection       History From: Patient and son Alin and Nursing home    Aileen Ley is a 84 y.o. female   84-year-old female with past medical history of hypertension, colectomy with colostomy, UTI with nephrostomy on the right and history of hydronephrosis on the right, also status post nephrectomy on the left,, SBO presents to the ER with hyperkalemia.  As per nursing home patient recently had a blood draw that showed hyperkalemia and was given Kayexalate at that time.    Nurse at the nursing home states that they did take a urine culture and urinalysis yesterday and the results came back today.  Patient did not start any antibiotics as of yet.    The only thing that patient is complaining of right now is just throat pain.    Denies any other acute complaints at this time           Nursing Notes were all reviewed and agreed with or any disagreements were addressed in the HPI.    PCP: Martha Bolaños MD    Current Facility-Administered Medications   Medication Dose Route Frequency Provider Last Rate Last Admin    sodium chloride 0.9 % bolus 500 mL  500 mL IntraVENous Once Adalberto Mitchell MD        levoFLOXacin (LEVAQUIN) 750 MG/150ML infusion 750 mg  750 mg IntraVENous NOW Adalberto Mitchell MD        albuterol (PROVENTIL) (2.5 MG/3ML) 0.083% nebulizer solution 2.5 mg  2.5 mg Nebulization NOW Adalberto Mitchell MD        insulin glargine (LANTUS) injection vial 5 Units  5 Units SubCUTAneous Nightly Maximo Vazquez MD        [START ON 12/25/2024] levothyroxine (SYNTHROID) tablet 50 mcg  50 mcg Oral QAM AC Maximo Vazquez MD        metoprolol succinate (TOPROL XL) extended release tablet 50 mg  50 mg Oral Daily Maximo Vazquez MD        sodium bicarbonate tablet 1,300 mg   Bactrim [Sulfamethoxazole-Trimethoprim] Other (See Comments)    Morphine Nausea And Vomiting    Penicillins Rash         Review of Systems       Review of Systems   Constitutional:  Negative for activity change and fever.   HENT:          (+) throat pain   Respiratory:  Negative for cough and shortness of breath.    Cardiovascular:  Negative for chest pain.   Gastrointestinal:  Positive for diarrhea. Negative for abdominal pain, nausea and vomiting.   Genitourinary:  Negative for dysuria and urgency.   Musculoskeletal: Negative.    Neurological: Negative.          Physical Exam   BP (!) 161/51   Pulse 84   Temp 97.8 °F (36.6 °C)   Resp 18   Ht 1.575 m (5' 2\")   SpO2 100%   BMI 29.26 kg/m²       Physical Exam  Constitutional:       Appearance: Normal appearance.   HENT:      Head: Normocephalic and atraumatic.   Eyes:      Extraocular Movements: Extraocular movements intact.      Pupils: Pupils are equal, round, and reactive to light.   Cardiovascular:      Rate and Rhythm: Normal rate and regular rhythm.   Pulmonary:      Effort: Pulmonary effort is normal.      Breath sounds: No wheezing, rhonchi or rales.   Abdominal:      General: Abdomen is flat. There is no distension.      Palpations: Abdomen is soft.      Tenderness: There is no abdominal tenderness. There is no guarding.      Comments: (+) colostomy in place  (+) R nephrostomy tube in place   Musculoskeletal:      Cervical back: Normal range of motion.   Neurological:      General: No focal deficit present.      Mental Status: She is alert and oriented to person, place, and time.           Diagnostic Study Results     Labs -  Recent Results (from the past 12 hour(s))   CMP    Collection Time: 12/24/24 11:22 AM   Result Value Ref Range    Sodium 136 136 - 145 mmol/L    Potassium 5.7 (H) 3.5 - 5.5 mmol/L    Chloride 110 100 - 111 mmol/L    CO2 16 (L) 21 - 32 mmol/L    Anion Gap 10 3.0 - 18 mmol/L    Glucose 126 (H) 74 - 99 mg/dL    BUN 74 (H) 7.0 - 18  sinus rhythm  Normal ECG  When compared with ECG of 11-NOV-2023 11:39,  No significant change was found     Urinalysis    Collection Time: 12/24/24 12:37 PM   Result Value Ref Range    Color, UA YELLOW      Appearance TURBID      Specific Gravity, UA 1.016 1.005 - 1.030      pH, Urine 5.5 5.0 - 8.0      Protein,  (A) NEG mg/dL    Glucose, Ur Negative NEG mg/dL    Ketones, Urine Negative NEG mg/dL    Bilirubin, Urine Negative NEG      Blood, Urine MODERATE (A) NEG      Urobilinogen, Urine 0.2 0.2 - 1.0 EU/dL    Nitrite, Urine Negative NEG      Leukocyte Esterase, Urine LARGE (A) NEG     Urinalysis, Micro    Collection Time: 12/24/24 12:37 PM   Result Value Ref Range    WBC, UA TOO NUMEROUS TO COUNT 0 - 5 /hpf    RBC, UA 11-20 0 - 5 /hpf    Epithelial Cells, UA 1+ 0 - 5 /lpf    BACTERIA, URINE 4+ (A) NEG /hpf    Other Microscopic performed on unspun urine.QNS to spin.         Radiologic Studies -   No orders to display         Medical Decision Making   I am the first provider for this patient.    I reviewed the vital signs, available nursing notes, past medical history, past surgical history, family history and social history.    Vital Signs-Reviewed the patient's vital signs.      EKG: ed course      ED Course: Progress Notes, Reevaluation, and Consults:    Provider Notes (Medical Decision Making):     MDM  Number of Diagnoses or Management Options  Diagnosis management comments: Ddx includes uti, acs, electrolyte abnormalities          ED Course as of 12/24/24 1402   Tue Dec 24, 2024   1058 Report as per Nursing home Nurse:     83 y/o f w/ tremors yesterday. Had 6.7 potassium, has received kayexalate and received a new nephrostomy tube and currently getting treated for a uti     [NF]   1146 EKG interpreted by me.  Normal sinus rhythm no peaked T waves  Ventricular rate 83 all intervals grossly within normal limits [NF]   1357 Spoke to son, Alin Lewis, states he wants to treat her urine,  States they are

## 2024-12-24 NOTE — ED TRIAGE NOTES
Pt BIBA Aox4 from [facility] c/o hyperkalemia. Per EMS, pts K is 6.7. Also complaints of UTI.    Pt has nephrostomy on R side of abd, colostomy bag on L side of abd.    Hx of DM

## 2024-12-24 NOTE — PROGRESS NOTES
Renal Dosing Adjustment     The following medication: Enoxaparin 40 mg subcutaneous daily was automatically dose-adjusted per [facility] P&T approved Protocols, with respect to renal function.    Pt Weight:   Wt Readings from Last 1 Encounters:   12/24/24 68.9 kg (151 lb 12.8 oz)       Previous Regimen                            Enoxaparin 40 mg subcutaneous daily    Creatinine Clearance Estimated Creatinine Clearance: 13 mL/min (A) (based on SCr of 2.95 mg/dL (H)).   BUN Lab Results   Component Value Date/Time    BUN 74 12/24/2024 11:22 AM         Assessment/Plan  Dose adjusted to Heparin 5,000 units starting 12/25/24   Pharmacy to continue to monitor patient daily.   Will make dosage adjustments based upon changing renal function.    Signed Gail Berry RPH  Contact: [phone]

## 2024-12-25 LAB
ALBUMIN SERPL-MCNC: 2 G/DL (ref 3.4–5)
ALBUMIN/GLOB SERPL: 0.4 (ref 0.8–1.7)
ALP SERPL-CCNC: 100 U/L (ref 45–117)
ALT SERPL-CCNC: 17 U/L (ref 13–56)
ANION GAP SERPL CALC-SCNC: 9 MMOL/L (ref 3–18)
AST SERPL-CCNC: 13 U/L (ref 10–38)
BASOPHILS # BLD: 0 K/UL (ref 0–0.1)
BASOPHILS NFR BLD: 1 % (ref 0–2)
BILIRUB SERPL-MCNC: 0.2 MG/DL (ref 0.2–1)
BUN SERPL-MCNC: 66 MG/DL (ref 7–18)
BUN/CREAT SERPL: 28 (ref 12–20)
CALCIUM SERPL-MCNC: 7 MG/DL (ref 8.5–10.1)
CHLORIDE SERPL-SCNC: 118 MMOL/L (ref 100–111)
CO2 SERPL-SCNC: 15 MMOL/L (ref 21–32)
CREAT SERPL-MCNC: 2.34 MG/DL (ref 0.6–1.3)
DIFFERENTIAL METHOD BLD: ABNORMAL
EKG ATRIAL RATE: 83 BPM
EKG DIAGNOSIS: NORMAL
EKG P AXIS: 73 DEGREES
EKG P-R INTERVAL: 140 MS
EKG Q-T INTERVAL: 356 MS
EKG QRS DURATION: 68 MS
EKG QTC CALCULATION (BAZETT): 418 MS
EKG R AXIS: 63 DEGREES
EKG T AXIS: 72 DEGREES
EKG VENTRICULAR RATE: 83 BPM
EOSINOPHIL # BLD: 0.3 K/UL (ref 0–0.4)
EOSINOPHIL NFR BLD: 4 % (ref 0–5)
ERYTHROCYTE [DISTWIDTH] IN BLOOD BY AUTOMATED COUNT: 15.6 % (ref 11.6–14.5)
GLOBULIN SER CALC-MCNC: 4.7 G/DL (ref 2–4)
GLUCOSE BLD STRIP.AUTO-MCNC: 81 MG/DL (ref 70–110)
GLUCOSE BLD STRIP.AUTO-MCNC: 82 MG/DL (ref 70–110)
GLUCOSE BLD STRIP.AUTO-MCNC: 84 MG/DL (ref 70–110)
GLUCOSE BLD STRIP.AUTO-MCNC: 92 MG/DL (ref 70–110)
GLUCOSE SERPL-MCNC: 85 MG/DL (ref 74–99)
HCT VFR BLD AUTO: 25.1 % (ref 35–45)
HGB BLD-MCNC: 7.8 G/DL (ref 12–16)
IMM GRANULOCYTES # BLD AUTO: 0.1 K/UL (ref 0–0.04)
IMM GRANULOCYTES NFR BLD AUTO: 1 % (ref 0–0.5)
LYMPHOCYTES # BLD: 1.3 K/UL (ref 0.9–3.6)
LYMPHOCYTES NFR BLD: 17 % (ref 21–52)
MCH RBC QN AUTO: 28.6 PG (ref 24–34)
MCHC RBC AUTO-ENTMCNC: 31.1 G/DL (ref 31–37)
MCV RBC AUTO: 91.9 FL (ref 78–100)
MONOCYTES # BLD: 0.6 K/UL (ref 0.05–1.2)
MONOCYTES NFR BLD: 7 % (ref 3–10)
NEUTS SEG # BLD: 5.2 K/UL (ref 1.8–8)
NEUTS SEG NFR BLD: 70 % (ref 40–73)
NRBC # BLD: 0 K/UL (ref 0–0.01)
NRBC BLD-RTO: 0 PER 100 WBC
PLATELET # BLD AUTO: 355 K/UL (ref 135–420)
PMV BLD AUTO: 9.5 FL (ref 9.2–11.8)
POTASSIUM SERPL-SCNC: 4.7 MMOL/L (ref 3.5–5.5)
PROT SERPL-MCNC: 6.7 G/DL (ref 6.4–8.2)
RBC # BLD AUTO: 2.73 M/UL (ref 4.2–5.3)
SODIUM SERPL-SCNC: 142 MMOL/L (ref 136–145)
WBC # BLD AUTO: 7.5 K/UL (ref 4.6–13.2)

## 2024-12-25 PROCEDURE — 36415 COLL VENOUS BLD VENIPUNCTURE: CPT

## 2024-12-25 PROCEDURE — 85025 COMPLETE CBC W/AUTO DIFF WBC: CPT

## 2024-12-25 PROCEDURE — 2500000003 HC RX 250 WO HCPCS: Performed by: HOSPITALIST

## 2024-12-25 PROCEDURE — 6370000000 HC RX 637 (ALT 250 FOR IP): Performed by: HOSPITALIST

## 2024-12-25 PROCEDURE — 82962 GLUCOSE BLOOD TEST: CPT

## 2024-12-25 PROCEDURE — 1100000000 HC RM PRIVATE

## 2024-12-25 PROCEDURE — 2580000003 HC RX 258: Performed by: HOSPITALIST

## 2024-12-25 PROCEDURE — 6360000002 HC RX W HCPCS: Performed by: HOSPITALIST

## 2024-12-25 PROCEDURE — 80053 COMPREHEN METABOLIC PANEL: CPT

## 2024-12-25 RX ADMIN — CEFEPIME 1000 MG: 1 INJECTION, POWDER, FOR SOLUTION INTRAMUSCULAR; INTRAVENOUS at 16:57

## 2024-12-25 RX ADMIN — ACETAMINOPHEN 650 MG: 325 TABLET ORAL at 21:33

## 2024-12-25 RX ADMIN — SODIUM CHLORIDE: 9 INJECTION, SOLUTION INTRAVENOUS at 15:24

## 2024-12-25 RX ADMIN — INSULIN GLARGINE 5 UNITS: 100 INJECTION, SOLUTION SUBCUTANEOUS at 21:30

## 2024-12-25 RX ADMIN — SODIUM BICARBONATE 650 MG TABLET 1300 MG: at 21:45

## 2024-12-25 RX ADMIN — HEPARIN SODIUM 5000 UNITS: 5000 INJECTION INTRAVENOUS; SUBCUTANEOUS at 21:33

## 2024-12-25 RX ADMIN — SODIUM CHLORIDE, PRESERVATIVE FREE 10 ML: 5 INJECTION INTRAVENOUS at 21:45

## 2024-12-25 RX ADMIN — ONDANSETRON 4 MG: 2 INJECTION INTRAMUSCULAR; INTRAVENOUS at 16:53

## 2024-12-25 RX ADMIN — LEVOTHYROXINE SODIUM 50 MCG: 0.05 TABLET ORAL at 06:08

## 2024-12-25 RX ADMIN — SODIUM BICARBONATE 650 MG TABLET 1300 MG: at 08:53

## 2024-12-25 RX ADMIN — METOPROLOL SUCCINATE 50 MG: 50 TABLET, EXTENDED RELEASE ORAL at 08:54

## 2024-12-25 ASSESSMENT — PAIN SCALES - GENERAL: PAINLEVEL_OUTOF10: 3

## 2024-12-25 ASSESSMENT — PAIN DESCRIPTION - DESCRIPTORS: DESCRIPTORS: ACHING

## 2024-12-25 ASSESSMENT — PAIN DESCRIPTION - LOCATION: LOCATION: GENERALIZED

## 2024-12-25 NOTE — PLAN OF CARE
Problem: Safety - Adult  Goal: Free from fall injury  Outcome: Progressing     Problem: Chronic Conditions and Co-morbidities  Goal: Patient's chronic conditions and co-morbidity symptoms are monitored and maintained or improved  Outcome: Progressing     Problem: Skin/Tissue Integrity  Goal: Absence of new skin breakdown  Description: 1.  Monitor for areas of redness and/or skin breakdown  2.  Assess vascular access sites hourly  3.  Every 4-6 hours minimum:  Change oxygen saturation probe site  4.  Every 4-6 hours:  If on nasal continuous positive airway pressure, respiratory therapy assess nares and determine need for appliance change or resting period.  Outcome: Progressing

## 2024-12-25 NOTE — PROGRESS NOTES
Hospitalist Progress Note    Patient: Aileen Ley MRN: [id number]  CSN: [id number]    YOB: 1940  Age: 84 y.o.  Sex: female    DOA: 12/24/2024 LOS:  LOS: 1 day          Chief Complain :  Hyperkalemia abnormal labs  84 y.o. 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  Is sent to ER from [facility] due to hyperkalemia and UTI.   Subjective:   Patient laying in bed, feels better, wants to eat    Assessment/Plan     Active Hospital Problems    Diagnosis     Hyperkalemia [E87.5]     CKD stage 3 due to type 2 diabetes mellitus ([facility]) [E11.22, N18.30]     Acute renal failure (ARF) ([facility]) [N17.9]     S/P AKA (above knee amputation), right ([facility]) [Z89.611]     Nephrostomy status ([facility]) [Z93.6]     Hypertension [I10]     Diabetes ([facility]) [E11.9]     Legally blind [H54.8]     S/p nephrectomy [Z90.5]           Hyperkalemia    Secondary to acute kidney injury  Received Kayexalate at nursing home  Potassium now in normal range.     DENNY with ckd3   Metabolic acidosis  on bicarb tabs  Lactic acid in normal range  Monitor renal function  Gentle IV fluids     UTI    Started on cefepime  Urine cultures from 12/23/2024 showing growth of mixed carl.  UTI with nephrostomy tube.     DM-II -   continue lantus. Accuchecks and ssi     Right AKA     Debility       DVT Prophylaxis:  []Lovenox SQ  [x] Heparin SQ  [] SCDs  [] Coumadin, Eliquis, Xarelto,   [] On Heparin gtt or therapeutic Lovenox. [] Patient refused.       Case discussed with:  [x]Patient  []Family [] Consultants  [x]Nursing

## 2024-12-25 NOTE — H&P
Hospitalist History & Physical    Patient: Aileen Ley MRN: [id number]  [facility]: [id number]    YOB: 1940  Age: 84 y.o.  Sex: female      DOA: 12/24/2024  Primary Care Provider:  Martha Bolaños MD      Assessment/Plan     Active Hospital Problems    Diagnosis     Hyperkalemia [E87.5]     CKD stage 3 due to type 2 diabetes mellitus ([facility]) [E11.22, N18.30]     Acute renal failure (ARF) ([facility]) [N17.9]     S/P AKA (above knee amputation), right ([facility]) [Z89.611]     Nephrostomy status ([facility]) [Z93.6]     Hypertension [I10]     Diabetes ([facility]) [E11.9]     Legally blind [H54.8]     S/p nephrectomy [Z90.5]        Admit to monitored floor    Hyperkalemia    Secondary to acute kidney injury  Received Kayexalate at nursing home  Follow potassium levels  Lokelma if still potassium elevated tomorrow.     DENNY with ckd3   Metabolic acidosis  on bicarb tabs  Lactic acid in normal range  Monitor renal function  Gentle IV fluids     UTI    Started on cefepime  Urine cultures from 12/23/2024 showing growth of mixed carl.  UTI with nephrostomy tube.     DM-II -   continue lantus. Accuchecks and ssi    Right AKA     Debility     DVT Prophylaxis:  []Lovenox SQ  [x] Heparin SQ  [] SCDs  [] Coumadin, Eliquis, Xarelto,   [] On Heparin gtt or therapeutic Lovenox. [] Patient refused.     Case discussed with:  [x]Patient  []Family [] Consultants  []Nursing  []Case Management   [x] ED Provider      Estimated length of stay : 2 days    CC: Hyperkalemia, abnormal labs       HPI:     Aileen Ley is a 84 y.o. 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  Is sent to ER from [facility] due to hyperkalemia and UTI.  Patient had blood draw and was noted to have elevated potassium levels.  She was given Kayexalate.  Urine cultures were done yesterday which showed growth of  cough, hemoptysis, wheezing and shortness of breath.   GI: No nausea, vomiting, diarrhea, constipation, melena or hematochezia.   : See above.   Derm: See above.   Musc/skeletal: no bone or joint complains.  Vasc: No edema, cyanosis or claudication.   Endo: No heat/cold intolerance, no polyuria,polydipsia or polyphagia.   Neuro: No unilateral weakness, numbness, tingling. No seizures.   Heme: No easy bruising or bleeding.          Physical Exam:     Physical Exam:  /69   Pulse 80   Temp 97.7 °F (36.5 °C) (Oral)   Resp 17   Ht 1.575 m (5' 2\")   Wt 68.9 kg (151 lb 12.8 oz)   SpO2 100%   BMI 27.76 kg/m²         Temp (24hrs), Av.8 °F (36.6 °C), Min:97.7 °F (36.5 °C), Max:97.8 °F (36.6 °C)    1901 -  07  In: -   Out: 100 [Urine:50]   701 - 1900  In: -   Out: 150 [Urine:150]      General:  Awake, cooperative, no distress.   Head:  Normocephalic, without obvious abnormality, atraumatic.   Eyes:  Conjunctivae/corneas clear, sclera anicteric,    Nose: Nares normal. No drainage or sinus tenderness.   Throat: Lips, mucosa, and tongue normal.    Neck: Supple, symmetrical, trachea midline, no adenopathy.       Lungs:   Clear to auscultation bilaterally.       Heart:  Regular rate and rhythm, S1, S2 normal, no murmur, click, rub or gallop.     Abdomen: Soft, non-tender. Bowel sounds normal. No masses,  No organomegaly.  Colostomy and right nephrostomy   Extremities: Right AKA   Pulses: 2+ and symmetric all extremities.   Skin: Skin color pink/pale/mottled/flushed, turgor normal. No rashes or lesions   Neurologic: CNII-XII intact. No focal motor or sensory deficit.       Labs Reviewed:  Labs: Results:       Chemistry Recent Labs     24  0506 24  1122   * 136   K 6.7* 5.7*   * 110   CO2 12* 16*   BUN 81* 74*   GLOB  --  5.1*   ,  Lab Results   Component Value Date/Time    LACTA 0.6 2020 01:15 AM      CBC w/Diff Recent Labs     24  0506 24  1122  separately)          Dear patient or family member or care giver, if you are reviewing this note and have a question regarding the medical terminology, please bring it with you to your next PCP visit.  Medical notes are meant to be a communication between medical professionals.  Additionally, portion of this note were created using voice recognition function, syntax and phonetic over may have escaped proofreading.

## 2024-12-25 NOTE — PROGRESS NOTES
Pharmacy Dosing Services:     Pharmacist Renal Dosing  Note for : Aileen Ley   Physician Dr. Maximo Vazquez    Indication: Urinary track infection  Previous Regimen cefepime 1000mg IV q12h   Serum Creatinine Lab Results   Component Value Date/Time    CREAPOC 1.1 01/16/2020 09:28 AM      Creatinine Clearance Estimated Creatinine Clearance: 13 mL/min (A) (based on SCr of 2.95 mg/dL (H)).   BUN Lab Results   Component Value Date/Time    BUN 74 12/24/2024 11:22 AM    BUNPOC 17 01/16/2020 09:28 AM       New Regimen: cefepime 1000mg IV q24h    This change is made based on pharmacy-directed automatic dose adjustment protocol for beta-lactam antibiotics with a CrCL of 11-29ml/min    Angus Song RPH , PharmD  [phone]

## 2024-12-25 NOTE — PROGRESS NOTES
Spiritual Health History and Assessment/Progress Note  [facility]    Spiritual/Emotional Needs,  , Life Adjustments,      Name: Aileen Ley MRN: [id number]    Age: 84 y.o.     Sex: female   Language: English   Mandaen: Protestant   Hyperkalemia     Date: 12/25/2024            Total Time Calculated: 15 min              Spiritual Assessment began in [address] SURGICAL/ONCOLOGY        Referral/Consult From: Rounding   Encounter Overview/Reason: Spiritual/Emotional Needs  Service Provided For: Patient    Latesha, Belief, Meaning:   Patient identifies as spiritual  Family/Friends No family/friends present      Importance and Influence:  Patient has spiritual/personal beliefs that influence decisions regarding their health  Family/Friends have spiritual/personal beliefs that influence decisions regarding the patient's health    Community:  Patient is connected with a spiritual community  Family/Friends No family/friends present    Assessment and Plan of Care:     Patient Interventions include: Facilitated expression of thoughts and feelings  Family/Friends Interventions include: No family/friends present    Patient Plan of Care: No spiritual needs identified for follow-up  Family/Friends Plan of Care: No family/friends present    Electronically signed by Chaplain Ananlisa on 12/25/2024 at 2:32 PM

## 2024-12-25 NOTE — PROGRESS NOTES
1919 - Assumed care at this time.     2015 - Assessment completed at this time. Pt A&Ox4, on RA. Denies chest pain/SOB. Lung sounds clear. Pain rated 6/10, pain medication administered per MAR. Colostomy/Nephrotomy dressings intact, draining and patent. Mepilex dressing to sacrum C/D/I. Optifoam to LLE C/D/I.  No concerns voiced. Telephone and call bell within reach, bed in lowest position. Pt encouraged to call for assistance.

## 2024-12-25 NOTE — CARE COORDINATION
12/25/24 1115   Service Assessment   Patient Orientation Other (see comment)  (dementia)   Cognition Dementia / Early Alzheimer's   History Provided By Child/Family   Primary Caregiver   (LTC at [facility])   Support Systems Children   Patient's Healthcare Decision Maker is: Named in Scanned ACP Document   PCP Verified by CM Yes   Last Visit to PCP Within last 3 months   Prior Functional Level Assistance with the following:;Bathing;Dressing;Toileting   Current Functional Level Assistance with the following:;Bathing;Dressing;Toileting   Can patient return to prior living arrangement Yes   Ability to make needs known: Unable   Family able to assist with home care needs: Yes   Would you like for me to discuss the discharge plan with any other family members/significant others, and if so, who? Yes   Financial Resources None   Community Resources None   CM/SW Referral Other (see comment)   Social/Functional History   Lives With Other (Comment)  (LTC at [facility])   Type of Home Facility   Discharge Planning   Type of Residence Long-Term Care   Living Arrangements Family Members   Current Services Prior To Admission Skilled Nursing Facility   Potential Assistance Needed N/A   DME Ordered? No   Potential Assistance Purchasing Medications No   Type of Home Care Services None   Patient expects to be discharged to: Long-term care   Follow Up Appointment: Best Day/Time  Monday AM   One/Two Story Residence One story   History of falls? 0   Services At/After Discharge   Transition of Care Consult (CM Consult) Long Term Care   Services At/After Discharge None    Resource Information Provided? No   Mode of Transport at Discharge BLS   Confirm Follow Up Transport Family   Condition of Participation: Discharge Planning   The Plan for Transition of Care is related to the following treatment goals: return to LTC at [facility]   The Patient and/or Patient Representative was provided with a Choice of Provider? Patient

## 2024-12-26 LAB
ALBUMIN SERPL-MCNC: 2.2 G/DL (ref 3.4–5)
ALBUMIN/GLOB SERPL: 0.5 (ref 0.8–1.7)
ALP SERPL-CCNC: 93 U/L (ref 45–117)
ALT SERPL-CCNC: 16 U/L (ref 13–56)
ANION GAP SERPL CALC-SCNC: 8 MMOL/L (ref 3–18)
AST SERPL-CCNC: 11 U/L (ref 10–38)
BASOPHILS # BLD: 0 K/UL (ref 0–0.1)
BASOPHILS NFR BLD: 1 % (ref 0–2)
BILIRUB SERPL-MCNC: 0.2 MG/DL (ref 0.2–1)
BUN SERPL-MCNC: 49 MG/DL (ref 7–18)
BUN/CREAT SERPL: 26 (ref 12–20)
CALCIUM SERPL-MCNC: 6.9 MG/DL (ref 8.5–10.1)
CHLORIDE SERPL-SCNC: 121 MMOL/L (ref 100–111)
CO2 SERPL-SCNC: 15 MMOL/L (ref 21–32)
CREAT SERPL-MCNC: 1.86 MG/DL (ref 0.6–1.3)
DIFFERENTIAL METHOD BLD: ABNORMAL
EOSINOPHIL # BLD: 0.2 K/UL (ref 0–0.4)
EOSINOPHIL NFR BLD: 3 % (ref 0–5)
ERYTHROCYTE [DISTWIDTH] IN BLOOD BY AUTOMATED COUNT: 15.6 % (ref 11.6–14.5)
GLOBULIN SER CALC-MCNC: 4.3 G/DL (ref 2–4)
GLUCOSE BLD STRIP.AUTO-MCNC: 109 MG/DL (ref 70–110)
GLUCOSE BLD STRIP.AUTO-MCNC: 75 MG/DL (ref 70–110)
GLUCOSE BLD STRIP.AUTO-MCNC: 84 MG/DL (ref 70–110)
GLUCOSE SERPL-MCNC: 92 MG/DL (ref 74–99)
HCT VFR BLD AUTO: 26.1 % (ref 35–45)
HGB BLD-MCNC: 8 G/DL (ref 12–16)
IMM GRANULOCYTES # BLD AUTO: 0.1 K/UL (ref 0–0.04)
IMM GRANULOCYTES NFR BLD AUTO: 1 % (ref 0–0.5)
LYMPHOCYTES # BLD: 1.1 K/UL (ref 0.9–3.6)
LYMPHOCYTES NFR BLD: 18 % (ref 21–52)
MCH RBC QN AUTO: 28.6 PG (ref 24–34)
MCHC RBC AUTO-ENTMCNC: 30.7 G/DL (ref 31–37)
MCV RBC AUTO: 93.2 FL (ref 78–100)
MONOCYTES # BLD: 0.4 K/UL (ref 0.05–1.2)
MONOCYTES NFR BLD: 7 % (ref 3–10)
NEUTS SEG # BLD: 4.3 K/UL (ref 1.8–8)
NEUTS SEG NFR BLD: 70 % (ref 40–73)
NRBC # BLD: 0 K/UL (ref 0–0.01)
NRBC BLD-RTO: 0 PER 100 WBC
PLATELET # BLD AUTO: 317 K/UL (ref 135–420)
PMV BLD AUTO: 9 FL (ref 9.2–11.8)
POTASSIUM SERPL-SCNC: 4.2 MMOL/L (ref 3.5–5.5)
PROT SERPL-MCNC: 6.5 G/DL (ref 6.4–8.2)
RBC # BLD AUTO: 2.8 M/UL (ref 4.2–5.3)
SODIUM SERPL-SCNC: 144 MMOL/L (ref 136–145)
WBC # BLD AUTO: 6.1 K/UL (ref 4.6–13.2)

## 2024-12-26 PROCEDURE — 6370000000 HC RX 637 (ALT 250 FOR IP): Performed by: HOSPITALIST

## 2024-12-26 PROCEDURE — 2580000003 HC RX 258: Performed by: HOSPITALIST

## 2024-12-26 PROCEDURE — 6360000002 HC RX W HCPCS: Performed by: HOSPITALIST

## 2024-12-26 PROCEDURE — 82962 GLUCOSE BLOOD TEST: CPT

## 2024-12-26 PROCEDURE — 80053 COMPREHEN METABOLIC PANEL: CPT

## 2024-12-26 PROCEDURE — 99223 1ST HOSP IP/OBS HIGH 75: CPT

## 2024-12-26 PROCEDURE — 36415 COLL VENOUS BLD VENIPUNCTURE: CPT

## 2024-12-26 PROCEDURE — 85025 COMPLETE CBC W/AUTO DIFF WBC: CPT

## 2024-12-26 PROCEDURE — 1100000000 HC RM PRIVATE

## 2024-12-26 RX ORDER — ALPRAZOLAM 0.5 MG
0.5 TABLET ORAL 2 TIMES DAILY PRN
Status: DISCONTINUED | OUTPATIENT
Start: 2024-12-26 | End: 2025-01-02 | Stop reason: HOSPADM

## 2024-12-26 RX ORDER — SODIUM BICARBONATE 650 MG/1
1300 TABLET ORAL 3 TIMES DAILY
Status: DISCONTINUED | OUTPATIENT
Start: 2024-12-26 | End: 2024-12-27

## 2024-12-26 RX ADMIN — HEPARIN SODIUM 5000 UNITS: 5000 INJECTION INTRAVENOUS; SUBCUTANEOUS at 07:03

## 2024-12-26 RX ADMIN — ONDANSETRON 4 MG: 2 INJECTION INTRAMUSCULAR; INTRAVENOUS at 21:27

## 2024-12-26 RX ADMIN — METOPROLOL SUCCINATE 50 MG: 50 TABLET, EXTENDED RELEASE ORAL at 08:24

## 2024-12-26 RX ADMIN — HEPARIN SODIUM 5000 UNITS: 5000 INJECTION INTRAVENOUS; SUBCUTANEOUS at 21:22

## 2024-12-26 RX ADMIN — LEVOTHYROXINE SODIUM 50 MCG: 0.05 TABLET ORAL at 07:03

## 2024-12-26 RX ADMIN — ONDANSETRON 4 MG: 2 INJECTION INTRAMUSCULAR; INTRAVENOUS at 13:45

## 2024-12-26 RX ADMIN — HEPARIN SODIUM 5000 UNITS: 5000 INJECTION INTRAVENOUS; SUBCUTANEOUS at 13:45

## 2024-12-26 RX ADMIN — SODIUM BICARBONATE 650 MG TABLET 1300 MG: at 14:41

## 2024-12-26 RX ADMIN — ALPRAZOLAM 0.5 MG: 0.5 TABLET ORAL at 14:41

## 2024-12-26 RX ADMIN — ONDANSETRON 4 MG: 2 INJECTION INTRAMUSCULAR; INTRAVENOUS at 07:03

## 2024-12-26 RX ADMIN — SODIUM CHLORIDE: 9 INJECTION, SOLUTION INTRAVENOUS at 17:41

## 2024-12-26 RX ADMIN — CEFEPIME 1000 MG: 1 INJECTION, POWDER, FOR SOLUTION INTRAMUSCULAR; INTRAVENOUS at 17:41

## 2024-12-26 RX ADMIN — SODIUM CHLORIDE: 9 INJECTION, SOLUTION INTRAVENOUS at 00:15

## 2024-12-26 RX ADMIN — ALPRAZOLAM 0.5 MG: 0.5 TABLET ORAL at 21:51

## 2024-12-26 RX ADMIN — SODIUM BICARBONATE 650 MG TABLET 1300 MG: at 08:23

## 2024-12-26 RX ADMIN — SODIUM CHLORIDE: 9 INJECTION, SOLUTION INTRAVENOUS at 08:17

## 2024-12-26 NOTE — PROGRESS NOTES
Hospitalist Progress Note    Patient: Aileen Ley MRN: [id number]  CSN: [id number]    YOB: 1940  Age: 84 y.o.  Sex: female    DOA: 12/24/2024 LOS:  LOS: 2 days          Chief Complain :  Hyperkalemia abnormal labs  84 y.o. 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  Is sent to ER from [facility] due to hyperkalemia and UTI.   Subjective:   Patient laying in bed, feels better, wants medication for her anxiety.    Assessment/Plan     Active Hospital Problems    Diagnosis     Hyperkalemia [E87.5]     CKD stage 3 due to type 2 diabetes mellitus ([facility]) [E11.22, N18.30]     Acute renal failure (ARF) ([facility]) [N17.9]     S/P AKA (above knee amputation), right ([facility]) [Z89.611]     Nephrostomy status ([facility]) [Z93.6]     Hypertension [I10]     Diabetes ([facility]) [E11.9]     Legally blind [H54.8]     S/p nephrectomy [Z90.5]           Hyperkalemia    Secondary to acute kidney injury  Received Kayexalate at nursing home  Potassium now in normal range.     DENNY with ckd3   Metabolic acidosis  on bicarb tabs  Lactic acid in normal range  Monitor renal function  Gentle IV fluids     UTI    Started on cefepime  Urine cultures from 12/23/2024 showing growth of mixed carl.  UTI with nephrostomy tube.     DM-II    continue lantus. Accuchecks and ssi     Right AKA     Debility       DVT Prophylaxis:  []Lovenox SQ  [x] Heparin SQ  [] SCDs  [] Coumadin, Eliquis, Xarelto,   [] On Heparin gtt or therapeutic Lovenox. [] Patient refused.       Case discussed with:  [x]Patient  []Family []

## 2024-12-26 NOTE — PROGRESS NOTES
Bedside and Verbal shift change report given to Nidhi RN (oncoming nurse) by Mara RN (offgoing nurse). Report included the following information Nurse Handoff Report, Adult Overview, Intake/Output, MAR, Recent Results, and Med Rec Status.

## 2024-12-26 NOTE — ACP (ADVANCE CARE PLANNING)
Advance Care Planning     Palliative Team Advance Care Planning (ACP) Conversation    Date of Conversation: 12/26/24    Individuals present for the conversation: Patient with decision making capacity and Son Alin (via telephone)     ACP documents on file prior to discussion:  -Power of  for Healthcare  -Portable DNR    Healthcare Decision Maker:    Primary Decision Maker: Alin Lewis - Child - [phone]    Secondary Decision Maker: Holden Lewis - Child - [phone]     Conversation Summary:    1115 Seen today in room 316 along with Yael Berman NP.  Lying supine. Using audio device--wearing headphone.  Awake, alert. Oriented x 4 Respirations unlabored on room air.  Able to speak in full  sentences.     States feeling very anxious and requesting medication for same. Also feeling very nauseated. Used anti-emetic earlier that \"helped for a little while\". Concerned that she had not seen provider yet today--explained they were out rounding.     Came to the ED 12/24/2024 from [facility] per EMS for hyperkalemia and UTI.     PMH significant for T2DM, nephrostomy, left nephrectomy, colectomy s/p colostomy, CKD III, RIGHT AKA (information gathered from medical records)    Admitted to telemetry for hyperkalemia (trend K, lokelma if indicated), DENNY with CKD III (bicarb PO, trend renal labs, gentle IVF), UTI (IV antibiotics, culture resulted from [facility] specimen), T2DM (sliding scale with accucheks).    Palliative Medicine consultation placed by Dr Vazquez for goals of care discussion    Introduced role of palliative medicine to the hospitalized patient.     Lives at [facility]. Prior to admission, she was mostly dependent in ADLs.     Telephone call with Alin, maxim/MPOA. He had seen his mother earlier today and acknowledged her anxiety    There is an AMD on file dated 11/14/2023 naming her sons, Alin and Holden, as her MPOAs. Alin confirmed this was accurate    There is a DDNR on file  dated 5/22/2018 Reviewed code status options. Discussed benefits and burdens of cardiac resuscitation and intubation/ventilation. Alin confirmed this was accurate.    Disposition plan: anticipate return to [facility]    Resuscitation Status: DNR/DNI    Documentation Completed:  -No new documents completed.    Palliative Medicine will continue to follow Aileen Ley  and her family during her hospitalization and support them as they make healthcare decisions and define goals of care.    Sachi Pichardo RN, MSN  Palliative Medicine  P: [phone]

## 2024-12-26 NOTE — PROGRESS NOTES
Bedside and Verbal shift change report given to NYA Collazo (oncoming nurse) by NYA Summers (offgoing nurse). Report included the following information Nurse Handoff Report, Intake/Output, and MAR.

## 2024-12-26 NOTE — PROGRESS NOTES
Speech-Language Pathology    Orders received and chart reviewed. Pt called out \"nurse!\" As this writer donned PPE outside of room. Upon entry, pt endorsed stomach pain and that she was \"so hungry\"  Pt then endorsed needing to vomit. Pt was quickly repositioned to upright and receptacle for emesis provided. Pt proceeded to dry heave and vomit clear liquid. RN at bedside. ST will follow up for skilled SLP eval as pt's medical condition and schedule allow.    Thank you for this referral,    Arianne Ramirez M.S., CF-SLP  Speech-Language Pathologist

## 2024-12-26 NOTE — PROGRESS NOTES
2320  Bedside and Verbal  report given to Ricarda SAXENA RN (oncoming nurse). Report included the following information Nurse Handoff Report, Index, Adult Overview, Intake/Output, MAR, and Recent Results.

## 2024-12-26 NOTE — PROGRESS NOTES
Bedside and Verbal shift change report given to ABDELRAHMAN Hughes RN (oncoming nurse) by LESLEE Mayberry RN (offgoing nurse). Report included the following information Nurse Handoff Report, ED Encounter Summary, Adult Overview, Intake/Output, MAR, and Recent Results, UPDATES.

## 2024-12-26 NOTE — CARE COORDINATION
Pt discussed in IDR . Pt from [facility]. Pt to return to LTC facility.   Pt getting abx for UTI.     Cris Cruz MSW, Supervisee in Social Work  Inpatient Case Mananger

## 2024-12-26 NOTE — PLAN OF CARE
Problem: Safety - Adult  Goal: Free from fall injury  Outcome: Progressing     Problem: Chronic Conditions and Co-morbidities  Goal: Patient's chronic conditions and co-morbidity symptoms are monitored and maintained or improved  12/26/2024 1119 by Davian Mayberry RN  Outcome: Progressing  12/26/2024 0003 by Garland Stockton RN  Outcome: Progressing  Flowsheets (Taken 12/26/2024 0003)  Care Plan - Patient's Chronic Conditions and Co-Morbidity Symptoms are Monitored and Maintained or Improved:   Monitor and assess patient's chronic conditions and comorbid symptoms for stability, deterioration, or improvement   Collaborate with multidisciplinary team to address chronic and comorbid conditions and prevent exacerbation or deterioration   Update acute care plan with appropriate goals if chronic or comorbid symptoms are exacerbated and prevent overall improvement and discharge     Problem: Skin/Tissue Integrity  Goal: Absence of new skin breakdown  Description: 1.  Monitor for areas of redness and/or skin breakdown  2.  Assess vascular access sites hourly  3.  Every 4-6 hours minimum:  Change oxygen saturation probe site  4.  Every 4-6 hours:  If on nasal continuous positive airway pressure, respiratory therapy assess nares and determine need for appliance change or resting period.  Outcome: Progressing     Problem: ABCDS Injury Assessment  Goal: Absence of physical injury  Outcome: Progressing     Problem: Pain  Goal: Verbalizes/displays adequate comfort level or baseline comfort level  12/26/2024 1119 by Davian Mayberry RN  Outcome: Progressing  12/26/2024 0003 by Garland Stockton RN  Outcome: Progressing  Flowsheets (Taken 12/26/2024 0003)  Verbalizes/displays adequate comfort level or baseline comfort level:   Assess pain using appropriate pain scale   Administer analgesics based on type and severity of pain and evaluate response   Encourage patient to monitor pain and request assistance   Implement

## 2024-12-26 NOTE — PROGRESS NOTES
0755  Assumed care of Pt. Pt is A&O x4. IV is patent and infusing NS @125ml/hr. Pt denies pain, chest pain, and SOB. Pt denies numbness/tingling to all extremities. Pt has Right BKA. Pt has wound to left calf and sacrum-both covered with mepilex. Tele monitor in place. Colostomy clean, dry, and intact. Nephrostomy clean, dry, and intact. Bed locked in lowest position, call bell in reach.     0815  Vitals obtained. Assessment performed-see flowsheet. AM medications given. Family at bedside. Pt is verbalizing agitation with doctors-Pt states she feels like she is not being listened to. Pt still nauseous at this time.     1215  Vitals obtained. Pt refuses food at this time, states she is too nauseous. This RN did get Pt to eat a couple bites of applesauce. Pt states she is anxious.     1232  MD aware of Pt's anxiety.     1243  MD ordered xanax 0.5mg PO 2 times daily PRN for anxiety.      1345  PRN zofran given for nausea, Pt tolerated well.     1441  Scheduled medication given, Pt tolerated well. PRN xanax given for anxiety, Pt tolerated well. Pt ate half an applesauce.      1610  MD Vazquez aware Pt was taken off sliding scale insulin. ACHS BS checks discontinued. BS should be obtained q6hr per MD.    1615  Vitals obtained. BS obtained. Pt encouraged to drink ensure.     1715  Per KIKI Ortega Pt refuses to eat at this time. Wants applesauce later.     1741  New NS bag hung @125ml/hr. Scheduled medication hung @100ml/hr, Pt tolerating well.

## 2024-12-26 NOTE — CONSULTS
Wound Care Note:  Patient was seen in IR for same areas on 12/23/24 assessment for complete assessment see note.  Pictures from 12/23/24 placed below for reference.  Chart audited for low deniz score of 13 and wound consult, patient with high risk for skin breakdown.  Does patient have documentation of pressure injury?  yes - sacrum.  Deniz score order set started on 12/24/2024                 Skin Care & Pressure Relief Recommendations  Minimize layers of linen  Pads under patient to optimize support surface  Turn/reposition approximately every 2 hours  Use pillow wedges to maintain positioning but do not put pillow directly over wounds  Manage incontinence   Promote continence; Skin Protective lotion/cream to buttocks and sacrum daily and as needed with incontinence care  Offload heels pillows    Wound care nurse will follow during admission for wounds.

## 2024-12-26 NOTE — PROGRESS NOTES
Physician Progress Note      PATIENT:               KIKO DIOP  CSN #:                  [id number]  :                       1940  ADMIT DATE:       2024 11:03 AM  DISCH DATE:  RESPONDING  PROVIDER #:        Maximo Vazquez MD          QUERY TEXT:    Pt admitted with DENNY. Pt noted to have ELEVATED WBC, HR. If possible, please   document in the progress notes and discharge summary if you are evaluating and   /or treating any of the following:    The medical record reflects the following:  Risk Factors: DENNY, 84 yr old female,  Clinical Indicators: IMPN on  UTI Urine cultures from 2024 showing   growth of mixed carl, DENNY with ckd3, Metabolic acidosis  Alcalde count >100,000 COLONIES/mL  Culture Gram negative rods Abnormal  Culture CHECKING FOR POSSIBLE 2ND GRAM NEGATIVE KASSI Abnormal  WBC 12.4  , 99, 98,  Treatment: on cefepime, IV bolus, urine culture      Thank you,  Rosa Maria Horner S CDS  Options provided:  -- *** (organism, if known) Sepsis, present on admission  -- *** (organism if known) Sepsis, present on admission, now resolved  -- *** (organism if known) Sepsis, developed following admission  -- *** (localized infection such as pneumonia, UTI, or cellulitis) without   Sepsis  -- Sepsis was ruled out  -- Other - I will add my own diagnosis  -- Disagree - Not applicable / Not valid  -- Disagree - Clinically unable to determine / Unknown  -- Refer to Clinical Documentation Reviewer    PROVIDER RESPONSE TEXT:    After further study, sepsis was ruled out for this patient.    Query created by: Rosa Maria Horner on 2024 12:53 AM      Electronically signed by:  Maximo Vazquez MD 2024 9:39 AM

## 2024-12-26 NOTE — PROGRESS NOTES
Care  assisting Care Mgr RICHELLE Cheatham with Discharge Planning needs for patient.  Referral and updates sent to [facility] (patient's is from [facility]) for review.    Will follow.

## 2024-12-26 NOTE — PROGRESS NOTES
Bedside and Verbal shift change report given to LESLEE Mayberry RN (oncoming nurse) by TITO Brown RN (offgoing nurse). Report included the following information Nurse Handoff Report, ED Encounter Summary, Adult Overview, Intake/Output, MAR, and Recent Results.

## 2024-12-27 LAB
ALBUMIN SERPL-MCNC: 2.1 G/DL (ref 3.4–5)
ALBUMIN/GLOB SERPL: 0.5 (ref 0.8–1.7)
ALP SERPL-CCNC: 78 U/L (ref 45–117)
ALT SERPL-CCNC: 12 U/L (ref 13–56)
ANION GAP SERPL CALC-SCNC: 8 MMOL/L (ref 3–18)
AST SERPL-CCNC: 11 U/L (ref 10–38)
BACTERIA SPEC CULT: ABNORMAL
BACTERIA SPEC CULT: ABNORMAL
BASOPHILS # BLD: 0 K/UL (ref 0–0.1)
BASOPHILS NFR BLD: 0 % (ref 0–2)
BILIRUB SERPL-MCNC: 0.2 MG/DL (ref 0.2–1)
BUN SERPL-MCNC: 30 MG/DL (ref 7–18)
BUN/CREAT SERPL: 22 (ref 12–20)
CALCIUM SERPL-MCNC: 6.5 MG/DL (ref 8.5–10.1)
CC UR VC: ABNORMAL
CHLORIDE SERPL-SCNC: 123 MMOL/L (ref 100–111)
CO2 SERPL-SCNC: 14 MMOL/L (ref 21–32)
CREAT SERPL-MCNC: 1.37 MG/DL (ref 0.6–1.3)
DIFFERENTIAL METHOD BLD: ABNORMAL
EOSINOPHIL # BLD: 0.3 K/UL (ref 0–0.4)
EOSINOPHIL NFR BLD: 4 % (ref 0–5)
ERYTHROCYTE [DISTWIDTH] IN BLOOD BY AUTOMATED COUNT: 15.7 % (ref 11.6–14.5)
GLOBULIN SER CALC-MCNC: 4 G/DL (ref 2–4)
GLUCOSE BLD STRIP.AUTO-MCNC: 128 MG/DL (ref 70–110)
GLUCOSE BLD STRIP.AUTO-MCNC: 149 MG/DL (ref 70–110)
GLUCOSE BLD STRIP.AUTO-MCNC: 173 MG/DL (ref 70–110)
GLUCOSE SERPL-MCNC: 155 MG/DL (ref 74–99)
HCT VFR BLD AUTO: 24.7 % (ref 35–45)
HGB BLD-MCNC: 7.8 G/DL (ref 12–16)
IMM GRANULOCYTES # BLD AUTO: 0.1 K/UL (ref 0–0.04)
IMM GRANULOCYTES NFR BLD AUTO: 1 % (ref 0–0.5)
LYMPHOCYTES # BLD: 1.2 K/UL (ref 0.9–3.6)
LYMPHOCYTES NFR BLD: 15 % (ref 21–52)
MCH RBC QN AUTO: 28.3 PG (ref 24–34)
MCHC RBC AUTO-ENTMCNC: 31.6 G/DL (ref 31–37)
MCV RBC AUTO: 89.5 FL (ref 78–100)
MONOCYTES # BLD: 0.6 K/UL (ref 0.05–1.2)
MONOCYTES NFR BLD: 8 % (ref 3–10)
NEUTS SEG # BLD: 5.4 K/UL (ref 1.8–8)
NEUTS SEG NFR BLD: 71 % (ref 40–73)
NRBC # BLD: 0 K/UL (ref 0–0.01)
NRBC BLD-RTO: 0 PER 100 WBC
PLATELET # BLD AUTO: 318 K/UL (ref 135–420)
PMV BLD AUTO: 9.3 FL (ref 9.2–11.8)
POTASSIUM SERPL-SCNC: 3.6 MMOL/L (ref 3.5–5.5)
PROT SERPL-MCNC: 6.1 G/DL (ref 6.4–8.2)
RBC # BLD AUTO: 2.76 M/UL (ref 4.2–5.3)
SERVICE CMNT-IMP: ABNORMAL
SODIUM SERPL-SCNC: 145 MMOL/L (ref 136–145)
WBC # BLD AUTO: 7.7 K/UL (ref 4.6–13.2)

## 2024-12-27 PROCEDURE — 2500000003 HC RX 250 WO HCPCS: Performed by: HOSPITALIST

## 2024-12-27 PROCEDURE — 6370000000 HC RX 637 (ALT 250 FOR IP): Performed by: STUDENT IN AN ORGANIZED HEALTH CARE EDUCATION/TRAINING PROGRAM

## 2024-12-27 PROCEDURE — 1100000000 HC RM PRIVATE

## 2024-12-27 PROCEDURE — 80053 COMPREHEN METABOLIC PANEL: CPT

## 2024-12-27 PROCEDURE — 85025 COMPLETE CBC W/AUTO DIFF WBC: CPT

## 2024-12-27 PROCEDURE — 82962 GLUCOSE BLOOD TEST: CPT

## 2024-12-27 PROCEDURE — 92610 EVALUATE SWALLOWING FUNCTION: CPT

## 2024-12-27 PROCEDURE — 92526 ORAL FUNCTION THERAPY: CPT

## 2024-12-27 PROCEDURE — 36415 COLL VENOUS BLD VENIPUNCTURE: CPT

## 2024-12-27 PROCEDURE — 2580000003 HC RX 258: Performed by: STUDENT IN AN ORGANIZED HEALTH CARE EDUCATION/TRAINING PROGRAM

## 2024-12-27 PROCEDURE — 6360000002 HC RX W HCPCS: Performed by: HOSPITALIST

## 2024-12-27 PROCEDURE — 2580000003 HC RX 258: Performed by: HOSPITALIST

## 2024-12-27 PROCEDURE — 6370000000 HC RX 637 (ALT 250 FOR IP): Performed by: HOSPITALIST

## 2024-12-27 RX ORDER — SODIUM BICARBONATE 650 MG/1
1300 TABLET ORAL 4 TIMES DAILY
Status: DISCONTINUED | OUTPATIENT
Start: 2024-12-27 | End: 2025-01-02 | Stop reason: HOSPADM

## 2024-12-27 RX ORDER — SODIUM CHLORIDE, SODIUM LACTATE, POTASSIUM CHLORIDE, CALCIUM CHLORIDE 600; 310; 30; 20 MG/100ML; MG/100ML; MG/100ML; MG/100ML
INJECTION, SOLUTION INTRAVENOUS CONTINUOUS
Status: DISCONTINUED | OUTPATIENT
Start: 2024-12-27 | End: 2024-12-31

## 2024-12-27 RX ADMIN — METOPROLOL SUCCINATE 50 MG: 50 TABLET, EXTENDED RELEASE ORAL at 10:06

## 2024-12-27 RX ADMIN — LEVOTHYROXINE SODIUM 50 MCG: 0.05 TABLET ORAL at 06:51

## 2024-12-27 RX ADMIN — HEPARIN SODIUM 5000 UNITS: 5000 INJECTION INTRAVENOUS; SUBCUTANEOUS at 05:19

## 2024-12-27 RX ADMIN — ONDANSETRON 4 MG: 2 INJECTION INTRAMUSCULAR; INTRAVENOUS at 22:52

## 2024-12-27 RX ADMIN — HEPARIN SODIUM 5000 UNITS: 5000 INJECTION INTRAVENOUS; SUBCUTANEOUS at 13:20

## 2024-12-27 RX ADMIN — HEPARIN SODIUM 5000 UNITS: 5000 INJECTION INTRAVENOUS; SUBCUTANEOUS at 20:47

## 2024-12-27 RX ADMIN — SODIUM CHLORIDE, POTASSIUM CHLORIDE, SODIUM LACTATE AND CALCIUM CHLORIDE: 600; 310; 30; 20 INJECTION, SOLUTION INTRAVENOUS at 12:17

## 2024-12-27 RX ADMIN — SODIUM BICARBONATE 650 MG TABLET 1300 MG: at 17:42

## 2024-12-27 RX ADMIN — SODIUM CHLORIDE, PRESERVATIVE FREE 10 ML: 5 INJECTION INTRAVENOUS at 20:47

## 2024-12-27 RX ADMIN — SODIUM BICARBONATE 650 MG TABLET 1300 MG: at 10:05

## 2024-12-27 RX ADMIN — SODIUM BICARBONATE 650 MG TABLET 1300 MG: at 20:47

## 2024-12-27 RX ADMIN — MEROPENEM 1000 MG: 1 INJECTION INTRAVENOUS at 12:28

## 2024-12-27 RX ADMIN — ALPRAZOLAM 0.5 MG: 0.5 TABLET ORAL at 17:51

## 2024-12-27 RX ADMIN — MEROPENEM 1000 MG: 1 INJECTION INTRAVENOUS at 20:56

## 2024-12-27 RX ADMIN — SODIUM CHLORIDE: 9 INJECTION, SOLUTION INTRAVENOUS at 03:58

## 2024-12-27 RX ADMIN — INSULIN GLARGINE 5 UNITS: 100 INJECTION, SOLUTION SUBCUTANEOUS at 20:47

## 2024-12-27 ASSESSMENT — PAIN SCALES - GENERAL: PAINLEVEL_OUTOF10: 0

## 2024-12-27 NOTE — PROGRESS NOTES
0782-    Bedside and Verbal shift change report given to Shelia (oncoming nurse) by Luh (offgoing nurse). Report included the following information Nurse Handoff Report, Adult Overview, Intake/Output, MAR, and Recent Results.      1730-    Pt IV to right forearm leaking. Area swollen, no pain.      1802-    RN Ashtyn attempted to get IV access twice on left arm and was unsuccessful. Called medic to attempt to get IV

## 2024-12-27 NOTE — PROGRESS NOTES
Hospitalist Progress Note    Patient: Aileen Ley MRN: [id number]  [facility]: [id number]    YOB: 1940  Age: 84 y.o.  Sex: female    DOA: 12/24/2024 LOS:  LOS: 3 days          Chief Complain :  Hyperkalemia abnormal labs  84 y.o. 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  Is sent to ER from [facility] due to hyperkalemia and UTI.   Subjective:   Patient laying in bed, feels better, wants medication for her anxiety.    Assessment/Plan     Active Hospital Problems    Diagnosis     Hyperkalemia [E87.5]     CKD stage 3 due to type 2 diabetes mellitus ([facility]) [E11.22, N18.30]     Acute renal failure (ARF) ([facility]) [N17.9]     S/P AKA (above knee amputation), right ([facility]) [Z89.611]     Nephrostomy status ([facility]) [Z93.6]     Hypertension [I10]     Diabetes ([facility]) [E11.9]     Legally blind [H54.8]     S/p nephrectomy [Z90.5]           Hyperkalemia    Secondary to acute kidney injury  Received Kayexalate at nursing home  Potassium now in normal range.     DENNY with ckd3   Metabolic acidosis  on bicarb tabs  Lactic acid in normal range  Monitor renal function  Gentle IV fluids  Persistent and worsening metabolic acidosis, despite being on sodium bicarb tabs.  Nephrology consulted.     UTI    Started on cefepime  Urine cultures from 12/23/2024 showing growth of mixed carl.  Urine cultures from 12/24/2024 showed growth of Klebsiella pneumoniae ESBL  UTI with nephrostomy tube.  Will need to change nephrostomy tube before discharge.     DM-II    continue lantus. Accuchecks and ssi      Right AKA     Debility       DVT Prophylaxis:  []Lovenox SQ  [x] Heparin SQ  [] SCDs  [] Coumadin, Eliquis, Xarelto,   [] On Heparin gtt or therapeutic Lovenox. [] Patient refused.       Case discussed with:  [x]Patient  []Family [] Consultants  [x]Nursing  []Case Management   [] Others      Discharge to;  [] Home  [] Home Health  [] SNF/Rehab  [x] LTAC. In 2 days    Review of systems : As above and,  General: No fevers or chills.  Cardiovascular: No chest pain or pressure. No palpitations.   Pulmonary: No shortness of breath.   Gastrointestinal: No nausea, vomiting.     Physical Exam: As above and,  General: Awake, cooperative, no acute distress    HEENT: NC, Atraumatic.  PERRLA, anicteric sclerae.  Lungs: CTA Bilaterally. No Wheezing/Rhonchi/Rales.  Heart:  S1 S2,  No murmur, No Rubs, No Gallops  Abdomen: Soft, Non distended, Non tender.  +Bowel sounds, colostomy, nephrostomy tube.  Extremities: No c/c/e  Psych:   Not anxious or agitated.  Neurologic:  No acute neurological deficit.         Vital signs/Intake and Output:  Visit Vitals  BP (!) 143/54   Pulse 79   Temp 97.8 °F (36.6 °C) (Oral)   Resp 16   Ht 1.575 m (5' 2\")   Wt 68.9 kg (151 lb 12.8 oz)   SpO2 98%   BMI 27.76 kg/m²     Current Shift:  No intake/output data recorded.  Last three shifts:  12/25 1901 - 12/27 0700  In: 250 [P.O.:200]  Out: 4110 [Urine:2950]            Labs: Results:       Chemistry Recent Labs     12/25/24  0536 12/26/24  0441 12/27/24  0552    144 145   K 4.7 4.2 3.6   * 121* 123*   CO2 15* 15* 14*   BUN 66* 49* 30*   GLOB 4.7* 4.3* 4.0   ,  Lab Results   Component Value Date/Time    LACTA 0.6 11/16/2020 01:15 AM      CBC w/Diff Recent Labs     12/25/24  0536 12/26/24  0441 12/27/24  0552   WBC 7.5 6.1 7.7   RBC 2.73* 2.80* 2.76*   HGB 7.8* 8.0* 7.8*   HCT 25.1* 26.1* 24.7*    317 318      Cardiac Enzymes Lab Results   Component Value Date    CKTOTAL 44 11/11/2023    CKMB <1.0 11/09/2020    CKMBINDEX  11/09/2020     are meant to be a communication between medical professionals.  Additionally, portion of this note were created using voice recognition function, syntax and phonetic over may have escaped proofreading.

## 2024-12-27 NOTE — PLAN OF CARE
Problem: Safety - Adult  Goal: Free from fall injury  Outcome: Progressing     Problem: Chronic Conditions and Co-morbidities  Goal: Patient's chronic conditions and co-morbidity symptoms are monitored and maintained or improved  Outcome: Progressing     Problem: Skin/Tissue Integrity  Goal: Absence of new skin breakdown  Description: 1.  Monitor for areas of redness and/or skin breakdown  2.  Assess vascular access sites hourly  3.  Every 4-6 hours minimum:  Change oxygen saturation probe site  4.  Every 4-6 hours:  If on nasal continuous positive airway pressure, respiratory therapy assess nares and determine need for appliance change or resting period.  Outcome: Progressing     Problem: ABCDS Injury Assessment  Goal: Absence of physical injury  Outcome: Progressing     Problem: Pain  Goal: Verbalizes/displays adequate comfort level or baseline comfort level  Outcome: Progressing

## 2024-12-27 NOTE — CONSULTS
Palliative Medicine  Patient Name: Aileen Ley  YOB: 1940  MRN: [id number]  Age: 84 y.o.  Gender: female    Date of Initial Consult: 12/24/2024  Date of Service: 12/26/2024  Time: 4:00 PM  Provider: KIRIT Jacobs CNP  Hospital Day: 4  Admit Date: 12/24/2024  Referring Provider: Maximo Vazquez MD      Reasons for Consultation:  Goals of Care    HISTORY OF PRESENT ILLNESS (HPI):   Aileen Ley is a 84 y.o. female with a past medical history of colon and vaginal cancer with colostomy, left nephrectomy, right nephrostomy, right AKA, legal blindness, dementia, and CKD 3 due to DM who was admitted on 12/24/2024 from [facility] with a diagnosis of UTI and hyperkalemia.     Psychosocial: Patient is  and has two sons, Alin and Holden, who are named as medical cervantes of .     Functional: Patient resides at [facility] and requires assistance with ADLs.    PALLIATIVE DIAGNOSES:    Encounter for palliative care  Goals of care  UTI with nephrostomy  History of colon cancer  CKD 3  Hyperkalemia    ASSESSMENT AND PLAN:   Patient seen by Scahi Pichardo RN and Yael BETH. She was reclining in bed with headphones on. She is legally blind and hard of hearing. She was alert, but very anxious. She briefly reviewed her medical and surgical history which was consistent with chart review. She stated that she is having significant anxiety, as well as nausea that did not respond very long to PRN ondansetron administered this morning. She is requesting a medication for anxiety.  Assessment required an independent historian, so additional information was obtained from son Alin by phone. He verified her medical history and agreed that when he saw her, the patient was very anxious. He confirmed Advance Medical Directive and Durable DNR; see notes below.  Discussed management of anxiety with attending physician, who placed orders for PRN  alprazolam.  PLAN: Treatment of UTI, correction of electrolyte imbalances, and return to nursing home when medically ready for discharge.  Please call with any palliative questions or concerns.  Palliative Care Team is available via perfect serve or via phone.    ADVANCE CARE PLANNING:   [x] [facility] Interdisciplinary Team has updated the ACP Navigator with Health Care Decision Maker       Primary Decision Maker: Alin Lewis - Child - [phone]    Secondary Decision Maker: Holden Lewis - Child - [phone]  Confirm Advance Directive: Yes, on file    Current Code Status: DNR     Goals of Care:  Patient has Advance Medical Directive on file naming brit Ogden and Holden as primary and secondary medical cervantes of , respectively. There is a Durable Do Not Resuscitate order on file. Inpatient code status is congruent with these documents. Palliative Medicine RN confirmed this information with maxim Ogden by phone.       Please refer to Palliative Medicine ACP notes for further details.    PALLIATIVE ASSESSMENT:      Palliative Performance Scale (PPS):  PPS: 40    ECOG:   ECOG Status : Limited self-care [3]    Modified ESAS:  Modified-Twain Symptom Assessment Scale (ESAS)  Tiredness Score: Not tired  Drowsiness Score: Not drowsy  Pain Score: No pain  Anxiety Score: 8  Nausea Score: 5  Appetite Score: 5  Dyspnea Score: No shortness of breath    Clinical Pain Assessment (nonverbal scale for severity on nonverbal patients):   Clinical Pain Assessment  Severity: 0       Vital Signs: Blood pressure (!) 151/52, pulse 75, temperature 98.1 °F (36.7 °C), temperature source Oral, resp. rate 16, height 1.575 m (5' 2\"), weight 68.9 kg (151 lb 12.8 oz), SpO2 97%.    PHYSICAL ASSESSMENT:   General: [x] Oriented x3  [] Well appearing  [] Intubated  []Ill appearing  []Other:  Mental Status: [x] Normal mental status exam  [] Drowsy  [] Confused  [x]Other: endorses extreme anxiety  Cardiovascular: [] Regular

## 2024-12-27 NOTE — CONSULTS
RENAL CONSULT  2024    Patient:  Aileen Ley  :  1940  Gender:  female  MRN #:  [id number]    Reason for Consult:     History of Present Illness:    Aileen Ley is a 84 y.o. 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 She has decent colostomy output .  When I saw her th morning she denied chest pain , fever or dyspnea  History is limited as she is hard of hearing as well . Feels okay now         Past Medical History:   Diagnosis Date    Anxiety     Cancer (HCC)     Cancer (HCC)     vaginal and colon cancer    Chronic kidney disease     Chronic pain     arthritis    Colostomy present (HCC)     Diabetes (HCC)     Dry eye syndrome     DVT (deep venous thrombosis) (HCC)     Dysphagia     Encephalopathy     Glaucoma     Hypertension     Hypomagnesemia     Hypothyroid     Ileostomy in place (HCC)     Legally blind     Thyroid disease      Past Surgical History:   Procedure Laterality Date    ABOVE KNEE AMPUTATION Right     COLOSTOMY      IR GUIDED NEPHROSTOMY CATH EXCHANGE  2021    IR GUIDED NEPHROSTOMY CATH EXCHANGE  2021    IR GUIDED NEPHROSTOMY CATH EXCHANGE  2021    IR GUIDED NEPHROSTOMY CATH EXCHANGE  2021    IR GUIDED NEPHROSTOMY CATH EXCHANGE  2022    IR GUIDED NEPHROSTOMY CATH EXCHANGE  3/21/2022    IR GUIDED NEPHROSTOMY CATH EXCHANGE  2022    IR GUIDED NEPHROSTOMY CATH EXCHANGE  2022    IR GUIDED NEPHROSTOMY CATH EXCHANGE  2022    IR GUIDED NEPHROSTOMY CATH EXCHANGE  2022    IR GUIDED NEPHROSTOMY CATH EXCHANGE  2022    IR GUIDED NEPHROSTOMY CATH EXCHANGE  2022    IR GUIDED NEPHROSTOMY CATH EXCHANGE  2023    IR GUIDED NEPHROSTOMY CATH EXCHANGE  5/10/2021    IR GUIDED  650 mg Rectal Q6H PRN Maximo Vazquez MD        heparin (porcine) injection 5,000 Units  5,000 Units SubCUTAneous 3 times per day Maximo Vazquez MD   5,000 Units at 12/27/24 0519    cefepime (MAXIPIME) 1,000 mg in sodium chloride 0.9 % 50 mL IVPB (mini-bag)  1,000 mg IntraVENous Q24H Maximo Vazquez MD   Stopped at 12/26/24 1841           Review of Symptoms:     Consitutional Symptoms: No fever, weight loss, weight gain and fatigue  Eyes:- No change in vision , no itching   Ears, Nose, Mouth, Throat:  No neck pain , swelling ,hearing loss and nose bleed.   Pulmonary: No cough and shortness of breath .  CVS: No  Chest pain , palpitation and orthopnea  GI: No nausea, vomiting, abdominal pain, and blood in stool   - No burning, frequency ,urgency  and difficulty voiding .  Neurological:No dizzy ness, syncope, focal weakness and numbness .  Skin : No rash and erythema   Endocrine: No feeling of excessive cold or warmth, hot flushes    Objective:    BP (!) 143/54   Pulse 79   Temp 97.8 °F (36.6 °C) (Oral)   Resp 17   Ht 1.575 m (5' 2\")   Wt 68.9 kg (151 lb 12.8 oz)   SpO2 97%   BMI 27.76 kg/m²     Physical Exam:    Pt awake,  comfortable   HEENT: No JVD, anicteric sclera, no neck swelling  Lung: clear to auscultation  Heart: s1s2 regualr no rubs or murmur  Abdomen: soft,  Colostomy in RLQ   Ext: no edema  CNS- Oriented to place and person     Laboratory Data:    Lab Results   Component Value Date    BUN 30 (H) 12/27/2024    BUN 49 (H) 12/26/2024    BUN 66 (H) 12/25/2024     12/27/2024     12/26/2024     12/25/2024    CO2 14 (L) 12/27/2024    CO2 15 (L) 12/26/2024    CO2 15 (L) 12/25/2024    GLUCOSE 155 (H) 12/27/2024    GLUCOSE 92 12/26/2024    GLUCOSE 85 12/25/2024     Lab Results   Component Value Date    WBC 7.7 12/27/2024    HGB 7.8 (L) 12/27/2024    HCT 24.7 (L) 12/27/2024     Lab Results   Component Value Date    CALCIUM 6.5 (L) 12/27/2024     Lab

## 2024-12-27 NOTE — PROGRESS NOTES
Comprehensive High Risk Nutrition Assessment Initial    Type and Reason for Visit:  Initial (SLP eval)    Nutrition Recommendations/Plan:   Rec make NPO until swallow SLP eval for PO safety  Diet consistency recs per SLP eval  If able to take thin liq add ONS  Rec check Phos, and cont to check Phos, Mg, and K and replete as needed  Consider adding Nephro-neida or Gi-neida and zinc sulfate 220mg x 14 days for wound healing and GI losses  Cont to monitor ostomy output   Consider repletion of severely low Ca 6.5 - defer to MD  Cont to monitor POC, wt trends, renal fx, lytes, UOP, bowel fx, wound healing and adjust recs as needed     Malnutrition Assessment:  Malnutrition Status:  At risk for malnutrition (12/27/24 1631)      Nutrition Assessment:    83yo F h/o prior vaginal and colon cancer w/L nephrectomy, R nephrostomy, and colostomy, legal blindness, DM2, R AKA, SBO, multiple decubitus ulcers; sent to ER from [facility] with hyperkalemia, UTI, DENNY, s/p Kayexalate at NH, K now wnl, DENNY on CKD3, metabolic acidosis on bicarb tabs, seen by Nephrology, DENNY improved and will need to change nephrostomy tube before discharge, debility per MD. pending SLP eval but on dysphagia soft bite sized dm diet with poor PO so far.  WOCN eval noted PI and excoriation on sacrum and per chart review h/o chronic stage 3 PI sacrum and PI on leg noted.  ~1L ostomy output and ~2L UOP noted.  wt is slight down since 71kg March '24 if admit wt correct not significant.    Nutrition Related Findings:    Cr 1.37, K 3.6, no phos or mg; lantus 5u, na bicarb, LR Wound Type: Multiple, Stage III       Current Nutrition Intake & Therapies:    Average Meal Intake: 1-25%  Average Supplements Intake: None Ordered  ADULT DIET; Dysphagia - Soft and Bite Sized; 4 carb choices (60 gm/meal)    Anthropometric Measures:  Height: 157.5 cm (5' 2.01\")  Ideal Body Weight (IBW): 110 lbs (50 kg)    Admission Body Weight: 69 kg (152 lb 1.9 oz)  Current Body      Wendy Ndiaye MS, RD  Clinical Dietitian  E: [email or web address]  O:  [phone]  C:  [phone]

## 2024-12-27 NOTE — CARE COORDINATION
Pt discussed in IDR. Pt getting treated for UTI. Pt ELINA day or 2. Pt return to [facility].     RICHELLE Cheatham, Supervisee in Social Work  Inpatient Case Mananger

## 2024-12-27 NOTE — PROGRESS NOTES
1912 - Assumed care at this time.     1923 - Assessment completed at this time. Pt A&Ox4, on RA. Denies chest pain/SOB. Lung sounds clear. Pt denies any pain. Colostomy/Nephrotomy dressings intact, draining and patent. Mepilex dressing to sacrum/LLE C/D/I. Pt has a poor appetite per pt this has been for the past 8 days. No other concerns voiced. Telephone and call bell within reach, bed in lowest position. Pt encouraged to call for assistance.

## 2024-12-27 NOTE — PLAN OF CARE
Problem: SLP Adult - Impaired Swallowing  Goal: By Discharge: Advance to least restrictive diet without signs or symptoms of aspiration for planned discharge setting.  See evaluation for individualized goals.  Description: Patient will:  1. Tolerate PO trials with 0 s/s overt distress in 4/5 trials  2. Utilize compensatory swallow strategies/maneuvers (decrease bite/sip, size/rate, alt. liq/sol) with min cues in 4/5 trials    Rec:     Soft and bite size solid diet with thin liquids  Total assistance w/ po  Aspiration precautions  HOB >45 during po intake, remain >30 for 30-45 minutes after po   Small bites/sips; alternate liquid/solid with slow feeding rate   Oral care TID  Meds per pt preference  Outcome: Progressing  SPEECH LANGUAGE PATHOLOGY BEDSIDE SWALLOW EVALUATION/TREATMENT    Patient: Aileen Ley (84 y.o. female)  Date: 12/27/2024  Primary Diagnosis: Hyperkalemia [E87.5]       Precautions: Aspiration  PLOF: As per H&P  ASSESSMENT:  Based on the objective data described below, the pt presents with mild oral dysphagia. Pt seen today for a bedside swallow evaluation. Pt significantly [place] and visually impaired, requiring a near shouting speaking volume. Denies a hx of swallowing difficulties. Endorses a softer solid diet and thin liquids at home. A cursory OME remarkable for mildly reduced lingual strength. Pt w/ limited dentition in poor repair.    No overt s/sx aspiration observed w/ dx trials of single sips of thin liquid via teaspoon and straw, puree, and regular solids. Mastication and A-P transit were mildly slow and effortful w/ positive oral clearance across trials. Laryngeal elevation/excursion present to palpation. Clear vocal quality upon phonation post swallows. Recommend continue soft and bite size solids and thin liquids, meds as tolerated, oral care TID, total assistance w/ PO, and aspiration precautions. D/w nursing. ST will continue to follow for dysphagia management, diet tolerance monitoring,      Past Medical History:   Diagnosis Date    Anxiety     Cancer (HCC)     Cancer (HCC)     vaginal and colon cancer    Chronic kidney disease     Chronic pain     arthritis    Colostomy present (HCC)     Diabetes (HCC)     Dry eye syndrome     DVT (deep venous thrombosis) (HCC)     Dysphagia     Encephalopathy     Glaucoma     Hypertension     Hypomagnesemia     Hypothyroid     Ileostomy in place (HCC)     Legally blind     Thyroid disease      Past Surgical History:   Procedure Laterality Date    ABOVE KNEE AMPUTATION Right     COLOSTOMY      IR GUIDED NEPHROSTOMY CATH EXCHANGE  8/2/2021    IR GUIDED NEPHROSTOMY CATH EXCHANGE  9/13/2021    IR GUIDED NEPHROSTOMY CATH EXCHANGE  11/9/2021    IR GUIDED NEPHROSTOMY CATH EXCHANGE  12/7/2021    IR GUIDED NEPHROSTOMY CATH EXCHANGE  1/27/2022    IR GUIDED NEPHROSTOMY CATH EXCHANGE  3/21/2022    IR GUIDED NEPHROSTOMY CATH EXCHANGE  4/29/2022    IR GUIDED NEPHROSTOMY CATH EXCHANGE  6/9/2022    IR GUIDED NEPHROSTOMY CATH EXCHANGE  7/25/2022    IR GUIDED NEPHROSTOMY CATH EXCHANGE  9/22/2022    IR GUIDED NEPHROSTOMY CATH EXCHANGE  11/4/2022    IR GUIDED NEPHROSTOMY CATH EXCHANGE  12/20/2022    IR GUIDED NEPHROSTOMY CATH EXCHANGE  2/6/2023    IR GUIDED NEPHROSTOMY CATH EXCHANGE  5/10/2021    IR GUIDED NEPHROSTOMY CATH EXCHANGE  12/16/2020    IR GUIDED NEPHROSTOMY CATH EXCHANGE  2/1/2021    IR GUIDED NEPHROSTOMY CATH EXCHANGE  3/25/2021    IR GUIDED NEPHROSTOMY CATH EXCHANGE  11/5/2020    IR GUIDED NEPHROSTOMY CATH EXCHANGE  9/30/2020    IR GUIDED NEPHROSTOMY CATH EXCHANGE  8/10/2020    IR GUIDED NEPHROSTOMY CATH EXCHANGE  6/19/2020    IR GUIDED NEPHROSTOMY CATH EXCHANGE  6/1/2020    IR GUIDED NEPHROSTOMY CATH EXCHANGE  4/13/2020    IR GUIDED NEPHROSTOMY CATH EXCHANGE  1/28/2019    IR GUIDED NEPHROSTOMY CATH EXCHANGE  3/21/2019    IR GUIDED NEPHROSTOMY CATH EXCHANGE  5/13/2019    IR GUIDED NEPHROSTOMY CATH EXCHANGE  6/27/2019    IR GUIDED NEPHROSTOMY CATH EXCHANGE  8/16/2019     IR GUIDED NEPHROSTOMY CATH EXCHANGE  10/3/2019    IR GUIDED NEPHROSTOMY CATH EXCHANGE  11/18/2019    IR GUIDED NEPHROSTOMY CATH EXCHANGE  1/6/2020    IR GUIDED NEPHROSTOMY CATH EXCHANGE  2/24/2020    IR GUIDED NEPHROSTOMY CATH PLACEMENT RIGHT  6/21/2020    IR NEPHROSTOMY PERCUTANEOUS RIGHT 6/21/2020 [facility]    IR GUIDED NEPHROSTOMY CATH PLACEMENT RIGHT  6/7/2021    IR NEPHROSTOMY PERCUTANEOUS RIGHT 6/7/2021 [facility]    IR GUIDED NEPHROSTOMY CATH PLACEMENT RIGHT  11/26/2018    IR NEPHROSTOMY PERCUTANEOUS RIGHT 11/26/2018 [facility]    IR GUIDED NEPHROSTOMY CATH PLACEMENT RIGHT  6/7/2021    IR GUIDED NEPHROSTOMY CATH PLACEMENT RIGHT  6/21/2020    IR GUIDED NEPHROSTOMY CATH PLACEMENT RIGHT  3/9/2024    IR NEPHROSTOMY PERCUTANEOUS RIGHT 3/9/2024 [facility]    ORTHOPEDIC SURGERY      OTHER SURGICAL HISTORY      kidney drainage tube    UROLOGICAL SURGERY      kidney removal     Prior Level of Function/Home Situation:  Social/Functional History  Lives With: Other (Comment) (LTC at [facility])  Type of Home: Facility    Daily Assessment:  Baseline Assessment  Temperature Spikes Noted: No  Respiratory Status: Room air  O2 Device: None (Room air)  Communication Observation: Functional ([place] and visually impaired)  Follows Directions: Simple  Current Diet : Soft and Bite-Sized  Current Liquid Diet : Thin  Dentition: Some missing teeth, Poor dental/oral hygeine  Patient Positioning: Partially reclined  Baseline Vocal Quality: Normal    Orientation:  Person, Place, and Situation    Oral Assessment:  Oral Motor   Labial: No impairment  Dentition: Poor, Limited  Oral Hygiene: Moist  Lingual: No impairment  Velum: Unable to visualize  Mandible: No impairment        P.O. Trials:  PO Trials  Neuromuscular Estim Used: No  Assessment Method(s): Palpation, Observation  Vocal Quality: No Impairment  Consistency Presented: Regular, Pureed, Thin  How Presented: SLP-fed/Presented, Straw, Spoon  Bolus

## 2024-12-27 NOTE — PROGRESS NOTES
Care  assisting Care Mgr RICHELLE Cheatham with Discharge Planning needs for patient. [facility] to [facility] (patient is from [facility]) for review.    Will follow.

## 2024-12-28 LAB
GLUCOSE BLD STRIP.AUTO-MCNC: 157 MG/DL (ref 70–110)
GLUCOSE BLD STRIP.AUTO-MCNC: 187 MG/DL (ref 70–110)
GLUCOSE BLD STRIP.AUTO-MCNC: 232 MG/DL (ref 70–110)

## 2024-12-28 PROCEDURE — 82962 GLUCOSE BLOOD TEST: CPT

## 2024-12-28 PROCEDURE — 2580000003 HC RX 258: Performed by: STUDENT IN AN ORGANIZED HEALTH CARE EDUCATION/TRAINING PROGRAM

## 2024-12-28 PROCEDURE — 1100000000 HC RM PRIVATE

## 2024-12-28 PROCEDURE — 6370000000 HC RX 637 (ALT 250 FOR IP): Performed by: STUDENT IN AN ORGANIZED HEALTH CARE EDUCATION/TRAINING PROGRAM

## 2024-12-28 PROCEDURE — 2500000003 HC RX 250 WO HCPCS: Performed by: HOSPITALIST

## 2024-12-28 PROCEDURE — 6360000002 HC RX W HCPCS: Performed by: HOSPITALIST

## 2024-12-28 PROCEDURE — 6370000000 HC RX 637 (ALT 250 FOR IP): Performed by: HOSPITALIST

## 2024-12-28 PROCEDURE — 2580000003 HC RX 258: Performed by: HOSPITALIST

## 2024-12-28 RX ORDER — METOCLOPRAMIDE HYDROCHLORIDE 5 MG/ML
5 INJECTION INTRAMUSCULAR; INTRAVENOUS EVERY 4 HOURS PRN
Status: DISPENSED | OUTPATIENT
Start: 2024-12-28 | End: 2024-12-29

## 2024-12-28 RX ORDER — MECOBALAMIN 5000 MCG
5 TABLET,DISINTEGRATING ORAL NIGHTLY
Status: DISCONTINUED | OUTPATIENT
Start: 2024-12-28 | End: 2025-01-02 | Stop reason: HOSPADM

## 2024-12-28 RX ADMIN — HEPARIN SODIUM 5000 UNITS: 5000 INJECTION INTRAVENOUS; SUBCUTANEOUS at 04:36

## 2024-12-28 RX ADMIN — SODIUM CHLORIDE, PRESERVATIVE FREE 10 ML: 5 INJECTION INTRAVENOUS at 09:39

## 2024-12-28 RX ADMIN — INSULIN GLARGINE 5 UNITS: 100 INJECTION, SOLUTION SUBCUTANEOUS at 21:52

## 2024-12-28 RX ADMIN — SODIUM CHLORIDE, POTASSIUM CHLORIDE, SODIUM LACTATE AND CALCIUM CHLORIDE: 600; 310; 30; 20 INJECTION, SOLUTION INTRAVENOUS at 04:13

## 2024-12-28 RX ADMIN — SODIUM BICARBONATE 650 MG TABLET 1300 MG: at 09:36

## 2024-12-28 RX ADMIN — SODIUM BICARBONATE 650 MG TABLET 1300 MG: at 13:43

## 2024-12-28 RX ADMIN — LEVOTHYROXINE SODIUM 50 MCG: 0.05 TABLET ORAL at 07:29

## 2024-12-28 RX ADMIN — MEROPENEM 1000 MG: 1 INJECTION INTRAVENOUS at 13:36

## 2024-12-28 RX ADMIN — HEPARIN SODIUM 5000 UNITS: 5000 INJECTION INTRAVENOUS; SUBCUTANEOUS at 15:40

## 2024-12-28 RX ADMIN — SODIUM BICARBONATE 650 MG TABLET 1300 MG: at 17:22

## 2024-12-28 RX ADMIN — SODIUM CHLORIDE, PRESERVATIVE FREE 10 ML: 5 INJECTION INTRAVENOUS at 19:28

## 2024-12-28 RX ADMIN — MEROPENEM 1000 MG: 1 INJECTION INTRAVENOUS at 04:37

## 2024-12-28 RX ADMIN — MEROPENEM 1000 MG: 1 INJECTION INTRAVENOUS at 21:56

## 2024-12-28 RX ADMIN — ALPRAZOLAM 0.5 MG: 0.5 TABLET ORAL at 04:37

## 2024-12-28 RX ADMIN — METOPROLOL SUCCINATE 50 MG: 50 TABLET, EXTENDED RELEASE ORAL at 09:37

## 2024-12-28 RX ADMIN — Medication 5 MG: at 21:48

## 2024-12-28 RX ADMIN — ONDANSETRON 4 MG: 2 INJECTION INTRAMUSCULAR; INTRAVENOUS at 19:25

## 2024-12-28 RX ADMIN — SODIUM BICARBONATE 650 MG TABLET 1300 MG: at 21:49

## 2024-12-28 RX ADMIN — SODIUM CHLORIDE, POTASSIUM CHLORIDE, SODIUM LACTATE AND CALCIUM CHLORIDE: 600; 310; 30; 20 INJECTION, SOLUTION INTRAVENOUS at 13:35

## 2024-12-28 ASSESSMENT — PAIN SCALES - GENERAL
PAINLEVEL_OUTOF10: 0

## 2024-12-28 NOTE — PROGRESS NOTES
Bedside and Verbal shift change report given to Betty (oncoming nurse) by Shelia (offgoing nurse). Report included the following information Nurse Handoff Report, Adult Overview, Intake/Output, MAR, and Recent Results.

## 2024-12-28 NOTE — PROGRESS NOTES
Hospitalist Progress Note    Patient: Aileen Ley MRN: [id number]  CSN: [id number]    YOB: 1940  Age: 84 y.o.  Sex: female    DOA: 12/24/2024 LOS:  LOS: 4 days          Chief Complain :  Hyperkalemia abnormal labs  84 y.o. 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  Is sent to ER from [facility] due to hyperkalemia and UTI.   Subjective:   Patient laying in bed, feels better.    Assessment/Plan     Active Hospital Problems    Diagnosis     Hyperkalemia [E87.5]     CKD stage 3 due to type 2 diabetes mellitus ([facility]) [E11.22, N18.30]     Acute renal failure (ARF) ([facility]) [N17.9]     S/P AKA (above knee amputation), right ([facility]) [Z89.611]     Nephrostomy status ([facility]) [Z93.6]     Hypertension [I10]     Diabetes ([facility]) [E11.9]     Legally blind [H54.8]     S/p nephrectomy [Z90.5]           Hyperkalemia    Secondary to acute kidney injury  Received Kayexalate at nursing home  Potassium now in normal range.     DENNY with ckd3   Metabolic acidosis  on bicarb tabs  Lactic acid in normal range  Monitor renal function  Gentle IV fluids  Persistent and worsening metabolic acidosis, despite being on sodium bicarb tabs.  Nephrology following.     UTI    Urine cultures from 12/23/2024 showing growth of mixed carl.  Urine cultures from 12/24/2024 showed growth of Klebsiella pneumoniae ESBL  UTI with nephrostomy tube.  Will need to change nephrostomy tube before discharge.  On meropenem     DM-II    continue lantus. Accuchecks and ssi     Right AKA     Debility       DVT  12/24/2024   ,No results found for: \"BNP\"   Coagulation No results for input(s): \"INR\", \"APTT\" in the last 72 hours.    Invalid input(s): \"PTP\"    Lipid Panel Lab Results   Component Value Date/Time    CHOL 115 08/28/2019 08:22 AM    HDL 39 08/28/2019 08:22 AM    LDL 46.2 08/28/2019 08:22 AM    VLDL 29.8 08/28/2019 08:22 AM      Pancreas No results for input(s): \"LIPASE\" in the last 72 hours.  ,No results for input(s): \"AMYLASE\" in the last 72 hours.   Liver Enzymes No results for input(s): \"TP\" in the last 72 hours.    Invalid input(s): \"ALB\", \"TBIL\", \"AP\", \"SGOT\", \"GPT\", \"DBIL\"   Thyroid Studies Lab Results   Component Value Date/Time    TSH 2.51 12/24/2024 11:22 AM        Procedures/imaging: see electronic medical records for all procedures/Xrays and details which were not copied into this note but were reviewed prior to creation of Plan    TIME: E/M Time spent with patient and patient care issues: 55 mins.     This time also includes physician non-face-to-face service time visit on the date of service such as  Preparing to see the patient (eg, review of tests)  Obtaining and/or reviewing separately obtained history  Performing a medically necessary appropriate examination and/or evaluation  Counseling and educating the patient/family/caregiver  Ordering medications, tests, or procedures  Referring and communicating with other health care professionals as needed  Documenting clinical information in the electronic or other health record  Independently interpreting results (not reported separately) and communicating results to the patient/family/caregiver  Care coordination and discharge planning with Case Management.    Dear patient or family member or Caretaker, if you are reviewing this note and have a question regarding the medical terminology, please bring it with you to your next PCP visit.  Medical notes are meant to be a communication between medical professionals.  Additionally, portion of this note were

## 2024-12-28 NOTE — PROGRESS NOTES
RENAL CONSULT PROGRESS NOTE   2024    Patient:  Aileen Ley  :  1940  Gender:  female  MRN #:  [id number]    Reason for Consult: METABOLIC ACIDOSIS     Subjective:   Lying flat in bed, breathing is  fine  BP stable  No new symptoms and concern       Objective:    BP (!) 165/72   Pulse 74   Temp 97.9 °F (36.6 °C) (Oral)   Resp 18   Ht 1.575 m (5' 2.01\")   Wt 64 kg (141 lb 1.5 oz)   SpO2 100%   BMI 25.80 kg/m²     Physical Exam:    Pt awake, Hard of hearing     Ext: no edema      Laboratory Data:    Lab Results   Component Value Date    BUN 30 (H) 2024    BUN 49 (H) 2024    BUN 66 (H) 2024     2024     2024     2024    CO2 14 (L) 2024    CO2 15 (L) 2024    CO2 15 (L) 2024    GLUCOSE 155 (H) 2024    GLUCOSE 92 2024    GLUCOSE 85 2024     Lab Results   Component Value Date    WBC 7.7 2024    HGB 7.8 (L) 2024    HCT 24.7 (L) 2024     Lab Results   Component Value Date    CALCIUM 6.5 (L) 2024     Lab Results   Component Value Date    HDL 39 (L) 2019     No results found for: \"TURBIDITY\"    Imaging Reveiwed:      EKG  Renal ultrasound  Xray       Assessment:    Aileen Ley is a 84 y.o. 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   Today's blood test is pending   Continue ringer lactate 125  cc/hour as long as she is in house     sodium bicarb to 1300 mg

## 2024-12-28 NOTE — PLAN OF CARE
Problem: Pain  Goal: Verbalizes/displays adequate comfort level or baseline comfort level  Outcome: Progressing  Flowsheets (Taken 12/28/2024 0324)  Verbalizes/displays adequate comfort level or baseline comfort level:   Assess pain using appropriate pain scale   Encourage patient to monitor pain and request assistance   Administer analgesics based on type and severity of pain and evaluate response   Implement non-pharmacological measures as appropriate and evaluate response     Problem: Safety - Adult  Goal: Free from fall injury  Outcome: Progressing  Flowsheets (Taken 12/28/2024 0324)  Free From Fall Injury: Instruct family/caregiver on patient safety

## 2024-12-28 NOTE — PLAN OF CARE
Problem: Safety - Adult  Goal: Free from fall injury  12/28/2024 1556 by Osiris Sandhu RN  Outcome: Progressing  12/28/2024 0324 by Betty Xiao RN  Outcome: Progressing  Flowsheets (Taken 12/28/2024 0324)  Free From Fall Injury: Instruct family/caregiver on patient safety     Problem: Chronic Conditions and Co-morbidities  Goal: Patient's chronic conditions and co-morbidity symptoms are monitored and maintained or improved  Outcome: Progressing  Flowsheets (Taken 12/28/2024 1204)  Care Plan - Patient's Chronic Conditions and Co-Morbidity Symptoms are Monitored and Maintained or Improved: Monitor and assess patient's chronic conditions and comorbid symptoms for stability, deterioration, or improvement     Problem: Skin/Tissue Integrity  Goal: Absence of new skin breakdown  Description: 1.  Monitor for areas of redness and/or skin breakdown  2.  Assess vascular access sites hourly  3.  Every 4-6 hours minimum:  Change oxygen saturation probe site  4.  Every 4-6 hours:  If on nasal continuous positive airway pressure, respiratory therapy assess nares and determine need for appliance change or resting period.  Outcome: Progressing     Problem: ABCDS Injury Assessment  Goal: Absence of physical injury  Outcome: Progressing     Problem: Pain  Goal: Verbalizes/displays adequate comfort level or baseline comfort level  12/28/2024 1556 by Osiris Sandhu RN  Outcome: Progressing  Flowsheets (Taken 12/28/2024 0415 by Betty Xiao RN)  Verbalizes/displays adequate comfort level or baseline comfort level:   Assess pain using appropriate pain scale   Encourage patient to monitor pain and request assistance   Administer analgesics based on type and severity of pain and evaluate response  12/28/2024 0324 by Betty Xiao RN  Outcome: Progressing  Flowsheets (Taken 12/28/2024 0324)  Verbalizes/displays adequate comfort level or baseline comfort level:   Assess pain using appropriate pain scale   Encourage  patient to monitor pain and request assistance   Administer analgesics based on type and severity of pain and evaluate response   Implement non-pharmacological measures as appropriate and evaluate response

## 2024-12-29 LAB
ANION GAP SERPL CALC-SCNC: 7 MMOL/L (ref 3–18)
BASOPHILS # BLD: 0 K/UL (ref 0–0.1)
BASOPHILS NFR BLD: 0 % (ref 0–2)
BUN SERPL-MCNC: 13 MG/DL (ref 7–18)
BUN/CREAT SERPL: 13 (ref 12–20)
CA-I SERPL-SCNC: 1.05 MMOL/L (ref 1.12–1.32)
CALCIUM SERPL-MCNC: 6.6 MG/DL (ref 8.5–10.1)
CHLORIDE SERPL-SCNC: 112 MMOL/L (ref 100–111)
CO2 SERPL-SCNC: 22 MMOL/L (ref 21–32)
CREAT SERPL-MCNC: 1.04 MG/DL (ref 0.6–1.3)
DIFFERENTIAL METHOD BLD: ABNORMAL
EOSINOPHIL # BLD: 0.1 K/UL (ref 0–0.4)
EOSINOPHIL NFR BLD: 1 % (ref 0–5)
ERYTHROCYTE [DISTWIDTH] IN BLOOD BY AUTOMATED COUNT: 15.7 % (ref 11.6–14.5)
GLUCOSE BLD STRIP.AUTO-MCNC: 238 MG/DL (ref 70–110)
GLUCOSE BLD STRIP.AUTO-MCNC: 331 MG/DL (ref 70–110)
GLUCOSE SERPL-MCNC: 217 MG/DL (ref 74–99)
HCT VFR BLD AUTO: 27.7 % (ref 35–45)
HGB BLD-MCNC: 9.1 G/DL (ref 12–16)
IMM GRANULOCYTES # BLD AUTO: 0.2 K/UL (ref 0–0.04)
IMM GRANULOCYTES NFR BLD AUTO: 1 % (ref 0–0.5)
LYMPHOCYTES # BLD: 1.2 K/UL (ref 0.9–3.6)
LYMPHOCYTES NFR BLD: 9 % (ref 21–52)
MCH RBC QN AUTO: 28.3 PG (ref 24–34)
MCHC RBC AUTO-ENTMCNC: 32.9 G/DL (ref 31–37)
MCV RBC AUTO: 86.3 FL (ref 78–100)
MONOCYTES # BLD: 1.1 K/UL (ref 0.05–1.2)
MONOCYTES NFR BLD: 9 % (ref 3–10)
NEUTS SEG # BLD: 10.2 K/UL (ref 1.8–8)
NEUTS SEG NFR BLD: 80 % (ref 40–73)
NRBC # BLD: 0 K/UL (ref 0–0.01)
NRBC BLD-RTO: 0 PER 100 WBC
PLATELET # BLD AUTO: 340 K/UL (ref 135–420)
PMV BLD AUTO: 9.5 FL (ref 9.2–11.8)
POTASSIUM SERPL-SCNC: 3.2 MMOL/L (ref 3.5–5.5)
RBC # BLD AUTO: 3.21 M/UL (ref 4.2–5.3)
SODIUM SERPL-SCNC: 141 MMOL/L (ref 136–145)
WBC # BLD AUTO: 12.8 K/UL (ref 4.6–13.2)

## 2024-12-29 PROCEDURE — 6360000002 HC RX W HCPCS: Performed by: HOSPITALIST

## 2024-12-29 PROCEDURE — 1100000000 HC RM PRIVATE

## 2024-12-29 PROCEDURE — 6370000000 HC RX 637 (ALT 250 FOR IP): Performed by: HOSPITALIST

## 2024-12-29 PROCEDURE — 2580000003 HC RX 258: Performed by: HOSPITALIST

## 2024-12-29 PROCEDURE — 36415 COLL VENOUS BLD VENIPUNCTURE: CPT

## 2024-12-29 PROCEDURE — 82962 GLUCOSE BLOOD TEST: CPT

## 2024-12-29 PROCEDURE — 85025 COMPLETE CBC W/AUTO DIFF WBC: CPT

## 2024-12-29 PROCEDURE — 2500000003 HC RX 250 WO HCPCS: Performed by: HOSPITALIST

## 2024-12-29 PROCEDURE — 82330 ASSAY OF CALCIUM: CPT

## 2024-12-29 PROCEDURE — 6360000002 HC RX W HCPCS: Performed by: STUDENT IN AN ORGANIZED HEALTH CARE EDUCATION/TRAINING PROGRAM

## 2024-12-29 PROCEDURE — 6370000000 HC RX 637 (ALT 250 FOR IP): Performed by: STUDENT IN AN ORGANIZED HEALTH CARE EDUCATION/TRAINING PROGRAM

## 2024-12-29 PROCEDURE — 80048 BASIC METABOLIC PNL TOTAL CA: CPT

## 2024-12-29 RX ORDER — POTASSIUM CHLORIDE 1500 MG/1
40 TABLET, EXTENDED RELEASE ORAL ONCE
Status: COMPLETED | OUTPATIENT
Start: 2024-12-29 | End: 2024-12-29

## 2024-12-29 RX ADMIN — METOCLOPRAMIDE 5 MG: 5 INJECTION, SOLUTION INTRAMUSCULAR; INTRAVENOUS at 00:39

## 2024-12-29 RX ADMIN — MEROPENEM 1000 MG: 1 INJECTION INTRAVENOUS at 16:27

## 2024-12-29 RX ADMIN — SODIUM CHLORIDE, PRESERVATIVE FREE 10 ML: 5 INJECTION INTRAVENOUS at 23:23

## 2024-12-29 RX ADMIN — INSULIN GLARGINE 5 UNITS: 100 INJECTION, SOLUTION SUBCUTANEOUS at 23:17

## 2024-12-29 RX ADMIN — POTASSIUM CHLORIDE 40 MEQ: 1500 TABLET, EXTENDED RELEASE ORAL at 09:21

## 2024-12-29 RX ADMIN — METOCLOPRAMIDE 5 MG: 5 INJECTION, SOLUTION INTRAMUSCULAR; INTRAVENOUS at 05:43

## 2024-12-29 RX ADMIN — ONDANSETRON 4 MG: 2 INJECTION INTRAMUSCULAR; INTRAVENOUS at 02:36

## 2024-12-29 RX ADMIN — SODIUM BICARBONATE 650 MG TABLET 1300 MG: at 09:21

## 2024-12-29 RX ADMIN — Medication 5 MG: at 23:20

## 2024-12-29 RX ADMIN — SODIUM BICARBONATE 650 MG TABLET 1300 MG: at 23:20

## 2024-12-29 RX ADMIN — METOPROLOL SUCCINATE 50 MG: 50 TABLET, EXTENDED RELEASE ORAL at 09:22

## 2024-12-29 RX ADMIN — MEROPENEM 1000 MG: 1 INJECTION INTRAVENOUS at 04:52

## 2024-12-29 RX ADMIN — HEPARIN SODIUM 5000 UNITS: 5000 INJECTION INTRAVENOUS; SUBCUTANEOUS at 15:37

## 2024-12-29 RX ADMIN — SODIUM BICARBONATE 650 MG TABLET 1300 MG: at 15:39

## 2024-12-29 RX ADMIN — METOCLOPRAMIDE 5 MG: 5 INJECTION, SOLUTION INTRAMUSCULAR; INTRAVENOUS at 13:07

## 2024-12-29 RX ADMIN — HEPARIN SODIUM 5000 UNITS: 5000 INJECTION INTRAVENOUS; SUBCUTANEOUS at 04:52

## 2024-12-29 RX ADMIN — HEPARIN SODIUM 5000 UNITS: 5000 INJECTION INTRAVENOUS; SUBCUTANEOUS at 23:19

## 2024-12-29 NOTE — PROGRESS NOTES
Hospitalist Progress Note    Patient: Aileen Ley MRN: [id number]  CSN: [id number]    YOB: 1940  Age: 84 y.o.  Sex: female    DOA: 12/24/2024 LOS:  LOS: 5 days          Chief Complain :  Hyperkalemia abnormal labs  84 y.o. 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  Is sent to ER from [facility] due to hyperkalemia and UTI.   Subjective:   Patient laying in bed, complains of nausea.    Assessment/Plan     Active Hospital Problems    Diagnosis     Hyperkalemia [E87.5]     CKD stage 3 due to type 2 diabetes mellitus ([facility]) [E11.22, N18.30]     Acute renal failure (ARF) ([facility]) [N17.9]     S/P AKA (above knee amputation), right ([facility]) [Z89.611]     Nephrostomy status ([facility]) [Z93.6]     Hypertension [I10]     Diabetes ([facility]) [E11.9]     Legally blind [H54.8]     S/p nephrectomy [Z90.5]           Hyperkalemia    Secondary to acute kidney injury  Received Kayexalate at nursing home  Potassium now in normal range.     DENNY with ckd3   Metabolic acidosis, resolved  on bicarb tabs  Lactic acid in normal range  Monitor renal function  Gentle IV fluids  Persistent and worsening metabolic acidosis, despite being on sodium bicarb tabs.  Nephrology following.     UTI    Urine cultures from 12/23/2024 showing growth of mixed carl.  Urine cultures from 12/24/2024 showed growth of Klebsiella pneumoniae ESBL  UTI with nephrostomy tube.  Will need to change nephrostomy tube before discharge.  On meropenem     DM-II    continue lantus. Accuchecks and ssi     Right AKA     Debility        DVT Prophylaxis:  []Lovenox SQ  [x] Heparin SQ  [] SCDs  [] Coumadin, Eliquis, Xarelto,   [] On Heparin gtt or therapeutic Lovenox. [] Patient refused.       Case discussed with:  [x]Patient  []Family [] Consultants  [x]Nursing  []Case Management   [] Others      Discharge to;  [] Home  [] Home Health  [x] SNF/Rehab  [] LTAC. In 2 days    Review of systems : As above and,  General: No fevers or chills.  Cardiovascular: No chest pain or pressure. No palpitations.   Pulmonary: No shortness of breath.   Gastrointestinal: No nausea, vomiting.     Physical Exam: As above and,  General: Awake, cooperative, no acute distress    HEENT: NC, Atraumatic.  PERRLA, anicteric sclerae.  Lungs: CTA Bilaterally. No Wheezing/Rhonchi/Rales.  Heart:  S1 S2,  No murmur, No Rubs, No Gallops  Abdomen: Soft, Non distended, Non tender.  +Bowel sounds, colostomy, nephrostomy tube.  Extremities: No c/c/e  Psych:   Not anxious or agitated.  Neurologic:  No acute neurological deficit.         Vital signs/Intake and Output:  Visit Vitals  BP (!) 153/77   Pulse 77   Temp 97.7 °F (36.5 °C) (Oral)   Resp 18   Ht 1.575 m (5' 2.01\")   Wt 64 kg (141 lb 1.5 oz)   SpO2 100%   BMI 25.80 kg/m²     Current Shift:  12/29 0701 - 12/29 1900  In: 180 [P.O.:180]  Out: -   Last three shifts:  12/27 1901 - 12/29 0700  In: 120 [P.O.:120]  Out: 3490 [Urine:3300]            Labs: Results:       Chemistry Recent Labs     12/27/24  0552 12/29/24  0348    141   K 3.6 3.2*   * 112*   CO2 14* 22   BUN 30* 13   GLOB 4.0  --    ,  Lab Results   Component Value Date/Time    LACTA 0.6 11/16/2020 01:15 AM      CBC w/Diff Recent Labs     12/27/24  0552 12/29/24  0348   WBC 7.7 12.8   RBC 2.76* 3.21*   HGB 7.8* 9.1*   HCT 24.7* 27.7*    340      Cardiac Enzymes Lab Results   Component Value Date    CKTOTAL 44 11/11/2023    CKMB <1.0 11/09/2020    CKMBINDEX  11/09/2020     CALCULATION NOT PERFORMED WHEN RESULT IS BELOW LINEAR LIMIT    TROPONINI <0.02  03/20/2021    TROPHS 6 12/24/2024   ,No results found for: \"BNP\"   Coagulation No results for input(s): \"INR\", \"APTT\" in the last 72 hours.    Invalid input(s): \"PTP\"    Lipid Panel Lab Results   Component Value Date/Time    CHOL 115 08/28/2019 08:22 AM    HDL 39 08/28/2019 08:22 AM    LDL 46.2 08/28/2019 08:22 AM    VLDL 29.8 08/28/2019 08:22 AM      Pancreas No results for input(s): \"LIPASE\" in the last 72 hours.  ,No results for input(s): \"AMYLASE\" in the last 72 hours.   Liver Enzymes No results for input(s): \"TP\" in the last 72 hours.    Invalid input(s): \"ALB\", \"TBIL\", \"AP\", \"SGOT\", \"GPT\", \"DBIL\"   Thyroid Studies Lab Results   Component Value Date/Time    TSH 2.51 12/24/2024 11:22 AM        Procedures/imaging: see electronic medical records for all procedures/Xrays and details which were not copied into this note but were reviewed prior to creation of Plan    TIME: E/M Time spent with patient and patient care issues: 55 mins.     This time also includes physician non-face-to-face service time visit on the date of service such as  Preparing to see the patient (eg, review of tests)  Obtaining and/or reviewing separately obtained history  Performing a medically necessary appropriate examination and/or evaluation  Counseling and educating the patient/family/caregiver  Ordering medications, tests, or procedures  Referring and communicating with other health care professionals as needed  Documenting clinical information in the electronic or other health record  Independently interpreting results (not reported separately) and communicating results to the patient/family/caregiver  Care coordination and discharge planning with Case Management.    Dear patient or family member or Caretaker, if you are reviewing this note and have a question regarding the medical terminology, please bring it with you to your next PCP visit.  Medical notes are meant to be a communication between medical professionals.  Additionally,

## 2024-12-29 NOTE — PROGRESS NOTES
Bedside shift change report given to Adelso RN (oncoming nurse) by Osiris Sandhu RN   (offgoing nurse). Report included the following information Nurse Handoff Report, Index, Adult Overview, Intake/Output, MAR, and Recent Results. Opportunity for questioning was provided.

## 2024-12-29 NOTE — PROGRESS NOTES
0735 Upon entering room, pt was holding emesis bag and expressing feelings of nausea and complaining that she was very hungry but couldn't keep anything down. Per Night RN, pt ws given Zofran and Reglan with no relief of symptoms and pt was unable to keep anything down overnight. This RN will attempt to feed pt small sips of nutritional supplement with breaks in-between with hope of helping pt feel satiated and keep liquids down.     8265 Pt was able to tolerate nutrition shake in small spaced out sips.     2875 Patient had one episode of emesis after attempting to eat multiple solid food.

## 2024-12-29 NOTE — PROGRESS NOTES
RENAL CONSULT PROGRESS NOTE   2024    Patient:  Aileen Ley  :  1940  Gender:  female  MRN #:  [id number]    Reason for Consult: METABOLIC ACIDOSIS     Subjective:   Lying flat in bed, breathing is  comfortable   BP stable  No new symptoms and concern reported       Objective:    BP (!) 162/86   Pulse 79   Temp 97.7 °F (36.5 °C) (Oral)   Resp 18   Ht 1.575 m (5' 2.01\")   Wt 64 kg (141 lb 1.5 oz)   SpO2 100%   BMI 25.80 kg/m²     Physical Exam:    Pt awake, Hard of hearing     Ext: no edema      Laboratory Data:    Lab Results   Component Value Date    BUN 13 2024    BUN 30 (H) 2024    BUN 49 (H) 2024     2024     2024     2024    CO2 22 2024    CO2 14 (L) 2024    CO2 15 (L) 2024    GLUCOSE 217 (H) 2024    GLUCOSE 155 (H) 2024    GLUCOSE 92 2024     Lab Results   Component Value Date    WBC 12.8 2024    HGB 9.1 (L) 2024    HCT 27.7 (L) 2024     Lab Results   Component Value Date    CALCIUM 6.6 (L) 2024     Lab Results   Component Value Date    HDL 39 (L) 2019     No results found for: \"TURBIDITY\"    Imaging Reveiwed:      EKG  Renal ultrasound  Xray       Assessment:    Aileen Ley is a 84 y.o. 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   Acidosis improving   Continue ringer lactate 125  cc/hour for one more day     sodium bicarb to 1300 mg QID

## 2024-12-29 NOTE — PROGRESS NOTES
Patient refused 1900 and 2200 Vital Sign. This nurse attempted to educate. \"I don't need it I'm alive\"    Contacted On call for nausea meds.    Patient is unable to tolerate pills, food, juice,etc.     She allowed 0300 vs but refused to have bp retaken

## 2024-12-30 LAB
ANION GAP SERPL CALC-SCNC: 7 MMOL/L (ref 3–18)
BASOPHILS # BLD: 0 K/UL (ref 0–0.1)
BASOPHILS NFR BLD: 0 % (ref 0–2)
BUN SERPL-MCNC: 11 MG/DL (ref 7–18)
BUN/CREAT SERPL: 10 (ref 12–20)
CALCIUM SERPL-MCNC: 6.7 MG/DL (ref 8.5–10.1)
CHLORIDE SERPL-SCNC: 109 MMOL/L (ref 100–111)
CO2 SERPL-SCNC: 22 MMOL/L (ref 21–32)
CREAT SERPL-MCNC: 1.1 MG/DL (ref 0.6–1.3)
DIFFERENTIAL METHOD BLD: ABNORMAL
EOSINOPHIL # BLD: 0.1 K/UL (ref 0–0.4)
EOSINOPHIL NFR BLD: 1 % (ref 0–5)
ERYTHROCYTE [DISTWIDTH] IN BLOOD BY AUTOMATED COUNT: 15.7 % (ref 11.6–14.5)
GLUCOSE BLD STRIP.AUTO-MCNC: 179 MG/DL (ref 70–110)
GLUCOSE BLD STRIP.AUTO-MCNC: 185 MG/DL (ref 70–110)
GLUCOSE SERPL-MCNC: 229 MG/DL (ref 74–99)
HCT VFR BLD AUTO: 30.7 % (ref 35–45)
HGB BLD-MCNC: 10 G/DL (ref 12–16)
IMM GRANULOCYTES # BLD AUTO: 0.2 K/UL (ref 0–0.04)
IMM GRANULOCYTES NFR BLD AUTO: 1 % (ref 0–0.5)
LYMPHOCYTES # BLD: 1.2 K/UL (ref 0.9–3.6)
LYMPHOCYTES NFR BLD: 8 % (ref 21–52)
MCH RBC QN AUTO: 28 PG (ref 24–34)
MCHC RBC AUTO-ENTMCNC: 32.6 G/DL (ref 31–37)
MCV RBC AUTO: 86 FL (ref 78–100)
MONOCYTES # BLD: 0.9 K/UL (ref 0.05–1.2)
MONOCYTES NFR BLD: 6 % (ref 3–10)
NEUTS SEG # BLD: 12.1 K/UL (ref 1.8–8)
NEUTS SEG NFR BLD: 83 % (ref 40–73)
NRBC # BLD: 0 K/UL (ref 0–0.01)
NRBC BLD-RTO: 0 PER 100 WBC
PLATELET # BLD AUTO: 383 K/UL (ref 135–420)
PMV BLD AUTO: 9.8 FL (ref 9.2–11.8)
POTASSIUM SERPL-SCNC: 3.9 MMOL/L (ref 3.5–5.5)
RBC # BLD AUTO: 3.57 M/UL (ref 4.2–5.3)
SODIUM SERPL-SCNC: 138 MMOL/L (ref 136–145)
WBC # BLD AUTO: 14.5 K/UL (ref 4.6–13.2)

## 2024-12-30 PROCEDURE — 2580000003 HC RX 258: Performed by: INTERNAL MEDICINE

## 2024-12-30 PROCEDURE — 36415 COLL VENOUS BLD VENIPUNCTURE: CPT

## 2024-12-30 PROCEDURE — 1100000000 HC RM PRIVATE

## 2024-12-30 PROCEDURE — 2580000003 HC RX 258: Performed by: HOSPITALIST

## 2024-12-30 PROCEDURE — 85025 COMPLETE CBC W/AUTO DIFF WBC: CPT

## 2024-12-30 PROCEDURE — 99231 SBSQ HOSP IP/OBS SF/LOW 25: CPT

## 2024-12-30 PROCEDURE — 2580000003 HC RX 258: Performed by: STUDENT IN AN ORGANIZED HEALTH CARE EDUCATION/TRAINING PROGRAM

## 2024-12-30 PROCEDURE — 6370000000 HC RX 637 (ALT 250 FOR IP): Performed by: STUDENT IN AN ORGANIZED HEALTH CARE EDUCATION/TRAINING PROGRAM

## 2024-12-30 PROCEDURE — 6370000000 HC RX 637 (ALT 250 FOR IP): Performed by: HOSPITALIST

## 2024-12-30 PROCEDURE — 6360000002 HC RX W HCPCS: Performed by: HOSPITALIST

## 2024-12-30 PROCEDURE — 80048 BASIC METABOLIC PNL TOTAL CA: CPT

## 2024-12-30 PROCEDURE — 82962 GLUCOSE BLOOD TEST: CPT

## 2024-12-30 PROCEDURE — 2500000003 HC RX 250 WO HCPCS: Performed by: HOSPITALIST

## 2024-12-30 PROCEDURE — 6370000000 HC RX 637 (ALT 250 FOR IP): Performed by: INTERNAL MEDICINE

## 2024-12-30 RX ORDER — CALCIUM CARBONATE 500 MG/1
250 TABLET, CHEWABLE ORAL DAILY
Status: DISCONTINUED | OUTPATIENT
Start: 2024-12-30 | End: 2024-12-31

## 2024-12-30 RX ORDER — AMLODIPINE BESYLATE 5 MG/1
2.5 TABLET ORAL DAILY
Status: DISCONTINUED | OUTPATIENT
Start: 2024-12-30 | End: 2025-01-02 | Stop reason: HOSPADM

## 2024-12-30 RX ORDER — ERGOCALCIFEROL 1.25 MG/1
50000 CAPSULE, LIQUID FILLED ORAL WEEKLY
Status: DISCONTINUED | OUTPATIENT
Start: 2024-12-30 | End: 2025-01-02 | Stop reason: HOSPADM

## 2024-12-30 RX ORDER — ENOXAPARIN SODIUM 100 MG/ML
40 INJECTION SUBCUTANEOUS EVERY 24 HOURS
Status: DISCONTINUED | OUTPATIENT
Start: 2024-12-30 | End: 2025-01-02 | Stop reason: HOSPADM

## 2024-12-30 RX ORDER — IBUPROFEN 200 MG
200 CAPSULE ORAL DAILY
Status: DISCONTINUED | OUTPATIENT
Start: 2024-12-30 | End: 2024-12-30 | Stop reason: CLARIF

## 2024-12-30 RX ADMIN — HEPARIN SODIUM 5000 UNITS: 5000 INJECTION INTRAVENOUS; SUBCUTANEOUS at 13:27

## 2024-12-30 RX ADMIN — ONDANSETRON 4 MG: 2 INJECTION INTRAMUSCULAR; INTRAVENOUS at 02:27

## 2024-12-30 RX ADMIN — ALPRAZOLAM 0.5 MG: 0.5 TABLET ORAL at 14:53

## 2024-12-30 RX ADMIN — SODIUM BICARBONATE 650 MG TABLET 1300 MG: at 10:08

## 2024-12-30 RX ADMIN — MEROPENEM 1000 MG: 1 INJECTION INTRAVENOUS at 16:49

## 2024-12-30 RX ADMIN — SODIUM CHLORIDE, PRESERVATIVE FREE 10 ML: 5 INJECTION INTRAVENOUS at 10:14

## 2024-12-30 RX ADMIN — SODIUM CHLORIDE, POTASSIUM CHLORIDE, SODIUM LACTATE AND CALCIUM CHLORIDE: 600; 310; 30; 20 INJECTION, SOLUTION INTRAVENOUS at 21:52

## 2024-12-30 RX ADMIN — AMLODIPINE BESYLATE 2.5 MG: 5 TABLET ORAL at 14:50

## 2024-12-30 RX ADMIN — SODIUM CHLORIDE, POTASSIUM CHLORIDE, SODIUM LACTATE AND CALCIUM CHLORIDE: 600; 310; 30; 20 INJECTION, SOLUTION INTRAVENOUS at 06:54

## 2024-12-30 RX ADMIN — LEVOTHYROXINE SODIUM 50 MCG: 0.05 TABLET ORAL at 06:43

## 2024-12-30 RX ADMIN — MEROPENEM 1000 MG: 1 INJECTION INTRAVENOUS at 10:13

## 2024-12-30 RX ADMIN — MEROPENEM 1000 MG: 1 INJECTION INTRAVENOUS at 00:07

## 2024-12-30 RX ADMIN — ONDANSETRON 4 MG: 2 INJECTION INTRAMUSCULAR; INTRAVENOUS at 14:54

## 2024-12-30 RX ADMIN — Medication 5 MG: at 21:47

## 2024-12-30 RX ADMIN — ACETAMINOPHEN 650 MG: 325 TABLET ORAL at 21:47

## 2024-12-30 RX ADMIN — ENOXAPARIN SODIUM 40 MG: 100 INJECTION SUBCUTANEOUS at 21:49

## 2024-12-30 RX ADMIN — SODIUM BICARBONATE 650 MG TABLET 1300 MG: at 11:37

## 2024-12-30 RX ADMIN — INSULIN GLARGINE 5 UNITS: 100 INJECTION, SOLUTION SUBCUTANEOUS at 21:30

## 2024-12-30 RX ADMIN — ONDANSETRON 4 MG: 2 INJECTION INTRAMUSCULAR; INTRAVENOUS at 21:48

## 2024-12-30 RX ADMIN — SODIUM CHLORIDE, PRESERVATIVE FREE 10 ML: 5 INJECTION INTRAVENOUS at 21:49

## 2024-12-30 RX ADMIN — METOPROLOL SUCCINATE 50 MG: 50 TABLET, EXTENDED RELEASE ORAL at 10:08

## 2024-12-30 RX ADMIN — SODIUM BICARBONATE 650 MG TABLET 1300 MG: at 16:49

## 2024-12-30 RX ADMIN — SODIUM BICARBONATE 650 MG TABLET 1300 MG: at 21:47

## 2024-12-30 RX ADMIN — HEPARIN SODIUM 5000 UNITS: 5000 INJECTION INTRAVENOUS; SUBCUTANEOUS at 06:42

## 2024-12-30 ASSESSMENT — PAIN DESCRIPTION - LOCATION: LOCATION: GENERALIZED

## 2024-12-30 ASSESSMENT — PAIN DESCRIPTION - ORIENTATION: ORIENTATION: POSTERIOR

## 2024-12-30 ASSESSMENT — PAIN DESCRIPTION - DESCRIPTORS: DESCRIPTORS: ACHING;DISCOMFORT

## 2024-12-30 ASSESSMENT — PAIN SCALES - GENERAL: PAINLEVEL_OUTOF10: 3

## 2024-12-30 NOTE — PLAN OF CARE
Care plan reveiwed   Last refill of HCTZ 25 mg 3-31-20 #30 with 3 refills  Last refill of Clonidine 0.1 mg 3-31-20 #60 with 3 refills  Last office visit: January 2020  Upcoming appointment: Next week  Pharmacy: [facility]

## 2024-12-30 NOTE — PROGRESS NOTES
Renal Dosing Adjustment     Meropenem was automatically dose-adjusted per [facility] P&T approved Protocols, with respect to renal function.    Pt Weight:   Wt Readings from Last 1 Encounters:   12/28/24 64 kg (141 lb 1.5 oz)       Previous Regimen                                            Meropenem 1 gram IV q12h x 5 days   Creatinine Clearance Estimated Creatinine Clearance: 33 mL/min (based on SCr of 1.1 mg/dL).   BUN Lab Results   Component Value Date/Time    BUN 11 12/30/2024 04:00 AM         Assessment/Plan  Dose adjusted to Meropenem 1 gram IV q12h x 3 doses remaining  Pharmacy to continue to monitor patient daily.   Will make dosage adjustments based upon changing renal function.    Ashtyn Bell, PharmD  Contact: [phone]

## 2024-12-30 NOTE — CARE COORDINATION
Pt discussed In IDR. Pt to dc tomorrow. Pt to be seen by ID .     MSW to schedule transport for tomorrow.     [facility]    Basic Information    Address:  [address] [place] [id number]  [facility]  Phone:  [phone]  Fax:  [phone]    RICHELLE Cheatham, Supervisee in Social Work  Inpatient Case [place] [phone] MSW scheduled pt transport for 12/31 to [facility] with Vivi at 1400

## 2024-12-30 NOTE — PLAN OF CARE
Problem: Safety - Adult  Goal: Free from fall injury  Outcome: Progressing     Problem: Chronic Conditions and Co-morbidities  Goal: Patient's chronic conditions and co-morbidity symptoms are monitored and maintained or improved  Outcome: Progressing     Problem: Skin/Tissue Integrity  Goal: Absence of new skin breakdown  Description: 1.  Monitor for areas of redness and/or skin breakdown  2.  Assess vascular access sites hourly  3.  Every 4-6 hours minimum:  Change oxygen saturation probe site  4.  Every 4-6 hours:  If on nasal continuous positive airway pressure, respiratory therapy assess nares and determine need for appliance change or resting period.  Outcome: Progressing     Problem: ABCDS Injury Assessment  Goal: Absence of physical injury  Outcome: Progressing     Problem: Pain  Goal: Verbalizes/displays adequate comfort level or baseline comfort level  Outcome: Progressing     Problem: Metabolic/Fluid and Electrolytes - Adult  Goal: Electrolytes maintained within normal limits  Outcome: Progressing  Goal: Hemodynamic stability and optimal renal function maintained  Outcome: Progressing  Goal: Glucose maintained within prescribed range  Outcome: Progressing

## 2024-12-30 NOTE — WOUND CARE
Wound care nurse attempted to see patient to reassess buttocks.  Patient refused assessment stating that her bottom did not bother her.  Wound care nurse asked if I could put new cream on her buttock patient again refused.  Primary nurse and wound care manager both also at bedside.

## 2024-12-30 NOTE — PROGRESS NOTES
1934 Bedside and Verbal shift change report given to Isra  RN (oncoming nurse) by Chani RN (offgoing nurse). Report included the following information Nurse Handoff Report, Adult Overview, Intake/Output, MAR, and Recent Results.

## 2024-12-30 NOTE — PROGRESS NOTES
Assessment:     Aileen Ley is a 84 y.o. 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   Acidosis improving   Continue ringer lactate 50cc/hour for one more day     sodium bicarb to 1300 mg QID   Encourage oral intake    needs vitamin D2 and calcium acetate for low Calcium  Strict intake and output recording , especially urine output   Ensure that patient does not retain urine   Avoid NSAIDS, contrast , nephrotoxin   Dose all medicine for current eGFR       I have reviewed all medication and recommend the following change: None  Continued hospitalization per Hospitalist. If discharged can follow up with me at office.     CC: ARF  Interval History: pt is status quo, no new issues since yesterday      Subjective:   No change since i saw pt last. No new symptoms or issues.         Review of Systems  She is hard of hearing and visually impaired        Blood pressure (!) 162/91, pulse 75, temperature 98.2 °F (36.8 °C), temperature source Axillary, resp. rate 20, height 1.575 m (5' 2.01\"), weight 64 kg (141 lb 1.5 oz), SpO2 100%.    Intake/Output Summary (Last 24 hours) at 12/30/2024 1039  Last data filed at 12/29/2024 2026  Gross per 24 hour   Intake --   Output 900 ml   Net -900 ml        awake and alert   Abdomen is soft  anasarca  No new rash      Intake/Output Summary (Last 24 hours) at 12/30/2024 1039  Last data filed at 12/29/2024 2026  Gross per 24 hour   Intake --   Output 900 ml   Net

## 2024-12-30 NOTE — PROGRESS NOTES
Renal Dosing Adjustment     The following medication: Heparin SC 5000 units was automatically dose-adjusted per [facility] P&T approved Protocols, with respect to renal function.    Pt Weight:   Wt Readings from Last 1 Encounters:   12/28/24 64 kg (141 lb 1.5 oz)       Previous Regimen                                            Heparin SC 5000 units TID   Creatinine Clearance Estimated Creatinine Clearance: 33 mL/min (based on SCr of 1.1 mg/dL).   BUN Lab Results   Component Value Date/Time    BUN 11 12/30/2024 04:00 AM         Assessment/Plan  Dose adjusted to Enoxaparin 40 mg subcutaneous daily   Pharmacy to continue to monitor patient daily.   Will make dosage adjustments based upon changing renal function.    Signed Angus Song RPH  Contact: [phone]

## 2024-12-30 NOTE — PROGRESS NOTES
Palliative Medicine  Patient Name: Aileen Ley  YOB: 1940  MRN: [id number]  Age: 84 y.o.  Gender: female    Date of Initial Consult: 12/24/2024  Date of Service: 12/26/2024  Time: 1:41 PM  Provider: KIRIT Jacobs - CNP  Hospital Day: 7  Admit Date: 12/24/2024  Referring Provider: Maximo Vazquez MD      Reasons for Consultation:  Goals of Care    HISTORY OF PRESENT ILLNESS (HPI):   Aileen Ley is a 84 y.o. female with a past medical history of colon and vaginal cancer with colostomy, left nephrectomy, right nephrostomy, right AKA, legal blindness, dementia, and CKD 3 due to DM who was admitted on 12/24/2024 from [facility] with a diagnosis of UTI and hyperkalemia.     Psychosocial: Patient is  and has two sons, Alin and Holden, who are named as medical cervantes of .     Functional: Patient resides at [facility] and requires assistance with ADLs.    PALLIATIVE DIAGNOSES:    Encounter for palliative care  Goals of care  UTI with nephrostomy  History of colon cancer  CKD 3  Hyperkalemia    ASSESSMENT AND PLAN:   Patient seen by Yael BETH to follow-up on symptom management. She reports her nausea is controlled this morning at 4/10 although it is affecting her appetite per nursing notes. She reports that her anxiety \"isn't too bad.\"   PLAN: Treatment of UTI, correction of electrolyte imbalances, and return to nursing home when medically ready for discharge.  Please call with any palliative questions or concerns.  Palliative Care Team is available via perfect serve or via phone.    ADVANCE CARE PLANNING:   [x] [facility] Interdisciplinary Team has updated the ACP Navigator with Health Care Decision Maker       Primary Decision Maker: DebbieAlin - Child - [phone]    Secondary Decision Maker: Holden Lewis - Child - [phone]  Confirm Advance Directive: Yes, on file    Current Code Status: DNR     Goals  of Care:  Patient has Advance Medical Directive on file naming brit Ogden and Holden as primary and secondary medical cervantes of , respectively. There is a Durable Do Not Resuscitate order on file. Inpatient code status is congruent with these documents. Palliative Medicine RN confirmed this information with maxim Ogden by phone.       Please refer to Palliative Medicine ACP notes for further details.    PALLIATIVE ASSESSMENT:      Palliative Performance Scale (PPS):  PPS: 40    ECOG:   ECOG Status : Limited self-care [3]    Modified ESAS:  Modified-[place] Symptom Assessment Scale (ESAS)  Tiredness Score: Not tired  Drowsiness Score: Not drowsy  Pain Score: No pain  Anxiety Score: 3  Nausea Score: 4  Appetite Score: 5  Dyspnea Score: No shortness of breath    Clinical Pain Assessment (nonverbal scale for severity on nonverbal patients):   Clinical Pain Assessment  Severity: 0       Vital Signs: Blood pressure (!) 162/91, pulse 75, temperature 98.2 °F (36.8 °C), temperature source Axillary, resp. rate 20, height 1.575 m (5' 2.01\"), weight 64 kg (141 lb 1.5 oz), SpO2 100%.    PHYSICAL ASSESSMENT:   General: [x] Oriented x3  [] Well appearing  [] Intubated  []Ill appearing  []Other:  Mental Status: [x] Normal mental status exam  [] Drowsy  [] Confused  []Other:   Cardiovascular: [] Regular rate/rhythm  [] Arrhythmia  [] Other:  Chest: [x] Effort normal  []Lungs clear  [] Respiratory distress  []Tachypnea  [] Other:  Abdomen: [] Soft/non-tender  [] Normal appearance  [] Distended  [] Ascites  [x] Other: nausea affecting appetite  Neurological: [x] Normal speech  [] Normal sensation  []Deficits present:  Extremity: [x] Normal skin color/temp  [] Clubbing/cyanosis  [] No edema  [x] Other: hand edema    Wt Readings from Last 15 Encounters:   12/28/24 64 kg (141 lb 1.5 oz)   08/08/24 72.6 kg (160 lb)   03/09/24 70.8 kg (156 lb)   12/31/23 71 kg (156 lb 8.4 oz)   12/26/23 71 kg (156 lb 9.6 oz)   11/23/23 76.8 kg (169 lb  5 oz)      Current Diet: ADULT DIET; Dysphagia - Soft and Bite Sized; 4 carb choices (60 gm/meal)  ADULT ORAL NUTRITION SUPPLEMENT; Breakfast, Lunch, Dinner; Standard High Calorie/High Protein Oral Supplement     PSYCHOSOCIAL/SPIRITUAL SCREENING:   Palliative IDT has assessed this patient for cultural preferences / practices and a referral made as appropriate to needs (Cultural Services, Patient Advocacy, Ethics, etc.)    Spiritual Affiliation: Gilberto    Any spiritual / Latter day concerns:  [] Yes /  [x] No   If \"Yes\" to discuss with pastoral care during IDT     Does caregiver feel burdened by caring for their loved one:   [] Yes /  [] No /  [] No Caregiver Present/Available [] No Caregiver [x] Pt Lives at Facility  If \"Yes\" to discuss with social work during IDT    Anticipatory grief assessment:   [x] Normal  / [] Maladaptive     If \"Maladaptive\" to discuss with social work during IDT    ESAS Anxiety: Anxiety Score: 3     LAB AND IMAGING FINDINGS:   Objective data reviewed:  records, medication use, vitals, and chart     FINAL COMMENTS   Thank you for allowing Palliative Medicine to participate in the care of Aileen Ley.    Only check if applicable and billing time based rather than MDM  [x] The total encounter time on this service date was __25__ minutes which was spent performing a face-to-face encounter and personally completing the provider-level activities documented in the note. This includes time spent prior to the visit and after the visit in direct care of the patient. This time does not include time spent in any separately reportable services.    Electronically signed by   KIRIT Jacobs CNP  Palliative Care Team  on 12/30/2024 at 1:41 PM

## 2024-12-30 NOTE — CONSULTS
[facility]  (A Division of [facility])                                                           Date of Admission: 12/24/2024       Reason for Consult: Antibiotic management for MRDO infection         C/C: Abnormal labs on presenting to ED    Current Antimicrobials:    Prior Antimicrobials:    Meropenem 12/27 to date  Vanco/Cefepime/levo 12/26 X1 day   Allergy to antibiotics: PCN       Assessment:     ESBL K.pneumo in urine  --long term R PCN in place  --R PCN was X-changed by IR, on 12/23/24  --urine culture 12/23-mixed bacteria  --urine culture 12/24- ESBL Kleb  Hyperkalemia - 6.7 on admission  DENNY- improved      Medical History:   Diagnosis Date    Anxiety     Cancer (HCC)     Cancer (HCC)     vaginal and colon cancer    Chronic kidney disease     Chronic pain     arthritis    Colostomy present (HCC)     Diabetes (HCC)     Dry eye syndrome     DVT (deep venous thrombosis) (HCC)     Dysphagia     Encephalopathy     Glaucoma     Hypertension     Hypomagnesemia     Hypothyroid     Ileostomy in place (HCC)     Legally blind     Thyroid disease          Recommendation -- ID related:     Cont meropenem from 12/27 X10 days, can switch to ertapenem for outpatient via Midline/PICC line  Supportive care and additional treatment per respective team      Will place OPAT    Thank you for involving me in the care of this patient. Please do not hesitate to contact me if question or concern.          HPI:      Aileen Ley is a 84 y.o. female with PMH known to me from past admissions, seen last year, a year ago.  PHH includes anxiety, colostomy in place, DM, [place], vaginal and colon cancer post colostomy, nephrostomy. Sent to ED from SNF with abn lab-DENNY/hyperkalemia of 6.7.    Initial urine culture at [facility] was mixed, and ESBL K.p on repat. Her PCN has been changed. Currently on meropenem.    On exam, says very little complaint. Wants to sleep. Her WBC bump today. Asked by Dr Tobias    50 mcg at 12/30/24 0643    metoprolol succinate (TOPROL XL) extended release tablet 50 mg  50 mg Oral Daily Maximo Vazquez MD   50 mg at 12/30/24 1008    sodium chloride flush 0.9 % injection 5-40 mL  5-40 mL IntraVENous 2 times per day Maximo Vazquez MD   10 mL at 12/30/24 1014    sodium chloride flush 0.9 % injection 5-40 mL  5-40 mL IntraVENous PRN Maximo Vazquez MD        0.9 % sodium chloride infusion   IntraVENous PRN Maximo Vazquez MD        potassium chloride (KLOR-CON M) extended release tablet 40 mEq  40 mEq Oral PRN Maximo Vazquez MD        Or    potassium bicarb-citric acid (EFFER-K) effervescent tablet 40 mEq  40 mEq Oral PRN Maximo Vazquez MD        Or    potassium chloride 10 mEq/100 mL IVPB (Peripheral Line)  10 mEq IntraVENous PRN Maximo Vazquez MD        magnesium sulfate 2000 mg in 50 mL IVPB premix  2,000 mg IntraVENous PRN Maximo Vazquez MD        ondansetron (ZOFRAN-ODT) disintegrating tablet 4 mg  4 mg Oral Q8H PRN Maximo Vazquez MD        Or    ondansetron (ZOFRAN) injection 4 mg  4 mg IntraVENous Q6H PRN Maximo Vazquez MD   4 mg at 12/30/24 1454    polyethylene glycol (GLYCOLAX) packet 17 g  17 g Oral Daily PRN Maximo Vazquez MD        acetaminophen (TYLENOL) tablet 650 mg  650 mg Oral Q6H PRN Maximo Vazquez MD   650 mg at 12/25/24 2133    Or    acetaminophen (TYLENOL) suppository 650 mg  650 mg Rectal Q6H PRN Maximo Vazquez MD            Review of Systems     Negative Unless BOLDED     General: fevers, chills, myalgias  HEENT:  headaches, recent URI, recent dental procedures  PUlMONARY:  cough , shortness of breath, sputum production, hx of asthma or COPD.  Cardiovascular: chest pain  GI:   nausea, vomiting, diarrhea, abdominal pain, prior C.diff  :  PCN in place   Neurologic:  seizures  Musculoskeletal:  myalgias  arthralgias  Skin:  Purities, pressure injury sacrum  Endocrine: polyuria, polydipsia, hair loss, weight gain  Psych: Depression or anxiety       Objective:       /83   Pulse 77   Temp 97.7 °F (36.5 °C) (Oral)   Resp 17   Ht 1.575 m (5' 2.01\")   Wt 64 kg (141 lb 1.5 oz)   SpO2 100%   BMI 25.80 kg/m²   Temp (24hrs), Av.8 °F (36.6 °C), Min:97.6 °F (36.4 °C), Max:98.2 °F (36.8 °C)        Lines: PIV    General:   WD WN , in no acute distress on RA   Skin:   no diffuse rash  Erythema from pressure wound sacrum   HEENT:  Normocephalic, atraumatic,       Lungs:   non-labored, bilaterally clear - no crackles wheezes rales or rhonchi   Heart:  RRR, s1 and s2   Abdomen:  soft, non-distended, colostomy bag in place Non-tender   Genitourinary:  deferred   Extremities:   no clubbing, cyanosis;   Neurologic:  interactive   Psychiatric:  interactive.        Labs: Results:   Chemistry Recent Labs     24  0348 24  0400    138   K 3.2* 3.9   * 109   CO2 22 22   BUN 13 11      CBC w/Diff Recent Labs     24  0348 24  0400   WBC 12.8 14.5*   RBC 3.21* 3.57*   HGB 9.1* 10.0*   HCT 27.7* 30.7*    383            No results found for: \"SDES\" No components found for: \"CULT\"         Imaging:   All imaging reviewed from Admission to present as per radiology interpretation in [facility]    Radiology reports reviewed:    No results found.    Katie Castanon MD  [facility])  Office #:     [phone]- Option #[phone]   Office Fax: [phone]      Note:  Medical notes are meant to be a communication between medical professionals.  Additionally, portion of this note were created using voice recognition function, syntax and phonetic over may have escaped proofreading.   If you are patient or family reviewing this note and have questions, please ask during rounds or bring it with you to your next doctors appointment.

## 2024-12-30 NOTE — PROGRESS NOTES
Hospitalist Progress Note    Patient: Aileen Ley MRN: [id number]  CSN: [id number]    YOB: 1940  Age: 84 y.o.  Sex: female    DOA: 12/24/2024 LOS:  LOS: 6 days         Total duration of encounter: 6 days      Chief Complaint ;  84 y.o. 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  Is sent to ER from [facility] due to hyperkalemia and UTI.     Subjective ;  I want to eat toast. Pt lying bed comfortable. Case discussed with ID , nephrectomy tube changed on dec 23. ID consulted for complicated and recurrent uti     Rn nausea, no vomiting, not eat so much     Review of systems:  Constitutional: denies fevers, chills, myalgias  Respiratory: denies SOB, cough  Cardiovascular: denies chest pain, palpitations  Gastrointestinal: denies nausea, vomiting, diarrhea    10 systems reviewed, all negative other than what is mentioned above.      Vital signs/Intake and Output:  Visit Vitals  BP (!) 162/91   Pulse 75   Temp 98.2 °F (36.8 °C) (Axillary)   Resp 20   Ht 1.575 m (5' 2.01\")   Wt 64 kg (141 lb 1.5 oz)   SpO2 100%   BMI 25.80 kg/m²     Current Shift:  12/30 0701 - 12/30 1900  In: 5481.5 [I.V.:5481.5]  Out: 100   Last three shifts:  12/28 1901 - 12/30 0700  In: 180 [P.O.:180]  Out: 1950 [Urine:1850]    Exam:    General: Well developed, alert, NAD, OX3 blindness   Head/Neck: NCAT, supple, No masses, No lymphadenopathy  CVS:Regular rate and rhythm, no M/R/G, S1/S2 heard, no thrill  Lungs:Clear to auscultation bilaterally, no wheezes, rhonchi, or rales  Abdomen: Soft, Nontender, No distention, Normal Bowel sounds, No hepatomegaly nephrectomy bag noticed   Extremities: No C/C/E, pulses palpable 2+, rt bka   Skin:normal texture and turgor, no rashes, no lesions  Neuro:grossly normal , follows commands  Psych:appropriate    Labs: Results:

## 2024-12-30 NOTE — PROGRESS NOTES
Bedside and Verbal shift change report given to Ashtyn RN (oncoming nurse) by Madison RN (offgoing nurse). Report included the following information Nurse Handoff Report, Adult Overview, Intake/Output, MAR, and Recent Results.

## 2024-12-30 NOTE — CONSULTS
[facility]  (A Division of [facility])                                                           Date of Admission: 12/24/2024       Reason for Consult: Antibiotic management for MDRO infection     C/C: abnormal labs on presenting to ED    Current Antimicrobials:    Prior Antimicrobials:  Meropenem 12/27 to date   Vanco/cefepime/FQ X1 day 12/26   Allergy to antibiotics: PCN       Assessment:     ESBL K.pneumo in urine  -- long term PCN in place  -- in patient with R PCN in place, it was X-changed by IR 12/23    Hyperkalemia- reason for admission 6.7  DENNY -resolved    Medical History:   Diagnosis Date    Anxiety     Cancer (HCC)     Cancer (HCC)     vaginal and colon cancer    Chronic kidney disease     Chronic pain     arthritis    Colostomy present (HCC)     Diabetes (HCC)     Dry eye syndrome     DVT (deep venous thrombosis) (HCC)     Dysphagia     Encephalopathy     Glaucoma     Hypertension     Hypomagnesemia     Hypothyroid     Ileostomy in place (HCC)     Legally blind     Thyroid disease        Recommendation -- ID related:     Cont meropenem 12/27 X 10 days,can switch to Ertapenem for Outpatient-via Midline or PICC Line    Supportive care and additional treatment per respective team      Will place OPAT, ID available as needed    Thank you for involving me in the care of this patient. Please do not hesitate to contact me if question or concern.          HPI:      Aileen Ley is a 84 y.o. female with known to me from past admissions, last seen 1 year ago. Her PMH includes anxiety, colostomy, diabetes, hard of hearing, vaginal and colon cancer, nephrostomy, colostomy, cholecystectomy, appendectomy - presented to ED due to abnormal lab- potassium of 6.7  and DENNY with Cr of 2.59.    Initial urine culture from PCN with mixed GNR, and after PCN was changed with ESBL k.pneumonia. currently on meropenem. She has a slight bump on WBC today, but no new complaint or  chronic ulcer, diabetic foot ulcers         Objective:       /83   Pulse 77   Temp 97.7 °F (36.5 °C) (Oral)   Resp 17   Ht 1.575 m (5' 2.01\")   Wt 64 kg (141 lb 1.5 oz)   SpO2 100%   BMI 25.80 kg/m²   Temp (24hrs), Av.8 °F (36.6 °C), Min:97.6 °F (36.4 °C), Max:98.2 °F (36.8 °C)        Lines: PIV    General:   WD chronically ill looking, in no acute distress on RA   Skin:   no diffuse rash  Pressure injury on her sacrum reviewed in media   HEENT:  Normocephalic, atraumatic       Lungs:   non-labored, bilaterally clear    Heart:  RRR, s1 and s2; no murmurs    Abdomen:  soft, non-distended, active bowel sounds. Colostomy present.  Non-tender   Genitourinary:  deferred   Extremities:   no clubbing, cyanosis;   Neurologic:  Flat affect, hard of hearing.    Psychiatric:    interactive.        Labs: Results:   Chemistry Recent Labs     24  0348 24  0400    138   K 3.2* 3.9   * 109   CO2 22 22   BUN 13 11      CBC w/Diff Recent Labs     24  0348 24  0400   WBC 12.8 14.5*   RBC 3.21* 3.57*   HGB 9.1* 10.0*   HCT 27.7* 30.7*    383            No results found for: \"SDES\" No components found for: \"CULT\"         Imaging:   All imaging reviewed from Admission to present as per radiology interpretation in [facility]    Radiology reports reviewed:    No results found.    Katie Castanon MD  [facility])  Office #:     [phone]- Option #[phone]   Office Fax: [phone]      Note:  Medical notes are meant to be a communication between medical professionals.  Additionally, portion of this note were created using voice recognition function, syntax and phonetic over may have escaped proofreading.   If you are patient or family reviewing this note and have questions, please ask during rounds or bring it with you to your next doctors appointment.

## 2024-12-31 ENCOUNTER — APPOINTMENT (OUTPATIENT)
Facility: HOSPITAL | Age: 84
DRG: 641 | End: 2024-12-31
Payer: MEDICARE

## 2024-12-31 LAB
BASOPHILS # BLD: 0 K/UL (ref 0–0.1)
BASOPHILS NFR BLD: 0 % (ref 0–2)
DIFFERENTIAL METHOD BLD: ABNORMAL
EOSINOPHIL # BLD: 0.4 K/UL (ref 0–0.4)
EOSINOPHIL NFR BLD: 2 % (ref 0–5)
ERYTHROCYTE [DISTWIDTH] IN BLOOD BY AUTOMATED COUNT: 16.1 % (ref 11.6–14.5)
GLUCOSE BLD STRIP.AUTO-MCNC: 166 MG/DL (ref 70–110)
GLUCOSE BLD STRIP.AUTO-MCNC: 167 MG/DL (ref 70–110)
GLUCOSE BLD STRIP.AUTO-MCNC: 256 MG/DL (ref 70–110)
HCT VFR BLD AUTO: 28.7 % (ref 35–45)
HGB BLD-MCNC: 9.3 G/DL (ref 12–16)
IMM GRANULOCYTES # BLD AUTO: 0.1 K/UL (ref 0–0.04)
IMM GRANULOCYTES NFR BLD AUTO: 1 % (ref 0–0.5)
LYMPHOCYTES # BLD: 2 K/UL (ref 0.9–3.6)
LYMPHOCYTES NFR BLD: 12 % (ref 21–52)
MCH RBC QN AUTO: 28.4 PG (ref 24–34)
MCHC RBC AUTO-ENTMCNC: 32.4 G/DL (ref 31–37)
MCV RBC AUTO: 87.5 FL (ref 78–100)
MONOCYTES # BLD: 1 K/UL (ref 0.05–1.2)
MONOCYTES NFR BLD: 6 % (ref 3–10)
NEUTS SEG # BLD: 12.7 K/UL (ref 1.8–8)
NEUTS SEG NFR BLD: 78 % (ref 40–73)
NRBC # BLD: 0 K/UL (ref 0–0.01)
NRBC BLD-RTO: 0 PER 100 WBC
PLATELET # BLD AUTO: 299 K/UL (ref 135–420)
PMV BLD AUTO: 9.7 FL (ref 9.2–11.8)
RBC # BLD AUTO: 3.28 M/UL (ref 4.2–5.3)
WBC # BLD AUTO: 16.3 K/UL (ref 4.6–13.2)

## 2024-12-31 PROCEDURE — 6360000002 HC RX W HCPCS: Performed by: HOSPITALIST

## 2024-12-31 PROCEDURE — 1100000000 HC RM PRIVATE

## 2024-12-31 PROCEDURE — 6370000000 HC RX 637 (ALT 250 FOR IP): Performed by: STUDENT IN AN ORGANIZED HEALTH CARE EDUCATION/TRAINING PROGRAM

## 2024-12-31 PROCEDURE — 2580000003 HC RX 258: Performed by: HOSPITALIST

## 2024-12-31 PROCEDURE — 2580000003 HC RX 258: Performed by: INTERNAL MEDICINE

## 2024-12-31 PROCEDURE — 6360000002 HC RX W HCPCS: Performed by: INTERNAL MEDICINE

## 2024-12-31 PROCEDURE — 82962 GLUCOSE BLOOD TEST: CPT

## 2024-12-31 PROCEDURE — 85025 COMPLETE CBC W/AUTO DIFF WBC: CPT

## 2024-12-31 PROCEDURE — 6370000000 HC RX 637 (ALT 250 FOR IP): Performed by: HOSPITALIST

## 2024-12-31 PROCEDURE — 2500000003 HC RX 250 WO HCPCS: Performed by: HOSPITALIST

## 2024-12-31 RX ORDER — AMLODIPINE BESYLATE 2.5 MG/1
2.5 TABLET ORAL DAILY
Qty: 30 TABLET | Refills: 0
Start: 2025-01-01

## 2024-12-31 RX ORDER — SODIUM BICARBONATE 650 MG/1
1300 TABLET ORAL 4 TIMES DAILY
Qty: 60 TABLET | Refills: 0
Start: 2024-12-31

## 2024-12-31 RX ORDER — LIDOCAINE HYDROCHLORIDE 10 MG/ML
5 INJECTION, SOLUTION EPIDURAL; INFILTRATION; INTRACAUDAL; PERINEURAL ONCE
Status: DISCONTINUED | OUTPATIENT
Start: 2024-12-31 | End: 2025-01-02 | Stop reason: HOSPADM

## 2024-12-31 RX ORDER — HEPARIN SODIUM (PORCINE) LOCK FLUSH IV SOLN 100 UNIT/ML 100 UNIT/ML
500 SOLUTION INTRAVENOUS PRN
Status: DISCONTINUED | OUTPATIENT
Start: 2024-12-31 | End: 2025-01-02 | Stop reason: HOSPADM

## 2024-12-31 RX ORDER — CALCIUM CARBONATE 500 MG/1
750 TABLET, CHEWABLE ORAL DAILY
Qty: 45 TABLET | Refills: 0
Start: 2024-12-31 | End: 2025-01-30

## 2024-12-31 RX ORDER — CALCIUM CARBONATE 500 MG/1
750 TABLET, CHEWABLE ORAL DAILY
Status: DISCONTINUED | OUTPATIENT
Start: 2024-12-31 | End: 2025-01-02 | Stop reason: HOSPADM

## 2024-12-31 RX ORDER — HEPARIN SODIUM 200 [USP'U]/100ML
500 INJECTION, SOLUTION INTRAVENOUS ONCE
Status: DISCONTINUED | OUTPATIENT
Start: 2024-12-31 | End: 2025-01-02 | Stop reason: HOSPADM

## 2024-12-31 RX ORDER — ALPRAZOLAM 0.5 MG
0.5 TABLET ORAL 2 TIMES DAILY PRN
Qty: 2 TABLET | Refills: 0 | Status: SHIPPED | OUTPATIENT
Start: 2024-12-31 | End: 2025-01-01

## 2024-12-31 RX ORDER — ERGOCALCIFEROL 1.25 MG/1
50000 CAPSULE ORAL WEEKLY
Qty: 5 CAPSULE | Refills: 0
Start: 2025-01-06

## 2024-12-31 RX ADMIN — SODIUM BICARBONATE 650 MG TABLET 1300 MG: at 21:40

## 2024-12-31 RX ADMIN — SODIUM CHLORIDE, PRESERVATIVE FREE 10 ML: 5 INJECTION INTRAVENOUS at 21:40

## 2024-12-31 RX ADMIN — ALPRAZOLAM 0.5 MG: 0.5 TABLET ORAL at 09:59

## 2024-12-31 RX ADMIN — ONDANSETRON 4 MG: 2 INJECTION INTRAMUSCULAR; INTRAVENOUS at 09:56

## 2024-12-31 RX ADMIN — LEVOTHYROXINE SODIUM 50 MCG: 0.05 TABLET ORAL at 07:27

## 2024-12-31 RX ADMIN — ONDANSETRON 4 MG: 2 INJECTION INTRAMUSCULAR; INTRAVENOUS at 03:26

## 2024-12-31 RX ADMIN — METOPROLOL SUCCINATE 50 MG: 50 TABLET, EXTENDED RELEASE ORAL at 09:59

## 2024-12-31 RX ADMIN — ONDANSETRON 4 MG: 2 INJECTION INTRAMUSCULAR; INTRAVENOUS at 17:50

## 2024-12-31 RX ADMIN — ENOXAPARIN SODIUM 40 MG: 100 INJECTION SUBCUTANEOUS at 21:40

## 2024-12-31 RX ADMIN — AMLODIPINE BESYLATE 2.5 MG: 5 TABLET ORAL at 09:59

## 2024-12-31 RX ADMIN — INSULIN GLARGINE 5 UNITS: 100 INJECTION, SOLUTION SUBCUTANEOUS at 21:40

## 2024-12-31 RX ADMIN — MEROPENEM 1000 MG: 1 INJECTION INTRAVENOUS at 03:30

## 2024-12-31 RX ADMIN — ERTAPENEM SODIUM 1000 MG: 1 INJECTION INTRAMUSCULAR; INTRAVENOUS at 13:14

## 2024-12-31 RX ADMIN — SODIUM CHLORIDE, PRESERVATIVE FREE 10 ML: 5 INJECTION INTRAVENOUS at 10:05

## 2024-12-31 RX ADMIN — Medication 5 MG: at 21:40

## 2024-12-31 NOTE — CARE COORDINATION
Care Mgmt visited patient in room, patient's son, Alin Lewis present in room during visit.  The second Important Message from Medicare reviewed/discussed with patient's son who acknowledged understanding of letter and right to appeal.  Copy of letter given to patient's son.  Original letter placed in patient's chart.

## 2024-12-31 NOTE — WOUND CARE
Wound care nurse attempted to assess patient for excoriation last assessed on 12/23/2024.  Patient refused x 4 when asked why I was not able to assess patient stated \"they washed and put cream on my bottom earlier and I don't want you to touch me\". Contact wound care nurse if patient changes mind.

## 2024-12-31 NOTE — PROGRESS NOTES
[facility]  (A Division of [facility])                                                           Date of Admission: 12/24/2024       Reason for Consult FU:  Antibiotic management for MRDO infection         C/C: Abnormal labs on presenting to ED    Current Antimicrobials:    Prior Antimicrobials:    Meropenem 12/27 to date  Vanco/Cefepime/levo 12/26 X1 day   Allergy to antibiotics: PCN       Assessment:     ESBL K.pneumo in urine  --long term R PCN in place  --R PCN was X-changed by IR, on 12/23/24  --urine culture 12/23-mixed bacteria  --urine culture 12/24- ESBL Kleb  Hyperkalemia - 6.7 on admission  DENNY- improved      Medical History:   Diagnosis Date    Anxiety     Cancer (HCC)     Cancer (HCC)     vaginal and colon cancer    Chronic kidney disease     Chronic pain     arthritis    Colostomy present (HCC)     Diabetes (HCC)     Dry eye syndrome     DVT (deep venous thrombosis) (HCC)     Dysphagia     Encephalopathy     Glaucoma     Hypertension     Hypomagnesemia     Hypothyroid     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 FERNANDO Tobias      Subjective:     Patient seen and examined for follow-up in ICU Medical Floor. No Fever. Chart reviewed-notes/cultures/labs and imaging reports. Tolerating therapy without any obvious adverse effects. FERNANDO Tobias     HPI:      Aileen Ley is a 84 y.o. female with PMH known to me from past admissions, seen last year, a year ago.  PHH includes anxiety, colostomy in place, DM, [place], vaginal and colon cancer post colostomy, nephrostomy. Sent to ED from SNF with abn lab-DENNY/hyperkalemia of 6.7.    Initial urine culture at [facility] was mixed, and ESBL K.p on repat. Her PCN has been changed. Currently on meropenem.    On  tablet 50 mg  50 mg Oral Daily Maximo Vazquez MD   50 mg at 24 0959    sodium chloride flush 0.9 % injection 5-40 mL  5-40 mL IntraVENous 2 times per day Maximo Vaqzuez MD   10 mL at 24 1005    sodium chloride flush 0.9 % injection 5-40 mL  5-40 mL IntraVENous PRN Maximo Vazquez MD        0.9 % sodium chloride infusion   IntraVENous PRN Maximo Vazquez MD        potassium chloride (KLOR-CON M) extended release tablet 40 mEq  40 mEq Oral PRN Maximo Vazquez MD        Or    potassium bicarb-citric acid (EFFER-K) effervescent tablet 40 mEq  40 mEq Oral PRN Maximo Vazquez MD        Or    potassium chloride 10 mEq/100 mL IVPB (Peripheral Line)  10 mEq IntraVENous PRN Maximo Vazquez MD        magnesium sulfate 2000 mg in 50 mL IVPB premix  2,000 mg IntraVENous PRN Maximo Vazquez MD        ondansetron (ZOFRAN-ODT) disintegrating tablet 4 mg  4 mg Oral Q8H PRN Maximo Vazquez MD        Or    ondansetron (ZOFRAN) injection 4 mg  4 mg IntraVENous Q6H PRN Mxaimo Vazquez MD   4 mg at 24 0956    polyethylene glycol (GLYCOLAX) packet 17 g  17 g Oral Daily PRN Maximo Vazquez MD        acetaminophen (TYLENOL) tablet 650 mg  650 mg Oral Q6H PRN Maximo Vazquez MD   650 mg at 24 2147    Or    acetaminophen (TYLENOL) suppository 650 mg  650 mg Rectal Q6H PRN Maximo Vazquez MD            Review of Systems     Negative Unless BOLDED   see hPI       Objective:       BP (!) 149/76   Pulse 75   Temp 97.8 °F (36.6 °C) (Oral)   Resp 16   Ht 1.575 m (5' 2.01\")   Wt 64 kg (141 lb 1.5 oz)   SpO2 100%   BMI 25.80 kg/m²   Temp (24hrs), Av.8 °F (36.6 °C), Min:97.7 °F (36.5 °C), Max:97.9 °F (36.6 °C)        Lines: PIV    General:   WD WN , in no acute distress on RA   Skin:   no diffuse rash  Erythema from pressure wound sacrum   HEENT:  Normocephalic,  atraumatic,       Lungs:   non-labored       Abdomen:  soft, non-distended, colostomy bag in place Non-tender   Genitourinary:  deferred   Extremities:   no clubbing, cyanosis;   Neurologic:  interactive   Psychiatric:  interactive.        Labs: Results:   Chemistry Recent Labs     12/29/24  0348 12/30/24  0400    138   K 3.2* 3.9   * 109   CO2 22 22   BUN 13 11      CBC w/Diff Recent Labs     12/29/24  0348 12/30/24  0400   WBC 12.8 14.5*   RBC 3.21* 3.57*   HGB 9.1* 10.0*   HCT 27.7* 30.7*    383            No results found for: \"SDES\" No components found for: \"CULT\"         Imaging:   All imaging reviewed from Admission to present as per radiology interpretation in [facility]    Radiology reports reviewed:    No results found.    Katie Castanon MD  [facility]  Office #:     [phone]- Option #8   Office Fax: [phone]      Note:  Medical notes are meant to be a communication between medical professionals.  Additionally, portion of this note were created using voice recognition function, syntax and phonetic over may have escaped proofreading.   If you are patient or family reviewing this note and have questions, please ask during rounds or bring it with you to your next doctors appointment.

## 2024-12-31 NOTE — CARE COORDINATION
1430 MSW spoke to IR  RN, eXT 1678 and SADE at [facility]. MSW sent midline report for SNF review. Audra stated t was fine and does not need to be changed.     Pt clear togo at 1730    RICHELLE Cheatham, Supervisee in Social Work  Inpatient Case Mananger

## 2024-12-31 NOTE — CARE COORDINATION
1540 MSW received message from attending stating pt dc on hold as pt wbc elevated.   MSW changed pt transport to 1/1/25 at  1400    RICHELLE Cheatham, Supervisee in Social Work  Inpatient Case Mananger

## 2024-12-31 NOTE — CARE COORDINATION
Per CM pt requiring iv abx . MSW to see if pt is able to get abx via     Pt will need PICC or midline   Pt is able to transition to facility on 1/1    MSW spoke Rosemary with [facility] to reschedule for 1/1/25 at 1400    If PICC is done today then pt can go today     MSW updated Audra at [facility]    MSW changed pt transport to [facility] 1730    MSW updated pt RN Ashtyn Cruz, MSW, Supervisee in Social Work  Inpatient Case [facility]

## 2024-12-31 NOTE — CARE COORDINATION
Pt discussed in IDR  Pt clear to dc today. Transport is scheduled for 1400. MSW confirmed with facility pt bed is available.   MSW called pt maxim [place] to update    RICHELLE Cheatham, Supervisee in Social Work  Inpatient Case Mananger

## 2024-12-31 NOTE — PROGRESS NOTES
Bedside shift change report given to Nidhi rn (oncoming nurse) by Ashtyn Ramirez RN   (offgoing nurse). Report included the following information Nurse Handoff Report, Index, Adult Overview, Intake/Output, MAR, and Recent Results.

## 2024-12-31 NOTE — PROGRESS NOTES
Assessment:     Aileen Ley is a 84 y.o. 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   Acidosis improving   Stop IVF, encourage PO intake    sodium bicarb to 1300 mg QID   Encourage oral intake    needs vitamin D2 and calcium acetate for low Calcium  Strict intake and output recording , especially urine output   Ensure that patient does not retain urine   Avoid NSAIDS, contrast , nephrotoxin   Dose all medicine for current eGFR        CC: ARF  Interval History: pt is status quo, no new issues since yesterday        Subjective:   No change since i saw pt last. No new symptoms or issues.            Review of Systems  Negative for SOB, Chest pain, Tremors, Nausea, vomiting        Blood pressure (!) 155/70, pulse 75, temperature 97.8 °F (36.6 °C), temperature source Axillary, resp. rate 17, height 1.575 m (5' 2.01\"), weight 64 kg (141 lb 1.5 oz), SpO2 100%.    Intake/Output Summary (Last 24 hours) at 12/31/2024 0755  Last data filed at 12/30/2024 2115  Gross per 24 hour   Intake 5481.52 ml   Output 650 ml   Net 4831.52 ml        awake and alert   Abdomen is soft  anasarca  No new rash         Intake/Output Summary (Last 24 hours) at 12/31/2024 0755  Last data filed at 12/30/2024 2115  Gross per 24 hour   Intake 5481.52 ml   Output 650 ml   Net 4831.52 ml      Recent Labs     12/30/24  0400   WBC 14.5*     Lab Results   Component Value Date/Time     12/30/2024 04:00 AM    K 3.9  12/30/2024 04:00 AM     12/30/2024 04:00 AM    CO2 22 12/30/2024 04:00 AM    BUN 11 12/30/2024 04:00 AM    GFRAA 23.0 12/23/2024 05:06 AM      Allergies   Allergen Reactions    Bactrim [Sulfamethoxazole-Trimethoprim] Other (See Comments)    Morphine Nausea And Vomiting    Penicillins Rash     Current Facility-Administered Medications   Medication Dose Route Frequency Provider Last Rate Last Admin    vitamin D (ERGOCALCIFEROL) capsule 50,000 Units  50,000 Units Oral Weekly Mateo Bashir DO        calcium carbonate (TUMS) chewable tablet 250 mg  250 mg Oral Daily Mateo Bashir DO        amLODIPine (NORVASC) tablet 2.5 mg  2.5 mg Oral Daily Bailey Tobias MD   2.5 mg at 12/30/24 1450    enoxaparin (LOVENOX) injection 40 mg  40 mg SubCUTAneous Q24H Maximo Vazquez MD   40 mg at 12/30/24 2149    meropenem (MERREM) 1,000 mg in sodium chloride 0.9 % 100 mL IVPB (mini-bag)  1,000 mg IntraVENous Q12H Maximo Vazquez MD   Stopped at 12/31/24 0400    melatonin disintegrating tablet 5 mg  5 mg Oral Nightly AmMaximo woodard MD   5 mg at 12/30/24 2147    sodium bicarbonate tablet 1,300 mg  1,300 mg Oral 4x Daily Sebastián Cruz MD   1,300 mg at 12/30/24 2147    ALPRAZolam (XANAX) tablet 0.5 mg  0.5 mg Oral BID PRN Maximo Vazquez MD   0.5 mg at 12/30/24 1453    insulin glargine (LANTUS) injection vial 5 Units  5 Units SubCUTAneous Nightly Maximo Vazquez MD   5 Units at 12/30/24 2130    levothyroxine (SYNTHROID) tablet 50 mcg  50 mcg Oral QAM AC Maximo Vazquez MD   50 mcg at 12/31/24 0727    metoprolol succinate (TOPROL XL) extended release tablet 50 mg  50 mg Oral Daily Maximo Vazquez MD   50 mg at 12/30/24 1008    sodium chloride flush 0.9 % injection 5-40 mL  5-40 mL IntraVENous 2 times per day Maximo Vazquez MD   10 mL at 12/30/24 2149    sodium chloride flush 0.9 % injection 5-40 mL  5-40 mL IntraVENous PRN Maximo Vazquez

## 2024-12-31 NOTE — PROGRESS NOTES
Will hold for dc today repeated  wbc more elevated -will repeat tomorrow. Son is informed    [Mother] : mother

## 2024-12-31 NOTE — PROGRESS NOTES
Bedside shift change report given to Emanuel rn (oncoming nurse) by Ashtyn Ramirez RN   (offgoing nurse). Report included the following information Nurse Handoff Report, Index, Adult Overview, Intake/Output, MAR, and Recent Results.

## 2024-12-31 NOTE — PROGRESS NOTES
Bedside and Verbal shift change report given to Ashtyn HAAS (oncoming nurse). Report included the following information Nurse Handoff Report, Index, Adult Overview, Intake/Output, MAR, and Recent Results.

## 2024-12-31 NOTE — PROGRESS NOTES
Care  assisting Care Mgr RICHELLE Arriola with Discharge Planning needs for patient.  Updates sent to [facility] [employer] [facility] (patient is from [email or web address]) for review.    Will follow.

## 2024-12-31 NOTE — PROGRESS NOTES
This RN went into room with NYA Chamorro to help Pt turn and to apply lotion to her sacrum. Pt refuses at this time.

## 2024-12-31 NOTE — PROGRESS NOTES
Hospitalist Progress Note    Patient: Aileen Ley MRN: [id number]  CSN: [id number]    YOB: 1940  Age: 84 y.o.  Sex: female    DOA: 12/24/2024 LOS:  LOS: 7 days         Total duration of encounter: 7 days      Chief Complaint ;  84 y.o. 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  Is sent to ER from [facility] due to hyperkalemia and UTI.     Subjective ;  I feels fine I want to nausea medication. Repeated wbc worsening today will hold dc today     Rn pt declined the pills     Review of systems:  Constitutional: denies fevers, chills, myalgias  Respiratory: denies SOB, cough  Cardiovascular: denies chest pain, palpitations  Gastrointestinal: denies nausea, vomiting, diarrhea    10 systems reviewed, all negative other than what is mentioned above.      Vital signs/Intake and Output:  Visit Vitals  BP (!) 149/76   Pulse 75   Temp 97.8 °F (36.6 °C) (Oral)   Resp 16   Ht 1.575 m (5' 2.01\")   Wt 64 kg (141 lb 1.5 oz)   SpO2 100%   BMI 25.80 kg/m²     Current Shift:  No intake/output data recorded.  Last three shifts:  12/29 1901 - 12/31 0700  In: 5481.5 [I.V.:5481.5]  Out: 1550 [Urine:900]    Exam:    General: Well developed, alert, NAD, OX3 blindness   Head/Neck: NCAT, supple, No masses, No lymphadenopathy  CVS:Regular rate and rhythm, no M/R/G, S1/S2 heard, no thrill  Lungs:Clear to auscultation bilaterally, no wheezes, rhonchi, or rales  Abdomen: Soft, Nontender, No distention, Normal Bowel sounds, No hepatomegaly nephrectomy bag noticed   Extremities: No C/C/E, pulses palpable 2+, rt bka   Skin:normal texture and turgor, no rashes, no lesions  Neuro:grossly normal , follows commands  Psych:appropriate    Labs: Results:       Chemistry Recent Labs     12/29/24  0348 12/30/24  0400   GLUCOSE 217* 229*    138   K 3.2* 3.9   * 109    CO2 22 22   BUN 13 11   CREATININE 1.04 1.10   CALCIUM 6.6* 6.7*   LABGLOM 53* 49*      CBC w/Diff Recent Labs     12/29/24  0348 12/30/24  0400 12/31/24  1515   WBC 12.8 14.5* 16.3*   RBC 3.21* 3.57* 3.28*   HGB 9.1* 10.0* 9.3*   HCT 27.7* 30.7* 28.7*    383 299   LYMPHOPCT 9* 8* 12*      Cardiac Enzymes No results for input(s): \"CKTOTAL\", \"CKMB\", \"CKMBINDEX\", \"TROPONINI\" in the last 72 hours.    Invalid input(s): \"PATRICIA\"   Coagulation No results for input(s): \"PROTIME\", \"INR\", \"APTT\" in the last 72 hours.    Lipid Panel Lab Results   Component Value Date/Time    CHOL 115 08/28/2019 08:22 AM    TRIG 149 08/28/2019 08:22 AM    HDL 39 08/28/2019 08:22 AM    VLDL 29.8 08/28/2019 08:22 AM    CHOLHDLRATIO 2.9 08/28/2019 08:22 AM      BNP No results for input(s): \"BNP\" in the last 72 hours.   Liver Enzymes No results for input(s): \"ALT\", \"AST\", \"GGT\", \"ALKPHOS\", \"BILITOT\", \"BILIDIR\" in the last 72 hours.   Thyroid Studies Lab Results   Component Value Date/Time    T4FREE 1.5 01/24/2020 08:35 AM    TSH 2.51 12/24/2024 11:22 AM          Procedures/imaging: see electronic medical records for all procedures/Xrays and details which were not copied into this note but were reviewed prior to creation of Plan         Assessment/Plan     Principal Problem:    Hyperkalemia  Active Problems:    Hypertension    S/p nephrectomy    Acute renal failure (ARF) (HCC)    S/P AKA (above knee amputation), right (HCC)    CKD stage 3 due to type 2 diabetes mellitus (HCC)    Diabetes (HCC)    Nephrostomy status (HCC)    Legally blind  Resolved Problems:    * No resolved hospital problems. *       Hyperkalemia    Resolved   Secondary to acute kidney injury  Received Kayexalate at nursing home  Potassium now in normal range.     DENNY with ckd3  resolved   Metabolic acidosis, resolved  on bicarb tabs  Lactic acid in normal range  Monitor renal function  Gentle IV fluids for one more day   Persistent and worsening metabolic acidosis, despite

## 2024-12-31 NOTE — PROGRESS NOTES
Rn provided jordy care. Patient tolerated poorly. RN was able to apply zinc  to posterior legs and buttocks, patient asked RN to let to return her to supine position as soon as possible, RN complied to patients wishes

## 2024-12-31 NOTE — DISCHARGE SUMMARY
Discharge Summary    Patient: Aileen Ley               Sex: female          DOA: 12/24/2024         YOB: 1940      Age:  84 y.o.        LOS:  LOS: 9 days                Admit Date: 12/24/2024    Discharge Date: 1/2/2025    Admission Diagnoses: Hyperkalemia [E87.5]    Discharge Diagnoses:    Hospital Problems             Last Modified POA    * (Principal) Hyperkalemia 12/24/2024 Yes    Hypertension 12/24/2024 Yes    S/p nephrectomy 12/24/2024 Yes    Acute renal failure (ARF) ([facility]) 12/24/2024 Yes    S/P AKA (above knee amputation), right ([facility]) 12/24/2024 Yes    CKD stage 3 due to type 2 diabetes mellitus ([facility]) 12/24/2024 Yes    Diabetes ([facility]) 12/24/2024 Yes    Nephrostomy status ([facility]) 12/24/2024 Yes    Legally blind 12/24/2024 Yes        Discharge Condition: Stable    Hospital Course ;   Aileen Ley is a 84 y.o. 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  Is sent to ER from [facility] due to hyperkalemia and UTI.  Patient had blood draw and was noted to have elevated potassium levels.  She was given Kayexalate.  Urine cultures were done yesterday which showed growth of mixed urogenital carl.  As such there is no history of fever, chills, nausea, vomiting.  Her white count done yesterday showed WBC of 13.9.  ER UA with evidence of UTI however her white count in normal range.  Potassium of 5.7, BUN/creatinine of 74/2.95.    Pt was admitted for hyperkalemia DENNY on CKD and recurrent urinary tract infection.  Hyperkalemia    Resolved nephrologist consulted bicarb dose increased for metabolic acidosis   Secondary to acute kidney injury  Received Kayexalate at nursing home  Potassium now in normal range.     DENNY with ckd3  resolved   Metabolic acidosis, resolved  on bicarb tabs  Lactic acid in normal range  Monitor renal function  Received  iv infusion   Need to continue f/u with nephrologist      UTI  -complicated   Urine cultures from 12/23/2024 showing growth of mixed carl.  Urine cultures from 12/24/2024 showed growth of Klebsiella pneumoniae ESBL  UTI with nephrostomy tube.:urine cx ESBL K.pneumo   nephrostomy tube changed on dec  23   Received  meropenem during her stay  ID consult  : recommend 12/27 X 10 days,can switch to Ertapenem for Outpatient-via Midline or PICC Line   Picc line placed      DM-II    continue lantus. Accuchecks and ssi     Right AKA     Debility   Dnr/I          Physical Exam:  BP (!) 142/69   Pulse 78   Temp 97.5 °F (36.4 °C) (Temporal)   Resp 15   Ht 1.575 m (5' 2.01\")   Wt 64 kg (141 lb 1.5 oz)   SpO2 99%   BMI 25.80 kg/m²   General appearance: alert, cooperative, no distress, appears stated age  Head: Normocephalic, without obvious abnormality, atraumatic  Neck: supple, trachea midline  Lungs: clear to auscultation bilaterally  Heart: regular rate and rhythm, S1, S2 normal, no murmur, click, rub or gallop  Abdomen: soft, non-tender. Bowel sounds normal. No masses,  no organomegaly. Nephrectomy tube noted   Extremities: extremities normal, atraumatic, no cyanosis or edema, rt bka   Skin: Skin color, texture, turgor normal. No rashes or lesions  Neurologic: Grossly normal    Labs: Results:       Chemistry Recent Labs     01/01/25  1545 01/02/25  0455    143   K 3.3* 3.4*    107   CO2 32 32   BUN 19* 19*      CBC w/Diff Recent Labs     12/31/24  1515 01/01/25  1545 01/02/25  0455   WBC 16.3* 15.6* 12.3   RBC 3.28* 3.17* 2.98*   HGB 9.3* 9.1* 8.4*   HCT 28.7* 28.0* 26.7*    281 231      Cardiac Enzymes No results for input(s): \"CPK\" in the last 72 hours.    Invalid input(s): \"CKRMB\", \"CKND1\", \"TROIP\", \"PATRICIA\"   Coagulation No results for input(s): \"INR\", \"APTT\" in the last 72 hours.    Invalid input(s): \"PTP\"    Lipid Panel Lab Results   Component Value Date/Time    CHOL 115 08/28/2019 08:22 AM

## 2025-01-01 ENCOUNTER — APPOINTMENT (OUTPATIENT)
Facility: HOSPITAL | Age: 85
DRG: 641 | End: 2025-01-01
Payer: MEDICARE

## 2025-01-01 LAB
ANION GAP SERPL CALC-SCNC: 3 MMOL/L (ref 3–18)
BASOPHILS # BLD: 0 K/UL (ref 0–0.1)
BASOPHILS NFR BLD: 0 % (ref 0–2)
BUN SERPL-MCNC: 19 MG/DL (ref 7–18)
BUN/CREAT SERPL: 13 (ref 12–20)
CALCIUM SERPL-MCNC: 6.1 MG/DL (ref 8.5–10.1)
CHLORIDE SERPL-SCNC: 106 MMOL/L (ref 100–111)
CO2 SERPL-SCNC: 32 MMOL/L (ref 21–32)
CREAT SERPL-MCNC: 1.41 MG/DL (ref 0.6–1.3)
DIFFERENTIAL METHOD BLD: ABNORMAL
EOSINOPHIL # BLD: 0.5 K/UL (ref 0–0.4)
EOSINOPHIL NFR BLD: 3 % (ref 0–5)
ERYTHROCYTE [DISTWIDTH] IN BLOOD BY AUTOMATED COUNT: 16.3 % (ref 11.6–14.5)
GLUCOSE BLD STRIP.AUTO-MCNC: 142 MG/DL (ref 70–110)
GLUCOSE BLD STRIP.AUTO-MCNC: 161 MG/DL (ref 70–110)
GLUCOSE BLD STRIP.AUTO-MCNC: 169 MG/DL (ref 70–110)
GLUCOSE BLD STRIP.AUTO-MCNC: 175 MG/DL (ref 70–110)
GLUCOSE SERPL-MCNC: 179 MG/DL (ref 74–99)
HCT VFR BLD AUTO: 28 % (ref 35–45)
HGB BLD-MCNC: 9.1 G/DL (ref 12–16)
IMM GRANULOCYTES # BLD AUTO: 0.1 K/UL (ref 0–0.04)
IMM GRANULOCYTES NFR BLD AUTO: 1 % (ref 0–0.5)
LYMPHOCYTES # BLD: 2.1 K/UL (ref 0.9–3.6)
LYMPHOCYTES NFR BLD: 14 % (ref 21–52)
MCH RBC QN AUTO: 28.7 PG (ref 24–34)
MCHC RBC AUTO-ENTMCNC: 32.5 G/DL (ref 31–37)
MCV RBC AUTO: 88.3 FL (ref 78–100)
MONOCYTES # BLD: 1.1 K/UL (ref 0.05–1.2)
MONOCYTES NFR BLD: 7 % (ref 3–10)
NEUTS SEG # BLD: 11.8 K/UL (ref 1.8–8)
NEUTS SEG NFR BLD: 75 % (ref 40–73)
NRBC # BLD: 0 K/UL (ref 0–0.01)
NRBC BLD-RTO: 0 PER 100 WBC
PLATELET # BLD AUTO: 281 K/UL (ref 135–420)
PMV BLD AUTO: 9.9 FL (ref 9.2–11.8)
POTASSIUM SERPL-SCNC: 3.3 MMOL/L (ref 3.5–5.5)
RBC # BLD AUTO: 3.17 M/UL (ref 4.2–5.3)
SODIUM SERPL-SCNC: 141 MMOL/L (ref 136–145)
WBC # BLD AUTO: 15.6 K/UL (ref 4.6–13.2)

## 2025-01-01 PROCEDURE — 71045 X-RAY EXAM CHEST 1 VIEW: CPT

## 2025-01-01 PROCEDURE — 2500000003 HC RX 250 WO HCPCS: Performed by: HOSPITALIST

## 2025-01-01 PROCEDURE — 1100000000 HC RM PRIVATE

## 2025-01-01 PROCEDURE — 82962 GLUCOSE BLOOD TEST: CPT

## 2025-01-01 PROCEDURE — 6370000000 HC RX 637 (ALT 250 FOR IP): Performed by: STUDENT IN AN ORGANIZED HEALTH CARE EDUCATION/TRAINING PROGRAM

## 2025-01-01 PROCEDURE — 6360000002 HC RX W HCPCS: Performed by: INTERNAL MEDICINE

## 2025-01-01 PROCEDURE — 02HV33Z INSERTION OF INFUSION DEVICE INTO SUPERIOR VENA CAVA, PERCUTANEOUS APPROACH: ICD-10-PCS | Performed by: HOSPITALIST

## 2025-01-01 PROCEDURE — 85025 COMPLETE CBC W/AUTO DIFF WBC: CPT

## 2025-01-01 PROCEDURE — 36569 INSJ PICC 5 YR+ W/O IMAGING: CPT

## 2025-01-01 PROCEDURE — 2580000003 HC RX 258: Performed by: INTERNAL MEDICINE

## 2025-01-01 PROCEDURE — 6360000002 HC RX W HCPCS: Performed by: HOSPITALIST

## 2025-01-01 PROCEDURE — 6370000000 HC RX 637 (ALT 250 FOR IP): Performed by: HOSPITALIST

## 2025-01-01 PROCEDURE — 80048 BASIC METABOLIC PNL TOTAL CA: CPT

## 2025-01-01 PROCEDURE — 6370000000 HC RX 637 (ALT 250 FOR IP): Performed by: INTERNAL MEDICINE

## 2025-01-01 RX ADMIN — INSULIN GLARGINE 5 UNITS: 100 INJECTION, SOLUTION SUBCUTANEOUS at 20:48

## 2025-01-01 RX ADMIN — Medication 5 MG: at 19:44

## 2025-01-01 RX ADMIN — SODIUM BICARBONATE 650 MG TABLET 1300 MG: at 09:53

## 2025-01-01 RX ADMIN — AMLODIPINE BESYLATE 2.5 MG: 5 TABLET ORAL at 10:00

## 2025-01-01 RX ADMIN — METOPROLOL SUCCINATE 50 MG: 50 TABLET, EXTENDED RELEASE ORAL at 09:53

## 2025-01-01 RX ADMIN — SODIUM BICARBONATE 650 MG TABLET 1300 MG: at 17:45

## 2025-01-01 RX ADMIN — LEVOTHYROXINE SODIUM 50 MCG: 0.05 TABLET ORAL at 05:06

## 2025-01-01 RX ADMIN — CALCIUM CARBONATE 750 MG: 500 TABLET, CHEWABLE ORAL at 12:38

## 2025-01-01 RX ADMIN — SODIUM CHLORIDE, PRESERVATIVE FREE 10 ML: 5 INJECTION INTRAVENOUS at 09:53

## 2025-01-01 RX ADMIN — ALPRAZOLAM 0.5 MG: 0.5 TABLET ORAL at 04:58

## 2025-01-01 RX ADMIN — ENOXAPARIN SODIUM 40 MG: 100 INJECTION SUBCUTANEOUS at 21:33

## 2025-01-01 RX ADMIN — SODIUM CHLORIDE, PRESERVATIVE FREE 10 ML: 5 INJECTION INTRAVENOUS at 19:44

## 2025-01-01 RX ADMIN — ERTAPENEM SODIUM 1000 MG: 1 INJECTION INTRAMUSCULAR; INTRAVENOUS at 10:00

## 2025-01-01 RX ADMIN — ACETAMINOPHEN 650 MG: 325 TABLET ORAL at 06:18

## 2025-01-01 RX ADMIN — SODIUM BICARBONATE 650 MG TABLET 1300 MG: at 19:44

## 2025-01-01 RX ADMIN — ALPRAZOLAM 0.5 MG: 0.5 TABLET ORAL at 14:49

## 2025-01-01 RX ADMIN — SODIUM BICARBONATE 650 MG TABLET 1300 MG: at 12:38

## 2025-01-01 ASSESSMENT — PAIN DESCRIPTION - DESCRIPTORS
DESCRIPTORS: BURNING
DESCRIPTORS: BURNING

## 2025-01-01 ASSESSMENT — PAIN SCALES - GENERAL: PAINLEVEL_OUTOF10: 3

## 2025-01-01 ASSESSMENT — PAIN DESCRIPTION - ORIENTATION
ORIENTATION: RIGHT;LEFT
ORIENTATION: RIGHT;LEFT

## 2025-01-01 ASSESSMENT — PAIN - FUNCTIONAL ASSESSMENT
PAIN_FUNCTIONAL_ASSESSMENT: ACTIVITIES ARE NOT PREVENTED
PAIN_FUNCTIONAL_ASSESSMENT: ACTIVITIES ARE NOT PREVENTED

## 2025-01-01 ASSESSMENT — PAIN DESCRIPTION - LOCATION
LOCATION: LEG
LOCATION: LEG

## 2025-01-01 NOTE — PLAN OF CARE
Problem: Safety - Adult  Goal: Free from fall injury  12/31/2024 1338 by Ashtyn Prater RN  Outcome: Adequate for Discharge  12/31/2024 1337 by Ashtyn Prater RN  Outcome: Adequate for Discharge     Problem: Safety - Adult  Goal: Free from fall injury  Outcome: Progressing     Problem: Chronic Conditions and Co-morbidities  Goal: Patient's chronic conditions and co-morbidity symptoms are monitored and maintained or improved  12/31/2024 1338 by Ashtyn Prater RN  Outcome: Adequate for Discharge  12/31/2024 1337 by Ashtyn Prater RN  Outcome: Adequate for Discharge     Problem: Skin/Tissue Integrity  Goal: Absence of new skin breakdown  Description: 1.  Monitor for areas of redness and/or skin breakdown  2.  Assess vascular access sites hourly  3.  Every 4-6 hours minimum:  Change oxygen saturation probe site  4.  Every 4-6 hours:  If on nasal continuous positive airway pressure, respiratory therapy assess nares and determine need for appliance change or resting period.  12/31/2024 1338 by Ashtyn Prater RN  Outcome: Adequate for Discharge  12/31/2024 1337 by Ashtyn Prater RN  Outcome: Adequate for Discharge     Problem: ABCDS Injury Assessment  Goal: Absence of physical injury  12/31/2024 1338 by Ashtyn Prater RN  Outcome: Adequate for Discharge  12/31/2024 1337 by Ashtyn Prater RN  Outcome: Adequate for Discharge     Problem: Pain  Goal: Verbalizes/displays adequate comfort level or baseline comfort level  12/31/2024 1338 by Ashtyn Prater RN  Outcome: Adequate for Discharge  12/31/2024 1337 by Ashtyn Prater RN  Outcome: Adequate for Discharge     Problem: Pain  Goal: Verbalizes/displays adequate comfort level or baseline comfort level  Outcome: Progressing     Problem: Metabolic/Fluid and

## 2025-01-01 NOTE — CARE COORDINATION
Pt discusse din IDR. Pt to get PICC placed today. Pt WBC elevated . Pt dc changed to 1/2    MSW spoke to Pat with [facility] to change transport to 1/2 at 1400    RICHELLE Cheatham, Supervisee in Social Work  Inpatient Case [facility]

## 2025-01-01 NOTE — PROGRESS NOTES
Pt refused to eat both breakfast and lunch.  She would take 2 bites and tell me she is finished.  Encouraged her to drink Ensure - only could get 1/2 in her.  Pt refused it too.  Turned pt every 2-4 hours.  Tolerated well.

## 2025-01-01 NOTE — PROGRESS NOTES
Hospitalist Progress Note    Patient: Aileen Ley MRN: [id number]  CSN: [id number]    YOB: 1940  Age: 84 y.o.  Sex: female    DOA: 12/24/2024 LOS:  LOS: 8 days         Total duration of encounter: 8 days      Chief Complaint ;  84 y.o. 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  Is sent to ER from [facility] due to hyperkalemia and UTI.     Subjective ;  I feels fine   Will have picc line,   Rn no one can get blood     Review of systems:  Constitutional: denies fevers, chills, myalgias  Respiratory: denies SOB, cough  Cardiovascular: denies chest pain, palpitations  Gastrointestinal: denies nausea, vomiting, diarrhea    10 systems reviewed, all negative other than what is mentioned above.      Vital signs/Intake and Output:  Visit Vitals  BP (!) 147/88   Pulse 60   Temp 97.9 °F (36.6 °C) (Axillary)   Resp 16   Ht 1.575 m (5' 2.01\")   Wt 64 kg (141 lb 1.5 oz)   SpO2 100%   BMI 25.80 kg/m²     Current Shift:  01/01 0701 - 01/01 1900  In: 120 [P.O.:120]  Out: 100 [Urine:50]  Last three shifts:  12/30 1901 - 01/01 0700  In: 326.2 [P.O.:120; I.V.:50]  Out: 1950 [Urine:1050]    Exam:    General: Well developed, alert, NAD, OX3 blindness   Head/Neck: NCAT, supple, No masses, No lymphadenopathy  CVS:Regular rate and rhythm, no M/R/G, S1/S2 heard, no thrill  Lungs:Clear to auscultation bilaterally, no wheezes, rhonchi, or rales  Abdomen: Soft, Nontender, No distention, Normal Bowel sounds, No hepatomegaly nephrectomy bag noticed   Extremities: No C/C/E, pulses palpable 2+, rt bka   Skin:normal texture and turgor, no rashes, no lesions  Neuro:grossly normal , follows commands  Psych:appropriate    Labs: Results:       Chemistry Recent Labs     12/30/24  0400   GLUCOSE 229*      K 3.9      CO2 22   BUN 11   CREATININE 1.10   CALCIUM  from 12/23/2024 showing growth of mixed carl.  Urine cultures from 12/24/2024 showed growth of Klebsiella pneumoniae ESBL  UTI with nephrostomy tube.  nephrostomy tube changed on dec  23   On meropenem-change invaz   Picc line planning        DM-II    continue lantus. Accuchecks and ssi     Right AKA     Debility   Dni/R     Will repeat lab after picc line inserted   Discharge to; , [] Home  [x]SNF/Rehab  []  Others  in  1 Days, []  stable for DC         Case discussed with:  [x]Patient  []Family  [x]Nursing  [x]Case Management [] Consultants  DVT Prophylaxis:  []Lovenox  [x]Hep SQ  []SCDs  []Coumadin, Eliquis, Xarelto, Pradaxa   []On Heparin gtt. [] No indication [] refused     TIME: 55 minutes were spent on the care of this patient today,  This time also includes physician non-face-to-face service time visit on the date of service such as  Preparing to see the patient (eg, review of tests)  Obtaining and/or reviewing separately obtained history  Performing a medically necessary appropriate examination and/or evaluation  Counseling and educating the patient/family/caregiver  Ordering medications, tests, or procedures  Referring and communicating with other health care professionals as needed  Documenting clinical information in the electronic or other health record  Independently interpreting results (not reported separately) and communicating results to the patient/family/caregiver  Care coordination and discharge planning with Case Management.      Dear patient, if you are reviewing this note and have a question regarding the medical terminology, please bring it with you to your next PCP visit.  Medical notes are meant to be a communication between medical professionals.  Additionally, portion of this note were created using voice recognition function, syntax and phonetic over may have escaped proofreading.    Bailey Tobias MD

## 2025-01-01 NOTE — PROGRESS NOTES
Dr Bashir called back to advise we could go ahead with PICC line.  Pt has hx of CKD III and needs a nephrology ok.  Vascular access at side.  Consent from son obtained too.

## 2025-01-01 NOTE — PROCEDURES
PROCEDURE NOTE  Date: 1/1/2025   Name: Aileen Ley  YOB: 1940    Procedures    PICC Line Insertion Procedure Note    Procedure: Insertion of #4 FR/18G PICC    Indications:  Long Term IV therapy    Procedure Details   VAT RN called with request to place PICC for discharge, pt discharging to NH on IV antibiotics.  Nephrology has okayed PICC placement given presence of CKDIII.     Informed consent was obtained for the procedure, including sedation.  Risks of lung perforation, hemorrhage, and adverse drug reaction were discussed. Telephone consent obtained from pt's son Holden Lewis, witnessed by NYA Livingston. Pt is blind, deaf, and confused. Bedside timeout performed with NYA Livingston.    BUE evaluated with US, LESLIE has no vessels of adequate size for a 4f PICC. TREVOR brachial vein identified as appropriate for access, midline to R basilic removed.     #4 FR/18G PICC inserted to the R compressible brachial vein per hospital protocol.   Blood return:  yes    Findings:  Catheter inserted to 37 cm, with 1 cm. Exposed. Mid upper arm circumference is 42 cm.  Catheter was flushed with 20 cc NS. Patient did tolerate procedure well. Bed in low position, call light in reach, handoff given to NYA Livingston.     Recommendations:  CXR ordered to verify placement per facility policy, do not use until placement has been confirmed with radiology.   PICC Brochure placed in chart.

## 2025-01-01 NOTE — PROGRESS NOTES
2000  Patient in bed asleep, A/Ox4, speech Clear, hard of hearing, bed rest, colostomy and nephrostomy in place. On room air, diminished breath sounds bilaterally, respirations unlabored. Skin is warm, dry, with right AKA. Pressure ulcer to sacral area. Dressings clean, dry, and intact. Radial and pedal pulses present bilaterally, capillary refill <3 seconds to fingers and toes. Abdomen soft, bowel sounds active. Weak hand  noted to bilateral upper extremities. Side rails x3 up, bed locked and in lowest position, bed alarm on, call bell and bedside table within reach, needs attended, denies any pain, SOB, or concerns at present.    0240  Pericare and bed bath done. Dressings, linens and gown changed. Ostomy care done. Zinc cream applied to buttocks and posterior thighs.    0700  Phlebotomist attempted to draw AM labs without success due to edema. Informed dayshift Yara FAY. Will have it drawn when they do the PICC line today. Dr. Bailey Tobias informed.    Bedside and Verbal shift change report given to NYA Livingston (oncoming nurse) by NYA Castellanos (offgoing nurse). Report included the following information Nurse Handoff Report, Adult Overview, Intake/Output, MAR, and Recent Results.

## 2025-01-01 NOTE — PROGRESS NOTES
Assessment:      Aileen Ley is a 84 y.o. 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     CC:ARF  Interval History: no interval change since yesterday      Subjective:   PT does not have any somatic complaints              Blood pressure (!) 151/64, pulse 100, temperature 97.9 °F (36.6 °C), temperature source Axillary, resp. rate 16, height 1.575 m (5' 2.01\"), weight 64 kg (141 lb 1.5 oz), SpO2 100%.    Intake/Output Summary (Last 24 hours) at 1/1/2025 1313  Last data filed at 1/1/2025 1021  Gross per 24 hour   Intake 446.22 ml   Output 1250 ml   Net -803.78 ml        awake and alert   Abdomen is soft  No new rash        Intake/Output Summary (Last 24 hours) at 1/1/2025 1313  Last data filed at 1/1/2025 1021  Gross per 24 hour   Intake 446.22 ml   Output 1250 ml   Net -803.78 ml      Recent Labs     12/31/24  1515   WBC 16.3*     Lab Results   Component Value Date/Time     12/30/2024 04:00 AM    K 3.9 12/30/2024 04:00 AM     12/30/2024 04:00 AM    CO2 22 12/30/2024 04:00 AM    BUN 11 12/30/2024 04:00 AM    GFRAA 23.0 12/23/2024 05:06 AM

## 2025-01-02 VITALS
RESPIRATION RATE: 16 BRPM | SYSTOLIC BLOOD PRESSURE: 128 MMHG | OXYGEN SATURATION: 98 % | WEIGHT: 141.09 LBS | BODY MASS INDEX: 25.96 KG/M2 | HEIGHT: 62 IN | HEART RATE: 95 BPM | DIASTOLIC BLOOD PRESSURE: 64 MMHG | TEMPERATURE: 98.2 F

## 2025-01-02 PROBLEM — E46 MALNUTRITION (HCC): Status: ACTIVE | Noted: 2025-01-02

## 2025-01-02 LAB
ANION GAP SERPL CALC-SCNC: 4 MMOL/L (ref 3–18)
BASOPHILS # BLD: 0 K/UL (ref 0–0.1)
BASOPHILS NFR BLD: 0 % (ref 0–2)
BUN SERPL-MCNC: 19 MG/DL (ref 7–18)
BUN/CREAT SERPL: 15 (ref 12–20)
CALCIUM SERPL-MCNC: 6.1 MG/DL (ref 8.5–10.1)
CHLORIDE SERPL-SCNC: 107 MMOL/L (ref 100–111)
CO2 SERPL-SCNC: 32 MMOL/L (ref 21–32)
CREAT SERPL-MCNC: 1.29 MG/DL (ref 0.6–1.3)
DIFFERENTIAL METHOD BLD: ABNORMAL
EOSINOPHIL # BLD: 0.5 K/UL (ref 0–0.4)
EOSINOPHIL NFR BLD: 4 % (ref 0–5)
ERYTHROCYTE [DISTWIDTH] IN BLOOD BY AUTOMATED COUNT: 16.2 % (ref 11.6–14.5)
GLUCOSE BLD STRIP.AUTO-MCNC: 123 MG/DL (ref 70–110)
GLUCOSE SERPL-MCNC: 114 MG/DL (ref 74–99)
HCT VFR BLD AUTO: 26.7 % (ref 35–45)
HGB BLD-MCNC: 8.4 G/DL (ref 12–16)
IMM GRANULOCYTES # BLD AUTO: 0.1 K/UL (ref 0–0.04)
IMM GRANULOCYTES NFR BLD AUTO: 1 % (ref 0–0.5)
LYMPHOCYTES # BLD: 1.6 K/UL (ref 0.9–3.6)
LYMPHOCYTES NFR BLD: 13 % (ref 21–52)
MCH RBC QN AUTO: 28.2 PG (ref 24–34)
MCHC RBC AUTO-ENTMCNC: 31.5 G/DL (ref 31–37)
MCV RBC AUTO: 89.6 FL (ref 78–100)
MONOCYTES # BLD: 0.9 K/UL (ref 0.05–1.2)
MONOCYTES NFR BLD: 7 % (ref 3–10)
NEUTS SEG # BLD: 9.3 K/UL (ref 1.8–8)
NEUTS SEG NFR BLD: 75 % (ref 40–73)
NRBC # BLD: 0 K/UL (ref 0–0.01)
NRBC BLD-RTO: 0 PER 100 WBC
PLATELET # BLD AUTO: 231 K/UL (ref 135–420)
PMV BLD AUTO: 10.1 FL (ref 9.2–11.8)
POTASSIUM SERPL-SCNC: 3.4 MMOL/L (ref 3.5–5.5)
RBC # BLD AUTO: 2.98 M/UL (ref 4.2–5.3)
SODIUM SERPL-SCNC: 143 MMOL/L (ref 136–145)
WBC # BLD AUTO: 12.3 K/UL (ref 4.6–13.2)

## 2025-01-02 PROCEDURE — 36415 COLL VENOUS BLD VENIPUNCTURE: CPT

## 2025-01-02 PROCEDURE — 6370000000 HC RX 637 (ALT 250 FOR IP): Performed by: HOSPITALIST

## 2025-01-02 PROCEDURE — 80048 BASIC METABOLIC PNL TOTAL CA: CPT

## 2025-01-02 PROCEDURE — 6370000000 HC RX 637 (ALT 250 FOR IP): Performed by: STUDENT IN AN ORGANIZED HEALTH CARE EDUCATION/TRAINING PROGRAM

## 2025-01-02 PROCEDURE — 82962 GLUCOSE BLOOD TEST: CPT

## 2025-01-02 PROCEDURE — 2500000003 HC RX 250 WO HCPCS: Performed by: HOSPITALIST

## 2025-01-02 PROCEDURE — 85025 COMPLETE CBC W/AUTO DIFF WBC: CPT

## 2025-01-02 RX ORDER — POTASSIUM CHLORIDE 1500 MG/1
40 TABLET, EXTENDED RELEASE ORAL ONCE
Status: DISCONTINUED | OUTPATIENT
Start: 2025-01-02 | End: 2025-01-02 | Stop reason: HOSPADM

## 2025-01-02 RX ADMIN — METOPROLOL SUCCINATE 50 MG: 50 TABLET, EXTENDED RELEASE ORAL at 09:58

## 2025-01-02 RX ADMIN — AMLODIPINE BESYLATE 2.5 MG: 5 TABLET ORAL at 09:58

## 2025-01-02 RX ADMIN — ALPRAZOLAM 0.5 MG: 0.5 TABLET ORAL at 08:40

## 2025-01-02 RX ADMIN — LEVOTHYROXINE SODIUM 50 MCG: 0.05 TABLET ORAL at 06:00

## 2025-01-02 RX ADMIN — SODIUM BICARBONATE 650 MG TABLET 1300 MG: at 10:04

## 2025-01-02 RX ADMIN — SODIUM CHLORIDE, PRESERVATIVE FREE 10 ML: 5 INJECTION INTRAVENOUS at 10:05

## 2025-01-02 NOTE — PROGRESS NOTES
Care  assisting Care Mgr RICHELLE Cheatham with Discharge Planning needs for patient.  Updates sent to [facility] where patient will discharge today, 1/2/2025.    Will follow.

## 2025-01-02 NOTE — PROGRESS NOTES
2000  Patient in bed asleep, A/Ox4, speech Clear, hard of hearing, bed rest, colostomy and nephrostomy in place. On room air, diminished breath sounds bilaterally, respirations unlabored. Skin is warm, dry, with right AKA. Pressure ulcer to sacral area. Dressings clean, dry, and intact. Radial and pedal pulses present bilaterally, capillary refill <3 seconds to fingers and toes. Abdomen soft, bowel sounds active. Weak hand  noted to bilateral upper extremities. Side rails x3 up, bed locked and in lowest position, bed alarm on, call bell and bedside table within reach, needs attended, denies any pain, SOB, or concerns at present. Patient turned to sides. Zinc cream applied to bilateral posterior thighs.    Patient ate 3 spoonfuls of apple sauce.    0500  Labs collected from PICC and sent to lab.    0600  Patient had half a cup of apple sauce.

## 2025-01-02 NOTE — PROGRESS NOTES
Bedside and Verbal shift change report given to NYA Terry (oncoming nurse) by NYA Castellanos (offgoing nurse). Report included the following information Nurse Handoff Report, Adult Overview, Intake/Output, MAR, and Recent Results.

## 2025-01-02 NOTE — PLAN OF CARE
Problem: Safety - Adult  Goal: Free from fall injury  1/1/2025 1958 by Emanuel Bahena RN  Outcome: Progressing  1/1/2025 1301 by Yara Tang, RN  Outcome: Progressing     Problem: Pain  Goal: Verbalizes/displays adequate comfort level or baseline comfort level  1/1/2025 1958 by Emnauel Bahena RN  Outcome: Progressing  1/1/2025 1301 by Yara Tang, RN  Outcome: Progressing

## 2025-01-02 NOTE — CARE COORDINATION
MSW spoke to attending , pt is medically stable for a discharge today.   MSW scheduled pt transport for 1100 today.   MSW updated the facility in [facility].     RICHELLE Cheatham, Supervisee in Social Work  Inpatient Case Mananger

## 2025-01-02 NOTE — PROGRESS NOTES
Comprehensive High Risk Nutrition Assessment Follow-Up    Type and Reason for Visit:  Reassess    Nutrition Recommendations/Plan:   Diet consistency recs per SLP eval  Cont and monitor toleration of diet and ONS  Cont to check Phos, Mg, and K and replete as needed  K trends low on K suppl  Strongly consider adding Centrum/certa-neida w/min daily for wound healing and given wt loss and poor PO  Cont to monitor ostomy output appears trends down if high rec add zinc sulfate 220mg x 14 days for GI losses  Cont to monitor POC, wt trends, renal fx, lytes, UOP, bowel fx, wound healing and adjust recs as needed     Malnutrition Assessment:  Malnutrition Status:  Moderate malnutrition (01/02/25 1106)    Context:  Acute Illness     Findings of the 6 clinical characteristics of malnutrition:  Energy Intake:  50% or less of estimated energy requirements for 5 or more days  Weight Loss:  Greater than 5% over 1 month (severe ~7% wt loss x 9 days since admit if correct)     Body Fat Loss:  Moderate body fat loss (suspected)     Muscle Mass Loss:  Moderate muscle mass loss      Nutrition Assessment:    83yo F was admitted for hyperkalemia DENNY on CKD and recurrent UTI, resolved DENNY and metabolic acidosis, complicated UTI, +nephrostomy tube, DM-II, Debility, DNR/I h/o vaginal and colon ca, L nephrectomy, R nephrostomy, and colostomy, legal blindness, DM2, R AKA, SBO, multiple decubitus ulcers per MD.  confused. WOCN eval noted PI and excoriation on sacrum and per chart review h/o chronic stage 3 PI sacrum and PI on leg noted.  ~100ml ostomy and ~300ml UOP 1/1 noted.  passed SLP eval 12/27 rec Soft and bite size solid diet with thin liquids. poor PO since admit.  appears was taking some ONS. appears with severe ~7% wt loss x 9 days since admit some maybe fluid.  admit wt was slight down from hx 71kg March '24 if admit wt correct not significant.    Nutrition Related Findings:    K 3.4, , Ca 6.1, lantus 5u, klor-con, na bicarb,

## 2025-01-02 NOTE — PROGRESS NOTES
TRANSFER - OUT REPORT:    Verbal report given to EVELYNE Ley  being transferred to [facility] for routine progression of patient care       Report consisted of patient's Situation, Background, Assessment and   Recommendations(SBAR).     Information from the following report(s) Nurse Handoff Report, Adult Overview, Intake/Output, MAR, and Recent Results was reviewed with the receiving nurse.           Lines:   PICC 01/01/25 Right Brachial (Active)   Central Line Being Utilized Yes 01/01/25 1954   Criteria for Appropriate Use Long term IV/antibiotic administration 01/01/25 1954   Site Assessment Clean, dry & intact 01/01/25 1954   Phlebitis Assessment No symptoms 01/01/25 1954   Infiltration Assessment 0 01/01/25 1954   Extremity Circumference (cm) 42 cm 01/01/25 1530   External Catheter Length (cm) 1 cm 01/01/25 1530   Lumen #1 Color/Status Purple;Brisk blood return;Flushed 01/01/25 1530   Line Care Connections checked and tightened 01/01/25 1954   Alcohol Cap Used Yes 01/01/25 1954   Date of Last Dressing Change 01/01/25 01/01/25 1954   Dressing Type Transparent w/CHG gel;Securing device 01/01/25 1954   Dressing Status Clean, dry & intact 01/01/25 1954   Dressing Intervention New 01/01/25 1530        Opportunity for questions and clarification was provided.      Patient transported with:  Tech

## 2025-01-03 NOTE — PROGRESS NOTES
Physician Progress Note      PATIENT:               KIKO DIOP  CSN #:                  [id number]  :                       1940  ADMIT DATE:       2024 11:03 AM  DISCH DATE:        2025 11:34 AM  RESPONDING  PROVIDER #:        Bailey Tobias MD          QUERY TEXT:    Pt admitted with DENNY and has malnutrition documented in IMPN on . Please   further specify type of malnutrition with documentation in the medical record.    The medical record reflects the following:  Risk Factors: History of colon cancer, 84 yr old female,  Clinical Indicators: IMPN on  CKD 3ga Edema due to malnutrition and low   oncotic pressures  Nutrition PN on  Malnutrition Status:  Moderate malnutrition (25   1106)  Findings of the 6 clinical characteristics of malnutrition:  Energy Intake:  50% or less of estimated energy requirements for 5 or more   days  Weight Loss:  Greater than 5% over 1 month (severe   7% wt loss x 9 days since admit if correct)  Body Fat Loss:  Moderate body fat loss (suspected)  Muscle Mass Loss:  Moderate muscle mass loss  Current BMI (kg/m2): 27.8  Infectious Disease consult on  unexplained weight loss,    Treatment: Nutrition consult, monitor POC, ONS, K suppl      Thank you,  CHRISTOPHER Bhardwaj CDS    ASPEN Criteria:    https://aspenjournals.onlinelibrary.butler.com/doi/full/10.1177/634211134733811  5  Options provided:  -- Moderate Malnutrition  -- Other - I will add my own diagnosis  -- Disagree - Not applicable / Not valid  -- Disagree - Clinically unable to determine / Unknown  -- Refer to Clinical Documentation Reviewer    PROVIDER RESPONSE TEXT:    This patient has moderate malnutrition.    Query created by: Rosa Maria Horner on 2025 11:57 PM      Electronically signed by:  Bailey Tobias MD 1/3/2025 1:55 PM

## 2025-02-22 NOTE — PROCEDURES
ELECTROENCEPHALOGRAPHY     Patient: Whit Coleman MRN: [id number]  CSN: [id number]    YOB: 1940  Age: [de-identified] y.o. Sex: female    DOA: 11/15/2020 LOS:  LOS: 4 days        Date of Service: 11/19/2020    DICTATING: Escobar Mojica MD     REFERRING PHYSICIAN: Dr. Gayle Childs: 20-93    HISTORY OF PRESENT ILLNESS: This is a [de-identified] YO female, who presented with acute encephalopathy and sepsis, UTI. This EEG was performed in order to assess electrodiagnostic risk factors for epilepsy. ELECTROENCEPHALOGRAM INTERPRETATION: This is a referential and bipolar EEG recorded with a 10-20 system. EEG background during wakefulness shows 7 hertz posterior dominant rhythm, which disappears with eye opening or activation procedures. Drowsiness is accompanied by loss of posterior dominant rhythm and generalized alpha and theta activities. Photic stimulation does not elicit any abnormal activity. There are no epileptiform discharges or electrographic seizures in the study. IMPRESSION: This is a normal EEG awake and drowsy. There are no ictal or interictal findings with a specific correlation with seizures. Signed:   Escobar Mojica MD  11/19/2020  9:35 PM None

## 2025-03-08 NOTE — ROUTINE PROCESS
Bedside and Verbal shift change report given to JESSIKA Rosado RN (oncoming nurse) by Ralph Nuñez RN 
 (offgoing nurse). Report included the following information SBAR, Kardex and MAR. Occupational Therapy      Patient Name:  Soila Chávez   MRN:  [id number]    Patient not seen today secondary to  (Pt about to get an EEG per RN.). Will follow-up later today is scheduling permits.    3/8/2025

## (undated) PROCEDURE — 0T25X0Z CHANGE DRAINAGE DEVICE IN KIDNEY, EXTERNAL APPROACH: ICD-10-PCS

## (undated) PROCEDURE — 02HV33Z INSERTION OF INFUSION DEVICE INTO SUPERIOR VENA CAVA, PERCUTANEOUS APPROACH: ICD-10-PCS